# Patient Record
Sex: MALE | Race: ASIAN | NOT HISPANIC OR LATINO | Employment: OTHER | URBAN - METROPOLITAN AREA
[De-identification: names, ages, dates, MRNs, and addresses within clinical notes are randomized per-mention and may not be internally consistent; named-entity substitution may affect disease eponyms.]

---

## 2021-09-14 ENCOUNTER — HOSPITAL ENCOUNTER (INPATIENT)
Facility: CLINIC | Age: 71
LOS: 3 days | Discharge: NURSING, REHAB OR LONG-TERM CARE | DRG: 065 | End: 2021-09-17
Attending: EMERGENCY MEDICINE | Admitting: INTERNAL MEDICINE
Payer: MEDICARE

## 2021-09-14 ENCOUNTER — APPOINTMENT (OUTPATIENT)
Dept: CT IMAGING | Facility: CLINIC | Age: 71
DRG: 065 | End: 2021-09-14
Attending: EMERGENCY MEDICINE
Payer: MEDICARE

## 2021-09-14 ENCOUNTER — APPOINTMENT (OUTPATIENT)
Dept: GENERAL RADIOLOGY | Facility: CLINIC | Age: 71
DRG: 065 | End: 2021-09-14
Attending: EMERGENCY MEDICINE
Payer: MEDICARE

## 2021-09-14 ENCOUNTER — APPOINTMENT (OUTPATIENT)
Dept: MRI IMAGING | Facility: CLINIC | Age: 71
DRG: 065 | End: 2021-09-14
Attending: EMERGENCY MEDICINE
Payer: MEDICARE

## 2021-09-14 DIAGNOSIS — N17.9 ACUTE KIDNEY INJURY (H): ICD-10-CM

## 2021-09-14 DIAGNOSIS — I63.9 CEREBROVASCULAR ACCIDENT (CVA), UNSPECIFIED MECHANISM (H): Primary | ICD-10-CM

## 2021-09-14 DIAGNOSIS — R53.1 RIGHT SIDED WEAKNESS: ICD-10-CM

## 2021-09-14 DIAGNOSIS — I63.50 CEREBRAL ARTERY OCCLUSION WITH CEREBRAL INFARCTION (H): ICD-10-CM

## 2021-09-14 LAB
ALBUMIN UR-MCNC: 50 MG/DL
ANION GAP SERPL CALCULATED.3IONS-SCNC: 6 MMOL/L (ref 3–14)
APPEARANCE UR: CLEAR
APTT PPP: 29 SECONDS (ref 22–38)
ATRIAL RATE - MUSE: 77 BPM
BASOPHILS # BLD AUTO: 0.1 10E3/UL (ref 0–0.2)
BASOPHILS NFR BLD AUTO: 1 %
BILIRUB UR QL STRIP: NEGATIVE
BUN SERPL-MCNC: 35 MG/DL (ref 7–30)
CALCIUM SERPL-MCNC: 8.8 MG/DL (ref 8.5–10.1)
CHLORIDE BLD-SCNC: 98 MMOL/L (ref 94–109)
CO2 SERPL-SCNC: 30 MMOL/L (ref 20–32)
COLOR UR AUTO: YELLOW
CREAT SERPL-MCNC: 2.13 MG/DL (ref 0.66–1.25)
DIASTOLIC BLOOD PRESSURE - MUSE: NORMAL MMHG
EOSINOPHIL # BLD AUTO: 0.2 10E3/UL (ref 0–0.7)
EOSINOPHIL NFR BLD AUTO: 3 %
ERYTHROCYTE [DISTWIDTH] IN BLOOD BY AUTOMATED COUNT: 13.1 % (ref 10–15)
GFR SERPL CREATININE-BSD FRML MDRD: 30 ML/MIN/1.73M2
GLUCOSE BLD-MCNC: 379 MG/DL (ref 70–99)
GLUCOSE UR STRIP-MCNC: >=1000 MG/DL
HCO3 BLDV-SCNC: 31 MMOL/L (ref 21–28)
HCT VFR BLD AUTO: 41.6 % (ref 40–53)
HGB BLD-MCNC: 13.3 G/DL (ref 13.3–17.7)
HGB UR QL STRIP: NEGATIVE
HOLD SPECIMEN: NORMAL
IMM GRANULOCYTES # BLD: 0 10E3/UL
IMM GRANULOCYTES NFR BLD: 0 %
INR PPP: 0.94 (ref 0.85–1.15)
INTERPRETATION ECG - MUSE: NORMAL
KETONES UR STRIP-MCNC: NEGATIVE MG/DL
LACTATE BLD-SCNC: 2.2 MMOL/L
LEUKOCYTE ESTERASE UR QL STRIP: NEGATIVE
LYMPHOCYTES # BLD AUTO: 1.9 10E3/UL (ref 0.8–5.3)
LYMPHOCYTES NFR BLD AUTO: 25 %
MCH RBC QN AUTO: 26.7 PG (ref 26.5–33)
MCHC RBC AUTO-ENTMCNC: 32 G/DL (ref 31.5–36.5)
MCV RBC AUTO: 84 FL (ref 78–100)
MONOCYTES # BLD AUTO: 0.7 10E3/UL (ref 0–1.3)
MONOCYTES NFR BLD AUTO: 10 %
NEUTROPHILS # BLD AUTO: 4.6 10E3/UL (ref 1.6–8.3)
NEUTROPHILS NFR BLD AUTO: 61 %
NITRATE UR QL: NEGATIVE
NRBC # BLD AUTO: 0 10E3/UL
NRBC BLD AUTO-RTO: 0 /100
P AXIS - MUSE: 8 DEGREES
PCO2 BLDV: 47 MM HG (ref 40–50)
PH BLDV: 7.42 [PH] (ref 7.32–7.43)
PH UR STRIP: 6.5 [PH] (ref 5–7)
PLATELET # BLD AUTO: 207 10E3/UL (ref 150–450)
PO2 BLDV: 34 MM HG (ref 25–47)
POTASSIUM BLD-SCNC: 3.4 MMOL/L (ref 3.4–5.3)
PR INTERVAL - MUSE: 176 MS
QRS DURATION - MUSE: 68 MS
QT - MUSE: 400 MS
QTC - MUSE: 452 MS
R AXIS - MUSE: -24 DEGREES
RBC # BLD AUTO: 4.98 10E6/UL (ref 4.4–5.9)
RBC URINE: 2 /HPF
SAO2 % BLDV: 66 % (ref 94–100)
SODIUM SERPL-SCNC: 134 MMOL/L (ref 133–144)
SP GR UR STRIP: 1.02 (ref 1–1.03)
SYSTOLIC BLOOD PRESSURE - MUSE: NORMAL MMHG
T AXIS - MUSE: -4 DEGREES
TROPONIN I SERPL-MCNC: <0.015 UG/L (ref 0–0.04)
UROBILINOGEN UR STRIP-MCNC: NORMAL MG/DL
VENTRICULAR RATE- MUSE: 77 BPM
WBC # BLD AUTO: 7.5 10E3/UL (ref 4–11)
WBC URINE: 0 /HPF

## 2021-09-14 PROCEDURE — 87149 DNA/RNA DIRECT PROBE: CPT | Performed by: EMERGENCY MEDICINE

## 2021-09-14 PROCEDURE — 99291 CRITICAL CARE FIRST HOUR: CPT

## 2021-09-14 PROCEDURE — 81001 URINALYSIS AUTO W/SCOPE: CPT | Performed by: EMERGENCY MEDICINE

## 2021-09-14 PROCEDURE — 71045 X-RAY EXAM CHEST 1 VIEW: CPT

## 2021-09-14 PROCEDURE — 85025 COMPLETE CBC W/AUTO DIFF WBC: CPT | Performed by: EMERGENCY MEDICINE

## 2021-09-14 PROCEDURE — 36415 COLL VENOUS BLD VENIPUNCTURE: CPT | Performed by: EMERGENCY MEDICINE

## 2021-09-14 PROCEDURE — 96361 HYDRATE IV INFUSION ADD-ON: CPT

## 2021-09-14 PROCEDURE — 87077 CULTURE AEROBIC IDENTIFY: CPT | Performed by: EMERGENCY MEDICINE

## 2021-09-14 PROCEDURE — 70498 CT ANGIOGRAPHY NECK: CPT

## 2021-09-14 PROCEDURE — U0003 INFECTIOUS AGENT DETECTION BY NUCLEIC ACID (DNA OR RNA); SEVERE ACUTE RESPIRATORY SYNDROME CORONAVIRUS 2 (SARS-COV-2) (CORONAVIRUS DISEASE [COVID-19]), AMPLIFIED PROBE TECHNIQUE, MAKING USE OF HIGH THROUGHPUT TECHNOLOGIES AS DESCRIBED BY CMS-2020-01-R: HCPCS | Performed by: EMERGENCY MEDICINE

## 2021-09-14 PROCEDURE — 120N000001 HC R&B MED SURG/OB

## 2021-09-14 PROCEDURE — 258N000003 HC RX IP 258 OP 636: Performed by: EMERGENCY MEDICINE

## 2021-09-14 PROCEDURE — 70450 CT HEAD/BRAIN W/O DYE: CPT

## 2021-09-14 PROCEDURE — 85730 THROMBOPLASTIN TIME PARTIAL: CPT | Performed by: EMERGENCY MEDICINE

## 2021-09-14 PROCEDURE — 80048 BASIC METABOLIC PNL TOTAL CA: CPT | Performed by: EMERGENCY MEDICINE

## 2021-09-14 PROCEDURE — 99223 1ST HOSP IP/OBS HIGH 75: CPT | Mod: GC | Performed by: PSYCHIATRY & NEUROLOGY

## 2021-09-14 PROCEDURE — 82803 BLOOD GASES ANY COMBINATION: CPT

## 2021-09-14 PROCEDURE — 250N000009 HC RX 250: Performed by: EMERGENCY MEDICINE

## 2021-09-14 PROCEDURE — 96360 HYDRATION IV INFUSION INIT: CPT | Mod: 59

## 2021-09-14 PROCEDURE — 99223 1ST HOSP IP/OBS HIGH 75: CPT | Mod: AI | Performed by: INTERNAL MEDICINE

## 2021-09-14 PROCEDURE — 93005 ELECTROCARDIOGRAM TRACING: CPT

## 2021-09-14 PROCEDURE — 85610 PROTHROMBIN TIME: CPT | Performed by: EMERGENCY MEDICINE

## 2021-09-14 PROCEDURE — C9803 HOPD COVID-19 SPEC COLLECT: HCPCS

## 2021-09-14 PROCEDURE — 250N000011 HC RX IP 250 OP 636: Performed by: EMERGENCY MEDICINE

## 2021-09-14 PROCEDURE — 70551 MRI BRAIN STEM W/O DYE: CPT | Mod: 52

## 2021-09-14 PROCEDURE — 84484 ASSAY OF TROPONIN QUANT: CPT | Performed by: EMERGENCY MEDICINE

## 2021-09-14 RX ORDER — IOPAMIDOL 755 MG/ML
70 INJECTION, SOLUTION INTRAVASCULAR ONCE
Status: COMPLETED | OUTPATIENT
Start: 2021-09-14 | End: 2021-09-14

## 2021-09-14 RX ADMIN — IOPAMIDOL 70 ML: 755 INJECTION, SOLUTION INTRAVENOUS at 21:49

## 2021-09-14 RX ADMIN — SODIUM CHLORIDE 1000 ML: 9 INJECTION, SOLUTION INTRAVENOUS at 22:32

## 2021-09-14 RX ADMIN — SODIUM CHLORIDE 100 ML: 9 INJECTION, SOLUTION INTRAVENOUS at 21:49

## 2021-09-14 ASSESSMENT — ENCOUNTER SYMPTOMS
WEAKNESS: 1
FACIAL ASYMMETRY: 1

## 2021-09-15 ENCOUNTER — APPOINTMENT (OUTPATIENT)
Dept: CARDIOLOGY | Facility: CLINIC | Age: 71
DRG: 065 | End: 2021-09-15
Attending: INTERNAL MEDICINE
Payer: MEDICARE

## 2021-09-15 ENCOUNTER — APPOINTMENT (OUTPATIENT)
Dept: SPEECH THERAPY | Facility: CLINIC | Age: 71
DRG: 065 | End: 2021-09-15
Attending: INTERNAL MEDICINE
Payer: MEDICARE

## 2021-09-15 ENCOUNTER — APPOINTMENT (OUTPATIENT)
Dept: OCCUPATIONAL THERAPY | Facility: CLINIC | Age: 71
DRG: 065 | End: 2021-09-15
Attending: INTERNAL MEDICINE
Payer: MEDICARE

## 2021-09-15 ENCOUNTER — APPOINTMENT (OUTPATIENT)
Dept: PHYSICAL THERAPY | Facility: CLINIC | Age: 71
DRG: 065 | End: 2021-09-15
Attending: INTERNAL MEDICINE
Payer: MEDICARE

## 2021-09-15 LAB
ALBUMIN SERPL-MCNC: 2.6 G/DL (ref 3.4–5)
ALP SERPL-CCNC: 80 U/L (ref 40–150)
ALT SERPL W P-5'-P-CCNC: 22 U/L (ref 0–70)
ANION GAP SERPL CALCULATED.3IONS-SCNC: 4 MMOL/L (ref 3–14)
AST SERPL W P-5'-P-CCNC: 20 U/L (ref 0–45)
BILIRUB SERPL-MCNC: 0.3 MG/DL (ref 0.2–1.3)
BUN SERPL-MCNC: 30 MG/DL (ref 7–30)
CALCIUM SERPL-MCNC: 8.4 MG/DL (ref 8.5–10.1)
CHLORIDE BLD-SCNC: 104 MMOL/L (ref 94–109)
CHOLEST SERPL-MCNC: 172 MG/DL
CO2 SERPL-SCNC: 29 MMOL/L (ref 20–32)
CREAT SERPL-MCNC: 1.67 MG/DL (ref 0.66–1.25)
ERYTHROCYTE [DISTWIDTH] IN BLOOD BY AUTOMATED COUNT: 13.2 % (ref 10–15)
GFR SERPL CREATININE-BSD FRML MDRD: 41 ML/MIN/1.73M2
GLUCOSE BLD-MCNC: 253 MG/DL (ref 70–99)
GLUCOSE BLDC GLUCOMTR-MCNC: 205 MG/DL (ref 70–99)
GLUCOSE BLDC GLUCOMTR-MCNC: 209 MG/DL (ref 70–99)
GLUCOSE BLDC GLUCOMTR-MCNC: 246 MG/DL (ref 70–99)
GLUCOSE BLDC GLUCOMTR-MCNC: 273 MG/DL (ref 70–99)
GLUCOSE BLDC GLUCOMTR-MCNC: 344 MG/DL (ref 70–99)
GLUCOSE BLDC GLUCOMTR-MCNC: 393 MG/DL (ref 70–99)
HBA1C MFR BLD: 11.5 % (ref 0–5.6)
HCO3 BLDV-SCNC: 29 MMOL/L (ref 21–28)
HCT VFR BLD AUTO: 39.2 % (ref 40–53)
HDLC SERPL-MCNC: 32 MG/DL
HGB BLD-MCNC: 12.5 G/DL (ref 13.3–17.7)
LACTATE BLD-SCNC: 1.3 MMOL/L
LDLC SERPL CALC-MCNC: 92 MG/DL
MCH RBC QN AUTO: 26.8 PG (ref 26.5–33)
MCHC RBC AUTO-ENTMCNC: 31.9 G/DL (ref 31.5–36.5)
MCV RBC AUTO: 84 FL (ref 78–100)
NONHDLC SERPL-MCNC: 140 MG/DL
PCO2 BLDV: 51 MM HG (ref 40–50)
PH BLDV: 7.37 [PH] (ref 7.32–7.43)
PLATELET # BLD AUTO: 176 10E3/UL (ref 150–450)
PO2 BLDV: 28 MM HG (ref 25–47)
POTASSIUM BLD-SCNC: 3.2 MMOL/L (ref 3.4–5.3)
POTASSIUM BLD-SCNC: 3.5 MMOL/L (ref 3.4–5.3)
PROT SERPL-MCNC: 6.6 G/DL (ref 6.8–8.8)
RADIOLOGIST FLAGS: ABNORMAL
RBC # BLD AUTO: 4.66 10E6/UL (ref 4.4–5.9)
SAO2 % BLDV: 50 % (ref 94–100)
SARS-COV-2 RNA RESP QL NAA+PROBE: NEGATIVE
SODIUM SERPL-SCNC: 137 MMOL/L (ref 133–144)
TRIGL SERPL-MCNC: 238 MG/DL
WBC # BLD AUTO: 7.9 10E3/UL (ref 4–11)

## 2021-09-15 PROCEDURE — 250N000009 HC RX 250: Performed by: INTERNAL MEDICINE

## 2021-09-15 PROCEDURE — 97163 PT EVAL HIGH COMPLEX 45 MIN: CPT | Mod: GP | Performed by: PHYSICAL THERAPIST

## 2021-09-15 PROCEDURE — 92610 EVALUATE SWALLOWING FUNCTION: CPT | Mod: GN

## 2021-09-15 PROCEDURE — 97165 OT EVAL LOW COMPLEX 30 MIN: CPT | Mod: GO | Performed by: OCCUPATIONAL THERAPIST

## 2021-09-15 PROCEDURE — 999N000208 ECHOCARDIOGRAM COMPLETE

## 2021-09-15 PROCEDURE — 250N000013 HC RX MED GY IP 250 OP 250 PS 637: Performed by: INTERNAL MEDICINE

## 2021-09-15 PROCEDURE — 120N000001 HC R&B MED SURG/OB

## 2021-09-15 PROCEDURE — 84132 ASSAY OF SERUM POTASSIUM: CPT | Performed by: INTERNAL MEDICINE

## 2021-09-15 PROCEDURE — 99233 SBSQ HOSP IP/OBS HIGH 50: CPT | Performed by: PSYCHIATRY & NEUROLOGY

## 2021-09-15 PROCEDURE — 80061 LIPID PANEL: CPT | Performed by: INTERNAL MEDICINE

## 2021-09-15 PROCEDURE — 97110 THERAPEUTIC EXERCISES: CPT | Mod: GP | Performed by: PHYSICAL THERAPIST

## 2021-09-15 PROCEDURE — 93306 TTE W/DOPPLER COMPLETE: CPT | Mod: 26 | Performed by: INTERNAL MEDICINE

## 2021-09-15 PROCEDURE — 97530 THERAPEUTIC ACTIVITIES: CPT | Mod: GP | Performed by: PHYSICAL THERAPIST

## 2021-09-15 PROCEDURE — 82803 BLOOD GASES ANY COMBINATION: CPT

## 2021-09-15 PROCEDURE — 250N000012 HC RX MED GY IP 250 OP 636 PS 637: Performed by: INTERNAL MEDICINE

## 2021-09-15 PROCEDURE — 258N000003 HC RX IP 258 OP 636: Performed by: EMERGENCY MEDICINE

## 2021-09-15 PROCEDURE — 99221 1ST HOSP IP/OBS SF/LOW 40: CPT | Performed by: STUDENT IN AN ORGANIZED HEALTH CARE EDUCATION/TRAINING PROGRAM

## 2021-09-15 PROCEDURE — 258N000003 HC RX IP 258 OP 636: Performed by: INTERNAL MEDICINE

## 2021-09-15 PROCEDURE — 80053 COMPREHEN METABOLIC PANEL: CPT | Performed by: INTERNAL MEDICINE

## 2021-09-15 PROCEDURE — 255N000002 HC RX 255 OP 636: Performed by: INTERNAL MEDICINE

## 2021-09-15 PROCEDURE — 83036 HEMOGLOBIN GLYCOSYLATED A1C: CPT | Performed by: INTERNAL MEDICINE

## 2021-09-15 PROCEDURE — 97535 SELF CARE MNGMENT TRAINING: CPT | Mod: GO | Performed by: OCCUPATIONAL THERAPIST

## 2021-09-15 PROCEDURE — 250N000011 HC RX IP 250 OP 636: Performed by: INTERNAL MEDICINE

## 2021-09-15 PROCEDURE — C9113 INJ PANTOPRAZOLE SODIUM, VIA: HCPCS | Performed by: INTERNAL MEDICINE

## 2021-09-15 PROCEDURE — 97530 THERAPEUTIC ACTIVITIES: CPT | Mod: GO | Performed by: OCCUPATIONAL THERAPIST

## 2021-09-15 PROCEDURE — 36415 COLL VENOUS BLD VENIPUNCTURE: CPT | Performed by: INTERNAL MEDICINE

## 2021-09-15 PROCEDURE — 99233 SBSQ HOSP IP/OBS HIGH 50: CPT | Performed by: INTERNAL MEDICINE

## 2021-09-15 PROCEDURE — 85027 COMPLETE CBC AUTOMATED: CPT | Performed by: INTERNAL MEDICINE

## 2021-09-15 RX ORDER — ASPIRIN 81 MG/1
81 TABLET, CHEWABLE ORAL DAILY
Status: CANCELLED | OUTPATIENT
Start: 2021-09-15

## 2021-09-15 RX ORDER — CLOPIDOGREL BISULFATE 75 MG/1
75 TABLET ORAL DAILY
Status: DISCONTINUED | OUTPATIENT
Start: 2021-09-15 | End: 2021-09-16

## 2021-09-15 RX ORDER — ASPIRIN 81 MG/1
81 TABLET ORAL DAILY
Status: CANCELLED | OUTPATIENT
Start: 2021-09-15

## 2021-09-15 RX ORDER — NALOXONE HYDROCHLORIDE 0.4 MG/ML
0.2 INJECTION, SOLUTION INTRAMUSCULAR; INTRAVENOUS; SUBCUTANEOUS
Status: DISCONTINUED | OUTPATIENT
Start: 2021-09-15 | End: 2021-09-17 | Stop reason: HOSPADM

## 2021-09-15 RX ORDER — ASPIRIN 81 MG/1
81 TABLET, CHEWABLE ORAL DAILY
Status: DISCONTINUED | OUTPATIENT
Start: 2021-09-16 | End: 2021-09-17 | Stop reason: HOSPADM

## 2021-09-15 RX ORDER — AMLODIPINE BESYLATE 10 MG/1
10 TABLET ORAL DAILY
Status: ON HOLD | COMMUNITY
End: 2021-10-06

## 2021-09-15 RX ORDER — ATENOLOL 25 MG/1
25 TABLET ORAL 2 TIMES DAILY
Status: DISCONTINUED | OUTPATIENT
Start: 2021-09-15 | End: 2021-09-17 | Stop reason: HOSPADM

## 2021-09-15 RX ORDER — ONDANSETRON 4 MG/1
4 TABLET, ORALLY DISINTEGRATING ORAL EVERY 6 HOURS PRN
Status: DISCONTINUED | OUTPATIENT
Start: 2021-09-15 | End: 2021-09-15

## 2021-09-15 RX ORDER — ONDANSETRON 2 MG/ML
4 INJECTION INTRAMUSCULAR; INTRAVENOUS EVERY 6 HOURS PRN
Status: DISCONTINUED | OUTPATIENT
Start: 2021-09-15 | End: 2021-09-17 | Stop reason: HOSPADM

## 2021-09-15 RX ORDER — TAMSULOSIN HYDROCHLORIDE 0.4 MG/1
0.4 CAPSULE ORAL DAILY
Status: ON HOLD | COMMUNITY
End: 2021-10-06

## 2021-09-15 RX ORDER — NICOTINE POLACRILEX 4 MG
15-30 LOZENGE BUCCAL
Status: DISCONTINUED | OUTPATIENT
Start: 2021-09-15 | End: 2021-09-17 | Stop reason: HOSPADM

## 2021-09-15 RX ORDER — CLOPIDOGREL BISULFATE 75 MG/1
75 TABLET ORAL DAILY
COMMUNITY

## 2021-09-15 RX ORDER — HYDROCHLOROTHIAZIDE 25 MG/1
25 TABLET ORAL DAILY
Status: ON HOLD | COMMUNITY
End: 2021-09-17

## 2021-09-15 RX ORDER — ATORVASTATIN CALCIUM 80 MG/1
80 TABLET, FILM COATED ORAL DAILY
COMMUNITY

## 2021-09-15 RX ORDER — CLOPIDOGREL BISULFATE 75 MG/1
75 TABLET ORAL DAILY
Status: CANCELLED | OUTPATIENT
Start: 2021-09-15

## 2021-09-15 RX ORDER — ACETAMINOPHEN 325 MG/1
650 TABLET ORAL EVERY 4 HOURS PRN
Status: DISCONTINUED | OUTPATIENT
Start: 2021-09-15 | End: 2021-09-17 | Stop reason: HOSPADM

## 2021-09-15 RX ORDER — SENNOSIDES 8.6 MG
8.6 TABLET ORAL 2 TIMES DAILY PRN
Status: DISCONTINUED | OUTPATIENT
Start: 2021-09-15 | End: 2021-09-17 | Stop reason: HOSPADM

## 2021-09-15 RX ORDER — INSULIN ASPART 100 [IU]/ML
INJECTION, SOLUTION INTRAVENOUS; SUBCUTANEOUS
Status: ON HOLD | COMMUNITY
End: 2021-10-06

## 2021-09-15 RX ORDER — SPIRONOLACTONE 25 MG/1
25 TABLET ORAL DAILY
Status: ON HOLD | COMMUNITY
End: 2021-09-17

## 2021-09-15 RX ORDER — NALOXONE HYDROCHLORIDE 0.4 MG/ML
0.4 INJECTION, SOLUTION INTRAMUSCULAR; INTRAVENOUS; SUBCUTANEOUS
Status: DISCONTINUED | OUTPATIENT
Start: 2021-09-15 | End: 2021-09-17 | Stop reason: HOSPADM

## 2021-09-15 RX ORDER — PROCHLORPERAZINE 25 MG
12.5 SUPPOSITORY, RECTAL RECTAL EVERY 12 HOURS PRN
Status: DISCONTINUED | OUTPATIENT
Start: 2021-09-15 | End: 2021-09-17 | Stop reason: HOSPADM

## 2021-09-15 RX ORDER — ATORVASTATIN CALCIUM 80 MG/1
80 TABLET, FILM COATED ORAL DAILY
Status: DISCONTINUED | OUTPATIENT
Start: 2021-09-15 | End: 2021-09-17 | Stop reason: HOSPADM

## 2021-09-15 RX ORDER — ACETAMINOPHEN 325 MG/1
650 TABLET ORAL EVERY 6 HOURS PRN
Status: DISCONTINUED | OUTPATIENT
Start: 2021-09-15 | End: 2021-09-15

## 2021-09-15 RX ORDER — DEXTROSE MONOHYDRATE 25 G/50ML
25-50 INJECTION, SOLUTION INTRAVENOUS
Status: DISCONTINUED | OUTPATIENT
Start: 2021-09-15 | End: 2021-09-17 | Stop reason: HOSPADM

## 2021-09-15 RX ORDER — LIDOCAINE 40 MG/G
CREAM TOPICAL
Status: DISCONTINUED | OUTPATIENT
Start: 2021-09-15 | End: 2021-09-15

## 2021-09-15 RX ORDER — PROCHLORPERAZINE MALEATE 5 MG
5 TABLET ORAL EVERY 6 HOURS PRN
Status: DISCONTINUED | OUTPATIENT
Start: 2021-09-15 | End: 2021-09-15

## 2021-09-15 RX ORDER — ASPIRIN 81 MG/1
81 TABLET, CHEWABLE ORAL DAILY
Status: ON HOLD | COMMUNITY
End: 2021-11-03

## 2021-09-15 RX ORDER — NICOTINE POLACRILEX 4 MG
15-30 LOZENGE BUCCAL
Status: DISCONTINUED | OUTPATIENT
Start: 2021-09-15 | End: 2021-09-15

## 2021-09-15 RX ORDER — LANOLIN ALCOHOL/MO/W.PET/CERES
3 CREAM (GRAM) TOPICAL
Status: DISCONTINUED | OUTPATIENT
Start: 2021-09-15 | End: 2021-09-17 | Stop reason: HOSPADM

## 2021-09-15 RX ORDER — POTASSIUM CHLORIDE 1500 MG/1
20 TABLET, EXTENDED RELEASE ORAL DAILY
Status: ON HOLD | COMMUNITY
End: 2021-10-06

## 2021-09-15 RX ORDER — POLYETHYLENE GLYCOL 3350 17 G/17G
17 POWDER, FOR SOLUTION ORAL DAILY
Status: DISCONTINUED | OUTPATIENT
Start: 2021-09-15 | End: 2021-09-17 | Stop reason: HOSPADM

## 2021-09-15 RX ORDER — PROCHLORPERAZINE 25 MG
12.5 SUPPOSITORY, RECTAL RECTAL EVERY 12 HOURS PRN
Status: DISCONTINUED | OUTPATIENT
Start: 2021-09-15 | End: 2021-09-15

## 2021-09-15 RX ORDER — LIDOCAINE HYDROCHLORIDE 20 MG/ML
JELLY TOPICAL EVERY 4 HOURS PRN
Status: DISCONTINUED | OUTPATIENT
Start: 2021-09-15 | End: 2021-09-17 | Stop reason: HOSPADM

## 2021-09-15 RX ORDER — LABETALOL HYDROCHLORIDE 5 MG/ML
10-20 INJECTION, SOLUTION INTRAVENOUS EVERY 10 MIN PRN
Status: DISCONTINUED | OUTPATIENT
Start: 2021-09-15 | End: 2021-09-17 | Stop reason: HOSPADM

## 2021-09-15 RX ORDER — ACETAMINOPHEN 650 MG/1
650 SUPPOSITORY RECTAL EVERY 6 HOURS PRN
Status: DISCONTINUED | OUTPATIENT
Start: 2021-09-15 | End: 2021-09-15

## 2021-09-15 RX ORDER — DEXTROSE MONOHYDRATE 25 G/50ML
25-50 INJECTION, SOLUTION INTRAVENOUS
Status: DISCONTINUED | OUTPATIENT
Start: 2021-09-15 | End: 2021-09-15

## 2021-09-15 RX ORDER — LOSARTAN POTASSIUM 100 MG/1
100 TABLET ORAL DAILY
Status: ON HOLD | COMMUNITY
End: 2021-09-17

## 2021-09-15 RX ORDER — TAMSULOSIN HYDROCHLORIDE 0.4 MG/1
0.4 CAPSULE ORAL DAILY
Status: DISCONTINUED | OUTPATIENT
Start: 2021-09-15 | End: 2021-09-17 | Stop reason: HOSPADM

## 2021-09-15 RX ORDER — ACETAMINOPHEN 650 MG/1
650 SUPPOSITORY RECTAL EVERY 4 HOURS PRN
Status: DISCONTINUED | OUTPATIENT
Start: 2021-09-15 | End: 2021-09-17 | Stop reason: HOSPADM

## 2021-09-15 RX ORDER — SODIUM CHLORIDE 9 MG/ML
INJECTION, SOLUTION INTRAVENOUS CONTINUOUS
Status: DISCONTINUED | OUTPATIENT
Start: 2021-09-15 | End: 2021-09-17 | Stop reason: HOSPADM

## 2021-09-15 RX ORDER — ATENOLOL 50 MG/1
50 TABLET ORAL 2 TIMES DAILY
Status: ON HOLD | COMMUNITY
End: 2021-10-06

## 2021-09-15 RX ORDER — HYDRALAZINE HYDROCHLORIDE 20 MG/ML
10-20 INJECTION INTRAMUSCULAR; INTRAVENOUS EVERY 30 MIN PRN
Status: DISCONTINUED | OUTPATIENT
Start: 2021-09-15 | End: 2021-09-17 | Stop reason: HOSPADM

## 2021-09-15 RX ORDER — LIDOCAINE 40 MG/G
CREAM TOPICAL
Status: DISCONTINUED | OUTPATIENT
Start: 2021-09-15 | End: 2021-09-17 | Stop reason: HOSPADM

## 2021-09-15 RX ORDER — ASPIRIN 300 MG/1
300 SUPPOSITORY RECTAL DAILY
Status: DISCONTINUED | OUTPATIENT
Start: 2021-09-15 | End: 2021-09-15

## 2021-09-15 RX ORDER — HYDROMORPHONE HCL IN WATER/PF 6 MG/30 ML
0.2 PATIENT CONTROLLED ANALGESIA SYRINGE INTRAVENOUS
Status: DISCONTINUED | OUTPATIENT
Start: 2021-09-15 | End: 2021-09-17 | Stop reason: HOSPADM

## 2021-09-15 RX ORDER — PROCHLORPERAZINE MALEATE 5 MG
5 TABLET ORAL EVERY 6 HOURS PRN
Status: DISCONTINUED | OUTPATIENT
Start: 2021-09-15 | End: 2021-09-17 | Stop reason: HOSPADM

## 2021-09-15 RX ORDER — CLOPIDOGREL 300 MG/1
300 TABLET, FILM COATED ORAL ONCE
Status: CANCELLED | OUTPATIENT
Start: 2021-09-15

## 2021-09-15 RX ORDER — ONDANSETRON 4 MG/1
4 TABLET, ORALLY DISINTEGRATING ORAL EVERY 6 HOURS PRN
Status: DISCONTINUED | OUTPATIENT
Start: 2021-09-15 | End: 2021-09-17 | Stop reason: HOSPADM

## 2021-09-15 RX ORDER — ONDANSETRON 2 MG/ML
4 INJECTION INTRAMUSCULAR; INTRAVENOUS EVERY 6 HOURS PRN
Status: DISCONTINUED | OUTPATIENT
Start: 2021-09-15 | End: 2021-09-15

## 2021-09-15 RX ADMIN — HUMAN ALBUMIN MICROSPHERES AND PERFLUTREN 9 ML: 10; .22 INJECTION, SOLUTION INTRAVENOUS at 15:25

## 2021-09-15 RX ADMIN — SODIUM CHLORIDE: 9 INJECTION, SOLUTION INTRAVENOUS at 17:48

## 2021-09-15 RX ADMIN — ASPIRIN 300 MG: 300 SUPPOSITORY RECTAL at 10:53

## 2021-09-15 RX ADMIN — INSULIN ASPART 2 UNITS: 100 INJECTION, SOLUTION INTRAVENOUS; SUBCUTANEOUS at 13:06

## 2021-09-15 RX ADMIN — ATENOLOL 25 MG: 25 TABLET ORAL at 11:38

## 2021-09-15 RX ADMIN — LIDOCAINE HYDROCHLORIDE 6 ML: 20 JELLY TOPICAL at 13:31

## 2021-09-15 RX ADMIN — INSULIN ASPART 3 UNITS: 100 INJECTION, SOLUTION INTRAVENOUS; SUBCUTANEOUS at 06:37

## 2021-09-15 RX ADMIN — TAMSULOSIN HYDROCHLORIDE 0.4 MG: 0.4 CAPSULE ORAL at 11:38

## 2021-09-15 RX ADMIN — ATENOLOL 25 MG: 25 TABLET ORAL at 21:35

## 2021-09-15 RX ADMIN — CLOPIDOGREL BISULFATE 75 MG: 75 TABLET ORAL at 17:43

## 2021-09-15 RX ADMIN — SODIUM CHLORIDE 1000 ML: 9 INJECTION, SOLUTION INTRAVENOUS at 00:09

## 2021-09-15 RX ADMIN — INSULIN ASPART 2 UNITS: 100 INJECTION, SOLUTION INTRAVENOUS; SUBCUTANEOUS at 09:40

## 2021-09-15 RX ADMIN — PANTOPRAZOLE SODIUM 40 MG: 40 INJECTION, POWDER, FOR SOLUTION INTRAVENOUS at 09:30

## 2021-09-15 RX ADMIN — SODIUM CHLORIDE: 9 INJECTION, SOLUTION INTRAVENOUS at 07:39

## 2021-09-15 RX ADMIN — SODIUM CHLORIDE: 9 INJECTION, SOLUTION INTRAVENOUS at 02:30

## 2021-09-15 RX ADMIN — INSULIN GLARGINE 20 UNITS: 100 INJECTION, SOLUTION SUBCUTANEOUS at 21:35

## 2021-09-15 RX ADMIN — ATORVASTATIN CALCIUM 80 MG: 80 TABLET, FILM COATED ORAL at 11:38

## 2021-09-15 RX ADMIN — INSULIN ASPART 3 UNITS: 100 INJECTION, SOLUTION INTRAVENOUS; SUBCUTANEOUS at 02:15

## 2021-09-15 ASSESSMENT — ACTIVITIES OF DAILY LIVING (ADL)
ADLS_ACUITY_SCORE: 21
PREVIOUS_RESPONSIBILITIES: MEDICATION MANAGEMENT;DRIVING
ADLS_ACUITY_SCORE: 19
ADLS_ACUITY_SCORE: 17
ADLS_ACUITY_SCORE: 17
ADLS_ACUITY_SCORE: 21

## 2021-09-15 NOTE — PROGRESS NOTES
09/15/21 1000   Quick Adds   Type of Visit Initial Occupational Therapy Evaluation   Living Environment   People in home spouse   Current Living Arrangements house   Home Accessibility stairs to enter home;stairs within home   Number of Stairs, Main Entrance   (1-2, no rail)   Number of Stairs, Within Home, Primary   (uses stair/chair lift)   Transportation Anticipated   (pt. driving at baseline)   Living Environment Comments Per. dtr. report, pt. typically stays on  main level, uses 4WW in/utside the home, also uses a cane outside the  home at times;has a commode over the toilet and a tub/shower combo. w/shower chair, grab bars and hand-held shower hose.    Self-Care   Usual Activity Tolerance moderate   Current Activity Tolerance fair   Regular Exercise Yes   Activity/Exercise Type walking   Exercise Amount/Frequency   (walks around [place]de[place]sac 2X/day w/4WW)   Equipment Currently Used at Home cane, straight;grab bar, tub/shower;shower chair;walker, rolling;other (see comments)  (commode--uses over the toilet)   Activity/Exercise/Self-Care Comment Pt. overall indep./mod. indep. w/mobility + ADL's w/ exception of dressing, bathing--gets some assist, dtr. not sure to what level;pt. completes all grooming, toileting tasks on his own;pt. boudreaux smedication mgmt. on his own.    Instrumental Activities of Daily Living (IADL)   Previous Responsibilities medication management;driving  (spouse does the cooking,cleaning and laundry)   Disability/Function   Walking or Climbing Stairs Difficulty yes   Walking or Climbing Stairs ambulation difficulty, requires equipment   Mobility Management 4WW;also occasionally uses cane   Dressing/Bathing Difficulty yes   Dressing/Bathing bathing difficulty, requires equipment;bathing difficulty, assistance 1 person;dressing difficulty, assistance 1 person   Toileting issues yes   Toileting Assistance toileting difficulty, requires equipment   Fall history within last six months yes   Number  of times patient has fallen within last six months 2   Change in Functional Status Since Onset of Current Illness/Injury yes   General Information   Onset of Illness/Injury or Date of Surgery 09/14/21   Referring Physician    Additional Occupational Profile Info/Pertinent History of Current Problem OT:Jimmy Otto is a 71 year old male admitted on 9/14/2021. He presents to the emergency department with approximately 2 days of some mild increased weakness last seen by sister-in-law at 6 PM and fairly baseline. Shortly thereafter, patient developed significant expressive aphasia and weakness, found to have new right thalamic stroke with prior left thalamic stroke.   Performance Patterns (Routines, Roles, Habits) Per dtr., has had 2 previous strokes;pt. w/ baseline R-sdied weakness, limited RUE ROM.   Existing Precautions/Restrictions fall;swallowing  (pureed w/thin liquids)   Limitations/Impairments swallowing   General Observations and Info Pt. sleepy, following commands, dtr. present, expressive aphasia;per chart--pt. had slow speech at baseline, currently w/ increaased impairment   Cognitive Status Examination   Orientation Status person;time  (stated correct month)   Follows Commands follows one-step commands;75-90% accuracy   Cognitive Status Comments difficult to assess due to aphasia   Visual Perception   Impact of Vision Impairment on Function (Vision) able to visually track object w/ exception of L upper quadrant--will need to further assess   Sensory   Sensory Comments light touch intact;nonumbness/tingling reported   Pain Assessment   Patient Currently in Pain No   Posture   Posture forward head position;protracted shoulders   Range of Motion Comprehensive   Comment, General Range of Motion RUE--able to demo. digit flex./ext., no other AROM, hold arm w/ elbow flexed, supinated;PROM=WFL all planes/shoulder to 90 egrees; LUE=scap./shoulder to WFL;elbow through hand=WNL/WFL, some difficulty w/  command-following for testing   Strength Comprehensive (MMT)   Comment, General Manual Muscle Testing (MMT) Assessment RUE:impaired/weak from previous strok LUE:grossly 3+/5 shoulder strength, 4-/5 elbow flex./ext. + forearm pron./sup.    Coordination   Upper Extremity Coordination Left UE impaired   Coordination Comments R sided hemiparesis from previous CVA   Bed Mobility   Comment (Bed Mobility) mod.-max. A   Balance   Balance Comments sitting balance w/ initial mod. A, progressed to SBA-CGA   Lower Body Dressing Assessment   [place] Level (Lower Body Dressing) dependent (less than 25% patient effort)   Grooming Assessment   [place] Level (Grooming) moderate assist (50% patient effort)   Clinical Impression   Criteria for Skilled Therapeutic Interventions Met (OT) yes   OT Diagnosis Declinein ADl performance   OT Problem List-Impairments impacting ADL problems related to;activity tolerance impaired;balance;cognition;communication;coordination;mobility;motor control;range of motion (ROM);strength;pain;postural control;vision   Assessment of Occupational Performance 5 or more Performance Deficits   Identified Performance Deficits dressing,bathing, groooming, toileting, mobility/transfers, IADL's   Planned Therapy Interventions (OT) ADL retraining;IADL retraining;balance training;cognition;fine motor coordination training;motor coordination training;neuromuscular re-education;ROM;strengthening;transfer training;visual perception;home program guidelines;progressive activity/exercise   Clinical Decision Making Complexity (OT) low complexity   Therapy Frequency (OT) Daily   Predicted Duration of Therapy 3-5 days   Anticipated Equipment Needs Upon Discharge (OT)   (defer to rehab)   Risk & Benefits of therapy have been explained evaluation/treatment results reviewed;care plan/treatment goals reviewed;risks/benefits reviewed;current/potential barriers reviewed;participants voiced agreement with care  plan;participants included;patient;daughter   OT Discharge Planning    OT Discharge Recommendation (DC Rec) [facility] patient will benefit from intensive, interdisciplinary therapy.  Anticipate will be able to tolerate 3 hours of therapy per day   OT Rationale for DC Rec OT:Pt. is currently well below ADL baseline and would benefit from continued intensivev daily OT to maximize safety/indep. w/ ADL's prior to discharge home;pt. appears m otivated w/ good family support.    Total Evaluation Time (Minutes)   Total Evaluation Time (Minutes) 10

## 2021-09-15 NOTE — ED PROVIDER NOTES
History   Chief Complaint:  Altered Mental Status     The history is provided by the patient, medical records and the EMS personnel.      Jimmy Otto is a 71 year old male with history of hypertension, type 2 diabetes, and stroke who presents with altered mental status. The patient's family states that approximately 3 hours ago at 1800 the patient started to mentally decline as he had difficulty balancing and was unable to ambulate, although he has difficulty ambulating at baseline. Since 1800, they have been trying to have him agree to go to the ED but the patient declined. Since symptom onset, the patient has developed a right-sided facial droop and lost movement in his right side, prompting his family to call EMS. En route his blood sugar was noted to be 450 and noted the patient to be fairly unresponsive. Here in the ED the patient is able to recite his name but not his birthday, and denies any headache. Of note the patient had a stroke 7 years ago. He has marks on his chest from a coining ceremony as well.    Review of Systems   Unable to perform ROS: Acuity of condition   Neurological: Positive for facial asymmetry (right side droop) and weakness.       Allergies:  No known drug allergies    Medications:  Insulin  Lisinopril  Metformin  Zocor  Maxzide  Plavix  Oretic  Cozaar    Past Medical History:    Type 2 Diabetes  Hypercholesteremia  Hypertension  Unspecified Cerebral Artery Occlusion with Cerebral Infarction  Stroke  Sleep Apnea  Torn Rotator Cuff  Imbalance  Hyperlipidemia  Microalbuminuria  Depression  Gait Abnormality  Acute Renal Failure  Dysphagia    Past Surgical History:    No past surgical history on file.    Family History:    Mother - Cataract    Social History:  Marital Status:     Physical Exam     Patient Vitals for the past 24 hrs:   BP Temp Temp src Pulse Resp SpO2 Weight   09/14/21 2300 -- -- -- 71 19 97 % --   09/14/21 2230 (!) 169/93 -- -- 73 -- 97 % --   09/14/21 2200 (!) 181/95  -- -- 101 16 98 % --   09/14/21 2150 (!) 172/96 -- -- 77 -- 98 % --   09/14/21 2148 -- 97.7  F (36.5  C) Temporal -- -- -- 63.8 kg (140 lb 10.5 oz)   09/14/21 2145 -- -- -- 77 20 98 % --   09/14/21 2140 (!) 183/97 -- -- 77 18 98 % --       Physical Exam   Eyes:  The pupils are equal and round    Conjunctivae and sclerae are normal  ENT:    The nose is normal    Pinnae are normal  CV:  Regular rate and rhythm     No edema  Resp:  Lungs are clear    Non-labored    No rales    No wheezing   GI:  Abdomen is soft, there is no rigidity    No distension    No rebound tenderness   MS:  Normal muscular tone    No asymmetric leg swelling  Skin:  No rash or acute skin lesions noted  Neuro:   Awake, alert, GCS 15    Speech is slow but does not appear slurred    Face is symmetric    Right sided weakness, hold right leg off bed briefly    No drift in LLE    No drift in LUE    Effort of right upper extremity against gravity    SILT in all four extremities    Equal sensation bilaterally    No inattention    EOMI    No apparent visual field deficits      Emergency Department Course   ECG (21:43:59):  Indication: Screening for cardiovascular disease.   Rate 77 bpm. WV interval 176. QRS duration 68. QT/QTc 400/452. P-R-T axes 8 -24 -4.   Interpretation: Sinus rhythm with fusion complexes. Moderate voltage criteria for LVH, may be normal variant. Junctional ST depression, probably abnormal. Abnormal ECG.  Interpreted at 2145 by Dr. Young.     Imaging:  XR Chest Port 1 view   IMPRESSION: Somewhat low lung volumes. Atelectasis/infiltrate at the left base. Right lung appears clear. No significant pleural fluid. No pneumothorax. Normal heart size and pulmonary vascularity.  Reading per radiology    CTA Head Neck w contrast   IMPRESSION:   HEAD CT:   1.  No CT evidence of acute intracranial hemorrhage, mass or recent infarct.   2.  Chronic infarct involving the left basal ganglia and thalamus.   3.  Moderate presumed chronic small vessel  ischemic changes.   4.  Mild to moderate generalized volume loss.     HEAD CTA:   1.  No major vessel acute thromboembolism, flow-limiting stenosis or occlusion.     NECK CTA:   1.  No measurable stenosis in either proximal internal carotid artery.   2.  Vertebral arteries are patent through the neck and into head.   Reading per radiology    MR Brain w/o Contrast  IMPRESSION:   1.  Technically suboptimal secondary to patient motion and inability of the patient to continue with the complete sequences.   2.  15 mm in diameter acute/early subacute infarct involving the anteromedial right thalamus.   3.  Chronic infarct in the left basal ganglia and thalamus.   Per radiology    Laboratory:  CBC: WBC 7.5, HGB 13.3,    BMP: Glucose 379 (H), Urea Nitrogen 35 (H), GFR 30 (L), o/w WNL (Creatinine 2.13 (H))     ISTAT Gases Lactate Venous (2227): LACTW 2.2 (H), Bicarbonate POCT 31 (H), O2 Sat POCT 66 (L), AWNL  ISTAT Gases Lactate Venous (0110): [place] POCT 29 (H), O2 POCT 50 (L), PCO2 POCT 51 (H), AWNL    Asymptomatic COVID19 Virus PCR by nasopharyngeal swab pending    Blood Culture: Pending    PTT: 29    INR: 0.94    Troponin: (Collected 2142) <0.015    UA: Glucose >= 1000 (!), Protein Albumin 50 (!)    Blood Culture: Pending    Asymptomatic COVID19 Virus PCR by nasopharyngeal swab pending    Emergency Department Course:    National [employer] Stroke Scale  Exam Interval: Baseline   Score    Level of consciousness: (1)   Not alert; arousable w/ minor stim to obey/answer/respond    LOC questions: (0)   Answers both questions correctly    LOC commands: (0)   Performs both tasks correctly    Best gaze: (0)   Normal    Visual: (0)   No visual loss    Facial palsy: (1)   Minor paralysis (flat nasolabial fold, smile asymmetry)    Motor arm (left): (0)   No drift    Motor arm (right): (2)   Some effort against gravity    Motor leg (left): (0)   No drift    Motor leg (right): (1)   Drift    Limb ataxia: (0)    Absent    Sensory: (0)   Normal- no sensory loss    Best language: (0)   Normal- no aphasia    Dysarthria: (0)   Normal    Extinction and inattention: (0)   No abnormality        Total Score:  5      Elevated lactate not suspected to be due to infection. Elevated lactate secondary to dehydration.    Reviewed:  I reviewed nursing notes, vitals, past medical history and care everywhere    Assessments:  2137 Patient arrives via EMS.  2139 Tier 1 Code Stroke called.  2139 I obtained history and examined the patient as noted above  2142 Patient left ED to go to CT.  2330 I rechecked the patient and explained findings.   2335 Set for admission.    Consults:   2141 I spoke with Dr. Homero Matthews of the stroke neuro service from [place] regarding patient's presentation, findings, and plan of care.  2212 I spoke with Dr. Homero Matthews of the stroke neuro service from [place] regarding patient's presentation, findings, and plan of care.  2330 I spoke with Dr. De Guzman of the neurology service from [place] regarding patient's presentation, findings, and plan of care.    Interventions:  2232 Normal Saline 1000 mL IV    Disposition:  The patient was admitted to the hospital under the care of Dr. Edouard De Guzman.     Impression & Plan   Medical Decision Making:  Jimmy Otto is a 71 year old male who presents to the emergency department with weakness and confusion. Family noted to EMS that at about 6pm the patient started to have difficulty with balance and ambulating. He had some coining done by family and seemed to lose movement of his right side. On arrival here, he does have movements of his right side available. This does seem quite weak. Left side did not seem to show weakness to gravity especially when compared to the right. Code stroke was called. He was taken to CT which showed signs of chronic stroke, but no large vessel occlusion. Given timing, he does not meet the criteria for IVTPA. Later when discussing with family, they  noted that the right-sided symptoms were quite chronic and upon review and results of MRI there appears to be a new stroke on the right thalamus and chronic findings on the left side of the brain. Lactate was slightly elevated at 2.2. He has not been having fevers or hypoxia, no signs of a UTI. He does have acute kidney injuries which I suspect the elevated lactate is secondary to dehydration. Lactate was rechecked after 2L of saline provided. Repeat lactate was 1.3. Admitted patient to neurology floor.     Diagnosis:    ICD-10-CM    1. Right sided weakness  R53.1    2. Acute kidney injury (H)  N17.9      Scribe Disclosure:  I, Jesika Harrison, am serving as a scribe at 9:39 PM on 9/14/2021 to document services personally performed by Jose Young MD, based on my observations and the provider's statements to me.      Jose Young MD  09/15/21 0150

## 2021-09-15 NOTE — ED TRIAGE NOTES
OPt arrives via EMS for eval of altered mental status. LKN 1800. Pt became off, R side deficits. Hx of stroke 7 years ago. Coining marks noted to chest

## 2021-09-15 NOTE — H&P
[facility]    History and Physical - Hospitalist Service       Date of Admission:  9/14/2021    Assessment & Plan      Jimmy Otto is a 71 year old male admitted on 9/14/2021. He presents to the emergency department with approximately 2 days of some mild increased weakness last seen by sister-in-law at 6 PM and fairly baseline. Shortly thereafter, patient developed significant expressive aphasia and weakness, found to have new right thalamic stroke with prior left thalamic stroke.    Acute right thalamic CVA:  -Physical therapy, Occupational Therapy consulted  -N.p.o. until cleared by speech pathology  -Stroke neurology consulted, aware of patient from emergency department  -Rectal aspirin daily while n.p.o; holding on Plavix load as neurology has concern for cardioembolic source requiring anticoagulation.  -Normal saline at 100/h  -Case management consult for disposition planning; anticipate need for rehab, potentially acute rehab, at discharge. Discussed with family as well as patient  -Lipid panel pending; resume prior to admission atorvastatin 80 mg daily when tolerating oral intake  -Cardiac telemetry, TTE    Uncontrolled type 2 diabetes: Most recent hemoglobin A1c of 9.4 6/28/2021 through outside hospital system. Appears to be on 28 units long-acting insulin daily  -Lantus 15 units at bedtime given n.p.o. status  -Every 4 hour blood glucose checks with medium dose sliding scale insulin available  -Hypoglycemia protocol in place  -Prior to admission Metformin on hold given acute renal failure. baseline creatinine was 1.1.   -Avoid NSAID's except daily aspirin if indicated  -Start IV fluids  -Repeat BMP in AM    Hypertension:  -Prior to admission Cozaar 100 mg, spironolactone 25 mg, hydrochlorothiazide 25 on hold given acute renal failure  -Permissive hypertension at this time  -As needed labetalol and hydralazine if needed  -Resuming prior to admission atenolol 50 mg twice daily at half  dose per stroke protocol    Acute renal failure: Creatinine of 2.13, baseline of 1.1 as of 6/28/2021 through [facility]. He reports he has been eating and drinking normally, no nausea or vomiting, no diarrhea. Does have a history of nephrolithiasis in the past, though no flank pain, no hematuria. UA generally not concerning.  -Trend CMP  -Continue Flomax as below  -Holding prior to admission Cozaar, spironolactone, hydrochlorothiazide  -Received 2 L normal saline bolus in the emergency department  -If patient does not have significant improvement in his renal function in a.m., consider renal ultrasound  -Bladder scan, straight catheterize as needed. If significant issues with retention, Place Felix catheter as bladder outlet obstruction might be cause of patient's renal injury in the absence of other provoking factors.    BPH:  -Resume prior to admission Flomax 0.4 mg daily when able to tolerate oral intake  -Bladder scan, straight catheterize as needed    Hyperlipidemia:  -Fasting lipid panel in a.m.  -Continue atorvastatin 80 when able to tolerate oral intake       Diet: NPO for Medical/Clinical Reasons Except for: No Exceptions    DVT Prophylaxis: Pneumatic Compression Devices  Felix Catheter: Not present  Central Lines: None  Code Status:  Full code. Confirmed with patient on admission    Clinically Significant Risk Factors Present on Admission              # Platelet Defect: home medication list includes an antiplatelet medication      Disposition Plan   Expected discharge: Expected discharge likely 3 days recommended to Acute rehabilitation once Stroke work-up complete, safe disposition plan in place.     The patient's care was discussed with the Patient and Patient's 2 daughters, sister-in-law at bedside, Dr. Young in the emergency department. Discussed also with stroke neurology service..    Edouard De Guzman MD  [facility]  Securely message with the Vocera Web Console  (learn more here)  Text page via [facility] Paging/Directory      ______________________________________________________________________    Chief Complaint   Weakness, expressive aphasia    History is obtained from patient, chart review, review of outside records including prior creatinine and hemoglobin A1c through [facility], patient's 2 daughters at bedside, patient's sister-in-law at bedside, discussion with Dr. Young in the emergency department. Patient is not able to provide any significant verbal history secondary to his expressive aphasia. Patient's answers are largely yes or no with directed questioning, and this does limit his ability to provide a full history..    History of Present Illness   Jimmy Otto is a 71 year old male who presents to the emergency department for evaluation of weakness, expressive aphasia. 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 This stroke took place in approximately 2007/2008, and patient went to acute rehab following this. Patient lives at home with his wife. Per daughters at bedside, wife is not particularly robust. Patient has a history of hyperlipidemia, hypertension on multiple agents, and uncontrolled type 2 diabetes. Per daughters, they have attempted to assist him with medication management, though patient generally has been somewhat resistant to assistance with this, is in charge of his own medications. He has had improvement in his hemoglobin A1c over the past year, last 9.4 in June.    In discussion with patient, he is no longer taking his aspirin. He is unable to tell me why. He reports no bleeding, has not caused him any ill effect. A doctor has not told him to discontinue aspirin. He is not able to verbally tell me why he is no longer taking aspirin..    For the past 2 days or so, patient has been had some increased weakness. Per daughters, he typically goes on walks, though has not been  doing this. He reports no shortness of breath, no nausea or vomiting, no diarrhea. Tells me that he has been eating and drinking normally. He has no pain. He tells me that he does need to get up frequently at night to urinate. He tells me that he has been taking his antihypertensive medications and insulin, though again, confirms that he is not taking his daily aspirin    At approximately 6 PM, patient's sister-in-law, who lives next door, came over to do a coining on patient's chest. She tells me that he was conversant at that time, the reason for the Coining was his weakness over the past days. This is when patient was last seen at his approximate normal baseline, subsequently had worsening of his weakness and expressive aphasia resulting in presentation to the emergency department. A code stroke was called on arrival. CT and CTA without acute findings, though MRI confirmed a new right thalamic CVA.    Discussed with family, ER provider, and stroke neurology. There is concern for cardioembolic source of stroke given history of left thalamic and now with right thalamic stroke. Rectal aspirin without Plavix load given possible anticoagulation related to this.    Patient reports he has no pain. No shortness of breath. He has 1 coughing/clearing throat episode which sounds different to daughters than his baseline, though also in the setting of recent stroke. Patient is able to express that is his his throat that he was clearing, not a sensation in his chest. He reports no fevers or chills    Review of Systems    The 10 point Review of Systems is negative other than noted in the HPI or here.  No fevers or chills  No nausea vomiting or diarrhea  No pain    Past Medical History    I have reviewed this patient's medical history and updated it with pertinent information if needed.   Past Medical History:   Diagnosis Date     Diabetes mellitus (H)      Hypercholesteremia      Hypertension      Unspecified cerebral artery  occlusion with cerebral infarction     TIA 8/11       Past Surgical History   I have reviewed this patient's surgical history and updated it with pertinent information if needed.  No prior surgical history    Social History   I have reviewed this patient's social history and updated it with pertinent information if needed.  Social History     Tobacco Use     Smoking status: Never Smoker   Substance Use Topics     Alcohol use: Yes     Comment: occassional     Drug use: No       Family History     No family history of diabetes.    Prior to Admission Medications   Prior to Admission Medications   Prescriptions Last Dose Informant Patient Reported? Taking?   METFORMIN HCL PO   Yes No   Sig: Take 1,000 mg by mouth 2 times daily (with meals).     aspirin  MG EC tablet   No No   Sig: Take 1 tablet by mouth daily.   insulin aspart (NovoLOG) injection   Yes No   Sig: Inject  Subcutaneous 3 times daily (before meals). dispense flex pen, Per sliding scale   insulin glargine (LANTUS) 100 UNIT/ML injection   Yes No   Sig: Inject 20 Units Subcutaneous every evening.   lisinopril (PRINIVIL,ZESTRIL) 5 MG tablet   No No   Sig: Take 1 tablet by mouth daily.   simvastatin (ZOCOR) 20 MG tablet   No No   Sig: Take 1 tablet by mouth every evening.   triamterene-hydrochlorothiazide (MAXZIDE-25) 37.5-25 MG per tablet   Yes No   Sig: Take 1 tablet by mouth daily.        Facility-Administered Medications: None     Allergies   Allergies   Allergen Reactions     Nka [No Known Allergies]        Physical Exam   Vital Signs: Temp: 97.7  F (36.5  C) Temp src: Temporal BP: (!) 169/93 Pulse: 71   Resp: 19 SpO2: 97 % O2 Device: None (Room air)    Weight: 140 lbs 10.46 oz    General Appearance: Somewhat fatigued appearing 71-year-old  American male laying in bed in no acute distress.  Eyes: No scleral icterus or injection  HEENT: Atraumatic. No overt facial droop appreciated, though flattened expression/facies  Respiratory: Breath sounds  are clear bilateral auscultation no wheezes or crackles.  Cardiovascular: Regular rate and rhythm, no murmur  GI: Abdomen soft, nontender to palpation. Appears to have some suprapubic fullness, though no discomfort reported by patient with palpation and recently catheterized for urine  Lymph/Hematologic: Patient has some right lower extremity swelling associated with distant history of stroke which is unchanged per family at bedside  Skin: No jaundice, no petechiae. Has Coining marks around his neck on chest from ceremony approximately 6 PM this evening  Musculoskeletal: Diffuse weakness, contractures of right hand present. No subcutaneous fat wasting.  Neurologic: Patient with weakness of his left upper extremity most notable in  strength and slowed movement, though able to resist against flexion extension at wrist 5/5. Has chronic right sided upper and lower extremity weakness which is more profound than that now noted on left. Reflexes are brisk bilaterally, though no upgoing Babinski on left. Some contractures of the right hand are present and chronic, not noted on left hand. Dense expressive aphasia, though does not have any notable receptive aphasia. At baseline daughters report that his speech is slowed. He has no difficulty with English, though currently he mostly needs to answer by shaking his head yes or no. Is occasionally able to vocalize yes, no, and at one point says the word throat to indicate that he was clearing his throat. Strength is more preserved in left lower extremity as compared to left upper extremity  Psychiatric: Normal affect, pleasant    Data   Data reviewed today: I reviewed all medications, new labs and imaging results over the last 24 hours. I personally reviewed the brain MRI image(s) showing Bilateral thalamic infarcts. Reviewed with family at bedside..    Recent Labs   Lab 09/14/21 2145 09/14/21  2142   WBC  --  7.5   HGB  --  13.3   MCV  --  84   PLT  --  207   INR 0.94  --     NA  --  134   POTASSIUM  --  3.4   CHLORIDE  --  98   CO2  --  30   BUN  --  35*   CR  --  2.13*   ANIONGAP  --  6   ARLETH  --  8.8   GLC  --  379*   TROPONIN  --  <0.015

## 2021-09-15 NOTE — ED NOTES
[facility]  ED Nurse Handoff Report    ED Chief complaint: Altered Mental Status      ED Diagnosis:   Final diagnoses:   Right sided weakness   Acute kidney injury (H)       Code Status: see admitting MD    Allergies:   Allergies   Allergen Reactions     Nka [No Known Allergies]        Patient Story: 71 year old male arrives to the ED via ambulance for evaluation of altered mental status. arrives via EMS for eval of altered mental status. LKN 1800. Pt became off, R side deficits. Hx of stroke 7 years ago. Coining marks noted to chest  Focused Assessment:  Last known normal was at 1800.Right side deficits noted. Has a Hx of a stroke 7 years ago. Coining marks on upper chest noted.    Treatments and/or interventions provided: covid swab, labs, CT head, CTA Head Neck, MRI Brain, EKG, NS 2L  Patient's response to treatments and/or interventions: no change    To be done/followed up on inpatient unit:  monitor    Does this patient have any cognitive concerns?: normally he does not but he has an altered mental status due to stroke    Activity level - Baseline/Home:  Walker  Activity Level - Current:   Total Care    Patient's Preferred language: English   Needed?: No    Isolation: None  Infection: Not Applicable  Patient tested for COVID 19 prior to admission: YES  Bariatric?: No    Vital Signs:   Vitals:    09/14/21 2200 09/14/21 2230 09/14/21 2300 09/15/21 0000   BP: (!) 181/95 (!) 169/93  138/72   Pulse: 101 73 71 72   Resp: 16  19 16   Temp:       TempSrc:       SpO2: 98% 97% 97%    Weight:           Cardiac Rhythm:     Was the PSS-3 completed:   Yes  What interventions are required if any?               Family Comments: a couple of family members in the lobby and two family members at patient's bedside.  OBS brochure/video discussed/provided to patient/family: N/A              Name of person given brochure if not patient: NA              Relationship to patient: NA    For the majority of the  shift this patient's behavior was Green.   Behavioral interventions performed were none.    ED NURSE PHONE NUMBER: [phone]

## 2021-09-15 NOTE — PROGRESS NOTES
[facility]    Medicine Progress Note - Hospitalist Service       Date of Admission:  9/14/2021    Assessment & Plan           Jimmy Otto is a 71 year old male admitted on 9/14/2021. He presents to the emergency department with approximately 2 days of some mild increased weakness last seen by sister-in-law at 6 PM and fairly baseline. Shortly thereafter, patient developed significant expressive aphasia and weakness, found to have new right thalamic stroke with prior left thalamic stroke.    Acute right thalamic CVA:  -Physical therapy, Occupational Therapy consulted  - appreciate speech pathology  -Stroke neurology consulted  -Rectal aspirin daily while n.p.o; holding on Plavix load as neurology has concern for cardioembolic source requiring anticoagulation.  -Normal saline at 100/h  -Case management consult for disposition planning; anticipate need for rehab, potentially acute rehab, at discharge.   -Lipid panel pending; resume prior to admission atorvastatin 80 mg daily when tolerating oral intake  -Cardiac telemetry, TTE    Uncontrolled type 2 diabetes: Most recent hemoglobin A1c of 9.4 6/28/2021 through outside hospital system. Appears to be on 28 units long-acting insulin daily  -Lantus increased to 20 units 09/15  -Every 4 hour blood glucose checks with medium dose sliding scale insulin available  -Hypoglycemia protocol in place  -Prior to admission Metformin on hold given acute renal failure. baseline creatinine was 1.1.   -Avoid NSAID's except daily aspirin if indicated  -Start IV fluids    Recent Labs   Lab 09/15/21  0858 09/15/21  0606 09/15/21  0521 09/15/21  0207 09/14/21  2142   * 246* 253* 273* 379*     Hypertension:  -Prior to admission Cozaar 100 mg, spironolactone 25 mg, hydrochlorothiazide 25 on hold given acute renal failure  -Permissive hypertension at this time  -As needed labetalol and hydralazine if needed  -Ct prior to admission atenolol @ 25mg bid. (PTA dose 50  mg twice daily)     Acute renal failure: Creatinine of 2.13, baseline of 1.1 as of 6/28/2021 through   [facility] system.   Creatinine   Date Value Ref Range Status   09/15/2021 1.67 (H) 0.66 - 1.25 mg/dL Final   08/16/2012 0.96 0.66 - 1.25 mg/dL Final     Does have a history of nephrolithiasis in the past, though no flank pain, no hematuria. UA generally not concerning.  -Trend CMP  -Continue Flomax as below  -Holding prior to admission Cozaar, spironolactone, hydrochlorothiazide  -Received 2 L normal saline bolus in the emergency department  -Bladder scan, straight catheterize as needed. If significant issues with retention, Place Felix catheter as bladder outlet obstruction might be cause of patient's renal injury in the absence of other provoking factors.    BPH:  -Resume prior to admission Flomax 0.4 mg daily when able to tolerate oral intake  -Bladder scan, straight catheterize as needed    Hyperlipidemia:  -Fasting lipid panel (LDL- 92)   -Continue atorvastatin 80 when able to tolerate oral intake       Diet: Combination Diet Pureed Diet (level 4); Thin Liquids (level 0); No Straws    DVT Prophylaxis: Pneumatic Compression Devices  Felix Catheter: Not present  Central Lines: None  Code Status: Full Code      Disposition Plan   Expected discharge: 09/17/2021   recommended to transitional care unit once mental status at baseline and safe disposition plan/ TCU bed available.     The patient's care was discussed with the Bedside Nurse, Care Coordinator/, Patient and Patient's Family.    Nazario Cooper MD, MD  Hospitalist Service  [facility]  Securely message with the Vocera Web Console (learn more here)  Text page via [employer] Paging/Directory    Clinically Significant Risk Factors Present on Admission              # Platelet Defect: home medication list includes an antiplatelet medication    ______________________________________________________________________    Interval  History   History revisited from EMR, night MD, patient's daughter.    Increased weakness and speech difficulty  Has been having urinary retention issues as well required straight catheter 3 times   4 point ROS done and negative unless mentioned     Data reviewed today: I reviewed all medications, new labs and imaging results over the last 24 hours. I personally reviewed the head CT image(s) showing As below.    Physical Exam   BP (!) 148/93 (BP Location: Right arm)   Pulse 63   Temp 98.7  F (37.1  C) (Oral)   Resp 14   Wt 63.8 kg (140 lb 10.5 oz)   SpO2 97%   BMI 24.14 kg/m    Gen- pleasant  lying in bed  HEENT- NAD, ROGER  Neck- supple, no JVD elevation, no thyromegaly  CVS- I+II+ no m/r/g  RS- CTABS  Abdo- soft, no tenderness . No g/r/r, BS+ no hepatosplenomegaly   Ext- no edema   CNS-dense expressive aphasia.  Left-sided weakness (further as detailed in neurology note_     Data    BMPRecent Labs   Lab 09/15/21  0858 09/15/21  0701 09/15/21  0606 09/15/21  0521 09/15/21  0207 09/14/21  2142   NA  --   --   --  137  --  134   POTASSIUM  --  3.5  --  3.2*  --  3.4   CHLORIDE  --   --   --  104  --  98   ARLETH  --   --   --  8.4*  --  8.8   CO2  --   --   --  29  --  30   BUN  --   --   --  30  --  35*   CR  --   --   --  1.67*  --  2.13*   *  --  246* 253* 273* 379*     CBC  Recent Labs   Lab 09/15/21  0521 09/14/21  2142 09/14/21  2142   WBC 7.9  --  7.5   RBC 4.66  --  4.98   HGB 12.5*   < > 13.3   HCT 39.2*  --  41.6   MCV 84  --  84   MCH 26.8  --  26.7   MCHC 31.9  --  32.0   RDW 13.2  --  13.1     --  207    < > = values in this interval not displayed.     INR  Recent Labs   Lab 09/14/21  2145   INR 0.94     LFTs  Recent Labs   Lab 09/15/21  0521   ALKPHOS 80   AST 20   ALT 22   BILITOTAL 0.3   PROTTOTAL 6.6*   ALBUMIN 2.6*      PANCNo lab results found in last 7 days.    Recent Results (from the past 24 hour(s))   CT Head w/o Contrast    Narrative    EXAM: CTA  HEAD NECK WITH CONTRAST, CT  HEAD W/O CONTRAST  LOCATION: [facility]  DATE/TIME: 9/14/2021 9:48 PM    INDICATION: History of stroke with right-sided deficits, now presents with worsening right-sided deficits. Follow-up/evaluate cerebrovascular disease.  COMPARISON: 08/13/2012  CONTRAST: 70mL Isovue-370  TECHNIQUE: Head and neck CT angiogram with IV contrast. Noncontrast head CT followed by axial helical CT images of the head and neck vessels obtained during the arterial phase of intravenous contrast administration. Axial 2D reconstructed images and   multiplanar 3D MIP reconstructed images of the head and neck vessels were performed by the technologist. Dose reduction techniques were used. All stenosis measurements made according to NASCET criteria unless otherwise specified.    FINDINGS:   NONCONTRAST HEAD CT:   INTRACRANIAL CONTENTS: No intracranial hemorrhage, extraaxial collection, or mass effect.  No CT evidence of acute infarct. There is a chronic infarct involving the left basal ganglia and thalamus. Moderate presumed chronic small vessel ischemic changes.   Mild to moderate generalized volume loss. No hydrocephalus.     VISUALIZED ORBITS/SINUSES/MASTOIDS: No intraorbital abnormality. Trace pneumatized fluid in the sphenoid sinuses. No middle ear or mastoid effusion.    BONES/SOFT TISSUES: No acute abnormality.    HEAD CTA:  ANTERIOR CIRCULATION: No major vessel acute thromboembolism, flow-limiting stenosis or occlusion. There are mild atheromatous changes in the carotid siphons. Standard [place] of Eller anatomy.    POSTERIOR CIRCULATION: No stenosis/occlusion, aneurysm, or high flow vascular malformation. Dominant right and smaller left vertebral artery contribute to a normal basilar artery.     DURAL VENOUS SINUSES: Expected enhancement of the major dural venous sinuses.    NECK CTA:  RIGHT CAROTID: Mild atheromatous changes. No measurable stenosis or dissection.    LEFT CAROTID: Mild atheromatous changes.   No measurable stenosis or dissection.    VERTEBRAL ARTERIES: No focal stenosis or dissection. Dominant right and smaller left vertebral arteries.    AORTIC ARCH: Vascular variant aortic arch origin left vertebral artery.  No significant stenosis at the origin of the great vessels.    NONVASCULAR STRUCTURES: Unremarkable.      Impression    IMPRESSION:   HEAD CT:  1.  No CT evidence of acute intracranial hemorrhage, mass or recent infarct.  2.  Chronic infarct involving the left basal ganglia and thalamus.  3.  Moderate presumed chronic small vessel ischemic changes.  4.  Mild to moderate generalized volume loss.    HEAD CTA:   1.  No major vessel acute thromboembolism, flow-limiting stenosis or occlusion.    NECK CTA:  1.  No measurable stenosis in either proximal internal carotid artery.  2.  Vertebral arteries are patent through the neck and into head.    Results of the CT and CTA were called to Dr. Young at 10:19 PM on 09/14/2021.   CTA Head Neck with Contrast    Narrative    EXAM: CTA  HEAD NECK WITH CONTRAST, CT HEAD W/O CONTRAST  LOCATION: [facility]  DATE/TIME: 9/14/2021 9:48 PM    INDICATION: History of stroke with right-sided deficits, now presents with worsening right-sided deficits. Follow-up/evaluate cerebrovascular disease.  COMPARISON: 08/13/2012  CONTRAST: 70mL Isovue-370  TECHNIQUE: Head and neck CT angiogram with IV contrast. Noncontrast head CT followed by axial helical CT images of the head and neck vessels obtained during the arterial phase of intravenous contrast administration. Axial 2D reconstructed images and   multiplanar 3D MIP reconstructed images of the head and neck vessels were performed by the technologist. Dose reduction techniques were used. All stenosis measurements made according to NASCET criteria unless otherwise specified.    FINDINGS:   NONCONTRAST HEAD CT:   INTRACRANIAL CONTENTS: No intracranial hemorrhage, extraaxial collection, or mass effect.  No  CT evidence of acute infarct. There is a chronic infarct involving the left basal ganglia and thalamus. Moderate presumed chronic small vessel ischemic changes.   Mild to moderate generalized volume loss. No hydrocephalus.     VISUALIZED ORBITS/SINUSES/MASTOIDS: No intraorbital abnormality. Trace pneumatized fluid in the sphenoid sinuses. No middle ear or mastoid effusion.    BONES/SOFT TISSUES: No acute abnormality.    HEAD CTA:  ANTERIOR CIRCULATION: No major vessel acute thromboembolism, flow-limiting stenosis or occlusion. There are mild atheromatous changes in the carotid siphons. Standard [place] of Eller anatomy.    POSTERIOR CIRCULATION: No stenosis/occlusion, aneurysm, or high flow vascular malformation. Dominant right and smaller left vertebral artery contribute to a normal basilar artery.     DURAL VENOUS SINUSES: Expected enhancement of the major dural venous sinuses.    NECK CTA:  RIGHT CAROTID: Mild atheromatous changes. No measurable stenosis or dissection.    LEFT CAROTID: Mild atheromatous changes.  No measurable stenosis or dissection.    VERTEBRAL ARTERIES: No focal stenosis or dissection. Dominant right and smaller left vertebral arteries.    AORTIC ARCH: Vascular variant aortic arch origin left vertebral artery.  No significant stenosis at the origin of the great vessels.    NONVASCULAR STRUCTURES: Unremarkable.      Impression    IMPRESSION:   HEAD CT:  1.  No CT evidence of acute intracranial hemorrhage, mass or recent infarct.  2.  Chronic infarct involving the left basal ganglia and thalamus.  3.  Moderate presumed chronic small vessel ischemic changes.  4.  Mild to moderate generalized volume loss.    HEAD CTA:   1.  No major vessel acute thromboembolism, flow-limiting stenosis or occlusion.    NECK CTA:  1.  No measurable stenosis in either proximal internal carotid artery.  2.  Vertebral arteries are patent through the neck and into head.    Results of the CT and CTA were called to   Hannah at 10:19 PM on 09/14/2021.   XR Chest Port 1 View    Narrative    EXAM: XR CHEST PORT 1 VIEW  LOCATION: [facility]  DATE/TIME: 9/14/2021 10:18 PM    INDICATION: weakness  COMPARISON: 08/13/2012.      Impression    IMPRESSION: Somewhat low lung volumes. Atelectasis/infiltrate at the left base. Right lung appears clear. No significant pleural fluid. No pneumothorax. Normal heart size and pulmonary vascularity.   MR Brain w/o Contrast   Result Value    Radiologist flags (AA)     15 mm acute/early subacute infarct in the anteromedial right thalamus.    Narrative    EXAM: MR BRAIN W/O CONTRAST  LOCATION: [facility]  DATE/TIME: 9/14/2021 11:14 PM    INDICATION: Neuro deficit, acute, stroke suspected  COMPARISON: Head CT 09/14/2021 MRI brain 08/13/2012  TECHNIQUE: Head MRI without IV contrast including diffusion weighted imaging, FLAIR and hemosiderin sensitive sequences.    FINDINGS: Technically suboptimal secondary to motion and lack of FLAIR imaging which the patient could not tolerate.  INTRACRANIAL CONTENTS: There is a 15 mm in diameter zone of restricted diffusion involving the anteromedial right thalamus. No mass, acute hemorrhage, or extra-axial fluid collections. Chronic infarct in the left thalamus and basal ganglia. Mild to   moderate generalized cerebral atrophy. No hydrocephalus. Normal position of the cerebellar tonsils.     OTHER: Accounting for technique no additional abnormalities identified.      Impression    IMPRESSION:  1.  Technically suboptimal secondary to patient motion and inability of the patient to continue with the complete sequences.  2.  15 mm in diameter acute/early subacute infarct involving the anteromedial right thalamus.  3.  Chronic infarct in the left basal ganglia and thalamus.      [Critical Result: 15 mm acute/early subacute infarct in the anteromedial right thalamus.]    Finding was identified on 9/14/2021 11:37 PM.       Jose Young was contacted by me on 9/14/2021 11:44 PM and verbalized understanding of the critical result.

## 2021-09-15 NOTE — PROVIDER NOTIFICATION
Writer paged Dr. Cooper, Patient diet advanced to Pureed, meds will need to be crushed, please see meds are crushable. Also patient's daughter at bedside if you want to see patient. Please advise

## 2021-09-15 NOTE — PROVIDER NOTIFICATION
Writer paged Dr. Cooper, you wanna change the q4hr BG checks change to meals and HS, correct, please update orders. Néstor NEVES

## 2021-09-15 NOTE — PHARMACY-ADMISSION MEDICATION HISTORY
Pharmacy Medication History  Admission medication history interview status for the 9/14/2021  admission is complete. See EPIC admission navigator for prior to admission medications     Location of Interview: Patient room  Medication history sources: Patient's family/friend (daughter), Surescripts and Care Everywhere    Significant changes made to the medication list:   Added all medications. Deleted old list.    Medication reconciliation completed by provider prior to medication history? Yes    Time spent in this activity: 20 minutes     Prior to Admission medications    Medication Sig Last Dose Taking? Auth Provider   acetaminophen-codeine (TYLENOL #3) 300-30 MG tablet Take 1 tablet by mouth every 6 hours as needed for severe pain  Yes Unknown, Entered By History   amLODIPine (NORVASC) 10 MG tablet Take 10 mg by mouth daily  Yes Unknown, Entered By History   aspirin (ASA) 81 MG chewable tablet Take 81 mg by mouth daily  Yes Unknown, Entered By History   atenolol (TENORMIN) 50 MG tablet Take 50 mg by mouth 2 times daily  Yes Unknown, Entered By History   atorvastatin (LIPITOR) 80 MG tablet Take 80 mg by mouth daily  Yes Unknown, Entered By History   clopidogrel (PLAVIX) 75 MG tablet Take 75 mg by mouth daily  Yes Unknown, Entered By History   hydrochlorothiazide (HYDRODIURIL) 25 MG tablet Take 25 mg by mouth daily  Yes Unknown, Entered By History   insulin aspart (NOVOLOG FLEXPEN) 100 UNIT/ML pen Inject Subcutaneous 3 times daily (with meals) 2 units/carb + sliding scale insulin (50 units per 50 > 150)  Yes Unknown, Entered By History   insulin detemir (LEVEMIR PEN) 100 UNIT/ML pen Inject 28 Units Subcutaneous At Bedtime  Yes Unknown, Entered By History   losartan (COZAAR) 100 MG tablet Take 100 mg by mouth daily  Yes Unknown, Entered By History   metFORMIN (GLUCOPHAGE) 500 MG tablet Take 500 mg by mouth 2 times daily (with meals)  Yes Unknown, Entered By History   potassium chloride ER (KLOR-CON M) 20 MEQ CR tablet  Take 20 mEq by mouth daily  Yes Unknown, Entered By History   spironolactone (ALDACTONE) 25 MG tablet Take 25 mg by mouth daily  Yes Unknown, Entered By History   tamsulosin (FLOMAX) 0.4 MG capsule Take 0.4 mg by mouth daily  Yes Unknown, Entered By History       The information provided in this note is only as accurate as the sources available at the time of update(s)     Connie Downing, PharmD, BCCCP

## 2021-09-15 NOTE — CONSULTS
Urology Consult    Name: Jimmy Otto    MRN: [id number]   YOB: 1950               Chief Complaint:   Retention, gross hematuria    History is obtained from the patient and chart review          History of Present Illness:   Jimmy Otto is a 71 year old male admitted for thalamic stroke who required CIC multiple times yesterday for urinary retention now with Felix placed who has had intermitted hematuria. He has also had an ALEX to 2.1 which improved to 1.67 today with bladder emptying. He is on Plavix. UA negative for infection.     Urine is light red. Patient declines . He denies any difficulty voiding or hematuria prior to his stroke.           Past Medical History:     Past Medical History:   Diagnosis Date     Diabetes mellitus (H)      Hypercholesteremia      Hypertension      Unspecified cerebral artery occlusion with cerebral infarction     TIA 8/11            Past Surgical History:   No past surgical history on file.         Social History:     Social History     Tobacco Use     Smoking status: Never Smoker   Substance Use Topics     Alcohol use: Yes     Comment: occassional            Family History:   No family history on file.         Allergies:     Allergies   Allergen Reactions     Nka [No Known Allergies]             Medications:     Current Facility-Administered Medications   Medication     acetaminophen (TYLENOL) tablet 650 mg    Or     acetaminophen (TYLENOL) solution 650 mg    Or     acetaminophen (TYLENOL) Suppository 650 mg     [START ON 9/16/2021] aspirin (ASA) chewable tablet 81 mg     atenolol (TENORMIN) tablet 25 mg     atorvastatin (LIPITOR) tablet 80 mg     clopidogrel (PLAVIX) tablet 75 mg     glucose gel 15-30 g    Or     dextrose 50 % injection 25-50 mL    Or     glucagon injection 1 mg     hydrALAZINE (APRESOLINE) injection 10-20 mg     HYDROmorphone (DILAUDID) injection 0.2 mg     insulin aspart (NovoLOG) injection (RAPID ACTING)     insulin aspart (NovoLOG) injection  (RAPID ACTING)     insulin glargine (LANTUS PEN) injection 20 Units     labetalol (NORMODYNE/TRANDATE) injection 10-20 mg     lidocaine (LMX4) cream     lidocaine (XYLOCAINE) 2 % external gel     lidocaine 1 % 0.1-1 mL     Medication Instruction - Avoid dextrose in IV solutions     melatonin tablet 3 mg     naloxone (NARCAN) injection 0.2 mg    Or     naloxone (NARCAN) injection 0.4 mg    Or     naloxone (NARCAN) injection 0.2 mg    Or     naloxone (NARCAN) injection 0.4 mg     ondansetron (ZOFRAN-ODT) ODT tab 4 mg    Or     ondansetron (ZOFRAN) injection 4 mg     oxyCODONE IR (ROXICODONE) half-tab 2.5 mg     pantoprazole (PROTONIX) IV push injection 40 mg     polyethylene glycol (MIRALAX) Packet 17 g     prochlorperazine (COMPAZINE) injection 5 mg    Or     prochlorperazine (COMPAZINE) tablet 5 mg    Or     prochlorperazine (COMPAZINE) suppository 12.5 mg     sennosides (SENOKOT) tablet 8.6 mg     sodium chloride (PF) 0.9% PF flush 3 mL     sodium chloride (PF) 0.9% PF flush 3 mL     sodium chloride 0.9% infusion     tamsulosin (FLOMAX) capsule 0.4 mg             Review of Systems:    ROS: 10 point ROS neg other than the symptoms noted above in the HPI           Physical Exam:   VS:  T: 98.7    HR: 63    BP: 148/93    RR: 14   GEN:  AOx3.  NAD.    CV:  RRR  LUNGS: Non-labored breathing.   BACK:  No midline or CVA tenderness.  ABD:  Soft.  NT.  ND.  No rebound or guarding.  No masses.  :  Light red urine in catheter  SKIN:  Warm.  Dry.  No rashes.  NEURO:  CN grossly intact.            Data:   All laboratory data reviewed:    Recent Labs   Lab 09/15/21  0521 09/14/21  2142   WBC 7.9 7.5   HGB 12.5* 13.3    207       Recent Labs   Lab 09/15/21  1651 09/15/21  1229 09/15/21  0858 09/15/21  0701 09/15/21  0606 09/15/21  0521 09/15/21  0207 09/14/21  2142   NA  --   --   --   --   --  137  --  134   POTASSIUM  --   --   --  3.5  --  3.2*  --  3.4   CHLORIDE  --   --   --   --   --  104  --  98   CO2  --   --    --   --   --  29  --  30   BUN  --   --   --   --   --  30  --  35*   CR  --   --   --   --   --  1.67*  --  2.13*   * 209* 205*  --  246* 253*   < > 379*   ARLETH  --   --   --   --   --  8.4*  --  8.8    < > = values in this interval not displayed.       Recent Labs   Lab 09/14/21  2304   COLOR Yellow   APPEARANCE Clear   URINEGLC >=1000*   URINEBILI Negative   URINEKETONE Negative   SG 1.020   URINEPH 6.5   PROTEIN 50 *   NITRITE Negative   LEUKEST Negative   RBCU 2   WBCU 0     No imaging.          Impression and Plan:   Impression:   Jimmy Otto is a 71 year old male with PMH of thalamic stroke now with retention and intermittent hematuria. Retention likely secondary to stroke. Hematuria likely secondary to catheter and anticoagulation, however we will proceed with hematuria workup.       Plan:  - Avoid anticholinergic, antihistamine, and alpha-agonist medications as these will exacerbate urinary retention  - Minimize narcotic pain medication regimen as tolerated  - Increased ambulation/mobility will also usually help with improvement in the degree of urinary retention  - Start tamsulosin 0.4 mg qday    - Patient can either be managed with a indwelling ordoñez catheter vs clean intermittent catheterization until seen in f/u in urology clinic    - If CIC is chosen, then the patient will need nursing instruction on proper self-intermittent catheterization technique.  - Remind patient that CIC should be performed ONLY after an attempt at spontaneous voiding is made  - Frequency of SIC can be determined based on the average post-void residual:  If PVR < 150cc - no cathing needed  If PVR is 150-250cc - cath bid (qam & qhs)  If PVR is 251-350cc - cath tid  If PVR is 351-450cc - cath qid  If PVR is > 450cc - cath every 4 hours  - The patient will need to keep a journal of his cathing regimen after discharge (will need to include frequency of cathing and post-void residual amount obtained from CIC) and bring this to  clinic for review     - If patient unwilling or unable to perform CIC, may leave ordoñez catheter in place. May perform TOV on day of discharge if this is at least one week after placement.   - Patient will need to f/u in urology clinic approximately 1 week after discharge for a trial of void if not performed prior to discharge    For gross hematuria:  - Please get CT urogram when creatinine has normalized  - urine cytology (ordered for you)  - Will arrange for outpatient cystoscopy in the urology clinic    - Urology will continue to follow peripherally    This patient's exam findings, labs, and imaging discussed with urology staff surgeon Dr Red, who developed the treatment plan.    Renae Johnson MD  Urology Resident

## 2021-09-15 NOTE — ED NOTES
Bed: ST  Expected date: 9/14/21  Expected time: 9:40 PM  Means of arrival: Ambulance  Comments:  [facility] 71M stroke, unresponsive

## 2021-09-15 NOTE — PROGRESS NOTES
"Speech-Language Pathology Clinical Swallow Evaluation         09/15/21 0953   General Information   Onset of Illness/Injury or Date of Surgery 09/14/21   Referring Physician Dr. De Guzman   Pertinent History of Current Problem Per H&P, \"Jimmy Otto is a 71 year old male admitted on 9/14/2021. He presents to the emergency department with approximately 2 days of some mild increased weakness last seen by sister-in-law at 6 PM and fairly baseline. Shortly thereafter, patient developed significant expressive aphasia and weakness, found to have new right thalamic stroke with prior left thalamic stroke.\"   General Observations Pt asleep and somewhat difficult to rouse, lethargic for majority of evaluation, however with some progressive improvement. + reduced attention and limited verbal output; reduced intelligibility. Daughter present. She reported pt with \"slow\" speech at baseline secondary to past CVA, however currently with increased impairment.   Past History of Dysphagia No dysphagia at baseline per daughter; reported regular diet with thin liquid.   Pain Assessment   Patient Currently in Pain No   Type of Evaluation   Type of Evaluation Swallow Evaluation   Oral Motor   Oral Musculature   (limited assessment)   Mucosal Quality good   Dentition (Oral Motor)   Dentition (Oral Motor) natural dentition;adequate dentition   Facial Symmetry (Oral Motor)   Facial Symmetry (Oral Motor) right side impairment   Left Side Facial Asymmetry minimal impairment   Lip Function (Oral Motor)   Lip Range of Motion (Oral Motor) retraction impairment   Protrusion, Lip Range of Motion bilateral   Tongue Function (Oral Motor)   Tongue ROM (Oral Motor) protrusion is impaired;lateralization is impaired   Protrusion, Tongue ROM Impairment (Oral Motor) minimal impairment;bilateral   Lateralization, Tongue ROM Impairment (Oral Motor) bilateral;minimal impairment   Tongue Strength (Oral Motor) unable/difficult to assess   Jaw Function (Oral Motor)   Jaw " Function (Oral Motor) unable/difficult to assess   Cough/Swallow/Gag Reflex (Oral Motor)   Soft Palate/Velum (Oral Motor) unable/difficult to assess   Vocal Quality/Secretion Management (Oral Motor)   Vocal Quality (Oral Motor) hypophonic;hoarse   Secretion Management (Oral Motor) WNL   General Swallowing Observations   Current Diet/Method of Nutritional Intake (General Swallowing Observations, NIS) NPO   Respiratory Support (General Swallowing Observations) none   Swallowing Evaluation Clinical swallow evaluation   Clinical Swallow Evaluation   Clinical Swallow Evaluation Textures Trialed thin liquids;pureed;minced & moist   Clinical Swallow Eval: Thin Liquid Texture Trial   Mode of Presentation, Thin Liquids spoon;cup;self-fed;fed by clinician  (declined straw, does not use per daughter)   Oral Phase of Swallow   (occasional bolus holding)   Pharyngeal Phase of Swallow suspect impaired; ?frequently delayed swallow with reduced hyolaryngeal elevation   Diagnostic Statement No cough, throat clear, wet vocal quality, eye watering, or increased WOB.    Clinical Swallow Evaluation: Puree Solid Texture Trial   Mode of Presentation, Puree spoon;fed by clinician;self-fed   Oral Phase, Puree WFL   Pharyngeal Phase, Puree suspect impaired; ?frequently delayed swallow with reduced hyolaryngeal elevation   Diagnostic Statement No cough, throat clear, wet vocal quality, eye watering, or increased WOB.    Clinical Swallow Eval: Minced & Moist   Mode of Presentation spoon;fed by clinician   Oral Phase   (reduced, prolonged mastication)   Pharyngeal Phase suspect impaired; ?frequently delayed swallow with reduced hyolaryngeal elevation   Diagnostic Statement No cough, throat clear, wet vocal quality, eye watering, or increased WOB.    Swallowing Recommendations   Diet Consistency Recommendations pureed (level 4);thin liquids (level 0)   Supervision Level for Intake 1:1 supervision needed   Mode of Delivery Recommendations bolus  size, small;no straws   Swallowing Maneuver Recommendations alternate food and liquid intake   Monitoring/Assistance Required (Eating/Swallowing) stop eating activities when fatigue is present;monitor for cough or change in vocal quality with intake   Recommended Feeding/Eating Techniques (Swallow Eval) maintain upright posture during/after eating for 30 minutes;minimize distractions during oral intake   Medication Administration Recommendations, Swallowing (SLP) crushed in puree   Instrumental Assessment Recommendations instrumental evaluation not recommended at this time   Comment, Swallowing Recommendations Pt currently presents with mild-moderate oral and possible pharyngeal dysphagia. Noted reduced acceptance and fair containment with occasional bolus holding for thin liquid. Bolus formation/propulsion and lingual coordination appeared ~mild/mild-moderately reduced/prolonged. + mild-moderately reduced, prolonged mastication with increased oral transit time and complete clearance. Suspect frequently swallow with ?reduced hyolaryngeal elevation. No cough, throat clear, wet vocal quality, eye watering, or increased WOB.   General Therapy Interventions   Planned Therapy Interventions Dysphagia Treatment   Dysphagia treatment Modified diet education;Instruction of safe swallow strategies   SLP Therapy Assessment/Plan   Criteria for Skilled Therapeutic Interventions Met (SLP Eval) yes;treatment indicated   SLP Diagnosis mild-moderate oral and possible pharyngeal dysphagia   Rehab Potential (SLP Eval) good, to achieve stated therapy goals   Therapy Frequency (SLP Eval) 5 times/wk   Therapy Plan Review/Discharge Plan (SLP)   Therapy Plan Review (SLP) evaluation/treatment results reviewed;daughter;participants voiced agreement with care plan;participants included;patient   SLP Discharge Planning    SLP Discharge Recommendation (DC Rec) Transitional Care Facility   SLP Rationale for DC Rec Pt below baseline for diet  level and for communication per daughter   SLP Brief overview of current status  Recommend puree (4) diet with thin liquid (0) liquid with aspiration precautions -- no straws; small sips, one at a time; small bites; slow rate; upright posture during and following intake; alternate solids/liquids; feed only when fully awake/alert; discontinue feeding if difficulty or cough, throat clear, wet vocal quality, or increased WOB observed.     Total Evaluation Time   Total Evaluation Time (Minutes) 19

## 2021-09-15 NOTE — PROGRESS NOTES
09/15/21 1600   Quick Adds   Type of Visit Initial PT Evaluation   Living Environment   People in home spouse   Current Living Arrangements house   Home Accessibility stairs to enter home;stairs within home   Number of Stairs, Main Entrance 2   Stair Railings, Main Entrance none   Number of Stairs, Within Home, Primary greater than 10 stairs  (uses stair lift to go up, no lift to lower level)   Stair Railings, Within Home, Primary railing on right side (ascending)   Transportation Anticipated family or friend will provide   Self-Care   Usual Activity Tolerance moderate   Current Activity Tolerance fair   Regular Exercise Yes   Activity/Exercise Type walking   Exercise Amount/Frequency   (walks around [place]de[place]sac 2X/day w/4WW)   Equipment Currently Used at Home cane, straight;grab bar, tub/shower;shower chair;walker, rolling;other (see comments)   Activity/Exercise/Self-Care Comment Pt. overall indep./mod. indep. w/mobility + ADL's w/ exception of dressing, bathing--gets some assist, dtr. not sure to what level;pt. completes all grooming, toileting tasks on his own;pt. boudreaux smedication mgmt. on his own.    Disability/Function   Walking or Climbing Stairs Difficulty yes   Walking or Climbing Stairs ambulation difficulty, requires equipment   Mobility Management 4WW;also occasionally uses cane   Dressing/Bathing Difficulty yes   Dressing/Bathing bathing difficulty, assistance 1 person;dressing difficulty, assistance 1 person   Toileting issues yes   Toileting Assistance toileting difficulty, requires equipment   Fall history within last six months yes   Number of times patient has fallen within last six months 2   Change in Functional Status Since Onset of Current Illness/Injury yes   General Information   Onset of Illness/Injury or Date of Surgery 09/14/21   Referring Physician De GuzmanEdouard   Patient/Family Therapy Goals Statement (PT) none stated   Pertinent History of Current Problem (include personal factors and/or  comorbidities that impact the POC) Jimmy Otto is a 71 year old male admitted on 9/14/2021. He presents to the emergency department with approximately 2 days of some mild increased weakness last seen by sister-in-law at 6 PM and fairly baseline. Shortly thereafter, patient developed significant expressive aphasia and weakness, found to have new right thalamic stroke with prior left thalamic stroke.   Existing Precautions/Restrictions fall   General Observations dtr in room, pt lethargic but wakes when stirred,   Cognition   Orientation Status (Cognition) person;time   Affect/Mental Status (Cognition) low arousal/lethargic   Follows Commands (Cognition) follows one-step commands;75-90% accuracy   Pain Assessment   Patient Currently in Pain No   Range of Motion (ROM)   ROM Comment difficult to assess pt resists PROM, moves spontaneouly but not following commamds for full ROM, but resists prior to end range   Strength Comprehensive (MMT)   Comment, General Manual Muscle Testing (MMT) Assessment R side hemiparesis from prior CVA, difficult to Assess as pt not following commands well for LE movement, able to lift antigravity,    Strength   Strength Comments 3+ to 4-/5 for DF and PF   Bed Mobility   Bed Mobility rolling left;scooting/bridging;supine-sit   Rolling Left [place] (Bed Mobility) moderate assist (50% patient effort)   Scooting/Bridging [place] (Bed Mobility) minimum assist (75% patient effort)   Supine-Sit [place] (Bed Mobility) moderate assist (50% patient effort)   Bed Mobility Limitations decreased ability to use arms for pushing/pulling;decreased ability to use legs for bridging/pushing;impaired ability to control trunk for mobility;cognitive deficits   Impairments Contributing to Impaired Bed Mobility impaired balance;impaired coordination;impaired postural control;impaired motor control;decreased strength;decreased ROM   Assistive Device (Bed Mobility) bed rails   Transfers   Transfers  sit-stand transfer;bed-chair transfer;transfer safety analysis   Transfer Safety Concerns Noted losing balance backward;decreased sequencing ability   Impairments Contributing to Impaired Transfers impaired balance;impaired coordination;impaired postural control;decreased strength   Bed-Chair Transfer   Bed-Chair [place] (Transfers) moderate assist (50% patient effort)   Assistive Device (Bed-Chair Transfers)   (HHA)   Bed/Chair Transfer Comments use elbow grasp on PT to maintain standing and turn   Sit-Stand Transfer   Sit-Stand [place] (Transfers) 2 person assist;moderate assist (50% patient effort)   Sit/Stand Transfer Comments used elbos grasp on PT on L   Balance   Balance Comments sitting balance with mod A initally   Clinical Impression   Criteria for Skilled Therapeutic Intervention yes, treatment indicated   PT Diagnosis (PT) difficulty with all mob   Influenced by the following impairments decreased strength, ROM, act babita, balance, coordination, cog deficits, fall risk   Functional limitations due to impairments impaired functional mob I and safety   Clinical Presentation Unstable/Unpredictable   Clinical Presentation Rationale 5 + deficits   Clinical Decision Making (Complexity) high complexity   Therapy Frequency (PT) 2x/day   Predicted Duration of Therapy Intervention (days/wks) 4 days   Planned Therapy Interventions (PT) balance training;bed mobility training;gait training;motor coordination training;neuromuscular re-education;ROM (range of motion);stair training;strengthening;transfer training   Risk & Benefits of therapy have been explained evaluation/treatment results reviewed;care plan/treatment goals reviewed;risks/benefits reviewed;current/potential barriers reviewed;participants voiced agreement with care plan;participants included;patient;daughter   PT Discharge Planning    PT Discharge Recommendation (DC Rec) Acute [facility]hab [facility]-Motivated patient will benefit from intensive,  interdisciplinary therapy.  Anticipate will be able to tolerate 3 hours of therapy per day   PT Rationale for DC Rec Patient would benefit from ongoing physical therapy in order to increase strength, activity tolerance, balance, motor coordination and independence with safe, functional mobility.  Patient would benefit from intensive skilled therapies in ARU setting; patient is motivated to participate and would be able to tolerate 3 hours of therapy/day.   PT Brief overview of current status  Pt currently requires up to mod A x 1-2 for bed mob and transfers bed to chair   Total Evaluation Time   Total Evaluation Time (Minutes) 15

## 2021-09-15 NOTE — PROVIDER NOTIFICATION
Writer paged Dr. Cooper, could we get PRN urojet ordered for patient with anticipation of placing ordoñez. Thx

## 2021-09-15 NOTE — CONSULTS
[facility]    Stroke Telephone Note  I was called by Jose Young on 09/14/21 regarding patient Jimmy Otto. The patient is a 71 year old male with history of left corona radiata stroke, diabetes mellitus, hyperlipidemia.  On aspirin and statin at home.  He presents today with acute onset right-sided weakness, that was worse from his baseline and altered mental status that started at 6 PM, he was last known well before that.  On arrival to ER today his blood pressure was 183/97.  Per ED he appeared confused and mumbling.    MRI from August 2012 shows, left corona radiata stroke extending all the way to the internal capsule.    CT CTA was reviewed, TNKase not given due to unfavorable risk-benefit profile.  The examination appeared more consistent with encephalopathy likely in the setting of newly discovered ALEX.  We can get an MRI to rule out an acute stroke.     Addendum: On discussing with the hospitalist, the last known well time may not have been 1800, patient sister who arrived to perform coining found at 1800 that he was weak on both sides, and appeared confused.  The last known well time may be confirmed by the morning stroke team.       Stroke Code Data (for stroke code without tele)  Stroke code activated 09/14/21 2140   Stroke provider first response  09/14/21 2140            Last known normal 09/14/21   1800        Time of discovery   (or onset of symptoms)         Head CT read by Stroke Neuro Dr/Provider 09/14/21 2146   Was stroke code de-escalated?   09/14/21 2217  other (see comments) unfavorable risk/benefit profile     Imaging Findings:  CT shows encephalomalacia in the left corona radiata, wallerian degeneration inferiorly.  There is questionable hypodensity in the left insula that was not seen in the MRI from 2012.    CTA shows no large vessel occlusion, no evidence of atherosclerotic disease in either neck or cerebral vessels.    Thrombolytic Treatment   Not  "given due to stroke mimic: Stroke recrudescence     Endovascular Treatment  Not initiated due to absence of proximal vessel occlusion    Impression  Right Thalamus Ischemic Stroke  - admit for stroke w/u      My recommendations are based on the information provided over the phone by Jimmy Otto's in-person providers. They are not intended to replace the clinical judgment of his in-person providers. I was not requested to personally see or examine the patient at this time.    The Stroke Staff is Dr. Beltran.    Homero Matthews MD  Vascular Neurology Fellow  To page me or covering stroke neurology team member, click here: [facility]   Choose \"On Call\" tab at top, then search dropdown box for \"Neurology Adult\", select location, press Enter, then look for stroke/neuro ICU/telestroke.           "

## 2021-09-15 NOTE — PROGRESS NOTES
RECEIVING UNIT ED HANDOFF REVIEW    ED Nurse Handoff Report was reviewed by: Danielle Benedict RN on September 15, 2021 at 12:55 AM

## 2021-09-15 NOTE — PHARMACY
Pharmacy Consult to evaluate for medication related stroke core measures    Jimmy Otto, 71 year old male admitted for Acute right thalamic CVA   on 9/14/2021.    Thrombolytic was not given because of Stroke recrudescence     Antithrombotic: aspirin started on 1, as appropriate by end of hospital day 2. Continue antithrombotic therapy on discharge to meet quality measures, unless contraindicated.    Anticoagulation if history of A-fib/flutter: Patient does not have history of A-fib/flutter - anticoagulation not required for medication related stroke core measures.     LDL Cholesterol Calculated   Date Value Ref Range Status   09/15/2021 92 <=100 mg/dL Final   08/14/2012 135 (H) 0 - 129 mg/dL Final     Comment:     LDL Cholesterol is the primary guide to therapy: LDL-cholesterol goal in high   risk patients is <100 mg/dL and in very high risk patients is <70 mg/dL.       Patient currently receiving Lipitor (atorvastatin) continue statin on discharge to meet quality measures, unless contraindicated.      Thank you for the consult.    Anusha Medrano RPH 9/15/2021 9:01 AM

## 2021-09-15 NOTE — CONSULTS
"      [facility]    Stroke Consult Note    Reason for Consult: \"stroke\"    Chief Complaint: Altered Mental Status     HPI  Jimmy Otto is a 71 year old male with PMH of T2DM, HLD, HTN, ANDRE, hx of stroke with residual right sided deficits, who presented to the emergency department for evaluation of AMS and weakness. Patient was reported to have an acute change the evening of prior to admission with new left sided weakness and speech changes. Exact onset of symptoms unclear at this time, but daughter states she will follow up with her mother to provide that information. Patients daughter at bedside this afternoon states the family recently noticed a cognitive decline. However, at baseline the patient walks with walker at home, requires assistance with bathing, but independently drives and takes care of finances. There was a question regarding medication compliance. Per daughter, the patient may self adjust doses at home.      Stroke Evaluation Summarized  MRI/Head CT MRI brain demonstrates acute/subacute infarct involving the anteromedial right thalamus    Intracranial Vasculature CTA head negative for proximal LVO or flow limiting stenosis    Cervical Vasculature CTA neck unrevealing      Echocardiogram Ordered    EKG/Telemetry SR   Other Testing Not Applicable     LDL  9/15/2021: 92 mg/dL   A1C  9/15/2021: 11.5 %   Troponin 9/14/2021: <0.015 ug/L     Impression  71 year old male with history of stroke (residual right sided deficits) presented to the emergency department with new speech changes and left sided weakness. MRI brain reveals a new right thalamic infarct. Etiology of stroke likely small vessel disease related vs ICAD     Recommendations  -DAPT with ASA 81 mg + Plavix 75 mg for 21 days    -LDL (90) with goal 40-70. Continue Atorvastatin 80 mg daily  -A1c (11.5) will need aggressive long term glucose control needed to achieve goal <7.0   -Delirium precautions maintain sleep wake cycles, " "light on during the day, blinds should be up during daytime to let natural light in the room. Frequent reorientation and interaction during daytime.  -Therapies as needed   -TTE pending     Patient Follow-up    - final recommendation pending work-up    Thank you for this consult. We will continue to follow.     Amber Le PA-C   Neurology  To page me or covering stroke neurology team member, click here: AMCOM   Choose \"On Call\" tab at top, then search dropdown box for \"Neurology Adult\", select location, press Enter, then look for stroke/neuro ICU/telestroke.  _____________________________________________________    Clinically Significant Risk Factors Present on Admission             # Platelet Defect: home medication list includes an antiplatelet medication      Past Medical History   Past Medical History:   Diagnosis Date     Diabetes mellitus (H)      Hypercholesteremia      Hypertension      Unspecified cerebral artery occlusion with cerebral infarction     TIA 8/11     Past Surgical History   No past surgical history on file.  Medications   Home Meds  Prior to Admission medications    Medication Sig Start Date End Date Taking? Authorizing Provider   acetaminophen-codeine (TYLENOL #3) 300-30 MG tablet Take 1 tablet by mouth every 6 hours as needed for severe pain   Yes Unknown, Entered By History   amLODIPine (NORVASC) 10 MG tablet Take 10 mg by mouth daily   Yes Unknown, Entered By History   aspirin (ASA) 81 MG chewable tablet Take 81 mg by mouth daily   Yes Unknown, Entered By History   atenolol (TENORMIN) 50 MG tablet Take 50 mg by mouth 2 times daily   Yes Unknown, Entered By History   atorvastatin (LIPITOR) 80 MG tablet Take 80 mg by mouth daily   Yes Unknown, Entered By History   clopidogrel (PLAVIX) 75 MG tablet Take 75 mg by mouth daily   Yes Unknown, Entered By History   hydrochlorothiazide (HYDRODIURIL) 25 MG tablet Take 25 mg by mouth daily   Yes Unknown, Entered By History   insulin aspart (NOVOLOG " FLEXPEN) 100 UNIT/ML pen Inject Subcutaneous 3 times daily (with meals) 2 units/carb + sliding scale insulin (50 units per 50 > 150)   Yes Unknown, Entered By History   insulin detemir (LEVEMIR PEN) 100 UNIT/ML pen Inject 28 Units Subcutaneous At Bedtime   Yes Unknown, Entered By History   losartan (COZAAR) 100 MG tablet Take 100 mg by mouth daily   Yes Unknown, Entered By History   metFORMIN (GLUCOPHAGE) 500 MG tablet Take 500 mg by mouth 2 times daily (with meals)   Yes Unknown, Entered By History   potassium chloride ER (KLOR-CON M) 20 MEQ CR tablet Take 20 mEq by mouth daily   Yes Unknown, Entered By History   spironolactone (ALDACTONE) 25 MG tablet Take 25 mg by mouth daily   Yes Unknown, Entered By History   tamsulosin (FLOMAX) 0.4 MG capsule Take 0.4 mg by mouth daily   Yes Unknown, Entered By History       Scheduled Meds    aspirin  300 mg Rectal Daily     atenolol  25 mg Oral BID     atorvastatin  80 mg Oral Daily     insulin aspart  1-6 Units Subcutaneous Q4H     insulin glargine  15 Units Subcutaneous At Bedtime     pantoprazole (PROTONIX) IV  40 mg Intravenous Daily     sodium chloride (PF)  3 mL Intracatheter Q8H     sodium chloride (PF)  3 mL Intracatheter Q8H     tamsulosin  0.4 mg Oral Daily       Infusion Meds    - MEDICATION INSTRUCTIONS -       sodium chloride 100 mL/hr at 09/15/21 0739       PRN Meds  acetaminophen **OR** acetaminophen **OR** acetaminophen, glucose **OR** dextrose **OR** glucagon, hydrALAZINE, HYDROmorphone, labetalol, lidocaine 4%, lidocaine (buffered or not buffered), - MEDICATION INSTRUCTIONS -, melatonin, naloxone **OR** naloxone **OR** naloxone **OR** naloxone, ondansetron **OR** ondansetron, oxyCODONE, prochlorperazine **OR** prochlorperazine **OR** prochlorperazine, sodium chloride (PF), sodium chloride (PF)    Allergies   Allergies   Allergen Reactions     Nka [No Known Allergies]      Family History   No family history on file.  Social History   Social History      Tobacco Use     Smoking status: Never Smoker   Substance Use Topics     Alcohol use: Yes     Comment: occassional     Drug use: No       Review of Systems   Review of systems not obtained due to patient factors - mental status     PHYSICAL EXAMINATION   Temp:  [97.6  F (36.4  C)-98.6  F (37  C)] 98.6  F (37  C)  Pulse:  [] 67  Resp:  [12-20] 12  BP: (135-183)/(72-97) 135/80  SpO2:  [93 %-98 %] 98 %    Neurologic  Mental Status: drowsy/sleeping but will wake to verbal and tactile stimuli, alert to self, following some simple commands, severe dysarthria will mumble/whisper most answers   Cranial Nerves:  PERRL, EOMI with normal smooth pursuit, pronounced right facial droop (chronic) with new left facial muscle weakness, hearing not formally tested but intact to conversation, protrudes tongue  Motor:  normal muscle tone and bulk, no abnormal movements, chronic RUE/RLE weakness, with new LUE/LLE weakness  Reflexes:  deferred  Sensory:  unable to assess due to patients level of alertness   Coordination:  ataxic with LUE/LLE   Station/Gait:  deferred    Dysphagia Screen  Per Nursing    Stroke Scales  Unable to appropriately assess patient NIHSS due to level of alertness. Will attempt tomorrow.     Imaging  I personally reviewed all imaging; relevant findings per HPI.    Labs Data   CBC  Recent Labs   Lab 09/15/21  0521 09/14/21  2142   WBC 7.9 7.5   RBC 4.66 4.98   HGB 12.5* 13.3   HCT 39.2* 41.6    207     Basic Metabolic Panel   Recent Labs   Lab 09/15/21  0701 09/15/21  0606 09/15/21  0521 09/15/21  0207 09/14/21  2142   NA  --   --  137  --  134   POTASSIUM 3.5  --  3.2*  --  3.4   CHLORIDE  --   --  104  --  98   CO2  --   --  29  --  30   BUN  --   --  30  --  35*   CR  --   --  1.67*  --  2.13*   GLC  --  246* 253* 273* 379*   ARLETH  --   --  8.4*  --  8.8     Liver Panel  Recent Labs   Lab 09/15/21  0521   PROTTOTAL 6.6*   ALBUMIN 2.6*   BILITOTAL 0.3   ALKPHOS 80   AST 20   ALT 22     INR    Recent  Labs   Lab Test 09/14/21  2145   INR 0.94      Lipid Profile    Recent Labs   Lab Test 09/15/21  0521   CHOL 172   HDL 32*   LDL 92   TRIG 238*     A1C    Recent Labs   Lab Test 09/15/21  0521   A1C 11.5*     Troponin I    Recent Labs   Lab 09/14/21  2142   TROPONIN <0.015        Stroke Consult Data Data This was a non-emergent, non-telestroke consult.

## 2021-09-16 ENCOUNTER — APPOINTMENT (OUTPATIENT)
Dept: PHYSICAL THERAPY | Facility: CLINIC | Age: 71
DRG: 065 | End: 2021-09-16
Payer: MEDICARE

## 2021-09-16 ENCOUNTER — APPOINTMENT (OUTPATIENT)
Dept: OCCUPATIONAL THERAPY | Facility: CLINIC | Age: 71
DRG: 065 | End: 2021-09-16
Payer: MEDICARE

## 2021-09-16 LAB
ANION GAP SERPL CALCULATED.3IONS-SCNC: 6 MMOL/L (ref 3–14)
APTT PPP: 30 SECONDS (ref 22–38)
BUN SERPL-MCNC: 15 MG/DL (ref 7–30)
CALCIUM SERPL-MCNC: 9 MG/DL (ref 8.5–10.1)
CHLORIDE BLD-SCNC: 108 MMOL/L (ref 94–109)
CO2 SERPL-SCNC: 25 MMOL/L (ref 20–32)
CREAT SERPL-MCNC: 1.32 MG/DL (ref 0.66–1.25)
ENTEROCOCCUS FAECALIS: NOT DETECTED
ENTEROCOCCUS FAECIUM: NOT DETECTED
ERYTHROCYTE [DISTWIDTH] IN BLOOD BY AUTOMATED COUNT: 13.1 % (ref 10–15)
GFR SERPL CREATININE-BSD FRML MDRD: 54 ML/MIN/1.73M2
GLUCOSE BLD-MCNC: 312 MG/DL (ref 70–99)
GLUCOSE BLDC GLUCOMTR-MCNC: 194 MG/DL (ref 70–99)
GLUCOSE BLDC GLUCOMTR-MCNC: 209 MG/DL (ref 70–99)
GLUCOSE BLDC GLUCOMTR-MCNC: 233 MG/DL (ref 70–99)
GLUCOSE BLDC GLUCOMTR-MCNC: 290 MG/DL (ref 70–99)
GLUCOSE BLDC GLUCOMTR-MCNC: 328 MG/DL (ref 70–99)
GLUCOSE BLDC GLUCOMTR-MCNC: 455 MG/DL (ref 70–99)
GLUCOSE BLDC GLUCOMTR-MCNC: 529 MG/DL (ref 70–99)
HCT VFR BLD AUTO: 40 % (ref 40–53)
HGB BLD-MCNC: 13 G/DL (ref 13.3–17.7)
INR PPP: 1.03 (ref 0.85–1.15)
LISTERIA SPECIES (DETECTED/NOT DETECTED): NOT DETECTED
MCH RBC QN AUTO: 26.7 PG (ref 26.5–33)
MCHC RBC AUTO-ENTMCNC: 32.5 G/DL (ref 31.5–36.5)
MCV RBC AUTO: 82 FL (ref 78–100)
PLATELET # BLD AUTO: 207 10E3/UL (ref 150–450)
POTASSIUM BLD-SCNC: 3.6 MMOL/L (ref 3.4–5.3)
RBC # BLD AUTO: 4.86 10E6/UL (ref 4.4–5.9)
SODIUM SERPL-SCNC: 139 MMOL/L (ref 133–144)
STAPHYLOCOCCUS AUREUS: NOT DETECTED
STAPHYLOCOCCUS EPIDERMIDIS: NOT DETECTED
STAPHYLOCOCCUS LUGDUNENSIS: NOT DETECTED
STAPHYLOCOCCUS SPECIES: DETECTED
STREPTOCOCCUS AGALACTIAE: NOT DETECTED
STREPTOCOCCUS ANGINOSUS GROUP: NOT DETECTED
STREPTOCOCCUS PNEUMONIAE: NOT DETECTED
STREPTOCOCCUS PYOGENES: NOT DETECTED
STREPTOCOCCUS SPECIES: NOT DETECTED
WBC # BLD AUTO: 9.1 10E3/UL (ref 4–11)

## 2021-09-16 PROCEDURE — 250N000013 HC RX MED GY IP 250 OP 250 PS 637: Performed by: INTERNAL MEDICINE

## 2021-09-16 PROCEDURE — 250N000012 HC RX MED GY IP 250 OP 636 PS 637: Performed by: INTERNAL MEDICINE

## 2021-09-16 PROCEDURE — 97530 THERAPEUTIC ACTIVITIES: CPT | Mod: GP | Performed by: PHYSICAL THERAPY ASSISTANT

## 2021-09-16 PROCEDURE — 250N000011 HC RX IP 250 OP 636: Performed by: INTERNAL MEDICINE

## 2021-09-16 PROCEDURE — 88112 CYTOPATH CELL ENHANCE TECH: CPT | Mod: TC | Performed by: STUDENT IN AN ORGANIZED HEALTH CARE EDUCATION/TRAINING PROGRAM

## 2021-09-16 PROCEDURE — 36415 COLL VENOUS BLD VENIPUNCTURE: CPT | Performed by: INTERNAL MEDICINE

## 2021-09-16 PROCEDURE — C9113 INJ PANTOPRAZOLE SODIUM, VIA: HCPCS | Performed by: INTERNAL MEDICINE

## 2021-09-16 PROCEDURE — 250N000013 HC RX MED GY IP 250 OP 250 PS 637: Performed by: PHYSICIAN ASSISTANT

## 2021-09-16 PROCEDURE — 80048 BASIC METABOLIC PNL TOTAL CA: CPT | Performed by: INTERNAL MEDICINE

## 2021-09-16 PROCEDURE — 258N000003 HC RX IP 258 OP 636: Performed by: INTERNAL MEDICINE

## 2021-09-16 PROCEDURE — 97530 THERAPEUTIC ACTIVITIES: CPT | Mod: GO

## 2021-09-16 PROCEDURE — 85027 COMPLETE CBC AUTOMATED: CPT | Performed by: INTERNAL MEDICINE

## 2021-09-16 PROCEDURE — 99232 SBSQ HOSP IP/OBS MODERATE 35: CPT | Performed by: PSYCHIATRY & NEUROLOGY

## 2021-09-16 PROCEDURE — 99233 SBSQ HOSP IP/OBS HIGH 50: CPT | Performed by: INTERNAL MEDICINE

## 2021-09-16 PROCEDURE — 87040 BLOOD CULTURE FOR BACTERIA: CPT | Performed by: INTERNAL MEDICINE

## 2021-09-16 PROCEDURE — 85610 PROTHROMBIN TIME: CPT | Performed by: INTERNAL MEDICINE

## 2021-09-16 PROCEDURE — 120N000001 HC R&B MED SURG/OB

## 2021-09-16 PROCEDURE — 85730 THROMBOPLASTIN TIME PARTIAL: CPT | Performed by: INTERNAL MEDICINE

## 2021-09-16 RX ORDER — DEXTROSE MONOHYDRATE 25 G/50ML
25-50 INJECTION, SOLUTION INTRAVENOUS
Status: DISCONTINUED | OUTPATIENT
Start: 2021-09-16 | End: 2021-09-16

## 2021-09-16 RX ORDER — AMLODIPINE BESYLATE 10 MG/1
10 TABLET ORAL DAILY
Status: DISCONTINUED | OUTPATIENT
Start: 2021-09-16 | End: 2021-09-17 | Stop reason: HOSPADM

## 2021-09-16 RX ORDER — NICOTINE POLACRILEX 4 MG
15-30 LOZENGE BUCCAL
Status: DISCONTINUED | OUTPATIENT
Start: 2021-09-16 | End: 2021-09-16

## 2021-09-16 RX ORDER — ASPIRIN 81 MG/1
81 TABLET, CHEWABLE ORAL DAILY
Status: DISCONTINUED | OUTPATIENT
Start: 2021-09-16 | End: 2021-09-16

## 2021-09-16 RX ORDER — CLOPIDOGREL BISULFATE 75 MG/1
75 TABLET ORAL DAILY
Status: DISCONTINUED | OUTPATIENT
Start: 2021-09-16 | End: 2021-09-17 | Stop reason: HOSPADM

## 2021-09-16 RX ADMIN — ATENOLOL 25 MG: 25 TABLET ORAL at 09:16

## 2021-09-16 RX ADMIN — SODIUM CHLORIDE: 9 INJECTION, SOLUTION INTRAVENOUS at 22:35

## 2021-09-16 RX ADMIN — POLYETHYLENE GLYCOL 3350 17 G: 17 POWDER, FOR SOLUTION ORAL at 09:15

## 2021-09-16 RX ADMIN — ASPIRIN 81 MG CHEWABLE TABLET 81 MG: 81 TABLET CHEWABLE at 09:16

## 2021-09-16 RX ADMIN — INSULIN GLARGINE 25 UNITS: 100 INJECTION, SOLUTION SUBCUTANEOUS at 22:22

## 2021-09-16 RX ADMIN — TAMSULOSIN HYDROCHLORIDE 0.4 MG: 0.4 CAPSULE ORAL at 09:16

## 2021-09-16 RX ADMIN — ATORVASTATIN CALCIUM 80 MG: 80 TABLET, FILM COATED ORAL at 09:16

## 2021-09-16 RX ADMIN — PANTOPRAZOLE SODIUM 40 MG: 40 INJECTION, POWDER, FOR SOLUTION INTRAVENOUS at 09:23

## 2021-09-16 RX ADMIN — CLOPIDOGREL BISULFATE 75 MG: 75 TABLET ORAL at 09:16

## 2021-09-16 RX ADMIN — AMLODIPINE BESYLATE 10 MG: 10 TABLET ORAL at 17:49

## 2021-09-16 RX ADMIN — CLOPIDOGREL BISULFATE 75 MG: 75 TABLET ORAL at 17:49

## 2021-09-16 RX ADMIN — ATENOLOL 25 MG: 25 TABLET ORAL at 21:32

## 2021-09-16 RX ADMIN — SODIUM CHLORIDE: 9 INJECTION, SOLUTION INTRAVENOUS at 06:13

## 2021-09-16 ASSESSMENT — ACTIVITIES OF DAILY LIVING (ADL)
ADLS_ACUITY_SCORE: 17
ADLS_ACUITY_SCORE: 22

## 2021-09-16 NOTE — PROVIDER NOTIFICATION
Writer paged oncall hospitalist, Dr Lopez  Patient  at [phone]. Do you wanna given additional sliding scale Novolog? Please advise.     Writer spoke to Dr. Lopez, order to give a total if 15 units total of Novolog insulin of combined scheduled sliding scale insulin, carb count insulin, and the new order of one time dose insulin.

## 2021-09-16 NOTE — PLAN OF CARE
Pt here with R thalamus stroke. A&O to self, disoriented to time, place and situation. Neuros q4hrs, slow speech, slow to respond, LUE4/5 and RUE 3/5. RUE contracted. VSS. Tele NSR.  Purees diet with thin liquids. Takes pills crush in applesauce. Bladder irigatted with 60 ml NS, Pt toleterated it well. Felix catheter patent, urine is light pikage color from previous cath pull. Up with A2/lift. Denies pain. Pt scoring green on the Aggression Stop Light Tool. Discharge pending.

## 2021-09-16 NOTE — PROGRESS NOTES
[facility]    Stroke Progress Note    Interval EventsNo events overnight. More alert and interactive on exam today.     HPI Summary  71 year old male with PMH of T2DM, HLD, HTN, ANDRE, hx of stroke with residual right sided deficits, who presented to the emergency department for evaluation of AMS and weakness. Found to have a recent right thalamic infarct on MRI. There is a question regarding medication compliance. Per daughter, the patient may self adjust doses at home.    Stroke Evaluation Summarized  MRI/Head CT MRI brain demonstrates acute/subacute infarct involving the anteromedial right thalamus    Intracranial Vasculature CTA head negative for proximal LVO or flow limiting stenosis    Cervical Vasculature CTA neck unrevealing       Echocardiogram Borderline concentric LVH, normal LA   EKG/Telemetry SR   Other Testing 1/2 blood cultures with GPC      LDL  9/15/2021: 92 mg/dL   A1C  9/15/2021: 11.5 %   Troponin 9/14/2021: <0.015 ug/L          Impression   Acute ischemic stroke of right thalamus due to small-vessel occlusion     Plan  - DAPT with ASA 81 mg + Plavix 75 mg for 1 month then Plavix alone indefinitely. Check P2Y12 to ensure Plavix efficacy in 1 month.   - LDL (90) with goal 40-70. Continue Atorvastatin 80 mg daily  - A1c (11.5) will need aggressive long term glucose control needed to achieve goal <7.0   - Delirium precautions maintain sleep wake cycles, light on during the day, blinds should be up during daytime to let natural light in the room. Frequent reorientation and interaction during daytime.  - Therapies as needed   - Medication should be administered by caregiver    Patient Follow-up    - in the next 1-2 week(s) with PCP  - in 6-8 weeks with [facility] [phone] (neurology referral order placed)    No further stroke evaluation is recommended, so we will sign off. Please contact us with any additional questions.    FEMI Miles,  "CNP  Neurology  To page me or covering stroke neurology team member, click here: AMCOM   Choose \"On Call\" tab at top, then search dropdown box for \"Neurology Adult\", select location, press Enter, then look for stroke/neuro ICU/telestroke.    ______________________________________________________    Clinically Significant Risk Factors Present on Admission                 Medications   Scheduled Meds    aspirin  81 mg Oral Daily     atenolol  25 mg Oral BID     atorvastatin  80 mg Oral Daily     clopidogrel  75 mg Oral Daily     insulin aspart  1-7 Units Subcutaneous TID AC     insulin aspart  1-5 Units Subcutaneous At Bedtime     insulin glargine  25 Units Subcutaneous At Bedtime     pantoprazole (PROTONIX) IV  40 mg Intravenous Daily     polyethylene glycol  17 g Oral Daily     sodium chloride (PF)  3 mL Intracatheter Q8H     tamsulosin  0.4 mg Oral Daily       Infusion Meds    - MEDICATION INSTRUCTIONS -       sodium chloride 50 mL/hr at 09/16/21 1228       PRN Meds  acetaminophen **OR** acetaminophen **OR** acetaminophen, glucose **OR** dextrose **OR** glucagon, hydrALAZINE, HYDROmorphone, labetalol, lidocaine 4%, lidocaine, lidocaine (buffered or not buffered), - MEDICATION INSTRUCTIONS -, melatonin, naloxone **OR** naloxone **OR** naloxone **OR** naloxone, ondansetron **OR** ondansetron, oxyCODONE, prochlorperazine **OR** prochlorperazine **OR** prochlorperazine, sennosides, sodium chloride (PF)       PHYSICAL EXAMINATION  Temp:  [98.1  F (36.7  C)-99  F (37.2  C)] 98.9  F (37.2  C)  Pulse:  [65-72] 68  Resp:  [16] 16  BP: (138-172)/(80-97) 138/80  SpO2:  [94 %-98 %] 98 %      Neurologic  Mental Status:  alert, follows simple commands, states own name and names family members, moderate dysarthria  Cranial Nerves:  EOMI with normal smooth pursuit, chronic right facial droop, left facial weakness, does not protrude tongue today  Motor:  normal muscle tone and bulk, no abnormal movements, LUE with mild drift, " chronic RU/RLE weakness  Reflexes:  deferred   Sensory:  difficulty following for formal exam  Coordination:  no ataxia out of proportion to weakness  Station/Gait:  deferred    Stroke Scales    NIHSS  Interval    Interval Comments     1a. Level of Consciousness 0-->Alert, keenly responsive   1b. LOC Questions 1-->Answers one question correctly   1c. LOC Commands 0-->Performs both tasks correctly   2.   Best Gaze 0-->Normal   3.   Visual 0-->No visual loss   4.   Facial Palsy 2-->Partial paralysis (total or near-total paralysis of lower face)   5a. Motor Arm, Left 1-->Drift, limb holds 90 (or 45) degrees, but drifts down before full 10 seconds, does not hit bed or other support   5b. Motor Arm, Right 2-->Some effort against gravity, limb cannot get to or maintain (if cued) 90 (or 45) degrees, drifts down to bed, but has some effort against gravity   6a. Motor Leg, Left 1-->Drift, leg falls by the end of the 5-sec period but does not hit bed   6b. Motor Leg, right 1-->Drift, leg falls by the end of the 5-sec period but does not hit bed   7.   Limb Ataxia 0-->Absent   8.   Sensory 1-->Mild-to-moderate sensory loss, patient feels pinprick is less sharp or is dull on the affected side, or there is a loss of superficial pain with pinprick, but patient is aware of being touched   9.   Best Language 0-->No aphasia, normal   10. Dysarthria 1-->Mild-to-moderate dysarthria, patient slurs at least some words and, at worst, can be understood with some difficulty   11. Extinction and Inattention  0-->No abnormality   Total 10 (09/16/21 1435)       Modified Gato Score (Discharge)  4-Moderately severe disability; unable to walk without assistance and unable to attend to own bodily    Imaging  I personally reviewed all imaging; relevant findings per HPI.     Lab Results Data   CBC  Recent Labs   Lab 09/16/21  1003 09/15/21  0521 09/14/21  2142   WBC 9.1 7.9 7.5   RBC 4.86 4.66 4.98   HGB 13.0* 12.5* 13.3   HCT 40.0 39.2* 41.6     176 207     Basic Metabolic Panel    Recent Labs   Lab 09/16/21  1236 09/16/21  0957 09/16/21  0806 09/15/21  0858 09/15/21  0701 09/15/21  0606 09/15/21  0521 09/15/21  0207 09/14/21 2142   NA  --  139  --   --   --   --  137  --  134   POTASSIUM  --  3.6  --   --  3.5  --  3.2*   < > 3.4   CHLORIDE  --  108  --   --   --   --  104  --  98   CO2  --  25  --   --   --   --  29  --  30   BUN  --  15  --   --   --   --  30  --  35*   CR  --  1.32*  --   --   --   --  1.67*  --  2.13*   * 312* 194*   < >  --    < > 253*   < > 379*   ARLETH  --  9.0  --   --   --   --  8.4*  --  8.8    < > = values in this interval not displayed.     Liver Panel  Recent Labs   Lab 09/15/21  0521   PROTTOTAL 6.6*   ALBUMIN 2.6*   BILITOTAL 0.3   ALKPHOS 80   AST 20   ALT 22     INR    Recent Labs   Lab Test 09/16/21  0957 09/14/21 2145   INR 1.03 0.94      Lipid Profile    Recent Labs   Lab Test 09/15/21  0521   CHOL 172   HDL 32*   LDL 92   TRIG 238*     A1C    Recent Labs   Lab Test 09/15/21  0521   A1C 11.5*     Troponin I    Recent Labs   Lab 09/14/21 2142   TROPONIN <0.015

## 2021-09-16 NOTE — PLAN OF CARE
Pt here with new R thalamic stroke. Hx of previous L thalamic stroke resulting with R side deficits. Pt A&O x2, confused to time and situation. Sleeping between cares, arouses to voice. VSS, on RA. LS clear. Tele NSR. Patient very slow to respond, slow whispered speech, more than patient's baseline, able to follow most commands, CMS and Neuros intact except for RUE weakness, 3/5 with contraction, weak hand , RLE weakness, 3-4/5, with traces of edema, right facial droop, which daughter stated are all baseline for patient. Denies pain. T&R q2hrs, A2 lift/reposheet. Unable to void, bladder scanned 526cc, straight cath 2 times prior, Felix placed d/t retention, yellow urine output at time of insertion, patient pulled at tube later, writer noted red colored urine and clots in bag, MD notified, bladder irrigations ordered and Urology consulted. 60cc NS bladder irrigation done, rudi back 60cc of watermelon colored urine with blood clots. Advanced to Pureed (level 4) diet with thin liquids, total feed, good appetite. Takes pills crushed when able with applesauce. K+ 3.5. ECHO completed, see results. Skin has red subramanian on chest and back from coining ceremony. Pt scoring green on Aggression Stop Light Tool. Currently recommending ARU vs TCU.

## 2021-09-16 NOTE — PLAN OF CARE
Pt here with new R thalamic stroke. Pt A&O x2, confused to time and situation. Sleeping between cares, arouses to voice, more alert later in day. Elevated BP within parameters otherwise VSS, on RA. LS clear. Tele NSR. Patient very slow to respond, slow whispered speech, more than patient's baseline, able to follow most commands. CMS and Neuros intact except for RUE weakness, 3/5 strength, contracted, weak to mod hand , RLE weakness, 3/5, with traces of edema, R facial droop, baseline from patient's from previous stroke. Denies pain. A2, T&R q2hrs, lift/reposheet,  strong A2 w/GB and walker to chair, slow moving. Felix placed d/t retention, adequate output, red improving to watermelon colored urine, bladder irrigation completed per order, NS bladder irrigation done, 60cc input with 60cc drawn back, clear yellow urine no clots noted. Pureed (level 4) diet with thin liquids, total feed, good appetite. Elevated BG, MD notified, sliding scale insulin, carb counting insulin given at dinner time, evening Lantus dose increased. Takes pills whole, large pills crushed given with applesauce. Skin has red subramanian on chest and back from coining ceremony. Pt scoring green on Aggression Stop Light Tool. Discharge pending, ARU vs TCU.

## 2021-09-16 NOTE — PLAN OF CARE
Pt is alert with gentle shaking and oriented x3 with occasional confusion. Pt lethargic, but arousal. Unable to say situation. Denies pain. Having weakness in RUE 3/5 and contracted and RLE 3/5. LUE 4/5 and LLE 4/5. Pupils PERRL 3mm. Speech is slow. Able to swallow, med given whole in applesauce, tolerated well. Pt , insulin given as ordered (See MAR). Pt BP elevated, atenolol given as ordered (see MAR). Urine red potentially due to previous ordoñez pulling, urology consulted, orders to irrigate bladder once a shift, done last @ 1830 per previous nurse. Did not preform this task. Continue to monitor for s/s of stroke. Continue to monitor BP and BS. Continue POC.

## 2021-09-16 NOTE — CONSULTS
Care Management Initial Consult    General Information  Assessment completed with: Patient, Children, Family, Phally-Dtr and pt  Type of CM/SW Visit: Initial Assessment    Primary Care Provider verified and updated as needed: Yes --Dr. Nallely Fong at [facility]  Readmission within the last 30 days:        Reason for Consult: discharge planning  Advance Care Planning: Advance Care Planning Reviewed: education/resources on health care directives provided          Communication Assessment  Patient's communication style: spoken language (English or Bilingual)    Hearing Difficulty or Deaf: no   Wear Glasses or Blind: no    Cognitive  Cognitive/Neuro/Behavioral: .WDL except, orientation  Level of Consciousness: alert, confused  Arousal Level: arouses to voice  Orientation: disoriented to, time, situation  Mood/Behavior: cooperative, flat affect  Best Language: 0 - No aphasia  Speech: slow, whispers    Living Environment:   People in home: spouse     Current living Arrangements: house      Able to return to prior arrangements: yes       Family/Social Support:  Care provided by: self  Provides care for: no one  Marital Status:              Description of Support System: Supportive, Involved    Support Assessment: Adequate family and caregiver support, Adequate social supports    Current Resources:   Patient receiving home care services:  N/A     Community Resources:    Equipment currently used at home: cane, straight, grab bar, tub/shower, shower chair, walker, rolling, other (see comments)  Supplies currently used at home:  N/A    Employment/Financial:  Employment Status:          Financial Concerns:   Pt has Medicare w/supplement (dtr to provide copy of insurance cards to update Epic)          Lifestyle & Psychosocial Needs:  Social Determinants of Health     Tobacco Use:      Smoking Tobacco Use:      Smokeless Tobacco Use:    Alcohol Use:      Frequency of Alcohol Consumption:      Average Number of  Drinks:      Frequency of Binge Drinking:    Financial Resource Strain:      Difficulty of Paying Living Expenses:    Food Insecurity:      Worried About Running Out of Food in the Last Year:      Ran Out of Food in the Last Year:    Transportation Needs:      Lack of Transportation (Medical):      Lack of Transportation (Non-Medical):    Physical Activity:      Days of Exercise per Week:      Minutes of Exercise per Session:    Stress:      Feeling of Stress :    Social Connections:      Frequency of Communication with Friends and Family:      Frequency of Social Gatherings with Friends and Family:      Attends Alevism Services:      Active Member of Clubs or Organizations:      Attends Club or Organization Meetings:      Marital Status:    Intimate Partner Violence:      Fear of Current or Ex-Partner:      Emotionally Abused:      Physically Abused:      Sexually Abused:    Depression:      PHQ-2 Score:    Housing Stability:      Unable to Pay for Housing in the Last Year:      Number of Places Lived in the Last Year:      Unstable Housing in the Last Year:        Functional Status:  Prior to admission patient needed assistance:       Dtr reports pt has been independent w/most ADL's, did require showering assistance that was provided by sister in law that lives next door.        Mental Health Status:  Mental Health Status: No Current Concerns       Chemical Dependency Status:  Chemical Dependency Status: No Current Concerns             Values/Beliefs:  Spiritual, Cultural Beliefs, Alevism Practices, Values that affect care:             Yazidism    Additional Information:    SW reviewed chart and met with patient and his brother, Miquel Otto, who was present in room. As brother is not listed as an emergency contact, SW did received pt's approval to discuss all information in front of brother.   Pt was admitted 9/14/21 with right sided weakness/CVA. Anticipated discharge date: 9/17. PALAK introduced self and  role.  Pt confirmed he resides in his [place] home w/his spouse. Pt's brother lives directly next door in his own home. Pt has only Medicare listed currently, however, brother reports pt has full coverage; Pt's dtr Clemente has this information. We reviewed therapy recommendation for: ARU. Pt in favor of this level of care. Brother confirmed either he, his spouse or pt's dtr's would be able to move in w/pt to provide any level of assistance needed post ARU.  PALAK called dtrClemente who stated she will scan pt's insurance card and email them. Clemente stated she has no preference about which ARU, but SW will wait insurance information to be sure pt can go to either [facility] or [facility]. Dtr confirms all family members will be available to assist pt in his home post ARU, if needed.  We did not discuss transport at this time.   Will continue to follow.     ANIRUDH Vallejo   [facility]  [phone]      UPDATE@5560: Per brother, pt has [employer]. Brother has pt's insurance card at his house and will scan it and send to PALAK.  PALAK sent referrals thru DOD to: [facility] and [facility] as both take [employer].

## 2021-09-16 NOTE — PROVIDER NOTIFICATION
Eben Lopze MD (hospitalist) FYI paged regarding pt positive blood culture.    JOSE ANTONIO GARLAND RN @ [phone]

## 2021-09-16 NOTE — PROGRESS NOTES
"[facility]    Medicine Progress Note - Hospitalist Service       Date of Admission:  9/14/2021    Assessment & Plan           Jimmy Otto is a 71 year old male admitted on 9/14/2021. He presents to the emergency department with approximately 2 days of some mild increased weakness last seen by sister-in-law at 6 PM and fairly baseline. Shortly thereafter, patient developed significant expressive aphasia and weakness, found to have new right thalamic stroke with prior left thalamic stroke.    Acute right thalamic CVA:    - appreciate speech pathology  -Stroke neurology consulted \"Dual antiplatelets for 1 month then back to plavix monotherapy as he was supposed to be on. It might be informative to check P2Y12 activity after a month of being on plavix only. Caregivers need to be in charge of the medications administration. \"   -Case management consult for disposition planning; anticipate need for rehab, potentially acute rehab, at discharge.   - atorvastatin 80 mg daily when tolerating oral intake  -Cardiac telemetry, TTE  -Continue PT/OT.  -Continue SLP eval: Currently on pureed (level 4);thin liquids (level 0)    Uncontrolled type 2 diabetes: Most recent hemoglobin A1c of 9.4 6/28/2021 through outside hospital system. Appears to be on 28 units long-acting insulin daily  -Lantus increased to 25 units 09/16  Recent Labs   Lab 09/16/21  0957 09/16/21  0806 09/16/21  0559 09/16/21  0224 09/15/21  2121 09/15/21  1651   * 194* 209* 233* 393* 344*     -Hypoglycemia protocol in place  -Prior to admission Metformin on hold given acute renal failure. baseline creatinine was 1.1.   -Avoid NSAID's except daily aspirin if indicated    Hypertension:  -Prior to admission Cozaar 100 mg, spironolactone 25 mg, hydrochlorothiazide 25 on hold given acute renal failure  -Permissive hypertension at this time  -As needed labetalol and hydralazine if needed  -Ct prior to admission atenolol @ 25mg bid. (PTA dose " 50 mg twice daily)     Acute renal failure: Creatinine of 2.13, baseline of 1.1 as of 6/28/2021 through   [facility].   Creatinine   Date Value Ref Range Status   09/16/2021 1.32 (H) 0.66 - 1.25 mg/dL Final   08/16/2012 0.96 0.66 - 1.25 mg/dL Final     Does have a history of nephrolithiasis in the past, though no flank pain, no hematuria. UA generally not concerning.  -Trend CMP   -Continue Flomax as below  -Holding prior to admission Cozaar, spironolactone, hydrochlorothiazide  -Received 2 L normal saline bolus in the emergency department  -On 9/16: Creatinine is improving.  Likely had component of acute renal obstruction. Ct to monitor    Hematuria, BPH  and urinary retention:  -Appreciate urology review on 9/15  - Needs CT urogram once SCr normalizes  Outpatient followup for cystoscopy to complete hematuria workup    -Resume prior to admission Flomax 0.4 mg daily when able to tolerate oral intake    Positive blood culture on admission on 9/14: Gram-positive cocci in cluster 1/2 bottles  -Likely contaminant  -Repeat blood culture    Hyperlipidemia:  -Fasting lipid panel (LDL- 92)   -Continue atorvastatin 80 when able to tolerate oral intake       Diet: Combination Diet Pureed Diet (level 4); Thin Liquids (level 0); No Straws    DVT Prophylaxis: Pneumatic Compression Devices  Felix Catheter: PRESENT, indication: Retention  Central Lines: None  Code Status: Full Code      Disposition Plan   Expected discharge: 09/17/2021   recommended to transitional care unit once mental status at baseline and safe disposition plan/ TCU bed available.     The patient's care was discussed with the Bedside Nurse, Care Coordinator/, Patient and Patient's Family.    Nazario Cooper MD, MD  Hospitalist Service  [facility]  Securely message with the Vocera Web Console (learn more here)  Text page via [employer] Paging/Directory    Clinically Significant Risk Factors Present on Admission               ______________________________________________________________________    Interval History   Feels better compared to yesterday.  More awake and responding to questions  Had catheter placed yesterday was reviewed by urology.     4 point ROS done and negative unless mentioned     Data reviewed today: I reviewed all medications, new labs and imaging results over the last 24 hours. I personally reviewed the Echo image(s) showing As below.    Physical Exam   BP (!) 143/90 (BP Location: Right arm)   Pulse 65   Temp 98.5  F (36.9  C) (Oral)   Resp 16   Wt 61.5 kg (135 lb 9.3 oz)   SpO2 98%   BMI 23.27 kg/m    Gen- pleasant  lying in bed  HEENT- NAD, ROGER  Neck- supple, no JVD elevation, no thyromegaly  CVS- I+II+ no m/r/g  RS- CTABS  Abdo- soft, no tenderness . No g/r/r, BS+ no hepatosplenomegaly   Ext- no edema   CNS-dense expressive aphasia.  Left-sided weakness (further as detailed in neurology note_     Data    BMP  Recent Labs   Lab 09/16/21  0957 09/16/21  0806 09/16/21  0559 09/16/21  0224 09/15/21  0858 09/15/21  0701 09/15/21  0606 09/15/21  0521 09/15/21  0207 09/14/21  2142     --   --   --   --   --   --  137  --  134   POTASSIUM 3.6  --   --   --   --  3.5  --  3.2*  --  3.4   CHLORIDE 108  --   --   --   --   --   --  104  --  98   ARLETH 9.0  --   --   --   --   --   --  8.4*  --  8.8   CO2 25  --   --   --   --   --   --  29  --  30   BUN 15  --   --   --   --   --   --  30  --  35*   CR 1.32*  --   --   --   --   --   --  1.67*  --  2.13*   * 194* 209* 233*   < >  --    < > 253*   < > 379*    < > = values in this interval not displayed.     CBC  Recent Labs   Lab 09/16/21  1003 09/15/21  0521 09/15/21  0521 09/14/21 2142 09/14/21 2142   WBC 9.1  --  7.9  --  7.5   RBC 4.86  --  4.66  --  4.98   HGB 13.0*   < > 12.5*   < > 13.3   HCT 40.0  --  39.2*  --  41.6   MCV 82  --  84  --  84   MCH 26.7  --  26.8  --  26.7   MCHC 32.5  --  31.9  --  32.0   RDW 13.1  --  13.2  --  13.1   PLT  207  --  176  --  207    < > = values in this interval not displayed.     INR  Recent Labs   Lab 21  0957 21  2145   INR 1.03 0.94     LFTs  Recent Labs   Lab 09/15/21  0521   ALKPHOS 80   AST 20   ALT 22   BILITOTAL 0.3   PROTTOTAL 6.6*   ALBUMIN 2.6*      PANCNo lab results found in last 7 days.    Recent Results (from the past 24 hour(s))   Echocardiogram Complete - For age > 60 yrs    Narrative    [id number]  [id number]  [id number]^DEZ^EDOUARD^SEBLE     [facility]  Echocardiography Laboratory  [address] [place] [address]     Name: CARLOS SCHULTZ  MRN: [id number]  : 1950  Study Date: 09/15/2021 02:59 PM  Age: 71 yrs  Gender: Male  Patient Location: [facility]  Reason For Study: Cerebrovascular Incident  Ordering Physician: EDOUARD GARCES  Referring Physician: Manny Worrell  Performed By: Edouard Tapia RDCS     BSA: 1.7 m2  Height: 64 in  Weight: 140 lb  HR: 66  BP: 138/72 mmHg  ______________________________________________________________________________  Procedure  Complete Portable Echo Adult. Optison (NDC #1221-6656) given intravenously.  ______________________________________________________________________________  Interpretation Summary     Contrast was used without apparent complications. No cardiac source of emboli  noted.  ______________________________________________________________________________  Left Ventricle  The left ventricle is normal in size. There is borderline concentric left  ventricular hypertrophy. Left ventricular systolic function is normal. Left  ventricular diastolic function is normal. Normal left ventricular wall motion.  No evidence of left ventricular mass or tumors.     Right Ventricle  The right ventricle is normal in structure, function and size.     Atria  Normal left atrial size. Right atrial size is normal. There is no color  Doppler evidence of an atrial shunt.     Mitral Valve  The mitral valve leaflets are mildly thickened.  The mitral valve leaflets  appear normal. There is no evidence of stenosis, fluttering, or prolapse.     Tricuspid Valve  Normal tricuspid valve.     Aortic Valve  There is trivial trileaflet aortic sclerosis.     Pulmonic Valve  Normal pulmonic valve.     Vessels  The aortic root is normal size. The inferior vena cava is normal.     Pericardium  The pericardium appears normal.     ______________________________________________________________________________  MMode/2D Measurements & Calculations  IVSd: 1.2 cm  LVIDd: 3.7 cm  LVIDs: 2.1 cm  LVPWd: 1.1 cm  FS: 44.3 %  LV mass(C)d: 135.9 grams  LV mass(C)dI: 80.9 grams/m2     Ao root diam: 3.4 cm  LA dimension: 3.4 cm  LA/Ao: 1.0  LA Volume (BP): 36.7 ml  LA Volume Index (BP): 21.8 ml/m2  RWT: 0.60     Doppler Measurements & Calculations  MV E max kathleen: 103.0 cm/sec  MV A max kathleen: 81.0 cm/sec  MV E/A: 1.3  MV dec time: 0.17 sec  PA acc time: 0.10 sec     E/E' av.0  Lateral E/e': 10.5  Medial E/e': 15.5     ______________________________________________________________________________  Report approved by: Luciana Ferris 09/15/2021 04:21 PM

## 2021-09-16 NOTE — PROGRESS NOTES
Writer noted 1540 BG reading of 524, patient just finished eating lunch about an hour prior, will continue to monitor and recheck at next scheduled time.

## 2021-09-17 ENCOUNTER — APPOINTMENT (OUTPATIENT)
Dept: SPEECH THERAPY | Facility: CLINIC | Age: 71
DRG: 065 | End: 2021-09-17
Payer: MEDICARE

## 2021-09-17 ENCOUNTER — APPOINTMENT (OUTPATIENT)
Dept: PHYSICAL THERAPY | Facility: CLINIC | Age: 71
DRG: 065 | End: 2021-09-17
Payer: MEDICARE

## 2021-09-17 ENCOUNTER — HOSPITAL ENCOUNTER (INPATIENT)
Facility: CLINIC | Age: 71
LOS: 19 days | Discharge: NURSING, REHAB OR LONG-TERM CARE | DRG: 057 | End: 2021-10-06
Attending: PHYSICAL MEDICINE & REHABILITATION | Admitting: PHYSICAL MEDICINE & REHABILITATION
Payer: MEDICARE

## 2021-09-17 VITALS
RESPIRATION RATE: 16 BRPM | OXYGEN SATURATION: 95 % | WEIGHT: 135.58 LBS | HEART RATE: 71 BPM | BODY MASS INDEX: 23.15 KG/M2 | DIASTOLIC BLOOD PRESSURE: 83 MMHG | HEIGHT: 64 IN | SYSTOLIC BLOOD PRESSURE: 142 MMHG | TEMPERATURE: 98.7 F

## 2021-09-17 DIAGNOSIS — N40.1 BENIGN PROSTATIC HYPERPLASIA WITH URINARY RETENTION: ICD-10-CM

## 2021-09-17 DIAGNOSIS — K59.00 CONSTIPATION, UNSPECIFIED CONSTIPATION TYPE: ICD-10-CM

## 2021-09-17 DIAGNOSIS — E11.22 TYPE 2 DIABETES MELLITUS WITH CHRONIC KIDNEY DISEASE, WITH LONG-TERM CURRENT USE OF INSULIN, UNSPECIFIED CKD STAGE (H): ICD-10-CM

## 2021-09-17 DIAGNOSIS — R33.8 BENIGN PROSTATIC HYPERPLASIA WITH URINARY RETENTION: ICD-10-CM

## 2021-09-17 DIAGNOSIS — Z79.4 TYPE 2 DIABETES MELLITUS WITH CHRONIC KIDNEY DISEASE, WITH LONG-TERM CURRENT USE OF INSULIN, UNSPECIFIED CKD STAGE (H): ICD-10-CM

## 2021-09-17 DIAGNOSIS — I63.9 CEREBROVASCULAR ACCIDENT (CVA), UNSPECIFIED MECHANISM (H): Primary | ICD-10-CM

## 2021-09-17 DIAGNOSIS — G47.9 SLEEP DISTURBANCE: ICD-10-CM

## 2021-09-17 DIAGNOSIS — E87.6 HYPOKALEMIA: ICD-10-CM

## 2021-09-17 DIAGNOSIS — R52 PAIN: ICD-10-CM

## 2021-09-17 DIAGNOSIS — I10 BENIGN ESSENTIAL HYPERTENSION: ICD-10-CM

## 2021-09-17 LAB
ANION GAP SERPL CALCULATED.3IONS-SCNC: 4 MMOL/L (ref 3–14)
BACTERIA BLD CULT: ABNORMAL
BUN SERPL-MCNC: 15 MG/DL (ref 7–30)
CALCIUM SERPL-MCNC: 8.7 MG/DL (ref 8.5–10.1)
CHLORIDE BLD-SCNC: 107 MMOL/L (ref 94–109)
CO2 SERPL-SCNC: 29 MMOL/L (ref 20–32)
CREAT SERPL-MCNC: 1.31 MG/DL (ref 0.66–1.25)
GFR SERPL CREATININE-BSD FRML MDRD: 54 ML/MIN/1.73M2
GLUCOSE BLD-MCNC: 171 MG/DL (ref 70–99)
GLUCOSE BLDC GLUCOMTR-MCNC: 170 MG/DL (ref 70–99)
GLUCOSE BLDC GLUCOMTR-MCNC: 175 MG/DL (ref 70–99)
GLUCOSE BLDC GLUCOMTR-MCNC: 180 MG/DL (ref 70–99)
GLUCOSE BLDC GLUCOMTR-MCNC: 187 MG/DL (ref 70–99)
GLUCOSE BLDC GLUCOMTR-MCNC: 256 MG/DL (ref 70–99)
GLUCOSE BLDC GLUCOMTR-MCNC: 268 MG/DL (ref 70–99)
POTASSIUM BLD-SCNC: 3 MMOL/L (ref 3.4–5.3)
SODIUM SERPL-SCNC: 140 MMOL/L (ref 133–144)

## 2021-09-17 PROCEDURE — 250N000011 HC RX IP 250 OP 636: Performed by: INTERNAL MEDICINE

## 2021-09-17 PROCEDURE — 250N000013 HC RX MED GY IP 250 OP 250 PS 637: Performed by: PHYSICAL MEDICINE & REHABILITATION

## 2021-09-17 PROCEDURE — 250N000012 HC RX MED GY IP 250 OP 636 PS 637: Performed by: PHYSICIAN ASSISTANT

## 2021-09-17 PROCEDURE — 128N000003 HC R&B REHAB

## 2021-09-17 PROCEDURE — 250N000013 HC RX MED GY IP 250 OP 250 PS 637: Performed by: INTERNAL MEDICINE

## 2021-09-17 PROCEDURE — 99239 HOSP IP/OBS DSCHRG MGMT >30: CPT | Performed by: INTERNAL MEDICINE

## 2021-09-17 PROCEDURE — 250N000013 HC RX MED GY IP 250 OP 250 PS 637: Performed by: PHYSICIAN ASSISTANT

## 2021-09-17 PROCEDURE — 97530 THERAPEUTIC ACTIVITIES: CPT | Mod: GP | Performed by: PHYSICAL THERAPIST

## 2021-09-17 PROCEDURE — C9113 INJ PANTOPRAZOLE SODIUM, VIA: HCPCS | Performed by: INTERNAL MEDICINE

## 2021-09-17 PROCEDURE — 99223 1ST HOSP IP/OBS HIGH 75: CPT | Mod: AI | Performed by: PHYSICAL MEDICINE & REHABILITATION

## 2021-09-17 PROCEDURE — 92526 ORAL FUNCTION THERAPY: CPT | Mod: GN | Performed by: SPEECH-LANGUAGE PATHOLOGIST

## 2021-09-17 PROCEDURE — 80048 BASIC METABOLIC PNL TOTAL CA: CPT | Performed by: INTERNAL MEDICINE

## 2021-09-17 PROCEDURE — 97116 GAIT TRAINING THERAPY: CPT | Mod: GP | Performed by: PHYSICAL THERAPIST

## 2021-09-17 PROCEDURE — 36415 COLL VENOUS BLD VENIPUNCTURE: CPT | Performed by: INTERNAL MEDICINE

## 2021-09-17 RX ORDER — SENNOSIDES 8.6 MG
2 TABLET ORAL 2 TIMES DAILY PRN
Status: ON HOLD | DISCHARGE
Start: 2021-09-17 | End: 2021-10-06

## 2021-09-17 RX ORDER — ACETAMINOPHEN 325 MG/1
650 TABLET ORAL EVERY 4 HOURS PRN
Status: ON HOLD | DISCHARGE
Start: 2021-09-17 | End: 2021-10-06

## 2021-09-17 RX ORDER — NICOTINE POLACRILEX 4 MG
15-30 LOZENGE BUCCAL
Status: DISCONTINUED | OUTPATIENT
Start: 2021-09-17 | End: 2021-10-06 | Stop reason: HOSPADM

## 2021-09-17 RX ORDER — AMLODIPINE BESYLATE 10 MG/1
10 TABLET ORAL DAILY
Status: DISCONTINUED | OUTPATIENT
Start: 2021-09-18 | End: 2021-10-06 | Stop reason: HOSPADM

## 2021-09-17 RX ORDER — PANTOPRAZOLE SODIUM 40 MG/1
40 TABLET, DELAYED RELEASE ORAL
Status: DISCONTINUED | OUTPATIENT
Start: 2021-09-18 | End: 2021-09-24

## 2021-09-17 RX ORDER — ACETAMINOPHEN 325 MG/1
325-975 TABLET ORAL EVERY 6 HOURS PRN
Status: DISCONTINUED | OUTPATIENT
Start: 2021-09-17 | End: 2021-10-06 | Stop reason: HOSPADM

## 2021-09-17 RX ORDER — TAMSULOSIN HYDROCHLORIDE 0.4 MG/1
0.4 CAPSULE ORAL DAILY
Status: DISCONTINUED | OUTPATIENT
Start: 2021-09-18 | End: 2021-09-28 | Stop reason: ALTCHOICE

## 2021-09-17 RX ORDER — BISACODYL 10 MG
10 SUPPOSITORY, RECTAL RECTAL DAILY PRN
Status: DISCONTINUED | OUTPATIENT
Start: 2021-09-17 | End: 2021-10-06 | Stop reason: HOSPADM

## 2021-09-17 RX ORDER — POLYETHYLENE GLYCOL 3350 17 G/17G
17 POWDER, FOR SOLUTION ORAL DAILY PRN
Status: DISCONTINUED | OUTPATIENT
Start: 2021-09-17 | End: 2021-10-06 | Stop reason: HOSPADM

## 2021-09-17 RX ORDER — AMOXICILLIN 250 MG
1-2 CAPSULE ORAL 2 TIMES DAILY PRN
Status: DISCONTINUED | OUTPATIENT
Start: 2021-09-17 | End: 2021-10-06 | Stop reason: HOSPADM

## 2021-09-17 RX ORDER — ASPIRIN 81 MG/1
81 TABLET, CHEWABLE ORAL DAILY
Status: DISCONTINUED | OUTPATIENT
Start: 2021-09-18 | End: 2021-10-06 | Stop reason: HOSPADM

## 2021-09-17 RX ORDER — CLOPIDOGREL BISULFATE 75 MG/1
75 TABLET ORAL DAILY
Status: DISCONTINUED | OUTPATIENT
Start: 2021-09-18 | End: 2021-10-06 | Stop reason: HOSPADM

## 2021-09-17 RX ORDER — ATENOLOL 25 MG/1
25 TABLET ORAL 2 TIMES DAILY
Status: DISCONTINUED | OUTPATIENT
Start: 2021-09-17 | End: 2021-10-06 | Stop reason: HOSPADM

## 2021-09-17 RX ORDER — DEXTROSE MONOHYDRATE 25 G/50ML
25-50 INJECTION, SOLUTION INTRAVENOUS
Status: DISCONTINUED | OUTPATIENT
Start: 2021-09-17 | End: 2021-10-06 | Stop reason: HOSPADM

## 2021-09-17 RX ORDER — ONDANSETRON 4 MG/1
4 TABLET, FILM COATED ORAL EVERY 6 HOURS PRN
Status: DISCONTINUED | OUTPATIENT
Start: 2021-09-17 | End: 2021-10-06 | Stop reason: HOSPADM

## 2021-09-17 RX ORDER — LANOLIN ALCOHOL/MO/W.PET/CERES
3 CREAM (GRAM) TOPICAL
Status: DISCONTINUED | OUTPATIENT
Start: 2021-09-17 | End: 2021-10-02

## 2021-09-17 RX ORDER — POTASSIUM CHLORIDE 1.5 G/1.58G
40 POWDER, FOR SOLUTION ORAL ONCE
Status: COMPLETED | OUTPATIENT
Start: 2021-09-17 | End: 2021-09-17

## 2021-09-17 RX ORDER — HYDRALAZINE HYDROCHLORIDE 10 MG/1
10 TABLET, FILM COATED ORAL 4 TIMES DAILY PRN
Status: DISCONTINUED | OUTPATIENT
Start: 2021-09-17 | End: 2021-10-06 | Stop reason: HOSPADM

## 2021-09-17 RX ORDER — ATORVASTATIN CALCIUM 80 MG/1
80 TABLET, FILM COATED ORAL DAILY
Status: DISCONTINUED | OUTPATIENT
Start: 2021-09-18 | End: 2021-10-06 | Stop reason: HOSPADM

## 2021-09-17 RX ADMIN — CLOPIDOGREL BISULFATE 75 MG: 75 TABLET ORAL at 09:44

## 2021-09-17 RX ADMIN — TAMSULOSIN HYDROCHLORIDE 0.4 MG: 0.4 CAPSULE ORAL at 09:26

## 2021-09-17 RX ADMIN — INSULIN DETEMIR 30 UNITS: 100 INJECTION, SOLUTION SUBCUTANEOUS at 21:03

## 2021-09-17 RX ADMIN — ATENOLOL 25 MG: 25 TABLET ORAL at 21:03

## 2021-09-17 RX ADMIN — AMLODIPINE BESYLATE 10 MG: 10 TABLET ORAL at 09:27

## 2021-09-17 RX ADMIN — DOCUSATE SODIUM AND SENNOSIDES 2 TABLET: 8.6; 5 TABLET ORAL at 21:40

## 2021-09-17 RX ADMIN — POTASSIUM CHLORIDE 40 MEQ: 1.5 POWDER, FOR SOLUTION ORAL at 09:26

## 2021-09-17 RX ADMIN — PANTOPRAZOLE SODIUM 40 MG: 40 INJECTION, POWDER, FOR SOLUTION INTRAVENOUS at 09:27

## 2021-09-17 RX ADMIN — ATENOLOL 25 MG: 25 TABLET ORAL at 09:27

## 2021-09-17 RX ADMIN — ASPIRIN 81 MG CHEWABLE TABLET 81 MG: 81 TABLET CHEWABLE at 09:26

## 2021-09-17 RX ADMIN — ACETAMINOPHEN 975 MG: 325 TABLET, FILM COATED ORAL at 17:48

## 2021-09-17 RX ADMIN — INSULIN ASPART 8 UNITS: 100 INJECTION, SOLUTION INTRAVENOUS; SUBCUTANEOUS at 19:06

## 2021-09-17 RX ADMIN — ATORVASTATIN CALCIUM 80 MG: 80 TABLET, FILM COATED ORAL at 09:27

## 2021-09-17 ASSESSMENT — ACTIVITIES OF DAILY LIVING (ADL)
ADLS_ACUITY_SCORE: 20

## 2021-09-17 ASSESSMENT — MIFFLIN-ST. JEOR: SCORE: 1328

## 2021-09-17 NOTE — CONSULTS
Brief diabetes note    Increase Levemir from 25>30 units daily  Increase Novolog from 2:15 to 1:7g CHO with meals, and added snack coverage  Increase Novolog from medium to high intensity sliding scale AC/HS    Full consult to follow tomorrow.       Janet Duran PA-C  Diabetes Management Service  Pager [phone]

## 2021-09-17 NOTE — PROVIDER NOTIFICATION
MD paged: [phone] Jimmy Otto. Social work is setting up a tentative ride to [facility] at 1:30 today. Thanks, Manny GIANG [phone]

## 2021-09-17 NOTE — PROGRESS NOTES
Bladder irrigated at 0515 with 60cc of NS in and 60cc returned. Urine watermelon color, no clots. Pt tolerated well.

## 2021-09-17 NOTE — H&P
[facility]   Acute Rehabilitation Unit  Admission History and Physical    CHIEF COMPLAINT   Unable to obtain due to somnolence     REHAB DIAGNOSIS  Ischemic CVA of the right thalamus    HISTORY OF PRESENT ILLNESS  Jimmy Otto is a 71 year old male with a PMH including but not limited to HTN, uncontrolled DM 2 (HbA1c 11.5), HLD, BPH, and history of a stroke in 2012 with residual right hemiparesis who presented to the emergency room on 9/14/2021 with altered mental status and acute worsening of right-sided weakness.  Was not a candidate for thrombolysis due to unknown time of last known normal, and initial CT and CT imaging did not show any acute abnormalities or large vessel occlusion therefore not a candidate for mechanical thrombectomy.  An MRI was then obtained which did demonstrate an acute right thalamic CVA.  Etiology felt to be due to small vessel disease and he has been initiated on aspirin and Plavix for 1 month, followed by aspirin alone indefinitely with a check of P2Y12 activity after a month of being on Plavix only.  Hospital course has been complicated by acute kidney injury with a peak creatinine of 2.13, hypokalemia needing repletion, and urinary retention and hematuria needing Felix catheter and urology consult.    Upon my visit, Mr. Otto was quite somnolent and unable to provide any history or conversation.  Son was present at bedside who states this has not been uncommon since he has been in the hospital and much of the information regarding functional status and living situation was provided by him.    FUNCTIONAL HISTORY  Current Functional Status:  The patient is performing bed mobility with Mod/Min A, sitting balance with Mod A/SBA. The pateint performs sit to stand with Min/Mod A. The patient performs gait with Naseem/CGA x 70 feet with FWW, and a second staff member to perform close WC follow due to falls risk. In OT the pt sat EOB w/ overall CGA-SBA for  partcipation in grooming/hyg. tasks;Pt. able to wash face w/ set-up, wash cloth placed in pt.'s hand;pt. able to unscrew toothpaste cap using 1-handed tech/L hand after demo. Pt. able to squeeze toothpaste onto toothbrush w/L hand, therapist holding brush. In SLP pt accepted cup sips of thin water with moderate delay and no overt s/sx of aspiration on 4oz. Family reported pt enjoys coffee and coffee present with straw in cup. Trialed straw sips, however, suspect spillage with immediate throat clearing. O2 remained in high 90s.    Prior Functional Status:  Per son, was modified independent for ambulation of short distances with a cane in the left hand and activities of daily living.  Needed assistance for IADLs.  If out in the community for grocery shopping, would use a shopping cart for support.    PAST MEDICAL HISTORY   Reviewed and updated in Epic.  Past Medical History:   Diagnosis Date     Diabetes mellitus (H)      Hypercholesteremia      Hypertension      Unspecified cerebral artery occlusion with cerebral infarction     TIA 8/11       SURGICAL HISTORY  Reviewed and updated in Epic.  No past surgical history on file.    SOCIAL HISTORY  Reviewed and updated in Epic.  Marital Status:   Living situation: Lives at home with one-step to enter 2 flights of stairs inside.  Stairs upstairs has a lift.  To the basement does not.  Family support: Wife able to provide supervision and brother lives nearby, amount of physical assist available unclear at this time.    Social History     Socioeconomic History     Marital status:      Spouse name: Not on file     Number of children: Not on file     Years of education: Not on file     Highest education level: Not on file   Occupational History     Not on file   Tobacco Use     Smoking status: Never Smoker   Substance and Sexual Activity     Alcohol use: Yes     Comment: occassional     Drug use: No     Sexual activity: Not on file   Other Topics Concern     Not  on file   Social History Narrative     Not on file     Social Determinants of Health     Financial Resource Strain:      Difficulty of Paying Living Expenses:    Food Insecurity:      Worried About Running Out of Food in the Last Year:      Ran Out of Food in the Last Year:    Transportation Needs:      Lack of Transportation (Medical):      Lack of Transportation (Non-Medical):    Physical Activity:      Days of Exercise per Week:      Minutes of Exercise per Session:    Stress:      Feeling of Stress :    Social Connections:      Frequency of Communication with Friends and Family:      Frequency of Social Gatherings with Friends and Family:      Attends [employer] Services:      Active Member of Clubs or Organizations:      Attends Club or Organization Meetings:      Marital Status:    Intimate Partner Violence:      Fear of Current or Ex-Partner:      Emotionally Abused:      Physically Abused:      Sexually Abused:        FAMILY HISTORY  Reviewed and updated in Epic.  No family history on file.        MEDICATIONS  Scheduled meds  Medications Prior to Admission   Medication Sig Dispense Refill Last Dose     acetaminophen (TYLENOL) 325 MG tablet Take 2 tablets (650 mg) by mouth every 4 hours as needed for mild pain or fever (for temperature greater than 100.4 F (38 C) - may also be used for moderate pain)        amLODIPine (NORVASC) 10 MG tablet Take 10 mg by mouth daily        aspirin (ASA) 81 MG chewable tablet Take 81 mg by mouth daily        atenolol (TENORMIN) 50 MG tablet Take 50 mg by mouth 2 times daily        atorvastatin (LIPITOR) 80 MG tablet Take 80 mg by mouth daily        clopidogrel (PLAVIX) 75 MG tablet Take 75 mg by mouth daily        insulin aspart (NOVOLOG FLEXPEN) 100 UNIT/ML pen Inject Subcutaneous 3 times daily (with meals) 2 units/carb + sliding scale insulin (50 units per 50 > 150)        insulin detemir (LEVEMIR PEN) 100 UNIT/ML pen Inject 28 Units Subcutaneous At Bedtime        metFORMIN  "(GLUCOPHAGE) 500 MG tablet Take 500 mg by mouth 2 times daily (with meals)        potassium chloride ER (KLOR-CON M) 20 MEQ CR tablet Take 20 mEq by mouth daily        sennosides (SENOKOT) 8.6 MG tablet Take 2 tablets by mouth 2 times daily as needed for constipation        tamsulosin (FLOMAX) 0.4 MG capsule Take 0.4 mg by mouth daily          ALLERGIES     Allergies   Allergen Reactions     Nka [No Known Allergies]          REVIEW OF SYSTEMS  Unable to obtain review of systems at this time due to patient somnolence      PHYSICAL EXAM  VITAL SIGNS:  /64 (BP Location: Right arm)   Pulse 68   Temp 98.3  F (36.8  C) (Oral)   Resp 16   Ht 1.626 m (5' 4\")   Wt 66.2 kg (145 lb 15.1 oz)   SpO2 97%   BMI 25.05 kg/m    BMI:  Estimated body mass index is 25.05 kg/m  as calculated from the following:    Height as of this encounter: 1.626 m (5' 4\").    Weight as of this encounter: 66.2 kg (145 lb 15.1 oz).     General: Somnolent, difficult to arouse  HEENT: NC/AT  Pulmonary: Non-labored breathing, CTA b/l, no w/r/r  Cardiovascular: RRR, S1+S2, no m/r/g  Abdominal: Soft, NT/ND, BS+  Lower Extremities: No LE edema b/l  : Felix catheter in place with hematuria present  MSK/neuro:   Mental Status: Somnolent  1.  Cranial Nerves: Unable to participate in physical exam  Sensory: Unable to provide sensory information  Strength: Unable to perform strength exam  Reflexes:    Biceps BR Patella Achilles  R 2+ 1+ 0 0   L 1+ 1+ 0 0      Black's test: negative bilaterally    Tone per modified Misa Scale: 2 to right elbow flexors and wrist flexors   Abnormal movements: None   Coordination: Unable to test        LABS  Lab Results   Component Value Date    WBC 9.1 09/16/2021    WBC 5.6 08/16/2012     Lab Results   Component Value Date    RBC 4.86 09/16/2021    RBC 5.15 08/16/2012     Lab Results   Component Value Date    HGB 13.0 09/16/2021    HGB 14.4 08/16/2012     Lab Results   Component Value Date    HCT 40.0 09/16/2021 "    HCT 42.8 08/16/2012     Lab Results   Component Value Date    MCV 82 09/16/2021    MCV 83 08/16/2012     Lab Results   Component Value Date    MCH 26.7 09/16/2021    MCH 28.0 08/16/2012     Lab Results   Component Value Date    MCHC 32.5 09/16/2021    MCHC 33.6 08/16/2012     Lab Results   Component Value Date    RDW 13.1 09/16/2021    RDW 13.1 08/16/2012     Lab Results   Component Value Date     09/16/2021     08/16/2012     Last Comprehensive Metabolic Panel:  Sodium   Date Value Ref Range Status   09/17/2021 140 133 - 144 mmol/L Final   08/16/2012 140 133 - 144 mmol/L Final     Potassium   Date Value Ref Range Status   09/17/2021 3.0 (L) 3.4 - 5.3 mmol/L Final   08/16/2012 4.0 3.4 - 5.3 mmol/L Final     Chloride   Date Value Ref Range Status   09/17/2021 107 94 - 109 mmol/L Final   08/16/2012 101 94 - 109 mmol/L Final     Carbon Dioxide   Date Value Ref Range Status   08/16/2012 26 20 - 32 mmol/L Final     Carbon Dioxide (CO2)   Date Value Ref Range Status   09/17/2021 29 20 - 32 mmol/L Final     Anion Gap   Date Value Ref Range Status   09/17/2021 4 3 - 14 mmol/L Final   08/16/2012 13 6 - 17 mmol/L Final     Glucose   Date Value Ref Range Status   09/17/2021 171 (H) 70 - 99 mg/dL Final   08/16/2012 161 (H) 60 - 99 mg/dL Final     GLUCOSE BY METER POCT   Date Value Ref Range Status   09/17/2021 187 (H) 70 - 99 mg/dL Final     Urea Nitrogen   Date Value Ref Range Status   09/17/2021 15 7 - 30 mg/dL Final   08/16/2012 18 7 - 30 mg/dL Final     Creatinine   Date Value Ref Range Status   09/17/2021 1.31 (H) 0.66 - 1.25 mg/dL Final   08/16/2012 0.96 0.66 - 1.25 mg/dL Final     GFR Estimate   Date Value Ref Range Status   09/17/2021 54 (L) >60 mL/min/1.73m2 Final     Comment:     As of July 11, 2021, eGFR is calculated by the CKD-EPI creatinine equation, without race adjustment. eGFR can be influenced by muscle mass, exercise, and diet. The reported eGFR is an estimation only and is only applicable if  the renal function is stable.   08/16/2012 79 >60 mL/min/1.7m2 Final     Calcium   Date Value Ref Range Status   09/17/2021 8.7 8.5 - 10.1 mg/dL Final   08/16/2012 8.9 8.5 - 10.4 mg/dL Final     Recent Labs   Lab Test 09/15/21  0521   CHOL 172   HDL 32*   LDL 92   TRIG 238*     Hemoglobin A1C   Date Value Ref Range Status   09/15/2021 11.5 (H) 0.0 - 5.6 % Final     Comment:     Normal <5.7%   Prediabetes 5.7-6.4%    Diabetes 6.5% or higher     Note: Adopted from ADA consensus guidelines.   08/14/2012 7.6 (H) 4.3 - 6.0 % Final     Recent Labs   Lab 09/17/21  1718 09/17/21  1207 09/17/21  0814 09/17/21  0741 09/17/21  0654 09/17/21  0232   * 268* 180* 171* 170* 175*           IMAGING  CT/CTA Head Neck (9/14/21)  IMPRESSION:   HEAD CT:  1.  No CT evidence of acute intracranial hemorrhage, mass or recent infarct.  2.  Chronic infarct involving the left basal ganglia and thalamus.  3.  Moderate presumed chronic small vessel ischemic changes.  4.  Mild to moderate generalized volume loss.     HEAD CTA:   1.  No major vessel acute thromboembolism, flow-limiting stenosis or occlusion.     NECK CTA:  1.  No measurable stenosis in either proximal internal carotid artery.  2.  Vertebral arteries are patent through the neck and into head.     Results of the CT and CTA were called to Dr. Young at 10:19 PM on 09/14/2021.      MRI Head (9/14/21)  IMPRESSION:  1.  Technically suboptimal secondary to patient motion and inability of the patient to continue with the complete sequences.  2.  15 mm in diameter acute/early subacute infarct involving the anteromedial right thalamus.  3.  Chronic infarct in the left basal ganglia and thalamus.      IMPRESSION  Jimmy Otto is a 71 year old male with a PMH including but not limited to HTN, uncontrolled DM 2 (HbA1c 11.5), HLD, BPH, and history of a stroke in 2012 with residual right hemiparesis who presented to the emergency room on 9/14/2021 with altered mental status and acute worsening  of right-sided weakness, and found to have an acute right thalamic stroke due to small vessel occlusion.  Hospital course complicated by acute kidney injury, hypokalemia needing repletion, and urinary retention and hematuria needing Felix catheter and urology consult.        Impairment group code: Stroke Ischemic 01.3 Bilateral Involvement, new R Thalamic stroke in setting of R sided weakness from prior stroke    PLAN  Rehabilitation  -The patient has impairments in strength, balance, endurance, and coordination, which are leading to activity limitations in ambulation, mobility, ADLs, and swallowing.  He will be admitted into a comprehensive, interdisciplinary, inpatient rehabilitation program including  PT, OT and SLP for 60 minutes of each on a daily basis for an estimated 14 days.  The patient requires close supervision by a physiatrist for management and monitoring of active and chronic medical co-morbidities, and to ensure the patient's ability to fully participate and benefit from the rigorous program.  Additionally, the patient requires specialized rehabilitation nursing for monitoring of vital signs, medication administration, patient training and education, and monitoring of bowel and bladder including Felix management and potential for voiding trial.  The assistance of the dietary and social work teams are also needed to optimize nutritional status and to collaborate and achieve the identified discharge goals.   A comprehensive individualized Care Plan will be formulated by the attending Physiatrist after initial evaluations by each Rehabilitation Team member at the initial team conference within four days of admission and updated/modified at subsequent team conferences.   This level of care and multidisciplinary approach is medically necessary and only available at the inpatient rehabilitation facility level of care.  The patient is willing to participate in 3 hours of therapy per day and has the  potential for improvement.     Medical  1)Neurology  #R thalamic CVA due to small vessel occlusion  -DAPT with ASA 81 mg and Plavix 75 mg for 1 month, followed by Plavix alone indefinitely.  Recommended check P2Y12 level to ensure Plavix efficacy after 1 month of being on Plavix only.  -Hypertension, lipid, and glucose management as below.  Will need ongoing education regarding stroke risk factor modifications including importance of medication compliance and diet and lifestyle changes  -Follow-up with [facility] general neurology in 6 to 8 weeks  -Monitor somnolence/lethargy.  Decreased level of alertness not unexpected with essentially bilateral thalamic pathology (new right thalamic stroke and a history of left thalamic stroke).  If no improvement consider neuro stimulant  -High risk for delirium, ensure delirium precautions and appropriate sleep-wake cycle      2)CVS  #HTN  -PTA on amlodipine 10 mg daily, atenolol 50 mg twice daily, Cozaar 100 mg daily, spironolactone 25 mg daily, and hydrochlorothiazide 25 mg daily.  Cozaar, spironolactone, and hydrochlorothiazide are being held due to acute kidney injury  -continue amlodipine 10 mg daily, and atenolol at a lower dose of 25 mg twice daily for now.  Titrate doses as needed.  -Hydralazine as needed for SBP greater than 180 mmHg    #HLD  -Continue Lipitor 80 mg daily.  LDL 92 on 9/15    3)Pulmonary  -No acute issues but low lung volumes and atelectasis/infiltrate seen at the left lung base on chest x-ray 9/14.  Clinically no signs of pneumonia but will continue to monitor.  -Encourage incentive spirometry        4)FENGI  #Diet: Level 4 solids and level 0 thin liquids.  Upgrade as able per speech therapy  -Aspiration precautions  -Consult nutrition for education and optimization given stroke, modified diet, and uncontrolled diabetes    #Bowels: As needed Senokot-S, MiraLAX, and Dulcolax suppository    #Hypokalemia  -Odilon of 3.0 on 9/17 requiring repletion  -Continue to  monitor, recheck in the morning.    #GI prophylaxis  -Protonix 40 mg daily given DAPT and age      5)  #ALEX  -Baseline creatinine ~1.1, with a peak of 2.13 upon admission  -improving with fluids and holding Cozaar, hydrochlorothiazide, and spironolactone.  -Creatinine 1.31 on 9/17, continue to monitor and recheck in the morning    #Urinary retention and hematuria  -Felix placed on 9/15  -Urology consulted and recommend CT urogram once creatinine normalizes and outpatient follow-up for cystoscopy to complete hematuria work-up.  -Can perform voiding trial after 1 week from date of Felix placement (~9/22)  -Flomax 0.4 mg daily started, will continue      6)DVT prophylaxis  -Mechanical.  Will hold from chemoprophylaxis given hematuria and already on dual anti-platelet therapy      7)Pain  -Tylenol PRN      8)Endo  #Uncontrolled DM II (HbA1c 11.5)  -PTA was on Levemir 20 units nightly, NovoLog with meals with carb coverage, and Metformin 500 mg twice daily.  Unclear how compliant he was with his medications.  -Given uncontrolled diabetes and glucose levels into the 400s during hospitalization, will ask endocrinology to assist with glucose management and optimization.   -Increase Levemir to 30 units at bedtime, high resistance sliding scale, and carb coverage 1 unit per 7 g CHO with meals and snacks.      9)Heme  -Hgb 13.0 on 9/16.  Continue to monitor, especially in the setting of hematuria.        10)Psych  -Monitor mood, will consult health psychology if needed  -Melatonin PRN for sleep        11)Social/Dispo  -Anticipate discharge home at a modified independent level for ambulation, mobility, and ADLs.  -ELOS: 14 days  -Rehab prognosis: Fair  -Follow up appointments: PCP, [facility] General neurology clinic 6-8 weeks, urology for hematuria    Code status: Full Code (unable to discuss with patient given somnolence upon admission.  Have continued prior CODE STATUS and discussed with the son who confirms that his prior wishes  were to be full code)    Juan Miguel Campo MD  Department of Rehabilitation Medicine    Time Spent on this Encounter   I, Juan Miguel Campo, spent a total of 90 minutes bedside and on the inpatient unit today managing the care of Jimmy Otto.  Over 50% of my time on the unit was spent counseling the patient and /or coordinating care.  See note for details.

## 2021-09-17 NOTE — PROVIDER NOTIFICATION
MD paged: [phone] Jimmy Otto. Patient has a bed at [facility] and a ride. Can we get discharge orders? Thanks. Manny GIANG [phone]

## 2021-09-17 NOTE — DISCHARGE SUMMARY
Occupational Therapy Discharge Summary    Reason for therapy discharge:    Discharged to acute rehabilitation facility.    Progress towards therapy goal(s). See goals on Care Plan in [facility] electronic health record for goal details.  Goals partially met.  Barriers to achieving goals:   discharge from facility.    Therapy recommendation(s):    Continued therapy is recommended.  Rationale/Recommendations:  to incease indep with self-cares and funcitonal mobility prior to return home.

## 2021-09-17 NOTE — DISCHARGE INSTRUCTIONS
You are discharging to:    [facility]  [address] [place]  [address] [phone]

## 2021-09-17 NOTE — PROGRESS NOTES
Care Management Discharge Note    Discharge Date: 09/17/2021       Discharge Disposition:  [facility]    Discharge Services:      Discharge DME:      Discharge Transportation: health plan transportation-HE stretcher    Private pay costs discussed: Not applicable    PAS Confirmation Code:  N/A  Patient/family educated on Medicare website which has current facility and service quality ratings:  N/A    Education Provided on the Discharge Plan:   Persons Notified of Discharge Plans: Yes  Patient/Family in Agreement with the Plan:  Yes    Handoff Referral Completed: No    Additional Information:    Orders and discharge summary have been placed.   No further  interventions anticipated at this time.        ANIRUDH Vallejo   [facility]  [phone]

## 2021-09-17 NOTE — DISCHARGE SUMMARY
"[facility]  Hospitalist Discharge Summary      Date of Admission:  9/14/2021  Date of Discharge:  9/17/2021  Discharging Provider: Nazario Cooper MD, MD      Discharge Diagnoses     Acute right thalamic CVA:  Uncontrolled type 2 diabetes:      Hypertension:  Acute renal failure:      Hypokalemia- resolved    Hematuria, BPH  and urinary retention:     Positive blood culture on admission on 9/14: Gram-positive cocci in cluster 1/2 bottles  -Likely contaminant    Hyperlipidemia:  Clinically Significant Risk Factors Present on Admission                 Follow-ups Needed After Discharge   Follow-up Appointments     Follow Up and recommended labs and tests      Follow up with care home physician.  The following labs/tests are   recommended: CBC, BMP weekly.  Follow up with Urology  in 7-10 days .    Follow up with specialist, Neurology, in 1-2 weeks .         {  Unresulted Labs Ordered in the Past 30 Days of this Admission     Date and Time Order Name Status Description    9/17/2021  7:51 AM FISH bladder cancer In process     9/16/2021 11:46 AM Blood Culture Hand, Left Preliminary     9/15/2021  8:30 PM Cytology, non-gynecologic In process         Discharge Disposition   Discharged to rehabilitation facility  Condition at discharge: Stable      Hospital Course   Jimmy Otto is a 71 year old male admitted on 9/14/2021. He presents to the emergency department with approximately 2 days of some mild increased weakness last seen by sister-in-law at 6 PM and fairly baseline. Shortly thereafter, patient developed significant expressive aphasia and weakness, found to have new right thalamic stroke with prior left thalamic stroke.     Acute right thalamic CVA:     -Stroke neurology consulted \"Dual antiplatelets for 1 month then back to plavix monotherapy as he was supposed to be on. It might be informative to check P2Y12 activity after a month of being on plavix only. Caregivers need to be in charge of the " "medications administration. \"   -He had PT, OT and was discharged to acute rehab for continued cares  - atorvastatin 80 mg daily  -Continued SLP eval: Currently on pureed (level 4);thin liquids (level 0)  -will need follow-up with  general neurology clinic in 6-8 weeks    Uncontrolled type 2 diabetes: Most recent hemoglobin A1c of 9.4 6/28/2021 through outside hospital system. Appears to be on 28 units long-acting insulin daily  Recent Labs   Lab 09/17/21  1207 09/17/21  0814 09/17/21  0741 09/17/21  0654 09/17/21  0232 09/16/21  2128   * 180* 171* 170* 175* 290*     His insulin was adjusted and his PTA dosage was resumed on discharge (28 units long-acting at bedtime and carb coverage)   -Prior to admission Metformin was held given acute renal failure.  With improvement in creatinine before discharge it was resumed .  Recommend to follow creatinine closely and if any worsening to discontinue Metformin    -Avoid NSAID's except daily aspirin if indicated     Hypertension:  -Prior to admission Cozaar 100 mg, spironolactone 25 mg, hydrochlorothiazide 25 placed on hold given acute renal failure.   -Permissive hypertension at this time  -As needed labetalol and hydralazine if needed  -Ct prior to admission atenolol .  Will need to monitor blood pressure in rehab and if kidney function remains stable then lastly reintroduced PTA medications.    Acute renal failure: Creatinine of 2.13, baseline of 1.1 as of 6/28/2021 through   [email or web address] system.   Creatinine   Date Value Ref Range Status   09/17/2021 1.31 (H) 0.66 - 1.25 mg/dL Final   08/16/2012 0.96 0.66 - 1.25 mg/dL Final     Does have a history of nephrolithiasis in the past, though no flank pain, no hematuria. UA generally not concerning.  -Continued Flomax as below  -Holding prior to admission Cozaar, spironolactone, hydrochlorothiazide  -Received 2 L normal saline bolus in the emergency department  -Likely also had component of acute renal obstruction and " improved after placing a catheter     Hematuria, BPH  and urinary retention:  -Appreciate urology review on 9/15  - Needs CT urogram once SCr normalizes  Outpatient followup for cystoscopy to complete hematuria workup     -Resumed prior to admission Flomax 0.4 mg daily when able to tolerate oral intake     Positive blood culture on admission on 9/14: Gram-positive cocci in cluster 1/2 bottles  -Likely contaminant  -Repeat blood culture NGTD before discharge.  May need to follow complete result in acute rehab     Hyperlipidemia:  -Fasting lipid panel (LDL- 92)   -Continue atorvastatin 80      Consultations This Hospital Stay   PHYSICAL THERAPY ADULT IP CONSULT  OCCUPATIONAL THERAPY ADULT IP CONSULT  CARE MANAGEMENT / SOCIAL WORK IP CONSULT  PATIENT LEARNING CENTER IP CONSULT  NEUROLOGY IP CONSULT  SPEECH LANGUAGE PATH ADULT IP CONSULT  PHARMACY IP CONSULT  PHARMACY IP CONSULT  PHARMACY IP CONSULT  PHYSICAL THERAPY ADULT IP CONSULT  OCCUPATIONAL THERAPY ADULT IP CONSULT  REHAB ADMISSIONS LIAISON IP CONSULT  CARE MANAGEMENT / SOCIAL WORK IP CONSULT  UROLOGY IP CONSULT  SMOKING CESSATION PROGRAM IP CONSULT    Code Status   Full Code    Time Spent on this Encounter   I, Nazario Cooper MD, MD, personally saw the patient today and spent greater than 30 minutes discharging this patient.       Nazario Cooper MD, MD  [address] STROKE CENTER  [facility][note 3] [place] [address]  Phone: [phone]  ______________________________________________________________________    Physical Exam   Vital Signs: Temp: 98.7  F (37.1  C) Temp src: Oral BP: (!) 142/83 Pulse: 71   Resp: 16 SpO2: 95 % O2 Device: None (Room air)    Weight: 135 lbs 9.33 oz  Gen- pleasant  lying in bed  HEENT- NAD, ROGER  Neck- supple, no JVD elevation, no thyromegaly  CVS- I+II+ no m/r/g  RS- CTABS  Abdo- soft, no tenderness . No g/r/r, BS+ no hepatosplenomegaly   Ext- no edema   CNS-dense expressive aphasia.  Left-sided weakness  (further as detailed in neurology note_      Primary Care Physician   Kris Huston    Discharge Orders      Adult Neurology Referral      General info for SNF    Length of Stay Estimate: Short Term Care: Estimated # of Days <30  Condition at Discharge: Stable  Level of care:skilled   Rehabilitation Potential: Fair  Admission H&P remains valid and up-to-date: Yes  Recent Chemotherapy: N/A  Use Nursing Home Standing Orders: Yes     Mantoux instructions    Give two-step Mantoux (PPD) Per Facility Policy Yes     Follow Up and recommended labs and tests    Follow up with group home physician.  The following labs/tests are recommended: CBC, BMP weekly.  Follow up with Urology  in 7-10 days .    Follow up with specialist, Neurology, in 1-2 weeks .     Reason for your hospital stay    Jimmy Otto is a 71 year old male admitted on 9/14/2021. He presents to the emergency department with approximately 2 days of some mild increased weakness last seen by sister-in-law at 6 PM and fairly baseline. Shortly thereafter, patient developed significant expressive aphasia and weakness, found to have new right thalamic stroke with prior left thalamic stroke.     Glucose monitor nursing POCT    Before meals and at bedtime     Intake and output    Every shift     Felix catheter    To straight gravity drainage. Change catheter every 2 weeks and PRN for leaking or decreased uring output with signs of bladder distention. DO NOT change catheter without a specific MD order IF diagnosis of benign prostatic hypertrophy (BPH), neurogenic bladder, or other urological conditions     Activity - Up with assistive device     Fall precautions     Pneumatic Compression Device     Bilateral calf. Remove 30 mins BID.     Advance Diet as Tolerated    Follow this diet upon discharge: Orders Placed This Encounter      Combination Diet Consistent Carb 60 Grams CHO per Meal Diet; Pureed Diet (level 4); Thin Liquids (level 0); No Straws       Significant  Results and Procedures   Results for orders placed or performed during the hospital encounter of 09/14/21   CT Head w/o Contrast    Narrative    EXAM: CTA  HEAD NECK WITH CONTRAST, CT HEAD W/O CONTRAST  LOCATION: [facility]  DATE/TIME: 9/14/2021 9:48 PM    INDICATION: History of stroke with right-sided deficits, now presents with worsening right-sided deficits. Follow-up/evaluate cerebrovascular disease.  COMPARISON: 08/13/2012  CONTRAST: 70mL Isovue-370  TECHNIQUE: Head and neck CT angiogram with IV contrast. Noncontrast head CT followed by axial helical CT images of the head and neck vessels obtained during the arterial phase of intravenous contrast administration. Axial 2D reconstructed images and   multiplanar 3D MIP reconstructed images of the head and neck vessels were performed by the technologist. Dose reduction techniques were used. All stenosis measurements made according to NASCET criteria unless otherwise specified.    FINDINGS:   NONCONTRAST HEAD CT:   INTRACRANIAL CONTENTS: No intracranial hemorrhage, extraaxial collection, or mass effect.  No CT evidence of acute infarct. There is a chronic infarct involving the left basal ganglia and thalamus. Moderate presumed chronic small vessel ischemic changes.   Mild to moderate generalized volume loss. No hydrocephalus.     VISUALIZED ORBITS/SINUSES/MASTOIDS: No intraorbital abnormality. Trace pneumatized fluid in the sphenoid sinuses. No middle ear or mastoid effusion.    BONES/SOFT TISSUES: No acute abnormality.    HEAD CTA:  ANTERIOR CIRCULATION: No major vessel acute thromboembolism, flow-limiting stenosis or occlusion. There are mild atheromatous changes in the carotid siphons. Standard [place] anatomy.    POSTERIOR CIRCULATION: No stenosis/occlusion, aneurysm, or high flow vascular malformation. Dominant right and smaller left vertebral artery contribute to a normal basilar artery.     DURAL VENOUS SINUSES: Expected  enhancement of the major dural venous sinuses.    NECK CTA:  RIGHT CAROTID: Mild atheromatous changes. No measurable stenosis or dissection.    LEFT CAROTID: Mild atheromatous changes.  No measurable stenosis or dissection.    VERTEBRAL ARTERIES: No focal stenosis or dissection. Dominant right and smaller left vertebral arteries.    AORTIC ARCH: Vascular variant aortic arch origin left vertebral artery.  No significant stenosis at the origin of the great vessels.    NONVASCULAR STRUCTURES: Unremarkable.      Impression    IMPRESSION:   HEAD CT:  1.  No CT evidence of acute intracranial hemorrhage, mass or recent infarct.  2.  Chronic infarct involving the left basal ganglia and thalamus.  3.  Moderate presumed chronic small vessel ischemic changes.  4.  Mild to moderate generalized volume loss.    HEAD CTA:   1.  No major vessel acute thromboembolism, flow-limiting stenosis or occlusion.    NECK CTA:  1.  No measurable stenosis in either proximal internal carotid artery.  2.  Vertebral arteries are patent through the neck and into head.    Results of the CT and CTA were called to Dr. Young at 10:19 PM on 09/14/2021.   CTA Head Neck with Contrast    Narrative    EXAM: CTA  HEAD NECK WITH CONTRAST, CT HEAD W/O CONTRAST  LOCATION: [facility]  DATE/TIME: 9/14/2021 9:48 PM    INDICATION: History of stroke with right-sided deficits, now presents with worsening right-sided deficits. Follow-up/evaluate cerebrovascular disease.  COMPARISON: 08/13/2012  CONTRAST: 70mL Isovue-370  TECHNIQUE: Head and neck CT angiogram with IV contrast. Noncontrast head CT followed by axial helical CT images of the head and neck vessels obtained during the arterial phase of intravenous contrast administration. Axial 2D reconstructed images and   multiplanar 3D MIP reconstructed images of the head and neck vessels were performed by the technologist. Dose reduction techniques were used. All stenosis measurements made  according to NASCET criteria unless otherwise specified.    FINDINGS:   NONCONTRAST HEAD CT:   INTRACRANIAL CONTENTS: No intracranial hemorrhage, extraaxial collection, or mass effect.  No CT evidence of acute infarct. There is a chronic infarct involving the left basal ganglia and thalamus. Moderate presumed chronic small vessel ischemic changes.   Mild to moderate generalized volume loss. No hydrocephalus.     VISUALIZED ORBITS/SINUSES/MASTOIDS: No intraorbital abnormality. Trace pneumatized fluid in the sphenoid sinuses. No middle ear or mastoid effusion.    BONES/SOFT TISSUES: No acute abnormality.    HEAD CTA:  ANTERIOR CIRCULATION: No major vessel acute thromboembolism, flow-limiting stenosis or occlusion. There are mild atheromatous changes in the carotid siphons. Standard [place] of [place] anatomy.    POSTERIOR CIRCULATION: No stenosis/occlusion, aneurysm, or high flow vascular malformation. Dominant right and smaller left vertebral artery contribute to a normal basilar artery.     DURAL VENOUS SINUSES: Expected enhancement of the major dural venous sinuses.    NECK CTA:  RIGHT CAROTID: Mild atheromatous changes. No measurable stenosis or dissection.    LEFT CAROTID: Mild atheromatous changes.  No measurable stenosis or dissection.    VERTEBRAL ARTERIES: No focal stenosis or dissection. Dominant right and smaller left vertebral arteries.    AORTIC ARCH: Vascular variant aortic arch origin left vertebral artery.  No significant stenosis at the origin of the great vessels.    NONVASCULAR STRUCTURES: Unremarkable.      Impression    IMPRESSION:   HEAD CT:  1.  No CT evidence of acute intracranial hemorrhage, mass or recent infarct.  2.  Chronic infarct involving the left basal ganglia and thalamus.  3.  Moderate presumed chronic small vessel ischemic changes.  4.  Mild to moderate generalized volume loss.    HEAD CTA:   1.  No major vessel acute thromboembolism, flow-limiting stenosis or occlusion.    NECK  CTA:  1.  No measurable stenosis in either proximal internal carotid artery.  2.  Vertebral arteries are patent through the neck and into head.    Results of the CT and CTA were called to Dr. Young at 10:19 PM on 09/14/2021.   XR Chest Port 1 View    Narrative    EXAM: XR CHEST PORT 1 VIEW  LOCATION: [facility]  DATE/TIME: 9/14/2021 10:18 PM    INDICATION: weakness  COMPARISON: 08/13/2012.      Impression    IMPRESSION: Somewhat low lung volumes. Atelectasis/infiltrate at the left base. Right lung appears clear. No significant pleural fluid. No pneumothorax. Normal heart size and pulmonary vascularity.   MR Brain w/o Contrast     Value    Radiologist flags (OSMAR)     15 mm acute/early subacute infarct in the anteromedial right thalamus.    Narrative    EXAM: MR BRAIN W/O CONTRAST  LOCATION: [facility]  DATE/TIME: 9/14/2021 11:14 PM    INDICATION: Neuro deficit, acute, stroke suspected  COMPARISON: Head CT 09/14/2021 MRI brain 08/13/2012  TECHNIQUE: Head MRI without IV contrast including diffusion weighted imaging, FLAIR and hemosiderin sensitive sequences.    FINDINGS: Technically suboptimal secondary to motion and lack of FLAIR imaging which the patient could not tolerate.  INTRACRANIAL CONTENTS: There is a 15 mm in diameter zone of restricted diffusion involving the anteromedial right thalamus. No mass, acute hemorrhage, or extra-axial fluid collections. Chronic infarct in the left thalamus and basal ganglia. Mild to   moderate generalized cerebral atrophy. No hydrocephalus. Normal position of the cerebellar tonsils.     OTHER: Accounting for technique no additional abnormalities identified.      Impression    IMPRESSION:  1.  Technically suboptimal secondary to patient motion and inability of the patient to continue with the complete sequences.  2.  15 mm in diameter acute/early subacute infarct involving the anteromedial right thalamus.  3.  Chronic infarct in  the left basal ganglia and thalamus.      [Critical Result: 15 mm acute/early subacute infarct in the anteromedial right thalamus.]    Finding was identified on 2021 11:37 PM.     DR. Jose Young was contacted by me on 2021 11:44 PM and verbalized understanding of the critical result.    Echocardiogram Complete - For age > 60 yrs    Narrative    [id number]  [id number]  [id number]  [id number]^DEZ^EDOUARD^SEBLE     [facility]  Echocardiography Laboratory  [address] [place] [address]     Name: CARLOS SCHULTZ  MRN: [id number]  : 1950  Study Date: 09/15/2021 02:59 PM  Age: 71 yrs  Gender: Male  Patient Location: [facility]  Reason For Study: Cerebrovascular Incident  Ordering Physician: EDOUARD GARCES  Referring Physician: Manny Worrell  Performed By: Edouard Tapia RDCS     BSA: 1.7 m2  Height: 64 in  Weight: 140 lb  HR: 66  BP: 138/72 mmHg  ______________________________________________________________________________  Procedure  Complete Portable Echo Adult. Optison (NDC #7672-2163) given intravenously.  ______________________________________________________________________________  Interpretation Summary     Contrast was used without apparent complications. No cardiac source of emboli  noted.  ______________________________________________________________________________  Left Ventricle  The left ventricle is normal in size. There is borderline concentric left  ventricular hypertrophy. Left ventricular systolic function is normal. Left  ventricular diastolic function is normal. Normal left ventricular wall motion.  No evidence of left ventricular mass or tumors.     Right Ventricle  The right ventricle is normal in structure, function and size.     Atria  Normal left atrial size. Right atrial size is normal. There is no color  Doppler evidence of an atrial shunt.     Mitral Valve  The mitral valve leaflets are mildly thickened. The mitral valve leaflets  appear normal. There is no  evidence of stenosis, fluttering, or prolapse.     Tricuspid Valve  Normal tricuspid valve.     Aortic Valve  There is trivial trileaflet aortic sclerosis.     Pulmonic Valve  Normal pulmonic valve.     Vessels  The aortic root is normal size. The inferior vena cava is normal.     Pericardium  The pericardium appears normal.     ______________________________________________________________________________  MMode/2D Measurements & Calculations  IVSd: 1.2 cm  LVIDd: 3.7 cm  LVIDs: 2.1 cm  LVPWd: 1.1 cm  FS: 44.3 %  LV mass(C)d: 135.9 grams  LV mass(C)dI: 80.9 grams/m2     Ao root diam: 3.4 cm  LA dimension: 3.4 cm  LA/Ao: 1.0  LA Volume (BP): 36.7 ml  LA Volume Index (BP): 21.8 ml/m2  RWT: 0.60     Doppler Measurements & Calculations  MV E max kathleen: 103.0 cm/sec  MV A max kathleen: 81.0 cm/sec  MV E/A: 1.3  MV dec time: 0.17 sec  PA acc time: 0.10 sec     E/E' av.0  Lateral E/e': 10.5  Medial E/e': 15.5     ______________________________________________________________________________  Report approved by: Luciana Ferris 09/15/2021 04:21 PM               Discharge Medications   Current Discharge Medication List      START taking these medications    Details   acetaminophen (TYLENOL) 325 MG tablet Take 2 tablets (650 mg) by mouth every 4 hours as needed for mild pain or fever (for temperature greater than 100.4 F (38 C) - may also be used for moderate pain)    Associated Diagnoses: Cerebral artery occlusion with cerebral infarction (H)      sennosides (SENOKOT) 8.6 MG tablet Take 2 tablets by mouth 2 times daily as needed for constipation    Associated Diagnoses: Cerebral artery occlusion with cerebral infarction (H)         CONTINUE these medications which have NOT CHANGED    Details   amLODIPine (NORVASC) 10 MG tablet Take 10 mg by mouth daily      aspirin (ASA) 81 MG chewable tablet Take 81 mg by mouth daily      atenolol (TENORMIN) 50 MG tablet Take 50 mg by mouth 2 times daily      atorvastatin (LIPITOR) 80 MG  tablet Take 80 mg by mouth daily      clopidogrel (PLAVIX) 75 MG tablet Take 75 mg by mouth daily      insulin aspart (NOVOLOG FLEXPEN) 100 UNIT/ML pen Inject Subcutaneous 3 times daily (with meals) 2 units/carb + sliding scale insulin (50 units per 50 > 150)      insulin detemir (LEVEMIR PEN) 100 UNIT/ML pen Inject 28 Units Subcutaneous At Bedtime      metFORMIN (GLUCOPHAGE) 500 MG tablet Take 500 mg by mouth 2 times daily (with meals)      potassium chloride ER (KLOR-CON M) 20 MEQ CR tablet Take 20 mEq by mouth daily      tamsulosin (FLOMAX) 0.4 MG capsule Take 0.4 mg by mouth daily         STOP taking these medications       acetaminophen-codeine (TYLENOL #3) 300-30 MG tablet Comments:   Reason for Stopping:         hydrochlorothiazide (HYDRODIURIL) 25 MG tablet Comments:   Reason for Stopping:         losartan (COZAAR) 100 MG tablet Comments:   Reason for Stopping:         spironolactone (ALDACTONE) 25 MG tablet Comments:   Reason for Stopping:             Allergies   Allergies   Allergen Reactions     Nka [No Known Allergies]

## 2021-09-17 NOTE — PLAN OF CARE
Physical Therapy Discharge Summary    Reason for therapy discharge:    Discharged to acute rehabilitation facility.    Progress towards therapy goal(s). See goals on Care Plan in [facility] electronic health record for goal details.  Goals partially met.  Barriers to achieving goals:   discharge from facility.    Therapy recommendation(s):    Continued therapy is recommended.  Rationale/Recommendations:  Patient would benefit from continued skilled PT to progress his independence with bed mobility, transfers and amb with 4ww. .

## 2021-09-17 NOTE — PLAN OF CARE
Pt here with R thalamus stroke. A&O to self, disoriented to time, place and situation. Neuros q4hrs except slow speech, slow to respond, LUE4/5 and RUE 3/5. RUE contracted and weak. VSS. Tele NSR. Pureed diet with thin liquids.Takes pills crush in applesauce, small pill given whole in applesauce. Felix catheter patent and intact.Bladder irrigated at 0515 with 60cc of NS in and 60cc returned. Urine watermelon color, no clots. Pt tolerated irrigation well.  Up with strong A2/GB. Denies pain. Pt scoring green on the Aggression Stop Light Tool. Discharge plan is for ARU or TCU.

## 2021-09-17 NOTE — PROGRESS NOTES
Care Management Follow Up    Length of Stay (days): 3    Expected Discharge Date: 09/17/2021     Concerns to be Addressed:       Patient plan of care discussed at interdisciplinary rounds: Yes    Anticipated Discharge Disposition:       Anticipated Discharge Services:    Anticipated Discharge DME:      Patient/family educated on Medicare website which has current facility and service quality ratings:    Education Provided on the Discharge Plan:    Patient/Family in Agreement with the Plan:      Referrals Placed by CM/SW:    Private pay costs discussed: transportation costs, if applicable    Additional Information:    PALAK received update from [facility] liaison who anticipates a bed for pt today if medically stable.  SW set up a tentative stretcher ride for patient at: 2pm.  PCS form completed (trunk instability d/t CVA), faxed it to [facility] and placed on chart.  Awaiting final confirmation from [facility].  PALAK updated [facility] and [facility].        ANIRUDH Vallejo   [facility]  [phone]      UPDATE@1019: Pt accepted at [facility] however liaison reports they may not have a bed for today as planned.  SW to keep ride in place in case something falls thru. Dtr and RN updated.   Liaison will call SW by 12:30 to confirm/deny. If no bed today, SW directed to reschedule ride for 1 pm tomorrow.    UPDATE@1231: [facility] confirms they can take pt today; PALAK updated staff and pt/dtr; awaiting orders and discharge summary.

## 2021-09-17 NOTE — INTERIM SUMMARY
[facility] Acute Rehab Center Pre-Admission Screen    Referral Source:  [facility]  Admit date to referring facility: 9/14/2021    Physical Medicine and Rehab Consult Completed: No    Rehab Diagnosis:    Stroke Ischemic 01.3 Bilateral Involvement, new R Thalamic stroke in setting of R sided weakness from prior stroke    Justification for Acute Inpatient Rehabilitation  Jimmy Otto is a 71 year old male with a past medical history of hyperlipidemia, hypertension on multiple agents, and uncontrolled type 2 diabetes who presents to the emergency department for evaluation of weakness, expressive aphasia. 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 This stroke took place in approximately 2007/2008, and patient went to acute rehab following this and returned home. Patient lives at home with his wife. For the past 2 days or so, patient has been had some increased weakness. He now is presenting with L sided weakness in addition to his prior R sided weakness. MRI revealed an acute/subacute infarct involving the anteromedial right thalamus, thought to be from small vessel disease.     The patient is medically ready to transfer to [facility] for rehabilitation. Patient requires an intensive inpatient rehab program to address the following acute impairments:impaired strength, impaired activity tolerance, edema management, impaired tone, impaired balance, impaired coordination, impaired cognition, impaired judgement and safety awareness, impaired weight shifting, dysphagia, aphasia and dysarthria    Current Active Medical Management Needs/Risks for Clinical Complications  The patient requires the high level of rehabilitation physician supervision that accompanies the provision of intensive rehabilitation therapy.  The patient needs the services of the rehabilitation physician to assess the  patient medically and functionally and to modify the course of treatment as needed to maximize the patient's capacity to benefit from the rehabilitation process. The patient requires physician involvement at least 3 days per week to manage:   Neurologic: in setting of new R thalamic stroke with prior R sided weakness from L thalamic stroke, assess neuros regularly-- currently on aspirin, Plavix  Endocrine: in setting of uncontrolled DM II-- currently on sliding scale insulin   Cardiac: in setting of HTN, HLD at risk for further strokes-- currently on Norvasc, atenolol, Lipitor; allowing permissive HTN, and holding PTA meds Cozaar, HCTZ  Renal: in setting of ALEX assess creatinine regularly  : in setting of Hematuria, BPH, and urinary retention-- currently on Flomax, will need OP follow up for cystoscopy to complete hematuria workup  Dysphagia: assess nutrition and upgrade diet as able, currently on combination level 4 (pureed) and level 0 (thin liquids) IDDSI framework      The patient is at risk for medical complications due to Diabetes Mellitus type II- uncontrolled, Dysphagia increasing risk for aspiration and PNA, and Hemiplegia increasing risk for falls, skin breakdown.     Past Medical/Surgical History   Surgery in the past 100 days: No   Additional relevant past medical history:  hyperlipidemia, hypertension on multiple agents, uncontrolled type 2 diabetes, L thalamic stroke with residual R sided weakness    Level of Functioning Prior to Admission:    LIVING ENVIRONMENT   People in home: spouse   Current Living Arrangements: house   Home Accessibility: stairs to enter home, stairs within home    Number of Stairs, Main Entrance: 2    Stair Railings, Main Entrance: none    Number of Stairs, Within Home, Primary: greater than 10 stairs (uses stair lift to go up, no lift to lower level)    Stair Railings, Within Home, Primary: railing on right side (ascending)   Transportation Anticipated: family or friend will  provide   Living Environment Comments: Per. dtr. report, pt. typically stays on  main level, uses 4WW in/utside the home, also uses a cane outside the  home at times;has a commode over the toilet and a tub/shower combo. w/shower chair, grab bars and hand-held shower hose.     SELF-CARE   Usual Activity Tolerance: moderate   Regular Exercise: Yes    Activity/Exercise Type: walking    Exercise Amount/Frequency:  (walks around cul-de-sac 2X/day w/4WW)   Equipment Currently Used at Home: cane, straight, grab bar, tub/shower, shower chair, walker, rolling, other (see comments)   Activity/Exercise/Self-Care Comment: Pt. overall indep./mod. indep. w/mobility + ADL's w/ exception of dressing, bathing--gets some assist, dtr. not sure to what level;pt. completes all grooming, toileting tasks on his own;pt. boudreaux smedication mgmt. on his own.     DISABILITY/FUNCTION   Concentrating, Remembering or Making Decisions Difficulty: yes     Walking or Climbing Stairs Difficulty: yes    Walking or Climbing Stairs: ambulation difficulty, requires equipment    Mobility Management: 4WW;also occasionally uses cane   Dressing/Bathing Difficulty: yes    Dressing/Bathing: bathing difficulty, assistance 1 person, dressing difficulty, assistance 1 person     Toileting issues: yes      Toileting Assistance: toileting difficulty, requires equipment   Doing Errands Independently Difficulty (such as shopping): yes     Fall history within last six months: yes; Number of times patient has fallen within last six months: 2   Change in Functional Status Since Onset of Current Illness/Injury: yes  Additional Comments: Per daughters, he typically goes on walks, though has not been doing this the couple days prior to admit. He drives at baseline, walks with a cane or 4WW. He is in charge of his own medications, and may self adjust at baseline.     Level of Function: GG Scale (Section GG Functional Ability and Goals; CMS's INTERIANO Version 3.0 Manual effective  10.1.2019):  PT Current Function Goals for Rehab   Bed Rolling 2 Substantial/maximal assistance 6 Independent   Supine to Sit 2 Substantial/maximal assistance 6 Independent   Sit to Stand 1 Dependent 4 Supervision or touching assitance   Transfer 1 Dependent 4 Supervision or touching assitance   Ambulation 1 Dependent 4 Supervision or touching assitance   Stairs Not completed 4 Supervision or touching assitance     OT Current Function Goals for Rehab   Feeding 3 Partial/moderate assistance 6 Independent   Grooming 3 Partial/moderate assistance 6 Independent   Bathing Not completed 6 Independent   Upper Body Dressing 2 Substantial/maximal assistance 3 Partial/moderate assistance   Lower Body Dressing 1 Dependent 3 Partial/moderate assistance   Toileting 1 Dependent 6 Independent   Toilet Transfer 1 Dependent 6 Independent   Tub/Shower Transfer Not completed 4 Supervision or touching assitance   Cognition Impaired Supervision     SLP Current Function Goals for Rehab   Swallow Impaired Tolerate least restrictive diet without signs & symptoms of aspiration and adhere to safe swallow strategies   Communication Impaired Communicate basic needs effectively       Current Diet:  0-Thin, 4-Pureed/extremely thick, Diabetic and No straws    Summary Statement:  The patient is performing bed mobility with Mod/Min A, sitting balance with Mod A/SBA. The pateint performs sit to stand with Min/Mod A. The patient performs gait with Naseem/CGA x 70 feet with FWW, and a second staff member to perform close WC follow due to falls risk. In OT the pt sat EOB w/ overall CGA-SBA for partcipation in grooming/hyg. tasks;Pt. able to wash face w/ set-up, wash cloth placed in pt.'s hand;pt. able to unscrew toothpaste cap using 1-handed tech/L hand after demo. Pt. able to squeeze toothpaste onto toothbrush w/L hand, therapist holding brush. In SLP pt accepted cup sips of thin water with moderate delay and no overt s/sx of aspiration on 4oz. Family  reported pt enjoys coffee and coffee present with straw in cup. Trialed straw sips, however, suspect spillage with immediate throat clearing. O2 remained in high 90s.    Expected Therapies and Services Required During Inpatient Rehab Admission  Intensity of Therapy: Patient requires intensive therapies not available in a lesser level of care. Patient is motivated, making gains, and can tolerate 3 hours of therapy a day.  Physical Therapy: 60 minutes per day, 7 days a week for 14 days  Occupational Therapy: 60 minutes per day, 7 days a week for 14 days  Speech and Language Therapy: 60 minutes per day, 7 days a week for 14 days  Rehabilitation Nursing Needs: Patient requires 24 hour Rehab Nursing to manage bowel program, vitals, medication education, tone management, positioning, carryover of new rehab techniques, care coordination, blood sugar management, diabetes education, assess neurologic status, stroke education, provide safe environment for patient at falls risk and monitor nutritional intake.    Precautions/restrictions/special needs:   Precautions: fall precautions   Restrictions: Swallow restrictions, restricted diet   Special Needs: lift    Designated Visitors: Clemente (daughter), Sathya (son)    Expected Level of Improvement: Anticipate with intensive therapies, close medical management, and rehabilitative nursing care the patient will improve strength, balance, tolerance to activity, safety, swallow, and cognition to ensure supervision or less with all mobility and ADLs except LB dressing, tub/shower transfer, and stairs which will be Min A x 1 to allow return home with 24 hour supervision and ongoing home therapies likely.   Expected Length of time to achieve: 14 days    Anticipated Discharge Needs:  Anticipated Discharge Destination: Home  Anticipated Discharge Support: Family member  24/7 support available : Yes  Identified caregiver(s):  Daughter, Son-in-law, spouse  Anticipated Discharge Needs: Home  with homecare    Identified challenges/barriers:  2 IGNACIO, 10 within with stair lift. Does not need to access basement.    Admissions Liaison Signature/Date/Time:       Physician statement of review and agreement:  I have reviewed and am in agreement of the need for IRF stay to address above functional and medical needs. In addition to above statements address, Patient requires intensive active and ongoing therapeutic intervention and multiple therapies; Patient requires medical supervision; Expected to actively participate in the intensive rehab program; Sufficiently stable to actively participate; Expectation for measurable improvement in functional capacity or adaption to impairments.    Physician Signature/Date/Time:

## 2021-09-18 ENCOUNTER — APPOINTMENT (OUTPATIENT)
Dept: OCCUPATIONAL THERAPY | Facility: CLINIC | Age: 71
DRG: 057 | End: 2021-09-18
Attending: PHYSICAL MEDICINE & REHABILITATION
Payer: MEDICARE

## 2021-09-18 ENCOUNTER — APPOINTMENT (OUTPATIENT)
Dept: PHYSICAL THERAPY | Facility: CLINIC | Age: 71
DRG: 057 | End: 2021-09-18
Attending: PHYSICAL MEDICINE & REHABILITATION
Payer: MEDICARE

## 2021-09-18 ENCOUNTER — APPOINTMENT (OUTPATIENT)
Dept: SPEECH THERAPY | Facility: CLINIC | Age: 71
DRG: 057 | End: 2021-09-18
Attending: PHYSICAL MEDICINE & REHABILITATION
Payer: MEDICARE

## 2021-09-18 ENCOUNTER — APPOINTMENT (OUTPATIENT)
Dept: GENERAL RADIOLOGY | Facility: CLINIC | Age: 71
DRG: 057 | End: 2021-09-18
Attending: PHYSICAL MEDICINE & REHABILITATION
Payer: MEDICARE

## 2021-09-18 LAB
ANION GAP SERPL CALCULATED.3IONS-SCNC: 5 MMOL/L (ref 3–14)
BASOPHILS # BLD AUTO: 0.1 10E3/UL (ref 0–0.2)
BASOPHILS NFR BLD AUTO: 1 %
BUN SERPL-MCNC: 19 MG/DL (ref 7–30)
CALCIUM SERPL-MCNC: 9.2 MG/DL (ref 8.5–10.1)
CHLORIDE BLD-SCNC: 108 MMOL/L (ref 94–109)
CO2 SERPL-SCNC: 28 MMOL/L (ref 20–32)
CREAT SERPL-MCNC: 1.37 MG/DL (ref 0.66–1.25)
EOSINOPHIL # BLD AUTO: 0.1 10E3/UL (ref 0–0.7)
EOSINOPHIL NFR BLD AUTO: 1 %
ERYTHROCYTE [DISTWIDTH] IN BLOOD BY AUTOMATED COUNT: 13.2 % (ref 10–15)
GFR SERPL CREATININE-BSD FRML MDRD: 52 ML/MIN/1.73M2
GLUCOSE BLD-MCNC: 94 MG/DL (ref 70–99)
GLUCOSE BLDC GLUCOMTR-MCNC: 101 MG/DL (ref 70–99)
GLUCOSE BLDC GLUCOMTR-MCNC: 165 MG/DL (ref 70–99)
GLUCOSE BLDC GLUCOMTR-MCNC: 242 MG/DL (ref 70–99)
GLUCOSE BLDC GLUCOMTR-MCNC: 247 MG/DL (ref 70–99)
GLUCOSE BLDC GLUCOMTR-MCNC: 280 MG/DL (ref 70–99)
GLUCOSE BLDC GLUCOMTR-MCNC: 83 MG/DL (ref 70–99)
GLUCOSE BLDC GLUCOMTR-MCNC: 92 MG/DL (ref 70–99)
HCT VFR BLD AUTO: 41.1 % (ref 40–53)
HGB BLD-MCNC: 13.2 G/DL (ref 13.3–17.7)
IMM GRANULOCYTES # BLD: 0 10E3/UL
IMM GRANULOCYTES NFR BLD: 0 %
LYMPHOCYTES # BLD AUTO: 1.3 10E3/UL (ref 0.8–5.3)
LYMPHOCYTES NFR BLD AUTO: 15 %
MCH RBC QN AUTO: 26.8 PG (ref 26.5–33)
MCHC RBC AUTO-ENTMCNC: 32.1 G/DL (ref 31.5–36.5)
MCV RBC AUTO: 83 FL (ref 78–100)
MONOCYTES # BLD AUTO: 0.8 10E3/UL (ref 0–1.3)
MONOCYTES NFR BLD AUTO: 9 %
NEUTROPHILS # BLD AUTO: 6.4 10E3/UL (ref 1.6–8.3)
NEUTROPHILS NFR BLD AUTO: 74 %
NRBC # BLD AUTO: 0 10E3/UL
NRBC BLD AUTO-RTO: 0 /100
PLATELET # BLD AUTO: 217 10E3/UL (ref 150–450)
POTASSIUM BLD-SCNC: 3 MMOL/L (ref 3.4–5.3)
POTASSIUM BLD-SCNC: 3.4 MMOL/L (ref 3.4–5.3)
POTASSIUM BLD-SCNC: 3.6 MMOL/L (ref 3.4–5.3)
RBC # BLD AUTO: 4.93 10E6/UL (ref 4.4–5.9)
SODIUM SERPL-SCNC: 141 MMOL/L (ref 133–144)
TROPONIN I SERPL-MCNC: <0.015 UG/L (ref 0–0.04)
WBC # BLD AUTO: 8.6 10E3/UL (ref 4–11)

## 2021-09-18 PROCEDURE — 92610 EVALUATE SWALLOWING FUNCTION: CPT | Mod: GN | Performed by: SPEECH-LANGUAGE PATHOLOGIST

## 2021-09-18 PROCEDURE — 93005 ELECTROCARDIOGRAM TRACING: CPT

## 2021-09-18 PROCEDURE — 84484 ASSAY OF TROPONIN QUANT: CPT | Performed by: STUDENT IN AN ORGANIZED HEALTH CARE EDUCATION/TRAINING PROGRAM

## 2021-09-18 PROCEDURE — 250N000013 HC RX MED GY IP 250 OP 250 PS 637: Performed by: STUDENT IN AN ORGANIZED HEALTH CARE EDUCATION/TRAINING PROGRAM

## 2021-09-18 PROCEDURE — 250N000013 HC RX MED GY IP 250 OP 250 PS 637: Performed by: PHYSICAL MEDICINE & REHABILITATION

## 2021-09-18 PROCEDURE — 36415 COLL VENOUS BLD VENIPUNCTURE: CPT | Performed by: STUDENT IN AN ORGANIZED HEALTH CARE EDUCATION/TRAINING PROGRAM

## 2021-09-18 PROCEDURE — 36415 COLL VENOUS BLD VENIPUNCTURE: CPT | Performed by: PHYSICAL MEDICINE & REHABILITATION

## 2021-09-18 PROCEDURE — 97530 THERAPEUTIC ACTIVITIES: CPT | Mod: GP

## 2021-09-18 PROCEDURE — 97535 SELF CARE MNGMENT TRAINING: CPT | Mod: GO

## 2021-09-18 PROCEDURE — 84132 ASSAY OF SERUM POTASSIUM: CPT | Performed by: STUDENT IN AN ORGANIZED HEALTH CARE EDUCATION/TRAINING PROGRAM

## 2021-09-18 PROCEDURE — 84132 ASSAY OF SERUM POTASSIUM: CPT | Performed by: PHYSICAL MEDICINE & REHABILITATION

## 2021-09-18 PROCEDURE — 85025 COMPLETE CBC W/AUTO DIFF WBC: CPT | Performed by: PHYSICAL MEDICINE & REHABILITATION

## 2021-09-18 PROCEDURE — 250N000009 HC RX 250

## 2021-09-18 PROCEDURE — 97530 THERAPEUTIC ACTIVITIES: CPT | Mod: GO

## 2021-09-18 PROCEDURE — 80048 BASIC METABOLIC PNL TOTAL CA: CPT | Performed by: PHYSICAL MEDICINE & REHABILITATION

## 2021-09-18 PROCEDURE — 97163 PT EVAL HIGH COMPLEX 45 MIN: CPT | Mod: GP

## 2021-09-18 PROCEDURE — 93010 ELECTROCARDIOGRAM REPORT: CPT | Performed by: INTERNAL MEDICINE

## 2021-09-18 PROCEDURE — 71046 X-RAY EXAM CHEST 2 VIEWS: CPT

## 2021-09-18 PROCEDURE — 99222 1ST HOSP IP/OBS MODERATE 55: CPT | Performed by: NURSE PRACTITIONER

## 2021-09-18 PROCEDURE — 97167 OT EVAL HIGH COMPLEX 60 MIN: CPT | Mod: GO

## 2021-09-18 PROCEDURE — 71046 X-RAY EXAM CHEST 2 VIEWS: CPT | Mod: 26 | Performed by: RADIOLOGY

## 2021-09-18 PROCEDURE — 128N000003 HC R&B REHAB

## 2021-09-18 RX ORDER — POTASSIUM CHLORIDE 750 MG/1
40 TABLET, EXTENDED RELEASE ORAL ONCE
Status: COMPLETED | OUTPATIENT
Start: 2021-09-18 | End: 2021-09-18

## 2021-09-18 RX ORDER — POTASSIUM CHLORIDE 750 MG/1
20 TABLET, EXTENDED RELEASE ORAL ONCE
Status: DISCONTINUED | OUTPATIENT
Start: 2021-09-18 | End: 2021-09-18

## 2021-09-18 RX ORDER — MAGNESIUM HYDROXIDE 1200 MG/15ML
LIQUID ORAL
Status: COMPLETED
Start: 2021-09-18 | End: 2021-09-18

## 2021-09-18 RX ADMIN — ATENOLOL 25 MG: 25 TABLET ORAL at 20:58

## 2021-09-18 RX ADMIN — ACETAMINOPHEN 975 MG: 325 TABLET, FILM COATED ORAL at 09:47

## 2021-09-18 RX ADMIN — SODIUM CHLORIDE 60 ML: 900 IRRIGANT IRRIGATION at 02:31

## 2021-09-18 RX ADMIN — ACETAMINOPHEN 975 MG: 325 TABLET, FILM COATED ORAL at 15:47

## 2021-09-18 RX ADMIN — AMLODIPINE BESYLATE 10 MG: 10 TABLET ORAL at 08:57

## 2021-09-18 RX ADMIN — POLYETHYLENE GLYCOL 3350 17 G: 17 POWDER, FOR SOLUTION ORAL at 13:34

## 2021-09-18 RX ADMIN — TAMSULOSIN HYDROCHLORIDE 0.4 MG: 0.4 CAPSULE ORAL at 08:57

## 2021-09-18 RX ADMIN — POTASSIUM CHLORIDE 40 MEQ: 750 TABLET, EXTENDED RELEASE ORAL at 12:57

## 2021-09-18 RX ADMIN — INSULIN ASPART 3 UNITS: 100 INJECTION, SOLUTION INTRAVENOUS; SUBCUTANEOUS at 09:13

## 2021-09-18 RX ADMIN — ATORVASTATIN CALCIUM 80 MG: 80 TABLET, FILM COATED ORAL at 08:57

## 2021-09-18 RX ADMIN — PANTOPRAZOLE SODIUM 40 MG: 40 TABLET, DELAYED RELEASE ORAL at 06:15

## 2021-09-18 RX ADMIN — CLOPIDOGREL BISULFATE 75 MG: 75 TABLET, FILM COATED ORAL at 08:56

## 2021-09-18 RX ADMIN — ASPIRIN 81 MG CHEWABLE TABLET 81 MG: 81 TABLET CHEWABLE at 08:56

## 2021-09-18 RX ADMIN — INSULIN ASPART 8 UNITS: 100 INJECTION, SOLUTION INTRAVENOUS; SUBCUTANEOUS at 13:02

## 2021-09-18 RX ADMIN — ATENOLOL 25 MG: 25 TABLET ORAL at 08:57

## 2021-09-18 RX ADMIN — INSULIN ASPART 5 UNITS: 100 INJECTION, SOLUTION INTRAVENOUS; SUBCUTANEOUS at 19:14

## 2021-09-18 NOTE — PROGRESS NOTES
[facility]   Acute Rehabilitation Unit  Daily progress note    INTERVAL HISTORY  Nursing notes reviewed, no acute events overnight. Continues to have ongoing hematuria.     Jimmy Otto was seen and examined at bedside. His brother is at bedside as well.    This afternoon, Jimmy was sitting in wheelchair at bedside. Unable to verbally communicate, but able to follow commands w/ head nodding. Continues to appear slightly somnolent. Endorses some left sided neck pain, but notes that pain medication helped. His brother notes that today, he appears to be doing better than last Friday.     Potassium this AM was low, 3.0. Ho was given 40mEq potassium replacement. Repeat K was 3.4. Will continue to monitor.     This afternoon around 3pm, patient informed nursing staff of chest tightness, difficulty breathing, and left arm pain. Patient was being moved from one room to another when pain started. When this writer went to evaluate patient, he no longer had CP. Still had SOB, but noted that it was improving. Denied any numbness/tingling or pain in b/l UE. Discussed w/ patient and his brother. Low suspicion at this time for cardiac pathology, likely it was from transferring patient from bed to chair and back to bed that may have caused some SOB. Lung sounds decreased bilaterally, but no wheezing or crackles. No coughing. Repeat vitals WNL, sating 97%% on RA. Will obtain EKG, troponin, and CXR to r/o ACS and pneumonia.     Functionally  SLP:  Communication: to be assessed, pt limited voicing/aphonic, follows simple directions  Cognition: to be assessed  Swallow: IDDSI 4,- pureed thin fluids 0,  1:1 for po/meals, assist to feed PRN, slow pace, small bites, no straw  A 10 point ROS was performed and negative unless otherwise noted in HPI.     PT:  Bed Mobility: Dependent  Transfer: Max-A squat/pivot. OH lift with nursing  Gait: Amb 3' with HHA, unable to motor plan turns  Stairs: Not safe  Balance:  "At times sits EOB SBA, other times max-A for midline with B-side leaning      Bowel/Bladder: Felix in place, draining pink/red urine, bladder irrigation as ordered. Continent of bowel, LBM 9/14, PRN laxatives given.    New from today:  - Monitor potassium, replete as needed    MEDICATIONS  Scheduled meds    amLODIPine  10 mg Oral Daily     aspirin  81 mg Oral Daily     atenolol  25 mg Oral BID     atorvastatin  80 mg Oral Daily     clopidogrel  75 mg Oral Daily     insulin aspart   Subcutaneous TID w/meals     insulin aspart  1-10 Units Subcutaneous TID AC     insulin aspart  1-7 Units Subcutaneous At Bedtime     [Held by provider] insulin detemir  30 Units Subcutaneous At Bedtime     pantoprazole  40 mg Oral QAM AC     tamsulosin  0.4 mg Oral Daily       PRN meds:  acetaminophen, bisacodyl, glucose **OR** dextrose **OR** glucagon, hydrALAZINE, insulin aspart, melatonin, ondansetron, polyethylene glycol, senna-docusate      PHYSICAL EXAM  /61 (BP Location: Left arm)   Pulse 75   Temp 98  F (36.7  C) (Oral)   Resp 16   Ht 1.626 m (5' 4\")   Wt 66.2 kg (145 lb 15.1 oz)   SpO2 97%   BMI 25.05 kg/m    Gen: Calm, non verbal, able to answer questions w/ hand signs and head nodding  HEENT: PERRLA, MMM  Cardio: RRR, well perfused  Pulm: CTAB, no wheezing/crackles  Abd: Soft   Ext: No b/l extremity edema/tenderness  Neuro/MSK: Able to slightly lift extremities antigravity. Increased tone in RUE 2/2 prior stroke    LABS  Results for orders placed or performed during the hospital encounter of 09/17/21 (from the past 24 hour(s))   Glucose by meter   Result Value Ref Range    GLUCOSE BY METER POCT 256 (H) 70 - 99 mg/dL   Glucose by meter   Result Value Ref Range    GLUCOSE BY METER POCT 83 70 - 99 mg/dL   Glucose by meter   Result Value Ref Range    GLUCOSE BY METER POCT 101 (H) 70 - 99 mg/dL   CBC with platelets differential    Narrative    The following orders were created for panel order CBC with platelets " differential.  Procedure                               Abnormality         Status                     ---------                               -----------         ------                     CBC with platelets and d...[[id number]]  Abnormal            Final result                 Please view results for these tests on the individual orders.   Basic metabolic panel   Result Value Ref Range    Sodium 141 133 - 144 mmol/L    Potassium 3.0 (L) 3.4 - 5.3 mmol/L    Chloride 108 94 - 109 mmol/L    Carbon Dioxide (CO2) 28 20 - 32 mmol/L    Anion Gap 5 3 - 14 mmol/L    Urea Nitrogen 19 7 - 30 mg/dL    Creatinine 1.37 (H) 0.66 - 1.25 mg/dL    Calcium 9.2 8.5 - 10.1 mg/dL    Glucose 94 70 - 99 mg/dL    GFR Estimate 52 (L) >60 mL/min/1.73m2   CBC with platelets and differential   Result Value Ref Range    WBC Count 8.6 4.0 - 11.0 10e3/uL    RBC Count 4.93 4.40 - 5.90 10e6/uL    Hemoglobin 13.2 (L) 13.3 - 17.7 g/dL    Hematocrit 41.1 40.0 - 53.0 %    MCV 83 78 - 100 fL    MCH 26.8 26.5 - 33.0 pg    MCHC 32.1 31.5 - 36.5 g/dL    RDW 13.2 10.0 - 15.0 %    Platelet Count 217 150 - 450 10e3/uL    % Neutrophils 74 %    % Lymphocytes 15 %    % Monocytes 9 %    % Eosinophils 1 %    % Basophils 1 %    % Immature Granulocytes 0 %    NRBCs per 100 WBC 0 <1 /100    Absolute Neutrophils 6.4 1.6 - 8.3 10e3/uL    Absolute Lymphocytes 1.3 0.8 - 5.3 10e3/uL    Absolute Monocytes 0.8 0.0 - 1.3 10e3/uL    Absolute Eosinophils 0.1 0.0 - 0.7 10e3/uL    Absolute Basophils 0.1 0.0 - 0.2 10e3/uL    Absolute Immature Granulocytes 0.0 <=0.0 10e3/uL    Absolute NRBCs 0.0 10e3/uL   Glucose by meter   Result Value Ref Range    GLUCOSE BY METER POCT 92 70 - 99 mg/dL   Potassium   Result Value Ref Range    Potassium 3.4 3.4 - 5.3 mmol/L   Glucose by meter   Result Value Ref Range    GLUCOSE BY METER POCT 242 (H) 70 - 99 mg/dL   Glucose by meter   Result Value Ref Range    GLUCOSE BY METER POCT 280 (H) 70 - 99 mg/dL       ASSESSMENT AND PLAN  Jimmy Otto is a 71  year old male with a PMH including but not limited to HTN, uncontrolled DM 2 (HbA1c 11.5), HLD, BPH, and history of a stroke in 2012 with residual right hemiparesis who presented to the emergency room on 9/14/2021 with altered mental status and acute worsening of right-sided weakness, and found to have an acute right thalamic stroke due to small vessel occlusion.  Hospital course complicated by acute kidney injury, hypokalemia needing repletion, and urinary retention and hematuria needing Felix catheter and urology consult.     Admission to inpatient rehab:  9/17/21  Impairment group code: Stroke Ischemic 01.3 Bilateral Involvement, new R Thalamic stroke in setting of R sided weakness from prior stroke      - Vitals stable.   - K supplement given this AM, will continue to monitor.  - CXR, EKG, and troponin pending for CP (resolved) and SPB  - Continue ongoing medical management.  - Continue therapies and plan of care.  - Refer to last progress note from primary team for full problems list.      Erlinda Locke MD  PGY-2  Physical Medicine & Rehabilitation

## 2021-09-18 NOTE — PLAN OF CARE
Discharge Planner Post-Acute Rehab PT:     Discharge Plan: Home with 24/7 vs [facility] vs TCU pending progress    Precautions: Fall, alarms    Current Status:  Bed Mobility: Dependent  Transfer: Max-A squat/pivot. OH lift with nursing  Gait: Amb 3' with HHA, unable to motor plan turns  Stairs: Not safe  Balance: At times sits EOB SBA, other times max-A for midline with B-side leaning    Assessment:  Pt presents with new L hemiparesis and baseline R hemiparesis from past stroke. At baseline ambulates short distances mod-I with 4WW and is mod-I with basic ADLs. Anticipating need for long rehab course given new L-side deficits with significant baseline R-sided deficits. Cognition/lethargy will greatly impact progress. ELOS 3 weeks, will need to consider TCU if participation does not improve.    Other Barriers to Discharge (DME, Family Training, etc): Safe discharge plan - will need 24/7 cares, level of assist/equipment pending lethargy/participation

## 2021-09-18 NOTE — PROGRESS NOTES
09/18/21 0915   Quick Adds   Type of Visit Initial Occupational Therapy Evaluation   Living Environment   People in home spouse   Current Living Arrangements house   Home Accessibility stairs to enter home;stairs within home   Number of Stairs, Main Entrance 1   Stair Railings, Main Entrance none   Number of Stairs, Within Home, Primary 4   Stair Railings, Within Home, Primary none   Transportation Anticipated family or friend will provide   Living Environment Comments Lives in [place],  from spouse, lives in split level house with 4 stairs inside, tub/sh, chair lift   Self-Care   Usual Activity Tolerance moderate   Current Activity Tolerance fair   Regular Exercise Yes   Activity/Exercise Type walking   Equipment Currently Used at Home walker, rolling;shower chair;grab bar, toilet;cane, quad;wheelchair, power   Activity/Exercise/Self-Care Comment OT: Mod I in ADLs, MELA assists with bathing, cleaning, and cooking tasks,    Instrumental Activities of Daily Living (IADL)   IADL Comments Family assists with IADLs at baseline   Disability/Function   Hearing Difficulty or Deaf no   Wear Glasses or Blind yes   Vision Management eyeglasses   Concentrating, Remembering or Making Decisions Difficulty no   Difficulty Communicating no   Difficulty Eating/Swallowing no   Walking or Climbing Stairs Difficulty yes   Walking or Climbing Stairs stair climbing difficulty, requires equipment   Mobility Management stair lift, Mod I using 4WW   Dressing/Bathing Difficulty yes   Dressing/Bathing bathing difficulty, requires equipment   Dressing/Bathing Management grab bar, shower chair   Toileting issues yes   Toileting Management grab bar   Toileting Assistance toileting difficulty, requires equipment   Doing Errands Independently Difficulty (such as shopping) yes   Errands Management Family assist   Fall history within last six months yes   Number of times patient has fallen within last six months 2   Change in Functional  Status Since Onset of Current Illness/Injury yes   General Information   Onset of Illness/Injury or Date of Surgery 09/17/21   Referring Physician Dr Campo   Additional Occupational Profile Info/Pertinent History of Current Problem Jimmy Otto is a 71 year old male with a PMH including but not limited to HTN, uncontrolled DM 2 (HbA1c 11.5), HLD, BPH, and history of a stroke in 2012 with residual right hemiparesis who presented to the emergency room on 9/14/2021 with altered mental status and acute worsening of right-sided weakness.  Was not a candidate for thrombolysis due to unknown time of last known normal, and initial CT and CT imaging did not show any acute abnormalities or large vessel occlusion therefore not a candidate for mechanical thrombectomy.  An MRI was then obtained which did demonstrate an acute right thalamic CVA.  Etiology felt to be due to small vessel disease and he has been initiated on aspirin and Plavix for 1 month, followed by aspirin alone indefinitely with a check of P2Y12 activity after a month of being on Plavix only.  Hospital course has been complicated by acute kidney injury with a peak creatinine of 2.13, hypokalemia needing repletion, and urinary retention and hematuria needing Felix catheter and urology consult.   Cognitive Status Examination   Orientation Status person   Affect/Mental Status (Cognitive) low arousal/lethargic   Follows Commands follows one-step commands   Safety Deficit insight into deficits/self-awareness   Cognitive Status Comments pt exhibits bouts of somnolence   Visual Perception   Visual Impairment/Limitations corrective lenses full-time   Range of Motion Comprehensive   Comment, General Range of Motion RUE ROM impaired at baseline; LUE AROM WFL   Strength Comprehensive (MMT)   Comment, General Manual Muscle Testing (MMT) Assessment RUE MMT flaccid at baseline; LUE MMT 3/5   Muscle Tone Assessment   Muscle Tone Comments pt has increased tone in RUE with h/o L CVA    Coordination   Upper Extremity Coordination Left UE impaired   Gross Motor Coordination impaired   Coordination Comments pt has moderate difficulty with motor planning, LUE incoordination   ARC Assessment Only   Acute Rehab Functional Assessment See IP Rehab Daily Documentation Flowsheet for Functional Mobility/ADL Assessment   Clinical Impression   Criteria for Skilled Therapeutic Interventions Met (OT) yes;skilled treatment is necessary   OT Diagnosis ADL skills impaired   OT Problem List-Impairments impacting ADL problems related to;activity tolerance impaired;balance;cognition;communication;coordination;inability to direct their own care;mobility;range of motion (ROM);strength;pain;motor control;muscle tone   Assessment of Occupational Performance 5 or more Performance Deficits   Planned Therapy Interventions (OT) ADL retraining;bed mobility training;balance training;cognition;fine motor coordination training;E-stim;joint mobilization;manual therapy;motor coordination training;neuromuscular re-education;ROM;strengthening;stretching;transfer training;home program guidelines;progressive activity/exercise;risk factor education   Clinical Decision Making Complexity (OT) high complexity   Therapy Frequency (OT) Daily   Predicted Duration of Therapy 3 weeks   Anticipated Equipment Needs Upon Discharge (OT) tub bench   Risk & Benefits of therapy have been explained evaluation/treatment results reviewed;care plan/treatment goals reviewed;participants included;patient;sibling;participants voiced agreement with care plan   Comment-Clinical Impression Limited by activity tolerance, motor planning, incoordination, LUE function, strength, balance, cognition. Will benefit from daily OT services for 3 weeks in order to d/c home with family support, 24 hr assist   Total Evaluation Time (Minutes)   Total Evaluation Time (Minutes) 40

## 2021-09-18 NOTE — PLAN OF CARE
Discharge Planner Post-Acute Rehab SLP:     Discharge Plan:home with assist, likely further SLP    Precautions:fall, swallowing    Current Status:  Communication: to be assessed, pt limited voicing/aphonic, follows simple directions  Cognition: to be assessed  Swallow: IDDSI 4,- pureed thin fluids 0,  1:1 for po/meals, assist to feed PRN, slow pace, small bites, no straw    Assessment: SLP: pt seen for clinical swallowing evaluation. Pt presents with mild/moderate oropharyngeal dysphagia, Initially pt appeared somnolent, but did participate/awaken for evaluation. Pt generally aphonic, with very limited attempts to verbalize. Pt needed assist to  bring cup to mouth, did self feed pureed with left hand.  Noting variable delay with bolus formation, transit time/completion of swallow.  Appears pt holds bolus at times , cannot fully assess if premature pharyngeal entry. Also suspect reduced hyolaryngeal elevation.   Pt did not show any outward sx aspiration with small sips thin fluid by cup - did have coughing episode with pureed x1 (weak cough).  Pt took pills with nursing one whole, others crushed with pureed.Did not assess more advanced solids at this time, but may in subsequent sessions PRN.  Recommend continue pureed (4) and thin fluids, but monitor for sx aspiration. Pt needs 1:1 for meals/po to assist to feed/safety. Recommend small bites and sips/ no straw, wait for swallow, sit up for all po.  If sx of aspiration noted, pt may need VFSS.Ok pills with pureed crush, unless cannot per guidelines. will plan also to evaluate communication skills.     Other Barriers to Discharge (Family Training, etc): TBD

## 2021-09-18 NOTE — PHARMACY-MEDICATION REGIMEN REVIEW
Pharmacy Medication Regimen Review  Jimmy Otto is a 71 year old male who is currently in the Acute Rehab Unit.    Assessment: Upon review of the medications and patient chart the following irregularities were found: Other Recommendations:   -Patient on atenolol 50mg twice daily prior to admission. Starting at lower dose of 25mg twice daily and will titrate as needed  -Hydralazine as needed for SBP greater than 180mmHg  -Holding spironolactone, losartan, and hydrochlorothiazide in the setting of acute kidney injury.   -Patient on metformin 500mg twice daily prior to admission but this medication has not been restarted.     Plan:   Continue current medication treatment regimen.  Pharmacy will renally adjust any renally cleared medications as needed.    Attending provider will be sent this note for review.  If there are any emergent issues noted above, pharmacist will contact provider directly by phone.      Pharmacy will periodically review the resident's medication regimen for any PRN medications not administered in > 72 hours and discontinue them. The pharmacist will discuss gradual dose reductions of psychopharmacologic medications with interdisciplinary team on a regular basis.    Please contact pharmacy if the above does not answer specific medication questions/concerns.    Background:  A pharmacist has reviewed all medications and pertinent medical history today.  Medications were reviewed for appropriate use and any irregularities found are listed with recommendations.      Current Facility-Administered Medications:      acetaminophen (TYLENOL) tablet 325-975 mg, 325-975 mg, Oral, Q6H PRN, Kerrie Campo MD, 975 mg at 09/18/21 0947     amLODIPine (NORVASC) tablet 10 mg, 10 mg, Oral, Daily, Kerrie Campo MD, 10 mg at 09/18/21 0857     aspirin (ASA) chewable tablet 81 mg, 81 mg, Oral, Daily, Kerrie Campo MD, 81 mg at 09/18/21 0856     atenolol (TENORMIN) tablet 25 mg, 25 mg, Oral, BID, Jahaira  Kerrie Bullard MD, 25 mg at 09/18/21 0857     atorvastatin (LIPITOR) tablet 80 mg, 80 mg, Oral, Daily, JahairaKerrie godoy MD, 80 mg at 09/18/21 0857     bisacodyl (DULCOLAX) Suppository 10 mg, 10 mg, Rectal, Daily PRN, Kerrie Campo MD     clopidogrel (PLAVIX) tablet 75 mg, 75 mg, Oral, Daily, JahairaKerrie godoy MD, 75 mg at 09/18/21 0856     glucose gel 15-30 g, 15-30 g, Oral, Q15 Min PRN **OR** dextrose 50 % injection 25-50 mL, 25-50 mL, Intravenous, Q15 Min PRN **OR** glucagon injection 1 mg, 1 mg, Subcutaneous, Q15 Min PRN, Kerrie Campo MD     hydrALAZINE (APRESOLINE) tablet 10 mg, 10 mg, Oral, 4x Daily PRN, Kerrie Campo MD     insulin aspart (NovoLOG) injection (RAPID ACTING), , Subcutaneous, TID w/meals, Janet Duran PA-C, 3 Units at 09/18/21 0913     insulin aspart (NovoLOG) injection (RAPID ACTING), 1-10 Units, Subcutaneous, TID AC, Janet Duran PA-C, 2 Units at 09/17/21 1905     insulin aspart (NovoLOG) injection (RAPID ACTING), 1-7 Units, Subcutaneous, At Bedtime, Janet Duran PA-C, 3 Units at 09/17/21 2150     insulin aspart (NovoLOG) injection (RAPID ACTING), , Subcutaneous, With Snacks or Supplements, Janet Duran PA-C     [Held by provider] insulin detemir (LEVEMIR PEN) injection 30 Units, 30 Units, Subcutaneous, At Bedtime, Janet Duran PA-C, 30 Units at 09/17/21 2103     melatonin tablet 3 mg, 3 mg, Oral, At Bedtime PRN, Kerrie Campo MD     ondansetron (ZOFRAN) tablet 4 mg, 4 mg, Oral, Q6H PRN, Kerrie Campo MD     pantoprazole (PROTONIX) EC tablet 40 mg, 40 mg, Oral, QAM AC, Kerrie Campo MD, 40 mg at 09/18/21 0615     polyethylene glycol (MIRALAX) Packet 17 g, 17 g, Oral, Daily PRN, Kerrie Campo MD     senna-docusate (SENOKOT-S/PERICOLACE) 8.6-50 MG per tablet 1-2 tablet, 1-2 tablet, Oral, BID PRN, Kerrie Campo MD, 2 tablet at 09/17/21 2140     tamsulosin (FLOMAX) capsule 0.4  mg, 0.4 mg, Oral, Daily, Kerrie Campo MD, 0.4 mg at 09/18/21 3328  No current outpatient prescriptions on file.

## 2021-09-18 NOTE — PLAN OF CARE
"  VS: BP (!) 144/81 (BP Location: Right arm)   Pulse 83   Temp 98.6  F (37  C) (Oral)   Resp 16   Ht 1.626 m (5' 4\")   Wt 66.2 kg (145 lb 15.1 oz)   SpO2 98%   BMI 25.05 kg/m       O2: Room air, sats stable. LS clear.    Output: Felix catheter in place, patent. Irrigated.   LBM per report 9/14 family not sure of LBM. BS active. PRN miralax given, no results yet.   Activity: Up with 2 assist with a lift. Participated in therapies.   Pain: Pt/family reported pain on left shoulder/arm,received PRN tylenol with relief.   CMS: Unable to assess, pt is lethargic, whispers and unable to speak but follows commands and communicates by nodding. Pt's son at bedside assisting with communication.   Dressing: none   Diet: Pureed diet level 4/thin liquid, needs assistance and supervision with meals. Good appetite, ate 100 % lunch. Pills crushed takes with pudding.    LDA: Felix catheter in place, intact.   Plan: Continue to monitor and follow POC.   Additional Info: BG 92 before breakfast and 242 after eating lunch, pt ate lunch before BG check.  Potassium 3 this AM, recheck at 1238 3.4. Replaced per protocol and recheck ordered.       "

## 2021-09-18 NOTE — PLAN OF CARE
Speech Language Therapy Discharge Summary    Reason for therapy discharge:    Discharged to acute rehabilitation facility.    Progress towards therapy goal(s). See goals on Care Plan in [facility] electronic health record for goal details.  Goals not met.  Barriers to achieving goals:   discharge from facility.    Therapy recommendation(s):    Continued therapy is recommended.  Rationale/Recommendations:  Pt below baseline for diet level and for communication per daughter.     Recommendations at time of discharge: Recommend puree (4) diet with thin liquid (0) liquid with aspiration precautions -- NO STRAWS; small sips, one at a time; small bites; slow rate; upright posture during and following intake; alternate solids/liquids; feed only when fully awake/alert; discontinue feeding if difficulty or cough, throat clear, wet vocal quality, or increased WOB observed.

## 2021-09-18 NOTE — PROGRESS NOTES
09/18/21 1100   Quick Adds   Type of Visit Initial PT Evaluation   Living Environment   People in home alone   Current Living Arrangements house   Home Accessibility stairs to enter home;stairs within home   Number of Stairs, Main Entrance 1   Stair Railings, Main Entrance none   Number of Stairs, Within Home, Primary 4   Stair Railings, Within Home, Primary railing on right side (ascending)   Transportation Anticipated family or friend will provide;agency   Living Environment Comments Pt lives in [facility]. He is seperated from his spouse. His brother lives next door. Split-level entrance with 4 steps up/down. Chair lift installed to go to upper level. Pt does not use lower level. Tub shower upstairs. Standard toilet.   Self-Care   Usual Activity Tolerance fair   Current Activity Tolerance poor   Regular Exercise Yes   Activity/Exercise Type walking  (Walks ~ one block with walker occassionally)   Equipment Currently Used at Home walker, rolling;tub bench;grab bar, toilet;grab bar, tub/shower  (Has a quad cane, [facility], access to PWC that was originally moms)   Activity/Exercise/Self-Care Comment PT: Pt was mod-I short gait with 4WW. Mod-I with toileting and dressing, although reportedly took a very long time. Seperated spouse or sister in law would come in to help with cleaning, bathing at times, cooking. He could walk down stairs, but could not walk upstairs   Disability/Function   Hearing Difficulty or Deaf no   Wear Glasses or Blind yes   Vision Management Bifocal   Concentrating, Remembering or Making Decisions Difficulty no   Difficulty Communicating no   Difficulty Eating/Swallowing no   Walking or Climbing Stairs Difficulty yes   Mobility Management Stair lift upstairs, short gait mod-I with 4WW   Dressing/Bathing Difficulty yes   Dressing/Bathing Management Grab bars and shower bench, would get occassional assist for some cleaning   Toileting issues yes   Toileting Management Grab bar at toilet   Doing Errands  "Independently Difficulty (such as shopping) yes   Errands Management Family performed   Fall history within last six months yes   Number of times patient has fallen within last six months 2  (out of bed)   Change in Functional Status Since Onset of Current Illness/Injury yes   General Information   Onset of Illness/Injury or Date of Surgery 09/14/21   Referring Physician Dr. Campo   Patient/Family Therapy Goals Statement (PT) \"Learn everything we need to know to take care of him. What to eat.\"   Pertinent History of Current Problem (include personal factors and/or comorbidities that impact the POC) 71 year old male admitted on 9/14/2021 with new right thalamic stroke with prior left thalamic stroke and baseline R hemiparesis.   General Observations Pt very somnolent, brother present to clarify PLOF   Cognition   Cognitive Status Comments Unable to assess orientation/cognition. Unable to verbalize during session. Brother states he is oriented to self at least   Pain Assessment   Patient Currently in Pain Yes, see Vital Sign flowsheet  (Pointing at L neck, brother indicates pain)   Integumentary/Edema   Integumentary/Edema Comments Trace edema in RUE, likely baseline given given hemiparesis   Posture    Posture Forward head position;Protracted shoulders   Range of Motion (ROM)   ROM Comment Decreased RUE ROM ~ 90deg shouder flex/ABD with baseline contractures. PROM to LUE WFL, RLE WFL with DF to neutral   Strength   Strength Comments Unable to test due to command following, sporadically kicked against gravity, does not initiate L shoulder movement when prompted, but held LUE at 90deg flexion when placed   ARC Assessment Only   Acute Rehab Functional Assessment See IP Rehab Daily Documentation Flowsheet for Functional Mobility/ADL Assessment   Balance   Balance Comments At time sits EOB with SBA, sporadically falls into R or L trunk lean   Sensory Examination   Sensory Perception Comments Unable to assess due to " command following. No response   Coordination   Coordination Comments Unable to assess due to command following   Muscle Tone   Muscle Tone Comments RUE 3 on MAS, LLE 1+   Clinical Impression   Criteria for Skilled Therapeutic Intervention yes, treatment indicated   PT Diagnosis (PT) Impaired functional mobility   Influenced by the following impairments New L hemiparesis, cognition, lethargy, spacticity, coordinative deficits, baseline R hemiparesis   Functional limitations due to impairments Bed mobility, transfers, gait, stairs   Clinical Presentation Unstable/Unpredictable   Clinical Presentation Rationale B hemiparesis follow new stroke, cognition/lethargy   Clinical Decision Making (Complexity) high complexity   Therapy Frequency (PT) Daily  (x60-90 minutes)   Predicted Duration of Therapy Intervention (days/wks) 3 weeks   Planned Therapy Interventions (PT) balance training;bed mobility training;E-stim;gait training;home exercise program;neuromuscular re-education;patient/family education;postural re-education;strengthening;stair training;stretching;transfer training;progressive activity/exercise;risk factor education;home program guidelines   Anticipated Equipment Needs at Discharge (PT)   (Possible lift, commode)   Risk & Benefits of therapy have been explained evaluation/treatment results reviewed;care plan/treatment goals reviewed;risks/benefits reviewed;current/potential barriers reviewed;participants voiced agreement with care plan;participants included;patient   Clinical Impression Comments Pt presents with new L hemiparesis and baseline R hemiparesis from past stroke. At baseline ambulates short distances mod-I with 4WW and is mod-I with basic ADLs. Anticipating need for long rehab course given new L-side deficits with significant baseline R-sided deficits. Cognition/lethargy will greatly impact progress. ELOS 3 weeks, will need to consider TCU if participation does not improve.   Total Evaluation Time    Total Evaluation Time (Minutes) 25

## 2021-09-18 NOTE — PROGRESS NOTES
09/18/21 1200   General Information   Onset of Illness/Injury or Date of Surgery 09/18/21   Pertinent History of Current Problem Jimmy Otto is a 71 year old male with a PMH including but not limited to HTN, uncontrolled DM 2 (HbA1c 11.5), HLD, BPH, and history of a stroke in 2012 with residual right hemiparesis who presented to the emergency room on 9/14/2021 with altered mental status and acute worsening of right-sided weakness.  Was not a candidate for thrombolysis due to unknown time of last known normal, and initial CT and CT imaging did not show any acute abnormalities or large vessel occlusion therefore not a candidate for mechanical thrombectomy.  An MRI was then obtained which did demonstrate an acute right thalamic CVA.  Etiology felt to be due to small vessel disease and he has been initiated on aspirin and Plavix for 1 month, followed by aspirin alone indefinitely with a check of P2Y12 activity after a month of being on Plavix only.  Hospital course has been complicated by acute kidney injury with a peak creatinine of 2.13, hypokalemia needing repletion, and urinary retention and hematuria needing Felix catheter and urology consult.   Type of Evaluation   Type of Evaluation Swallow Evaluation   Oral Motor   Oral Musculature anomalies present   Dentition (Oral Motor)   Dentition (Oral Motor) natural dentition   Facial Symmetry (Oral Motor)   Facial Symmetry (Oral Motor) right side impairment  (from prior CVA )   Comment, Facial Symmetry (Oral Motor) SLP: pt has right side weakness, reportedly from prior CVA, appears more than left   Lip Function (Oral Motor)   Protrusion, Lip Range of Motion bilateral   Retraction, Lip Range of Motion bilateral   Comment, Lip Function (Oral Motor) SPL; overall weakness   Tongue Function (Oral Motor)   Protrusion, Tongue ROM Impairment (Oral Motor) bilateral  (reduced protrusion ? if also reduced comprehension/effort)   Tongue Strength (Oral Motor) strength decreased    Comment, Tongue Function (Oral Motor) SLP: difficult fully assess, but do note reduced ROM, minimal coating on tongue, noted to RN   Cough/Swallow/Gag Reflex (Oral Motor)   Volitional Throat Clear/Cough (Oral Motor)   (pt generally aphonic, weak voice with reflexive cough)   Vocal Quality/Secretion Management (Oral Motor)   Vocal Quality (Oral Motor) aphonia;breathy  (minimal voicing, weak with cough)   Secretion Management (Oral Motor)   (appears WFL)   General Swallowing Observations   Current Diet/Method of Nutritional Intake (General Swallowing Observations, NIS) thin liquids (level 0);pureed (level 4)   Respiratory Support (General Swallowing Observations) none   Swallowing Evaluation Clinical swallow evaluation   Clinical Swallow Evaluation   Feeding Assistance frequent cues/help required   Clinical Swallow Evaluation Textures Trialed thin liquids;pureed   Clinical Swallow Eval: Thin Liquid Texture Trial   Mode of Presentation, Thin Liquids cup;self-fed;fed by clinician  (needed assist to bring cup to mouth)   Oral Phase of Swallow Poor AP movement  (appears holding bolus at times )   Oral Residue left lip drooling   Pharyngeal Phase of Swallow reduction in laryngeal movement   Diagnostic Statement SLP:pt took small sips by cup, assisted to feed, no outward sx aspiration, (ie WOB, eyes watering, cough/throat clear), minmal oral leakage on left, appears slow to complete swallow at times.   Clinical Swallow Evaluation: Puree Solid Texture Trial   Mode of Presentation, Puree spoon;self-fed   Oral Phase, Puree WFL;other (see comments)  (slow initiation swallow, ?reduced A-P movement)   Pharyngeal Phase, Puree coughing/choking  (coughed one time, after swallowing hot cereal)   Diagnostic Statement SLP: pt self fed pureed, at times took large bolus, inconsistent timeliness of swallow, appears to hold/slight delay completion swallow.pt took pill whole and crushed with pureed, no outward sx difficulty did not assess  minced and moist (was not delivered per request, also pt mildly somnolent, but improved for evaluation.    Swallowing Recommendations   Diet Consistency Recommendations thin liquids (level 0);pureed (level 4)   Supervision Level for Intake 1:1 supervision needed   Mode of Delivery Recommendations bolus size, small;no straws   Monitoring/Assistance Required (Eating/Swallowing) check mouth frequently for oral residue/pocketing;stop eating activities when fatigue is present;monitor for cough or change in vocal quality with intake   Recommended Feeding/Eating Techniques (Swallow Eval) maintain upright posture during/after eating for 30 minutes   Medication Administration Recommendations, Swallowing (SLP)   (with pureed whole if needed otherwise crush)   Instrumental Assessment Recommendations   (may need VFSS, will monitor/assess)   General Therapy Interventions   Planned Therapy Interventions Dysphagia Treatment   Dysphagia treatment Oropharyngeal exercise training;Modified diet education;Instruction of safe swallow strategies;Compensatory strategies for swallowing   Intervention Comments sLP: will also evaluate communication, pt able to follow most simplecommands/aphonic   SLP Therapy Assessment/Plan   Criteria for Skilled Therapeutic Interventions Met (SLP Eval) yes;treatment indicated   SLP Diagnosis   (moderate oropharyngeal dysphagia )   Rehab Potential (SLP Eval) good, to achieve stated therapy goals   Therapy Frequency (SLP Eval) daily   Predicted Duration of Therapy Intervention (SLP Eval) estimated 2 weeks   Comment, Therapy Assessment/Plan (SLP) SLP: pt seen for clinical swallowing evaluation. Pt presents with mild/moderate oropharyngeal dysphagia, Initially pt appeared somnolent, but did participate/awaken for evaluation. Pt generally aphonic, with very limited attempts to verbalize. Pt needed assist to  bring cup to mouth, did self feed pureed with left hand.  Noting variable delay with bolus formation,  transit time/completion of swallow.  Appears pt holds bolus at times , cannot fully assess if premature pharyngeal entry. Also suspect reduced hyolaryngeal elevation.   Pt did not show any outward sx aspiration with small sips thin fluid by cup - did have coughing episode with pureed x1 (weak cough).  Pt took pills with nursing one whole, others crushed with pureed.Did not assess more advanced solids at this time, but may in subsequent sessions PRN.  Recommend continue pureed (4) and thin fluids, but monitor for sx aspiration. Pt needs 1:1 for meals/po to assist to feed/safety. Recommend small bites and sips/ no straw, wait for swallow, sit up for all po.  If sx of aspiration noted, pt may need VFSS. will plan also to evaluate communication skills.    Therapy Plan Review/Discharge Plan (SLP)   Therapy Plan Review (SLP) evaluation/treatment results reviewed;care plan/treatment goals reviewed;risks/benefits reviewed;current/potential barriers reviewed;participants voiced agreement with care plan;participants included;patient  (brother present)    Total Evaluation Time   Total Evaluation Time (Minutes) 55

## 2021-09-18 NOTE — PROGRESS NOTES
"CLINICAL NUTRITION SERVICES - ASSESSMENT NOTE     Nutrition Prescription    RECOMMENDATIONS FOR MDs/PROVIDERS TO ORDER:  None todya    Malnutrition Status:    Patient does not meet two of the established criteria necessary for diagnosing malnutrition    Recommendations already ordered by Registered Dietitian (RD):  Supplements PRN    Future/Additional Recommendations:  Consider removing CHO modifier if intakes do not improve  DM diet education as appropriate      REASON FOR ASSESSMENT  Jimmy Otto is a 71 year old male assessed by the dietitian for Provider Order - Nutrition education and optimization, CVA, poorly controlled DM  PMH significant for HTN, uncontrolled DM 2 (HbA1c 11.5), HLD, BPH, and history of a stroke in 2012 with residual right hemiparesis. An MRI was then obtained which did demonstrate an acute right thalamic CVA  NUTRITION HISTORY  Pt provided no information at this time. All information was obtained through chart review. Pt was consuming % of his melas while at [facility]. Unknown intakes prior to hospitalization. Pt follows up with an OP RD/CDE and has received DM diet education in the past    CURRENT NUTRITION ORDERS  Diet: 60g CHO per meal, pureed (level 4), thin liquids  Intake/Tolerance: Sine admit pt has consumed % of meals. Not appropriate for diet education at this time, RD to follow up with education as appropriate     LABS  Labs reviewed:  K 3 (3/18) improved to 3.4 with replacement     MEDICATIONS  Medications reviewed:  novolog  levemir  protonix  Potassium chloride  PRN: dulcolax, novolog, zofran, miralax, senokot    ANTHROPOMETRICS  Height: 162.6 cm (5' 4\")  Most Recent Weight: 66.2 kg (145 lb 15.1 oz)    IBW: 59 kg  BMI: 25.05 kg/m^2 Overweight BMI 25-29.9  Weight History: 7# (4.6%) wt loss in 2 months   Wt Readings from Last 10 Encounters:   09/17/21 66.2 kg (145 lb 15.1 oz)   09/16/21 61.5 kg (135 lb 9.3 oz)   07/02/21 69.3 kg (152 lb 12.8 oz)   04/15/21 68.5 kg (151 lb) "   01/19/21 70.8 kg (156 lb)   08/14/12 68.5 kg (151 lb)     Dosing Weight: 66.2 kg (current)    ASSESSED NUTRITION NEEDS  Estimated Energy Needs: 5228-4633 kcals/day (25 - 30 kcals/kg)  Justification: Maintenance  Estimated Protein Needs: 66-79 grams protein/day (1 - 1.2 grams of pro/kg)  Justification: Maintenance  Estimated Fluid Needs:1 mL/kcal  Justification: Maintenance or Per provider pending fluid status    PHYSICAL FINDINGS  See malnutrition section below.    MALNUTRITION  % Intake: Decreased intake does not meet criteria  % Weight Loss: Weight loss does not meet criteria  Subcutaneous Fat Loss: None observed  Muscle Loss: None observed  Fluid Accumulation/Edema: None noted  Malnutrition Diagnosis: Patient does not meet two of the established criteria necessary for diagnosing malnutrition    NUTRITION DIAGNOSIS  Inadequate oral intake related to altered consistency diet as evidenced by documented intakes since admit and at OSH      INTERVENTIONS  Implementation  Nutrition Education: Pt is not appropriate for DM diet education at this time. RD will follow up with education if pt becomes more appropriate    Supplements PRN     Goals  Patient to consume % of nutritionally adequate meal trays TID, or the equivalent with supplements/snacks.     Monitoring/Evaluation  Progress toward goals will be monitored and evaluated per protocol.    Daksha Melara MS, RD, LDN  Unit Pager [phone]

## 2021-09-18 NOTE — PLAN OF CARE
FOCUS/GOAL  Medical management    ASSESSMENT, INTERVENTIONS AND CONTINUING PLAN FOR GOAL:  Pt is alert to self. Whispers and slow to answer but follows command. A1-2 w/ cares and boosting up in bed. He likes to lay on his right side. Catheter is secured. Bladder irrigation done, ongoing hematuria. BG was 83mg/dl at 2am, apple sauce and apple juice given. Denies pain and no sob noted. Slept in between cares. Cont w/ POC,

## 2021-09-18 NOTE — PLAN OF CARE
FOCUS/GOAL  Bowel management, Bladder management, Nutrition/Feeding/Swallowing precautions, Pain management, Mobility, Cognition/Memory/Judgment/Problem solving, and Safety management    ASSESSMENT, INTERVENTIONS AND CONTINUING PLAN FOR GOAL:    Disoriented to place, Alert with intermittent confusion. Patient's speech is slow and whispery, needs anticipated as appropriate use of call light not observed. LBM per chart review is the 14th as reported by daughter. prn senna given. Felix draining pink/red urine, bladder irrigated per orders. Patient reported right arm and right flank pain. Prn tylenol given with relief. Ax2 Liko/Golvo. Pureed diet, takes pills crushed in applesauce. /256, sliding scale/carb coverage given. Continue with POC

## 2021-09-19 ENCOUNTER — APPOINTMENT (OUTPATIENT)
Dept: OCCUPATIONAL THERAPY | Facility: CLINIC | Age: 71
DRG: 057 | End: 2021-09-19
Attending: PHYSICAL MEDICINE & REHABILITATION
Payer: MEDICARE

## 2021-09-19 ENCOUNTER — APPOINTMENT (OUTPATIENT)
Dept: GENERAL RADIOLOGY | Facility: CLINIC | Age: 71
DRG: 057 | End: 2021-09-19
Attending: PHYSICAL MEDICINE & REHABILITATION
Payer: MEDICARE

## 2021-09-19 ENCOUNTER — APPOINTMENT (OUTPATIENT)
Dept: CT IMAGING | Facility: CLINIC | Age: 71
DRG: 057 | End: 2021-09-19
Attending: PHYSICAL MEDICINE & REHABILITATION
Payer: MEDICARE

## 2021-09-19 ENCOUNTER — APPOINTMENT (OUTPATIENT)
Dept: SPEECH THERAPY | Facility: CLINIC | Age: 71
DRG: 057 | End: 2021-09-19
Attending: PHYSICAL MEDICINE & REHABILITATION
Payer: MEDICARE

## 2021-09-19 LAB
ANION GAP SERPL CALCULATED.3IONS-SCNC: 3 MMOL/L (ref 3–14)
ANION GAP SERPL CALCULATED.3IONS-SCNC: 5 MMOL/L (ref 3–14)
ATRIAL RATE - MUSE: 71 BPM
BUN SERPL-MCNC: 24 MG/DL (ref 7–30)
BUN SERPL-MCNC: 26 MG/DL (ref 7–30)
CALCIUM SERPL-MCNC: 8.7 MG/DL (ref 8.5–10.1)
CALCIUM SERPL-MCNC: 8.9 MG/DL (ref 8.5–10.1)
CHLORIDE BLD-SCNC: 107 MMOL/L (ref 94–109)
CHLORIDE BLD-SCNC: 110 MMOL/L (ref 94–109)
CO2 SERPL-SCNC: 27 MMOL/L (ref 20–32)
CO2 SERPL-SCNC: 28 MMOL/L (ref 20–32)
CREAT SERPL-MCNC: 1.44 MG/DL (ref 0.66–1.25)
CREAT SERPL-MCNC: 1.52 MG/DL (ref 0.66–1.25)
CRP SERPL-MCNC: 28 MG/L (ref 0–8)
DIASTOLIC BLOOD PRESSURE - MUSE: NORMAL MMHG
ERYTHROCYTE [DISTWIDTH] IN BLOOD BY AUTOMATED COUNT: 13.2 % (ref 10–15)
ERYTHROCYTE [SEDIMENTATION RATE] IN BLOOD BY WESTERGREN METHOD: 66 MM/HR (ref 0–20)
GFR SERPL CREATININE-BSD FRML MDRD: 45 ML/MIN/1.73M2
GFR SERPL CREATININE-BSD FRML MDRD: 49 ML/MIN/1.73M2
GLUCOSE BLD-MCNC: 109 MG/DL (ref 70–99)
GLUCOSE BLD-MCNC: 244 MG/DL (ref 70–99)
GLUCOSE BLDC GLUCOMTR-MCNC: 107 MG/DL (ref 70–99)
GLUCOSE BLDC GLUCOMTR-MCNC: 173 MG/DL (ref 70–99)
GLUCOSE BLDC GLUCOMTR-MCNC: 199 MG/DL (ref 70–99)
GLUCOSE BLDC GLUCOMTR-MCNC: 249 MG/DL (ref 70–99)
GLUCOSE BLDC GLUCOMTR-MCNC: 83 MG/DL (ref 70–99)
GLUCOSE BLDC GLUCOMTR-MCNC: 88 MG/DL (ref 70–99)
HCT VFR BLD AUTO: 41.5 % (ref 40–53)
HGB BLD-MCNC: 13.1 G/DL (ref 13.3–17.7)
INTERPRETATION ECG - MUSE: NORMAL
LACTATE SERPL-SCNC: 1.2 MMOL/L (ref 0.7–2)
MCH RBC QN AUTO: 26.3 PG (ref 26.5–33)
MCHC RBC AUTO-ENTMCNC: 31.6 G/DL (ref 31.5–36.5)
MCV RBC AUTO: 83 FL (ref 78–100)
P AXIS - MUSE: 34 DEGREES
PLATELET # BLD AUTO: 234 10E3/UL (ref 150–450)
POTASSIUM BLD-SCNC: 3.6 MMOL/L (ref 3.4–5.3)
POTASSIUM BLD-SCNC: 4 MMOL/L (ref 3.4–5.3)
PR INTERVAL - MUSE: 192 MS
PROCALCITONIN SERPL-MCNC: 0.26 NG/ML
QRS DURATION - MUSE: 68 MS
QT - MUSE: 384 MS
QTC - MUSE: 417 MS
R AXIS - MUSE: -13 DEGREES
RBC # BLD AUTO: 4.99 10E6/UL (ref 4.4–5.9)
SODIUM SERPL-SCNC: 139 MMOL/L (ref 133–144)
SODIUM SERPL-SCNC: 141 MMOL/L (ref 133–144)
SYSTOLIC BLOOD PRESSURE - MUSE: NORMAL MMHG
T AXIS - MUSE: 225 DEGREES
VENTRICULAR RATE- MUSE: 71 BPM
WBC # BLD AUTO: 10.5 10E3/UL (ref 4–11)

## 2021-09-19 PROCEDURE — 83605 ASSAY OF LACTIC ACID: CPT | Performed by: STUDENT IN AN ORGANIZED HEALTH CARE EDUCATION/TRAINING PROGRAM

## 2021-09-19 PROCEDURE — 70450 CT HEAD/BRAIN W/O DYE: CPT

## 2021-09-19 PROCEDURE — 97535 SELF CARE MNGMENT TRAINING: CPT | Mod: GO

## 2021-09-19 PROCEDURE — 70450 CT HEAD/BRAIN W/O DYE: CPT | Mod: 26 | Performed by: RADIOLOGY

## 2021-09-19 PROCEDURE — 85027 COMPLETE CBC AUTOMATED: CPT | Performed by: STUDENT IN AN ORGANIZED HEALTH CARE EDUCATION/TRAINING PROGRAM

## 2021-09-19 PROCEDURE — 84145 PROCALCITONIN (PCT): CPT | Performed by: STUDENT IN AN ORGANIZED HEALTH CARE EDUCATION/TRAINING PROGRAM

## 2021-09-19 PROCEDURE — 92526 ORAL FUNCTION THERAPY: CPT | Mod: GN | Performed by: SPEECH-LANGUAGE PATHOLOGIST

## 2021-09-19 PROCEDURE — 86140 C-REACTIVE PROTEIN: CPT | Performed by: STUDENT IN AN ORGANIZED HEALTH CARE EDUCATION/TRAINING PROGRAM

## 2021-09-19 PROCEDURE — 36415 COLL VENOUS BLD VENIPUNCTURE: CPT | Performed by: STUDENT IN AN ORGANIZED HEALTH CARE EDUCATION/TRAINING PROGRAM

## 2021-09-19 PROCEDURE — 128N000003 HC R&B REHAB

## 2021-09-19 PROCEDURE — 71046 X-RAY EXAM CHEST 2 VIEWS: CPT | Mod: 26 | Performed by: RADIOLOGY

## 2021-09-19 PROCEDURE — 71046 X-RAY EXAM CHEST 2 VIEWS: CPT

## 2021-09-19 PROCEDURE — 99231 SBSQ HOSP IP/OBS SF/LOW 25: CPT | Performed by: PHYSICAL MEDICINE & REHABILITATION

## 2021-09-19 PROCEDURE — 250N000013 HC RX MED GY IP 250 OP 250 PS 637: Performed by: PHYSICAL MEDICINE & REHABILITATION

## 2021-09-19 PROCEDURE — 82374 ASSAY BLOOD CARBON DIOXIDE: CPT | Performed by: STUDENT IN AN ORGANIZED HEALTH CARE EDUCATION/TRAINING PROGRAM

## 2021-09-19 PROCEDURE — 93005 ELECTROCARDIOGRAM TRACING: CPT

## 2021-09-19 PROCEDURE — 85652 RBC SED RATE AUTOMATED: CPT | Performed by: STUDENT IN AN ORGANIZED HEALTH CARE EDUCATION/TRAINING PROGRAM

## 2021-09-19 PROCEDURE — 93010 ELECTROCARDIOGRAM REPORT: CPT | Performed by: INTERNAL MEDICINE

## 2021-09-19 RX ADMIN — TAMSULOSIN HYDROCHLORIDE 0.4 MG: 0.4 CAPSULE ORAL at 09:47

## 2021-09-19 RX ADMIN — AMLODIPINE BESYLATE 10 MG: 10 TABLET ORAL at 09:47

## 2021-09-19 RX ADMIN — BISACODYL 10 MG: 10 SUPPOSITORY RECTAL at 04:55

## 2021-09-19 RX ADMIN — ATENOLOL 25 MG: 25 TABLET ORAL at 09:46

## 2021-09-19 RX ADMIN — ACETAMINOPHEN 975 MG: 325 TABLET, FILM COATED ORAL at 13:17

## 2021-09-19 RX ADMIN — ASPIRIN 81 MG CHEWABLE TABLET 81 MG: 81 TABLET CHEWABLE at 09:46

## 2021-09-19 RX ADMIN — ATORVASTATIN CALCIUM 80 MG: 80 TABLET, FILM COATED ORAL at 09:47

## 2021-09-19 RX ADMIN — CLOPIDOGREL BISULFATE 75 MG: 75 TABLET, FILM COATED ORAL at 09:47

## 2021-09-19 RX ADMIN — ATENOLOL 25 MG: 25 TABLET ORAL at 20:18

## 2021-09-19 RX ADMIN — PANTOPRAZOLE SODIUM 40 MG: 40 TABLET, DELAYED RELEASE ORAL at 06:20

## 2021-09-19 NOTE — PROGRESS NOTES
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  Hospital course complicated by acute kidney injury, hypokalemia needing repletion, and urinary retention and hematuria needing Felix catheter and urology consult.     Admission to inpatient rehab:  9/17/21  Impairment group code: Stroke Ischemic 01.3 Bilateral Involvement, new R Thalamic stroke in setting of R sided weakness from prior stroke       Assessment:     1)  Type 2 Diabetes Mellitus; poor control.  A1c 11.5%  2)  S/p stroke; R hemiparesis  3)  ALEX/CKD     Plan:       -  Levemir gentle reduction to 26 units daily PM (2200)    -  Increase Novolog 1 unit(s) per 5 g cho breakfast, 1 per 6 lunch and snacks and 1 per 9 g cho dinner     -  Novolog high resistance sliding scale insulin TID AC/HS    -  BG monitoring TID AC, HS and 0200    -  Will consider adding additional agent -> GLP-1, DPP4 or SGLT2 less appropriate    -  Will do test claim for GLP-1 Monday     -  No resumption of Metformin at this time - will consider resumption ongoing    -  Hypoglycemia protocol    -  Carb counting protocol    -  Will follow       Outpatient diabetes follow up: TBD    Plan discussed with patient, family member - Sathya, bedside RN, and primary team via this note.           Interval History:     The last 24 hours progress and nursing notes reviewed.  No acute events this interval.    Elevated BG readings yesterday following lunch were PP reads.  Will trend today prior to making any changes in prandial coverage.      Appears from the past 2 days pattern -  "needs more prandial coverage for breakfast.   Less determir with the consistent lower trends in the AM/fasting    1:00 PM call to RN to clarify if there was any chance this too could be a PP read - no return call yet, 1:57 PM.    Spent > 30 minutes with family member who is here from [place] and when lived here reports helping a lot with Ho's cares.   Asks many questions about amb pump - current status, patient would not be an appropriate candidate for a amb pump    He does have a CGM - Freestyle sherwin - for when home.  Family very involved.       Recent Labs   Lab 09/19/21  0541 09/19/21  0306 09/19/21  0252 09/19/21  0219 09/18/21  2120 09/18/21  1745   * 107* 88 83 165* 247*           Nutrition:     Orders Placed This Encounter      Combination Diet Consistent Carb 60 Grams CHO per Meal Diet; Pureed Diet (level 4); Thin Liquids (level 0)    Supplements:         PTA Regimen:        Levemir 28 units at bedtime  \"Novolog - varied doses based on chos\"  Clinic notes reveal:  Novolog 2 unit(s) per carb choice (2per 15)  Novolog 1 unit(s) per 50> 150     Metformin 500 mg BID (dose reduced for decreased renal fxn)     BG monitoring frequency:  FreeStyle Sherwin - intermittent use     Diet: regular, no restrictions  Eats sporadically - AM - bagel or banana  Lunch - sporadic  Dinner - rice/beg/meat  Loves to drink [employer]     Exercise: sedentary             Review of Systems:   CC:  Non-verbal/resting most of visit.  Does open eyes and nod or shake head to questions.  Denies pain          Medications:   Steroid planning:  No       Physical Exam:   BP (P) 137/85 (BP Location: Left arm)   Pulse (P) 90   Temp (P) 99.6  F (37.6  C) (Oral)   Resp (P) 20   Ht 1.626 m (5' 4\")   Wt 66.2 kg (145 lb 15.1 oz)   SpO2 (P) 94%   BMI 25.05 kg/m         General:  resting in bed.  Son at bedside and rigo on phone. R sided deficts  HEENT: NC/AT, PER and anicteric, non-injected, oral mucous membranes moist.   Lungs: " non-labored, no cough,  no SOB  ABD:  soft, non-tender  Skin: warm and dry, no obvious lesions  Feet:  CMS intact  MSK:  fluid movement of all extremities  Lymp:  no LE edema   Mental status:  alert, oriented x3, unable to communicate verbally - nods and smiles  Psych:  calm, appropriate mood, approrpriate affect         Data:     Lab Results   Component Value Date    A1C 11.5 09/15/2021    A1C 7.6 08/14/2012            CBC RESULTS:   Recent Labs   Lab Test 09/18/21  0625   WBC 8.6   RBC 4.93   HGB 13.2*   HCT 41.1   MCV 83   MCH 26.8   MCHC 32.1   RDW 13.2        Recent Labs   Lab Test 09/19/21  0541 09/19/21  0306 09/18/21  2120 09/18/21  1909 09/18/21  0728 09/18/21  0625     --   --   --   --  141   POTASSIUM 3.6  --   --  3.6   < > 3.0*   CHLORIDE 110*  --   --   --   --  108   CO2 28  --   --   --   --  28   ANIONGAP 3  --   --   --   --  5   * 107*   < >  --    < > 94   BUN 24  --   --   --   --  19   CR 1.44*  --   --   --   --  1.37*   ARLETH 8.9  --   --   --   --  9.2    < > = values in this interval not displayed.     Liver Function Studies -   Recent Labs   Lab Test 09/15/21  0521   PROTTOTAL 6.6*   ALBUMIN 2.6*   BILITOTAL 0.3   ALKPHOS 80   AST 20   ALT 22     Lab Results   Component Value Date    INR 1.03 09/16/2021    INR 0.94 09/14/2021    INR 1.00 08/16/2012    INR 0.96 08/13/2012         FEMI Kohli CNP   Inpatient Diabetes Management Service  Pager - [phone]  Friday - Monday 0800 - 1600 hrs  After hours above - Diabetes Management Team job code: [id number]    I spent a total of >45 minutes bedside and on the inpatient unit managing glycemic care.  Over 50% of my time on the unit was spent counseling the patient and/or coordinating care regarding acute hyperglycemic management.  See note for details.

## 2021-09-19 NOTE — PLAN OF CARE
FOCUS/GOAL  Medical management    ASSESSMENT, INTERVENTIONS AND CONTINUING PLAN FOR GOAL:  Pt nods and follows command only, very sleepy. Bladder irrigation done. Still w/ hematuria on his ordoñez output. No sign of pain or sob noted. No BM yet but passing gas, prn supp given early in the morning when pt is more alert to answer. No result yet. BG of 83mg/dl at 2am, a cup of ice cream given, needs assist w/ feeding. Aspiration precaution observed. Needs A2 w/ reposition and boosting up in bed. Cont w/ POC.

## 2021-09-19 NOTE — PLAN OF CARE
Discharge Planner Post-Acute Rehab OT:     Discharge Plan: home with 24/7    Precautions: falls    Current Status:  ADLs: Total A for shower transfer using TIS commode chair, Total A for LB dressing tasks, Max A for UB dressing tasks, Total A for toilet tx/transfers using michell lift and commode chair, Min A for thoroughness with g/h tasks  IADLs: Family support  Vision/Cognition: wears glasses at all times, will continue to assess cognition    Assessment: Improved alertness this session for ADLs including shower. See IP OT daily documentation. Limited by LUE function, strength, activity tolerance, cognition, motor planning, coordination. Will benefit from ongoing OT services.    Other Barriers to Discharge (DME, Family Training, etc): Family training, DME

## 2021-09-19 NOTE — CONSULTS
21 1300   Living Arrangements   People in home alone   Able to Return to Prior Arrangements other (see comments)   Living Arrangement Comments Pending progress, good family support    CM/SW Discharge Planning   Patient/Family Anticipates Transition to home with family;home with help/services  (TCU)   Concerns to be Addressed no discharge needs identified   Patient/family educated on Medicare website which has current facility and service quality ratings yes   Transportation Anticipated family or friend will provide   Anticipated Discharge Disposition (CM/SW) Home;Transitional Care   Patient/Family in Agreement with Plan yes       Social Work: Initial Assessment with Discharge Plan    Patient Name: Jimmy Otto  : 1950  Age: 71 year old  MRN: [id number]  Completed assessment with: Chart review and interview with patient brother at bedside   Admitted to ARU: 2021    Presenting Information   Date of SW assessment: 2021  Health Care Directive: Health Care Directive Agent (if patient not able to make decisions)  Primary Health Care Agent: Patient/self until further assessed   Secondary Health Care Agent: JAI wife () and adult children   Living Situation: Lives in [place], [place]. Pt lives ALONE but wife will come over often to help, in additional to pt brother and MELA. Pt is  but  from wife. 1 IGNACIO and Split-level entrance with 4 steps up/down. Pt has a stair lift to go up but basement does not. Brother and MELA lives next door. Tub/shower. Standard toilet.   Previous Functional Status: Hx of stroke in . Pt MELA assisted with bathing PTA. Pt manages his own medications and finances. Family A with transportation.   Per pt H&P-Pt was modified independent for ambulation of short distances with a cane in the left hand and activities of daily living.  Needed assistance for IADLs.  If out in the community for grocery shopping, would use a shopping cart for support.   Per PT  eval-Pt was mod-I short gait with 4WW. Mod-I with toileting and dressing, although reportedly took a very long time. Seperated spouse or sister in law would come in to help with cleaning, bathing at times, cooking. He could walk down stairs, but could not walk upstairs. 2 falls in the last 6 months.   DME available: walker, rolling;tub bench;grab bar, toilet;grab bar, tub/shower, hand held shower   (Has a quad cane, MW, access to PWC that was originally moms).  Patient and family understanding of hospitalization: Difficult to assess. Per RN note--Pt nods and follows command only, very sleepy.   Cultural/Language/Spiritual Considerations: 70 y/o  male, English-speaking, Spiritism-toshia.     Physical Health  Reason for admission: Stroke     Jimmy Otto is a 71 year old male with a PMH including but not limited to HTN, uncontrolled DM 2 (HbA1c 11.5), HLD, BPH, and history of a stroke in 2012 with residual right hemiparesis who presented to the emergency room on 9/14/2021 with altered mental status and acute worsening of right-sided weakness.  Was not a candidate for thrombolysis due to unknown time of last known normal, and initial CT and CT imaging did not show any acute abnormalities or large vessel occlusion therefore not a candidate for mechanical thrombectomy.  An MRI was then obtained which did demonstrate an acute right thalamic CVA.  Etiology felt to be due to small vessel disease and he has been initiated on aspirin and Plavix for 1 month, followed by aspirin alone indefinitely with a check of P2Y12 activity after a month of being on Plavix only.  Hospital course has been complicated by acute kidney injury with a peak creatinine of 2.13, hypokalemia needing repletion, and urinary retention and hematuria needing Felix catheter and urology consult.    Provider Information   Primary Care Physician: Manny Lackey   [facility] [address] [place] [place] [address]  PH: [phone]  :  None reported     Mental Health/Chemical Dependency:   Diagnosis: Melatonin PRN for sleep. Pt brother denied depression/anxiety.   Alcohol/Tobacco/Narcotis: None reported, pt brother denied   Support/Services in Place: None reported   Services Needed/Recommended: Supportive services available by consult (Health Psychology and Preeti services)   Sexuality/Intimacy: Not discussed     Support System  Marital Status:  but . Wife assists with cleaning the home and errands. Per H&P, wife can provide level of supervision but physical A unclear at this time.   Family support: Brother (Miquel Otto) and MELA are supportive and live next door. Adult son Sathya (lives in [place]) and adult dtr Clemente and another dtr in [place]. Both dtrs live next door to each other. Wife primary lives with dtr.   Other support available: Not discussed     Community Resources  Current in home services: None reported   Previous services: Rehab after first stroke for about a month at [facility].     Financial/Employment/Education  Employment Status: Retired, worked a a technician   Income Source: SSI  Education: Degree  Financial Concerns:  None reported, pt brother denied   Insurance: Medicare and [employer]     *Pt brother inquired about MA and PCA Services. Pt collects close to $3,000/month in SSI. Will explore resources/options for brother/family to look intp.       Discharge Plan   Patient and family discharge goal: Home vs TCU pending progress.   Provided Education on discharge plan: Yes  Patient agreeable to discharge plan:  Yes  Provided education and attained signature for Medicare IM and IRF Patient Rights and Privacy Information provided to patient : YES  Provided patient with [facility] Resources: YES, information added to AVS and flyer given to pt/family.   Barriers to discharge: Pending progress    Discharge Recommendations   Disposition: See above   Transportation Needs: Family  assistance   Name of Transportation Company and Phone: N/A     Additional comments   Discharge LISA ARZOLA 14 days. Discharge plans pending progress.     SW will remain available and continue to follow as needs arise.     Please invite to Care Conference:  CRISTINA Linder, [facility]   Phone: [phone]  I   Pager: [phone]

## 2021-09-19 NOTE — PLAN OF CARE
"Discharge Planner Post-Acute Rehab SLP:      Discharge Plan:home with assist, likely further SLP     Precautions:fall, swallowing     Current Status:  Communication: to be assessed, pt limited voicing/aphonic, follows simple directions, some mild aphasia/dysarthria baseline per son, worsened now  Cognition: to be assessed  Swallow: IDDSI 4,- pureed  mildly thick fluids (2),  1:1 for po/meals, assist to feed PRN, slow pace, small bites, no straw     Assessment: slP: pt seen for meal, son present, some difficulty awakening intially. pt fed self pureed, assisted in bringing cup to mouth small sips liquids, son did report occasional cough on po since Thursday (pureed, liquids), unsure feeding method. will continue to monitor for aspiration, 1:1 with po, small bites/sips, slow pace Recommend continue pureed (4) downgraded to mildly thick (2) fluids, but monitor for sx aspiration. Pt needs 1:1 for meals/po to assist to feed/safety. Recommend small bites and sips/ no straw, wait for swallow, sit up for all po.Ok pills with pureed crush, unless cannot per guidelines. will plan also to evaluate communication skills.      addendum: approximately 1:10pm notified by CNA/nursing regarding pt coughing on po.  Went in to discuss with nursing/brother - stated \"he was having hard time swallowing pureed - gave him some water and started coughing.\"  Brother was assisting to feed.  Discussed with team, downgraded to nectar fluids, also requested order VFSS. CNA reported pt with elevated temperature. Will further discuss recommendations pending radiology reports. -20 minutes pt somnolent, returned from xray - did not appear appropriate for tx - reviewed above with brother.  Other Barriers to Discharge (Family Training, etc): TBD  "

## 2021-09-19 NOTE — PLAN OF CARE
"  VS: BP (P) 137/85 (BP Location: Left arm)   Pulse (P) 90   Temp (P) 99.6  F (37.6  C) (Oral)   Resp (P) 20   Ht 1.626 m (5' 4\")   Wt 66.2 kg (145 lb 15.1 oz)   SpO2 (P) 94%   BMI 25.05 kg/m    Patient A&O to self. LS clear,  BS active   O2: 94% at RA   Output: Felix patent with 450 ml, cloudy urine   Last BM: 9/19  x 2, after a suppository   Activity: Up with a liko lift   Skin: Intact   Pain: Denies pain   CMS: Per baseline   Dressing: None   Diet: Pureed   LDA: None   Equipment: Liko lift   Plan: Continue to monitor progress   Additional Info: Pt  has been sleepy, or if awake will stay in bed eyes closed up responding to commands .  Pt started coughing during his lunch while was fed. PT was assessed, LS clear on auscultation,VS Febrile Temp 100 per oral, Resp 20, HR 99, /84, SATs 94% at RA. MD aware, assessed Pt,  ordered labs &UA, Xray and CT. BG's 109 and 249. VS rechecked are as above. Will continue to monitor progress.       "

## 2021-09-19 NOTE — PLAN OF CARE
FOCUS/GOAL  Bowel management, Bladder management, Pain management, Medical management, Mobility, and Cognition/Memory/Judgment/Problem solving    ASSESSMENT, INTERVENTIONS AND CONTINUING PLAN FOR GOAL:    Alert to self, orientation difficult to assess d/t somnolence. Felix draining brown/christine urine, bladder irrigated. LBM 9/14 per report, miralax given this afternoon. Reported chest pain at change of shift ~ 1500. Prn tylenol given. VSS, denied numbness/tingling. Resident made aware, ekg, cxr and troponin returned unremarkable. When patient arrived back from x-ray patient denied any pain. Son at bedside and assisted with meal. /165. K+ 3.6. continue with POC

## 2021-09-19 NOTE — PROGRESS NOTES
"  [facility]   Acute Rehabilitation Unit  Daily progress note    INTERVAL HISTORY  Nursing notes reviewed, no acute events overnight. Continues to have ongoing hematuria noted in ordoñez output. No BM yet today, but passing gas.     Jimmy Otto was seen and examined at bedside. Patient did have an episode yesterday when experienced acute left sided chest/arm pain/tightness and difficulty breathing. Resident physician alerted to evaluate him in the room, and ordered troponin, EKG and chest xray, all of which looked wnl. All VSS with O2 sats 97%. Symptoms improved within 20 minutes and have not returned since then.        MEDICATIONS  Scheduled meds    amLODIPine  10 mg Oral Daily     aspirin  81 mg Oral Daily     atenolol  25 mg Oral BID     atorvastatin  80 mg Oral Daily     clopidogrel  75 mg Oral Daily     insulin aspart   Subcutaneous QAM AC     insulin aspart   Subcutaneous Daily with lunch     insulin aspart   Subcutaneous Daily with supper     insulin aspart  1-10 Units Subcutaneous TID AC     insulin aspart  1-7 Units Subcutaneous At Bedtime     insulin detemir  26 Units Subcutaneous At Bedtime     pantoprazole  40 mg Oral QAM AC     tamsulosin  0.4 mg Oral Daily       PRN meds:  acetaminophen, bisacodyl, glucose **OR** dextrose **OR** glucagon, hydrALAZINE, insulin aspart, melatonin, ondansetron, polyethylene glycol, senna-docusate      PHYSICAL EXAM  BP (!) 166/89   Pulse 87   Temp 98.3  F (36.8  C) (Oral)   Resp 18   Ht 1.626 m (5' 4\")   Wt 66.2 kg (145 lb 15.1 oz)   SpO2 99%   BMI 25.05 kg/m    Gen: Calm, non verbal, able to answer questions w/ head nodding  Cardio: RRR, well perfused  Pulm: Breathing unlabored on room air  Abd: Soft, non tender, non-distended  Ext: Trace edema in bilateral lower extremities  Neuro/MSK: Able to slightly lift extremities antigravity. Increased tone in RUE 2/2 prior stroke    LABS  Results for orders placed or performed during the " hospital encounter of 09/17/21 (from the past 24 hour(s))   Potassium   Result Value Ref Range    Potassium 3.4 3.4 - 5.3 mmol/L   Glucose by meter   Result Value Ref Range    GLUCOSE BY METER POCT 242 (H) 70 - 99 mg/dL   Glucose by meter   Result Value Ref Range    GLUCOSE BY METER POCT 280 (H) 70 - 99 mg/dL   Troponin I   Result Value Ref Range    Troponin I <0.015 0.000 - 0.045 ug/L   XR Chest 2 Views    Narrative    EXAMINATION: XR CHEST 2 VW, 9/18/2021 5:05 PM    COMPARISON: 9/14/2021    HISTORY: SOB    FINDINGS: Heart size is normal. No pneumothorax or pleural effusion.  No focal airspace opacities.      Impression    IMPRESSION: No acute cardiopulmonary disease.     SOFI ARNETT MD         SYSTEM ID:  [id number]   Glucose by meter   Result Value Ref Range    GLUCOSE BY METER POCT 247 (H) 70 - 99 mg/dL   Potassium   Result Value Ref Range    Potassium 3.6 3.4 - 5.3 mmol/L   Glucose by meter   Result Value Ref Range    GLUCOSE BY METER POCT 165 (H) 70 - 99 mg/dL   Glucose by meter   Result Value Ref Range    GLUCOSE BY METER POCT 83 70 - 99 mg/dL   Glucose by meter   Result Value Ref Range    GLUCOSE BY METER POCT 88 70 - 99 mg/dL   Glucose by meter   Result Value Ref Range    GLUCOSE BY METER POCT 107 (H) 70 - 99 mg/dL   Basic metabolic panel   Result Value Ref Range    Sodium 141 133 - 144 mmol/L    Potassium 3.6 3.4 - 5.3 mmol/L    Chloride 110 (H) 94 - 109 mmol/L    Carbon Dioxide (CO2) 28 20 - 32 mmol/L    Anion Gap 3 3 - 14 mmol/L    Urea Nitrogen 24 7 - 30 mg/dL    Creatinine 1.44 (H) 0.66 - 1.25 mg/dL    Calcium 8.9 8.5 - 10.1 mg/dL    Glucose 109 (H) 70 - 99 mg/dL    GFR Estimate 49 (L) >60 mL/min/1.73m2   EKG 12-lead, complete   Result Value Ref Range    Systolic Blood Pressure  mmHg    Diastolic Blood Pressure  mmHg    Ventricular Rate 71 BPM    Atrial Rate 71 BPM    VT Interval 192 ms    QRS Duration 68 ms     ms    QTc 417 ms    P Axis 34 degrees    R AXIS -13 degrees    T Axis 225  degrees    Interpretation ECG       Sinus rhythm  T wave abnormality, consider inferolateral ischemia  Abnormal ECG         ASSESSMENT AND PLAN  Jimmy Otto is a 71 year old male with a PMH including but not limited to HTN, uncontrolled DM 2 (HbA1c 11.5), HLD, BPH, and history of a stroke in 2012 with residual right hemiparesis who presented to the emergency room on 9/14/2021 with altered mental status and acute worsening of right-sided weakness, and found to have an acute right thalamic stroke due to small vessel occlusion.  Hospital course complicated by acute kidney injury, hypokalemia needing repletion, and urinary retention and hematuria needing Felix catheter and urology consult.     Admission to inpatient rehab:  9/17/21  Impairment group code: Stroke Ischemic 01.3 Bilateral Involvement, new R Thalamic stroke in setting of R sided weakness from prior stroke      - Vitals stable.   - K wnl today, will continue to monitor.  - CXR, EKG, and troponin wnl on 9/18.   - Continue ongoing medical management.  - Continue therapies and plan of care.  - Refer to last progress note from primary team for full problems list.    Claire Sandy MD     I spent a total of 15 minutes face-to-face and managing the care of Jimmy Otto. Over 50% of my time was spent counseling the patient and coordinating care. Please see note for details.

## 2021-09-20 ENCOUNTER — APPOINTMENT (OUTPATIENT)
Dept: PHYSICAL THERAPY | Facility: CLINIC | Age: 71
DRG: 057 | End: 2021-09-20
Attending: PHYSICAL MEDICINE & REHABILITATION
Payer: MEDICARE

## 2021-09-20 ENCOUNTER — APPOINTMENT (OUTPATIENT)
Dept: OCCUPATIONAL THERAPY | Facility: CLINIC | Age: 71
DRG: 057 | End: 2021-09-20
Attending: PHYSICAL MEDICINE & REHABILITATION
Payer: MEDICARE

## 2021-09-20 ENCOUNTER — APPOINTMENT (OUTPATIENT)
Dept: SPEECH THERAPY | Facility: CLINIC | Age: 71
DRG: 057 | End: 2021-09-20
Attending: PHYSICAL MEDICINE & REHABILITATION
Payer: MEDICARE

## 2021-09-20 LAB
ANION GAP SERPL CALCULATED.3IONS-SCNC: 6 MMOL/L (ref 3–14)
BASE EXCESS BLDV CALC-SCNC: 4.7 MMOL/L (ref -7.7–1.9)
BUN SERPL-MCNC: 30 MG/DL (ref 7–30)
CALCIUM SERPL-MCNC: 8.6 MG/DL (ref 8.5–10.1)
CHLORIDE BLD-SCNC: 111 MMOL/L (ref 94–109)
CO2 SERPL-SCNC: 28 MMOL/L (ref 20–32)
CREAT SERPL-MCNC: 1.63 MG/DL (ref 0.66–1.25)
ERYTHROCYTE [DISTWIDTH] IN BLOOD BY AUTOMATED COUNT: 13.4 % (ref 10–15)
GFR SERPL CREATININE-BSD FRML MDRD: 42 ML/MIN/1.73M2
GLUCOSE BLD-MCNC: 112 MG/DL (ref 70–99)
GLUCOSE BLDC GLUCOMTR-MCNC: 112 MG/DL (ref 70–99)
GLUCOSE BLDC GLUCOMTR-MCNC: 182 MG/DL (ref 70–99)
GLUCOSE BLDC GLUCOMTR-MCNC: 195 MG/DL (ref 70–99)
GLUCOSE BLDC GLUCOMTR-MCNC: 212 MG/DL (ref 70–99)
GLUCOSE BLDC GLUCOMTR-MCNC: 221 MG/DL (ref 70–99)
GLUCOSE BLDC GLUCOMTR-MCNC: 228 MG/DL (ref 70–99)
GLUCOSE BLDC GLUCOMTR-MCNC: 241 MG/DL (ref 70–99)
GLUCOSE BLDC GLUCOMTR-MCNC: 52 MG/DL (ref 70–99)
GLUCOSE BLDC GLUCOMTR-MCNC: 55 MG/DL (ref 70–99)
GLUCOSE BLDC GLUCOMTR-MCNC: 60 MG/DL (ref 70–99)
GLUCOSE BLDC GLUCOMTR-MCNC: 60 MG/DL (ref 70–99)
GLUCOSE BLDC GLUCOMTR-MCNC: 61 MG/DL (ref 70–99)
GLUCOSE BLDC GLUCOMTR-MCNC: 68 MG/DL (ref 70–99)
HCO3 BLDV-SCNC: 30 MMOL/L (ref 21–28)
HCT VFR BLD AUTO: 40.1 % (ref 40–53)
HGB BLD-MCNC: 12.6 G/DL (ref 13.3–17.7)
MCH RBC QN AUTO: 26.5 PG (ref 26.5–33)
MCHC RBC AUTO-ENTMCNC: 31.4 G/DL (ref 31.5–36.5)
MCV RBC AUTO: 84 FL (ref 78–100)
O2/TOTAL GAS SETTING VFR VENT: 96 %
PATH REPORT.COMMENTS IMP SPEC: NORMAL
PATH REPORT.FINAL DX SPEC: NORMAL
PATH REPORT.GROSS SPEC: NORMAL
PATH REPORT.RELEVANT HX SPEC: NORMAL
PCO2 BLDV: 46 MM HG (ref 40–50)
PH BLDV: 7.42 [PH] (ref 7.32–7.43)
PLATELET # BLD AUTO: 227 10E3/UL (ref 150–450)
PO2 BLDV: 47 MM HG (ref 25–47)
POTASSIUM BLD-SCNC: 3.3 MMOL/L (ref 3.4–5.3)
RBC # BLD AUTO: 4.75 10E6/UL (ref 4.4–5.9)
SODIUM SERPL-SCNC: 145 MMOL/L (ref 133–144)
WBC # BLD AUTO: 8.8 10E3/UL (ref 4–11)

## 2021-09-20 PROCEDURE — 250N000013 HC RX MED GY IP 250 OP 250 PS 637: Performed by: PHYSICIAN ASSISTANT

## 2021-09-20 PROCEDURE — 97530 THERAPEUTIC ACTIVITIES: CPT | Mod: GP

## 2021-09-20 PROCEDURE — 250N000013 HC RX MED GY IP 250 OP 250 PS 637: Performed by: PHYSICAL MEDICINE & REHABILITATION

## 2021-09-20 PROCEDURE — 82803 BLOOD GASES ANY COMBINATION: CPT | Performed by: STUDENT IN AN ORGANIZED HEALTH CARE EDUCATION/TRAINING PROGRAM

## 2021-09-20 PROCEDURE — 36415 COLL VENOUS BLD VENIPUNCTURE: CPT | Performed by: STUDENT IN AN ORGANIZED HEALTH CARE EDUCATION/TRAINING PROGRAM

## 2021-09-20 PROCEDURE — 99233 SBSQ HOSP IP/OBS HIGH 50: CPT | Mod: AI | Performed by: PHYSICAL MEDICINE & REHABILITATION

## 2021-09-20 PROCEDURE — 88112 CYTOPATH CELL ENHANCE TECH: CPT | Mod: 26 | Performed by: PATHOLOGY

## 2021-09-20 PROCEDURE — 97530 THERAPEUTIC ACTIVITIES: CPT | Mod: GO | Performed by: OCCUPATIONAL THERAPIST

## 2021-09-20 PROCEDURE — 128N000003 HC R&B REHAB

## 2021-09-20 PROCEDURE — 250N000011 HC RX IP 250 OP 636: Performed by: STUDENT IN AN ORGANIZED HEALTH CARE EDUCATION/TRAINING PROGRAM

## 2021-09-20 PROCEDURE — 99233 SBSQ HOSP IP/OBS HIGH 50: CPT | Performed by: NURSE PRACTITIONER

## 2021-09-20 PROCEDURE — 92526 ORAL FUNCTION THERAPY: CPT | Mod: GN | Performed by: SPEECH-LANGUAGE PATHOLOGIST

## 2021-09-20 PROCEDURE — 258N000003 HC RX IP 258 OP 636: Performed by: STUDENT IN AN ORGANIZED HEALTH CARE EDUCATION/TRAINING PROGRAM

## 2021-09-20 PROCEDURE — 97110 THERAPEUTIC EXERCISES: CPT | Mod: GO | Performed by: OCCUPATIONAL THERAPIST

## 2021-09-20 PROCEDURE — 80048 BASIC METABOLIC PNL TOTAL CA: CPT | Performed by: STUDENT IN AN ORGANIZED HEALTH CARE EDUCATION/TRAINING PROGRAM

## 2021-09-20 PROCEDURE — 85027 COMPLETE CBC AUTOMATED: CPT | Performed by: PHYSICAL MEDICINE & REHABILITATION

## 2021-09-20 PROCEDURE — 36415 COLL VENOUS BLD VENIPUNCTURE: CPT | Performed by: PHYSICAL MEDICINE & REHABILITATION

## 2021-09-20 RX ORDER — POTASSIUM CHLORIDE 750 MG/1
20 TABLET, EXTENDED RELEASE ORAL ONCE
Status: COMPLETED | OUTPATIENT
Start: 2021-09-20 | End: 2021-09-20

## 2021-09-20 RX ORDER — SODIUM CHLORIDE 9 MG/ML
INJECTION, SOLUTION INTRAVENOUS CONTINUOUS
Status: DISPENSED | OUTPATIENT
Start: 2021-09-20 | End: 2021-09-20

## 2021-09-20 RX ORDER — SODIUM CHLORIDE 9 MG/ML
INJECTION, SOLUTION INTRAVENOUS CONTINUOUS
Status: DISCONTINUED | OUTPATIENT
Start: 2021-09-20 | End: 2021-09-20 | Stop reason: CLARIF

## 2021-09-20 RX ORDER — AMANTADINE HYDROCHLORIDE 100 MG/1
100 CAPSULE, GELATIN COATED ORAL DAILY
Status: DISCONTINUED | OUTPATIENT
Start: 2021-09-20 | End: 2021-09-24

## 2021-09-20 RX ADMIN — ATENOLOL 25 MG: 25 TABLET ORAL at 08:04

## 2021-09-20 RX ADMIN — ASPIRIN 81 MG CHEWABLE TABLET 81 MG: 81 TABLET CHEWABLE at 08:04

## 2021-09-20 RX ADMIN — AMANTADINE HYDROCHLORIDE 100 MG: 100 CAPSULE ORAL at 12:27

## 2021-09-20 RX ADMIN — ATORVASTATIN CALCIUM 80 MG: 80 TABLET, FILM COATED ORAL at 08:04

## 2021-09-20 RX ADMIN — PANTOPRAZOLE SODIUM 40 MG: 40 TABLET, DELAYED RELEASE ORAL at 08:05

## 2021-09-20 RX ADMIN — DEXTROSE 15 G: 15 GEL ORAL at 02:30

## 2021-09-20 RX ADMIN — ATENOLOL 25 MG: 25 TABLET ORAL at 20:36

## 2021-09-20 RX ADMIN — GLUCAGON HYDROCHLORIDE 1 MG: 1 INJECTION, POWDER, FOR SOLUTION INTRAMUSCULAR; INTRAVENOUS; SUBCUTANEOUS at 03:55

## 2021-09-20 RX ADMIN — AMLODIPINE BESYLATE 10 MG: 10 TABLET ORAL at 08:04

## 2021-09-20 RX ADMIN — SODIUM CHLORIDE: 9 INJECTION, SOLUTION INTRAVENOUS at 05:44

## 2021-09-20 RX ADMIN — TAMSULOSIN HYDROCHLORIDE 0.4 MG: 0.4 CAPSULE ORAL at 08:04

## 2021-09-20 RX ADMIN — CLOPIDOGREL BISULFATE 75 MG: 75 TABLET, FILM COATED ORAL at 08:05

## 2021-09-20 RX ADMIN — POTASSIUM CHLORIDE 20 MEQ: 750 TABLET, EXTENDED RELEASE ORAL at 08:11

## 2021-09-20 RX ADMIN — DEXTROSE 15 G: 15 GEL ORAL at 02:51

## 2021-09-20 NOTE — PLAN OF CARE
Denies pain this morning, following commands in the morning , though speech was very soft and slow. I fed the patient and he ate 100%, of his food. Towards mid day, patient became sleepy whilst sitting in his chair, open his eyes with gentle shoulder rub. SLP did not proceed with feeding him his lunch due to his sleepiness, and possible risk for aspiration, speech did mention that patient had an episode of coughing with lunch despite his thick liquid and sitting up straight in his w/c. Patient took a nap in his chair for about an hour, he became more awake and ate about 50% of his lunch. Bg's today were 195 and 182, sliding scale insulin and carb coverage administered. Felix is patent and draining clear urine, Piv is intact on his left arm with saline infusing 100 ml/hr. Had 2 incontinent large formed BM contained in his brief, assisted with patric car. Daughter at bedside  most of the shift, will continue POC

## 2021-09-20 NOTE — PROGRESS NOTES
[facility]   Acute Rehabilitation Unit  Daily progress note    INTERVAL HISTORY  Weekend notes reviewed.  Overnight had hypoglycemia needing IV glucagon.  From an alertness standpoint he did seem more alert on Saturday however was again somnolent on Sunday.  A repeat CT head was obtained which was negative for acute pathology.  This morning he is seen with daughter present.  He is sitting in a WC, able to open eyes and follow very simple one step commands, but continues to be non-verbal and does not answer questioning.  Was able to participate in some PT and OT, but due to lethargy in the afternoon a planned VFSS was cancelled.  Functionally, when participating, scored 1/36 on the PASS, Max A for squat pivot transfers or lift with nursing, gait 10 ft with HHA.      MEDICATIONS  Scheduled:    amantadine  100 mg Oral Daily     amLODIPine  10 mg Oral Daily     aspirin  81 mg Oral Daily     atenolol  25 mg Oral BID     atorvastatin  80 mg Oral Daily     clopidogrel  75 mg Oral Daily     insulin aspart  1-5 Units Subcutaneous At Bedtime     insulin aspart   Subcutaneous QAM AC     insulin aspart   Subcutaneous Daily with lunch     insulin aspart   Subcutaneous Daily with supper     insulin aspart  1-10 Units Subcutaneous TID AC     insulin detemir  20 Units Subcutaneous At Bedtime     pantoprazole  40 mg Oral QAM AC     sodium chloride (PF)  3 mL Intracatheter Q8H     tamsulosin  0.4 mg Oral Daily        PRN:  acetaminophen, bisacodyl, glucose **OR** dextrose **OR** glucagon, hydrALAZINE, insulin aspart, insulin aspart, melatonin, ondansetron, polyethylene glycol, senna-docusate       PHYSICAL EXAM  Patient Vitals for the past 24 hrs:   BP Temp Temp src Pulse Resp SpO2   09/20/21 1514 118/70 97  F (36.1  C) Axillary 70 16 98 %   09/20/21 0725 133/61 (!) 96.2  F (35.7  C) Oral 62 20 98 %   09/20/21 0516 121/65 -- -- 72 21 99 %   09/20/21 0427 113/55 -- -- 68 20 97 %   09/20/21 0329  108/50 -- -- 68 24 95 %   09/20/21 0327 99/60 -- -- -- -- --   09/20/21 0321 117/62 -- -- 71 22 97 %   09/19/21 2015 113/60 98.4  F (36.9  C) Oral 73 18 96 %       GEN: NAD, lethargic, but able to open eyes upon verbal request  HEENT: NC/AT  CVS: RRR, S1+S2, no m/r/g  PULM: CTA b/l, no w/r/r  ABD: Soft, NT, ND, bowel sounds present  : Felix in place, no hematuria noted today  EXT: No LE edema or calf tenderness b/l  Neuro: Somnolent, non-verbal, able to follow basic 1 step commands    LABS  CBC RESULTS:   Recent Labs   Lab Test 09/20/21  0824   WBC 8.8   RBC 4.75   HGB 12.6*   HCT 40.1   MCV 84   MCH 26.5   MCHC 31.4*   RDW 13.4          Last Comprehensive Metabolic Panel:  Sodium   Date Value Ref Range Status   09/20/2021 145 (H) 133 - 144 mmol/L Final   08/16/2012 140 133 - 144 mmol/L Final     Potassium   Date Value Ref Range Status   09/20/2021 3.3 (L) 3.4 - 5.3 mmol/L Final   08/16/2012 4.0 3.4 - 5.3 mmol/L Final     Chloride   Date Value Ref Range Status   09/20/2021 111 (H) 94 - 109 mmol/L Final   08/16/2012 101 94 - 109 mmol/L Final     Carbon Dioxide   Date Value Ref Range Status   08/16/2012 26 20 - 32 mmol/L Final     Carbon Dioxide (CO2)   Date Value Ref Range Status   09/20/2021 28 20 - 32 mmol/L Final     Anion Gap   Date Value Ref Range Status   09/20/2021 6 3 - 14 mmol/L Final   08/16/2012 13 6 - 17 mmol/L Final     Glucose   Date Value Ref Range Status   09/20/2021 112 (H) 70 - 99 mg/dL Final   08/16/2012 161 (H) 60 - 99 mg/dL Final     GLUCOSE BY METER POCT   Date Value Ref Range Status   09/20/2021 182 (H) 70 - 99 mg/dL Final     Urea Nitrogen   Date Value Ref Range Status   09/20/2021 30 7 - 30 mg/dL Final   08/16/2012 18 7 - 30 mg/dL Final     Creatinine   Date Value Ref Range Status   09/20/2021 1.63 (H) 0.66 - 1.25 mg/dL Final   08/16/2012 0.96 0.66 - 1.25 mg/dL Final     GFR Estimate   Date Value Ref Range Status   09/20/2021 42 (L) >60 mL/min/1.73m2 Final     Comment:     As of July  11, 2021, eGFR is calculated by the CKD-EPI creatinine equation, without race adjustment. eGFR can be influenced by muscle mass, exercise, and diet. The reported eGFR is an estimation only and is only applicable if the renal function is stable.   08/16/2012 79 >60 mL/min/1.7m2 Final     Calcium   Date Value Ref Range Status   09/20/2021 8.6 8.5 - 10.1 mg/dL Final   08/16/2012 8.9 8.5 - 10.4 mg/dL Final       Recent Labs   Lab 09/20/21  1117 09/20/21  0738 09/20/21  0639 09/20/21  0515 09/20/21  0415 09/20/21  0409   * 195* 212* 221* 112* 112*         IMPRESSION  Jimmy Otto is a 71 year old male with a PMH including but not limited to HTN, uncontrolled DM 2 (HbA1c 11.5), HLD, BPH, and history of a stroke in 2012 with residual right hemiparesis who presented to the emergency room on 9/14/2021 with altered mental status and acute worsening of right-sided weakness, and found to have an acute right thalamic stroke due to small vessel occlusion.  Hospital course complicated by acute kidney injury, hypokalemia needing repletion, and urinary retention and hematuria needing Felix catheter and urology consult.         Impairment group code: Stroke Ischemic 01.3 Bilateral Involvement, new R Thalamic stroke in setting of R sided weakness from prior stroke      PLAN  Rehabilitation  -Continue with inpatient rehabilitation program including PT, OT, and SLP for 3 hrs/day, rehab nursing, social work, nutrition, and close monitoring by physiatry for an estimated total of 3 weeks.  -The patient has impairments in strength, balance, endurance, and coordination, which are leading to activity limitations in ambulation, mobility, ADLs, and swallowing.      Medical  1)Neurology  #R thalamic CVA due to small vessel occlusion  -DAPT with ASA 81 mg and Plavix 75 mg for 1 month, followed by Plavix alone indefinitely.  Recommended check P2Y12 level to ensure Plavix efficacy after 1 month of being on Plavix only.  -Hypertension, lipid, and  glucose management as below.  Will need ongoing education regarding stroke risk factor modifications including importance of medication compliance and diet and lifestyle changes  -Follow-up with [facility] general neurology in 6 to 8 weeks  -Monitor somnolence/lethargy.  Decreased level of alertness not unexpected with essentially bilateral thalamic pathology (new right thalamic stroke and a history of left thalamic stroke).    -Start Amantadine 100 mg daily for neurostimulation given ongoing somnolence which is limiting participation  -High risk for delirium, ensure delirium precautions and appropriate sleep-wake cycle        2)CVS  #HTN  -PTA on amlodipine 10 mg daily, atenolol 50 mg twice daily, Cozaar 100 mg daily, spironolactone 25 mg daily, and hydrochlorothiazide 25 mg daily.  Cozaar, spironolactone, and hydrochlorothiazide are being held due to acute kidney injury  -continue amlodipine 10 mg daily, and atenolol at a lower dose of 25 mg twice daily for now.  Titrate doses as needed.  -Hydralazine as needed for SBP greater than 180 mmHg     #HLD  -Continue Lipitor 80 mg daily.  LDL 92 on 9/15     3)Pulmonary  -No acute issues but low lung volumes and atelectasis/infiltrate seen at the left lung base on chest x-ray 9/14.  Clinically no signs of pneumonia but will continue to monitor.  -Encourage incentive spirometry           4)FENGI  #Diet: Level 4 solids and level 0 thin liquids.  Upgrade as able per speech therapy  -Aspiration precautions  -Consult nutrition for education and optimization given stroke, modified diet, and uncontrolled diabetes  -VFSS was planned for today, however cancelled due to lethargy     #Bowels: As needed Senokot-S, MiraLAX, and Dulcolax suppository     #Hypokalemia  -Ongoing and in the setting of poor po intake.  K 3.3 this morning, supplement with 20 mEq and re-check in the morning     #GI prophylaxis  -Protonix 40 mg daily given DAPT and age        5)  #ALEX  -Baseline creatinine ~1.1,  with a peak of 2.13 upon admission  -Had improved, but Cr today increasing at 1.63.  Was started on IV fluids overnight and is likely pre-renal given lethargy and poor po intake.  Re-check tomorrow.  -Continue holding Cozaar, hydrochlorothiazide, and spironolactone.       #Urinary retention and hematuria  -Felix placed on 9/15  -Urology consulted and recommend CT urogram once creatinine normalizes and outpatient follow-up for cystoscopy to complete hematuria work-up.  -Can perform voiding trial after 1 week from date of Felix placement (~9/22)  -Flomax 0.4 mg daily started, will continue        6)DVT prophylaxis  -Mechanical.  Will hold from chemoprophylaxis given hematuria and already on dual anti-platelet therapy        7)Pain  -Tylenol PRN        8)Endo  #Uncontrolled DM II (HbA1c 11.5)  -PTA was on Levemir 20 units nightly, NovoLog with meals with carb coverage, and Metformin 500 mg twice daily.  Unclear how compliant he was with his medications.  -Endocrine assistance appreciated.  Had hypoglycemic episodes overnight and adjustments made.    -  Decrease Levemir  to 20 units daily PM (2000) - adjust time to 2000 - following low     -  Novolog 1 unit(s) per 5 g cho breakfast, 1 per 6 lunch and snacks between 0800 - 1800 and decrease 1 per 12 g cho dinner and snacks from 1801 - 0759    -  Novolog high resistance sliding scale insulin TID AC    -  Decrease Novolog medium resistance HS    -  BG monitoring TID AC, HS and 0200    -  Will consider adding additional agent -> GLP-1, DPP4 or SGLT2 less appropriate    -  Will do test claim for GLP-1 Monday     -  No resumption of Metformin at this time - will consider resumption ongoing    -  Hypoglycemia protocol    -  Carb counting protocol    -  Will follow        9)Heme  -Hgb 12.6 on 9/20.  Overall stable.  -Continue to monitor, especially in the setting of hematuria.           10)Psych  -Monitor mood, will consult health psychology if needed  -Melatonin PRN for  sleep           11)Social/Dispo  -Goals for discharge home at a supervision level for ambulation, mobility, and ADLs.  -ELOS: Tentative discharge date 10/8/21  -Rehab prognosis: Fair  -Follow up appointments: PCP, [facility] neurology clinic 6-8 weeks, urology for hematuria     Code status: Full Code (unable to discuss with patient given somnolence upon admission.  Have continued prior CODE STATUS and discussed with the son who confirms that his prior wishes were to be full code)     Juan Miguel Campo MD  Department of Rehabilitation Medicine      Time Spent on this Encounter   I, Juan Miguel Campo, spent a total of 35 minutes face-to-face or managing the care of Jimmy Otto. Over 50% of my time on the unit was spent counseling the patient and coordinating care. See note for details.

## 2021-09-20 NOTE — PLAN OF CARE
"FOCUS/GOAL  Medical management    ASSESSMENT, INTERVENTIONS AND CONTINUING PLAN FOR GOAL:  Pt hard to assess orientation but his eyes open and follows command during incontinent cares done. He tried to reach out his cup of water after, took small sips of thickened water. Reposition him after since he try to hang his legs to the edge of bed. Bladder irrigation done after. Seen sleeping during safety round checks. He has hypoglycemic episode at 2am BG checks, was 52 mg/dl gel given orally in longer time, pt swallow the gel slowly. He was able to consume 1 tube. Re-check BG, was up to 68mg/dl. Pt tend to be more sleepy and not swallowing the gel. Re-check BG, went down to 55mg/dl. Unable to give anything by mouth. BG sugar fluctuates but it is still low. Called for another nurse and CN, unable to wake pt. He could hardly wake up for sternal rub. Called hospitalist, came w/ orders. MD () placed IV line access to left posterior arm. Glucagon given through IV line. After few minutes, pt move his legs and arms, VS stable. BG number is getting higher in readings,112mg/dl at 4:09am, 221mg/dl at 05:15am and latest is 212mg/dl. Has stat lab draws done w/ results. MD order  IV maintenance of  Normal Saline runs at 100ml/hr., started at 0544am. Pt opens his eyes when his name is  called. Asked if he is aguilar and answered \"yes\" then falls back to sleep. VS has been stable. Felix was irrigated earlier on the shift, light hematuria noted. Drain bag for better monitoring of urine color. Noted for dark yellow urine noted later. Had a BM, incontinent. Needs A2 w/ cares, repositioning and repositioning. Will continue to monitor status.  S- Hypoglycemic episode at 2am BG check.  B- he is here for rehab, stroke pt, hx of DM.  A- BG readings remains low. Pt unable to take orally more of the gluco gel or snack.  Could hardly arouse w/ sternal rub. VS remain stable.  R- Called hospitalist (Dr. Ha), w/ orders and carried out.      "

## 2021-09-20 NOTE — PROGRESS NOTES
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  Hospital course complicated by acute kidney injury, hypokalemia needing repletion, and urinary retention and hematuria needing Felix catheter and urology consult.     Admission to inpatient rehab:  9/17/21  Impairment group code: Stroke Ischemic 01.3 Bilateral Involvement, new R Thalamic stroke in setting of R sided weakness from prior stroke       Assessment:     1)  Type 2 Diabetes Mellitus; poor control.  A1c 11.5%  2)  S/p stroke; R hemiparesis  3)  ALEX/CKD     Plan:       -  Decrease Levemir  to 20 units daily PM (2000) - adjust time to 2000 - following low     -  Increase Novolog 1 unit(s) per 4 g cho breakfast, 1 per 5 lunch and snacks between 0800 - 1800 and decrease 1 per 12 g cho dinner and snacks from 1801 - 0759    -  Novolog high resistance sliding scale insulin TID AC    -  Decrease Novolog medium resistance HS    -  BG monitoring TID AC, HS and 0200    -  Will consider adding additional agent -> GLP-1, DPP4 or SGLT2 less appropriate    -  Will do test claim for GLP-1 Monday     -  No resumption of Metformin at this time - will consider resumption ongoing    -  Hypoglycemia protocol    -  Carb counting protocol    -  Will follow       Outpatient diabetes follow up: TBD    Plan discussed with patient, family member - Sathya, bedside RN, and primary team via this note.           Interval History:     The last 24 hours progress and nursing notes reviewed.     Experienced severe low BG despite reductions - pattern has been quite  "consistent with large excursions during the day with precipitous drop off later HS and overnoc  Will more aggressively reduce Levemir and change timing to earlier in evening as well as less prandial coverage with supper and reduce to Medium resistance sliding scale insulin at HS       rigo Phally is here - we reviewed in detail the plan   Ho is very alert this afternoon and more interactive, smiling and nodding yes or no to questions.     Eating well. Denies all pain or concerns.  Smiling a lot.     Creat 1.63/GFR 42 - creat slowly worsening      Recent Labs   Lab 09/20/21  0415 09/20/21  0300 09/20/21  0249 09/20/21  0226 09/19/21  2132 09/19/21  1735   * 55* 68* 52* 173* 199*           Nutrition:     Orders Placed This Encounter      Combination Diet Consistent Carb 60 Grams CHO per Meal Diet; Pureed Diet (level 4); Mildly Thick (level 2)    Supplements:         PTA Regimen:        Levemir 28 units at bedtime  \"Novolog - varied doses based on chos\"  Clinic notes reveal:  Novolog 2 unit(s) per carb choice (2per 15)  Novolog 1 unit(s) per 50> 150     Metformin 500 mg BID (dose reduced for decreased renal fxn)     BG monitoring frequency:  FreeStyle Sherwin - intermittent use     Diet: regular, no restrictions  Eats sporadically - AM - bagel or banana  Lunch - sporadic  Dinner - rice/beg/meat  Loves to drink [employer]     Exercise: sedentary             Review of Systems:   CC:  Non-verbal/resting most of visit.  Does open eyes and nod or shake head to questions.  Denies pain          Medications:   Steroid planning:  No       Physical Exam:   /61 (BP Location: Right arm)   Pulse 62   Temp (!) 96.2  F (35.7  C) (Oral)   Resp 20   Ht 1.626 m (5' 4\")   Wt 66.2 kg (145 lb 15.1 oz)   SpO2 98%   BMI 25.05 kg/m       General:  resting in chair, rigo at bedside. R sided deficts  HEENT: NC/AT, PER and anicteric, non-injected, oral mucous membranes moist.   Lungs: non-labored, no cough,  no SOB  ABD:  " rounded   Skin: warm and dry, no obvious lesions  Feet:  CMS intact  MSK:  fluid movement of L extremities - R sided deficits  Lymp:  no LE edema   Mental status:  alert, oriented x3, unable to communicate verbally - nods and smiles  Psych:  calm, appropriate mood, approrpriate affect         Data:     Lab Results   Component Value Date    A1C 11.5 09/15/2021    A1C 7.6 08/14/2012            CBC RESULTS: Recent Labs   Lab Test 09/20/21  0824   WBC 8.8   RBC 4.75   HGB 12.6*   HCT 40.1   MCV 84   MCH 26.5   MCHC 31.4*   RDW 13.4        Recent Labs   Lab Test 09/20/21  0415 09/20/21  0300 09/19/21  1735 09/19/21  1347   *  --   --  139   POTASSIUM 3.3*  --   --  4.0   CHLORIDE 111*  --   --  107   CO2 28  --   --  27   ANIONGAP 6  --   --  5   * 55*   < > 244*   BUN 30  --   --  26   CR 1.63*  --   --  1.52*   ARLETH 8.6  --   --  8.7    < > = values in this interval not displayed.       Liver Function Studies -   Recent Labs   Lab Test 09/15/21  0521   PROTTOTAL 6.6*   ALBUMIN 2.6*   BILITOTAL 0.3   ALKPHOS 80   AST 20   ALT 22     Lab Results   Component Value Date    INR 1.03 09/16/2021    INR 0.94 09/14/2021    INR 1.00 08/16/2012    INR 0.96 08/13/2012       FEMI Kohli CNP   Inpatient Diabetes Management Service  Pager - [phone]  Friday - Monday 0800 - 1600 hrs  After hours above - Diabetes Management Team job code: [id number]    I spent a total of 35 minutes bedside and on the inpatient unit managing glycemic care.  Over 50% of my time on the unit was spent counseling the patient and/or coordinating care regarding acute hyperglycemic management.  See note for details.

## 2021-09-20 NOTE — PLAN OF CARE
Discharge Planner Post-Acute Rehab SLP:      Discharge Plan:home with assist, likely further SLP     Precautions:fall, swallowing     Current Status:  Communication: to be assessed, pt limited voicing/aphonic, follows simple directions, some mild aphasia/dysarthria baseline per son, worsened now  Cognition: to be assessed  Swallow: IDDSI 4,- pureed  mildly thick fluids (2),  1:1 for po/meals, assist to feed PRN, slow pace, small bites, no straw, VFSS PRN (order received, on hold)     Assessment: SLP: attempted to awaken, for po/meal. pt did not open eyes, inchair, head frequently bobbing forward, did nod yes couple times. did not feed pt. educated dtr further regarding swallowing, feeding, diet level. dtr reported pt coughed on mildly thick with spoon.   Pt was scheduled for VFSS, but decision made to cancel 2/2 increased somnolence much of day - did not appear appropriate to attempt.  Will continue to assess - reschedule PRN (order still present).  -30 minutes pm, -20 am session.     Other Barriers to Discharge (Family Training, etc): TBD                Revision History

## 2021-09-20 NOTE — PLAN OF CARE
Discharge Planner Post-Acute Rehab PT:     Discharge Plan: Home with 24/7 vs [facility] vs TCU pending progress    Precautions: Fall, alarms    Current Status:  Bed Mobility: Dependent, does not initiate  Transfer: Max-A squat/pivot. OH lift with nursing  Gait: 10' with B HHA, assist to initiate steps, RLE drags which was reported as baseline  Stairs: Not safe  Balance: 1/36 PASS - although not truly reflective of capability due to drastic performance fluctuations with command following and lethargy.    Assessment: Able to trade for TIS w/c from another pt for safety up in chair due to alertness fluctuations. Lethargy first session, pt did participate fairly well 2nd session tolerating standing and transfer work. Initiation is very poor, but able to carry out tasks once started.    Other Barriers to Discharge (DME, Family Training, etc): Safe discharge plan - will need 24/7 cares, level of assist/equipment pending lethargy/participation

## 2021-09-20 NOTE — PLAN OF CARE
FOCUS/GOAL  Bowel management, Bladder management, Pain management, Mobility, Skin integrity, and Cognition/Memory/Judgment/Problem solving    ASSESSMENT, INTERVENTIONS AND CONTINUING PLAN FOR GOAL:    Alert, orientation difficult to assess. Patient was able to follow commands and remain more alert this shift. Good appetite, ate 100% of meal, no coughing or aspiration observed or reported. Denied pain.  and 173. Smear BM this shift. Spoke with resident about patient medical status given labs and tests administered today. Will continue to monitor.

## 2021-09-20 NOTE — PROGRESS NOTES
Hospital Medicine Cross Cover    Notified about worsening somnolence and hypoglycemia.  Patient given some oral glucose gel but unable to take adequate amount to improve glucose.  Patient assessed and found to be protecting airway with normal VS but responsive with grimace to painful stimuli only.  Glucose 60 during my exam.  An IV was placed and glucagon given with immediate improvement in mental status.    Glucose improved to 112.  Will monitor q1h for now.    Paulo Ha MD      Addendum:    Creatinine rising; will order NS @100 ml/h x 10 hours

## 2021-09-20 NOTE — PROGRESS NOTES
Discharge Planner Post-Acute Rehab OT:      Discharge Plan: home with 24/7 assistance vs TCU     Precautions: falls, hx of right sided weakness.      Current Status:  ADLs: Total A for shower transfer using TIS commode chair, Total A for LB dressing tasks, Max A for UB dressing tasks, Total A for toilet tx/transfers using michell lift and commode chair, Min A for thoroughness with g/h tasks  IADLs: Family support  Vision/Cognition: wears glasses at all times, will continue to assess cognition     Assessment: Pt lethargic during session and req'd max cues to participate. Focused on assessing LUE ROM and strengthening which presents WFL all planes when pt is alert. Pt would benefit from Right resting hand splint d/t hx of tone from previous stroke. Pt would benefit from ongoing OT services to help decrease burden of care and increase IND and safety with ADLs/IADLs and functional transfers.      Other Barriers to Discharge (DME, Family Training, etc): Family training,   AE/DME pending progress    -5 d/t lethargy

## 2021-09-20 NOTE — PLAN OF CARE
Individualized Overall Plan Of Care (IOPOC)      Rehab diagnosis/Impairment Group Code: Stroke ischemic 01.3 bilateral involvement, new r thalamic stroke in setting of r sided weakness from prior stroke  Cva (cerebral vascular accident) (h)       Expected functional outcome: Supervision for mobility and ADLs    Clinical Impression Comments: Ongoing lethargy/somnolence have been barrier    Mobility: Pt presents with new L hemiparesis and baseline R hemiparesis from past stroke. At baseline ambulates short distances mod-I with 4WW and is mod-I with basic ADLs. Anticipating need for long rehab course given new L-side deficits with significant baseline R-sided deficits. Cognition/lethargy will greatly impact progress. ELOS 3 weeks, will need to consider TCU if participation does not improve.    ADL: Limited by activity tolerance, motor planning, incoordination, LUE function, strength, balance, cognition. Will benefit from daily OT services for 3 weeks in order to d/c home with family support, 24 hr assist    Communication/Cognition/Swallow: SLP: pt seen for clinical swallowing evaluation. Pt presents with mild/moderate oropharyngeal dysphagia, Initially pt appeared somnolent, but did participate/awaken for evaluation. Pt generally aphonic, with very limited attempts to verbalize. Pt needed assist to  bring cup to mouth, did self feed pureed with left hand.  Noting variable delay with bolus formation, transit time/completion of swallow.  Appears pt holds bolus at times , cannot fully assess if premature pharyngeal entry. Also suspect reduced hyolaryngeal elevation.   Pt did not show any outward sx aspiration with small sips thin fluid by cup - did have coughing episode with pureed x1 (weak cough).  Pt took pills with nursing one whole, others crushed with pureed.Did not assess more advanced solids at this time, but may in subsequent sessions PRN.  Recommend continue pureed (4) and thin fluids, but monitor for sx  aspiration. Pt needs 1:1 for meals/po to assist to feed/safety. Recommend small bites and sips/ no straw, wait for swallow, sit up for all po.  If sx of aspiration noted, pt may need VFSS.Ok pills with pureed crush, unless cannot per guidelines. will plan also to evaluate communication skills.      Intensity of therapy:   PT 60 minutes, Daily, for 3 weeks  OT 60 minutes, Daily, for 3 weeks  SLP 60 minutes, daily, for estimated 3 weeks    Orthotics None  Education stroke and diabetes  Neuropsychology Testing: TBD  Other:  None      Medical Prognosis: Fair      Physician summary statement: Start Amantadine for neurostimulation.  Endocrine assistance appreciated for DM management.  Continue therapies as able.    Discharge destination: prior home  Discharge rehabilitation needs: home care, RN, PT, OT and SLP      Estimated length of stay: 3 weeks      Rehabilitation Physician Kerrie Campo MD

## 2021-09-20 NOTE — PLAN OF CARE
Postural Assessment Scale for Stroke    Maintaining a posture  1. Sitting without support: 0  2. Standing with support: 1  3. Standing without support: 0  4. Standing on nonparetic le  5. Standing on paretic le    Changing posture  6. Supine to affected side lateral: 0  7. Supine to nonaffected side lateral: 0  8. Supine to sitting up on the edge of the table: 0  9. Sitting on the edge of the table to supine: 0  10. Sitting to standing up: 0  11. Standing up to sitting down: 0  12. Standing, picking up a pencil from the floor: 0    Total score:     1/36    Score indicates low ambulatory potential, however difficult to determine actual ability vs command following/lethargy. Pt has been able to sit up in the past despite being even less alert than this session.

## 2021-09-21 ENCOUNTER — APPOINTMENT (OUTPATIENT)
Dept: OCCUPATIONAL THERAPY | Facility: CLINIC | Age: 71
DRG: 057 | End: 2021-09-21
Attending: PHYSICAL MEDICINE & REHABILITATION
Payer: MEDICARE

## 2021-09-21 ENCOUNTER — APPOINTMENT (OUTPATIENT)
Dept: SPEECH THERAPY | Facility: CLINIC | Age: 71
DRG: 057 | End: 2021-09-21
Attending: PHYSICAL MEDICINE & REHABILITATION
Payer: MEDICARE

## 2021-09-21 ENCOUNTER — APPOINTMENT (OUTPATIENT)
Dept: PHYSICAL THERAPY | Facility: CLINIC | Age: 71
DRG: 057 | End: 2021-09-21
Attending: PHYSICAL MEDICINE & REHABILITATION
Payer: MEDICARE

## 2021-09-21 LAB
ANION GAP SERPL CALCULATED.3IONS-SCNC: 2 MMOL/L (ref 3–14)
BACTERIA BLD CULT: NO GROWTH
BUN SERPL-MCNC: 24 MG/DL (ref 7–30)
CALCIUM SERPL-MCNC: 8.9 MG/DL (ref 8.5–10.1)
CHLORIDE BLD-SCNC: 111 MMOL/L (ref 94–109)
CO2 SERPL-SCNC: 30 MMOL/L (ref 20–32)
CREAT SERPL-MCNC: 1.43 MG/DL (ref 0.66–1.25)
ERYTHROCYTE [DISTWIDTH] IN BLOOD BY AUTOMATED COUNT: 13.2 % (ref 10–15)
GFR SERPL CREATININE-BSD FRML MDRD: 49 ML/MIN/1.73M2
GLUCOSE BLD-MCNC: 155 MG/DL (ref 70–99)
GLUCOSE BLDC GLUCOMTR-MCNC: 103 MG/DL (ref 70–99)
GLUCOSE BLDC GLUCOMTR-MCNC: 160 MG/DL (ref 70–99)
GLUCOSE BLDC GLUCOMTR-MCNC: 202 MG/DL (ref 70–99)
GLUCOSE BLDC GLUCOMTR-MCNC: 207 MG/DL (ref 70–99)
GLUCOSE BLDC GLUCOMTR-MCNC: 264 MG/DL (ref 70–99)
HCT VFR BLD AUTO: 38.1 % (ref 40–53)
HGB BLD-MCNC: 12 G/DL (ref 13.3–17.7)
MCH RBC QN AUTO: 26.7 PG (ref 26.5–33)
MCHC RBC AUTO-ENTMCNC: 31.5 G/DL (ref 31.5–36.5)
MCV RBC AUTO: 85 FL (ref 78–100)
PLATELET # BLD AUTO: 247 10E3/UL (ref 150–450)
POTASSIUM BLD-SCNC: 3.3 MMOL/L (ref 3.4–5.3)
RBC # BLD AUTO: 4.49 10E6/UL (ref 4.4–5.9)
SODIUM SERPL-SCNC: 143 MMOL/L (ref 133–144)
WBC # BLD AUTO: 7.3 10E3/UL (ref 4–11)

## 2021-09-21 PROCEDURE — 250N000013 HC RX MED GY IP 250 OP 250 PS 637: Performed by: PHYSICIAN ASSISTANT

## 2021-09-21 PROCEDURE — 250N000009 HC RX 250: Performed by: NURSE PRACTITIONER

## 2021-09-21 PROCEDURE — L3808 WHFO, RIGID W/O JOINTS: HCPCS | Performed by: OCCUPATIONAL THERAPIST

## 2021-09-21 PROCEDURE — 99232 SBSQ HOSP IP/OBS MODERATE 35: CPT | Performed by: PHYSICIAN ASSISTANT

## 2021-09-21 PROCEDURE — 128N000003 HC R&B REHAB

## 2021-09-21 PROCEDURE — 97530 THERAPEUTIC ACTIVITIES: CPT | Mod: GP

## 2021-09-21 PROCEDURE — 97530 THERAPEUTIC ACTIVITIES: CPT | Mod: GO | Performed by: OCCUPATIONAL THERAPIST

## 2021-09-21 PROCEDURE — 80048 BASIC METABOLIC PNL TOTAL CA: CPT | Performed by: PHYSICIAN ASSISTANT

## 2021-09-21 PROCEDURE — 85014 HEMATOCRIT: CPT | Performed by: PHYSICIAN ASSISTANT

## 2021-09-21 PROCEDURE — 92526 ORAL FUNCTION THERAPY: CPT | Mod: GN

## 2021-09-21 PROCEDURE — 99233 SBSQ HOSP IP/OBS HIGH 50: CPT | Performed by: NURSE PRACTITIONER

## 2021-09-21 PROCEDURE — 36415 COLL VENOUS BLD VENIPUNCTURE: CPT | Performed by: PHYSICIAN ASSISTANT

## 2021-09-21 PROCEDURE — 250N000013 HC RX MED GY IP 250 OP 250 PS 637: Performed by: PHYSICAL MEDICINE & REHABILITATION

## 2021-09-21 PROCEDURE — 92507 TX SP LANG VOICE COMM INDIV: CPT | Mod: GN

## 2021-09-21 RX ORDER — POTASSIUM CHLORIDE 750 MG/1
20 TABLET, EXTENDED RELEASE ORAL DAILY
Status: DISCONTINUED | OUTPATIENT
Start: 2021-09-21 | End: 2021-09-28 | Stop reason: ALTCHOICE

## 2021-09-21 RX ORDER — LIRAGLUTIDE 6 MG/ML
0.6 INJECTION SUBCUTANEOUS DAILY
Status: DISCONTINUED | OUTPATIENT
Start: 2021-09-21 | End: 2021-09-22

## 2021-09-21 RX ADMIN — CLOPIDOGREL BISULFATE 75 MG: 75 TABLET, FILM COATED ORAL at 07:36

## 2021-09-21 RX ADMIN — PANTOPRAZOLE SODIUM 40 MG: 40 TABLET, DELAYED RELEASE ORAL at 06:48

## 2021-09-21 RX ADMIN — LIRAGLUTIDE 0.6 MG: 6 INJECTION SUBCUTANEOUS at 17:29

## 2021-09-21 RX ADMIN — ASPIRIN 81 MG CHEWABLE TABLET 81 MG: 81 TABLET CHEWABLE at 07:37

## 2021-09-21 RX ADMIN — TAMSULOSIN HYDROCHLORIDE 0.4 MG: 0.4 CAPSULE ORAL at 07:37

## 2021-09-21 RX ADMIN — AMANTADINE HYDROCHLORIDE 100 MG: 100 CAPSULE ORAL at 07:40

## 2021-09-21 RX ADMIN — POTASSIUM CHLORIDE 20 MEQ: 750 TABLET, EXTENDED RELEASE ORAL at 11:37

## 2021-09-21 RX ADMIN — ATENOLOL 25 MG: 25 TABLET ORAL at 21:53

## 2021-09-21 RX ADMIN — AMLODIPINE BESYLATE 10 MG: 10 TABLET ORAL at 07:41

## 2021-09-21 RX ADMIN — ATENOLOL 25 MG: 25 TABLET ORAL at 07:40

## 2021-09-21 RX ADMIN — ATORVASTATIN CALCIUM 80 MG: 80 TABLET, FILM COATED ORAL at 07:36

## 2021-09-21 NOTE — PLAN OF CARE
No signs of pain noted,red tinged output noted in ordoñez drainage bag, also noted at the time was patient pulling on ordoñez and subsequently took off the ordoñez anchor on his left thigh. Ordoñez switched to the left thigh so patient wont be able to pull it with his weak arm, a new anchor also applied, Towards Mid day, Ordoñez output appear clear with red discoloration or sediments. Patient still have episodes of appearing sleepy on and off, do respond to verbal commands though. Had good appetite at breakfast and fair at lunch, BG's today 103 and 202,. Son in law at bedside most of the shift, will continue Poc

## 2021-09-21 NOTE — PLAN OF CARE
Discharge Planner Post-Acute Rehab PT:     Discharge Plan: Home with 24/7 vs [facility] vs TCU pending progress    Precautions: Fall, alarms    Current Status:  Bed Mobility: Dependent, does not initiate  Transfer: squat pivot A x 2, liko with RN staff  Gait: 100 ft with HHA bilaterally.   Stairs: Not safe  Balance: 1/36 PASS - although not truly reflective of capability due to drastic performance fluctuations with command following and lethargy.    Assessment: More alert this PM, able to amb 100' with assistive person on each side. Tolerates well, but immediately somnolent upon completion of task.     Other Barriers to Discharge (DME, Family Training, etc): Safe discharge plan - will need 24/7 cares, level of assist/equipment pending lethargy/participation

## 2021-09-21 NOTE — CONSULTS
Consulted by Margarita Rosen to run a test claim for GLP-1 Agonist Receptor drugs.    Patient has pharmacy benefits through [employer] Part D with $240 remaining in 2021 deductible. Per insurance, the following is covered and preferred under the plan:      Bydureon BCISE (exenatide ER): $287 before deductible met, $47 after deductible met    Ozempic (semaglutide): $287 before deductible met, $47 after deductible met    Trulicity (dulaglutide): $287 before deductible met, $47 after deductible met    Victoza (liraglutide): $287 before deductible met, $47 after deductible met    Rybelsus (semaglutide oral): $287 before deductible met, $47 after deductible met    Byetta (exenatide): $520.30 before deductible met, $280.30 once met    Not Covered:  Adlyxin (lixisenatide)      Please feel free to contact me with any other test claims, prior authorizations, or insurance questions regarding outpatient medications.     Thanks!      Dulce Rosa CPhT  [facility] Liaison  Pronouns: She/Her/Hers    [facility]  [facility]  [address] [place] [address] [address] [address]   [email or web address]  [email or web address]   Phone: [phone]  Pager: [phone]  Fax: [phone]

## 2021-09-21 NOTE — PROGRESS NOTES
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  Hospital course complicated by acute kidney injury, hypokalemia needing repletion, and urinary retention and hematuria needing Felix catheter and urology consult.     Admission to inpatient rehab:  9/17/21  Impairment group code: Stroke Ischemic 01.3 Bilateral Involvement, new R Thalamic stroke in setting of R sided weakness from prior stroke       Assessment:     1)  Type 2 Diabetes Mellitus; poor control.  A1c 11.5%  2)  S/p stroke; R hemiparesis  3)  ALEX/CKD     Plan:       -decrease Levemir  to 18 units from 20 units daily PM (2000)    -  continue Novolog 1 unit(s) per 4 g cho breakfast, 1 per 5 lunch and snacks between 0800 - 1800 and continue 1 per 12 g cho dinner and snacks from 1801 - 0759    -  Novolog high resistance sliding scale insulin TID AC    -  continue Novolog medium resistance HS    -  Start Victoza 0.6 mg subcutaneous daily tonight    -  BG monitoring TID AC, HS and 0200    -  No resumption of Metformin at this time - will consider resumption ongoing    -  Hypoglycemia protocol    -  Carb counting protocol    -  Will follow       Outpatient diabetes follow up: TBD    Plan discussed with patient, family member - bedside RN, and primary team via this note.           Interval History:     The last 24 hours progress and nursing notes reviewed. BG trend below:         this am, a little too tight of control for this patient.  Will scale back slightly to Levemir 18 units at 2000.      Son in law here with patient.  Not as  "involved with the medical decision making, will discuss plan of care with patient's daughter.    Ho is eating lunch with assistance today, only ate 11g CHO.  25g CHO with breakfast.      Called daughter, she is agreeable to starting Victoza.  Discussed risks, benefits, and insurance coverage.  No history of pancreatitis, heavy ETOH use, multiple endocrine neoplasia disorder, or thyroid cancer on chart review    Creatinine improved to 1.43 from 1.63 yesterday after IVF.      Recent Labs   Lab 09/21/21  1110 09/21/21  0830 09/21/21  0723 09/21/21  0206 09/20/21  2103 09/20/21  1716   * 155* 103* 160* 241* 228*           Nutrition:     Orders Placed This Encounter      Combination Diet Consistent Carb 60 Grams CHO per Meal Diet; Pureed Diet (level 4); Mildly Thick (level 2)    Supplements: any supplement PRN        PTA Regimen:        Levemir 28 units at bedtime  \"Novolog - varied doses based on chos\"  Clinic notes reveal:  Novolog 2 unit(s) per carb choice (2per 15)  Novolog 1 unit(s) per 50> 150     Metformin 500 mg BID (dose reduced for decreased renal fxn)     BG monitoring frequency:  FreeStyle Sherwin - intermittent use     Diet: regular, no restrictions  Eats sporadically - AM - bagel or banana  Lunch - sporadic  Dinner - rice/beg/meat  Loves to drink [employer]     Exercise: sedentary             Review of Systems:   CC:  Non-verbal/resting most of visit.  Does open eyes and nod or shake head to questions.  Denies pain          Medications:   Steroid planning:  No       Physical Exam:   /79 (BP Location: Right arm)   Pulse 78   Temp 97.4  F (36.3  C) (Oral)   Resp 17   Ht 1.626 m (5' 4\")   Wt 66.2 kg (145 lb 15.1 oz)   SpO2 97%   BMI 25.05 kg/m       General:  resting in chair, son in law at bedside. R sided deficts  HEENT: NC/AT, PER and anicteric, non-injected, oral mucous membranes moist.   Lungs: non-labored, no cough,  no SOB  ABD:  rounded   Skin: warm and dry, no obvious " lesions  Feet:  CMS intact  MSK:  fluid movement of L extremities - R sided deficits  Lymp:  no LE edema   Mental status:  alert, oriented x3, unable to communicate verbally - nods and smiles  Psych:  calm, appropriate mood, approrpriate affect         Data:     Lab Results   Component Value Date    A1C 11.5 09/15/2021    A1C 7.6 08/14/2012            CBC RESULTS: Recent Labs   Lab Test 09/20/21  0824   WBC 8.8   RBC 4.75   HGB 12.6*   HCT 40.1   MCV 84   MCH 26.5   MCHC 31.4*   RDW 13.4        Recent Labs   Lab Test 09/20/21  0415 09/20/21  0300 09/19/21  1735 09/19/21  1347   *  --   --  139   POTASSIUM 3.3*  --   --  4.0   CHLORIDE 111*  --   --  107   CO2 28  --   --  27   ANIONGAP 6  --   --  5   * 55*   < > 244*   BUN 30  --   --  26   CR 1.63*  --   --  1.52*   ARLETH 8.6  --   --  8.7    < > = values in this interval not displayed.       Liver Function Studies -   Recent Labs   Lab Test 09/15/21  0521   PROTTOTAL 6.6*   ALBUMIN 2.6*   BILITOTAL 0.3   ALKPHOS 80   AST 20   ALT 22     Lab Results   Component Value Date    INR 1.03 09/16/2021    INR 0.94 09/14/2021    INR 1.00 08/16/2012    INR 0.96 08/13/2012       FEMI Reynolds, CNP  Inpatient Diabetes Management Service  Pager - [phone]  Friday - Monday 0800 - 1600 hrs  After hours above - Diabetes Management Team job code: [phone]    I spent a total of 35 minutes bedside and on the inpatient unit managing glycemic care.  Over 50% of my time on the unit was spent counseling the patient and/or coordinating care regarding acute hyperglycemic management.  See note for details.

## 2021-09-21 NOTE — PLAN OF CARE
Discharge Planner Post-Acute Rehab SLP:      Discharge Plan:home with assist, likely further SLP     Precautions:fall, swallowing     Current Status:  Communication: to be assessed, pt limited voicing/aphonic, follows simple directions, some mild aphasia/dysarthria baseline per son, worsened now  Cognition: to be assessed  Swallow: IDDSI 4,- pureed  mildly thick fluids (2),  1:1 for po/meals, assist to feed PRN, slow pace, small bites, no straw, VFSS PRN (order received, on hold)     Assessment: Pt seen for dysphagia tx session. Pt requires set up and feeding assistance. He is able to fed self and take a drink with assistance once item is placed in his right hand. Adequate and timely bolus formation with puree, no notable oral residue. Intermittent bolus holding with mildly thick liquids but no overt s/sx of aspiration. Not yet appropriate for video given his fluctuating alertness. Current diet remains appropriate.    PM - pt completed basic level reading comprehension tasks with 90 % accuracy cues needed for attention and initiation - suspect this was impacted by fatigue. 50% accuracy on fill in the blank reading comp questions even with an auditory presentation as well      Other Barriers to Discharge (Family Training, etc): TBD

## 2021-09-21 NOTE — PROGRESS NOTES
[facility]   Acute Rehabilitation Unit  Daily progress note    INTERVAL HISTORY  Patient was seen at bedside this afternoon, son-in-law present.  No acute events reported overnight.  Endocrinology made some adjustments to insulin regimen and no recurrent hypoglycemia.  Nursing noting recurrent hematuria to ordoñez today.  Son-in-law says it is improving from earlier and that both he and nursing have witness patient to be pulling at ordoñez, so suspect may be related to trauma.  Patient has eyes closed for much of the visit, though he does open them intermittently and is able to follow commands and nods yes or no to questions more consistently than yesterday.  Patient denies pain, shortness of breath, nausea, difficulty sleeping.  AM labs with ongoing anemia, improvement in Cr, ongoing hypokalemia.    Functionally, he needs max assist for squat pivot transfers with skilled therapists, using lift with nursing.  He was able to ambulate 10' with bilat hand-hold assistance, including to initiate steps.  He needs total assist for shower transfers, toilet transfers and cares, and lower body dressing, and max assist for upper body dressing.  He is able to complete grooming/hygiene with min assist for thoroughness.  SLP unable to complete VFSS yesterday due to somnolence.  Today, was able to feed self and take a drink with assistance once item is placed in right hand.  Current diet remains appropriate and will evaluate VFSS timing based on more consistent alertness.    MEDICATIONS  Scheduled:    amantadine  100 mg Oral Daily     amLODIPine  10 mg Oral Daily     aspirin  81 mg Oral Daily     atenolol  25 mg Oral BID     atorvastatin  80 mg Oral Daily     clopidogrel  75 mg Oral Daily     insulin aspart  1-5 Units Subcutaneous At Bedtime     insulin aspart   Subcutaneous QAM AC     insulin aspart   Subcutaneous Daily with lunch     insulin aspart   Subcutaneous Daily with supper     insulin  aspart  1-10 Units Subcutaneous TID AC     insulin detemir  18 Units Subcutaneous Q24H     pantoprazole  40 mg Oral QAM AC     potassium chloride  20 mEq Oral Daily     sodium chloride (PF)  3 mL Intracatheter Q8H     tamsulosin  0.4 mg Oral Daily        PRN:  acetaminophen, bisacodyl, glucose **OR** dextrose **OR** glucagon, hydrALAZINE, insulin aspart, insulin aspart, melatonin, ondansetron, polyethylene glycol, senna-docusate       PHYSICAL EXAM  Patient Vitals for the past 24 hrs:   BP Temp Temp src Pulse Resp SpO2   09/21/21 0717 136/79 97.4  F (36.3  C) Oral 78 17 97 %   09/20/21 2036 127/67 -- -- 76 -- --   09/20/21 1514 118/70 97  F (36.1  C) Axillary 70 16 98 %       GEN: NAD, lethargic, but able to open eyes upon verbal request  HEENT: NC/AT  CVS: RRR, no murmurs  PULM: non-labored on room air, lungs CTA bilaterally  ABD: Soft, non-tender, non-distended, bowel sounds present  : Felix in place, recurrent hematuria noted today, urine christine colored  EXT: No LE edema or calf tenderness b/l, PIV in LUE  Neuro: Somnolent, non-verbal, able to follow basic 1 step commands    LABS  CBC RESULTS: Recent Labs   Lab Test 09/21/21  0830 09/20/21  0824 09/19/21  1347   WBC 7.3 8.8 10.5   RBC 4.49 4.75 4.99   HGB 12.0* 12.6* 13.1*   HCT 38.1* 40.1 41.5   MCV 85 84 83   MCH 26.7 26.5 26.3*   MCHC 31.5 31.4* 31.6   RDW 13.2 13.4 13.2    227 234     Last Basic Metabolic Panel:  Recent Labs   Lab Test 09/21/21  1110 09/21/21  0830 09/21/21  0723 09/20/21  0515 09/20/21  0415 09/19/21  1735 09/19/21  1347   NA  --  143  --   --  145*  --  139   POTASSIUM  --  3.3*  --   --  3.3*  --  4.0   CHLORIDE  --  111*  --   --  111*  --  107   CO2  --  30  --   --  28  --  27   ANIONGAP  --  2*  --   --  6  --  5   * 155* 103*   < > 112*   < > 244*   BUN  --  24  --   --  30  --  26   CR  --  1.43*  --   --  1.63*  --  1.52*   GFRESTIMATED  --  49*  --   --  42*  --  45*   ARLETH  --  8.9  --   --  8.6  --  8.7    < > =  values in this interval not displayed.     Recent Labs   Lab 09/21/21  1110 09/21/21  0830 09/21/21  0723 09/21/21  0206 09/20/21  2103 09/20/21  1716   * 155* 103* 160* 241* 228*       IMPRESSION  Jimmy Otto is a 71 year old male with a PMH including but not limited to HTN, uncontrolled DM 2 (HbA1c 11.5), HLD, BPH, and history of a stroke in 2012 with residual right hemiparesis who presented to the emergency room on 9/14/2021 with altered mental status and acute worsening of right-sided weakness, and found to have an acute right thalamic stroke due to small vessel occlusion.  Hospital course complicated by acute kidney injury, hypokalemia needing repletion, and urinary retention and hematuria needing Felix catheter and urology consult.         Impairment group code: Stroke Ischemic 01.3 Bilateral Involvement, new R Thalamic stroke in setting of R sided weakness from prior stroke      PLAN  Rehabilitation  -Continue with inpatient rehabilitation program including PT, OT, and SLP for 3 hrs/day, rehab nursing, social work, nutrition, and close monitoring by physiatry for an estimated total of 3 weeks.  -The patient has impairments in strength, balance, endurance, and coordination, which are leading to activity limitations in ambulation, mobility, ADLs, and swallowing.      Medical  1)Neurology  #R thalamic CVA due to small vessel occlusion  -DAPT with ASA 81 mg and Plavix 75 mg for 1 month, followed by Plavix alone indefinitely.  Recommended check P2Y12 level to ensure Plavix efficacy after 1 month of being on Plavix only.  -Hypertension, lipid, and glucose management as below.  Will need ongoing education regarding stroke risk factor modifications including importance of medication compliance and diet and lifestyle changes  -Follow-[facility] with [facility] general neurology in 6 to 8 weeks  -Monitor somnolence/lethargy.  Decreased level of alertness not unexpected with essentially bilateral thalamic pathology (new right  thalamic stroke and a history of left thalamic stroke).    -Amantadine 100 mg daily started on 9/20 for neurostimulation given ongoing somnolence which is limiting participation.  Remains fluctuating but possibly improved, continue to monitor.  -High risk for delirium, ensure delirium precautions and appropriate sleep-wake cycle        2)CVS  #HTN  -PTA on amlodipine 10 mg daily, atenolol 50 mg twice daily, Cozaar 100 mg daily, spironolactone 25 mg daily, and hydrochlorothiazide 25 mg daily.  Cozaar, spironolactone, and hydrochlorothiazide are being held due to acute kidney injury  -continue amlodipine 10 mg daily, and atenolol at a lower dose of 25 mg twice daily for now.  Titrate doses as needed.  -Hydralazine as needed for SBP greater than 180 mmHg     #HLD  -Continue Lipitor 80 mg daily.  LDL 92 on 9/15       3)Pulmonary  -No acute issues but low lung volumes and atelectasis/infiltrate seen at the left lung base on chest x-ray 9/14.  Clinically no signs of pneumonia but will continue to monitor.  -Encourage incentive spirometry        4)FENGI  #Diet: Level 4 solids and level 0 thin liquids.  Upgrade as able per speech therapy  -Aspiration precautions  -Consult nutrition for education and optimization given stroke, modified diet, and uncontrolled diabetes  -VFSS was planned for 9/20, however cancelled due to lethargy.  Will repeat when appropriate based on more consistent level of alertness.     #Bowels: As needed Senokot-S, MiraLAX, and Dulcolax suppository     #Hypokalemia  -Ongoing and in the setting of poor po intake.  K remains 3.3 this morning.  Will start on scheduled replacement 20 mEq daily and continue to monitor.     #GI prophylaxis  -Protonix 40 mg daily given DAPT and age        5)  #ALEX  -Baseline creatinine ~1.1, with a peak of 2.13 upon admission  -Had improved, then Cr increasing at 1.63 on 9/20,  likely pre-renal given lethargy and poor po intake.  Improved to 1.43 today following IV fluids  (9/19-9/20).  Continue to monitor, encourage fluids.  -Continue holding Cozaar, hydrochlorothiazide, and spironolactone.       #Urinary retention and hematuria  -Felix placed on 9/15  -Urology consulted and recommend CT urogram once creatinine normalizes and outpatient follow-up for cystoscopy to complete hematuria work-up.  -Recurrent hematuria noted today, family and nursing note patient pulling at tube frequently, may be 2/2 trauma.  Hgb slightly decreased at 12.0 (prior 12.6).  -Can perform voiding trial after 1 week from date of Felix placement (~9/22)  -Flomax 0.4 mg daily started, will continue        6)DVT prophylaxis  -Mechanical.  Will hold from chemoprophylaxis given hematuria and already on dual anti-platelet therapy        7)Pain  -Tylenol PRN        8)Endo  #Uncontrolled DM II (HbA1c 11.5)  -PTA was on Levemir 20 units nightly, NovoLog with meals with carb coverage, and Metformin 500 mg twice daily.  Unclear how compliant he was with his medications.  -Endocrine assistance appreciated.  Had hypoglycemic episodes overnight 9/19-9/20 and adjustments made as below.  Awaiting any further changes today.    -  Decrease Levemir  to 20 units daily PM (2000) - adjust time to 2000 - following low     -  Novolog 1 unit(s) per 5 g cho breakfast, 1 per 6 lunch and snacks between 0800 - 1800 and decrease 1 per 12 g cho dinner and snacks from 1801 - 0759    -  Novolog high resistance sliding scale insulin TID AC    -  Decrease Novolog medium resistance HS    -  BG monitoring TID AC, HS and 0200    -  Will consider adding additional agent -> GLP-1, DPP4 or SGLT2 less appropriate    -  Will do test claim for GLP-1 Monday     -  No resumption of Metformin at this time - will consider resumption ongoing    -  Hypoglycemia protocol    -  Carb counting protocol    -  Will follow        9)Heme  -Hgb 12.0 on 9/21.  Overall stable.  -Continue to monitor, especially in the setting of hematuria.           10)Psych  -Monitor  mood, will consult health psychology if needed  -Melatonin PRN for sleep           11)Social/Dispo  -Goals for discharge home at a supervision level for ambulation, mobility, and ADLs.  -ELOS: Tentative discharge date 10/8/21  -Rehab prognosis: Fair  -Follow up appointments: PCP, N General neurology clinic 6-8 weeks, urology for hematuria     Code status: Full Code (unable to discuss with patient given somnolence upon admission.  Have continued prior CODE STATUS and discussed with the son who confirms that his prior wishes were to be full code)       Patient discussed with Dr. Juan Miguel Campo, PM&R staff physician     NATALIE Lopez-C  Physical Medicine & Rehabilitation

## 2021-09-21 NOTE — PLAN OF CARE
"Alert, orientation difficult to assess. Patient was able to follow commands; shakes head \"yes\" \"no\" to questions. A2 for bed mobility and positioning. Good appetite, ate 75% with 1:1 assist. Scant hematuria noted to ordoñez catheter; patient observed pulling at brief and gown. Denies pain.  and 241. No acute changes, continue POC.      "

## 2021-09-21 NOTE — PLAN OF CARE
FOCUS/GOAL  Medical management    ASSESSMENT, INTERVENTIONS AND CONTINUING PLAN FOR GOAL:  Restless at shift change, patient is pulling is ordoñez, ripped his brief.  Has a small BM in his brief, toileted, repositioned and Ordoñez secured. Ordoñez out 1000 ml of bloody urine. Provider informed via sticky note. No signs of pain noted. Patient on Potassium replacement  ( RN managed).  BMP and CBC scheduled this morning. Lab contacted Around 730 as no result showing for potassium level.  Phlebotomist  in unit to draw the lab. Result TBD. Will continue with POC.

## 2021-09-21 NOTE — PROGRESS NOTES
Discharge Planner Post-Acute Rehab OT:      Discharge Plan: home with 24/7 assistance vs TCU     Precautions: falls, hx of right sided weakness.      Current Status:  ADLs: Total A for shower transfer using TIS commode chair, Total A for LB dressing tasks, Max A for UB dressing tasks, Total A for toilet tx/transfers using michell lift and commode chair, Min A for thoroughness with g/h tasks.  IADLs: Family support  Vision/Cognition: Wears glasses all the time. Lethargic, follows directions when alert.      Assessment: Fabricated resting hand splint: palmar based/forearm length with wrist. Purpose of splint to counteract flexor tone/contracture and provide functional hand/instrinsic plus position.      Other Barriers to Discharge (DME, Family Training, etc):   Family training  AE/DME pending progress

## 2021-09-22 ENCOUNTER — APPOINTMENT (OUTPATIENT)
Dept: SPEECH THERAPY | Facility: CLINIC | Age: 71
DRG: 057 | End: 2021-09-22
Attending: PHYSICAL MEDICINE & REHABILITATION
Payer: MEDICARE

## 2021-09-22 ENCOUNTER — APPOINTMENT (OUTPATIENT)
Dept: OCCUPATIONAL THERAPY | Facility: CLINIC | Age: 71
DRG: 057 | End: 2021-09-22
Attending: PHYSICAL MEDICINE & REHABILITATION
Payer: MEDICARE

## 2021-09-22 ENCOUNTER — APPOINTMENT (OUTPATIENT)
Dept: PHYSICAL THERAPY | Facility: CLINIC | Age: 71
DRG: 057 | End: 2021-09-22
Attending: PHYSICAL MEDICINE & REHABILITATION
Payer: MEDICARE

## 2021-09-22 LAB
GLUCOSE BLDC GLUCOMTR-MCNC: 105 MG/DL (ref 70–99)
GLUCOSE BLDC GLUCOMTR-MCNC: 156 MG/DL (ref 70–99)
GLUCOSE BLDC GLUCOMTR-MCNC: 183 MG/DL (ref 70–99)
GLUCOSE BLDC GLUCOMTR-MCNC: 190 MG/DL (ref 70–99)
GLUCOSE BLDC GLUCOMTR-MCNC: 230 MG/DL (ref 70–99)
PATH REPORT.COMMENTS IMP SPEC: NORMAL
PATH REPORT.FINAL DX SPEC: NORMAL
PATH REPORT.GROSS SPEC: NORMAL
PATH REPORT.RELEVANT HX SPEC: NORMAL
POTASSIUM BLD-SCNC: 3.8 MMOL/L (ref 3.4–5.3)

## 2021-09-22 PROCEDURE — 97535 SELF CARE MNGMENT TRAINING: CPT | Mod: GO

## 2021-09-22 PROCEDURE — 92507 TX SP LANG VOICE COMM INDIV: CPT | Mod: GN

## 2021-09-22 PROCEDURE — 128N000003 HC R&B REHAB

## 2021-09-22 PROCEDURE — 250N000013 HC RX MED GY IP 250 OP 250 PS 637: Performed by: PHYSICIAN ASSISTANT

## 2021-09-22 PROCEDURE — 97530 THERAPEUTIC ACTIVITIES: CPT | Mod: GP

## 2021-09-22 PROCEDURE — 99233 SBSQ HOSP IP/OBS HIGH 50: CPT | Performed by: NURSE PRACTITIONER

## 2021-09-22 PROCEDURE — 92523 SPEECH SOUND LANG COMPREHEN: CPT | Mod: GN | Performed by: SPEECH-LANGUAGE PATHOLOGIST

## 2021-09-22 PROCEDURE — 36415 COLL VENOUS BLD VENIPUNCTURE: CPT | Performed by: PHYSICAL MEDICINE & REHABILITATION

## 2021-09-22 PROCEDURE — 84132 ASSAY OF SERUM POTASSIUM: CPT | Performed by: PHYSICAL MEDICINE & REHABILITATION

## 2021-09-22 PROCEDURE — 92526 ORAL FUNCTION THERAPY: CPT | Mod: GN

## 2021-09-22 PROCEDURE — 250N000013 HC RX MED GY IP 250 OP 250 PS 637: Performed by: PHYSICAL MEDICINE & REHABILITATION

## 2021-09-22 PROCEDURE — 99232 SBSQ HOSP IP/OBS MODERATE 35: CPT | Mod: AI | Performed by: PHYSICAL MEDICINE & REHABILITATION

## 2021-09-22 RX ORDER — LIRAGLUTIDE 6 MG/ML
1.2 INJECTION SUBCUTANEOUS DAILY
Status: DISCONTINUED | OUTPATIENT
Start: 2021-09-23 | End: 2021-09-25

## 2021-09-22 RX ADMIN — POTASSIUM CHLORIDE 20 MEQ: 750 TABLET, EXTENDED RELEASE ORAL at 09:11

## 2021-09-22 RX ADMIN — AMANTADINE HYDROCHLORIDE 100 MG: 100 CAPSULE ORAL at 09:10

## 2021-09-22 RX ADMIN — ATENOLOL 25 MG: 25 TABLET ORAL at 09:11

## 2021-09-22 RX ADMIN — TAMSULOSIN HYDROCHLORIDE 0.4 MG: 0.4 CAPSULE ORAL at 09:11

## 2021-09-22 RX ADMIN — LIRAGLUTIDE 0.6 MG: 6 INJECTION SUBCUTANEOUS at 09:23

## 2021-09-22 RX ADMIN — ATENOLOL 25 MG: 25 TABLET ORAL at 22:10

## 2021-09-22 RX ADMIN — ASPIRIN 81 MG CHEWABLE TABLET 81 MG: 81 TABLET CHEWABLE at 09:10

## 2021-09-22 RX ADMIN — AMLODIPINE BESYLATE 10 MG: 10 TABLET ORAL at 09:12

## 2021-09-22 RX ADMIN — CLOPIDOGREL BISULFATE 75 MG: 75 TABLET, FILM COATED ORAL at 09:11

## 2021-09-22 RX ADMIN — ATORVASTATIN CALCIUM 80 MG: 80 TABLET, FILM COATED ORAL at 09:11

## 2021-09-22 RX ADMIN — PANTOPRAZOLE SODIUM 40 MG: 40 TABLET, DELAYED RELEASE ORAL at 06:48

## 2021-09-22 NOTE — PLAN OF CARE
Discharge Planner Post-Acute Rehab PT:     Discharge Plan: Home with 24/7 vs [facility] vs TCU pending progress    Precautions: Fall, alarms    Current Status:  Bed Mobility: mod A if alert  Transfer: squat pivot A x 2, liko with RN staff  Gait: 50 ft with quad cane, CGA  Stairs: Not safe  Balance: 1/36 PASS - although not truly reflective of capability due to drastic performance fluctuations with command following and lethargy.    Assessment: Alertness improving again - amb with CGA x 1 using quad cane in L hand. Unable to trial stairs d/t waning alertness and very slow processing.     Other Barriers to Discharge (DME, Family Training, etc): Safe discharge plan - will need 24/7 cares, level of assist/equipment pending lethargy/participation

## 2021-09-22 NOTE — PROGRESS NOTES
"  General acute hospital   Acute Rehabilitation Unit  Daily progress note    INTERVAL HISTORY  Pt is seen sitting in wheelchair with daughter present as well.  No acute events overnight.  He is able to keep his eyes open and nod \"yes or no\", but remains non-verbal.  He shook his head \"no\" when asked if he is having any chest pain or shortness of breath.  With therapy yesterday he was able to ambulate 100 ft with HHA on both sides with improved alertness, although was immediately somnolent after completion of ambulation.  Squat pivot assist x2, Liko with RN staff.  Upgraded to level 2 liquids.      MEDICATIONS  Scheduled:    amantadine  100 mg Oral Daily     amLODIPine  10 mg Oral Daily     aspirin  81 mg Oral Daily     atenolol  25 mg Oral BID     atorvastatin  80 mg Oral Daily     clopidogrel  75 mg Oral Daily     insulin aspart  1-5 Units Subcutaneous At Bedtime     insulin aspart   Subcutaneous QAM AC     insulin aspart   Subcutaneous Daily with lunch     insulin aspart   Subcutaneous Daily with supper     insulin aspart  1-10 Units Subcutaneous TID AC     insulin detemir  18 Units Subcutaneous Q24H     liraglutide  0.6 mg Subcutaneous Daily     pantoprazole  40 mg Oral QAM AC     potassium chloride  20 mEq Oral Daily     sodium chloride (PF)  3 mL Intracatheter Q8H     tamsulosin  0.4 mg Oral Daily        PRN:  acetaminophen, bisacodyl, glucose **OR** dextrose **OR** glucagon, hydrALAZINE, insulin aspart, insulin aspart, melatonin, ondansetron, polyethylene glycol, senna-docusate       PHYSICAL EXAM  Patient Vitals for the past 24 hrs:   BP Temp Temp src Pulse Resp SpO2   09/22/21 0800 125/73 -- -- 84 -- 97 %   09/22/21 0628 111/69 97  F (36.1  C) Oral 88 16 96 %   09/21/21 2153 133/83 -- -- -- -- --   09/21/21 1911 130/66 (!) 96.1  F (35.6  C) Oral 71 16 98 %   09/21/21 1500 124/71 97.5  F (36.4  C) Oral 70 20 98 %       GEN: NAD, awake with eyes open  HEENT: NC/AT  CVS: RRR, S1+S2, no " "m/r/g  PULM: CTA b/l, no w/r/r  ABD: Soft, NT, ND, bowel sounds present  : Felix in place, no hematuria noted  EXT: No LE edema or calf tenderness b/l  Neuro: Awake, non-verbal but shakes head \"yes or no\"    LABS  CBC RESULTS: Recent Labs   Lab Test 09/21/21  0830   WBC 7.3   RBC 4.49   HGB 12.0*   HCT 38.1*   MCV 85   MCH 26.7   MCHC 31.5   RDW 13.2          Last Comprehensive Metabolic Panel:  Sodium   Date Value Ref Range Status   09/21/2021 143 133 - 144 mmol/L Final   08/16/2012 140 133 - 144 mmol/L Final     Potassium   Date Value Ref Range Status   09/22/2021 3.8 3.4 - 5.3 mmol/L Final   08/16/2012 4.0 3.4 - 5.3 mmol/L Final     Chloride   Date Value Ref Range Status   09/21/2021 111 (H) 94 - 109 mmol/L Final   08/16/2012 101 94 - 109 mmol/L Final     Carbon Dioxide   Date Value Ref Range Status   08/16/2012 26 20 - 32 mmol/L Final     Carbon Dioxide (CO2)   Date Value Ref Range Status   09/21/2021 30 20 - 32 mmol/L Final     Anion Gap   Date Value Ref Range Status   09/21/2021 2 (L) 3 - 14 mmol/L Final   08/16/2012 13 6 - 17 mmol/L Final     Glucose   Date Value Ref Range Status   09/21/2021 155 (H) 70 - 99 mg/dL Final   08/16/2012 161 (H) 60 - 99 mg/dL Final     GLUCOSE BY METER POCT   Date Value Ref Range Status   09/22/2021 105 (H) 70 - 99 mg/dL Final     Urea Nitrogen   Date Value Ref Range Status   09/21/2021 24 7 - 30 mg/dL Final   08/16/2012 18 7 - 30 mg/dL Final     Creatinine   Date Value Ref Range Status   09/21/2021 1.43 (H) 0.66 - 1.25 mg/dL Final   08/16/2012 0.96 0.66 - 1.25 mg/dL Final     GFR Estimate   Date Value Ref Range Status   09/21/2021 49 (L) >60 mL/min/1.73m2 Final     Comment:     As of July 11, 2021, eGFR is calculated by the CKD-EPI creatinine equation, without race adjustment. eGFR can be influenced by muscle mass, exercise, and diet. The reported eGFR is an estimation only and is only applicable if the renal function is stable.   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  Hospital course complicated by acute kidney injury, hypokalemia needing repletion, and urinary retention and hematuria needing Felix catheter and urology consult.         Impairment group code: Stroke Ischemic 01.3 Bilateral Involvement, new R Thalamic stroke in setting of R sided weakness from prior stroke        PLAN  Rehabilitation  -Continue with inpatient rehabilitation program including PT, OT, and SLP for 3 hrs/day, rehab nursing, social work, nutrition, and close monitoring by physiatry for an estimated total of 3 weeks.  -The patient has impairments in strength, balance, endurance, and coordination, which are leading to activity limitations in ambulation, mobility, ADLs, and swallowing.      Medical  1)Neurology  #R thalamic CVA due to small vessel occlusion  -DAPT with ASA 81 mg and Plavix 75 mg for 1 month, followed by Plavix alone indefinitely.  Recommended check P2Y12 level to ensure Plavix efficacy after 1 month of being on Plavix only.  -Hypertension, lipid, and glucose management as below.  Will need ongoing education regarding stroke risk factor modifications including importance of medication compliance and diet and lifestyle changes  -Follow-up with [facility] general neurology in 6 to 8 weeks  -Monitor somnolence/lethargy.  Decreased level  of alertness not unexpected with essentially bilateral thalamic pathology (new right thalamic stroke and a history of left thalamic stroke).    -Amantadine 100 mg daily started on 9/20 for neurostimulation given ongoing somnolence which is limiting participation.  Remains fluctuating but overall seems somewhat improved, continue to monitor.  -High risk for delirium, ensure delirium precautions and appropriate sleep-wake cycle        2)CVS  #HTN  -PTA on amlodipine 10 mg daily, atenolol 50 mg twice daily, Cozaar 100 mg daily, spironolactone 25 mg daily, and hydrochlorothiazide 25 mg daily.  Cozaar, spironolactone, and hydrochlorothiazide are being held due to acute kidney injury  -continue amlodipine 10 mg daily, and atenolol at a lower dose of 25 mg twice daily for now.  Titrate doses as needed.  -Hydralazine as needed for SBP greater than 180 mmHg     #HLD  -Continue Lipitor 80 mg daily.  LDL 92 on 9/15        3)Pulmonary  -No acute issues but low lung volumes and atelectasis/infiltrate seen at the left lung base on chest x-ray 9/14.  Clinically no signs of pneumonia but will continue to monitor.  -Encourage incentive spirometry        4)FENGI  #Diet: Level 4 solids and now upgraded to level 2 liquids.  Upgrade further as able per speech therapy  -Aspiration precautions  -Consult nutrition for education and optimization given stroke, modified diet, and uncontrolled diabetes  -VFSS was planned for 9/20, however cancelled due to lethargy.  Will repeat when appropriate based on more consistent level of alertness.     #Bowels: As needed Senokot-S, MiraLAX, and Dulcolax suppository     #Hypokalemia  -Ongoing and in the setting of poor po intake.  Started scheduled replacement 20 mEq daily, K 3.8 today.     #GI prophylaxis  -Protonix 40 mg daily given DAPT and age        5)  #ALEX  -Baseline creatinine ~1.1, with a peak of 2.13 upon admission  -Had improved, then Cr increasing at 1.63 on 9/20,  likely pre-renal given  lethargy and poor po intake.  Improved to 1.43 on 9/21 following IV fluids (9/19-9/20).  Continue to monitor, encourage fluids.  -Continue holding Cozaar, hydrochlorothiazide, and spironolactone.        #Urinary retention and hematuria  -Felix placed on 9/15  -Urology consulted and recommend CT urogram once creatinine normalizes and outpatient follow-up for cystoscopy to complete hematuria work-up.  -Recurrent hematuria noted, family and nursing note patient pulling at tube frequently, may be 2/2 trauma.  -Will do voiding trial today and monitor voids and PVRs with intermittent caths as needed.  -Flomax 0.4 mg daily started, will continue        6)DVT prophylaxis  -Mechanical.  Will hold from chemoprophylaxis given hematuria and already on dual anti-platelet therapy        7)Pain  -Tylenol PRN        8)Endo  #Uncontrolled DM II (HbA1c 11.5)  -PTA was on Levemir 20 units nightly, NovoLog with meals with carb coverage, and Metformin 500 mg twice daily.  Unclear how compliant he was with his medications.  -Endocrine assistance appreciated.     -decrease Levemir  to 18 units from 20 units daily PM (2000)    -  continue Novolog 1 unit(s) per 4 g cho breakfast, 1 per 5 lunch and snacks between 0800 - 1800 and continue 1 per 12 g cho dinner and snacks from 1801 - 0759    -  Novolog high resistance sliding scale insulin TID AC    -  continue Novolog medium resistance HS    -  Start Victoza 0.6 mg subcutaneous daily tonight    -  BG monitoring TID AC, HS and 0200    -  No resumption of Metformin at this time - will consider resumption ongoing    -  Hypoglycemia protocol    -  Carb counting protocol    -  Will follow        9)Heme  -Hgb 12.0 on 9/21.  Overall stable.  -Continue to monitor, especially in the setting of hematuria.           10)Psych  -Monitor mood, will consult health psychology if needed  -Melatonin PRN for sleep           11)Social/Dispo  -Goals for discharge home at a supervision level for ambulation,  mobility, and ADLs.  -ELOS: Tentative discharge date 10/8/21  -Rehab prognosis: Fair  -Follow up appointments: PCP, N General neurology clinic 6-8 weeks, urology for hematuria     Code status: Full Code (unable to discuss with patient given somnolence upon admission.  Have continued prior CODE STATUS and discussed with the son who confirms that his prior wishes were to be full code)    Juan Miguel Campo MD  Department of Rehabilitation Medicine    Time Spent on this Encounter   I, Juan Miguel Campo, spent a total of 25 minutes face-to-face or managing the care of Jimmy Otto. Over 50% of my time on the unit was spent counseling the patient and coordinating care. See note for details.

## 2021-09-22 NOTE — PLAN OF CARE
FOCUS/GOAL  Bowel management, Bladder management, Medication management, Pain management, Medical management, Mobility, Skin integrity, and Safety management    ASSESSMENT, INTERVENTIONS AND CONTINUING PLAN FOR GOAL:    Pt is alert, unable to assess orientation as pt did not respond to questions. Incontinent of bowel, incontinence pad utilized, ordoñez catheter in place. Liko lift transfers. Pureed diet, mildly thick liquids, pills crushed in apple sauce. PIV patent and saline locked. Unable to assess pain, nausea and vomiting, numbness or tingling, SOB, pt appears comfortable. Alarms on, call light within reach. Will continue with plan of care.

## 2021-09-22 NOTE — PROGRESS NOTES
"Discharge Planner Post-Acute Rehab OT:      Discharge Plan: home with 24/7 assistance vs TCU     Precautions: falls, hx of right sided weakness.      Current Status:  ADLs: Total A for shower transfer using TIS commode chair, Max A don pants, Mod A socks, Max A for UB dressing tasks, Total A for toilet tx/transfers using michell lift and commode chair, Min A for thoroughness with g/h tasks.  IADLs: Family support  Vision/Cognition: Wears glasses all the time. Lethargic, follows directions when alert.      Assessment:  tx focus adls, see gg flowsheet for performance details. Pt with good participation as able limited by periods of drowsiness.  Nodding yes/no to questions in context of tx, attempted to verbalize/mouth \"after\" when asking if he would like to do oral hygeine before/after breakfast. Improved with donning socks today with optimal positioning during more alert period. Cont per POC     Other Barriers to Discharge (DME, Family Training, etc):   Family training  AE/DME pending progress    "

## 2021-09-22 NOTE — PLAN OF CARE
No signs of pain noted, pink urine noted in catheter drainage bag before it was discontinued. Urethral catheter removed per provider orders, scant amount of blood noted at the tip of the cathter after it was removed. Patient a little bit more awake today but did have periods of sleepiness whilst her daughter was attempting to feed him lunch. Patient did end up eating half of his roast beef and mash potato and gravy. Had good appetite at breakfast also. Bg's today 105 and 156. Daughter at bedside and hands on, will continue Poc

## 2021-09-22 NOTE — PLAN OF CARE
Discharge Planner Post-Acute Rehab SLP:      Discharge Plan:home with assist, likely further SLP     Precautions:fall, swallowing     Current Status:  Communication: to be assessed, pt limited voicing/aphonic, follows simple directions, some mild aphasia/dysarthria baseline per son, worsened now  Cognition: to be assessed  Swallow: IDDSI 4,- pureed  mildly thick fluids (2),  1:1 for po/meals, assist to feed PRN, slow pace, small bites, no straw, VFSS PRN (order received, on hold)     Assessment:  Pt seen with IDD 5 (minced moist) texture and mildly thick liquids. mildly thick liquids not directly on tray. Pt with limited initaiton initally with texture and required max verbal and tactile cues to initate response and self feed. Pt with prolonged mastication, reduced AP transit, and moderate collection of residuals in oral cavity following completion of initial swallow. SLP cued pt to take subsequent sip liquid to clear bolus remaining in oral cavity. Strategy was effective. However, given cognition and reduced initiation, do not suspect pt would be able to complete this independently. After initial cueing, pt able to feed himself more independently. Pt with overall fatigue from IDD 5 texture and do not feel pt ready for upgrade at this time. Pt would benefit from ongoing trials with texture via SLP meal follow ups to gain increased in experience. Daughter present for session and educated re: clinical impressions and recommendations. Plan to conduct VFSS when pt becomes appropriate. No s/sx aspiration noted with mildly thick liquids.     Also conducted further communication evaluation.       Other Barriers to Discharge (Family Training, etc): TBD

## 2021-09-22 NOTE — PLAN OF CARE
FOCUS/GOAL  Medical management    ASSESSMENT, INTERVENTIONS AND CONTINUING PLAN FOR GOAL:  Slept good. Felix patent, still having bloody urine. Output 800 ml  . Splint on  R hand all night long refrained him to pull his catheter. No BM this shift.  Lab ordered for  Potassium level result 3.8.  PIV patent, no S/S of infection noted. Will continue with POC.

## 2021-09-22 NOTE — PLAN OF CARE
FOCUS/GOAL  Bowel management, Bladder management, Medication management, Pain management, Medical management, Mobility, Skin integrity, and Safety management     ASSESSMENT, INTERVENTIONS AND CONTINUING PLAN FOR GOAL:     Pt is alert, unable to assess orientation. Incontinent of bladder, incontinence pad utilized. PIV is no longer patent, taken out this shift. Noticed soft bulge on left arm (upper), marked and sticky note left for provider. Liko lift transfers. Pureed diet, mildly thick liquids, pills crushed in apple sauce. Denied pain, nausea and vomiting, numbness or tingling, SOB, pt appears comfortable. Alarms on, call light within reach. Will continue with plan of care.

## 2021-09-22 NOTE — PROGRESS NOTES
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  Hospital course complicated by acute kidney injury, hypokalemia needing repletion, and urinary retention and hematuria needing Felix catheter and urology consult.     Admission to inpatient rehab:  9/17/21  Impairment group code: Stroke Ischemic 01.3 Bilateral Involvement, new R Thalamic stroke in setting of R sided weakness from prior stroke        Assessment:     1)  Type 2 Diabetes Mellitus; poor control.  A1c 11.5%  2)  S/p stroke; R hemiparesis  3)  ALEX/CKD     Plan:       - decrease Levemir  to 14 units from 18 units  daily PM (2000)    -  continue Novolog 1 unit(s) per 4 g cho breakfast, 1 per 5 lunch and snacks between 0800 - 1800 and increase 1 per 8 g cho from 1:12g dinner and snacks from 1801 - 0759    -  Novolog high resistance sliding scale insulin TID AC    -  continue Novolog medium resistance HS    -  Increase Victoza to 1.2 mg tomorrow am    -  BG monitoring TID AC, HS and 0200    -  No resumption of Metformin at this time - will consider resumption ongoing    -  Hypoglycemia protocol    -  Carb counting protocol    -  Will follow       Outpatient diabetes follow up: TBD    Plan discussed with patient, family member - bedside RN, and primary team via this note.           Interval History:     The last 24 hours progress and nursing notes reviewed. BG trend below:        Yesterday had pre prandial BG in 200s. Victoza started at 0.6 mg yesterday.  Levemir decreased to 18 units.   this am.  Patient worked with therapies  "this am, now asleep in bed.  Daughter not at bedside at this time.  No reports of diarrhea, abdominal pain, nausea/vomiting at this time.         Recent Labs   Lab 09/22/21  1153 09/22/21  0746 09/22/21  0207 09/21/21  2107 09/21/21  1717 09/21/21  1110   * 105* 183* 264* 207* 202*           Nutrition:     Orders Placed This Encounter      Combination Diet Consistent Carb 60 Grams CHO per Meal Diet; Pureed Diet (level 4); Mildly Thick (level 2)    Supplements: any supplement PRN        PTA Regimen:        Levemir 28 units at bedtime  \"Novolog - varied doses based on chos\"  Clinic notes reveal:  Novolog 2 unit(s) per carb choice (2per 15)  Novolog 1 unit(s) per 50> 150     Metformin 500 mg BID (dose reduced for decreased renal fxn)     BG monitoring frequency:  FreeStyle Sherwin - intermittent use     Diet: regular, no restrictions  Eats sporadically - AM - bagel or banana  Lunch - sporadic  Dinner - rice/beg/meat  Loves to drink [employer]     Exercise: sedentary             Review of Systems:   CC:  Non-verbal/resting most of visit.  Does open eyes and nod or shake head to questions.  Denies pain          Medications:   Steroid planning:  No       Physical Exam:   /73 (BP Location: Left arm)   Pulse 84   Temp 97  F (36.1  C) (Oral)   Resp 16   Ht 1.626 m (5' 4\")   Wt 66.2 kg (145 lb 15.1 oz)   SpO2 97%   BMI 25.05 kg/m       General:  resting in chair, son in law at bedside. R sided deficts  HEENT: NC/AT, PER and anicteric, non-injected, oral mucous membranes moist.   Lungs: non-labored, no cough,  no SOB  ABD:  rounded   Skin: warm and dry, no obvious lesions  Feet:  CMS intact  MSK:  fluid movement of L extremities - R sided deficits  Lymp:  no LE edema   Mental status:  alert, oriented x3, unable to communicate verbally - nods and smiles  Psych:  calm, appropriate mood, approrpriate affect         Data:     Lab Results   Component Value Date    A1C 11.5 09/15/2021    A1C 7.6 08/14/2012 "            CBC RESULTS: Recent Labs   Lab Test 09/20/21  0824   WBC 8.8   RBC 4.75   HGB 12.6*   HCT 40.1   MCV 84   MCH 26.5   MCHC 31.4*   RDW 13.4        Recent Labs   Lab Test 09/20/21  0415 09/20/21  0300 09/19/21  1735 09/19/21  1347   *  --   --  139   POTASSIUM 3.3*  --   --  4.0   CHLORIDE 111*  --   --  107   CO2 28  --   --  27   ANIONGAP 6  --   --  5   * 55*   < > 244*   BUN 30  --   --  26   CR 1.63*  --   --  1.52*   ARLETH 8.6  --   --  8.7    < > = values in this interval not displayed.       Liver Function Studies -   Recent Labs   Lab Test 09/15/21  0521   PROTTOTAL 6.6*   ALBUMIN 2.6*   BILITOTAL 0.3   ALKPHOS 80   AST 20   ALT 22     Lab Results   Component Value Date    INR 1.03 09/16/2021    INR 0.94 09/14/2021    INR 1.00 08/16/2012    INR 0.96 08/13/2012       FEMI Reynolds, CNP  Inpatient Diabetes Management Service  Pager - [phone]  Friday - Monday 0800 - 1600 hrs  After hours above - Diabetes Management Team job code: [id number]    I spent a total of 35 minutes bedside and on the inpatient unit managing glycemic care.  Over 50% of my time on the unit was spent counseling the patient and/or coordinating care regarding acute hyperglycemic management.  See note for details.

## 2021-09-23 ENCOUNTER — APPOINTMENT (OUTPATIENT)
Dept: OCCUPATIONAL THERAPY | Facility: CLINIC | Age: 71
DRG: 057 | End: 2021-09-23
Attending: PHYSICAL MEDICINE & REHABILITATION
Payer: MEDICARE

## 2021-09-23 ENCOUNTER — APPOINTMENT (OUTPATIENT)
Dept: SPEECH THERAPY | Facility: CLINIC | Age: 71
DRG: 057 | End: 2021-09-23
Attending: PHYSICAL MEDICINE & REHABILITATION
Payer: MEDICARE

## 2021-09-23 LAB
GLUCOSE BLDC GLUCOMTR-MCNC: 134 MG/DL (ref 70–99)
GLUCOSE BLDC GLUCOMTR-MCNC: 180 MG/DL (ref 70–99)
GLUCOSE BLDC GLUCOMTR-MCNC: 180 MG/DL (ref 70–99)
GLUCOSE BLDC GLUCOMTR-MCNC: 201 MG/DL (ref 70–99)
GLUCOSE BLDC GLUCOMTR-MCNC: 215 MG/DL (ref 70–99)

## 2021-09-23 PROCEDURE — 92526 ORAL FUNCTION THERAPY: CPT | Mod: GN

## 2021-09-23 PROCEDURE — 99233 SBSQ HOSP IP/OBS HIGH 50: CPT | Performed by: NURSE PRACTITIONER

## 2021-09-23 PROCEDURE — 128N000003 HC R&B REHAB

## 2021-09-23 PROCEDURE — 99232 SBSQ HOSP IP/OBS MODERATE 35: CPT | Performed by: PHYSICIAN ASSISTANT

## 2021-09-23 PROCEDURE — 92507 TX SP LANG VOICE COMM INDIV: CPT | Mod: GN

## 2021-09-23 PROCEDURE — 97530 THERAPEUTIC ACTIVITIES: CPT | Mod: GO | Performed by: STUDENT IN AN ORGANIZED HEALTH CARE EDUCATION/TRAINING PROGRAM

## 2021-09-23 PROCEDURE — 250N000013 HC RX MED GY IP 250 OP 250 PS 637: Performed by: PHYSICAL MEDICINE & REHABILITATION

## 2021-09-23 PROCEDURE — 250N000013 HC RX MED GY IP 250 OP 250 PS 637: Performed by: PHYSICIAN ASSISTANT

## 2021-09-23 RX ORDER — AMOXICILLIN 250 MG
1 CAPSULE ORAL AT BEDTIME
Status: DISCONTINUED | OUTPATIENT
Start: 2021-09-23 | End: 2021-10-06

## 2021-09-23 RX ADMIN — ATORVASTATIN CALCIUM 80 MG: 80 TABLET, FILM COATED ORAL at 07:35

## 2021-09-23 RX ADMIN — DOCUSATE SODIUM AND SENNOSIDES 1 TABLET: 8.6; 5 TABLET ORAL at 13:40

## 2021-09-23 RX ADMIN — AMLODIPINE BESYLATE 10 MG: 10 TABLET ORAL at 07:35

## 2021-09-23 RX ADMIN — LIRAGLUTIDE 1.2 MG: 6 INJECTION SUBCUTANEOUS at 09:05

## 2021-09-23 RX ADMIN — ATENOLOL 25 MG: 25 TABLET ORAL at 21:59

## 2021-09-23 RX ADMIN — AMANTADINE HYDROCHLORIDE 100 MG: 100 CAPSULE ORAL at 07:35

## 2021-09-23 RX ADMIN — ATENOLOL 25 MG: 25 TABLET ORAL at 07:36

## 2021-09-23 RX ADMIN — DOCUSATE SODIUM AND SENNOSIDES 1 TABLET: 8.6; 5 TABLET ORAL at 21:58

## 2021-09-23 RX ADMIN — SENNOSIDES AND DOCUSATE SODIUM 1 TABLET: 8.6; 5 TABLET ORAL at 22:00

## 2021-09-23 RX ADMIN — ASPIRIN 81 MG CHEWABLE TABLET 81 MG: 81 TABLET CHEWABLE at 07:35

## 2021-09-23 RX ADMIN — PANTOPRAZOLE SODIUM 40 MG: 40 TABLET, DELAYED RELEASE ORAL at 06:42

## 2021-09-23 RX ADMIN — POTASSIUM CHLORIDE 20 MEQ: 750 TABLET, EXTENDED RELEASE ORAL at 07:35

## 2021-09-23 RX ADMIN — ACETAMINOPHEN 975 MG: 325 TABLET, FILM COATED ORAL at 13:45

## 2021-09-23 RX ADMIN — CLOPIDOGREL BISULFATE 75 MG: 75 TABLET, FILM COATED ORAL at 07:36

## 2021-09-23 RX ADMIN — TAMSULOSIN HYDROCHLORIDE 0.4 MG: 0.4 CAPSULE ORAL at 07:35

## 2021-09-23 NOTE — PLAN OF CARE
-60 mins PT d/t somnolence. Pt unable to open eyes or respond to verbal cues. Attempted to raise HOB and provide sternal rubbing to rouse. This was unsuccessful. Per nursing, pt was awake all night. Assisted pt to reposition safely in bed and notified RN and PA regarding pt's status.    Consider set schedule starting later in AM to allow for improved sleep/wake consistency.

## 2021-09-23 NOTE — PROGRESS NOTES
"  [facility]   Acute Rehabilitation Unit  Daily progress note    INTERVAL HISTORY  Patient is seen this morning at bedside, daughter present.  No acute events reported overnight, although patient noted to be more lethargic this morning and not able to participate in early therapies.  Nursing reporting a poor night of sleep although patient denies.  He is complaining of diffuse bilateral leg pain, but is unable to further describe.  Daughter is providing massage at time of visit, and will try Tylenol.  Patient denies other pain, shortness of breath, nausea.  Felix was removed and patient has been incontinent of urine.  PVRs elevated but improving.  Nursing noting soft \"bulge\" in left upper arm.  Per daughter, she had not noted this previously.  Patient denied pain in the area.    Functionally, he is able to complete bed mobility with mod assist if alert.  He completed squat pivot transfer with assist of 2 with therapies, continues lift with nursing.  He ambulates 50' with quad cane and contact guard assist.  Alertness has generally been improved, although more lethargic today.  SLP will repeat video swallow study when level of alertness improved more consistently.  He needs total assist for shower transfers, max assist for donning pants, mod assist for socks, max assist for upper body dressing, total assist for toileting, min assist for thoroughness with grooming/hygiene tasks.    MEDICATIONS  Scheduled:    amantadine  100 mg Oral Daily     amLODIPine  10 mg Oral Daily     aspirin  81 mg Oral Daily     atenolol  25 mg Oral BID     atorvastatin  80 mg Oral Daily     clopidogrel  75 mg Oral Daily     insulin aspart  1-7 Units Subcutaneous At Bedtime     insulin aspart   Subcutaneous QAM AC     insulin aspart   Subcutaneous Daily with lunch     insulin aspart   Subcutaneous Daily with supper     insulin aspart  1-10 Units Subcutaneous TID AC     insulin detemir  14 Units Subcutaneous " "Q24H     liraglutide  1.2 mg Subcutaneous Daily     pantoprazole  40 mg Oral QAM AC     potassium chloride  20 mEq Oral Daily     sodium chloride (PF)  3 mL Intracatheter Q8H     tamsulosin  0.4 mg Oral Daily        PRN:  acetaminophen, bisacodyl, glucose **OR** dextrose **OR** glucagon, hydrALAZINE, insulin aspart, insulin aspart, melatonin, ondansetron, polyethylene glycol, senna-docusate       PHYSICAL EXAM  Patient Vitals for the past 24 hrs:   BP Temp Temp src Pulse Resp SpO2   09/23/21 0931 -- -- -- 93 18 99 %   09/23/21 0721 126/68 98.5  F (36.9  C) Oral (!) 18 17 --   09/23/21 0651 128/70 96.8  F (36  C) Oral 63 16 99 %   09/22/21 2210 (!) 146/79 -- -- -- -- --   09/22/21 1500 115/70 (!) 96.5  F (35.8  C) Oral 87 18 97 %       GEN: NAD, more lethargic, kept eyes closed but responded to questions by nodding  HEENT: NC/AT  CVS: RRR, no murmurs  PULM: lungs CTA bilaterally, non-labored on room air  ABD: Soft, NT, ND, bowel sounds present  EXT: No LE edema or calf tenderness b/l  Neuro: Awake, non-verbal but shakes head \"yes or no\"  Skin: approximate 1.5 cm nodule in distal lateral upper arm (several inches proximally from antecubital fossa), soft, non-tender, mobile, no overlying erythema or warmth    LABS  CBC RESULTS: Recent Labs   Lab Test 09/21/21  0830 09/20/21  0824 09/19/21  1347   WBC 7.3 8.8 10.5   RBC 4.49 4.75 4.99   HGB 12.0* 12.6* 13.1*   HCT 38.1* 40.1 41.5   MCV 85 84 83   MCH 26.7 26.5 26.3*   MCHC 31.5 31.4* 31.6   RDW 13.2 13.4 13.2    227 234     Last Basic Metabolic Panel:  Recent Labs   Lab Test 09/23/21  0734 09/23/21  0246 09/22/21  2121 09/22/21  0746 09/22/21  0547 09/21/21  1110 09/21/21  0830 09/20/21  0515 09/20/21  0415 09/19/21  1735 09/19/21  1347   NA  --   --   --   --   --   --  143  --  145*  --  139   POTASSIUM  --   --   --   --  3.8  --  3.3*  --  3.3*   < > 4.0   CHLORIDE  --   --   --   --   --   --  111*  --  111*  --  107   CO2  --   --   --   --   --   --  30  " --  28  --  27   ANIONGAP  --   --   --   --   --   --  2*  --  6  --  5   * 180* 230*   < >  --    < > 155*   < > 112*   < > 244*   BUN  --   --   --   --   --   --  24  --  30  --  26   CR  --   --   --   --   --   --  1.43*  --  1.63*  --  1.52*   GFRESTIMATED  --   --   --   --   --   --  49*  --  42*  --  45*   ARLETH  --   --   --   --   --   --  8.9  --  8.6  --  8.7    < > = values in this interval not displayed.     Recent Labs   Lab 09/23/21  0734 09/23/21  0246 09/22/21  2121 09/22/21  1654 09/22/21  1153 09/22/21  0746   * 180* 230* 190* 156* 105*       IMPRESSION  Jimmy Otto is a 71 year old male with a PMH including but not limited to HTN, uncontrolled DM 2 (HbA1c 11.5), HLD, BPH, and history of a stroke in 2012 with residual right hemiparesis who presented to the emergency room on 9/14/2021 with altered mental status and acute worsening of right-sided weakness, and found to have an acute right thalamic stroke due to small vessel occlusion.  Hospital course complicated by acute kidney injury, hypokalemia needing repletion, and urinary retention and hematuria needing Felix catheter and urology consult.         Impairment group code: Stroke Ischemic 01.3 Bilateral Involvement, new R Thalamic stroke in setting of R sided weakness from prior stroke        PLAN  Rehabilitation  -Continue with inpatient rehabilitation program including PT, OT, and SLP for 3 hrs/day, rehab nursing, social work, nutrition, and close monitoring by physiatry for an estimated total of 3 weeks.  -The patient has impairments in strength, balance, endurance, and coordination, which are leading to activity limitations in ambulation, mobility, ADLs, and swallowing.      Medical  1)Neurology  #R thalamic CVA due to small vessel occlusion  - DAPT with ASA 81 mg and Plavix 75 mg for 1 month, followed by Plavix alone indefinitely.  Recommended check P2Y12 level to ensure Plavix efficacy after 1 month of being on Plavix only.  -  Hypertension, lipid, and glucose management as below.  Will need ongoing education regarding stroke risk factor modifications including importance of medication compliance and diet and lifestyle changes  - Follow-up with [facility] general neurology in 6 to 8 weeks  - Monitor somnolence/lethargy.  Decreased level of alertness not unexpected with essentially bilateral thalamic pathology (new right thalamic stroke and a history of left thalamic stroke).    - Amantadine 100 mg daily started on 9/20 for neurostimulation given ongoing somnolence which is limiting participation.  Remains fluctuating but overall seems somewhat improved.  Today more lethargic again, continue to monitor.  Therapist will try set schedule to target optimal times of day for alertness/participation.  - High risk for delirium, ensure delirium precautions and appropriate sleep-wake cycle        2)CVS  #HTN  - PTA on amlodipine 10 mg daily, atenolol 50 mg twice daily, Cozaar 100 mg daily, spironolactone 25 mg daily, and hydrochlorothiazide 25 mg daily.  Cozaar, spironolactone, and hydrochlorothiazide are being held due to acute kidney injury  - Continue amlodipine 10 mg daily, and atenolol at a lower dose of 25 mg twice daily for now.  Titrate doses as needed.  - Hydralazine as needed for SBP greater than 180 mmHg     #HLD  - Continue Lipitor 80 mg daily.  LDL 92 on 9/15        3)Pulmonary  - No acute issues but low lung volumes and atelectasis/infiltrate seen at the left lung base on chest x-ray 9/14.  Clinically no signs of pneumonia but will continue to monitor.  - Encourage incentive spirometry        4)FENGI  #Diet: Level 4 solids and now upgraded to level 2 liquids.  Upgrade further as able per speech therapy  - Aspiration precautions  - Consult nutrition for education and optimization given stroke, modified diet, and uncontrolled diabetes  - VFSS was planned for 9/20, however cancelled due to lethargy.  Will repeat when appropriate based on more  consistent level of alertness.     #Bowels: As needed Senokot-S, MiraLAX, and Dulcolax suppository     #Hypokalemia  - Ongoing and in the setting of poor po intake.  Started scheduled replacement 20 mEq daily, K 3.8 9/22.  Repeat labs tomorrow.     #GI prophylaxis  -Protonix 40 mg daily given DAPT and age        5)  #ALEX  - Baseline creatinine ~1.1, with a peak of 2.13 upon admission  - Had improved, then Cr increasing at 1.63 on 9/20,  likely pre-renal given lethargy and poor po intake.  Improved to 1.43 on 9/21 following IV fluids (9/19-9/20).  Continue to monitor, encourage fluids.  Repeat labs tomorrow.  - Continue holding Cozaar, hydrochlorothiazide, and spironolactone.        #Urinary retention and hematuria  - Felix placed on 9/15  - Urology consulted and recommend CT urogram once creatinine normalizes and outpatient follow-up for cystoscopy to complete hematuria work-up.  - Recurrent hematuria noted, family and nursing note patient pulling at tube frequently, may be 2/2 trauma.  - Trial of void started 9/22.  Has been incontinent.  PVRs elevated, but most recent with improved volume.  Continue to monitor voids and PVRs with intermittent caths as needed.  - Flomax 0.4 mg daily started, will continue        6)DVT prophylaxis  - Mechanical.  Will hold from chemoprophylaxis given hematuria and already on dual anti-platelet therapy        7)Pain  - Tylenol PRN        8)Endo  #Uncontrolled DM II (HbA1c 11.5)  - PTA was on Levemir 20 units nightly, NovoLog with meals with carb coverage, and Metformin 500 mg twice daily.  Unclear how compliant he was with his medications.  - Endocrine assistance appreciated.  Recs as of 9/22    - decrease Levemir  to 14 units from 18 units  daily PM (2000)    -  continue Novolog 1 unit(s) per 4 g cho breakfast, 1 per 5 lunch and snacks between 0800 - 1800 and increase 1 per 8 g cho from 1:12g dinner and snacks from 1801 - 0759    -  Novolog high resistance sliding scale insulin  TID AC    -  continue Novolog medium resistance HS    -  Increase Victoza to 1.2 mg tomorrow am    -  BG monitoring TID AC, HS and 0200    -  No resumption of Metformin at this time - will consider resumption ongoing    -  Hypoglycemia protocol    -  Carb counting protocol    -  Will follow        9)Heme  - Hgb 12.0 on 9/21.  Overall stable.  - Continue to monitor, especially in the setting of hematuria.  Repeat labs tomorrow.        10)Psych  - Monitor mood, will consult health psychology if needed  - Melatonin PRN for sleep      11)MSK  ~1.5 cm soft, non-tender, mobile nodule in left distal upper arm.  No overlying erythema, warmth, skin changes.  Seems most consistent with lipoma vs cyst.  - Monitor, but no further work-up/treatment indicated at this time           11)Social/Dispo  - Goals for discharge home at a supervision level for ambulation, mobility, and ADLs.  - ELOS: Tentative discharge date 10/8/21  - Rehab prognosis: Fair  - Follow up appointments: PCP, [facility] neurology clinic 6-8 weeks, urology for hematuria     Code status: Full Code (unable to discuss with patient given somnolence upon admission.  Have continued prior CODE STATUS and discussed with the son who confirms that his prior wishes were to be full code)        Patient discussed with Dr. Juan Miguel Campo, PM&R staff physician     Jesika Theodore PA-C  Physical Medicine & Rehabilitation

## 2021-09-23 NOTE — PLAN OF CARE
Discharge Planner Post-Acute Rehab OT:      Discharge Plan: home with 24/7 assistance vs TCU     Precautions: falls, hx of right sided weakness.      Current Status:  ADLs: Total A for shower transfer using TIS commode chair, Max A don pants, Mod A socks, Max A for UB dressing tasks, Total A for toilet tx/transfers using michell lift and commode chair, Min A for thoroughness with g/h tasks.  IADLs: Family support  Vision/Cognition: Wears glasses all the time. Lethargic, follows directions when alert.      Assessment:  Pt too lethargic to participate in meaningful task. Therapist tried sternal rubs, rubbing head, talking loudly, rolling pt during nursing cares, using cold wash cloth for face and literally sitting pt up to wake him up but he only briefly opened his eyes after nurse cathed him and then continued to keep eyes closed and fall asleep. All movement during this session was done for the pt except he did take the wash cloth and rub his face and he did hold therapists hand. Otherwise he could not assist at all with bed mobility. -30      Other Barriers to Discharge (DME, Family Training, etc):   Family training, per daughter the pt's wife can be home for the pt but she is not necessarily going to be able to assist physically.   AE/DME pending progress

## 2021-09-23 NOTE — PLAN OF CARE
Discharge Planner Post-Acute Rehab SLP:      Discharge Plan:home with assist, likely further SLP     Precautions:fall, swallowing     Current Status:  Communication: to be assessed, pt limited voicing/aphonic, follows simple directions, some mild aphasia/dysarthria baseline per son, worsened now  Cognition: to be assessed  Swallow: IDDSI 4,- pureed  mildly thick fluids (2),  1:1 for po/meals, assist to feed PRN, slow pace, small bites, no straw, VFSS PRN (order received, on hold)     Assessment:  Fatigue noted but adequately alert to start his meal. Needing hand over hand assistance with varying level of support. Increasing fatigue with progression of the session leading to increased oral transit time and delayed swallow. Cough x1 with mildly thick liquids. Orders obtained for VFSS.     Other Barriers to Discharge (Family Training, etc): TBD

## 2021-09-23 NOTE — PLAN OF CARE
No signs of pain noted but earlier patient c/o leg pain to the PA, also had a heard BM today, senna  and tylenol given. Very sleepy today, did not participate in early therapies today, did not get of bed, repositioned every 2 hrs and heels elevated on a pillow.Incontinent of bladder x 2 PVR for 365 and 384, Straight cath him for 400, output from sic was red, no sediments noted.Ate poor, daughter at bedside and hands on. PIV on right arm patent, will continue POC

## 2021-09-23 NOTE — PROGRESS NOTES
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  Hospital course complicated by acute kidney injury, hypokalemia needing repletion, and urinary retention and hematuria needing Felix catheter and urology consult.     Admission to inpatient rehab:  9/17/21  Impairment group code: Stroke Ischemic 01.3 Bilateral Involvement, new R Thalamic stroke in setting of R sided weakness from prior stroke        Assessment:     1)  Type 2 Diabetes Mellitus; poor control.  A1c 11.5%  2)  S/p stroke; R hemiparesis  3)  ALEX/CKD     Plan:       -  continue Levemir 14 units daily PM (2000)    -  continue Novolog 1 unit(s) per 4 g cho breakfast, 1 per 5 lunch and snacks between 0800 - 1800 and increase 1 per 6 g cho from 1:8g dinner and snacks from 1801 - 0759    -  Novolog high resistance sliding scale insulin TID AC and HS    -  Increase Victoza to 1.2 mg today    -  BG monitoring TID AC, HS and 0200    -  No resumption of Metformin at this time - will consider resumption ongoing    -  Hypoglycemia protocol    -  Carb counting protocol    -  Will follow       Outpatient diabetes follow up: TBD    Plan discussed with patient, family member - bedside RN, and primary team via this note.           Interval History:     The last 24 hours progress and nursing notes reviewed. BG trend below:        Patient ate 10-15g CHO per meal yesterday.  -230s.   this am.  Will increase dinner CHO coverage.  Victoza increased to 1.2 mg today, continue Levemir 14 units daily in PM.  Will try to move toward simpler  "Novolog CHO coverage and sliding scale insulin.      Patient doing ok today, sleepier this am.  Daughter at bedside, no reports of nausea, vomiting, diarrhea, or stomach upset.      Tentative discharge date 10/8/21.  Awaiting VFSS when patient more alert. Lethargy has been an issue.           Recent Labs   Lab 09/23/21  0734 09/23/21  0246 09/22/21  2121 09/22/21  1654 09/22/21  1153 09/22/21  0746   * 180* 230* 190* 156* 105*           Nutrition:     Orders Placed This Encounter      Combination Diet Consistent Carb 60 Grams CHO per Meal Diet; Pureed Diet (level 4); Mildly Thick (level 2)    Supplements: any supplement PRN        PTA Regimen:        Levemir 28 units at bedtime  \"Novolog - varied doses based on chos\"  Clinic notes reveal:  Novolog 2 unit(s) per carb choice (2per 15)  Novolog 1 unit(s) per 50> 150     Metformin 500 mg BID (dose reduced for decreased renal fxn)     BG monitoring frequency:  FreeStyle Sherwin - intermittent use     Diet: regular, no restrictions  Eats sporadically - AM - bagel or banana  Lunch - sporadic  Dinner - rice/beg/meat  Loves to drink [employer]     Exercise: sedentary             Review of Systems:   CC:  Non-verbal/resting most of visit.  Does open eyes and nod or shake head to questions.  Smiles. Denies pain          Medications:   Steroid planning:  No       Physical Exam:   /68 (BP Location: Right arm)   Pulse (!) 18   Temp 98.5  F (36.9  C) (Oral)   Resp 17   Ht 1.626 m (5' 4\")   Wt 66.2 kg (145 lb 15.1 oz)   SpO2 99%   BMI 25.05 kg/m       General:  resting in bed, daughter at bedside. R sided deficts  HEENT: NC/AT, PER and anicteric, non-injected, oral mucous membranes moist.   Lungs: non-labored, no cough,  no SOB  ABD:  rounded   Skin: warm and dry, no obvious lesions  Feet:  CMS intact  MSK:  fluid movement of L extremities - R sided deficits  Lymp:  no LE edema   Mental status:  alert, oriented x3, unable to communicate verbally - nods and " smiles  Psych:  calm, appropriate mood, approrpriate affect         Data:     Lab Results   Component Value Date    A1C 11.5 09/15/2021    A1C 7.6 08/14/2012            CBC RESULTS: Recent Labs   Lab Test 09/20/21  0824   WBC 8.8   RBC 4.75   HGB 12.6*   HCT 40.1   MCV 84   MCH 26.5   MCHC 31.4*   RDW 13.4        Recent Labs   Lab Test 09/20/21  0415 09/20/21  0300 09/19/21  1735 09/19/21  1347   *  --   --  139   POTASSIUM 3.3*  --   --  4.0   CHLORIDE 111*  --   --  107   CO2 28  --   --  27   ANIONGAP 6  --   --  5   * 55*   < > 244*   BUN 30  --   --  26   CR 1.63*  --   --  1.52*   ARLETH 8.6  --   --  8.7    < > = values in this interval not displayed.       Liver Function Studies -   Recent Labs   Lab Test 09/15/21  0521   PROTTOTAL 6.6*   ALBUMIN 2.6*   BILITOTAL 0.3   ALKPHOS 80   AST 20   ALT 22     Lab Results   Component Value Date    INR 1.03 09/16/2021    INR 0.94 09/14/2021    INR 1.00 08/16/2012    INR 0.96 08/13/2012       FEMI Reynolds, CNP  Inpatient Diabetes Management Service  Pager - [phone]  Friday - Monday 0800 - 1600 hrs  After hours above - Diabetes Management Team job code: [id number]    I spent a total of 35 minutes bedside and on the inpatient unit managing glycemic care.  Over 50% of my time on the unit was spent counseling the patient and/or coordinating care regarding acute hyperglycemic management.  See note for details.

## 2021-09-23 NOTE — PROGRESS NOTES
CLINICAL NUTRITION SERVICES - REASSESSMENT NOTE     Nutrition Prescription    RECOMMENDATIONS FOR MDs/PROVIDERS TO ORDER:  None today    Malnutrition Status:    Patient does not meet two of the established criteria necessary for diagnosing malnutrition    Recommendations already ordered by Registered Dietitian (RD):  Gelatein Sugar free @ 2PM snack  No milk with meals due to lactose intolerance (does not warrant lactose free diet as patient tolerates other forms of lactose)    Future/Additional Recommendations:  Obtain current weight as able  Assess for education needs, as appropriate  Monitor po adequacy and hydration status (on thickened liquids)  Monitor progress with SLP     EVALUATION OF THE PROGRESS TOWARD GOALS   Diet: 60 gm Consistent Carbohydrate Pureed (level 4) with Mildly thick liquids (level 2), supplements prn  Room Service with assist  Intake: % per flowsheets (variable, although only one recorded meal of 25% and 50%, most meals %)     NEW FINDINGS   SLP progress noted.   Patient fatigues easily, VFSS planned for future date  GLU (9/23) 180, 215  No new weight to address.     Patient sleeping at time of visit.   Cup of thickened water at bedside, 100% consumed  History obtained from daughter at bedside: weight has been stable, patient does not drink milk due to lactose intolerance but does tolerate other forms of lactose (ice cream).   Noncompliant PTA with diet - excess sweets.   Good appetite PTA.    MALNUTRITION  % Intake: No decreased intake noted  % Weight Loss: None noted  Subcutaneous Fat Loss: None observed in facial region  Muscle Loss: Unable to assess  Fluid Accumulation/Edema: trace edema RLE per flowsheets  Malnutrition Diagnosis: Patient does not meet two of the established criteria necessary for diagnosing malnutrition    Previous Goals   Patient to consume % of nutritionally adequate meal trays TID, or the equivalent with supplements/snacks.  Evaluation: Not met  consistently: per flowsheets one meal 25%, one meal 50%    Previous Nutrition Diagnosis  Inadequate oral intake related to altered consistency diet as evidenced by documented intakes since admit and at OSH   Evaluation: Improving    CURRENT NUTRITION DIAGNOSIS  Inadequate oral intake related to fatigue at mealtimes and need for assistance / texture modified diet as evidenced by documented variable intake (%)      INTERVENTIONS  Implementation  Medical food supplement therapy - Gelatein sugar free every day  No milk with trays    Goals  Patient to consume % of nutritionally adequate meal trays TID, or the equivalent with supplements/snacks.    Monitoring/Evaluation  Progress toward goals will be monitored and evaluated per protocol.     Elyse Mcdermott RD, LD  [facility] Pager: [phone]

## 2021-09-23 NOTE — PLAN OF CARE
FOCUS/GOAL  Medical management    ASSESSMENT, INTERVENTIONS AND CONTINUING PLAN FOR GOAL:  Patient slept well. Felix removed, patient voided twice this shift. PVR are elevated. Last one was relatively normal 130. No S/S of pain noted. No BM this shift. Will continue with POC,

## 2021-09-24 ENCOUNTER — APPOINTMENT (OUTPATIENT)
Dept: SPEECH THERAPY | Facility: CLINIC | Age: 71
DRG: 057 | End: 2021-09-24
Attending: PHYSICAL MEDICINE & REHABILITATION
Payer: MEDICARE

## 2021-09-24 ENCOUNTER — APPOINTMENT (OUTPATIENT)
Dept: PHYSICAL THERAPY | Facility: CLINIC | Age: 71
DRG: 057 | End: 2021-09-24
Attending: PHYSICAL MEDICINE & REHABILITATION
Payer: MEDICARE

## 2021-09-24 ENCOUNTER — APPOINTMENT (OUTPATIENT)
Dept: OCCUPATIONAL THERAPY | Facility: CLINIC | Age: 71
DRG: 057 | End: 2021-09-24
Attending: PHYSICAL MEDICINE & REHABILITATION
Payer: MEDICARE

## 2021-09-24 ENCOUNTER — APPOINTMENT (OUTPATIENT)
Dept: GENERAL RADIOLOGY | Facility: CLINIC | Age: 71
DRG: 057 | End: 2021-09-24
Attending: PHYSICIAN ASSISTANT
Payer: MEDICARE

## 2021-09-24 ENCOUNTER — APPOINTMENT (OUTPATIENT)
Dept: EDUCATION SERVICES | Facility: CLINIC | Age: 71
DRG: 057 | End: 2021-09-24
Attending: PHYSICAL MEDICINE & REHABILITATION
Payer: MEDICARE

## 2021-09-24 LAB
ANION GAP SERPL CALCULATED.3IONS-SCNC: 4 MMOL/L (ref 3–14)
BUN SERPL-MCNC: 32 MG/DL (ref 7–30)
CALCIUM SERPL-MCNC: 8.8 MG/DL (ref 8.5–10.1)
CHLORIDE BLD-SCNC: 112 MMOL/L (ref 94–109)
CO2 SERPL-SCNC: 28 MMOL/L (ref 20–32)
CREAT SERPL-MCNC: 1.65 MG/DL (ref 0.66–1.25)
ERYTHROCYTE [DISTWIDTH] IN BLOOD BY AUTOMATED COUNT: 13 % (ref 10–15)
GFR SERPL CREATININE-BSD FRML MDRD: 41 ML/MIN/1.73M2
GLUCOSE BLD-MCNC: 95 MG/DL (ref 70–99)
GLUCOSE BLDC GLUCOMTR-MCNC: 113 MG/DL (ref 70–99)
GLUCOSE BLDC GLUCOMTR-MCNC: 141 MG/DL (ref 70–99)
GLUCOSE BLDC GLUCOMTR-MCNC: 172 MG/DL (ref 70–99)
GLUCOSE BLDC GLUCOMTR-MCNC: 229 MG/DL (ref 70–99)
GLUCOSE BLDC GLUCOMTR-MCNC: 88 MG/DL (ref 70–99)
HCT VFR BLD AUTO: 38.7 % (ref 40–53)
HGB BLD-MCNC: 12.3 G/DL (ref 13.3–17.7)
MCH RBC QN AUTO: 26.7 PG (ref 26.5–33)
MCHC RBC AUTO-ENTMCNC: 31.8 G/DL (ref 31.5–36.5)
MCV RBC AUTO: 84 FL (ref 78–100)
PLATELET # BLD AUTO: 275 10E3/UL (ref 150–450)
POTASSIUM BLD-SCNC: 3.4 MMOL/L (ref 3.4–5.3)
RBC # BLD AUTO: 4.61 10E6/UL (ref 4.4–5.9)
SODIUM SERPL-SCNC: 144 MMOL/L (ref 133–144)
WBC # BLD AUTO: 7.3 10E3/UL (ref 4–11)

## 2021-09-24 PROCEDURE — 36415 COLL VENOUS BLD VENIPUNCTURE: CPT | Performed by: PHYSICIAN ASSISTANT

## 2021-09-24 PROCEDURE — 92611 MOTION FLUOROSCOPY/SWALLOW: CPT | Mod: GN | Performed by: SPEECH-LANGUAGE PATHOLOGIST

## 2021-09-24 PROCEDURE — 74230 X-RAY XM SWLNG FUNCJ C+: CPT

## 2021-09-24 PROCEDURE — 85027 COMPLETE CBC AUTOMATED: CPT | Performed by: PHYSICIAN ASSISTANT

## 2021-09-24 PROCEDURE — 999N000125 HC STATISTIC PATIENT MED CONFERENCE < 30 MIN: Performed by: SPEECH-LANGUAGE PATHOLOGIST

## 2021-09-24 PROCEDURE — 99233 SBSQ HOSP IP/OBS HIGH 50: CPT | Performed by: PHYSICAL MEDICINE & REHABILITATION

## 2021-09-24 PROCEDURE — 250N000013 HC RX MED GY IP 250 OP 250 PS 637: Performed by: PHYSICAL MEDICINE & REHABILITATION

## 2021-09-24 PROCEDURE — 97112 NEUROMUSCULAR REEDUCATION: CPT | Mod: GP

## 2021-09-24 PROCEDURE — 258N000003 HC RX IP 258 OP 636

## 2021-09-24 PROCEDURE — 999N000150 HC STATISTIC PT MED CONFERENCE < 30 MIN: Performed by: PHYSICAL THERAPIST

## 2021-09-24 PROCEDURE — 92526 ORAL FUNCTION THERAPY: CPT | Mod: GN

## 2021-09-24 PROCEDURE — 999N000125 HC STATISTIC PATIENT MED CONFERENCE < 30 MIN: Performed by: STUDENT IN AN ORGANIZED HEALTH CARE EDUCATION/TRAINING PROGRAM

## 2021-09-24 PROCEDURE — 250N000013 HC RX MED GY IP 250 OP 250 PS 637: Performed by: PHYSICIAN ASSISTANT

## 2021-09-24 PROCEDURE — 250N000009 HC RX 250: Performed by: PHYSICAL MEDICINE & REHABILITATION

## 2021-09-24 PROCEDURE — 80048 BASIC METABOLIC PNL TOTAL CA: CPT | Performed by: PHYSICIAN ASSISTANT

## 2021-09-24 PROCEDURE — 74230 X-RAY XM SWLNG FUNCJ C+: CPT | Mod: 26 | Performed by: STUDENT IN AN ORGANIZED HEALTH CARE EDUCATION/TRAINING PROGRAM

## 2021-09-24 PROCEDURE — 97116 GAIT TRAINING THERAPY: CPT | Mod: GP

## 2021-09-24 PROCEDURE — 99233 SBSQ HOSP IP/OBS HIGH 50: CPT | Performed by: NURSE PRACTITIONER

## 2021-09-24 PROCEDURE — 258N000003 HC RX IP 258 OP 636: Performed by: PHYSICAL MEDICINE & REHABILITATION

## 2021-09-24 PROCEDURE — 97535 SELF CARE MNGMENT TRAINING: CPT | Mod: GO

## 2021-09-24 PROCEDURE — 128N000003 HC R&B REHAB

## 2021-09-24 PROCEDURE — 92507 TX SP LANG VOICE COMM INDIV: CPT | Mod: GN

## 2021-09-24 PROCEDURE — 97530 THERAPEUTIC ACTIVITIES: CPT | Mod: GP

## 2021-09-24 RX ORDER — SODIUM CHLORIDE 9 MG/ML
INJECTION, SOLUTION INTRAVENOUS CONTINUOUS
Status: DISCONTINUED | OUTPATIENT
Start: 2021-09-24 | End: 2021-09-27

## 2021-09-24 RX ORDER — AMANTADINE HYDROCHLORIDE 100 MG/1
100 CAPSULE, GELATIN COATED ORAL 2 TIMES DAILY
Status: DISCONTINUED | OUTPATIENT
Start: 2021-09-25 | End: 2021-09-28

## 2021-09-24 RX ORDER — SODIUM CHLORIDE 9 MG/ML
INJECTION, SOLUTION INTRAVENOUS
Status: COMPLETED
Start: 2021-09-24 | End: 2021-09-24

## 2021-09-24 RX ORDER — BARIUM SULFATE 400 MG/ML
SUSPENSION ORAL ONCE
Status: COMPLETED | OUTPATIENT
Start: 2021-09-24 | End: 2021-09-24

## 2021-09-24 RX ADMIN — SODIUM CHLORIDE: 9 INJECTION, SOLUTION INTRAVENOUS at 10:45

## 2021-09-24 RX ADMIN — ACETAMINOPHEN 975 MG: 325 TABLET, FILM COATED ORAL at 10:06

## 2021-09-24 RX ADMIN — POTASSIUM CHLORIDE 20 MEQ: 750 TABLET, EXTENDED RELEASE ORAL at 08:50

## 2021-09-24 RX ADMIN — ATORVASTATIN CALCIUM 80 MG: 80 TABLET, FILM COATED ORAL at 08:50

## 2021-09-24 RX ADMIN — Medication 40 MG: at 08:49

## 2021-09-24 RX ADMIN — TAMSULOSIN HYDROCHLORIDE 0.4 MG: 0.4 CAPSULE ORAL at 08:51

## 2021-09-24 RX ADMIN — BARIUM SULFATE 25 ML: 400 SUSPENSION ORAL at 15:33

## 2021-09-24 RX ADMIN — INSULIN ASPART 1 UNITS: 100 INJECTION, SOLUTION INTRAVENOUS; SUBCUTANEOUS at 13:22

## 2021-09-24 RX ADMIN — SENNOSIDES AND DOCUSATE SODIUM 1 TABLET: 8.6; 5 TABLET ORAL at 20:50

## 2021-09-24 RX ADMIN — INSULIN ASPART 1 UNITS: 100 INJECTION, SOLUTION INTRAVENOUS; SUBCUTANEOUS at 17:25

## 2021-09-24 RX ADMIN — AMLODIPINE BESYLATE 10 MG: 10 TABLET ORAL at 08:51

## 2021-09-24 RX ADMIN — ATENOLOL 25 MG: 25 TABLET ORAL at 08:51

## 2021-09-24 RX ADMIN — AMANTADINE HYDROCHLORIDE 100 MG: 100 CAPSULE ORAL at 08:50

## 2021-09-24 RX ADMIN — ASPIRIN 81 MG CHEWABLE TABLET 81 MG: 81 TABLET CHEWABLE at 08:50

## 2021-09-24 RX ADMIN — CLOPIDOGREL BISULFATE 75 MG: 75 TABLET, FILM COATED ORAL at 08:52

## 2021-09-24 RX ADMIN — SODIUM CHLORIDE 1000 ML: 9 INJECTION, SOLUTION INTRAVENOUS at 10:43

## 2021-09-24 RX ADMIN — ATENOLOL 25 MG: 25 TABLET ORAL at 20:49

## 2021-09-24 RX ADMIN — LIRAGLUTIDE 1.2 MG: 6 INJECTION SUBCUTANEOUS at 09:05

## 2021-09-24 ASSESSMENT — MIFFLIN-ST. JEOR: SCORE: 1327

## 2021-09-24 NOTE — PLAN OF CARE
C/o left leg pain, tylenol given with good effect, Had an episode of bladder incontinence PVR for 399, currently en-route to video swallow. Will have the next him complete another scan when he comes back. Appear awake today and eyes open most of the time and participated in therapies. IV maintenance fluid started by provider due to elevated BUN, currently infusing at 100 ml/hr, had good appetite today, BG's 88 and 172 daughter and wife at bedside, will continue poc

## 2021-09-24 NOTE — CONSULTS
Stroke Education Note     The following information has been reviewed with the family:     1. Warning signs of stroke     2. Calling 911 if having warning signs of stroke     3. All modifiable risk factors: hypertension, CAD, atrial fib, diabetes, hypercholesterolemia, smoking, substance abuse, diet, physical inactivity, obesity, sleep apnea.     4. Patient's risk factors for stroke which include: HTN, HLD, DM2, stroke in 2012     5. Follow-up plan for after discharge     6. Discharge medications which include: Norvasc, Aspirin, Plavix, Atenolol, Lipitor     In addition, the above information was given to the family in writing as a part of the [facility] Stroke Class Handout.     Learner's response to risk factors / lifestyle modification education: Ability to change      Olga Talley RN

## 2021-09-24 NOTE — CARE CONFERENCE
Acute Rehab Care Conference/Team Rounds      Type: Team Rounds    Present: Dr. Kerrie Campo, Jesika Theodore PA, uJnior Valentine RN, Nadia Ortiz [facility], Zayra James OT, Ra Berumen PT, Imani Lacy SLP, Kristina Garcia Dietician, Patient Jimmy Otto      Discharge Barriers/Treatment/Education    Rehab Diagnosis: Ischemic CVA of R thalamus    Active Medical Co-morbidities/Prognosis: HTN, uncontrolled DM II, HLD, BPH, prior CVA with residual R hemiparesis, hypokalemia, urinary retention, hematuria, dysphagia, ALEX    Safety: Patient is alert but does not talk, staff anticipates all his needs. Transfer with Liko lift. He was sleepy but easily arouse..    Pain: No evidence of discomfort.    Medications, Skin, Tubes/Lines: Medication are crushed and mixed with apple sauce. Skin is intact. No tubes/lines.     Swallowing/Nutrition:    Bowel/Bladder: Incontinent of stool x2 tonight. Urinary retention and incontinence.     Psychosocial: Legally  but  from wife. Lives alone, brother and MELA live next door. Family supportive, was helping PTA. Pt retired. No MH or SA concerns. Hx stroke in the past.       ADLs/IADLs: Pt currently requires MAX A for dressing, min A for grooming, total assist for toileting and liko lift for transfers vs pivot transfer with varying assist if alert. Pt has been limited in participation due to somnolence. When somnolent he is extremely hard to wake up and becomes a total assist for all movement and ADL. Hard to determine DME for bathroom at this time since pt has been mostly max/total assist. Family is usually present here but reports that the pt's wife who will be home with him is not able to assist physically. Will need to discuss with family the need for more caregiver assist if discharging home and pt may need full DME work up including lift, hospital bed, WC and commode if his alertness does not improve. 24/7 physical assist recommended and home care.     Mobility:   Bed Mobility:  mod A if alert  Transfer: squat pivot A x 2, liko with RN staff  Gait: 50 ft with quad cane, CGA  Stairs: Not safe  Balance: 1/36 PASS - although not truly reflective of capability due to drastic performance fluctuations with command following and lethargy.  Fluctuating alertness remains a challenging barrier to progression of mobility. Anticipate discharge home with 24/7 supervision, with likely need for WC, ramp, quad cane, gait belt. Home care PT.    Cognition/Language:    Community Re-Entry: Not an immediate barrier.     Transportation: Family to provide. Will need to assess/practice car transfer prior to discharge.    Plan of Care and goals reviewed and updated.    Discharge Plan/Recommendations    Fall Precautions: continue    Overall plan for the patient:   Somnolence continues to be a barrier to participation.  Slightly improved with initiation of Amantadine, however still having prolonged periods of somnolence and will increase dose tomorrow.  Functional capabilities also vary with alertness levels.  When awake, able to ambulate 100-125 ft with a quad cane, however Max A for mobility, transfers, and ADLs when somnolent.  Is able to respond with accurate yes/no questions, but no verbal output.  On level 4 solids and level 2 liquids with planned VFSS today.  Discussed with daughter that will need to plan for equipment and assistance during his somnolent times, and therefore will need family training and equipment ordered but will continue to monitor progress.  He required a medical exception to therapy minutes due to somnolence. Tentative discharge date 10/8/21.        Utilization Review and Continued Stay Justification    Medical Necessity Criteria:    For any criteria that is not met, please document reason and plan for discharge, transfer, or modification of plan of care to address.    Requires intensive rehabilitation program to treat functional deficits?: Yes    Requires 3x per week or greater  involvement of rehabilitation physician to oversee rehabilitation program?: Yes    Requires rehabilitation nursing interventions?: Yes    Patient is making functional progress?: Yes    There is a potential for additional functional progress? Yes    Patient is participating in therapy 3 hours per day a minimum of 5 days per week or 15 hours per week in 7 day period?: Medical exception days 3 and 7 with significant somnolence, altering meds/fluid management to increase participation.  Expect continued variability, but week 2 anticipate 15 hours/week.    Has discharge needs that require coordinated discharge planning approach?:Yes      Barriers/Concerns related to meeting medical necessity criteria:  Somnolence    Team Plan to Address Concern:  Pharmacological and non-pharmacological methods for stimulation, appropriate sleep/wake cycle, ongoing therapies.      Final Physician Sign off    Statement of Approval: I have reviewed and agree with the recommendations and documentation in this care conference note.       Patient Goals  Social Work Goals: Confirm discharge recommendations with therapy, coordinate safe discharge plan and remain available to support and assist as needed.    OT goal: hygiene/grooming: supervision/stand-by assist  OT goal: upper body dressing: Minimal assist  OT goal: lower body dressing: Moderate assist  OT goal: upper body bathing: Supervision/stand-by assist  OT goal: lower body bathing: Moderate assist  OT goal: toilet transfer/toileting: Moderate assist, toilet transfer, cleaning and garment management     PT Frequency: daily x60-90 minutes  PT goal: bed mobility: Minimal assist, Supine to/from sit, Rolling  PT goal: transfers: Minimal assist, Sit to/from stand, Bed to/from chair  PT goal: gait: Minimal assist, 25 feet (Hand hold vs cane)  PT goal 1: Family will understand fall prevention methods for safe home setup and discharge  PT Goal 2: Pt will perform HEP with supervision/cues for  family.  PT Goal 3: Car transfer with min-A         SLP Frequency: daily  SLP Swallow Goal:  Safely tolerate diet without signs/symptoms of aspiration: Soft & bite sized diet, Thin liquids

## 2021-09-24 NOTE — PROGRESS NOTES
"  [facility]   Acute Rehabilitation Unit  Daily progress note    INTERVAL HISTORY  Pt seen in team rounds with daughter present.  Yesterday Mr. Otto was quite somnolent and unable to participate in therapies.  Today he is more awake and alert, able to keep his eyes open and answer basic questions with shaking his head yes or no.  Felix was removed 9/22 and he has been incontinent of urine, but also retaining and needed straight cath x1 with hematuria per report.  For full functional updates, see team rounds note from today.    MEDICATIONS  Scheduled:    [START ON 9/25/2021] amantadine  100 mg Oral BID     amLODIPine  10 mg Oral Daily     aspirin  81 mg Oral Daily     atenolol  25 mg Oral BID     atorvastatin  80 mg Oral Daily     clopidogrel  75 mg Oral Daily     insulin aspart   Subcutaneous TID w/meals     insulin aspart  1-7 Units Subcutaneous TID AC     insulin aspart  1-5 Units Subcutaneous At Bedtime     insulin detemir  14 Units Subcutaneous Q24H     liraglutide  1.2 mg Subcutaneous Daily     pantoprazole  40 mg Oral QAM AC     potassium chloride  20 mEq Oral Daily     senna-docusate  1 tablet Oral At Bedtime     sodium chloride (PF)  3 mL Intracatheter Q8H     tamsulosin  0.4 mg Oral Daily        PRN:  acetaminophen, bisacodyl, glucose **OR** dextrose **OR** glucagon, diclofenac, hydrALAZINE, insulin aspart, melatonin, ondansetron, polyethylene glycol, senna-docusate       PHYSICAL EXAM  Patient Vitals for the past 24 hrs:   BP Temp Temp src Pulse Resp SpO2 Weight   09/24/21 0623 138/79 97.3  F (36.3  C) Oral 80 15 97 % 66.1 kg (145 lb 11.6 oz)   09/23/21 2159 124/73 -- -- -- -- -- --   09/23/21 1500 100/66 97.5  F (36.4  C) -- 97 15 98 % --       GEN: NAD, awake today, able to nod yes/no responses  HEENT: NC/AT  PULM: non-labored on room air  ABD: Non-distended  EXT: No LE edema or calf tenderness b/l  Neuro: Awake, non-verbal but shakes head \"yes or no\"      LABS  CBC " RESULTS:   Recent Labs   Lab Test 09/24/21  0641 09/21/21  0830 09/20/21  0824   WBC 7.3 7.3 8.8   RBC 4.61 4.49 4.75   HGB 12.3* 12.0* 12.6*   HCT 38.7* 38.1* 40.1   MCV 84 85 84   MCH 26.7 26.7 26.5   MCHC 31.8 31.5 31.4*   RDW 13.0 13.2 13.4    247 227     Last Basic Metabolic Panel:  Recent Labs   Lab Test 09/24/21  0839 09/24/21  0641 09/24/21  0211 09/22/21  0746 09/22/21  0547 09/21/21  1110 09/21/21  0830 09/20/21  0515 09/20/21  0415   NA  --  144  --   --   --   --  143  --  145*   POTASSIUM  --  3.4  --   --  3.8  --  3.3*   < > 3.3*   CHLORIDE  --  112*  --   --   --   --  111*  --  111*   CO2  --  28  --   --   --   --  30  --  28   ANIONGAP  --  4  --   --   --   --  2*  --  6   GLC 88 95 113*   < >  --    < > 155*   < > 112*   BUN  --  32*  --   --   --   --  24  --  30   CR  --  1.65*  --   --   --   --  1.43*  --  1.63*   GFRESTIMATED  --  41*  --   --   --   --  49*  --  42*   ARLETH  --  8.8  --   --   --   --  8.9  --  8.6    < > = values in this interval not displayed.     Recent Labs   Lab 09/24/21  0839 09/24/21  0641 09/24/21  0211 09/23/21  2201 09/23/21  1730 09/23/21  1159   GLC 88 95 113* 134* 180* 201*       IMPRESSION  Jimmy Otto is a 71 year old male with a PMH including but not limited to HTN, uncontrolled DM 2 (HbA1c 11.5), HLD, BPH, and history of a stroke in 2012 with residual right hemiparesis who presented to the emergency room on 9/14/2021 with altered mental status and acute worsening of right-sided weakness, and found to have an acute right thalamic stroke due to small vessel occlusion.  Hospital course complicated by acute kidney injury, hypokalemia needing repletion, and urinary retention and hematuria needing Felix catheter and urology consult.         Impairment group code: Stroke Ischemic 01.3 Bilateral Involvement, new R Thalamic stroke in setting of R sided weakness from prior stroke        PLAN  Rehabilitation  -Continue with inpatient rehabilitation program including  PT, OT, and SLP for 3 hrs/day, rehab nursing, social work, nutrition, and close monitoring by physiatry for an estimated total of 3 weeks.  -The patient has impairments in strength, balance, endurance, and coordination, which are leading to activity limitations in ambulation, mobility, ADLs, and swallowing.      Medical  1)Neurology  #R thalamic CVA due to small vessel occlusion  - DAPT with ASA 81 mg and Plavix 75 mg for 1 month, followed by Plavix alone indefinitely.  Recommended check P2Y12 level to ensure Plavix efficacy after 1 month of being on Plavix only.  - Hypertension, lipid, and glucose management as below.  Will need ongoing education regarding stroke risk factor modifications including importance of medication compliance and diet and lifestyle changes  - Follow-up with [facility] general neurology in 6 to 8 weeks  - Monitor somnolence/lethargy.  Decreased level of alertness not unexpected with essentially bilateral thalamic pathology (new right thalamic stroke and a history of left thalamic stroke).    - Amantadine 100 mg daily started on 9/20 for neurostimulation given ongoing somnolence which is limiting participation.  Remains fluctuating but overall seems somewhat improved, but yesterday the somnolence was significant.  Will increase Amantadine to 100 mg BID (7 am and noon) starting tomorrow.  Therapist will try set schedule to target optimal times of day for alertness/participation.  - High risk for delirium, ensure delirium precautions and appropriate sleep-wake cycle        2)CVS  #HTN  - PTA on amlodipine 10 mg daily, atenolol 50 mg twice daily, Cozaar 100 mg daily, spironolactone 25 mg daily, and hydrochlorothiazide 25 mg daily.  Cozaar, spironolactone, and hydrochlorothiazide are being held due to acute kidney injury  - Continue amlodipine 10 mg daily, and atenolol at a lower dose of 25 mg twice daily for now.  Titrate doses as needed.  - Hydralazine as needed for SBP greater than 180  mmHg     #HLD  - Continue Lipitor 80 mg daily.  LDL 92 on 9/15        3)Pulmonary  - No acute issues but low lung volumes and atelectasis/infiltrate seen at the left lung base on chest x-ray 9/14.  Clinically no signs of pneumonia but will continue to monitor.  - Encourage incentive spirometry        4)FENGI  #Diet: Level 4 solids and now upgraded to level 2 liquids.  Upgrade further as able per speech therapy  - Aspiration precautions  - Consult nutrition for education and optimization given stroke, modified diet, and uncontrolled diabetes  - VFSS was planned for 9/20, however cancelled due to lethargy. Repeat planned for this afternoon.     #Bowels: As needed Senokot-S, MiraLAX, and Dulcolax suppository     #Hypokalemia  - Ongoing and in the setting of poor po intake.  Started scheduled replacement 20 mEq daily, K 3.8 9/22.  Potassium 3.4 today.     #GI prophylaxis  -Protonix 40 mg daily given DAPT and age        5)  #ALEX  - Baseline creatinine ~1.1, with a peak of 2.13 upon admission  - Had improved, then Cr increasing at 1.63 on 9/20,  likely pre-renal given lethargy and poor po intake.  Improved to 1.43 on 9/21 following IV fluids (9/19-9/20).  Continue to monitor, encourage fluids.  Creatinine today again increased at 1.65 and BUN 32 (prior 24).  Will re-start IV normal saline and re-check BMP in the morning.  - Continue holding Cozaar, hydrochlorothiazide, and spironolactone.        #Urinary retention and hematuria  - Felix placed on 9/15  - Urology consulted and recommend CT urogram once creatinine normalizes and outpatient follow-up for cystoscopy to complete hematuria work-up.  - Recurrent hematuria noted, family and nursing note patient pulling at tube frequently, may be 2/2 trauma.  - Trial of void started 9/22.  Has been incontinent but also with ongoing elevated PVRs.  Continue to monitor voids and PVRs with intermittent caths as needed.  If no improvement and continues to need regular caths then  would need replacement of Felix as he would be unable to straight cath himself.  - Flomax 0.4 mg daily started, will continue        6)DVT prophylaxis  - Mechanical.  Will hold from chemoprophylaxis given hematuria and already on dual anti-platelet therapy        7)Pain  - Tylenol PRN        8)Endo  #Uncontrolled DM II (HbA1c 11.5)  - PTA was on Levemir 20 units nightly, NovoLog with meals with carb coverage, and Metformin 500 mg twice daily.  Unclear how compliant he was with his medications.    -  continue Levemir 14 units daily PM (2000)    -  continue Novolog 1 unit(s) per 4 g cho breakfast, 1 per 5 lunch and snacks between 0800 - 1800 and increase 1 per 6 g cho from 1:8g dinner and snacks from 1801 - 0759    -  Novolog high resistance sliding scale insulin TID AC and HS    -  Increase Victoza to 1.2 mg today    -  BG monitoring TID AC, HS and 0200    -  No resumption of Metformin at this time - will consider resumption ongoing    -  Hypoglycemia protocol    -  Carb counting protocol    -  Will follow     9)Heme  - Hgb 12.3 on 9/24.  Overall stable.  - Continue to monitor, especially in the setting of hematuria.  Repeat CBC Monday        10)Psych  - Monitor mood, will consult health psychology if needed  - Melatonin PRN for sleep      11)MSK  ~1.5 cm soft, non-tender, mobile nodule in left distal upper arm.  No overlying erythema, warmth, skin changes.  Seems most consistent with lipoma vs cyst.  - Monitor, but no further work-up/treatment indicated at this time           12)Social/Dispo  - Goals for discharge home at a supervision level for ambulation, mobility, and ADLs.  - ELOS: Tentative discharge date 10/8/21  - Rehab prognosis: Fair  - Follow up appointments: PCP, [facility] neurology clinic 6-8 weeks, urology for hematuria     Code status: Full Code (unable to discuss with patient given somnolence upon admission.  Have continued prior CODE STATUS and discussed with the son who confirms that his prior  wishes were to be full code)    Juan Miguel Campo MD  Department of Rehabilitation Medicine    Time Spent on this Encounter   I, Juan Miguel Campo, spent a total of 35 minutes face-to-face or managing the care of Jimmy Otto. Over 50% of my time on the unit was spent counseling the patient and coordinating care. See note for details.

## 2021-09-24 NOTE — PROGRESS NOTES
09/24/21 1500   General Information   Onset of Illness/Injury or Date of Surgery 09/18/21   Referring Physician Dr. Kerrie Campo MD   Pertinent History of Current Problem Jimmy Otto is a 71 year old male with a PMH including but not limited to HTN, uncontrolled DM 2 (HbA1c 11.5), HLD, BPH, and history of a stroke in 2012 with residual right hemiparesis who presented to the emergency room on 9/14/2021 with altered mental status and acute worsening of right-sided weakness.  Was not a candidate for thrombolysis due to unknown time of last known normal, and initial CT and CT imaging did not show any acute abnormalities or large vessel occlusion therefore not a candidate for mechanical thrombectomy.  An MRI was then obtained which did demonstrate an acute right thalamic CVA.  Etiology felt to be due to small vessel disease and he has been initiated on aspirin and Plavix for 1 month, followed by aspirin alone indefinitely with a check of P2Y12 activity after a month of being on Plavix only.  Hospital course has been complicated by acute kidney injury with a peak creatinine of 2.13, hypokalemia needing repletion, and urinary retention and hematuria needing Felix catheter and urology consult.   General Observations Patient has been seen by SLP in acute setting for swallowing, sent to current setting on IDDSI 4,0 diet. Liquids downgraded to mildly thick following coughing episode.   Disability/Function   Hearing Difficulty or Deaf no   Type of Evaluation   Type of Evaluation Swallow Evaluation   General Swallowing Observations   Swallowing Evaluation Videofluoroscopic swallow study (VFSS)   VFSS Evaluation   Radiologist Dr. Arce   Views Taken left lateral   Physical Location of Procedure [facility]   VFSS Textures Trialed thin liquids;slightly thick liquids;mildly thick liquids   VFSS Eval: Thin Liquid Texture Trial   Mode of Presentation, Thin Liquid spoon;fed by clinician;cup;self-fed   Order of Presentation  1-3, 9,10   Preparatory Phase Holding;Other (see comments)  (2-5 seconds)   Oral Phase, Thin Liquid Premature pharyngeal entry;other (see comments)  (with 1st bolus sip)   Pharyngeal Phase, Thin Liquid Residue in valleculae;other (see comments)  (Trace)   Rosenbek's Penetration Aspiration Scale: Thin Liquid Trial Results 2 - contrast enters airway, remains above the vocal cords, no residue remains (penetration)   Successful Strategies Trialed During Procedure, Thin Liquid other (see comments)  (Small sips/tsp sips)   Diagnostic Statement Mild oropharyngeal dysphagia c/b impaired cognition resulting in extended bolus hold with swallow initiation when cued, reduced bolus control resulting in premature spillage to the valleculae with large bolus sip, and reduced pharyngeal strength resulting in reduced hyolaryngeal excursion, reduced pharyngeal constriction, residue lining pharyngeal wall, and occasional trace vallecular residue.   VFSS Eval: Slightly Thick Liquids   Mode of Presentation spoon;fed by clinician;cup;self-fed   Order of Presentation 7,8   Preparatory Phase Holding  (2-5 seconds with cup)   Oral Phase premature pharyngeal entry  (with sip to valleuculae)   Pharyngeal Phase Residue in valleculae;Pharyngeal wall coating   Rosenbek's Penetration Aspiration Scale 1 - no aspiration, contrast does not enter airway   Successful Strategies Trialed During Procedure other (see comments)  (small sips, tsp sips)   Diagnostic Statement Mild oropharyngeal dysphagia c/b impaired cognition resulting in extended bolus hold with swallow initiation when cued, timely initiation of swallow, 2 swallows required to clear with second swallow initiated at the valleculae, and reduced pharyngeal strength resulting in reduced hyolaryngeal excursion, reduced pharyngeal constriction, residue lining pharyngeal wall, and occasional trace vallecular residue.   VFSS Eval: Mildly Thick Liquids   Mode of Presentation spoon;fed by  clinician;cup;self-fed   Order of Presentation 4-6   Preparatory Phase Holding  (2-5 seconds)   Oral Phase Premature pharyngeal entry;other (see comments)  (2 swallows for cup sips)   Pharyngeal Phase Residue in valleculae;Pharyngeal wall coating   Rosenbek's Penetration Aspiration Scale 1 - no aspiration, contrast does not enter airway   Diagnostic Statement Mild oropharyngeal dysphagia c/b impaired cognition resulting in extended bolus hold with swallow initiation when cued, timely initiation of swallow, 2 swallows required for cup sip with second swallow initiated at the pyriforms, and reduced pharyngeal strength resulting in reduced hyolaryngeal excursion, reduced pharyngeal constriction, residue lining pharyngeal wall, and occasional trace vallecular residue.   Esophageal Phase of Swallow   Patient reports or presents with symptoms of esophageal dysphagia No   Swallowing Recommendations   Diet Consistency Recommendations thin liquids (level 0);pureed (level 4);other (see comments)  (thin liquids via 5cc bolus sip (tsp or provale cup).)   Supervision Level for Intake close supervision needed   Mode of Delivery Recommendations bolus size, small  (thin liquids via tsp or measured cup sips)   Monitoring/Assistance Required (Eating/Swallowing) check mouth frequently for oral residue/pocketing;stop eating activities when fatigue is present;other (see comments)  (ensure small cup sips/tsp sips)   Recommended Feeding/Eating Techniques (Swallow Eval) maintain upright sitting position for eating;minimize distractions during oral intake   Instrumental Assessment Recommendations   (Reassess if indicated)   SLP Therapy Assessment/Plan   Criteria for Skilled Therapeutic Interventions Met (SLP Eval) yes;treatment indicated   SLP Diagnosis Mild oropharyngeal dysphagia   Rehab Potential (SLP Eval) good, to achieve stated therapy goals   Therapy Frequency (SLP Eval) daily   Predicted Duration of Therapy Intervention (SLP Eval)  2-3 weeks   Comment, Therapy Assessment/Plan (SLP) Patient seen for VFSS this afternoon. Patient completed PO intake of thin, slightly-thick, and mildly-thick liquids via tsp and cup sips. Overall, patient demonstrates mild oropharyngeal dysphagia with oral phase c/b adequate bolus acceptance, impaired cognition leading to bolus hold (2-5 seconds) with cup sips, but cleared with cued swallow, usual (50-74%) premature spillage of liquid bolus to the valleculae before swallow, or if second swallow with same bolus to the vallecular or pyriform (with mildly-thick), and good oral clearance. Pharyngeal phase c/b reduced pharyngeal strength resulting in reduced pharyngeal constriction leading to trace pharyngeal wall residue with all liquids, reduced hyolaryngeal excursion leading to flash penetration of thin liquids, as well as reduced BOT strength resulting in trace-minimal vallecular residue. Patient did demonstrate need for double swallows with 1 bolus for most cup sips. Only 1 instance of flash penetration with large thin liquid bolus sip. No other s/s of penetration of aspiration or penetration with all other trials. Patient safest and least restrictive diet will be IDDSI 4,0 at this time, with thin liquids taken in 5cc volume (either tsp or provale cup). Patient should sit upright for all meals, should have his mouth checked to ensure clearance throughout the meal, and feeding should be abandoned if patient waxing and waning in alertness or is not alert. If patient falls asleep or becomes somnolent during the meal, do not attempt further PO intake. Diet will be upgraded following meal trial with SLP in future session.   Therapy Plan Review/Discharge Plan (SLP)   Therapy Plan Review (SLP) evaluation/treatment results reviewed;care plan/treatment goals reviewed;risks/benefits reviewed;current/potential barriers reviewed;participants voiced agreement with care plan;participants included;patient;caregiver    Total  Evaluation Time   Total Evaluation Time (Minutes) 30  (VFSS)

## 2021-09-24 NOTE — PLAN OF CARE
FOCUS/GOAL  Bowel management, Bladder management, Medication management, Pain management, Medical management, Mobility, Skin integrity, and Safety management     ASSESSMENT, INTERVENTIONS AND CONTINUING PLAN FOR GOAL:     Pt is alert, unable to assess orientation, very somnolent, slept entire shift. Incontinent of bladder, incontinence pad utilized. Liko lift transfers. Pureed diet, mildly thick liquids, pills crushed in apple sauce. Denied pain, nausea and vomiting, numbness or tingling, SOB, pt appears comfortable. Daughter present at bedside. Alarms on, call light within reach. Will continue with plan of care.

## 2021-09-24 NOTE — PLAN OF CARE
Discharge Planner Post-Acute Rehab PT:     Discharge Plan: Home with 24/7 vs [facility] vs TCU pending progress    Precautions: Fall, alarms    Current Status:  Bed Mobility: mod A if alert  Transfer: squat pivot A x 2, liko with RN staff.  Total Assist of 1 stand pivot,  Max assist sit to stand  Gait: 15 feet Mod A with sequencing and weight shifting using WBQC.  Lethargy impacts performance  Stairs: Not safe  Balance: 1/36 PASS - although not truly reflective of capability due to drastic performance fluctuations with command following and lethargy.    Assessment: Alertness is inconsistent.  Needed lots of cues to stay engaged.     Other Barriers to Discharge (DME, Family Training, etc): Safe discharge plan - will need 24/7 cares, level of assist/equipment pending lethargy/participation

## 2021-09-24 NOTE — PROGRESS NOTES
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  Hospital course complicated by acute kidney injury, hypokalemia needing repletion, and urinary retention and hematuria needing Felix catheter and urology consult.     Admission to inpatient rehab:  9/17/21  Impairment group code: Stroke Ischemic 01.3 Bilateral Involvement, new R Thalamic stroke in setting of R sided weakness from prior stroke        Assessment:     1)  Type 2 Diabetes Mellitus; poor control.  A1c 11.5%  2)  S/p stroke; R hemiparesis  3)  ALEX/CKD     Plan:       -  continue Levemir 14 units daily PM (2000)    -  switch to 1:7g CHO all meals and snacks     -  decrease to Novolog medium resistance from high resistance sliding scale insulin TID AC and HS    -  continue Victoza to 1.2 mg, consider increase to 1.8mg in 2 days    -  anticipate decreasing insulin needs with Victoza on board    -  BG monitoring TID AC, HS and 0200    -  No resumption of Metformin at this time - will consider resumption ongoing    -  Hypoglycemia protocol    -  Carb counting protocol    -  Will follow       Outpatient diabetes follow up: TBD    Plan discussed with patient, family member - bedside RN, and primary team via this note.           Interval History:     The last 24 hours progress and nursing notes reviewed. BG trend below:        Patient eating 33g, 41g CHO for bfast and lunch.   CHO per meal yesterday.  BG 88 this am. Victoza 1.2 mg daily continues.  Will continue Levemir 14 units daily in PM.  Will try to move toward simpler Novolog CHO  "coverage and sliding scale insulin and scale back on CHO coverage.      Patient sitting up in chair, eating lunch.  Daughter at bedside, no reports of nausea, vomiting, diarrhea, or stomach upset.      Tentative discharge date 10/8/21.  Awaiting VFSS when patient more alert. Lethargy has been an issue.           Recent Labs   Lab 09/24/21  0839 09/24/21  0641 09/24/21  0211 09/23/21  2201 09/23/21  1730 09/23/21  1159   GLC 88 95 113* 134* 180* 201*           Nutrition:     Orders Placed This Encounter      Combination Diet Consistent Carb 60 Grams CHO per Meal Diet; Pureed Diet (level 4); Mildly Thick (level 2)    Supplements: any supplement PRN        PTA Regimen:        Levemir 28 units at bedtime  \"Novolog - varied doses based on chos\"  Clinic notes reveal:  Novolog 2 unit(s) per carb choice (2per 15)  Novolog 1 unit(s) per 50> 150     Metformin 500 mg BID (dose reduced for decreased renal fxn)     BG monitoring frequency:  FreeStyle Sherwin - intermittent use     Diet: regular, no restrictions  Eats sporadically - AM - bagel or banana  Lunch - sporadic  Dinner - rice/beg/meat  Loves to drink Monster Drinks     Exercise: sedentary             Review of Systems:   CC:  Non-verbal/resting most of visit.  Does open eyes and nod or shake head to questions.  Smiles. Denies pain          Medications:   Steroid planning:  No       Physical Exam:   /79 (BP Location: Left arm)   Pulse 80   Temp 97.3  F (36.3  C) (Oral)   Resp 15   Ht 1.626 m (5' 4\")   Wt 66.1 kg (145 lb 11.6 oz)   SpO2 97%   BMI 25.01 kg/m       General:  sitting up in chair, daughter at bedside. R sided deficts  HEENT: NC/AT, PER and anicteric, non-injected, oral mucous membranes moist.   Lungs: non-labored, no cough,  no SOB  ABD:  rounded   Skin: warm and dry, no obvious lesions  Feet:  CMS intact  MSK:  fluid movement of L extremities - R sided deficits  Lymp:  no LE edema   Mental status:  alert, oriented x3, unable to communicate " verbally - nods and smiles  Psych:  calm, appropriate mood, approrpriate affect         Data:     Lab Results   Component Value Date    A1C 11.5 09/15/2021    A1C 7.6 08/14/2012            CBC RESULTS: Recent Labs   Lab Test 09/20/21  0824   WBC 8.8   RBC 4.75   HGB 12.6*   HCT 40.1   MCV 84   MCH 26.5   MCHC 31.4*   RDW 13.4        Recent Labs   Lab Test 09/20/21  0415 09/20/21  0300 09/19/21  1735 09/19/21  1347   *  --   --  139   POTASSIUM 3.3*  --   --  4.0   CHLORIDE 111*  --   --  107   CO2 28  --   --  27   ANIONGAP 6  --   --  5   * 55*   < > 244*   BUN 30  --   --  26   CR 1.63*  --   --  1.52*   ARLETH 8.6  --   --  8.7    < > = values in this interval not displayed.       Liver Function Studies -   Recent Labs   Lab Test 09/15/21  0521   PROTTOTAL 6.6*   ALBUMIN 2.6*   BILITOTAL 0.3   ALKPHOS 80   AST 20   ALT 22     Lab Results   Component Value Date    INR 1.03 09/16/2021    INR 0.94 09/14/2021    INR 1.00 08/16/2012    INR 0.96 08/13/2012       FEMI Reynolds, CNP  Inpatient Diabetes Management Service  Pager - [phone]  Friday - Monday 0800 - 1600 hrs  After hours above - Diabetes Management Team job code: [id number]    I spent a total of 35 minutes bedside and on the inpatient unit managing glycemic care.  Over 50% of my time on the unit was spent counseling the patient and/or coordinating care regarding acute hyperglycemic management.  See note for details.

## 2021-09-25 ENCOUNTER — APPOINTMENT (OUTPATIENT)
Dept: OCCUPATIONAL THERAPY | Facility: CLINIC | Age: 71
DRG: 057 | End: 2021-09-25
Attending: PHYSICAL MEDICINE & REHABILITATION
Payer: MEDICARE

## 2021-09-25 ENCOUNTER — APPOINTMENT (OUTPATIENT)
Dept: PHYSICAL THERAPY | Facility: CLINIC | Age: 71
DRG: 057 | End: 2021-09-25
Attending: PHYSICAL MEDICINE & REHABILITATION
Payer: MEDICARE

## 2021-09-25 ENCOUNTER — APPOINTMENT (OUTPATIENT)
Dept: SPEECH THERAPY | Facility: CLINIC | Age: 71
DRG: 057 | End: 2021-09-25
Attending: PHYSICAL MEDICINE & REHABILITATION
Payer: MEDICARE

## 2021-09-25 LAB
ANION GAP SERPL CALCULATED.3IONS-SCNC: 2 MMOL/L (ref 3–14)
BUN SERPL-MCNC: 31 MG/DL (ref 7–30)
CALCIUM SERPL-MCNC: 8.7 MG/DL (ref 8.5–10.1)
CHLORIDE BLD-SCNC: 111 MMOL/L (ref 94–109)
CO2 SERPL-SCNC: 30 MMOL/L (ref 20–32)
CREAT SERPL-MCNC: 1.46 MG/DL (ref 0.66–1.25)
GFR SERPL CREATININE-BSD FRML MDRD: 48 ML/MIN/1.73M2
GLUCOSE BLD-MCNC: 222 MG/DL (ref 70–99)
GLUCOSE BLDC GLUCOMTR-MCNC: 153 MG/DL (ref 70–99)
GLUCOSE BLDC GLUCOMTR-MCNC: 156 MG/DL (ref 70–99)
GLUCOSE BLDC GLUCOMTR-MCNC: 158 MG/DL (ref 70–99)
GLUCOSE BLDC GLUCOMTR-MCNC: 197 MG/DL (ref 70–99)
GLUCOSE BLDC GLUCOMTR-MCNC: 212 MG/DL (ref 70–99)
GLUCOSE BLDC GLUCOMTR-MCNC: 213 MG/DL (ref 70–99)
POTASSIUM BLD-SCNC: 3.7 MMOL/L (ref 3.4–5.3)
SODIUM SERPL-SCNC: 143 MMOL/L (ref 133–144)

## 2021-09-25 PROCEDURE — 250N000013 HC RX MED GY IP 250 OP 250 PS 637: Performed by: PHYSICIAN ASSISTANT

## 2021-09-25 PROCEDURE — 92526 ORAL FUNCTION THERAPY: CPT | Mod: GN | Performed by: SPEECH-LANGUAGE PATHOLOGIST

## 2021-09-25 PROCEDURE — 250N000009 HC RX 250: Performed by: STUDENT IN AN ORGANIZED HEALTH CARE EDUCATION/TRAINING PROGRAM

## 2021-09-25 PROCEDURE — 92507 TX SP LANG VOICE COMM INDIV: CPT | Mod: GN | Performed by: SPEECH-LANGUAGE PATHOLOGIST

## 2021-09-25 PROCEDURE — 250N000013 HC RX MED GY IP 250 OP 250 PS 637: Performed by: PHYSICAL MEDICINE & REHABILITATION

## 2021-09-25 PROCEDURE — 97110 THERAPEUTIC EXERCISES: CPT | Mod: GP

## 2021-09-25 PROCEDURE — 99231 SBSQ HOSP IP/OBS SF/LOW 25: CPT | Mod: GC | Performed by: STUDENT IN AN ORGANIZED HEALTH CARE EDUCATION/TRAINING PROGRAM

## 2021-09-25 PROCEDURE — 80048 BASIC METABOLIC PNL TOTAL CA: CPT | Performed by: PHYSICAL MEDICINE & REHABILITATION

## 2021-09-25 PROCEDURE — 36415 COLL VENOUS BLD VENIPUNCTURE: CPT | Performed by: PHYSICAL MEDICINE & REHABILITATION

## 2021-09-25 PROCEDURE — 97530 THERAPEUTIC ACTIVITIES: CPT | Mod: GP

## 2021-09-25 PROCEDURE — 250N000009 HC RX 250: Performed by: PHYSICAL MEDICINE & REHABILITATION

## 2021-09-25 PROCEDURE — 97535 SELF CARE MNGMENT TRAINING: CPT | Mod: GO

## 2021-09-25 PROCEDURE — 128N000003 HC R&B REHAB

## 2021-09-25 RX ORDER — LIDOCAINE HYDROCHLORIDE 20 MG/ML
JELLY TOPICAL EVERY 4 HOURS PRN
Status: DISCONTINUED | OUTPATIENT
Start: 2021-09-25 | End: 2021-10-06 | Stop reason: HOSPADM

## 2021-09-25 RX ORDER — LIDOCAINE HYDROCHLORIDE 20 MG/ML
JELLY TOPICAL ONCE
Status: COMPLETED | OUTPATIENT
Start: 2021-09-25 | End: 2021-09-25

## 2021-09-25 RX ORDER — LIRAGLUTIDE 6 MG/ML
1.8 INJECTION SUBCUTANEOUS DAILY
Status: DISCONTINUED | OUTPATIENT
Start: 2021-09-26 | End: 2021-10-06 | Stop reason: HOSPADM

## 2021-09-25 RX ADMIN — TAMSULOSIN HYDROCHLORIDE 0.4 MG: 0.4 CAPSULE ORAL at 09:17

## 2021-09-25 RX ADMIN — CLOPIDOGREL BISULFATE 75 MG: 75 TABLET, FILM COATED ORAL at 09:17

## 2021-09-25 RX ADMIN — SENNOSIDES AND DOCUSATE SODIUM 1 TABLET: 8.6; 5 TABLET ORAL at 20:04

## 2021-09-25 RX ADMIN — DICLOFENAC SODIUM 2 G: 10 GEL TOPICAL at 21:36

## 2021-09-25 RX ADMIN — AMLODIPINE BESYLATE 10 MG: 10 TABLET ORAL at 09:17

## 2021-09-25 RX ADMIN — LIDOCAINE HYDROCHLORIDE: 20 JELLY TOPICAL at 11:33

## 2021-09-25 RX ADMIN — LIRAGLUTIDE 1.2 MG: 6 INJECTION SUBCUTANEOUS at 09:17

## 2021-09-25 RX ADMIN — INSULIN ASPART 2 UNITS: 100 INJECTION, SOLUTION INTRAVENOUS; SUBCUTANEOUS at 09:17

## 2021-09-25 RX ADMIN — ASPIRIN 81 MG CHEWABLE TABLET 81 MG: 81 TABLET CHEWABLE at 09:17

## 2021-09-25 RX ADMIN — INSULIN ASPART 2 UNITS: 100 INJECTION, SOLUTION INTRAVENOUS; SUBCUTANEOUS at 17:05

## 2021-09-25 RX ADMIN — ATORVASTATIN CALCIUM 80 MG: 80 TABLET, FILM COATED ORAL at 09:17

## 2021-09-25 RX ADMIN — INSULIN ASPART 1 UNITS: 100 INJECTION, SOLUTION INTRAVENOUS; SUBCUTANEOUS at 12:00

## 2021-09-25 RX ADMIN — Medication 40 MG: at 06:33

## 2021-09-25 RX ADMIN — AMANTADINE HYDROCHLORIDE 100 MG: 100 CAPSULE ORAL at 11:32

## 2021-09-25 RX ADMIN — ATENOLOL 25 MG: 25 TABLET ORAL at 20:04

## 2021-09-25 RX ADMIN — ATENOLOL 25 MG: 25 TABLET ORAL at 09:17

## 2021-09-25 RX ADMIN — AMANTADINE HYDROCHLORIDE 100 MG: 100 CAPSULE ORAL at 06:33

## 2021-09-25 RX ADMIN — POTASSIUM CHLORIDE 20 MEQ: 750 TABLET, EXTENDED RELEASE ORAL at 09:17

## 2021-09-25 NOTE — PROGRESS NOTES
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  Hospital course complicated by acute kidney injury, hypokalemia needing repletion, and urinary retention and hematuria needing Felix catheter and urology consult.     Admission to inpatient rehab:  9/17/21  Impairment group code: Stroke Ischemic 01.3 Bilateral Involvement, new R Thalamic stroke in setting of R sided weakness from prior stroke      Assessment:     1)  Type 2 Diabetes Mellitus; poor control.  A1c 11.5%  2)  S/p stroke; R hemiparesis  3)  ALEX/CKD     Plan:       -  continue Levemir 14 units daily PM (2000) - then reduce tomorrow to 12 unit(s) as Victoza increases to goal    -  Novolog 1:7g CHO all meals and snacks     -  Novolog medium resistance from high resistance sliding scale insulin TID AC and HS    -  continue Victoza to 1.2 mg, will increase to 1.8mg  tomorrow    -  anticipate decreasing insulin needs with Victoza on board    -  BG monitoring TID AC, HS and 0200    -  No resumption of Metformin at this time - will consider resumption ongoing    -  Hypoglycemia protocol    -  Carb counting protocol    -  Will follow        Outpatient diabetes follow up: TBD     Plan discussed with patient, family member - bedside RN, and primary team via this note.                Interval History:     The last 24 hours progress and nursing notes reviewed.  No acute events this interval.      Nephew in room today - no new questions or concerns  Ho shakes head to all ROS questions  Specifically denies any abd/GI distress or elimination  "concerns  Will increase Victoza tomorrow to 1.8 mg - full dosing/goal      Recent Labs   Lab 09/25/21  0208 09/24/21  2101 09/24/21  1717 09/24/21  1225 09/24/21  0839 09/24/21  0641   * 229* 141* 172* 88 95           Nutrition:     Orders Placed This Encounter      Combination Diet Consistent Carb 60 Grams CHO per Meal Diet; Pureed Diet (level 4); Mildly Thick (level 2)    Supplements: Gelatein sugar -free - between meals - send orange 2 pm        PTA Regimen:     Levemir 28 units at bedtime  \"Novolog - varied doses based on chos\"  Clinic notes reveal:  Novolog 2 unit(s) per carb choice (2per 15)  Novolog 1 unit(s) per 50> 150     Metformin 500 mg BID (dose reduced for decreased renal fxn)     BG monitoring frequency:  FreeStyle Sherwin - intermittent use     Diet: regular, no restrictions  Eats sporadically - AM - bagel or banana  Lunch - sporadic  Dinner - rice/beg/meat  Loves to drink [employer] Drinks     Exercise: sedentary          Review of Systems:   CC:  Denies     Constitutional:   No fever/chills  ENT/Mouth:   No hearing changes, no ear pain, no sore throat, no rhinorrhea, no difficulty swallowing  Eyes:  No eye pain, no discharge, no vision changes  CV:   No CP/SOB, no new edema  Resp:  No cough, no wheezing  GI:   No N/V, no constipation, no diarrhea  :  No dysuria, frequency, or hematuria  Musk:  No new joint swelling/pain, no back pain  Skin/heme:   No new rashes/bruises/open areas.  No pruritis  Neuro:   No new weakness, no numbness/tingling, no headache  Psych:   No new anxiety, denies depression  Endocrine:  No polyuria or polydipsia         Medications:   Steroid planning:  no       Physical Exam:   /72   Pulse 93   Temp (!) 96.4  F (35.8  C) (Oral)   Resp 15   Ht 1.626 m (5' 4\")   Wt 66.1 kg (145 lb 11.6 oz)   SpO2 97%   BMI 25.01 kg/m      General:   NAD, pleasant,  resting comfortably in bed. Lethargic but arousable.  Nephew at bedside, attentive  HEENT:  NC/AT. MMM, sclera " not injected  Lungs:  unremarkable, no new cough, no SOB  ABD:   soft,  rounded  Skin:  warm and dry, no obvious lesions/rash  Feet:    CMS intact, PPP - per baseline  MSK:   moving all extremities - R sided deficits  Lymp:   no LE edema  Mental Status:  Lethargic - opens eyes and nods to ROS questions, follows writer with eyes.  Looks at family in room  Psych:   Cooperative, calm          Data:     Lab Results   Component Value Date    A1C 11.5 09/15/2021    A1C 7.6 08/14/2012            CBC RESULTS:   Recent Labs   Lab Test 09/24/21  0641   WBC 7.3   RBC 4.61   HGB 12.3*   HCT 38.7*   MCV 84   MCH 26.7   MCHC 31.8   RDW 13.0        Recent Labs   Lab Test 09/25/21  0208 09/24/21  2101 09/24/21  0839 09/24/21  0641 09/22/21  0746 09/22/21  0547 09/21/21  1110 09/21/21  0830   NA  --   --   --  144  --   --   --  143   POTASSIUM  --   --   --  3.4  --  3.8   < > 3.3*   CHLORIDE  --   --   --  112*  --   --   --  111*   CO2  --   --   --  28  --   --   --  30   ANIONGAP  --   --   --  4  --   --   --  2*   * 229*   < > 95   < >  --    < > 155*   BUN  --   --   --  32*  --   --   --  24   CR  --   --   --  1.65*  --   --   --  1.43*   ARLETH  --   --   --  8.8  --   --   --  8.9    < > = values in this interval not displayed.     Liver Function Studies -   Recent Labs   Lab Test 09/15/21  0521   PROTTOTAL 6.6*   ALBUMIN 2.6*   BILITOTAL 0.3   ALKPHOS 80   AST 20   ALT 22     Lab Results   Component Value Date    INR 1.03 09/16/2021    INR 0.94 09/14/2021    INR 1.00 08/16/2012    INR 0.96 08/13/2012     FEMI Kohli CNP   Inpatient Diabetes Management Service  Pager - [phone]  Friday - Monday 0800 - 1600 hrs  After hours above - Diabetes Management Team job code: [phone]    I spent a total of 25 minutes bedside and on the inpatient unit managing glycemic care.  Over 50% of my time on the unit was spent counseling the patient and/or coordinating care regarding acute hyperglycemic management.  See  note for details.

## 2021-09-25 NOTE — PLAN OF CARE
Patient alert, ZAYDA orientation d/t aphasia. Pt able to communicate needs with resources in room and writing them on paper. R side weakness, A2 pivot transfer or liko lift. L forearm PIV, flushed and patent. Pureed diet, mild thick liquids, pills crushed in applesauce, on carb count.  and 158. Sliding scale insulin given as needed. Incontinent of bowel and bladder, LBM: 9/25. Two small BM's during shift. Having urinary retention, bladder scan done at 1100: 607mL, attempted voiding in urinal, no output. ISC done at 1200: 550mL, red tinged urine. Urojet used during straight cath, patient still experienced severe discomfort and met resistance. Pt denies pain throughout shift. Will continue with POC.

## 2021-09-25 NOTE — PROGRESS NOTES
"  [facility]   Acute Rehabilitation Unit  Weekend progress note    INTERVAL HISTORY  Jimmy Otto was seen and examined at bedside, lying comfortably in bed.  Nephew present at the bedside.  No acute events overnight.   Patient responding to voice commands spontaneously.  Was able to open up his eyes, able to move both his hands on voice command.  Nursing report reviewed patient had one intermittent catheterization overnight, some hematuria at the time of catheterization.  Urojet ordered for subsequent catheterizations.  Creatinine on 9/25/2021 was 1.46 coming down from 1.65 on 9/24/2021    Functionally  Total assist of 1 with stand pivot transfers, max assist from sit to stand, able to walk 15 feet with moderate assistance with sequencing and weight shifting using WBQC        MEDICATIONS    amantadine  100 mg Oral BID     amLODIPine  10 mg Oral Daily     aspirin  81 mg Oral Daily     atenolol  25 mg Oral BID     atorvastatin  80 mg Oral Daily     clopidogrel  75 mg Oral Daily     insulin aspart   Subcutaneous TID w/meals     insulin aspart  1-7 Units Subcutaneous TID AC     insulin aspart  1-5 Units Subcutaneous At Bedtime     insulin detemir  14 Units Subcutaneous Q24H     lidocaine   Urethral Once     liraglutide  1.2 mg Subcutaneous Daily     pantoprazole  40 mg Oral QAM AC     potassium chloride  20 mEq Oral Daily     senna-docusate  1 tablet Oral At Bedtime     sodium chloride (PF)  3 mL Intracatheter Q8H     tamsulosin  0.4 mg Oral Daily        acetaminophen, bisacodyl, glucose **OR** dextrose **OR** glucagon, diclofenac, hydrALAZINE, insulin aspart, melatonin, ondansetron, polyethylene glycol, senna-docusate     PHYSICAL EXAM  BP (!) 158/93 (BP Location: Right arm)   Pulse 99   Temp 98  F (36.7  C) (Oral)   Resp 15   Ht 1.626 m (5' 4\")   Wt 66.1 kg (145 lb 11.6 oz)   SpO2 99%   BMI 25.01 kg/m    Gen: Lying comfortably in bed, sleeping, was easily arousable  HEENT: Eyes " closed, open once to voice command  Cardio: S1-S2 present, regular, rate, rhythm.  Pulm: equal bilateral air entry, no crackles or wheezes  Abd: soft non-tender, audible bowel sounds  Ext: no pitting edema, no tenderness in bilateral calves  Neuro/MSK: Following voice commands consistently.  Ongoing right-sided weakness in upper extremity.  Skin: Visible skin without any skin changes    LABS  CBC RESULTS:   Recent Labs   Lab Test 09/24/21  0641   WBC 7.3   RBC 4.61   HGB 12.3*   HCT 38.7*   MCV 84   MCH 26.7   MCHC 31.8   RDW 13.0         Last Comprehensive Metabolic Panel:  Sodium   Date Value Ref Range Status   09/25/2021 143 133 - 144 mmol/L Final   08/16/2012 140 133 - 144 mmol/L Final     Potassium   Date Value Ref Range Status   09/25/2021 3.7 3.4 - 5.3 mmol/L Final   08/16/2012 4.0 3.4 - 5.3 mmol/L Final     Chloride   Date Value Ref Range Status   09/25/2021 111 (H) 94 - 109 mmol/L Final   08/16/2012 101 94 - 109 mmol/L Final     Carbon Dioxide   Date Value Ref Range Status   08/16/2012 26 20 - 32 mmol/L Final     Carbon Dioxide (CO2)   Date Value Ref Range Status   09/25/2021 30 20 - 32 mmol/L Final     Anion Gap   Date Value Ref Range Status   09/25/2021 2 (L) 3 - 14 mmol/L Final   08/16/2012 13 6 - 17 mmol/L Final     Glucose   Date Value Ref Range Status   09/25/2021 222 (H) 70 - 99 mg/dL Final   08/16/2012 161 (H) 60 - 99 mg/dL Final     GLUCOSE BY METER POCT   Date Value Ref Range Status   09/25/2021 197 (H) 70 - 99 mg/dL Final     Urea Nitrogen   Date Value Ref Range Status   09/25/2021 31 (H) 7 - 30 mg/dL Final   08/16/2012 18 7 - 30 mg/dL Final     Creatinine   Date Value Ref Range Status   09/25/2021 1.46 (H) 0.66 - 1.25 mg/dL Final   08/16/2012 0.96 0.66 - 1.25 mg/dL Final     GFR Estimate   Date Value Ref Range Status   09/25/2021 48 (L) >60 mL/min/1.73m2 Final     Comment:     As of July 11, 2021, eGFR is calculated by the CKD-EPI creatinine equation, without race adjustment. eGFR can be  influenced by muscle mass, exercise, and diet. The reported eGFR is an estimation only and is only applicable if the renal function is stable.   08/16/2012 79 >60 mL/min/1.7m2 Final     Calcium   Date Value Ref Range Status   09/25/2021 8.7 8.5 - 10.1 mg/dL Final   08/16/2012 8.9 8.5 - 10.4 mg/dL Final     Bilirubin Total   Date Value Ref Range Status   09/15/2021 0.3 0.2 - 1.3 mg/dL Final   08/13/2012 0.6 0.2 - 1.3 mg/dL Final     Alkaline Phosphatase   Date Value Ref Range Status   09/15/2021 80 40 - 150 U/L Final   08/13/2012 87 40 - 150 U/L Final     ALT   Date Value Ref Range Status   09/15/2021 22 0 - 70 U/L Final   08/13/2012 13 0 - 70 U/L Final     AST   Date Value Ref Range Status   09/15/2021 20 0 - 45 U/L Final   08/13/2012 36 0 - 45 U/L Final       ASSESSMENT   Jimmy Otto is a 71 year old male with a PMH including but not limited to HTN, uncontrolled DM 2 (HbA1c 11.5), HLD, BPH, and history of a stroke in 2012 with residual right hemiparesis who presented to the emergency room on 9/14/2021 with altered mental status and acute worsening of right-sided weakness, and found to have an acute right thalamic stroke due to small vessel occlusion.  Hospital course complicated by acute kidney injury, hypokalemia needing repletion, and urinary retention and hematuria needing Felix catheter and urology consult.        Impairment group code: Stroke Ischemic 01.3 Bilateral Involvement, new R Thalamic stroke in setting of R sided weakness from prior stroke    PLAN  -Vitals stable. Creatinine on 9/25/2021 was 1.46 coming down from 1.65 on 9/24/2021.  BUN 31 on 9/25/2021.  We will continue to monitor  -Continue intermittent catheterization if trial voids fail.  Will review tomorrow if patient needing regular caths, will replace it with Felix's.  -Continue ongoing medical management, please review primary team's note for details.  -Continue therapies and plan of care.    Patient was seen and discussed with my staff physician  Dr. Bertrand, who agrees with my assessment and plan.    Torsten Carmona  PGY-3  Physical medicine and rehabilitation  Pager [phone]

## 2021-09-25 NOTE — PLAN OF CARE
Discharge Planner Post-Acute Rehab SLP:      Discharge Plan:home with assist, likely further SLP     Precautions:fall, swallowing     Current Status:  Communication: to be assessed, pt limited voicing/aphonic, follows simple directions, some mild aphasia/dysarthria baseline per son, worsened now  Cognition: to be assessed  Swallow: IDDSI 4,- pureed  mildly thick fluids (2),  1:1 for po/meals, assist to feed PRN, slow pace, small bites, no straw, VFSS PRN (order received, on hold)     Assessment: Patient completed PO intake of thin liquids via tsp and puree textures via tsp. 1 overt s/s of aspiration as patient had immediate cough following extended bolus hold with multiple cues for swallow. No other overt s/s of aspiration observed with all PO intake. Patient does require usual (50-74%) max auditory cues to initiate swallow in more timely fashion. Education provided to patient and family member regarding role of SLP in patient care, goals for patient, plans for future sessions, and rationale for diet upgrade with thin liquids via tsp. Family verbalized agreement and understanding. Upgrade patient to thin liquids via teaspoon. Continue puree textures. Ensure patient is seated upright, small bites/sips, total assistance for feeding, and do not feed if patient is not alert or falling asleep during the meal. Cue patient verbally to initiate swallow more timely.      Other Barriers to Discharge (Family Training, etc): TBD

## 2021-09-25 NOTE — PLAN OF CARE
FOCUS/GOAL  Bladder management, Skin integrity, and Safety management    ASSESSMENT, INTERVENTIONS AND CONTINUING PLAN FOR GOAL:  Pt was alert, ZAYDA orientation d/t dysarthria but able to nod head with yes or no questions, able to use call light and to make needs known to staff thru gestures. On IV infusion at 100ml/hr and was completed at 2030. Denies pain, SOB, chest pain nor dizziness. On pureed diet, mildly thick liquids, takes medicines crushed with apple sauce, ate 100% of meal with assistance from family member. Liko lift with transfers. Incont of BL, voided a little, random bladder scan taken twice and straight catheterization done 2x drained pink-red urine, no BM this shift, LBM-9/23/21. Left arm PIV in place, dressing CDI. VSS, BG monitored. Will continue with current POC.

## 2021-09-25 NOTE — PLAN OF CARE
"Discharge Planner Post-Acute Rehab SLP:      Discharge Plan:home with assist, likely further SLP     Precautions:fall, swallowing     Current Status:  Communication: to be assessed, pt limited voicing/aphonic, follows simple directions, some mild aphasia/dysarthria baseline per son, worsened now  Cognition: to be assessed  Swallow: IDDSI 4,- pureed  mildly thick fluids (2),  1:1 for po/meals, assist to feed PRN, slow pace, small bites, no straw, VFSS PRN (order received, on hold)     Assessment:  Patient seen for communication treatment with nephew present. Patient achieved soft, hoarse, weak true voicing at vowel level (\"ah\") on 3/5 attempts (note 1 second duration). Patient not able to elicit throat clear imitation task. Patient mouthed words for pictured objects with 100% accuracy. Patient achieved whispered voice 30% of trials given repetition/imitation task. AAC low tech (f:8 choices): Patient pointed to named items with 75% response rate. Patient did not use board functionally to indicate needs. Patient responded to short abstract yes/no questions with 90% accuracy given occasional repetition. Patient nodded head yes/no to respond (at times difficult to discern intent).    Other Barriers to Discharge (Family Training, etc): TBD    "

## 2021-09-25 NOTE — PLAN OF CARE
Discharge Planner Post-Acute Rehab OT:      Discharge Plan: home with 24/7 assistance vs TCU     Precautions: falls, hx of right sided weakness.      Current Status:  ADLs: Total A for shower transfer using TIS commode chair, Max A don pants, Mod A socks, Max A for UB dressing tasks, Total A for toilet tx/transfers using michell lift and commode chair, Min A for thoroughness with g/h tasks.  IADLs: Family support  Vision/Cognition: Wears glasses all the time. Lethargic, follows directions when alert.      Assessment: Pt keeping his eyes closed during the morning OT session. Pt responding by nodding and following ~50% of commands. ADLs completed with pt needing cues for active participation at times. Tolerated sitting on EOB ~7minute with CGA-mod A.         Other Barriers to Discharge (DME, Family Training, etc):   Family training, per daughter the pt's wife can be home for the pt but she is not necessarily going to be able to assist physically.   AE/DME pending progress    -5 minutes OT

## 2021-09-25 NOTE — PLAN OF CARE
Discharge Planner Post-Acute Rehab PT:      Discharge Plan: Home with 24/7 vs long-term vs TCU pending progress     Precautions: Fall, alarms     Current Status:  Bed Mobility: mod A if alert  Transfer: Squat Pivot with ModA x 2; LIKO with RN staff; and MaxAx1 for STS from MW with WBQC and MinAx1 for balance in standing.  Gait: 15 feet Mod A with sequencing and weight shifting using WBQC.  Lethargy impacts performance  Stairs: Not safe  Balance: 1/36 PASS - although not truly reflective of capability due to drastic performance fluctuations with command following and lethargy.     Assessment: Pt able to follow most commands during session, and demonstrated agreeableness/motivation to participate. Showing improvement with transfers, as pt performed sup<>sit with SBA-ModAx1 with increased time allowed, and SPT with ModAx2 from EOB>MWC and inc vc's to sequence task. Increase of fatigue observed with increased activity. Pt's nephew present for duration of session.      Other Barriers to Discharge (DME, Family Training, etc): Safe discharge plan - will need 24/7 cares, level of assist/equipment pending lethargy/participation

## 2021-09-26 ENCOUNTER — APPOINTMENT (OUTPATIENT)
Dept: PHYSICAL THERAPY | Facility: CLINIC | Age: 71
DRG: 057 | End: 2021-09-26
Attending: PHYSICAL MEDICINE & REHABILITATION
Payer: MEDICARE

## 2021-09-26 ENCOUNTER — APPOINTMENT (OUTPATIENT)
Dept: SPEECH THERAPY | Facility: CLINIC | Age: 71
DRG: 057 | End: 2021-09-26
Attending: PHYSICAL MEDICINE & REHABILITATION
Payer: MEDICARE

## 2021-09-26 ENCOUNTER — APPOINTMENT (OUTPATIENT)
Dept: OCCUPATIONAL THERAPY | Facility: CLINIC | Age: 71
DRG: 057 | End: 2021-09-26
Attending: PHYSICAL MEDICINE & REHABILITATION
Payer: MEDICARE

## 2021-09-26 LAB
GLUCOSE BLDC GLUCOMTR-MCNC: 137 MG/DL (ref 70–99)
GLUCOSE BLDC GLUCOMTR-MCNC: 177 MG/DL (ref 70–99)
GLUCOSE BLDC GLUCOMTR-MCNC: 190 MG/DL (ref 70–99)
GLUCOSE BLDC GLUCOMTR-MCNC: 193 MG/DL (ref 70–99)
GLUCOSE BLDC GLUCOMTR-MCNC: 201 MG/DL (ref 70–99)
HOLD SPECIMEN: NORMAL
POTASSIUM BLD-SCNC: 3.6 MMOL/L (ref 3.4–5.3)

## 2021-09-26 PROCEDURE — 36415 COLL VENOUS BLD VENIPUNCTURE: CPT | Performed by: PHYSICAL MEDICINE & REHABILITATION

## 2021-09-26 PROCEDURE — 97110 THERAPEUTIC EXERCISES: CPT | Mod: GO

## 2021-09-26 PROCEDURE — 84132 ASSAY OF SERUM POTASSIUM: CPT | Performed by: PHYSICAL MEDICINE & REHABILITATION

## 2021-09-26 PROCEDURE — 250N000013 HC RX MED GY IP 250 OP 250 PS 637: Performed by: PHYSICAL MEDICINE & REHABILITATION

## 2021-09-26 PROCEDURE — 250N000013 HC RX MED GY IP 250 OP 250 PS 637: Performed by: PHYSICIAN ASSISTANT

## 2021-09-26 PROCEDURE — 92526 ORAL FUNCTION THERAPY: CPT | Mod: GN | Performed by: SPEECH-LANGUAGE PATHOLOGIST

## 2021-09-26 PROCEDURE — 97110 THERAPEUTIC EXERCISES: CPT | Mod: GP

## 2021-09-26 PROCEDURE — 97535 SELF CARE MNGMENT TRAINING: CPT | Mod: GO

## 2021-09-26 PROCEDURE — 128N000003 HC R&B REHAB

## 2021-09-26 PROCEDURE — 97530 THERAPEUTIC ACTIVITIES: CPT | Mod: GO

## 2021-09-26 PROCEDURE — 97112 NEUROMUSCULAR REEDUCATION: CPT | Mod: GP

## 2021-09-26 PROCEDURE — 97530 THERAPEUTIC ACTIVITIES: CPT | Mod: GP

## 2021-09-26 PROCEDURE — 99232 SBSQ HOSP IP/OBS MODERATE 35: CPT | Performed by: NURSE PRACTITIONER

## 2021-09-26 PROCEDURE — 92507 TX SP LANG VOICE COMM INDIV: CPT | Mod: GN | Performed by: SPEECH-LANGUAGE PATHOLOGIST

## 2021-09-26 PROCEDURE — 250N000009 HC RX 250: Performed by: STUDENT IN AN ORGANIZED HEALTH CARE EDUCATION/TRAINING PROGRAM

## 2021-09-26 RX ADMIN — LIDOCAINE HYDROCHLORIDE: 20 JELLY TOPICAL at 06:39

## 2021-09-26 RX ADMIN — Medication 40 MG: at 06:39

## 2021-09-26 RX ADMIN — AMANTADINE HYDROCHLORIDE 100 MG: 100 CAPSULE ORAL at 06:39

## 2021-09-26 RX ADMIN — ATENOLOL 25 MG: 25 TABLET ORAL at 20:13

## 2021-09-26 RX ADMIN — AMANTADINE HYDROCHLORIDE 100 MG: 100 CAPSULE ORAL at 12:28

## 2021-09-26 RX ADMIN — ATORVASTATIN CALCIUM 80 MG: 80 TABLET, FILM COATED ORAL at 07:31

## 2021-09-26 RX ADMIN — TAMSULOSIN HYDROCHLORIDE 0.4 MG: 0.4 CAPSULE ORAL at 07:31

## 2021-09-26 RX ADMIN — LIDOCAINE HYDROCHLORIDE: 20 JELLY TOPICAL at 01:15

## 2021-09-26 RX ADMIN — INSULIN ASPART 2 UNITS: 100 INJECTION, SOLUTION INTRAVENOUS; SUBCUTANEOUS at 14:05

## 2021-09-26 RX ADMIN — LIRAGLUTIDE 1.8 MG: 6 INJECTION SUBCUTANEOUS at 07:31

## 2021-09-26 RX ADMIN — SENNOSIDES AND DOCUSATE SODIUM 1 TABLET: 8.6; 5 TABLET ORAL at 20:13

## 2021-09-26 RX ADMIN — ATENOLOL 25 MG: 25 TABLET ORAL at 07:30

## 2021-09-26 RX ADMIN — POTASSIUM CHLORIDE 20 MEQ: 750 TABLET, EXTENDED RELEASE ORAL at 07:30

## 2021-09-26 RX ADMIN — INSULIN ASPART 2 UNITS: 100 INJECTION, SOLUTION INTRAVENOUS; SUBCUTANEOUS at 07:45

## 2021-09-26 RX ADMIN — ASPIRIN 81 MG CHEWABLE TABLET 81 MG: 81 TABLET CHEWABLE at 07:30

## 2021-09-26 RX ADMIN — CLOPIDOGREL BISULFATE 75 MG: 75 TABLET, FILM COATED ORAL at 07:31

## 2021-09-26 RX ADMIN — AMLODIPINE BESYLATE 10 MG: 10 TABLET ORAL at 07:31

## 2021-09-26 NOTE — PLAN OF CARE
Patient is alert, difficult to assess orientation d/t aphasia. Assist of 2 with Liko Lift. Has ordoñez, patent. Urine is red with clots, not new. MD notified. Incontinent of bowel, last BM today.  Pureed level 4 diet, thin liquids by spoonful only, takes pills crushed with applesauce. BG checks QID + 02:00, 137 at dinner and 177 at HS. Brother helped patient with dinner.  At appx 16:15, Dennys TOLEDO informed me that it appeared his 3rd and 4th digit on his right hand looked dislocated. Upon RN assessment, 3rd and 4th digit appear depressed and dislocated in between the medial and proximal phalanges. MD notified, stated to be assessed by primary team in the AM, no change in POC.  VS stable. Continue POC.

## 2021-09-26 NOTE — PLAN OF CARE
Discharge Planner Post-Acute Rehab PT:      Discharge Plan: Home with 24/7 vs detention vs TCU pending progress     Precautions: Fall, alarms         Current Status:  Bed Mobility: mod A if alert  Transfer: squat pivot A x 2, liko with RN staff.  Total Assist of 1 stand pivot,  Max assist sit to stand  Gait: 15 feet Mod A with sequencing and weight shifting using WBQC.  Lethargy impacts performance  Stairs: Not safe  Balance: 1/36 PASS - although not truly reflective of capability due to drastic performance fluctuations with command following and lethargy.     Assessment: PT: tolerated sitting re ed; squat pivot transfer; sit to stand with mod A; static standing min A and weight shifts min A;  Brother present for education this pm.         PT: finger hyperextension noted at the R proximal IP joints 3rd >4th, during ROM;  notified RN  (pt with splint on during first pm session; PT returned later to provide additional neuro re ed and this was noted; denies pain; no gross swelling or redness however appears to be significantly lax into extension;  RN will monitor and page MD's for additional assessment as needed).  Pt and brother are agreeable to this plan.        Other Barriers to Discharge (DME, Family Training, etc): Safe discharge plan - will need 24/7 cares, level of assist/equipment pending lethargy/participation

## 2021-09-26 NOTE — PLAN OF CARE
Discharge Planner Post-Acute Rehab SLP:      Discharge Plan:home with assist, likely further SLP     Precautions:fall, swallowing     Current Status:  Communication: to be assessed, pt limited voicing/aphonic, follows simple directions, some mild aphasia/dysarthria baseline per son, worsened now  Cognition: to be assessed  Swallow: IDDSI 4,0 (puree, thin via teaspoon only).  1:1 for po/meals, assist to feed PRN, slow pace, small bites, no straw.     Assessment: Patient completed PO intake with thin liquids. 1 instance of overt s/s of aspiration as patient had immediate cough following tsp of thin liquids, but appeared that patient had attempted to swallow before receiving PO intake resulting in poor coordination of swallow. Overall, safely tolerating thin liquids and puree textures. Improving comprehension of no-tech AAC board, but likely not independently implementing. Patient overall improved alertness compared to previous sessions.       Other Barriers to Discharge (Family Training, etc): TBD

## 2021-09-26 NOTE — PLAN OF CARE
Discharge Planner Post-Acute Rehab OT:      Discharge Plan: home with 24/7 assistance vs TCU     Precautions: falls, hx of right sided weakness.      Current Status:  ADLs: Total A for shower transfer using TIS commode chair, Max A don pants, Mod A socks, Max A for UB dressing tasks, Total A for toilet tx/transfers using michell lift and commode chair, Min A for thoroughness with g/h tasks.  IADLs: Family support  Vision/Cognition: Wears glasses all the time. Lethargic, follows directions when alert.      Assessment: Pt did not tolerate full session today, became somnolent during OT session in gym, unable to arouse. Pt originally responding to yes/no questions by nodding head, eyes open and alert. Following LUE AAROM exercise at tabletop for approx 10 minutes pt closed eyes and slumped forward sleeping. Returned pt to bed via liko lift Ax2. Pt remained sleeping during transfer and rolling to remove liko lift. Handoff given to nursing.      Other Barriers to Discharge (DME, Family Training, etc):   Family training, per daughter the pt's wife can be home for the pt but she is not necessarily going to be able to assist physically.   AE/DME pending progress

## 2021-09-26 NOTE — PLAN OF CARE
FOCUS/GOAL  Bowel management, Bladder management, Pain management and Cognition/Memory/Judgment/Problem solving    ASSESSMENT, INTERVENTIONS AND CONTINUING PLAN FOR GOAL:  Pt is alert and aphasic making orientation assessment difficult, calling but unable to always communicate needs, can answer most yes or no questions with head noding and follows most commands, attempting to reposition pt but will self turn back to right side, ISC for 425 at 130 am after urojet, cath met little resistance but urine is red tinged at start and then became dark red with multiple small clots when nearing removal, pt was incontinent x3 of bladder, incontinent of stool but pt became restless prior to this incontinence but was unable to communicate needs therefore was incontinent. At 230 pt again began restless and physically communicated a desire to remove right hand splint, assisting pt with oral thin liquid intake overnight after which pt started to have wet cough, denied pain and sob, no indication of spontaneous cough unrelated to oral intake, fever, chills or nasal secretions, no further care concerns at this time continue with POC.

## 2021-09-26 NOTE — PLAN OF CARE
Patient alert, ZAYDA orientation d/t speech difficulty. A2 pivot or liko lift for transfer. Incontinent of bowel and bladder, LBM: 9/26. Pt continued to have urinary retention and hematuria. Orders to place ordoñez given. Placed ordoñez catheter at 1122 with standard bag and secured with stat lock. Urine blood-tinged with clots. Pureed diet, thin liquids, pills crushed with applesauce. Pt only tolerating small amounts of water at a time. On carb count.  and 190, sliding scale and carb coverage insulin given as needed. PIV in L forearm, flushed and patent. Pt denies pain, shortness of breath, and n/v throughout shift. Will continue with POC.

## 2021-09-26 NOTE — PLAN OF CARE
FOCUS/GOAL  Bladder management, Skin integrity, and Safety management     ASSESSMENT, INTERVENTIONS AND CONTINUING PLAN FOR GOAL:  Pt was alert, ZAYDA orientation d/t dysarthria but able to nod head with yes or no questions, able to use call light and make needs known to staff thru actions. Denies SOB, chest pain nor dizziness, had pain on left thight and was managed with PRN voltaren gel. On consistent carb, pureed diet, thin liquids, takes medicines crushed with apple sauce, ate 100% of meal with assistance from brother. Assist of 2 liko lift with transfers, stayed in bed the whole shift, repositioned. Incont of BL, voided a little, random bladder scan taken twice and straight catheterization done once, drained pink-red urine with small blood clots, no BM this shift, LBM-9/25/21. Left arm PIV in place, dressing CDI. VSS, BG monitored. Will continue with current POC.

## 2021-09-26 NOTE — PROGRESS NOTES
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  Hospital course complicated by acute kidney injury, hypokalemia needing repletion, and urinary retention and hematuria needing Felix catheter and urology consult.     Admission to inpatient rehab:  9/17/21  Impairment group code: Stroke Ischemic 01.3 Bilateral Involvement, new R Thalamic stroke in setting of R sided weakness from prior stroke      Assessment:     1)  Type 2 Diabetes Mellitus; poor control.  A1c 11.5%  2)  S/p stroke; R hemiparesis  3)  ALEX/CKD     Plan:       -  continue Levemir 14 units daily PM (2000) - will trend if decrease needed now that victoza at goal    -  Novolog 1:7g CHO all meals and snacks     -  Novolog medium resistance from high resistance sliding scale insulin TID AC and HS    -  Victoza to 1.8 mg    -  anticipate decreasing insulin needs with Victoza on board    -  BG monitoring TID AC, HS and 0200    -  No resumption of Metformin at this time - will consider resumption ongoing    -  Hypoglycemia protocol    -  Carb counting protocol    -  Will follow      Outpatient diabetes follow up: TBD     Plan discussed with patient, family member - bedside RN, and primary team via this note.                Interval History:     The last 24 hours progress and nursing notes reviewed. Catheter place - causing some discomfort  Family at bedside - no additional questions/concerns        Nephew in room today - no new questions or concerns  Ho shakes head to havingdiscomfort with the placement of the cath    Specifically  "denies any abd/GI distress or elimination concerns   Victoza at 1.8 mg - will monitor for changes    Recent Labs   Lab 09/26/21  0207 09/25/21  2109 09/25/21  1655 09/25/21  1332 09/25/21  1133 09/25/21  0809   * 212* 213* 153* 158* 197*           Nutrition:     Orders Placed This Encounter      Combination Diet Consistent Carb 60 Grams CHO per Meal Diet; Pureed Diet (level 4); Thin Liquids (level 0) (via tsp only)    Supplements: Gelatein sugar -free - between meals - send orange 2 pm        PTA Regimen:     Levemir 28 units at bedtime  \"Novolog - varied doses based on chos\"  Clinic notes reveal:  Novolog 2 unit(s) per carb choice (2per 15)  Novolog 1 unit(s) per 50> 150     Metformin 500 mg BID (dose reduced for decreased renal fxn)     BG monitoring frequency:  FreeStyle Sherwin - intermittent use     Diet: regular, no restrictions  Eats sporadically - AM - bagel or banana  Lunch - sporadic  Dinner - rice/beg/meat  Loves to drink [employer]     Exercise: sedentary          Review of Systems:   CC:  Pain from the placement of cath    Constitutional:   No fever/chills  ENT/Mouth:   No hearing changes, no ear pain, no sore throat, no rhinorrhea, no difficulty swallowing  Eyes:  No eye pain, no discharge, no vision changes  CV:   No CP/SOB, no new edema  Resp:  No cough, no wheezing  GI:   No N/V, no constipation, no diarrhea  :  Cath placed  Musk:  No new joint swelling/pain, no back pain  Skin/heme:   No new rashes/bruises/open areas.  No pruritis  Neuro:   No new weakness, no numbness/tingling, no headache  Psych:   No new anxiety, denies depression  Endocrine:  No polyuria or polydipsia         Medications:   Steroid planning:  no       Physical Exam:   BP (!) 147/89 (BP Location: Right arm)   Pulse 101   Temp 98.2  F (36.8  C) (Oral)   Resp 16   Ht 1.626 m (5' 4\")   Wt 66.1 kg (145 lb 11.6 oz)   SpO2 97%   BMI 25.01 kg/m      General:   NAD, pleasant,  resting comfortably in bed. Uncomfortable " appearing,  nephew at bedside, attentive - cath placed  HEENT:  NC/AT. MMM, sclera not injected  Lungs:  unremarkable, no new cough, no SOB  ABD:   soft,  rounded  Skin:  warm and dry, no obvious lesions/rash  Feet:    CMS intact, PPP - per baseline  MSK:   moving all extremities - R sided deficits  Lymp:   no LE edema  Mental Status:  Mor alert today -nods to ROS questions, follows writer with eyes.  Looks at family in room  Psych:   Cooperative, calm          Data:     Lab Results   Component Value Date    A1C 11.5 09/15/2021    A1C 7.6 08/14/2012            CBC RESULTS: Recent Labs   Lab Test 09/24/21  0641   WBC 7.3   RBC 4.61   HGB 12.3*   HCT 38.7*   MCV 84   MCH 26.7   MCHC 31.8   RDW 13.0        Recent Labs   Lab Test 09/26/21  1223 09/26/21  0744 09/26/21  0544 09/26/21  0207 09/25/21  0809 09/25/21  0728 09/24/21  0839 09/24/21  0641   NA  --   --   --   --   --  143  --  144   POTASSIUM  --   --  3.6  --   --  3.7   < > 3.4   CHLORIDE  --   --   --   --   --  111*  --  112*   CO2  --   --   --   --   --  30  --  28   ANIONGAP  --   --   --   --   --  2*  --  4   * 201*  --    < >   < > 222*   < > 95   BUN  --   --   --   --   --  31*  --  32*   CR  --   --   --   --   --  1.46*  --  1.65*   ARLETH  --   --   --   --   --  8.7  --  8.8    < > = values in this interval not displayed.       Liver Function Studies -   Recent Labs   Lab Test 09/15/21  0521   PROTTOTAL 6.6*   ALBUMIN 2.6*   BILITOTAL 0.3   ALKPHOS 80   AST 20   ALT 22     Lab Results   Component Value Date    INR 1.03 09/16/2021    INR 0.94 09/14/2021    INR 1.00 08/16/2012    INR 0.96 08/13/2012     FEMI Kohli CNP   Inpatient Diabetes Management Service  Pager - [phone]  Friday - Monday 0800 - 1600 hrs  After hours above - Diabetes Management Team job code: [id number]    I spent a total of 25 minutes bedside and on the inpatient unit managing glycemic care.  Over 50% of my time on the unit was spent counseling the patient  and/or coordinating care regarding acute hyperglycemic management.  See note for details.

## 2021-09-27 ENCOUNTER — APPOINTMENT (OUTPATIENT)
Dept: SPEECH THERAPY | Facility: CLINIC | Age: 71
DRG: 057 | End: 2021-09-27
Attending: PHYSICAL MEDICINE & REHABILITATION
Payer: MEDICARE

## 2021-09-27 ENCOUNTER — APPOINTMENT (OUTPATIENT)
Dept: PHYSICAL THERAPY | Facility: CLINIC | Age: 71
DRG: 057 | End: 2021-09-27
Attending: PHYSICAL MEDICINE & REHABILITATION
Payer: MEDICARE

## 2021-09-27 ENCOUNTER — APPOINTMENT (OUTPATIENT)
Dept: OCCUPATIONAL THERAPY | Facility: CLINIC | Age: 71
DRG: 057 | End: 2021-09-27
Attending: PHYSICAL MEDICINE & REHABILITATION
Payer: MEDICARE

## 2021-09-27 LAB
ANION GAP SERPL CALCULATED.3IONS-SCNC: 3 MMOL/L (ref 3–14)
BUN SERPL-MCNC: 31 MG/DL (ref 7–30)
CALCIUM SERPL-MCNC: 9.2 MG/DL (ref 8.5–10.1)
CHLORIDE BLD-SCNC: 110 MMOL/L (ref 94–109)
CO2 SERPL-SCNC: 29 MMOL/L (ref 20–32)
CREAT SERPL-MCNC: 1.51 MG/DL (ref 0.66–1.25)
ERYTHROCYTE [DISTWIDTH] IN BLOOD BY AUTOMATED COUNT: 13.1 % (ref 10–15)
GFR SERPL CREATININE-BSD FRML MDRD: 46 ML/MIN/1.73M2
GLUCOSE BLD-MCNC: 259 MG/DL (ref 70–99)
GLUCOSE BLDC GLUCOMTR-MCNC: 203 MG/DL (ref 70–99)
GLUCOSE BLDC GLUCOMTR-MCNC: 209 MG/DL (ref 70–99)
GLUCOSE BLDC GLUCOMTR-MCNC: 235 MG/DL (ref 70–99)
GLUCOSE BLDC GLUCOMTR-MCNC: 264 MG/DL (ref 70–99)
GLUCOSE BLDC GLUCOMTR-MCNC: 269 MG/DL (ref 70–99)
HCT VFR BLD AUTO: 39.6 % (ref 40–53)
HGB BLD-MCNC: 12.7 G/DL (ref 13.3–17.7)
MCH RBC QN AUTO: 27.1 PG (ref 26.5–33)
MCHC RBC AUTO-ENTMCNC: 32.1 G/DL (ref 31.5–36.5)
MCV RBC AUTO: 84 FL (ref 78–100)
PLATELET # BLD AUTO: 278 10E3/UL (ref 150–450)
POTASSIUM BLD-SCNC: 3.6 MMOL/L (ref 3.4–5.3)
RBC # BLD AUTO: 4.69 10E6/UL (ref 4.4–5.9)
SARS-COV-2 RNA RESP QL NAA+PROBE: NEGATIVE
SODIUM SERPL-SCNC: 142 MMOL/L (ref 133–144)
WBC # BLD AUTO: 7.8 10E3/UL (ref 4–11)

## 2021-09-27 PROCEDURE — 250N000009 HC RX 250: Performed by: PHYSICAL MEDICINE & REHABILITATION

## 2021-09-27 PROCEDURE — 258N000003 HC RX IP 258 OP 636: Performed by: PHYSICIAN ASSISTANT

## 2021-09-27 PROCEDURE — U0003 INFECTIOUS AGENT DETECTION BY NUCLEIC ACID (DNA OR RNA); SEVERE ACUTE RESPIRATORY SYNDROME CORONAVIRUS 2 (SARS-COV-2) (CORONAVIRUS DISEASE [COVID-19]), AMPLIFIED PROBE TECHNIQUE, MAKING USE OF HIGH THROUGHPUT TECHNOLOGIES AS DESCRIBED BY CMS-2020-01-R: HCPCS | Performed by: PHYSICIAN ASSISTANT

## 2021-09-27 PROCEDURE — 128N000003 HC R&B REHAB

## 2021-09-27 PROCEDURE — 36415 COLL VENOUS BLD VENIPUNCTURE: CPT | Performed by: PHYSICIAN ASSISTANT

## 2021-09-27 PROCEDURE — 97112 NEUROMUSCULAR REEDUCATION: CPT | Mod: GO | Performed by: STUDENT IN AN ORGANIZED HEALTH CARE EDUCATION/TRAINING PROGRAM

## 2021-09-27 PROCEDURE — 97110 THERAPEUTIC EXERCISES: CPT | Mod: GO | Performed by: STUDENT IN AN ORGANIZED HEALTH CARE EDUCATION/TRAINING PROGRAM

## 2021-09-27 PROCEDURE — 97116 GAIT TRAINING THERAPY: CPT | Mod: GP

## 2021-09-27 PROCEDURE — 97535 SELF CARE MNGMENT TRAINING: CPT | Mod: GO | Performed by: STUDENT IN AN ORGANIZED HEALTH CARE EDUCATION/TRAINING PROGRAM

## 2021-09-27 PROCEDURE — 92526 ORAL FUNCTION THERAPY: CPT | Mod: GN | Performed by: SPEECH-LANGUAGE PATHOLOGIST

## 2021-09-27 PROCEDURE — 97112 NEUROMUSCULAR REEDUCATION: CPT | Mod: GP

## 2021-09-27 PROCEDURE — 85027 COMPLETE CBC AUTOMATED: CPT | Performed by: PHYSICIAN ASSISTANT

## 2021-09-27 PROCEDURE — 250N000013 HC RX MED GY IP 250 OP 250 PS 637: Performed by: PHYSICIAN ASSISTANT

## 2021-09-27 PROCEDURE — 99233 SBSQ HOSP IP/OBS HIGH 50: CPT | Performed by: PHYSICAL MEDICINE & REHABILITATION

## 2021-09-27 PROCEDURE — 80048 BASIC METABOLIC PNL TOTAL CA: CPT | Performed by: PHYSICIAN ASSISTANT

## 2021-09-27 PROCEDURE — 250N000013 HC RX MED GY IP 250 OP 250 PS 637: Performed by: PHYSICAL MEDICINE & REHABILITATION

## 2021-09-27 PROCEDURE — 99232 SBSQ HOSP IP/OBS MODERATE 35: CPT | Performed by: NURSE PRACTITIONER

## 2021-09-27 RX ORDER — SODIUM CHLORIDE 9 MG/ML
INJECTION, SOLUTION INTRAVENOUS CONTINUOUS
Status: DISCONTINUED | OUTPATIENT
Start: 2021-09-27 | End: 2021-09-28

## 2021-09-27 RX ORDER — NYSTATIN 100000/ML
500000 SUSPENSION, ORAL (FINAL DOSE FORM) ORAL 4 TIMES DAILY
Status: DISCONTINUED | OUTPATIENT
Start: 2021-09-27 | End: 2021-09-27

## 2021-09-27 RX ORDER — NYSTATIN 100000/ML
500000 SUSPENSION, ORAL (FINAL DOSE FORM) ORAL 4 TIMES DAILY
Status: DISPENSED | OUTPATIENT
Start: 2021-09-27 | End: 2021-10-04

## 2021-09-27 RX ADMIN — ACETAMINOPHEN 975 MG: 325 TABLET, FILM COATED ORAL at 19:36

## 2021-09-27 RX ADMIN — Medication 40 MG: at 06:15

## 2021-09-27 RX ADMIN — MELATONIN TAB 3 MG 3 MG: 3 TAB at 19:36

## 2021-09-27 RX ADMIN — INSULIN ASPART 3 UNITS: 100 INJECTION, SOLUTION INTRAVENOUS; SUBCUTANEOUS at 07:45

## 2021-09-27 RX ADMIN — ASPIRIN 81 MG CHEWABLE TABLET 81 MG: 81 TABLET CHEWABLE at 07:46

## 2021-09-27 RX ADMIN — LIRAGLUTIDE 1.8 MG: 6 INJECTION SUBCUTANEOUS at 07:45

## 2021-09-27 RX ADMIN — AMANTADINE HYDROCHLORIDE 100 MG: 100 CAPSULE ORAL at 06:15

## 2021-09-27 RX ADMIN — SODIUM CHLORIDE: 9 INJECTION, SOLUTION INTRAVENOUS at 11:06

## 2021-09-27 RX ADMIN — ATENOLOL 25 MG: 25 TABLET ORAL at 19:36

## 2021-09-27 RX ADMIN — NYSTATIN 500000 UNITS: 100000 SUSPENSION ORAL at 19:44

## 2021-09-27 RX ADMIN — INSULIN ASPART 2 UNITS: 100 INJECTION, SOLUTION INTRAVENOUS; SUBCUTANEOUS at 17:32

## 2021-09-27 RX ADMIN — POTASSIUM CHLORIDE 20 MEQ: 750 TABLET, EXTENDED RELEASE ORAL at 07:46

## 2021-09-27 RX ADMIN — ATENOLOL 25 MG: 25 TABLET ORAL at 07:57

## 2021-09-27 RX ADMIN — SENNOSIDES AND DOCUSATE SODIUM 1 TABLET: 8.6; 5 TABLET ORAL at 19:36

## 2021-09-27 RX ADMIN — NYSTATIN 500000 UNITS: 100000 SUSPENSION ORAL at 11:08

## 2021-09-27 RX ADMIN — NYSTATIN 500000 UNITS: 100000 SUSPENSION ORAL at 16:22

## 2021-09-27 RX ADMIN — CLOPIDOGREL BISULFATE 75 MG: 75 TABLET, FILM COATED ORAL at 07:46

## 2021-09-27 RX ADMIN — INSULIN ASPART 2 UNITS: 100 INJECTION, SOLUTION INTRAVENOUS; SUBCUTANEOUS at 12:18

## 2021-09-27 RX ADMIN — AMANTADINE HYDROCHLORIDE 100 MG: 100 CAPSULE ORAL at 11:08

## 2021-09-27 RX ADMIN — ATORVASTATIN CALCIUM 80 MG: 80 TABLET, FILM COATED ORAL at 07:46

## 2021-09-27 RX ADMIN — AMLODIPINE BESYLATE 10 MG: 10 TABLET ORAL at 07:46

## 2021-09-27 RX ADMIN — TAMSULOSIN HYDROCHLORIDE 0.4 MG: 0.4 CAPSULE ORAL at 07:46

## 2021-09-27 NOTE — PROGRESS NOTES
[facility]   Acute Rehabilitation Unit  Daily progress note    INTERVAL HISTORY  Weekend notes reviewed.  Had ongoing retention requiring re-placement of a Felix catheter.  This morning Mr. Otto is awake, but closes his eyes towards the end of my visit.  He is able to follow simple one step commands and denied pain, chest pain, or shortness of breath.  Functionally, he continues to vary based on his alertness levels.  Today was Mod A for transfers, gait and standing in parallel bars with chair follow, cues for attention.     MEDICATIONS  Scheduled:    amantadine  100 mg Oral BID     amLODIPine  10 mg Oral Daily     aspirin  81 mg Oral Daily     atenolol  25 mg Oral BID     atorvastatin  80 mg Oral Daily     clopidogrel  75 mg Oral Daily     insulin aspart   Subcutaneous TID w/meals     insulin aspart  1-7 Units Subcutaneous TID AC     insulin aspart  1-5 Units Subcutaneous At Bedtime     insulin detemir  14 Units Subcutaneous Q24H     liraglutide  1.8 mg Subcutaneous Daily     nystatin  500,000 Units Oral 4x Daily     pantoprazole  40 mg Oral QAM AC     potassium chloride  20 mEq Oral Daily     senna-docusate  1 tablet Oral At Bedtime     sodium chloride (PF)  3 mL Intracatheter Q8H     tamsulosin  0.4 mg Oral Daily        PRN:  acetaminophen, bisacodyl, glucose **OR** dextrose **OR** glucagon, diclofenac, hydrALAZINE, insulin aspart, lidocaine, melatonin, ondansetron, polyethylene glycol, senna-docusate       PHYSICAL EXAM  Patient Vitals for the past 24 hrs:   BP Temp Temp src Pulse Resp SpO2   09/27/21 0729 (!) 161/85 97.9  F (36.6  C) Oral 96 16 96 %   09/26/21 2013 (!) 143/69 -- -- 101 -- --   09/26/21 1533 130/81 96.8  F (36  C) Oral 99 16 98 %       GEN: NAD, awake during visit but closes eyes towards the end and falls asleep easily, able to nod yes/no responses  HEENT: NC/AT.  Dry mucus membranes, +Thrush on tongue  CVS: RRR, S1+S2, no m/r/g  PULM: non-labored on room  "air, CTA b/l  ABD: Non-distended, soft, NT, BS+  : Felix in place, +Hematuria  EXT: No LE edema or calf tenderness b/l  Neuro: Non-verbal but shakes head \"yes or no\"      LABS  CBC RESULTS:   Recent Labs   Lab Test 09/27/21  0602 09/24/21  0641 09/21/21  0830   WBC 7.8 7.3 7.3   RBC 4.69 4.61 4.49   HGB 12.7* 12.3* 12.0*   HCT 39.6* 38.7* 38.1*   MCV 84 84 85   MCH 27.1 26.7 26.7   MCHC 32.1 31.8 31.5   RDW 13.1 13.0 13.2    275 247     Last Basic Metabolic Panel:  Recent Labs   Lab Test 09/27/21 0728 09/27/21 0602 09/27/21 0201 09/26/21  0744 09/26/21  0544 09/25/21  0809 09/25/21  0728 09/24/21  0839 09/24/21  0641   NA  --  142  --   --   --   --  143  --  144   POTASSIUM  --  3.6  --   --  3.6  --  3.7   < > 3.4   CHLORIDE  --  110*  --   --   --   --  111*  --  112*   CO2  --  29  --   --   --   --  30  --  28   ANIONGAP  --  3  --   --   --   --  2*  --  4   * 259* 203*   < >  --    < > 222*   < > 95   BUN  --  31*  --   --   --   --  31*  --  32*   CR  --  1.51*  --   --   --   --  1.46*  --  1.65*   GFRESTIMATED  --  46*  --   --   --   --  48*  --  41*   ARLETH  --  9.2  --   --   --   --  8.7  --  8.8    < > = values in this interval not displayed.     Recent Labs   Lab 09/27/21 0728 09/27/21  0602 09/27/21  0201 09/26/21  2106 09/26/21  1652 09/26/21  1223   * 259* 203* 177* 137* 190*       IMPRESSION  Jimmy Otto is a 71 year old male with a PMH including but not limited to HTN, uncontrolled DM 2 (HbA1c 11.5), HLD, BPH, and history of a stroke in 2012 with residual right hemiparesis who presented to the emergency room on 9/14/2021 with altered mental status and acute worsening of right-sided weakness, and found to have an acute right thalamic stroke due to small vessel occlusion.  Hospital course complicated by acute kidney injury, hypokalemia needing repletion, and urinary retention and hematuria needing Felix catheter and urology consult.         Impairment group " code: Stroke Ischemic 01.3 Bilateral Involvement, new R Thalamic stroke in setting of R sided weakness from prior stroke        PLAN  Rehabilitation  -Continue with inpatient rehabilitation program including PT, OT, and SLP for 3 hrs/day, rehab nursing, social work, nutrition, and close monitoring by physiatry for an estimated total of 3 weeks.  -The patient has impairments in strength, balance, endurance, and coordination, which are leading to activity limitations in ambulation, mobility, ADLs, and swallowing.      Medical  1)Neurology  #R thalamic CVA due to small vessel occlusion  - DAPT with ASA 81 mg and Plavix 75 mg for 1 month, followed by Plavix alone indefinitely.  Recommended check P2Y12 level to ensure Plavix efficacy after 1 month of being on Plavix only.  - Hypertension, lipid, and glucose management as below.  Will need ongoing education regarding stroke risk factor modifications including importance of medication compliance and diet and lifestyle changes  - Follow-up with [facility] general neurology in 6 to 8 weeks  - Monitor somnolence/lethargy.  Decreased level of alertness not unexpected with essentially bilateral thalamic pathology (new right thalamic stroke and a history of left thalamic stroke).    - Amantadine 100 mg daily started on 9/20 for neurostimulation given ongoing somnolence which is limiting participation.  Increased Amantadine to 100 mg BID (7 am and noon) on 9/25.  Therapist will try set schedule to target optimal times of day for alertness/participation.  - High risk for delirium, ensure delirium precautions and appropriate sleep-wake cycle.  PRN melatonin available.        2)CVS  #HTN  - PTA on amlodipine 10 mg daily, atenolol 50 mg twice daily, Cozaar 100 mg daily, spironolactone 25 mg daily, and hydrochlorothiazide 25 mg daily.  Cozaar, spironolactone, and hydrochlorothiazide are being held due to acute kidney injury  - Continue amlodipine 10 mg daily, and atenolol at a lower dose of  25 mg twice daily for now.  Titrate doses as needed.  - Hydralazine as needed for SBP greater than 180 mmHg     #HLD  - Continue Lipitor 80 mg daily.  LDL 92 on 9/15        3)Pulmonary  - No acute issues but low lung volumes and atelectasis/infiltrate seen at the left lung base on chest x-ray 9/14.  Clinically no signs of pneumonia but will continue to monitor.  - Encourage incentive spirometry        4)FENGI  #Diet: Level 4 solids and now upgraded to level 0 thin liquids.  Upgrade further as able per speech therapy  - Aspiration precautions  - Consult nutrition for education and optimization given stroke, modified diet, and uncontrolled diabetes  - VFSS completed 9/24, diet now upgraded to level 4 solids 0, however cancelled due to lethargy. Repeat planned for this afternoon.     #Bowels: As needed Senokot-S, MiraLAX, and Dulcolax suppository     #Hypokalemia  - Ongoing and in the setting of poor po intake.  Started scheduled replacement 20 mEq daily, K 3.8 9/22.  Potassium 3.4 today.     #GI prophylaxis  -Protonix 40 mg daily given DAPT and age     #Oral Thrush  -Start Nystatin swish and swallow x7 days.  Will need to ensure patient is awake enough to adhere to aspiration precautions.     5)  #ALEX  - Baseline creatinine ~1.1, with a peak of 2.13 upon admission  - Had improved, then Cr increasing at 1.63 on 9/20,  likely pre-renal given lethargy and poor po intake.  Improved to 1.43 on 9/21 following IV fluids (9/19-9/20).  Continue to monitor, encourage fluids.  Creatinine today 1.51 with BUN 31.  Will continue IV fluids for one more day and re-check BMP in am.   - Continue holding Cozaar, hydrochlorothiazide, and spironolactone.        #Urinary retention and hematuria  - Trial of void attempted, but continued to retain urine and Foleyh was re-placed on 9/26. Will plan to keep in until follow up with urology.  - Urology consulted and recommend CT urogram once creatinine normalizes and outpatient follow-up for  cystoscopy to complete hematuria work-up.  - Recurrent hematuria noted, continue to monitor hgb levels.  Thus far have been stable.   - Flomax 0.4 mg daily started, will continue        6)DVT prophylaxis  - Mechanical.  Will hold from chemoprophylaxis given hematuria and already on dual anti-platelet therapy        7)Pain  - Tylenol PRN        8)Endo  #Uncontrolled DM II (HbA1c 11.5)  - PTA was on Levemir 20 units nightly, NovoLog with meals with carb coverage, and Metformin 500 mg twice daily.  Unclear how compliant he was with his medications.  Endocrine assistance appreciated for help with management.  Recs 9/26:     -  continue Levemir 14 units daily PM (2000) - will trend if decrease needed now that victoza at goal    -  Novolog 1:7g CHO all meals and snacks     -  Novolog medium resistance from high resistance sliding scale insulin TID AC and HS    -  Victoza to 1.8 mg    -  anticipate decreasing insulin needs with Victoza on board    -  BG monitoring TID AC, HS and 0200    -  No resumption of Metformin at this time - will consider resumption ongoing    -  Hypoglycemia protocol    -  Carb counting protocol    -  Will follow     9)Heme  - Hgb 12.7 on 9/27.  Overall stable.  - Continue to monitor, especially in the setting of hematuria.        10)Psych  - Monitor mood, will consult health psychology if needed  - Melatonin PRN for sleep      11)MSK  ~1.5 cm soft, non-tender, mobile nodule in left distal upper arm.  No overlying erythema, warmth, skin changes.  Seems most consistent with lipoma vs cyst.  - Monitor, but no further work-up/treatment indicated at this time           12)Social/Dispo  - Goals for discharge home at a supervision level for ambulation, mobility, and ADLs.  - ELOS: Tentative discharge date 10/8/21  - Rehab prognosis: Fair  - Follow up appointments: PCP, [facility] General neurology clinic 6-8 weeks, urology for hematuria     Code status: Full Code (unable to discuss with patient given somnolence  upon admission.  Have continued prior CODE STATUS and discussed with the son who confirms that his prior wishes were to be full code)    Juan Miguel Campo MD  Department of Rehabilitation Medicine    Time Spent on this Encounter   I, Juan Miguel Campo, spent a total of 35 minutes face-to-face or managing the care of Jimmy Otto. Over 50% of my time on the unit was spent counseling the patient and coordinating care. See note for details.

## 2021-09-27 NOTE — PLAN OF CARE
Discharge Planner Post-Acute Rehab SLP:      Discharge Plan:home with assist, likely further SLP     Precautions:fall, swallowing     Current Status:  Communication:  pt limited voicing/aphonic, follows simple directions, some mild aphasia/dysarthria baseline per son, worsened now. Working on expression/comprehension as well as assistive technology ie picure board  Cognition: to be assessed as appropriate  Swallow: IDDSI 4,0 (puree, thin via teaspoon only).  1:1 for po/meals, assist to feed PRN, slow pace, small bites, no straw.     assessment: SLP:pm session pt seen for swallowing drank 4oz thin liquid by sip, no outward sx aspiration, nor by tsp, pt also self fed gelatein without any sx difficulty. In am session pt did cough 1x by tsp. Will further assess if pt ok to drink thin fluids by sip which may help meet hydration needs better and increase independence.   Other Barriers to Discharge (Family Training, etc): TBD

## 2021-09-27 NOTE — PLAN OF CARE
FOCUS/GOAL  Bowel management, Bladder management, Pain management and Cognition/Memory/Judgment/Problem solving    ASSESSMENT, INTERVENTIONS AND CONTINUING PLAN FOR GOAL:  Pt is alert and aphasic but following commands, ordoñez in place draining red urine some clots visible. Pt continues to be restless until 230am, denied pain, and without signs or symptoms of sob, chills, n/v or new numbness and tingling, PIV in L hand, no further care concerns at this time continue with POC.

## 2021-09-27 NOTE — PLAN OF CARE
Patient alert, ZAYDA orientation d/t aphasia. R side weakness, A2 pivot or liko lift. Incontinent of bowel and bladder, LBM: . Felix in place, output still red with clots. Order to irrigate q shift. L arm PIV, started 0.9 sodium chloride continuous fluids at 1100, running at 100mL/hr. Pureed diet, thin liquids by tsp only, pills crushed in applesauce, total feed. Adequate intake. B and 234, sliding scale and carb coverage insulin given. R arm splint on at this time. Pt denies pain throughout shift. Asymptomatic COVID swab done, results negative. Will continue with POC.

## 2021-09-27 NOTE — PLAN OF CARE
Discharge Planner Post-Acute Rehab PT:      Discharge Plan: Home with 24/7 vs prison vs TCU pending progress     Precautions: Fall, alarms         Current Status:  Bed Mobility: mod A if alert  Transfer: squat pivot A x 2, liko with RN staff.  Total Assist of 1 stand pivot,  Max assist sit to stand  Gait: 15 feet Mod A with sequencing and weight shifting using WBQC.  Lethargy impacts performance  Stairs: Not safe  Balance: 1/36 PASS - although not truly reflective of capability due to drastic performance fluctuations with command following and lethargy.     Assessment: Mod A for transfers,  Gait and standing at // bars with chair follow,  Impaired weight shift.  Cues for attention.  Performance variable due to fatigue and lethargy.      Other Barriers to Discharge (DME, Family Training, etc): Safe discharge plan - will need 24/7 cares, level of assist/equipment pending lethargy/participation

## 2021-09-27 NOTE — PROGRESS NOTES
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  Hospital course complicated by acute kidney injury, hypokalemia needing repletion, and urinary retention and hematuria needing Felix catheter and urology consult.     Admission to inpatient rehab:  9/17/21  Impairment group code: Stroke Ischemic 01.3 Bilateral Involvement, new R Thalamic stroke in setting of R sided weakness from prior stroke      Assessment:     1)  Type 2 Diabetes Mellitus; poor control.  A1c 11.5%  2)  S/p stroke; R hemiparesis  3)  ALEX/CKD     Plan:       -  continue Levemir 14 units daily PM (2000) - will trend if decrease needed now that victoza at goal    -  Novolog 1:7g CHO all meals and snacks     -  Novolog medium resistance from high resistance sliding scale insulin TID AC and HS    -  Victoza to 1.8 mg    -  anticipate decreasing insulin needs with Victoza on board    -  BG monitoring TID AC, HS and 0200    -  No resumption of Metformin at this time - will consider resumption ongoing    -  Hypoglycemia protocol    -  Carb counting protocol    -  Will follow      Outpatient diabetes follow up: TBD     Plan discussed with patient, bedside RN, and primary team via this note.             Interval History:     The last 24 hours progress and nursing notes reviewed.      Working with speech therapy this AM - feeling better than yesterday, pain reduced from cath insertion site.     Specifically denies any abd/GI distress or elimination concerns   Victoza at 1.8 mg - tolerating    (note:  Phally - one of her family members  "exposed to covid -19+ so she will be available by phone during this exposure time)    Recent Labs   Lab 09/27/21  0728 09/27/21  0602 09/27/21  0201 09/26/21  2106 09/26/21  1652 09/26/21  1223   * 259* 203* 177* 137* 190*           Nutrition:     Orders Placed This Encounter      Combination Diet Consistent Carb 60 Grams CHO per Meal Diet; Pureed Diet (level 4); Thin Liquids (level 0) (via tsp only)    Supplements: Gelatein sugar -free - between meals - send orange 2 pm        PTA Regimen:     Levemir 28 units at bedtime  \"Novolog - varied doses based on chos\"  Clinic notes reveal:  Novolog 2 unit(s) per carb choice (2per 15)  Novolog 1 unit(s) per 50> 150     Metformin 500 mg BID (dose reduced for decreased renal fxn)     BG monitoring frequency:  FreeStyle Sherwin - intermittent use     Diet: regular, no restrictions  Eats sporadically - AM - bagel or banana  Lunch - sporadic  Dinner - rice/beg/meat  Loves to drink [employer]     Exercise: sedentary          Review of Systems:   CC:  Denies pain today and that the pain from cath yesterday has resolved.    Constitutional:   No fever/chills  ENT/Mouth:   No hearing changes, no ear pain, no sore throat, no rhinorrhea, no difficulty swallowing  Eyes:  No eye pain, no discharge, no vision changes  CV:   No CP/SOB, no new edema  Resp:  No cough, no wheezing  GI:   No N/V, no constipation, no diarrhea  :  Cath placed yesterday  Musk:  No new joint swelling/pain, no back pain  Skin/heme:   No new rashes/bruises/open areas.  No pruritis  Neuro:   No new weakness, no numbness/tingling, no headache  Psych:   No new anxiety, denies depression  Endocrine:  No polyuria or polydipsia         Medications:   Steroid planning:  no       Physical Exam:   BP (!) 161/85 (BP Location: Left arm)   Pulse 96   Temp 97.9  F (36.6  C) (Oral)   Resp 16   Ht 1.626 m (5' 4\")   Wt 66.1 kg (145 lb 11.6 oz)   SpO2 96%   BMI 25.01 kg/m      General:   NAD, pleasant,  resting " comfortably in chair - working with speech therapy   HEENT:  NC/AT. MMM, sclera not injected  Lungs:  unremarkable, no new cough, no SOB  ABD:   soft,  rounded  Skin:  warm and dry, no obvious lesions/rash  Feet:    CMS intact, PPP - per baseline  MSK:   moving all extremities - R sided deficits  Lymp:   no LE edema  Mental Status:  alert -nods to ROS questions, follows writer with eyes.   Psych:   Cooperative, calm          Data:     Lab Results   Component Value Date    A1C 11.5 09/15/2021    A1C 7.6 08/14/2012        CBC RESULTS: Recent Labs   Lab Test 09/27/21  0602   WBC 7.8   RBC 4.69   HGB 12.7*   HCT 39.6*   MCV 84   MCH 27.1   MCHC 32.1   RDW 13.1        Recent Labs   Lab Test 09/27/21  1152 09/27/21  0728 09/27/21  0602 09/27/21  0201 09/26/21  0744 09/26/21  0544 09/25/21  0809 09/25/21  0728   NA  --   --  142  --   --   --   --  143   POTASSIUM  --   --  3.6  --   --  3.6   < > 3.7   CHLORIDE  --   --  110*  --   --   --   --  111*   CO2  --   --  29  --   --   --   --  30   ANIONGAP  --   --  3  --   --   --   --  2*   * 269* 259*   < >   < >  --    < > 222*   BUN  --   --  31*  --   --   --   --  31*   CR  --   --  1.51*  --   --   --   --  1.46*   ARLETH  --   --  9.2  --   --   --   --  8.7    < > = values in this interval not displayed.       Liver Function Studies -   Recent Labs   Lab Test 09/15/21  0521   PROTTOTAL 6.6*   ALBUMIN 2.6*   BILITOTAL 0.3   ALKPHOS 80   AST 20   ALT 22     Lab Results   Component Value Date    INR 1.03 09/16/2021    INR 0.94 09/14/2021    INR 1.00 08/16/2012    INR 0.96 08/13/2012     FEMI Kohli CNP   Inpatient Diabetes Management Service  Pager - [phone]  Friday - Monday 0800 - 1600 hrs  After hours above - Diabetes Management Team job code: [id number]    I spent a total of 25 minutes bedside and on the inpatient unit managing glycemic care.  Over 50% of my time on the unit was spent counseling the patient and/or coordinating care regarding acute  hyperglycemic management.  See note for details.

## 2021-09-27 NOTE — PLAN OF CARE
Discharge Planner Post-Acute Rehab OT:      Discharge Plan: home with 24/7 assistance vs TCU     Precautions: falls, hx of right sided weakness.      Current Status:  ADLs: Total A for shower transfer using TIS commode chair, Max A don pants, Mod A socks, Max A for UB dressing tasks, Total A for toilet tx/transfers using michell lift and commode chair, Min A for thoroughness with g/h tasks.  IADLs: Family support  Vision/Cognition: Wears glasses all the time. Lethargic, follows directions when alert.      Assessment:Pt completed 2 transfers to  with assist of 2. Pt more alert in the beginning of session but fatigued by the end. Over all max A for transfer due to sequencing and fatigue.    Other Barriers to Discharge (DME, Family Training, etc):   Family training, per daughter the pt's wife can be home for the pt but she is not necessarily going to be able to assist physically.   AE/DME pending progress

## 2021-09-28 ENCOUNTER — APPOINTMENT (OUTPATIENT)
Dept: SPEECH THERAPY | Facility: CLINIC | Age: 71
DRG: 057 | End: 2021-09-28
Attending: PHYSICAL MEDICINE & REHABILITATION
Payer: MEDICARE

## 2021-09-28 ENCOUNTER — APPOINTMENT (OUTPATIENT)
Dept: OCCUPATIONAL THERAPY | Facility: CLINIC | Age: 71
DRG: 057 | End: 2021-09-28
Attending: PHYSICAL MEDICINE & REHABILITATION
Payer: MEDICARE

## 2021-09-28 LAB
ANION GAP SERPL CALCULATED.3IONS-SCNC: <1 MMOL/L (ref 3–14)
BUN SERPL-MCNC: 33 MG/DL (ref 7–30)
CALCIUM SERPL-MCNC: 9.2 MG/DL (ref 8.5–10.1)
CHLORIDE BLD-SCNC: 114 MMOL/L (ref 94–109)
CO2 SERPL-SCNC: 28 MMOL/L (ref 20–32)
CREAT SERPL-MCNC: 1.57 MG/DL (ref 0.66–1.25)
GFR SERPL CREATININE-BSD FRML MDRD: 44 ML/MIN/1.73M2
GLUCOSE BLD-MCNC: 227 MG/DL (ref 70–99)
GLUCOSE BLDC GLUCOMTR-MCNC: 167 MG/DL (ref 70–99)
GLUCOSE BLDC GLUCOMTR-MCNC: 206 MG/DL (ref 70–99)
GLUCOSE BLDC GLUCOMTR-MCNC: 237 MG/DL (ref 70–99)
GLUCOSE BLDC GLUCOMTR-MCNC: 272 MG/DL (ref 70–99)
GLUCOSE BLDC GLUCOMTR-MCNC: 289 MG/DL (ref 70–99)
HOLD SPECIMEN: NORMAL
POTASSIUM BLD-SCNC: 3.5 MMOL/L (ref 3.4–5.3)
SODIUM SERPL-SCNC: 142 MMOL/L (ref 133–144)

## 2021-09-28 PROCEDURE — 97535 SELF CARE MNGMENT TRAINING: CPT | Mod: GO | Performed by: STUDENT IN AN ORGANIZED HEALTH CARE EDUCATION/TRAINING PROGRAM

## 2021-09-28 PROCEDURE — 250N000013 HC RX MED GY IP 250 OP 250 PS 637: Performed by: PHYSICAL MEDICINE & REHABILITATION

## 2021-09-28 PROCEDURE — 92526 ORAL FUNCTION THERAPY: CPT | Mod: GN | Performed by: SPEECH-LANGUAGE PATHOLOGIST

## 2021-09-28 PROCEDURE — 99233 SBSQ HOSP IP/OBS HIGH 50: CPT | Performed by: NURSE PRACTITIONER

## 2021-09-28 PROCEDURE — 36415 COLL VENOUS BLD VENIPUNCTURE: CPT | Performed by: PHYSICAL MEDICINE & REHABILITATION

## 2021-09-28 PROCEDURE — 82374 ASSAY BLOOD CARBON DIOXIDE: CPT | Performed by: PHYSICAL MEDICINE & REHABILITATION

## 2021-09-28 PROCEDURE — 99232 SBSQ HOSP IP/OBS MODERATE 35: CPT | Performed by: PHYSICAL MEDICINE & REHABILITATION

## 2021-09-28 PROCEDURE — 250N000013 HC RX MED GY IP 250 OP 250 PS 637: Performed by: PHYSICIAN ASSISTANT

## 2021-09-28 PROCEDURE — 250N000009 HC RX 250: Performed by: PHYSICAL MEDICINE & REHABILITATION

## 2021-09-28 PROCEDURE — 250N000012 HC RX MED GY IP 250 OP 636 PS 637: Performed by: NURSE PRACTITIONER

## 2021-09-28 PROCEDURE — 128N000003 HC R&B REHAB

## 2021-09-28 RX ORDER — POTASSIUM CHLORIDE 1.5 G/1.58G
20 POWDER, FOR SOLUTION ORAL DAILY
Status: DISCONTINUED | OUTPATIENT
Start: 2021-09-28 | End: 2021-10-06 | Stop reason: HOSPADM

## 2021-09-28 RX ORDER — AMANTADINE HYDROCHLORIDE 100 MG/1
100 CAPSULE, GELATIN COATED ORAL DAILY
Status: DISCONTINUED | OUTPATIENT
Start: 2021-09-29 | End: 2021-10-06 | Stop reason: HOSPADM

## 2021-09-28 RX ORDER — MAGNESIUM HYDROXIDE 1200 MG/15ML
LIQUID ORAL
Status: DISPENSED
Start: 2021-09-28 | End: 2021-09-29

## 2021-09-28 RX ORDER — DOXAZOSIN 1 MG/1
1 TABLET ORAL DAILY
Status: DISCONTINUED | OUTPATIENT
Start: 2021-09-28 | End: 2021-10-06 | Stop reason: HOSPADM

## 2021-09-28 RX ADMIN — Medication 40 MG: at 06:06

## 2021-09-28 RX ADMIN — DOXAZOSIN 1 MG: 1 TABLET ORAL at 09:26

## 2021-09-28 RX ADMIN — AMLODIPINE BESYLATE 10 MG: 10 TABLET ORAL at 08:17

## 2021-09-28 RX ADMIN — AMANTADINE HYDROCHLORIDE 100 MG: 100 CAPSULE ORAL at 06:06

## 2021-09-28 RX ADMIN — LIRAGLUTIDE 1.8 MG: 6 INJECTION SUBCUTANEOUS at 08:16

## 2021-09-28 RX ADMIN — ATENOLOL 25 MG: 25 TABLET ORAL at 21:18

## 2021-09-28 RX ADMIN — INSULIN ASPART 3 UNITS: 100 INJECTION, SOLUTION INTRAVENOUS; SUBCUTANEOUS at 12:24

## 2021-09-28 RX ADMIN — NYSTATIN 500000 UNITS: 100000 SUSPENSION ORAL at 12:33

## 2021-09-28 RX ADMIN — INSULIN ASPART 2 UNITS: 100 INJECTION, SOLUTION INTRAVENOUS; SUBCUTANEOUS at 08:15

## 2021-09-28 RX ADMIN — POTASSIUM CHLORIDE 20 MEQ: 1.5 FOR SOLUTION ORAL at 09:27

## 2021-09-28 RX ADMIN — NYSTATIN 500000 UNITS: 100000 SUSPENSION ORAL at 08:17

## 2021-09-28 RX ADMIN — ATENOLOL 25 MG: 25 TABLET ORAL at 08:17

## 2021-09-28 RX ADMIN — NYSTATIN 500000 UNITS: 100000 SUSPENSION ORAL at 21:22

## 2021-09-28 RX ADMIN — ASPIRIN 81 MG CHEWABLE TABLET 81 MG: 81 TABLET CHEWABLE at 08:16

## 2021-09-28 RX ADMIN — CLOPIDOGREL BISULFATE 75 MG: 75 TABLET, FILM COATED ORAL at 08:17

## 2021-09-28 RX ADMIN — SENNOSIDES AND DOCUSATE SODIUM 1 TABLET: 8.6; 5 TABLET ORAL at 21:18

## 2021-09-28 RX ADMIN — NYSTATIN 500000 UNITS: 100000 SUSPENSION ORAL at 18:02

## 2021-09-28 RX ADMIN — INSULIN ASPART 2 UNITS: 100 INJECTION, SOLUTION INTRAVENOUS; SUBCUTANEOUS at 18:02

## 2021-09-28 RX ADMIN — ATORVASTATIN CALCIUM 80 MG: 80 TABLET, FILM COATED ORAL at 08:16

## 2021-09-28 NOTE — PLAN OF CARE
Discharge Planner Post-Acute Rehab PT:      Discharge Plan: Home with 24/7 vs nursing home vs TCU pending progress     Precautions: Fall, alarms     Current Status:  Bed Mobility: mod A if alert  Transfer: variable based on alertness, lift dependent  Gait: variable based on alertness, w/c dependent   Stairs: Not safe  Balance: 1/36 PASS - although not truly reflective of capability due to drastic performance fluctuations with command following and lethargy.     Assessment: Somnolence continues to limit safety. Unable to participate in session this AM. Initiated order for hospital bed, lift, and wheelchair for safe discharge to home.    Need to contact family to solidify discharge plan, location, and supervision.     Other Barriers to Discharge (DME, Family Training, etc): Safe discharge plan - will need 24/7 cares, level of assist/equipment pending lethargy/participation

## 2021-09-28 NOTE — PROGRESS NOTES
Pt alert, ZAYDA orientation due to sever aphasia. Nods to yes and no questions, soft spoken. Felix patent and draining clear, dark yellow urine. Bladder irrigation per orders. LBM 9/27. PIV to LUE, + blood return and saline locked. Level 4 (pureed), level 2 (mild thick), emds crushed in apple sauce - family at bedside assisting with dinner this evening. Family reports increased coughing with eating and difficulty swallowing. Ate 75% of meal and ricardo sounds CTA. Left sticky note for SLP. Blood sugar 237, 289. Alarms activated. Soft touch call light within reach. Pt does use call light and also noted to fidget with call light at times.

## 2021-09-28 NOTE — PROGRESS NOTES
"  [facility]   Acute Rehabilitation Unit  Daily progress note    INTERVAL HISTORY  Mr. Otto was seen and examined sitting upright in wheelchair this morning.  No acute events reported overnight.  He appeared more awake this morning, and when asked how he is doing today, he gave \"ok\" sign.  He denies any pain, shortness of breath, dizziness, nausea.  Nursing reports he ate a good breakfast today.    Functionally, he currently requires mod assist for bed mobility (if alert), lift dependent for transfers (though abilities variable based on alertness), wheelchair dependent at this time.  ADL continue to be completed mostly by therapist with pt minimally assisting. He needs max assist for getting to edge of bed, min assist for sitting balance, max assist for transfer to . Will need to initiate family training since pt will be max assist for ADL due to somnolence. Physical therapist initiated paperwork for DME.    MEDICATIONS  Scheduled:    [START ON 9/29/2021] amantadine  100 mg Oral Daily     amLODIPine  10 mg Oral Daily     aspirin  81 mg Oral Daily     atenolol  25 mg Oral BID     atorvastatin  80 mg Oral Daily     clopidogrel  75 mg Oral Daily     doxazosin  1 mg Oral Daily     insulin aspart   Subcutaneous TID w/meals     insulin aspart  1-7 Units Subcutaneous TID AC     insulin aspart  1-5 Units Subcutaneous At Bedtime     insulin detemir  14 Units Subcutaneous Q24H     liraglutide  1.8 mg Subcutaneous Daily     nystatin  500,000 Units Swish & Swallow 4x Daily     pantoprazole  40 mg Oral QAM AC     potassium chloride  20 mEq Oral Daily     senna-docusate  1 tablet Oral At Bedtime     sodium chloride (PF)  3 mL Intracatheter Q8H        PRN:  acetaminophen, bisacodyl, glucose **OR** dextrose **OR** glucagon, diclofenac, hydrALAZINE, insulin aspart, lidocaine, melatonin, ondansetron, polyethylene glycol, senna-docusate       PHYSICAL EXAM  Patient Vitals for the past 24 hrs:   " "BP Temp Temp src Pulse Resp SpO2   09/28/21 0746 (!) 149/90 (!) 95.1  F (35.1  C) Oral 95 20 97 %   09/28/21 0617 126/67 (!) 96.7  F (35.9  C) Oral 95 16 97 %   09/27/21 1524 136/86 97.3  F (36.3  C) Oral 94 16 98 %       GEN: NAD, awake during visit, able to nod yes/no responses  HEENT: NC/AT,  MMM, +Thrush on tongue  CVS: RRR, no murmurs  PULM: non-labored on room air, CTA b/l  ABD: Non-distended, soft, NT, BS+  : Felix in place, draining dark yellow-orange urine  EXT: no edema appreciated in bilateral lower extremities  Neuro: Non-verbal but shakes head \"yes or no\"      LABS  CBC RESULTS:   Recent Labs   Lab Test 09/27/21  0602 09/24/21  0641 09/21/21  0830   WBC 7.8 7.3 7.3   RBC 4.69 4.61 4.49   HGB 12.7* 12.3* 12.0*   HCT 39.6* 38.7* 38.1*   MCV 84 84 85   MCH 27.1 26.7 26.7   MCHC 32.1 31.8 31.5   RDW 13.1 13.0 13.2    275 247     Last Basic Metabolic Panel:  Recent Labs   Lab Test 09/28/21  0726 09/28/21  0622 09/28/21  0209 09/27/21  0728 09/27/21  0602 09/26/21  0744 09/26/21  0544 09/25/21  0809 09/25/21  0728   NA  --  142  --   --  142  --   --   --  143   POTASSIUM  --  3.5  --   --  3.6  --  3.6   < > 3.7   CHLORIDE  --  114*  --   --  110*  --   --   --  111*   CO2  --  28  --   --  29  --   --   --  30   ANIONGAP  --  <1*  --   --  3  --   --   --  2*   * 227* 167*   < > 259*   < >  --    < > 222*   BUN  --  33*  --   --  31*  --   --   --  31*   CR  --  1.57*  --   --  1.51*  --   --   --  1.46*   GFRESTIMATED  --  44*  --   --  46*  --   --   --  48*   ARLETH  --  9.2  --   --  9.2  --   --   --  8.7    < > = values in this interval not displayed.     Recent Labs   Lab 09/28/21  0726 09/28/21  0622 09/28/21  0209 09/27/21  2138 09/27/21  1646 09/27/21  1152   * 227* 167* 264* 209* 235*       IMPRESSION  Jimmy Otto is a 71 year old male with a PMH including but not limited to HTN, uncontrolled DM 2 (HbA1c 11.5), HLD, BPH, and history of a stroke in 2012 with residual right " hemiparesis who presented to the emergency room on 9/14/2021 with altered mental status and acute worsening of right-sided weakness, and found to have an acute right thalamic stroke due to small vessel occlusion.  Hospital course complicated by acute kidney injury, hypokalemia needing repletion, and urinary retention and hematuria needing Felix catheter and urology consult.         Impairment group code: Stroke Ischemic 01.3 Bilateral Involvement, new R Thalamic stroke in setting of R sided weakness from prior stroke        PLAN  Rehabilitation  -Continue with inpatient rehabilitation program including PT, OT, and SLP for 3 hrs/day, rehab nursing, social work, nutrition, and close monitoring by physiatry for an estimated total of 3 weeks.  -The patient has impairments in strength, balance, endurance, and coordination, which are leading to activity limitations in ambulation, mobility, ADLs, and swallowing.      Medical  1)Neurology  #R thalamic CVA due to small vessel occlusion  - DAPT with ASA 81 mg and Plavix 75 mg for 1 month, followed by Plavix alone indefinitely.  Recommended check P2Y12 level to ensure Plavix efficacy after 1 month of being on Plavix only.  - Hypertension, lipid, and glucose management as below.  Will need ongoing education regarding stroke risk factor modifications including importance of medication compliance and diet and lifestyle changes  - Follow-up with [facility] general neurology in 6 to 8 weeks  - Monitor somnolence/lethargy.  Decreased level of alertness not unexpected with essentially bilateral thalamic pathology (new right thalamic stroke and a history of left thalamic stroke).    - Amantadine 100 mg daily started on 9/20 for neurostimulation given ongoing somnolence which is limiting participation.  Increased Amantadine to 100 mg BID (7 am and noon) on 9/25.  Decreased to 100 mg daily on 9/29 due to impaired renal function.  - High risk for delirium, ensure delirium precautions and  appropriate sleep-wake cycle.  PRN melatonin available.        2)CVS  #HTN  - PTA on amlodipine 10 mg daily, atenolol 50 mg twice daily, Cozaar 100 mg daily, spironolactone 25 mg daily, and hydrochlorothiazide 25 mg daily.  Cozaar, spironolactone, and hydrochlorothiazide are being held due to acute kidney injury  - Continue amlodipine 10 mg daily, and atenolol at a lower dose of 25 mg twice daily for now.  Titrate doses as needed.  - Hydralazine as needed for SBP greater than 180 mmHg     #HLD  - Continue Lipitor 80 mg daily.  LDL 92 on 9/15        3)Pulmonary  - No acute issues but low lung volumes and atelectasis/infiltrate seen at the left lung base on chest x-ray 9/14.  Clinically no signs of pneumonia but will continue to monitor.  - Encourage incentive spirometry        4)FENGI  #Diet: Level 4 solids and now upgraded to level 0 thin liquids.  Upgrade further as able per speech therapy  - Aspiration precautions  - Consult nutrition for education and optimization given stroke, modified diet, and uncontrolled diabetes  - VFSS completed 9/24, diet now upgraded to level 4 solids 0.     #Bowels: As needed Senokot-S, MiraLAX, and Dulcolax suppository     #Hypokalemia  - Ongoing and in the setting of poor po intake.  Started scheduled replacement 20 mEq daily, K 3.8 9/22.  Potassium 3.5 today.     #GI prophylaxis  -Protonix 40 mg daily given DAPT and age     #Oral Thrush  Noted on exam 9/27, started on Nystatin swish and swallow x7 days.  - Continue Nystatin swish and swallow x7 days.  Will need to ensure patient is awake enough to adhere to aspiration precautions.     5)  #ALEX  - Baseline creatinine ~1.1, with a peak of 2.13 upon admission  - Had improved, then Cr increasing at 1.63 on 9/20,  likely pre-renal given lethargy and poor po intake.  Improved to 1.43 on 9/21 following IV fluids (9/19-9/20).  Continue to monitor, encourage fluids.  Creatinine 9/28 1.51 with BUN 31, on continuous IV fluids 100 ml/hr  overnight.  Repeat Cr today at 1.57 with BUN 33.  Weights stable, mucous membranes appear less dry today, urine output ~1800 ml yesterday.  Maintenance fluids stopped, encourage oral intake.  Recheck Cr tomorrow.  Consider IV fluid bolus if Cr uptrending.    - Continue holding Cozaar, hydrochlorothiazide, and spironolactone.        #Urinary retention and hematuria  - Trial of void attempted, but continued to retain urine and Felix was re-placed on 9/26. Will plan to keep in until follow up with urology.  - Urology consulted and recommend CT urogram once creatinine normalizes and outpatient follow-up for cystoscopy to complete hematuria work-up.  - Recurrent hematuria noted, continue to monitor hgb levels.  Thus far have been stable.   - Flomax 0.4 mg daily started, will continue        6)DVT prophylaxis  - Mechanical.  Will hold from chemoprophylaxis given hematuria and already on dual anti-platelet therapy        7)Pain  - Tylenol PRN        8)Endo  #Uncontrolled DM II (HbA1c 11.5)  - PTA was on Levemir 20 units nightly, NovoLog with meals with carb coverage, and Metformin 500 mg twice daily.  Unclear how compliant he was with his medications.  Endocrine assistance appreciated for help with management.  Recs 9/27:    -  continue Levemir 14 units daily PM (2000) - will trend if decrease needed now that victoza at goal    -  Novolog 1:7g CHO all meals and snacks     -  Novolog medium resistance from high resistance sliding scale insulin TID AC and HS    -  Victoza to 1.8 mg    -  anticipate decreasing insulin needs with Victoza on board    -  BG monitoring TID AC, HS and 0200    -  No resumption of Metformin at this time - will consider resumption ongoing    -  Hypoglycemia protocol    -  Carb counting protocol    -  Will follow     9)Heme  - Hgb 12.7 on 9/27.  Overall stable.  - Continue to monitor, especially in the setting of hematuria.        10)Psych  - Monitor mood, will consult health psychology if needed  -  Melatonin PRN for sleep      11)MSK  ~1.5 cm soft, non-tender, mobile nodule in left distal upper arm.  No overlying erythema, warmth, skin changes.  Seems most consistent with lipoma vs cyst.  - Monitor, but no further work-up/treatment indicated at this time           12)Social/Dispo  - Goals for discharge home at a supervision level for ambulation, mobility, and ADLs.  - ELOS: Tentative discharge date 10/8/21  - Rehab prognosis: Fair  - Follow up appointments: PCP, [facility] General neurology clinic 6-8 weeks, urology for hematuria     Code status: Full Code (unable to discuss with patient given somnolence upon admission.  Have continued prior CODE STATUS and discussed with the son who confirms that his prior wishes were to be full code)        Seen and discussed with Dr. Juan Miguel Campo, PM&R staff physician     NATALIE Lopez-C  Physical Medicine & Rehabilitation

## 2021-09-28 NOTE — PLAN OF CARE
FOCUS/GOAL  Medical management    ASSESSMENT, INTERVENTIONS AND CONTINUING PLAN FOR GOAL:  Pt is alert, answer questions by nodding. Alarm was on earlier when pt move to his rt side and left leg try to reach side table. Reposition by 2 staff. He was able to sleep for a while, when he was awake he tried to push the bed control. Locked the bed to prevent accident. Constantly checking for needs and offered water. Needs A1 w/ spoon to drink. Noted to have episode of coughing w/ regular thin liquid. His left hand is busy pushing the bed control, gave him the squeezy foam to exercise his hands. Decline pain and no sob noted. He was awake at 4am, watching TV. Re-orient pt w/ time and encourage to sleep. Bladder Irrigation done earlier. Colored orange of urine draining from the ordoñez. Cont w/ POC.

## 2021-09-28 NOTE — PROGRESS NOTES
"Rehabilitation Medicine Wheelchair Face to Face     Diagnosis: debility and somnolence d/t ischemic CVA of the right thalamus.   Currently has limited mobility due to weakness and somnolence.  Current transfer status: mechanical lift  Distance patient currently able to walk: 0 feet d/t somnolence.   Therapy team has ruled out the following less costly options:   Cane due to unable to walk safely d/t somnolence.   Walker due to unable to walk safely d/t somnolence  Patient will use wheelchair to complete Mobility Related ADL's in the home including toileting, dressing, self cares.    Without a wheelchair patient will be unable to safely move about their home.  This equipment will allow them to be out of bed, participate in home activities with their family, and it will be part of a fall prevention plan for safe mobility.     Patient's home will accommodate use of wheelchair.  Patient has a family member willing and able to assist as necessary with wheelchair.     Patient needs a parul-height chair due to small body stature in order to self propel with their feet.    Arm and leg strength: dependent for propulsion d/t somnolence.     Gait/balance/coordination: Unable when somnolent.    Length of need: lifetime=99    Current height: 5'4\"  Current weight:145  :1950        Juan Miguel Campo MD  NPI: [id number]  Date: 2021     "

## 2021-09-28 NOTE — PLAN OF CARE
Discharge Planner Post-Acute Rehab OT:      Discharge Plan: home with 24/7 assistance, liift, hospital bed and WC     Precautions: falls, hx of right sided weakness.      Current Status:  ADLs: Total A for shower transfer using TIS commode chair, Max A don pants, Mod A socks, Max A for UB dressing tasks, Total A for toilet tx/transfers using michell lift and commode chair, Min A for thoroughness with g/h tasks.  IADLs: Family support  Vision/Cognition: Wears glasses all the time. Lethargic, follows directions when alert.      Assessment: ADL completed but mostly by therapist with pt minimally assisting. MAX A for getting to EOB, GLADIS for sitting balance, MAX A for transfer to WC. Once in CW pt fell asleep again. Family was not present today. Will need to get family here to initiate training since pt will be max A for ADL due to somnolence. Physical therapist initiated paperwork for DME. Pt has been incontinent of BM and has a ordoñez so he does not need a commode but will talk tot family about it at later time. -30 lethargy    Talked to pt's daughter,Clemente, on the phone. The earliest she and other family will be able to come in to do family training is Sat. She will call with an updated time in prep for the training.     Other Barriers to Discharge (DME, Family Training, etc):   Family training, per daughter the pt's wife can be home for the pt but she is not necessarily going to be able to assist physically.   AE/DME WC, lift, hospital bed

## 2021-09-28 NOTE — PROGRESS NOTES
"Rehabilitation Medicine Face to Face for Patient Lift    Diagnosis: ischemic CVA of the right thalamus.    Currently requires assistance of more than one person to transfer between surfaces due to somnolence and severe weakness.    Patient is unable to transfer without a lift due to somnolence and severe weakness.    Lift will fit into the patient's home.  Patient has a caregiver who will be able to assist with use of the lift. Without a lift patient will be confined to bed.     Will need slings x 2 to use with this lift.     Length of need: lifetime    Current height: 5'4\"  Current weight: 145 lbs  : 1950      Juan Miguel Campo MD  NPI: [id number]  Date: 2021    "

## 2021-09-28 NOTE — PLAN OF CARE
Patient is alert, difficult to assess orientation d/t aphasia. Assist of 2 with Liko Lift. Has ordoñez, patent. Urine is red with clots, not new. Incontinent of bowel, last BM yesterday 09/26.  Pureed level 4 diet, thin liquids by spoonful only, takes pills crushed with applesauce. BG checks QID + 02:00, 209 at dinner and 264 at HS. Sister in law helped patient with dinner. NS running at start of shift 100ml/hr, ended appx 20:30. Patient received bed bath today. VS stable. Continue POC.

## 2021-09-28 NOTE — PLAN OF CARE
FOCUS/GOAL  Bowel management, Bladder management, Pain management, Mobility, Skin integrity, and Safety management    ASSESSMENT, INTERVENTIONS AND CONTINUING PLAN FOR GOAL:  Alert, orientation unable to be assessed, able to answer yes and no questions. VS stable. Level 4/2, pills crushed in applesauce. Ate most of meal. Incontinent of bowel, ordoñez in place for bladder management. No complaints or signs of pain this shift. Assist of 2 with liko to transfer. No new skin concerns. Calls some with light, fidgets with light. Anticipating cares needed. Continue POC.

## 2021-09-28 NOTE — PLAN OF CARE
Discharge Planner Post-Acute Rehab SLP:      Discharge Plan:home with assist, likely further SLP     Precautions:fall, swallowing     Current Status:  Communication:  pt limited voicing/aphonic, follows simple directions, some mild aphasia/dysarthria baseline per son, worsened now. Working on expression/comprehension as well as assistive technology ie picure board  Cognition: to be assessed as appropriate  Swallow: IDDSI 4,2 (pureed, mildly thick ok by cup).  1:1 for po/meals, assist to feed PRN, slow pace, small bites,/sips no straw.  Assessment:   sLP: pt seen for meal, aspiration sx with thin by cup (coughing,) even with small sip.  Because of intermittent clinical sx aspirtion on thin (tsp/sip), downgraded back to mildly thick - likely less aspiration risk, and may be easier to meet hydration needs as difficult to comply with tsp size bolus with thin at this time.  May look into assessing for free water. Pt fed self with spoon, occasional cues to take smaller bolus,slow down, smallsips, no sx aspiration with mildly thick/pureed, continue 1:1 for now, but will work towards independence   Other Barriers to Discharge (Family Training, etc): TBD

## 2021-09-28 NOTE — PROGRESS NOTES
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  Hospital course complicated by acute kidney injury, hypokalemia needing repletion, and urinary retention and hematuria needing Felix catheter and urology consult.     Admission to inpatient rehab:  9/17/21  Impairment group code: Stroke Ischemic 01.3 Bilateral Involvement, new R Thalamic stroke in setting of R sided weakness from prior stroke      Assessment:     1)  Type 2 Diabetes Mellitus; poor control.  A1c 11.5%  2)  S/p stroke; R hemiparesis  3)  ALEX/CKD     Plan:       -  increase Levemir to 18 units daily PM from 14 units(2000)     -  Novolog 1:7g CHO all meals and snacks     -  Novolog medium resistance from high resistance sliding scale insulin TID AC and HS    -  continue Victoza to 1.8 mg    -  BG monitoring TID AC, HS and 0200    -  Metformin 500 mg PO in am, start tomorrow    -  Hypoglycemia protocol    -  Carb counting protocol    -  Will follow      Outpatient diabetes follow up: TBD     Plan discussed with patient, bedside RN, and primary team via this note.             Interval History:     The last 24 hours progress and nursing notes reviewed.      BG trend:        BG in 200s consistently.  Eating well.  Will increase Levemir to 18 units in PM, start Metformin 500 mg PO daily in am tomorrow as well, he was on this prior to admission.     Denies any abd/GI distress or elimination concerns   Victoza at 1.8 mg - tolerating    (note:  Phally - one of her family members exposed to covid -19+ so she will be available by phone  "during this exposure time)    Recent Labs   Lab 09/28/21  0726 09/28/21  0622 09/28/21  0209 09/27/21  2138 09/27/21  1646 09/27/21  1152   * 227* 167* 264* 209* 235*           Nutrition:     Orders Placed This Encounter      Combination Diet Consistent Carb 60 Grams CHO per Meal Diet; Pureed Diet (level 4); Thin Liquids (level 0) (via tsp only)    Supplements: Gelatein sugar -free - between meals - send orange 2 pm        PTA Regimen:     Levemir 28 units at bedtime  \"Novolog - varied doses based on chos\"  Clinic notes reveal:  Novolog 2 unit(s) per carb choice (2per 15)  Novolog 1 unit(s) per 50> 150     Metformin 500 mg BID (dose reduced for decreased renal fxn)     BG monitoring frequency:  FreeStyle Sherwin - intermittent use     Diet: regular, no restrictions  Eats sporadically - AM - bagel or banana  Lunch - sporadic  Dinner - rice/beg/meat  Loves to drink Monster Drinks     Exercise: sedentary          Review of Systems:   CC:  Denies pain today and that the pain from cath yesterday has resolved.    Constitutional:   No fever/chills  ENT/Mouth:   No hearing changes, no ear pain, no sore throat, no rhinorrhea, no difficulty swallowing  Eyes:  No eye pain, no discharge, no vision changes  CV:   No CP/SOB, no new edema  Resp:  No cough, no wheezing  GI:   No N/V, no constipation, no diarrhea  :  Cath placed yesterday  Musk:  No new joint swelling/pain, no back pain  Skin/heme:   No new rashes/bruises/open areas.  No pruritis  Neuro:   No new weakness, no numbness/tingling, no headache  Psych:   No new anxiety, denies depression  Endocrine:  No polyuria or polydipsia         Medications:   Steroid planning:  no       Physical Exam:   BP (!) 149/90 (BP Location: Left arm)   Pulse 95   Temp (!) 95.1  F (35.1  C) (Oral)   Resp 20   Ht 1.626 m (5' 4\")   Wt 66.1 kg (145 lb 11.6 oz)   SpO2 97%   BMI 25.01 kg/m      General:   NAD, pleasant,  resting comfortably in chair - working with speech therapy "   HEENT:  NC/AT. MMM, sclera not injected  Lungs:  unremarkable, no new cough, no SOB  ABD:   soft,  rounded  Skin:  warm and dry, no obvious lesions/rash  Feet:    CMS intact, PPP - per baseline  MSK:   moving all extremities - R sided deficits  Lymp:   no LE edema  Mental Status:  alert -nods to ROS questions, follows writer with eyes.   Psych:   Cooperative, calm          Data:     Lab Results   Component Value Date    A1C 11.5 09/15/2021    A1C 7.6 08/14/2012        CBC RESULTS: Recent Labs   Lab Test 09/27/21  0602   WBC 7.8   RBC 4.69   HGB 12.7*   HCT 39.6*   MCV 84   MCH 27.1   MCHC 32.1   RDW 13.1        Recent Labs   Lab Test 09/27/21  1152 09/27/21  0728 09/27/21  0602 09/27/21  0201 09/26/21  0744 09/26/21  0544 09/25/21  0809 09/25/21  0728   NA  --   --  142  --   --   --   --  143   POTASSIUM  --   --  3.6  --   --  3.6   < > 3.7   CHLORIDE  --   --  110*  --   --   --   --  111*   CO2  --   --  29  --   --   --   --  30   ANIONGAP  --   --  3  --   --   --   --  2*   * 269* 259*   < >   < >  --    < > 222*   BUN  --   --  31*  --   --   --   --  31*   CR  --   --  1.51*  --   --   --   --  1.46*   ARLETH  --   --  9.2  --   --   --   --  8.7    < > = values in this interval not displayed.       Liver Function Studies -   Recent Labs   Lab Test 09/15/21  0521   PROTTOTAL 6.6*   ALBUMIN 2.6*   BILITOTAL 0.3   ALKPHOS 80   AST 20   ALT 22     Lab Results   Component Value Date    INR 1.03 09/16/2021    INR 0.94 09/14/2021    INR 1.00 08/16/2012    INR 0.96 08/13/2012     FEMI Reynolds, CNP  Inpatient Diabetes Management Service  Pager - [phone]  Friday - Monday 0800 - 1600 hrs  After hours above - Diabetes Management Team job code: [id number]    I spent a total of 35 minutes bedside and on the inpatient unit managing glycemic care.  Over 50% of my time on the unit was spent counseling the patient and/or coordinating care regarding acute hyperglycemic management.  See note for details.

## 2021-09-28 NOTE — PROGRESS NOTES
Briefly discussed pt case after huddle with primary team. Therapy plans to schedule FT with pt caregivers and working on equipment for home. At this time, targeting discharge Fri 10/08. SWer sent referral for HC to [facility]. Pending reply and confirmation. Will plan to discuss HC set up with family and provide additional resources/information by end of the week or early next week. SW available as needs arise.     Home Health Care:   [facility] PH: [phone]   Nurse, physical therapy, occupational therapy, speech therapy, home health aide     [facility]   Direct Phone: [phone]  I   Pager: [phone]  I  Fax: [phone]

## 2021-09-28 NOTE — PROGRESS NOTES
"Rehabilitation Medicine Face to Face for Hospital Bed     Diagnosis: ischemic CVA of the right thalamus.    The patient has a medical condition which requires positioning of the body in ways not feasible with an ordinary bed.   Medical condition: ischemic CVA of the right thalamus resulting in: debility, acute on chronic weakness, somnolence.    The patient requires positioning of the body in ways not feasible with an ordinary bed in order to alleviate pain.     The patient requires a bed height different than a fixed height hospital bed to permit transfers to chair, wheelchair, or standing position.  Current transfer status: mechanical lift dependent    The patient requires frequent changes in body position and/or has an immediate need for change in body position due to inability to reposition self d/t severe weakness and somnolence and medical diagnosis as written above.    Type of rails: 2 upper half  Length of need lifetime.    Patient ht: 5'4\"  Patient wt:145 lbs  Patient : 1950        Juan Miguel Campo MD  NPI: [id number]  Date: 2021        "

## 2021-09-29 ENCOUNTER — APPOINTMENT (OUTPATIENT)
Dept: SPEECH THERAPY | Facility: CLINIC | Age: 71
DRG: 057 | End: 2021-09-29
Attending: PHYSICAL MEDICINE & REHABILITATION
Payer: MEDICARE

## 2021-09-29 ENCOUNTER — APPOINTMENT (OUTPATIENT)
Dept: OCCUPATIONAL THERAPY | Facility: CLINIC | Age: 71
DRG: 057 | End: 2021-09-29
Attending: PHYSICAL MEDICINE & REHABILITATION
Payer: MEDICARE

## 2021-09-29 ENCOUNTER — APPOINTMENT (OUTPATIENT)
Dept: PHYSICAL THERAPY | Facility: CLINIC | Age: 71
DRG: 057 | End: 2021-09-29
Attending: PHYSICAL MEDICINE & REHABILITATION
Payer: MEDICARE

## 2021-09-29 LAB
ANION GAP SERPL CALCULATED.3IONS-SCNC: 2 MMOL/L (ref 3–14)
BUN SERPL-MCNC: 31 MG/DL (ref 7–30)
CALCIUM SERPL-MCNC: 9 MG/DL (ref 8.5–10.1)
CHLORIDE BLD-SCNC: 111 MMOL/L (ref 94–109)
CO2 SERPL-SCNC: 30 MMOL/L (ref 20–32)
CREAT SERPL-MCNC: 1.58 MG/DL (ref 0.66–1.25)
GFR SERPL CREATININE-BSD FRML MDRD: 43 ML/MIN/1.73M2
GLUCOSE BLD-MCNC: 191 MG/DL (ref 70–99)
GLUCOSE BLDC GLUCOMTR-MCNC: 141 MG/DL (ref 70–99)
GLUCOSE BLDC GLUCOMTR-MCNC: 168 MG/DL (ref 70–99)
GLUCOSE BLDC GLUCOMTR-MCNC: 184 MG/DL (ref 70–99)
GLUCOSE BLDC GLUCOMTR-MCNC: 204 MG/DL (ref 70–99)
GLUCOSE BLDC GLUCOMTR-MCNC: 232 MG/DL (ref 70–99)
POTASSIUM BLD-SCNC: 3.4 MMOL/L (ref 3.4–5.3)
SODIUM SERPL-SCNC: 143 MMOL/L (ref 133–144)

## 2021-09-29 PROCEDURE — 80048 BASIC METABOLIC PNL TOTAL CA: CPT | Performed by: PHYSICIAN ASSISTANT

## 2021-09-29 PROCEDURE — 92526 ORAL FUNCTION THERAPY: CPT | Mod: GN | Performed by: SPEECH-LANGUAGE PATHOLOGIST

## 2021-09-29 PROCEDURE — 250N000009 HC RX 250: Performed by: PHYSICAL MEDICINE & REHABILITATION

## 2021-09-29 PROCEDURE — 250N000013 HC RX MED GY IP 250 OP 250 PS 637: Performed by: NURSE PRACTITIONER

## 2021-09-29 PROCEDURE — 97116 GAIT TRAINING THERAPY: CPT | Mod: GP

## 2021-09-29 PROCEDURE — 97530 THERAPEUTIC ACTIVITIES: CPT | Mod: GP

## 2021-09-29 PROCEDURE — 97112 NEUROMUSCULAR REEDUCATION: CPT | Mod: GP

## 2021-09-29 PROCEDURE — 250N000013 HC RX MED GY IP 250 OP 250 PS 637: Performed by: PHYSICIAN ASSISTANT

## 2021-09-29 PROCEDURE — 97535 SELF CARE MNGMENT TRAINING: CPT | Mod: GO

## 2021-09-29 PROCEDURE — 99232 SBSQ HOSP IP/OBS MODERATE 35: CPT | Performed by: NURSE PRACTITIONER

## 2021-09-29 PROCEDURE — 36415 COLL VENOUS BLD VENIPUNCTURE: CPT | Performed by: PHYSICIAN ASSISTANT

## 2021-09-29 PROCEDURE — 99232 SBSQ HOSP IP/OBS MODERATE 35: CPT | Performed by: PHYSICAL MEDICINE & REHABILITATION

## 2021-09-29 PROCEDURE — 250N000013 HC RX MED GY IP 250 OP 250 PS 637: Performed by: PHYSICAL MEDICINE & REHABILITATION

## 2021-09-29 PROCEDURE — 128N000003 HC R&B REHAB

## 2021-09-29 RX ADMIN — ATORVASTATIN CALCIUM 80 MG: 80 TABLET, FILM COATED ORAL at 08:34

## 2021-09-29 RX ADMIN — ATENOLOL 25 MG: 25 TABLET ORAL at 20:21

## 2021-09-29 RX ADMIN — DOXAZOSIN 1 MG: 1 TABLET ORAL at 08:34

## 2021-09-29 RX ADMIN — AMLODIPINE BESYLATE 10 MG: 10 TABLET ORAL at 08:34

## 2021-09-29 RX ADMIN — INSULIN ASPART 2 UNITS: 100 INJECTION, SOLUTION INTRAVENOUS; SUBCUTANEOUS at 12:03

## 2021-09-29 RX ADMIN — ASPIRIN 81 MG CHEWABLE TABLET 81 MG: 81 TABLET CHEWABLE at 08:34

## 2021-09-29 RX ADMIN — CLOPIDOGREL BISULFATE 75 MG: 75 TABLET, FILM COATED ORAL at 08:35

## 2021-09-29 RX ADMIN — NYSTATIN 500000 UNITS: 100000 SUSPENSION ORAL at 20:39

## 2021-09-29 RX ADMIN — POTASSIUM CHLORIDE 20 MEQ: 1.5 FOR SOLUTION ORAL at 08:34

## 2021-09-29 RX ADMIN — LIRAGLUTIDE 1.8 MG: 6 INJECTION SUBCUTANEOUS at 08:34

## 2021-09-29 RX ADMIN — NYSTATIN 500000 UNITS: 100000 SUSPENSION ORAL at 12:06

## 2021-09-29 RX ADMIN — METFORMIN HYDROCHLORIDE 500 MG: 500 TABLET, FILM COATED ORAL at 08:34

## 2021-09-29 RX ADMIN — Medication 40 MG: at 05:37

## 2021-09-29 RX ADMIN — ATENOLOL 25 MG: 25 TABLET ORAL at 08:35

## 2021-09-29 RX ADMIN — AMANTADINE HYDROCHLORIDE 100 MG: 100 CAPSULE ORAL at 08:35

## 2021-09-29 RX ADMIN — INSULIN ASPART 1 UNITS: 100 INJECTION, SOLUTION INTRAVENOUS; SUBCUTANEOUS at 17:50

## 2021-09-29 RX ADMIN — INSULIN ASPART 1 UNITS: 100 INJECTION, SOLUTION INTRAVENOUS; SUBCUTANEOUS at 08:33

## 2021-09-29 RX ADMIN — SENNOSIDES AND DOCUSATE SODIUM 1 TABLET: 8.6; 5 TABLET ORAL at 20:21

## 2021-09-29 RX ADMIN — NYSTATIN 500000 UNITS: 100000 SUSPENSION ORAL at 08:34

## 2021-09-29 NOTE — PLAN OF CARE
FOCUS/GOAL  Bowel management, Bladder management, Pain management, Mobility, Skin integrity, and Safety management    ASSESSMENT, INTERVENTIONS AND CONTINUING PLAN FOR GOAL:  A/O, VS stable. Regular/thin, pills whole. Ate most of meal. Continent and incontinent of bowel, ordoñez in place for bladder management. No complaints of pain this shift. Assist of 2 with liko to transfer. No new skin concerns. Calls some with light, anticipate cares. Continue POC.

## 2021-09-29 NOTE — PLAN OF CARE
Discharge Planner Post-Acute Rehab SLP:      Discharge Plan:home with assist, likely further SLP     Precautions:fall, swallowing     Current Status:  Communication:  pt limited voicing/aphonic, follows simple directions, some mild aphasia/dysarthria baseline per son, worsened now. Working on expression/comprehension as well as assistive technology ie picure board  Cognition: to be assessed as appropriate  Swallow: IDDSI 4,2 (pureed, mildly thick ok by cup). Ruiz Free Water protocol via tsp between meals. 1:1 for po/meals, assist to feed PRN, slow pace, small bites,/sips no straw.      Assessment: Patient was not impulsive during PO intake at noon meal with current IDDSI 4,2 (puree,mildly thick) diet. Following completion of PO intake, patient completed PO intake of thin water via tsp (x10 trials) without overt s/s of aspiration observed. Patient does demonstrated delayed initiation of pharyngeal swallow, however that is baseline at this time. Implement ruiz free water protocol of thin water between meals via teaspoon, after oral care. Continue IDDSI 2 mildly thick liquids between meals.    -30 minutes with SLP for afternoon session, patient somnolent, unable to be made alert for therapy participation.     Other Barriers to Discharge (Family Training, etc): TBD

## 2021-09-29 NOTE — PROGRESS NOTES
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  Hospital course complicated by acute kidney injury, hypokalemia needing repletion, and urinary retention and hematuria needing Felix catheter and urology consult.     Admission to inpatient rehab:  9/17/21  Impairment group code: Stroke Ischemic 01.3 Bilateral Involvement, new R Thalamic stroke in setting of R sided weakness from prior stroke      Assessment:     1)  Type 2 Diabetes Mellitus; poor control.  A1c 11.5%  2)  S/p stroke; R hemiparesis  3)  ALEX/CKD     Plan:       -  increase Levemir to 20 from 18 units daily PM    -  Novolog 1:7g CHO all meals and snacks     -  Novolog medium resistance from high resistance sliding scale insulin TID AC and HS    -  continue Victoza to 1.8 mg    -  BG monitoring TID AC, HS and 0200    -  Metformin 500 mg PO in am, increase to BID tomorrow    -  Hypoglycemia protocol    -  Carb counting protocol    -  Will follow      Outpatient diabetes follow up: TBD     Plan discussed with patient, bedside RN, and primary team via this note.             Interval History:     The last 24 hours progress and nursing notes reviewed.      BG trend:        BG improved slightly today after increas of Levemir to 18 units last evening.    Eating well.  Given Metformin 500 mg this am.  GFR 43, was 44 yesterday.  Will plan to increase Metformin to 500 mg PO BID tomorrow pending kidney function and patient's symptoms.    Denies any abd/GI distress or elimination concerns   Victoza at 1.8 mg - tolerating    (note:  Phally -  "one of her family members exposed to covid -19+ so she will be available by phone during this exposure time)    Recent Labs   Lab 09/29/21  1107 09/29/21  0747 09/29/21  0622 09/29/21  0204 09/28/21  2115 09/28/21  1710   * 184* 191* 232* 289* 237*           Nutrition:     Orders Placed This Encounter      Combination Diet Consistent Carb 60 Grams CHO per Meal Diet; Pureed Diet (level 4); Mildly Thick (level 2)    Supplements: Gelatein sugar -free - between meals - send orange 2 pm        PTA Regimen:     Levemir 28 units at bedtime  \"Novolog - varied doses based on chos\"  Clinic notes reveal:  Novolog 2 unit(s) per carb choice (2per 15)  Novolog 1 unit(s) per 50> 150     Metformin 500 mg BID (dose reduced for decreased renal fxn)     BG monitoring frequency:  FreeStyle Sherwin - intermittent use     Diet: regular, no restrictions  Eats sporadically - AM - bagel or banana  Lunch - sporadic  Dinner - rice/beg/meat  Loves to drink Monster Drinks     Exercise: sedentary          Review of Systems:   CC:  Denies pain today and that the pain from cath yesterday has resolved.    Constitutional:   No fever/chills  ENT/Mouth:   No hearing changes, no ear pain, no sore throat, no rhinorrhea, no difficulty swallowing  Eyes:  No eye pain, no discharge, no vision changes  CV:   No CP/SOB, no new edema  Resp:  No cough, no wheezing  GI:   No N/V, no constipation, no diarrhea  :  Cath placed yesterday  Musk:  No new joint swelling/pain, no back pain  Skin/heme:   No new rashes/bruises/open areas.  No pruritis  Neuro:   No new weakness, no numbness/tingling, no headache  Psych:   No new anxiety, denies depression  Endocrine:  No polyuria or polydipsia         Medications:   Steroid planning:  no       Physical Exam:   /75   Pulse 95   Temp 97.4  F (36.3  C) (Oral)   Resp 16   Ht 1.626 m (5' 4\")   Wt 66.1 kg (145 lb 11.6 oz)   SpO2 96%   BMI 25.01 kg/m      General:   NAD, pleasant,  Resting in bed.  HEENT:  " NC/AT. MMM, sclera not injected  Lungs:  unremarkable, no new cough, no SOB  ABD:   soft,  rounded  Skin:  warm and dry, no obvious lesions/rash  Feet:    CMS intact, PPP - per baseline  MSK:   moving all extremities - R sided deficits  Lymp:   no LE edema  Mental Status:  alert -nods to ROS questions, follows writer with eyes.   Psych:   Cooperative, calm          Data:     Lab Results   Component Value Date    A1C 11.5 09/15/2021    A1C 7.6 08/14/2012        CBC RESULTS: Recent Labs   Lab Test 09/27/21  0602   WBC 7.8   RBC 4.69   HGB 12.7*   HCT 39.6*   MCV 84   MCH 27.1   MCHC 32.1   RDW 13.1        Recent Labs   Lab Test 09/27/21  1152 09/27/21  0728 09/27/21  0602 09/27/21  0201 09/26/21  0744 09/26/21  0544 09/25/21  0809 09/25/21  0728   NA  --   --  142  --   --   --   --  143   POTASSIUM  --   --  3.6  --   --  3.6   < > 3.7   CHLORIDE  --   --  110*  --   --   --   --  111*   CO2  --   --  29  --   --   --   --  30   ANIONGAP  --   --  3  --   --   --   --  2*   * 269* 259*   < >   < >  --    < > 222*   BUN  --   --  31*  --   --   --   --  31*   CR  --   --  1.51*  --   --   --   --  1.46*   ARLETH  --   --  9.2  --   --   --   --  8.7    < > = values in this interval not displayed.       Liver Function Studies -   Recent Labs   Lab Test 09/15/21  0521   PROTTOTAL 6.6*   ALBUMIN 2.6*   BILITOTAL 0.3   ALKPHOS 80   AST 20   ALT 22     Lab Results   Component Value Date    INR 1.03 09/16/2021    INR 0.94 09/14/2021    INR 1.00 08/16/2012    INR 0.96 08/13/2012     FEMI Reynolds, CNP  Inpatient Diabetes Management Service  Pager - [phone]  Friday - Monday 0800 - 1600 hrs  After hours above - Diabetes Management Team job code: [id number]    I spent a total of 25 minutes bedside and on the inpatient unit managing glycemic care.  Over 50% of my time on the unit was spent counseling the patient and/or coordinating care regarding acute hyperglycemic management.  See note for details.

## 2021-09-29 NOTE — PROGRESS NOTES
Pt alert, ZAYDA orientation due to severe aphasia. Nods to yes and no questions, soft spoken when answering questions. Felix patent and draining clear, yellow urine. Bladder irrigation per orders. LBM 9/29. PIV to LUE, + no blood return and saline locked. Level 4 (pureed), level 2 (mild thick)-ok for free water after oral cares, meds crushed in apple sauce - family at bedside assisting with dinner this evening. Ate 100%. Blood sugar 168. Alarms activated. Soft touch call light within reach.

## 2021-09-29 NOTE — PROGRESS NOTES
As noted yesterday in SW note. Referral to [facility] HC and pt accepted for HC services at discharge.     SWer called pt dtr to discuss discharge plans and resources further. Spoke with Clemente. Clemente was quick to express concerns RE: pt retention and plan going home. SWer informed Clemente that SW can not answer that or address that concern but would have the medical team and RN staff follow up.     Clemente also expressed concerns about pt lethargy and fluctuation day-by-day and if TCU would be an option. SWer discussed goal to get pt home and that in order to go to TCU, pt has to be able to participate, tolerate and show progress. SWer made plan to discuss with team but spoke highly of plan to coordinate discharge home.     SWer began to discuss HC set up, services, SOC, frequency and insurance coverage. Clemente appeared to be considering/thinking a lot and SW had to go back to that conversation and confirm that Clemente understood and agreed with the plan.     Clemente stated that she lives in [place], can work from home at times but also travels for work. Phally concerned that pt's wife is small and can't provide any physical help but can provide supervision. SWer asked about pt brother and MELA who live next door, Clemente stated that she doesn't know their schedule and what level of support they can provide. SWer encouraged Clemente to discuss with her immediate/extended family and have a plan in place prior to discharge home. SWer also encouraged Clemente to have her uncle and pt wife also come in for family training (whether they are providing supervision or physical help) before discharge home. Clemente stated that she and pt's MELA (who live next door) were making plans for FT this weekend and waiting for therapy to call back and confirm. SWer confirmed with Clemente and will update therapy office. FT to start Sat 10/02 at 1:15pm.       Clemente asked about PCA services. SWer discussed criteria for PCA and applying for MA. Per  discussion with pt brother at admission, pt is way over the income for MA. Clemente expressed understanding. SWer discussed hiring in help (AM/PM) and offered to complete a [place] [facility] referral and [facility] referral but Clemente declined. Clemente prefers to take that information home with her and f/u on their own time. SW discussed transportation, offered to do a [employer] application and Clemente declined that as well. Clemente asked about a [facility] assessment, SWer plans to bring information for the [facility] Health in [place] information and other resources and leave that ppwk in pt room for family to review. (This was completed and left in pt room, along with  business card).     Will discuss updates with team, continue to follow and follow-up early next week with family to address and other  concerns and/or resources.     Resources left in room for family: hired in help resources, [facility] social service information, [facility] flyer, stroke flyer (declined referral) and SLL information (declined referral).  business card.     Home Health Care:   [facility] PH: [phone]  Nurse, physical therapy, occupational therapy, speech therapy, home health aide    [facility]   Direct Phone: [phone]  I   Pager: [phone]  I  Fax: [phone]

## 2021-09-29 NOTE — PLAN OF CARE
FOCUS/GOAL  Medical management    ASSESSMENT, INTERVENTIONS AND CONTINUING PLAN FOR GOAL:  Pt opens eyes and nods to communicate. Hard to assess orientation d/t aphasia or use questions answerable w/ yes and no.  Can be fully awake then sleeps easily. Use call light when up. Fluids offered 3x, able to drink half cup each time.  Bladder irrigation done, urine output of dark yellowish color. No BM this shift. A2 w/ repositioning and boosting up in bed. Cont w/ POC.

## 2021-09-29 NOTE — PROGRESS NOTES
[facility]   Acute Rehabilitation Unit  Daily progress note    INTERVAL HISTORY  No acute events overnight.  This morning Mr. Otto is seen participating in physical therapy, standing in the parallel bars.  He is alert and awake.  He continues to be non-verbal but is able to respond yes/no appropriately with head shakes.  Denies chest pain, pain in general, or shortness of breath.  Functionally continues to fluctuate with alertness levels.  Mod A if alert, but can be dependent if somnolent.  Liquids were downgraded back to level 2 due to coughing.    MEDICATIONS  Scheduled:    amantadine  100 mg Oral Daily     amLODIPine  10 mg Oral Daily     aspirin  81 mg Oral Daily     atenolol  25 mg Oral BID     atorvastatin  80 mg Oral Daily     clopidogrel  75 mg Oral Daily     doxazosin  1 mg Oral Daily     insulin aspart   Subcutaneous TID w/meals     insulin aspart  1-7 Units Subcutaneous TID AC     insulin aspart  1-5 Units Subcutaneous At Bedtime     insulin detemir  18 Units Subcutaneous Q24H     liraglutide  1.8 mg Subcutaneous Daily     metFORMIN  500 mg Oral Daily with breakfast     nystatin  500,000 Units Swish & Swallow 4x Daily     pantoprazole  40 mg Oral QAM AC     potassium chloride  20 mEq Oral Daily     senna-docusate  1 tablet Oral At Bedtime     sodium chloride (PF)  3 mL Intracatheter Q8H        PRN:  acetaminophen, bisacodyl, glucose **OR** dextrose **OR** glucagon, diclofenac, hydrALAZINE, insulin aspart, lidocaine, melatonin, ondansetron, polyethylene glycol, senna-docusate       PHYSICAL EXAM  Patient Vitals for the past 24 hrs:   BP Temp Temp src Pulse Resp SpO2   09/29/21 0835 126/75 -- -- 95 -- --   09/28/21 2117 124/73 -- -- 97 -- --   09/28/21 1530 134/88 97.4  F (36.3  C) Oral 90 16 96 %       GEN: NAD, awake during visit and participating in PT, able to nod yes/no responses  HEENT: NC/AT,  MMM, +Thrush on tongue  CVS: RRR, no murmurs  PULM: non-labored on  "room air, CTA b/l  ABD: Non-distended, soft, NT, BS+  : Felix in place, draining clear yellow urine  EXT: no edema appreciated in bilateral lower extremities  Neuro: Non-verbal but shakes head \"yes or no\"      LABS  CBC RESULTS:   Recent Labs   Lab Test 09/27/21  0602 09/24/21  0641 09/21/21  0830   WBC 7.8 7.3 7.3   RBC 4.69 4.61 4.49   HGB 12.7* 12.3* 12.0*   HCT 39.6* 38.7* 38.1*   MCV 84 84 85   MCH 27.1 26.7 26.7   MCHC 32.1 31.8 31.5   RDW 13.1 13.0 13.2    275 247     Last Basic Metabolic Panel:  Recent Labs   Lab Test 09/29/21  0747 09/29/21  0622 09/29/21  0204 09/28/21  0726 09/28/21  0622 09/27/21  0728 09/27/21  0602   NA  --  143  --   --  142  --  142   POTASSIUM  --  3.4  --   --  3.5  --  3.6   CHLORIDE  --  111*  --   --  114*  --  110*   CO2  --  30  --   --  28  --  29   ANIONGAP  --  2*  --   --  <1*  --  3   * 191* 232*   < > 227*   < > 259*   BUN  --  31*  --   --  33*  --  31*   CR  --  1.58*  --   --  1.57*  --  1.51*   GFRESTIMATED  --  43*  --   --  44*  --  46*   ARLETH  --  9.0  --   --  9.2  --  9.2    < > = values in this interval not displayed.     Recent Labs   Lab 09/29/21  0747 09/29/21  0622 09/29/21  0204 09/28/21  2115 09/28/21  1710 09/28/21  1042   * 191* 232* 289* 237* 272*       IMPRESSION  Jimmy Otto is a 71 year old male with a PMH including but not limited to HTN, uncontrolled DM 2 (HbA1c 11.5), HLD, BPH, and history of a stroke in 2012 with residual right hemiparesis who presented to the emergency room on 9/14/2021 with altered mental status and acute worsening of right-sided weakness, and found to have an acute right thalamic stroke due to small vessel occlusion.  Hospital course complicated by acute kidney injury, hypokalemia needing repletion, and urinary retention and hematuria needing Felix catheter and urology consult.         Impairment group code: Stroke Ischemic 01.3 Bilateral Involvement, new R Thalamic stroke in setting of R sided weakness from " prior stroke        PLAN  Rehabilitation  -Continue with inpatient rehabilitation program including PT, OT, and SLP for 3 hrs/day, rehab nursing, social work, nutrition, and close monitoring by physiatry for an estimated total of 3 weeks.  -The patient has impairments in strength, balance, endurance, and coordination, which are leading to activity limitations in ambulation, mobility, ADLs, and swallowing.      Medical  1)Neurology  #R thalamic CVA due to small vessel occlusion  - DAPT with ASA 81 mg and Plavix 75 mg for 1 month, followed by Plavix alone indefinitely.  Recommended check P2Y12 level to ensure Plavix efficacy after 1 month of being on Plavix only.  - Hypertension, lipid, and glucose management as below.  Will need ongoing education regarding stroke risk factor modifications including importance of medication compliance and diet and lifestyle changes  - Follow-up with [facility] general neurology in 6 to 8 weeks  - Monitor somnolence/lethargy.  Decreased level of alertness not unexpected with essentially bilateral thalamic pathology (new right thalamic stroke and a history of left thalamic stroke).    - Amantadine 100 mg daily started on 9/20 for neurostimulation given ongoing somnolence which is limiting participation.  Increased Amantadine to 100 mg BID (7 am and noon) on 9/25.  Decreased to 100 mg daily on 9/29 due to impaired renal function.  - High risk for delirium, ensure delirium precautions and appropriate sleep-wake cycle.  PRN melatonin available.        2)CVS  #HTN  - PTA on amlodipine 10 mg daily, atenolol 50 mg twice daily, Cozaar 100 mg daily, spironolactone 25 mg daily, and hydrochlorothiazide 25 mg daily.  Cozaar, spironolactone, and hydrochlorothiazide are being held due to acute kidney injury  - Continue amlodipine 10 mg daily, and atenolol at a lower dose of 25 mg twice daily for now.  Titrate doses as needed.  - Hydralazine as needed for SBP greater than 180 mmHg     #HLD  - Continue  Lipitor 80 mg daily.  LDL 92 on 9/15        3)Pulmonary  - No acute issues but low lung volumes and atelectasis/infiltrate seen at the left lung base on chest x-ray 9/14.  Clinically no signs of pneumonia but will continue to monitor.  - Encourage incentive spirometry        4)FENGI  #Diet: Level 4 solids and was upgraded to level 0 thin liquids, however downgraded again to level 2 liquids due to concern for aspiration.  Upgrade further as able per speech therapy  - Aspiration precautions  - Consult nutrition for education and optimization given stroke, modified diet, and uncontrolled diabetes  - VFSS completed 9/24     #Bowels: As needed Senokot-S, MiraLAX, and Dulcolax suppository     #Hypokalemia  - Ongoing and in the setting of poor po intake.  Started scheduled replacement 20 mEq daily.  Potassium 3.4 today.     #GI prophylaxis  -Protonix 40 mg daily given DAPT and age     #Oral Thrush  Noted on exam 9/27, started on Nystatin swish and swallow x7 days.  - Continue Nystatin swish and swallow x7 days.  Will need to ensure patient is awake enough to adhere to aspiration precautions.     5)  #ALEX  - Baseline creatinine ~1.1, with a peak of 2.13 upon admission  - Had improved, then Cr increasing at 1.63 on 9/20,  likely pre-renal given lethargy and poor po intake.  Improved to 1.43 on 9/21 following IV fluids (9/19-9/20).  Continue to monitor, encourage fluids.  Creatinine climbing again and IV fluids given over the weekend and stopped yesterday.  This morning Cr 1.58 with BUN 31, which is stable from yesterday.  Continue to closely monitor, re-check in 2 days.  - Continue holding Cozaar, hydrochlorothiazide, and spironolactone.        #Urinary retention and hematuria  - Trial of void attempted, but continued to retain urine and Felix was re-placed on 9/26. Will plan to keep in until follow up with urology.  - Urology consulted and recommend CT urogram once creatinine normalizes and outpatient follow-up for  cystoscopy to complete hematuria work-up.  - Recurrent hematuria noted, continue to monitor hgb levels.  Thus far have been stable.   - Flomax 0.4 mg daily started, will continue        6)DVT prophylaxis  - Mechanical.  Will hold from chemoprophylaxis given hematuria and already on dual anti-platelet therapy        7)Pain  - Tylenol PRN        8)Endo  #Uncontrolled DM II (HbA1c 11.5)  - PTA was on Levemir 20 units nightly, NovoLog with meals with carb coverage, and Metformin 500 mg twice daily.  Unclear how compliant he was with his medications.  Endocrine assistance appreciated for help with management.  Recs 9/28:     -  increase Levemir to 18 units daily PM from 14 units(2000)     -  Novolog 1:7g CHO all meals and snacks     -  Novolog medium resistance from high resistance sliding scale insulin TID AC and HS    -  continue Victoza to 1.8 mg    -  BG monitoring TID AC, HS and 0200    -  Metformin 500 mg PO in am, start tomorrow    -  Hypoglycemia protocol    -  Carb counting protocol    -  Will follow     9)Heme  - Hgb 12.7 on 9/27.  Overall stable.  - Continue to monitor, especially in the setting of hematuria.        10)Psych  - Monitor mood, will consult health psychology if needed  - Melatonin PRN for sleep      11)MSK  ~1.5 cm soft, non-tender, mobile nodule in left distal upper arm.  No overlying erythema, warmth, skin changes.  Seems most consistent with lipoma vs cyst.  - Monitor, but no further work-up/treatment indicated at this time           12)Social/Dispo  - Goals for discharge home at a supervision level for ambulation, mobility, and ADLs.  - ELOS: Tentative discharge date 10/8/21  - Rehab prognosis: Fair  - Follow up appointments: PCP, [facility] neurology clinic 6-8 weeks, urology for hematuria     Code status: Full Code (unable to discuss with patient given somnolence upon admission.  Have continued prior CODE STATUS and discussed with the son who confirms that his prior wishes were to be full  code)      Juan Miguel Campo MD  Department of Rehabilitation Medicine    Time Spent on this Encounter   I, Juan Miguel Campo, spent a total of 25 minutes face-to-face or managing the care of Jimmy Otto. Over 50% of my time on the unit was spent counseling the patient and coordinating care. See note for details.

## 2021-09-29 NOTE — PLAN OF CARE
Discharge Planner Post-Acute Rehab PT:      Discharge Plan: Home with 24/7 vs [facility] vs TCU pending progress     Precautions: Fall, alarms     Current Status:  Bed Mobility: mod A if alert  Transfer: variable based on alertness, lift dependent  Gait: variable based on alertness, w/c dependent   Stairs: Not safe  Balance: 1/36 PASS - although not truly reflective of capability due to drastic performance fluctuations with command following and lethargy.     Assessment: Pt alert and awake for therapy, engaged in stand pivot transfers bed <> w/c with max A of 1 and min A of another; gait in // bars 12' x 2 with min A of 1 and w/c follow by tech, improved pace and step length throughout, assistance provided for tactile feedback to initiate swing and longer step length.  -10 minutes due to BM    Other Barriers to Discharge (DME, Family Training, etc): Safe discharge plan - will need 24/7 cares, level of assist/equipment pending lethargy/participation

## 2021-09-30 ENCOUNTER — APPOINTMENT (OUTPATIENT)
Dept: SPEECH THERAPY | Facility: CLINIC | Age: 71
DRG: 057 | End: 2021-09-30
Attending: PHYSICAL MEDICINE & REHABILITATION
Payer: MEDICARE

## 2021-09-30 ENCOUNTER — APPOINTMENT (OUTPATIENT)
Dept: PHYSICAL THERAPY | Facility: CLINIC | Age: 71
DRG: 057 | End: 2021-09-30
Attending: PHYSICAL MEDICINE & REHABILITATION
Payer: MEDICARE

## 2021-09-30 ENCOUNTER — APPOINTMENT (OUTPATIENT)
Dept: OCCUPATIONAL THERAPY | Facility: CLINIC | Age: 71
DRG: 057 | End: 2021-09-30
Attending: PHYSICAL MEDICINE & REHABILITATION
Payer: MEDICARE

## 2021-09-30 LAB
GLUCOSE BLDC GLUCOMTR-MCNC: 153 MG/DL (ref 70–99)
GLUCOSE BLDC GLUCOMTR-MCNC: 154 MG/DL (ref 70–99)
GLUCOSE BLDC GLUCOMTR-MCNC: 247 MG/DL (ref 70–99)
GLUCOSE BLDC GLUCOMTR-MCNC: 287 MG/DL (ref 70–99)
GLUCOSE BLDC GLUCOMTR-MCNC: 292 MG/DL (ref 70–99)

## 2021-09-30 PROCEDURE — 250N000013 HC RX MED GY IP 250 OP 250 PS 637: Performed by: PHYSICIAN ASSISTANT

## 2021-09-30 PROCEDURE — 250N000013 HC RX MED GY IP 250 OP 250 PS 637: Performed by: NURSE PRACTITIONER

## 2021-09-30 PROCEDURE — 250N000013 HC RX MED GY IP 250 OP 250 PS 637: Performed by: PHYSICAL MEDICINE & REHABILITATION

## 2021-09-30 PROCEDURE — 92507 TX SP LANG VOICE COMM INDIV: CPT | Mod: GN | Performed by: SPEECH-LANGUAGE PATHOLOGIST

## 2021-09-30 PROCEDURE — 99232 SBSQ HOSP IP/OBS MODERATE 35: CPT | Performed by: NURSE PRACTITIONER

## 2021-09-30 PROCEDURE — 99232 SBSQ HOSP IP/OBS MODERATE 35: CPT | Performed by: PHYSICAL MEDICINE & REHABILITATION

## 2021-09-30 PROCEDURE — 250N000009 HC RX 250: Performed by: PHYSICAL MEDICINE & REHABILITATION

## 2021-09-30 PROCEDURE — 92526 ORAL FUNCTION THERAPY: CPT | Mod: GN

## 2021-09-30 PROCEDURE — 128N000003 HC R&B REHAB

## 2021-09-30 PROCEDURE — 97530 THERAPEUTIC ACTIVITIES: CPT | Mod: GO | Performed by: STUDENT IN AN ORGANIZED HEALTH CARE EDUCATION/TRAINING PROGRAM

## 2021-09-30 PROCEDURE — 97110 THERAPEUTIC EXERCISES: CPT | Mod: GP

## 2021-09-30 PROCEDURE — 92507 TX SP LANG VOICE COMM INDIV: CPT | Mod: GN

## 2021-09-30 PROCEDURE — 97535 SELF CARE MNGMENT TRAINING: CPT | Mod: GO | Performed by: STUDENT IN AN ORGANIZED HEALTH CARE EDUCATION/TRAINING PROGRAM

## 2021-09-30 PROCEDURE — 92526 ORAL FUNCTION THERAPY: CPT | Mod: GN | Performed by: SPEECH-LANGUAGE PATHOLOGIST

## 2021-09-30 RX ORDER — MAGNESIUM HYDROXIDE 1200 MG/15ML
LIQUID ORAL
Status: DISPENSED
Start: 2021-09-30 | End: 2021-10-01

## 2021-09-30 RX ADMIN — ATENOLOL 25 MG: 25 TABLET ORAL at 19:58

## 2021-09-30 RX ADMIN — AMANTADINE HYDROCHLORIDE 100 MG: 100 CAPSULE ORAL at 08:14

## 2021-09-30 RX ADMIN — SENNOSIDES AND DOCUSATE SODIUM 1 TABLET: 8.6; 5 TABLET ORAL at 20:05

## 2021-09-30 RX ADMIN — NYSTATIN 500000 UNITS: 100000 SUSPENSION ORAL at 19:59

## 2021-09-30 RX ADMIN — AMLODIPINE BESYLATE 10 MG: 10 TABLET ORAL at 08:14

## 2021-09-30 RX ADMIN — DOXAZOSIN 1 MG: 1 TABLET ORAL at 08:14

## 2021-09-30 RX ADMIN — POTASSIUM CHLORIDE 20 MEQ: 1.5 FOR SOLUTION ORAL at 08:15

## 2021-09-30 RX ADMIN — NYSTATIN 500000 UNITS: 100000 SUSPENSION ORAL at 12:51

## 2021-09-30 RX ADMIN — ASPIRIN 81 MG CHEWABLE TABLET 81 MG: 81 TABLET CHEWABLE at 08:14

## 2021-09-30 RX ADMIN — INSULIN ASPART 3 UNITS: 100 INJECTION, SOLUTION INTRAVENOUS; SUBCUTANEOUS at 12:50

## 2021-09-30 RX ADMIN — LIRAGLUTIDE 1.8 MG: 6 INJECTION SUBCUTANEOUS at 08:29

## 2021-09-30 RX ADMIN — ATORVASTATIN CALCIUM 80 MG: 80 TABLET, FILM COATED ORAL at 08:14

## 2021-09-30 RX ADMIN — METFORMIN HYDROCHLORIDE 500 MG: 500 TABLET, FILM COATED ORAL at 08:15

## 2021-09-30 RX ADMIN — NYSTATIN 500000 UNITS: 100000 SUSPENSION ORAL at 08:14

## 2021-09-30 RX ADMIN — METFORMIN HYDROCHLORIDE 500 MG: 500 TABLET, FILM COATED ORAL at 19:58

## 2021-09-30 RX ADMIN — INSULIN ASPART 1 UNITS: 100 INJECTION, SOLUTION INTRAVENOUS; SUBCUTANEOUS at 08:28

## 2021-09-30 RX ADMIN — INSULIN DETEMIR 22 UNITS: 100 INJECTION, SOLUTION SUBCUTANEOUS at 20:03

## 2021-09-30 RX ADMIN — ATENOLOL 25 MG: 25 TABLET ORAL at 08:14

## 2021-09-30 RX ADMIN — CLOPIDOGREL BISULFATE 75 MG: 75 TABLET, FILM COATED ORAL at 08:14

## 2021-09-30 RX ADMIN — Medication 40 MG: at 06:16

## 2021-09-30 NOTE — PLAN OF CARE
Discharge Planner Post-Acute Rehab PT:      Discharge Plan: Home with 24/7 vs jail vs TCU pending progress     Precautions: Fall, alarms     Current Status:  Bed Mobility: mod A if alert  Transfer: variable based on alertness, lift dependent  Gait: variable based on alertness, w/c dependent   Stairs: Not safe  Balance: 1/36 PASS - although not truly reflective of capability due to drastic performance fluctuations with command following and lethargy.     Assessment:Lethargic during AM session - but was able to engage in biking with motor support.Fluctuates in/out of alertness.     Family training scheduled for 1:15pm on Saturday 10/2.    Other Barriers to Discharge (DME, Family Training, etc): Safe discharge plan - will need 24/7 cares, level of assist/equipment pending lethargy/participation

## 2021-09-30 NOTE — PLAN OF CARE
Discharge Planner Post-Acute Rehab OT:      Discharge Plan: home with 24/7 assistance, lift, hospital bed and WC     Precautions: falls, hx of right sided weakness, variable alertness      Current Status:  ADLs: Total A for shower transfer using TIS commode chair, Max A don pants, Mod A socks, Max A for UB dressing tasks, Total A for toilet tx/transfers using michell lift and commode chair, Min A for thoroughness with g/h tasks.  IADLs: Family support  Vision/Cognition: Wears glasses all the time. Lethargic, follows directions when alert.      Assessment: Pt more alert this AM then this therapist has ever seen. Pt participated in ADL although still requiring a high level of assist. Pt alert for entire session.  Assist of 2 for transfer.         Other Barriers to Discharge (DME, Family Training, etc):   Family training scheduled 10/2 1:15pm, per daughter the pt's wife can be home for the pt but she is not necessarily going to be able to assist physically.   AE/DME WC, lift, hospital bed

## 2021-09-30 NOTE — PLAN OF CARE
Discharge Planner Post-Acute Rehab OT:      Discharge Plan: home with 24/7 assistance, lift, hospital bed and WC     Precautions: falls, hx of right sided weakness, variable alertness      Current Status:  ADLs: Total A for shower transfer using TIS commode chair, Max A don pants, Mod A socks, Max A for UB dressing tasks, Total A for toilet tx/transfers using michell lift and commode chair, Min A for thoroughness with g/h tasks.  IADLs: Family support  Vision/Cognition: Wears glasses all the time. Lethargic, follows directions when alert.      Assessment: Pt participated in EOB g/h routine, initially alert but arousal level and participation significantly decreasing in short amount of time over course of session and resulting in max A and pt somnolence. Pt progress in therapy continues to be limited by variable arousal level affecting participation in therapy. Continue POC.     - 25 d/t somnolence    Other Barriers to Discharge (DME, Family Training, etc):   Family training scheduled 10/2, per daughter the pt's wife can be home for the pt but she is not necessarily going to be able to assist physically.   AE/DME WC, lift, hospital bed

## 2021-09-30 NOTE — PLAN OF CARE
Discharge Planner Post-Acute Rehab SLP:      Discharge Plan:home with assist, likely further SLP     Precautions:fall, swallowing     Current Status:  Communication:  pt limited voicing/aphonic, follows simple directions, some mild aphasia/dysarthria baseline per son, worsened now. Working on expression/comprehension as well as assistive technology ie picure board  Cognition: to be assessed as appropriate  Swallow: IDDSI 4,2 (pureed, mildly thick ok by cup). Ruiz Free Water protocol via tsp between meals. 1:1 for po/meals, assist to feed PRN, slow pace, small bites,/sips no straw.      Assessment: SLP: Tolerated 8 ounces of slightly thick liquids without any overt s/sx of aspriation. Some labial leakage at times when having delayed AP movement. Patient aslos tolerating limited thin by spoon without difficulties. Some increasing fatigue with progression of the session. Showing good potential for advancement to slightly thick liquids.   Able to make a drink selection when presented with picture choices. Able to mouth his choice with clinician model and verbal cueing.      Other Barriers to Discharge (Family Training, etc): TBD

## 2021-09-30 NOTE — PROGRESS NOTES
"  [facility]   Acute Rehabilitation Unit  Daily progress note    INTERVAL HISTORY  No acute events overnight.  Upon my visit this morning, pt was awake and alert, was able to mouth \"OK\", although continues to have no verbal output.  Denies chest pain or shortness of breath.  Small amount of hematuria seen in the Felix bag today.  Functionally was awake and alert with OT this morning, however with PT later was more lethargic but able to engage in biking in support.  Family training is scheduled for this weekend.    MEDICATIONS  Scheduled:    amantadine  100 mg Oral Daily     amLODIPine  10 mg Oral Daily     aspirin  81 mg Oral Daily     atenolol  25 mg Oral BID     atorvastatin  80 mg Oral Daily     clopidogrel  75 mg Oral Daily     doxazosin  1 mg Oral Daily     insulin aspart   Subcutaneous TID w/meals     insulin aspart  1-7 Units Subcutaneous TID AC     insulin aspart  1-5 Units Subcutaneous At Bedtime     insulin detemir  20 Units Subcutaneous Q24H     liraglutide  1.8 mg Subcutaneous Daily     metFORMIN  500 mg Oral BID w/meals     nystatin  500,000 Units Swish & Swallow 4x Daily     pantoprazole  40 mg Oral QAM AC     potassium chloride  20 mEq Oral Daily     senna-docusate  1 tablet Oral At Bedtime     sodium chloride (PF)  3 mL Intracatheter Q8H        PRN:  acetaminophen, bisacodyl, glucose **OR** dextrose **OR** glucagon, diclofenac, hydrALAZINE, insulin aspart, lidocaine, melatonin, ondansetron, polyethylene glycol, senna-docusate       PHYSICAL EXAM  Patient Vitals for the past 24 hrs:   BP Temp Temp src Pulse Resp SpO2   09/30/21 0800 136/77 98.4  F (36.9  C) Oral 97 16 97 %   09/29/21 2018 124/86 -- -- 96 -- --   09/29/21 1554 120/77 97.5  F (36.4  C) Oral 89 16 97 %       GEN: NAD, awake during visit, seated in wheelchair, able to nod yes/no responses  HEENT: NC/AT, MMM, Thrush on tongue is resolving  CVS: RRR, no murmurs  PULM: non-labored on room air, CTA " "b/l  ABD: Non-distended, soft, NT, BS+  : Felix in place, mild hematuria noted.  EXT: no edema appreciated in bilateral lower extremities  Neuro: Non-verbal but shakes head \"yes or no\"      LABS  CBC RESULTS:   Recent Labs   Lab Test 09/27/21  0602 09/24/21  0641 09/21/21  0830   WBC 7.8 7.3 7.3   RBC 4.69 4.61 4.49   HGB 12.7* 12.3* 12.0*   HCT 39.6* 38.7* 38.1*   MCV 84 84 85   MCH 27.1 26.7 26.7   MCHC 32.1 31.8 31.5   RDW 13.1 13.0 13.2    275 247     Last Basic Metabolic Panel:  Recent Labs   Lab Test 09/30/21 0754 09/30/21 0159 09/29/21 2050 09/29/21  0747 09/29/21  0622 09/28/21  0726 09/28/21  0622 09/27/21  0728 09/27/21  0602   NA  --   --   --   --  143  --  142  --  142   POTASSIUM  --   --   --   --  3.4  --  3.5  --  3.6   CHLORIDE  --   --   --   --  111*  --  114*  --  110*   CO2  --   --   --   --  30  --  28  --  29   ANIONGAP  --   --   --   --  2*  --  <1*  --  3   * 153* 141*   < > 191*   < > 227*   < > 259*   BUN  --   --   --   --  31*  --  33*  --  31*   CR  --   --   --   --  1.58*  --  1.57*  --  1.51*   GFRESTIMATED  --   --   --   --  43*  --  44*  --  46*   ARLETH  --   --   --   --  9.0  --  9.2  --  9.2    < > = values in this interval not displayed.     Recent Labs   Lab 09/30/21 0754 09/30/21 0159 09/29/21 2050 09/29/21  1719 09/29/21  1107 09/29/21  0747   * 153* 141* 168* 204* 184*       IMPRESSION  Ho Seak is a 71 year old male with a PMH including but not limited to HTN, uncontrolled DM 2 (HbA1c 11.5), HLD, BPH, and history of a stroke in 2012 with residual right hemiparesis who presented to the emergency room on 9/14/2021 with altered mental status and acute worsening of right-sided weakness, and found to have an acute right thalamic stroke due to small vessel occlusion.  Hospital course complicated by acute kidney injury, hypokalemia needing repletion, and urinary retention and hematuria needing Felix catheter and urology consult.         Impairment " group code: Stroke Ischemic 01.3 Bilateral Involvement, new R Thalamic stroke in setting of R sided weakness from prior stroke        PLAN  Rehabilitation  -Continue with inpatient rehabilitation program including PT, OT, and SLP for 3 hrs/day, rehab nursing, social work, nutrition, and close monitoring by physiatry for an estimated total of 3 weeks.  -The patient has impairments in strength, balance, endurance, and coordination, which are leading to activity limitations in ambulation, mobility, ADLs, and swallowing.      Medical  1)Neurology  #R thalamic CVA due to small vessel occlusion  - DAPT with ASA 81 mg and Plavix 75 mg for 1 month, followed by Plavix alone indefinitely.  Recommended check P2Y12 level to ensure Plavix efficacy after 1 month of being on Plavix only.  - Hypertension, lipid, and glucose management as below.  Will need ongoing education regarding stroke risk factor modifications including importance of medication compliance and diet and lifestyle changes  - Follow-up with [facility] general neurology in 6 to 8 weeks  - Monitor somnolence/lethargy.  Decreased level of alertness not unexpected with essentially bilateral thalamic pathology (new right thalamic stroke and a history of left thalamic stroke).    - Amantadine 100 mg daily started on 9/20 for neurostimulation given ongoing somnolence which is limiting participation.  Increased Amantadine to 100 mg BID (7 am and noon) on 9/25.  Decreased to 100 mg daily on 9/29 due to impaired renal function.  - High risk for delirium, ensure delirium precautions and appropriate sleep-wake cycle.  PRN melatonin available.        2)CVS  #HTN  - PTA on amlodipine 10 mg daily, atenolol 50 mg twice daily, Cozaar 100 mg daily, spironolactone 25 mg daily, and hydrochlorothiazide 25 mg daily.  Cozaar, spironolactone, and hydrochlorothiazide are being held due to acute kidney injury  - Continue amlodipine 10 mg daily, and atenolol at a lower dose of 25 mg twice daily  for now.  Titrate doses as needed.  - Hydralazine as needed for SBP greater than 180 mmHg     #HLD  - Continue Lipitor 80 mg daily.  LDL 92 on 9/15        3)Pulmonary  - No acute issues but low lung volumes and atelectasis/infiltrate seen at the left lung base on chest x-ray 9/14.  Clinically no signs of pneumonia but will continue to monitor.  - Encourage incentive spirometry        4)FENGI  #Diet: Level 4 solids and was upgraded to level 0 thin liquids, however downgraded again to level 2 liquids due to concern for aspiration.  Upgrade further as able per speech therapy  - Aspiration precautions  - Consult nutrition for education and optimization given stroke, modified diet, and uncontrolled diabetes  - VFSS completed 9/24     #Bowels: As needed Senokot-S, MiraLAX, and Dulcolax suppository     #Hypokalemia  - Ongoing and in the setting of poor po intake.  Started scheduled replacement 20 mEq daily.  Potassium 3.4 on 9/29.  Re-check tomorrow.     #GI prophylaxis  -Protonix 40 mg daily given DAPT and age     #Oral Thrush  Noted on exam 9/27, started on Nystatin swish and swallow x7 days.  - Continue Nystatin swish and swallow x7 days.  Will need to ensure patient is awake enough to adhere to aspiration precautions.     5)  #ALEX  - Baseline creatinine ~1.1, with a peak of 2.13 upon admission  - Had improved, then Cr increasing at 1.63 on 9/20,  likely pre-renal given lethargy and poor po intake.  Improved to 1.43 on 9/21 following IV fluids (9/19-9/20).  Continue to monitor, encourage fluids.  Creatinine climbing again and IV fluids given over the weekend and stopped yesterday.  This morning Cr 1.58 with BUN 31, which is stable from yesterday.  Continue to closely monitor, re-check tomorrow.  - Continue holding Cozaar, hydrochlorothiazide, and spironolactone.        #Urinary retention and hematuria  - Trial of void attempted, but continued to retain urine and Felix was re-placed on 9/26. Will plan to keep in until  follow up with urology.  - Urology consulted and recommend CT urogram once creatinine normalizes and outpatient follow-up for cystoscopy to complete hematuria work-up.  - Recurrent hematuria noted, continue to monitor hgb levels.  Thus far have been stable.   - Flomax 0.4 mg daily started, will continue        6)DVT prophylaxis  - Mechanical.  Will hold from chemoprophylaxis given hematuria and already on dual anti-platelet therapy        7)Pain  - Tylenol PRN        8)Endo  #Uncontrolled DM II (HbA1c 11.5)  - PTA was on Levemir 20 units nightly, NovoLog with meals with carb coverage, and Metformin 500 mg twice daily.  Unclear how compliant he was with his medications.  Endocrine assistance appreciated for help with management.  Recs 9/29:     -  increase Levemir to 20 from 18 units daily PM    -  Novolog 1:7g CHO all meals and snacks     -  Novolog medium resistance from high resistance sliding scale insulin TID AC and HS    -  continue Victoza to 1.8 mg    -  BG monitoring TID AC, HS and 0200    -  Metformin 500 mg PO in am, increase to BID tomorrow    -  Hypoglycemia protocol    -  Carb counting protocol    -  Will follow         9)Heme  - Hgb 12.7 on 9/27.  Overall stable.  - Continue to monitor, especially in the setting of hematuria.        10)Psych  - Monitor mood, will consult health psychology if needed  - Melatonin PRN for sleep      11)MSK  ~1.5 cm soft, non-tender, mobile nodule in left distal upper arm.  No overlying erythema, warmth, skin changes.  Seems most consistent with lipoma vs cyst.  - Monitor, but no further work-up/treatment indicated at this time           12)Social/Dispo  - Goals for discharge home at a supervision level for ambulation, mobility, and ADLs.  - ELOS: Tentative discharge date 10/8/21  - Rehab prognosis: Fair  - Follow up appointments: PCP, N General neurology clinic 6-8 weeks, urology for hematuria     Code status: Full Code (unable to discuss with patient given somnolence  upon admission.  Have continued prior CODE STATUS and discussed with the son who confirms that his prior wishes were to be full code)      Juan Miguel Campo MD  Department of Rehabilitation Medicine    Time Spent on this Encounter   I, Juan Miguel Campo, spent a total of 25 minutes face-to-face or managing the care of Jimmy Otto. Over 50% of my time on the unit was spent counseling the patient and coordinating care. See note for details.

## 2021-09-30 NOTE — PROGRESS NOTES
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  Hospital course complicated by acute kidney injury, hypokalemia needing repletion, and urinary retention and hematuria needing Felix catheter and urology consult.     Admission to inpatient rehab:  9/17/21  Impairment group code: Stroke Ischemic 01.3 Bilateral Involvement, new R Thalamic stroke in setting of R sided weakness from prior stroke      Assessment:     1)  Type 2 Diabetes Mellitus; poor control.  A1c 11.5%  2)  S/p stroke; R hemiparesis  3)  ALEX/CKD     Plan:       -  increase Levemir to 22 units at 2000    -  Novolog 1:7g CHO all meals and snacks     -  Novolog medium resistance from high resistance sliding scale insulin TID AC and HS    -  continue Victoza to 1.8 mg    -  BG monitoring TID AC, HS and 0200    -  Increase Metformin to 500 mg PO BID, from once daily, monitor kidney function closely    -  Hypoglycemia protocol    -  Carb counting protocol    -  Will follow      Outpatient diabetes follow up: TBD     Plan discussed with patient, bedside RN, and primary team via this note.             Interval History:     The last 24 hours progress and nursing notes reviewed.      BG trend:        BG improved to 140s-150s after Levemir increased to 20 units last evening.      Denies any abd/GI distress or elimination concerns, sleepy this am.   Victoza at 1.8 mg - tolerating    (note:  Phally - one of her family members exposed to covid -19+ so she will be available by phone during this exposure time)    Recent Labs   Lab  "09/30/21  0754 09/30/21  0159 09/29/21  2050 09/29/21  1719 09/29/21  1107 09/29/21  0747   * 153* 141* 168* 204* 184*           Nutrition:     Orders Placed This Encounter      Combination Diet Consistent Carb 60 Grams CHO per Meal Diet; Pureed Diet (level 4); Mildly Thick (level 2)    Supplements: Gelatein sugar -free - between meals - send orange 2 pm        PTA Regimen:     Levemir 28 units at bedtime  \"Novolog - varied doses based on chos\"  Clinic notes reveal:  Novolog 2 unit(s) per carb choice (2per 15)  Novolog 1 unit(s) per 50> 150     Metformin 500 mg BID (dose reduced for decreased renal fxn)     BG monitoring frequency:  FreeStyle Sherwin - intermittent use     Diet: regular, no restrictions  Eats sporadically - AM - bagel or banana  Lunch - sporadic  Dinner - rice/beg/meat  Loves to drink [employer]     Exercise: sedentary          Review of Systems:   CC:  Denies pain today and that the pain from cath yesterday has resolved.    Constitutional:   No fever/chills  ENT/Mouth:   No hearing changes, no ear pain, no sore throat, no rhinorrhea, no difficulty swallowing  Eyes:  No eye pain, no discharge, no vision changes  CV:   No CP/SOB, no new edema  Resp:  No cough, no wheezing  GI:   No N/V, no constipation, no diarrhea  :  Cath placed yesterday  Musk:  No new joint swelling/pain, no back pain  Skin/heme:   No new rashes/bruises/open areas.  No pruritis  Neuro:   No new weakness, no numbness/tingling, no headache  Psych:   No new anxiety, denies depression  Endocrine:  No polyuria or polydipsia         Medications:   Steroid planning:  no       Physical Exam:   /77 (BP Location: Right arm)   Pulse 97   Temp 98.4  F (36.9  C) (Oral)   Resp 16   Ht 1.626 m (5' 4\")   Wt 66.1 kg (145 lb 11.6 oz)   SpO2 97%   BMI 25.01 kg/m      General:   NAD, pleasant,  Sitting in chair, sleepy  HEENT:  NC/AT. MMM, sclera not injected  Lungs:  unremarkable, no new cough, no SOB  ABD:   soft,  " rounded  Skin:  warm and dry, no obvious lesions/rash  Feet:    CMS intact, PPP - per baseline  MSK:   moving all extremities - R sided deficits  Lymp:   no LE edema  Mental Status:  alert -nods to ROS questions, follows writer with eyes.   Psych:   Cooperative, calm          Data:     Lab Results   Component Value Date    A1C 11.5 09/15/2021    A1C 7.6 08/14/2012        CBC RESULTS: Recent Labs   Lab Test 09/27/21  0602   WBC 7.8   RBC 4.69   HGB 12.7*   HCT 39.6*   MCV 84   MCH 27.1   MCHC 32.1   RDW 13.1        Recent Labs   Lab Test 09/27/21  1152 09/27/21  0728 09/27/21  0602 09/27/21  0201 09/26/21  0744 09/26/21  0544 09/25/21  0809 09/25/21  0728   NA  --   --  142  --   --   --   --  143   POTASSIUM  --   --  3.6  --   --  3.6   < > 3.7   CHLORIDE  --   --  110*  --   --   --   --  111*   CO2  --   --  29  --   --   --   --  30   ANIONGAP  --   --  3  --   --   --   --  2*   * 269* 259*   < >   < >  --    < > 222*   BUN  --   --  31*  --   --   --   --  31*   CR  --   --  1.51*  --   --   --   --  1.46*   ARLETH  --   --  9.2  --   --   --   --  8.7    < > = values in this interval not displayed.       Liver Function Studies -   Recent Labs   Lab Test 09/15/21  0521   PROTTOTAL 6.6*   ALBUMIN 2.6*   BILITOTAL 0.3   ALKPHOS 80   AST 20   ALT 22     Lab Results   Component Value Date    INR 1.03 09/16/2021    INR 0.94 09/14/2021    INR 1.00 08/16/2012    INR 0.96 08/13/2012     FEMI Reynolds, CNP  Inpatient Diabetes Management Service  Pager - [phone]  Friday - Monday 0800 - 1600 hrs  After hours above - Diabetes Management Team job code: [phone]    I spent a total of 25 minutes bedside and on the inpatient unit managing glycemic care.  Over 50% of my time on the unit was spent counseling the patient and/or coordinating care regarding acute hyperglycemic management.  See note for details.

## 2021-09-30 NOTE — PROGRESS NOTES
CLINICAL NUTRITION SERVICES - BRIEF NOTE    FINDINGS   RD briefly reviewed pt with SLP today for possible diet advancements by late this afternoon, SLP trialed Ensure with pt and pt took well so will consider adding/adjusting supplements once final orders determined. Noted pt is on for team rounds tomorrow, RD will plan for full re-assessment tomorrow in light of this and in light of possible diet advancement per SLP.    Kristina Garcia RD, CNSC, LD  [facility] RD pager: [phone]

## 2021-09-30 NOTE — PROGRESS NOTES
Alert, ZAYDA orientation d/t patient's aphagia, pt able to easily answer yes/no questions with head gestures, waved at staff with left hand when greeted this AM, VSS, A2 w/lift for transfers, turns easily with assist when in bed, LS clear, BS+, ordoñez patent and draining, hematuria at times, irrigating Qshift, no c/o pain, CP, SOB, nausea or numbness/tingling, tolerating puree diet with mildly thickened liquids, able to feed self today, pills crushed with apple sauce.  this AM and 287 before lunch, sliding scale and carb count insulin given per protocols. Patient able to participate in all therapy sessions this shift. POC reviewed with patient and son-in-law at bedside, questions answered.

## 2021-09-30 NOTE — PLAN OF CARE
FOCUS/GOAL  Bowel management, Bladder management, Pain management, Skin integrity and Cognition/Memory/Judgment/Problem solving    ASSESSMENT, INTERVENTIONS AND CONTINUING PLAN FOR GOAL:  Pt is alert, unable to assess orienation due to aphasia but able to answer yes or no questions with fairly high certainty, denied pain, sob, or new numbness and tingling  pt was found with ordoñez in hand and had detached it from anchor, irrigated and re secured, education and redirection from ordoñez as performed and effective, hematuria present with output, BG of 153 at 2 am, , oral cares performed, mildly thick fluids provided, no further care concerns at this time continue with POC.

## 2021-10-01 ENCOUNTER — APPOINTMENT (OUTPATIENT)
Dept: SPEECH THERAPY | Facility: CLINIC | Age: 71
DRG: 057 | End: 2021-10-01
Attending: PHYSICAL MEDICINE & REHABILITATION
Payer: MEDICARE

## 2021-10-01 ENCOUNTER — APPOINTMENT (OUTPATIENT)
Dept: OCCUPATIONAL THERAPY | Facility: CLINIC | Age: 71
DRG: 057 | End: 2021-10-01
Attending: PHYSICAL MEDICINE & REHABILITATION
Payer: MEDICARE

## 2021-10-01 ENCOUNTER — APPOINTMENT (OUTPATIENT)
Dept: PHYSICAL THERAPY | Facility: CLINIC | Age: 71
DRG: 057 | End: 2021-10-01
Attending: PHYSICAL MEDICINE & REHABILITATION
Payer: MEDICARE

## 2021-10-01 LAB
ANION GAP SERPL CALCULATED.3IONS-SCNC: 3 MMOL/L (ref 3–14)
BUN SERPL-MCNC: 40 MG/DL (ref 7–30)
CALCIUM SERPL-MCNC: 8.9 MG/DL (ref 8.5–10.1)
CHLORIDE BLD-SCNC: 112 MMOL/L (ref 94–109)
CO2 SERPL-SCNC: 30 MMOL/L (ref 20–32)
CREAT SERPL-MCNC: 1.64 MG/DL (ref 0.66–1.25)
GFR SERPL CREATININE-BSD FRML MDRD: 41 ML/MIN/1.73M2
GLUCOSE BLD-MCNC: 115 MG/DL (ref 70–99)
GLUCOSE BLDC GLUCOMTR-MCNC: 125 MG/DL (ref 70–99)
GLUCOSE BLDC GLUCOMTR-MCNC: 154 MG/DL (ref 70–99)
GLUCOSE BLDC GLUCOMTR-MCNC: 155 MG/DL (ref 70–99)
GLUCOSE BLDC GLUCOMTR-MCNC: 89 MG/DL (ref 70–99)
POTASSIUM BLD-SCNC: 3.3 MMOL/L (ref 3.4–5.3)
SODIUM SERPL-SCNC: 145 MMOL/L (ref 133–144)

## 2021-10-01 PROCEDURE — 999N000125 HC STATISTIC PATIENT MED CONFERENCE < 30 MIN: Performed by: SPEECH-LANGUAGE PATHOLOGIST

## 2021-10-01 PROCEDURE — 250N000013 HC RX MED GY IP 250 OP 250 PS 637: Performed by: NURSE PRACTITIONER

## 2021-10-01 PROCEDURE — 999N000150 HC STATISTIC PT MED CONFERENCE < 30 MIN

## 2021-10-01 PROCEDURE — 92526 ORAL FUNCTION THERAPY: CPT | Mod: GN

## 2021-10-01 PROCEDURE — 97535 SELF CARE MNGMENT TRAINING: CPT | Mod: GO | Performed by: STUDENT IN AN ORGANIZED HEALTH CARE EDUCATION/TRAINING PROGRAM

## 2021-10-01 PROCEDURE — 99233 SBSQ HOSP IP/OBS HIGH 50: CPT | Performed by: PHYSICAL MEDICINE & REHABILITATION

## 2021-10-01 PROCEDURE — 250N000013 HC RX MED GY IP 250 OP 250 PS 637: Performed by: PHYSICIAN ASSISTANT

## 2021-10-01 PROCEDURE — 99232 SBSQ HOSP IP/OBS MODERATE 35: CPT | Performed by: NURSE PRACTITIONER

## 2021-10-01 PROCEDURE — 999N000125 HC STATISTIC PATIENT MED CONFERENCE < 30 MIN: Performed by: STUDENT IN AN ORGANIZED HEALTH CARE EDUCATION/TRAINING PROGRAM

## 2021-10-01 PROCEDURE — 97530 THERAPEUTIC ACTIVITIES: CPT | Mod: GP

## 2021-10-01 PROCEDURE — 258N000003 HC RX IP 258 OP 636: Performed by: PHYSICAL MEDICINE & REHABILITATION

## 2021-10-01 PROCEDURE — 250N000013 HC RX MED GY IP 250 OP 250 PS 637: Performed by: PHYSICAL MEDICINE & REHABILITATION

## 2021-10-01 PROCEDURE — 36415 COLL VENOUS BLD VENIPUNCTURE: CPT | Performed by: PHYSICAL MEDICINE & REHABILITATION

## 2021-10-01 PROCEDURE — 80048 BASIC METABOLIC PNL TOTAL CA: CPT | Performed by: PHYSICAL MEDICINE & REHABILITATION

## 2021-10-01 PROCEDURE — 250N000012 HC RX MED GY IP 250 OP 636 PS 637: Performed by: NURSE PRACTITIONER

## 2021-10-01 PROCEDURE — 128N000003 HC R&B REHAB

## 2021-10-01 RX ORDER — POTASSIUM CHLORIDE 750 MG/1
20 TABLET, EXTENDED RELEASE ORAL ONCE
Status: COMPLETED | OUTPATIENT
Start: 2021-10-01 | End: 2021-10-01

## 2021-10-01 RX ADMIN — POTASSIUM CHLORIDE 20 MEQ: 750 TABLET, EXTENDED RELEASE ORAL at 09:03

## 2021-10-01 RX ADMIN — NYSTATIN 500000 UNITS: 100000 SUSPENSION ORAL at 16:16

## 2021-10-01 RX ADMIN — ATENOLOL 25 MG: 25 TABLET ORAL at 09:03

## 2021-10-01 RX ADMIN — ASPIRIN 81 MG CHEWABLE TABLET 81 MG: 81 TABLET CHEWABLE at 09:03

## 2021-10-01 RX ADMIN — SODIUM CHLORIDE 500 ML: 9 INJECTION, SOLUTION INTRAVENOUS at 09:13

## 2021-10-01 RX ADMIN — DOXAZOSIN 1 MG: 1 TABLET ORAL at 09:03

## 2021-10-01 RX ADMIN — CLOPIDOGREL BISULFATE 75 MG: 75 TABLET, FILM COATED ORAL at 09:03

## 2021-10-01 RX ADMIN — NYSTATIN 500000 UNITS: 100000 SUSPENSION ORAL at 09:03

## 2021-10-01 RX ADMIN — NYSTATIN 500000 UNITS: 100000 SUSPENSION ORAL at 13:13

## 2021-10-01 RX ADMIN — INSULIN DETEMIR 22 UNITS: 100 INJECTION, SOLUTION SUBCUTANEOUS at 20:57

## 2021-10-01 RX ADMIN — METFORMIN HYDROCHLORIDE 500 MG: 500 TABLET, FILM COATED ORAL at 09:03

## 2021-10-01 RX ADMIN — AMLODIPINE BESYLATE 10 MG: 10 TABLET ORAL at 09:03

## 2021-10-01 RX ADMIN — SENNOSIDES AND DOCUSATE SODIUM 1 TABLET: 8.6; 5 TABLET ORAL at 20:57

## 2021-10-01 RX ADMIN — POTASSIUM CHLORIDE 20 MEQ: 1.5 FOR SOLUTION ORAL at 09:13

## 2021-10-01 RX ADMIN — NYSTATIN 500000 UNITS: 100000 SUSPENSION ORAL at 21:06

## 2021-10-01 RX ADMIN — AMANTADINE HYDROCHLORIDE 100 MG: 100 CAPSULE ORAL at 09:03

## 2021-10-01 RX ADMIN — Medication 40 MG: at 05:27

## 2021-10-01 RX ADMIN — METFORMIN HYDROCHLORIDE 500 MG: 500 TABLET, FILM COATED ORAL at 18:21

## 2021-10-01 RX ADMIN — SODIUM CHLORIDE 500 ML: 9 INJECTION, SOLUTION INTRAVENOUS at 13:11

## 2021-10-01 RX ADMIN — ATENOLOL 25 MG: 25 TABLET ORAL at 20:57

## 2021-10-01 RX ADMIN — ATORVASTATIN CALCIUM 80 MG: 80 TABLET, FILM COATED ORAL at 09:03

## 2021-10-01 RX ADMIN — LIRAGLUTIDE 1.8 MG: 6 INJECTION SUBCUTANEOUS at 09:04

## 2021-10-01 ASSESSMENT — MIFFLIN-ST. JEOR: SCORE: 1331.43

## 2021-10-01 NOTE — PLAN OF CARE
OT: Post rounds discussion occurred with pt's daughter and pt since daughter arrived on the unit shortly after rounds was finished. Gave her an update and discussed discharge options including TCU and Home with 24/7 assist. Per daughter they would like the pt to discharge to a TCU to get as close to his baseline as he can before returning home. If this is not possible then she is aware and in agreement of completing family training in prep for a discharge home as well. We discussed family training and her mother is also going to come since she will be able to assist with ADL. We discussed DME and the idea of a mamadou giles. Gave her resources for a commode for when appropriate. Daughter and mother will come tomorrow for FT and other family will come on a different day.

## 2021-10-01 NOTE — PLAN OF CARE
Discharge Planner Post-Acute Rehab SLP:      Discharge Plan:home with assist, likely further SLP     Precautions:fall, swallowing     Current Status:  Communication:  pt limited voicing/aphonic, follows simple directions, some mild aphasia/dysarthria baseline per son, worsened now. Working on expression/comprehension as well as assistive technology ie picure board  Cognition: to be assessed as appropriate  Swallow: IDDSI 4,2 (pureed, mildly thick ok by cup). Ruiz Free Water protocol via tsp between meals. 1:1 for po/meals, assist to feed PRN, slow pace, small bites,/sips no straw.      Assessment:  sLP: pt seen for swallowing, famly education provided regarding feeding/swallowing recommendations, diet, thickening liquids, demonstrated and verbalized understanding, pt did not show any sx aspiration simall sips slightly thick. Noting slight voicing in conversational response.   Other Barriers to Discharge (Family Training, etc): TBD

## 2021-10-01 NOTE — CARE CONFERENCE
Acute Rehab Care Conference/Team Rounds      Type: Team Rounds    Present: Dr. Kerrie Campo, Jesika Theodore PA, Charmaine Pickett RN, Finn Lantigua SW, Zayra James OT, Rubi Avila PT, Imani Lacy SLP, Kristina Garcia Dietician, Patient Jimmy Otto      Discharge Barriers/Treatment/Education    Rehab Diagnosis: Ischemic CVA of R thalamus     Active Medical Co-morbidities/Prognosis: HTN, uncontrolled DM II, HLD, BPH, prior CVA with residual R hemiparesis, hypokalemia, urinary retention needing Ordoñez catheter, hematuria, dysphagia, ALEX, spasticity    Safety: Pt is alert, unable to assess orientation due to aphasia, able to generally answer yes or no questions with head nods, lift to transfer, bed alarms on at all times, on occasion will pull at ordoñez and need reeducation and direction.     Pain: Pt denies pain.     Medications, Skin, Tubes/Lines: Pt takes pills crushed in applesauce and will need medication management upon discharge, unsure of DM education needs, ordoñez in place draining red urine at times with bouts of christine, PIV in LUE.     Swallowing/Nutrition:SLP; pt on pureed IDDSI 4, mildly thick fluids. Pt did not aspirate on VFSS on thin, but does have clinical sx intermittently (even with alert, following strategies) ok'd for free water with supervision, continue recommend 1:1 for po/meals for safety, pt to sit up, small bites and sips, wait for swallow    Bowel/Bladder: Pt generally incontinent of stool with ordoñez in place due to retention and hematuria.     Psychosocial: Legally  but  from wife. Lives alone, brother and MELA live next door. Adult children live local. Family helping PTA. Pt retired. No MH or SA concerns. Hx stroke in the past.    ADLs/IADLs: Pt currently requires a high level of assist including max A for dressing, total asist for incontinence cares, min A for grooming and MAX A for transfers or liko lift. Goal is for family to take pt home with DME and to train them in caregiving needs.  Yesterday pt was more alert but this did not improve his ADL or transfer ability. Pt has limited assist at home and because of his variability we are acquiring a hospital bed, lift and WC for the pt. Therapist will recommend a commode however he has been incontinent thus far and does not need one for discharge. Family training scheduled for Sat and most likely they will require more sessions as well. Recommend HH OT and HHA.    Mobility: Significant fluctuation in performance between dependent lift and ambulatory with min A. Family hoping for improved function prior to discharge to home, but realize that TCU may not be available. Working on increasing alertness and participation, and minimizing caregiver burden. Planning for home with bed, lift, and wheelchair. Hopeful to decrease need for TIS w/c and trial María Stedy lift instead of Benjie for decreased burden on family.    Cognition/Language:SLP: pt generally aphonic at this time, working on basic speech/language, also working on assistive communication (ie picture board) pt below baseline per family reports.  Limited improvement in expressive communication. Pt does follow simple directions, but likely cognitive deficits. Recommended 24 hour support.      Plan of Care and goals reviewed and updated.    Discharge Plan/Recommendations    Fall Precautions: continue    Overall plan for the patient:   Overall improvement seen in alertness, although still very fluctuant and variable.  Function also very variable.  Medically needs Felix catheter for retention and has ongoing hematuria.  Also need to continue to monitor kidney function and fluid status closely.  Functionally has been able to ambulate up to 100 ft when alert and awake, but can also be dependent in a lift for mobility.  Yesterday with OT was alert, but still required Max A x2 for transfer.  Diet was downgraded as he coughs with liquids, and thus seems to be doing worse clinically compared to his VFSS.  Poor  initiation.  Has good comprehension, but still not able to verbalize.  Will need significant 24 hr cares including physical assist for all mobilty and ADLs, and equipment in place if patient were to discharge home.  Family would prefer TCU to continue with rehab and gain more consistency and improve function prior to discharge home, which is very reasonable.  However, given current healthcare situation an accepting facility is not guaranteed and therefore we will also plan to train the family in the event he does need to discharge home.  Will begin TCU applications immediately.  .        Utilization Review and Continued Stay Justification    Medical Necessity Criteria:    For any criteria that is not met, please document reason and plan for discharge, transfer, or modification of plan of care to address.    Requires intensive rehabilitation program to treat functional deficits?: Yes    Requires 3x per week or greater involvement of rehabilitation physician to oversee rehabilitation program?: Yes    Requires rehabilitation nursing interventions?: Yes    Patient is making functional progress?: Yes    There is a potential for additional functional progress? Yes    Patient is participating in therapy 3 hours per day a minimum of 5 days per week or 15 hours per week in 7 day period?:Yes    Has discharge needs that require coordinated discharge planning approach?:Yes      Barriers/Concerns related to meeting medical necessity criteria:  Somnolence, variable performance    Team Plan to Address Concern:  Ongoing therapies, pharmacological and non-pharmacological methods to promote alertness      Final Physician Sign off    Statement of Approval: I have reviewed and agree with the recommendations and documentation in this care conference note.       Patient Goals  Social Work Goals: Confirm discharge recommendations with therapy, coordinate safe discharge plan and remain available to support and assist as needed.    OT goal:  hygiene/grooming: supervision/stand-by assist  OT goal: upper body dressing: Minimal assist  OT goal: lower body dressing: Moderate assist  OT goal: upper body bathing: Supervision/stand-by assist  OT goal: lower body bathing: Moderate assist  OT goal: toilet transfer/toileting: Moderate assist, toilet transfer, cleaning and garment management                                     PT Frequency: daily x60-90 minutes  PT goal: bed mobility: Minimal assist, Supine to/from sit, Rolling  PT goal: transfers: Minimal assist, Sit to/from stand, Bed to/from chair  PT goal: gait: Minimal assist, 25 feet (Hand hold vs cane)  PT goal 1: Family will understand fall prevention methods for safe home setup and discharge  PT Goal 2: Pt will perform HEP with supervision/cues for family.  PT Goal 3: Car transfer with min-A     SLP Frequency: daily  SLP Swallow Goal:  Safely tolerate diet without signs/symptoms of aspiration: Soft & bite sized diet, Thin liquids  SLP: pt to communicate basic needs with min cues 80% of time  SLP: to to comprehend basic/moderate complex information (yes/no questions, directions) with min assist 80% accuracy                     Patient/Family Goal: Bladder: Pt will be able to demonstrate ordoñez cares before discharge if pt going home with ordoñez.              Goal: Skin Integrity: Pt will be able to reposisiton enough to prevent skin breakdown while on ARU.                    Goal: Safety Management: Pt will call for assistance with transferring while on ARU.

## 2021-10-01 NOTE — PLAN OF CARE
FOCUS/GOAL  Bowel management, Bladder management, Medication management, Pain management, Medical management, Mobility, Skin integrity, and Safety management    ASSESSMENT, INTERVENTIONS AND CONTINUING PLAN FOR GOAL:    Pt is alert, ZAYDA orentation. Incontinent of bowel, ordoñez cathetar in place, urine is blood-tinged, bladder irrigated per POC. Ax2 liko lift transfers. Pureed diet, mildly thick liquids, requires 1:1 feeding assistance. 500 ml fluid bolus given at 9 am and 1 pm . Denied pain, nausea and vomiting, numbness or tingling, SOB. Alarms on, call light within reach. Will continue with plan of care.

## 2021-10-01 NOTE — PROGRESS NOTES
Pt alert, ZAYDA orientation due to severe aphasia. Nods to yes and no questions. Pt sleepy after therapy today and when woken to eat dinner, pt declined, daughter at bedside and attempted x3. Blood sugar 292 and 247. patient drank 1/4 of thickened vanilla ensure at HS. Denies chest pain, SOB, n/v, numbness/tingling. Felix patent and draining christine urine. Bladder irrigation per orders. LBM 9/30. PIV to LUE, no blood return and saline locked. Level 4 (pureed), level 2 (mild thick)-ok for free water after oral cares, meds crushed in apple sauce. bed bath provided. Alarms activated. Soft touch call light within reach.

## 2021-10-01 NOTE — PROGRESS NOTES
CLINICAL NUTRITION SERVICES - REASSESSMENT NOTE     Nutrition Prescription    RECOMMENDATIONS FOR MDs/PROVIDERS TO ORDER:  None today - pt reviewed face to face during team rounds     Malnutrition Status:    Patient does not meet two of the established criteria necessary for diagnosing malnutrition but is at risk for malnutrition    Recommendations already ordered by Registered Dietitian (RD):  Continue supplement as ordered - if diet advanced per SLP will also add on Ensure Enlive once daily and increase based on tolerance/acceptance    RD will add to room service order to have  send 2 juices on each meal tray    RD will add nursing patient care order to encourage between meal fluids     Future/Additional Recommendations:  Continue to monitor meal intakes and supplement acceptance  Continue to monitor weight and lab trends      EVALUATION OF THE PROGRESS TOWARD GOALS   Diet: Consistent CHO - 60 grams, Puree (level 4), Mildly Thick (level 2) - no milk with meals due to lactose intolerance per pt/family preference   Supplement: Orange Gelatein once daily at 2 pm   Intake: % per flow sheets with only 1 noted 0%       NEW FINDINGS   MAR reviewed. Labs noted below, pt receiving IVF today. Pt reviewed during team rounds and visited at bedside, reviewed po meal intakes appear adequate, pt appears to continue to have difficulty with hydration and continues to require intermittent IVF, reviewed potential for diet advancement later today per SLP and potential for pt to have Ensure supplement for additional supplementation.     Results for CARLOS SCHULTZ (MRN [id number]) as of 10/1/2021 09:01   9/27/2021 06:02 9/28/2021 06:22 9/29/2021 06:22 10/1/2021 05:42   Sodium 142 142 143 145 (H)   Potassium 3.6 3.5 3.4 3.3 (L)   Chloride 110 (H) 114 (H) 111 (H) 112 (H)   Urea Nitrogen 31 (H) 33 (H) 31 (H) 40 (H)   Creatinine 1.51 (H) 1.57 (H) 1.58 (H) 1.64 (H)   GFR Estimate 46 (L) 44 (L) 43 (L) 41 (L)     10/01/21 0611  66.5 kg (146 lb 11.2 oz)   09/24/21 0623 66.1 kg (145 lb 11.6 oz)   09/17/21 1455 66.2 kg (145 lb 15.1 oz)     09/16/21 61.5 kg (135 lb 9.3 oz) - question accuracy      07/02/21 69.3 kg (152 lb 12.8 oz)   04/15/21 68.5 kg (151 lb)   01/19/21 70.8 kg (156 lb)     Weight assessment: Weight appears stable from ARU admission, non-significant 3.9% weight loss in 3 months, non-significant 3.3% weight loss in 6 months.     MALNUTRITION  % Intake: Decreased intake does not meet criteria  % Weight Loss: Weight loss does not meet criteria  Subcutaneous Fat Loss: Facial region: mild vs age related   Muscle Loss: Temporal: mild vs age related   Fluid Accumulation/Edema: Trace per flow sheet  Malnutrition Diagnosis: Patient does not meet two of the established criteria necessary for diagnosing malnutrition but is at risk for malnutrition    Previous Goals   Patient to consume % of nutritionally adequate meal trays TID, or the equivalent with supplements/snacks  Evaluation: Likely met    Previous Nutrition Diagnosis  Inadequate oral intake related to fatigue at mealtimes and need for assistance / texture modified diet as evidenced by documented variable intake (%)    Evaluation: Improving, diagnosis adjusted to below     CURRENT NUTRITION DIAGNOSIS  Inadequate oral hydration related to continued need for modified diet as evidenced by lab values       INTERVENTIONS  Implementation  IVF hydration per Provider discretion   Medical food supplement therapy - continue as ordered/consider additions once diet advanced   Care orders - as above to encourage po intakes     Goals  Patient to consume % of nutritionally adequate meal trays TID, or the equivalent with supplements/snacks while attempting to maintain hydration status.    Monitoring/Evaluation  Progress toward goals will be monitored and evaluated per protocol.    Kristina Garcia, RD, CNSC, LD  ARU RD pager: [phone]  Weekend/holiday pager: [phone]

## 2021-10-01 NOTE — PROGRESS NOTES
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  Hospital course complicated by acute kidney injury, hypokalemia needing repletion, and urinary retention and hematuria needing Felix catheter and urology consult.     Admission to inpatient rehab:  9/17/21  Impairment group code: Stroke Ischemic 01.3 Bilateral Involvement, new R Thalamic stroke in setting of R sided weakness from prior stroke      Assessment:     1)  Type 2 Diabetes Mellitus; poor control.  A1c 11.5%  2)  S/p stroke; R hemiparesis  3)  ALEX/CKD     Plan:       -  continue Levemir to 22 units at 2000    -  Novolog 1:7g CHO all meals and snacks     -  Novolog medium resistance from high resistance sliding scale insulin TID AC and HS    -  continue Victoza to 1.8 mg    -  BG monitoring TID AC, HS and 0200    -  continue Metformin to 500 mg PO BID, from once daily, monitor kidney function closely    -  Hypoglycemia protocol    -  Carb counting protocol    -  Will follow      Outpatient diabetes follow up: TBD     Plan discussed with patient, bedside RN, and primary team via this note.             Interval History:     The last 24 hours progress and nursing notes reviewed.      BG trend:         yesterday at 1700, patient did not eat, but no sliding scale insulin was given by RN.  Unsure for reason.  Discussed with nursing staff today and they will cover with sliding scale insulin even if patient does not eat.  BG stable today, 115-125.  Will continue with current regimen.     Denies any abd/GI distress or elimination  "concerns, daughter at bedside.   Victoza at 1.8 mg - tolerating        Recent Labs   Lab 10/01/21  1157 10/01/21  0542 10/01/21  0152 09/30/21  2056 09/30/21  1709 09/30/21  1227   * 115* 155* 247* 292* 287*           Nutrition:     Orders Placed This Encounter      Combination Diet Consistent Carb 60 Grams CHO per Meal Diet; Pureed Diet (level 4); Slightly Thick (level 1)    Supplements: Gelatein sugar -free - between meals - send orange 2 pm        PTA Regimen:     Levemir 28 units at bedtime  \"Novolog - varied doses based on chos\"  Clinic notes reveal:  Novolog 2 unit(s) per carb choice (2per 15)  Novolog 1 unit(s) per 50> 150     Metformin 500 mg BID (dose reduced for decreased renal fxn)     BG monitoring frequency:  FreeStyle Sherwin - intermittent use     Diet: regular, no restrictions  Eats sporadically - AM - bagel or banana  Lunch - sporadic  Dinner - rice/beg/meat  Loves to drink [employer]     Exercise: sedentary          Review of Systems:   CC:  Denies pain today and that the pain from cath yesterday has resolved.    Constitutional:   No fever/chills  ENT/Mouth:   No hearing changes, no ear pain, no sore throat, no rhinorrhea, no difficulty swallowing  Eyes:  No eye pain, no discharge, no vision changes  CV:   No CP/SOB, no new edema  Resp:  No cough, no wheezing  GI:   No N/V, no constipation, no diarrhea  :  Cath placed yesterday  Musk:  No new joint swelling/pain, no back pain  Skin/heme:   No new rashes/bruises/open areas.  No pruritis  Neuro:   No new weakness, no numbness/tingling, no headache  Psych:   No new anxiety, denies depression  Endocrine:  No polyuria or polydipsia         Medications:   Steroid planning:  no       Physical Exam:   /79   Pulse 89   Temp 97.2  F (36.2  C) (Oral)   Resp 18   Ht 1.626 m (5' 4\")   Wt 66.5 kg (146 lb 11.2 oz)   SpO2 95%   BMI 25.18 kg/m      General:   NAD, pleasant,  Sitting in bed, alert.  HEENT:  NC/AT. MMM, sclera not " injected  Lungs:  unremarkable, no new cough, no SOB  ABD:   soft,  rounded  Skin:  warm and dry, no obvious lesions/rash  Feet:    CMS intact, PPP - per baseline  MSK:   moving all extremities - R sided deficits  Lymp:   no LE edema  Mental Status:  alert -nods to ROS questions, follows writer with eyes.   Psych:   Cooperative, calm          Data:     Lab Results   Component Value Date    A1C 11.5 09/15/2021    A1C 7.6 08/14/2012        CBC RESULTS: Recent Labs   Lab Test 09/27/21  0602   WBC 7.8   RBC 4.69   HGB 12.7*   HCT 39.6*   MCV 84   MCH 27.1   MCHC 32.1   RDW 13.1        Recent Labs   Lab Test 09/27/21  1152 09/27/21  0728 09/27/21  0602 09/27/21  0201 09/26/21  0744 09/26/21  0544 09/25/21  0809 09/25/21  0728   NA  --   --  142  --   --   --   --  143   POTASSIUM  --   --  3.6  --   --  3.6   < > 3.7   CHLORIDE  --   --  110*  --   --   --   --  111*   CO2  --   --  29  --   --   --   --  30   ANIONGAP  --   --  3  --   --   --   --  2*   * 269* 259*   < >   < >  --    < > 222*   BUN  --   --  31*  --   --   --   --  31*   CR  --   --  1.51*  --   --   --   --  1.46*   ARLETH  --   --  9.2  --   --   --   --  8.7    < > = values in this interval not displayed.       Liver Function Studies -   Recent Labs   Lab Test 09/15/21  0521   PROTTOTAL 6.6*   ALBUMIN 2.6*   BILITOTAL 0.3   ALKPHOS 80   AST 20   ALT 22     Lab Results   Component Value Date    INR 1.03 09/16/2021    INR 0.94 09/14/2021    INR 1.00 08/16/2012    INR 0.96 08/13/2012     FEMI Reynolds, CNP  Inpatient Diabetes Management Service  Pager - [phone]  Friday - Monday 0800 - 1600 hrs  After hours above - Diabetes Management Team job code: [id number]    I spent a total of 25 minutes bedside and on the inpatient unit managing glycemic care.  Over 50% of my time on the unit was spent counseling the patient and/or coordinating care regarding acute hyperglycemic management.  See note for details.

## 2021-10-01 NOTE — PROGRESS NOTES
[facility]   Acute Rehabilitation Unit  Daily progress note    INTERVAL HISTORY  Pt was seen in team rounds.  Family was not present during rounds but came shortly after and therapies was able to update and discuss discharge planning.  They would prefer TCU given his significant variability in function and amount of assist that would be required.  No acute events overnight.  This morning Mr. Otto denies (via head shaking) any concerns including pain in the legs, chest pain, or shortness of breath.  Functionally he continues be very variable depending on his alertness levels, but yesterday with OT was alert and still required Max Ax2 for transfers.  For full functional updates, see team rounds note from today.    MEDICATIONS  Scheduled:    amantadine  100 mg Oral Daily     amLODIPine  10 mg Oral Daily     aspirin  81 mg Oral Daily     atenolol  25 mg Oral BID     atorvastatin  80 mg Oral Daily     clopidogrel  75 mg Oral Daily     doxazosin  1 mg Oral Daily     insulin aspart   Subcutaneous TID w/meals     insulin aspart  1-7 Units Subcutaneous TID AC     insulin aspart  1-5 Units Subcutaneous At Bedtime     insulin detemir  22 Units Subcutaneous Q24H     liraglutide  1.8 mg Subcutaneous Daily     metFORMIN  500 mg Oral BID w/meals     nystatin  500,000 Units Swish & Swallow 4x Daily     pantoprazole  40 mg Oral QAM AC     potassium chloride  20 mEq Oral Daily     senna-docusate  1 tablet Oral At Bedtime     sodium chloride (PF)  3 mL Intracatheter Q8H        PRN:  acetaminophen, bisacodyl, glucose **OR** dextrose **OR** glucagon, diclofenac, hydrALAZINE, insulin aspart, lidocaine, melatonin, ondansetron, polyethylene glycol, senna-docusate       PHYSICAL EXAM  Patient Vitals for the past 24 hrs:   BP Temp Temp src Pulse Resp SpO2 Weight   10/01/21 0903 133/79 -- -- -- -- -- --   10/01/21 0611 126/77 97.2  F (36.2  C) Oral 89 18 95 % 66.5 kg (146 lb 11.2 oz)   09/30/21 1957  "130/80 -- -- 92 -- -- --   09/30/21 1638 100/69 (!) 96.7  F (35.9  C) Oral 93 16 97 % --       GEN: NAD, awake during visit, lying in bed, able to nod yes/no responses  HEENT: NC/AT, MMM, Thrush on tongue is resolving  PULM: non-labored on room air  ABD: Non-distended  : Felix in place, +Hematuria present  EXT: no edema appreciated in bilateral lower extremities  Neuro: Non-verbal but shakes head \"yes or no\"      LABS  CBC RESULTS:   Recent Labs   Lab Test 09/27/21  0602 09/24/21  0641 09/21/21  0830   WBC 7.8 7.3 7.3   RBC 4.69 4.61 4.49   HGB 12.7* 12.3* 12.0*   HCT 39.6* 38.7* 38.1*   MCV 84 84 85   MCH 27.1 26.7 26.7   MCHC 32.1 31.8 31.5   RDW 13.1 13.0 13.2    275 247     Last Basic Metabolic Panel:  Recent Labs   Lab Test 10/01/21  1157 10/01/21  0542 10/01/21  0152 09/29/21  0747 09/29/21 0622 09/28/21  0726 09/28/21 0622   NA  --  145*  --   --  143  --  142   POTASSIUM  --  3.3*  --   --  3.4  --  3.5   CHLORIDE  --  112*  --   --  111*  --  114*   CO2  --  30  --   --  30  --  28   ANIONGAP  --  3  --   --  2*  --  <1*   * 115* 155*   < > 191*   < > 227*   BUN  --  40*  --   --  31*  --  33*   CR  --  1.64*  --   --  1.58*  --  1.57*   GFRESTIMATED  --  41*  --   --  43*  --  44*   ARLETH  --  8.9  --   --  9.0  --  9.2    < > = values in this interval not displayed.     Recent Labs   Lab 10/01/21  1157 10/01/21  0542 10/01/21  0152 09/30/21  2056 09/30/21  1709 09/30/21  1227   * 115* 155* 247* 292* 287*       IMPRESSION  Jimmy Otto is a 71 year old male with a PMH including but not limited to HTN, uncontrolled DM 2 (HbA1c 11.5), HLD, BPH, and history of a stroke in 2012 with residual right hemiparesis who presented to the emergency room on 9/14/2021 with altered mental status and acute worsening of right-sided weakness, and found to have an acute right thalamic stroke due to small vessel occlusion.  Hospital course complicated by acute kidney injury, hypokalemia needing repletion, and " urinary retention and hematuria needing Felix catheter and urology consult.         Impairment group code: Stroke Ischemic 01.3 Bilateral Involvement, new R Thalamic stroke in setting of R sided weakness from prior stroke        PLAN  Rehabilitation  -Continue with inpatient rehabilitation program including PT, OT, and SLP for 3 hrs/day, rehab nursing, social work, nutrition, and close monitoring by physiatry for an estimated total of 3 weeks.  -The patient has impairments in strength, balance, endurance, and coordination, which are leading to activity limitations in ambulation, mobility, ADLs, and swallowing.      Medical  1)Neurology  #R thalamic CVA due to small vessel occlusion  - DAPT with ASA 81 mg and Plavix 75 mg for 1 month, followed by Plavix alone indefinitely.  Recommended check P2Y12 level to ensure Plavix efficacy after 1 month of being on Plavix only.  - Hypertension, lipid, and glucose management as below.  Will need ongoing education regarding stroke risk factor modifications including importance of medication compliance and diet and lifestyle changes  - Follow-up with [facility] general neurology in 6 to 8 weeks  - Monitor somnolence/lethargy.  Decreased level of alertness not unexpected with essentially bilateral thalamic pathology (new right thalamic stroke and a history of left thalamic stroke).    - Amantadine 100 mg daily started on 9/20 for neurostimulation given ongoing somnolence which is limiting participation.  Increased Amantadine to 100 mg BID (7 am and noon) on 9/25.  Decreased to 100 mg daily on 9/29 due to impaired renal function.  - High risk for delirium, ensure delirium precautions and appropriate sleep-wake cycle.  PRN melatonin available.        2)CVS  #HTN  - PTA on amlodipine 10 mg daily, atenolol 50 mg twice daily, Cozaar 100 mg daily, spironolactone 25 mg daily, and hydrochlorothiazide 25 mg daily.  Cozaar, spironolactone, and hydrochlorothiazide are being held due to acute kidney  injury  - Continue amlodipine 10 mg daily, and atenolol at a lower dose of 25 mg twice daily for now.  Titrate doses as needed.  - Hydralazine as needed for SBP greater than 180 mmHg     #HLD  - Continue Lipitor 80 mg daily.  LDL 92 on 9/15        3)Pulmonary  - No acute issues but low lung volumes and atelectasis/infiltrate seen at the left lung base on chest x-ray 9/14.  Clinically no signs of pneumonia but will continue to monitor.  - Encourage incentive spirometry        4)FENGI  #Diet: Level 4 solids and was upgraded to level 0 thin liquids, however downgraded again to level 2 liquids due to concern for aspiration.  Upgrade further as able per speech therapy  - Aspiration precautions  - Consult nutrition for education and optimization given stroke, modified diet, and uncontrolled diabetes  - VFSS completed 9/24     #Bowels: As needed Senokot-S, MiraLAX, and Dulcolax suppository     #Hypokalemia  - Ongoing.  Started scheduled replacement 20 mEq daily.  Potassium 3.3 on 10/1.  Replete with an additional 30 mEq and re-check tomorrow.     #GI prophylaxis  -Protonix 40 mg daily given DAPT and age     #Oral Thrush  Noted on exam 9/27, started on Nystatin swish and swallow x7 days.  - Improving.  Continue Nystatin swish and swallow x7 days.  Will need to ensure patient is awake enough to adhere to aspiration precautions.     5)  #ALEX  - Baseline creatinine ~1.1, with a peak of 2.13 upon admission  - Had improved, then Cr increasing at 1.63 on 9/20,  likely pre-renal given lethargy and poor po intake.  Improved to 1.43 on 9/21 following IV fluids (9/19-9/20).  Continue to monitor, encourage fluids.   This morning Cr 1.64 with BUN 40, both increased from yesterday.  Give 1L of saline today and re-check in the morning.  Will also monitor sodium and Chloride levels closely, both slightly elevated today.  - Continue holding Cozaar, hydrochlorothiazide, and spironolactone.        #Urinary retention and hematuria  - Trial  of void attempted, but continued to retain urine and Felix was re-placed on 9/26. Will plan to keep in until follow up with urology.  - Urology consulted and recommend CT urogram once creatinine normalizes and outpatient follow-up for cystoscopy to complete hematuria work-up.  - Recurrent hematuria noted, continue to monitor hgb levels.  Thus far have been stable.   - Flomax 0.4 mg daily started, will continue        6)DVT prophylaxis  - Mechanical.  Will hold from chemoprophylaxis given hematuria and already on dual anti-platelet therapy        7)Pain  - Tylenol PRN        8)Endo  #Uncontrolled DM II (HbA1c 11.5)  - PTA was on Levemir 20 units nightly, NovoLog with meals with carb coverage, and Metformin 500 mg twice daily.  Unclear how compliant he was with his medications.  Endocrine assistance appreciated for help with management.  Recs 9/30:   -  increase Levemir to 22 units at 2000    -  Novolog 1:7g CHO all meals and snacks     -  Novolog medium resistance from high resistance sliding scale insulin TID AC and HS    -  continue Victoza to 1.8 mg    -  BG monitoring TID AC, HS and 0200    -  Increase Metformin to 500 mg PO BID, from once daily, monitor kidney function closely    -  Hypoglycemia protocol    -  Carb counting protocol    -  Will follow         9)Heme  - Hgb 12.7 on 9/27.  Overall stable.    - Continue to monitor, especially in the setting of hematuria.  Re-check in the morning.        10)Psych  - Monitor mood, will consult health psychology if needed  - Melatonin PRN for sleep      11)MSK  ~1.5 cm soft, non-tender, mobile nodule in left distal upper arm.  No overlying erythema, warmth, skin changes.  Seems most consistent with lipoma vs cyst.  - Monitor, but no further work-up/treatment indicated at this time           12)Social/Dispo  - Goals for discharge home at a supervision level for ambulation, mobility, and ADLs.  - ELOS: Tentative discharge date 10/8/21  - Rehab prognosis: Fair  - Follow  up appointments: PCP, [facility] General neurology clinic 6-8 weeks, urology for hematuria     Code status: Full Code (unable to discuss with patient given somnolence upon admission.  Have continued prior CODE STATUS and discussed with the son who confirms that his prior wishes were to be full code)      Juan Miguel Campo MD  Department of Rehabilitation Medicine    Time Spent on this Encounter   I, Juan Miguel Campo, spent a total of 35 minutes face-to-face or managing the care of Jimmy Otto. Over 50% of my time on the unit was spent counseling the patient and coordinating care. See note for details.

## 2021-10-01 NOTE — PLAN OF CARE
"PT, OT, and SLP spoke with Daughter Jose regarding discharge plans. Family preferring TCU placement so patient can be \"closer to baseline\" prior to going home. She acknowledges this may not be possible, and that family could support needs at home. SW notified of TCU referral request.    Family training was started today with daughter. Other family coming this weekend for education on pt's current status and lift equipment. Family is most anxious about catheter management at home.     If patient goes home from ARU, anticipate need for mechanical lift, hospital bed, and wheelchair. Hopeful to progress to María Stedy or similar device, and wean from TIS wheelchair.  "

## 2021-10-01 NOTE — PROGRESS NOTES
PT notified SW that pt's daughter was present and requesting information about discharge to TCU vs home with home care.     SW provided general overview of capabilities of TCUs, insurance coverage, and referral process. SW provided information on three 5-star rated SNFs in the [place] area from medicare.gov for her to review. SW also reviewed that staff shortages and high volume of patients throughout the system have made placement at a pt's preferred TCUs a challenge and that a search for an accepting facility may have to expand. Dtr stated she would share this information with her siblings and discuss.    SW also answered dtr's questions about home care services and informed her that floor SW has already made a referral. I provided my direct phone number in case dtr wants me to send out referrals this afternoon.     SW will continue to remain available for patient and family support, discharge planning, and access to resources.    Finn BLOUNT, [facility]  TCU   Phone: [phone]

## 2021-10-01 NOTE — PLAN OF CARE
FOCUS/GOAL  Bowel management, Bladder management and Cognition/Memory/Judgment/Problem solving    ASSESSMENT, INTERVENTIONS AND CONTINUING PLAN FOR GOAL:  Pt is alert, unable to assess orienation due to aphasia but able to answer yes or no questions with fairly high certainty, denied pain, sob, or new numbness and tingling  ordoñez secured and irrigated ,hematuria present with output which then faded to christine toward end of shift, BG of 155 at 2 am, oral cares performed, mildly thick fluids provided, sleeping off and on during the night but pt appears restless, splint on RUE, no further care concerns at this time continue with POC.

## 2021-10-02 ENCOUNTER — APPOINTMENT (OUTPATIENT)
Dept: PHYSICAL THERAPY | Facility: CLINIC | Age: 71
DRG: 057 | End: 2021-10-02
Attending: PHYSICAL MEDICINE & REHABILITATION
Payer: MEDICARE

## 2021-10-02 ENCOUNTER — APPOINTMENT (OUTPATIENT)
Dept: OCCUPATIONAL THERAPY | Facility: CLINIC | Age: 71
DRG: 057 | End: 2021-10-02
Attending: PHYSICAL MEDICINE & REHABILITATION
Payer: MEDICARE

## 2021-10-02 ENCOUNTER — APPOINTMENT (OUTPATIENT)
Dept: SPEECH THERAPY | Facility: CLINIC | Age: 71
DRG: 057 | End: 2021-10-02
Attending: PHYSICAL MEDICINE & REHABILITATION
Payer: MEDICARE

## 2021-10-02 LAB
ANION GAP SERPL CALCULATED.3IONS-SCNC: 2 MMOL/L (ref 3–14)
BUN SERPL-MCNC: 29 MG/DL (ref 7–30)
CALCIUM SERPL-MCNC: 8.6 MG/DL (ref 8.5–10.1)
CHLORIDE BLD-SCNC: 112 MMOL/L (ref 94–109)
CO2 SERPL-SCNC: 30 MMOL/L (ref 20–32)
CREAT SERPL-MCNC: 1.47 MG/DL (ref 0.66–1.25)
GFR SERPL CREATININE-BSD FRML MDRD: 47 ML/MIN/1.73M2
GLUCOSE BLD-MCNC: 117 MG/DL (ref 70–99)
GLUCOSE BLDC GLUCOMTR-MCNC: 103 MG/DL (ref 70–99)
GLUCOSE BLDC GLUCOMTR-MCNC: 108 MG/DL (ref 70–99)
GLUCOSE BLDC GLUCOMTR-MCNC: 135 MG/DL (ref 70–99)
GLUCOSE BLDC GLUCOMTR-MCNC: 194 MG/DL (ref 70–99)
GLUCOSE BLDC GLUCOMTR-MCNC: 200 MG/DL (ref 70–99)
HGB BLD-MCNC: 11.5 G/DL (ref 13.3–17.7)
POTASSIUM BLD-SCNC: 3.6 MMOL/L (ref 3.4–5.3)
SODIUM SERPL-SCNC: 144 MMOL/L (ref 133–144)

## 2021-10-02 PROCEDURE — 250N000013 HC RX MED GY IP 250 OP 250 PS 637: Performed by: PHYSICIAN ASSISTANT

## 2021-10-02 PROCEDURE — 92526 ORAL FUNCTION THERAPY: CPT | Mod: GN | Performed by: SPEECH-LANGUAGE PATHOLOGIST

## 2021-10-02 PROCEDURE — 250N000013 HC RX MED GY IP 250 OP 250 PS 637: Performed by: NURSE PRACTITIONER

## 2021-10-02 PROCEDURE — 97535 SELF CARE MNGMENT TRAINING: CPT | Mod: GO

## 2021-10-02 PROCEDURE — 250N000013 HC RX MED GY IP 250 OP 250 PS 637: Performed by: PHYSICAL MEDICINE & REHABILITATION

## 2021-10-02 PROCEDURE — 99231 SBSQ HOSP IP/OBS SF/LOW 25: CPT | Performed by: PHYSICAL MEDICINE & REHABILITATION

## 2021-10-02 PROCEDURE — 99233 SBSQ HOSP IP/OBS HIGH 50: CPT | Performed by: NURSE PRACTITIONER

## 2021-10-02 PROCEDURE — 97530 THERAPEUTIC ACTIVITIES: CPT | Mod: GP

## 2021-10-02 PROCEDURE — 36415 COLL VENOUS BLD VENIPUNCTURE: CPT | Performed by: PHYSICIAN ASSISTANT

## 2021-10-02 PROCEDURE — 80048 BASIC METABOLIC PNL TOTAL CA: CPT | Performed by: PHYSICIAN ASSISTANT

## 2021-10-02 PROCEDURE — 128N000003 HC R&B REHAB

## 2021-10-02 PROCEDURE — 85018 HEMOGLOBIN: CPT | Performed by: PHYSICIAN ASSISTANT

## 2021-10-02 RX ORDER — LANOLIN ALCOHOL/MO/W.PET/CERES
3 CREAM (GRAM) TOPICAL AT BEDTIME
Status: DISCONTINUED | OUTPATIENT
Start: 2021-10-02 | End: 2021-10-03

## 2021-10-02 RX ADMIN — POTASSIUM CHLORIDE 20 MEQ: 1.5 FOR SOLUTION ORAL at 07:45

## 2021-10-02 RX ADMIN — NYSTATIN 500000 UNITS: 100000 SUSPENSION ORAL at 12:41

## 2021-10-02 RX ADMIN — ASPIRIN 81 MG CHEWABLE TABLET 81 MG: 81 TABLET CHEWABLE at 07:44

## 2021-10-02 RX ADMIN — AMLODIPINE BESYLATE 10 MG: 10 TABLET ORAL at 07:45

## 2021-10-02 RX ADMIN — METFORMIN HYDROCHLORIDE 500 MG: 500 TABLET, FILM COATED ORAL at 17:31

## 2021-10-02 RX ADMIN — CLOPIDOGREL BISULFATE 75 MG: 75 TABLET, FILM COATED ORAL at 07:45

## 2021-10-02 RX ADMIN — ATENOLOL 25 MG: 25 TABLET ORAL at 07:48

## 2021-10-02 RX ADMIN — NYSTATIN 500000 UNITS: 100000 SUSPENSION ORAL at 07:45

## 2021-10-02 RX ADMIN — NYSTATIN 500000 UNITS: 100000 SUSPENSION ORAL at 16:07

## 2021-10-02 RX ADMIN — ATENOLOL 25 MG: 25 TABLET ORAL at 20:33

## 2021-10-02 RX ADMIN — MELATONIN TAB 3 MG 3 MG: 3 TAB at 01:07

## 2021-10-02 RX ADMIN — LIRAGLUTIDE 1.8 MG: 6 INJECTION SUBCUTANEOUS at 07:45

## 2021-10-02 RX ADMIN — NYSTATIN 500000 UNITS: 100000 SUSPENSION ORAL at 20:33

## 2021-10-02 RX ADMIN — SENNOSIDES AND DOCUSATE SODIUM 1 TABLET: 8.6; 5 TABLET ORAL at 20:33

## 2021-10-02 RX ADMIN — ACETAMINOPHEN 975 MG: 325 TABLET, FILM COATED ORAL at 20:32

## 2021-10-02 RX ADMIN — ATORVASTATIN CALCIUM 80 MG: 80 TABLET, FILM COATED ORAL at 07:45

## 2021-10-02 RX ADMIN — INSULIN ASPART 2 UNITS: 100 INJECTION, SOLUTION INTRAVENOUS; SUBCUTANEOUS at 17:37

## 2021-10-02 RX ADMIN — METFORMIN HYDROCHLORIDE 500 MG: 500 TABLET, FILM COATED ORAL at 07:45

## 2021-10-02 RX ADMIN — AMANTADINE HYDROCHLORIDE 100 MG: 100 CAPSULE ORAL at 07:45

## 2021-10-02 RX ADMIN — Medication 40 MG: at 05:34

## 2021-10-02 RX ADMIN — MELATONIN TAB 3 MG 3 MG: 3 TAB at 20:33

## 2021-10-02 RX ADMIN — DOXAZOSIN 1 MG: 1 TABLET ORAL at 07:45

## 2021-10-02 NOTE — PLAN OF CARE
FOCUS/GOAL  Bowel management, Bladder management, Nutrition/Feeding/Swallowing precautions, Pain management, Mobility, and Cognition/Memory/Judgment/Problem solving    ASSESSMENT, INTERVENTIONS AND CONTINUING PLAN FOR GOAL:    Alert, follows commands. Ax2 Liko, LBM 9/30, ordoñez draining dark tea/red colored urine. Bladder irrigated per orders. Good appetite tonight, ate 100% of meal. Denied pain, also absent of non verbal signs of pain. BG 89 & 154. Continue with POC

## 2021-10-02 NOTE — PLAN OF CARE
Discharge Planner Post-Acute Rehab OT:      Discharge Plan: home with 24/7 assistance, lift, hospital bed and WC     Precautions: falls, hx of right sided weakness, variable alertness      Current Status:  ADLs: Total A for shower transfer using TIS commode chair, Max A don pants, Mod A socks, Max A for UB dressing tasks, Total A for toilet tx/transfers using michell lift and commode chair, Min A for thoroughness with g/h tasks.  IADLs: Family support  Vision/Cognition: Wears glasses all the time. Lethargic, follows directions when alert.      Assessment:    Family training completed for when pt more somnolent and needing increased caregiver assist, covering dressing and brief changes/hygeine. Also educated pts dtr and wife re: gentle RUE PROM, with good return demonstration and pt tolerating well.      Other Barriers to Discharge (DME, Family Training, etc):   Family training scheduled 10/2 1:15pm, per daughter the pt's wife can be home for the pt but she is not necessarily going to be able to assist physically.   AE/DME WC, lift, hospital bed

## 2021-10-02 NOTE — PLAN OF CARE
FOCUS/GOAL  Bowel management, Bladder management, Medication management, Pain management, Medical management, Mobility, Skin integrity, and Safety management     ASSESSMENT, INTERVENTIONS AND CONTINUING PLAN FOR GOAL:     Pt is alert, ZAYDA orentation. Was continent of bowel this shift, ordoñez cathetar in place, urine is blood-tinged, bladder irrigated per POC. Ax2 liko lift transfers. Pureed diet, mildly thick liquids, requires 1:1 feeding assistance. Denied pain, nausea and vomiting, numbness or tingling, SOB. Alarms on, call light within reach. Will continue with plan of care.

## 2021-10-02 NOTE — PROGRESS NOTES
[facility]   Acute Rehabilitation Unit  Daily progress note    INTERVAL HISTORY  Jimmy Otto was seen and examined at bedside this morning.  Nursing team reported that he was awake all night and pushed her call light several times throughout the night.  Received as needed melatonin at 1 AM with no effect.  He seemed to be comfortable in his bed; aphasic and not able to communicate.  He follows some commands but not consistently.      MEDICATIONS  Scheduled:    amantadine  100 mg Oral Daily     amLODIPine  10 mg Oral Daily     aspirin  81 mg Oral Daily     atenolol  25 mg Oral BID     atorvastatin  80 mg Oral Daily     clopidogrel  75 mg Oral Daily     doxazosin  1 mg Oral Daily     insulin aspart   Subcutaneous TID w/meals     insulin aspart  1-7 Units Subcutaneous TID AC     insulin aspart  1-5 Units Subcutaneous At Bedtime     insulin detemir  16 Units Subcutaneous Q24H     liraglutide  1.8 mg Subcutaneous Daily     melatonin  3 mg Oral At Bedtime     metFORMIN  500 mg Oral BID w/meals     nystatin  500,000 Units Swish & Swallow 4x Daily     pantoprazole  40 mg Oral QAM AC     potassium chloride  20 mEq Oral Daily     senna-docusate  1 tablet Oral At Bedtime     sodium chloride (PF)  3 mL Intracatheter Q8H        PRN:  acetaminophen, bisacodyl, glucose **OR** dextrose **OR** glucagon, diclofenac, hydrALAZINE, insulin aspart, lidocaine, ondansetron, polyethylene glycol, senna-docusate       PHYSICAL EXAM  Patient Vitals for the past 24 hrs:   BP Temp Temp src Pulse Resp SpO2   10/02/21 0736 (!) 150/79 97.2  F (36.2  C) Oral 94 16 98 %   10/01/21 2052 131/75 -- -- 90 -- --   10/01/21 1545 126/73 97.3  F (36.3  C) Oral 85 16 96 %       GEN: NAD, resting in bed   HEENT: His tongue seemed clear from thrush  PULM: clear breath sounds b/l   ABD: Soft and nontender  EXT: No edema in bilateral lower extremities and did not seem to have any tenderness at calves  Neuro: Aphasic, follows  some commands.  Right hemiparesis with clear emerging tone at right upper extremity and no volitional tone.  He had some good resistance at his right lower extremity with close kinetic chain and some synergic movement of her muscles.    Dark christine color urine in the Felix bag.      EDGAR Otto is a 71 year old male with a PMH including but not limited to HTN, uncontrolled DM 2 (HbA1c 11.5), HLD, BPH, and history of a stroke in 2012 with residual right hemiparesis who presented to the emergency room on 9/14/2021 with altered mental status and acute worsening of right-sided weakness, and found to have an acute right thalamic stroke due to small vessel occlusion.  Hospital course complicated by acute kidney injury, hypokalemia needing repletion, and urinary retention and hematuria needing Felix catheter and urology consult.         Impairment group code: Stroke Ischemic 01.3 Bilateral Involvement, new R Thalamic stroke in setting of R sided weakness from prior stroke        --Vitals stable. BP was slightly elevated today.   --Labs: C improved. Hgb 11.5.     --Continue ongoing medical management.   -Cr. Improved with IV fluid.  Has repeat labs ordered for Monday.  Potassium within normal limits with a supplement that he received yesterday.   -Hemoglobin is slightly lower than previous checks but this might be delusional given the IV fluid that he received.  Will monitor clinically and repeat labs on Monday.   -Melatonin was just added yesterday to help with his sleep-wake cycle.  Scheduled melatonin at 9 AM and will monitor tonight; may consider to switch to a different agent if needed.    --Continue therapies and plan of care.  Therapy team has communicated with his family about plans for discharge to home versus TCU.  They have also discussed all the equipment needs including mechanical lift, hospital bed and wheelchair.  Working on transfers with María giles.      Janel Benson MD  Physical Medicine &  Rehabilitation    Time Spent on this Encounter   I spent a total of 15 minutes face to face and coordinating care of Jimmy Otto.  Over 50% of my time on the unit was spent counseling the patient and /or coordinating care; see note for details.

## 2021-10-02 NOTE — PLAN OF CARE
Discharge Planner Post-Acute Rehab PT:      Discharge Plan: Attempting TCU placement, otherwise family will take home with lift and w/c, dependent for cares.     Precautions: Fall, alarms     Current Status:  Bed Mobility: mod A if alert  Transfer: variable based on alertness, lift dependent  Gait: variable based on alertness, w/c dependent   Stairs: Not safe  Balance: 1/36 PASS - although not truly reflective of capability due to drastic performance fluctuations with command following and lethargy.     Assessment:Alert in AM session - somnolent in PM session. Family present for training on dependent rolling and lift transfers. Discussed w/c options with family. Daughter present and engaged well with training, pt's wife needing encouragement.       Other Barriers to Discharge (DME, Family Training, etc): Safe discharge plan - will need 24/7 cares, level of assist/equipment pending lethargy/participation

## 2021-10-02 NOTE — PLAN OF CARE
FOCUS/GOAL  Bladder management, Pain management, Mobility, Cognition/Memory/Judgment/Problem solving, and Safety management    ASSESSMENT, INTERVENTIONS AND CONTINUING PLAN FOR GOAL:  Pt is alert, unable to assess orientation d/t aphasia. Felix catheter is patent, draining cherry red urine. Felix irrigated per orders. No s/s of pain or discomfort overnight. Pt turns and repositions with assist of 1. Pt received Melatonin at 0100 with no affect. Pt awake throughout the night. Call light within reach. Bed alarm on for safety. Will continue with POC.

## 2021-10-03 ENCOUNTER — APPOINTMENT (OUTPATIENT)
Dept: OCCUPATIONAL THERAPY | Facility: CLINIC | Age: 71
DRG: 057 | End: 2021-10-03
Attending: PHYSICAL MEDICINE & REHABILITATION
Payer: MEDICARE

## 2021-10-03 ENCOUNTER — APPOINTMENT (OUTPATIENT)
Dept: PHYSICAL THERAPY | Facility: CLINIC | Age: 71
DRG: 057 | End: 2021-10-03
Attending: PHYSICAL MEDICINE & REHABILITATION
Payer: MEDICARE

## 2021-10-03 ENCOUNTER — APPOINTMENT (OUTPATIENT)
Dept: SPEECH THERAPY | Facility: CLINIC | Age: 71
DRG: 057 | End: 2021-10-03
Attending: PHYSICAL MEDICINE & REHABILITATION
Payer: MEDICARE

## 2021-10-03 LAB
GLUCOSE BLDC GLUCOMTR-MCNC: 105 MG/DL (ref 70–99)
GLUCOSE BLDC GLUCOMTR-MCNC: 205 MG/DL (ref 70–99)
GLUCOSE BLDC GLUCOMTR-MCNC: 264 MG/DL (ref 70–99)
GLUCOSE BLDC GLUCOMTR-MCNC: 79 MG/DL (ref 70–99)

## 2021-10-03 PROCEDURE — 128N000003 HC R&B REHAB

## 2021-10-03 PROCEDURE — 92507 TX SP LANG VOICE COMM INDIV: CPT | Mod: GN | Performed by: SPEECH-LANGUAGE PATHOLOGIST

## 2021-10-03 PROCEDURE — 250N000013 HC RX MED GY IP 250 OP 250 PS 637: Performed by: PHYSICAL MEDICINE & REHABILITATION

## 2021-10-03 PROCEDURE — 92526 ORAL FUNCTION THERAPY: CPT | Mod: GN | Performed by: SPEECH-LANGUAGE PATHOLOGIST

## 2021-10-03 PROCEDURE — 97530 THERAPEUTIC ACTIVITIES: CPT | Mod: GP

## 2021-10-03 PROCEDURE — 250N000013 HC RX MED GY IP 250 OP 250 PS 637: Performed by: NURSE PRACTITIONER

## 2021-10-03 PROCEDURE — 99232 SBSQ HOSP IP/OBS MODERATE 35: CPT | Performed by: NURSE PRACTITIONER

## 2021-10-03 PROCEDURE — 99232 SBSQ HOSP IP/OBS MODERATE 35: CPT | Performed by: PHYSICAL MEDICINE & REHABILITATION

## 2021-10-03 PROCEDURE — 250N000013 HC RX MED GY IP 250 OP 250 PS 637: Performed by: PHYSICIAN ASSISTANT

## 2021-10-03 PROCEDURE — 97535 SELF CARE MNGMENT TRAINING: CPT | Mod: GO

## 2021-10-03 RX ORDER — RAMELTEON 8 MG/1
8 TABLET ORAL AT BEDTIME
Status: DISCONTINUED | OUTPATIENT
Start: 2021-10-03 | End: 2021-10-06 | Stop reason: HOSPADM

## 2021-10-03 RX ADMIN — NYSTATIN 500000 UNITS: 100000 SUSPENSION ORAL at 21:07

## 2021-10-03 RX ADMIN — DOXAZOSIN 1 MG: 1 TABLET ORAL at 09:29

## 2021-10-03 RX ADMIN — INSULIN ASPART 3 UNITS: 100 INJECTION, SOLUTION INTRAVENOUS; SUBCUTANEOUS at 13:05

## 2021-10-03 RX ADMIN — RAMELTEON 8 MG: 8 TABLET ORAL at 21:07

## 2021-10-03 RX ADMIN — ATENOLOL 25 MG: 25 TABLET ORAL at 09:29

## 2021-10-03 RX ADMIN — AMLODIPINE BESYLATE 10 MG: 10 TABLET ORAL at 09:28

## 2021-10-03 RX ADMIN — ACETAMINOPHEN 650 MG: 325 TABLET, FILM COATED ORAL at 21:07

## 2021-10-03 RX ADMIN — ATORVASTATIN CALCIUM 80 MG: 80 TABLET, FILM COATED ORAL at 09:29

## 2021-10-03 RX ADMIN — AMANTADINE HYDROCHLORIDE 100 MG: 100 CAPSULE ORAL at 09:28

## 2021-10-03 RX ADMIN — METFORMIN HYDROCHLORIDE 500 MG: 500 TABLET, FILM COATED ORAL at 17:52

## 2021-10-03 RX ADMIN — CLOPIDOGREL BISULFATE 75 MG: 75 TABLET, FILM COATED ORAL at 09:31

## 2021-10-03 RX ADMIN — LIRAGLUTIDE 1.8 MG: 6 INJECTION SUBCUTANEOUS at 09:30

## 2021-10-03 RX ADMIN — METFORMIN HYDROCHLORIDE 500 MG: 500 TABLET, FILM COATED ORAL at 09:28

## 2021-10-03 RX ADMIN — ATENOLOL 25 MG: 25 TABLET ORAL at 21:07

## 2021-10-03 RX ADMIN — ASPIRIN 81 MG CHEWABLE TABLET 81 MG: 81 TABLET CHEWABLE at 09:29

## 2021-10-03 RX ADMIN — NYSTATIN 500000 UNITS: 100000 SUSPENSION ORAL at 09:28

## 2021-10-03 RX ADMIN — POTASSIUM CHLORIDE 20 MEQ: 1.5 FOR SOLUTION ORAL at 09:29

## 2021-10-03 RX ADMIN — SENNOSIDES AND DOCUSATE SODIUM 1 TABLET: 8.6; 5 TABLET ORAL at 21:07

## 2021-10-03 RX ADMIN — NYSTATIN 500000 UNITS: 100000 SUSPENSION ORAL at 13:05

## 2021-10-03 NOTE — PLAN OF CARE
FOCUS/GOAL  Bladder management, Pain management, Mobility, Cognition/Memory/Judgment/Problem solving, and Safety management    ASSESSMENT, INTERVENTIONS AND CONTINUING PLAN FOR GOAL:  Pt is alert, unable to assess orientation d/t aphasia. Eflix catheter is patent, draining yellow urine this shift. Pt picking at and partially removed securing device this shift. Writer replaced securing device to the right upper thigh. No s/s of pain or discomfort this shift. Pt turned and repositioned with assist of 1. Pt did not sleep well this shift. Pt was found frequently rubbing left forearm against the right resting hand splint. PIV was found to have been removed at 0345. Call light within reach. Bed alarm on for safety. Will continue with POC.     0600: urine noted to be cloudy and odorous.   Pt too sleepy to take Protonix at this time.

## 2021-10-03 NOTE — PROGRESS NOTES
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  Hospital course complicated by acute kidney injury, hypokalemia needing repletion, and urinary retention and hematuria needing Felix catheter and urology consult.     Admission to inpatient rehab:  9/17/21  Impairment group code: Stroke Ischemic 01.3 Bilateral Involvement, new R Thalamic stroke in setting of R sided weakness from prior stroke    Home/discharge set for 10/8 as of today      Assessment:     1)  Type 2 Diabetes Mellitus; poor control.  A1c 11.5%  2)  S/p stroke; R hemiparesis  3)  ALEX/CKD     Plan:       -  decrease Levemir to 16 units at 2000    -  Novolog 1:10g CHO all meals and snacks     -  Novolog medium resistance from high resistance sliding scale insulin TID AC and HS    -  continue Victoza to 1.8 mg    -  BG monitoring TID AC, HS and 0200    -  continue Metformin to 500 mg PO BID, from once daily, monitor kidney function closely    -  Hypoglycemia protocol    -  Carb counting protocol    -  Will follow      Outpatient diabetes follow up: TBD     Plan discussed with patient, rigo/wife and bedside RN, and primary team via this note.             Interval History:     The last 24 hours progress and nursing notes reviewed.  No acute events this interval.  Working with family and therapy for transfers in planning for discharge    Jimmy is tired following therapies - minimally interactive today  Outlier BG at noon of 264 - call to RN who confirms he ate a snack of ice cream before lunch so this is a post-prandial read.      Today  "different family members here for education and working with therapies.  No new questions or concerns.       Carry over:  Met with Clemente and wife who were here for education with Rehab - reviewed aspects of care in detail - discharge date is still set for 10/8 and it is not clear today if that will be home or TCU  Did review what a sliding scale insulin regimen would look like by having Clemente review scales with writer - felt with the specifics the scale provided, it was clear and simple.      Reassured IDS will plan for a clear/concise and simplified plan for home.  All questions and concerns addressed.       Recent Labs   Lab 10/03/21  0219 10/02/21  2126 10/02/21  1702 10/02/21  1157 10/02/21  0732 10/02/21  0549   * 200* 194* 135* 108* 117*           Nutrition:     Orders Placed This Encounter      Combination Diet Consistent Carb 60 Grams CHO per Meal Diet; Pureed Diet (level 4); Slightly Thick (level 1)    Supplements: Vanilla or strawberry Ensure Enlive once daily at breakfast meal up to BID PRN (can send at dinner as well if requested by nursing or family) - SLP preformed flow test and approved these flavors to be given to pt    SF Gelatein - Please send orange flavor at 2PM        PTA Regimen:        Levemir 28 units at bedtime  \"Novolog - varied doses based on chos\"  Clinic notes reveal:  Novolog 2 unit(s) per carb choice (2per 15)  Novolog 1 unit(s) per 50> 150     Metformin 500 mg BID (dose reduced for decreased renal fxn)     BG monitoring frequency:  FreeStyle Sherwin - intermittent use     Diet: regular, no restrictions  Eats sporadically - AM - bagel or banana  Lunch - sporadic  Dinner - rice/beg/meat  Loves to drink [employer]     Exercise: sedentary          Review of Systems:   CC:  Denies - no GI distress    Constitutional:   No fever/chills  ENT/Mouth:   No hearing changes, no ear pain, no sore throat, no rhinorrhea, no difficulty swallowing  Eyes:  No eye pain, no discharge, no vision " "changes  CV:   No CP/SOB, no new edema  Resp:  No cough, no wheezing  GI:   No N/V, no constipation, no diarrhea  :  No dysuria, frequency, or hematuria - cath   Musk:  No new joint swelling/pain, no back pain  Skin/heme:   No new rashes/bruises/open areas.  No pruritis  Neuro:   No new weakness, no numbness/tingling, no headache  Psych:   No new anxiety, denies epression  Endocrine:  No polyuria or polydipsia         Medications:   Steroid planning:  no       Physical Exam:   /67 (BP Location: Left arm)   Pulse 97   Temp 97  F (36.1  C) (Oral)   Resp 16   Ht 1.626 m (5' 4\")   Wt 66.5 kg (146 lb 11.2 oz)   SpO2 96%   BMI 25.18 kg/m      General:   NAD, pleasant,  resting comfortably in bed. Fatigued appearing - family in room - supportive.  HEENT:  NC/AT. MMM, sclera not injected  Lungs:  unremarkable, no new cough, no SOB  ABD:   soft, rounded  Skin:  warm and dry, no obvious lesions/rash  Feet:   CMS intact, PPP  MSK:   moving all extremities  Lymp:   no LE edema  Mental Status:  fatigued - open eyes when spoken to   Psych:   Cooperative         Data:     Lab Results   Component Value Date    A1C 11.5 09/15/2021    A1C 7.6 08/14/2012          CBC RESULTS: Recent Labs   Lab Test 10/02/21  0549 09/27/21  0602 09/27/21  0602   WBC  --   --  7.8   RBC  --   --  4.69   HGB 11.5*   < > 12.7*   HCT  --   --  39.6*   MCV  --   --  84   MCH  --   --  27.1   MCHC  --   --  32.1   RDW  --   --  13.1   PLT  --   --  278    < > = values in this interval not displayed.     Recent Labs   Lab Test 10/03/21  0219 10/02/21  2126 10/02/21  0732 10/02/21  0549 10/01/21  1157 10/01/21  0542   NA  --   --   --  144  --  145*   POTASSIUM  --   --   --  3.6  --  3.3*   CHLORIDE  --   --   --  112*  --  112*   CO2  --   --   --  30  --  30   ANIONGAP  --   --   --  2*  --  3   * 200*   < > 117*   < > 115*   BUN  --   --   --  29  --  40*   CR  --   --   --  1.47*  --  1.64*   ARLETH  --   --   --  8.6  --  8.9    < > = " values in this interval not displayed.       Liver Function Studies -   Recent Labs   Lab Test 09/15/21  0521   PROTTOTAL 6.6*   ALBUMIN 2.6*   BILITOTAL 0.3   ALKPHOS 80   AST 20   ALT 22     Lab Results   Component Value Date    INR 1.03 09/16/2021    INR 0.94 09/14/2021    INR 1.00 08/16/2012    INR 0.96 08/13/2012       FEMI Kohli CNP   Inpatient Diabetes Management Service  Pager - [phone]  Friday - Monday 0800 - 1600 hrs  After hours above - Diabetes Management Team job code: [id number]    I spent a total of 25 minutes bedside and on the inpatient unit managing glycemic care.  Over 50% of my time on the unit was spent counseling the patient and/or coordinating care regarding acute hyperglycemic management.  See note for details.

## 2021-10-03 NOTE — PROGRESS NOTES
"SW spoke to PT today about pt's discharge plan. Per PT, team had a discussion w/ pt and daughter, Clemente, during rounds on Friday and recommend pt discharge to a TCU, but also informed daughter that they should prepare for pt to discharge home w/ family and HC on 10/8 in case pt does not get accepted at a TCU by then. Per PT, Clemente was in agreement w/ this plan. Therapy continues to do family training in anticipation that pt goes home.     PALAK called Clemente to discuss. Clemente confirmed this conversation w/ the team and is in agreement w/ it. SW provided education about TCU. Clemente shared that she reviewed a list of TCUs given to her by PALAK Briseno on Friday. PALAK pulled up the list on the computer from the Medicare.gov website and reviewed facilities w/ Clemente. Clemente shared that she is OK w/ PALAK sending referrals to \"any facility in/around [place] and seeing what comes back as accepting pt.\" Clemente gave SW permission to send referrals to TCUs from the Medicare.gov website. PALAK sent referrals. PALAK informed Clemente that PALAK will update her on status of referrals. Clemente was in agreement and did not have any immediate questions/needs for SW.    PENDING FACILITIES   [facility]  [address], [facility] [address]  Phone: [phone]  Fax: [phone]    [facility]   [address] [facility] [address]  Phone: [phone]  Fax: [phone]    Vipul Mcl[facility]  [address] [facility] [address]  Phone: [phone]  Fax: [phone]    [facility]  [address], [facility] [place] [facility] [address]  Phone: [phone]  Fax: [phone]    Jose Menjivar  [address] [address]  Phone: [phone]  Fax: [phone]     [facility] at [address] [facility] [address]  Phone: [phone]  Fax: [phone]    [place] [address] [place] [address]  Phone: [phone]  Fax: " [phone]    [facility]   [address], [place] [address]  Phone: [phone]  Fax: [phone]    [facility]   [address] [address]  Phone: [phone]  Fax: [phone]    [facility] [place]  [place]   Phone: [phone]  Pager: [phone]

## 2021-10-03 NOTE — PLAN OF CARE
Orientation: Alert, unable to assess orientation due to aphasia. Follows directions.   Mobility: A2 liko lift.   Vitals: Temp: 97.2  F (36.2  C) Temp src: Oral BP: 126/70 Pulse: 89   Resp: 18 SpO2: 96 % O2 Device: None (Room air)    Pain: PRN tyenol given x1, patient appear restless and rubbing legs. Rested well after tylenol.   BG: Patient's Preprandial BG at dinner not obtained. Patient had a late snack and family had already began to feed dinner. BG would have been inaccurate. Did cover carbs for snack at 4pm and dinner at 6 pm. BG at HS was 205, 1 unit sliding scale given.   I/O: Felix patent and draining. No BM this shift.   Skin: Intact  Equipment: Uses R. Hand splint for contracture prevention.

## 2021-10-03 NOTE — PLAN OF CARE
Discharge Planner Post-Acute Rehab OT:      Discharge Plan: home with 24/7 assistance, lift, hospital bed and WC     Precautions: falls, hx of right sided weakness, variable alertness      Current Status:  ADLs: Total A for shower transfer using TIS commode chair, Max A don pants, Mod A socks, Max A for UB dressing tasks, Total A for toilet tx/transfers using michell lift and commode chair, Min A for thoroughness with g/h tasks.  IADLs: Family support  Vision/Cognition: Wears glasses all the time. Lethargic, follows directions when alert.      Assessment:    Am tx: trialed donning pants with some bridging assist from pt today vs rolling, continues to require Max A to don pants, Mod A don socks. Pt Mod A supine to sit and tsf squat pivot to w/c. Min A oral hygeine cares with one handed toothpaste application and assist with basin as unable to reach sink to expectorate toothpaste. Mod A to don shirt, set up to comb hair.                                                            PM tx: provided caregiver training for pts brother and other daughter, covered techniques for LB dressing in bed, UB dsg in bed PRN as recommending to complete up in w/c, and brief change. Recommending sponge bathing at this time when at home d/t current set up is tub/shower combo and pt not ready for bath bench yet. Reviewed options for washing hair when completing sponge bathing including inflatable shampoo basin.  Provided education re: sleep/wake cycle regulation and benefit of having daily routines to encourage wakefulness during the day.  Pt somnolent throughout tx, however recommended caregivers to encourage pt to participate with care as much as possible when awake.     Other Barriers to Discharge (DME, Family Training, etc):   Family training scheduled 10/2 1:15pm, per daughter the pt's wife can be home for the pt but she is not necessarily going to be able to assist physically.   AE/DME WC, lift, hospital bed

## 2021-10-03 NOTE — PROGRESS NOTES
[facility]   Acute Rehabilitation Unit  Daily progress note    INTERVAL HISTORY  Jimmy Otto was seen and examined at bedside this morning.  His daughter was at bedside visiting today.  Reviewed his lab results and active medical issues.  He was not able to sleep again last night and was frequently moving and bad and using call light several time.  He did not seem to be in any pain or discomfort.  This morning he was awake and able to eat his breakfast and drink fluids.  Nursing reported some cloudy and foul-smelling urine.  His urine was clear today so asked nursing to flush per shift as they have been doing and encourage fluids.  Will consider to check a UA/UC by tomorrow if he develops any symptoms or if his urine stays cloudy.  Reviewed the plan with his daughter who agreed and answered all of her questions.      MEDICATIONS  Scheduled:    amantadine  100 mg Oral Daily     amLODIPine  10 mg Oral Daily     aspirin  81 mg Oral Daily     atenolol  25 mg Oral BID     atorvastatin  80 mg Oral Daily     clopidogrel  75 mg Oral Daily     doxazosin  1 mg Oral Daily     insulin aspart   Subcutaneous TID w/meals     insulin aspart  1-7 Units Subcutaneous TID AC     insulin aspart  1-5 Units Subcutaneous At Bedtime     insulin detemir  16 Units Subcutaneous Q24H     liraglutide  1.8 mg Subcutaneous Daily     metFORMIN  500 mg Oral BID w/meals     nystatin  500,000 Units Swish & Swallow 4x Daily     pantoprazole  40 mg Oral QAM AC     potassium chloride  20 mEq Oral Daily     ramelteon  8 mg Oral At Bedtime     senna-docusate  1 tablet Oral At Bedtime     sodium chloride (PF)  3 mL Intracatheter Q8H        PRN:  acetaminophen, bisacodyl, glucose **OR** dextrose **OR** glucagon, diclofenac, hydrALAZINE, insulin aspart, lidocaine, ondansetron, polyethylene glycol, senna-docusate       PHYSICAL EXAM  Patient Vitals for the past 24 hrs:   BP Temp Temp src Pulse Resp SpO2   10/03/21 0929 (!)  142/87 -- -- -- -- --   10/03/21 0728 127/67 97  F (36.1  C) Oral 97 16 96 %   10/02/21 2030 (!) 144/83 -- -- 96 -- --   10/02/21 1612 124/80 97.2  F (36.2  C) Oral 93 16 97 %       GEN: NAD, sitting in chair  PULM: non-labored in room air   ABD: Soft and nontender  EXT: No edema in bilateral lower extremities and did not seem to have any tenderness at calves  Neuro: Aphasic, follows some commands.  Right hemiparesis with clear emerging tone at right upper extremity and no volitional tone.  He had some good resistance at his right lower extremity with close kinetic chain and some synergic movement of her muscles.    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  Hospital course complicated by acute kidney injury, hypokalemia needing repletion, and urinary retention and hematuria needing Felix catheter and urology consult.         Impairment group code: Stroke Ischemic 01.3 Bilateral Involvement, new R Thalamic stroke in setting of R sided weakness from prior stroke        --Vitals stable. BP was slightly elevated today.   --Labs: None today but from Saturday Cr improved. Hgb 11.5.     --Continue ongoing medical management.   -Cr. Improved with IV fluid.  Has repeat labs ordered for Monday.  Potassium within normal limits with a supplement that he received yesterday.   -Hemoglobin is slightly lower than previous checks but this might be delusional given the IV fluid that he received.  Will monitor clinically and repeat labs on Monday.   -Melatonin was added on Friday to help with his sleep-wake cycle; he received 1 dose at 1 AM on Saturday.   Scheduled melatonin for 9 PM on Saturday night but he did not seem to be sleeping at all all shift.  A started Rynatan and will monitor.   -Reviewed the plan regarding his urine with the nursing staff and his daughter.    --Continue therapies and plan of care.  Therapy team has communicated with his family about plans for discharge to home versus TCU.  They have also discussed all the equipment needs including mechanical lift, hospital bed and wheelchair.  Working on transfers with María giles.      Janel Benson MD  Physical Medicine & Rehabilitation    Time Spent on this Encounter   I spent a total of 25 minutes face to face and coordinating care of Jimmy Otto.  Over 50% of my time on the unit was spent counseling the patient and /or coordinating care; see note for details.

## 2021-10-03 NOTE — PLAN OF CARE
FOCUS/GOAL  Bowel management, Bladder management, Medication management, Pain management, Medical management, Mobility, Skin integrity, and Safety management     ASSESSMENT, INTERVENTIONS AND CONTINUING PLAN FOR GOAL:     Pt is alert, ZAYDA orentation. Continent of bowel, ordoñez cathetar in place, urine looks more clear than the previous day, yellow color, bladder irrigated per POC and fluid intake encouraged. Ax2 liko lift transfers. Pureed diet, mildly thick liquids, requires 1:1 feeding assistance. Denied pain, nausea and vomiting, numbness or tingling, SOB. Alarms on, call light within reach. Will continue with plan of care.

## 2021-10-03 NOTE — PLAN OF CARE
FOCUS/GOAL  Bowel management, Bladder management, Pain management, Mobility, and Cognition/Memory/Judgment/Problem solving    ASSESSMENT, INTERVENTIONS AND CONTINUING PLAN FOR GOAL:    Alert and follows commands. Ax2 Liko. LBM 10/2, ordoñez draining more yellow colored urine with slight red tinge. Bladder irrigated per orders.  and 200, poor appetite tonight did not eat dinner, but had a tray around 1500~ with SLP. Left PIV flushed without difficulty. Reported left thigh pain, prn tylenol given. Patient sleeping and absent of non verbal signs of pain. Will continue with POC

## 2021-10-03 NOTE — PLAN OF CARE
"Discharge Planner Post-Acute Rehab SLP:      Discharge Plan:home with assist, likely further SLP     Precautions:fall, swallowing     Current Status:  Communication:  pt limited voicing/aphonic, follows simple directions, some mild aphasia/dysarthria baseline per son, worsened now. Working on expression/comprehension as well as assistive technology ie picure board  Cognition: to be assessed as appropriate  Swallow: IDDSI 4,1(pureed, slightly thick ok by cup). Joseph Free Water protocol via tsp/sips between meals. 1:1 for po/meals, assist to feed PRN, slow pace, small bites,/sips no straw.      Assessment:  SLP: worked with picture board, pointed to picture to basic functional question 50% accuracy. noted slight voice few times with simple imitation, otherwise aphonia, but with some \"mouthing\" words. educated family regarding tasks for expression/voicing,Also educated brother and sister in law, regarding thickening liquids to slightly thick, free water, pureed food/preparation, eating/feeding assist, no outward sx aspiration with current diet, or free water, if pt takes small sips , pt does grimace often when swallowing fluids - unknown reason,    Other Barriers to Discharge (Family Training, etc): TBD  "

## 2021-10-04 ENCOUNTER — APPOINTMENT (OUTPATIENT)
Dept: OCCUPATIONAL THERAPY | Facility: CLINIC | Age: 71
DRG: 057 | End: 2021-10-04
Attending: PHYSICAL MEDICINE & REHABILITATION
Payer: MEDICARE

## 2021-10-04 ENCOUNTER — DOCUMENTATION ONLY (OUTPATIENT)
Dept: REHABILITATION | Facility: CLINIC | Age: 71
End: 2021-10-04

## 2021-10-04 ENCOUNTER — APPOINTMENT (OUTPATIENT)
Dept: PHYSICAL THERAPY | Facility: CLINIC | Age: 71
DRG: 057 | End: 2021-10-04
Attending: PHYSICAL MEDICINE & REHABILITATION
Payer: MEDICARE

## 2021-10-04 ENCOUNTER — APPOINTMENT (OUTPATIENT)
Dept: SPEECH THERAPY | Facility: CLINIC | Age: 71
DRG: 057 | End: 2021-10-04
Attending: PHYSICAL MEDICINE & REHABILITATION
Payer: MEDICARE

## 2021-10-04 LAB
ANION GAP SERPL CALCULATED.3IONS-SCNC: 1 MMOL/L (ref 3–14)
BUN SERPL-MCNC: 28 MG/DL (ref 7–30)
CALCIUM SERPL-MCNC: 9.1 MG/DL (ref 8.5–10.1)
CHLORIDE BLD-SCNC: 110 MMOL/L (ref 94–109)
CO2 SERPL-SCNC: 31 MMOL/L (ref 20–32)
CREAT SERPL-MCNC: 1.39 MG/DL (ref 0.66–1.25)
ERYTHROCYTE [DISTWIDTH] IN BLOOD BY AUTOMATED COUNT: 13 % (ref 10–15)
GFR SERPL CREATININE-BSD FRML MDRD: 51 ML/MIN/1.73M2
GLUCOSE BLD-MCNC: 116 MG/DL (ref 70–99)
GLUCOSE BLDC GLUCOMTR-MCNC: 120 MG/DL (ref 70–99)
GLUCOSE BLDC GLUCOMTR-MCNC: 130 MG/DL (ref 70–99)
GLUCOSE BLDC GLUCOMTR-MCNC: 168 MG/DL (ref 70–99)
GLUCOSE BLDC GLUCOMTR-MCNC: 229 MG/DL (ref 70–99)
GLUCOSE BLDC GLUCOMTR-MCNC: 97 MG/DL (ref 70–99)
HCT VFR BLD AUTO: 37.2 % (ref 40–53)
HGB BLD-MCNC: 11.9 G/DL (ref 13.3–17.7)
MCH RBC QN AUTO: 27.1 PG (ref 26.5–33)
MCHC RBC AUTO-ENTMCNC: 32 G/DL (ref 31.5–36.5)
MCV RBC AUTO: 85 FL (ref 78–100)
PLATELET # BLD AUTO: 211 10E3/UL (ref 150–450)
POTASSIUM BLD-SCNC: 3.4 MMOL/L (ref 3.4–5.3)
RBC # BLD AUTO: 4.39 10E6/UL (ref 4.4–5.9)
SARS-COV-2 RNA RESP QL NAA+PROBE: NEGATIVE
SODIUM SERPL-SCNC: 142 MMOL/L (ref 133–144)
WBC # BLD AUTO: 6.9 10E3/UL (ref 4–11)

## 2021-10-04 PROCEDURE — 250N000013 HC RX MED GY IP 250 OP 250 PS 637: Performed by: PHYSICAL MEDICINE & REHABILITATION

## 2021-10-04 PROCEDURE — 99232 SBSQ HOSP IP/OBS MODERATE 35: CPT | Performed by: NURSE PRACTITIONER

## 2021-10-04 PROCEDURE — 250N000009 HC RX 250: Performed by: NURSE PRACTITIONER

## 2021-10-04 PROCEDURE — G0008 ADMIN INFLUENZA VIRUS VAC: HCPCS | Performed by: PHYSICAL MEDICINE & REHABILITATION

## 2021-10-04 PROCEDURE — 92507 TX SP LANG VOICE COMM INDIV: CPT | Mod: GN | Performed by: SPEECH-LANGUAGE PATHOLOGIST

## 2021-10-04 PROCEDURE — U0005 INFEC AGEN DETEC AMPLI PROBE: HCPCS | Performed by: PHYSICIAN ASSISTANT

## 2021-10-04 PROCEDURE — 97530 THERAPEUTIC ACTIVITIES: CPT | Mod: GP | Performed by: STUDENT IN AN ORGANIZED HEALTH CARE EDUCATION/TRAINING PROGRAM

## 2021-10-04 PROCEDURE — 128N000003 HC R&B REHAB

## 2021-10-04 PROCEDURE — 250N000013 HC RX MED GY IP 250 OP 250 PS 637: Performed by: PHYSICIAN ASSISTANT

## 2021-10-04 PROCEDURE — 85027 COMPLETE CBC AUTOMATED: CPT | Performed by: PHYSICIAN ASSISTANT

## 2021-10-04 PROCEDURE — 99232 SBSQ HOSP IP/OBS MODERATE 35: CPT | Performed by: PHYSICAL MEDICINE & REHABILITATION

## 2021-10-04 PROCEDURE — 92526 ORAL FUNCTION THERAPY: CPT | Mod: GN | Performed by: SPEECH-LANGUAGE PATHOLOGIST

## 2021-10-04 PROCEDURE — 90662 IIV NO PRSV INCREASED AG IM: CPT | Performed by: PHYSICAL MEDICINE & REHABILITATION

## 2021-10-04 PROCEDURE — 97535 SELF CARE MNGMENT TRAINING: CPT | Mod: GO | Performed by: STUDENT IN AN ORGANIZED HEALTH CARE EDUCATION/TRAINING PROGRAM

## 2021-10-04 PROCEDURE — 250N000013 HC RX MED GY IP 250 OP 250 PS 637: Performed by: NURSE PRACTITIONER

## 2021-10-04 PROCEDURE — 80048 BASIC METABOLIC PNL TOTAL CA: CPT | Performed by: PHYSICIAN ASSISTANT

## 2021-10-04 PROCEDURE — 36415 COLL VENOUS BLD VENIPUNCTURE: CPT | Performed by: PHYSICIAN ASSISTANT

## 2021-10-04 PROCEDURE — 250N000011 HC RX IP 250 OP 636: Performed by: PHYSICAL MEDICINE & REHABILITATION

## 2021-10-04 RX ADMIN — METFORMIN HYDROCHLORIDE 500 MG: 500 TABLET, FILM COATED ORAL at 09:37

## 2021-10-04 RX ADMIN — INFLUENZA A VIRUS A/VICTORIA/2570/2019 IVR-215 (H1N1) ANTIGEN (FORMALDEHYDE INACTIVATED), INFLUENZA A VIRUS A/TASMANIA/503/2020 IVR-221 (H3N2) ANTIGEN (FORMALDEHYDE INACTIVATED), INFLUENZA B VIRUS B/PHUKET/3073/2013 ANTIGEN (FORMALDEHYDE INACTIVATED), AND INFLUENZA B VIRUS B/WASHINGTON/02/2019 ANTIGEN (FORMALDEHYDE INACTIVATED) 0.7 ML: 60; 60; 60; 60 INJECTION, SUSPENSION INTRAMUSCULAR at 10:22

## 2021-10-04 RX ADMIN — ATENOLOL 25 MG: 25 TABLET ORAL at 08:16

## 2021-10-04 RX ADMIN — ATENOLOL 25 MG: 25 TABLET ORAL at 20:13

## 2021-10-04 RX ADMIN — RAMELTEON 8 MG: 8 TABLET ORAL at 20:12

## 2021-10-04 RX ADMIN — ASPIRIN 81 MG CHEWABLE TABLET 81 MG: 81 TABLET CHEWABLE at 08:15

## 2021-10-04 RX ADMIN — AMLODIPINE BESYLATE 10 MG: 10 TABLET ORAL at 08:15

## 2021-10-04 RX ADMIN — CLOPIDOGREL BISULFATE 75 MG: 75 TABLET, FILM COATED ORAL at 08:16

## 2021-10-04 RX ADMIN — METFORMIN HYDROCHLORIDE 500 MG: 500 TABLET, FILM COATED ORAL at 18:29

## 2021-10-04 RX ADMIN — DOXAZOSIN 1 MG: 1 TABLET ORAL at 08:15

## 2021-10-04 RX ADMIN — LIRAGLUTIDE 1.8 MG: 6 INJECTION SUBCUTANEOUS at 10:10

## 2021-10-04 RX ADMIN — AMANTADINE HYDROCHLORIDE 100 MG: 100 CAPSULE ORAL at 08:15

## 2021-10-04 RX ADMIN — POTASSIUM CHLORIDE 20 MEQ: 1.5 FOR SOLUTION ORAL at 08:15

## 2021-10-04 RX ADMIN — Medication 40 MG: at 07:01

## 2021-10-04 RX ADMIN — NYSTATIN 500000 UNITS: 100000 SUSPENSION ORAL at 09:37

## 2021-10-04 RX ADMIN — ATORVASTATIN CALCIUM 80 MG: 80 TABLET, FILM COATED ORAL at 08:15

## 2021-10-04 RX ADMIN — SENNOSIDES AND DOCUSATE SODIUM 1 TABLET: 8.6; 5 TABLET ORAL at 20:12

## 2021-10-04 NOTE — PROGRESS NOTES
Prior Authorization **APPROVED**    Authorization Effective Date: 7/6/21  Authorization Expiration Date: 10/4/22  Medication: Ramelteon 8mg tabs *APPROVED*  Approved Dose/Quantity: 1 tablet daily  Reference #: CoverMyMeds Key: [id number] - [place] Case ID: [id number]   Insurance Company: [employer] Part D - Phone [phone] Fax [phone]  Expected CoPay: $292.11 (d/t $240 deductible), $52.11 once met     CoPay Card Available: No    Foundation Assistance Needed: n/a  Which Pharmacy is filling the prescription (Not needed for infusion/clinic administered): n/a - Patient has not yet been discharged, and there are no outpatient orders for this medication yet  Comments:  Proactive Prior Authorization          Dulce Rosa [facility] Discharge [facility] Liaison  Pronouns: She/Her/Hers    [facility]  [address] [place] [address]  [address] [place] [address] [address] [id number]   [email or web address]  [email or web address]   Phone: [phone]  Pager: [phone]  Fax: [phone]

## 2021-10-04 NOTE — PROGRESS NOTES
[facility]   Acute Rehabilitation Unit  Daily progress note    INTERVAL HISTORY  Patient was seen this morning sitting up in his room.  Weekend therapy and progress notes reviewed, no acute concerns reported.  He did have some increased difficulty with sleep over the weekend despite scheduled melatonin, so ramelteon was added.  This morning, patient reports a better night of sleep last night.  He denies any pain, shortness of breath, nausea, dizziness.  He is able to mouth a few words and appears more alert at the time of my visit.  AM labs with improvement in Cr, stable anemia.    Functionally, he is requiring mod assist for bed mobility if alert, transfers and gait variable based on alertness, often lift dependent and dependent for wheelchair mobility.  ALDs with max to total assist, exception of min assist for grooming/hygiene.  Multiple family members present over weekend for family training.  Referrals made to TCU; however, family also preparing to meet patient's needs if unable to get to TCU.    MEDICATIONS  Scheduled:    amantadine  100 mg Oral Daily     amLODIPine  10 mg Oral Daily     aspirin  81 mg Oral Daily     atenolol  25 mg Oral BID     atorvastatin  80 mg Oral Daily     clopidogrel  75 mg Oral Daily     doxazosin  1 mg Oral Daily     influenza vac high-dose quad  0.7 mL Intramuscular Prior to discharge     insulin aspart   Subcutaneous TID w/meals     insulin aspart  1-7 Units Subcutaneous TID AC     insulin aspart  1-5 Units Subcutaneous At Bedtime     insulin detemir  16 Units Subcutaneous Q24H     liraglutide  1.8 mg Subcutaneous Daily     metFORMIN  500 mg Oral BID w/meals     nystatin  500,000 Units Swish & Swallow 4x Daily     pantoprazole  40 mg Oral QAM AC     potassium chloride  20 mEq Oral Daily     ramelteon  8 mg Oral At Bedtime     senna-docusate  1 tablet Oral At Bedtime        PRN:  acetaminophen, bisacodyl, glucose **OR** dextrose **OR**  "glucagon, diclofenac, hydrALAZINE, insulin aspart, lidocaine, ondansetron, polyethylene glycol, senna-docusate       PHYSICAL EXAM  Patient Vitals for the past 24 hrs:   BP Temp Temp src Pulse Resp SpO2   10/04/21 0810 125/74 (!) 96.4  F (35.8  C) Oral 92 16 96 %   10/03/21 2100 111/85 -- -- 94 -- --   10/03/21 1628 126/70 97.2  F (36.2  C) Oral 89 18 96 %       GEN: NAD, awake during visit, seated upright in wheelchair  HEENT: NC/AT, MMM, Thrush on tongue resolved  CVS: RRR, no murmurs  PULM: non-labored on room air, lungs CTA bilaterally  ABD: soft, non-tender, non-distended, bowel sounds present  : Felix in place, draining clear/dark yellow urine  EXT: no edema appreciated in bilateral lower extremities  Neuro: Non-verbal but shakes head \"yes or no\" and mouthed a few words, RUE in resting splint      LABS  CBC RESULTS:   Recent Labs   Lab Test 10/04/21  0515 10/02/21  0549 09/27/21  0602 09/24/21  0641 09/24/21  0641   WBC 6.9  --  7.8  --  7.3   RBC 4.39*  --  4.69  --  4.61   HGB 11.9* 11.5* 12.7*   < > 12.3*   HCT 37.2*  --  39.6*  --  38.7*   MCV 85  --  84  --  84   MCH 27.1  --  27.1  --  26.7   MCHC 32.0  --  32.1  --  31.8   RDW 13.0  --  13.1  --  13.0     --  278  --  275    < > = values in this interval not displayed.     Last Basic Metabolic Panel:  Recent Labs   Lab Test 10/04/21  0813 10/04/21  0515 10/04/21  0208 10/02/21  0732 10/02/21  0549 10/01/21  1157 10/01/21  0542   NA  --  142  --   --  144  --  145*   POTASSIUM  --  3.4  --   --  3.6  --  3.3*   CHLORIDE  --  110*  --   --  112*  --  112*   CO2  --  31  --   --  30  --  30   ANIONGAP  --  1*  --   --  2*  --  3   GLC 97 116* 168*   < > 117*   < > 115*   BUN  --  28  --   --  29  --  40*   CR  --  1.39*  --   --  1.47*  --  1.64*   GFRESTIMATED  --  51*  --   --  47*  --  41*   ARLETH  --  9.1  --   --  8.6  --  8.9    < > = values in this interval not displayed.     Recent Labs   Lab 10/04/21  0813 10/04/21  0515 10/04/21  0208 " 10/03/21  2104 10/03/21  1205 10/03/21  0750   GLC 97 116* 168* 205* 264* 79       EDGAR Otto is a 71 year old male with a PMH including but not limited to HTN, uncontrolled DM 2 (HbA1c 11.5), HLD, BPH, and history of a stroke in 2012 with residual right hemiparesis who presented to the emergency room on 9/14/2021 with altered mental status and acute worsening of right-sided weakness, and found to have an acute right thalamic stroke due to small vessel occlusion.  Hospital course complicated by acute kidney injury, hypokalemia needing repletion, and urinary retention and hematuria needing Felix catheter and urology consult.         Impairment group code: Stroke Ischemic 01.3 Bilateral Involvement, new R Thalamic stroke in setting of R sided weakness from prior stroke        PLAN  Rehabilitation  -Continue with inpatient rehabilitation program including PT, OT, and SLP for 3 hrs/day, rehab nursing, social work, nutrition, and close monitoring by physiatry for an estimated total of 3 weeks.  -The patient has impairments in strength, balance, endurance, and coordination, which are leading to activity limitations in ambulation, mobility, ADLs, and swallowing.      Medical  1)Neurology  #R thalamic CVA due to small vessel occlusion  - DAPT with ASA 81 mg and Plavix 75 mg for 1 month, followed by Plavix alone indefinitely.  Recommended check P2Y12 level to ensure Plavix efficacy after 1 month of being on Plavix only.  - Hypertension, lipid, and glucose management as below.  Will need ongoing education regarding stroke risk factor modifications including importance of medication compliance and diet and lifestyle changes  - Follow-up with [facility] general neurology in 6 to 8 weeks  - Monitor somnolence/lethargy.  Decreased level of alertness not unexpected with essentially bilateral thalamic pathology (new right thalamic stroke and a history of left thalamic stroke).    - Amantadine 100 mg daily started on 9/20 for  neurostimulation given ongoing somnolence which is limiting participation.  Increased Amantadine to 100 mg BID (7 am and noon) on 9/25.  Decreased to 100 mg daily on 9/29 due to impaired renal function.  - High risk for delirium, ensure delirium precautions and appropriate sleep-wake cycle.  PRN melatonin available.        2)CVS  #HTN  - PTA on amlodipine 10 mg daily, atenolol 50 mg twice daily, Cozaar 100 mg daily, spironolactone 25 mg daily, and hydrochlorothiazide 25 mg daily.  Cozaar, spironolactone, and hydrochlorothiazide are being held due to acute kidney injury  - Continue amlodipine 10 mg daily, and atenolol at a lower dose of 25 mg twice daily for now.  Titrate doses as needed.  - Hydralazine as needed for SBP greater than 180 mmHg     #HLD  - Continue Lipitor 80 mg daily.  LDL 92 on 9/15        3)Pulmonary  - No acute issues but low lung volumes and atelectasis/infiltrate seen at the left lung base on chest x-ray 9/14.  Clinically no signs of pneumonia but will continue to monitor.  - Encourage incentive spirometry        4)FENGI  #Diet: Level 4 solids and was upgraded to level 0 thin liquids, however downgraded again to level 2 liquids due to concern for aspiration.  Upgrade further as able per speech therapy  - Aspiration precautions  - Consult nutrition for education and optimization given stroke, modified diet, and uncontrolled diabetes  - VFSS completed 9/24     #Bowels: As needed Senokot-S, MiraLAX, and Dulcolax suppository     #Hypokalemia  - Ongoing.  Started scheduled replacement 20 mEq daily.  Potassium 3.3 on 10/1.  Repleted with additional 30 mEq, recheck 10/2 WNL at 3.6, remains stable at 3.4 today.    #GI prophylaxis  -Protonix 40 mg daily given DAPT and age     #Oral Thrush  Noted on exam 9/27, started on Nystatin swish and swallow x7 days.  - Improving.  Continue Nystatin swish and swallow x7 days.  Will need to ensure patient is awake enough to adhere to aspiration  precautions.     5)  #ALEX  - Baseline creatinine ~1.1, with a peak of 2.13 upon admission  - Had improved, then Cr increasing at 1.63 on 9/20,  likely pre-renal given lethargy and poor po intake.  Improved to 1.43 on 9/21 following IV fluids (9/19-9/20).  Continue to monitor, encourage fluids.  10/1 Cr 1.64 with BUN 40, both increased from yesterday.  Give 1L of saline again 10/1, re-check 10/2 with Cr improved to 1.47, further improved to 1.39 today.  - Continue holding Cozaar, hydrochlorothiazide, and spironolactone.        #Urinary retention and hematuria  - Trial of void attempted, but continued to retain urine and Felix was re-placed on 9/26. Will plan to keep in until follow up with urology.  - Urology consulted and recommend CT urogram once creatinine normalizes and outpatient follow-up for cystoscopy to complete hematuria work-up.  - Recurrent hematuria noted, continue to monitor hgb levels.  Thus far have been stable, slight drop on 10/2, suspect maybe related to dilution in setting of IV fluids, remains stable today.  Hematuria improved today.  - Flomax 0.4 mg daily started, will continue        6)DVT prophylaxis  - Mechanical.  Will hold from chemoprophylaxis given hematuria and already on dual anti-platelet therapy        7)Pain  - Tylenol PRN        8)Endo  #Uncontrolled DM II (HbA1c 11.5)  - PTA was on Levemir 20 units nightly, NovoLog with meals with carb coverage, and Metformin 500 mg twice daily.  Unclear how compliant he was with his medications.  Endocrine assistance appreciated for help with management.  Recs 10/3:     -  decrease Levemir to 16 units at 2000    -  Novolog 1:10g CHO all meals and snacks     -  Novolog medium resistance from high resistance sliding scale insulin TID AC and HS    -  continue Victoza to 1.8 mg    -  BG monitoring TID AC, HS and 0200    -  continue Metformin to 500 mg PO BID, from once daily, monitor kidney function closely    -  Hypoglycemia protocol    -  Carb  counting protocol    -  Will follow         9)Heme  - Hgb 12.7 on 9/27.  Overall stable.    - Continue to monitor, especially in the setting of hematuria.  Re-check 10/2 with slight drop to 11.5, suspect maybe related to dilution in setting of IV fluids, remains stable today at 11.9.  Hematuria improved this AM.      10)Psych  - Monitor mood, will consult health psychology if needed  - Melatonin scheduled starting 10/1 due to complaints of sleep difficulty.   Ongoing issues, so started on ramelteon.  Patient reports improvement in sleep today.      11)MSK  ~1.5 cm soft, non-tender, mobile nodule in left distal upper arm.  No overlying erythema, warmth, skin changes.  Seems most consistent with lipoma vs cyst.  - Monitor, but no further work-up/treatment indicated at this time           12)Social/Dispo  - Goals for discharge home at a supervision level for ambulation, mobility, and ADLs.  - ELOS: Tentative discharge date 10/8/21  - Rehab prognosis: Fair  - Follow up appointments: PCP, [facility] neurology clinic 6-8 weeks, urology for hematuria     Code status: Full Code (unable to discuss with patient given somnolence upon admission.  Have continued prior CODE STATUS and discussed with the son who confirms that his prior wishes were to be full code)      Patient discussed with Dr. Juan Miguel Campo, PM&R staff physician     Jesika Theodore PA-C  Physical Medicine & Rehabilitation

## 2021-10-04 NOTE — PROGRESS NOTES
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  Hospital course complicated by acute kidney injury, hypokalemia needing repletion, and urinary retention and hematuria needing Felix catheter and urology consult.     Admission to inpatient rehab:  9/17/21  Impairment group code: Stroke Ischemic 01.3 Bilateral Involvement, new R Thalamic stroke in setting of R sided weakness from prior stroke    Home/discharge still set for 10/8 as of today      Assessment:     1)  Type 2 Diabetes Mellitus; poor control.  A1c 11.5%  2)  S/p stroke; R hemiparesis  3)  ALEX/CKD     Plan:       -  decrease Levemir to 13 units at 2000    -  decrease Novolog 1:15g CHO all meals and snacks     -  Novolog medium resistance from high resistance sliding scale insulin TID AC and HS    -  continue Victoza to 1.8 mg    -  BG monitoring TID AC, HS and 0200    -  continue Metformin to 500 mg PO BID, from once daily, monitor kidney function closely    -  Hypoglycemia protocol    -  Carb counting protocol    -  Will follow      Outpatient diabetes follow up: TBD - likely to go to TCU however family has been getting education in the event that a TCU is not available     Plan discussed with patient, rigo/wife and bedside RN, and primary team via this note.             Interval History:     The last 24 hours progress and nursing notes reviewed.  No acute events this interval.  Working with family and therapy for transfers in planning for discharge    Lower trends - reduced basal/prandial  Wife and rigo here - no new questions   Jimmy  "a bit more alert and interactive today - denies pain or other concerns        Carry over:  Met with Clemente and wife who were here for education with Rehab - reviewed aspects of care in detail - discharge date is still set for 10/8 and it is still not clear if that will be home or TCU  Did review what a sliding scale insulin regimen would look like by having Clemente review scales with writer - felt with the specifics the scale provided, it was clear and simple.    Reassured IDS will plan for a clear/concise and simplified plan for home.  All questions and concerns addressed.       Recent Labs   Lab 10/04/21  0515 10/04/21  0208 10/03/21  2104 10/03/21  1205 10/03/21  0750 10/03/21  0219   * 168* 205* 264* 79 105*           Nutrition:     Orders Placed This Encounter      Combination Diet Consistent Carb 60 Grams CHO per Meal Diet; Pureed Diet (level 4); Slightly Thick (level 1)    Supplements: Vanilla or strawberry Ensure Enlive once daily at breakfast meal up to BID PRN (can send at dinner as well if requested by nursing or family) - SLP preformed flow test and approved these flavors to be given to pt    SF Gelatein - Please send orange flavor at 2PM        PTA Regimen:        Levemir 28 units at bedtime  \"Novolog - varied doses based on chos\"  Clinic notes reveal:  Novolog 2 unit(s) per carb choice (2per 15)  Novolog 1 unit(s) per 50> 150     Metformin 500 mg BID (dose reduced for decreased renal fxn)     BG monitoring frequency:  FreeStyle Sherwin - intermittent use     Diet: regular, no restrictions  Eats sporadically - AM - bagel or banana  Lunch - sporadic  Dinner - rice/beg/meat  Loves to drink [employer]     Exercise: sedentary          Review of Systems:   CC:  Denies    Constitutional:   No fever/chills  ENT/Mouth:   No hearing changes, no ear pain, no sore throat, no rhinorrhea, no difficulty swallowing  Eyes:  No eye pain, no discharge, no vision changes  CV:   No CP/SOB, no new edema  Resp:  No " "cough, no wheezing  GI:   No N/V, no constipation, no diarrhea  :  No dysuria, frequency, or hematuria - cath   Musk:  No new joint swelling/pain, no back pain  Skin/heme:   No new rashes/bruises/open areas.  No pruritis  Neuro:   No new weakness, no numbness/tingling, no headache  Psych:   No new anxiety, denies epression  Endocrine:  No polyuria or polydipsia         Medications:   Steroid planning:  no       Physical Exam:   /85 (BP Location: Left arm)   Pulse 94   Temp 97.2  F (36.2  C) (Oral)   Resp 18   Ht 1.626 m (5' 4\")   Wt 66.5 kg (146 lb 11.2 oz)   SpO2 96%   BMI 25.18 kg/m      General:   NAD, pleasant,  resting comfortably in bed. Fatigued appearing - family in room - supportive.  HEENT:  NC/AT. MMM, sclera not injected  Lungs:  unremarkable, no new cough, no SOB  ABD:   soft, rounded  Skin:  warm and dry, no obvious lesions/rash  Feet:   CMS intact, PPP  MSK:   moving all extremities  Lymp:   no LE edema  Mental Status:  fatigued - open eyes when spoken to   Psych:   Cooperative         Data:     Lab Results   Component Value Date    A1C 11.5 09/15/2021    A1C 7.6 08/14/2012          CBC RESULTS: Recent Labs   Lab Test 10/04/21  0515   WBC 6.9   RBC 4.39*   HGB 11.9*   HCT 37.2*   MCV 85   MCH 27.1   MCHC 32.0   RDW 13.0        Recent Labs   Lab Test 10/04/21  0813 10/04/21  0515 10/02/21  0732 10/02/21  0549   NA  --  142  --  144   POTASSIUM  --  3.4  --  3.6   CHLORIDE  --  110*  --  112*   CO2  --  31  --  30   ANIONGAP  --  1*  --  2*   GLC 97 116*   < > 117*   BUN  --  28  --  29   CR  --  1.39*  --  1.47*   ARLETH  --  9.1  --  8.6    < > = values in this interval not displayed.       Liver Function Studies -   Recent Labs   Lab Test 09/15/21  0521   PROTTOTAL 6.6*   ALBUMIN 2.6*   BILITOTAL 0.3   ALKPHOS 80   AST 20   ALT 22     Lab Results   Component Value Date    INR 1.03 09/16/2021    INR 0.94 09/14/2021    INR 1.00 08/16/2012    INR 0.96 08/13/2012       Keri" FEMI Amaral CNP   Inpatient Diabetes Management Service  Pager - [phone]  Friday - Monday 0800 - 1600 hrs  After hours above - Diabetes Management Team job code: [phone]    I spent a total of 25 minutes bedside and on the inpatient unit managing glycemic care.  Over 50% of my time on the unit was spent counseling the patient and/or coordinating care regarding acute hyperglycemic management.  See note for details.

## 2021-10-04 NOTE — PLAN OF CARE
Discharge Planner Post-Acute Rehab PT:      Discharge Plan: Attempting TCU placement, otherwise family will take home with lift and w/c, dependent for cares.     Precautions: Fall, alarms     Current Status:  Bed Mobility: mod A if alert  Transfer: variable based on alertness, lift dependent  Gait: variable based on alertness, w/c dependent   Stairs: Not safe  Balance: 1/36 PASS - although not truly reflective of capability due to drastic performance fluctuations with command following and lethargy.     Assessment: am session PT and dtr completing wc>bed while pt quite fatigued. PM session Ho more alert, wife and dtr directing majority of transfers. Will continue family training as unclear if pt will be accepted to TCU by 10/8.      Other Barriers to Discharge (DME, Family Training, etc): Safe discharge plan - will need 24/7 cares, level of assist/equipment pending lethargy/participation

## 2021-10-04 NOTE — PLAN OF CARE
FOCUS/GOAL  Bowel management, Bladder management, Pain management, Mobility, Skin integrity, and Safety management    ASSESSMENT, INTERVENTIONS AND CONTINUING PLAN FOR GOAL:  Alert, orientation hard to assess due to aphasia and quiet vocalization, VS stable. Level 4/1, pills crushed with applesauce. Incontinent of bowel, ordoñez in place for bladder management. No complaints of pain this shift. Assist of 2 with lift to transfer. No new skin concerns. Calls appropriately with light. Continue POC.

## 2021-10-04 NOTE — PROGRESS NOTES
SW assisting w/ discharge planning. No accepting facility at this time.     PENDING FACILITIES   [facility]  [address] [place] [place] [address]  Phone: [phone]  Fax: [phone]  10/4: Admissions called and requested SW send a paper fax since they did not receive Epic fax. Paper referral sent.     [facility]   [address] [facility] [address]  Phone: [phone]  Fax: [phone]  10/4: Left VM requesting update on referral.      [facility]  [address] [place] [address]  Phone: [phone]  Fax: [phone]  10/4: Left VM requesting update on referral.      [facility]  [address], [place] [address]  Phone: [phone]  Fax: [phone]  10/4: Received call from Stephanie in admissions that Keri is reviewing the referral and will update PALAK.      [facility]   [address], [place] [address]  Phone: [phone]  Fax: [phone]  10/4: Left  requesting update on referral.     [facility]   [address], [facility] [address]  Phone: [phone]  Fax: [phone]  10/4: Left  requesting update on referral.    [facility]  [facility]  [address], [facility] [address]  Phone: [phone]  Fax: [phone]  10/4: No available beds.     [facility]  [address] [facility] [address]  Phone: [phone]  Fax: [phone]  10/4: No available beds. May have an opening end of week, but TBD.    [facility]  [address] [facility], [facility] [address]  Phone: [phone]  Fax: [phone]  10/4: Full. No available beds until the week of 10/18.     Tremayne BLOUNT, [facility]   Phone: [phone]  Pager: [phone]

## 2021-10-04 NOTE — PLAN OF CARE
Discharge Planner Post-Acute Rehab SLP:      Discharge Plan:home with assist, likely further SLP     Precautions:fall, swallowing     Current Status:  Communication:  pt limited voicing/aphonic, follows simple directions, some mild aphasia/dysarthria baseline per son, worsened now. Working on expression/comprehension as well as assistive technology ie picure board  Cognition: to be assessed as appropriate  Swallow: IDDSI 4,1(pureed, slightly thick ok by cup). Ruiz Free Water protocol via tsp/sips between meals. 1:1 for po/meals, assist to feed PRN, slow pace, small bites,/sips no straw.      Assessment:  SLP: some difficulty following directions during meal( ?comprehension vs cognition ),  only slight phonation noted couple times with cues otherwise aphonic, limited attempts. pt seen for swallowing at meal, trialed small amount minced and moist ,extended chewing time ( up to minute per bolus), no coughing during, but did have one episode later in meal, while eating pureed.  continued to educate wife/dtr on diet, strategies. .appeared to tolerate slighlty thick coffee   Other Barriers to Discharge (Family Training, etc): TBD

## 2021-10-04 NOTE — PROGRESS NOTES
Prior Authorization **INITIATED**    Medication: Ramelteon 8mg tabs  Insurance Company: [employer] Part D - Phone [phone] Fax [phone]  Pharmacy Filling the Rx: n/a - Patient has not yet been discharged, and there are no outpatient orders for this medication yet  Start Date: 10/4/2021  Reference #: CoverMyMeds Key: [id number] - [place] Case ID: [id number]  Comments:  Proactive Prior Authorization      [facility]ul[facility] Liaison  Pronouns: She/Her/Hers    [facility]  [facility]  [address] [place] [address] [address] [id number]   [email or web address]  [email or web address]   Phone: [phone]  Pager: [phone]  Fax: [phone]

## 2021-10-04 NOTE — PLAN OF CARE
FOCUS/GOAL  Bladder management, Pain management, Mobility, Cognition/Memory/Judgment/Problem solving, and Safety management    ASSESSMENT, INTERVENTIONS AND CONTINUING PLAN FOR GOAL:  Pt is alert, unable to assess orientation d/t expressive aphasia. Felix catheter is patent and intact, draining slightly cloudy yellow urine. Urine is less odorous this shift. No report of pain or discomfort. Turns and repositions with assist of 1-2. Pt appeared to be sleeping well this shift. Call light within reach. Bed alarm on for safety. Will continue with POC.

## 2021-10-04 NOTE — PLAN OF CARE
Discharge Planner Post-Acute Rehab OT:      Discharge Plan: home with 24/7 assistance, lift, hospital bed and WC vs TCU     Precautions: falls, hx of right sided weakness, variable alertness      Current Status:  ADLs: Total A for shower transfer using TIS commode chair, Max A don pants, Mod A socks, Max A for UB dressing tasks, Total A for toilet tx/transfers using michell lift and commode chair, Min A for thoroughness with g/h tasks.  IADLs: Family support  Vision/Cognition: Wears glasses all the time. Lethargic, follows directions when alert.      Assessment:                                                           Family present this PM for more family training. Reviewed strategy for sponge bath and demo'd a squat pivot transfer with daughter assisting. Pt was mAX x2 person. Reviewed recommendation for pt to be liko lift for transfers. Daughter very attentive , pt's wife more observed.     Other Barriers to Discharge (DME, Family Training, etc):   Family training scheduled 10/2 1:15pm, per daughter the pt's wife can be home for the pt but she is not necessarily going to be able to assist physically.   AE/DME WC, lift, hospital bed

## 2021-10-05 ENCOUNTER — APPOINTMENT (OUTPATIENT)
Dept: SPEECH THERAPY | Facility: CLINIC | Age: 71
DRG: 057 | End: 2021-10-05
Attending: PHYSICAL MEDICINE & REHABILITATION
Payer: MEDICARE

## 2021-10-05 ENCOUNTER — APPOINTMENT (OUTPATIENT)
Dept: OCCUPATIONAL THERAPY | Facility: CLINIC | Age: 71
DRG: 057 | End: 2021-10-05
Attending: PHYSICAL MEDICINE & REHABILITATION
Payer: MEDICARE

## 2021-10-05 ENCOUNTER — APPOINTMENT (OUTPATIENT)
Dept: PHYSICAL THERAPY | Facility: CLINIC | Age: 71
DRG: 057 | End: 2021-10-05
Attending: PHYSICAL MEDICINE & REHABILITATION
Payer: MEDICARE

## 2021-10-05 LAB
GLUCOSE BLDC GLUCOMTR-MCNC: 136 MG/DL (ref 70–99)
GLUCOSE BLDC GLUCOMTR-MCNC: 176 MG/DL (ref 70–99)
GLUCOSE BLDC GLUCOMTR-MCNC: 194 MG/DL (ref 70–99)
GLUCOSE BLDC GLUCOMTR-MCNC: 217 MG/DL (ref 70–99)

## 2021-10-05 PROCEDURE — 99232 SBSQ HOSP IP/OBS MODERATE 35: CPT | Performed by: PHYSICAL MEDICINE & REHABILITATION

## 2021-10-05 PROCEDURE — 97535 SELF CARE MNGMENT TRAINING: CPT | Mod: GO | Performed by: STUDENT IN AN ORGANIZED HEALTH CARE EDUCATION/TRAINING PROGRAM

## 2021-10-05 PROCEDURE — 99233 SBSQ HOSP IP/OBS HIGH 50: CPT | Performed by: PHYSICIAN ASSISTANT

## 2021-10-05 PROCEDURE — 250N000013 HC RX MED GY IP 250 OP 250 PS 637: Performed by: PHYSICAL MEDICINE & REHABILITATION

## 2021-10-05 PROCEDURE — 92507 TX SP LANG VOICE COMM INDIV: CPT | Mod: GN | Performed by: SPEECH-LANGUAGE PATHOLOGIST

## 2021-10-05 PROCEDURE — 128N000003 HC R&B REHAB

## 2021-10-05 PROCEDURE — 92526 ORAL FUNCTION THERAPY: CPT | Mod: GN | Performed by: SPEECH-LANGUAGE PATHOLOGIST

## 2021-10-05 PROCEDURE — 250N000013 HC RX MED GY IP 250 OP 250 PS 637: Performed by: PHYSICIAN ASSISTANT

## 2021-10-05 PROCEDURE — 97530 THERAPEUTIC ACTIVITIES: CPT | Mod: GP

## 2021-10-05 PROCEDURE — 250N000013 HC RX MED GY IP 250 OP 250 PS 637: Performed by: NURSE PRACTITIONER

## 2021-10-05 RX ORDER — AMOXICILLIN 250 MG
1 CAPSULE ORAL AT BEDTIME
Status: CANCELLED | DISCHARGE
Start: 2021-10-05

## 2021-10-05 RX ORDER — AMOXICILLIN 250 MG
1-2 CAPSULE ORAL 2 TIMES DAILY PRN
Status: CANCELLED | DISCHARGE
Start: 2021-10-05

## 2021-10-05 RX ORDER — PANTOPRAZOLE SODIUM 40 MG/1
40 TABLET, DELAYED RELEASE ORAL
Status: DISCONTINUED | OUTPATIENT
Start: 2021-10-06 | End: 2021-10-06 | Stop reason: HOSPADM

## 2021-10-05 RX ADMIN — ASPIRIN 81 MG CHEWABLE TABLET 81 MG: 81 TABLET CHEWABLE at 08:57

## 2021-10-05 RX ADMIN — RAMELTEON 8 MG: 8 TABLET ORAL at 22:00

## 2021-10-05 RX ADMIN — AMANTADINE HYDROCHLORIDE 100 MG: 100 CAPSULE ORAL at 08:57

## 2021-10-05 RX ADMIN — DOXAZOSIN 1 MG: 1 TABLET ORAL at 08:57

## 2021-10-05 RX ADMIN — INSULIN ASPART 2 UNITS: 100 INJECTION, SOLUTION INTRAVENOUS; SUBCUTANEOUS at 14:15

## 2021-10-05 RX ADMIN — AMLODIPINE BESYLATE 10 MG: 10 TABLET ORAL at 08:57

## 2021-10-05 RX ADMIN — METFORMIN HYDROCHLORIDE 850 MG: 850 TABLET, FILM COATED ORAL at 17:44

## 2021-10-05 RX ADMIN — SENNOSIDES AND DOCUSATE SODIUM 1 TABLET: 8.6; 5 TABLET ORAL at 22:00

## 2021-10-05 RX ADMIN — POTASSIUM CHLORIDE 20 MEQ: 1.5 FOR SOLUTION ORAL at 08:57

## 2021-10-05 RX ADMIN — CLOPIDOGREL BISULFATE 75 MG: 75 TABLET, FILM COATED ORAL at 08:57

## 2021-10-05 RX ADMIN — METFORMIN HYDROCHLORIDE 500 MG: 500 TABLET, FILM COATED ORAL at 08:57

## 2021-10-05 RX ADMIN — Medication 40 MG: at 05:32

## 2021-10-05 RX ADMIN — LIRAGLUTIDE 1.8 MG: 6 INJECTION SUBCUTANEOUS at 08:56

## 2021-10-05 RX ADMIN — ATENOLOL 25 MG: 25 TABLET ORAL at 21:59

## 2021-10-05 RX ADMIN — ATENOLOL 25 MG: 25 TABLET ORAL at 08:57

## 2021-10-05 RX ADMIN — ATORVASTATIN CALCIUM 80 MG: 80 TABLET, FILM COATED ORAL at 08:57

## 2021-10-05 NOTE — PROGRESS NOTES
This writer followed up on TCU referrals, updates listed below. As of 2:00pm today, no accepting TCUs due to acuity, bed avail and limited participation. Team updated in evelyn. PALAK will continue to follow.     PENDING FACILITIES:  [facility]   Phone: [phone], Fax: [phone]  10/05: Left vm for Mackenzie in admissions. Waiting on reply.      [facility]  Phone: [phone], Fax: [phone]  10/05: Left vm for Daksha in admissions. Waiting on reply.      [facility]   Phone: [phone], Fax: [phone]  10/5: Left VM for admissions. Waiting on reply.   -------------------------------------------------------------------------------------  ADDITIONAL REFERRALS SENT TODAY:   [facility] ([facility])  Global referral to [place]   -------------------------------------------------------------------------------------  DECLINED  [facility]  10/4: No available beds.      [facility]  10/4: Declined, no available beds. May have an opening end of week, but [facility]  10/4: Declined, no available beds until the week of 10/18.    [facility]  10/05: Declined due to participation in therapy and 1:1 for meals.    [facility]   10/5: Declined, no beds until next week (at earliest)     [facility]  10/5: Declined due to acuity     CRISTINA Neville   [facility]   Direct Phone: [phone]  I   Pager: [phone]  I  Fax: [phone]

## 2021-10-05 NOTE — PROGRESS NOTES
"  [facility]   Acute Rehabilitation Unit  Daily progress note    INTERVAL HISTORY  No acute events overnight.  Pt seen with his wife present.  This morning Mr. Marquez mouths that he is \"OK\", and shakes his head \"no\" when asked if he has leg pain, abdominal pain, nausea, chest pain, or shortness of breath.  Per wife is eating \"a little bit\", and per nursing flowsheets meals range from 25-75%.  Last BM 10/2.  Functionally was able to  parallel bars for 8 minutes today with MIn A, but continues to have ongoing fatigue.      MEDICATIONS  Scheduled:    amantadine  100 mg Oral Daily     amLODIPine  10 mg Oral Daily     aspirin  81 mg Oral Daily     atenolol  25 mg Oral BID     atorvastatin  80 mg Oral Daily     clopidogrel  75 mg Oral Daily     doxazosin  1 mg Oral Daily     insulin aspart   Subcutaneous TID w/meals     insulin aspart  1-7 Units Subcutaneous TID AC     insulin aspart  1-5 Units Subcutaneous At Bedtime     insulin detemir  13 Units Subcutaneous Q24H     liraglutide  1.8 mg Subcutaneous Daily     metFORMIN  850 mg Oral BID w/meals     pantoprazole  40 mg Oral QAM AC     potassium chloride  20 mEq Oral Daily     ramelteon  8 mg Oral At Bedtime     senna-docusate  1 tablet Oral At Bedtime        PRN:  acetaminophen, bisacodyl, glucose **OR** dextrose **OR** glucagon, diclofenac, hydrALAZINE, insulin aspart, lidocaine, ondansetron, polyethylene glycol, senna-docusate       PHYSICAL EXAM  Patient Vitals for the past 24 hrs:   BP Temp Temp src Pulse Resp SpO2   10/04/21 2013 (!) 144/79 -- -- 97 -- --   10/04/21 1544 126/72 (!) 95.3  F (35.2  C) Axillary 91 16 100 %       GEN: NAD, awake during visit, lying in bed  HEENT: NC/AT, MMM, Thrush on tongue resolved  CVS: RRR, no murmurs  PULM: non-labored on room air, lungs CTA bilaterally  ABD: soft, non-tender, non-distended, bowel sounds present  : Felix in place, draining clear/dark yellow urine  EXT: no edema appreciated " "in bilateral lower extremities  Neuro: Non-verbal but shakes head \"yes or no\" and mouthed a few words, RUE in resting splint      LABS  CBC RESULTS:   Recent Labs   Lab Test 10/04/21  0515 10/02/21  0549 09/27/21  0602 09/24/21  0641 09/24/21  0641   WBC 6.9  --  7.8  --  7.3   RBC 4.39*  --  4.69  --  4.61   HGB 11.9* 11.5* 12.7*   < > 12.3*   HCT 37.2*  --  39.6*  --  38.7*   MCV 85  --  84  --  84   MCH 27.1  --  27.1  --  26.7   MCHC 32.0  --  32.1  --  31.8   RDW 13.0  --  13.1  --  13.0     --  278  --  275    < > = values in this interval not displayed.     Last Basic Metabolic Panel:  Recent Labs   Lab Test 10/05/21  0209 10/04/21  2111 10/04/21  1731 10/04/21  0813 10/04/21  0515 10/02/21  0732 10/02/21  0549 10/01/21  1157 10/01/21  0542   NA  --   --   --   --  142  --  144  --  145*   POTASSIUM  --   --   --   --  3.4  --  3.6  --  3.3*   CHLORIDE  --   --   --   --  110*  --  112*  --  112*   CO2  --   --   --   --  31  --  30  --  30   ANIONGAP  --   --   --   --  1*  --  2*  --  3   * 229* 130*   < > 116*   < > 117*   < > 115*   BUN  --   --   --   --  28  --  29  --  40*   CR  --   --   --   --  1.39*  --  1.47*  --  1.64*   GFRESTIMATED  --   --   --   --  51*  --  47*  --  41*   ARLETH  --   --   --   --  9.1  --  8.6  --  8.9    < > = values in this interval not displayed.     Recent Labs   Lab 10/05/21  0209 10/04/21  2111 10/04/21  1731 10/04/21  1252 10/04/21  0813 10/04/21  0515   * 229* 130* 120* 97 116*       IMPRESSION  Jimmy Otto is a 71 year old male with a PMH including but not limited to HTN, uncontrolled DM 2 (HbA1c 11.5), HLD, BPH, and history of a stroke in 2012 with residual right hemiparesis who presented to the emergency room on 9/14/2021 with altered mental status and acute worsening of right-sided weakness, and found to have an acute right thalamic stroke due to small vessel occlusion.  Hospital course complicated by acute kidney injury, hypokalemia needing " repletion, and urinary retention and hematuria needing Felix catheter and urology consult.         Impairment group code: Stroke Ischemic 01.3 Bilateral Involvement, new R Thalamic stroke in setting of R sided weakness from prior stroke        PLAN  Rehabilitation  -Continue with inpatient rehabilitation program including PT, OT, and SLP for 3 hrs/day, rehab nursing, social work, nutrition, and close monitoring by physiatry.  -The patient has impairments in strength, balance, endurance, and coordination, which are leading to activity limitations in ambulation, mobility, ADLs, and swallowing.      Medical  1)Neurology  #R thalamic CVA due to small vessel occlusion  - DAPT with ASA 81 mg and Plavix 75 mg for 1 month, followed by Plavix alone indefinitely.  Recommended check P2Y12 level to ensure Plavix efficacy after 1 month of being on Plavix only.  - Hypertension, lipid, and glucose management as below.  Will need ongoing education regarding stroke risk factor modifications including importance of medication compliance and diet and lifestyle changes  - Follow-up with [facility] general neurology in 6 to 8 weeks  - Monitor somnolence/lethargy.  Decreased level of alertness not unexpected with essentially bilateral thalamic pathology (new right thalamic stroke and a history of left thalamic stroke).    - Amantadine 100 mg daily started on 9/20 for neurostimulation given ongoing somnolence which is limiting participation.  Increased Amantadine to 100 mg BID (7 am and noon) on 9/25.  Decreased back to 100 mg daily on 9/29 due to impaired renal function.  - High risk for delirium, ensure delirium precautions and appropriate sleep-wake cycle.  PRN melatonin available.        2)CVS  #HTN  - PTA on amlodipine 10 mg daily, atenolol 50 mg twice daily, Cozaar 100 mg daily, spironolactone 25 mg daily, and hydrochlorothiazide 25 mg daily.  Cozaar, spironolactone, and hydrochlorothiazide are being held due to acute kidney injury  -  Continue amlodipine 10 mg daily, and atenolol at a lower dose of 25 mg twice daily for now.  Titrate doses as needed.  - Hydralazine as needed for SBP greater than 180 mmHg     #HLD  - Continue Lipitor 80 mg daily.  LDL 92 on 9/15        3)Pulmonary  - No acute issues but low lung volumes and atelectasis/infiltrate seen at the left lung base on chest x-ray 9/14.  Clinically no signs of pneumonia but will continue to monitor.  - Encourage incentive spirometry        4)FENGI  #Diet: Currently on pureed (level 4) solids and slightly thick (level 1) liquids and free water protocol.   Upgrade further as able per speech therapy  - Aspiration precautions  - Consult nutrition for education and optimization given stroke, modified diet, and uncontrolled diabetes  - VFSS completed 9/24     #Bowels: As needed Senokot-S, MiraLAX, and Dulcolax suppository     #Hypokalemia  - Ongoing.  Started scheduled replacement 20 mEq daily.  Potassium 3.3 on 10/1.  Repleted with additional 30 mEq, recheck 10/2 WNL at 3.6, remains stable at 3.4 on 10/4.  Re-check BMP on Thursday..    #GI prophylaxis  -Protonix 40 mg daily given DAPT and age     #Oral Thrush  -Resolved after 7 day course of Nystatin     5)  #ALEX  - Baseline creatinine ~1.1, with a peak of 2.13 upon admission  - Had improved, then Cr increasing at 1.63 on 9/20,  likely pre-renal given lethargy and poor po intake.  Improved to 1.43 on 9/21 following IV fluids (9/19-9/20).  Continue to monitor, encourage fluids.  10/1 Cr 1.64 with BUN 40, both increased from yesterday.  Give 1L of saline again 10/1, re-check 10/2 with Cr improved to 1.47, further improved to 1.39 on 10/4.  Re-check Thursday.  - Continue holding Cozaar, hydrochlorothiazide, and spironolactone.        #Urinary retention and hematuria  - Trial of void attempted, but continued to retain urine and Felix was re-placed on 9/26. Will plan to keep in until follow up with urology.  - Urology consulted and recommend CT  urogram once creatinine normalizes and outpatient follow-up for cystoscopy to complete hematuria work-up.  - Recurrent hematuria noted, continue to monitor hgb levels.  Thus far have been stable, slight drop on 10/2, suspect maybe related to dilution in setting of IV fluids, remains stable today.  Hematuria improved today.  - Flomax 0.4 mg daily started, will continue        6)DVT prophylaxis  - Mechanical.  Will hold from chemoprophylaxis given hematuria and already on dual anti-platelet therapy        7)Pain  - Tylenol PRN        8)Endo  #Uncontrolled DM II (HbA1c 11.5)  - PTA was on Levemir 20 units nightly, NovoLog with meals with carb coverage, and Metformin 500 mg twice daily.  Unclear how compliant he was with his medications.  Endocrine assistance appreciated for help with management.  Recs 10/5:                  -increase Metformin from 500>850 mg BID with meals today                  -Lantus 13 units daily                  -Victoza 1.8 mg daily                  -Novolog 1:15g CHO coverage with meals and snacks/supplements- will discontinue beginning tomorrow AM                 -Novolog moderate intensity sliding scale TID AC and HS                 -BG monitoring TID AC, HS, 0200                 -hypoglycemia protocol                 -carb counting protocol                 -diabetes education- to be done with family/caregivers prior to discharge.     Planning for Discharge:                  -Lantus once daily, dose TBD                 -Victoza 1.8 mg once daily with breakfast                 -Metformin 850-1000 mg BID                 -hope for no mealtime insulin, +/- sliding scale insulin upon discharge for simplification.          9)Heme.    - Continue to monitor Hgb, especially in the setting of hematuria.  Re-check 10/2 with slight drop to 11.5, suspect maybe related to dilution in setting of IV fluids, remained stable on 10/4 at 11.9.  No hematuria seen this AM.      10)Psych  - Monitor mood, will  consult health psychology if needed  - Melatonin scheduled starting 10/1 due to complaints of sleep difficulty.   Ongoing issues, so started on ramelteon.  Patient reports improvement.      11)MSK  ~1.5 cm soft, non-tender, mobile nodule in left distal upper arm.  No overlying erythema, warmth, skin changes.  Seems most consistent with lipoma vs cyst.  - Monitor, but no further work-up/treatment indicated at this time           12)Social/Dispo  - Goals for discharge home at a supervision level for ambulation, mobility, and ADLs.  - ELOS: Tentative discharge date 10/8/21  - Rehab prognosis: Fair  - Follow up appointments: PCP, N General neurology clinic 6-8 weeks, urology for hematuria     Code status: Full Code (unable to discuss with patient given somnolence upon admission.  Have continued prior CODE STATUS and discussed with the son who confirms that his prior wishes were to be full code)    Juan Miguel Campo MD  Department of Rehabilitation Medicine    Time Spent on this Encounter   I, Juan Miguel Campo, spent a total of 25 minutes face-to-face or managing the care of Jimmy Otto. Over 50% of my time on the unit was spent counseling the patient and coordinating care. See note for details.

## 2021-10-05 NOTE — PLAN OF CARE
Discharge Planner Post-Acute Rehab OT:      Discharge Plan: home with 24/7 assistance, lift, hospital bed and WC vs TCU     Precautions: falls, hx of right sided weakness, variable alertness      Current Status:  ADLs: Total A for shower transfer using TIS commode chair, Max A don pants, Mod A socks, Max A for UB dressing tasks, Total A for toilet tx/transfers using michlel lift and commode chair, Min A for thoroughness with g/h tasks.  IADLs: Family support  Vision/Cognition: Wears glasses all the time. Lethargic, follows directions when alert.      Assessment:                                                          Pt's wife was present, used Cambodian interpretor for family training. Wife will not be the main caregiver but did educate her and instruct her in how to assist with LB dressing in bed and with lift transfer and placement of sling. Wife needing verbal instruction from therapist throughout but able to act as second assist.  -40  Other Barriers to Discharge (DME, Family Training, etc):   Family training scheduled 10/2 1:15pm, per daughter the pt's wife can be home for the pt but she is not necessarily going to be able to assist physically.   AE/DME WC, lift, hospital bed

## 2021-10-05 NOTE — PLAN OF CARE
Discharge Planner Post-Acute Rehab PT:      Discharge Plan: Attempting TCU placement, otherwise family will take home with lift and w/c, dependent for cares.     Precautions: Fall, alarms     Current Status:  Bed Mobility: mod A if alert  Transfer: variable based on alertness, lift dependent  Gait: variable based on alertness, w/c dependent   Stairs: Not safe  Balance: 1/36 PASS - although not truly reflective of capability due to drastic performance fluctuations with command following and lethargy.     Assessment: Pt's SO present for session, at start of session pt appeared alert, able to go to gym and  // bars and amb ~8' with min A though increased fatigue evident, with use of interpretor services was able to engage in family training with SO, though SO will not be main caregiver, was able to assist with sling placement and provide SBA for w/c > bed with liko lift and assist with rolling for sling removal. Pt immediately closed eyes once in bed. -25 minutes due to pt fatigue        Other Barriers to Discharge (DME, Family Training, etc): Safe discharge plan - will need 24/7 cares, level of assist/equipment pending lethargy/participation

## 2021-10-05 NOTE — PLAN OF CARE
Patient is alert, difficult to assess orientation d/t aphasia. Assist of 2 with Liko Lift. Denies pain, no nonverbal indicators of pain absent. Has tiago, patent. Last BM 10/02.  Pureed level 4 diet, slightly thickened liquids, thin liquids by spoonful after oral cares, takes pills crushed with applesauce. BG checks QID + 02:00, 130 at dinner and 229 at HS. Family helped patient with dinner.  Patient received bed bath today. VS stable. Continue POC.

## 2021-10-05 NOTE — PLAN OF CARE
FOCUS/GOAL  Bowel management, Bladder management, Skin integrity and Cognition/Memory/Judgment/Problem solving    ASSESSMENT, INTERVENTIONS AND CONTINUING PLAN FOR GOAL:  Pt is alert, unable to assess oriented due to aphasia, pt was reoriented to call light use after frequently using at start of shift but being able to indicate any need. Pt was assisted with drinking, using head nods to answer yes or no question, denied pain, sob, or new numbness and tingling. Felix in place and patent, no BM overnight. Skin intact, splint on RUE intact, Bg 173 at 2 am, no further care concerns at this time continue with POC.

## 2021-10-05 NOTE — PLAN OF CARE
FOCUS/GOAL  Bowel management, Bladder management, Pain management, Mobility, Skin integrity, and Safety management    ASSESSMENT, INTERVENTIONS AND CONTINUING PLAN FOR GOAL:  Alert, orientation unable to assess due to aphasia and whispered speech. VS stable. Level 4/1, pills crushed in applesauce. Continent of bowel, ordoñez in place for bladder management. Last BM 10/2. No complaints of pain this shift. Assist of 2 with liko to transfer. No new skin concerns. Calls some with light. Pushes buttons frequently without any needs. Encouraging awake time/out of bed during day to help with sleep overnight. Continue POC.

## 2021-10-05 NOTE — PROGRESS NOTES
IP Diabetes Management  Daily Note           Assessment and Plan:   HPI: Jimmy Otto is a 71 year old male with a PMHx  HTN, uncontrolled DM 2 (HbA1c 11.5), HLD, BPH, and history of a stroke in 2012 with residual right hemiparesis who presented to the emergency room on 9/14/2021 with altered mental status and acute worsening of right-sided weakness, and found to have an acute right thalamic stroke due to small vessel occlusion.  Hospital course complicated by acute kidney injury, hypokalemia needing repletion, and urinary retention and hematuria. At ARU for rehabilitation since 9/17/21.     Assessment:   1)Type II Diabetes Mellitus, uncontrolled (A1c 11.5% )    Plan:    -increase Metformin from 500>850 mg BID with meals today    -Lantus 13 units daily    -Victoza 1.8 mg daily    -Novolog 1:15g CHO coverage with meals and snacks/supplements- will discontinue beginning tomorrow AM   -Novolog moderate intensity sliding scale TID AC and HS   -BG monitoring TID AC, HS, 0200   -hypoglycemia protocol   -carb counting protocol   -diabetes education- to be done with family/caregivers prior to discharge.    Planning for Discharge:    -Lantus once daily, dose TBD   -Victoza 1.8 mg once daily with breakfast   -Metformin 850-1000 mg BID   -hope for no mealtime insulin, +/- sliding scale insulin upon discharge for simplification.     Outpatient follow up: TBD  Plan discussed with patient, family at bedside, and primary team       Interval History and Assessment: interval glucose trend reviewed:    this AM with Lantus 13, compared to 97 the morning previous with Lantus 16. Evening prandial hyperglycemia. He is getting medications crushed in applesauce.    Tolerating Metformin and Victoza without side effects. Renal function stable, to improved. GFR >45 consistently.     Discharge planning : home with family, if no TCU acceptance by Friday, is the plan.  In depth conversation for insulin planning for home was deferred today as  "it seemed there was a language barrier to understanding. Will return tomorrow with Camb[place]ian .     Current nutritional intake and type: Orders Placed This Encounter      Combination Diet Consistent Carb 60 Grams CHO per Meal Diet; Pureed Diet (level 4); Slightly Thick (level 1)      PTA Diabetes Regimen:   Levemir 28 units at bedtime  \"Novolog - varied doses based on chos\"  Clinic notes reveal:  Novolog 2 unit(s) per carb choice (2per 15)  Novolog 1 unit(s) per 50> 150     Metformin 500 mg BID (dose reduced for decreased renal fxn)     BG monitoring frequency:  FreeStyle Sherwin - intermittent use     Diet: regular, no restrictions  Eats sporadically - AM - bagel or banana  Lunch - sporadic  Dinner - rice/beg/meat  Loves to drink [employer]     Exercise: sedentary    Discharge Planning: TCU vs home, likely at end of week. 24 hour care was recommended if discharging home.            Diabetes History:   Type of Diabetes: Type 2 Diabetes Mellitus  Lab Results   Component Value Date    A1C 11.5 09/15/2021    A1C 7.6 08/14/2012              Review of Systems:     The Review of Systems is negative other than noted in the Interval History.           Medications:     Current Facility-Administered Medications   Medication     acetaminophen (TYLENOL) tablet 325-975 mg     amantadine (SYMMETREL) capsule 100 mg     amLODIPine (NORVASC) tablet 10 mg     aspirin (ASA) chewable tablet 81 mg     atenolol (TENORMIN) tablet 25 mg     atorvastatin (LIPITOR) tablet 80 mg     bisacodyl (DULCOLAX) Suppository 10 mg     clopidogrel (PLAVIX) tablet 75 mg     glucose gel 15-30 g    Or     dextrose 50 % injection 25-50 mL    Or     glucagon injection 1 mg     diclofenac (VOLTAREN) 1 % topical gel 2 g     doxazosin (CARDURA) tablet 1 mg     hydrALAZINE (APRESOLINE) tablet 10 mg     insulin aspart (NovoLOG) injection (RAPID ACTING)     insulin aspart (NovoLOG) injection (RAPID ACTING)     insulin aspart (NovoLOG) injection (RAPID " "ACTING)     insulin aspart (NovoLOG) injection (RAPID ACTING)     insulin detemir (LEVEMIR PEN) injection 13 Units     lidocaine (XYLOCAINE) 2 % external gel     liraglutide (VICTOZA) injection 1.8 mg     metFORMIN (GLUCOPHAGE) tablet 850 mg     ondansetron (ZOFRAN) tablet 4 mg     pantoprazole (PROTONIX) 2 mg/mL suspension 40 mg     polyethylene glycol (MIRALAX) Packet 17 g     potassium chloride (KLOR-CON) Packet 20 mEq     ramelteon (ROZEREM) tablet 8 mg     senna-docusate (SENOKOT-S/PERICOLACE) 8.6-50 MG per tablet 1 tablet     senna-docusate (SENOKOT-S/PERICOLACE) 8.6-50 MG per tablet 1-2 tablet            Physical Exam:    BP (!) 144/79   Pulse 97   Temp (!) 95.3  F (35.2  C) (Axillary)   Resp 16   Ht 1.626 m (5' 4\")   Wt 66.5 kg (146 lb 11.2 oz)   SpO2 100%   BMI 25.18 kg/m    General: pleasant, in no distress, sitting up in chair   HEENT: normocephalic, atraumatic. Oral mucous membranes moist.   Lungs: unlabored respiration, no cough  ABD: rounded, nondistended  Skin: warm and dry, no obvious lesions  MSK:  moves all extremities  Lymp:  no LE edema   Mental status:  alert, oriented to self, pt was aphasic, did not answer questions .  Psych:   calm and appropriate interaction             Data:     Recent Labs   Lab 10/05/21  0209 10/04/21  2111 10/04/21  1731 10/04/21  1252 10/04/21  0813 10/04/21  0515   * 229* 130* 120* 97 116*     Lab Results   Component Value Date    WBC 6.9 10/04/2021    WBC 7.8 09/27/2021    WBC 7.3 09/24/2021    HGB 11.9 (L) 10/04/2021    HGB 11.5 (L) 10/02/2021    HGB 12.7 (L) 09/27/2021    HCT 37.2 (L) 10/04/2021    HCT 39.6 (L) 09/27/2021    HCT 38.7 (L) 09/24/2021    MCV 85 10/04/2021    MCV 84 09/27/2021    MCV 84 09/24/2021     10/04/2021     09/27/2021     09/24/2021     Lab Results   Component Value Date     10/04/2021     10/02/2021     (H) 10/01/2021    POTASSIUM 3.4 10/04/2021    POTASSIUM 3.6 10/02/2021    POTASSIUM 3.3 " (L) 10/01/2021    CHLORIDE 110 (H) 10/04/2021    CHLORIDE 112 (H) 10/02/2021    CHLORIDE 112 (H) 10/01/2021    CO2 31 10/04/2021    CO2 30 10/02/2021    CO2 30 10/01/2021     (H) 10/05/2021     (H) 10/04/2021     (H) 10/04/2021     Lab Results   Component Value Date    BUN 28 10/04/2021    BUN 29 10/02/2021    BUN 40 (H) 10/01/2021     Lab Results   Component Value Date    TSH 1.00 08/14/2012     Lab Results   Component Value Date    AST 20 09/15/2021    AST 36 08/13/2012    ALT 22 09/15/2021    ALT 13 08/13/2012    ALKPHOS 80 09/15/2021    ALKPHOS 87 08/13/2012             Over 50% of my time on the unit was spent counseling the patient and/or coordinating care regarding acute hyperglycemia management.  See note for details.    To contact Endocrine Diabetes service:   From 8AM-4PM: page inpatient diabetes provider that is following the patient  For questions or updates from 4PM-8AM: page the diabetes job code for on call fellow: [phone]    Janet Duran PA-C  Inpatient Diabetes Management Service  Pager [phone]

## 2021-10-05 NOTE — PLAN OF CARE
Discharge Planner Post-Acute Rehab SLP:      Discharge Plan:home with assist, likely further SLP     Precautions:fall, swallowing     Current Status:  Communication:  pt limited voicing/aphonic, follows simple directions, some mild aphasia/dysarthria baseline per son, worsened now. Working on expression/comprehension as well as assistive technology ie picure board  Cognition: to be assessed as appropriate  Swallow: IDDSI 4,1(pureed, slightly thick ok by cup). Riuz Free Water protocol via tsp/sips between meals with supervision. 1:1 for po/meals, assist to feed PRN, slow pace, small bites,/sips no straw.  Assessment : SLP: reassessment,auditory comprehension basic yes/no 9/10, one step directions 9/10, 2 step 4/5. Also assessed basic memory - matching task pictures, pt did appear to have some retention with repetition. Pt also seen for swallowing at meal, wife present, assisting PRn, one time slight cough on slightly thick , otherwise no outward sx aspiration during meal on pureed, or slightly thick pt taking small bites, and sips slow pace with min cues, do note ongoing delayed swallow completion       Other Barriers to Discharge (Family Training, etc): TBD

## 2021-10-06 ENCOUNTER — APPOINTMENT (OUTPATIENT)
Dept: SPEECH THERAPY | Facility: CLINIC | Age: 71
DRG: 057 | End: 2021-10-06
Attending: PHYSICAL MEDICINE & REHABILITATION
Payer: MEDICARE

## 2021-10-06 ENCOUNTER — APPOINTMENT (OUTPATIENT)
Dept: PHYSICAL THERAPY | Facility: CLINIC | Age: 71
DRG: 057 | End: 2021-10-06
Attending: PHYSICAL MEDICINE & REHABILITATION
Payer: MEDICARE

## 2021-10-06 ENCOUNTER — APPOINTMENT (OUTPATIENT)
Dept: OCCUPATIONAL THERAPY | Facility: CLINIC | Age: 71
DRG: 057 | End: 2021-10-06
Attending: PHYSICAL MEDICINE & REHABILITATION
Payer: MEDICARE

## 2021-10-06 VITALS
WEIGHT: 146.7 LBS | BODY MASS INDEX: 25.04 KG/M2 | OXYGEN SATURATION: 97 % | HEART RATE: 96 BPM | TEMPERATURE: 97.2 F | SYSTOLIC BLOOD PRESSURE: 125 MMHG | HEIGHT: 64 IN | RESPIRATION RATE: 20 BRPM | DIASTOLIC BLOOD PRESSURE: 73 MMHG

## 2021-10-06 LAB
GLUCOSE BLDC GLUCOMTR-MCNC: 152 MG/DL (ref 70–99)
GLUCOSE BLDC GLUCOMTR-MCNC: 185 MG/DL (ref 70–99)
GLUCOSE BLDC GLUCOMTR-MCNC: 288 MG/DL (ref 70–99)
HOLD SPECIMEN: NORMAL
POTASSIUM BLD-SCNC: 3.9 MMOL/L (ref 3.4–5.3)

## 2021-10-06 PROCEDURE — 97110 THERAPEUTIC EXERCISES: CPT | Mod: GP | Performed by: REHABILITATION PRACTITIONER

## 2021-10-06 PROCEDURE — 250N000013 HC RX MED GY IP 250 OP 250 PS 637: Performed by: PHYSICIAN ASSISTANT

## 2021-10-06 PROCEDURE — 250N000013 HC RX MED GY IP 250 OP 250 PS 637: Performed by: PHYSICAL MEDICINE & REHABILITATION

## 2021-10-06 PROCEDURE — 84132 ASSAY OF SERUM POTASSIUM: CPT | Performed by: PHYSICAL MEDICINE & REHABILITATION

## 2021-10-06 PROCEDURE — 92526 ORAL FUNCTION THERAPY: CPT | Mod: GN

## 2021-10-06 PROCEDURE — 97535 SELF CARE MNGMENT TRAINING: CPT | Mod: GO | Performed by: OCCUPATIONAL THERAPIST

## 2021-10-06 PROCEDURE — 36415 COLL VENOUS BLD VENIPUNCTURE: CPT | Performed by: PHYSICAL MEDICINE & REHABILITATION

## 2021-10-06 PROCEDURE — 97530 THERAPEUTIC ACTIVITIES: CPT | Mod: GP | Performed by: REHABILITATION PRACTITIONER

## 2021-10-06 PROCEDURE — 99239 HOSP IP/OBS DSCHRG MGMT >30: CPT | Performed by: PHYSICAL MEDICINE & REHABILITATION

## 2021-10-06 RX ORDER — LIRAGLUTIDE 6 MG/ML
1.8 INJECTION SUBCUTANEOUS DAILY
Status: ON HOLD | DISCHARGE
Start: 2021-10-06 | End: 2021-11-03

## 2021-10-06 RX ORDER — POLYETHYLENE GLYCOL 3350 17 G/17G
17 POWDER, FOR SOLUTION ORAL DAILY PRN
DISCHARGE
Start: 2021-10-06

## 2021-10-06 RX ORDER — RAMELTEON 8 MG/1
8 TABLET ORAL AT BEDTIME
Status: ON HOLD | DISCHARGE
Start: 2021-10-06 | End: 2021-11-03

## 2021-10-06 RX ORDER — ACETAMINOPHEN 325 MG/1
325-975 TABLET ORAL EVERY 6 HOURS PRN
DISCHARGE
Start: 2021-10-06 | End: 2023-04-21

## 2021-10-06 RX ORDER — SENNOSIDES 8.6 MG
8.6 TABLET ORAL AT BEDTIME
Status: DISCONTINUED | OUTPATIENT
Start: 2021-10-07 | End: 2021-10-06 | Stop reason: HOSPADM

## 2021-10-06 RX ORDER — POTASSIUM CHLORIDE 1.5 G/1.58G
20 POWDER, FOR SOLUTION ORAL DAILY
Status: ON HOLD | DISCHARGE
Start: 2021-10-06 | End: 2021-11-03

## 2021-10-06 RX ORDER — AMLODIPINE BESYLATE 10 MG/1
10 TABLET ORAL DAILY
Status: ON HOLD | DISCHARGE
Start: 2021-10-06 | End: 2021-11-03

## 2021-10-06 RX ORDER — AMANTADINE HYDROCHLORIDE 100 MG/1
100 CAPSULE, GELATIN COATED ORAL DAILY
DISCHARGE
Start: 2021-10-06 | End: 2022-10-28

## 2021-10-06 RX ORDER — DOXAZOSIN 1 MG/1
1 TABLET ORAL DAILY
Status: ON HOLD | DISCHARGE
Start: 2021-10-06 | End: 2021-11-03

## 2021-10-06 RX ORDER — DOCUSATE SODIUM 100 MG/1
100 CAPSULE, LIQUID FILLED ORAL 2 TIMES DAILY
Status: ON HOLD | DISCHARGE
Start: 2021-10-06 | End: 2021-11-03

## 2021-10-06 RX ORDER — BISACODYL 10 MG
10 SUPPOSITORY, RECTAL RECTAL DAILY PRN
Status: ON HOLD | DISCHARGE
Start: 2021-10-06 | End: 2021-11-03

## 2021-10-06 RX ORDER — DOCUSATE SODIUM 100 MG/1
100 CAPSULE, LIQUID FILLED ORAL 2 TIMES DAILY
Status: DISCONTINUED | OUTPATIENT
Start: 2021-10-06 | End: 2021-10-06 | Stop reason: HOSPADM

## 2021-10-06 RX ORDER — ATENOLOL 25 MG/1
25 TABLET ORAL 2 TIMES DAILY
DISCHARGE
Start: 2021-10-06

## 2021-10-06 RX ORDER — SENNOSIDES 8.6 MG
1 TABLET ORAL AT BEDTIME
DISCHARGE
Start: 2021-10-07 | End: 2022-01-21

## 2021-10-06 RX ADMIN — ATENOLOL 25 MG: 25 TABLET ORAL at 08:36

## 2021-10-06 RX ADMIN — ASPIRIN 81 MG CHEWABLE TABLET 81 MG: 81 TABLET CHEWABLE at 08:36

## 2021-10-06 RX ADMIN — INSULIN ASPART 1 UNITS: 100 INJECTION, SOLUTION INTRAVENOUS; SUBCUTANEOUS at 17:23

## 2021-10-06 RX ADMIN — INSULIN ASPART 3 UNITS: 100 INJECTION, SOLUTION INTRAVENOUS; SUBCUTANEOUS at 12:09

## 2021-10-06 RX ADMIN — DOXAZOSIN 1 MG: 1 TABLET ORAL at 08:36

## 2021-10-06 RX ADMIN — AMLODIPINE BESYLATE 10 MG: 10 TABLET ORAL at 08:36

## 2021-10-06 RX ADMIN — AMANTADINE HYDROCHLORIDE 100 MG: 100 CAPSULE ORAL at 08:36

## 2021-10-06 RX ADMIN — POLYETHYLENE GLYCOL 3350 17 G: 17 POWDER, FOR SOLUTION ORAL at 09:25

## 2021-10-06 RX ADMIN — POTASSIUM CHLORIDE 20 MEQ: 1.5 FOR SOLUTION ORAL at 08:36

## 2021-10-06 RX ADMIN — METFORMIN HYDROCHLORIDE 850 MG: 850 TABLET, FILM COATED ORAL at 08:36

## 2021-10-06 RX ADMIN — ATORVASTATIN CALCIUM 80 MG: 80 TABLET, FILM COATED ORAL at 08:36

## 2021-10-06 RX ADMIN — PANTOPRAZOLE SODIUM 40 MG: 40 TABLET, DELAYED RELEASE ORAL at 06:34

## 2021-10-06 RX ADMIN — METFORMIN HYDROCHLORIDE 850 MG: 850 TABLET, FILM COATED ORAL at 19:06

## 2021-10-06 RX ADMIN — LIRAGLUTIDE 1.8 MG: 6 INJECTION SUBCUTANEOUS at 08:37

## 2021-10-06 RX ADMIN — CLOPIDOGREL BISULFATE 75 MG: 75 TABLET, FILM COATED ORAL at 08:36

## 2021-10-06 RX ADMIN — BISACODYL 10 MG: 10 SUPPOSITORY RECTAL at 13:34

## 2021-10-06 NOTE — PROGRESS NOTES
[facility]   Acute Rehabilitation Unit  Daily progress note    INTERVAL HISTORY  No acute events overnight.  Pt seen with wife and daughter present and family training ongoing.  No acute events overnight and this morning Ho denies any concerns or complaints, however family states he has been complaining of bilateral leg pain with them.  They are asking if he could use [employer], will place an order for topical icy/hot PRN.  Has been accepted to a TCU, however there is a confirmed COVID case which is making family somewhat hesitant, although have not made a final decision yet.  Daughter asking appropriate questions including checking renal function, hydration, and catheter cares should he discharge home.  Functionally, Max A x2 for pants, Max A for UBD, dependent for socks, able to  parallel bars and ambulate 8 ft with Min A although fatigue still evident.     MEDICATIONS  Scheduled:    amantadine  100 mg Oral Daily     amLODIPine  10 mg Oral Daily     aspirin  81 mg Oral Daily     atenolol  25 mg Oral BID     atorvastatin  80 mg Oral Daily     clopidogrel  75 mg Oral Daily     doxazosin  1 mg Oral Daily     insulin aspart  1-7 Units Subcutaneous TID AC     insulin aspart  1-5 Units Subcutaneous At Bedtime     insulin detemir  15 Units Subcutaneous Q24H     liraglutide  1.8 mg Subcutaneous Daily     metFORMIN  850 mg Oral BID w/meals     pantoprazole  40 mg Oral QAM AC     potassium chloride  20 mEq Oral Daily     ramelteon  8 mg Oral At Bedtime     senna-docusate  1 tablet Oral At Bedtime        PRN:  acetaminophen, bisacodyl, glucose **OR** dextrose **OR** glucagon, diclofenac, hydrALAZINE, insulin aspart, lidocaine, methyl salicylate-menthol, ondansetron, polyethylene glycol, senna-docusate       PHYSICAL EXAM  Patient Vitals for the past 24 hrs:   BP Temp Temp src Pulse Resp SpO2   10/06/21 0835 125/73 -- -- 96 -- --   10/06/21 0607 138/80 97.2  F (36.2  C) Oral 96 20  "97 %   10/05/21 2159 119/69 -- -- 88 -- --   10/05/21 1520 111/62 (!) 96.4  F (35.8  C) Axillary 91 16 97 %       GEN: NAD, awake during visit, sitting in wheelchair  HEENT: NC/AT, MMM  CVS: RRR, no murmurs  PULM: non-labored on room air, lungs CTA bilaterally  ABD: soft, non-tender, non-distended, bowel sounds present  : Felix in place, draining clear christine colored yellow urine  EXT: no edema appreciated in bilateral lower extremities  Neuro: Non-verbal but shakes head \"yes or no\" and mouthed a few words      LABS  CBC RESULTS:   Recent Labs   Lab Test 10/04/21  0515 10/02/21  0549 09/27/21  0602 09/24/21  0641 09/24/21  0641   WBC 6.9  --  7.8  --  7.3   RBC 4.39*  --  4.69  --  4.61   HGB 11.9* 11.5* 12.7*   < > 12.3*   HCT 37.2*  --  39.6*  --  38.7*   MCV 85  --  84  --  84   MCH 27.1  --  27.1  --  26.7   MCHC 32.0  --  32.1  --  31.8   RDW 13.0  --  13.1  --  13.0     --  278  --  275    < > = values in this interval not displayed.     Last Basic Metabolic Panel:  Recent Labs   Lab Test 10/06/21  0554 10/06/21  0215 10/05/21  2200 10/05/21  1707 10/04/21  0813 10/04/21  0515 10/02/21  0732 10/02/21  0549 10/01/21  1157 10/01/21  0542   NA  --   --   --   --   --  142  --  144  --  145*   POTASSIUM 3.9  --   --   --   --  3.4  --  3.6  --  3.3*   CHLORIDE  --   --   --   --   --  110*  --  112*  --  112*   CO2  --   --   --   --   --  31  --  30  --  30   ANIONGAP  --   --   --   --   --  1*  --  2*  --  3   GLC  --  152* 194* 136*   < > 116*   < > 117*   < > 115*   BUN  --   --   --   --   --  28  --  29  --  40*   CR  --   --   --   --   --  1.39*  --  1.47*  --  1.64*   GFRESTIMATED  --   --   --   --   --  51*  --  47*  --  41*   ARLETH  --   --   --   --   --  9.1  --  8.6  --  8.9    < > = values in this interval not displayed.     Recent Labs   Lab 10/06/21  0215 10/05/21  2200 10/05/21  1707 10/05/21  1255 10/05/21  0209 10/04/21  2111   * 194* 136* 217* 176* 229*       IMPRESSION  Ho Seak " is a 71 year old male with a PMH including but not limited to HTN, uncontrolled DM 2 (HbA1c 11.5), HLD, BPH, and history of a stroke in 2012 with residual right hemiparesis who presented to the emergency room on 9/14/2021 with altered mental status and acute worsening of right-sided weakness, and found to have an acute right thalamic stroke due to small vessel occlusion.  Hospital course complicated by acute kidney injury, hypokalemia needing repletion, and urinary retention and hematuria needing Felix catheter and urology consult.         Impairment group code: Stroke Ischemic 01.3 Bilateral Involvement, new R Thalamic stroke in setting of R sided weakness from prior stroke        PLAN  Rehabilitation  -Continue with inpatient rehabilitation program including PT, OT, and SLP for 3 hrs/day, rehab nursing, social work, nutrition, and close monitoring by physiatry.  -The patient has impairments in strength, balance, endurance, and coordination, which are leading to activity limitations in ambulation, mobility, ADLs, and swallowing.      Medical  1)Neurology  #R thalamic CVA due to small vessel occlusion  - DAPT with ASA 81 mg and Plavix 75 mg for 1 month, followed by Plavix alone indefinitely.  Recommended check P2Y12 level to ensure Plavix efficacy after 1 month of being on Plavix only.  - Hypertension, lipid, and glucose management as below.  Will need ongoing education regarding stroke risk factor modifications including importance of medication compliance and diet and lifestyle changes  - Follow-up with [facility] general neurology in 6 to 8 weeks  - Monitor somnolence/lethargy.  Decreased level of alertness not unexpected with essentially bilateral thalamic pathology (new right thalamic stroke and a history of left thalamic stroke).    - Amantadine 100 mg daily started on 9/20 for neurostimulation given ongoing somnolence which is limiting participation.  Increased Amantadine to 100 mg BID (7 am and noon) on 9/25.   Decreased back to 100 mg daily on 9/29 due to impaired renal function.  - High risk for delirium, ensure delirium precautions and appropriate sleep-wake cycle.  PRN melatonin available.        2)CVS  #HTN  - PTA on amlodipine 10 mg daily, atenolol 50 mg twice daily, Cozaar 100 mg daily, spironolactone 25 mg daily, and hydrochlorothiazide 25 mg daily.  Cozaar, spironolactone, and hydrochlorothiazide are being held due to acute kidney injury  - Continue amlodipine 10 mg daily, and atenolol at a lower dose of 25 mg twice daily for now.  Titrate doses as needed.  - Hydralazine as needed for SBP greater than 180 mmHg     #HLD  - Continue Lipitor 80 mg daily.  LDL 92 on 9/15        3)Pulmonary  - No acute issues but low lung volumes and atelectasis/infiltrate seen at the left lung base on chest x-ray 9/14.  Clinically no signs of pneumonia but will continue to monitor.  - Encourage incentive spirometry        4)FENGI  #Diet: Currently on pureed (level 4) solids and slightly thick (level 1) liquids and free water protocol.   Upgrade further as able per speech therapy  - Aspiration precautions  - Consult nutrition for education and optimization given stroke, modified diet, and uncontrolled diabetes  - VFSS completed 9/24     #Bowels: As needed Senokot-S, MiraLAX, and Dulcolax suppository     #Hypokalemia  - Ongoing.  Started scheduled replacement 20 mEq daily.  Potassium 3.9 on 10/6.  Re-check BMP tomorrow.    #GI prophylaxis  -Protonix 40 mg daily given DAPT and age     #Oral Thrush  -Resolved after 7 day course of Nystatin     5)  #ALEX  - Baseline creatinine ~1.1, with a peak of 2.13 upon admission  - Had improved, then Cr increasing at 1.63 on 9/20,  likely pre-renal given lethargy and poor po intake.  Improved to 1.43 on 9/21 following IV fluids (9/19-9/20).  Continue to monitor, encourage fluids.  10/1 Cr 1.64 with BUN 40, both increased from yesterday.  Give 1L of saline again 10/1, re-check 10/2 with Cr improved to  1.47, further improved to 1.39 on 10/4.  Re-check Thursday.  - Continue holding Cozaar, hydrochlorothiazide, and spironolactone.        #Urinary retention and hematuria  - Trial of void attempted, but continued to retain urine and Felix was re-placed on 9/26. Will plan to keep in until follow up with urology.  - Urology consulted and recommend CT urogram once creatinine normalizes and outpatient follow-up for cystoscopy to complete hematuria work-up.  - Recurrent hematuria noted, continue to monitor hgb levels.  Thus far have been stable, slight drop on 10/2, suspect maybe related to dilution in setting of IV fluids, remains stable today.  Hematuria improved today.  - Flomax 0.4 mg daily started, will continue        6)DVT prophylaxis  - Mechanical.  Will hold from chemoprophylaxis given hematuria and already on dual anti-platelet therapy        7)Pain  - Tylenol PRN  -Add Icy/Hot topical to legs PRN        8)Endo  #Uncontrolled DM II (HbA1c 11.5)  - PTA was on Levemir 20 units nightly, NovoLog with meals with carb coverage, and Metformin 500 mg twice daily.  Unclear how compliant he was with his medications.  Endocrine assistance appreciated for help with management.  Recs 10/5:                  -increase Metformin from 500>850 mg BID with meals today                  -Lantus 13 units daily                  -Victoza 1.8 mg daily                  -Novolog 1:15g CHO coverage with meals and snacks/supplements- will discontinue beginning tomorrow AM                 -Novolog moderate intensity sliding scale TID AC and HS                 -BG monitoring TID AC, HS, 0200                 -hypoglycemia protocol                 -carb counting protocol                 -diabetes education- to be done with family/caregivers prior to discharge.     Planning for Discharge:                  -Lantus once daily, dose TBD                 -Victoza 1.8 mg once daily with breakfast                 -Metformin 850-1000 mg BID                  -hope for no mealtime insulin, +/- sliding scale insulin upon discharge for simplification.          9)Heme.    - Continue to monitor Hgb, especially in the setting of hematuria.  Re-check 10/2 with slight drop to 11.5, suspect maybe related to dilution in setting of IV fluids, remained stable on 10/4 at 11.9.  No hematuria seen this AM. Check CBC in am.      10)Psych  - Monitor mood, will consult health psychology if needed  - Melatonin scheduled starting 10/1 due to complaints of sleep difficulty.   Ongoing issues, so started on ramelteon.  Patient reports improvement.       11)MSK  ~1.5 cm soft, non-tender, mobile nodule in left distal upper arm.  No overlying erythema, warmth, skin changes.  Seems most consistent with lipoma vs cyst.  - Monitor, but no further work-up/treatment indicated at this time           12)Social/Dispo  - Goals for discharge home at a supervision level for ambulation, mobility, and ADLs.  - ELOS: Tentative discharge date 10/8/21  - Rehab prognosis: Fair  - Follow up appointments: PCP, [facility] General neurology clinic 6-8 weeks, urology for hematuria     Code status: Full Code (unable to discuss with patient given somnolence upon admission.  Have continued prior CODE STATUS and discussed with the son who confirms that his prior wishes were to be full code)    Juan Miguel Campo MD  Department of Rehabilitation Medicine    Time Spent on this Encounter   I, Juan Miguel Campo, spent a total of 25 minutes face-to-face or managing the care of Jimmy Otto. Over 50% of my time on the unit was spent counseling the patient and coordinating care. See note for details.

## 2021-10-06 NOTE — PLAN OF CARE
FOCUS/GOAL  Bowel management, Medication management, and Safety management    ASSESSMENT, INTERVENTIONS AND CONTINUING PLAN FOR GOAL:  Pt Alert with severe aphasia, able to shake head for yes or no, occasionally speaks on or two words.  Shook head no when asked about pain, cough, sob, chest pain, dizziness, N/V, N/T. Pt has ordoñez in place, cleansed with wipes, blood noted around cath, cath irrigated once a shift, emptied for 400 on writers shift.  Pt has not had BM since the 2nd, PRN miralax given, helped onto bed pan later in shift but was unsuccessful, pt and family agreed to try suppository if no success by mely.  Liko for transfers.  Pureed/ slightly thick liquids, taking pills crushed with applesauce. Working with therapies.  Late in writers shift ride set for pt to discharge to TCU at 1800. Nursing will continue to monitor.

## 2021-10-06 NOTE — PROGRESS NOTES
IP Diabetes Management  Daily Note           Assessment and Plan:   HPI: Jimmy Otto is a 71 year old male with a PMHx  HTN, uncontrolled DM 2 (HbA1c 11.5), HLD, BPH, and history of a stroke in 2012 with residual right hemiparesis who presented to the emergency room on 9/14/2021 with altered mental status and acute worsening of right-sided weakness, and found to have an acute right thalamic stroke due to small vessel occlusion.  Hospital course complicated by acute kidney injury, hypokalemia needing repletion, and urinary retention and hematuria. At ARU for rehabilitation since 9/17/21.     Assessment:   1)Type II Diabetes Mellitus, uncontrolled (A1c 11.5% )    Plan:    -Metformin  850 mg BID with meals - if renal function stable and tolerating, may be able to increase to 1000 mg BID on discharge.   -Lantus 15 units daily > assess for increase tomorrow   -Victoza 1.8 mg daily    -Novolog 1:15g CHO coverage with meals and snacks discontinued today.   -Novolog moderate intensity sliding scale TID AC and HS   -BG monitoring TID AC, HS, 0200   -hypoglycemia protocol   -carb counting protocol   -diabetes education- to be done with family/caregivers prior to home discharge.    Addendum 1400: pt is slated for outside TCU discharge this afternoon- updated discharge orders below:    Planning for TCU Discharge:    -Levemir 18 units once daily HS   -Victoza 1.8 mg once daily with breakfast   -Metformin 1000 mg BID   -No Novolog mealtime insulin   -Novolog moderate intensity sliding scale AC/HS: ** family requesting this not be prescribed on discharge home, to simplify plan.      Moderate Intensity  Sliding Scale (1:50)  Mealtime Scale  BG less than 140, none   - 189 = 1 unit.    - 239 = 2    - 289 = 3     - 339 = 4   BG over 340 = 5     Bedtime Scale  BG less than 200, none   - 249 = 1 unit.    - 299 = 2     - 349 = 3   BG over 350 = 4       Outpatient follow up: TBD  Plan discussed with  "patient, family at bedside, and primary team       Interval History and Assessment: interval glucose trend reviewed:   BG occasionally above target, but improved following first Metformin increased dose yesterday.    Discharge planning : home with family, if no TCU acceptance by Friday, is the plan.  CDE aware teaching will be needed with family.    Daughter was present today; appreciative of care. She was hopeful, upon further introspection, to not have her mother and father need to utilize a sliding scale on discharge. We will attempt to titrate Metformin and Lantus such that control is optimized without the need for Novolog.     Current nutritional intake and type: Orders Placed This Encounter      Combination Diet Consistent Carb 60 Grams CHO per Meal Diet; Pureed Diet (level 4); Slightly Thick (level 1)      PTA Diabetes Regimen:   Levemir 28 units at bedtime  \"Novolog - varied doses based on chos\"  Clinic notes reveal:  Novolog 2 unit(s) per carb choice (2per 15)  Novolog 1 unit(s) per 50> 150     Metformin 500 mg BID (dose reduced for decreased renal fxn)     BG monitoring frequency:  FreeStyle Sherwin - intermittent use     Diet: regular, no restrictions  Eats sporadically - AM - bagel or banana  Lunch - sporadic  Dinner - rice/beg/meat  Loves to drink [employer]     Exercise: sedentary    Discharge Planning: TCU vs home, likely at end of week. 24 hour care was recommended if discharging home.            Diabetes History:   Type of Diabetes: Type 2 Diabetes Mellitus  Lab Results   Component Value Date    A1C 11.5 09/15/2021    A1C 7.6 08/14/2012              Review of Systems:     The Review of Systems is negative other than noted in the Interval History.           Medications:     Current Facility-Administered Medications   Medication     acetaminophen (TYLENOL) tablet 325-975 mg     amantadine (SYMMETREL) capsule 100 mg     amLODIPine (NORVASC) tablet 10 mg     aspirin (ASA) chewable tablet 81 mg     " "atenolol (TENORMIN) tablet 25 mg     atorvastatin (LIPITOR) tablet 80 mg     bisacodyl (DULCOLAX) Suppository 10 mg     clopidogrel (PLAVIX) tablet 75 mg     glucose gel 15-30 g    Or     dextrose 50 % injection 25-50 mL    Or     glucagon injection 1 mg     diclofenac (VOLTAREN) 1 % topical gel 2 g     doxazosin (CARDURA) tablet 1 mg     hydrALAZINE (APRESOLINE) tablet 10 mg     insulin aspart (NovoLOG) injection (RAPID ACTING)     insulin aspart (NovoLOG) injection (RAPID ACTING)     insulin aspart (NovoLOG) injection (RAPID ACTING)     insulin detemir (LEVEMIR PEN) injection 15 Units     lidocaine (XYLOCAINE) 2 % external gel     liraglutide (VICTOZA) injection 1.8 mg     menthol (Topical Analgesic) 2.5% (BENGAY VANISHING SCENT) 2.5 % topical gel     metFORMIN (GLUCOPHAGE) tablet 850 mg     ondansetron (ZOFRAN) tablet 4 mg     pantoprazole (PROTONIX) EC tablet 40 mg     polyethylene glycol (MIRALAX) Packet 17 g     potassium chloride (KLOR-CON) Packet 20 mEq     ramelteon (ROZEREM) tablet 8 mg     senna-docusate (SENOKOT-S/PERICOLACE) 8.6-50 MG per tablet 1 tablet     senna-docusate (SENOKOT-S/PERICOLACE) 8.6-50 MG per tablet 1-2 tablet            Physical Exam:    /73 (BP Location: Left arm)   Pulse 96   Temp 97.2  F (36.2  C) (Oral)   Resp 20   Ht 1.626 m (5' 4\")   Wt 66.5 kg (146 lb 11.2 oz)   SpO2 97%   BMI 25.18 kg/m    General: pleasant, in no distress, sitting up in chair. multiple family members present.  HEENT: normocephalic, atraumatic. Oral mucous membranes moist.   Lungs: unlabored respiration, no cough  ABD: rounded, nondistended  Skin: warm and dry, no obvious lesions  MSK:  moves all extremities  Lymp:  no LE edema   Mental status:  alert, oriented to self, pt was aphasic, did not answer questions but nodded occasionally to his daughter  Psych:   calm            Data:     Recent Labs   Lab 10/06/21  1126 10/06/21  0215 10/05/21  2200 10/05/21  1707 10/05/21  1255 10/05/21  0209   GLC " 288* 152* 194* 136* 217* 176*     Lab Results   Component Value Date    WBC 6.9 10/04/2021    WBC 7.8 09/27/2021    WBC 7.3 09/24/2021    HGB 11.9 (L) 10/04/2021    HGB 11.5 (L) 10/02/2021    HGB 12.7 (L) 09/27/2021    HCT 37.2 (L) 10/04/2021    HCT 39.6 (L) 09/27/2021    HCT 38.7 (L) 09/24/2021    MCV 85 10/04/2021    MCV 84 09/27/2021    MCV 84 09/24/2021     10/04/2021     09/27/2021     09/24/2021     Lab Results   Component Value Date     10/04/2021     10/02/2021     (H) 10/01/2021    POTASSIUM 3.9 10/06/2021    POTASSIUM 3.4 10/04/2021    POTASSIUM 3.6 10/02/2021    CHLORIDE 110 (H) 10/04/2021    CHLORIDE 112 (H) 10/02/2021    CHLORIDE 112 (H) 10/01/2021    CO2 31 10/04/2021    CO2 30 10/02/2021    CO2 30 10/01/2021     (H) 10/06/2021     (H) 10/06/2021     (H) 10/05/2021     Lab Results   Component Value Date    BUN 28 10/04/2021    BUN 29 10/02/2021    BUN 40 (H) 10/01/2021     Lab Results   Component Value Date    TSH 1.00 08/14/2012     Lab Results   Component Value Date    AST 20 09/15/2021    AST 36 08/13/2012    ALT 22 09/15/2021    ALT 13 08/13/2012    ALKPHOS 80 09/15/2021    ALKPHOS 87 08/13/2012       Over 50% of my time on the unit was spent counseling the patient and/or coordinating care regarding acute hyperglycemia management.  See note for details.    To contact Endocrine Diabetes service:   From 8AM-4PM: page inpatient diabetes provider that is following the patient  For questions or updates from 4PM-8AM: page the diabetes job code for on call fellow: [phone]    Janet Duran PA-C  Inpatient Diabetes Management Service  Pager [phone]

## 2021-10-06 NOTE — PLAN OF CARE
Patient is alert, difficult to assess orientation d/t aphasia. Assist of 2 with Liko Lift. Denies pain, nonverbal indicators of pain absent. Has tiago, patent. Last BM 10/02.  Pureed level 4 diet, slightly thickened liquids, thin liquids by spoonful after oral cares, takes pills crushed with applesauce. BG checks QID + 02:00, 130 at dinner and 194 at HS. Family helped patient with dinner. VS stable. Continue POC.

## 2021-10-06 NOTE — DISCHARGE INSTRUCTIONS
Follow up appointments    - Primary Care Provider - hospital follow-up and recommended check P2Y12 level to ensure Plavix efficacy after 1 month of being on Plavix only.  You are scheduled to see Dr. Nallely Fong on Tuesday, October 19th at 11:30 AM.    Address  [facility]                          [place] [address]    Phone   [phone]  Fax                  [phone]      - [facility] neurology clinic 6-8 weeks  You are scheduled for a video visit with Dr. Liz on Friday, December 10th at 1:30 PM.  The clinic will text a link to the mobile we have on file, [phone], 30 minutes prior to the appointment.      Address [facility] - Neurology    [place] [address]   Phone  [phone]    - Urology for hematuria  Please call the number below to schedule a urology appointment.     Address [facility]  [facility] [place]  [address]  [address]    [place] [address]  Phone   [phone]    Home Health Care:   [facility] PH: [phone] (previously known as: [facility])  Nurse, physical therapy, occupational therapy, speech therapy, home health aide   -You will get a call after [facility] to schedule the first home care visit. The home health nurse should come out within the first 24-48 hours. If you don't get a call from them within the first 48 hours, please call to follow up (number above).     [place] [facility] [place] and Association  Additional resources and contact information online   Www.strokemn.org & www.[email or web address]    Types of services that Stroke/Brain Injury Resource Facilitation offers include:  Emotional support in coping with a  new normal   Finding mental health support and counselors who understand brain injury and stroke  Finding a support group  Finding or re-connecting to  rehabilitation services  Finding where and how to get a brain injury assessed  Finding or re-connecting with a primary care physician  Supporting caregivers by connecting them with resources  Education about diagnosis and symptoms -  Is this normal   Helping educate family and loved ones of the survivor s  invisible  symptoms  Figuring out the logistics of returning to work, vocational rehab and understanding ADA rights  If not going back to work, support in finding meaningful ways to structure your day and exploring volunteer options  Coaching on navigation the federal, state and [facility] health and disability service system with additional support and conference calls as needed  Help finding appropriate legal support for appealing and navigating Social Security Disability, providing advocacy on the individual s behalf as needed and connecting with cost effective alternatives to  as needed  Supporting parents/students with how to discuss return to school and sports with educators and connecting to a TBI specialist or parent advocate group if needed  Connecting to addiction (alcohol/drug/gambling/smoking) support and treatment services  Support with creative problem solving to life barriers.  *These are just a few examples of common things that Resource Facilitation can help with. They are not limited to what is listed here so if you are curious if they can help, just ask. Call us at [phone] or [phone].    ------------------------------------------------------------------------------------------------------------------------------------------------------------------------------------------------------------------------    To reduce the risk of subsequent stroke there are several important factors including optimal management of anticoagulants, blood pressure, cholesterol, diabetes and smoking abstinence.    Anticoagulation:  You are on Aspirin and Clopidogrel    Blood Pressure:  Keeping your blood  "pressures less than 130/80 has been shown to reduce risk of recurrent stroke. Recording your blood pressure and heart rate twice daily in a log book can help you and your providers make decisions on optimal management. You are encouraged to bring your log book with you to your primary physician and/or cardiology doctor visits.    You are currently on amlodipine, atenolol to help control your blood pressure. Several lifestyle modifications have been associated with blood pressure reduction and are an important part of a comprehensive plan. These include: weight loss (if over-weight); a diet low in salt and cholesterol and rich in fruits and vegetables; regular aerobic physical activity and limited alcohol consumption.    Diabetes:  Optimal management of diabetes not only reduces risk of another stroke, it can help with healing process from your recent stroke. Blood glucose levels measure how well you're controlling your diabetes. An additional test \"hemoglobin A1C\" reflects how you have been overall with glucose control in the prior few months. This should be less than 7 though even lower below 6 is considered normal. Your recent Hgb A1C was [insert A1C]. This should be followed up with your primary provider and/or endocrinologist.    Your present plan is to check blood glucose consistently Before Meals and at Bedtime. These should be recorded along with date/time and any relevant notes such as food consumed, insulin/medication taken etc. This helps you and your providers make decisions on optimal management. You're encouraged to bring you log book with to your primary and/or endocrinology doctor visits.  Your current medications include metformin, insulin Levemir, insulin Novolog, Victoza. Specific doses and frequencies listed elsewhere.     Diet:  Diet and exercise are very important for diabetes regardless of being on medication or not. Be aware of and moderate your carbohydrate intake as instructed by doctor, " "dietitian or nurse.  Regular physical activity may be more difficult since your stroke though doing what you can on a daily basis is helpful.     Cholesterol:  Traditional target levels for LDL cholesterol or \"bad cholesterol\" is less than 130 however once you have had a stroke, your target LDL level is now less than 70. Additional recommendations such as increasing your HDL or \"good\" cholesterol and lowering your triglyceride level can also be important.    Your most recent lipid panel was   Lab Results   Component Value Date    CHOL 172 09/15/2021    CHOL 197 08/14/2012     Lab Results   Component Value Date    HDL 32 09/15/2021    HDL 34 08/14/2012     Lab Results   Component Value Date    LDL 92 09/15/2021     08/14/2012     Lab Results   Component Value Date    TRIG 238 09/15/2021    TRIG 136 08/14/2012     Lab Results   Component Value Date    CHOLHDLRATIO 5.7 08/14/2012       This should be followed up in 2-3 months with your primary provider.    Smoking:  Finally one of the most important modifiable risk factors is to not smoke. This includes cigarettes, pipes, cigars, chewing tobacco and second hand smoke. Support through counseling, nicotine replacement, and oral smoking-cessation medications may all be helpful. Often people have been able to quit during their hospitalization but once returning to their familiar environment, the urges can be stronger. If this is the case, we encourage you to get support. There are numerous options, start by talking with your doctor.    "

## 2021-10-06 NOTE — PROGRESS NOTES
[facility], [facility] and [facility] all willing and able to accept with semi-private rooms available. SWer updated pt dtr at bedside. Pt dtr spoke with family and prefer/agreeable to [facility]. SWer confirmed medical readiness for discharge and bed available for today. After speaking with RN and PT, SWer arranged for a HE stretcher ride at 6pm today. TCU Liaison, HUC, Charge RN, PA and therapy team updated. Pt and pt family updated and aware as well. PCS form filled out and faxed to  billing. Pt dtr notified no guarantee of coverage but stretcher recommended due to fatigue, cognition and ability to tolerate the w/c for duration of transport. [facility] updated.     SWer faxed orders and discharge summary to TCU. PAS completed-[id number]. Indep day completed today as well. SWer gave pt dtr IMM, pt dtr signed on pt behalf. Pt dtr asked about POA, SWer discussed POA and cognition. SWer emailed pt dtr a link to VOA to pusue guardianship/POA. Dtr appreciative, denied additional SW needs.     [facility]--TODAY via HE stretcher ride at 6pm.   MAIN PH: [phone], F: [phone]  TCU Admissions LiaAugustina simon PH: [phone], F: [phone].     CRISTINA Neville   [facility]   Direct Phone: [phone]  I   Pager: [phone]  I  Fax: [phone]  -------------------------------------------------------------------------------------  PENDING/ACCEPTING REFERRALS:   [facility]   Phone: [phone], Fax: [phone]  10/06: Left [facility] for admissions. No reply since referral had been sent.      [facility]   10/06: Left  for admissions. No reply since referral has been sent.     [facility]   10/06: Left  for admissions. No reply since referral has been sent.     [facility]  10/06: Called but unable to leave , will follow-up as needed. No reply since referral has been sent.    [facility]   10:06: No reply since referral has been sent.    [facility]   10/06: Referral sent to [facility]--ACCEPTED with semi-private room.   10/06: [facility]--ACCEPTED with semi-private room.--Pt family declined this option due to 1/5 star ratings and only have a semi-private room available.     [facility]  10/06: ACCEPTED with bed on LTC unit w/ semi-private room.   -------------------------------------------------------------------------------------  DECLINED  [facility]  [facility]  [facility]    [facility]--10/6: Accepted yesterday but as of this afternoon, pt can no longer accept due to language/ needs and staffing.     CRISTINA Neville   [facility]   Direct Phone: [phone]  I   Pager: [phone]  I  Fax: [phone]

## 2021-10-06 NOTE — PROGRESS NOTES
"LATE ENTRY: At the end of the day yesterday (Tues 10/05) [facility] TCU admissions called and pt accepted with bed available today (10/06). Admissions wanted family to be aware that there is a LTC patient on the same unit and down a different ledesma that is COVID + and the facility is on lock down from visitors for \"hopefully only another week\". Yarar called pt dtr Clemente to update. Clemente wanted to speak to her siblings about this option. In the meantime, SWer updated Clemente on all the other pending and declined referrals. SW let Clemente know that [facility] could send to a facility in [facility] to review. SW looked up star ratings on Medicare list. Clemente declined looking into either of those locations further due to star rating and distance to [place].     Clemente called back later in the day and requested that SW look at other locations that are pending and family will make final choice in the morning. Clemente later emailed SWer and asked about looking into [facility] in [place]. SWer notified Clemente that [facility] is a senior living (ILF/intermediate) and does not provide TCU. SWer made plan to follow-up with Clemente in the morning (on 10/06) after calling pending referrals. Yarar updated therapy office and indep day scheduled for 10/06. PA, NOA and Charge RN updated as well.     SW will continue to follow. See following PALAK note for further updates.     [facility]   Phone: [phone], Fax: [phone]    CRISTINA Neville   [facility]   Direct Phone: [phone]  I   Pager: [phone]  I  Fax: [phone]    "

## 2021-10-06 NOTE — PLAN OF CARE
Discharge Planner Post-Acute Rehab OT:      Discharge Plan: home with 24/7 assistance, lift, hospital bed and WC vs TCU     Precautions: falls, hx of right sided weakness, variable alertness      Current Status:  ADLs: Family training continued with Liko transfer, Grooming/hygiene and dressing. Total assist perry slipper socks, Max assist doff gown and perry pull over shirt, Max assist x2 to manage pants.   IADLs: Family support  Vision/Cognition: Wears glasses all the time. Lethargic, follows directions when alert.      Assessment:     Pt. Daughter and spouse present for family training. Daughter took lead on training although if patient unable to get TCU bed patient will return home with family support ( Main caregivers spouse and sister who lives next door).                                Other Barriers to Discharge (DME, Family Training, etc):   Family training scheduled 10/2 1:15pm, per daughter the pt's wife can be home for the pt but she is not necessarily going to be able to assist physically.   AE/DME WC, lift, hospital bed

## 2021-10-06 NOTE — PLAN OF CARE
Discharge Planner Post-Acute Rehab PT:     Discharge Plan: Attempting TCU placement, otherwise family will take home with lift and w/c, dependent for cares.     Precautions: Fall, alarms    Current Status:  Bed Mobility: depends on alertness   Transfer: lift   Gait: mostly W/C dependent   Stairs: unsafe  Balance: mod A     Assessment:  family training went well. Drt stating no questions at this time. Pt demo STS in // bars needing min to mod A PT to ed and give written handout for seated and supine TE for family to demo outside of PT     Other Barriers to Discharge (DME, Family Training, etc): Safe discharge plan - will need 24/7 cares, level of assist/equipment pending lethargy/participation

## 2021-10-06 NOTE — PLAN OF CARE
FOCUS/GOAL  Bowel management, Bladder management, Pain management and Cognition/Memory/Judgment/Problem solving    ASSESSMENT, INTERVENTIONS AND CONTINUING PLAN FOR GOAL:  Pt is alert and aphasic making orientation ZAYDA, can answer some yes or no questions primarily with head nods, denied pain, sob, n/v, or new numbness and tingling, improved sleep overnight with a reduction in unnecessary call light use, ordoñez in place,

## 2021-10-06 NOTE — DISCHARGE SUMMARY
[facility]   Acute Rehabilitation Unit  Discharge summary     Date of Admission: 9/17/2021  Date of Discharge: 10/6/2021  Disposition: TCU  Primary Care Physician: Manny Worrell  Attending physician: Kerrie Campo MD      DISCHARGE DIAGNOSIS      R thalamic CVA due to small vessel occlusion    Hypertension    Hyperlipidemia    Hypokalemia    Dysphagia    ALEX    Urinary retention    Hematuria    Uncontrolled DM II    Anemia    Sleep difficulty    Constipation      BRIEF SUMMARY  Jimmy Otto is a 71 year old male with a PMH including but not limited to HTN, uncontrolled DM 2 (HbA1c 11.5), HLD, BPH, and history of a stroke in 2012 with residual right hemiparesis who presented to the emergency room on 9/14/2021 with altered mental status and acute worsening of right-sided weakness, and found to have an acute right thalamic stroke due to small vessel occlusion.  Hospital course complicated by acute kidney injury, hypokalemia needing repletion, and urinary retention and hematuria needing Felix catheter and urology consult.    REHABILITATION COURSE  PT: Discharged to TCU  Current Status:  Bed Mobility: depends on alertness   Transfer: lift   Gait: mostly W/C dependent   Stairs: unsafe  Balance: mod A     OT: Discharged to TCU  Current Status:  ADLs: Family training continued with Liko transfer, Grooming/hygiene and dressing. Total assist perry slipper socks, Max assist doff gown and perry pull over shirt, Max assist x2 to manage pants.   IADLs: Family support  Vision/Cognition: Wears glasses all the time. Lethargic, follows directions when alert.     SLP: Discharged to TCU  Current Status:  Communication:  pt limited voicing/aphonic, follows simple directions, some mild aphasia/dysarthria baseline per son, worsened now. Working on expression/comprehension as well as assistive technology ie picure board  Cognition: to be assessed as appropriate  Swallow: IDDSI 4,1(pureed,  slightly thick ok by cup). Ruiz Free Water protocol via tsp/sips between meals with supervision. 1:1 for po/meals, assist to feed PRN, slow pace, small bites,/sips no straw.    MEDICAL COURSE  1)Neurology  #R thalamic CVA due to small vessel occlusion  - DAPT with ASA 81 mg and Plavix 75 mg for 1 month (~10/16), followed by Plavix alone indefinitely.  Recommended check P2Y12 level to ensure Plavix efficacy after 1 month of being on Plavix only.  - Hypertension, lipid, and glucose management as below.  Will need ongoing education regarding stroke risk factor modifications including importance of medication compliance and diet and lifestyle changes  - Follow-up with [facility] general neurology in 6 to 8 weeks  - Monitor somnolence/lethargy.  Decreased level of alertness not unexpected with essentially bilateral thalamic pathology (new right thalamic stroke and a history of left thalamic stroke).    - Amantadine 100 mg daily started on 9/20 for neurostimulation given ongoing somnolence which is limiting participation.  Increased Amantadine to 100 mg BID (7 am and noon) on 9/25.  Decreased back to 100 mg daily on 9/29 due to impaired renal function.  - High risk for delirium, ensure delirium precautions and appropriate sleep-wake cycle.  Continue PRN melatonin.        2)CVS  #HTN  - PTA on amlodipine 10 mg daily, atenolol 50 mg twice daily, Cozaar 100 mg daily, spironolactone 25 mg daily, and hydrochlorothiazide 25 mg daily.  Cozaar, spironolactone, and hydrochlorothiazide are being held due to acute kidney injury  - Continue amlodipine 10 mg daily, and atenolol at a lower dose of 25 mg twice daily for now.  Blood pressures currently well-controlled on this regimen.  Continue to monitor for need for additional adjustments.     #HLD  - Continue Lipitor 80 mg daily.  LDL 92 on 9/15        3)Pulmonary  - No acute issues but low lung volumes and atelectasis/infiltrate seen at the left lung base on chest x-ray 9/14.  Clinically  no signs of pneumonia but will continue to monitor.  - Encourage incentive spirometry        4)FENGI  #Diet: Currently on pureed (level 4) solids and slightly thick (level 1) liquids and free water protocol.   Upgrade further as able per speech therapy  - Aspiration precautions  - Consult nutrition for education and optimization given stroke, modified diet, and uncontrolled diabetes  - VFSS completed 9/24     #Bowels: Has had hard, infrequent bowel movements on Senokot-S daily at bedtime.  Last BM 10/6 after Miralax, very hard per nursing.  Also administered suppository.  Plan to increase docusate to 100 mg BID and Senna daily at bedtime.  Continue Dulcolax suppository PRN.  Monitor need for further adjustments to regimen.     #Hypokalemia  - Ongoing.  Started scheduled replacement 20 mEq daily.  Potassium 3.9 on 10/6.  Continue replacement and repeat BMP within 1 week.     #GI prophylaxis  -Protonix 40 mg daily while at ARU given DAPT and age, ok to discontinue at discharge.     #Oral Thrush  -Resolved after 7 day course of Nystatin     5)  #ALEX  - Baseline creatinine ~1.1, with a peak of 2.13 upon admission  - Has had fluctuating creatinine during ARU stay, suspect pre-renal given poor intake and modified diet.  Required intermittent IV fluid boluses with subsequent improvement.  With improved alertness and current diet, Cr has improved, most recently at 1.39 on 10/4.  Planning for recheck Thurs (10/7), but now discharging to TCU.  Would recommended repeat BMP within 3 days, especially given increasing Metformin dose.    - Continue to encourage fluids.  - Continue holding Cozaar, hydrochlorothiazide, and spironolactone.     #Urinary retention and hematuria  - Trial of void attempted, but continued to retain urine and Felix was re-placed on 9/26. Will plan to keep in until follow up with urology.  - Urology consulted and recommend CT urogram once creatinine normalizes and outpatient follow-up for cystoscopy to  complete hematuria work-up.  - Recurrent hematuria noted but Hgb remained stable.  Hematuria has improved as well.  - Flomax 0.4 mg daily started, will continue        6)DVT prophylaxis  - Mechanical.  Will hold from chemoprophylaxis given hematuria and already on dual anti-platelet therapy        7)Pain  - Tylenol PRN  - Add Icy/Hot topical to legs PRN         8)Endo  #Uncontrolled DM II (HbA1c 11.5)  - PTA was on Levemir 20 units nightly, NovoLog with meals with carb coverage, and Metformin 500 mg twice daily.  Unclear how compliant he was with his medications.  Endocrinology following during ARU stay with multiple changes to his regimen.  Goal to simplify regimen for family before discharge to home.    Endocrine recs as of 10/6:                  -increase Metformin from 850->1000 mg BID with meals today                  -increase Levemir to 18 units daily at HS                 -Continue Victoza 1.8 mg daily with breakfast                 -carb coverage discontinued                 -Continue Novolog moderate intensity sliding scale TID AC and HS for now, but family requesting this not be prescribed on discharge home, to simplify plan.                 -BG monitoring TID AC, HS, 0200                 -hypoglycemia protocol                  9)Heme    - Continue to monitor Hgb, especially in the setting of hematuria.  Re-check 10/2 with slight drop to 11.5, suspect maybe related to dilution in setting of IV fluids, remained stable on 10/4 at 11.9.  No hematuria seen this AM.  Continue to trend CBC.        10)Psych  - Monitor mood, will consult health psychology if needed  - Melatonin scheduled starting 10/1 due to complaints of sleep difficulty.   Ongoing issues, so started on ramelteon.  Patient reports improvement.   - Continue ramelteon.        11)MSK  ~1.5 cm soft, non-tender, mobile nodule in left distal upper arm.  No overlying erythema, warmth, skin changes.  Seems most consistent with lipoma vs cyst.  -  Monitor, but no further work-up/treatment indicated at this time    DISCHARGE MEDICATIONS  Current Discharge Medication List      START taking these medications    Details   amantadine (SYMMETREL) 100 MG capsule Take 1 capsule (100 mg) by mouth daily    Associated Diagnoses: Cerebrovascular accident (CVA), unspecified mechanism (H)      bisacodyl (DULCOLAX) 10 MG suppository Place 1 suppository (10 mg) rectally daily as needed for constipation    Associated Diagnoses: Constipation, unspecified constipation type      docusate sodium (COLACE) 100 MG capsule Take 1 capsule (100 mg) by mouth 2 times daily    Associated Diagnoses: Constipation, unspecified constipation type      doxazosin (CARDURA) 1 MG tablet Take 1 tablet (1 mg) by mouth daily    Associated Diagnoses: Benign prostatic hyperplasia with urinary retention      liraglutide (VICTOZA) 18 MG/3ML solution Inject 1.8 mg Subcutaneous daily    Associated Diagnoses: Type 2 diabetes mellitus with chronic kidney disease, with long-term current use of insulin, unspecified CKD stage (H)      polyethylene glycol (MIRALAX) 17 g packet Take 17 g by mouth daily as needed for constipation    Associated Diagnoses: Constipation, unspecified constipation type      potassium chloride (KLOR-CON) 20 MEQ packet Take 20 mEq by mouth daily    Associated Diagnoses: Hypokalemia      ramelteon (ROZEREM) 8 MG tablet Take 1 tablet (8 mg) by mouth At Bedtime    Associated Diagnoses: Sleep disturbance         CONTINUE these medications which have CHANGED    Details   acetaminophen (TYLENOL) 325 MG tablet Take 1-3 tablets (325-975 mg) by mouth every 6 hours as needed for mild pain or fever (> 101 F)    Associated Diagnoses: Pain      amLODIPine (NORVASC) 10 MG tablet Take 1 tablet (10 mg) by mouth daily    Associated Diagnoses: Benign essential hypertension      atenolol (TENORMIN) 25 MG tablet Take 1 tablet (25 mg) by mouth 2 times daily    Associated Diagnoses: Benign essential  hypertension      !! insulin aspart (NOVOLOG PEN) 100 UNIT/ML pen Inject 1-7 Units Subcutaneous 3 times daily (before meals) Mealtime Scale  BG less than 140, none   - 189 = 1 unit.    - 239 = 2    - 289 = 3     - 339 = 4   BG over 340 = 5    Associated Diagnoses: Type 2 diabetes mellitus with chronic kidney disease, with long-term current use of insulin, unspecified CKD stage (H)      !! insulin aspart (NOVOLOG PEN) 100 UNIT/ML pen Inject 1-5 Units Subcutaneous At Bedtime Bedtime Scale  BG less than 200, none   - 249 = 1 unit.    - 299 = 2     - 349 = 3   BG over 350 = 4    Associated Diagnoses: Type 2 diabetes mellitus with chronic kidney disease, with long-term current use of insulin, unspecified CKD stage (H)      insulin detemir (LEVEMIR PEN) 100 UNIT/ML pen Inject 18 Units Subcutaneous every 24 hours (evening)    Associated Diagnoses: Type 2 diabetes mellitus with chronic kidney disease, with long-term current use of insulin, unspecified CKD stage (H)      metFORMIN (GLUCOPHAGE) 1000 MG tablet Take 1 tablet (1,000 mg) by mouth 2 times daily (with meals)    Associated Diagnoses: Type 2 diabetes mellitus with chronic kidney disease, with long-term current use of insulin, unspecified CKD stage (H)      sennosides (SENOKOT) 8.6 MG tablet Take 1 tablet by mouth At Bedtime    Associated Diagnoses: Constipation, unspecified constipation type       !! - Potential duplicate medications found. Please discuss with provider.      CONTINUE these medications which have NOT CHANGED    Details   aspirin (ASA) 81 MG chewable tablet Take 81 mg by mouth daily      atorvastatin (LIPITOR) 80 MG tablet Take 80 mg by mouth daily      clopidogrel (PLAVIX) 75 MG tablet Take 75 mg by mouth daily         STOP taking these medications       potassium chloride ER (KLOR-CON M) 20 MEQ CR tablet Comments:   Reason for Stopping:         tamsulosin (FLOMAX) 0.4 MG capsule Comments:   Reason for  Stopping:                 DISCHARGE INSTRUCTIONS AND FOLLOW UP  Discharge Procedure Orders   General info for SNF   Order Comments: Length of Stay Estimate: Short Term Care: Estimated # of Days 31-90  Condition at Discharge: Stable  Level of care:skilled   Rehabilitation Potential: Fair  Admission H&P remains valid and up-to-date: Yes  Recent Chemotherapy: N/A  Use Nursing Home Standing Orders: Yes     Mantoux instructions   Order Comments: Give two-step Mantoux (PPD) Per Facility Policy Yes     Follow Up and recommended labs and tests   Order Comments: Follow up with long term physician.  The following labs/tests are recommended: BMP (evaluate Cr with increase in Metformin, poor oral intake) within 3 days, CBC.    Follow up with [facility] general neurology clinic 6-8 weeks.    Follow up with urology regarding hematuria.     Reason for your hospital stay   Order Comments: You were admitted for rehabilitation after your stroke.     Felix catheter   Order Comments: To straight gravity drainage. Change catheter every 2 weeks and PRN for leaking or decreased uring output with signs of bladder distention. DO NOT change catheter without a specific MD order IF diagnosis of benign prostatic hypertrophy (BPH), neurogenic bladder, or other urological conditions     Activity - Up with nursing assistance     Order Specific Question Answer Comments   Is discharge order? Yes      Full Code     Order Specific Question Answer Comments   Code status determined by: Discussion with patient/ legal decision maker      Physical Therapy Adult Consult   Order Comments: Evaluate and treat as clinically indicated.    Reason:  stroke     Occupational Therapy Adult Consult   Order Comments: Evaluate and treat as clinically indicated.    Reason:  stroke     Speech Language Path Adult Consult   Order Comments: Evaluate and treat as clinically indicated.    Reason:  stroke, dysphagia, aphasia, impaired cognition     Fall precautions     Advance Diet  as Tolerated   Order Comments: Follow this diet upon discharge:       Snacks/Supplements Adult: Gelatein sugar-free; Between Meals; Ensure Enlive; With Meals      Consistent Carb 60 Grams CHO per Meal Diet; Pureed Diet (level 4); Slightly Thick (level 1)      Encourage fluids.     Order Specific Question Answer Comments   Is discharge order? Yes           PHYSICAL EXAMINATION    Most recent Vital Signs:   Vitals:    10/05/21 1520 10/05/21 2159 10/06/21 0607 10/06/21 0835   BP: 111/62 119/69 138/80 125/73   BP Location: Left arm  Right arm Left arm   Pulse: 91 88 96 96   Resp: 16  20    Temp: (!) 96.4  F (35.8  C)  97.2  F (36.2  C)    TempSrc: Axillary  Oral    SpO2: 97%  97%    Weight:       Height:           Gen: NAD, lying in bed, sleepy on arrival but roused to voice  HEENT: NC/AT, mild coating on tongue  Cardio: RRR, no murmurs  Pulm: non-labored on room air, lungs CTA bilaterally  Abd: soft, non-tender, non-distended, bowel sounds present  : ordoñez present, no hematuria  Extr: no edema in bilateral lower extremities  Neuro/MSK: able to nod yes/no to questions and mouths a few words but generally non-verbal, follows commands, lethargic, RUE without voluntary movement and increased flexural tone MAS 3 in right elbow flexors with resting wrist splint in place, LUE strength 4/5 throughout, RLE 2/5 HF 4-/5 KE 4/5 PF/DF, LLE 3/5 HF 4/5 KE 3/5 DF 4/5 PF    40 minutes spent in discharge, including >50% in counseling and coordination of care, medication review and plan of care recommended on follow up.     Patient was evaluated on day of discharge by attending physician, Kerrie Campo MD, who agrees with plan of care.    Discharge summary was forwarded to Manny Worrell (PCP) at the time of discharge, so as to bridge from hospital to outpatient care.     It was our pleasure to care for Jimmy Otto during this hospitalization. Please do not hesitate to contact me should there be questions regarding the hospital  course or discharge plan.          Jesika Theodore PA-C  Physical Medicine and Rehabilitation

## 2021-10-06 NOTE — DISCHARGE SUMMARY
Patient discharged via stretcher to [facility]. Family with patient at the time of discharge and received education and discharge paper work. Important dates and phone number were highlighted for ease of finding. Family was receptive to the learning and was assessed. Verified with charge nurse that there were no discharge medications being sent with patient. Report was given to EMS transferring patient.

## 2021-10-06 NOTE — PLAN OF CARE
Discharge Planner Post-Acute Rehab SLP:      Discharge Plan:home with assist, likely further SLP     Precautions:fall, swallowing     Current Status:  Communication:  pt limited voicing/aphonic, follows simple directions, some mild aphasia/dysarthria baseline per son, worsened now. Working on expression/comprehension as well as assistive technology ie picure board  Cognition: to be assessed as appropriate  Swallow: IDDSI 4,1(pureed, slightly thick ok by cup). Ruiz Free Water protocol via tsp/sips between meals with supervision. 1:1 for po/meals, assist to feed PRN, slow pace, small bites,/sips no straw.    Assessment : No overt s/sx of aspiration on mildly thick or slightly thick liquids t/o meal session. Slow but functional manipulation of purees. Encouragement needed to self feed but pt able to do so. Current diet and recommendations remains appropriate.        Other Barriers to Discharge (Family Training, etc): TBD

## 2021-10-07 NOTE — PLAN OF CARE
Physical Therapy Discharge Summary    Reason for therapy discharge:    Discharged to transitional care facility.    Progress towards therapy goal(s). See goals on Care Plan in [facility] electronic health record for goal details.  Goals partially met.  Barriers to achieving goals:   limited tolerance for therapy.    Therapy recommendation(s):    Continued therapy is recommended.  Rationale/Recommendations:  continue PT at TCU.

## 2021-10-07 NOTE — PLAN OF CARE
Speech Language Therapy Discharge Summary    Reason for therapy discharge:    Discharged to transitional care facility.    Progress towards therapy goal(s). See goals on Care Plan in [facility] electronic health record for goal details.  Goals partially met.  Pt made improvement in swallow function and has tolerated upgrade to slightly thick liquids and free water appropriately. Pt language still is impaired and would benefit from ongoing use of low tech AAC and ongoing SLP    Therapy recommendation(s):    Ongoing SLP at U.     Pt participated in skilled speech therapy targeting swallow function and communication. Pt is now tolerating puree texture (4), slightly thick liquids (1). Free Water Protocol also implemented with small single sips thin water only, pt tolerating this well. Daughter purchased Provale cup to ensure safe amount of bolus (5 ml) offered at single instance. Pt would benefit from ongoing SLP to further tx and address communication and dysphagia.

## 2021-10-07 NOTE — PLAN OF CARE
Occupational Therapy Discharge Summary    Reason for therapy discharge:    Discharged to transitional care facility.    Progress towards therapy goal(s). See goals on Care Plan in [facility] electronic health record for goal details.  Goals not met.  Barriers to achieving goals:   limited tolerance for therapy and discharge from facility.    Therapy recommendation(s):    Continued therapy is recommended.  Rationale/Recommendations:  Pt discharged to TCU for continued therapy. Pt was making improvement in alertness but functionally still required a lot of support. Family training was in progress but ultimately the family was not able to provide the level of physical assist the pt would require for a discharge home. Pt discharged to TCU.

## 2021-10-12 ENCOUNTER — TELEPHONE (OUTPATIENT)
Dept: UROLOGY | Facility: CLINIC | Age: 71
End: 2021-10-12

## 2021-10-13 ENCOUNTER — TELEPHONE (OUTPATIENT)
Dept: UROLOGY | Facility: CLINIC | Age: 71
End: 2021-10-13

## 2021-10-13 ENCOUNTER — APPOINTMENT (OUTPATIENT)
Dept: GENERAL RADIOLOGY | Facility: CLINIC | Age: 71
DRG: 871 | End: 2021-10-13
Attending: EMERGENCY MEDICINE
Payer: MEDICARE

## 2021-10-13 ENCOUNTER — HOSPITAL ENCOUNTER (INPATIENT)
Facility: CLINIC | Age: 71
LOS: 21 days | Discharge: HOME HEALTH | DRG: 871 | End: 2021-11-04
Attending: EMERGENCY MEDICINE | Admitting: INTERNAL MEDICINE
Payer: MEDICARE

## 2021-10-13 ENCOUNTER — APPOINTMENT (OUTPATIENT)
Dept: CT IMAGING | Facility: CLINIC | Age: 71
DRG: 871 | End: 2021-10-13
Attending: EMERGENCY MEDICINE
Payer: MEDICARE

## 2021-10-13 DIAGNOSIS — R11.2 NAUSEA AND VOMITING, INTRACTABILITY OF VOMITING NOT SPECIFIED, UNSPECIFIED VOMITING TYPE: ICD-10-CM

## 2021-10-13 DIAGNOSIS — R62.7 ADULT FAILURE TO THRIVE: ICD-10-CM

## 2021-10-13 DIAGNOSIS — Z79.4 TYPE 2 DIABETES MELLITUS WITH CHRONIC KIDNEY DISEASE, WITH LONG-TERM CURRENT USE OF INSULIN, UNSPECIFIED CKD STAGE (H): ICD-10-CM

## 2021-10-13 DIAGNOSIS — N40.1 BENIGN PROSTATIC HYPERPLASIA WITH URINARY RETENTION: ICD-10-CM

## 2021-10-13 DIAGNOSIS — R33.8 BENIGN PROSTATIC HYPERPLASIA WITH URINARY RETENTION: ICD-10-CM

## 2021-10-13 DIAGNOSIS — I63.9 CEREBROVASCULAR ACCIDENT (CVA), UNSPECIFIED MECHANISM (H): ICD-10-CM

## 2021-10-13 DIAGNOSIS — N39.0 ACUTE UTI: ICD-10-CM

## 2021-10-13 DIAGNOSIS — E86.0 DEHYDRATION: ICD-10-CM

## 2021-10-13 DIAGNOSIS — F32.A DEPRESSION, UNSPECIFIED DEPRESSION TYPE: Primary | ICD-10-CM

## 2021-10-13 DIAGNOSIS — I10 BENIGN ESSENTIAL HYPERTENSION: ICD-10-CM

## 2021-10-13 DIAGNOSIS — B37.0 ORAL THRUSH: ICD-10-CM

## 2021-10-13 DIAGNOSIS — E11.22 TYPE 2 DIABETES MELLITUS WITH CHRONIC KIDNEY DISEASE, WITH LONG-TERM CURRENT USE OF INSULIN, UNSPECIFIED CKD STAGE (H): ICD-10-CM

## 2021-10-13 LAB
ALBUMIN SERPL-MCNC: 3.3 G/DL (ref 3.4–5)
ALBUMIN UR-MCNC: 100 MG/DL
ALP SERPL-CCNC: 158 U/L (ref 40–150)
ALT SERPL W P-5'-P-CCNC: 19 U/L (ref 0–70)
ANION GAP SERPL CALCULATED.3IONS-SCNC: 7 MMOL/L (ref 3–14)
APPEARANCE UR: ABNORMAL
AST SERPL W P-5'-P-CCNC: 15 U/L (ref 0–45)
BACTERIA #/AREA URNS HPF: ABNORMAL /HPF
BASOPHILS # BLD AUTO: 0.1 10E3/UL (ref 0–0.2)
BASOPHILS NFR BLD AUTO: 1 %
BILIRUB SERPL-MCNC: 0.4 MG/DL (ref 0.2–1.3)
BILIRUB UR QL STRIP: NEGATIVE
BUN SERPL-MCNC: 30 MG/DL (ref 7–30)
CALCIUM SERPL-MCNC: 10.3 MG/DL (ref 8.5–10.1)
CHLORIDE BLD-SCNC: 106 MMOL/L (ref 94–109)
CO2 SERPL-SCNC: 29 MMOL/L (ref 20–32)
COLOR UR AUTO: YELLOW
CREAT SERPL-MCNC: 1.53 MG/DL (ref 0.66–1.25)
EOSINOPHIL # BLD AUTO: 0.2 10E3/UL (ref 0–0.7)
EOSINOPHIL NFR BLD AUTO: 2 %
ERYTHROCYTE [DISTWIDTH] IN BLOOD BY AUTOMATED COUNT: 13.2 % (ref 10–15)
GFR SERPL CREATININE-BSD FRML MDRD: 45 ML/MIN/1.73M2
GLUCOSE BLD-MCNC: 83 MG/DL (ref 70–99)
GLUCOSE BLDC GLUCOMTR-MCNC: 101 MG/DL (ref 70–99)
GLUCOSE UR STRIP-MCNC: NEGATIVE MG/DL
HCO3 BLDV-SCNC: 31 MMOL/L (ref 21–28)
HCT VFR BLD AUTO: 46.5 % (ref 40–53)
HGB BLD-MCNC: 14.2 G/DL (ref 13.3–17.7)
HGB UR QL STRIP: ABNORMAL
HOLD SPECIMEN: NORMAL
IMM GRANULOCYTES # BLD: 0 10E3/UL
IMM GRANULOCYTES NFR BLD: 0 %
KETONES UR STRIP-MCNC: NEGATIVE MG/DL
LACTATE BLD-SCNC: 2.4 MMOL/L
LEUKOCYTE ESTERASE UR QL STRIP: ABNORMAL
LIPASE SERPL-CCNC: <10 U/L (ref 73–393)
LYMPHOCYTES # BLD AUTO: 1.3 10E3/UL (ref 0.8–5.3)
LYMPHOCYTES NFR BLD AUTO: 13 %
MCH RBC QN AUTO: 26.7 PG (ref 26.5–33)
MCHC RBC AUTO-ENTMCNC: 30.5 G/DL (ref 31.5–36.5)
MCV RBC AUTO: 88 FL (ref 78–100)
MONOCYTES # BLD AUTO: 0.7 10E3/UL (ref 0–1.3)
MONOCYTES NFR BLD AUTO: 7 %
MUCOUS THREADS #/AREA URNS LPF: PRESENT /LPF
NEUTROPHILS # BLD AUTO: 7.9 10E3/UL (ref 1.6–8.3)
NEUTROPHILS NFR BLD AUTO: 77 %
NITRATE UR QL: NEGATIVE
NRBC # BLD AUTO: 0 10E3/UL
NRBC BLD AUTO-RTO: 0 /100
PCO2 BLDV: 51 MM HG (ref 40–50)
PH BLDV: 7.39 [PH] (ref 7.32–7.43)
PH UR STRIP: 7 [PH] (ref 5–7)
PLATELET # BLD AUTO: 284 10E3/UL (ref 150–450)
PO2 BLDV: 21 MM HG (ref 25–47)
POTASSIUM BLD-SCNC: 4.1 MMOL/L (ref 3.4–5.3)
PROT SERPL-MCNC: 9.7 G/DL (ref 6.8–8.8)
RBC # BLD AUTO: 5.31 10E6/UL (ref 4.4–5.9)
RBC URINE: >182 /HPF
SAO2 % BLDV: 33 % (ref 94–100)
SODIUM SERPL-SCNC: 142 MMOL/L (ref 133–144)
SP GR UR STRIP: 1.02 (ref 1–1.03)
TROPONIN I SERPL-MCNC: <0.015 UG/L (ref 0–0.04)
UROBILINOGEN UR STRIP-MCNC: NORMAL MG/DL
WBC # BLD AUTO: 10.2 10E3/UL (ref 4–11)
WBC CLUMPS #/AREA URNS HPF: PRESENT /HPF
WBC URINE: 124 /HPF

## 2021-10-13 PROCEDURE — 250N000011 HC RX IP 250 OP 636: Performed by: EMERGENCY MEDICINE

## 2021-10-13 PROCEDURE — 83690 ASSAY OF LIPASE: CPT | Performed by: EMERGENCY MEDICINE

## 2021-10-13 PROCEDURE — 84484 ASSAY OF TROPONIN QUANT: CPT | Performed by: EMERGENCY MEDICINE

## 2021-10-13 PROCEDURE — 71046 X-RAY EXAM CHEST 2 VIEWS: CPT

## 2021-10-13 PROCEDURE — 87040 BLOOD CULTURE FOR BACTERIA: CPT | Performed by: EMERGENCY MEDICINE

## 2021-10-13 PROCEDURE — C9803 HOPD COVID-19 SPEC COLLECT: HCPCS

## 2021-10-13 PROCEDURE — 258N000003 HC RX IP 258 OP 636: Performed by: EMERGENCY MEDICINE

## 2021-10-13 PROCEDURE — 85025 COMPLETE CBC W/AUTO DIFF WBC: CPT | Performed by: EMERGENCY MEDICINE

## 2021-10-13 PROCEDURE — 36415 COLL VENOUS BLD VENIPUNCTURE: CPT | Performed by: EMERGENCY MEDICINE

## 2021-10-13 PROCEDURE — 70450 CT HEAD/BRAIN W/O DYE: CPT

## 2021-10-13 PROCEDURE — 82803 BLOOD GASES ANY COMBINATION: CPT

## 2021-10-13 PROCEDURE — 87635 SARS-COV-2 COVID-19 AMP PRB: CPT | Performed by: EMERGENCY MEDICINE

## 2021-10-13 PROCEDURE — 96361 HYDRATE IV INFUSION ADD-ON: CPT

## 2021-10-13 PROCEDURE — 99285 EMERGENCY DEPT VISIT HI MDM: CPT | Mod: 25

## 2021-10-13 PROCEDURE — 96374 THER/PROPH/DIAG INJ IV PUSH: CPT

## 2021-10-13 PROCEDURE — 87086 URINE CULTURE/COLONY COUNT: CPT | Performed by: EMERGENCY MEDICINE

## 2021-10-13 PROCEDURE — 250N000009 HC RX 250: Performed by: EMERGENCY MEDICINE

## 2021-10-13 PROCEDURE — 82040 ASSAY OF SERUM ALBUMIN: CPT | Performed by: EMERGENCY MEDICINE

## 2021-10-13 PROCEDURE — 93005 ELECTROCARDIOGRAM TRACING: CPT

## 2021-10-13 PROCEDURE — 81001 URINALYSIS AUTO W/SCOPE: CPT | Performed by: EMERGENCY MEDICINE

## 2021-10-13 RX ORDER — CEFTRIAXONE 1 G/1
1 INJECTION, POWDER, FOR SOLUTION INTRAMUSCULAR; INTRAVENOUS ONCE
Status: COMPLETED | OUTPATIENT
Start: 2021-10-13 | End: 2021-10-14

## 2021-10-13 RX ORDER — ONDANSETRON 2 MG/ML
4 INJECTION INTRAMUSCULAR; INTRAVENOUS EVERY 30 MIN PRN
Status: DISCONTINUED | OUTPATIENT
Start: 2021-10-13 | End: 2021-10-14

## 2021-10-13 RX ORDER — ONDANSETRON 2 MG/ML
4 INJECTION INTRAMUSCULAR; INTRAVENOUS ONCE
Status: COMPLETED | OUTPATIENT
Start: 2021-10-13 | End: 2021-10-13

## 2021-10-13 RX ADMIN — SODIUM CHLORIDE 1000 ML: 9 INJECTION, SOLUTION INTRAVENOUS at 22:46

## 2021-10-13 RX ADMIN — ONDANSETRON 4 MG: 2 INJECTION INTRAMUSCULAR; INTRAVENOUS at 22:16

## 2021-10-13 RX ADMIN — FAMOTIDINE 20 MG: 10 INJECTION, SOLUTION INTRAVENOUS at 22:46

## 2021-10-13 ASSESSMENT — ENCOUNTER SYMPTOMS
VOMITING: 1
COUGH: 0
FEVER: 1
DIARRHEA: 0
NAUSEA: 1

## 2021-10-13 NOTE — TELEPHONE ENCOUNTER
I called daughter and told her not urgent but should follow up in case this would come back again.  Please scheduled with Alivia Herzog on   Friday Oct 29th 9am for hematuria video visit.  Please call and schedule and remove hold Virginie Reyes LPN Staff Nurse

## 2021-10-14 ENCOUNTER — APPOINTMENT (OUTPATIENT)
Dept: OCCUPATIONAL THERAPY | Facility: CLINIC | Age: 71
DRG: 871 | End: 2021-10-14
Attending: INTERNAL MEDICINE
Payer: MEDICARE

## 2021-10-14 ENCOUNTER — APPOINTMENT (OUTPATIENT)
Dept: SPEECH THERAPY | Facility: CLINIC | Age: 71
DRG: 871 | End: 2021-10-14
Attending: INTERNAL MEDICINE
Payer: MEDICARE

## 2021-10-14 ENCOUNTER — APPOINTMENT (OUTPATIENT)
Dept: PHYSICAL THERAPY | Facility: CLINIC | Age: 71
DRG: 871 | End: 2021-10-14
Attending: INTERNAL MEDICINE
Payer: MEDICARE

## 2021-10-14 PROBLEM — N39.0 ACUTE UTI: Status: ACTIVE | Noted: 2021-10-14

## 2021-10-14 PROBLEM — R62.7 ADULT FAILURE TO THRIVE: Status: ACTIVE | Noted: 2021-10-14

## 2021-10-14 PROBLEM — B37.0 ORAL THRUSH: Status: ACTIVE | Noted: 2021-10-14

## 2021-10-14 PROBLEM — E86.0 DEHYDRATION: Status: ACTIVE | Noted: 2021-10-14

## 2021-10-14 LAB
ANION GAP SERPL CALCULATED.3IONS-SCNC: 7 MMOL/L (ref 3–14)
ATRIAL RATE - MUSE: 91 BPM
BUN SERPL-MCNC: 22 MG/DL (ref 7–30)
CALCIUM SERPL-MCNC: 8.5 MG/DL (ref 8.5–10.1)
CHLORIDE BLD-SCNC: 109 MMOL/L (ref 94–109)
CO2 SERPL-SCNC: 26 MMOL/L (ref 20–32)
CREAT SERPL-MCNC: 1.19 MG/DL (ref 0.66–1.25)
DIASTOLIC BLOOD PRESSURE - MUSE: NORMAL MMHG
ERYTHROCYTE [DISTWIDTH] IN BLOOD BY AUTOMATED COUNT: 13 % (ref 10–15)
GFR SERPL CREATININE-BSD FRML MDRD: 61 ML/MIN/1.73M2
GLUCOSE BLD-MCNC: 146 MG/DL (ref 70–99)
GLUCOSE BLDC GLUCOMTR-MCNC: 140 MG/DL (ref 70–99)
GLUCOSE BLDC GLUCOMTR-MCNC: 148 MG/DL (ref 70–99)
GLUCOSE BLDC GLUCOMTR-MCNC: 163 MG/DL (ref 70–99)
GLUCOSE BLDC GLUCOMTR-MCNC: 182 MG/DL (ref 70–99)
GLUCOSE BLDC GLUCOMTR-MCNC: 260 MG/DL (ref 70–99)
GLUCOSE BLDC GLUCOMTR-MCNC: 274 MG/DL (ref 70–99)
GLUCOSE BLDC GLUCOMTR-MCNC: 68 MG/DL (ref 70–99)
HCO3 BLDV-SCNC: 28 MMOL/L (ref 21–28)
HCT VFR BLD AUTO: 41.3 % (ref 40–53)
HGB BLD-MCNC: 12.8 G/DL (ref 13.3–17.7)
INTERPRETATION ECG - MUSE: NORMAL
LACTATE BLD-SCNC: 2.2 MMOL/L
LACTATE SERPL-SCNC: 1.2 MMOL/L (ref 0.7–2)
MAGNESIUM SERPL-MCNC: 1.5 MG/DL (ref 1.6–2.3)
MCH RBC QN AUTO: 27.2 PG (ref 26.5–33)
MCHC RBC AUTO-ENTMCNC: 31 G/DL (ref 31.5–36.5)
MCV RBC AUTO: 88 FL (ref 78–100)
P AXIS - MUSE: 35 DEGREES
PCO2 BLDV: 49 MM HG (ref 40–50)
PH BLDV: 7.36 [PH] (ref 7.32–7.43)
PLATELET # BLD AUTO: 213 10E3/UL (ref 150–450)
PO2 BLDV: 30 MM HG (ref 25–47)
POTASSIUM BLD-SCNC: 3.7 MMOL/L (ref 3.4–5.3)
PR INTERVAL - MUSE: 170 MS
QRS DURATION - MUSE: 72 MS
QT - MUSE: 384 MS
QTC - MUSE: 472 MS
R AXIS - MUSE: -18 DEGREES
RBC # BLD AUTO: 4.71 10E6/UL (ref 4.4–5.9)
SAO2 % BLDV: 53 % (ref 94–100)
SARS-COV-2 RNA RESP QL NAA+PROBE: NEGATIVE
SODIUM SERPL-SCNC: 142 MMOL/L (ref 133–144)
SYSTOLIC BLOOD PRESSURE - MUSE: NORMAL MMHG
T AXIS - MUSE: 19 DEGREES
VENTRICULAR RATE- MUSE: 91 BPM
WBC # BLD AUTO: 9 10E3/UL (ref 4–11)

## 2021-10-14 PROCEDURE — 83605 ASSAY OF LACTIC ACID: CPT | Performed by: INTERNAL MEDICINE

## 2021-10-14 PROCEDURE — 258N000003 HC RX IP 258 OP 636: Performed by: INTERNAL MEDICINE

## 2021-10-14 PROCEDURE — 36415 COLL VENOUS BLD VENIPUNCTURE: CPT | Performed by: INTERNAL MEDICINE

## 2021-10-14 PROCEDURE — 97530 THERAPEUTIC ACTIVITIES: CPT | Mod: GO

## 2021-10-14 PROCEDURE — 96365 THER/PROPH/DIAG IV INF INIT: CPT

## 2021-10-14 PROCEDURE — 87040 BLOOD CULTURE FOR BACTERIA: CPT | Performed by: EMERGENCY MEDICINE

## 2021-10-14 PROCEDURE — 999N000127 HC STATISTIC PERIPHERAL IV START W US GUIDANCE

## 2021-10-14 PROCEDURE — 250N000013 HC RX MED GY IP 250 OP 250 PS 637: Performed by: INTERNAL MEDICINE

## 2021-10-14 PROCEDURE — 92610 EVALUATE SWALLOWING FUNCTION: CPT | Mod: GN

## 2021-10-14 PROCEDURE — 258N000003 HC RX IP 258 OP 636: Performed by: EMERGENCY MEDICINE

## 2021-10-14 PROCEDURE — 97530 THERAPEUTIC ACTIVITIES: CPT | Mod: GP | Performed by: PHYSICAL THERAPIST

## 2021-10-14 PROCEDURE — 250N000011 HC RX IP 250 OP 636: Performed by: EMERGENCY MEDICINE

## 2021-10-14 PROCEDURE — 83735 ASSAY OF MAGNESIUM: CPT | Performed by: INTERNAL MEDICINE

## 2021-10-14 PROCEDURE — 258N000001 HC RX 258

## 2021-10-14 PROCEDURE — 97162 PT EVAL MOD COMPLEX 30 MIN: CPT | Mod: GP | Performed by: PHYSICAL THERAPIST

## 2021-10-14 PROCEDURE — 36415 COLL VENOUS BLD VENIPUNCTURE: CPT | Performed by: EMERGENCY MEDICINE

## 2021-10-14 PROCEDURE — 97166 OT EVAL MOD COMPLEX 45 MIN: CPT | Mod: GO

## 2021-10-14 PROCEDURE — 80048 BASIC METABOLIC PNL TOTAL CA: CPT | Performed by: INTERNAL MEDICINE

## 2021-10-14 PROCEDURE — 120N000001 HC R&B MED SURG/OB

## 2021-10-14 PROCEDURE — 250N000011 HC RX IP 250 OP 636: Performed by: INTERNAL MEDICINE

## 2021-10-14 PROCEDURE — 85014 HEMATOCRIT: CPT | Performed by: INTERNAL MEDICINE

## 2021-10-14 PROCEDURE — 99223 1ST HOSP IP/OBS HIGH 75: CPT | Mod: AI | Performed by: INTERNAL MEDICINE

## 2021-10-14 PROCEDURE — 82803 BLOOD GASES ANY COMBINATION: CPT

## 2021-10-14 PROCEDURE — 250N000012 HC RX MED GY IP 250 OP 636 PS 637: Performed by: INTERNAL MEDICINE

## 2021-10-14 RX ORDER — POLYETHYLENE GLYCOL 3350 17 G/17G
17 POWDER, FOR SOLUTION ORAL DAILY PRN
Status: DISCONTINUED | OUTPATIENT
Start: 2021-10-14 | End: 2021-10-16

## 2021-10-14 RX ORDER — PROCHLORPERAZINE MALEATE 5 MG
5 TABLET ORAL EVERY 6 HOURS PRN
Status: DISCONTINUED | OUTPATIENT
Start: 2021-10-14 | End: 2021-11-04 | Stop reason: HOSPADM

## 2021-10-14 RX ORDER — NICOTINE POLACRILEX 4 MG
15-30 LOZENGE BUCCAL
Status: DISCONTINUED | OUTPATIENT
Start: 2021-10-14 | End: 2021-11-04 | Stop reason: HOSPADM

## 2021-10-14 RX ORDER — ACETAMINOPHEN 650 MG/1
650 SUPPOSITORY RECTAL EVERY 6 HOURS PRN
Status: DISCONTINUED | OUTPATIENT
Start: 2021-10-14 | End: 2021-11-04 | Stop reason: HOSPADM

## 2021-10-14 RX ORDER — BISACODYL 10 MG
10 SUPPOSITORY, RECTAL RECTAL DAILY PRN
Status: DISCONTINUED | OUTPATIENT
Start: 2021-10-14 | End: 2021-11-04 | Stop reason: HOSPADM

## 2021-10-14 RX ORDER — FLUCONAZOLE 2 MG/ML
100 INJECTION INTRAVENOUS EVERY 24 HOURS
Status: DISCONTINUED | OUTPATIENT
Start: 2021-10-14 | End: 2021-10-20

## 2021-10-14 RX ORDER — DEXTROSE MONOHYDRATE, SODIUM CHLORIDE, AND POTASSIUM CHLORIDE 50; 1.49; 4.5 G/1000ML; G/1000ML; G/1000ML
INJECTION, SOLUTION INTRAVENOUS CONTINUOUS
Status: DISCONTINUED | OUTPATIENT
Start: 2021-10-14 | End: 2021-10-18

## 2021-10-14 RX ORDER — LIDOCAINE 40 MG/G
CREAM TOPICAL
Status: DISCONTINUED | OUTPATIENT
Start: 2021-10-14 | End: 2021-11-04 | Stop reason: HOSPADM

## 2021-10-14 RX ORDER — ASPIRIN 81 MG/1
81 TABLET, CHEWABLE ORAL DAILY
Status: DISCONTINUED | OUTPATIENT
Start: 2021-10-14 | End: 2021-10-18

## 2021-10-14 RX ORDER — ONDANSETRON 4 MG/1
4 TABLET, ORALLY DISINTEGRATING ORAL EVERY 6 HOURS PRN
Status: DISCONTINUED | OUTPATIENT
Start: 2021-10-14 | End: 2021-11-04 | Stop reason: HOSPADM

## 2021-10-14 RX ORDER — CEFTRIAXONE 2 G/1
2 INJECTION, POWDER, FOR SOLUTION INTRAMUSCULAR; INTRAVENOUS EVERY 24 HOURS
Status: COMPLETED | OUTPATIENT
Start: 2021-10-15 | End: 2021-10-21

## 2021-10-14 RX ORDER — ONDANSETRON 2 MG/ML
4 INJECTION INTRAMUSCULAR; INTRAVENOUS EVERY 6 HOURS PRN
Status: DISCONTINUED | OUTPATIENT
Start: 2021-10-14 | End: 2021-11-04 | Stop reason: HOSPADM

## 2021-10-14 RX ORDER — MAGNESIUM SULFATE HEPTAHYDRATE 40 MG/ML
2 INJECTION, SOLUTION INTRAVENOUS ONCE
Status: COMPLETED | OUTPATIENT
Start: 2021-10-14 | End: 2021-10-14

## 2021-10-14 RX ORDER — PROCHLORPERAZINE 25 MG
12.5 SUPPOSITORY, RECTAL RECTAL EVERY 12 HOURS PRN
Status: DISCONTINUED | OUTPATIENT
Start: 2021-10-14 | End: 2021-11-04 | Stop reason: HOSPADM

## 2021-10-14 RX ORDER — CEFTRIAXONE 1 G/1
1 INJECTION, POWDER, FOR SOLUTION INTRAMUSCULAR; INTRAVENOUS ONCE
Status: COMPLETED | OUTPATIENT
Start: 2021-10-14 | End: 2021-10-14

## 2021-10-14 RX ORDER — CALCIUM CARBONATE 500 MG/1
1000 TABLET, CHEWABLE ORAL 4 TIMES DAILY PRN
Status: DISCONTINUED | OUTPATIENT
Start: 2021-10-14 | End: 2021-11-04 | Stop reason: HOSPADM

## 2021-10-14 RX ORDER — DEXTROSE MONOHYDRATE 25 G/50ML
25-50 INJECTION, SOLUTION INTRAVENOUS
Status: DISCONTINUED | OUTPATIENT
Start: 2021-10-14 | End: 2021-11-04 | Stop reason: HOSPADM

## 2021-10-14 RX ORDER — DEXTROSE MONOHYDRATE 25 G/50ML
INJECTION, SOLUTION INTRAVENOUS
Status: COMPLETED
Start: 2021-10-14 | End: 2021-10-14

## 2021-10-14 RX ORDER — AMOXICILLIN 250 MG
2 CAPSULE ORAL 2 TIMES DAILY PRN
Status: DISCONTINUED | OUTPATIENT
Start: 2021-10-14 | End: 2021-11-04 | Stop reason: HOSPADM

## 2021-10-14 RX ORDER — CLOPIDOGREL BISULFATE 75 MG/1
75 TABLET ORAL DAILY
Status: DISCONTINUED | OUTPATIENT
Start: 2021-10-14 | End: 2021-11-04 | Stop reason: HOSPADM

## 2021-10-14 RX ORDER — ACETAMINOPHEN 325 MG/1
650 TABLET ORAL EVERY 6 HOURS PRN
Status: DISCONTINUED | OUTPATIENT
Start: 2021-10-14 | End: 2021-11-04 | Stop reason: HOSPADM

## 2021-10-14 RX ORDER — AMOXICILLIN 250 MG
1 CAPSULE ORAL 2 TIMES DAILY PRN
Status: DISCONTINUED | OUTPATIENT
Start: 2021-10-14 | End: 2021-11-04 | Stop reason: HOSPADM

## 2021-10-14 RX ADMIN — CLOPIDOGREL BISULFATE 75 MG: 75 TABLET ORAL at 12:19

## 2021-10-14 RX ADMIN — DEXTROSE MONOHYDRATE 25 ML: 25 INJECTION, SOLUTION INTRAVENOUS at 03:42

## 2021-10-14 RX ADMIN — ASPIRIN 81 MG CHEWABLE TABLET 81 MG: 81 TABLET CHEWABLE at 12:19

## 2021-10-14 RX ADMIN — POTASSIUM CHLORIDE, DEXTROSE MONOHYDRATE AND SODIUM CHLORIDE: 150; 5; 450 INJECTION, SOLUTION INTRAVENOUS at 03:47

## 2021-10-14 RX ADMIN — CEFTRIAXONE 1 G: 1 INJECTION, POWDER, FOR SOLUTION INTRAMUSCULAR; INTRAVENOUS at 04:11

## 2021-10-14 RX ADMIN — FLUCONAZOLE 100 MG: 2 INJECTION, SOLUTION INTRAVENOUS at 06:13

## 2021-10-14 RX ADMIN — CEFTRIAXONE 1 G: 1 INJECTION, POWDER, FOR SOLUTION INTRAMUSCULAR; INTRAVENOUS at 00:21

## 2021-10-14 RX ADMIN — SODIUM CHLORIDE, POTASSIUM CHLORIDE, SODIUM LACTATE AND CALCIUM CHLORIDE 1000 ML: 600; 310; 30; 20 INJECTION, SOLUTION INTRAVENOUS at 00:05

## 2021-10-14 RX ADMIN — MAGNESIUM SULFATE HEPTAHYDRATE 2 G: 40 INJECTION, SOLUTION INTRAVENOUS at 14:05

## 2021-10-14 RX ADMIN — INSULIN DETEMIR 9 UNITS: 100 INJECTION, SOLUTION SUBCUTANEOUS at 22:52

## 2021-10-14 RX ADMIN — INSULIN ASPART 3 UNITS: 100 INJECTION, SOLUTION INTRAVENOUS; SUBCUTANEOUS at 22:49

## 2021-10-14 RX ADMIN — CEFTRIAXONE 2 G: 2 INJECTION, POWDER, FOR SOLUTION INTRAMUSCULAR; INTRAVENOUS at 23:57

## 2021-10-14 RX ADMIN — POTASSIUM CHLORIDE, DEXTROSE MONOHYDRATE AND SODIUM CHLORIDE: 150; 5; 450 INJECTION, SOLUTION INTRAVENOUS at 14:00

## 2021-10-14 RX ADMIN — INSULIN ASPART 3 UNITS: 100 INJECTION, SOLUTION INTRAVENOUS; SUBCUTANEOUS at 19:35

## 2021-10-14 ASSESSMENT — ACTIVITIES OF DAILY LIVING (ADL)
FALL_HISTORY_WITHIN_LAST_SIX_MONTHS: NO
ADLS_ACUITY_SCORE: 26
ADLS_ACUITY_SCORE: 22
ADLS_ACUITY_SCORE: 26
ADLS_ACUITY_SCORE: 22
IADL_COMMENTS: DEPENDENT
ADLS_ACUITY_SCORE: 26

## 2021-10-14 NOTE — ED TRIAGE NOTES
Pt aox3, ABCs intact. Pt arrives with family for evaluation of nausea/vomiting and low blood sugar this AM. Patient unable to keep any food or water down. Family also concerned for blisters on his penis that developed 30 minutes PTA, patient has urinary catheter in place. Patient and family also state that patient feels unsafe at home because he needs more care than his family can provide. Blood sugar in triage was 101.

## 2021-10-14 NOTE — PROGRESS NOTES
"   10/14/21 1501   Quick Adds   Type of Visit Initial PT Evaluation       Present no   Language English   Living Environment   Current Living Arrangements house   Home Accessibility stairs to enter home;stairs within home   Stair Railings, Main Entrance none   Number of Stairs, Within Home, Primary   (uses stair/chair lift)   Stair Railings, Within Home, Primary railing on right side (ascending)   Transportation Anticipated family or friend will provide   Living Environment Comments Pt currently receiving 24/7 assistance with fn mobility from family. Family member endorsing they are overwhelmed with the amount of care they are providing, and endorsing they are worried about injuring themselves in the process. They have michell lift available but reporting pt does not like it to be used.   Self-Care   Usual Activity Tolerance fair   Current Activity Tolerance fair   Equipment Currently Used at Home walker, standard;wheelchair, manual;lift device;hospital bed   Activity/Exercise/Self-Care Comment Had been to ARU, then TCU (for 1 day only), reporitng \"not taking good care of him\"   Disability/Function   Fall history within last six months no   General Information   Onset of Illness/Injury or Date of Surgery 10/13/21   Referring Physician Alessio Marshall MD   Patient/Family Therapy Goals Statement (PT) Did not formally state, endorsing being overwhelmed with care at home   Pertinent History of Current Problem (include personal factors and/or comorbidities that impact the POC) Jimmy Otto is a 71-year-old male with history of insulin-dependent type 2 diabetes, hypercholesterolemia, hypertension, TIA, erectile dysfunction, recent stroke, and urinary retention with indwelling Felix catheter placed about 2 and half weeks ago.  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  This left him nonverbal.  He was cared for at the [facility].  He discharged to the acute rehab unit at " [facility] where he stayed from 9/17 through 10/6.  He returned home.  His family brought him in last night for evaluation of weakness, nausea, vomiting, decreased oral intake, difficulty swallowing, and generally doing poorly.     Existing Precautions/Restrictions fall   General Observations Alert during this evaluation, per family/RN/chart review, frequently very lethargic/somnolent    Cognition   Orientation Status (Cognition) unable/difficult to assess   Affect/Mental Status (Cognition) flat/blunted affect   Follows Commands (Cognition) follows one-step commands;75-90% accuracy   Behavioral Issues unable/difficult to assess;withdrawn   Safety Deficit (Cognition) unable/difficult to assess   Memory Deficit (Cognition) unable/difficult to assess   Pain Assessment   Patient Currently in Pain No   Strength   Strength Comments Grossly deconditioned, L LE grossly 4-/5, R LE grossly 3+/5    Bed Mobility   Comment (Bed Mobility) modA   Transfers   Transfer Safety Comments modA with SPT bed<>w/c, gait belt, no AD, uses elbow grasp to maintain standing and turn   Balance   Balance Comments Sitting balance fair-poor, modA to maintain, fatigues. Fair to poor head control with fatigue in sitting.Sits with posterior pelvic tilt, slumped posture, leaning to L, R UE flexed at elbow.  Standing balance poor.    Sensory Examination   Sensory Perception Comments Unable to assess due to command following   Coordination   Coordination Comments Unable to assess due to command following   Muscle Tone   Muscle Tone Comments On modified DEV liz 3 (considerable increase in tone)    Clinical Impression   Criteria for Skilled Therapeutic Intervention yes, treatment indicated   PT Diagnosis (PT) Impaired fn mobility   Influenced by the following impairments Strength, ROM, cardiovascular endurance, balance, coordination, cognition, communication, high falls risk    Functional limitations due to impairments All fn mobility tasks: Bed mob,  transfers, amb   Clinical Presentation Evolving/Changing   Clinical Presentation Rationale Multiple affecting comorobidities, assessment incl strength, balance, tone, ROM, cognition/communication, home env and caregiver assist/burden   Clinical Decision Making (Complexity) moderate complexity   Therapy Frequency (PT) Daily   Predicted Duration of Therapy Intervention (days/wks) 4 days   Planned Therapy Interventions (PT) balance training;bed mobility training;gait training;home exercise program;neuromuscular re-education;patient/family education;postural re-education;strengthening;stair training;stretching;transfer training;progressive activity/exercise;risk factor education;home program guidelines   Risk & Benefits of therapy have been explained evaluation/treatment results reviewed;care plan/treatment goals reviewed;risks/benefits reviewed;current/potential barriers reviewed;participants voiced agreement with care plan;participants included;patient   Clinical Impression Comments Cognition/lethargy greatly impact fn mobility level.    PT Discharge Planning    PT Discharge Recommendation (DC Rec) home with assist;home with home care physical therapy;Transitional Care Facility   PT Rationale for DC Rec Pt would benefit from ongoing physical therapy in order to increase strength, activity tolerance, balance, motor coordination and independence with safe, functional mobility. Of note, family reporting feeling overwhelmed with amount of care they are providing at home.  Patient would benefit from intensive skilled therapies in TCU setting; however, anticipate pt/family will refuse. If d/c to home, pt/fam already have stair lift, michell, w/c, 2WW,4WW. Pt would benefit from HHPT to reduce caregiver burden and progress pt's fn mobility, reduce risk of falling. Spoke with SW/CM about assisting family with adult  services   PT Brief overview of current status  modA with bed mob, sitting balance, SPT    Total Evaluation  Time   Total Evaluation Time (Minutes) 15   Coping Strategies   Trust Relationship/Rapport care explained;choices provided;questions encouraged;reassurance provided;questions answered;empathic listening provided;thoughts/feelings acknowledged   Wellbeing Promotion   Family/Support System Care caregiver stress acknowledged;self-care encouraged;support provided

## 2021-10-14 NOTE — PROGRESS NOTES
Patient seen and examined.  H&P reviewed.  71-year-old male patient with history of diabetes mellitus type 2, insulin requiring, hypertension, hyperlipidemia, recent stroke, urinary retention with indwelling Felix catheter, who was admitted this morning by Dr. Marshall for sepsis secondary to urinary tract infection.  He was started on IV fluid and IV ceftriaxone.  We will continue IV fluid and IV antibiotic as ordered.  Evaluated by speech therapy dysphagia and recommended clear diet.  Order placed.

## 2021-10-14 NOTE — ED NOTES
[facility]  ED Nurse Handoff Report    Jimmy Otto is a 71 year old male   ED Chief complaint: Nausea & Vomiting  . ED Diagnosis:   Final diagnoses:   Adult failure to thrive   Nausea and vomiting, intractability of vomiting not specified, unspecified vomiting type   Oral thrush   Dehydration   Acute UTI     Allergies:   Allergies   Allergen Reactions     Nka [No Known Allergies]        Code Status: not addressed in ED  Activity level - Baseline/Home:  Total Care. Activity Level - Current:   Total Care. Lift room needed: Yes. Bariatric: No   Needed: No   Isolation: No. Infection: Not Applicable.     Vital Signs:   Vitals:    10/13/21 2230 10/13/21 2245 10/13/21 2300 10/13/21 2345   BP: 128/77 117/76  122/64   Pulse: 91 90  91   Resp:       Temp:       TempSrc:       SpO2: 96% 97% 97% 98%       Cardiac Rhythm:  ,      Pain level:    Patient confused: No. Patient Falls Risk: Yes.   Elimination Status: Felix cath in place  Patient Report - Initial Complaint: n/v  . Focused Assessment:Jimmy Otto is a 71 year old male with history of diabetes mellitus, cerebral artery occlusion, hypertension, and hypercholesteremia who presents with nausea and vomiting. The patient's brother mentions the patient felt weak and had a decreased appetite yesterday which developed into nausea, multiple episodes of emesis, and increased weakness prompting their visit to the ED today. His brother mentions he had a subjective fever at home yesterday but denies any cough or diarrhea. In the ED, the patient denies any pain.The family is concerned for blisters on his penis which he developed 30 minutes prior to arrival. The patient lives at home with his wife but the family feels he needs more care than what they can provide. The patient's history and ROS is limited due to the baseline condition of the patient.        Review of Systems   Constitutional: Positive for fever (Subjective).   Respiratory: Negative for cough.     Gastrointestinal: Positive for nausea and vomiting. Negative for diarrhea.   All other systems reviewed and are negative.     Allergies:  No known drug allergies     Medications:    Amantadine   Amlodipine  Atenolol   Atorvastatin   Bisacodyl   Clopidogrel   Docusate sodium   Doxazosin   Liraglutide   Metformin  Ramelteon   Sennosides   Viagra     Past Medical History:    Diabetes mellitus  Hypercholesteremia  Hypertension  Cerebral artery occlusion   Stroke     Family History:    Cataract: Mother     Social History:  The patient presents to the ED with his brother.  Unable to walk at home per baseline.   Lives with his wife at home.     Tests Performed: labs, head CT, CXR. Abnormal Results:   Labs Ordered and Resulted from Time of ED Arrival Up to the Time of Departure from the ED   GLUCOSE BY METER - Abnormal; Notable for the following components:       Result Value    GLUCOSE BY METER POCT 101 (*)     All other components within normal limits   COMPREHENSIVE METABOLIC PANEL - Abnormal; Notable for the following components:    Creatinine 1.53 (*)     Calcium 10.3 (*)     Alkaline Phosphatase 158 (*)     Protein Total 9.7 (*)     Albumin 3.3 (*)     GFR Estimate 45 (*)     All other components within normal limits   ROUTINE UA WITH MICROSCOPIC REFLEX TO CULTURE - Abnormal; Notable for the following components:    Appearance Urine Slightly Cloudy (*)     Blood Urine Large (*)     Protein Albumin Urine 100  (*)     Leukocyte Esterase Urine Large (*)     Bacteria Urine Many (*)     WBC Clumps Urine Present (*)     Mucus Urine Present (*)     RBC Urine >182 (*)     WBC Urine 124 (*)     All other components within normal limits    Narrative:     Urine Culture ordered based on laboratory criteria   CBC WITH PLATELETS AND DIFFERENTIAL - Abnormal; Notable for the following components:    MCHC 30.5 (*)     All other components within normal limits   LIPASE - Abnormal; Notable for the following components:    Lipase <10  (*)     All other components within normal limits   ISTAT GASES LACTATE VENOUS POCT - Abnormal; Notable for the following components:    Lactic Acid POCT 2.4 (*)     Bicarbonate Venous POCT 31 (*)     O2 Sat, Venous POCT 33 (*)     pCO2V Venous POCT 51 (*)     pO2 Venous POCT 21 (*)     All other components within normal limits   TROPONIN I - Normal   COVID-19 VIRUS (CORONAVIRUS) BY PCR - Normal    Narrative:     Testing was performed using the nan  SARS-CoV-2 & Influenza A/B Assay on the nan  Snow  System.  This test should be ordered for the detection of SARS-COV-2 in individuals who meet SARS-CoV-2 clinical and/or epidemiological criteria. Test performance is unknown in asymptomatic patients.  This test is for in vitro diagnostic use under the FDA EUA for laboratories certified under CLIA to perform moderate and/or high complexity testing. This test has not been FDA cleared or approved.  A negative test does not rule out the presence of PCR inhibitors in the specimen or target RNA in concentration below the limit of detection for the assay. The possibility of a false negative should be considered if the patient's recent exposure or clinical presentation suggests COVID-19.  [facility] are certified under the Clinical Laboratory Improvement Amendments of 1988 (CLIA-88) as qualified to perform moderate and/or high complexity laboratory testing.   EXTRA BLUE TOP TUBE   EXTRA RED TOP TUBE   EXTRA PURPLE TOP TUBE   EXTRA BLOOD BANK PURPLE TOP TUBE   EXTRA BLOOD BANK PURPLE TOP TUBE   PERIPHERAL IV CATHETER   PERIPHERAL IV CATHETER   ISTAT CG4 GASES LACTATE YRN NURSING POCT   ISTAT CG4 GASES LACTATE YRN NURSING POCT   URINE CULTURE   BLOOD CULTURE   BLOOD CULTURE   CBC WITH PLATELETS & DIFFERENTIAL    Narrative:     The following orders were created for panel order CBC with platelets differential.  Procedure                               Abnormality         Status                     ---------                                -----------         ------                     CBC with platelets and d...[[id number]]  Abnormal            Final result                 Please view results for these tests on the individual orders.   EXTRA TUBE    Narrative:     The following orders were created for panel order Extra Tube.  Procedure                               Abnormality         Status                     ---------                               -----------         ------                     Extra Blood Bank Purple ...[[id number]]                      Final result                 Please view results for these tests on the individual orders.   EXTRA TUBE    Narrative:     The following orders were created for panel order Extra Tube ([place] Draw).  Procedure                               Abnormality         Status                     ---------                               -----------         ------                     Extra Blue Top Tube[[id number]]                              Final result               Extra Red Top Tube[[id number]]                               Final result               Extra Purple Top Tube[[id number]]                            Final result               Extra Blood Bank Purple ...[[id number]]                                                   Please view results for these tests on the individual orders.     XR Chest 2 Views   Final Result   IMPRESSION: No acute abnormality.      Head CT w/o contrast   Final Result   IMPRESSION:   1.  No acute intracranial process.   2.  Chronic infarcts described above.   3.  Age-related changes described above.    4.  Right posterior sphenoid sinus mild mucosal thickening versus trace air-fluid level. Please correlate for acute sinusitis.       .   Treatments provided: NS bolus, LR bolus, ABX  Family Comments: family at bedside and is updated on hospital admission  OBS brochure/video discussed/provided to patient:  No  ED Medications:   Medications   ondansetron  (ZOFRAN) injection 4 mg (has no administration in time range)   lactated ringers BOLUS 1,995 mL (0 mLs Intravenous Paused 10/14/21 0023)   ondansetron (ZOFRAN) injection 4 mg (4 mg Intravenous Given 10/13/21 2216)   famotidine (PEPCID) injection 20 mg (20 mg Intravenous Given 10/13/21 2246)   0.9% sodium chloride BOLUS (0 mLs Intravenous Stopped 10/14/21 0020)   cefTRIAXone (ROCEPHIN) 1 g vial to attach to  mL bag for ADULTS or NS 50 mL bag for PEDS (1 g Intravenous New Bag 10/14/21 0021)     Drips infusing:  No  For the majority of the shift, the patient's behavior Green. Interventions performed were n/a.    Sepsis treatment initiated: No     Patient tested for COVID 19 prior to admission: YES    ED Nurse Name/Phone Number: Octavia Wilson RN,   12:43 AM    RECEIVING UNIT ED HANDOFF REVIEW    Above ED Nurse Handoff Report was reviewed: Yes  Reviewed by: Lorie Vaugnh RN on October 14, 2021 at 12:47 AM

## 2021-10-14 NOTE — PROGRESS NOTES
10/14/21 1021   General Information   Onset of Illness/Injury or Date of Surgery 10/13/21   Referring Physician Alessio Marshall MD   Patient/Family Therapy Goal Statement (SLP) None stated   Pertinent History of Current Problem Jimmy Otto is a 71-year-old male with history of insulin-dependent type 2 diabetes, hypercholesterolemia, hypertension, TIA, erectile dysfunction, recent stroke, and urinary retention with indwelling Felix catheter placed about 2 and half weeks ago.  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  This left him nonverbal.  He was cared for at the [facility].  He discharged to the acute rehab unit at [facility] where he stayed from 9/17 through 10/6.  He returned home.  His family brought him in last night for evaluation of weakness, nausea, vomiting, decreased oral intake, difficulty swallowing, and generally doing poorly.  He had some fever the day before.  He had no cough or diarrhea.  Family was also concerned about maybe some blisters on his penis but none of those were noted here.  Emergency department evaluation showed heart rate in the 90s but no fever or other significant abnormalities.  Laboratory evaluation showed white blood cell 10.2 with otherwise unremarkable CBC.  Basic metabolic panel showed BUN 30 and creatinine 1.53 but was otherwise unremarkable.  Alkaline phosphatase is 158 and liver function tests were otherwise unremarkable.  Lactic acid was 2.4 and came down to 2.2 after IV fluids.  Lipase and troponin were undetectable.  Venous blood gas showed pH 7.39, PCO2 51, PO2 24, and oxygen saturations of 33%.  Urinalysis showed 124 white blood cells, large leukocyte esterase, no nitrite, many bacteria, and white blood cell clumps.  Testing for COVID-19 was negative.  Chest x-ray was unremarkable.  CT of head raise question of possible right sphenoid sinus mucosal thickening with small air-fluid level.  Patient was given Rocephin for sepsis due to urinary  tract infection.  Exam suggested thrush.  I was asked to admit him for further cares.   General Observations Pt is known to SLP Department. He was discharged from ARU with recommendations for puree (4) and slightly thick (1) liquids. A video swallow was completed on 9/24 and no aspiration occured across trials, penetration with thin liquids. No family is present during today's evaluation to ask further questions re: swallowing and diet modifications at home    Past History of Dysphagia Video swallow completed at ARU on 9/24 - no aspiration occured. Penetration with thin liquids. Discharge recs as follows - Pt is now tolerating puree texture (4), slightly thick liquids (1). Free Water Protocol also implemented with small single sips thin water only, pt tolerating this well. Daughter purchased Provale cup to ensure safe amount of bolus (5 ml) offered at single instance.   Type of Evaluation   Type of Evaluation Swallow Evaluation   Oral Motor   Oral Musculature unable to assess due to poor participation/comprehension   Structural Abnormalities none present   Vocal Quality/Secretion Management (Oral Motor)   Vocal Quality (Oral Motor)   (weak)   Secretion Management (Oral Motor) WNL   General Swallowing Observations   Current Diet/Method of Nutritional Intake (General Swallowing Observations, NIS) NPO   Respiratory Support (General Swallowing Observations) none   Swallowing Evaluation Clinical swallow evaluation   Clinical Swallow Evaluation   Clinical Swallow Evaluation Textures Trialed mildly thick liquids;pureed   Clinical Swallow Eval: Mildly Thick Liquids   Mode of Presentation cup;spoon;straw;fed by clinician   Volume Presented 1 oz    Oral Phase WFL;residue in oral cavity   Pharyngeal Phase impaired;repeated swallows   Diagnostic Statement Bolus holding and very slow swallow. No overt s/sx of aspiration on small amounts of mildly thick liquids.    Clinical Swallow Evaluation: Puree Solid Texture Trial   Mode of  Presentation, Puree spoon;fed by clinician   Volume of Puree Presented 1 teaspoons    Oral Phase, Puree Poor AP movement;Residue in oral cavity   Oral Residue, Puree mid posterior tongue;left anterior lateral sulci;right anterior lateral sulci   Diagnostic Statement Pt bolus holding and ultimately spitting out all of the puree trials. He reports difficulty and discomfort when swallowing and therefore declines any additional trials.    Swallowing Recommendations   Diet Consistency Recommendations mildly thick liquids (level 2);clear liquid diet   Supervision Level for Intake 1:1 supervision needed   Mode of Delivery Recommendations bolus size, small;slow rate of intake   Swallowing Maneuver Recommendations extra swallow   Monitoring/Assistance Required (Eating/Swallowing) check mouth frequently for oral residue/pocketing;cue for finger/lingual sweep if oral pocketing present;stop eating activities when fatigue is present;monitor for cough or change in vocal quality with intake   Recommended Feeding/Eating Techniques (Swallow Eval) maintain upright sitting position for eating;maintain upright posture during/after eating for 30 minutes;minimize distractions during oral intake;provide assist with feeding   Medication Administration Recommendations, Swallowing (SLP) Crushed with mildly thick    Instrumental Assessment Recommendations instrumental evaluation not recommended at this time   General Therapy Interventions   Planned Therapy Interventions Dysphagia Treatment   Dysphagia treatment Oropharyngeal exercise training;Modified diet education;Instruction of safe swallow strategies;Compensatory strategies for swallowing   SLP Therapy Assessment/Plan   Criteria for Skilled Therapeutic Interventions Met (SLP Eval) yes;treatment indicated   SLP Diagnosis Moderate oropharyngeal dysphagia    Rehab Potential (SLP Eval) good, to achieve stated therapy goals   Therapy Frequency (SLP Eval) 5 times/wk   Predicted Duration of  Therapy Intervention (SLP Eval) 2 weeks   Comment, Therapy Assessment/Plan (SLP) Pt seen for clinical swallow evaluation. He is fatigued and requires cues for alertness initially but is then able to maintain alertness. He reports difficulty and discomfort swallowing. Oral mech is supsect for oral thrush, medically being treated according to MD noted . Pt tolerated mildly thick liquids by spoon and cup sip, no overt s/sx of aspiration. Bolus holding and prolonged posterior propulsion, limited trials d/t pt declining. He was also assessed with puree which was noted for even more significant bolus holding and severe amounts of oral residue. Pt ultimately spitting it out and saying it was too difficulty to swallow. No further trials given d/t pt's refusal. Moderate oropharyngeal dysphagia, suspect largely d/t thrush given pt's report of discomfort and difficulty. Recommend clear liquids (midly thick) with 1:1 assistance and supervision. Pt must be alert, seated upright, and be allowed adequate time to swallow. Initiate SLP services for dysphagia.    Therapy Plan Review/Discharge Plan (SLP)   Therapy Plan Review (SLP) evaluation/treatment results reviewed;care plan/treatment goals reviewed;participants voiced agreement with care plan;participants included;patient   SLP Discharge Planning    SLP Discharge Recommendation (DC Rec) home with assist;home with home care speech therapy;Transitional Care Facility   SLP Rationale for DC Rec Dysphagia, below baseline    SLP Brief overview of current status  Moderate oropharyngeal dysphagia, suspect largely d/t thrush given pt's report of discomfort and difficulty. Recommend clear liquids (midly thick) with 1:1 assistance and supervision. Pt must be alert, seated upright, and be allowed adequate time to swallow. Initiate SLP services for dysphagia.     Total Evaluation Time   Total Evaluation Time (Minutes) 14

## 2021-10-14 NOTE — ED NOTES
DATE:  10/13/2021   TIME OF RECEIPT FROM LAB:  9404  LAB TEST:  lactic  LAB VALUE:  2.4  [facility]  TIME LAB VALUE REPORTED TO PROVIDER:   [id number]

## 2021-10-14 NOTE — PHARMACY-ADMISSION MEDICATION HISTORY
Admission medication history interview status for this patient is complete. See EPIC admission navigator for allergy information, prior to admission medications and immunization status. Per daughter, pt discharged from Acute rehab at [facility] on 10/6.  [place] [place] then came home on 10/8.  Daughter has been following meds from discharge paperwork from [facility].    Medication history interview source(s):pt daughter, Clemente, via phone  Medication history resources (including written lists, pill bottles, clinic record):Sure Scripts, Epic list ([facility] rehab discharge list)  Primary pharmacy:[facility] [place] on [address]    Changes made to PTA medication list:  Added: -----  Deleted: sliding scale novolog (family was told to not cont at home per care team)  REPORTED as NOT TAKING: bisacodyl, docusate, senna, miralax (per daughter do not have at home and not giving)  Changed: victoza from daily to qpm, levemir from qpm to qam.    Actions taken by pharmacist (provider contacted, etc):None     Additional medication history information:None    Medication reconciliation/reorder completed by provider prior to medication history? Yes, sticky note left for MD and MD paged      For patients on insulin therapy:Y  Lantus/levemir/NPH/Mix 70/30 dose: Y  Sliding scale Novolog N  If yes, do you have baseline novolog pre-meal dose:---  Checking BG 3-5 times daily.  Per daughter, some low BG, 50-70s  Any barriers to therapy: cost of medications/comfortable with giving injections (if applicable)/comfortable and confident with current diabetes regimen.    Prior to Admission medications    Medication Sig Last Dose Taking? Auth Provider   acetaminophen (TYLENOL) 325 MG tablet Take 1-3 tablets (325-975 mg) by mouth every 6 hours as needed for mild pain or fever (> 101 F)  at no recent usage Yes Jesika Theodore PA-C   amantadine (SYMMETREL) 100 MG capsule Take 1 capsule (100 mg) by mouth daily 10/13/2021 at am Yes  Jesika Theodore PA-C   amLODIPine (NORVASC) 10 MG tablet Take 1 tablet (10 mg) by mouth daily 10/13/2021 at am Yes Jesika Theodore PA-C   aspirin (ASA) 81 MG chewable tablet Take 81 mg by mouth daily 10/13/2021 at Unknown time Yes Unknown, Entered By History   atenolol (TENORMIN) 25 MG tablet Take 1 tablet (25 mg) by mouth 2 times daily 10/13/2021 at Unknown time Yes Jesika Theodore PA-C   atorvastatin (LIPITOR) 80 MG tablet Take 80 mg by mouth daily 10/13/2021 at Unknown time Yes Unknown, Entered By History   clopidogrel (PLAVIX) 75 MG tablet Take 75 mg by mouth daily 10/13/2021 at Unknown time Yes Unknown, Entered By History   doxazosin (CARDURA) 1 MG tablet Take 1 tablet (1 mg) by mouth daily 10/13/2021 at Unknown time Yes Jesika Theodore PA-C   insulin detemir (LEVEMIR PEN) 100 UNIT/ML pen Inject 18 Units Subcutaneous every 24 hours (evening)  Patient taking differently: Inject 18 Units Subcutaneous every morning (evening) 10/13/2021 at Unknown time Yes Jesika Theodore PA-C   liraglutide (VICTOZA) 18 MG/3ML solution Inject 1.8 mg Subcutaneous daily 10/12/2021 at pm Yes Jesika Theodore PA-C   metFORMIN (GLUCOPHAGE) 1000 MG tablet Take 1 tablet (1,000 mg) by mouth 2 times daily (with meals) 10/13/2021 at Unknown time Yes Jesika Theodore PA-C   potassium chloride (KLOR-CON) 20 MEQ packet Take 20 mEq by mouth daily 10/13/2021 at Unknown time Yes Jesika Theodore PA-C   ramelteon (ROZEREM) 8 MG tablet Take 1 tablet (8 mg) by mouth At Bedtime 10/13/2021 at Unknown time Yes Jesika Theodore PA-C   bisacodyl (DULCOLAX) 10 MG suppository Place 1 suppository (10 mg) rectally daily as needed for constipation  Patient not taking: Reported on 10/14/2021 Not Taking at Unknown time  Jesika Theodore PA-C   docusate sodium (COLACE) 100 MG capsule Take 1 capsule (100 mg) by mouth 2 times daily  Patient not taking: Reported on 10/14/2021 Not Taking at Unknown time  Jesika Theodore  KARINA ACOSTA   polyethylene glycol (MIRALAX) 17 g packet Take 17 g by mouth daily as needed for constipation  Patient not taking: Reported on 10/14/2021 Not Taking at Unknown time  Jesika Theodore PA-C   sennosides (SENOKOT) 8.6 MG tablet Take 1 tablet by mouth At Bedtime  Patient not taking: Reported on 10/14/2021 Not Taking at Unknown time  Jesika Theodore PA-C

## 2021-10-14 NOTE — PLAN OF CARE
VSS.  Pt non verbal so cannot  orientation.  Breathing is unlabored and pt denies CP and SOB.  Pt currently a lift as PT has not evaluated mobility.  Pt helped to shift weight and reposition throughout shift.  Felix chronic with good output.  Pt denies pain but has penile discomfort and itching.    Speech saw today; please see note.    Pt not able to have much oral intake; D5 K+ 1/2 NS running at 100 ml/hr; vascular placed new IV today also.    Pills given crushed in applesauce; pt would prefer pudding next time due to mouth sores.    Mag 1.5 and replaced with AM recheck.    Held insulin due to low intake.    Lactic resulted 1.2 this shift.    Following RN aware of swallowing difficulty and diet updates.

## 2021-10-14 NOTE — PROGRESS NOTES
"   10/14/21 1500   Quick Adds   Type of Visit Initial Occupational Therapy Evaluation   Living Environment   People in home Alone, lives in family's home now    Current Living Arrangements house     Home Accessibility stairs to enter home;stairs within home  (   Number of Stairs, Main Entrance 1   Self-Care   Usual Activity Tolerance fair         Equipment Currently Used at Home walker, standard;wheelchair, manual;lift device;hospital bed   Activity/Exercise/Self-Care Comment max A for all cares for ADL. family reports toileting and dressing at bed  level.  non ambulatory though can stand pivot with assist. had been working with therapies at [facility], discharged 2/2 lack of progress and thus discharged to TCU. family brought him home after 1 day at TCU reported \"not taking good care of him\"   Instrumental Activities of Daily Living (IADL)   IADL Comments dependent    Disability/Function   Hearing Difficulty or Deaf no   Communication difficulty speaking;unable to speak   Communication Management gestures and attempts to verbalize    Fall history within last six months no   General Information   Onset of Illness/Injury or Date of Surgery 10/13/21   Referring Physician Alessio Marshall MD   Patient/Family Therapy Goal Statement (OT) pt family member reports unsure of discharge dispo at this time    Additional Occupational Profile Info/Pertinent History of Current Problem Jimmy Otto is a 71 year old male with a PMH including but not limited to HTN, uncontrolled DM 2 (HbA1c 11.5), HLD, BPH, and history of a stroke in 2012 with residual right hemiparesis who presented to the emergency room on 9/14/2021 with altered mental status and acute worsening of right-sided weakness.  Was not a candidate for thrombolysis due to unknown time of last known normal, and initial CT and CT imaging did not show any acute abnormalities or large vessel occlusion therefore not a candidate for mechanical thrombectomy.  An MRI was then obtained " which did demonstrate an acute right thalamic CVA.  Etiology felt to be due to small vessel disease and he has been initiated on aspirin and Plavix for 1 month, followed by aspirin alone indefinitely with a check of P2Y12 activity after a month of being on Plavix only.  Hospital course has been complicated by acute kidney injury with a peak creatinine of 2.13, hypokalemia needing repletion, and urinary retention and hematuria needing Ordoñez catheter and urology consult.   Cognitive Status Examination   Cognitive Status Comments unable to assess. able to follow 2 step commands with VC and TC   Pain Assessment   Patient Currently in Pain No   Range of Motion Comprehensive   General Range of Motion upper extremity range of motion deficits identified   General Upper Extremity Assessment (Range of Motion)   Upper Extremity: Range of Motion shoulder, left: UE ROM   Comment: Upper Extremity ROM R UE no AROM, PROM grossly 75% SH. LUE about 50% full AROM, WNL PROM   Strength Comprehensive (MMT)   Comment, General Manual Muscle Testing (MMT) Assessment 1/5 R SH.   L not formally assessed. generalized deconditionng and weakness in L UE   Muscle Tone Assessment   Muscle Tone Quick Adds RUE   Muscle Tone Assessment - RUE moderately increased tone   Muscle Tone Comments at eblow, wrist, hand    Coordination   Upper Extremity Coordination Right UE impaired   Activities of Daily Living   BADL Assessment toileting   Toileting   [place] Level (Toileting) dependent (less than 25% patient effort)   Comment (Toileting) ordoñez use   Clinical Impression   Criteria for Skilled Therapeutic Interventions Met (OT) yes;skilled treatment is necessary   OT Diagnosis decreased indp and function with ADL   OT Problem List-Impairments impacting ADL problems related to;activity tolerance impaired;balance;cognition;communication;coordination;eating/swallowing;flexibility;mobility;muscle tone;range of motion (ROM);strength;postural control    Assessment of Occupational Performance 5 or more Performance Deficits   Identified Performance Deficits dressing,bathing, groooming, toileting, mobility/transfers, IADL's   Planned Therapy Interventions (OT) ADL retraining;IADL retraining;balance training;bed mobility training;cognition;neuromuscular re-education;strengthening;ROM;transfer training;home program guidelines;progressive activity/exercise;other (see comments)  (caregiver education )   Clinical Decision Making Complexity (OT) moderate complexity   Therapy Frequency (OT) 5x/week   Predicted Duration of Therapy 3 days    Anticipated Equipment Needs Upon Discharge (OT)   (TBD but reports that pt has all equipt needed)   Risk & Benefits of therapy have been explained evaluation/treatment results reviewed;care plan/treatment goals reviewed;risks/benefits reviewed;current/potential barriers reviewed;participants voiced agreement with care plan;participants included;patient  (sister in law?)   Comment-Clinical Impression decreased safety and function without skilled IP OT    OT Discharge Planning    OT Discharge Recommendation (DC Rec) Transitional Care Facility;home with home care occupational therapy;Home with assist   OT Rationale for DC Rec recommending TCU if pt family cannot provide 24/7 assistance with all mobility and ADL. if discharging to home would require dependent lift device, commode, shower chair/tub bench. will continue to monitor for needs. anticiapte that pt could make functional gains at TCU and would benefit from continued rehab after IP   OT Brief overview of current status  max A for transfers and ADL   Total Evaluation Time (Minutes)   Total Evaluation Time (Minutes) 15

## 2021-10-14 NOTE — H&P
[facility]  History and Physical   Hospitalist Service    Alessio Marshall MD    Jimmy Otto MRN# [id number]   YOB: 1950 Age: 71 year old      Date of Admission:  10/13/2021           Assessment and Plan:   Summary:  Jimmy Otto is a 71-year-old male with history of insulin-dependent type 2 diabetes, hypercholesterolemia, hypertension, TIA, erectile dysfunction, recent stroke, and urinary retention with indwelling Felix catheter placed about 2 and half weeks ago.  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  This left him nonverbal.  He was cared for at the [facility].  He discharged to the acute rehab unit at [facility] where he stayed from 9/17 through 10/6.  He returned home.  His family brought him in last night for evaluation of weakness, nausea, vomiting, decreased oral intake, difficulty swallowing, and generally doing poorly.  He had some fever the day before.  He had no cough or diarrhea.  Family was also concerned about maybe some blisters on his penis but none of those were noted here.  Emergency department evaluation showed heart rate in the 90s but no fever or other significant abnormalities.  Laboratory evaluation showed white blood cell 10.2 with otherwise unremarkable CBC.  Basic metabolic panel showed BUN 30 and creatinine 1.53 but was otherwise unremarkable.  Alkaline phosphatase is 158 and liver function tests were otherwise unremarkable.  Lactic acid was 2.4 and came down to 2.2 after IV fluids.  Lipase and troponin were undetectable.  Venous blood gas showed pH 7.39, PCO2 51, PO2 24, and oxygen saturations of 33%.  Urinalysis showed 124 white blood cells, large leukocyte esterase, no nitrite, many bacteria, and white blood cell clumps.  Testing for COVID-19 was negative.  Chest x-ray was unremarkable.  CT of head raise question of possible right sphenoid sinus mucosal thickening with small air-fluid level.  Patient was given Rocephin for sepsis due to  urinary tract infection.  Exam suggested thrush.  I was asked to admit him for further cares.    Problem list:    Sepsis (leukocytosis, heart rate 90s, lactic acidosis)  Urinary tract infection  -Admit as inpatient  -Continue Rocephin 2 g every 24 hours  -Follow urine and blood cultures  -Hold prior to admission antihypertensives in setting of infection and sepsis  -Lactic acid improving; repeat with morning labs    Thrush  -With difficulty swallowing we will treat with fluconazole 100 mg IV daily which can hopefully be changed to oral once diet advanced    Dysphagia  -Prior to admission was on modified diet due to recent stroke  -Has been having more difficulty in the last few days -likely due to weakness from infection  -N.p.o. for now except aspirin and Plavix  -We will ask speech therapy to see for swallow evaluation    Recent stroke on 9/14/2021  -Was hospitalized at [facility] in acute rehab there  -Resume prior to admission aspirin and Plavix  -Once diet advanced resume prior to admission Lipitor    Urine retention identified during recent hospitalization for stroke  -Indwelling Felix catheter was changed in the emergency department  -Keep catheter in  -Was to follow-up with urology as an outpatient    Type 2 diabetes  -Prior to admission was on Levemir insulin 18 units at bedtime, NovoLog sliding scale insulin, Victoza, and Metformin  -I have ordered Levemir 9 units at bedtime and NovoLog sliding scale every 4 hours as needed  -Hold prior to admission Victoza and Metformin for now but can likely resume once diet advanced    Hypercholesterolemia  -Resume prior to admission Lipitor once diet advanced    Hypertension  -Atenolol and Norvasc once blood pressure stable 1 diet advanced    Weakness  Deconditioning  -PT and OT consults    COVID-19 PCR testing was negative    CODE STATUS: Full code  DVT prophylaxis: Pneumoboots  Disposition: Admit as inpatient             Code Status:   Full Code          Primary Care Physician:   Manny Worrell [phone]         Chief Complaint:   Weakness, nausea, vomiting, and fever    History is obtained from patient's family, Dr. Simpson, and the medical record         History of Present Illness:   Jimmy Otto is a 71-year-old male with history of insulin-dependent type 2 diabetes, hypercholesterolemia, hypertension, TIA, erectile dysfunction, recent stroke, and urinary retention with indwelling Felix catheter placed about 2 and half weeks ago.  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  This left him nonverbal.  He was cared for at the [facility].  He discharged to the acute rehab unit at [facility] where he stayed from 9/17 through 10/6.  He returned home.  His family brought him in last night for evaluation of weakness, nausea, vomiting, decreased oral intake, difficulty swallowing, and generally doing poorly.  He had some fever the day before.  He had no cough or diarrhea.  Family was also concerned about maybe some blisters on his penis but none of those were noted here.  Emergency department evaluation showed heart rate in the 90s but no fever or other significant abnormalities.  Laboratory evaluation showed white blood cell 10.2 with otherwise unremarkable CBC.  Basic metabolic panel showed BUN 30 and creatinine 1.53 but was otherwise unremarkable.  Alkaline phosphatase is 158 and liver function tests were otherwise unremarkable.  Lactic acid was 2.4 and came down to 2.2 after IV fluids.  Lipase and troponin were undetectable.  Venous blood gas showed pH 7.39, PCO2 51, PO2 24, and oxygen saturations of 33%.  Urinalysis showed 124 white blood cells, large leukocyte esterase, no nitrite, many bacteria, and white blood cell clumps.  Testing for COVID-19 was negative.  Chest x-ray was unremarkable.  CT of head raise question of possible right sphenoid sinus mucosal thickening with small air-fluid level.  Patient was given Rocephin for sepsis due  to urinary tract infection.  Exam suggested thrush.  I was asked to admit him for further cares.           Past Medical History:     Patient Active Problem List   Diagnosis     Cerebral artery occlusion with cerebral infarction (H)     Stroke (H)     Right sided weakness     CVA (cerebral vascular accident) (H)     Adult failure to thrive     Dehydration     Oral thrush     Acute UTI     Nausea and vomiting, intractability of vomiting not specified, unspecified vomiting type      Past Medical History:   Diagnosis Date     Diabetes mellitus (H)      Hypercholesteremia      Hypertension      Unspecified cerebral artery occlusion with cerebral infarction     TIA 8/11             Past Surgical History:   No past surgical history on file.         Home Medications:     Prior to Admission medications    Medication Sig Last Dose Taking? Auth Provider   acetaminophen (TYLENOL) 325 MG tablet Take 1-3 tablets (325-975 mg) by mouth every 6 hours as needed for mild pain or fever (> 101 F)   Jesika Theodore PA-C   amantadine (SYMMETREL) 100 MG capsule Take 1 capsule (100 mg) by mouth daily   Jesika Theodore PA-C   amLODIPine (NORVASC) 10 MG tablet Take 1 tablet (10 mg) by mouth daily   Jesika Theodore PA-C   aspirin (ASA) 81 MG chewable tablet Take 81 mg by mouth daily   Unknown, Entered By History   atenolol (TENORMIN) 25 MG tablet Take 1 tablet (25 mg) by mouth 2 times daily   Jesika Theodore PA-C   atorvastatin (LIPITOR) 80 MG tablet Take 80 mg by mouth daily   Unknown, Entered By History   bisacodyl (DULCOLAX) 10 MG suppository Place 1 suppository (10 mg) rectally daily as needed for constipation   Jesika Theodore PA-C   clopidogrel (PLAVIX) 75 MG tablet Take 75 mg by mouth daily   Unknown, Entered By History   docusate sodium (COLACE) 100 MG capsule Take 1 capsule (100 mg) by mouth 2 times daily   Jesika Theodore PA-C   doxazosin (CARDURA) 1 MG tablet Take 1 tablet (1 mg) by mouth daily    Jesika Theodore PA-C   insulin aspart (NOVOLOG PEN) 100 UNIT/ML pen Inject 1-7 Units Subcutaneous 3 times daily (before meals) Mealtime Scale  BG less than 140, none   - 189 = 1 unit.    - 239 = 2    - 289 = 3     - 339 = 4   BG over 340 = 5   Jesika Theodore PA-C   insulin aspart (NOVOLOG PEN) 100 UNIT/ML pen Inject 1-5 Units Subcutaneous At Bedtime Bedtime Scale  BG less than 200, none   - 249 = 1 unit.    - 299 = 2     - 349 = 3   BG over 350 = 4   Jesika Theodore PA-C   insulin detemir (LEVEMIR PEN) 100 UNIT/ML pen Inject 18 Units Subcutaneous every 24 hours (evening)   Jesika Theodore PA-C   liraglutide (VICTOZA) 18 MG/3ML solution Inject 1.8 mg Subcutaneous daily   Jesika Theodore PA-C   metFORMIN (GLUCOPHAGE) 1000 MG tablet Take 1 tablet (1,000 mg) by mouth 2 times daily (with meals)   Jesika Theodore PA-C   polyethylene glycol (MIRALAX) 17 g packet Take 17 g by mouth daily as needed for constipation   Jesika Theoodre PA-C   potassium chloride (KLOR-CON) 20 MEQ packet Take 20 mEq by mouth daily   Jesika Theodore PA-C   ramelteon (ROZEREM) 8 MG tablet Take 1 tablet (8 mg) by mouth At Bedtime   Jesika Theodore PA-C   sennosides (SENOKOT) 8.6 MG tablet Take 1 tablet by mouth At Bedtime   Jesika Theodore PA-C            Allergies:     Allergies   Allergen Reactions     Nka [No Known Allergies]             Social History:     Social History     Tobacco Use     Smoking status: Never Smoker   Substance Use Topics     Alcohol use: Yes     Comment: occassional             Family History:   Cataracts           Review of Systems:   The 10 point Review of Systems is negative other than as noted in the HPI.           Physical Exam:   Blood pressure 124/73, pulse 93, temperature (!) 96.7  F (35.9  C), temperature source Axillary, resp. rate 18, weight 59.7 kg (131 lb 9.6 oz), SpO2 94 %.  131 lbs 9.6 oz      GENERAL: Nonverbal.  Sleeping. Appears comfortable. No acute distress.  EYES: Pupils equal and round. No scleral erythema or icterus.  ENT: External ears are normal without deformity. Posterior oropharynx is without erythem, swelling, or exudate.  NECK: Supple. No masses or swelling. No tenderness. Thyroid is normal without mass or tenderness.  CHEST: Clear to auscultation. Normal breath sounds. No retractions.   CV: Regular rate and rhythm. No JVD. Pulses normal.  ABDOMEN: Bowel sounds present. No tenderness. No masses or hernia.  : Felix in place  EXTREMETIES: No clubbing, cyanosis, or ischemia.  SKIN: Warm and dry to touch. No wounds or rashes.  NEUROLOGIC: Strength and sensation are normal. Deep tendon reflexes are normal. Cranial nerves are normal.             Data:   All new lab and imaging data was reviewed.     Results for orders placed or performed during the hospital encounter of 10/13/21 (from the past 24 hour(s))   Glucose by meter   Result Value Ref Range    GLUCOSE BY METER POCT 101 (H) 70 - 99 mg/dL   Extra Tube    Narrative    The following orders were created for panel order Extra Tube.  Procedure                               Abnormality         Status                     ---------                               -----------         ------                     Extra Blood Bank Purple ...[[id number]]                      Final result                 Please view results for these tests on the individual orders.   Extra Blood Bank Purple Top Tube   Result Value Ref Range    Hold Specimen [facility]    CBC with platelets differential    Narrative    The following orders were created for panel order CBC with platelets differential.  Procedure                               Abnormality         Status                     ---------                               -----------         ------                     CBC with platelets and d...[[id number]]  Abnormal            Final result                 Please view results for these tests on the  individual orders.   Comprehensive metabolic panel   Result Value Ref Range    Sodium 142 133 - 144 mmol/L    Potassium 4.1 3.4 - 5.3 mmol/L    Chloride 106 94 - 109 mmol/L    Carbon Dioxide (CO2) 29 20 - 32 mmol/L    Anion Gap 7 3 - 14 mmol/L    Urea Nitrogen 30 7 - 30 mg/dL    Creatinine 1.53 (H) 0.66 - 1.25 mg/dL    Calcium 10.3 (H) 8.5 - 10.1 mg/dL    Glucose 83 70 - 99 mg/dL    Alkaline Phosphatase 158 (H) 40 - 150 U/L    AST 15 0 - 45 U/L    ALT 19 0 - 70 U/L    Protein Total 9.7 (H) 6.8 - 8.8 g/dL    Albumin 3.3 (L) 3.4 - 5.0 g/dL    Bilirubin Total 0.4 0.2 - 1.3 mg/dL    GFR Estimate 45 (L) >60 mL/min/1.73m2   Extra Tube (Rogers Draw)    Narrative    The following orders were created for panel order Extra Tube (Rogers Draw).  Procedure                               Abnormality         Status                     ---------                               -----------         ------                     Extra Blue Top Tube[[id number]]                              Final result               Extra Red Top Tube[[id number]]                               Final result               Extra Purple Top Tube[[id number]]                            Final result               Extra Blood Bank Purple ...[[id number]]                                                   Please view results for these tests on the individual orders.   CBC with platelets and differential   Result Value Ref Range    WBC Count 10.2 4.0 - 11.0 10e3/uL    RBC Count 5.31 4.40 - 5.90 10e6/uL    Hemoglobin 14.2 13.3 - 17.7 g/dL    Hematocrit 46.5 40.0 - 53.0 %    MCV 88 78 - 100 fL    MCH 26.7 26.5 - 33.0 pg    MCHC 30.5 (L) 31.5 - 36.5 g/dL    RDW 13.2 10.0 - 15.0 %    Platelet Count 284 150 - 450 10e3/uL    % Neutrophils 77 %    % Lymphocytes 13 %    % Monocytes 7 %    % Eosinophils 2 %    % Basophils 1 %    % Immature Granulocytes 0 %    NRBCs per 100 WBC 0 <1 /100    Absolute Neutrophils 7.9 1.6 - 8.3 10e3/uL    Absolute Lymphocytes 1.3 0.8 - 5.3 10e3/uL     Absolute Monocytes 0.7 0.0 - 1.3 10e3/uL    Absolute Eosinophils 0.2 0.0 - 0.7 10e3/uL    Absolute Basophils 0.1 0.0 - 0.2 10e3/uL    Absolute Immature Granulocytes 0.0 <=0.0 10e3/uL    Absolute NRBCs 0.0 10e3/uL   Extra Blue Top Tube   Result Value Ref Range    Hold Specimen JIC    Extra Red Top Tube   Result Value Ref Range    Hold Specimen JIC    Extra Purple Top Tube   Result Value Ref Range    Hold Specimen JIC    Lipase   Result Value Ref Range    Lipase <10 (L) 73 - 393 U/L   Troponin I   Result Value Ref Range    Troponin I <0.015 0.000 - 0.045 ug/L   iStat Gases (lactate) venous, POCT   Result Value Ref Range    Lactic Acid POCT 2.4 (H) <=2.0 mmol/L    Bicarbonate Venous POCT 31 (H) 21 - 28 mmol/L    O2 Sat, Venous POCT 33 (L) 94 - 100 %    pCO2V Venous POCT 51 (H) 40 - 50 mm Hg    pH Venous POCT 7.39 7.32 - 7.43    pO2 Venous POCT 21 (L) 25 - 47 mm Hg   UA with Microscopic reflex to Culture    Specimen: Urine, Catheter   Result Value Ref Range    Color Urine Yellow Colorless, Straw, Light Yellow, Yellow    Appearance Urine Slightly Cloudy (A) Clear    Glucose Urine Negative Negative mg/dL    Bilirubin Urine Negative Negative    Ketones Urine Negative Negative mg/dL    Specific Gravity Urine 1.016 1.003 - 1.035    Blood Urine Large (A) Negative    pH Urine 7.0 5.0 - 7.0    Protein Albumin Urine 100  (A) Negative mg/dL    Urobilinogen Urine Normal Normal, 2.0 mg/dL    Nitrite Urine Negative Negative    Leukocyte Esterase Urine Large (A) Negative    Bacteria Urine Many (A) None Seen /HPF    WBC Clumps Urine Present (A) None Seen /HPF    Mucus Urine Present (A) None Seen /LPF    RBC Urine >182 (H) <=2 /HPF    WBC Urine 124 (H) <=5 /HPF    Narrative    Urine Culture ordered based on laboratory criteria   Head CT w/o contrast    Narrative    EXAM: CT HEAD W/O CONTRAST  LOCATION: [facility]  DATE/TIME: 10/13/2021 11:03 PM    INDICATION: Mental status change, unknown cause  COMPARISON: CT  head 09/14/2021. MRI brain 09/14/2021.  TECHNIQUE: Routine CT Head without IV contrast. Multiplanar reformats. Dose reduction techniques were used.    FINDINGS:  INTRACRANIAL CONTENTS: No intracranial hemorrhage, extraaxial collection, or mass effect.  No CT evidence of acute infarct. Moderate presumed chronic small vessel ischemic changes. Moderate generalized volume loss. No hydrocephalus. Left frontoparietal   deep white matter and adjacent left basal ganglia chronic infarct. Left thalamus chronic infarct. MRI brain 09/14/2021 demonstrated a right thalamus acute infarct with sequelae not identified on current CT head exam.    VISUALIZED ORBITS/SINUSES/MASTOIDS: No intraorbital abnormality. Right posterior sphenoid sinus mild mucosal thickening versus trace air-fluid level. Please correlate for acute sinusitis. Remaining visualized paranasal sinuses essentially clear.  Left   mastoid tip patchy opacification. Right mastoid air cells essentially clear.    BONES/SOFT TISSUES: No acute abnormality. Vascular calcifications.      Impression    IMPRESSION:  1.  No acute intracranial process.  2.  Chronic infarcts described above.  3.  Age-related changes described above.   4.  Right posterior sphenoid sinus mild mucosal thickening versus trace air-fluid level. Please correlate for acute sinusitis.    XR Chest 2 Views    Narrative    EXAM: XR CHEST 2 VW  LOCATION: [facility]  DATE/TIME: 10/13/2021 11:17 PM    INDICATION: Weakness.  COMPARISON: 9/19/2021.    FINDINGS: The heart size is normal. The thoracic aorta is calcified. Mild atelectasis or scarring at the left lung base. The lungs are otherwise clear. No pneumothorax or pleural effusion.      Impression    IMPRESSION: No acute abnormality.   Asymptomatic COVID-19 Virus (Coronavirus) by PCR Nasopharyngeal    Specimen: Nasopharyngeal; Swab   Result Value Ref Range    SARS CoV2 PCR Negative Negative    Narrative    Testing was performed using the  nan  SARS-CoV-2 & Influenza A/B Assay on the nan  Snow  System.  This test should be ordered for the detection of SARS-COV-2 in individuals who meet SARS-CoV-2 clinical and/or epidemiological criteria. Test performance is unknown in asymptomatic patients.  This test is for in vitro diagnostic use under the FDA EUA for laboratories certified under CLIA to perform moderate and/or high complexity testing. This test has not been FDA cleared or approved.  A negative test does not rule out the presence of PCR inhibitors in the specimen or target RNA in concentration below the limit of detection for the assay. The possibility of a false negative should be considered if the patient's recent exposure or clinical presentation suggests COVID-19.  [facility] are certified under the Clinical Laboratory Improvement Amendments of 1988 (CLIA-88) as qualified to perform moderate and/or high complexity laboratory testing.   EKG 12-lead, tracing only   Result Value Ref Range    Systolic Blood Pressure  mmHg    Diastolic Blood Pressure  mmHg    Ventricular Rate 91 BPM    Atrial Rate 91 BPM    WV Interval 170 ms    QRS Duration 72 ms     ms    QTc 472 ms    P Axis 35 degrees    R AXIS -18 degrees    T Axis 19 degrees    Interpretation ECG       Sinus rhythm  Minimal voltage criteria for LVH, may be normal variant  Borderline ECG  When compared with ECG of 19-SEP-2021 06:18,  T wave inversion no longer evident in Inferior leads  T wave inversion no longer evident in Lateral leads  QT has lengthened     iStat Gases (lactate) venous, POCT   Result Value Ref Range    Lactic Acid POCT 2.2 (H) <=2.0 mmol/L    Bicarbonate Venous POCT 28 21 - 28 mmol/L    O2 Sat, Venous POCT 53 (L) 94 - 100 %    pCO2V Venous POCT 49 40 - 50 mm Hg    pH Venous POCT 7.36 7.32 - 7.43    pO2 Venous POCT 30 25 - 47 mm Hg   Glucose by meter   Result Value Ref Range    GLUCOSE BY METER POCT 68 (L) 70 - 99 mg/dL   Glucose by meter   Result  Value Ref Range    GLUCOSE BY METER POCT 163 (H) 70 - 99 mg/dL

## 2021-10-14 NOTE — PLAN OF CARE
/73 (BP Location: Left arm)   Pulse 93   Temp (!) 96.7  F (35.9  C) (Axillary)   Resp 18   Wt 59.7 kg (131 lb 9.6 oz)   SpO2 94%   BMI 22.59 kg/m      Pt nonverbal (expressive aphasia), but understands what is being said. Per son, pt understands English. VSS. Pt denied pain overnight. Denies N/V. R-sided weakness & facial droop (per baseline from previous stroke).  Chronic ordoñez. Bleeding noted at meatus. BG 68 overnight. IV dextrose given. Requires lift. NPO. IV fluids 100mls/hr. K/mag protocol. Will cont POC.     Lorie Vaughn RN

## 2021-10-15 ENCOUNTER — APPOINTMENT (OUTPATIENT)
Dept: PHYSICAL THERAPY | Facility: CLINIC | Age: 71
DRG: 871 | End: 2021-10-15
Payer: MEDICARE

## 2021-10-15 LAB
BACTERIA UR CULT: ABNORMAL
GLUCOSE BLDC GLUCOMTR-MCNC: 149 MG/DL (ref 70–99)
GLUCOSE BLDC GLUCOMTR-MCNC: 188 MG/DL (ref 70–99)
GLUCOSE BLDC GLUCOMTR-MCNC: 196 MG/DL (ref 70–99)
GLUCOSE BLDC GLUCOMTR-MCNC: 230 MG/DL (ref 70–99)
GLUCOSE BLDC GLUCOMTR-MCNC: 241 MG/DL (ref 70–99)
GLUCOSE BLDC GLUCOMTR-MCNC: 250 MG/DL (ref 70–99)
GLUCOSE BLDC GLUCOMTR-MCNC: 254 MG/DL (ref 70–99)
MAGNESIUM SERPL-MCNC: 1.9 MG/DL (ref 1.6–2.3)
POTASSIUM BLD-SCNC: 3.7 MMOL/L (ref 3.4–5.3)

## 2021-10-15 PROCEDURE — 250N000011 HC RX IP 250 OP 636: Performed by: INTERNAL MEDICINE

## 2021-10-15 PROCEDURE — 84132 ASSAY OF SERUM POTASSIUM: CPT | Performed by: INTERNAL MEDICINE

## 2021-10-15 PROCEDURE — 97116 GAIT TRAINING THERAPY: CPT | Mod: GP | Performed by: PHYSICAL THERAPIST

## 2021-10-15 PROCEDURE — 99233 SBSQ HOSP IP/OBS HIGH 50: CPT | Performed by: INTERNAL MEDICINE

## 2021-10-15 PROCEDURE — 250N000013 HC RX MED GY IP 250 OP 250 PS 637: Performed by: INTERNAL MEDICINE

## 2021-10-15 PROCEDURE — 97530 THERAPEUTIC ACTIVITIES: CPT | Mod: GP | Performed by: PHYSICAL THERAPIST

## 2021-10-15 PROCEDURE — 83735 ASSAY OF MAGNESIUM: CPT | Performed by: INTERNAL MEDICINE

## 2021-10-15 PROCEDURE — 36415 COLL VENOUS BLD VENIPUNCTURE: CPT | Performed by: INTERNAL MEDICINE

## 2021-10-15 PROCEDURE — 258N000003 HC RX IP 258 OP 636: Performed by: INTERNAL MEDICINE

## 2021-10-15 PROCEDURE — 120N000001 HC R&B MED SURG/OB

## 2021-10-15 RX ORDER — DIPHENHYDRAMINE HYDROCHLORIDE AND LIDOCAINE HYDROCHLORIDE AND ALUMINUM HYDROXIDE AND MAGNESIUM HYDRO
10 KIT EVERY 6 HOURS PRN
Status: DISCONTINUED | OUTPATIENT
Start: 2021-10-15 | End: 2021-11-04 | Stop reason: HOSPADM

## 2021-10-15 RX ORDER — QUETIAPINE FUMARATE 25 MG/1
25 TABLET, FILM COATED ORAL AT BEDTIME
Status: DISCONTINUED | OUTPATIENT
Start: 2021-10-15 | End: 2021-10-16

## 2021-10-15 RX ADMIN — INSULIN ASPART 3 UNITS: 100 INJECTION, SOLUTION INTRAVENOUS; SUBCUTANEOUS at 22:38

## 2021-10-15 RX ADMIN — INSULIN ASPART 3 UNITS: 100 INJECTION, SOLUTION INTRAVENOUS; SUBCUTANEOUS at 16:25

## 2021-10-15 RX ADMIN — CEFTRIAXONE 2 G: 2 INJECTION, POWDER, FOR SOLUTION INTRAMUSCULAR; INTRAVENOUS at 23:43

## 2021-10-15 RX ADMIN — INSULIN DETEMIR 9 UNITS: 100 INJECTION, SOLUTION SUBCUTANEOUS at 22:38

## 2021-10-15 RX ADMIN — INSULIN ASPART 1 UNITS: 100 INJECTION, SOLUTION INTRAVENOUS; SUBCUTANEOUS at 02:30

## 2021-10-15 RX ADMIN — POTASSIUM CHLORIDE, DEXTROSE MONOHYDRATE AND SODIUM CHLORIDE: 150; 5; 450 INJECTION, SOLUTION INTRAVENOUS at 13:45

## 2021-10-15 RX ADMIN — PHENOL 1 ML: 1.5 LIQUID ORAL at 06:31

## 2021-10-15 RX ADMIN — QUETIAPINE FUMARATE 25 MG: 25 TABLET ORAL at 20:02

## 2021-10-15 RX ADMIN — INSULIN ASPART 2 UNITS: 100 INJECTION, SOLUTION INTRAVENOUS; SUBCUTANEOUS at 06:31

## 2021-10-15 RX ADMIN — PHENOL 1 ML: 1.5 LIQUID ORAL at 02:25

## 2021-10-15 RX ADMIN — INSULIN ASPART 2 UNITS: 100 INJECTION, SOLUTION INTRAVENOUS; SUBCUTANEOUS at 12:33

## 2021-10-15 RX ADMIN — POTASSIUM CHLORIDE, DEXTROSE MONOHYDRATE AND SODIUM CHLORIDE: 150; 5; 450 INJECTION, SOLUTION INTRAVENOUS at 23:43

## 2021-10-15 RX ADMIN — INSULIN ASPART 3 UNITS: 100 INJECTION, SOLUTION INTRAVENOUS; SUBCUTANEOUS at 19:00

## 2021-10-15 RX ADMIN — ACETAMINOPHEN 650 MG: 325 TABLET, FILM COATED ORAL at 01:05

## 2021-10-15 RX ADMIN — ASPIRIN 81 MG CHEWABLE TABLET 81 MG: 81 TABLET CHEWABLE at 09:11

## 2021-10-15 RX ADMIN — POTASSIUM CHLORIDE, DEXTROSE MONOHYDRATE AND SODIUM CHLORIDE: 150; 5; 450 INJECTION, SOLUTION INTRAVENOUS at 02:04

## 2021-10-15 RX ADMIN — CLOPIDOGREL BISULFATE 75 MG: 75 TABLET ORAL at 09:11

## 2021-10-15 RX ADMIN — FLUCONAZOLE 100 MG: 2 INJECTION, SOLUTION INTRAVENOUS at 06:32

## 2021-10-15 RX ADMIN — PHENOL 1 ML: 1.5 LIQUID ORAL at 09:15

## 2021-10-15 ASSESSMENT — ACTIVITIES OF DAILY LIVING (ADL)
ADLS_ACUITY_SCORE: 28

## 2021-10-15 NOTE — PROVIDER NOTIFICATION
MD paged: Pt having pain in mouth from kanker sores. Could we please get an order for PRN chloraseptic buccal mouth spray?    Order for buccal spray put in MAR. Thank you!

## 2021-10-15 NOTE — PROGRESS NOTES
[facility] Progress Note    Met with patient to deliver advance directive materials.  Patient is non-verbal at this time.  I explained that this information is for him to consider with his family.  I stated that his family may contact [facility] for further discussion if they wish.    Rev. Stephanie Tellez M.Div.  Staff   Phone  [phone]

## 2021-10-15 NOTE — PLAN OF CARE
Pt is nonverbal but is able to answer yes or no questions by shaking his head or giving a thumbs up with left thumb. Denies pain. Felix in place. IVF D5, 1/2 NS, 20 K @ 100 mL/hr. Abx IV rocephin. Up Ax2 with a lift. /260/274. Discharge TBD.

## 2021-10-15 NOTE — PROGRESS NOTES
[facility]    Hospitalist Progress Note  Provider : Mustapha Fountain MD  Date of Service (when I saw the patient): 10/15/2021    Assessment & Plan   Jimmy Otto is a 71-year-old male with history of insulin-dependent type 2 diabetes, hypercholesterolemia, hypertension, TIA, erectile dysfunction, recent stroke, and urinary retention with indwelling Felix catheter placed about 2 and half weeks ago.  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  This left him nonverbal.  He was cared for at the [facility].  He discharged to the acute rehab unit at [facility] where he stayed from 9/17 through 10/6.  He returned home.  His family brought him in last night for evaluation of weakness, nausea, vomiting, decreased oral intake, difficulty swallowing, and generally doing poorly.  He had some fever the day before.  He had no cough or diarrhea.    In the ER, he was afebrile. His heart rate was in 90s.  Lab workup showed wbc 10.2 otherwise unremarkable CBC.  BMP showed creatinine 1.53 but was otherwise unremarkable. Lactic acid was 2.4 and came down to 2.2 after IV fluids.  Lipase and troponin were undetectable.  Venous blood gas showed pH 7.39, PCO2 51, PO2 24, and oxygen saturations of 33%.  Urinalysis showed 124 white blood cells, large leukocyte esterase, no nitrite, many bacteria, and white blood cell clumps.  Testing for COVID-19 was negative.  Chest x-ray was unremarkable.  CT of head raise question of possible right sphenoid sinus mucosal thickening with small air-fluid level.  Patient was given Rocephin for sepsis due to urinary tract infection.  Exam suggested thrush. Patient was started on IV Ceftriaxone. He was admitted to the hospital for further management.      Problem list:     Sepsis (leukocytosis, heart rate 90s, lactic acidosis)  Urinary tract infection  -Continue Rocephin 2 g every 24 hours  -Urine culture growing E.coli. Will follow final culture and sensitivity  result.   -Hold prior to admission antihypertensives in setting of infection and sepsis  -Lactic acid improving with IV fluids  -Will continue D51/2NS with KCl 20 meq  at 100 ml/hr.      Thrush  -With difficulty swallowing we will treat with fluconazole 100 mg IV daily. Will continue  -Currently on clears.      Dysphagia  -Prior to admission was on modified diet due to recent stroke  -Has been having more difficulty in the last few days -likely due to weakness from infection  -Evaluated by speech therapy and recommended clear liquid diet.  -Will follow speech therapy recommendations.      Recent stroke on 9/14/2021  -Was hospitalized at [facility] at [facility] in acute rehab there  -Resumed prior to admission aspirin and Plavix  -Will resume Lipitor today     Urine retention identified during recent hospitalization for stroke  -Indwelling Felix catheter was changed in the emergency department  -Keep catheter in  -Was to follow-up with urology as an outpatient     Type 2 diabetes  -Prior to admission was on Levemir insulin 18 units at bedtime, NovoLog sliding scale insulin, Victoza, and Metformin  -Currently on Levemir 9 units at bedtime and NovoLog sliding scale every 4 hours as needed  -Hold prior to admission Victoza and Metformin for now but can likely resume once diet advanced  -Will change insulin sliding scale to ACHS.      Hypercholesterolemia  -Resume prior to admission Lipitor once diet advanced     Hypertension  -Atenolol and Norvasc once blood pressure stable 1 diet advanced     Weakness  Deconditioning  -PT and OT consults     COVID-19 PCR testing was negative     Code Status: Full Code    Disposition: Expected discharge: anticipate more than 2 nights of hospital course until sepsis improved.     Mustapha Fountain MD    Spent>35 minutes on chart review, patient evaluation, coordination of care, documentation.     Interval History   Patient seen and examined. He is nonverbal.unable to obtain any  history from the patient.     -Data reviewed today: I reviewed all new labs and imaging results over the last 24 hours. I personally reviewed     Physical Exam   Temp: 100  F (37.8  C) Temp src: Oral BP: (!) 145/76 Pulse: 104   Resp: 18 SpO2: 94 % O2 Device: None (Room air)    Vitals:    10/14/21 0131 10/15/21 0638   Weight: 59.7 kg (131 lb 9.6 oz) 57.4 kg (126 lb 9.6 oz)     Vital Signs with Ranges  Temp:  [97.6  F (36.4  C)-100  F (37.8  C)] 100  F (37.8  C)  Pulse:  [] 104  Resp:  [15-20] 18  BP: (134-153)/(76-82) 145/76  SpO2:  [94 %-97 %] 94 %  I/O last 3 completed shifts:  In: -   Out: 2400 [Urine:2400]    GEN:  Alert, oriented x 3, appears comfortable, NAD.  HEENT:  Normocephalic/atraumatic, no scleral icterus, no nasal discharge, mouth moist.  CV:  Regular rate and rhythm, no murmur or JVD.  S1 + S2 noted, no S3 or S4.  LUNGS:  Clear to auscultation bilaterally without rales/rhonchi/wheezing/retractions.  Symmetric chest rise on inhalation noted.  ABD:  Active bowel sounds, soft, non-tender/non-distended.  No rebound/guarding/rigidity.  EXT:  No edema or cyanosis.  Hands/feet warm to touch with good signs of peripheral perfusion.  No joint synovitis noted.  SKIN:  Dry to touch, no exanthems noted in the visualized areas.  NEURO:  Symmetric muscle strength, sensation to touch grossly intact.  No new focal deficits appreciated.    Medications     dextrose 5% and 0.45% NaCl + KCl 20 mEq/L 100 mL/hr at 10/15/21 0918       aspirin  81 mg Oral Daily     cefTRIAXone  2 g Intravenous Q24H     clopidogrel  75 mg Oral Daily     fluconazole  100 mg Intravenous Q24H     insulin aspart  1-6 Units Subcutaneous Q4H     insulin detemir  9 Units Subcutaneous At Bedtime     sodium chloride (PF)  3 mL Intracatheter Q8H       Data   Recent Labs   Lab 10/15/21  1138 10/15/21  0851 10/15/21  0745 10/15/21  0610 10/14/21  0819 10/14/21  0808 10/14/21  0333 10/13/21  4802   WBC  --   --   --   --   --  9.0  --  10.2   HGB  --    --   --   --   --  12.8*  --  14.2   MCV  --   --   --   --   --  88  --  88   PLT  --   --   --   --   --  213  --  284   NA  --   --   --   --   --  142  --  142   POTASSIUM  --   --  3.7  --   --  3.7  --  4.1   CHLORIDE  --   --   --   --   --  109  --  106   CO2  --   --   --   --   --  26  --  29   BUN  --   --   --   --   --  22  --  30   CR  --   --   --   --   --  1.19  --  1.53*   ANIONGAP  --   --   --   --   --  7  --  7   ARLETH  --   --   --   --   --  8.5  --  10.3*   * 149*  --  196*   < > 146*   < > 83   ALBUMIN  --   --   --   --   --   --   --  3.3*   PROTTOTAL  --   --   --   --   --   --   --  9.7*   BILITOTAL  --   --   --   --   --   --   --  0.4   ALKPHOS  --   --   --   --   --   --   --  158*   ALT  --   --   --   --   --   --   --  19   AST  --   --   --   --   --   --   --  15   LIPASE  --   --   --   --   --   --   --  <10*   TROPONIN  --   --   --   --   --   --   --  <0.015    < > = values in this interval not displayed.       No results found for this or any previous visit (from the past 24 hour(s)).

## 2021-10-15 NOTE — PLAN OF CARE
BP (!) 153/80 (BP Location: Left arm)   Pulse 87   Temp 98.4  F (36.9  C) (Oral)   Resp 15   Wt 59.7 kg (131 lb 9.6 oz)   SpO2 96%   BMI 22.59 kg/m      Pt alert and nonverbal. Unable to assess orientation, but responds to his name. Pt unable to report pain, but increase in restlessness and  PAIND scale 5/10 in mouth. PRN tylenol given w/ some relief. MD blanton and PRN chloraseptic spray given w/ relief per decrease in nonverbal indicators: PAIND scale 1/10. LS: clear and sating well on RA. Pt denies N/V. R sided weakness r/t previous CVA. Skin: redness blanchable on bottom; pt able to change position and shift weight independently w/ occasional boosts up in bed. Ordoñez: WDL except some penile redness noted; good output from ordoñez and ordoñez care completed. PIV on L forearm w/ D5 and 1/2NS w/ 20 mEq KCl infusing @ 100 ml/hr; no-no band in place. B and 1 unit of novolog given per MAR protocol; B w/ 2 units of novolog given per MAR protocol. Diet: clear liquid mildly thick level 2. Activity: Assist of 2 w/ lift. Continue w/ POC.

## 2021-10-15 NOTE — PLAN OF CARE
Alert. Non verbal, can answer yes and no questions with shaking his head. Felix patent and draining. , 241,254. Meds crushed in pudding. K and Mg WNL, Recheck in am. Complains of mouth hurting, PRN given. Plan: more than 2 nights

## 2021-10-16 ENCOUNTER — APPOINTMENT (OUTPATIENT)
Dept: SPEECH THERAPY | Facility: CLINIC | Age: 71
DRG: 871 | End: 2021-10-16
Payer: MEDICARE

## 2021-10-16 LAB
ANION GAP SERPL CALCULATED.3IONS-SCNC: 6 MMOL/L (ref 3–14)
BUN SERPL-MCNC: 16 MG/DL (ref 7–30)
CALCIUM SERPL-MCNC: 8.1 MG/DL (ref 8.5–10.1)
CHLORIDE BLD-SCNC: 105 MMOL/L (ref 94–109)
CO2 SERPL-SCNC: 26 MMOL/L (ref 20–32)
CREAT SERPL-MCNC: 1.39 MG/DL (ref 0.66–1.25)
ERYTHROCYTE [DISTWIDTH] IN BLOOD BY AUTOMATED COUNT: 12.9 % (ref 10–15)
GFR SERPL CREATININE-BSD FRML MDRD: 51 ML/MIN/1.73M2
GLUCOSE BLD-MCNC: 204 MG/DL (ref 70–99)
GLUCOSE BLDC GLUCOMTR-MCNC: 173 MG/DL (ref 70–99)
GLUCOSE BLDC GLUCOMTR-MCNC: 225 MG/DL (ref 70–99)
GLUCOSE BLDC GLUCOMTR-MCNC: 226 MG/DL (ref 70–99)
GLUCOSE BLDC GLUCOMTR-MCNC: 235 MG/DL (ref 70–99)
GLUCOSE BLDC GLUCOMTR-MCNC: 245 MG/DL (ref 70–99)
HCT VFR BLD AUTO: 38 % (ref 40–53)
HGB BLD-MCNC: 11.6 G/DL (ref 13.3–17.7)
MAGNESIUM SERPL-MCNC: 1.6 MG/DL (ref 1.6–2.3)
MCH RBC QN AUTO: 27 PG (ref 26.5–33)
MCHC RBC AUTO-ENTMCNC: 30.5 G/DL (ref 31.5–36.5)
MCV RBC AUTO: 88 FL (ref 78–100)
PLATELET # BLD AUTO: 186 10E3/UL (ref 150–450)
POTASSIUM BLD-SCNC: 3.7 MMOL/L (ref 3.4–5.3)
RBC # BLD AUTO: 4.3 10E6/UL (ref 4.4–5.9)
SODIUM SERPL-SCNC: 137 MMOL/L (ref 133–144)
WBC # BLD AUTO: 7.7 10E3/UL (ref 4–11)

## 2021-10-16 PROCEDURE — 80048 BASIC METABOLIC PNL TOTAL CA: CPT | Performed by: INTERNAL MEDICINE

## 2021-10-16 PROCEDURE — 85027 COMPLETE CBC AUTOMATED: CPT | Performed by: INTERNAL MEDICINE

## 2021-10-16 PROCEDURE — 99233 SBSQ HOSP IP/OBS HIGH 50: CPT | Performed by: INTERNAL MEDICINE

## 2021-10-16 PROCEDURE — 250N000011 HC RX IP 250 OP 636: Performed by: INTERNAL MEDICINE

## 2021-10-16 PROCEDURE — 36415 COLL VENOUS BLD VENIPUNCTURE: CPT | Performed by: INTERNAL MEDICINE

## 2021-10-16 PROCEDURE — 92526 ORAL FUNCTION THERAPY: CPT | Mod: GN | Performed by: SPEECH-LANGUAGE PATHOLOGIST

## 2021-10-16 PROCEDURE — 250N000013 HC RX MED GY IP 250 OP 250 PS 637: Performed by: INTERNAL MEDICINE

## 2021-10-16 PROCEDURE — 258N000003 HC RX IP 258 OP 636: Performed by: INTERNAL MEDICINE

## 2021-10-16 PROCEDURE — 83735 ASSAY OF MAGNESIUM: CPT | Performed by: INTERNAL MEDICINE

## 2021-10-16 PROCEDURE — 120N000001 HC R&B MED SURG/OB

## 2021-10-16 RX ORDER — ATENOLOL 25 MG/1
25 TABLET ORAL 2 TIMES DAILY
Status: DISCONTINUED | OUTPATIENT
Start: 2021-10-16 | End: 2021-11-04 | Stop reason: HOSPADM

## 2021-10-16 RX ORDER — DOXAZOSIN 1 MG/1
1 TABLET ORAL DAILY
Status: DISCONTINUED | OUTPATIENT
Start: 2021-10-16 | End: 2021-10-20

## 2021-10-16 RX ORDER — ATORVASTATIN CALCIUM 40 MG/1
80 TABLET, FILM COATED ORAL DAILY
Status: DISCONTINUED | OUTPATIENT
Start: 2021-10-16 | End: 2021-11-04 | Stop reason: HOSPADM

## 2021-10-16 RX ORDER — SENNOSIDES 8.6 MG
1 TABLET ORAL AT BEDTIME
Status: DISCONTINUED | OUTPATIENT
Start: 2021-10-16 | End: 2021-11-04 | Stop reason: HOSPADM

## 2021-10-16 RX ORDER — DEXTROSE MONOHYDRATE 25 G/50ML
25-50 INJECTION, SOLUTION INTRAVENOUS
Status: DISCONTINUED | OUTPATIENT
Start: 2021-10-16 | End: 2021-10-18

## 2021-10-16 RX ORDER — RAMELTEON 8 MG/1
8 TABLET ORAL AT BEDTIME
Status: DISCONTINUED | OUTPATIENT
Start: 2021-10-16 | End: 2021-10-20

## 2021-10-16 RX ORDER — AMANTADINE HYDROCHLORIDE 100 MG/1
100 CAPSULE, GELATIN COATED ORAL DAILY
Status: DISCONTINUED | OUTPATIENT
Start: 2021-10-16 | End: 2021-11-04 | Stop reason: HOSPADM

## 2021-10-16 RX ORDER — POLYETHYLENE GLYCOL 3350 17 G/17G
17 POWDER, FOR SOLUTION ORAL DAILY PRN
Status: DISCONTINUED | OUTPATIENT
Start: 2021-10-16 | End: 2021-11-04 | Stop reason: HOSPADM

## 2021-10-16 RX ORDER — NICOTINE POLACRILEX 4 MG
15-30 LOZENGE BUCCAL
Status: DISCONTINUED | OUTPATIENT
Start: 2021-10-16 | End: 2021-10-18

## 2021-10-16 RX ADMIN — INSULIN DETEMIR 9 UNITS: 100 INJECTION, SOLUTION SUBCUTANEOUS at 21:46

## 2021-10-16 RX ADMIN — ASPIRIN 81 MG CHEWABLE TABLET 81 MG: 81 TABLET CHEWABLE at 08:57

## 2021-10-16 RX ADMIN — INSULIN ASPART 1 UNITS: 100 INJECTION, SOLUTION INTRAVENOUS; SUBCUTANEOUS at 08:58

## 2021-10-16 RX ADMIN — INSULIN ASPART 3 UNITS: 100 INJECTION, SOLUTION INTRAVENOUS; SUBCUTANEOUS at 03:08

## 2021-10-16 RX ADMIN — POTASSIUM CHLORIDE, DEXTROSE MONOHYDRATE AND SODIUM CHLORIDE: 150; 5; 450 INJECTION, SOLUTION INTRAVENOUS at 23:15

## 2021-10-16 RX ADMIN — SENNOSIDES 1 TABLET: 8.6 TABLET, FILM COATED ORAL at 22:11

## 2021-10-16 RX ADMIN — CLOPIDOGREL BISULFATE 75 MG: 75 TABLET ORAL at 08:57

## 2021-10-16 RX ADMIN — FLUCONAZOLE 100 MG: 2 INJECTION, SOLUTION INTRAVENOUS at 06:16

## 2021-10-16 RX ADMIN — RAMELTEON 8 MG: 8 TABLET ORAL at 22:11

## 2021-10-16 RX ADMIN — ATENOLOL 25 MG: 25 TABLET ORAL at 21:57

## 2021-10-16 ASSESSMENT — ACTIVITIES OF DAILY LIVING (ADL)
ADLS_ACUITY_SCORE: 28

## 2021-10-16 NOTE — PROGRESS NOTES
[facility]    Hospitalist Progress Note  Provider : Mustapha Fountain MD  Date of Service (when I saw the patient): 10/16/2021    Assessment & Plan   Jimmy Otto is a 71-year-old male with history of insulin-dependent type 2 diabetes, hypercholesterolemia, hypertension, TIA, erectile dysfunction, recent stroke, and urinary retention with indwelling Felix catheter placed about 2 and half weeks ago.  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  This left him nonverbal.  He was cared for at the [facility].  He discharged to the acute rehab unit at [facility] where he stayed from 9/17 through 10/6.  He returned home.  His family brought him in last night for evaluation of weakness, nausea, vomiting, decreased oral intake, difficulty swallowing, and generally doing poorly.  He had some fever the day before.  He had no cough or diarrhea.    In the ER, he was afebrile. His heart rate was in 90s.  Lab workup showed wbc 10.2 otherwise unremarkable CBC.  BMP showed creatinine 1.53 but was otherwise unremarkable. Lactic acid was 2.4 and came down to 2.2 after IV fluids.  Lipase and troponin were undetectable.  Venous blood gas showed pH 7.39, PCO2 51, PO2 24, and oxygen saturations of 33%.  Urinalysis showed 124 white blood cells, large leukocyte esterase, no nitrite, many bacteria, and white blood cell clumps.  Testing for COVID-19 was negative.  Chest x-ray was unremarkable.  CT of head raise question of possible right sphenoid sinus mucosal thickening with small air-fluid level.  Patient was given Rocephin for sepsis due to urinary tract infection.  Exam suggested thrush. Patient was started on IV Ceftriaxone. He was admitted to the hospital for further management.      Problem list:     Sepsis (leukocytosis, heart rate 90s, lactic acidosis)  Urinary tract infection  -Continue Rocephin 2 g every 24 hours  -Urine culture growing E.coli(pansensitive)  -Hold prior to admission  antihypertensives in setting of infection and sepsis  -Lactic acid improving with IV fluids  -Will continue D51/2NS with KCl 20 meq  at 100 ml/hr.      Thrush  -With difficulty swallowing we will treat with fluconazole 100 mg IV daily. Will continue  -Currently on clears.      Dysphagia  -Prior to admission was on modified diet due to recent stroke  -Has been having more difficulty in the last few days -likely due to weakness from infection  -Evaluated by speech therapy and recommended clear liquid diet.  -Will follow speech therapy recommendations.      Recent stroke on 9/14/2021  -Was hospitalized at [facility] and was in acute rehab there  -Resumed prior to admission aspirin, Plavix, lipitor     Urine retention identified during recent hospitalization for stroke  -Indwelling Felix catheter was changed in the emergency department  -Keep catheter in  -Was to follow-up with urology as an outpatient     Type 2 diabetes  -Prior to admission was on Levemir insulin 18 units at bedtime, NovoLog sliding scale insulin, Victoza, and Metformin  -Currently on Levemir 9 units at bedtime and NovoLog sliding scale every 4 hours as needed  -Hold prior to admission Victoza and Metformin for now but can likely resume once diet advanced  -Will change insulin sliding scale to ACHS.      Hypercholesterolemia  -Resumed prior to admission Lipitor once diet advanced     Hypertension  -Resumed atenolol with parameter. Norvasc on hold for now.      Weakness  Deconditioning  -PT and OT consulted     COVID-19 PCR testing was negative     Code Status: Full Code    Disposition: Expected discharge: anticipate more than 2 nights of hospital course until sepsis improved.     Mustapha Fountain MD    Spent>35 minutes on chart review, patient evaluation, coordination of care, documentation.     Interval History   Patient seen and examined. He is nonverbal.unable to obtain any history from the patient because of his nonverbal state.      -Data reviewed today: I reviewed all new labs and imaging results over the last 24 hours. I personally reviewed     Physical Exam   Temp: 97.5  F (36.4  C) Temp src: Axillary BP: (!) 142/75 Pulse: 89   Resp: 16 SpO2: 98 % O2 Device: None (Room air)    Vitals:    10/14/21 0131 10/15/21 0638 10/16/21 0636   Weight: 59.7 kg (131 lb 9.6 oz) 57.4 kg (126 lb 9.6 oz) 60.9 kg (134 lb 3.2 oz)     Vital Signs with Ranges  Temp:  [97.5  F (36.4  C)-99.6  F (37.6  C)] 97.5  F (36.4  C)  Pulse:  [] 89  Resp:  [16] 16  BP: (136-146)/(67-81) 142/75  SpO2:  [94 %-98 %] 98 %  I/O last 3 completed shifts:  In: 120 [P.O.:120]  Out: 1650 [Urine:1650]    GEN:  Alert, oriented x 3, appears comfortable, NAD.  HEENT:  Normocephalic/atraumatic, no scleral icterus, no nasal discharge, mouth moist.  CV:  Regular rate and rhythm, no murmur or JVD.  S1 + S2 noted, no S3 or S4.  LUNGS:  Clear to auscultation bilaterally without rales/rhonchi/wheezing/retractions.  Symmetric chest rise on inhalation noted.  ABD:  Active bowel sounds, soft, non-tender/non-distended.  No rebound/guarding/rigidity.  EXT:  No edema or cyanosis.  Hands/feet warm to touch with good signs of peripheral perfusion.  No joint synovitis noted.  SKIN:  Dry to touch, no exanthems noted in the visualized areas.  NEURO:  Symmetric muscle strength, sensation to touch grossly intact.  No new focal deficits appreciated.    Medications     dextrose 5% and 0.45% NaCl + KCl 20 mEq/L 100 mL/hr at 10/15/21 3223       aspirin  81 mg Oral Daily     cefTRIAXone  2 g Intravenous Q24H     clopidogrel  75 mg Oral Daily     fluconazole  100 mg Intravenous Q24H     insulin aspart  1-6 Units Subcutaneous Q4H     insulin detemir  9 Units Subcutaneous At Bedtime     QUEtiapine  25 mg Oral At Bedtime     sodium chloride (PF)  3 mL Intracatheter Q8H       Data   Recent Labs   Lab 10/16/21  0840 10/16/21  0706 10/16/21  0256 10/15/21  0851 10/15/21  0745 10/14/21  0819 10/14/21  0808  10/14/21  0333 10/13/21  2152   WBC  --  7.7  --   --   --   --  9.0  --  10.2   HGB  --  11.6*  --   --   --   --  12.8*  --  14.2   MCV  --  88  --   --   --   --  88  --  88   PLT  --  186  --   --   --   --  213  --  284   NA  --  137  --   --   --   --  142  --  142   POTASSIUM  --  3.7  --   --  3.7  --  3.7  --  4.1   CHLORIDE  --  105  --   --   --   --  109  --  106   CO2  --  26  --   --   --   --  26  --  29   BUN  --  16  --   --   --   --  22  --  30   CR  --  1.39*  --   --   --   --  1.19  --  1.53*   ANIONGAP  --  6  --   --   --   --  7  --  7   ARLETH  --  8.1*  --   --   --   --  8.5  --  10.3*   * 204* 245*   < >  --    < > 146*   < > 83   ALBUMIN  --   --   --   --   --   --   --   --  3.3*   PROTTOTAL  --   --   --   --   --   --   --   --  9.7*   BILITOTAL  --   --   --   --   --   --   --   --  0.4   ALKPHOS  --   --   --   --   --   --   --   --  158*   ALT  --   --   --   --   --   --   --   --  19   AST  --   --   --   --   --   --   --   --  15   LIPASE  --   --   --   --   --   --   --   --  <10*   TROPONIN  --   --   --   --   --   --   --   --  <0.015    < > = values in this interval not displayed.       No results found for this or any previous visit (from the past 24 hour(s)).

## 2021-10-16 NOTE — PLAN OF CARE
Alert. Non verbal, can answer yes and no questions with shaking his head. Ordoñez patent and draining. , 225, 235. Meds crushed in pudding. K and Mg WNL, Recheck in am. Pt has mits on d/t trying to pull on ordoñez. Pt has been very lethargic today, held noon pills d/t unsafe to swallow. MD aware, decreased HS Seroquel. Plan: greater than 2 noc

## 2021-10-16 NOTE — PLAN OF CARE
/67 (BP Location: Right arm)   Pulse 100   Temp 99.6  F (37.6  C) (Axillary)   Resp 16   Wt 57.4 kg (126 lb 9.6 oz)   SpO2 97%   BMI 21.73 kg/m        VSS. Nonverbal. No signs of pain this shift. - insulin given, see MAR. Restless earlier in shift, MD paged- Seroquel ordered and given. Fluids infusing 100 ml/hr. Takes meds crushed in pudding. Felix in place. PT/OT/Speech following. Will continue POC.

## 2021-10-16 NOTE — CONSULTS
CLINICAL NUTRITION SERVICES  -  ASSESSMENT NOTE      RECOMMENDATIONS FOR MD/PROVIDER TO ORDER:   If diet is not able to be advanced beyond full liquids within 48 hours, recommend considering nutrition support therapy given chronic and ongoing poor PO intakes.    Recommendations Ordered by Registered Dietitian (RD):   Gelatein supplement TID with meals   MVI+M for micronutrient needs with poor PO intakes    Future/Additional Recommendations:   Diet per SLP  Monitor need for Room Service w/ Assist vs Room Service Not Appropriate    Malnutrition:   % Weight Loss:  > 7.5% in 3 months (severe malnutrition)  % Intake:  </= 50% for >/= 5 days (severe malnutrition)  Subcutaneous Fat Loss:  Orbital region moderate depletion and Upper arm region mild-moderate depletion  Muscle Loss:  Temporal region moderate depletion, Clavicle bone region moderate depletion, Acromion bone region moderate depletion and Dorsal hand region moderate depletion  Fluid Retention:  None noted    Malnutrition Diagnosis: Severe malnutrition  In Context of: Acute on Chronic illness or disease       REASON FOR ASSESSMENT  Jimmy Otto is a 71 year old male seen by Registered Dietitian for Admission Nutrition Risk Screen for positive  Have you recently lost weight without trying? Yes (unsure amount)  Have you been eating poorly because of a decreased appetite? Yes    Per chart review, PMH significant for insulin-dependent T2DM, hypercholesteremia, HTN and recent stroke on 9/14/21 that has left him nonverbal. Pt received medical care for acute stroke at [facility] and then completed rehab at [facility] from 9/17-10/6.  Admitted to [facility] 2/2 evaluation of weakness, N/V, decreased oral intake, difficulty swallowing and generally doing poor at home. Pt also was noted to have fevers for a day or so PTA.       NUTRITION HISTORY  - Information obtained from chart review and family at bedside.   - Pt was seen by RD consistently while at [facility]. Noted pt was receiving an orange  "gelatein daily and overall had good oral intakes.   - On admission pt has been noted to have thrush which likely has been contributing to his poor appetite and oral intakes.   - Spoke with family member at bedside this afternoon who states pt has had a very poor appetite over the past week. Family has been feeding him soup daily though he often declines to eat.   - Food allergies: None noted. Per prior RD notes, family was reporting a lactose intolerance and pt was staying away from milk (though could have ice cream and other dairy products).       CURRENT NUTRITION ORDERS  Diet Order:     Full liquid, Mildly Thick Liquids (per SLP recommendations)  --> Previously started on Clear Liquid, Mildly Thick diet on 10/14, advanced this morning.     Current Intake/Tolerance:  Per I/Os, pt had 120 ml of fluid via PO yesterday. Minimal intake over past 3 day admission. Writer ordered cream of wheat and 2 gelatein supplements per pt preference.       NUTRITION FOCUSED PHYSICAL ASSESSMENT FOR DIAGNOSING MALNUTRITION)  Yes (visual upper body as pt was sleeping during visit)           Observed:    Muscle wasting (refer to documentation in Malnutrition section) and Subcutaneous fat loss (refer to documentation in Malnutrition section)    Obtained from Chart/Interdisciplinary Team:  - No edema noted  - Unknown last BM  - No skin concerns noted     ANTHROPOMETRICS  Height: 5' 4\"  Weight: 134 lbs 3.2 oz  Body mass index is 23.04 kg/m .  Weight Status:  Normal BMI  Weight History: No wt loss noted over the past month since patient's stroke, however, pt is noted to have lost 18# over the past 3 months (12% wt loss) which is clinically significant.   Wt Readings from Last 10 Encounters:   10/16/21 60.9 kg (134 lb 3.2 oz)   10/01/21 66.5 kg (146 lb 11.2 oz)   09/16/21 61.5 kg (135 lb 9.3 oz)   08/14/12 68.5 kg (151 lb)   Per Care Everywhere:   69.3 kg (152 lb 12.8 oz) 07/02/2021         LABS  Labs reviewed  - BGM " 173-250    MEDICATIONS  Medications reviewed  - 9 units levemir daily at bedtime   - senokot       ASSESSED NUTRITION NEEDS PER APPROVED PRACTICE GUIDELINES:    Dosing Weight 61 kg (actual wt on 10/16)  Estimated Energy Needs: 8245-8122 kcals (25-30 Kcal/Kg)  Justification: maintenance  Estimated Protein Needs: 75+ grams protein (1.2+ g pro/Kg)  Justification: preservation of lean body mass  Estimated Fluid Needs: 1 mL/kcal or per MD pending fluid status       NUTRITION DIAGNOSIS:  Inadequate oral intake related to decreased appetite, difficulty/pain with swallowing, altered mentation 2/2 recent stroke, oral thrush as evidenced by minimal PO intake x 5 days with PO intake down from baseline over the past month, 12% wt loss over the past 3 months and mild to moderate fat and muscle wasting noted on visual exam       NUTRITION INTERVENTIONS  Recommendations / Nutrition Prescription  Diet per SLP  Gelatein supplement TID with meals   MVI+M for micronutrient needs with poor PO intakes   Monitor need for Room Service w/ Assist vs Room Service Not Appropriate       Implementation  Nutrition education: Discussed current diet order and available menu options with family at bedside. Encouraged meal ordering as able and availability of room service with assist as needed. Reviewed available ONS and indications for use.   Medical Food Supplement and Multivitamin/Mineral      Nutrition Goals  Diet to advance > Full Liquids within 48 hours vs nutrition support initiation       MONITORING AND EVALUATION:  Progress towards goals will be monitored and evaluated per protocol and Practice Guidelines      Mandy Ramos RD, LD  ICU/3rd Floor RD Pager: [phone]  All Other Floors: [phone]  Weekend RD Pager: [phone]

## 2021-10-16 NOTE — CONSULTS
Care Management Initial Consult    General Information  Assessment completed with: Patient, Children, Family, dtr Phally, brother Miquel, sister-in-law  Type of CM/SW Visit: Initial Assessment    Primary Care Provider verified and updated as needed: Yes   Readmission within the last 30 days: previous discharge plan unsuccessful      Reason for Consult: discharge planning  Advance Care Planning:            Communication Assessment  Patient's communication style: spoken language (English or Bilingual)    Hearing Difficulty or Deaf: no   Wear Glasses or Blind: yes    Cognitive  Cognitive/Neuro/Behavioral: .WDL except  Level of Consciousness: alert  Arousal Level: opens eyes spontaneously  Orientation: other (see comments) (ZAYDA)  Mood/Behavior: calm, cooperative  Best Language: 3 - Mute  Speech: unable to speak    Living Environment:   People in home: alone/family coming in and caring for    Current living Arrangements: house      Able to return to prior arrangements:  Requires 24/7 assistance and home health care(RN/PT/OT/HHA)       Family/Social Support:  Care provided by: child(melchor), other (see comments) (sister-in-law/brother)  Provides care for: no one, unable/limited ability to care for self     Children, Sibling(s)          Description of Support System: Involved, Supportive         Current Resources:   Patient receiving home care services: No     Community Resources: None  Equipment currently used at home: walker, standard, wheelchair, manual, lift device, hospital bed  Supplies currently used at home:      Employment/Financial:  Employment Status: unemployed        Financial Concerns: No concerns identified   Referral to Financial Counselor: No       Lifestyle & Psychosocial Needs:  Social Determinants of Health     Tobacco Use:      Smoking Tobacco Use:      Smokeless Tobacco Use:    Alcohol Use:      Frequency of Alcohol Consumption:      Average Number of Drinks:      Frequency of Binge Drinking:    Financial  Resource Strain:      Difficulty of Paying Living Expenses:    Food Insecurity:      Worried About Running Out of Food in the Last Year:      Ran Out of Food in the Last Year:    Transportation Needs:      Lack of Transportation (Medical):      Lack of Transportation (Non-Medical):    Physical Activity:      Days of Exercise per Week:      Minutes of Exercise per Session:    Stress:      Feeling of Stress :    Social Connections:      Frequency of Communication with Friends and Family:      Frequency of Social Gatherings with Friends and Family:      Attends Bahai Services:      Active Member of Clubs or Organizations:      Attends Club or Organization Meetings:      Marital Status:    Intimate Partner Violence:      Fear of Current or Ex-Partner:      Emotionally Abused:      Physically Abused:      Sexually Abused:    Depression:      PHQ-2 Score:    Housing Stability:      Unable to Pay for Housing in the Last Year:      Number of Places Lived in the Last Year:      Unstable Housing in the Last Year:        Functional Status:  Prior to admission patient needed assistance:   Dependent ADLs:: Ambulation-walker, Wheelchair-with assist, Transfers, Bathing, Dressing, Eating  Dependent IADLs:: Transportation, Medication Management, Money Management, Meal Preparation       Mental Health Status:  Mental Health Status: No Current Concerns       Chemical Dependency Status:  Chemical Dependency Status: No Current Concerns             Additional Information:  Met with patient's sister-in-law in room, she is one of the primary caregivers at home, she suggested writer talk to her  Miquel(patient's brother) or daughter Clemente.  Miquel was on phone in room and requests home care services at discharge, states patient was at the [facility] for 1 day and they do not want him to return there, they did not feel it was safe for patient.  Family plans to take patient home and per Phally she had contacted patient's  primary MD who had made a referral earlier in the week to [facility], she would like to use this home care(RN/PT/OT/HHA).  Will send referral to see if [facility] .  Asked about adult day care options, daughter does not feel patient is able to leave the house for something like that at this time but may think about it in the future.      SW will continue to follow for discharge planning.    Garland Marcum, ANIRUDH  [phone]

## 2021-10-17 ENCOUNTER — APPOINTMENT (OUTPATIENT)
Dept: SPEECH THERAPY | Facility: CLINIC | Age: 71
DRG: 871 | End: 2021-10-17
Payer: MEDICARE

## 2021-10-17 ENCOUNTER — APPOINTMENT (OUTPATIENT)
Dept: OCCUPATIONAL THERAPY | Facility: CLINIC | Age: 71
DRG: 871 | End: 2021-10-17
Payer: MEDICARE

## 2021-10-17 LAB
ANION GAP SERPL CALCULATED.3IONS-SCNC: 6 MMOL/L (ref 3–14)
BUN SERPL-MCNC: 11 MG/DL (ref 7–30)
CALCIUM SERPL-MCNC: 8.2 MG/DL (ref 8.5–10.1)
CHLORIDE BLD-SCNC: 104 MMOL/L (ref 94–109)
CO2 SERPL-SCNC: 26 MMOL/L (ref 20–32)
CREAT SERPL-MCNC: 1.27 MG/DL (ref 0.66–1.25)
ERYTHROCYTE [DISTWIDTH] IN BLOOD BY AUTOMATED COUNT: 12.8 % (ref 10–15)
GFR SERPL CREATININE-BSD FRML MDRD: 56 ML/MIN/1.73M2
GLUCOSE BLD-MCNC: 200 MG/DL (ref 70–99)
GLUCOSE BLDC GLUCOMTR-MCNC: 185 MG/DL (ref 70–99)
GLUCOSE BLDC GLUCOMTR-MCNC: 192 MG/DL (ref 70–99)
GLUCOSE BLDC GLUCOMTR-MCNC: 207 MG/DL (ref 70–99)
GLUCOSE BLDC GLUCOMTR-MCNC: 233 MG/DL (ref 70–99)
GLUCOSE BLDC GLUCOMTR-MCNC: 275 MG/DL (ref 70–99)
HCT VFR BLD AUTO: 35.3 % (ref 40–53)
HGB BLD-MCNC: 10.8 G/DL (ref 13.3–17.7)
MAGNESIUM SERPL-MCNC: 1.5 MG/DL (ref 1.6–2.3)
MCH RBC QN AUTO: 26.2 PG (ref 26.5–33)
MCHC RBC AUTO-ENTMCNC: 30.6 G/DL (ref 31.5–36.5)
MCV RBC AUTO: 86 FL (ref 78–100)
PLATELET # BLD AUTO: 197 10E3/UL (ref 150–450)
POTASSIUM BLD-SCNC: 3.5 MMOL/L (ref 3.4–5.3)
RBC # BLD AUTO: 4.12 10E6/UL (ref 4.4–5.9)
SODIUM SERPL-SCNC: 136 MMOL/L (ref 133–144)
WBC # BLD AUTO: 7.4 10E3/UL (ref 4–11)

## 2021-10-17 PROCEDURE — 120N000001 HC R&B MED SURG/OB

## 2021-10-17 PROCEDURE — 80048 BASIC METABOLIC PNL TOTAL CA: CPT | Performed by: INTERNAL MEDICINE

## 2021-10-17 PROCEDURE — 85027 COMPLETE CBC AUTOMATED: CPT | Performed by: INTERNAL MEDICINE

## 2021-10-17 PROCEDURE — 258N000003 HC RX IP 258 OP 636: Performed by: INTERNAL MEDICINE

## 2021-10-17 PROCEDURE — 99233 SBSQ HOSP IP/OBS HIGH 50: CPT | Performed by: INTERNAL MEDICINE

## 2021-10-17 PROCEDURE — 250N000011 HC RX IP 250 OP 636: Performed by: INTERNAL MEDICINE

## 2021-10-17 PROCEDURE — 250N000013 HC RX MED GY IP 250 OP 250 PS 637: Performed by: INTERNAL MEDICINE

## 2021-10-17 PROCEDURE — 92526 ORAL FUNCTION THERAPY: CPT | Mod: GN | Performed by: SPEECH-LANGUAGE PATHOLOGIST

## 2021-10-17 PROCEDURE — 36415 COLL VENOUS BLD VENIPUNCTURE: CPT | Performed by: INTERNAL MEDICINE

## 2021-10-17 PROCEDURE — 97530 THERAPEUTIC ACTIVITIES: CPT | Mod: GO

## 2021-10-17 PROCEDURE — 83735 ASSAY OF MAGNESIUM: CPT | Performed by: INTERNAL MEDICINE

## 2021-10-17 RX ORDER — MAGNESIUM OXIDE 400 MG/1
400 TABLET ORAL 2 TIMES DAILY
Status: COMPLETED | OUTPATIENT
Start: 2021-10-17 | End: 2021-10-18

## 2021-10-17 RX ADMIN — CEFTRIAXONE 2 G: 2 INJECTION, POWDER, FOR SOLUTION INTRAMUSCULAR; INTRAVENOUS at 00:03

## 2021-10-17 RX ADMIN — ATENOLOL 25 MG: 25 TABLET ORAL at 08:58

## 2021-10-17 RX ADMIN — POTASSIUM CHLORIDE, DEXTROSE MONOHYDRATE AND SODIUM CHLORIDE: 150; 5; 450 INJECTION, SOLUTION INTRAVENOUS at 12:26

## 2021-10-17 RX ADMIN — FLUCONAZOLE 100 MG: 2 INJECTION, SOLUTION INTRAVENOUS at 08:58

## 2021-10-17 RX ADMIN — QUETIAPINE FUMARATE 12.5 MG: 25 TABLET ORAL at 20:47

## 2021-10-17 RX ADMIN — SENNOSIDES 1 TABLET: 8.6 TABLET, FILM COATED ORAL at 21:56

## 2021-10-17 RX ADMIN — Medication 400 MG: at 10:36

## 2021-10-17 RX ADMIN — DOXAZOSIN 1 MG: 1 TABLET ORAL at 08:58

## 2021-10-17 RX ADMIN — ATORVASTATIN CALCIUM 80 MG: 40 TABLET, FILM COATED ORAL at 08:58

## 2021-10-17 RX ADMIN — INSULIN DETEMIR 9 UNITS: 100 INJECTION, SOLUTION SUBCUTANEOUS at 22:41

## 2021-10-17 RX ADMIN — Medication 400 MG: at 20:57

## 2021-10-17 RX ADMIN — DIPHENHYDRAMINE HYDROCHLORIDE AND LIDOCAINE HYDROCHLORIDE AND ALUMINUM HYDROXIDE AND MAGNESIUM HYDRO 10 ML: KIT at 10:36

## 2021-10-17 RX ADMIN — AMANTADINE HYDROCHLORIDE 100 MG: 100 CAPSULE ORAL at 08:58

## 2021-10-17 RX ADMIN — ASPIRIN 81 MG CHEWABLE TABLET 81 MG: 81 TABLET CHEWABLE at 08:58

## 2021-10-17 RX ADMIN — CEFTRIAXONE 2 G: 2 INJECTION, POWDER, FOR SOLUTION INTRAMUSCULAR; INTRAVENOUS at 23:48

## 2021-10-17 RX ADMIN — CLOPIDOGREL BISULFATE 75 MG: 75 TABLET ORAL at 08:58

## 2021-10-17 RX ADMIN — RAMELTEON 8 MG: 8 TABLET ORAL at 21:56

## 2021-10-17 RX ADMIN — ATENOLOL 25 MG: 25 TABLET ORAL at 20:57

## 2021-10-17 ASSESSMENT — ACTIVITIES OF DAILY LIVING (ADL)
ADLS_ACUITY_SCORE: 28

## 2021-10-17 NOTE — PLAN OF CARE
Alert. Non verbal, can answer yes and no questions with shaking his head. Felix patent and draining. ,192. Meds crushed in pudding. K WNL, Recheck in am. Mg replaced, Recheck 10/19 am. Pt alert today, pivot with 2 assist to recliner. Diet increased to puree. Plan: greater than 2 noc

## 2021-10-17 NOTE — PROGRESS NOTES
[facility]    Hospitalist Progress Note  Provider : Mustapha Fountain MD  Date of Service (when I saw the patient): 10/17/2021    Assessment & Plan   Jimmy Otto is a 71-year-old male with history of insulin-dependent type 2 diabetes, hypercholesterolemia, hypertension, TIA, erectile dysfunction, recent stroke, and urinary retention with indwelling Felix catheter placed about 2 and half weeks ago. 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  This left him nonverbal.  He was cared for at the [facility].  He discharged to the acute rehab unit at [facility] where he stayed from 9/17 through 10/6.  He returned home.  His family brought him in last night for evaluation of weakness, nausea, vomiting, decreased oral intake, difficulty swallowing, and generally doing poorly.  He had some fever the day before.  He had no cough or diarrhea.    In the ER, he was afebrile. His heart rate was in 90s.  Lab workup showed wbc 10.2 otherwise unremarkable CBC.  BMP showed creatinine 1.53 but was otherwise unremarkable. Lactic acid was 2.4 and came down to 2.2 after IV fluids.  Lipase and troponin were undetectable. Venous blood gas showed pH 7.39, PCO2 51, PO2 24, and oxygen saturations of 33%. Urinalysis showed 124 white blood cells, large leukocyte esterase, no nitrite, many bacteria, and white blood cell clumps. Testing for COVID-19 was negative. Chest x-ray was unremarkable. CT of head raise question of possible right sphenoid sinus mucosal thickening with small air-fluid level. Patient was given Rocephin for sepsis due to urinary tract infection. Exam suggested thrush. Patient was started on IV Ceftriaxone. He was admitted to the hospital for further management.      Problem list:  Sepsis (leukocytosis, heart rate 90s, lactic acidosis)  Urinary tract infection  -elevated lactic acid, positive UC.  -Continue Rocephin 2 g every 24 hours  -Urine culture growing  E.coli(pansensitive)  -Blood culture negative so far  -Hold prior to admission antihypertensives in setting of infection and sepsis  -Lactic acid improving with IV fluids  -Will decrease D51/2NS with KCl 20 meq  at 75 ml/hr.     Thrush  -Treated with fluconazole.  -Continue magic mouthwash as needed for pain  -Currently on clears.      Dysphagia  -Prior to admission was on modified diet due to recent stroke  -Has been having more difficulty in the last few days -likely due to weakness from infection  -Evaluated by speech therapy and recommended clear liquid diet.  -Will follow speech therapy and advance diet per SLP recommendations.      Recent stroke on 9/14/2021  -Was hospitalized at [facility] and was in acute rehab there. Nonverbal   -Resumed prior to admission aspirin, Plavix, Lipitor  -Patient's family don't want him to go back to rehab facility. They want to take him home with home therapy orders.   -Will need home PT/OT/RN/HHA on discharge.  consulted for discharge planning     Urine retention identified during recent hospitalization for stroke  -Indwelling Felix catheter was changed in the emergency department  -Keep catheter in  -Was to follow-up with urology as an outpatient     Type 2 diabetes  -Prior to admission was on Levemir insulin 18 units at bedtime, NovoLog sliding scale insulin, Victoza, and Metformin  -Currently on Levemir 9 units at bedtime and NovoLog sliding scale ACHS.  -Hold prior to admission Victoza and Metformin for now but can likely resume once diet advanced     Hypercholesterolemia  -Resumed prior to admission Lipitor once diet advanced     Hypertension  -Resumed atenolol and Norvasc with parameter.      Weakness  Deconditioning  -PT and OT consulted     COVID-19 PCR testing was negative     Code Status: Full Code    Disposition: Expected discharge: anticipate more than 2 nights of hospital course until sepsis improved. Will need home PT/OT/RN/HHA on  discharge    Mustapha Fountain MD    Spent>35 minutes on chart review, patient evaluation, coordination of care, documentation.     Interval History   Patient seen and examined. He is nonverbal.unable to obtain any history from the patient because of his nonverbal state. He is sitting in chair today    -Data reviewed today: I reviewed all new labs and imaging results over the last 24 hours. I personally reviewed     Physical Exam   Temp: 98.6  F (37  C) Temp src: Oral BP: (!) 146/80 Pulse: 76   Resp: 18 SpO2: 96 % O2 Device: None (Room air)    Vitals:    10/15/21 0638 10/16/21 0636 10/17/21 0322   Weight: 57.4 kg (126 lb 9.6 oz) 60.9 kg (134 lb 3.2 oz) 58.8 kg (129 lb 11.2 oz)     Vital Signs with Ranges  Temp:  [98  F (36.7  C)-99  F (37.2  C)] 98.6  F (37  C)  Pulse:  [73-79] 76  Resp:  [16-18] 18  BP: (126-146)/(71-80) 146/80  SpO2:  [96 %-98 %] 96 %  I/O last 3 completed shifts:  In: 1039 [I.V.:1039]  Out: 1650 [Urine:1650]    GEN:  Alert, oriented x 3, appears comfortable, NAD.  HEENT:  Normocephalic/atraumatic, no scleral icterus, no nasal discharge, mouth moist.  CV:  Regular rate and rhythm, no murmur or JVD.  S1 + S2 noted, no S3 or S4.  LUNGS:  Clear to auscultation bilaterally without rales/rhonchi/wheezing/retractions.  Symmetric chest rise on inhalation noted.  ABD:  Active bowel sounds, soft, non-tender/non-distended.  No rebound/guarding/rigidity.  EXT:  No edema or cyanosis.  Hands/feet warm to touch with good signs of peripheral perfusion.  No joint synovitis noted.  SKIN:  Dry to touch, no exanthems noted in the visualized areas.  NEURO:  Symmetric muscle strength, sensation to touch grossly intact.  No new focal deficits appreciated.    Medications     dextrose 5% and 0.45% NaCl + KCl 20 mEq/L 75 mL/hr at 10/17/21 0859       amantadine  100 mg Oral Daily     aspirin  81 mg Oral Daily     atenolol  25 mg Oral BID     atorvastatin  80 mg Oral Daily     cefTRIAXone  2 g Intravenous Q24H      clopidogrel  75 mg Oral Daily     doxazosin  1 mg Oral Daily     fluconazole  100 mg Intravenous Q24H     insulin aspart  1-7 Units Subcutaneous TID AC     insulin aspart  1-5 Units Subcutaneous At Bedtime     insulin detemir  9 Units Subcutaneous At Bedtime     magnesium oxide  400 mg Oral BID     QUEtiapine  12.5 mg Oral At Bedtime     ramelteon  8 mg Oral At Bedtime     sennosides  1 tablet Oral At Bedtime     sodium chloride (PF)  3 mL Intracatheter Q8H       Data   Recent Labs   Lab 10/17/21  1148 10/17/21  0819 10/17/21  0818 10/16/21  0840 10/16/21  0706 10/15/21  0851 10/15/21  0745 10/14/21  0819 10/14/21  0808 10/14/21  0333 10/13/21  9705   WBC  --  7.4  --   --  7.7  --   --   --  9.0  --  10.2   HGB  --  10.8*  --   --  11.6*  --   --   --  12.8*  --  14.2   MCV  --  86  --   --  88  --   --   --  88  --  88   PLT  --  197  --   --  186  --   --   --  213  --  284   NA  --  136  --   --  137  --   --   --  142  --  142   POTASSIUM  --  3.5  --   --  3.7  --  3.7   < > 3.7  --  4.1   CHLORIDE  --  104  --   --  105  --   --   --  109  --  106   CO2  --  26  --   --  26  --   --   --  26  --  29   BUN  --  11  --   --  16  --   --   --  22  --  30   CR  --  1.27*  --   --  1.39*  --   --   --  1.19  --  1.53*   ANIONGAP  --  6  --   --  6  --   --   --  7  --  7   ARLETH  --  8.2*  --   --  8.1*  --   --   --  8.5  --  10.3*   * 200* 192*   < > 204*   < >  --    < > 146*   < > 83   ALBUMIN  --   --   --   --   --   --   --   --   --   --  3.3*   PROTTOTAL  --   --   --   --   --   --   --   --   --   --  9.7*   BILITOTAL  --   --   --   --   --   --   --   --   --   --  0.4   ALKPHOS  --   --   --   --   --   --   --   --   --   --  158*   ALT  --   --   --   --   --   --   --   --   --   --  19   AST  --   --   --   --   --   --   --   --   --   --  15   LIPASE  --   --   --   --   --   --   --   --   --   --  <10*   TROPONIN  --   --   --   --   --   --   --   --   --   --  <0.015    < > = values in  this interval not displayed.       No results found for this or any previous visit (from the past 24 hour(s)).

## 2021-10-17 NOTE — PLAN OF CARE
Vitals VSS  Neuro ZAYDA, patient nonverbal. Alert and opens eyes spontaneously   Respiratory 95% RA, LS clear  GI/ Felix in place for retention   Skin Blanchable redness at coccyx   LDAs PIV infusing D5 1/2 NS+ K @ 75 mL/hr  Labs K+ 3.5, creatinine 1.27, Mag 1.5,   Diet Pureed level 4, mildly thick liquids   Activity A2 sera steady   Plan Continue with POC.

## 2021-10-17 NOTE — PLAN OF CARE
BP (!) 140/74 (BP Location: Right arm)   Pulse 73   Temp 99  F (37.2  C) (Oral)   Resp 16   Wt 58.8 kg (129 lb 11.2 oz)   SpO2 96%   BMI 22.26 kg/m      Pt non-verbal. Can answer yes or no by nodding head. VSS stable. Felix intact. D5% and 0.45% naCL + KCL 20 Stone/ml @ 100 mL/hr. Turn and reposition Q2H. Med crushed in pudding.

## 2021-10-17 NOTE — PROGRESS NOTES
SLP - Pt's family with questions re: applying for county assist and possible hospital bed for home at discharge. Recommend social work consult - MD please order if in agreement.

## 2021-10-18 ENCOUNTER — APPOINTMENT (OUTPATIENT)
Dept: OCCUPATIONAL THERAPY | Facility: CLINIC | Age: 71
DRG: 871 | End: 2021-10-18
Payer: MEDICARE

## 2021-10-18 ENCOUNTER — APPOINTMENT (OUTPATIENT)
Dept: SPEECH THERAPY | Facility: CLINIC | Age: 71
DRG: 871 | End: 2021-10-18
Payer: MEDICARE

## 2021-10-18 LAB
ANION GAP SERPL CALCULATED.3IONS-SCNC: 5 MMOL/L (ref 3–14)
BUN SERPL-MCNC: 17 MG/DL (ref 7–30)
CALCIUM SERPL-MCNC: 8.5 MG/DL (ref 8.5–10.1)
CHLORIDE BLD-SCNC: 106 MMOL/L (ref 94–109)
CO2 SERPL-SCNC: 26 MMOL/L (ref 20–32)
CREAT SERPL-MCNC: 1.33 MG/DL (ref 0.66–1.25)
ERYTHROCYTE [DISTWIDTH] IN BLOOD BY AUTOMATED COUNT: 12.8 % (ref 10–15)
GFR SERPL CREATININE-BSD FRML MDRD: 53 ML/MIN/1.73M2
GLUCOSE BLD-MCNC: 231 MG/DL (ref 70–99)
GLUCOSE BLDC GLUCOMTR-MCNC: 167 MG/DL (ref 70–99)
GLUCOSE BLDC GLUCOMTR-MCNC: 201 MG/DL (ref 70–99)
GLUCOSE BLDC GLUCOMTR-MCNC: 246 MG/DL (ref 70–99)
GLUCOSE BLDC GLUCOMTR-MCNC: 271 MG/DL (ref 70–99)
GLUCOSE BLDC GLUCOMTR-MCNC: 289 MG/DL (ref 70–99)
HCT VFR BLD AUTO: 35.2 % (ref 40–53)
HGB BLD-MCNC: 10.5 G/DL (ref 13.3–17.7)
MAGNESIUM SERPL-MCNC: 1.7 MG/DL (ref 1.6–2.3)
MCH RBC QN AUTO: 26.3 PG (ref 26.5–33)
MCHC RBC AUTO-ENTMCNC: 29.8 G/DL (ref 31.5–36.5)
MCV RBC AUTO: 88 FL (ref 78–100)
PLATELET # BLD AUTO: 192 10E3/UL (ref 150–450)
POTASSIUM BLD-SCNC: 3.8 MMOL/L (ref 3.4–5.3)
RBC # BLD AUTO: 3.99 10E6/UL (ref 4.4–5.9)
SODIUM SERPL-SCNC: 137 MMOL/L (ref 133–144)
WBC # BLD AUTO: 6.7 10E3/UL (ref 4–11)

## 2021-10-18 PROCEDURE — 97110 THERAPEUTIC EXERCISES: CPT | Mod: GO

## 2021-10-18 PROCEDURE — 85027 COMPLETE CBC AUTOMATED: CPT | Performed by: INTERNAL MEDICINE

## 2021-10-18 PROCEDURE — 99232 SBSQ HOSP IP/OBS MODERATE 35: CPT | Performed by: INTERNAL MEDICINE

## 2021-10-18 PROCEDURE — 83735 ASSAY OF MAGNESIUM: CPT | Performed by: INTERNAL MEDICINE

## 2021-10-18 PROCEDURE — 250N000013 HC RX MED GY IP 250 OP 250 PS 637: Performed by: INTERNAL MEDICINE

## 2021-10-18 PROCEDURE — 92526 ORAL FUNCTION THERAPY: CPT | Mod: GN

## 2021-10-18 PROCEDURE — 120N000001 HC R&B MED SURG/OB

## 2021-10-18 PROCEDURE — 97530 THERAPEUTIC ACTIVITIES: CPT | Mod: GO

## 2021-10-18 PROCEDURE — 258N000003 HC RX IP 258 OP 636: Performed by: INTERNAL MEDICINE

## 2021-10-18 PROCEDURE — 80048 BASIC METABOLIC PNL TOTAL CA: CPT | Performed by: INTERNAL MEDICINE

## 2021-10-18 PROCEDURE — 36415 COLL VENOUS BLD VENIPUNCTURE: CPT | Performed by: INTERNAL MEDICINE

## 2021-10-18 PROCEDURE — 250N000011 HC RX IP 250 OP 636: Performed by: INTERNAL MEDICINE

## 2021-10-18 RX ORDER — AMLODIPINE BESYLATE 5 MG/1
5 TABLET ORAL DAILY
Status: DISCONTINUED | OUTPATIENT
Start: 2021-10-18 | End: 2021-11-04 | Stop reason: HOSPADM

## 2021-10-18 RX ORDER — HEPARIN SODIUM 5000 [USP'U]/.5ML
5000 INJECTION, SOLUTION INTRAVENOUS; SUBCUTANEOUS 2 TIMES DAILY
Status: DISCONTINUED | OUTPATIENT
Start: 2021-10-18 | End: 2021-10-27

## 2021-10-18 RX ADMIN — RAMELTEON 8 MG: 8 TABLET ORAL at 22:07

## 2021-10-18 RX ADMIN — METFORMIN HYDROCHLORIDE 1000 MG: 500 TABLET, FILM COATED ORAL at 18:31

## 2021-10-18 RX ADMIN — ATENOLOL 25 MG: 25 TABLET ORAL at 08:47

## 2021-10-18 RX ADMIN — QUETIAPINE FUMARATE 12.5 MG: 25 TABLET ORAL at 21:59

## 2021-10-18 RX ADMIN — CLOPIDOGREL BISULFATE 75 MG: 75 TABLET ORAL at 08:47

## 2021-10-18 RX ADMIN — ASPIRIN 81 MG CHEWABLE TABLET 81 MG: 81 TABLET CHEWABLE at 08:48

## 2021-10-18 RX ADMIN — Medication 400 MG: at 08:47

## 2021-10-18 RX ADMIN — FLUCONAZOLE 100 MG: 2 INJECTION, SOLUTION INTRAVENOUS at 09:08

## 2021-10-18 RX ADMIN — ATENOLOL 25 MG: 25 TABLET ORAL at 21:59

## 2021-10-18 RX ADMIN — ATORVASTATIN CALCIUM 80 MG: 40 TABLET, FILM COATED ORAL at 08:48

## 2021-10-18 RX ADMIN — POTASSIUM CHLORIDE, DEXTROSE MONOHYDRATE AND SODIUM CHLORIDE: 150; 5; 450 INJECTION, SOLUTION INTRAVENOUS at 02:38

## 2021-10-18 RX ADMIN — AMANTADINE HYDROCHLORIDE 100 MG: 100 CAPSULE ORAL at 08:47

## 2021-10-18 RX ADMIN — SENNOSIDES 1 TABLET: 8.6 TABLET, FILM COATED ORAL at 21:59

## 2021-10-18 RX ADMIN — CEFTRIAXONE 2 G: 2 INJECTION, POWDER, FOR SOLUTION INTRAMUSCULAR; INTRAVENOUS at 23:11

## 2021-10-18 RX ADMIN — HEPARIN SODIUM 5000 UNITS: 10000 INJECTION, SOLUTION INTRAVENOUS; SUBCUTANEOUS at 22:11

## 2021-10-18 RX ADMIN — Medication 400 MG: at 21:59

## 2021-10-18 RX ADMIN — AMLODIPINE BESYLATE 5 MG: 5 TABLET ORAL at 15:59

## 2021-10-18 RX ADMIN — DOXAZOSIN 1 MG: 1 TABLET ORAL at 08:47

## 2021-10-18 RX ADMIN — METFORMIN HYDROCHLORIDE 1000 MG: 500 TABLET, FILM COATED ORAL at 09:05

## 2021-10-18 ASSESSMENT — ACTIVITIES OF DAILY LIVING (ADL)
ADLS_ACUITY_SCORE: 32
ADLS_ACUITY_SCORE: 28
ADLS_ACUITY_SCORE: 28
ADLS_ACUITY_SCORE: 32
ADLS_ACUITY_SCORE: 34
ADLS_ACUITY_SCORE: 34
ADLS_ACUITY_SCORE: 32
ADLS_ACUITY_SCORE: 34
ADLS_ACUITY_SCORE: 28
ADLS_ACUITY_SCORE: 34
ADLS_ACUITY_SCORE: 32
ADLS_ACUITY_SCORE: 36
ADLS_ACUITY_SCORE: 34
ADLS_ACUITY_SCORE: 32
ADLS_ACUITY_SCORE: 32

## 2021-10-18 NOTE — PLAN OF CARE
ZAYDA orientation, pt nonverbal. VSS on RA except elevated BP. Shook head no when asked if in pain. , 289. Shook head no when asked if having CP or SOB. PIV to right intact, infusing w/ D5 0.45% NS + K @ 75 ml/hr. Up to chair for breakfast Ax2 GB mamadou steady, total feed, requires frequent swallowing instructions/reminders. Felix patent w/ good UOP. Dgtr at bedside. Anticipatory discharge 1-2 days home w/ HC, SW following. Will continue POC.

## 2021-10-18 NOTE — PROGRESS NOTES
[facility]    Hospitalist Progress Note      Assessment & Plan   Jimmy Otto is a 71 year old male who was admitted on 10/13/2021.    Summary of Stay:   Jimmy Otto is a 71-year-old male with history of insulin-dependent type 2 diabetes, hypercholesterolemia, hypertension, TIA, erectile dysfunction, recent stroke, and urinary retention with indwelling Felix catheter placed about 2 and half weeks ago. 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  This left him nonverbal.  He discharged to the acute rehab unit at [facility] where he stayed from 9/17 through 10/6.  He returned home.  His family brought him in for evaluation of weakness, nausea, vomiting, decreased oral intake, difficulty swallowing, and generally doing poorly.  He had some fever the day before.  He had no cough or diarrhea.    In the ER, he was afebrile. His heart rate was in 90s.  Lab workup showed wbc 10.2 otherwise unremarkable CBC.  BMP showed creatinine 1.53 but was otherwise unremarkable. Lactic acid was 2.4 and came down to 2.2 after IV fluids.  Lipase and troponin were undetectable. Venous blood gas showed pH 7.39, PCO2 51, PO2 24, and oxygen saturations of 33%. Urinalysis showed 124 white blood cells, large leukocyte esterase, no nitrite, many bacteria, and white blood cell clumps. Testing for COVID-19 was negative. Chest x-ray was unremarkable. CT of head raise question of possible right sphenoid sinus mucosal thickening with small air-fluid level. Patient was given Rocephin for sepsis due to urinary tract infection. Exam suggested thrush. Patient was started on IV Ceftriaxone. He was admitted to the hospital for further management.     Plan:    Sepsis (leukocytosis, heart rate 90s, lactic acidosis)  Urinary tract infection  -getting Rocephin 2 g every 24 hours  -Urine culture growing E.coli(pansensitive)  -Blood culture negative so far  -Lactic acid improving with IV fluids     Thrush  -Treated with  "fluconazole.  -Continue magic mouthwash as needed for pain  -Currently on clears.      Dysphagia  -Prior to admission was on modified diet due to recent stroke  -Has been having more difficulty in the last few days -likely due to weakness from infection  -Evaluated by speech therapy: Diet is slowly being advanced.  Currently on puréed diet with slightly thickened liquids.  Aspiration precautions.     Recent stroke on 9/14/2021  -Was hospitalized at [facility] and was in acute rehab there. Nonverbal   -Resumed prior to admission aspirin, Plavix, Lipitor  -Patient's family don't want him to go back to rehab facility. They want to take him home with home therapy orders.   -Will need home PT/OT/RN/HHA on discharge.  consulted for discharge planning   -Per neurology note from his admission at [facility]: \"Dual antiplatelets for 1 month then back to plavix monotherapy as he was supposed to be on. It might be informative to check P2Y12 activity after a month of being on plavix only.\"  -Discontinue aspirin as it has been 1 month since the stroke.  Continue Plavix.     Urine retention identified during recent hospitalization for stroke  -Indwelling Felix catheter was changed in the emergency department  -Outpatient urology follow-up.     Type 2 diabetes  -Prior to admission was on Levemir insulin 18 units at bedtime, NovoLog sliding scale insulin, Victoza, and Metformin  -Currently on Levemir 9 units at bedtime and NovoLog sliding scale ACHS.  Increase Lantus to 12 units.  Resume Metformin.     Hypercholesterolemia  -Resumed prior to admission Lipitor once diet advanced     Hypertension  -Atenolol resumed.  Resume amlodipine at a lower dose.     Weakness  Deconditioning  -PT and OT consulted     COVID-19 PCR testing was negative      DVT Prophylaxis: Heparin SQ  Code Status: Full Code  Expected discharge: 1-2 days    Melecio Blevins MD  Text Page (7am - 6pm, M-F)    Interval History   Patient was " evaluated with nursing staff. Overnight issues discussed.    Review of systems:  Unable to obtain    -Data reviewed today: Labs and medications.    Physical Exam   Temp: 99  F (37.2  C) Temp src: Oral BP: (!) 163/83 Pulse: 71   Resp: 16 SpO2: 99 % O2 Device: None (Room air)    Vitals:    10/16/21 0636 10/17/21 0322 10/18/21 0331   Weight: 60.9 kg (134 lb 3.2 oz) 58.8 kg (129 lb 11.2 oz) 59.3 kg (130 lb 12.8 oz)     Vital Signs with Ranges  Temp:  [97.7  F (36.5  C)-99  F (37.2  C)] 99  F (37.2  C)  Pulse:  [68-71] 71  Resp:  [16-18] 16  BP: (116-163)/(64-83) 163/83  SpO2:  [95 %-99 %] 99 %  I/O last 3 completed shifts:  In: 185 [P.O.:185]  Out: 1500 [Urine:1500]    Constitutional: Sleepy but arousable, unable to obtain history  HEENT: Trachea midline, sclera is clear   Respiratory: No crackles. No wheezing. Equal breath sounds bilaterally.  Cardiovascular: Regular rate and rhythm, normal S1 and S2, and no murmur noted  GI: Normal bowel sounds, soft, non-distended, non-tender      Medications     dextrose 5% and 0.45% NaCl + KCl 20 mEq/L 75 mL/hr at 10/18/21 0238       amantadine  100 mg Oral Daily     aspirin  81 mg Oral Daily     atenolol  25 mg Oral BID     atorvastatin  80 mg Oral Daily     cefTRIAXone  2 g Intravenous Q24H     clopidogrel  75 mg Oral Daily     doxazosin  1 mg Oral Daily     fluconazole  100 mg Intravenous Q24H     insulin aspart  1-7 Units Subcutaneous TID AC     insulin aspart  1-5 Units Subcutaneous At Bedtime     insulin detemir  9 Units Subcutaneous At Bedtime     magnesium oxide  400 mg Oral BID     metFORMIN  1,000 mg Oral BID w/meals     QUEtiapine  12.5 mg Oral At Bedtime     ramelteon  8 mg Oral At Bedtime     sennosides  1 tablet Oral At Bedtime     sodium chloride (PF)  3 mL Intracatheter Q8H       Data   Recent Labs   Lab 10/18/21  0631 10/18/21  0139 10/17/21  2128 10/17/21  1148 10/17/21  0819 10/16/21  0840 10/16/21  0706 10/14/21  0333 10/13/21  2152   WBC 6.7  --   --   --  7.4   --  7.7   < > 10.2   HGB 10.5*  --   --   --  10.8*  --  11.6*   < > 14.2   MCV 88  --   --   --  86  --  88   < > 88     --   --   --  197  --  186   < > 284     --   --   --  136  --  137   < > 142   POTASSIUM 3.8  --   --   --  3.5  --  3.7   < > 4.1   CHLORIDE 106  --   --   --  104  --  105   < > 106   CO2 26  --   --   --  26  --  26   < > 29   BUN 17  --   --   --  11  --  16   < > 30   CR 1.33*  --   --   --  1.27*  --  1.39*   < > 1.53*   ANIONGAP 5  --   --   --  6  --  6   < > 7   ARLETH 8.5  --   --   --  8.2*  --  8.1*   < > 10.3*   * 271* 233*   < > 200*   < > 204*   < > 83   ALBUMIN  --   --   --   --   --   --   --   --  3.3*   PROTTOTAL  --   --   --   --   --   --   --   --  9.7*   BILITOTAL  --   --   --   --   --   --   --   --  0.4   ALKPHOS  --   --   --   --   --   --   --   --  158*   ALT  --   --   --   --   --   --   --   --  19   AST  --   --   --   --   --   --   --   --  15   LIPASE  --   --   --   --   --   --   --   --  <10*   TROPONIN  --   --   --   --   --   --   --   --  <0.015    < > = values in this interval not displayed.       No results found for this or any previous visit (from the past 24 hour(s)).

## 2021-10-18 NOTE — PROVIDER NOTIFICATION
"MD paged: \"MAR says to contact MD if GFR < 60 to hold metformin. Ok to hold metformin for pt GFR of 53?\"    Addendum: MD responded \"it's ok to give.\"  "

## 2021-10-18 NOTE — PLAN OF CARE
Alert. Patient nonverbal. VSS. Assist x 2 with María Steady. Diet: Pureed level 4, mildly thick liquids.  Has right sided weakness. PIV infusing D5 1/2 NS+ K @ 75 mL/hr. Felix intact. Creatinine 1.27, Mag 1.5, K 3.5,  , 271    PT, OT Following. Plan is to discharge home with homecare.  SW consult

## 2021-10-18 NOTE — CONSULTS
PALAK again consulted for discharge planning. PALAK is already following for discharge planning. See PALAK consult note from 10/16 for clear discharge plan. Patient will discharge home with his family per their request. He will have 24/7 support and will need orders for Home Care RN/PT/OT/HHA. [facility] has accepted patient.     No further discharge needs.        Alanis Chong RN BSN CM  Inpatient Care Coordination  [facility]  [phone]

## 2021-10-19 ENCOUNTER — APPOINTMENT (OUTPATIENT)
Dept: GENERAL RADIOLOGY | Facility: CLINIC | Age: 71
DRG: 871 | End: 2021-10-19
Attending: INTERNAL MEDICINE
Payer: MEDICARE

## 2021-10-19 ENCOUNTER — APPOINTMENT (OUTPATIENT)
Dept: SPEECH THERAPY | Facility: CLINIC | Age: 71
DRG: 871 | End: 2021-10-19
Payer: MEDICARE

## 2021-10-19 LAB
BACTERIA BLD CULT: NO GROWTH
BACTERIA BLD CULT: NO GROWTH
GLUCOSE BLDC GLUCOMTR-MCNC: 109 MG/DL (ref 70–99)
GLUCOSE BLDC GLUCOMTR-MCNC: 129 MG/DL (ref 70–99)
GLUCOSE BLDC GLUCOMTR-MCNC: 132 MG/DL (ref 70–99)
GLUCOSE BLDC GLUCOMTR-MCNC: 137 MG/DL (ref 70–99)
GLUCOSE BLDC GLUCOMTR-MCNC: 150 MG/DL (ref 70–99)
GLUCOSE BLDC GLUCOMTR-MCNC: 75 MG/DL (ref 70–99)
MAGNESIUM SERPL-MCNC: 1.8 MG/DL (ref 1.6–2.3)
POTASSIUM BLD-SCNC: 3.4 MMOL/L (ref 3.4–5.3)
POTASSIUM BLD-SCNC: 3.8 MMOL/L (ref 3.4–5.3)

## 2021-10-19 PROCEDURE — 99233 SBSQ HOSP IP/OBS HIGH 50: CPT | Performed by: INTERNAL MEDICINE

## 2021-10-19 PROCEDURE — 84132 ASSAY OF SERUM POTASSIUM: CPT | Performed by: INTERNAL MEDICINE

## 2021-10-19 PROCEDURE — 250N000013 HC RX MED GY IP 250 OP 250 PS 637: Performed by: INTERNAL MEDICINE

## 2021-10-19 PROCEDURE — 250N000011 HC RX IP 250 OP 636: Performed by: INTERNAL MEDICINE

## 2021-10-19 PROCEDURE — 92526 ORAL FUNCTION THERAPY: CPT | Mod: GN

## 2021-10-19 PROCEDURE — 258N000003 HC RX IP 258 OP 636: Performed by: INTERNAL MEDICINE

## 2021-10-19 PROCEDURE — 120N000001 HC R&B MED SURG/OB

## 2021-10-19 PROCEDURE — 83735 ASSAY OF MAGNESIUM: CPT | Performed by: INTERNAL MEDICINE

## 2021-10-19 PROCEDURE — 36415 COLL VENOUS BLD VENIPUNCTURE: CPT | Performed by: INTERNAL MEDICINE

## 2021-10-19 PROCEDURE — 71045 X-RAY EXAM CHEST 1 VIEW: CPT

## 2021-10-19 RX ORDER — DEXTROSE MONOHYDRATE, SODIUM CHLORIDE, AND POTASSIUM CHLORIDE 50; 1.49; 4.5 G/1000ML; G/1000ML; G/1000ML
INJECTION, SOLUTION INTRAVENOUS CONTINUOUS
Status: DISCONTINUED | OUTPATIENT
Start: 2021-10-19 | End: 2021-10-20

## 2021-10-19 RX ORDER — GUAIFENESIN/DEXTROMETHORPHAN 100-10MG/5
5 SYRUP ORAL EVERY 4 HOURS PRN
Status: DISCONTINUED | OUTPATIENT
Start: 2021-10-19 | End: 2021-11-04 | Stop reason: HOSPADM

## 2021-10-19 RX ORDER — POTASSIUM CHLORIDE 1500 MG/1
20 TABLET, EXTENDED RELEASE ORAL ONCE
Status: COMPLETED | OUTPATIENT
Start: 2021-10-19 | End: 2021-10-19

## 2021-10-19 RX ADMIN — ATORVASTATIN CALCIUM 80 MG: 40 TABLET, FILM COATED ORAL at 08:12

## 2021-10-19 RX ADMIN — HEPARIN SODIUM 5000 UNITS: 10000 INJECTION, SOLUTION INTRAVENOUS; SUBCUTANEOUS at 08:25

## 2021-10-19 RX ADMIN — FLUCONAZOLE 100 MG: 2 INJECTION, SOLUTION INTRAVENOUS at 06:31

## 2021-10-19 RX ADMIN — POTASSIUM CHLORIDE, DEXTROSE MONOHYDRATE AND SODIUM CHLORIDE: 150; 5; 450 INJECTION, SOLUTION INTRAVENOUS at 09:15

## 2021-10-19 RX ADMIN — AMLODIPINE BESYLATE 5 MG: 5 TABLET ORAL at 08:12

## 2021-10-19 RX ADMIN — DIPHENHYDRAMINE HYDROCHLORIDE AND LIDOCAINE HYDROCHLORIDE AND ALUMINUM HYDROXIDE AND MAGNESIUM HYDRO 10 ML: KIT at 14:57

## 2021-10-19 RX ADMIN — POTASSIUM CHLORIDE 20 MEQ: 1500 TABLET, EXTENDED RELEASE ORAL at 08:12

## 2021-10-19 RX ADMIN — HEPARIN SODIUM 5000 UNITS: 10000 INJECTION, SOLUTION INTRAVENOUS; SUBCUTANEOUS at 20:50

## 2021-10-19 RX ADMIN — GUAIFENESIN AND DEXTROMETHORPHAN 5 ML: 100; 10 SYRUP ORAL at 09:15

## 2021-10-19 RX ADMIN — CLOPIDOGREL BISULFATE 75 MG: 75 TABLET ORAL at 08:12

## 2021-10-19 RX ADMIN — AMANTADINE HYDROCHLORIDE 100 MG: 100 CAPSULE ORAL at 08:12

## 2021-10-19 RX ADMIN — ATENOLOL 25 MG: 25 TABLET ORAL at 08:12

## 2021-10-19 RX ADMIN — PHENOL 1 ML: 1.5 LIQUID ORAL at 08:10

## 2021-10-19 RX ADMIN — DOXAZOSIN 1 MG: 1 TABLET ORAL at 08:12

## 2021-10-19 ASSESSMENT — ACTIVITIES OF DAILY LIVING (ADL)
ADLS_ACUITY_SCORE: 38
ADLS_ACUITY_SCORE: 34
ADLS_ACUITY_SCORE: 38
ADLS_ACUITY_SCORE: 38
ADLS_ACUITY_SCORE: 34
ADLS_ACUITY_SCORE: 38
ADLS_ACUITY_SCORE: 34
ADLS_ACUITY_SCORE: 38
ADLS_ACUITY_SCORE: 38
ADLS_ACUITY_SCORE: 34
ADLS_ACUITY_SCORE: 34
ADLS_ACUITY_SCORE: 38
ADLS_ACUITY_SCORE: 38
ADLS_ACUITY_SCORE: 34

## 2021-10-19 NOTE — PLAN OF CARE
SLP Update: Per chart/RN, difficulty/coughing with PO and holding PO until SLP re-assessment; CxR pending. Pt with overt congestive, unproductive coughing prior to any PO. Accepted x2 extremely small tsps with significant oral holding, delayed swallow, delayed cough on 2/2 trials. Unable to maintain alertness for further trials. Recommend maintain NPO status given decline in function/lethargy. Update provided to hospitalist and RN

## 2021-10-19 NOTE — PROGRESS NOTES
[facility]    Hospitalist Progress Note      Assessment & Plan   Jimmy Otto is a 71 year old male who was admitted on 10/13/2021.    Summary of Stay:   Jimmy Otto is a 71-year-old male with history of insulin-dependent type 2 diabetes, hypercholesterolemia, hypertension, TIA, erectile dysfunction, recent stroke, and urinary retention with indwelling Felix catheter placed about 2 and half weeks ago. 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  This left him nonverbal.  He discharged to the acute rehab unit at [facility] where he stayed from 9/17 through 10/6.  He returned home.  His family brought him in for evaluation of weakness, nausea, vomiting, decreased oral intake, difficulty swallowing, and generally doing poorly.  He had some fever the day before.  He had no cough or diarrhea.    In the ER, he was afebrile. His heart rate was in 90s.  Lab workup showed wbc 10.2 otherwise unremarkable CBC.  BMP showed creatinine 1.53 but was otherwise unremarkable. Lactic acid was 2.4 and came down to 2.2 after IV fluids.  Lipase and troponin were undetectable. Venous blood gas showed pH 7.39, PCO2 51, PO2 24, and oxygen saturations of 33%. Urinalysis showed 124 white blood cells, large leukocyte esterase, no nitrite, many bacteria, and white blood cell clumps. Testing for COVID-19 was negative. Chest x-ray was unremarkable. CT of head raise question of possible right sphenoid sinus mucosal thickening with small air-fluid level. Patient was given Rocephin for sepsis due to urinary tract infection. Exam suggested thrush. Patient was started on IV Ceftriaxone. He was admitted to the hospital for further management.     Plan:    Sepsis (leukocytosis, heart rate 90s, lactic acidosis)  Urinary tract infection  -IV ceftriaxone  -Urine culture growing E.coli(pansensitive)  -Blood culture negative so far     Thrush  -Treated with fluconazole.  -Continue magic mouthwash as needed for pain  -Currently on  "clears.      Dysphagia  -Prior to admission was on modified diet due to recent stroke  -Patient was having trouble eating at home, likely related to acute illness.  -Slow recovery in the hospital.  -Yesterday, was started on puréed diet after discussion with speech therapy.  -Today, appears more somnolent.  Having some cough this morning.  For that reason patient is being kept n.p.o. seen by speech therapy but they were unable to do a full assessment.  Continue n.p.o. status.  Aspiration precautions.  -Hold Lantus.  Gentle IV hydration.  -Also hold ramelteon (for insomnia) and quetiapine.     Recent stroke on 9/14/2021  -Was hospitalized at [facility] and was in acute rehab there. Nonverbal   -Resumed prior to admission Plavix, Lipitor  -Patient's family don't want him to go back to rehab facility. They want to take him home with home therapy orders.   -Will need home PT/OT/RN/HHA on discharge.  consulted for discharge planning   -Per neurology note from his admission at [facility]: \"Dual antiplatelets for 1 month then back to plavix monotherapy as he was supposed to be on. It might be informative to check P2Y12 activity after a month of being on plavix only.\"  -aspirin discontinued as it has been 1 month since the stroke.  Continue Plavix.     Urine retention identified during recent hospitalization for stroke  -Indwelling Felix catheter was changed in the emergency department  -Outpatient urology follow-up.     Type 2 diabetes  -Prior to admission was on Levemir insulin 18 units at bedtime, NovoLog sliding scale insulin, Victoza, and Metformin  -Levemir and Metformin on hold due to n.p.o. status.     Hypercholesterolemia  -Statin     Hypertension  -Atenolol resumed.  Resumed amlodipine at a lower dose.     Weakness  Deconditioning  -PT and OT consulted     COVID-19 PCR testing was negative        DVT Prophylaxis: Heparin SQ  Code Status: Full Code  Expected discharge: 2-3 " days    Melecio Blevins MD  Text Page (7am - 6pm, M-F)    Interval History   Patient was evaluated with nursing staff. Overnight issues discussed.    Review of systems:   Patient was sitting up in a chair.  Alert.  Significant aphasia.  Unable to provide any meaningful history.  Does not appear to be in any distress.    -Data reviewed today: Labs and medications.    Physical Exam   Temp: 98.3  F (36.8  C) Temp src: Oral BP: 137/75 Pulse: 76   Resp: 18 SpO2: 97 % O2 Device: None (Room air)    Vitals:    10/16/21 0636 10/17/21 0322 10/18/21 0331   Weight: 60.9 kg (134 lb 3.2 oz) 58.8 kg (129 lb 11.2 oz) 59.3 kg (130 lb 12.8 oz)     Vital Signs with Ranges  Temp:  [97.6  F (36.4  C)-99  F (37.2  C)] 98.3  F (36.8  C)  Pulse:  [70-78] 76  Resp:  [16-18] 18  BP: (118-150)/(62-84) 137/75  SpO2:  [95 %-99 %] 97 %  I/O last 3 completed shifts:  In: 1173 [P.O.:50; I.V.:1123]  Out: 1325 [Urine:1325]    Constitutional: Awake, alert, no apparent distress  HEENT: Trachea midline, sclera is clear   Respiratory: No crackles. No wheezing. Equal breath sounds bilaterally.  Cardiovascular: Regular rate and rhythm, normal S1 and S2, and no murmur noted  GI: Normal bowel sounds, soft, non-distended, non-tender  Skin/Integumen: No rashes, no cyanosis    Medications     dextrose 5% and 0.45% NaCl + KCl 20 mEq/L 50 mL/hr at 10/19/21 0915       amantadine  100 mg Oral Daily     amLODIPine  5 mg Oral Daily     atenolol  25 mg Oral BID     atorvastatin  80 mg Oral Daily     cefTRIAXone  2 g Intravenous Q24H     clopidogrel  75 mg Oral Daily     doxazosin  1 mg Oral Daily     fluconazole  100 mg Intravenous Q24H     heparin ANTICOAGULANT  5,000 Units Subcutaneous BID     insulin aspart  1-7 Units Subcutaneous TID AC     insulin aspart  1-5 Units Subcutaneous At Bedtime     [Held by provider] insulin detemir  12 Units Subcutaneous At Bedtime     QUEtiapine  12.5 mg Oral At Bedtime     ramelteon  8 mg Oral At Bedtime     sennosides  1 tablet Oral  At Bedtime     sodium chloride (PF)  3 mL Intracatheter Q8H       Data   Recent Labs   Lab 10/19/21  1243 10/19/21  1209 10/19/21  0925 10/19/21  0802 10/19/21  0615 10/19/21  0156 10/18/21  0846 10/18/21  0631 10/17/21  1148 10/17/21  0819 10/16/21  0840 10/16/21  0706 10/14/21  0333 10/13/21  2842   WBC  --   --   --   --   --   --   --  6.7  --  7.4  --  7.7   < > 10.2   HGB  --   --   --   --   --   --   --  10.5*  --  10.8*  --  11.6*   < > 14.2   MCV  --   --   --   --   --   --   --  88  --  86  --  88   < > 88   PLT  --   --   --   --   --   --   --  192  --  197  --  186   < > 284   NA  --   --   --   --   --   --   --  137  --  136  --  137   < > 142   POTASSIUM 3.8  --   --   --  3.4  --   --  3.8   < > 3.5   < > 3.7   < > 4.1   CHLORIDE  --   --   --   --   --   --   --  106  --  104  --  105   < > 106   CO2  --   --   --   --   --   --   --  26  --  26  --  26   < > 29   BUN  --   --   --   --   --   --   --  17  --  11  --  16   < > 30   CR  --   --   --   --   --   --   --  1.33*  --  1.27*  --  1.39*   < > 1.53*   ANIONGAP  --   --   --   --   --   --   --  5  --  6  --  6   < > 7   ARLETH  --   --   --   --   --   --   --  8.5  --  8.2*  --  8.1*   < > 10.3*   GLC  --  137* 129* 75  --    < >   < > 231*   < > 200*   < > 204*   < > 83   ALBUMIN  --   --   --   --   --   --   --   --   --   --   --   --   --  3.3*   PROTTOTAL  --   --   --   --   --   --   --   --   --   --   --   --   --  9.7*   BILITOTAL  --   --   --   --   --   --   --   --   --   --   --   --   --  0.4   ALKPHOS  --   --   --   --   --   --   --   --   --   --   --   --   --  158*   ALT  --   --   --   --   --   --   --   --   --   --   --   --   --  19   AST  --   --   --   --   --   --   --   --   --   --   --   --   --  15   LIPASE  --   --   --   --   --   --   --   --   --   --   --   --   --  <10*   TROPONIN  --   --   --   --   --   --   --   --   --   --   --   --   --  <0.015    < > = values in this interval not displayed.        Recent Results (from the past 24 hour(s))   XR Chest Port 1 View    Narrative    XR CHEST PORT 1 VIEW   10/19/2021 10:55 AM     HISTORY: cough, aspiration risk    COMPARISON: 10/13/2021      Impression    IMPRESSION: No focal infiltrate or pleural effusion. Normal heart size  and pulmonary vascularity. Minimal aortic calcifications.    ELY TO MD         SYSTEM ID:  [id number]

## 2021-10-19 NOTE — PLAN OF CARE
Pt oriented to self, difficultly understanding whispers/incomprehensible words. VSS on RA. Denied pain, CP, SOB. BG 72, 129, 137. K+ replaced per protocol w/ recheck 3.8. PIV to right intact, infusing w/ D5 0.45% NS + K @ 50 ml/hr. Up to chair Ax2 GB mamadou steady. Maintains NPO for observed increased coughing w/ AM meds; awaiting speech eval once pt more alert. PRN robitussin given for cough w/ some improvement. Dgtr at bedside. Will continue POC.

## 2021-10-19 NOTE — PLAN OF CARE
"VSS on RA.  ZAYDA orientation- pt nonverbal, is able to say \"yes and no\" at times.  Denies pain.  LS dim.  No evidence of SOB or CP.  Felix patent, good UOP.  Pureed diet, good appetite-total feed.  , 167.  R PIV SL.  On IV rocephin.  Up A2 w/ mamadou steady.  Plan to discharge home w/ home care tbd.  Continue POC.    "

## 2021-10-19 NOTE — PLAN OF CARE
"Care from [id number]    Inpatient Progress Note:  For complete assessment see flow sheet documentation.    Vital signs:  Temp: 99  F (37.2  C) Temp src: Oral BP: 132/75 Pulse: 71   Resp: 16 SpO2: 97 % O2 Device: None (Room air)     Weight: 59.3 kg (130 lb 12.8 oz)  Estimated body mass index is 22.45 kg/m  as calculated from the following:    Height as of 9/17/21: 1.626 m (5' 4\").    Weight as of this encounter: 59.3 kg (130 lb 12.8 oz).  Orientation: Alert and orientated, but is non verbal. He does answer questions with shaking his head yes or no. Is slow to respond  Neuro: WNL  Pain status: Denies  Activity: Assist 2 with mamadou steady  Peripheral edema: None noted  Resp: Diminished, infreq cough  Cardiac: WNL  GI: Total feed  :  Chronic ordoñez- 350 ml output   LDA: Peripheral IV and Ordoñez cath  Infusions: NS locked  Pertinent Labs: K/Mag protocol, WNL for last 24 hrs. Recheck later today.  Diet: Pureed diet with slight  thickened liquid. Likes chewing on ice  Discharge Plan: Family wants to have pt discharge to home with home care. Discontinue next 1 to 2 days.    Will continue to monitor and provide cares.     Talisha Negron RN   "

## 2021-10-19 NOTE — PROVIDER NOTIFICATION
MD paged [phone]: Pt is NPO except meds but pt takes meds crushed in pudding. Are you alright with him taking his meds in pudding or did you want to see if there are IV alternatives? He only has his atenolol and senokot later tonight. Thank you!    Addendum: Per Dr. Blevins hold these PO meds for tonight.

## 2021-10-19 NOTE — PROVIDER NOTIFICATION
"MD paged: \"Pt is coughing more than yesterday, he seems very uncomfortable w/ cough. Can we try PRN cough med that I can either crush or he can drink? Also, AM BG 72, fyi\"     \"Correction: BG 75\"    Addendum [id number]: MD responded, placed orders for chest xray, NPO until speech eval, and D5 IVF to prevent BG drop.   "

## 2021-10-20 ENCOUNTER — APPOINTMENT (OUTPATIENT)
Dept: OCCUPATIONAL THERAPY | Facility: CLINIC | Age: 71
DRG: 871 | End: 2021-10-20
Payer: MEDICARE

## 2021-10-20 ENCOUNTER — APPOINTMENT (OUTPATIENT)
Dept: PHYSICAL THERAPY | Facility: CLINIC | Age: 71
DRG: 871 | End: 2021-10-20
Payer: MEDICARE

## 2021-10-20 ENCOUNTER — APPOINTMENT (OUTPATIENT)
Dept: SPEECH THERAPY | Facility: CLINIC | Age: 71
DRG: 871 | End: 2021-10-20
Payer: MEDICARE

## 2021-10-20 LAB
GLUCOSE BLDC GLUCOMTR-MCNC: 156 MG/DL (ref 70–99)
GLUCOSE BLDC GLUCOMTR-MCNC: 179 MG/DL (ref 70–99)
GLUCOSE BLDC GLUCOMTR-MCNC: 185 MG/DL (ref 70–99)
GLUCOSE BLDC GLUCOMTR-MCNC: 189 MG/DL (ref 70–99)
GLUCOSE BLDC GLUCOMTR-MCNC: 203 MG/DL (ref 70–99)
GLUCOSE BLDC GLUCOMTR-MCNC: 232 MG/DL (ref 70–99)
MAGNESIUM SERPL-MCNC: 1.9 MG/DL (ref 1.6–2.3)
POTASSIUM BLD-SCNC: 3.9 MMOL/L (ref 3.4–5.3)

## 2021-10-20 PROCEDURE — 84132 ASSAY OF SERUM POTASSIUM: CPT | Performed by: INTERNAL MEDICINE

## 2021-10-20 PROCEDURE — 83735 ASSAY OF MAGNESIUM: CPT | Performed by: INTERNAL MEDICINE

## 2021-10-20 PROCEDURE — 250N000011 HC RX IP 250 OP 636: Performed by: INTERNAL MEDICINE

## 2021-10-20 PROCEDURE — 258N000003 HC RX IP 258 OP 636: Performed by: INTERNAL MEDICINE

## 2021-10-20 PROCEDURE — 250N000013 HC RX MED GY IP 250 OP 250 PS 637: Performed by: INTERNAL MEDICINE

## 2021-10-20 PROCEDURE — 97530 THERAPEUTIC ACTIVITIES: CPT | Mod: GP

## 2021-10-20 PROCEDURE — 97110 THERAPEUTIC EXERCISES: CPT | Mod: GO

## 2021-10-20 PROCEDURE — 97110 THERAPEUTIC EXERCISES: CPT | Mod: GP

## 2021-10-20 PROCEDURE — 99232 SBSQ HOSP IP/OBS MODERATE 35: CPT | Performed by: INTERNAL MEDICINE

## 2021-10-20 PROCEDURE — 120N000001 HC R&B MED SURG/OB

## 2021-10-20 PROCEDURE — 92526 ORAL FUNCTION THERAPY: CPT | Mod: GN

## 2021-10-20 PROCEDURE — 36415 COLL VENOUS BLD VENIPUNCTURE: CPT | Performed by: INTERNAL MEDICINE

## 2021-10-20 RX ORDER — DOXAZOSIN 1 MG/1
2 TABLET ORAL DAILY
Status: DISCONTINUED | OUTPATIENT
Start: 2021-10-21 | End: 2021-11-04 | Stop reason: HOSPADM

## 2021-10-20 RX ORDER — SODIUM CHLORIDE, SODIUM LACTATE, POTASSIUM CHLORIDE, CALCIUM CHLORIDE 600; 310; 30; 20 MG/100ML; MG/100ML; MG/100ML; MG/100ML
INJECTION, SOLUTION INTRAVENOUS CONTINUOUS
Status: DISCONTINUED | OUTPATIENT
Start: 2021-10-20 | End: 2021-10-21

## 2021-10-20 RX ADMIN — CEFTRIAXONE 2 G: 2 INJECTION, POWDER, FOR SOLUTION INTRAMUSCULAR; INTRAVENOUS at 00:13

## 2021-10-20 RX ADMIN — SENNOSIDES 1 TABLET: 8.6 TABLET, FILM COATED ORAL at 22:00

## 2021-10-20 RX ADMIN — POTASSIUM CHLORIDE, DEXTROSE MONOHYDRATE AND SODIUM CHLORIDE: 150; 5; 450 INJECTION, SOLUTION INTRAVENOUS at 04:25

## 2021-10-20 RX ADMIN — AMANTADINE HYDROCHLORIDE 100 MG: 100 CAPSULE ORAL at 10:59

## 2021-10-20 RX ADMIN — CLOPIDOGREL BISULFATE 75 MG: 75 TABLET ORAL at 10:59

## 2021-10-20 RX ADMIN — HEPARIN SODIUM 5000 UNITS: 10000 INJECTION, SOLUTION INTRAVENOUS; SUBCUTANEOUS at 22:00

## 2021-10-20 RX ADMIN — ATENOLOL 25 MG: 25 TABLET ORAL at 22:00

## 2021-10-20 RX ADMIN — HEPARIN SODIUM 5000 UNITS: 10000 INJECTION, SOLUTION INTRAVENOUS; SUBCUTANEOUS at 11:00

## 2021-10-20 RX ADMIN — SODIUM CHLORIDE, POTASSIUM CHLORIDE, SODIUM LACTATE AND CALCIUM CHLORIDE: 600; 310; 30; 20 INJECTION, SOLUTION INTRAVENOUS at 16:41

## 2021-10-20 RX ADMIN — Medication 1 MG: at 23:36

## 2021-10-20 RX ADMIN — ATORVASTATIN CALCIUM 80 MG: 40 TABLET, FILM COATED ORAL at 10:59

## 2021-10-20 RX ADMIN — FLUCONAZOLE 100 MG: 2 INJECTION, SOLUTION INTRAVENOUS at 06:21

## 2021-10-20 RX ADMIN — DOXAZOSIN 1 MG: 1 TABLET ORAL at 11:00

## 2021-10-20 RX ADMIN — CEFTRIAXONE 2 G: 2 INJECTION, POWDER, FOR SOLUTION INTRAMUSCULAR; INTRAVENOUS at 23:36

## 2021-10-20 RX ADMIN — ATENOLOL 25 MG: 25 TABLET ORAL at 10:59

## 2021-10-20 RX ADMIN — AMLODIPINE BESYLATE 5 MG: 5 TABLET ORAL at 10:59

## 2021-10-20 ASSESSMENT — ACTIVITIES OF DAILY LIVING (ADL)
ADLS_ACUITY_SCORE: 32

## 2021-10-20 NOTE — PLAN OF CARE
VSS on RA. Pt is alert but nonverbal, unable to assess orientation. Pt has R sided weakness and deficit with upper and lower extremities. Cardiac WNL. Lung sounds are diminished with fine crackles in upper lobes. Pt has frequent, nonproductive cough. Pt is NPO except for meds and ice chips per MD. Speech to reassess pt on 10/20. Felix remain intact. No Insulin given this shift. D5W1/2NS and K+ running at 50mL/hr. Will continue plan of care.

## 2021-10-20 NOTE — PLAN OF CARE
VS elevated at times, on BP/HR control meds. Pt non verbal, nods yes or no to questions. NPO except for meds. Pt has difficulty swallowing meds. Meds need to be crushed and placed pudding or apple sauce, but pt still has delay in swallowing and needs encouragement. Speech following. PT/OT following. Up with A2  María steady. Previous hx of stroke Right sided deficits.  On IV Rocephin and IV Diflucan dc'd today.

## 2021-10-20 NOTE — PROGRESS NOTES
[facility]    Hospitalist Progress Note      Assessment & Plan   Jimmy Otto is a 71 year old male who was admitted on 10/13/2021.    Summary of Stay:   Jimmy Otto is a 71-year-old male with history of insulin-dependent type 2 diabetes, hypercholesterolemia, hypertension, TIA, erectile dysfunction, recent stroke, and urinary retention with indwelling Felix catheter placed about 2 and half weeks ago. 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  This left him nonverbal.  He discharged to the acute rehab unit at [facility] where he stayed from 9/17 through 10/6.  He returned home.  His family brought him in for evaluation of weakness, nausea, vomiting, decreased oral intake, difficulty swallowing, and generally doing poorly.  He had some fever the day before.  He had no cough or diarrhea.    In the ER, he was afebrile. His heart rate was in 90s.  Lab workup showed wbc 10.2 otherwise unremarkable CBC.  BMP showed creatinine 1.53 but was otherwise unremarkable. Lactic acid was 2.4 and came down to 2.2 after IV fluids.  Lipase and troponin were undetectable. Venous blood gas showed pH 7.39, PCO2 51, PO2 24, and oxygen saturations of 33%. Urinalysis showed 124 white blood cells, large leukocyte esterase, no nitrite, many bacteria, and white blood cell clumps. Testing for COVID-19 was negative. Chest x-ray was unremarkable. CT of head raise question of possible right sphenoid sinus mucosal thickening with small air-fluid level. Patient was given Rocephin for sepsis due to urinary tract infection. Exam suggested thrush. Patient was started on IV Ceftriaxone. He was admitted to the hospital for further management.     Plan:    Sepsis (leukocytosis, heart rate 90s, lactic acidosis)  Urinary tract infection  -IV ceftriaxone  -Urine culture growing E.coli(pansensitive)  -Blood culture negative     Thrush  -Treated with fluconazole.  -Continue magic mouthwash as needed for pain     Dysphagia  -Prior to  "admission was on modified diet due to recent stroke  -Patient was having trouble eating at home, likely related to acute illness.  -This is continues to be the main issue.  -After speech therapy evaluation and clinical improvement, diet was advanced.  But continued to have issues with oral intake.  Yesterday, concern of cough after trying to eat or drink.  Also appears to be somewhat somnolent.  -Patient was made n.p.o. again.  Speech therapy team is following.  Video swallow eval is anticipated.  -Overall far more awake and alert.  Quetiapine and ramelteon are being held.  -On amantadine which was started during last hospitalization for fatigue in the setting of stroke.  -Continue to hold quetiapine and ramelteon.  Consider antipsychotic or sedating medications only if absolutely necessary such as concern of patient safety.  They are not his chronic medications.  Probably started at the TCU or rehab center for delirium.     Recent stroke on 9/14/2021  -Was hospitalized at [facility] and was in acute rehab there. Nonverbal   -Resumed prior to admission Plavix, Lipitor  -Patient's family don't want him to go back to rehab facility. They want to take him home with home therapy orders.   -Will need home PT/OT/RN/HHA on discharge.  consulted for discharge planning   -Per neurology note from his admission at [facility]: \"Dual antiplatelets for 1 month then back to plavix monotherapy as he was supposed to be on. It might be informative to check P2Y12 activity after a month of being on plavix only.\"  -aspirin discontinued as it has been 1 month since the stroke.  Continue Plavix.     Urine retention identified during recent hospitalization for stroke  -Indwelling Felix catheter was changed in the emergency department  -Outpatient urology follow-up.  -On doxazosin for BPH.  Increase doxazosin.  Monitor blood pressure.     Type 2 diabetes  -Prior to admission was on Levemir insulin 18 units at " bedtime, NovoLog sliding scale insulin, Victoza, and Metformin  -Levemir and Metformin on hold due to n.p.o. status.     Hypercholesterolemia  -Statin     Hypertension  -Atenolol resumed.  Resumed amlodipine at a lower dose.     Weakness  Deconditioning  -PT and OT consulted     COVID-19 PCR testing was negative        DVT Prophylaxis: Heparin SQ  Code Status: Full Code  Expected discharge: Uncertain.  The plan is to discharge home with home health and home care.  Apparently family has a lot of medical equipments to help him.  Family does not want him to go back to TCU.    Melecio Blevins MD  Text Page (7am - 6pm, M-F)    Interval History   Patient was evaluated with nursing staff. Overnight issues discussed.    Review of systems:   Unable to obtain.  Sitting in the chair and watching TV.  Fully alert.    -Data reviewed today: Labs and medications.    Physical Exam   Temp: 98.2  F (36.8  C) Temp src: Oral BP: (!) 147/75 Pulse: 75   Resp: 20 SpO2: 94 % O2 Device: None (Room air)    Vitals:    10/16/21 0636 10/17/21 0322 10/18/21 0331   Weight: 60.9 kg (134 lb 3.2 oz) 58.8 kg (129 lb 11.2 oz) 59.3 kg (130 lb 12.8 oz)     Vital Signs with Ranges  Temp:  [97.5  F (36.4  C)-98.2  F (36.8  C)] 98.2  F (36.8  C)  Pulse:  [68-75] 75  Resp:  [18-20] 20  BP: (122-147)/(61-76) 147/75  SpO2:  [94 %-99 %] 94 %  I/O last 3 completed shifts:  In: 712 [I.V.:712]  Out: 1975 [Urine:1975]    Constitutional: Awake, alert, no apparent distress  HEENT: Trachea midline, sclera is clear   Respiratory: No crackles. No wheezing. Equal breath sounds bilaterally.  Cardiovascular: Regular rate and rhythm, normal S1 and S2, and no murmur noted  GI: Normal bowel sounds, soft, non-distended, non-tender  Skin/Integumen: No rashes, no cyanosis      Medications     dextrose 5% and 0.45% NaCl + KCl 20 mEq/L 50 mL/hr at 10/20/21 0425       amantadine  100 mg Oral Daily     amLODIPine  5 mg Oral Daily     atenolol  25 mg Oral BID     atorvastatin  80 mg  Oral Daily     cefTRIAXone  2 g Intravenous Q24H     clopidogrel  75 mg Oral Daily     [START ON 10/21/2021] doxazosin  2 mg Oral Daily     fluconazole  100 mg Intravenous Q24H     heparin ANTICOAGULANT  5,000 Units Subcutaneous BID     insulin aspart  1-7 Units Subcutaneous TID AC     insulin aspart  1-5 Units Subcutaneous At Bedtime     [Held by provider] insulin detemir  12 Units Subcutaneous At Bedtime     [Held by provider] QUEtiapine  12.5 mg Oral At Bedtime     [Held by provider] ramelteon  8 mg Oral At Bedtime     sennosides  1 tablet Oral At Bedtime     sodium chloride (PF)  3 mL Intracatheter Q8H       Data   Recent Labs   Lab 10/20/21  1200 10/20/21  0801 10/20/21  0607 10/20/21  0546 10/20/21  0128 10/19/21  1627 10/19/21  1243 10/19/21  0802 10/19/21  0615 10/18/21  0846 10/18/21  0631 10/17/21  1148 10/17/21  0819 10/16/21  0840 10/16/21  0706 10/14/21  0333 10/13/21  2152   WBC  --   --   --   --   --   --   --   --   --   --  6.7  --  7.4  --  7.7   < > 10.2   HGB  --   --   --   --   --   --   --   --   --   --  10.5*  --  10.8*  --  11.6*   < > 14.2   MCV  --   --   --   --   --   --   --   --   --   --  88  --  86  --  88   < > 88   PLT  --   --   --   --   --   --   --   --   --   --  192  --  197  --  186   < > 284   NA  --   --   --   --   --   --   --   --   --   --  137  --  136  --  137   < > 142   POTASSIUM  --   --   --  3.9  --   --  3.8  --  3.4   < > 3.8   < > 3.5   < > 3.7   < > 4.1   CHLORIDE  --   --   --   --   --   --   --   --   --   --  106  --  104  --  105   < > 106   CO2  --   --   --   --   --   --   --   --   --   --  26  --  26  --  26   < > 29   BUN  --   --   --   --   --   --   --   --   --   --  17  --  11  --  16   < > 30   CR  --   --   --   --   --   --   --   --   --   --  1.33*  --  1.27*  --  1.39*   < > 1.53*   ANIONGAP  --   --   --   --   --   --   --   --   --   --  5  --  6  --  6   < > 7   ARLETH  --   --   --   --   --   --   --   --   --   --  8.5  --  8.2*   --  8.1*   < > 10.3*   * 189* 185*  --    < >   < >  --    < >  --    < > 231*   < > 200*   < > 204*   < > 83   ALBUMIN  --   --   --   --   --   --   --   --   --   --   --   --   --   --   --   --  3.3*   PROTTOTAL  --   --   --   --   --   --   --   --   --   --   --   --   --   --   --   --  9.7*   BILITOTAL  --   --   --   --   --   --   --   --   --   --   --   --   --   --   --   --  0.4   ALKPHOS  --   --   --   --   --   --   --   --   --   --   --   --   --   --   --   --  158*   ALT  --   --   --   --   --   --   --   --   --   --   --   --   --   --   --   --  19   AST  --   --   --   --   --   --   --   --   --   --   --   --   --   --   --   --  15   LIPASE  --   --   --   --   --   --   --   --   --   --   --   --   --   --   --   --  <10*   TROPONIN  --   --   --   --   --   --   --   --   --   --   --   --   --   --   --   --  <0.015    < > = values in this interval not displayed.       No results found for this or any previous visit (from the past 24 hour(s)).

## 2021-10-20 NOTE — PLAN OF CARE
/73 (BP Location: Left arm)   Pulse 69   Temp 97.8  F (36.6  C) (Oral)   Resp 18   Wt 59.3 kg (130 lb 12.8 oz)   SpO2 96%   BMI 22.45 kg/m        VSS. Alert but nonverbal. . Fluids infusing @ 50ml/hr. Felix in place. NPO except ice chips pending speech eval. A x2 w/ sera steady. Will continue POC.

## 2021-10-21 ENCOUNTER — APPOINTMENT (OUTPATIENT)
Dept: SPEECH THERAPY | Facility: CLINIC | Age: 71
DRG: 871 | End: 2021-10-21
Payer: MEDICARE

## 2021-10-21 ENCOUNTER — APPOINTMENT (OUTPATIENT)
Dept: GENERAL RADIOLOGY | Facility: CLINIC | Age: 71
DRG: 871 | End: 2021-10-21
Attending: INTERNAL MEDICINE
Payer: MEDICARE

## 2021-10-21 LAB
GLUCOSE BLDC GLUCOMTR-MCNC: 165 MG/DL (ref 70–99)
GLUCOSE BLDC GLUCOMTR-MCNC: 166 MG/DL (ref 70–99)
GLUCOSE BLDC GLUCOMTR-MCNC: 217 MG/DL (ref 70–99)
GLUCOSE BLDC GLUCOMTR-MCNC: 236 MG/DL (ref 70–99)
GLUCOSE BLDC GLUCOMTR-MCNC: 243 MG/DL (ref 70–99)
GLUCOSE BLDC GLUCOMTR-MCNC: 243 MG/DL (ref 70–99)
MAGNESIUM SERPL-MCNC: 1.7 MG/DL (ref 1.6–2.3)
PLATELET # BLD AUTO: 261 10E3/UL (ref 150–450)
POTASSIUM BLD-SCNC: 3.8 MMOL/L (ref 3.4–5.3)
SARS-COV-2 RNA RESP QL NAA+PROBE: NEGATIVE

## 2021-10-21 PROCEDURE — 83735 ASSAY OF MAGNESIUM: CPT | Performed by: INTERNAL MEDICINE

## 2021-10-21 PROCEDURE — 250N000013 HC RX MED GY IP 250 OP 250 PS 637: Performed by: INTERNAL MEDICINE

## 2021-10-21 PROCEDURE — 250N000011 HC RX IP 250 OP 636: Performed by: INTERNAL MEDICINE

## 2021-10-21 PROCEDURE — 92526 ORAL FUNCTION THERAPY: CPT | Mod: GN

## 2021-10-21 PROCEDURE — 36415 COLL VENOUS BLD VENIPUNCTURE: CPT | Performed by: INTERNAL MEDICINE

## 2021-10-21 PROCEDURE — 74230 X-RAY XM SWLNG FUNCJ C+: CPT

## 2021-10-21 PROCEDURE — 85049 AUTOMATED PLATELET COUNT: CPT | Performed by: INTERNAL MEDICINE

## 2021-10-21 PROCEDURE — 87635 SARS-COV-2 COVID-19 AMP PRB: CPT | Performed by: INTERNAL MEDICINE

## 2021-10-21 PROCEDURE — 99232 SBSQ HOSP IP/OBS MODERATE 35: CPT | Performed by: INTERNAL MEDICINE

## 2021-10-21 PROCEDURE — 120N000001 HC R&B MED SURG/OB

## 2021-10-21 PROCEDURE — 92611 MOTION FLUOROSCOPY/SWALLOW: CPT | Mod: GN

## 2021-10-21 PROCEDURE — 258N000003 HC RX IP 258 OP 636: Performed by: INTERNAL MEDICINE

## 2021-10-21 PROCEDURE — 84132 ASSAY OF SERUM POTASSIUM: CPT | Performed by: INTERNAL MEDICINE

## 2021-10-21 RX ORDER — BARIUM SULFATE 400 MG/ML
SUSPENSION ORAL ONCE
Status: COMPLETED | OUTPATIENT
Start: 2021-10-21 | End: 2021-10-21

## 2021-10-21 RX ADMIN — SENNOSIDES 1 TABLET: 8.6 TABLET, FILM COATED ORAL at 21:54

## 2021-10-21 RX ADMIN — AMLODIPINE BESYLATE 5 MG: 5 TABLET ORAL at 10:01

## 2021-10-21 RX ADMIN — HEPARIN SODIUM 5000 UNITS: 10000 INJECTION, SOLUTION INTRAVENOUS; SUBCUTANEOUS at 21:54

## 2021-10-21 RX ADMIN — ATORVASTATIN CALCIUM 80 MG: 40 TABLET, FILM COATED ORAL at 09:56

## 2021-10-21 RX ADMIN — CLOPIDOGREL BISULFATE 75 MG: 75 TABLET ORAL at 10:00

## 2021-10-21 RX ADMIN — AMANTADINE HYDROCHLORIDE 100 MG: 100 CAPSULE ORAL at 10:00

## 2021-10-21 RX ADMIN — ATENOLOL 25 MG: 25 TABLET ORAL at 10:01

## 2021-10-21 RX ADMIN — BARIUM SULFATE 10 ML: 400 SUSPENSION ORAL at 11:13

## 2021-10-21 RX ADMIN — SODIUM CHLORIDE, POTASSIUM CHLORIDE, SODIUM LACTATE AND CALCIUM CHLORIDE: 600; 310; 30; 20 INJECTION, SOLUTION INTRAVENOUS at 14:16

## 2021-10-21 RX ADMIN — ATENOLOL 25 MG: 25 TABLET ORAL at 21:54

## 2021-10-21 RX ADMIN — HEPARIN SODIUM 5000 UNITS: 10000 INJECTION, SOLUTION INTRAVENOUS; SUBCUTANEOUS at 10:26

## 2021-10-21 RX ADMIN — DOXAZOSIN 2 MG: 1 TABLET ORAL at 09:57

## 2021-10-21 ASSESSMENT — ACTIVITIES OF DAILY LIVING (ADL)
ADLS_ACUITY_SCORE: 30
ADLS_ACUITY_SCORE: 32
ADLS_ACUITY_SCORE: 32
ADLS_ACUITY_SCORE: 30
ADLS_ACUITY_SCORE: 32
ADLS_ACUITY_SCORE: 30
ADLS_ACUITY_SCORE: 32
ADLS_ACUITY_SCORE: 30
ADLS_ACUITY_SCORE: 32
ADLS_ACUITY_SCORE: 30
ADLS_ACUITY_SCORE: 32
ADLS_ACUITY_SCORE: 30
ADLS_ACUITY_SCORE: 32
ADLS_ACUITY_SCORE: 30

## 2021-10-21 NOTE — PLAN OF CARE
[id number]    Non verbal. VSS. IVF discontinued. Covid swab done, pt is negative. Trial for pureed diet level 4 with thickened liquids. , corrective insulin given. Denies pain.Will continue POC    Rodolfo Hawk RN on 10/21/2021 at 6:43 PM

## 2021-10-21 NOTE — PLAN OF CARE
BP (!) 143/79 (BP Location: Left arm)   Pulse 72   Temp 98.4  F (36.9  C) (Oral)   Resp 20   Wt 59.3 kg (130 lb 12.8 oz)   SpO2 97%   BMI 22.45 kg/m        VSS. Non verbal, can respond to yes or no questions. IV rocephin given this shift. LR infusing @ 50ml/hr. Felix in place with good UOP. NPO except meds- needs to be crushed in applesauce or pudding. A2 mamadou steady. B. R sided weakness from stroke. Will continue POC.

## 2021-10-21 NOTE — PLAN OF CARE
VSS afebrile. Was on IV Diflucan and Rocephin. On IVF's. NPO for speech today. Continues to have difficulty swallowing. Speech following. Meds crushed given applesauce or pudding. Pt likes Ice cream.  Had Video swallow done, see results. Plan for Trial Pureed diet level 4 with thick liq via tsp. Monitoring BG's on sliding scale. Up with A2 gait belt and mamadou steady. PT/OT following.

## 2021-10-21 NOTE — PROGRESS NOTES
Addendum: swallow tx session completed this afternoon, following the below VFSS. See flowsheet for full treatment details - improvements in swallow function with items that pt enjoys (ice cream, pudding, juice, water).  Recommendations: cautious re-intiation of puree solids, mildly thick liquids via tsp only - 1:1 assist, verify each swallow, allow extra time for pt to swallow two times per bite/sip. HOLD PO if any increased coughing/difficulty/decline in respiratory status. If concerns arise or pt unable to maintain nutrition orally, may want to consider discussions re: non-oral means of nutrition.     Video Fluoroscopic Swallow Study (VFSS)     10/21/21 1549   General Information   Onset of Illness/Injury or Date of Surgery 10/13/21   Referring Physician Dr. Blevins   Patient/Family Therapy Goal Statement (SLP) Did not state, nonverbal - thumbs up (yes) to agreeing to this test   Pertinent History of Current Problem   Jimmy Otto is a 71-year-old male with history of insulin-dependent type 2 diabetes, hypercholesterolemia, hypertension, TIA, erectile dysfunction, recent stroke, and urinary retention with indwelling Felix catheter placed about 2 and half weeks ago. 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  This left him nonverbal.  He discharged to the acute rehab unit at [facility] where he stayed from 9/17 through 10/6.  He returned home.  His family brought him in for evaluation of weakness, nausea, vomiting, decreased oral intake, difficulty swallowing, and generally doing poorly.  He had some fever the day before.  He had no cough or diarrhea.  Admitted with sepsis, UTI, thrush, dysphagia.      General Observations Pt fully alert, upright    Past History of Dysphagia   Video swallow completed at [facility]U on 9/24 - no aspiration occured. Penetration with thin liquids. Discharge recs as follows - Pt is now tolerating puree texture (4), slightly thick liquids (1). Free Water Protocol also implemented with small  single sips thin water only, pt tolerating this well. Daughter purchased Provale cup to ensure safe amount of bolus (5 ml) offered at single instance.      Type of Evaluation   Type of Evaluation Swallow Evaluation   VFSS Evaluation   Radiologist Dr. Loyola   Views Taken left lateral   Physical Location of Procedure [facility], Radiology Dept, Fluoroscopy Suite   VFSS Textures Trialed thin liquids;mildly thick liquids   VFSS Eval: Thin Liquid Texture Trial   Mode of Presentation, Thin Liquid spoon;cup   Order of Presentation majority not saved by radiologist given no swallows   Oral Phase, Thin Liquid Poor AP movement   Diagnostic Statement severe oral holding with little-no attempt at anterior to posterior transit, no pharyngeal swallows achieved, pt expectorating 100% of boluses   VFSS Eval: Mildly Thick Liquids   Mode of Presentation spoon;cup   Order of Presentation majority not saved by radiologist given no swallows   Oral Phase Poor AP movement   Diagnostic Statement severe oral holding with little-no attempt at anterior to posterior transit, no pharyngeal swallows achieved, pt expectorating 100% of boluses   Swallowing Recommendations   Diet Consistency Recommendations NPO   General Therapy Interventions   Planned Therapy Interventions Dysphagia Treatment   SLP Therapy Assessment/Plan   Criteria for Skilled Therapeutic Interventions Met (SLP Eval) yes;treatment indicated   SLP Diagnosis moderate dysphagia   Rehab Potential (SLP Eval) good, to achieve stated therapy goals   Therapy Frequency (SLP Eval) daily   Predicted Duration of Therapy Intervention (SLP Eval) 1-2 weeks   Comment, Therapy Assessment/Plan (SLP)   Video fluoroscopic swallow study. Pt fully alert in fluoro chair. Trials of thin and mildly thick liquids via tsp/cup completed; attempted straw but pt unable to suck up from same. Oral acceptance and labial seal for tsp/cup WNL. Pt initially pumping liquids in oral cavity  though eventually holding bolus in anterior portion of mouth with no attempts at posterior propulsion despite max verbal/ tactile cues, including presenting next tsp to try to elicit swallow response. Pt pointing at throat during evaluation though given nonverbal status unable to determine why (?pain 2/2 thrush vs increased difficulty swallowing compared to baseline/prior VFSS). Pt expectorated 100% of boluses and no pharyngeal swallows with PO bolus observed - interestingly pt did swallow x1 on fluoroscopy of only saliva WHILE liquid bolus was being held in oral cavity.     Pt noted to oral hold specifically with medications this AM with RN as well - ? oral holding related to taste adversions (bitter crushed meds, barium). Will continue to follow.     Briefly discussed with hospitalist - SLP to plan to re-assess at bedside later this date with flavors/items pt is known to love (ice cream, orange juice) with plan for either PO initiation vs discussion re: non oral means of nutrition with pt/family.      Therapy Plan Review/Discharge Plan (SLP)   Therapy Plan Review (SLP) evaluation/treatment results reviewed;patient   SLP Discharge Planning    SLP Discharge Recommendation (DC Rec) home with assist;home with home care speech therapy;Transitional Care Facility   SLP Rationale for DC Rec Dysphagia, below baseline    SLP Brief overview of current status  Recommend NPO status with frequent oral cares. Necessary oral meds crushed in puree and small amount of mildly thick liquids for comfort. Only provide these items when pt is fully alert and seated upright with feeding assistance/supervision    Total Evaluation Time   Total Evaluation Time (Minutes) 10

## 2021-10-21 NOTE — PROGRESS NOTES
[facility]    Hospitalist Progress Note      Assessment & Plan   Jimmy Otto is a 71 year old male who was admitted on 10/13/2021.    Summary of Stay:     Jimmy Otto is a 71-year-old male with history of insulin-dependent type 2 diabetes, hypercholesterolemia, hypertension, TIA, erectile dysfunction, recent stroke, and urinary retention with indwelling Felix catheter placed about 2 and half weeks ago. 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  This left him nonverbal.  He discharged to the acute rehab unit at [facility] where he stayed from 9/17 through 10/6.  He returned home.  His family brought him in for evaluation of weakness, nausea, vomiting, decreased oral intake, difficulty swallowing, and generally doing poorly.  He had some fever the day before.  He had no cough or diarrhea.    In the ER, he was afebrile. His heart rate was in 90s.  Lab workup showed wbc 10.2 otherwise unremarkable CBC.  BMP showed creatinine 1.53 but was otherwise unremarkable. Lactic acid was 2.4 and came down to 2.2 after IV fluids.  Lipase and troponin were undetectable. Venous blood gas showed pH 7.39, PCO2 51, PO2 24, and oxygen saturations of 33%. Urinalysis showed 124 white blood cells, large leukocyte esterase, no nitrite, many bacteria, and white blood cell clumps. Testing for COVID-19 was negative. Chest x-ray was unremarkable. CT of head raise question of possible right sphenoid sinus mucosal thickening with small air-fluid level. Patient was given Rocephin for sepsis due to urinary tract infection. Exam suggested thrush. Patient was started on IV Ceftriaxone. He was admitted to the hospital for further management.     Plan:    Sepsis (leukocytosis, heart rate 90s, lactic acidosis)  Urinary tract infection  -IV ceftriaxone, completed 7 days.  -Urine culture growing E.coli (pansensitive)  -Blood culture negative     Thrush  -Treated with fluconazole-completed.  -Continue magic mouthwash as needed for  "pain     Dysphagia  -Currently, this appears to be the main issue.  -Prior to admission was on modified diet due to recent stroke  -Patient was having trouble eating at home, likely related to acute illness.  -This is continues to be the main issue.  -After speech therapy evaluation and clinical improvement, diet was advanced.  But continued to have issues with oral intake.    2 days ago, concern of cough after trying to eat or drink.  Also appears to be somnolent.  -Patient was made n.p.o. again.  Speech therapy team is following.  -After stopping quetiapine and ramelteon, mental status has improved.  -Video swallow was attempted today but was not successful.  Patient was not able to clearly follow commands and he was keeping the food in his mouth.  Later on, speech pathologist did evaluation again and the patient was able to swallow liquids.  -Patient is being started on modified diet again.  Continue close monitoring for possible aspiration.  -On amantadine which was started during last hospitalization for fatigue in the setting of stroke.  -Continue to hold quetiapine and ramelteon.  Consider antipsychotic or sedating medications only if absolutely necessary such as concern of patient safety.  They are not his chronic medications.  Probably started at the TCU or rehab center for delirium.     Recent stroke on 9/14/2021  -Was hospitalized at [facility] and was in acute rehab there. Nonverbal   -On Plavix, Lipitor.  At admission, he was on aspirin plus Plavix.  Aspirin has been stopped, per neurology recommendations.  -Patient's family don't want him to go back to rehab facility. They want to take him home with home therapy orders.   -Will need home PT/OT/RN/HHA on discharge.  consulted for discharge planning   -Per neurology note from his admission at [facility]: \"Dual antiplatelets for 1 month then back to plavix monotherapy as he was supposed to be on. It might be informative to " "check P2Y12 activity after a month of being on plavix only.\"  -aspirin discontinued as it has been 1 month since the stroke.  Continue Plavix.     Urine retention identified during recent hospitalization for stroke  -Indwelling Felix catheter was changed in the emergency department  -Outpatient urology follow-up.  -On doxazosin for BPH.  doxazosin increased.  Monitor blood pressure.     Type 2 diabetes  -Prior to admission was on Levemir insulin 18 units at bedtime, NovoLog sliding scale insulin, Victoza, and Metformin  -Resume Levemir at a lower dose.     Hypercholesterolemia  -Statin     Hypertension  -Atenolol resumed.  Resumed amlodipine at a lower dose.     Weakness  Deconditioning  -PT and OT consulted     COVID-19 PCR testing was negative        DVT Prophylaxis: Heparin SQ  Code Status: Full Code  Expected discharge: Uncertain.  The plan is to discharge home with home health and home care.  Apparently family has a lot of medical equipments to help him.  Family does not want him to go back to TCU.    Melecio Blevins MD  Text Page (7am - 6pm, M-F)    Interval History   Patient was evaluated with nursing staff. Overnight issues discussed.    Review of systems:   Unable to obtain a meaningful review of systems.    -Data reviewed today: Labs and medications.    Physical Exam   Temp: 98.4  F (36.9  C) Temp src: Axillary BP: 135/76 Pulse: 72   Resp: 18 SpO2: 97 % O2 Device: None (Room air)    Vitals:    10/16/21 0636 10/17/21 0322 10/18/21 0331   Weight: 60.9 kg (134 lb 3.2 oz) 58.8 kg (129 lb 11.2 oz) 59.3 kg (130 lb 12.8 oz)     Vital Signs with Ranges  Temp:  [98.3  F (36.8  C)-98.4  F (36.9  C)] 98.4  F (36.9  C)  Pulse:  [70-82] 72  Resp:  [18-20] 18  BP: (123-143)/(69-79) 135/76  SpO2:  [96 %-97 %] 97 %  I/O last 3 completed shifts:  In: -   Out: 1900 [Urine:1900]    Constitutional: Awake, alert, cooperative, no apparent distress, nonverbal.  HEENT: Trachea midline, sclera is clear   Respiratory: No crackles. No " wheezing. Equal breath sounds bilaterally.  Cardiovascular: Regular rate and rhythm, normal S1 and S2, and no murmur noted  GI: Normal bowel sounds, soft, non-distended, non-tender  Extremities: No pitting edema     Medications     lactated ringers 50 mL/hr at 10/21/21 1416       amantadine  100 mg Oral Daily     amLODIPine  5 mg Oral Daily     atenolol  25 mg Oral BID     atorvastatin  80 mg Oral Daily     clopidogrel  75 mg Oral Daily     doxazosin  2 mg Oral Daily     heparin ANTICOAGULANT  5,000 Units Subcutaneous BID     insulin aspart  1-7 Units Subcutaneous TID AC     insulin aspart  1-5 Units Subcutaneous At Bedtime     [Held by provider] insulin detemir  12 Units Subcutaneous At Bedtime     sennosides  1 tablet Oral At Bedtime     sodium chloride (PF)  3 mL Intracatheter Q8H       Data   Recent Labs   Lab 10/21/21  1400 10/21/21  1213 10/21/21  0803 10/21/21  0606 10/21/21  0131 10/20/21  0607 10/20/21  0546 10/19/21  1627 10/19/21  1243 10/18/21  0846 10/18/21  0631 10/17/21  1148 10/17/21  0819 10/16/21  0840 10/16/21  0706   WBC  --   --   --   --   --   --   --   --   --   --  6.7  --  7.4  --  7.7   HGB  --   --   --   --   --   --   --   --   --   --  10.5*  --  10.8*  --  11.6*   MCV  --   --   --   --   --   --   --   --   --   --  88  --  86  --  88   PLT  --   --   --  261  --   --   --   --   --   --  192  --  197   < > 186   NA  --   --   --   --   --   --   --   --   --   --  137  --  136  --  137   POTASSIUM  --   --   --  3.8  --   --  3.9  --  3.8   < > 3.8   < > 3.5   < > 3.7   CHLORIDE  --   --   --   --   --   --   --   --   --   --  106  --  104  --  105   CO2  --   --   --   --   --   --   --   --   --   --  26  --  26  --  26   BUN  --   --   --   --   --   --   --   --   --   --  17  --  11  --  16   CR  --   --   --   --   --   --   --   --   --   --  1.33*  --  1.27*  --  1.39*   ANIONGAP  --   --   --   --   --   --   --   --   --   --  5  --  6  --  6   ARLETH  --   --   --   --   --    --   --   --   --   --  8.5  --  8.2*  --  8.1*   * 217* 165*  --    < >   < >  --    < >  --    < > 231*   < > 200*   < > 204*    < > = values in this interval not displayed.       Recent Results (from the past 24 hour(s))   XR Video Swallow with SLP or OT    Narrative    VIDEO SWALLOW WITH SLP OR OT   10/21/2021 11:35 AM     HISTORY: Dysphagia.    COMPARISON: None.    FINDINGS:  A swallow study was performed in coordination with a member  from the speech pathology service. Total fluoroscopy time was 1.9  minutes. 1 spot fluoroscopic image, and/or cine clip was obtained. 1  view in lateral projection. Various consistencies of barium were given  to the patient to swallow, and the swallowing mechanism was observed  using fluoroscopy.    The patient was unable to swallow any of the barium.      Impression    IMPRESSION:  1. The patient was unable to swallow.  2. Please refer to the speech pathology report for further details.    This study includes only the cervical esophagus.    DELFINO LYNN MD         SYSTEM ID:  [id number]

## 2021-10-21 NOTE — PROGRESS NOTES
CLINICAL NUTRITION SERVICES - REASSESSMENT NOTE    Future/Additional Recommendations:   Pending outcome of video swallow study, nutrition support? Nasoenteric FT vs G tube pending anticipated recovery period?   Malnutrition:   % Weight Loss:  > 7.5% in 3 months (severe malnutrition)  % Intake:  </= 50% for >/= 5 days (severe malnutrition)  Subcutaneous Fat Loss: moderate depletion, as outlined 10/16  Muscle Loss: moderate depletion, as outlined 10/16  Fluid Retention:  None noted     Malnutrition Diagnosis: Severe malnutrition  In Context of: Acute on Chronic illness or disease     EVALUATION OF PROGRESS TOWARD GOALS/NEW FINDINGS   Progress towards goals will be monitored and evaluated per protocol and Practice Guidelines    Diet order: NPO per SLP  NPO x 3 days. Pureed diet, thickened liquids advanced 10/16 with intake of 25-75% for documented meals. NPO since 10/19 per SLP   Video swallow planned today      BM: none since admit    Skin: red, blanchable.    Fluid: no edema charted    Weight: 59 kg - relatively stable since admit +/-2 kg    Rohit nutrition score: 2; total score: 15    I/O last 3 completed shifts:    In: -     Out: 1950 [Urine:1950]    Labs:    Electrolytes  Potassium (mmol/L)   Date Value   10/21/2021 3.8   10/20/2021 3.9   10/19/2021 3.8   08/16/2012 4.0   08/13/2012 3.6        Blood Glucose  Glucose (mg/dL)   Date Value   10/18/2021 231 (H)   10/17/2021 200 (H)   10/16/2021 204 (H)     GLUCOSE BY METER POCT (mg/dL)   Date Value   10/21/2021 165 (H)   10/21/2021 166 (H)   10/20/2021 179 (H)     Hemoglobin A1C (%)   Date Value   09/15/2021 11.5 (H)   08/14/2012 7.6 (H)        Inflammatory Markers  CRP Inflammation (mg/L)   Date Value   09/19/2021 28.0 (H)     WBC Count (10e3/uL)   Date Value   10/18/2021 6.7   10/17/2021 7.4   10/16/2021 7.7     Albumin (g/dL)   Date Value   10/13/2021 3.3 (L)   09/15/2021 2.6 (L)        Magnesium (mg/dL)   Date Value   10/21/2021 1.7   10/20/2021 1.9   10/19/2021  1.8     Sodium (mmol/L)   Date Value   10/18/2021 137   10/17/2021 136   10/16/2021 137   08/16/2012 140   08/13/2012 141        Renal  Urea Nitrogen (mg/dL)   Date Value   10/18/2021 17   10/17/2021 11   10/16/2021 16   08/16/2012 18   08/13/2012 14     Creatinine (mg/dL)   Date Value   10/18/2021 1.33 (H)   10/17/2021 1.27 (H)   10/16/2021 1.39 (H)   08/16/2012 0.96   08/13/2012 0.91         Additional  Triglycerides (mg/dL)   Date Value   09/15/2021 238 (H)   08/14/2012 136     Ketones Urine (mg/dL)   Date Value   10/13/2021 Negative        Meds:    amantadine  100 mg Oral Daily     amLODIPine  5 mg Oral Daily     atenolol  25 mg Oral BID     atorvastatin  80 mg Oral Daily     clopidogrel  75 mg Oral Daily     doxazosin  2 mg Oral Daily     heparin ANTICOAGULANT  5,000 Units Subcutaneous BID     insulin aspart  1-7 Units Subcutaneous TID AC     insulin aspart  1-5 Units Subcutaneous At Bedtime     [Held by provider] insulin detemir  12 Units Subcutaneous At Bedtime     sennosides  1 tablet Oral At Bedtime     sodium chloride (PF)  3 mL Intracatheter Q8H        lactated ringers 50 mL/hr at 10/20/21 1641      ASSESSED NUTRITION NEEDS (PER APPROVED PRACTICE GUIDELINES; DW: 59 kg):  Estimated Energy Needs: 30-35+ Kcal per kg  Justification: prolonged NPO, maintenance while hospitalized   Estimated Protein Needs: 1.2-1.5+ g pro/Kg  Justification: preservation of lean body mass  Estimated Fluid Needs: >1 mL/kcal     Previous Goals:   Diet to advance > Full Liquids within 48 hours vs nutrition support initiation   Evaluation: Met initially, now not met    Previous Nutrition Diagnosis:   Inadequate oral intake related to decreased appetite, difficulty/pain with swallowing, altered mentation 2/2 recent stroke, oral thrush as evidenced by minimal PO intake x 5 days with PO intake down from baseline over the past month, 12% wt loss over the past 3 months and mild to moderate fat and muscle wasting noted on visual exam    Evaluation: Ongoing, updated below    CURRENT NUTRITION DIAGNOSIS  Inadequate oral intake related to decreased appetite, difficulty/pain with swallowing, altered mentation 2/2 recent stroke, oral thrush as evidenced by minimal PO intake since admit with PO intake down from baseline over the past month, 12% wt loss over the past 3 months and  fat + muscle loss    Swallowing difficulty related to dysphagia s/p stroke as evidenced by NPO per SLP    INTERVENTIONS  Recommendations / Nutrition Prescription  Diet consistency/texture per SLP + Diet appropriate oral nutrition supplements to increase protein and calorie intake  Pending outcome of video swallow study, nutrition support? Nasoenteric FT vs G tube pending anticipated recovery period?    Implementation  Collaboration and Referral of care: Discussed patient during interdisciplinary care rounds this morning and with Dr. Blevins    Goals  Diet >/=full liquid within 24-48 hours    MONITORING AND EVALUATION:  Progress towards goals will be monitored and evaluated per protocol and Practice Guidelines      Olga Lidia Mast MS, RDN-AP, LD, CNSC  Pager - 3rd floor/ICU: [phone]  Pager - All other floors: [phone]  Pager - Weekend/holiday: [phone]  Office: [phone]

## 2021-10-21 NOTE — PLAN OF CARE
"Non verbal, can nod yes or no to questions or do \"thumbs up\". VSS ex htn, on scheduled atenolol. NPO ex meds, needs encouragement to swallow and very small bites. PIV infusing LR @ 50ml/hr. , 179. R side weakness/defecits d/t stroke hx. A2 with mamadou cline. Felix patent with good UOP. No BM, on senna. Speech/PT/OT following, will continue POC      Rodolfo Hawk RN on 10/20/2021 at 10:26 PM    "

## 2021-10-22 ENCOUNTER — APPOINTMENT (OUTPATIENT)
Dept: SPEECH THERAPY | Facility: CLINIC | Age: 71
DRG: 871 | End: 2021-10-22
Payer: MEDICARE

## 2021-10-22 LAB
ANION GAP SERPL CALCULATED.3IONS-SCNC: 6 MMOL/L (ref 3–14)
BUN SERPL-MCNC: 14 MG/DL (ref 7–30)
CALCIUM SERPL-MCNC: 9.3 MG/DL (ref 8.5–10.1)
CHLORIDE BLD-SCNC: 103 MMOL/L (ref 94–109)
CO2 SERPL-SCNC: 29 MMOL/L (ref 20–32)
CREAT SERPL-MCNC: 1.34 MG/DL (ref 0.66–1.25)
ERYTHROCYTE [DISTWIDTH] IN BLOOD BY AUTOMATED COUNT: 12.6 % (ref 10–15)
GFR SERPL CREATININE-BSD FRML MDRD: 53 ML/MIN/1.73M2
GLUCOSE BLD-MCNC: 132 MG/DL (ref 70–99)
GLUCOSE BLDC GLUCOMTR-MCNC: 125 MG/DL (ref 70–99)
GLUCOSE BLDC GLUCOMTR-MCNC: 127 MG/DL (ref 70–99)
GLUCOSE BLDC GLUCOMTR-MCNC: 161 MG/DL (ref 70–99)
GLUCOSE BLDC GLUCOMTR-MCNC: 253 MG/DL (ref 70–99)
GLUCOSE BLDC GLUCOMTR-MCNC: 265 MG/DL (ref 70–99)
GLUCOSE BLDC GLUCOMTR-MCNC: 278 MG/DL (ref 70–99)
HCT VFR BLD AUTO: 34.8 % (ref 40–53)
HGB BLD-MCNC: 11.1 G/DL (ref 13.3–17.7)
MAGNESIUM SERPL-MCNC: 1.9 MG/DL (ref 1.6–2.3)
MCH RBC QN AUTO: 27.1 PG (ref 26.5–33)
MCHC RBC AUTO-ENTMCNC: 31.9 G/DL (ref 31.5–36.5)
MCV RBC AUTO: 85 FL (ref 78–100)
PLATELET # BLD AUTO: 260 10E3/UL (ref 150–450)
POTASSIUM BLD-SCNC: 3.4 MMOL/L (ref 3.4–5.3)
POTASSIUM BLD-SCNC: 3.7 MMOL/L (ref 3.4–5.3)
RBC # BLD AUTO: 4.1 10E6/UL (ref 4.4–5.9)
SODIUM SERPL-SCNC: 138 MMOL/L (ref 133–144)
WBC # BLD AUTO: 7.3 10E3/UL (ref 4–11)

## 2021-10-22 PROCEDURE — 250N000011 HC RX IP 250 OP 636: Performed by: HOSPITALIST

## 2021-10-22 PROCEDURE — 36415 COLL VENOUS BLD VENIPUNCTURE: CPT | Performed by: HOSPITALIST

## 2021-10-22 PROCEDURE — 85027 COMPLETE CBC AUTOMATED: CPT | Performed by: HOSPITALIST

## 2021-10-22 PROCEDURE — 83735 ASSAY OF MAGNESIUM: CPT | Performed by: HOSPITALIST

## 2021-10-22 PROCEDURE — 250N000013 HC RX MED GY IP 250 OP 250 PS 637: Performed by: INTERNAL MEDICINE

## 2021-10-22 PROCEDURE — 84132 ASSAY OF SERUM POTASSIUM: CPT | Performed by: HOSPITALIST

## 2021-10-22 PROCEDURE — 92526 ORAL FUNCTION THERAPY: CPT | Mod: GN

## 2021-10-22 PROCEDURE — 80048 BASIC METABOLIC PNL TOTAL CA: CPT | Performed by: HOSPITALIST

## 2021-10-22 PROCEDURE — 99233 SBSQ HOSP IP/OBS HIGH 50: CPT | Performed by: HOSPITALIST

## 2021-10-22 PROCEDURE — 120N000001 HC R&B MED SURG/OB

## 2021-10-22 PROCEDURE — 99223 1ST HOSP IP/OBS HIGH 75: CPT | Performed by: NURSE PRACTITIONER

## 2021-10-22 PROCEDURE — 250N000011 HC RX IP 250 OP 636: Performed by: INTERNAL MEDICINE

## 2021-10-22 RX ORDER — POTASSIUM CHLORIDE 7.45 MG/ML
10 INJECTION INTRAVENOUS
Status: COMPLETED | OUTPATIENT
Start: 2021-10-22 | End: 2021-10-22

## 2021-10-22 RX ADMIN — HEPARIN SODIUM 5000 UNITS: 10000 INJECTION, SOLUTION INTRAVENOUS; SUBCUTANEOUS at 20:56

## 2021-10-22 RX ADMIN — ATENOLOL 25 MG: 25 TABLET ORAL at 08:30

## 2021-10-22 RX ADMIN — AMLODIPINE BESYLATE 5 MG: 5 TABLET ORAL at 08:30

## 2021-10-22 RX ADMIN — POTASSIUM CHLORIDE 10 MEQ: 7.46 INJECTION, SOLUTION INTRAVENOUS at 12:52

## 2021-10-22 RX ADMIN — HEPARIN SODIUM 5000 UNITS: 10000 INJECTION, SOLUTION INTRAVENOUS; SUBCUTANEOUS at 08:30

## 2021-10-22 RX ADMIN — CLOPIDOGREL BISULFATE 75 MG: 75 TABLET ORAL at 08:30

## 2021-10-22 RX ADMIN — DOXAZOSIN 2 MG: 1 TABLET ORAL at 08:30

## 2021-10-22 RX ADMIN — ATENOLOL 25 MG: 25 TABLET ORAL at 20:58

## 2021-10-22 RX ADMIN — POTASSIUM CHLORIDE 10 MEQ: 7.46 INJECTION, SOLUTION INTRAVENOUS at 14:02

## 2021-10-22 RX ADMIN — ATORVASTATIN CALCIUM 80 MG: 40 TABLET, FILM COATED ORAL at 08:29

## 2021-10-22 RX ADMIN — SENNOSIDES 1 TABLET: 8.6 TABLET, FILM COATED ORAL at 21:03

## 2021-10-22 RX ADMIN — AMANTADINE HYDROCHLORIDE 100 MG: 100 CAPSULE ORAL at 08:30

## 2021-10-22 ASSESSMENT — ACTIVITIES OF DAILY LIVING (ADL)
ADLS_ACUITY_SCORE: 34
ADLS_ACUITY_SCORE: 34
ADLS_ACUITY_SCORE: 30
ADLS_ACUITY_SCORE: 32
ADLS_ACUITY_SCORE: 30
ADLS_ACUITY_SCORE: 32
ADLS_ACUITY_SCORE: 32
ADLS_ACUITY_SCORE: 34
ADLS_ACUITY_SCORE: 30
ADLS_ACUITY_SCORE: 32
ADLS_ACUITY_SCORE: 34
ADLS_ACUITY_SCORE: 32

## 2021-10-22 NOTE — PLAN OF CARE
Non verbal and flat affect.  Appetite very poor with no carbs ingested.  On calorie count since lunch.  Encourage po intake while being fed but remains minimal with intake.  Felix draining staw colored.  Used María steady+2 to chair for a couple hrs.  Rt side weak

## 2021-10-22 NOTE — PROGRESS NOTES
Assumed care from 1900- 2300    Pt is nonverbal, Right side impairment. , no BM this shift; senna given. Bowel sounds normoactive, abd soft and non tender. Pt did not each more than 1 or 2 bites of food. Pockets food in cheeks. Denies pain, resting comfortably. Safety maintained. Will continue POC.

## 2021-10-22 NOTE — PLAN OF CARE
"/71 (BP Location: Left arm)   Pulse 67   Temp 98.3  F (36.8  C) (Oral)   Resp 16   Wt 59.3 kg (130 lb 12.8 oz)   SpO2 97%   BMI 22.45 kg/m        VSS. Non verbal. Responds to questions with nodding and \"thumbs up\". A2 mamadou steady. Takes meds crushed in pudding, applesauce or ice cream. PIV SL with no no in place. Pureed, level 4 thickened liquid diet. . Denies pain. Will continue POC.  "

## 2021-10-22 NOTE — PROGRESS NOTES
[facility]    Hospitalist Progress Note      Assessment & Plan   Jimmy Otto is a 71-year-old male with history of insulin-dependent type 2 diabetes, hypercholesterolemia, hypertension, TIA, erectile dysfunction, recent stroke, and urinary retention with indwelling Ordoñez catheter placed about 2 and half weeks ago. 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  This left him nonverbal.  He discharged to the acute rehab unit at [facility] where he stayed from 9/17 through 10/6.  He returned home.  His family brought him in for evaluation of weakness, nausea, vomiting, decreased oral intake, difficulty swallowing, and generally doing poorly.      #Sepsis (leukocytosis, heart rate 90s, lactic acidosis) secondary to Urinary tract infection with chronic ordoñez catheter in place: Urine culture grew pansensitive E. coli.  Completed 7 days of IV ceftriaxone.  Ordoñez catheter exchanged in the ED.     #Thrush: Treated with fluconazole-completed.  -Continue magic mouthwash as needed for pain     #Severe malnutrition in setting of CVA, Dysphagia: Currently, this appears to be the main issue.  He was treated for thrush as above.  Both nutrition and speech have evaluated the patient.  He is on a modified diet.  Concern that patient not taking enough p.o. to maintain his nutrition.  I discussed with his daughter, Clemente, on 10/22.  I expressed that if Mr. Otto is not able to take enough p.o. to maintain his nutrition then will need to consider feeding tube.  She states that this is all new to her and that she will need to discuss with her family.  She would also like to discuss with palliative care team.  Consult is placed.  -Prior to admission was on modified diet due to recent stroke.  Seen by speech and continued on strict modified diet with 1:1 supervision.   -Calorie counts over the next couple days to monitor p.o. intake.  -Palliative consulted for goals of care and consideration of feeding tube.  -On  "amantadine which was started during last hospitalization for fatigue in the setting of stroke.  -Continue to hold quetiapine and ramelteon.  Consider antipsychotic or sedating medications only if absolutely necessary such as concern of patient safety.  They are not his chronic medications.  Probably started at the TCU or rehab center for delirium.     #Recent stroke on 9/14/2021: Was hospitalized at [facility] and was in acute rehab there. Nonverbal   -On Plavix, Lipitor.  At admission, he was on aspirin plus Plavix.  Aspirin has been stopped, per neurology recommendations.  -Patient's family don't want him to go back to rehab facility. They want to take him home with home therapy orders.   -Will need home PT/OT/RN/HHA on discharge.  consulted for discharge planning   -Per neurology note from his admission at [facility]: \"Dual antiplatelets for 1 month then back to plavix monotherapy as he was supposed to be on. It might be informative to check P2Y12 activity after a month of being on plavix only.\"  -aspirin discontinued as it has been 1 month since the stroke.  Continue Plavix.     #Acute toxic and infectious encephalopathy: Patient with somnolence during his hospitalization.  Likely secondary to his CVA in the past along with infection with some sedating medications.  Seroquel and ramelteon have since been held.  He did tolerate some p.o. with nursing assistance this AM.  We will continue to monitor.    #Urine retention identified during recent hospitalization for stroke: Indwelling Felix catheter was changed in the emergency department  -Outpatient urology follow-up.  -On doxazosin for BPH.  doxazosin increased.       #Type 2 diabetes: Prior to admission was on Levemir insulin 18 units at bedtime, NovoLog sliding scale insulin, Victoza, and Metformin  -Resume Levemir at a lower dose.  sliding scale insulin with hypoglycemia protocol.      #Hypercholesterolemia: Continue " statin  #Hypertension: Atenolol resumed.  Resumed amlodipine at a lower dose.  #Weakness, Deconditioning: PT and OT consulted  #CKD3: Baseline creatinine seems to be around 1.5.  At baseline    DVT Prophylaxis: Heparin SQ  Code Status: Full Code  Dispo: Likely several days.  Need to monitor ability to take p.o.  Monitoring calorie counts over the next few days and consideration for feeding tube if not able to maintain p.o. intake.    Demetrius De Santiago MD  Text Page    Interval History   No events.  Assumed care.  Patient more somnolent in the afternoon.  Sleeping.  Does open his eyes and squeezes my hand on the left side on command.  I discussed with his daughter by phone and provided update.    -Data reviewed today: I reviewed all new labs and imaging results over the last 24 hours.    Physical Exam   Temp: 98.2  F (36.8  C) Temp src: Oral BP: (!) 141/72 Pulse: 61   Resp: 18 SpO2: 97 % O2 Device: None (Room air)    Vitals:    10/16/21 0636 10/17/21 0322 10/18/21 0331   Weight: 60.9 kg (134 lb 3.2 oz) 58.8 kg (129 lb 11.2 oz) 59.3 kg (130 lb 12.8 oz)     Vital Signs with Ranges  Temp:  [98.2  F (36.8  C)-98.7  F (37.1  C)] 98.2  F (36.8  C)  Pulse:  [61-72] 61  Resp:  [16-18] 18  BP: (120-141)/(43-72) 141/72  SpO2:  [95 %-97 %] 97 %  I/O last 3 completed shifts:  In: 50 [P.O.:50]  Out: 975 [Urine:975]    Constitutional: Asleep.  Awakes to gentle stimulation.  Follows simple commands including squeezing hand on the left side.  Nonverbal.  Chronically deconditioned  HEENT: Normocephalic. MMM, No elevation of JVD noted.   Respiratory: Nl WOB, Clear bilaterally, No wheezes or crackles  Cardiovascular: Regular, no murmur  GI: BS+, NT, ND  Skin/Integumen: WWP, no rash. No edema  Neuro: Somnolent but arouses to stimulation.  Nonverbal.  Weakness of right upper and right lower extremity.  Squeezes hand on the left side.    Medications       amantadine  100 mg Oral Daily     amLODIPine  5 mg Oral Daily     atenolol  25 mg Oral BID      atorvastatin  80 mg Oral Daily     clopidogrel  75 mg Oral Daily     doxazosin  2 mg Oral Daily     heparin ANTICOAGULANT  5,000 Units Subcutaneous BID     insulin aspart  1-7 Units Subcutaneous TID AC     insulin aspart  1-5 Units Subcutaneous At Bedtime     insulin detemir  8 Units Subcutaneous At Bedtime     sennosides  1 tablet Oral At Bedtime     sodium chloride (PF)  3 mL Intracatheter Q8H       Data   Recent Labs   Lab 10/22/21  0812 10/22/21  0739 10/22/21  0622 10/21/21  0803 10/21/21  0606 10/20/21  0607 10/20/21  0546 10/18/21  0846 10/18/21  0631 10/17/21  1148 10/17/21  0819   WBC  --  7.3  --   --   --   --   --   --  6.7  --  7.4   HGB  --  11.1*  --   --   --   --   --   --  10.5*  --  10.8*   MCV  --  85  --   --   --   --   --   --  88  --  86   PLT  --  260  --   --  261  --   --   --  192   < > 197   NA  --  138  --   --   --   --   --   --  137  --  136   POTASSIUM  --  3.4  --   --  3.8  --  3.9   < > 3.8   < > 3.5   CHLORIDE  --  103  --   --   --   --   --   --  106  --  104   CO2  --  29  --   --   --   --   --   --  26  --  26   BUN  --  14  --   --   --   --   --   --  17  --  11   CR  --  1.34*  --   --   --   --   --   --  1.33*  --  1.27*   ANIONGAP  --  6  --   --   --   --   --   --  5  --  6   ARLETH  --  9.3  --   --   --   --   --   --  8.5  --  8.2*   * 132* 127*   < >  --    < >  --    < > 231*   < > 200*    < > = values in this interval not displayed.       Recent Results (from the past 24 hour(s))   XR Video Swallow with SLP or OT    Narrative    VIDEO SWALLOW WITH SLP OR OT   10/21/2021 11:35 AM     HISTORY: Dysphagia.    COMPARISON: None.    FINDINGS:  A swallow study was performed in coordination with a member  from the speech pathology service. Total fluoroscopy time was 1.9  minutes. 1 spot fluoroscopic image, and/or cine clip was obtained. 1  view in lateral projection. Various consistencies of barium were given  to the patient to swallow, and the swallowing  mechanism was observed  using fluoroscopy.    The patient was unable to swallow any of the barium.      Impression    IMPRESSION:  1. The patient was unable to swallow.  2. Please refer to the speech pathology report for further details.    This study includes only the cervical esophagus.    DELFINO LYNN MD         SYSTEM ID:  [id number]

## 2021-10-22 NOTE — CONSULTS
[facility]  Palliative Care Consultation   Text Page    Assessment & Plan   Jimmy Otto is a 71 year old male who was admitted on 10/13/2021.   Consulted by Demetrius De Santiago MD  to assist with symptom management, goals of care, and development of plan of care      Recommendations:  1. Goals of Care- Full Code  Hospitalization goals discussed discussed with luis enrique Clark (spouse non english speaking and patient non verbal)   Tube feedings:  Leaning toward NGT as medical trial and see if this helps short term.  She would like a family conference tomorrow to discuss procedure/cpns further.     Decisional Capacity- Unreliable. Patient does not have an advance directive. Per  informed consent policy next of kin should be involved if patient becomes unable. Consent and decision making by next of kin.  Spouse would be next of kin, non english speaking and poor understanding of english.  Please use interpretor services to affirm if luis enrique Cornejo can be next of kin in decision making     POLST no form in the electronic record, r easonable to complete prior to discharge    2. Pain history of chronic low back pain   Patient's opioid use in past 24 hours: 0 = 0mg Daily Morphine Equivalent  [place] Board of Pharmacy Data Base Reviewed:    YES; As expected, no concern for misuse/abuse of controlled medications based on this report. No controlled substances in past 12 months  ORT   NA  ( add dot phrase .FRHORT if warranted)    3. Dyspnea  None present     4. Dysphagia resulting in weight loss and severe malnutrition   - support patient and family in discussion and decision making for potential tube feeding   -appreciate input from speech language    -diet as ordered sit upright for meal    - high protein snack and boost, feed patient.   - monitor intake and weight   4. Spiritual Care    Oriented to Spiritual Health as part of Palliative Care team.  Spiritual Background: Protestant     5. Care  Planning  Appreciate Care of Alanis Chong RN. discharge home with [facility] services  Medications for discharge  None from Palliative     Medical Decision Making and Goals of Care:  Discussed on October 22, 2021 with Carly KAHN CNP: I contacted daughter Clemente Clark by phone and introduced my self and the nature of my visit. She was aware of this consult.    We talked about events leading to this hospital stay. I informed my call was to talk about the placement of an NG tube for nutrition.  She mention the possibility of this tube being in the stomach too.  We talked about the reason for the tube ( severe malnutrition) and the possibility it may be temporary.  I discussed the pros and cons focusing on pain and suffering and the potential of prolonging the inevitable (end of life) to having nutrition via a tube. She asked realistic questions about management at home. The pros are improved nutrition, the cons are intolerance leading to diarrhea and bloating, abdominal cramping. If diarrhea is problematic then skin may breakdown.  Some patient also find the tube placement uncomfortable and may pull on it.  A times restraints for medical safety may be needed.  I stated this was a big decision and that there is no wrong answer and comfort eating plan leading to hospice is also ok.  It is what the patient would wish to have done and how he would want to live.  We talked about the patient's Caodaism toshia and how high power and spirits control death.  These physical symptoms may be a sign from the higher haynes.   She asked if a family meeting can be schedule. I coordinated a meeting for tomorrow with Dr. De Santiago and informed the daughter 11 am   She is leaning more toward a trial to see if it helps his overall situation.     Thank you for involving us in the patient's care.     Carly Ortiz RN-PGMT-BC, APRN, CNP, ACHPN  Pain Management and Palliative Care  [facility]  [facility]: [phone]    Time Spent on this Encounter   Total unit/floor time 3:02pm - 3:40pm and 4:27pm - 5:20 pm minutes, time consisted of the following, examination of the patient, reviewing the record and completing documentation. >50% of time spent in counseling and coordination of care, Bedside Nurse Samantha Negron RN  and Hospitalist  Demetrius De Santiago MD .  Time spend counseling with family consisted of the following topics, goals of care, care planning for discharge and symptom management.    Understanding of disease process:   This has been discussed with family by this writer .    History of Present Illness   History is obtained from the electronic health record and daughter. Patient  non verbal since recent CVA, spouse limited English    Jimmy Otto is a 71 year old male we have been asked to see him for goals  of care discussion related to dysphagia and need for tube feeding. He has a past medical history of IDDM, hypercholesterolemia, hypertension, TIA, erectile dysfunction, recent stroke, and urinary retention with indwelling Felix catheter. The patient  Had a right ophthalmic CVA on 9/14/21and is now non verbal. The patient discharge to acute rehab at [facility] then home on 10/6/21.  He is under the care of his family and was brought back to the ED for weakness, vomiting, nausea, reduced oral intake, dysphagia and penile blisters. There was high suspicion for urosepsis and also with thrush orally.  This is in the setting of  been hospitalized recently for CVA  15 lb weigh loss over 2 months .      Past Medical History   I have reviewed this patient's medical history and updated it with pertinent information if needed.   Past Medical History:   Diagnosis Date    Diabetes mellitus (H)     Hypercholesteremia     Hypertension     Unspecified cerebral artery occlusion with cerebral infarction     TIA 8/11       Past Surgical History   I have reviewed this patient's surgical history and updated it with pertinent  information if needed.  No past surgical history on file.    Prior to Admission Medications   Prior to Admission Medications   Prescriptions Last Dose Informant Patient Reported? Taking?   acetaminophen (TYLENOL) 325 MG tablet  at no recent usage  No Yes   Sig: Take 1-3 tablets (325-975 mg) by mouth every 6 hours as needed for mild pain or fever (> 101 F)   amLODIPine (NORVASC) 10 MG tablet 10/13/2021 at am  No Yes   Sig: Take 1 tablet (10 mg) by mouth daily   amantadine (SYMMETREL) 100 MG capsule 10/13/2021 at am  No Yes   Sig: Take 1 capsule (100 mg) by mouth daily   aspirin (ASA) 81 MG chewable tablet 10/13/2021 at Unknown time  Yes Yes   Sig: Take 81 mg by mouth daily   atenolol (TENORMIN) 25 MG tablet 10/13/2021 at Unknown time  No Yes   Sig: Take 1 tablet (25 mg) by mouth 2 times daily   atorvastatin (LIPITOR) 80 MG tablet 10/13/2021 at Unknown time  Yes Yes   Sig: Take 80 mg by mouth daily   bisacodyl (DULCOLAX) 10 MG suppository Not Taking at Unknown time  No No   Sig: Place 1 suppository (10 mg) rectally daily as needed for constipation   Patient not taking: Reported on 10/14/2021   clopidogrel (PLAVIX) 75 MG tablet 10/13/2021 at Unknown time  Yes Yes   Sig: Take 75 mg by mouth daily   docusate sodium (COLACE) 100 MG capsule Not Taking at Unknown time  No No   Sig: Take 1 capsule (100 mg) by mouth 2 times daily   Patient not taking: Reported on 10/14/2021   doxazosin (CARDURA) 1 MG tablet 10/13/2021 at Unknown time  No Yes   Sig: Take 1 tablet (1 mg) by mouth daily   insulin detemir (LEVEMIR PEN) 100 UNIT/ML pen 10/13/2021 at Unknown time  No Yes   Sig: Inject 18 Units Subcutaneous every 24 hours (evening)   Patient taking differently: Inject 18 Units Subcutaneous every morning (evening)   liraglutide (VICTOZA) 18 MG/3ML solution 10/12/2021 at pm  No Yes   Sig: Inject 1.8 mg Subcutaneous daily   metFORMIN (GLUCOPHAGE) 1000 MG tablet 10/13/2021 at Unknown time  No Yes   Sig: Take 1 tablet (1,000 mg) by mouth  2 times daily (with meals)   polyethylene glycol (MIRALAX) 17 g packet Not Taking at Unknown time  No No   Sig: Take 17 g by mouth daily as needed for constipation   Patient not taking: Reported on 10/14/2021   potassium chloride (KLOR-CON) 20 MEQ packet 10/13/2021 at Unknown time  No Yes   Sig: Take 20 mEq by mouth daily   ramelteon (ROZEREM) 8 MG tablet 10/13/2021 at Unknown time  No Yes   Sig: Take 1 tablet (8 mg) by mouth At Bedtime   sennosides (SENOKOT) 8.6 MG tablet Not Taking at Unknown time  No No   Sig: Take 1 tablet by mouth At Bedtime   Patient not taking: Reported on 10/14/2021      Facility-Administered Medications: None     Allergies   Allergies   Allergen Reactions    Nka [No Known Allergies]        Social History   I have updated and reviewed the following Social History Narrative:   Social History     Social History Narrative    Not on file           Living situation:  With family        Support system:  Family        Actual/Potential Caregiver:  Family        Functional status:  Significantly declined has home health care        Financial concerns:  None        Substance use disorder (past / present):  None        Occupation:  Retired        Hobbies:        Family History   I have reviewed this patient's family history and updated it with pertinent information if needed.   No family history on file.    Review of Systems   The 10 point Review of Systems is negative other than noted in the HPI or here.     Palliative Symptom Review (0=no symptom/no concern, 1=mild, 2=moderate, 3=severe):      Pain: 0-none      Fatigue: 1-mild      Nausea: 0-none      Constipation: 0-none      Diarrhea: 0-none      Depressive Symptoms: 0-none      Anxiety: 0-none      Drowsiness: 0-none      Poor Appetite: 2-moderate      Shortness of Breath: 0-none      Insomnia: 0-none      Other:dysphagia 1-mild      Overall (0 good/no concerns, 3 very poor):   2    Physical Exam   Temp:  [98.2  F (36.8  C)-98.7  F (37.1  C)] 98.2   F (36.8  C)  Pulse:  [61-72] 61  Resp:  [16-18] 18  BP: (120-141)/(43-72) 141/72  SpO2:  [95 %-97 %] 97 %  130 lbs 12.8 oz  Exam:  GEN:  Alert, oriented x 1, looks with name calling and gestures approiately, appears comfortable, NAD.  HEENT:  Normocephalic/atraumatic, no scleral icterus, no nasal discharge, mouth moist.  CV:  RRR, S1, S2; no murmurs or other irregularities noted.  +3 DP/PT pulses bilatererally; no edema BLE.  RESP:  Clear to auscultation bilaterally without rales/rhonchi/wheezing/retractions.  Symmetric chest rise on inhalation noted.  Normal respiratory effort.  ABD:  Rounded, soft, non-tender/non-distended.  +BS  EXT:  Edema & pulses as noted above.  CMS intact x 4.     M/S:   Non tender to palpation throughout , right arm paresis.    SKIN:  Dry to touch, no exanthems noted in the visualized areas.    NEURO: Symmetric strength +2/5.  Sensation to touch intact all extremities.   There is no area of allodynia or hyperesthesia.  PAIN BEHAVIOR: Cooperative  Psych:  Normal affect.  Calm, cooperative, conversant appropriately.    Delirium Screen/CAM:  Delirium = (#1 and #2 = YES) + (#3 and/or #4)   1) Acute onset and fluctuating course:   No   (acute change in mental status from baseline over last 24 hours)  2) Inattention:   No   (difficulty focusing, distractible, can't follow conversation)  3) Disorganized thinking:   Not applicable   (score only if #1 and #2 are YES)  (rambling/irrelevant conversation, unclear/illogical thoughts, inconsistency)  4) Altered level of consciousness:   No   (score only if #1 and #2 are YES)  (other than alert, calm, cooperative)    Delirium/CAM score: 3/4  Interpretation:  1)  Delirium:  Absent  2)  Type:    3)  Severity:      Data   Results for orders placed or performed during the hospital encounter of 10/13/21 (from the past 24 hour(s))   Asymptomatic COVID-19 Virus (Coronavirus) by PCR Nasopharyngeal    Specimen: Nasopharyngeal; Swab   Result Value Ref Range     SARS CoV2 PCR Negative Negative    Narrative    Testing was performed using the nan  SARS-CoV-2 & Influenza A/B Assay on the nan  [employer]  System.  This test should be ordered for the detection of SARS-COV-2 in individuals who meet SARS-CoV-2 clinical and/or epidemiological criteria. Test performance is unknown in asymptomatic patients.  This test is for in vitro diagnostic use under the FDA EUA for laboratories certified under CLIA to perform moderate and/or high complexity testing. This test has not been FDA cleared or approved.  A negative test does not rule out the presence of PCR inhibitors in the specimen or target RNA in concentration below the limit of detection for the assay. The possibility of a false negative should be considered if the patient's recent exposure or clinical presentation suggests COVID-19.  [facility] are certified under the Clinical Laboratory Improvement Amendments of 1988 (CLIA-88) as qualified to perform moderate and/or high complexity laboratory testing.   Glucose by meter   Result Value Ref Range    GLUCOSE BY METER POCT 243 (H) 70 - 99 mg/dL   Glucose by meter   Result Value Ref Range    GLUCOSE BY METER POCT 236 (H) 70 - 99 mg/dL   Glucose by meter   Result Value Ref Range    GLUCOSE BY METER POCT 161 (H) 70 - 99 mg/dL   Glucose by meter   Result Value Ref Range    GLUCOSE BY METER POCT 127 (H) 70 - 99 mg/dL   CBC with platelets   Result Value Ref Range    WBC Count 7.3 4.0 - 11.0 10e3/uL    RBC Count 4.10 (L) 4.40 - 5.90 10e6/uL    Hemoglobin 11.1 (L) 13.3 - 17.7 g/dL    Hematocrit 34.8 (L) 40.0 - 53.0 %    MCV 85 78 - 100 fL    MCH 27.1 26.5 - 33.0 pg    MCHC 31.9 31.5 - 36.5 g/dL    RDW 12.6 10.0 - 15.0 %    Platelet Count 260 150 - 450 10e3/uL   Basic metabolic panel   Result Value Ref Range    Sodium 138 133 - 144 mmol/L    Potassium 3.4 3.4 - 5.3 mmol/L    Chloride 103 94 - 109 mmol/L    Carbon Dioxide (CO2) 29 20 - 32 mmol/L    Anion Gap 6 3 - 14 mmol/L     Urea Nitrogen 14 7 - 30 mg/dL    Creatinine 1.34 (H) 0.66 - 1.25 mg/dL    Calcium 9.3 8.5 - 10.1 mg/dL    Glucose 132 (H) 70 - 99 mg/dL    GFR Estimate 53 (L) >60 mL/min/1.73m2   Magnesium   Result Value Ref Range    Magnesium 1.9 1.6 - 2.3 mg/dL   Glucose by meter   Result Value Ref Range    GLUCOSE BY METER POCT 125 (H) 70 - 99 mg/dL   Glucose by meter   Result Value Ref Range    GLUCOSE BY METER POCT 278 (H) 70 - 99 mg/dL

## 2021-10-23 ENCOUNTER — APPOINTMENT (OUTPATIENT)
Dept: OCCUPATIONAL THERAPY | Facility: CLINIC | Age: 71
DRG: 871 | End: 2021-10-23
Payer: MEDICARE

## 2021-10-23 LAB
GLUCOSE BLDC GLUCOMTR-MCNC: 176 MG/DL (ref 70–99)
GLUCOSE BLDC GLUCOMTR-MCNC: 202 MG/DL (ref 70–99)
GLUCOSE BLDC GLUCOMTR-MCNC: 237 MG/DL (ref 70–99)
GLUCOSE BLDC GLUCOMTR-MCNC: 255 MG/DL (ref 70–99)
GLUCOSE BLDC GLUCOMTR-MCNC: 314 MG/DL (ref 70–99)
HOLD SPECIMEN: NORMAL
MAGNESIUM SERPL-MCNC: 1.8 MG/DL (ref 1.6–2.3)
POTASSIUM BLD-SCNC: 3.6 MMOL/L (ref 3.4–5.3)

## 2021-10-23 PROCEDURE — 99232 SBSQ HOSP IP/OBS MODERATE 35: CPT | Performed by: HOSPITALIST

## 2021-10-23 PROCEDURE — 97535 SELF CARE MNGMENT TRAINING: CPT | Mod: GO | Performed by: REHABILITATION PRACTITIONER

## 2021-10-23 PROCEDURE — 250N000011 HC RX IP 250 OP 636: Performed by: INTERNAL MEDICINE

## 2021-10-23 PROCEDURE — 250N000013 HC RX MED GY IP 250 OP 250 PS 637: Performed by: INTERNAL MEDICINE

## 2021-10-23 PROCEDURE — 120N000001 HC R&B MED SURG/OB

## 2021-10-23 PROCEDURE — 97530 THERAPEUTIC ACTIVITIES: CPT | Mod: GO | Performed by: REHABILITATION PRACTITIONER

## 2021-10-23 PROCEDURE — 84132 ASSAY OF SERUM POTASSIUM: CPT | Performed by: HOSPITALIST

## 2021-10-23 PROCEDURE — 36415 COLL VENOUS BLD VENIPUNCTURE: CPT | Performed by: HOSPITALIST

## 2021-10-23 PROCEDURE — 83735 ASSAY OF MAGNESIUM: CPT | Performed by: HOSPITALIST

## 2021-10-23 RX ADMIN — HEPARIN SODIUM 5000 UNITS: 10000 INJECTION, SOLUTION INTRAVENOUS; SUBCUTANEOUS at 21:36

## 2021-10-23 RX ADMIN — ATORVASTATIN CALCIUM 80 MG: 40 TABLET, FILM COATED ORAL at 08:28

## 2021-10-23 RX ADMIN — AMLODIPINE BESYLATE 5 MG: 5 TABLET ORAL at 08:29

## 2021-10-23 RX ADMIN — ATENOLOL 25 MG: 25 TABLET ORAL at 08:29

## 2021-10-23 RX ADMIN — HEPARIN SODIUM 5000 UNITS: 10000 INJECTION, SOLUTION INTRAVENOUS; SUBCUTANEOUS at 08:45

## 2021-10-23 RX ADMIN — AMANTADINE HYDROCHLORIDE 100 MG: 100 CAPSULE ORAL at 08:28

## 2021-10-23 RX ADMIN — CLOPIDOGREL BISULFATE 75 MG: 75 TABLET ORAL at 08:28

## 2021-10-23 RX ADMIN — DOXAZOSIN 2 MG: 1 TABLET ORAL at 08:28

## 2021-10-23 ASSESSMENT — ACTIVITIES OF DAILY LIVING (ADL)
ADLS_ACUITY_SCORE: 34
ADLS_ACUITY_SCORE: 34
ADLS_ACUITY_SCORE: 36
ADLS_ACUITY_SCORE: 34
ADLS_ACUITY_SCORE: 36
ADLS_ACUITY_SCORE: 34
ADLS_ACUITY_SCORE: 36
ADLS_ACUITY_SCORE: 34
ADLS_ACUITY_SCORE: 36
ADLS_ACUITY_SCORE: 36
ADLS_ACUITY_SCORE: 34
ADLS_ACUITY_SCORE: 36
ADLS_ACUITY_SCORE: 36

## 2021-10-23 NOTE — PROGRESS NOTES
[facility]    Hospitalist Progress Note      Assessment & Plan   Jimmy Otto is a 71-year-old male with history of insulin-dependent type 2 diabetes, hypercholesterolemia, hypertension, TIA, erectile dysfunction, recent stroke, and urinary retention with indwelling Ordoñez catheter placed about 2 and half weeks ago. Unfortunately, he had right thalamic stroke on 9/14/2021.  This left him nonverbal.  He discharged to the acute rehab unit at [facility] where he stayed from 9/17 through 10/6.  He returned home.  His family brought him in for evaluation of weakness, nausea, vomiting, decreased oral intake, difficulty swallowing, and generally doing poorly.       #Sepsis (leukocytosis, heart rate 90s, lactic acidosis) secondary to Urinary tract infection with chronic ordoñez catheter in place: Urine culture grew pansensitive E. coli.  Completed 7 days of IV ceftriaxone.  Ordoñez catheter exchanged in the ED.     #Thrush: Treated with fluconazole-completed.  -Continue magic mouthwash as needed for pain     #Severe malnutrition in setting of CVA, Dysphagia: Currently, this appears to be the main issue.  He was treated for thrush as above.  Both nutrition and speech have evaluated the patient.  He is on a modified diet.  Concern that patient not taking enough p.o. to maintain his nutrition.  I discussed with his daughter, Clemente, on 10/22.  I expressed that if Mr. Otto is not able to take enough p.o. to maintain his nutrition then will need to consider feeding tube.  She states that this is all new to her and that she will need to discuss with her family.  She would also like to discuss with palliative care team.  Consult is placed.  -Prior to admission was on modified diet due to recent stroke.  Seen by speech and continued on strict modified diet with 1:1 supervision.   -Calorie counts over the next couple days to monitor p.o. intake.  -Palliative consulted for goals of care and consideration of feeding tube.  I  "discussed with family including daughters, brother, wife and son by phone.  I offered formal  but patient's wife declined and daughter acted as .  Family was encouraged that he tolerated and ate most of his dinner on 10/22.  They want to continue to monitor his p.o. intake but if he is not able to maintain his nutrition we will consider feeding tube on Monday.  -On amantadine which was started during last hospitalization for fatigue in the setting of stroke.  -Continue to hold quetiapine and ramelteon.  Consider antipsychotic or sedating medications only if absolutely necessary such as concern of patient safety.  They are not his chronic medications.  Probably started at the TCU or rehab center for delirium.     #Recent stroke on 9/14/2021: Was hospitalized at [facility] and was in acute rehab there. Suffered right thalamic stroke.  Nonverbal and worsening right sided weakness.  He has prior history of left thalamic stroke previously.  He was noted to have periodic somnolence and lethargy as result during that hospitalization at [facility].  We have seen that periodically here as well and minimized sedating meds as result.      -On Plavix, Lipitor.  At admission, he was on aspirin plus Plavix.  Aspirin has been stopped, per neurology recommendations.  -Patient's family don't want him to go back to rehab facility. They want to take him home with home therapy orders.   -Will need home PT/OT/RN/HHA on discharge.  consulted for discharge planning   -Per neurology note from his admission at [facility]: \"Dual antiplatelets for 1 month then back to plavix monotherapy as he was supposed to be on. It might be informative to check P2Y12 activity after a month of being on plavix only.\"  -aspirin discontinued as it has been 1 month since the stroke.  Continue Plavix.     #Acute toxic and infectious encephalopathy: Patient with somnolence during his hospitalization.  Likely secondary " to his CVA in the past along with infection with some sedating medications.  Seroquel and ramelteon have since been held.  He did tolerate some p.o. with nursing assistance this AM.  We will continue to monitor.     #Urine retention identified during recent hospitalization for stroke: Indwelling Felix catheter was changed in the emergency department  -Outpatient urology follow-up.  -On doxazosin for BPH.  doxazosin increased.       #Type 2 diabetes: Prior to admission was on Levemir insulin 18 units at bedtime, NovoLog sliding scale insulin, Victoza, and Metformin  -Resume Levemir at a lower dose.  sliding scale insulin with hypoglycemia protocol.   Will not increase regimen until p.o. intake is stable     #Hypercholesterolemia: Continue statin  #Hypertension: Atenolol resumed.  Resumed amlodipine at a lower dose.  #Weakness, Deconditioning: PT and OT consulted  #CKD3: Baseline creatinine seems to be around 1.5.  At baseline     DVT Prophylaxis: Heparin SQ  Code Status: Full Code  Dispo: Possibly Monday if able to maintain p.o. intake with modified diet.  If not able to maintain nutrition we will need to readdress feeding tube with family on Monday.    Demetrius De Santiago MD  Text Page    Interval History   No events.  Patient ate most of his dinner.  Looks better today.  Comfortable.  No events.    -Data reviewed today: I reviewed all new labs and imaging results over the last 24 hours.     Physical Exam   Temp: 98.3  F (36.8  C) Temp src: Oral BP: 113/64 Pulse: 71   Resp: 18 SpO2: 97 % O2 Device: None (Room air)    Vitals:    10/17/21 0322 10/18/21 0331 10/23/21 0547   Weight: 58.8 kg (129 lb 11.2 oz) 59.3 kg (130 lb 12.8 oz) 54.7 kg (120 lb 11.2 oz)     Vital Signs with Ranges  Temp:  [98.3  F (36.8  C)-99.1  F (37.3  C)] 98.3  F (36.8  C)  Pulse:  [61-81] 71  Resp:  [18] 18  BP: (113-145)/(64-73) 113/64  SpO2:  [94 %-97 %] 97 %  I/O last 3 completed shifts:  In: 10 [P.O.:10]  Out: 1072  [Urine:1075]    Constitutional:Awake in chair. Interacts with family. Chronically deconditioned.  HEENT: Normocephalic. MMM, No elevation of JVD noted.   Respiratory: Nl WOB, Clear bilaterally, No wheezes or crackles  Cardiovascular: Regular, no murmur  GI: BS+, NT, ND  Skin/Integumen: WWP, no rash. No edema  Neuro: Awake. Interacts with family. Nonverbal.     Medications       amantadine  100 mg Oral Daily     amLODIPine  5 mg Oral Daily     atenolol  25 mg Oral BID     atorvastatin  80 mg Oral Daily     clopidogrel  75 mg Oral Daily     doxazosin  2 mg Oral Daily     heparin ANTICOAGULANT  5,000 Units Subcutaneous BID     insulin aspart  1-7 Units Subcutaneous TID AC     insulin aspart  1-5 Units Subcutaneous At Bedtime     insulin detemir  8 Units Subcutaneous At Bedtime     sennosides  1 tablet Oral At Bedtime     sodium chloride (PF)  3 mL Intracatheter Q8H       Data   Recent Labs   Lab 10/23/21  0844 10/23/21  0657 10/23/21  0252 10/22/21  2139 10/22/21  1721 10/22/21  1634 10/22/21  0812 10/22/21  0739 10/21/21  0803 10/21/21  0606 10/18/21  0846 10/18/21  0631 10/17/21  1148 10/17/21  0819   WBC  --   --   --   --   --   --   --  7.3  --   --   --  6.7  --  7.4   HGB  --   --   --   --   --   --   --  11.1*  --   --   --  10.5*  --  10.8*   MCV  --   --   --   --   --   --   --  85  --   --   --  88  --  86   PLT  --   --   --   --   --   --   --  260  --  261  --  192   < > 197   NA  --   --   --   --   --   --   --  138  --   --   --  137  --  136   POTASSIUM  --  3.6  --   --   --  3.7  --  3.4   < > 3.8   < > 3.8   < > 3.5   CHLORIDE  --   --   --   --   --   --   --  103  --   --   --  106  --  104   CO2  --   --   --   --   --   --   --  29  --   --   --  26  --  26   BUN  --   --   --   --   --   --   --  14  --   --   --  17  --  11   CR  --   --   --   --   --   --   --  1.34*  --   --   --  1.33*  --  1.27*   ANIONGAP  --   --   --   --   --   --   --  6  --   --   --  5  --  6   ARLETH  --   --   --    --   --   --   --  9.3  --   --   --  8.5  --  8.2*   *  --  202* 265*   < >  --    < > 132*   < >  --    < > 231*   < > 200*    < > = values in this interval not displayed.       No results found for this or any previous visit (from the past 24 hour(s)).

## 2021-10-23 NOTE — PROGRESS NOTES
Calorie Count    Current Diet: Pureed + mildly thick per SLP    Current Supplements: Gelatein w/ meals TID    Date: 10/22  Meals Recorded: 3 (w/ flowsheet review)  Supplements Recorded: 1  Calories consumed - See below  Protein consumed - See below    ASSESSED NUTRITION NEEDS (PER APPROVED PRACTICE GUIDELINES; DW: 59 kg):  Estimated Energy Needs: 5366-8285, 25-30+ Kcal per kg  Justification: maintenance  Estimated Protein Needs: >/=71, >/=1.2 g pro/Kg  Justification: preservation of lean body mass  Estimated Fluid Needs: per MD       Summary:  - Met <50% needs orally yesterday via 3 meals and at least 1 oral supplement.    - Overall unclear if he will be able to increase and consistently meet needs orally, combination concern for ability to maintain hydration status orally.  Per discussion with family and nursing patient was able to eat only 25% of breakfast tray this AM (~200 kcal, 11 g protein), had 100% of lunch meal though it was quite small (365 kcal, 22 g protein).  - Provided family in room with pureed menu, kept in room as updated w/ patient's preferences, discussed with nursing and also updated supplements, helped to order all meals for today and tomorrow.  Family want patient to receive Ensure though we are not able to provide on current diet, offered vital cuisine in place though they report patient is lactose intolerant and that this would not be a good fit.  They want him receive Gelatein and Magic Cup with each meal instead.    - Continue calorie counts.  Ongoing goals of care discussion with MD pending results.        Carly Casas RDN, LD  Clinical Dietitian  3rd floor/ICU: [phone]  All other floors: [phone]  Weekend/holiday: [phone]

## 2021-10-23 NOTE — PLAN OF CARE
SLP: Patient busy with care conference so not seen for swallow treatment at scheduled time.    Addendum: Attempted to see patient for swallow treatment, however, sleeping soundly in chair and unable to arouse.

## 2021-10-23 NOTE — PLAN OF CARE
Attempt to feed himself otherwise fed.  About 25% taken.  Continue calorie count to determine if needs feeding tube.  Dr De Santiago had family conference this am with plan moving forward.  To be reassessed  for needs tomorrow.Family not interested in rehab facility

## 2021-10-23 NOTE — PLAN OF CARE
"/73   Pulse 79   Temp 98.3  F (36.8  C) (Axillary)   Resp 18   Wt 59.3 kg (130 lb 12.8 oz)   SpO2 97%   BMI 22.45 kg/m      Pt nonverbal. Alert & \"fidgety\" at times. Follows commands inconsistently. Denies pain. Requires mamadou steady A2 for transfers. Moves independently in bed. Felix draining. Seen by palliative yesterday. Plan for NG tube if nutrition intake fails to improve. Will cont POC.     Lorie Vaughn RN     "

## 2021-10-24 ENCOUNTER — APPOINTMENT (OUTPATIENT)
Dept: SPEECH THERAPY | Facility: CLINIC | Age: 71
DRG: 871 | End: 2021-10-24
Payer: MEDICARE

## 2021-10-24 ENCOUNTER — APPOINTMENT (OUTPATIENT)
Dept: PHYSICAL THERAPY | Facility: CLINIC | Age: 71
DRG: 871 | End: 2021-10-24
Payer: MEDICARE

## 2021-10-24 LAB
GLUCOSE BLDC GLUCOMTR-MCNC: 167 MG/DL (ref 70–99)
GLUCOSE BLDC GLUCOMTR-MCNC: 192 MG/DL (ref 70–99)
GLUCOSE BLDC GLUCOMTR-MCNC: 208 MG/DL (ref 70–99)
GLUCOSE BLDC GLUCOMTR-MCNC: 293 MG/DL (ref 70–99)
MAGNESIUM SERPL-MCNC: 1.9 MG/DL (ref 1.6–2.3)
PLATELET # BLD AUTO: 319 10E3/UL (ref 150–450)
POTASSIUM BLD-SCNC: 3.4 MMOL/L (ref 3.4–5.3)
POTASSIUM BLD-SCNC: 3.9 MMOL/L (ref 3.4–5.3)

## 2021-10-24 PROCEDURE — 85049 AUTOMATED PLATELET COUNT: CPT | Performed by: INTERNAL MEDICINE

## 2021-10-24 PROCEDURE — 250N000013 HC RX MED GY IP 250 OP 250 PS 637: Performed by: INTERNAL MEDICINE

## 2021-10-24 PROCEDURE — 250N000011 HC RX IP 250 OP 636: Performed by: HOSPITALIST

## 2021-10-24 PROCEDURE — 36415 COLL VENOUS BLD VENIPUNCTURE: CPT | Performed by: HOSPITALIST

## 2021-10-24 PROCEDURE — 83735 ASSAY OF MAGNESIUM: CPT | Performed by: HOSPITALIST

## 2021-10-24 PROCEDURE — 97530 THERAPEUTIC ACTIVITIES: CPT | Mod: GP

## 2021-10-24 PROCEDURE — 120N000001 HC R&B MED SURG/OB

## 2021-10-24 PROCEDURE — 99232 SBSQ HOSP IP/OBS MODERATE 35: CPT | Performed by: HOSPITALIST

## 2021-10-24 PROCEDURE — 84132 ASSAY OF SERUM POTASSIUM: CPT | Performed by: HOSPITALIST

## 2021-10-24 PROCEDURE — 250N000011 HC RX IP 250 OP 636: Performed by: INTERNAL MEDICINE

## 2021-10-24 PROCEDURE — 92526 ORAL FUNCTION THERAPY: CPT | Mod: GN | Performed by: SPEECH-LANGUAGE PATHOLOGIST

## 2021-10-24 RX ORDER — POTASSIUM CHLORIDE 7.45 MG/ML
10 INJECTION INTRAVENOUS
Status: COMPLETED | OUTPATIENT
Start: 2021-10-24 | End: 2021-10-24

## 2021-10-24 RX ADMIN — DOXAZOSIN 2 MG: 1 TABLET ORAL at 08:44

## 2021-10-24 RX ADMIN — ATORVASTATIN CALCIUM 80 MG: 40 TABLET, FILM COATED ORAL at 08:44

## 2021-10-24 RX ADMIN — POTASSIUM CHLORIDE 10 MEQ: 7.46 INJECTION, SOLUTION INTRAVENOUS at 12:46

## 2021-10-24 RX ADMIN — AMANTADINE HYDROCHLORIDE 100 MG: 100 CAPSULE ORAL at 08:44

## 2021-10-24 RX ADMIN — DIPHENHYDRAMINE HYDROCHLORIDE AND LIDOCAINE HYDROCHLORIDE AND ALUMINUM HYDROXIDE AND MAGNESIUM HYDRO 10 ML: KIT at 11:56

## 2021-10-24 RX ADMIN — AMLODIPINE BESYLATE 5 MG: 5 TABLET ORAL at 08:44

## 2021-10-24 RX ADMIN — ATENOLOL 25 MG: 25 TABLET ORAL at 08:44

## 2021-10-24 RX ADMIN — HEPARIN SODIUM 5000 UNITS: 10000 INJECTION, SOLUTION INTRAVENOUS; SUBCUTANEOUS at 08:44

## 2021-10-24 RX ADMIN — POTASSIUM CHLORIDE 10 MEQ: 7.46 INJECTION, SOLUTION INTRAVENOUS at 11:48

## 2021-10-24 RX ADMIN — HEPARIN SODIUM 5000 UNITS: 10000 INJECTION, SOLUTION INTRAVENOUS; SUBCUTANEOUS at 21:59

## 2021-10-24 RX ADMIN — ATENOLOL 25 MG: 25 TABLET ORAL at 22:00

## 2021-10-24 RX ADMIN — CLOPIDOGREL BISULFATE 75 MG: 75 TABLET ORAL at 08:44

## 2021-10-24 RX ADMIN — SENNOSIDES 1 TABLET: 8.6 TABLET, FILM COATED ORAL at 21:59

## 2021-10-24 ASSESSMENT — ACTIVITIES OF DAILY LIVING (ADL)
ADLS_ACUITY_SCORE: 36
ADLS_ACUITY_SCORE: 34
ADLS_ACUITY_SCORE: 36
ADLS_ACUITY_SCORE: 34
ADLS_ACUITY_SCORE: 36
ADLS_ACUITY_SCORE: 34
ADLS_ACUITY_SCORE: 36
ADLS_ACUITY_SCORE: 36
ADLS_ACUITY_SCORE: 34
ADLS_ACUITY_SCORE: 36
ADLS_ACUITY_SCORE: 34
ADLS_ACUITY_SCORE: 36
ADLS_ACUITY_SCORE: 36
ADLS_ACUITY_SCORE: 34
ADLS_ACUITY_SCORE: 36
ADLS_ACUITY_SCORE: 36

## 2021-10-24 NOTE — PROGRESS NOTES
[facility]    Hospitalist Progress Note      Assessment & Plan   Jimmy Otto is a 71-year-old male with history of insulin-dependent type 2 diabetes, hypercholesterolemia, hypertension, TIA, erectile dysfunction, recent stroke, and urinary retention with indwelling Ordoñez catheter placed about 2 and half weeks ago. Unfortunately, he had right thalamic stroke on 9/14/2021.  This left him nonverbal.  He discharged to the acute rehab unit at [facility] where he stayed from 9/17 through 10/6.  He returned home.  His family brought him in for evaluation of weakness, nausea, vomiting, decreased oral intake, difficulty swallowing, and generally doing poorly.       #Sepsis (leukocytosis, heart rate 90s, lactic acidosis) secondary to Urinary tract infection with chronic ordoñez catheter in place: Urine culture grew pansensitive E. coli.  Completed 7 days of IV ceftriaxone.  Ordoñez catheter exchanged in the ED.     #Thrush: Treated with fluconazole-completed.  -Continue magic mouthwash as needed for pain     #Severe malnutrition in setting of CVA, Dysphagia: Currently, this appears to be the main issue.  He was treated for thrush as above.  Both nutrition and speech have evaluated the patient.  He is on a modified diet.  Concern that patient not taking enough p.o. to maintain his nutrition.  I discussed with his daughter, Clemente, on 10/22.  I expressed that if Mr. Otto is not able to take enough p.o. to maintain his nutrition then will need to consider feeding tube.  She states that this is all new to her and that she will need to discuss with her family.  She would also like to discuss with palliative care team.  Consult is placed.  -Prior to admission was on modified diet due to recent stroke.  Seen by speech and continued on strict modified diet with 1:1 supervision.   -Calorie counts over the next couple days to monitor p.o. intake.  He does seem to be eating a good amount of his meals (soups brought from home on  "10/23 which patient tolerated).  Appreciate nutrition following.   -Palliative consulted for goals of care and consideration of feeding tube.  I discussed with family including daughters, brother, wife and son by phone.  I offered formal  but patient's wife declined and daughter acted as .  Family was encouraged that he tolerated and ate most of his dinner on 10/22.  They want to continue to monitor his p.o. intake but if he is not able to maintain his nutrition we will consider feeding tube on Monday.  -On amantadine which was started during last hospitalization for fatigue in the setting of stroke.  -Continue to hold quetiapine and ramelteon.  Consider antipsychotic or sedating medications only if absolutely necessary such as concern of patient safety.  They are not his chronic medications.  Probably started at the TCU or rehab center for delirium.     #Recent stroke on 9/14/2021: Was hospitalized at [facility] and was in acute rehab there. Suffered right thalamic stroke.  Nonverbal and worsening right sided weakness.  He has prior history of left thalamic stroke previously.  He was noted to have periodic somnolence and lethargy as result during that hospitalization at [facility].  We have seen that periodically here as well and minimized sedating meds as result.      -On Plavix, Lipitor.  At admission, he was on aspirin plus Plavix.  Aspirin has been stopped, per neurology recommendations.  -Patient's family don't want him to go back to rehab facility. They want to take him home with home therapy orders.   -Will need home PT/OT/RN/HHA on discharge.  consulted for discharge planning   -Per neurology note from his admission at [facility]: \"Dual antiplatelets for 1 month then back to plavix monotherapy as he was supposed to be on. It might be informative to check P2Y12 activity after a month of being on plavix only.\"  -aspirin discontinued as it has been 1 month since the " stroke.  Continue Plavix.     #Acute toxic and infectious encephalopathy: Patient with somnolence during his hospitalization.  Likely secondary to his CVA in the past along with infection with some sedating medications.  Seroquel and ramelteon have since been held.  He did tolerate some p.o. with nursing assistance this AM.  We will continue to monitor.     #Urine retention identified during recent hospitalization for stroke: Indwelling Felix catheter was changed in the emergency department  -Outpatient urology follow-up.  -On doxazosin for BPH.  doxazosin increased.       #Type 2 diabetes: Prior to admission was on Levemir insulin 18 units at bedtime, NovoLog sliding scale insulin, Victoza, and Metformin  -Resume Levemir at a lower dose.  Slight increase on 10/24 for -300 range.  Increased from 8 units daily to 10 units daily.  sliding scale insulin with hypoglycemia protocol.       #Hypercholesterolemia: Continue statin  #Hypertension: Atenolol resumed.  Resumed amlodipine at a lower dose.  #Weakness, Deconditioning: PT and OT consulted  #CKD3: Baseline creatinine seems to be around 1.5.  At baseline     DVT Prophylaxis: Heparin SQ  Code Status: Full Code  Dispo: Possibly Monday if able to maintain p.o. intake with modified diet.  If not able to maintain nutrition we will need to readdress feeding tube with family on Monday.    Demetrius De Santiago MD  Text Page    Interval History   No events. Ate 100% of dinner which was pureed soup brought from home.  Pt resting comfortably in chair.     -Data reviewed today: I reviewed all new labs and imaging results over the last 24 hours.     Physical Exam   Temp: 99.2  F (37.3  C) Temp src: Axillary BP: 128/75 Pulse: 76   Resp: 20 SpO2: 98 % O2 Device: None (Room air)    Vitals:    10/18/21 0331 10/23/21 0547 10/24/21 0511   Weight: 59.3 kg (130 lb 12.8 oz) 54.7 kg (120 lb 11.2 oz) 55.4 kg (122 lb 3.2 oz)     Vital Signs with Ranges  Temp:  [97.9  F (36.6  C)-99.2  F (37.3   C)] 99.2  F (37.3  C)  Pulse:  [73-85] 76  Resp:  [16-20] 20  BP: (128-143)/(67-84) 128/75  SpO2:  [97 %-99 %] 98 %  I/O last 3 completed shifts:  In: 103 [P.O.:100; I.V.:3]  Out: 1025 [Urine:1025]    Constitutional:Awake in chair. Chronically deconditioned.  HEENT: Normocephalic. MMM, No elevation of JVD noted.   Respiratory: Nl WOB, Clear bilaterally, No wheezes or crackles  Cardiovascular: Regular, no murmur  GI: BS+, NT, ND  Skin/Integumen: WWP, no rash. No edema  Neuro: Awake. Nonverbal.     Medications       amantadine  100 mg Oral Daily     amLODIPine  5 mg Oral Daily     atenolol  25 mg Oral BID     atorvastatin  80 mg Oral Daily     clopidogrel  75 mg Oral Daily     doxazosin  2 mg Oral Daily     heparin ANTICOAGULANT  5,000 Units Subcutaneous BID     insulin aspart  1-7 Units Subcutaneous TID AC     insulin aspart  1-5 Units Subcutaneous At Bedtime     insulin detemir  10 Units Subcutaneous At Bedtime     potassium chloride  10 mEq Intravenous Q1H     sennosides  1 tablet Oral At Bedtime     sodium chloride (PF)  3 mL Intracatheter Q8H       Data   Recent Labs   Lab 10/24/21  0808 10/24/21  0611 10/24/21  0154 10/23/21  2115 10/23/21  0844 10/23/21  0657 10/22/21  1721 10/22/21  1634 10/22/21  0812 10/22/21  0739 10/21/21  0803 10/21/21  0606 10/18/21  0846 10/18/21  0631   WBC  --   --   --   --   --   --   --   --   --  7.3  --   --   --  6.7   HGB  --   --   --   --   --   --   --   --   --  11.1*  --   --   --  10.5*   MCV  --   --   --   --   --   --   --   --   --  85  --   --   --  88   PLT  --  319  --   --   --   --   --   --   --  260  --  261   < > 192   NA  --   --   --   --   --   --   --   --   --  138  --   --   --  137   POTASSIUM  --  3.4  --   --   --  3.6  --  3.7   < > 3.4   < > 3.8   < > 3.8   CHLORIDE  --   --   --   --   --   --   --   --   --  103  --   --   --  106   CO2  --   --   --   --   --   --   --   --   --  29  --   --   --  26   BUN  --   --   --   --   --   --   --   --    --  14  --   --   --  17   CR  --   --   --   --   --   --   --   --   --  1.34*  --   --   --  1.33*   ANIONGAP  --   --   --   --   --   --   --   --   --  6  --   --   --  5   ARLETH  --   --   --   --   --   --   --   --   --  9.3  --   --   --  8.5   *  --  208* 237*   < >  --    < >  --    < > 132*   < >  --    < > 231*    < > = values in this interval not displayed.       No results found for this or any previous visit (from the past 24 hour(s)).

## 2021-10-24 NOTE — PROGRESS NOTES
"Calorie Count     Current Diet: Pureed + mildly thick per SLP     Current Supplements: Gelatein and Magic Cup w/ meals TID     Date: 10/23  Meals Recorded: 3  Supplements Recorded: 0  Calories consumed - See below at least 565  Protein consumed - See below at least 33 g     ASSESSED NUTRITION NEEDS (PER APPROVED PRACTICE GUIDELINES; DW: 59 kg):  Estimated Energy Needs: 6246-0806, 25-30+ Kcal per kg  Justification: maintenance  Estimated Protein Needs: >/=71, >/=1.2 g pro/Kg  Justification: preservation of lean body mass  Estimated Fluid Needs: per MD         Summary:  - Met <50% calorie needs and ~50% protein needs via 2 recorded meals yesterday.  Unclear supplement contribution as not recorded on tickets, not on-site to verify with patient/family as able.  This does not include dinner meal which was brought from home and therefore unable to calculate, documented as \"soup\".  - In total, even with a slight supplement and food from home/dinner contribution, likely continues to meet <60-75% needs orally.   - Will continue following.  Off-site, please page as needed.      Carly Casas RDN, LD  Clinical Dietitian  3rd floor/ICU: [phone]  All other floors: [phone]  Weekend/holiday: [phone]  "

## 2021-10-24 NOTE — PLAN OF CARE
VSS on RA.  ZAYDA orientation-pt nonverbal. Writer asked if pt was having pain and pt shook head no.  LS dim.  No evidence of SOB or CP.  Pureed diet w/ mildly thicken liquids, total feed.  Ate 100% of soup that daughter brought from home.  B, 237.  Isidro patent, good UOP.  R PIV SL.  Discharge home tbd.  Continue POC.

## 2021-10-24 NOTE — PLAN OF CARE
"BP (!) 142/75 (BP Location: Right arm)   Pulse 85   Temp 98.2  F (36.8  C) (Axillary)   Resp 18   Wt 55.4 kg (122 lb 3.2 oz)   SpO2 99%   BMI 20.98 kg/m    Felix patent, no BM. Ax1 to reposition. Pillow between knees for comfort. Denies pain. Shakes head \"no\" when asked. R PIV SL No-No in place over IV for protection. BG @2AM was 208. K 3.8, Mag 2.2. Bed alarm intact.  "

## 2021-10-24 NOTE — PLAN OF CARE
Nursing 1530- 1930  Turned every 2 hours. Patient reported left hip pain with turns. Positioned on right with alternating supine. Bowel sounds hyperactive x 4 quads. No recorded BM. Lungs decreased bilat. Coccyx color WNL but has old 1cm dried scab. Appears healed under. Daughter fed patient evening meal. VSS.

## 2021-10-24 NOTE — PLAN OF CARE
No overt change.  Ceiling lift to and from chair and bed.  Flat affect and does not appear in discomfort except when eats, tends to grimace. Used magic mouth wash on swab  In mouth,  Overal oral intake poor.  Continue with calorie counts.

## 2021-10-25 ENCOUNTER — APPOINTMENT (OUTPATIENT)
Dept: OCCUPATIONAL THERAPY | Facility: CLINIC | Age: 71
DRG: 871 | End: 2021-10-25
Payer: MEDICARE

## 2021-10-25 ENCOUNTER — APPOINTMENT (OUTPATIENT)
Dept: SPEECH THERAPY | Facility: CLINIC | Age: 71
DRG: 871 | End: 2021-10-25
Payer: MEDICARE

## 2021-10-25 LAB
ANION GAP SERPL CALCULATED.3IONS-SCNC: 7 MMOL/L (ref 3–14)
BUN SERPL-MCNC: 26 MG/DL (ref 7–30)
CALCIUM SERPL-MCNC: 9.9 MG/DL (ref 8.5–10.1)
CHLORIDE BLD-SCNC: 105 MMOL/L (ref 94–109)
CO2 SERPL-SCNC: 28 MMOL/L (ref 20–32)
CREAT SERPL-MCNC: 1.23 MG/DL (ref 0.66–1.25)
GFR SERPL CREATININE-BSD FRML MDRD: 59 ML/MIN/1.73M2
GLUCOSE BLD-MCNC: 273 MG/DL (ref 70–99)
GLUCOSE BLDC GLUCOMTR-MCNC: 228 MG/DL (ref 70–99)
GLUCOSE BLDC GLUCOMTR-MCNC: 232 MG/DL (ref 70–99)
GLUCOSE BLDC GLUCOMTR-MCNC: 254 MG/DL (ref 70–99)
GLUCOSE BLDC GLUCOMTR-MCNC: 310 MG/DL (ref 70–99)
GLUCOSE BLDC GLUCOMTR-MCNC: 312 MG/DL (ref 70–99)
GLUCOSE BLDC GLUCOMTR-MCNC: 382 MG/DL (ref 70–99)
MAGNESIUM SERPL-MCNC: 2.2 MG/DL (ref 1.6–2.3)
MAGNESIUM SERPL-MCNC: 2.2 MG/DL (ref 1.6–2.3)
POTASSIUM BLD-SCNC: 3.5 MMOL/L (ref 3.4–5.3)
POTASSIUM BLD-SCNC: 3.8 MMOL/L (ref 3.4–5.3)
SODIUM SERPL-SCNC: 140 MMOL/L (ref 133–144)

## 2021-10-25 PROCEDURE — 99232 SBSQ HOSP IP/OBS MODERATE 35: CPT | Performed by: HOSPITALIST

## 2021-10-25 PROCEDURE — 250N000013 HC RX MED GY IP 250 OP 250 PS 637: Performed by: INTERNAL MEDICINE

## 2021-10-25 PROCEDURE — 84132 ASSAY OF SERUM POTASSIUM: CPT | Performed by: HOSPITALIST

## 2021-10-25 PROCEDURE — 258N000003 HC RX IP 258 OP 636: Performed by: HOSPITALIST

## 2021-10-25 PROCEDURE — 92526 ORAL FUNCTION THERAPY: CPT | Mod: GN

## 2021-10-25 PROCEDURE — 120N000001 HC R&B MED SURG/OB

## 2021-10-25 PROCEDURE — 97530 THERAPEUTIC ACTIVITIES: CPT | Mod: GO

## 2021-10-25 PROCEDURE — 80048 BASIC METABOLIC PNL TOTAL CA: CPT | Performed by: HOSPITALIST

## 2021-10-25 PROCEDURE — 36415 COLL VENOUS BLD VENIPUNCTURE: CPT | Performed by: HOSPITALIST

## 2021-10-25 PROCEDURE — 99233 SBSQ HOSP IP/OBS HIGH 50: CPT | Performed by: CLINICAL NURSE SPECIALIST

## 2021-10-25 PROCEDURE — 250N000011 HC RX IP 250 OP 636: Performed by: INTERNAL MEDICINE

## 2021-10-25 PROCEDURE — 83735 ASSAY OF MAGNESIUM: CPT | Performed by: HOSPITALIST

## 2021-10-25 RX ORDER — NICOTINE POLACRILEX 4 MG
15-30 LOZENGE BUCCAL
Status: DISCONTINUED | OUTPATIENT
Start: 2021-10-25 | End: 2021-10-25

## 2021-10-25 RX ORDER — DEXTROSE MONOHYDRATE 25 G/50ML
25-50 INJECTION, SOLUTION INTRAVENOUS
Status: DISCONTINUED | OUTPATIENT
Start: 2021-10-25 | End: 2021-10-25

## 2021-10-25 RX ADMIN — SENNOSIDES 1 TABLET: 8.6 TABLET, FILM COATED ORAL at 21:59

## 2021-10-25 RX ADMIN — CLOPIDOGREL BISULFATE 75 MG: 75 TABLET ORAL at 08:09

## 2021-10-25 RX ADMIN — ATORVASTATIN CALCIUM 80 MG: 40 TABLET, FILM COATED ORAL at 08:09

## 2021-10-25 RX ADMIN — DIPHENHYDRAMINE HYDROCHLORIDE AND LIDOCAINE HYDROCHLORIDE AND ALUMINUM HYDROXIDE AND MAGNESIUM HYDRO 10 ML: KIT at 08:10

## 2021-10-25 RX ADMIN — DOXAZOSIN 2 MG: 1 TABLET ORAL at 08:09

## 2021-10-25 RX ADMIN — ATENOLOL 25 MG: 25 TABLET ORAL at 21:59

## 2021-10-25 RX ADMIN — ATENOLOL 25 MG: 25 TABLET ORAL at 08:09

## 2021-10-25 RX ADMIN — SODIUM CHLORIDE 1000 ML: 9 INJECTION, SOLUTION INTRAVENOUS at 16:25

## 2021-10-25 RX ADMIN — AMLODIPINE BESYLATE 5 MG: 5 TABLET ORAL at 08:09

## 2021-10-25 RX ADMIN — AMANTADINE HYDROCHLORIDE 100 MG: 100 CAPSULE ORAL at 08:09

## 2021-10-25 RX ADMIN — HEPARIN SODIUM 5000 UNITS: 10000 INJECTION, SOLUTION INTRAVENOUS; SUBCUTANEOUS at 21:52

## 2021-10-25 RX ADMIN — HEPARIN SODIUM 5000 UNITS: 10000 INJECTION, SOLUTION INTRAVENOUS; SUBCUTANEOUS at 08:09

## 2021-10-25 ASSESSMENT — ACTIVITIES OF DAILY LIVING (ADL)
ADLS_ACUITY_SCORE: 36
ADLS_ACUITY_SCORE: 33
ADLS_ACUITY_SCORE: 36
ADLS_ACUITY_SCORE: 33
ADLS_ACUITY_SCORE: 33
ADLS_ACUITY_SCORE: 36
ADLS_ACUITY_SCORE: 33
ADLS_ACUITY_SCORE: 36
ADLS_ACUITY_SCORE: 33
ADLS_ACUITY_SCORE: 36
ADLS_ACUITY_SCORE: 33

## 2021-10-25 NOTE — PLAN OF CARE
"Isidro patent, no BM. Ax1 to reposition. Pillow between knees for comfort. Denies pain. Shakes head \"no\" when asked. R PIV SL No-No in place over IV for protection. BG @2AM was 312.  "

## 2021-10-25 NOTE — PROGRESS NOTES
[facility]    Hospitalist Progress Note      Assessment & Plan   Jimmy Otto is a 71-year-old male with history of insulin-dependent type 2 diabetes, hypercholesterolemia, hypertension, TIA, erectile dysfunction, recent stroke, and urinary retention with indwelling Ordoñez catheter placed about 2 and half weeks ago. Unfortunately, he had right thalamic stroke on 9/14/2021.  This left him nonverbal.  He discharged to the acute rehab unit at [facility] where he stayed from 9/17 through 10/6.  He returned home.  His family brought him in for evaluation of weakness, nausea, vomiting, decreased oral intake, difficulty swallowing, and generally doing poorly.       #Sepsis (leukocytosis, heart rate 90s, lactic acidosis) secondary to Urinary tract infection with chronic ordoñez catheter in place: Urine culture grew pansensitive E. coli.  Completed 7 days of IV ceftriaxone.  Ordoñez catheter exchanged in the ED.     #Thrush: Treated with fluconazole-completed.  -Continue magic mouthwash as needed for pain     #Severe malnutrition in setting of CVA, Dysphagia from strokes: Currently, this appears to be the main issue.  He was treated for thrush as above.  Both nutrition and speech have evaluated the patient.  He is on a modified diet.  Concern that patient not taking enough p.o. to maintain his nutrition.  I discussed with his daughter, Clemente, on 10/22.  I expressed that if Mr. Otto is not able to take enough p.o. to maintain his nutrition then will need to consider feeding tube.  She states that this is all new to her and that she will need to discuss with her family.  She would also like to discuss with palliative care team.  Consult is placed.  -Prior to admission was on modified diet due to recent stroke.  Seen by speech and continued on strict modified diet with 1:1 supervision.    -Calorie counts have been done which do show some improvement in PO intake but still not meeting his requirements. Unfortunately, he  is on strict diet with teaspoons at a time.  He has been able to take more PO over last 2-3 days but he is still not able to handle any liquids.  Dehydration is largest concern given his inability to take liquids and will likely require him to have feeding tube placed.    -Palliative consulted for goals of care and consideration of feeding tube.  I discussed with family including daughters, brother, wife and son by phone.  I offered formal  but patient's wife declined and daughter acted as .  Family was encouraged that he tolerated and ate most of his dinner on 10/22.  At that time, they did want to continue to monitor his p.o. intake but if he is not able to maintain his nutrition we will consider feeding tube.    -I called daughter, Clemente, again on 10/25.  Informed her of Ho's inability to maintain hydration without feeding tube.  We had swallow reassess to see if he could handle water but he failed on 100% of trials.    -Will give IVF bolus today over 4 hours.   -Discussed with palliative care.  They are going to reach out to family.  Will likely need feeding tube placed with tube feed initiation in coming days.  Given that his dysphagia is secondary from his CVAs, unclear if his swallow function would get better.  Given family is determined to bring him home, would probably benefit from placement of GJ as NG would be difficult to manage at home.  -On amantadine which was started during last hospitalization for fatigue in the setting of stroke.  Continue to hold quetiapine and ramelteon.      #Recent stroke on 9/14/2021: Was hospitalized at [facility] and was in acute rehab there. Suffered right thalamic stroke.  Nonverbal and worsening right sided weakness.  He has prior history of left thalamic stroke previously.  He was noted to have periodic somnolence and lethargy as result during that hospitalization at [facility].  We have seen that periodically here as well and minimized sedating  "meds as result.      -On Plavix, Lipitor.  At admission, he was on aspirin plus Plavix.  Aspirin has been stopped, per neurology recommendations.  -Patient's family don't want him to go back to rehab facility. They want to take him home with home therapy orders.   -Will need home PT/OT/RN/HHA on discharge.  consulted for discharge planning   -Per neurology note from his admission at [facility]: \"Dual antiplatelets for 1 month then back to plavix monotherapy as he was supposed to be on. It might be informative to check P2Y12 activity after a month of being on plavix only.\"  -aspirin discontinued as it has been 1 month since the stroke.  Continue Plavix.     #Acute toxic and infectious encephalopathy: Patient with somnolence during his hospitalization.  Likely secondary to his CVA in the past along with infection with some sedating medications.  Seroquel and ramelteon have since been held.  He has been more awake over the weekend and today.      #Urine retention identified during recent hospitalization for stroke: Indwelling Felix catheter was changed in the emergency department  -Outpatient urology follow-up.  -On doxazosin for BPH.  doxazosin increased.       #Type 2 diabetes: Prior to admission was on Levemir insulin 18 units at bedtime, NovoLog sliding scale insulin, Victoza, and Metformin  -Resume Levemir at a lower dose.  Still with more elevated BS over last 24 hours.  Increase levemir to 12 units and add carb count 1 unit per 15 g carbs, hold for premeal BS < 100.  Sliding scale insulin with hypoglycemia protocol.       #Hypercholesterolemia: Continue statin  #Hypertension: Atenolol resumed.  Resumed amlodipine at a lower dose.  #Weakness, Deconditioning: PT and OT consulted  #CKD3: Baseline creatinine seems to be around 1.5.  At baseline     DVT Prophylaxis: Heparin SQ  Code Status: Full Code  Dispo:     Demetrius De Santiago MD  Text Page    Interval History   No events. Taking some PO but not " able to handle any liquids. Failed multiple swallow attempts.  Appears comfortable.     -Data reviewed today: I reviewed all new labs and imaging results over the last 24 hours.    Physical Exam   Temp: 97.7  F (36.5  C) Temp src: Axillary BP: 136/79 Pulse: 71   Resp: 20 SpO2: 98 % O2 Device: None (Room air)    Vitals:    10/23/21 0547 10/24/21 0511 10/25/21 0541   Weight: 54.7 kg (120 lb 11.2 oz) 55.4 kg (122 lb 3.2 oz) 54.3 kg (119 lb 12.8 oz)     Vital Signs with Ranges  Temp:  [97.7  F (36.5  C)-98.3  F (36.8  C)] 97.7  F (36.5  C)  Pulse:  [71-77] 71  Resp:  [18-20] 20  BP: (124-152)/(67-82) 136/79  SpO2:  [96 %-98 %] 98 %  I/O last 3 completed shifts:  In: -   Out: 1100 [Urine:1100]    Constitutional:Awake in chair. Chronically deconditioned.  HEENT: Normocephalic. MMM, No elevation of JVD noted.   Respiratory: Nl WOB, Clear bilaterally, No wheezes or crackles  Cardiovascular: Regular, no murmur  GI: BS+, NT, ND  Skin/Integumen: WWP, no rash. No edema  Neuro: Awake. Nonverbal.     Medications       amantadine  100 mg Oral Daily     amLODIPine  5 mg Oral Daily     atenolol  25 mg Oral BID     atorvastatin  80 mg Oral Daily     clopidogrel  75 mg Oral Daily     doxazosin  2 mg Oral Daily     heparin ANTICOAGULANT  5,000 Units Subcutaneous BID     insulin aspart  1-7 Units Subcutaneous TID AC     insulin aspart  1-5 Units Subcutaneous At Bedtime     insulin detemir  12 Units Subcutaneous At Bedtime     sennosides  1 tablet Oral At Bedtime     sodium chloride (PF)  3 mL Intracatheter Q8H       Data   Recent Labs   Lab 10/25/21  0918 10/25/21  0753 10/25/21  0558 10/25/21  0219 10/24/21  1700 10/24/21  1526 10/24/21  0808 10/24/21  0611 10/22/21  0812 10/22/21  0739 10/21/21  0803 10/21/21  0606   WBC  --   --   --   --   --   --   --   --   --  7.3  --   --    HGB  --   --   --   --   --   --   --   --   --  11.1*  --   --    MCV  --   --   --   --   --   --   --   --   --  85  --   --    PLT  --   --   --   --    --   --   --  319  --  260  --  261     --   --   --   --   --   --   --   --  138  --   --    POTASSIUM 3.5  --  3.8  --   --  3.9   < > 3.4   < > 3.4   < > 3.8   CHLORIDE 105  --   --   --   --   --   --   --   --  103  --   --    CO2 28  --   --   --   --   --   --   --   --  29  --   --    BUN 26  --   --   --   --   --   --   --   --  14  --   --    CR 1.23  --   --   --   --   --   --   --   --  1.34*  --   --    ANIONGAP 7  --   --   --   --   --   --   --   --  6  --   --    ARLETH 9.9  --   --   --   --   --   --   --   --  9.3  --   --    * 254*  --  312*   < >  --    < >  --    < > 132*   < >  --     < > = values in this interval not displayed.       No results found for this or any previous visit (from the past 24 hour(s)).

## 2021-10-25 NOTE — PLAN OF CARE
No overt changes or improvements.  Feeding pt but only taking a tsp or two at bkft and lunch .  Overal strength unchanged.  Lift to chair.  Family in this am

## 2021-10-25 NOTE — PROGRESS NOTES
[facility]  Palliative Care Progress Note  Text Page    Recommendations:  1. Goals of Care - Full Code - Restorative care.    Hospitalization goals discussed discussed with daughter Clemente Clark (spouse non english speaking and patient non verbal). Family was hopeful swallowing would improve with resolution of UTI. They are aware he is unable to meet nutritional needs. Clemente explained that if Ho would not improved with NG tube feedings, their plan would to consider long term tube feeding. Clemente will discuss with family this evening, but as they are not considering a comfort plan unless Ho were dying, they would like to consider the option of PEG tube.         Decisional Capacity- Unreliable. Patient does not have an advance directive. Per  informed consent policy next of kin should be involved if patient becomes unable. Consent and decision making by next of kin. Spouse is next of kin, has deferred to her daughter Clemente Clark in assisting the family with decision making.     POLST  - consider completion prior to discharge     2. Dysphagia - Appreciate input of Speech Language Pathologist Jaclyn Maynard, SLP as patient is not appropriate at this time for free water protocol. Family is interested in the option of TUBE FEEDING.    3.   Nutrition - Appreciate input of Registered Dietitian Carly Casas, RD, LD. Family request TUBE FEEDING to maintain nutrition and hydration.    4.  Chronic low back pain   Patient's opioid use in past 24 hours: 0 = 0 mg Daily Morphine Equivalent  [place] Board of Pharmacy Data Base Reviewed:    YES; As expected, no concern for misuse/abuse of controlled medications based on this report. No controlled substances in past 12 months     5. Spiritual Care  Oriented to Spiritual Health as part of Palliative Care team.  Spiritual Background: Zoroastrianism      5. Care Planning  Appreciate Care of Alanis Chong RN. discharge home with [facility] services  Medications for  "discharge - none from Palliative perspective      Medical Decision Making and Goals of Care:  Discussed on October 25, 2021 with FEMI Trujillo:    Phoned the patient's daughter Clemente Clark at [phone]. She acknowledged \"The family is disappointed that he isn't getting better and can't keep his hydration.\" She is aware of the option of NG tube feeding, which the family would like to pursue. Clemente acknowledged \"I know that the tube feeding would only be a week or two, but what do we do then?\" We discussed the hope that Jimmy's swallow would improve. Clemente explained that when Jimmy left TCU, home health was not set up and she had to contact the PCP to obtain services, and by that time Jimmy was already back in the hospital. \"We talked with Alanis, so I know everything will be ready when he comes home. He has an appointment on the 28th, which can be changed.\" Clemente explained that the family would hope that Jimmy would be able to eat on his own, but acknowledged that if he was not able to meet nutritional needs they would pursue the option of long-term tube feeding \"Unless he was dying.\" We discussed the general differenced of NG feeding and PEG feeding. Clemente inquired \"So we need to bring him back to the hospital in a couple weeks to get the permanent tube feeding?\" Clemente explained that the family would want all treatments, unless he was dying. This would include long-term tube feeding. Clemente acknowledged \"seems like it would be easier to have the tube in the stomach. When he's better and can eat on his own, he can have it taken out.\"  Clemente would like to discuss her father's care with the family this evening, but thinks they would want to pursue long-term tube feedings at home.        Yarely Gaitan MS, RN, CNS, APRN, ACHPN, FAACVPR  Pain and Palliative Care  Pager [phone]  Office [phone]       Time Spent on this Encounter   Total unit/floor time 4:15 PM until 5 PM, time consisted of the following, " examination of the patient, reviewing the record and completing documentation. >50% of time spent in counseling and coordination of care.  Time spend counseling with family consisted of the following topics, goals of care, education about diagnosis and symptom management.  Time spent in coordination of care with Hospitalist Demetrius De Santiago MD.       Review of Systems       Unable as patient is not verbal  Physical Exam   Temp:  [97.7  F (36.5  C)-98.3  F (36.8  C)] 97.8  F (36.6  C)  Pulse:  [71-77] 72  Resp:  [18-20] 20  BP: (124-152)/(67-82) 124/73  SpO2:  [96 %-98 %] 98 %  119 lbs 12.8 oz  GEN:  Alert, non-verbal, appears comfortable, no distress .  HEENT:  Normocephalic/atraumatic, no scleral icterus, no nasal discharge, mouth moist.  RESP:    Symmetric chest rise on inhalation noted.  Normal respiratory effort.      Medications       sodium chloride 0.9%  1,000 mL Intravenous Once     amantadine  100 mg Oral Daily     amLODIPine  5 mg Oral Daily     atenolol  25 mg Oral BID     atorvastatin  80 mg Oral Daily     clopidogrel  75 mg Oral Daily     doxazosin  2 mg Oral Daily     heparin ANTICOAGULANT  5,000 Units Subcutaneous BID     insulin aspart   Subcutaneous TID w/meals     insulin aspart  1-7 Units Subcutaneous TID AC     insulin aspart  1-5 Units Subcutaneous At Bedtime     insulin detemir  12 Units Subcutaneous At Bedtime     sennosides  1 tablet Oral At Bedtime     sodium chloride (PF)  3 mL Intracatheter Q8H       Data   Results for orders placed or performed during the hospital encounter of 10/13/21 (from the past 24 hour(s))   Glucose by meter   Result Value Ref Range    GLUCOSE BY METER POCT 293 (H) 70 - 99 mg/dL   Glucose by meter   Result Value Ref Range    GLUCOSE BY METER POCT 312 (H) 70 - 99 mg/dL   Magnesium   Result Value Ref Range    Magnesium 2.2 1.6 - 2.3 mg/dL   Potassium   Result Value Ref Range    Potassium 3.8 3.4 - 5.3 mmol/L   Glucose by meter   Result Value Ref Range    GLUCOSE BY METER  POCT 254 (H) 70 - 99 mg/dL   Basic metabolic panel   Result Value Ref Range    Sodium 140 133 - 144 mmol/L    Potassium 3.5 3.4 - 5.3 mmol/L    Chloride 105 94 - 109 mmol/L    Carbon Dioxide (CO2) 28 20 - 32 mmol/L    Anion Gap 7 3 - 14 mmol/L    Urea Nitrogen 26 7 - 30 mg/dL    Creatinine 1.23 0.66 - 1.25 mg/dL    Calcium 9.9 8.5 - 10.1 mg/dL    Glucose 273 (H) 70 - 99 mg/dL    GFR Estimate 59 (L) >60 mL/min/1.73m2   Magnesium   Result Value Ref Range    Magnesium 2.2 1.6 - 2.3 mg/dL

## 2021-10-25 NOTE — PROGRESS NOTES
CALORIE COUNT    Current Diet and Supplement Order:   Pureed + mildly thick per SLP by teaspoon, Gelatein w/ meals TID    Approximate Oral Intake for 10/24:   Calories: 620 kcals   Protein: 31 g     Number of Meals Recorded: 2. No data for lunch available (if 100% consumed, would add an addition 545 kcal, 23 g protein)    ASSESSED NUTRITION NEEDS: (DW 59 kg)  Estimated Energy Needs: 4530-9049, 25-30+ Kcal per kg  Justification: maintenance  Estimated Protein Needs: >/=71, >/=1.2 g pro/Kg  Justification: preservation of lean body mass  Estimated Fluid Needs: per MD     Summary:   Continue to encourage intake. Provider team in discussion with family re: nutrition support options. Concerned of patient's current and future ability to meet hydration needs at a minimum (more immediate consequences from dehydration vs prolonged inadequate kcal + protein intake).  Calorie counts to continue - appreciate documentation of oral intake.      ADDENDUM: Discussed with Dr. De Santiago, RN and SLP - assessment today to determine eligibility for free water protocol? Concern for ability to meet hydration needs when restricted to thickened liquids by teaspoon only. If nasoenteric FT pursued by family, role of mentation, patient acceptance of tube and use of restraints to be discussed by provider team.     Olga Lidia Mast MS, RDN-AP, LD, CNSC  Pager - 3rd floor/ICU: [phone]  Pager - All other floors: [phone]  Pager - Weekend/holiday: [phone]  Office: [phone]

## 2021-10-25 NOTE — PLAN OF CARE
SLP: Received communication from dietician and MD regarding pt's ability to tolerate Free Water Protocol. Despite strategies, pt with bolus holding across thin liquids which results in overt coughing in 100% of trials. Pt is not appropriate at this time for FWP. If concern for de-hydration continues, pt may benefit from discussion regarding alternative means of hydration. SLP will continue to assess readiness for FWP.

## 2021-10-26 ENCOUNTER — HOME INFUSION (PRE-WILLOW HOME INFUSION) (OUTPATIENT)
Dept: PHARMACY | Facility: CLINIC | Age: 71
End: 2021-10-26

## 2021-10-26 ENCOUNTER — APPOINTMENT (OUTPATIENT)
Dept: SPEECH THERAPY | Facility: CLINIC | Age: 71
DRG: 871 | End: 2021-10-26
Payer: MEDICARE

## 2021-10-26 LAB
GLUCOSE BLDC GLUCOMTR-MCNC: 122 MG/DL (ref 70–99)
GLUCOSE BLDC GLUCOMTR-MCNC: 161 MG/DL (ref 70–99)
GLUCOSE BLDC GLUCOMTR-MCNC: 177 MG/DL (ref 70–99)
GLUCOSE BLDC GLUCOMTR-MCNC: 259 MG/DL (ref 70–99)
GLUCOSE BLDC GLUCOMTR-MCNC: 398 MG/DL (ref 70–99)
MAGNESIUM SERPL-MCNC: 2 MG/DL (ref 1.6–2.3)
POTASSIUM BLD-SCNC: 3.4 MMOL/L (ref 3.4–5.3)

## 2021-10-26 PROCEDURE — 36415 COLL VENOUS BLD VENIPUNCTURE: CPT | Performed by: HOSPITALIST

## 2021-10-26 PROCEDURE — 92526 ORAL FUNCTION THERAPY: CPT | Mod: GN

## 2021-10-26 PROCEDURE — 84132 ASSAY OF SERUM POTASSIUM: CPT | Performed by: HOSPITALIST

## 2021-10-26 PROCEDURE — 250N000011 HC RX IP 250 OP 636: Performed by: INTERNAL MEDICINE

## 2021-10-26 PROCEDURE — 99233 SBSQ HOSP IP/OBS HIGH 50: CPT | Performed by: CLINICAL NURSE SPECIALIST

## 2021-10-26 PROCEDURE — 99233 SBSQ HOSP IP/OBS HIGH 50: CPT | Performed by: HOSPITALIST

## 2021-10-26 PROCEDURE — 83735 ASSAY OF MAGNESIUM: CPT | Performed by: HOSPITALIST

## 2021-10-26 PROCEDURE — 120N000001 HC R&B MED SURG/OB

## 2021-10-26 PROCEDURE — 250N000013 HC RX MED GY IP 250 OP 250 PS 637: Performed by: INTERNAL MEDICINE

## 2021-10-26 RX ADMIN — HEPARIN SODIUM 5000 UNITS: 10000 INJECTION, SOLUTION INTRAVENOUS; SUBCUTANEOUS at 08:53

## 2021-10-26 RX ADMIN — ATENOLOL 25 MG: 25 TABLET ORAL at 08:53

## 2021-10-26 RX ADMIN — CLOPIDOGREL BISULFATE 75 MG: 75 TABLET ORAL at 08:53

## 2021-10-26 RX ADMIN — AMANTADINE HYDROCHLORIDE 100 MG: 100 CAPSULE ORAL at 08:53

## 2021-10-26 RX ADMIN — ATENOLOL 25 MG: 25 TABLET ORAL at 22:02

## 2021-10-26 RX ADMIN — DOXAZOSIN 2 MG: 1 TABLET ORAL at 08:52

## 2021-10-26 RX ADMIN — AMLODIPINE BESYLATE 5 MG: 5 TABLET ORAL at 08:53

## 2021-10-26 RX ADMIN — HEPARIN SODIUM 5000 UNITS: 10000 INJECTION, SOLUTION INTRAVENOUS; SUBCUTANEOUS at 22:02

## 2021-10-26 RX ADMIN — ATORVASTATIN CALCIUM 80 MG: 40 TABLET, FILM COATED ORAL at 08:53

## 2021-10-26 RX ADMIN — SENNOSIDES 1 TABLET: 8.6 TABLET, FILM COATED ORAL at 22:02

## 2021-10-26 ASSESSMENT — ACTIVITIES OF DAILY LIVING (ADL)
ADLS_ACUITY_SCORE: 36
ADLS_ACUITY_SCORE: 33
ADLS_ACUITY_SCORE: 36
ADLS_ACUITY_SCORE: 34
ADLS_ACUITY_SCORE: 36
ADLS_ACUITY_SCORE: 33
ADLS_ACUITY_SCORE: 36
ADLS_ACUITY_SCORE: 34
ADLS_ACUITY_SCORE: 33
ADLS_ACUITY_SCORE: 34
ADLS_ACUITY_SCORE: 36
ADLS_ACUITY_SCORE: 34

## 2021-10-26 NOTE — PROGRESS NOTES
ADDENDUM 1330:  Spoke to [facility] Liaison Claire regarding possible home TF needs for patient. We are still waiting for benefit check to come back. She will reach out to patient's family when benefit check comes back to discuss TF process for home.    Care Management Follow Up    Length of Stay (days): 12    Expected Discharge Date: 10/29/2021     Concerns to be Addressed:  Possible TF needs on discharge     Patient plan of care discussed at interdisciplinary rounds: Yes    Anticipated Discharge Disposition: home with family with [facility] services and possible home TF needs     Anticipated Discharge Services:  TBD, possible TF needs    Referrals Placed by CM/SW:  [facility] benefit check sent      Additional Information:  SW following for discharge planning. RN CC spoke to dietician yesterday who indicates that family is considering PEG tube with TF on discharge. Benefit check sent to [facility] prior to PEG tube placement. Awaiting response on benefit check.     Will cont to follow for discharge planning.           Alanis Chong RN BSN CM  Inpatient Care Coordination  [facility]  [phone]        Alanis Chong RN

## 2021-10-26 NOTE — PLAN OF CARE
End of Shift Summary: VSS on room air.  Pt is alert, unable to assess orientation due to pt being nonverbal. Cardiac WNL. Lung sounds are diminished in all lobes. R sided deficit and weakness in upper and lower extremities. Pt has infrequent episodes of a nonproductive cough. Pt is on a mechanically altered diet with mildly thick liquids. Pt ate 25% of meal with encouragement/assist from family. Felix remains intact. 1000 NS bolus given once this shift per MD orders. Will continue to monitor.

## 2021-10-26 NOTE — PLAN OF CARE
SLP: Dysphagia tx completed. See flowsheet for full details. Improved tolerance with thin water today.     OK for free water protocol:  1) THIN water via tsp only, 1:1 nursing assist  2) only allow THIN water at least 30 minutes after other consistencies  3) only allow THIN water after thorough oral cares to reduce/eliminate oral bacteria    Overall still concerned re: overall nutrition/hydration status. Noted ongoing discussion with medical team re: nutrition.

## 2021-10-26 NOTE — PLAN OF CARE
Patient nonverbal. Poor PO intake. Difficult to get patients to swallow meds after crushed; holds pudding in mouth for long period of time. Speech following. Did eat 1 pro-source gelatin this shift. Up to chair with assist of 2. Denies pain; shakes head to yes/no. Felix patent. Palliative following. Care coordinator await insurance authorization on tube feeding. Continue supportive cares.

## 2021-10-26 NOTE — PROGRESS NOTES
Therapy: Enteral TF  Insurance: Medicare and [employer] Supplement plans     Patient Jimmy Otto meets Medicare criteria for enteral tf, between Medicare and his [employer] supplement plans he is covered 100%.     Please note, anticipated length of need must be 90 days or more in order for insurance to cover and this has to be documented in the discharge summary. If not then patient will be self-pay for the therapy.      Please contact Intake with any questions, [phone] or In Basket pool,  Home Infusion ([phone]).

## 2021-10-26 NOTE — PROGRESS NOTES
[facility]    Hospitalist Progress Note    Date of Service (when I saw the patient): 10/26/2021  Provider:  Suhas Graham MD   Text Page  7am - 6PM       Assessment & Plan   Jimmy Otto is a 71-year-old male with history of insulin-dependent type 2 diabetes, hypercholesterolemia, hypertension, TIA, erectile dysfunction, recent stroke, and urinary retention with indwelling Ordoñez catheter placed about 2 and half weeks ago. Unfortunately, he had right thalamic stroke on 9/14/2021.  This left him nonverbal.  He discharged to the acute rehab unit at [facility] where he stayed from 9/17 through 10/6.  He returned home.  His family brought him in for evaluation of weakness, nausea, vomiting, decreased oral intake, difficulty swallowing, and generally doing poorly.       #Sepsis (leukocytosis, heart rate 90s, lactic acidosis) secondary to E Coli Urinary tract infection with chronic ordoñez catheter in place: Urine culture grew pansensitive E. coli.  Completed 7 days of IV ceftriaxone.  Ordoñez catheter exchanged in the ED.     #Thrush: Treated with fluconazole-completed.  -Continue magic mouthwash as needed for pain     #Severe malnutrition in setting of CVA, Dysphagia from strokes: Currently, this appears to be the main issue.  He was treated for thrush as above.  Both nutrition and speech have evaluated the patient.  He is on a modified diet.  Concern that patient not taking enough p.o. to maintain his nutrition.  I discussed with his daughter, Clemente, on 10/22.  I expressed that if Mr. Otto is not able to take enough p.o. to maintain his nutrition then will need to consider feeding tube.  She states that this is all new to her and that she will need to discuss with her family.  She would also like to discuss with palliative care team.  Consult is placed.  -Prior to admission was on modified diet due to recent stroke.  Seen by speech and continued on strict modified diet with 1:1 supervision.    -Calorie  counts have been done which do show some improvement in PO intake but still not meeting his requirements. Unfortunately, he is on strict diet with teaspoons at a time.  He has been able to take more PO over last 2-3 days but he is still not able to handle any liquids.  Dehydration is largest concern given his inability to take liquids and will likely require him to have feeding tube placed.    -Palliative consulted for goals of care and consideration of feeding tube. Discussed with family including daughters, brother, wife and son by phone. Dr De Santiago offered formal  but patient's wife declined and daughter acted as .  Family was encouraged that he tolerated and ate most of his dinner on 10/22.  At that time, they did want to continue to monitor his p.o. intake but if he is not able to maintain his nutrition they want feeding tube.    - Dr De Santiago called daughter, Clemente, again on 10/25.  Informed her of Ho's inability to maintain hydration without feeding tube.  We had swallow reassess to see if he could handle water but he failed on 100% of trials.    -Discussed with palliative care.  They are going to reach out to family.  Will likely need feeding tube placed with tube feed initiation in coming days.  Given that his dysphagia is secondary from his CVAs, unclear if his swallow function would get better.  Given family is determined to bring him home, would probably benefit from placement of GJ as NG would be difficult to manage at home.  -On amantadine which was started during last hospitalization for fatigue in the setting of stroke.  Continue to hold quetiapine and ramelteon.      #Recent stroke on 9/14/2021: Was hospitalized at [facility] and was in acute rehab there. Suffered right thalamic stroke.  Nonverbal and worsening right sided weakness.  He has prior history of left thalamic stroke previously.  He was noted to have periodic somnolence and lethargy as result during that  "hospitalization at [facility].  We have seen that periodically here as well and minimized sedating meds as result.      -On Plavix, Lipitor.  At admission, he was on aspirin plus Plavix.  Aspirin has been stopped, per neurology recommendations.  -Patient's family don't want him to go back to rehab facility. They want to take him home with home therapy orders.   -Will need home PT/OT/RN/HHA on discharge.  consulted for discharge planning   -Per neurology note from his admission at [facility]: \"Dual antiplatelets for 1 month then back to plavix monotherapy as he was supposed to be on. It might be informative to check P2Y12 activity after a month of being on plavix only.\"  -aspirin discontinued as it has been 1 month since the stroke.  Continue Plavix.     #Acute toxic and infectious encephalopathy: Patient with somnolence during his hospitalization.  Likely secondary to his CVA in the past along with infection with some sedating medications.  Seroquel and ramelteon have since been held.  This seem to be resolved.      #Urine retention identified during recent hospitalization for stroke: Indwelling Felix catheter was changed in the emergency department  -Outpatient urology follow-up.  -On doxazosin for BPH.  doxazosin increased.       #Type 2 diabetes: Prior to admission was on Levemir insulin 18 units at bedtime, NovoLog sliding scale insulin, Victoza, and Metformin  -Resume Levemir at a lower dose. Increased levemir to 12 units and add carb count 1 unit per 15 g carbs, hold for premeal BS < 100.  Sliding scale insulin with hypoglycemia protocol.       #Hypercholesterolemia: Continue statin  #Hypertension: Atenolol resumed.  Resumed amlodipine at a lower dose.  #Weakness, Deconditioning: PT and OT consulted  #CKD3: Baseline creatinine seems to be around 1.5.  At baseline     DVT Prophylaxis: Heparin SQ  Code Status: Full Code    Disposition: Expected discharge in home once FT placement is " done.    Interval History   Non verbal, appears comfort not in distress. SBP sl up (148) o/w vitals are normal. . Pending definitive tt for feeding, NG vs GJ.     -Data reviewed today: I reviewed all new labs and imaging results over the last 24 hours. I personally reviewed the EKG tracing showing NSR in monitor .    Physical Exam   Temp: 98.9  F (37.2  C) Temp src: Oral BP: (Abnormal) 148/74 Pulse: 68   Resp: 18 SpO2: 97 % O2 Device: None (Room air)    Vitals:    10/24/21 0511 10/25/21 0541 10/26/21 0545   Weight: 55.4 kg (122 lb 3.2 oz) 54.3 kg (119 lb 12.8 oz) 55.5 kg (122 lb 6.4 oz)     Vital Signs with Ranges  Temp:  [97.8  F (36.6  C)-98.9  F (37.2  C)] 98.9  F (37.2  C)  Pulse:  [68-72] 68  Resp:  [18-20] 18  BP: (124-148)/(63-92) 148/74  SpO2:  [97 %-99 %] 97 %  I/O last 3 completed shifts:  In: 120 [P.O.:120]  Out: 750 [Urine:750]    GEN:  Alert, non verbal, appears comfortable, NAD.  HEENT:  Normocephalic/atraumatic, no scleral icterus, no nasal discharge, mouth moist.  CV:  Regular rate and rhythm, no murmur or JVD.  S1 + S2 noted, no S3 or S4.  LUNGS:  Clear to auscultation bilaterally without rales/rhonchi/wheezing/retractions.  Symmetric chest rise on inhalation noted.  ABD:  Active bowel sounds, soft, non-tender/non-distended.  No rebound/guarding/rigidity.  EXT:  No edema or cyanosis.  No joint synovitis noted.  SKIN:  Dry to touch, no exanthems noted in the visualized areas.  NEURO: L sided weakness and paresis     Medications       amantadine  100 mg Oral Daily     amLODIPine  5 mg Oral Daily     atenolol  25 mg Oral BID     atorvastatin  80 mg Oral Daily     clopidogrel  75 mg Oral Daily     doxazosin  2 mg Oral Daily     heparin ANTICOAGULANT  5,000 Units Subcutaneous BID     insulin aspart   Subcutaneous TID w/meals     insulin aspart  1-7 Units Subcutaneous TID AC     insulin aspart  1-5 Units Subcutaneous At Bedtime     insulin detemir  12 Units Subcutaneous At Bedtime     sennosides  1  tablet Oral At Bedtime     sodium chloride (PF)  3 mL Intracatheter Q8H       Data   Recent Labs   Lab 10/26/21  0917 10/26/21  0837 10/25/21  2256 10/25/21  1711 10/25/21  1210 10/25/21  0918 10/25/21  0753 10/25/21  0558 10/24/21  0808 10/24/21  0611 10/22/21  0812 10/22/21  0739 10/21/21  0803 10/21/21  0606   WBC  --   --   --   --   --   --   --   --   --   --   --  7.3  --   --    HGB  --   --   --   --   --   --   --   --   --   --   --  11.1*  --   --    MCV  --   --   --   --   --   --   --   --   --   --   --  85  --   --    PLT  --   --   --   --   --   --   --   --   --  319  --  260  --  261   NA  --   --   --   --   --  140  --   --   --   --   --  138  --   --    POTASSIUM  --  3.4  --   --   --  3.5  --  3.8   < > 3.4   < > 3.4   < > 3.8   CHLORIDE  --   --   --   --   --  105  --   --   --   --   --  103  --   --    CO2  --   --   --   --   --  28  --   --   --   --   --  29  --   --    BUN  --   --   --   --   --  26  --   --   --   --   --  14  --   --    CR  --   --   --   --   --  1.23  --   --   --   --   --  1.34*  --   --    ANIONGAP  --   --   --   --   --  7  --   --   --   --   --  6  --   --    ARLETH  --   --   --   --   --  9.9  --   --   --   --   --  9.3  --   --    *  --  228* 310*   < > 273*   < >  --    < >  --    < > 132*   < >  --     < > = values in this interval not displayed.       No results found for this or any previous visit (from the past 24 hour(s)).          Disclaimer: This note consists of symbols derived from keyboarding, dictation and/or voice recognition software. As a result, there may be errors in the script that have gone undetected. Please consider this when interpreting information found in this chart.

## 2021-10-26 NOTE — PROGRESS NOTES
"CALORIE COUNT    Current Diet and Supplement Order:   Pureed + mildly thick per SLP by jh, Gelatein w/ meals TID    Approximate Oral Intake for 10/25:   Calories: <500 kcals   Protein: <15 g     Number of Meals Recorded: 3.     ASSESSED NUTRITION NEEDS: (DW 59 kg)  Estimated Energy Needs: 1456-5574 kcal, 25-30+ Kcal per kg  Justification: minimum maintenance  Estimated Protein Needs: >/=71 kcal, >/=1.2 g pro/Kg  Justification: preservation of lean body mass  Estimated Fluid Needs: per MD     Summary:   Bites at best consumed yesterday. Despite trial of free water protocol today per SLP, pt is still restricted to intake by teaspoon only.  Concerned of patient's current and future ability to meet hydration needs at a minimum (more immediate consequences from dehydration vs prolonged inadequate kcal + protein intake).   Discussed with SLP and Dr. Graham today - awaiting direction from provider team on pursuit of temporary vs longer term FT. Will also need consult for \"TF assess and order\" when enteral access plan established.   Phos add on in the interim. Care coordinator checking insurance coverage for potential needs at discharge. Will continue to follow per protocol.    Olga Lidia Mast, MS, RDN-AP, LD, CNSC  Pager - 3rd floor/ICU: [phone]  Pager - All other floors: [phone]  Pager - Weekend/holiday: [phone]  Office: [phone]    "

## 2021-10-26 NOTE — PLAN OF CARE
VSS. Pt is alert, unable to assess further due to non-verbal status. LS diminished. Generalized weakness. Soft pureed food with total feed. Crush medications in applesauce or pudding. Pt has ordoñez in place. Blood sugars Q4 280,210,177. POC includes increasing nutrition and hydration. Will continue to monitor.

## 2021-10-27 ENCOUNTER — APPOINTMENT (OUTPATIENT)
Dept: OCCUPATIONAL THERAPY | Facility: CLINIC | Age: 71
DRG: 871 | End: 2021-10-27
Payer: MEDICARE

## 2021-10-27 LAB
GLUCOSE BLDC GLUCOMTR-MCNC: 153 MG/DL (ref 70–99)
GLUCOSE BLDC GLUCOMTR-MCNC: 190 MG/DL (ref 70–99)
GLUCOSE BLDC GLUCOMTR-MCNC: 195 MG/DL (ref 70–99)
GLUCOSE BLDC GLUCOMTR-MCNC: 201 MG/DL (ref 70–99)
GLUCOSE BLDC GLUCOMTR-MCNC: 248 MG/DL (ref 70–99)
MAGNESIUM SERPL-MCNC: 2 MG/DL (ref 1.6–2.3)
PHOSPHATE SERPL-MCNC: 3.7 MG/DL (ref 2.5–4.5)
PLATELET # BLD AUTO: 358 10E3/UL (ref 150–450)
POTASSIUM BLD-SCNC: 3.6 MMOL/L (ref 3.4–5.3)

## 2021-10-27 PROCEDURE — 250N000011 HC RX IP 250 OP 636: Performed by: INTERNAL MEDICINE

## 2021-10-27 PROCEDURE — 97110 THERAPEUTIC EXERCISES: CPT | Mod: GO

## 2021-10-27 PROCEDURE — 120N000001 HC R&B MED SURG/OB

## 2021-10-27 PROCEDURE — 83735 ASSAY OF MAGNESIUM: CPT | Performed by: HOSPITALIST

## 2021-10-27 PROCEDURE — 36415 COLL VENOUS BLD VENIPUNCTURE: CPT | Performed by: INTERNAL MEDICINE

## 2021-10-27 PROCEDURE — 36415 COLL VENOUS BLD VENIPUNCTURE: CPT | Performed by: HOSPITALIST

## 2021-10-27 PROCEDURE — 250N000013 HC RX MED GY IP 250 OP 250 PS 637: Performed by: INTERNAL MEDICINE

## 2021-10-27 PROCEDURE — 258N000003 HC RX IP 258 OP 636: Performed by: HOSPITALIST

## 2021-10-27 PROCEDURE — 99233 SBSQ HOSP IP/OBS HIGH 50: CPT | Performed by: HOSPITALIST

## 2021-10-27 PROCEDURE — 85049 AUTOMATED PLATELET COUNT: CPT | Performed by: INTERNAL MEDICINE

## 2021-10-27 PROCEDURE — 250N000011 HC RX IP 250 OP 636: Performed by: HOSPITALIST

## 2021-10-27 PROCEDURE — 97530 THERAPEUTIC ACTIVITIES: CPT | Mod: GO

## 2021-10-27 PROCEDURE — 99233 SBSQ HOSP IP/OBS HIGH 50: CPT | Performed by: CLINICAL NURSE SPECIALIST

## 2021-10-27 PROCEDURE — 84132 ASSAY OF SERUM POTASSIUM: CPT | Performed by: HOSPITALIST

## 2021-10-27 PROCEDURE — 84100 ASSAY OF PHOSPHORUS: CPT | Performed by: HOSPITALIST

## 2021-10-27 RX ORDER — DEXTROSE MONOHYDRATE 100 MG/ML
INJECTION, SOLUTION INTRAVENOUS CONTINUOUS PRN
Status: DISCONTINUED | OUTPATIENT
Start: 2021-10-27 | End: 2021-11-03

## 2021-10-27 RX ORDER — SODIUM CHLORIDE, SODIUM LACTATE, POTASSIUM CHLORIDE, CALCIUM CHLORIDE 600; 310; 30; 20 MG/100ML; MG/100ML; MG/100ML; MG/100ML
INJECTION, SOLUTION INTRAVENOUS CONTINUOUS
Status: DISCONTINUED | OUTPATIENT
Start: 2021-10-27 | End: 2021-11-02

## 2021-10-27 RX ADMIN — AMLODIPINE BESYLATE 5 MG: 5 TABLET ORAL at 09:12

## 2021-10-27 RX ADMIN — SODIUM CHLORIDE, POTASSIUM CHLORIDE, SODIUM LACTATE AND CALCIUM CHLORIDE: 600; 310; 30; 20 INJECTION, SOLUTION INTRAVENOUS at 10:58

## 2021-10-27 RX ADMIN — DOXAZOSIN 2 MG: 1 TABLET ORAL at 09:13

## 2021-10-27 RX ADMIN — HEPARIN SODIUM 5000 UNITS: 10000 INJECTION, SOLUTION INTRAVENOUS; SUBCUTANEOUS at 09:13

## 2021-10-27 RX ADMIN — ATORVASTATIN CALCIUM 80 MG: 40 TABLET, FILM COATED ORAL at 09:12

## 2021-10-27 RX ADMIN — ENOXAPARIN SODIUM 40 MG: 40 INJECTION SUBCUTANEOUS at 15:06

## 2021-10-27 RX ADMIN — AMANTADINE HYDROCHLORIDE 100 MG: 100 CAPSULE ORAL at 09:12

## 2021-10-27 RX ADMIN — ATENOLOL 25 MG: 25 TABLET ORAL at 09:12

## 2021-10-27 ASSESSMENT — ACTIVITIES OF DAILY LIVING (ADL)
ADLS_ACUITY_SCORE: 34

## 2021-10-27 NOTE — PLAN OF CARE
/62 (BP Location: Left arm)   Pulse 68   Temp 98.6  F (37  C) (Oral)   Resp 20   Wt 55.5 kg (122 lb 6.4 oz)   SpO2 99%   BMI 21.01 kg/m      Patient nonverbal. Poor PO intake. Difficulty swallowing PO meds. Ate moderate amount of soup that daughter brought in. A2 with lift.  Denies pain. Felix in place. Plan to have GJ tube placed tomorrow for tube feeds. Will continue to monitor.

## 2021-10-27 NOTE — PLAN OF CARE
VSS. Nonverbal, unable to assess orientation, nods yes and no to questions. Denies pain, CP, and SOB. Turned and repositioned as needed. Up to chair for meals. Only ate a few bites of meals. LR infusing at 75mL. Felix in place, 200 out.  and 201, insulin given per sliding scale. Ambulating Ax2/lift. Plan for NG feeding tube placement today. Possible discharge tomorrow.

## 2021-10-27 NOTE — PROGRESS NOTES
"CLINICAL NUTRITION SERVICES - REASSESSMENT NOTE    Recommendations Ordered by Registered Dietitian (RD):     Continue oral nutrition supplements, diet per SLP   Future/Additional Recommendations:     Consult for \"TF assess and order\" when provider team has solidified enteral nutrition support plan.     If access achieved today, orders can be signed/released for following TF:   Enteral Regimen: Jevity 1.5 at goal rate of 55 mL/hr x 24 hrs  Total Enteral Provisions: 1320 mL provides 1980 kcal, 84 g protein, 285 g CHO, 28 g fiber, and 1003 mL free water daily   Free Water Flush: standard 60 mL q4 hours, while IVF on  Phos add on  Daily weights  Start at 15 mL/hr, increase by 15 mL q8 hours until goal rate reached - can increase advancement schedule following morning if tolerating, labs stable   Malnutrition:   % Weight Loss:  > 7.5% in 3 months (severe malnutrition)  % Intake:  </= 50% for >/= 5 days (severe malnutrition)  Subcutaneous Fat Loss: moderate depletion, as outlined 10/16  Muscle Loss: moderate depletion, as outlined 10/16  Fluid Retention:  None noted     Malnutrition Diagnosis: Severe malnutrition  In Context of: Acute on Chronic illness or disease     EVALUATION OF PROGRESS TOWARD GOALS/NEW FINDINGS   Progress towards goals will be monitored and evaluated per protocol and Practice Guidelines    Diet order: Combination Diet Pureed Diet (level 4); Mildly Thick (level 2) (tsp ONLY) per SLP  Supplements: gelatain plus with meals  Requiring feeding assistance.  Per flow sheet review and calorie count data, minimal intake for 4 days. Mentation, dysphagia and appetite remain barriers to adequate intake. As documented previously, ongoing concern of ability to meet hydration needs (more immediate complications) and kcal/protein needs.  Family interested in G tube, however, Plavix needs to be held x 5 days. MD concerned about risks of holding this and in discussion with family, palliative care for " timeline.      BM: 10/26    Skin: pale, bruised, flaky    Fluid: no edema charted.    Weight: 54 kg -- down 8% since admit    Palliative: following for goals of care    Rohit nutrition score: 1; total score: 14    I/O last 3 completed shifts:    In: -     Out: 900 [Urine:900]    Labs:    Electrolytes  Potassium (mmol/L)   Date Value   10/27/2021 3.6   10/26/2021 3.4   10/25/2021 3.5   08/16/2012 4.0   08/13/2012 3.6        Blood Glucose  Glucose (mg/dL)   Date Value   10/25/2021 273 (H)   10/22/2021 132 (H)   10/18/2021 231 (H)     GLUCOSE BY METER POCT (mg/dL)   Date Value   10/27/2021 153 (H)   10/27/2021 248 (H)   10/26/2021 398 (H)   10/26/2021 259 (H)   10/26/2021 161 (H)     Hemoglobin A1C (%)   Date Value   09/15/2021 11.5 (H)   08/14/2012 7.6 (H)        Inflammatory Markers  CRP Inflammation (mg/L)   Date Value   09/19/2021 28.0 (H)       WBC Count (10e3/uL)   Date Value   10/22/2021 7.3   10/18/2021 6.7   10/17/2021 7.4     Albumin (g/dL)   Date Value   10/13/2021 3.3 (L)   09/15/2021 2.6 (L)   08/13/2012 5.1 (H)        Magnesium (mg/dL)   Date Value   10/27/2021 2.0   10/26/2021 2.0   10/25/2021 2.2     Sodium (mmol/L)   Date Value   10/25/2021 140   10/22/2021 138   10/18/2021 137   08/16/2012 140   08/13/2012 141        Renal  Urea Nitrogen (mg/dL)   Date Value   10/25/2021 26   10/22/2021 14   10/18/2021 17   08/16/2012 18   08/13/2012 14     Creatinine (mg/dL)   Date Value   10/25/2021 1.23   10/22/2021 1.34 (H)   10/18/2021 1.33 (H)   08/16/2012 0.96   08/13/2012 0.91         Additional  Triglycerides (mg/dL)   Date Value   09/15/2021 238 (H)   08/14/2012 136     Ketones Urine (mg/dL)   Date Value   10/13/2021 Negative        Meds:    amantadine  100 mg Oral Daily     amLODIPine  5 mg Oral Daily     atenolol  25 mg Oral BID     atorvastatin  80 mg Oral Daily     [Held by provider] clopidogrel  75 mg Oral Daily     doxazosin  2 mg Oral Daily     heparin ANTICOAGULANT  5,000 Units Subcutaneous BID      "insulin aspart   Subcutaneous TID w/meals     insulin aspart  1-7 Units Subcutaneous TID AC     insulin aspart  1-5 Units Subcutaneous At Bedtime     insulin detemir  12 Units Subcutaneous At Bedtime     sennosides  1 tablet Oral At Bedtime     sodium chloride (PF)  3 mL Intracatheter Q8H        lactated ringers 75 mL/hr at 10/27/21 1058       ASSESSED NUTRITION NEEDS (PER APPROVED PRACTICE GUIDELINES; DW: 59 kg):  Estimated Energy Needs: 30-35+ Kcal per kg  Justification: prolonged NPO, maintenance while hospitalized   Estimated Protein Needs: 1.2-1.5+ g pro/Kg  Justification: preservation of lean body mass  Estimated Fluid Needs: >1 mL/kcal     Previous Goals:   Diet >/=full liquid within 24-48 hours  Evaluation:met, but intake inconsistent and remains inadequate    Previous Nutrition Diagnosis:   Inadequate oral intake related to decreased appetite, difficulty/pain with swallowing, altered mentation 2/2 recent stroke, oral thrush as evidenced by minimal PO intake since admit with PO intake down from baseline over the past month, 12% wt loss over the past 3 months and  fat + muscle loss  Evaluation: Ongoing, updated below  Swallowing difficulty related to dysphagia s/p stroke as evidenced by NPO per SLP  Evaluation: Ongoing    CURRENT NUTRITION DIAGNOSIS  Inadequate oral intake related to decreased appetite, difficulty/pain with swallowing, altered mentation 2/2 recent stroke, oral thrush as evidenced by minimal PO intake since admit with PO intake down from baseline over the past month, 12% wt loss over the past 3 months and  fat + muscle loss    Swallowing difficulty related to dysphagia s/p stroke as evidenced by diet modifications with thickened liquids by teaspoon only.    INTERVENTIONS  Recommendations / Nutrition Prescription  Diet consistency/texture per SLP + Diet appropriate oral nutrition supplements to increase protein and calorie intake    Consult for \"TF assess and order\" when provider team has " solidified enteral nutrition support plan.   Phos add on    Implementation  Collaboration and Referral of care: Discussed patient during interdisciplinary care rounds this morning and with Dr. Graham. Care coordinator aware and following for discharge planning.    Goals  Enteral access and TF start within 24 hours (if aligns with goals of care)      MONITORING AND EVALUATION:  Progress towards goals will be monitored and evaluated per protocol and Practice Guidelines      Olga Lidia Mast MS, RDN-AP, LD, CNSC  Pager - 3rd floor/ICU: [phone]  Pager - All other floors: [phone]  Pager - Weekend/holiday: [phone]  Office: [phone]

## 2021-10-27 NOTE — PROGRESS NOTES
Radiologist reviewed request for placement of new gastrojejunostomy tube.  Patient needs a five day hold for Plavix and a 24 hour hold for heparin per high bleeding risk protocol.  Nursing unit notified.

## 2021-10-27 NOTE — PROGRESS NOTES
[facility]    Hospitalist Progress Note    Date of Service (when I saw the patient): 10/27/2021  Provider:  Suhas Graham MD   Text Page  7am - 6PM       Assessment & Plan   Jimmy Otto is a 71-year-old male with history of insulin-dependent type 2 diabetes, hypercholesterolemia, hypertension, TIA, erectile dysfunction, recent stroke, and urinary retention with indwelling Ordoñez catheter placed about 2 and half weeks ago. Unfortunately, he had right thalamic stroke on 9/14/2021.  This left him nonverbal.  He discharged to the acute rehab unit at [facility] where he stayed from 9/17 through 10/6.  He returned home.  His family brought him in for evaluation of weakness, nausea, vomiting, decreased oral intake, difficulty swallowing, and generally doing poorly.       #Sepsis (leukocytosis, heart rate 90s, lactic acidosis) secondary to E Coli Urinary tract infection with chronic ordoñez catheter in place: Urine culture grew pansensitive E. coli.  Completed 7 days of IV ceftriaxone.  Ordoñez catheter exchanged in the ED.     #Thrush: Treated with fluconazole-completed.  -Continue magic mouthwash as needed for pain     #Severe malnutrition in setting of CVA, Dysphagia from strokes: Currently, this appears to be the main issue.  He was treated for thrush as above.  Both nutrition and speech have evaluated the patient.  He is on a modified diet.  Concern that patient not taking enough p.o. to maintain his nutrition.  I discussed with his daughter, Clemente, on 10/22.  I expressed that if Mr. Otto is not able to take enough p.o. to maintain his nutrition then will need to consider feeding tube.  She states that this is all new to her and that she will need to discuss with her family.  She would also like to discuss with palliative care team.  Consult is placed.  -Prior to admission was on modified diet due to recent stroke.  Seen by speech and continued on strict modified diet with 1:1 supervision.    -Calorie  counts have been done which do show some improvement in PO intake but still not meeting his requirements. Unfortunately, he is on strict diet with teaspoons at a time.  He has been able to take more PO over last 2-3 days but he is still not able to handle any liquids.  Dehydration is largest concern given his inability to take liquids and will likely require him to have feeding tube placed.    -Palliative consulted for goals of care and consideration of feeding tube. Discussed with family including daughters, brother, wife and son by phone. Dr De Santiago offered formal  but patient's wife declined and daughter acted as .  Family was encouraged that he tolerated and ate most of his dinner on 10/22.  At that time, they did want to continue to monitor his p.o. intake but if he is not able to maintain his nutrition they want feeding tube.    - Dr De Santiago called daughter, Clemente, again on 10/25.  Informed her of Ho's inability to maintain hydration without feeding tube.  We had swallow reassess to see if he could handle water but he failed on 100% of trials.    -Discussed with palliative care. Given that his dysphagia is secondary from his CVAs, unclear if his swallow function would get better.  Given family is determined to bring him home, they are in agreement with placement of GJ but because of the Plavix, the patient cannot be done until 5 days of Plavix.  In the interim he will have NG placement, discharged to home as possible on Lovenox twice daily, plan to hold on the heparin and 24 hours before GJ placement by IR.  Anticipate this will be Monday or Tuesday this coming week.  Palliative involvement and help highly appreciated.  -On amantadine which was started during last hospitalization for fatigue in the setting of stroke.  Continue to hold quetiapine and ramelteon.      #Recent stroke on 9/14/2021: Was hospitalized at [facility] and was in acute rehab there. Suffered right thalamic  "stroke.  Nonverbal and worsening right sided weakness.  He has prior history of left thalamic stroke previously.  He was noted to have periodic somnolence and lethargy as result during that hospitalization at [facility].  We have seen that periodically here as well and minimized sedating meds as result.      -On Plavix, Lipitor.  At admission, he was on aspirin plus Plavix.  Aspirin has been stopped, per neurology recommendations.  -Patient's family don't want him to go back to rehab facility. They want to take him home with home therapy orders.   -Will need home PT/OT/RN/HHA on discharge.  consulted for discharge planning   -Per neurology note from his admission at [facility]: \"Dual antiplatelets for 1 month then back to plavix monotherapy as he was supposed to be on. It might be informative to check P2Y12 activity after a month of being on plavix only.\"  -aspirin discontinued as it has been 1 month since the stroke.  Continue Plavix.     #Acute toxic and infectious encephalopathy: Patient with somnolence during his hospitalization.  Likely secondary to his CVA in the past along with infection with some sedating medications.  Seroquel and ramelteon have since been held.  This seem to be resolved.      #Urine retention identified during recent hospitalization for stroke: Indwelling Felix catheter was changed in the emergency department  -Outpatient urology follow-up.  -On doxazosin for BPH.  doxazosin increased.       #Type 2 diabetes: Prior to admission was on Levemir insulin 18 units at bedtime, NovoLog sliding scale insulin, Victoza, and Metformin  -Resume Levemir at a lower dose. Increased levemir to 12 units and add carb count 1 unit per 15 g carbs, hold for premeal BS < 100.  Sliding scale insulin with hypoglycemia protocol.       #Hypercholesterolemia: Continue statin  #Hypertension: Atenolol resumed.  Resumed amlodipine at a lower dose.  #Weakness, Deconditioning: PT and OT " consulted  #CKD3: Baseline creatinine seems to be around 1.5.  At baseline     DVT Prophylaxis: Heparin SQ  Code Status: Full Code    Disposition: Expected discharge in home once FT placement is done.    Interval History   Non verbal, appears comfort not in distress. SBP sl up (144) o/w vitals are normal. . Pending definitive tt for feeding, temporary NG until GJ is placed.     -Data reviewed today: I reviewed all new labs and imaging results over the last 24 hours. I personally reviewed the EKG tracing showing NSR in monitor .    Physical Exam   Temp: 97.2  F (36.2  C) Temp src: Oral BP: (Abnormal) 144/84 Pulse: 68   Resp: 20 SpO2: 95 % O2 Device: None (Room air)    Vitals:    10/25/21 0541 10/26/21 0545 10/27/21 0554   Weight: 54.3 kg (119 lb 12.8 oz) 55.5 kg (122 lb 6.4 oz) 54.7 kg (120 lb 9.6 oz)     Vital Signs with Ranges  Temp:  [97.2  F (36.2  C)-98.6  F (37  C)] 97.2  F (36.2  C)  Pulse:  [68-84] 68  Resp:  [20] 20  BP: (113-144)/(62-84) 144/84  SpO2:  [95 %-99 %] 95 %  I/O last 3 completed shifts:  In: -   Out: 1100 [Urine:1100]    GEN:  Alert, non verbal, appears comfortable, NAD.  HEENT:  Normocephalic/atraumatic, no scleral icterus, no nasal discharge, mouth moist.  CV:  Regular rate and rhythm, no murmur or JVD.  S1 + S2 noted, no S3 or S4.  LUNGS:  Clear to auscultation bilaterally without rales/rhonchi/wheezing/retractions.  Symmetric chest rise on inhalation noted.  ABD:  Active bowel sounds, soft, non-tender/non-distended.  No rebound/guarding/rigidity.  EXT:  No edema or cyanosis.  No joint synovitis noted.  SKIN:  Dry to touch, no exanthems noted in the visualized areas.  NEURO: L sided weakness and paresis     Medications     dextrose       lactated ringers 75 mL/hr at 10/27/21 1058       amantadine  100 mg Oral Daily     amLODIPine  5 mg Oral Daily     atenolol  25 mg Oral BID     atorvastatin  80 mg Oral Daily     [Held by provider] clopidogrel  75 mg Oral Daily     doxazosin  2 mg Oral  Daily     enoxaparin ANTICOAGULANT  0.75 mg/kg Subcutaneous Q12H     insulin aspart   Subcutaneous TID w/meals     insulin aspart  1-7 Units Subcutaneous TID AC     insulin aspart  1-5 Units Subcutaneous At Bedtime     insulin detemir  12 Units Subcutaneous At Bedtime     sennosides  1 tablet Oral At Bedtime     sodium chloride (PF)  3 mL Intracatheter Q8H       Data   Recent Labs   Lab 10/27/21  1413 10/27/21  0936 10/27/21  0850 10/27/21  0743 10/27/21  0204 10/26/21  0917 10/26/21  0837 10/25/21  1210 10/25/21  0918 10/24/21  0808 10/24/21  0611 10/22/21  0812 10/22/21  0739   WBC  --   --   --   --   --   --   --   --   --   --   --   --  7.3   HGB  --   --   --   --   --   --   --   --   --   --   --   --  11.1*   MCV  --   --   --   --   --   --   --   --   --   --   --   --  85   PLT  --   --   --  358  --   --   --   --   --   --  319  --  260   NA  --   --   --   --   --   --   --   --  140  --   --   --  138   POTASSIUM  --  3.6  --   --   --   --  3.4  --  3.5   < > 3.4   < > 3.4   CHLORIDE  --   --   --   --   --   --   --   --  105  --   --   --  103   CO2  --   --   --   --   --   --   --   --  28  --   --   --  29   BUN  --   --   --   --   --   --   --   --  26  --   --   --  14   CR  --   --   --   --   --   --   --   --  1.23  --   --   --  1.34*   ANIONGAP  --   --   --   --   --   --   --   --  7  --   --   --  6   ARLETH  --   --   --   --   --   --   --   --  9.9  --   --   --  9.3   *  --  153*  --  248*   < >  --    < > 273*   < >  --    < > 132*    < > = values in this interval not displayed.       No results found for this or any previous visit (from the past 24 hour(s)).          Disclaimer: This note consists of symbols derived from keyboarding, dictation and/or voice recognition software. As a result, there may be errors in the script that have gone undetected. Please consider this when interpreting information found in this chart.

## 2021-10-27 NOTE — PROGRESS NOTES
[facility]    Received request for benefit check for home enteral nutrition. Jimmy Otto meets Medicare criteria for enteral tube feeding. Between Medicare and his [employer] supplement plans he is covered at 100%.     Please note, anticipated length of need must be 90 days or more in order for insurance to cover and this has to be documented in the discharge summary. If not then patient will be self-pay for the therapy.    Addendum @ 1428h: Phone call placed to pt's daughter, Clemente to introduce home infusion services, review benefits and offer choice of providers. No answer, generic VM message left.     Addendum @ 1506h: received return phone call from Clemente. She would like to proceed with [facility] for home enteral needs. I will follow up with Clemente closer to discharge to coordinate enteral education.     Thank you for the referral.    Claire Kan RN  [facility] Liaison  [phone] (Mon thru Fri 8am - 5pm)  [phone] Office

## 2021-10-27 NOTE — PROGRESS NOTES
[facility]  Palliative Care Progress Note  Text Page     Assessment & Plan     Recommendations:  1. Goals of Care - Full Code - Restorative care. Family agreeable with NG tube feeding placement while awaiting timing of GJ-tube feeding.       Hospitalization goals discussed discussed with daughter Clemente Clark (spouse non english speaking and patient non verbal). Clemente has discussed options with their family. They agree with plan for NG feedings while timing of PEG tube is figured out. Their only concern is in Ho's wife's ability to feel comfortable with the tube feedings, as she is not English speaking and will need  for education.          Decisional Capacity- Unreliable. Patient does not have an advance directive. Per  informed consent policy next of kin should be involved if patient becomes unable. Consent and decision making by next of kin. Spouse is next of kin, has deferred to her daughter Clemente Clark in assisting the family with decision making.     POLST  - consider completion prior to discharge     2. Dysphagia - Appreciate input of Speech Therapist Alba Georges, SLP as patient has improved tolerance and is okay for free water protocol. Family is interested in the option of TUBE FEEDING.     3.   Nutrition - Appreciate input of Registered Dietitian Olga Lidia Mast RD, LD. Family request TUBE FEEDING to maintain nutrition and hydration.     4.  Pain - Patient sleeping at time of assessment and no family at bedside to provide insight. Continue benzocaine prn using a oral sponge for comfort.    Patient's opioid use in past 24 hours: 0 = 0 mg Daily Morphine Equivalent  [place] Board of Pharmacy Data Base Reviewed:    YES; As expected, no concern for misuse/abuse of controlled medications based on this report. No controlled substances in past 12 months     5. Spiritual Care  Oriented to Spiritual Health as part of Palliative Care team.  Spiritual Background: Bahai      5.  "Care Planning  Appreciate Care of Alanis Chong, RAHAT. discharge home with [facility] services.  Appreciate input of Infusion Service nurse MORRO Alvarez regarding enteral nutrition coverage.   Medications for discharge - none from Palliative perspective      Medical Decision Making and Goals of Care:  Discussed on October 27, 2021 with FEMI Trujillo:    Phoned the patient's daughter Clemente Clark at [phone]. Discussed the issue of holding Plavix for five days before GJ tube placement. Clemente is in agreement with placing temporary NG tube feedings to assist her father in gaining strength and nutrition until long-term tube feeding is placed. Clemente offered concern regarding her mother's comfort in dealing with Ho's feeding tube, as her mother does not speak English and \"She might need more time to learn about the feeding tube.\"       The patient's daughter, Clemente  called our office later in the afternoon and clarified that she has spoken with her family regarding the NG tube feeding placement. Clemente had a question from her uncle who is currently visiting . He was wondering if iJmmy could still eat with the plan for tube feedings, since a tray had arrived for him. I explained I would have 's bedside nurse Zofia Simpson RN check in with her uncle and father, but Jimmy would be able to eat orally despite a NG or GJ tube feeding. I clarified tht the tube feeding is needed as Jimmy is not meeting his nutritional and fluid needs and given his dysphagia tube feeding is a way to help him meet those needs.     Yarely Gaitan MS, RN, CNS, APRN, ACHPN, FAACVPR  Pain and Palliative Care  Pager [phone]  Office [phone]     Time Spent on this Encounter   Total unit/floor time 11:35 AM until Noon, 1:15 PM and until 1:25 PM time consisted of the following, examination of the patient, reviewing the record and completing documentation. >50% of time spent in counseling and coordination of care.  Time spend counseling " with family consisted of the following topics, care planning for discharge and symptom management.  Time spent in coordination of care with Bedside Nurse Zofia Simpson RN and Hospitalist Suhas Graham MD.     Interval History   Chart reviewed - no new issues. Unable to place GJ tube until off Plavix for 5 days    Review of Systems      Unable as patient non-verbal and sleeping        Physical Exam   Temp:  [97.2  F (36.2  C)-98.6  F (37  C)] 97.2  F (36.2  C)  Pulse:  [68-84] 68  Resp:  [20] 20  BP: (113-144)/(62-84) 144/84  SpO2:  [95 %-99 %] 95 %  120 lbs 9.6 oz  GEN:  Sleeping in chair and appears comfortable, no apparent distress.  HEENT:  Normocephalic/atraumatic, no scleral icterus, no nasal discharge, mouth moist.  CV:  RRR, S1, S2; no murmurs or other irregularities noted.  +3 DP/PT pulses bilaterally; no edema BLE.  RESP:  Clear to auscultation bilaterally without rales/rhonchi/wheezing/retractions.  Symmetric chest rise on inhalation noted.  Normal respiratory effort.  ABD:  Rounded, soft, non-tender/non-distended.  +BS  SKIN:  Warm and dry to touch.    Medications     lactated ringers 75 mL/hr at 10/27/21 1058       amantadine  100 mg Oral Daily     amLODIPine  5 mg Oral Daily     atenolol  25 mg Oral BID     atorvastatin  80 mg Oral Daily     [Held by provider] clopidogrel  75 mg Oral Daily     doxazosin  2 mg Oral Daily     heparin ANTICOAGULANT  5,000 Units Subcutaneous BID     insulin aspart   Subcutaneous TID w/meals     insulin aspart  1-7 Units Subcutaneous TID AC     insulin aspart  1-5 Units Subcutaneous At Bedtime     insulin detemir  12 Units Subcutaneous At Bedtime     sennosides  1 tablet Oral At Bedtime     sodium chloride (PF)  3 mL Intracatheter Q8H       Data   Results for orders placed or performed during the hospital encounter of 10/13/21 (from the past 24 hour(s))   Glucose by meter   Result Value Ref Range    GLUCOSE BY METER POCT 161 (H) 70 - 99 mg/dL   Glucose by meter   Result  Value Ref Range    GLUCOSE BY METER POCT 259 (H) 70 - 99 mg/dL   Glucose by meter   Result Value Ref Range    GLUCOSE BY METER POCT 398 (H) 70 - 99 mg/dL   Glucose by meter   Result Value Ref Range    GLUCOSE BY METER POCT 248 (H) 70 - 99 mg/dL   Platelet count   Result Value Ref Range    Platelet Count 358 150 - 450 10e3/uL   Glucose by meter   Result Value Ref Range    GLUCOSE BY METER POCT 153 (H) 70 - 99 mg/dL   Magnesium   Result Value Ref Range    Magnesium 2.0 1.6 - 2.3 mg/dL   Potassium   Result Value Ref Range    Potassium 3.6 3.4 - 5.3 mmol/L

## 2021-10-27 NOTE — PLAN OF CARE
VSS. Pt is alert, unable to assess further due to non-verbal status. LS diminished. Generalized weakness. Soft pureed food with total feed. Pt having issues with crushed medications and swallowing.   Pt has ordoñez in place MONIKA. POC includes JG tube placement for feedings. On hold for 5 days due to Plavix. Will continue to monitor.

## 2021-10-28 ENCOUNTER — APPOINTMENT (OUTPATIENT)
Dept: GENERAL RADIOLOGY | Facility: CLINIC | Age: 71
DRG: 871 | End: 2021-10-28
Attending: HOSPITALIST
Payer: MEDICARE

## 2021-10-28 ENCOUNTER — APPOINTMENT (OUTPATIENT)
Dept: SPEECH THERAPY | Facility: CLINIC | Age: 71
DRG: 871 | End: 2021-10-28
Payer: MEDICARE

## 2021-10-28 LAB
ANION GAP SERPL CALCULATED.3IONS-SCNC: 7 MMOL/L (ref 3–14)
BASOPHILS # BLD AUTO: 0.1 10E3/UL (ref 0–0.2)
BASOPHILS NFR BLD AUTO: 1 %
BUN SERPL-MCNC: 33 MG/DL (ref 7–30)
CALCIUM SERPL-MCNC: 9.6 MG/DL (ref 8.5–10.1)
CHLORIDE BLD-SCNC: 110 MMOL/L (ref 94–109)
CO2 SERPL-SCNC: 28 MMOL/L (ref 20–32)
CREAT SERPL-MCNC: 1.39 MG/DL (ref 0.66–1.25)
EOSINOPHIL # BLD AUTO: 0.1 10E3/UL (ref 0–0.7)
EOSINOPHIL NFR BLD AUTO: 1 %
ERYTHROCYTE [DISTWIDTH] IN BLOOD BY AUTOMATED COUNT: 12.9 % (ref 10–15)
GFR SERPL CREATININE-BSD FRML MDRD: 51 ML/MIN/1.73M2
GLUCOSE BLD-MCNC: 220 MG/DL (ref 70–99)
GLUCOSE BLDC GLUCOMTR-MCNC: 127 MG/DL (ref 70–99)
GLUCOSE BLDC GLUCOMTR-MCNC: 142 MG/DL (ref 70–99)
GLUCOSE BLDC GLUCOMTR-MCNC: 184 MG/DL (ref 70–99)
GLUCOSE BLDC GLUCOMTR-MCNC: 210 MG/DL (ref 70–99)
GLUCOSE BLDC GLUCOMTR-MCNC: 241 MG/DL (ref 70–99)
HCT VFR BLD AUTO: 42.2 % (ref 40–53)
HGB BLD-MCNC: 12.6 G/DL (ref 13.3–17.7)
IMM GRANULOCYTES # BLD: 0 10E3/UL
IMM GRANULOCYTES NFR BLD: 1 %
LYMPHOCYTES # BLD AUTO: 1.4 10E3/UL (ref 0.8–5.3)
LYMPHOCYTES NFR BLD AUTO: 22 %
MAGNESIUM SERPL-MCNC: 1.9 MG/DL (ref 1.6–2.3)
MCH RBC QN AUTO: 26.3 PG (ref 26.5–33)
MCHC RBC AUTO-ENTMCNC: 29.9 G/DL (ref 31.5–36.5)
MCV RBC AUTO: 88 FL (ref 78–100)
MONOCYTES # BLD AUTO: 0.4 10E3/UL (ref 0–1.3)
MONOCYTES NFR BLD AUTO: 6 %
NEUTROPHILS # BLD AUTO: 4.6 10E3/UL (ref 1.6–8.3)
NEUTROPHILS NFR BLD AUTO: 69 %
NRBC # BLD AUTO: 0 10E3/UL
NRBC BLD AUTO-RTO: 0 /100
PHOSPHATE SERPL-MCNC: 3.4 MG/DL (ref 2.5–4.5)
PLATELET # BLD AUTO: 332 10E3/UL (ref 150–450)
POTASSIUM BLD-SCNC: 3.8 MMOL/L (ref 3.4–5.3)
RBC # BLD AUTO: 4.8 10E6/UL (ref 4.4–5.9)
SODIUM SERPL-SCNC: 145 MMOL/L (ref 133–144)
WBC # BLD AUTO: 6.6 10E3/UL (ref 4–11)

## 2021-10-28 PROCEDURE — 87635 SARS-COV-2 COVID-19 AMP PRB: CPT | Performed by: HOSPITALIST

## 2021-10-28 PROCEDURE — 74340 X-RAY GUIDE FOR GI TUBE: CPT

## 2021-10-28 PROCEDURE — 44500 INTRO GASTROINTESTINAL TUBE: CPT

## 2021-10-28 PROCEDURE — 120N000001 HC R&B MED SURG/OB

## 2021-10-28 PROCEDURE — 83735 ASSAY OF MAGNESIUM: CPT | Performed by: HOSPITALIST

## 2021-10-28 PROCEDURE — 80048 BASIC METABOLIC PNL TOTAL CA: CPT | Performed by: HOSPITALIST

## 2021-10-28 PROCEDURE — 250N000011 HC RX IP 250 OP 636: Performed by: HOSPITALIST

## 2021-10-28 PROCEDURE — 36415 COLL VENOUS BLD VENIPUNCTURE: CPT | Performed by: HOSPITALIST

## 2021-10-28 PROCEDURE — 85004 AUTOMATED DIFF WBC COUNT: CPT | Performed by: HOSPITALIST

## 2021-10-28 PROCEDURE — 250N000013 HC RX MED GY IP 250 OP 250 PS 637: Performed by: INTERNAL MEDICINE

## 2021-10-28 PROCEDURE — 250N000009 HC RX 250: Performed by: INTERNAL MEDICINE

## 2021-10-28 PROCEDURE — 99233 SBSQ HOSP IP/OBS HIGH 50: CPT | Performed by: HOSPITALIST

## 2021-10-28 PROCEDURE — 258N000003 HC RX IP 258 OP 636: Performed by: HOSPITALIST

## 2021-10-28 PROCEDURE — 272N000078 HC NUTRITION PRODUCT INTERMEDIATE LITER

## 2021-10-28 PROCEDURE — 92526 ORAL FUNCTION THERAPY: CPT | Mod: GN

## 2021-10-28 PROCEDURE — 84100 ASSAY OF PHOSPHORUS: CPT | Performed by: HOSPITALIST

## 2021-10-28 RX ORDER — LIDOCAINE HYDROCHLORIDE 20 MG/ML
JELLY TOPICAL ONCE
Status: COMPLETED | OUTPATIENT
Start: 2021-10-28 | End: 2021-10-28

## 2021-10-28 RX ADMIN — ATENOLOL 25 MG: 25 TABLET ORAL at 21:53

## 2021-10-28 RX ADMIN — DOXAZOSIN 2 MG: 1 TABLET ORAL at 09:15

## 2021-10-28 RX ADMIN — ATORVASTATIN CALCIUM 80 MG: 40 TABLET, FILM COATED ORAL at 09:16

## 2021-10-28 RX ADMIN — SENNOSIDES 1 TABLET: 8.6 TABLET, FILM COATED ORAL at 21:53

## 2021-10-28 RX ADMIN — INSULIN ASPART 5 UNITS: 100 INJECTION, SOLUTION INTRAVENOUS; SUBCUTANEOUS at 12:47

## 2021-10-28 RX ADMIN — AMANTADINE HYDROCHLORIDE 100 MG: 100 CAPSULE ORAL at 09:16

## 2021-10-28 RX ADMIN — ENOXAPARIN SODIUM 40 MG: 40 INJECTION SUBCUTANEOUS at 03:04

## 2021-10-28 RX ADMIN — AMLODIPINE BESYLATE 5 MG: 5 TABLET ORAL at 09:16

## 2021-10-28 RX ADMIN — ENOXAPARIN SODIUM 40 MG: 40 INJECTION SUBCUTANEOUS at 14:55

## 2021-10-28 RX ADMIN — DIATRIZOATE MEGLUMINE AND DIATRIZOATE SODIUM 15 ML: 660; 100 SOLUTION ORAL; RECTAL at 10:58

## 2021-10-28 RX ADMIN — SODIUM CHLORIDE, POTASSIUM CHLORIDE, SODIUM LACTATE AND CALCIUM CHLORIDE: 600; 310; 30; 20 INJECTION, SOLUTION INTRAVENOUS at 14:55

## 2021-10-28 RX ADMIN — INSULIN ASPART 3 UNITS: 100 INJECTION, SOLUTION INTRAVENOUS; SUBCUTANEOUS at 16:43

## 2021-10-28 RX ADMIN — ATENOLOL 25 MG: 25 TABLET ORAL at 09:16

## 2021-10-28 RX ADMIN — LIDOCAINE HYDROCHLORIDE 10 ML: 20 JELLY TOPICAL at 10:57

## 2021-10-28 RX ADMIN — SODIUM CHLORIDE, POTASSIUM CHLORIDE, SODIUM LACTATE AND CALCIUM CHLORIDE: 600; 310; 30; 20 INJECTION, SOLUTION INTRAVENOUS at 02:36

## 2021-10-28 RX ADMIN — INSULIN ASPART 1 UNITS: 100 INJECTION, SOLUTION INTRAVENOUS; SUBCUTANEOUS at 21:56

## 2021-10-28 ASSESSMENT — ACTIVITIES OF DAILY LIVING (ADL)
ADLS_ACUITY_SCORE: 36
ADLS_ACUITY_SCORE: 36
ADLS_ACUITY_SCORE: 34
ADLS_ACUITY_SCORE: 36
ADLS_ACUITY_SCORE: 34
ADLS_ACUITY_SCORE: 36
ADLS_ACUITY_SCORE: 34
ADLS_ACUITY_SCORE: 36

## 2021-10-28 NOTE — PHARMACY-CONSULT NOTE
Pharmacy Tube Feeding Consult    Medication reviewed for administration by feeding tube and for potential food/drug interactions.    Discussed w/ pt's RN - currently ok with crushing meds for oral intake. Will not make any changes at this time, please contact pharmacy if you would like any meds changed to liquid or for FT administration. Thank you

## 2021-10-28 NOTE — PROGRESS NOTES
[facility]    Hospitalist Progress Note    Date of Service (when I saw the patient): 10/28/2021  Provider:  Suhas Graham MD   Text Page  7am - 6PM       Assessment & Plan   Jimmy Otto is a 71-year-old male with history of insulin-dependent type 2 diabetes, hypercholesterolemia, hypertension, TIA, erectile dysfunction, recent stroke, and urinary retention with indwelling Ordoñez catheter placed about 2 and half weeks ago. Unfortunately, he had right thalamic stroke on 9/14/2021.  This left him nonverbal.  He discharged to the acute rehab unit at [facility] where he stayed from 9/17 through 10/6.  He returned home.  His family brought him in for evaluation of weakness, nausea, vomiting, decreased oral intake, difficulty swallowing, and generally doing poorly.       #Sepsis (leukocytosis, heart rate 90s, lactic acidosis) secondary to E Coli Urinary tract infection with chronic ordoñez catheter in place: Urine culture grew pansensitive E. coli.  Completed 7 days of IV ceftriaxone.  Ordoñez catheter exchanged in the ED.     #Thrush: Treated with fluconazole-completed.  -Continue magic mouthwash as needed for pain     #Severe malnutrition in setting of CVA, Dysphagia from strokes: Currently, this appears to be the main issue.  He was treated for thrush as above.  Both nutrition and speech have evaluated the patient.  He is on a modified diet.  Concern that patient not taking enough p.o. to maintain his nutrition.  I discussed with his daughter, Clemente, on 10/22.  I expressed that if Mr. Otto is not able to take enough p.o. to maintain his nutrition then will need to consider feeding tube.  She states that this is all new to her and that she will need to discuss with her family.  She would also like to discuss with palliative care team.  Consult is placed.  -Prior to admission was on modified diet due to recent stroke.  Seen by speech and continued on strict modified diet with 1:1 supervision.    -Calorie  counts have been done which do show some improvement in PO intake but still not meeting his requirements. Unfortunately, he is on strict diet with teaspoons at a time.  He has been able to take more PO over last 2-3 days but he is still not able to handle any liquids.  Dehydration is largest concern given his inability to take liquids and will likely require him to have feeding tube placed.    -Palliative consulted for goals of care and consideration of feeding tube. Discussed with family including daughters, brother, wife and son by phone. Dr De Santiago offered formal  but patient's wife declined and daughter acted as .  Family was encouraged that he tolerated and ate most of his dinner on 10/22.  At that time, they did want to continue to monitor his p.o. intake but if he is not able to maintain his nutrition they want feeding tube.    - Dr De Santiago called daughter, Clemente, again on 10/25.  Informed her of Ho's inability to maintain hydration without feeding tube.  We had swallow reassess to see if he could handle water but he failed on 100% of trials.    -Discussed with palliative care. Given that his dysphagia is secondary from his CVAs, unclear if his swallow function would get better.  Given family is determined to bring him home, they are in agreement with placement of GJ but because of the Plavix, the patient cannot be done until 5 days off Plavix.  In the interim he had NG placement today and EN started, discharged to home as possible on Lovenox twice daily, plan to hold on the heparin 24 hours before GJ placement by IR.  Anticipate this will be Monday or Tuesday this coming week.  Palliative involvement and help highly appreciated.  -On amantadine which was started during last hospitalization for fatigue in the setting of stroke.  Continue to hold quetiapine and ramelteon.      #Recent stroke on 9/14/2021: Was hospitalized at [facility] and was in acute rehab there. Suffered right  "thalamic stroke.  Nonverbal and worsening right sided weakness.  He has prior history of left thalamic stroke previously.  He was noted to have periodic somnolence and lethargy as result during that hospitalization at [facility].  We have seen that periodically here as well and minimized sedating meds as result.      -On Plavix, Lipitor.  At admission, he was on aspirin plus Plavix.  Aspirin has been stopped, per neurology recommendations.  -Patient's family don't want him to go back to rehab facility. They want to take him home with home therapy orders.   -Will need home PT/OT/RN/HHA on discharge.  consulted for discharge planning   -Per neurology note from his admission at [facility]: \"Dual antiplatelets for 1 month then back to plavix monotherapy as he was supposed to be on. It might be informative to check P2Y12 activity after a month of being on plavix only.\"  -aspirin discontinued as it has been 1 month since the stroke.  Continue Plavix.     #Acute toxic and infectious encephalopathy: Patient with somnolence during his hospitalization.  Likely secondary to his CVA in the past along with infection with some sedating medications.  Seroquel and ramelteon have since been held.  This seem to be resolved.      #Urine retention identified during recent hospitalization for stroke: Indwelling Felix catheter was changed in the emergency department  -Outpatient urology follow-up.  -On doxazosin for BPH.  doxazosin increased.       #Type 2 diabetes: Prior to admission was on Levemir insulin 18 units at bedtime, NovoLog sliding scale insulin, Victoza, and Metformin  -Resumed Levemir at a lower dose. Increased levemir to 12 units, BS checks q 4h with EN.  Sliding scale insulin with hypoglycemia protocol.       #Hypercholesterolemia: Continue statin  #Hypertension: Atenolol resumed.  Resumed amlodipine at a lower dose.  #Weakness, Deconditioning: PT and OT consulted  #CKD3: Baseline creatinine seems to be " around 1.5.  At baseline     DVT Prophylaxis: Heparin SQ  Code Status: Full Code    Disposition: Expected discharge in home once FT placement is done.    Interval History   Non verbal, appears comfort not in distress. SBP sl up (144) o/w vitals are normal. . Pending definitive tt for feeding, temporary NG until GJ is placed.     -Data reviewed today: I reviewed all new labs and imaging results over the last 24 hours. I personally reviewed the EKG tracing showing NSR in monitor .    Physical Exam   Temp: 98.4  F (36.9  C) Temp src: Oral BP: (Abnormal) 161/86 Pulse: 70   Resp: 15 SpO2: 97 % O2 Device: None (Room air)    Vitals:    10/25/21 0541 10/26/21 0545 10/27/21 0554   Weight: 54.3 kg (119 lb 12.8 oz) 55.5 kg (122 lb 6.4 oz) 54.7 kg (120 lb 9.6 oz)     Vital Signs with Ranges  Temp:  [97.2  F (36.2  C)-98.4  F (36.9  C)] 98.4  F (36.9  C)  Pulse:  [65-74] 70  Resp:  [15-20] 15  BP: (103-161)/(57-86) 161/86  SpO2:  [97 %-99 %] 97 %  I/O last 3 completed shifts:  In: -   Out: 650 [Urine:650]    GEN:  Alert, non verbal, appears comfortable, NAD.  HEENT:  Normocephalic/atraumatic, no scleral icterus, no nasal discharge, mouth moist.  CV:  Regular rate and rhythm, no murmur or JVD.  S1 + S2 noted, no S3 or S4.  LUNGS:  Clear to auscultation bilaterally without rales/rhonchi/wheezing/retractions.  Symmetric chest rise on inhalation noted.  ABD:  Active bowel sounds, soft, non-tender/non-distended.  No rebound/guarding/rigidity.  EXT:  No edema or cyanosis.  No joint synovitis noted.  SKIN:  Dry to touch, no exanthems noted in the visualized areas.  NEURO: L sided weakness and paresis     Medications     dextrose       lactated ringers 75 mL/hr at 10/28/21 0236       amantadine  100 mg Oral Daily     amLODIPine  5 mg Oral Daily     atenolol  25 mg Oral BID     atorvastatin  80 mg Oral Daily     [Held by provider] clopidogrel  75 mg Oral Daily     doxazosin  2 mg Oral Daily     enoxaparin ANTICOAGULANT  0.75 mg/kg  Subcutaneous Q12H     insulin aspart   Subcutaneous TID w/meals     insulin aspart  1-7 Units Subcutaneous TID AC     insulin aspart  1-5 Units Subcutaneous At Bedtime     insulin detemir  12 Units Subcutaneous At Bedtime     sennosides  1 tablet Oral At Bedtime     sodium chloride (PF)  3 mL Intracatheter Q8H       Data   Recent Labs   Lab 10/28/21  0723 10/28/21  0245 10/27/21  2143 10/27/21  1413 10/27/21  0936 10/27/21  0850 10/27/21  0743 10/26/21  0917 10/26/21  0837 10/25/21  1210 10/25/21  0918 10/24/21  0808 10/24/21  0611 10/22/21  0812 10/22/21  0739   WBC 6.6  --   --   --   --   --   --   --   --   --   --   --   --   --  7.3   HGB 12.6*  --   --   --   --   --   --   --   --   --   --   --   --   --  11.1*   MCV 88  --   --   --   --   --   --   --   --   --   --   --   --   --  85     --   --   --   --   --  358  --   --   --   --   --  319   < > 260   *  --   --   --   --   --   --   --   --   --  140  --   --   --  138   POTASSIUM 3.8  --   --   --  3.6  --   --   --  3.4   < > 3.5   < > 3.4   < > 3.4   CHLORIDE 110*  --   --   --   --   --   --   --   --   --  105  --   --   --  103   CO2 28  --   --   --   --   --   --   --   --   --  28  --   --   --  29   BUN 33*  --   --   --   --   --   --   --   --   --  26  --   --   --  14   CR 1.39*  --   --   --   --   --   --   --   --   --  1.23  --   --   --  1.34*   ANIONGAP 7  --   --   --   --   --   --   --   --   --  7  --   --   --  6   ARLETH 9.6  --   --   --   --   --   --   --   --   --  9.9  --   --   --  9.3   * 184* 190*   < >  --    < >  --    < >  --    < > 273*   < >  --    < > 132*    < > = values in this interval not displayed.       Recent Results (from the past 24 hour(s))   XR Feeding Tube Placement    Narrative    XR FEEDING TUBE PLACEMENT 10/28/2021 10:53 AM     HISTORY: STROKE, DYSPHAGIA  TECHNIQUE: 35 seconds fluoroscopy time. No spot images. 15 mL  Gastrografin injected to confirm placement.  COMPARISON:  None      Impression    IMPRESSION:  1.  Nasojejunal feeding tube placed without difficulty. Tip is at the  duodenojejunal junction. Tube is flushed and ready for use.    WILLIE VALERA MD         SYSTEM ID:  [id number]             Disclaimer: This note consists of symbols derived from keyboarding, dictation and/or voice recognition software. As a result, there may be errors in the script that have gone undetected. Please consider this when interpreting information found in this chart.

## 2021-10-28 NOTE — PLAN OF CARE
VSS, elevated BP. ZAYDA orientation due to patient being nonverbal, answers yes and no questions by nodding. Denies pain, SOB, and CP. Feeding tube placed by XR today, jevity feeding running at 15mL/hour, 60mL free water flushes Q4. LR infusing at 75 mL.  and 241, insulin given per sliding scale. Felix in place, 425mL out. Ax2/lift. Discharge possible to home with family tomorrow.

## 2021-10-28 NOTE — PLAN OF CARE
Orientation:  Non verbal. Will follow most commands  VS: WDL  Pain: No sign of    Activity:  Assist X 2  Resp:   WDL  GI:  WDL  : WDL Felix Cath  Skin:  Redness on sacrum area padding placed  Lines: LR @ 75mL/HR  Other:  Patient became restless off and on throughout the night. Pt. Chewed trough IV line. Line replaced. Gave him ice cream instead. Slept the rest of the night without incident.   Plan:  Expected discharge in home once FT placement is done.

## 2021-10-28 NOTE — PLAN OF CARE
/57   Pulse 67   Temp 97.6  F (36.4  C) (Axillary)   Resp 20   Wt 54.7 kg (120 lb 9.6 oz)   SpO2 99%   BMI 20.70 kg/m      VSS except soft BP. Pt denies pain, sob, cp. LR infusing at 75mL/hr. Poor intake, ate 0% of dinner. , 190. NG tube placement tomorrow AM. Will continue to monitor.

## 2021-10-28 NOTE — PROGRESS NOTES
Nasal feeding tube 8 [place] placed post pyloric per Dr. Ji without difficulty, bridle securing device attached. Tube is available for immediate use. Patient transferred to room via cart in stable condition

## 2021-10-28 NOTE — PLAN OF CARE
Pt denied CP, pain. Calm, cooperative. LR infusing. Pt pulled out feeding tube at 1730. Tube feeding was going at 15 ml/hour prior to pt pulling it out. MD notified and order to place one tomorrow. Felix catheter in place. Continue to monitor and with plan of care. Levemir held per provider since Q4 BG and Tube feeding not happening.

## 2021-10-29 ENCOUNTER — APPOINTMENT (OUTPATIENT)
Dept: GENERAL RADIOLOGY | Facility: CLINIC | Age: 71
DRG: 871 | End: 2021-10-29
Attending: HOSPITALIST
Payer: MEDICARE

## 2021-10-29 ENCOUNTER — APPOINTMENT (OUTPATIENT)
Dept: PHYSICAL THERAPY | Facility: CLINIC | Age: 71
DRG: 871 | End: 2021-10-29
Payer: MEDICARE

## 2021-10-29 ENCOUNTER — APPOINTMENT (OUTPATIENT)
Dept: OCCUPATIONAL THERAPY | Facility: CLINIC | Age: 71
DRG: 871 | End: 2021-10-29
Payer: MEDICARE

## 2021-10-29 ENCOUNTER — PRE VISIT (OUTPATIENT)
Dept: UROLOGY | Facility: CLINIC | Age: 71
End: 2021-10-29

## 2021-10-29 ENCOUNTER — APPOINTMENT (OUTPATIENT)
Dept: SPEECH THERAPY | Facility: CLINIC | Age: 71
DRG: 871 | End: 2021-10-29
Payer: MEDICARE

## 2021-10-29 ENCOUNTER — APPOINTMENT (OUTPATIENT)
Dept: GENERAL RADIOLOGY | Facility: CLINIC | Age: 71
DRG: 871 | End: 2021-10-29
Attending: RADIOLOGY
Payer: MEDICARE

## 2021-10-29 LAB
GLUCOSE BLDC GLUCOMTR-MCNC: 132 MG/DL (ref 70–99)
GLUCOSE BLDC GLUCOMTR-MCNC: 136 MG/DL (ref 70–99)
GLUCOSE BLDC GLUCOMTR-MCNC: 139 MG/DL (ref 70–99)
GLUCOSE BLDC GLUCOMTR-MCNC: 166 MG/DL (ref 70–99)
GLUCOSE BLDC GLUCOMTR-MCNC: 168 MG/DL (ref 70–99)
GLUCOSE BLDC GLUCOMTR-MCNC: 194 MG/DL (ref 70–99)
GLUCOSE BLDC GLUCOMTR-MCNC: 229 MG/DL (ref 70–99)
MAGNESIUM SERPL-MCNC: 1.8 MG/DL (ref 1.6–2.3)
PHOSPHATE SERPL-MCNC: 2.7 MG/DL (ref 2.5–4.5)
POTASSIUM BLD-SCNC: 3.7 MMOL/L (ref 3.4–5.3)
SARS-COV-2 RNA RESP QL NAA+PROBE: NEGATIVE

## 2021-10-29 PROCEDURE — 250N000011 HC RX IP 250 OP 636: Performed by: HOSPITALIST

## 2021-10-29 PROCEDURE — 99233 SBSQ HOSP IP/OBS HIGH 50: CPT | Performed by: HOSPITALIST

## 2021-10-29 PROCEDURE — 83735 ASSAY OF MAGNESIUM: CPT | Performed by: HOSPITALIST

## 2021-10-29 PROCEDURE — 97530 THERAPEUTIC ACTIVITIES: CPT | Mod: GP | Performed by: PHYSICAL THERAPIST

## 2021-10-29 PROCEDURE — 36415 COLL VENOUS BLD VENIPUNCTURE: CPT | Performed by: HOSPITALIST

## 2021-10-29 PROCEDURE — 84100 ASSAY OF PHOSPHORUS: CPT | Performed by: HOSPITALIST

## 2021-10-29 PROCEDURE — 97530 THERAPEUTIC ACTIVITIES: CPT | Mod: GO | Performed by: REHABILITATION PRACTITIONER

## 2021-10-29 PROCEDURE — 84132 ASSAY OF SERUM POTASSIUM: CPT | Performed by: HOSPITALIST

## 2021-10-29 PROCEDURE — 250N000013 HC RX MED GY IP 250 OP 250 PS 637: Performed by: INTERNAL MEDICINE

## 2021-10-29 PROCEDURE — 74340 X-RAY GUIDE FOR GI TUBE: CPT

## 2021-10-29 PROCEDURE — 92526 ORAL FUNCTION THERAPY: CPT | Mod: GN

## 2021-10-29 PROCEDURE — 44500 INTRO GASTROINTESTINAL TUBE: CPT

## 2021-10-29 PROCEDURE — 97116 GAIT TRAINING THERAPY: CPT | Mod: GP | Performed by: PHYSICAL THERAPIST

## 2021-10-29 PROCEDURE — 120N000001 HC R&B MED SURG/OB

## 2021-10-29 PROCEDURE — 250N000009 HC RX 250: Performed by: INTERNAL MEDICINE

## 2021-10-29 PROCEDURE — 999N000065 XR ABDOMEN PORT 1 VIEWS

## 2021-10-29 PROCEDURE — 258N000003 HC RX IP 258 OP 636: Performed by: HOSPITALIST

## 2021-10-29 PROCEDURE — 250N000013 HC RX MED GY IP 250 OP 250 PS 637: Performed by: HOSPITALIST

## 2021-10-29 RX ORDER — FLUOXETINE 20 MG/5ML
20 SOLUTION ORAL DAILY
Status: DISCONTINUED | OUTPATIENT
Start: 2021-10-29 | End: 2021-11-04 | Stop reason: HOSPADM

## 2021-10-29 RX ORDER — LIDOCAINE HYDROCHLORIDE 20 MG/ML
JELLY TOPICAL ONCE
Status: COMPLETED | OUTPATIENT
Start: 2021-10-29 | End: 2021-10-29

## 2021-10-29 RX ADMIN — INSULIN ASPART 3 UNITS: 100 INJECTION, SOLUTION INTRAVENOUS; SUBCUTANEOUS at 13:28

## 2021-10-29 RX ADMIN — ATORVASTATIN CALCIUM 80 MG: 40 TABLET, FILM COATED ORAL at 08:32

## 2021-10-29 RX ADMIN — DOXAZOSIN 2 MG: 1 TABLET ORAL at 08:32

## 2021-10-29 RX ADMIN — ENOXAPARIN SODIUM 40 MG: 40 INJECTION SUBCUTANEOUS at 15:12

## 2021-10-29 RX ADMIN — DIATRIZOATE MEGLUMINE AND DIATRIZOATE SODIUM 50 ML: 660; 100 SOLUTION ORAL; RECTAL at 14:57

## 2021-10-29 RX ADMIN — INSULIN ASPART 4 UNITS: 100 INJECTION, SOLUTION INTRAVENOUS; SUBCUTANEOUS at 21:14

## 2021-10-29 RX ADMIN — LIDOCAINE HYDROCHLORIDE 10 ML: 20 JELLY TOPICAL at 14:58

## 2021-10-29 RX ADMIN — INSULIN ASPART 2 UNITS: 100 INJECTION, SOLUTION INTRAVENOUS; SUBCUTANEOUS at 04:00

## 2021-10-29 RX ADMIN — SODIUM CHLORIDE, POTASSIUM CHLORIDE, SODIUM LACTATE AND CALCIUM CHLORIDE: 600; 310; 30; 20 INJECTION, SOLUTION INTRAVENOUS at 20:23

## 2021-10-29 RX ADMIN — ATENOLOL 25 MG: 25 TABLET ORAL at 21:59

## 2021-10-29 RX ADMIN — AMLODIPINE BESYLATE 5 MG: 5 TABLET ORAL at 08:32

## 2021-10-29 RX ADMIN — FLUOXETINE HYDROCHLORIDE 20 MG: 20 LIQUID ORAL at 16:11

## 2021-10-29 RX ADMIN — AMANTADINE HYDROCHLORIDE 100 MG: 100 CAPSULE ORAL at 08:32

## 2021-10-29 RX ADMIN — SODIUM CHLORIDE, POTASSIUM CHLORIDE, SODIUM LACTATE AND CALCIUM CHLORIDE: 600; 310; 30; 20 INJECTION, SOLUTION INTRAVENOUS at 04:03

## 2021-10-29 RX ADMIN — ENOXAPARIN SODIUM 40 MG: 40 INJECTION SUBCUTANEOUS at 03:53

## 2021-10-29 ASSESSMENT — ACTIVITIES OF DAILY LIVING (ADL)
ADLS_ACUITY_SCORE: 32
ADLS_ACUITY_SCORE: 34
ADLS_ACUITY_SCORE: 36
ADLS_ACUITY_SCORE: 32
ADLS_ACUITY_SCORE: 34
ADLS_ACUITY_SCORE: 34
ADLS_ACUITY_SCORE: 32
ADLS_ACUITY_SCORE: 36
ADLS_ACUITY_SCORE: 32
ADLS_ACUITY_SCORE: 34
ADLS_ACUITY_SCORE: 32
ADLS_ACUITY_SCORE: 34
ADLS_ACUITY_SCORE: 32
ADLS_ACUITY_SCORE: 36

## 2021-10-29 NOTE — PLAN OF CARE
VSS. Patient nonverbal, nods yes and no to questions. Denies pain, SOB, or CP. Restless this AM, chewed through IV tubing, writer replaced. Sitter placed at bediside for patient safety. Family at bedside in afternoon. NG tube to be replaced today, awaiting XR placement.  and 194. Felix in place for retention, 460mL out. Ambulating Ax1 with gait belt and walker. LR infusing at 75mL. Possible discharge to home with family 1-2 days.

## 2021-10-29 NOTE — PLAN OF CARE
VSS on ra. Pt alert and restless. Did not sleep much at all. Early in shift found pt chewing on NG tube that he had already removed but was attached by the nasal ring. Nasal ring removed. Pt pulling at clothing, kicking off blankets, swinging feet over side rail, and pushing the button on his side rails. Not easily redirect. Very pleasant. Unable to verbally communicate. Held up hand and gestured okay. Occasional nods to yes or no questions. LS diminished. Incontinent B&B. Blood sugars 139, 166. RPIV infusing LR @ 75. Felix for retention is patient. Did not take anything po this shift. Modified diet. 2 assist with lift if OOB.

## 2021-10-29 NOTE — PROGRESS NOTES
Pulled out his tube feeds.  Not getting any continuous feeding.  Will hold off long-acting insulin as patient is not getting any tube feeds nor is he eating

## 2021-10-29 NOTE — PROGRESS NOTES
[facility]    Hospitalist Progress Note    Date of Service (when I saw the patient): 10/29/2021  Provider:  Suhas Graham MD   Text Page  7am - 6PM       Assessment & Plan   Jimmy Otto is a 71-year-old male with history of insulin-dependent type 2 diabetes, hypercholesterolemia, hypertension, TIA, erectile dysfunction, recent stroke, and urinary retention with indwelling Ordoñez catheter placed about 2 and half weeks ago. Unfortunately, he had right thalamic stroke on 9/14/2021.  This left him nonverbal.  He discharged to the acute rehab unit at [facility] where he stayed from 9/17 through 10/6.  He returned home.  His family brought him in for evaluation of weakness, nausea, vomiting, decreased oral intake, difficulty swallowing, and generally doing poorly.       #Sepsis (leukocytosis, heart rate 90s, lactic acidosis) secondary to E Coli Urinary tract infection with chronic ordoñez catheter in place: Urine culture grew pansensitive E. coli.  Completed 7 days of IV ceftriaxone.  Ordoñez catheter exchanged in the ED.     #Thrush: Treated with fluconazole-completed.  -Continue magic mouthwash as needed for pain     #Severe malnutrition in setting of CVA, Dysphagia from strokes: Currently, this appears to be the main issue.  He was treated for thrush as above.  Both nutrition and speech have evaluated the patient.  He is on a modified diet.  Concern that patient not taking enough p.o. to maintain his nutrition.  I discussed with his daughter, Clemente, on 10/22.  I expressed that if Mr. Otto is not able to take enough p.o. to maintain his nutrition then will need to consider feeding tube.  She states that this is all new to her and that she will need to discuss with her family.  She would also like to discuss with palliative care team.  Consult is placed.  -Prior to admission was on modified diet due to recent stroke.  Seen by speech and continued on strict modified diet with 1:1 supervision.    -Calorie  counts have been done which do show some improvement in PO intake but still not meeting his requirements. Unfortunately, he is on strict diet with teaspoons at a time.  He has been able to take more PO over last 2-3 days but he is still not able to handle any liquids.  Dehydration is largest concern given his inability to take liquids and will likely require him to have feeding tube placed.    -Palliative consulted for goals of care and consideration of feeding tube. Discussed with family including daughters, brother, wife and son by phone. Dr De Santiago offered formal  but patient's wife declined and daughter acted as .  Family was encouraged that he tolerated and ate most of his dinner on 10/22.  At that time, they did want to continue to monitor his p.o. intake but if he is not able to maintain his nutrition they want feeding tube.    -  I talked to luis enrique Cornejo personally and famiily is in agreement with plan.    -Discussed with palliative care. Given that his dysphagia is secondary from his CVAs, unclear if his swallow function would get better.  Given family is determined to bring him home, they are in agreement with placement of GJ but because of the Plavix, the patient cannot be done until 5 days off Plavix.  In the interim he had NG placement yesterday and EN started but pulled out. We 'll replace it, discharge to home tomorrow on Lovenox twice daily, plan to hold on the heparin 24 hours before GJ placement by IR.  Anticipate this will be Monday or Tuesday this coming week.  Palliative involvement and help highly appreciated.  -On amantadine which was started during last hospitalization for fatigue in the setting of stroke.  Continue to hold quetiapine and ramelteon.      #Recent stroke on 9/14/2021: Was hospitalized at [facility] and was in acute rehab there. Suffered right thalamic stroke.  Nonverbal and worsening right sided weakness.  He has prior history of left thalamic  "stroke previously.  He was noted to have periodic somnolence and lethargy as result during that hospitalization at [facility].  We have seen that periodically here as well and minimized sedating meds as result.      -On Plavix, Lipitor.  At admission, he was on aspirin plus Plavix.  Aspirin has been stopped, per neurology recommendations.  -Patient's family don't want him to go back to rehab facility. They want to take him home with home therapy orders.   -Will need home PT/OT/RN/HHA on discharge.  consulted for discharge planning   -Per neurology note from his admission at [facility]: \"Dual antiplatelets for 1 month then back to plavix monotherapy as he was supposed to be on. It might be informative to check P2Y12 activity after a month of being on plavix only.\"  -aspirin discontinued as it has been 1 month since the stroke.  Continue Plavix.     #Acute toxic and infectious encephalopathy: Patient with somnolence during his hospitalization.  Likely secondary to his CVA in the past along with infection with some sedating medications.  Seroquel and ramelteon have since been held.  This seem to be resolved.      #Urine retention identified during recent hospitalization for stroke: Indwelling Felix catheter was changed in the emergency department  -Outpatient urology follow-up.  -On doxazosin for BPH.  doxazosin increased.       #Type 2 diabetes: Prior to admission was on Levemir insulin 18 units at bedtime, NovoLog sliding scale insulin, Victoza, and Metformin  -Resumed Levemir at a lower dose. Increased levemir to 12 units, BS checks q 4h with EN.  Sliding scale insulin with hypoglycemia protocol.       #Hypercholesterolemia: Continue statin  #Hypertension: Atenolol resumed.  Resumed amlodipine at a lower dose.  #Weakness, Deconditioning: PT and OT consulted  #CKD3: Baseline creatinine seems to be around 1.5.  At baseline  #Depression.  Patient had good response to antidepressant when he had the first " stroke, the family is requesting that we prescribe ibuprofen medication.  I am ordering fluoxetine liquid formulation.     DVT Prophylaxis: Heparin SQ  Code Status: Full Code    Disposition: Expected discharge in home once FT placement is done.  Daughter updated at the bedside.  Family is in agreement with the plan as outlined.    Interval History   Non verbal, appears comfort not in distress.  Printed off his NG yesterday. Pending registration for feeding, temporary NG until GJ is placed.     -Data reviewed today: I reviewed all new labs and imaging results over the last 24 hours. I personally reviewed the EKG tracing showing NSR in monitor .    Physical Exam   Temp: 97.7  F (36.5  C) Temp src: Oral BP: (Abnormal) 144/73 Pulse: 58   Resp: 16 SpO2: 98 % O2 Device: None (Room air)    Vitals:    10/26/21 0545 10/27/21 0554 10/29/21 0549   Weight: 55.5 kg (122 lb 6.4 oz) 54.7 kg (120 lb 9.6 oz) 54.4 kg (120 lb)     Vital Signs with Ranges  Temp:  [97.7  F (36.5  C)-98  F (36.7  C)] 97.7  F (36.5  C)  Pulse:  [58-67] 58  Resp:  [16] 16  BP: (134-156)/(73-76) 144/73  SpO2:  [97 %-98 %] 98 %  I/O last 3 completed shifts:  In: 199 [P.O.:40; NG/GT:144]  Out: 1475 [Urine:1475]    GEN:  Alert, non verbal, appears comfortable, NAD.  HEENT:  Normocephalic/atraumatic, no scleral icterus, no nasal discharge, mouth moist.  CV:  Regular rate and rhythm, no murmur or JVD.  S1 + S2 noted, no S3 or S4.  LUNGS:  Clear to auscultation bilaterally without rales/rhonchi/wheezing/retractions.  Symmetric chest rise on inhalation noted.  ABD:  Active bowel sounds, soft, non-tender/non-distended.  No rebound/guarding/rigidity.  EXT:  No edema or cyanosis.  No joint synovitis noted.  SKIN:  Dry to touch, no exanthems noted in the visualized areas.  NEURO: L sided weakness and paresis     Medications     dextrose       lactated ringers 75 mL/hr at 10/29/21 0403       amantadine  100 mg Oral Daily     amLODIPine  5 mg Oral Daily     atenolol  25  mg Oral BID     atorvastatin  80 mg Oral Daily     [Held by provider] clopidogrel  75 mg Oral Daily     doxazosin  2 mg Oral Daily     enoxaparin ANTICOAGULANT  0.75 mg/kg Subcutaneous Q12H     insulin aspart  1-12 Units Subcutaneous Q4H     insulin detemir  12 Units Subcutaneous At Bedtime     sennosides  1 tablet Oral At Bedtime     sodium chloride (PF)  3 mL Intracatheter Q8H       Data   Recent Labs   Lab 10/29/21  1248 10/29/21  0836 10/29/21  0615 10/29/21  0352 10/28/21  1211 10/28/21  0723 10/27/21  1413 10/27/21  0936 10/27/21  0850 10/27/21  0743 10/25/21  1210 10/25/21  0918 10/24/21  0808 10/24/21  0611   WBC  --   --   --   --   --  6.6  --   --   --   --   --   --   --   --    HGB  --   --   --   --   --  12.6*  --   --   --   --   --   --   --   --    MCV  --   --   --   --   --  88  --   --   --   --   --   --   --   --    PLT  --   --   --   --   --  332  --   --   --  358  --   --   --  319   NA  --   --   --   --   --  145*  --   --   --   --   --  140  --   --    POTASSIUM  --   --  3.7  --   --  3.8  --  3.6  --   --    < > 3.5   < > 3.4   CHLORIDE  --   --   --   --   --  110*  --   --   --   --   --  105  --   --    CO2  --   --   --   --   --  28  --   --   --   --   --  28  --   --    BUN  --   --   --   --   --  33*  --   --   --   --   --  26  --   --    CR  --   --   --   --   --  1.39*  --   --   --   --   --  1.23  --   --    ANIONGAP  --   --   --   --   --  7  --   --   --   --   --  7  --   --    ARLETH  --   --   --   --   --  9.6  --   --   --   --   --  9.9  --   --    * 136*  --  166*   < > 220*   < >  --    < >  --    < > 273*   < >  --     < > = values in this interval not displayed.       No results found for this or any previous visit (from the past 24 hour(s)).          Disclaimer: This note consists of symbols derived from keyboarding, dictation and/or voice recognition software. As a result, there may be errors in the script that have gone undetected. Please consider  this when interpreting information found in this chart.

## 2021-10-30 ENCOUNTER — APPOINTMENT (OUTPATIENT)
Dept: SPEECH THERAPY | Facility: CLINIC | Age: 71
DRG: 871 | End: 2021-10-30
Payer: MEDICARE

## 2021-10-30 ENCOUNTER — APPOINTMENT (OUTPATIENT)
Dept: OCCUPATIONAL THERAPY | Facility: CLINIC | Age: 71
DRG: 871 | End: 2021-10-30
Payer: MEDICARE

## 2021-10-30 LAB
GLUCOSE BLDC GLUCOMTR-MCNC: 177 MG/DL (ref 70–99)
GLUCOSE BLDC GLUCOMTR-MCNC: 223 MG/DL (ref 70–99)
GLUCOSE BLDC GLUCOMTR-MCNC: 227 MG/DL (ref 70–99)
GLUCOSE BLDC GLUCOMTR-MCNC: 252 MG/DL (ref 70–99)
GLUCOSE BLDC GLUCOMTR-MCNC: 283 MG/DL (ref 70–99)
GLUCOSE BLDC GLUCOMTR-MCNC: 322 MG/DL (ref 70–99)
GLUCOSE BLDC GLUCOMTR-MCNC: 324 MG/DL (ref 70–99)
GLUCOSE BLDC GLUCOMTR-MCNC: 351 MG/DL (ref 70–99)
MAGNESIUM SERPL-MCNC: 1.8 MG/DL (ref 1.6–2.3)
PLATELET # BLD AUTO: 254 10E3/UL (ref 150–450)
POTASSIUM BLD-SCNC: 3.4 MMOL/L (ref 3.4–5.3)

## 2021-10-30 PROCEDURE — 250N000013 HC RX MED GY IP 250 OP 250 PS 637: Performed by: INTERNAL MEDICINE

## 2021-10-30 PROCEDURE — 250N000011 HC RX IP 250 OP 636: Performed by: HOSPITALIST

## 2021-10-30 PROCEDURE — 272N000078 HC NUTRITION PRODUCT INTERMEDIATE LITER

## 2021-10-30 PROCEDURE — 99233 SBSQ HOSP IP/OBS HIGH 50: CPT | Performed by: HOSPITALIST

## 2021-10-30 PROCEDURE — 97530 THERAPEUTIC ACTIVITIES: CPT | Mod: GO | Performed by: OCCUPATIONAL THERAPIST

## 2021-10-30 PROCEDURE — 36415 COLL VENOUS BLD VENIPUNCTURE: CPT | Performed by: HOSPITALIST

## 2021-10-30 PROCEDURE — 120N000001 HC R&B MED SURG/OB

## 2021-10-30 PROCEDURE — 250N000013 HC RX MED GY IP 250 OP 250 PS 637: Performed by: HOSPITALIST

## 2021-10-30 PROCEDURE — 36415 COLL VENOUS BLD VENIPUNCTURE: CPT | Performed by: INTERNAL MEDICINE

## 2021-10-30 PROCEDURE — 83735 ASSAY OF MAGNESIUM: CPT | Performed by: HOSPITALIST

## 2021-10-30 PROCEDURE — 84132 ASSAY OF SERUM POTASSIUM: CPT | Performed by: HOSPITALIST

## 2021-10-30 PROCEDURE — 97535 SELF CARE MNGMENT TRAINING: CPT | Mod: GO | Performed by: OCCUPATIONAL THERAPIST

## 2021-10-30 PROCEDURE — 85049 AUTOMATED PLATELET COUNT: CPT | Performed by: INTERNAL MEDICINE

## 2021-10-30 PROCEDURE — 92526 ORAL FUNCTION THERAPY: CPT | Mod: GN | Performed by: SPEECH-LANGUAGE PATHOLOGIST

## 2021-10-30 PROCEDURE — 258N000003 HC RX IP 258 OP 636: Performed by: HOSPITALIST

## 2021-10-30 RX ORDER — POTASSIUM CHLORIDE 1.5 G/1.58G
20 POWDER, FOR SOLUTION ORAL ONCE
Status: COMPLETED | OUTPATIENT
Start: 2021-10-30 | End: 2021-10-30

## 2021-10-30 RX ADMIN — SODIUM CHLORIDE, POTASSIUM CHLORIDE, SODIUM LACTATE AND CALCIUM CHLORIDE: 600; 310; 30; 20 INJECTION, SOLUTION INTRAVENOUS at 06:04

## 2021-10-30 RX ADMIN — ENOXAPARIN SODIUM 40 MG: 40 INJECTION SUBCUTANEOUS at 02:59

## 2021-10-30 RX ADMIN — SODIUM CHLORIDE, POTASSIUM CHLORIDE, SODIUM LACTATE AND CALCIUM CHLORIDE: 600; 310; 30; 20 INJECTION, SOLUTION INTRAVENOUS at 16:17

## 2021-10-30 RX ADMIN — ATENOLOL 25 MG: 25 TABLET ORAL at 20:11

## 2021-10-30 RX ADMIN — FLUOXETINE HYDROCHLORIDE 20 MG: 20 LIQUID ORAL at 10:57

## 2021-10-30 RX ADMIN — ENOXAPARIN SODIUM 40 MG: 40 INJECTION SUBCUTANEOUS at 14:48

## 2021-10-30 RX ADMIN — INSULIN ASPART 6 UNITS: 100 INJECTION, SOLUTION INTRAVENOUS; SUBCUTANEOUS at 08:42

## 2021-10-30 RX ADMIN — INSULIN ASPART 9 UNITS: 100 INJECTION, SOLUTION INTRAVENOUS; SUBCUTANEOUS at 20:11

## 2021-10-30 RX ADMIN — ATORVASTATIN CALCIUM 80 MG: 40 TABLET, FILM COATED ORAL at 10:45

## 2021-10-30 RX ADMIN — AMLODIPINE BESYLATE 5 MG: 5 TABLET ORAL at 10:57

## 2021-10-30 RX ADMIN — INSULIN ASPART 5 UNITS: 100 INJECTION, SOLUTION INTRAVENOUS; SUBCUTANEOUS at 00:24

## 2021-10-30 RX ADMIN — INSULIN ASPART 4 UNITS: 100 INJECTION, SOLUTION INTRAVENOUS; SUBCUTANEOUS at 04:49

## 2021-10-30 RX ADMIN — POTASSIUM CHLORIDE 20 MEQ: 1.5 POWDER, FOR SOLUTION ORAL at 12:18

## 2021-10-30 RX ADMIN — INSULIN ASPART 8 UNITS: 100 INJECTION, SOLUTION INTRAVENOUS; SUBCUTANEOUS at 12:54

## 2021-10-30 RX ADMIN — INSULIN ASPART 4 UNITS: 100 INJECTION, SOLUTION INTRAVENOUS; SUBCUTANEOUS at 16:17

## 2021-10-30 RX ADMIN — DOXAZOSIN 2 MG: 1 TABLET ORAL at 10:44

## 2021-10-30 RX ADMIN — AMANTADINE HYDROCHLORIDE 100 MG: 100 CAPSULE ORAL at 10:45

## 2021-10-30 RX ADMIN — ATENOLOL 25 MG: 25 TABLET ORAL at 10:46

## 2021-10-30 ASSESSMENT — ACTIVITIES OF DAILY LIVING (ADL)
ADLS_ACUITY_SCORE: 32
ADLS_ACUITY_SCORE: 36
ADLS_ACUITY_SCORE: 34
ADLS_ACUITY_SCORE: 34
ADLS_ACUITY_SCORE: 38
ADLS_ACUITY_SCORE: 34
ADLS_ACUITY_SCORE: 38
ADLS_ACUITY_SCORE: 36
ADLS_ACUITY_SCORE: 34
ADLS_ACUITY_SCORE: 34
ADLS_ACUITY_SCORE: 36
ADLS_ACUITY_SCORE: 32
ADLS_ACUITY_SCORE: 36
ADLS_ACUITY_SCORE: 34
ADLS_ACUITY_SCORE: 36
ADLS_ACUITY_SCORE: 32
ADLS_ACUITY_SCORE: 36
ADLS_ACUITY_SCORE: 34
ADLS_ACUITY_SCORE: 36
ADLS_ACUITY_SCORE: 36
ADLS_ACUITY_SCORE: 32
ADLS_ACUITY_SCORE: 34
ADLS_ACUITY_SCORE: 36
ADLS_ACUITY_SCORE: 38

## 2021-10-30 NOTE — PROGRESS NOTES
[facility]    Hospitalist Progress Note    Date of Service (when I saw the patient): 10/30/2021  Provider:  Suhas Graham MD   Text Page  7am - 6PM       Assessment & Plan   Jimmy Otto is a 71-year-old male with history of insulin-dependent type 2 diabetes, hypercholesterolemia, hypertension, TIA, erectile dysfunction, recent stroke, and urinary retention with indwelling Ordoñez catheter placed about 2 and half weeks ago. Unfortunately, he had right thalamic stroke on 9/14/2021.  This left him nonverbal.  He discharged to the acute rehab unit at [facility] where he stayed from 9/17 through 10/6.  He returned home.  His family brought him in for evaluation of weakness, nausea, vomiting, decreased oral intake, difficulty swallowing, and generally doing poorly.       #Sepsis (leukocytosis, heart rate 90s, lactic acidosis) secondary to E Coli Urinary tract infection with chronic ordoñez catheter in place: Urine culture grew pansensitive E. coli.  Completed 7 days of IV ceftriaxone.  Ordoñez catheter exchanged in the ED.     #Thrush: Treated with fluconazole-completed.  -Continue magic mouthwash as needed for pain     #Severe malnutrition in setting of CVA, Dysphagia from strokes: Currently, this appears to be the main issue.  He was treated for thrush as above.  Both nutrition and speech have evaluated the patient.  He is on a modified diet.  Concern that patient not taking enough p.o. to maintain his nutrition.  I discussed with his daughter, Clemente, on 10/22.  I expressed that if Mr. Otto is not able to take enough p.o. to maintain his nutrition then will need to consider feeding tube.  She states that this is all new to her and that she will need to discuss with her family.  She would also like to discuss with palliative care team.  Consult is placed.  -Prior to admission was on modified diet due to recent stroke.  Seen by speech and continued on strict modified diet with 1:1 supervision.    -Calorie  counts have been done which do show some improvement in PO intake but still not meeting his requirements. Unfortunately, he is on strict diet with teaspoons at a time.  He has been able to take more PO over last 2-3 days but he is still not able to handle any liquids.  Dehydration is largest concern given his inability to take liquids and will likely require him to have feeding tube placed.    -Palliative consulted for goals of care and consideration of feeding tube. Discussed with family including daughters, brother, wife and son by phone. Dr De Santiago offered formal  but patient's wife declined and daughter acted as .  Family was encouraged that he tolerated and ate most of his dinner on 10/22.  At that time, they did want to continue to monitor his p.o. intake but if he is not able to maintain his nutrition they want feeding tube.    -  I talked to luis enrique Cornejo personally and famiily is in agreement with plan.    -Discussed with palliative care. Given that his dysphagia is secondary from his CVAs, unclear if his swallow function would get better.  Given family is determined to bring him home, they are in agreement with placement of GJ but because of the Plavix, the patient cannot be done until 5 days off Plavix.  In the interim he had NG placement yesterday and EN started but pulled out. We replaced it,   Discharge to home after GJ placement. On Lovenox twice daily, plan to hold on the heparin 24 hours before GJ placement by IR.  Anticipate this will be Monday or Tuesday this coming week.    Palliative involvement and help highly appreciated.  -RD leading EN through NG, thanks.   -On amantadine which was started during last hospitalization for fatigue in the setting of stroke.  Continue to hold quetiapine and ramelteon.      #Right sided hemiplegia from an old stroke.    #Recent stroke on 9/14/2021: Was hospitalized at [facility] and was in acute rehab there. Suffered right thalamic  "stroke.  Nonverbal and worsening right sided weakness.  He has prior history of left thalamic stroke previously.  He was noted to have periodic somnolence and lethargy as result during that hospitalization at [facility].  We have seen that periodically here as well and minimized sedating meds as result.      -On Plavix, Lipitor.  At admission, he was on aspirin plus Plavix.  Aspirin has been stopped, per neurology recommendations.  -Patient's family don't want him to go back to rehab facility. They want to take him home with home therapy orders.   -Will need home PT/OT/RN/HHA on discharge.  consulted for discharge planning   -Per neurology note from his admission at [facility]: \"Dual antiplatelets for 1 month then back to plavix monotherapy as he was supposed to be on. It might be informative to check P2Y12 activity after a month of being on plavix only.\"  -aspirin discontinued as it has been 1 month since the stroke.  Continue Plavix.     #Acute toxic and infectious encephalopathy: Patient with somnolence during his hospitalization.  Likely secondary to his CVA in the past along with infection with some sedating medications.  Seroquel and ramelteon have since been held.  This seem to be resolved.      #Urine retention identified during recent hospitalization for stroke: Indwelling Felix catheter was changed in the emergency department  -Outpatient urology follow-up.  -On doxazosin for BPH.  doxazosin increased.       #Type 2 diabetes: Prior to admission was on Levemir insulin 18 units at bedtime, NovoLog sliding scale insulin, Victoza, and Metformin  -Resumed Levemir at a lower dose. Increased levemir to 12 units, BS checks q 4h with EN.  Sliding scale insulin with hypoglycemia protocol.       #Hypercholesterolemia: Continue statin  #Hypertension: Atenolol resumed.  Resumed amlodipine at a lower dose.  #Weakness, Deconditioning: PT and OT consulted  #CKD3: Baseline creatinine seems to be around 1.5. "  At baseline  #Depression.  Patient had good response to antidepressant when he had the first stroke, the family is requesting antidepressant medication.  Fluoxetine liquid formulation.     DVT Prophylaxis: Heparin SQ  Code Status: Full Code    Disposition: Expected discharge in home once FT placement is done.  Daughter updated at the bedside.  Family is in agreement with the plan as outlined.    Interval History   Non verbal, appears comfort not in distress.  Printed off his NG yesterday. Pending registration for feeding, temporary NG until GJ is placed.     -Data reviewed today: I reviewed all new labs and imaging results over the last 24 hours. I personally reviewed the EKG tracing showing NSR in monitor .    Physical Exam   Temp: 97.5  F (36.4  C) Temp src: Rectal BP: (Abnormal) 147/74 Pulse: 68   Resp: 18 SpO2: 98 % O2 Device: None (Room air)    Vitals:    10/27/21 0554 10/29/21 0549 10/30/21 0500   Weight: 54.7 kg (120 lb 9.6 oz) 54.4 kg (120 lb) 55.6 kg (122 lb 9.6 oz)     Vital Signs with Ranges  Temp:  [97.5  F (36.4  C)-97.9  F (36.6  C)] 97.5  F (36.4  C)  Pulse:  [67-83] 68  Resp:  [16-18] 18  BP: (122-149)/(69-77) 147/74  SpO2:  [96 %-98 %] 98 %  I/O last 3 completed shifts:  In: 30   Out: 1260 [Urine:1260]    GEN:  Alert, non verbal, appears comfortable, NAD.  HEENT:  Normocephalic/atraumatic, no scleral icterus, no nasal discharge, mouth moist.  CV:  Regular rate and rhythm, no murmur or JVD.  S1 + S2 noted, no S3 or S4.  LUNGS:  Clear to auscultation bilaterally without rales/rhonchi/wheezing/retractions.  Symmetric chest rise on inhalation noted.  ABD:  Active bowel sounds, soft, non-tender/non-distended.  No rebound/guarding/rigidity.  EXT:  No edema or cyanosis.  No joint synovitis noted.  SKIN:  Dry to touch, no exanthems noted in the visualized areas.  NEURO: L sided weakness and paresis     Medications     dextrose       lactated ringers 75 mL/hr at 10/30/21 0604       amantadine  100 mg Oral  Daily     amLODIPine  5 mg Oral Daily     atenolol  25 mg Oral BID     atorvastatin  80 mg Oral Daily     [Held by provider] clopidogrel  75 mg Oral Daily     doxazosin  2 mg Oral Daily     enoxaparin ANTICOAGULANT  0.75 mg/kg Subcutaneous Q12H     FLUoxetine  20 mg Oral or Feeding Tube Daily     insulin aspart  1-12 Units Subcutaneous Q4H     insulin detemir  12 Units Subcutaneous At Bedtime     sennosides  1 tablet Oral At Bedtime     sodium chloride (PF)  3 mL Intracatheter Q8H       Data   Recent Labs   Lab 10/30/21  1252 10/30/21  0842 10/30/21  0825 10/30/21  0659 10/30/21  0448 10/29/21  0836 10/29/21  0615 10/28/21  0913 10/28/21  0723 10/27/21  0850 10/27/21  0743 10/25/21  1210 10/25/21  0918   WBC  --   --   --   --   --   --   --   --  6.6  --   --   --   --    HGB  --   --   --   --   --   --   --   --  12.6*  --   --   --   --    MCV  --   --   --   --   --   --   --   --  88  --   --   --   --    PLT  --   --   --  254  --   --   --   --  332  --  358  --   --    NA  --   --   --   --   --   --   --   --  145*  --   --   --  140   POTASSIUM  --  3.4  --   --   --   --  3.7  --  3.8   < >  --    < > 3.5   CHLORIDE  --   --   --   --   --   --   --   --  110*  --   --   --  105   CO2  --   --   --   --   --   --   --   --  28  --   --   --  28   BUN  --   --   --   --   --   --   --   --  33*  --   --   --  26   CR  --   --   --   --   --   --   --   --  1.39*  --   --   --  1.23   ANIONGAP  --   --   --   --   --   --   --   --  7  --   --   --  7   ARLETH  --   --   --   --   --   --   --   --  9.6  --   --   --  9.9   *  --  283*  --  227*   < >  --    < > 220*   < >  --    < > 273*    < > = values in this interval not displayed.       Recent Results (from the past 24 hour(s))   XR Feeding Tube Placement    Narrative    FEEDING TUBE PLACEMENT 10/29/2021 2:48 PM    HISTORY: Pulled out.    COMPARISON: Feeding tube placement on 10/28/2021.    Fluoro time: 12.27 minutes. Two fluoroscopic images were  obtained.    TECHNIQUE: After injection of Xylocaine gel into the nose, a feeding  tube was advanced under fluoroscopic guidance.    FINDINGS: The feeding tube is advanced with the tip in the distal  stomach/proximal duodenum A small amount of barium was injected to  define anatomy.      Impression    IMPRESSION: Uncomplicated feeding tube placement with tip in the  distal stomach/proximal duodenum.         DELFINO LYNN MD         SYSTEM ID:  [id number]   XR Abdomen Port 1 View    Narrative    ABDOMEN ONE VIEW PORTABLE   10/29/2021 3:41 PM     HISTORY: Post feeding tube placement    COMPARISON: Fluoroscopy study on same day earlier      Impression    IMPRESSION: Single supine view of the abdomen and pelvis was obtained.  Feeding tube distal tip projects over distal stomach. Nonobstructive  bowel gas pattern with enteric contrast within small and large bowel.    DELFINO LYNN MD         SYSTEM ID:  [id number]     Disclaimer: This note consists of symbols derived from keyboarding, dictation and/or voice recognition software. As a result, there may be errors in the script that have gone undetected. Please consider this when interpreting information found in this chart.

## 2021-10-30 NOTE — CONSULTS
"Clinical Nutrition Brief Note     Received consult \"registered dietitian to order TF per medical nutrition therapy guidelines\" per request.     Labs:  Labs:  Electrolytes  Potassium (mmol/L)   Date Value   10/30/2021 3.4   10/29/2021 3.7   10/28/2021 3.8   08/16/2012 4.0   08/13/2012 3.6     Phosphorus (mg/dL)   Date Value   10/29/2021 2.7   10/28/2021 3.4   10/27/2021 3.7    Blood Glucose  Glucose (mg/dL)   Date Value   10/28/2021 220 (H)   10/25/2021 273 (H)   10/22/2021 132 (H)   10/18/2021 231 (H)   10/17/2021 200 (H)   08/16/2012 161 (H)   08/13/2012 214 (H)     GLUCOSE BY METER POCT (mg/dL)   Date Value   10/30/2021 324 (H)   10/30/2021 283 (H)   10/30/2021 227 (H)   10/30/2021 177 (H)   10/30/2021 252 (H)     Hemoglobin A1C (%)   Date Value   09/15/2021 11.5 (H)   08/14/2012 7.6 (H)    Inflammatory Markers  CRP Inflammation (mg/L)   Date Value   09/19/2021 28.0 (H)     WBC (10e9/L)   Date Value   08/16/2012 5.6   08/13/2012 6.1     WBC Count (10e3/uL)   Date Value   10/28/2021 6.6   10/22/2021 7.3   10/18/2021 6.7     Albumin (g/dL)   Date Value   10/13/2021 3.3 (L)   09/15/2021 2.6 (L)   08/13/2012 5.1 (H)      Magnesium (mg/dL)   Date Value   10/30/2021 1.8   10/29/2021 1.8   10/28/2021 1.9     Sodium (mmol/L)   Date Value   10/28/2021 145 (H)   10/25/2021 140   10/22/2021 138   08/16/2012 140   08/13/2012 141    Renal  Urea Nitrogen (mg/dL)   Date Value   10/28/2021 33 (H)   10/25/2021 26   10/22/2021 14   08/16/2012 18   08/13/2012 14     Creatinine (mg/dL)   Date Value   10/28/2021 1.39 (H)   10/25/2021 1.23   10/22/2021 1.34 (H)   08/16/2012 0.96   08/13/2012 0.91     Additional  Triglycerides (mg/dL)   Date Value   09/15/2021 238 (H)   08/14/2012 136     Ketones Urine (mg/dL)   Date Value   10/13/2021 Negative        Medications:    amantadine  100 mg Oral Daily     amLODIPine  5 mg Oral Daily     atenolol  25 mg Oral BID     atorvastatin  80 mg Oral Daily     [Held by provider] clopidogrel  75 mg Oral " Daily     doxazosin  2 mg Oral Daily     enoxaparin ANTICOAGULANT  0.75 mg/kg Subcutaneous Q12H     FLUoxetine  20 mg Oral or Feeding Tube Daily     insulin aspart  1-12 Units Subcutaneous Q4H     insulin detemir  12 Units Subcutaneous At Bedtime     sennosides  1 tablet Oral At Bedtime     sodium chloride (PF)  3 mL Intracatheter Q8H        dextrose       lactated ringers 75 mL/hr at 10/30/21 0604      acetaminophen **OR** acetaminophen, benzocaine, bisacodyl, calcium carbonate, dextrose, glucose **OR** dextrose **OR** glucagon, guaiFENesin-dextromethorphan, lidocaine 4%, lidocaine (buffered or not buffered), lidocaine visc 2% & maalox/mylanta w/simethicone & diphenhydramine, magnesium hydroxide, melatonin, ondansetron **OR** ondansetron, phenol, polyethylene glycol, prochlorperazine **OR** prochlorperazine **OR** prochlorperazine, senna-docusate **OR** senna-docusate, sodium chloride (PF)     Recommendations:   - Continue TF as ordered below--> rate currently at 45 mL/hr with a goal of 55 mL/hr.             EAD: Nasogastric (10/28)     Enteral Regimen: Jevity 1.5 at goal rate of 55 mL/hr x 24 hrs    Total Enteral Provisions: 1320 mL provides 1980 kcal, 84 g protein, 285 g CHO, 28 g fiber, and 1003 mL free water daily     Free Water Flush: standard 60 mL q4 hours, while IVF on    Phos add on    Daily weights    Please page with any questions.     Beronica Brasher, RD, LD  Clinical Dietitian

## 2021-10-30 NOTE — PLAN OF CARE
VSS ex htn, on atenolol. Non verbal but can nod head yes and no. Denies pain, SOB, nausea, CP. NG patent and in right place, enteral feedings started at 15ml/hr. Pt to be increased to 30ml at 0100. Sitter at bedside d/t pt being restless and picking at NG and IV. New IV placed, LR infusing @ 75ml/hr. , 229. Had 2 large, liquid bowel movements. Senna held. Will continue POC    Rodolfo Hawk RN on 10/29/2021 at 8:40 PM

## 2021-10-30 NOTE — PLAN OF CARE
Pt is non-verbal. Denies pain. Sitter in room. Tube feed increased to 30mL/hr at 0100, tolerating well. Q4 BGs: 252, 227. LR at 75mL/hr. Felix in place. Several loose BMs.     Plan: GJ tube placement next week. Continue to monitor.     /69 (BP Location: Left arm)   Pulse 67   Temp 97.5  F (36.4  C) (Rectal)   Resp 18   Wt 54.4 kg (120 lb)   SpO2 96%   BMI 20.60 kg/m

## 2021-10-30 NOTE — PLAN OF CARE
Denies pain.Nonverbal. Tube feeding adjusted to 45 ml/hour at 0900. Fluids infusing. Mitts on pt and family aware due to pt pulling out feeding tube on Thursday. , 324. Plan to have GJ placement on Monday. Hold Lovenox injection on Sunday per MD.

## 2021-10-30 NOTE — PROGRESS NOTES
"Care Management Follow Up    Length of Stay (days): 16    Expected Discharge Date: 10/30/2021     Concerns to be Addressed:     Discharge planning  Patient plan of care discussed at interdisciplinary rounds: Yes    Anticipated Discharge Disposition:  [facility] and [facility] Home care RN PT OT HHA     Anticipated Discharge Services:  Infusion services, Home care  Anticipated Discharge DME:  none    Referrals Placed by CM/SW:  Updated [facility] and [facility]     Private pay costs discussed: Not applicable    Additional Information:  Notified by nursing, MD planning on discharging patient today. CC notified [facility] and [facility] on discharge plan.     Spoke with Dr. Bhakta, he states discharge anticipated for Monday. Patient discharging home with family, per family's request.  [facility] and [facility] have accepted patient and will follow when discharge. HI and [facility] updated discharge anticipated on Monday.     Will need orders for Home Infusion and Home care RN PT OT HHA for discharge.       Donna Serrano RN Case Manager  Inpatient Care Coordination   [facility]   [phone]      Donna Serrano RN    Update 3238: Per MD note \" Discharge to home after GJ placement. On Lovenox twice daily, plan to hold on the heparin 24 hours before GJ placement by IR.  Anticipate this will be Monday or Tuesday this coming week.  \"      "

## 2021-10-30 NOTE — PLAN OF CARE
VSS. Non verbal but can nod head yes or no. No c/o pain, nausea, SOB. LS dim, PIV infusing LR @ 75ml/hr. TF goal rate of 55ml/hr reached. Tolerating well. PSC at bedside, pt can be restless and pick at tubes/IV at times. , 351, 322. Having loose/paste-like BMs, senna held. No c/o pain. Will continue POC    Rodolfo Hawk RN on 10/30/2021 at 9:31 PM

## 2021-10-31 ENCOUNTER — APPOINTMENT (OUTPATIENT)
Dept: SPEECH THERAPY | Facility: CLINIC | Age: 71
DRG: 871 | End: 2021-10-31
Payer: MEDICARE

## 2021-10-31 ENCOUNTER — APPOINTMENT (OUTPATIENT)
Dept: OCCUPATIONAL THERAPY | Facility: CLINIC | Age: 71
DRG: 871 | End: 2021-10-31
Payer: MEDICARE

## 2021-10-31 LAB
GLUCOSE BLDC GLUCOMTR-MCNC: 161 MG/DL (ref 70–99)
GLUCOSE BLDC GLUCOMTR-MCNC: 198 MG/DL (ref 70–99)
GLUCOSE BLDC GLUCOMTR-MCNC: 250 MG/DL (ref 70–99)
GLUCOSE BLDC GLUCOMTR-MCNC: 301 MG/DL (ref 70–99)
GLUCOSE BLDC GLUCOMTR-MCNC: 330 MG/DL (ref 70–99)
GLUCOSE BLDC GLUCOMTR-MCNC: 372 MG/DL (ref 70–99)
GLUCOSE BLDC GLUCOMTR-MCNC: 376 MG/DL (ref 70–99)
HOLD SPECIMEN: NORMAL
MAGNESIUM SERPL-MCNC: 1.8 MG/DL (ref 1.6–2.3)
POTASSIUM BLD-SCNC: 3.5 MMOL/L (ref 3.4–5.3)

## 2021-10-31 PROCEDURE — 250N000013 HC RX MED GY IP 250 OP 250 PS 637: Performed by: HOSPITALIST

## 2021-10-31 PROCEDURE — 258N000003 HC RX IP 258 OP 636: Performed by: HOSPITALIST

## 2021-10-31 PROCEDURE — 250N000012 HC RX MED GY IP 250 OP 636 PS 637: Performed by: HOSPITALIST

## 2021-10-31 PROCEDURE — 97530 THERAPEUTIC ACTIVITIES: CPT | Mod: GO | Performed by: OCCUPATIONAL THERAPIST

## 2021-10-31 PROCEDURE — 99232 SBSQ HOSP IP/OBS MODERATE 35: CPT | Performed by: INTERNAL MEDICINE

## 2021-10-31 PROCEDURE — 120N000001 HC R&B MED SURG/OB

## 2021-10-31 PROCEDURE — 92526 ORAL FUNCTION THERAPY: CPT | Mod: GN | Performed by: SPEECH-LANGUAGE PATHOLOGIST

## 2021-10-31 PROCEDURE — 250N000013 HC RX MED GY IP 250 OP 250 PS 637: Performed by: INTERNAL MEDICINE

## 2021-10-31 PROCEDURE — 84132 ASSAY OF SERUM POTASSIUM: CPT | Performed by: INTERNAL MEDICINE

## 2021-10-31 PROCEDURE — 36415 COLL VENOUS BLD VENIPUNCTURE: CPT | Performed by: HOSPITALIST

## 2021-10-31 PROCEDURE — 83735 ASSAY OF MAGNESIUM: CPT | Performed by: HOSPITALIST

## 2021-10-31 RX ADMIN — SODIUM CHLORIDE, POTASSIUM CHLORIDE, SODIUM LACTATE AND CALCIUM CHLORIDE: 600; 310; 30; 20 INJECTION, SOLUTION INTRAVENOUS at 04:40

## 2021-10-31 RX ADMIN — AMANTADINE HYDROCHLORIDE 100 MG: 100 CAPSULE ORAL at 09:46

## 2021-10-31 RX ADMIN — INSULIN ASPART 10 UNITS: 100 INJECTION, SOLUTION INTRAVENOUS; SUBCUTANEOUS at 03:56

## 2021-10-31 RX ADMIN — ATENOLOL 25 MG: 25 TABLET ORAL at 20:47

## 2021-10-31 RX ADMIN — FLUOXETINE HYDROCHLORIDE 20 MG: 20 LIQUID ORAL at 10:12

## 2021-10-31 RX ADMIN — AMLODIPINE BESYLATE 5 MG: 5 TABLET ORAL at 09:57

## 2021-10-31 RX ADMIN — ATORVASTATIN CALCIUM 80 MG: 40 TABLET, FILM COATED ORAL at 09:45

## 2021-10-31 RX ADMIN — SODIUM CHLORIDE, POTASSIUM CHLORIDE, SODIUM LACTATE AND CALCIUM CHLORIDE: 600; 310; 30; 20 INJECTION, SOLUTION INTRAVENOUS at 17:30

## 2021-10-31 RX ADMIN — INSULIN ASPART 3 UNITS: 100 INJECTION, SOLUTION INTRAVENOUS; SUBCUTANEOUS at 08:08

## 2021-10-31 RX ADMIN — INSULIN ASPART 10 UNITS: 100 INJECTION, SOLUTION INTRAVENOUS; SUBCUTANEOUS at 00:40

## 2021-10-31 RX ADMIN — DOXAZOSIN 2 MG: 1 TABLET ORAL at 09:46

## 2021-10-31 ASSESSMENT — ACTIVITIES OF DAILY LIVING (ADL)
ADLS_ACUITY_SCORE: 36
ADLS_ACUITY_SCORE: 38
ADLS_ACUITY_SCORE: 36
ADLS_ACUITY_SCORE: 38
ADLS_ACUITY_SCORE: 36
ADLS_ACUITY_SCORE: 38
ADLS_ACUITY_SCORE: 36
ADLS_ACUITY_SCORE: 38
ADLS_ACUITY_SCORE: 36
ADLS_ACUITY_SCORE: 36
ADLS_ACUITY_SCORE: 38
ADLS_ACUITY_SCORE: 36

## 2021-10-31 NOTE — PLAN OF CARE
Pt is non verbal. Shook head side to side when asked about pain. Ax2 w lift. Pt pulled NG tube out ~0405. Ok'd to leave NG out until AM. Closely monitoring BGs 330 at 0552. Original plan to place GJ tube 11/1. Soft BM on shift. LR at 75mL/hr.     BP (!) 151/76 (BP Location: Left arm, Patient Position: Supine)   Pulse 68   Temp 98  F (36.7  C) (Rectal)   Resp 16   Wt 55.6 kg (122 lb 9.6 oz)   SpO2 100%   BMI 21.04 kg/m

## 2021-10-31 NOTE — PLAN OF CARE
VSS ex htn, on scheduled atenolol. Non verbal but can nod yes/no. LR at 75ml/hr. Remains on dysphagia pureed diet, likes ice cream but poor appetite. Still having loose stools. A2 with lift. Denies pain. Repo as tolerated. Mitts on for safety when family not in room. Felix patent with good UOP. , 301. Plan for GJ tube placement tomorrow. Will continue POC     Rodolfo Hawk RN on 10/31/2021 at 9:02 PM

## 2021-10-31 NOTE — PROGRESS NOTES
Cross Cover:     Patient has NG tube placed for tube feeds due to severe malnutrition in the setting of CVA with dysphagia. Plan is to discharge home following GJ placement per IR on 11/1 or 11/2. OK to keep NG tube out until AM. Reassess if patient will tolerate placement of NG tube vs waiting for GJ tube placement. Discussed with nursing - patient had insulin prior to pulling NG tube. Last . Monitor BG and notify provider if <160 to re-evaluate if IVF vs NG tube trial overnight indicated. Defer to day team to discuss care goals of placement of NG vs waiting for IR GJ tube placement. Nursing notes patient has not been tolerating NG tube since placement.

## 2021-10-31 NOTE — PROGRESS NOTES
[facility]  Hospitalist Progress Note  Taty Bolton MD 10/31/21    Reason for Stay (Diagnosis): weakness         Assessment and Plan:      Summary of Stay: Jimmy Otto is a 71-year-old male with history of IDDM2, hypercholesterolemia, hypertension, urinary retention (currently ordoñez catheter in place) and recent right thalamic stroke on 9/14/2021 which left him nonverbal after which he was discharged to the acute rehab unit at [facility] where he stayed from 9/17 through 10/6 who was admitted on 10/13/2021 with weakness, nausea, vomiting, decreased oral intake, difficulty swallowing, and overall functional decline and was found to have sepsis due to UTI as well as severe malnutrition.  After discussions with patient's family plan to place Gtube for nutritional support with goal of returning home with his family.    Problem List/Assessment and Plan:     Sepsis 2/2 E Coli UTI with chronic ordoñez catheter in place: Urine culture grew pansensitive E. coli.  Completed 7 days of IV ceftriaxone.  Ordoñez catheter exchanged in the ED.  He had difficulty with urinary retention when hospitalized for CVA in September.  Did have hematuria at that time which resolved.  Has had ordoñez since then.  Will need outpatient follow up with Urology.  - Continue Flomax  - Follow up with Urology in outpatient setting     Thrush: Treated with fluconazole-completed.  -Continue magic mouthwash as needed for pain     Severe malnutrition in setting of CVA, Dysphagia from strokes: Currently, this appears to be the main issue.  He was treated for thrush as above.  Both nutrition and speech have evaluated the patient.  He is on a modified diet.  Concern that patient not taking enough p.o. to maintain his nutrition. Multiple discussions by prior Hospitalist as well as Palliative care.  -Prior to admission was on modified diet due to recent stroke.  Seen by speech and continued on strict modified diet with 1:1 supervision.    -Calorie  "counts have been done which do show some improvement in PO intake but still not meeting his requirements.   -Palliative care consulted, appreciate recommendations  - Given that his dysphagia is secondary from his CVAs, unclear if his swallow function would get better.  Family decided to move forward with Gtube placement.  - Plavix has been held since 10/27, Lovenox now on hold with plans for Gtube tomorrow, NPO at midnight  - NGT had been placed but he pulled this out for a second time this morning, will hold off on replacing this, planning Gtube tomorrow  -On amantadine which was started during last hospitalization for fatigue in the setting of stroke.  Continue to hold quetiapine and ramelteon.      Right sided hemiplegia from an old stroke.     Recent stroke on 9/14/2021: Was hospitalized at [facility] and was in acute rehab there. Suffered right thalamic stroke.  Nonverbal and worsening right sided weakness.  He has prior history of left thalamic stroke previously.  He was noted to have periodic somnolence and lethargy as result during that hospitalization at [facility].  We have seen that periodically here as well and minimized sedating meds as result.      -On Plavix, Lipitor.  At admission, he was on aspirin plus Plavix.  Aspirin has been stopped, per neurology recommendations.  -Patient's family don't want him to go back to rehab facility. They want to take him home with home therapy orders.   -Will need home PT/OT/RN/HHA on discharge.  consulted for discharge planning   -Per neurology note from his admission at [facility]: \"Dual antiplatelets for 1 month then back to Plavix monotherapy as he was supposed to be on. It might be informative to check P2Y12 activity after a month of being on Plavix only.\"  -aspirin discontinued as it has been 1 month since the stroke.   - As above Plavix on hold for Gtube placement tomorrow     Acute toxic and infectious encephalopathy: Patient with " somnolence during his hospitalization.  Likely secondary to his CVA in the past along with infection with some sedating medications.  Seroquel and ramelteon have since been held.  This seem to be resolved.      Urine retention identified during recent hospitalization for stroke: Indwelling Felix catheter was changed in the emergency department  -Outpatient urology follow-up.  -On doxazosin for BPH.  doxazosin increased.       IDDM2: Prior to admission was on Levemir insulin 18 units at bedtime, NovoLog sliding scale insulin, Victoza, and Metformin. His TF are currently held as he pulled his NGT so will lower Levemir today.  - Decrease Levemir from 12 units to 6 units as TF are now on hold  - Change to medium sliding scale insulin NPO scale  - Will need readjustment of insulin regimen once Gtube is placed and patient re started on TF     Hypercholesterolemia: Continue statin    Hypertension: Atenolol resumed.  Resumed amlodipine at a lower dose.    Weakness, Deconditioning: PT and OT consulted    CKD3: Baseline creatinine seems to be around 1.5.  At baseline    Depression.  Patient had good response to antidepressant when he had the first stroke, the family is requesting antidepressant medication.  Fluoxetine liquid formulation started.    Diet: Snacks/Supplements Adult: Gelatein Plus; With Meals  Combination Diet Pureed Diet (level 4); Mildly Thick (level 2) (tsp ONLY)  Adult Formula Drip Feeding: Continuous Jevity 1.5; Other - Specify in Comment; Goal Rate: 55; mL/hr; Medication - Feeding Tube Flush Frequency: At least 15-30 mL water before and after medication administration and with tube clogging; Start at 15 ...  NPO per Anesthesia Guidelines for Procedure/Surgery Except for: Meds    DVT Prophylaxis: Pneumatic Compression Devices, holding chemical prophylaxis for now given Gtube placement tomorrow  Felix Catheter: PRESENT, indication: Retention  Code Status: Full Code      Disposition Plan   Expected discharge:  suspect 2 days recommended to prior living arrangement once Gtube placed, tolerating TF, stable blood sugars.  Entered: Taty Bolton MD 10/31/2021, 11:29 AM       The patient's care was discussed with the Bedside Nurse, Patient and Patient's Family.    Hospitalist Service  [facility]          Interval History (Subjective):      Patient pulled out his NGT for the second time early this morning.  I saw the patient with his nurse at the bedside.  He appears comfortable at this time.  I called his daughter by phone.  Discussed that he had pulled out his NGT today and as this is the second time we will not replace it today as we will have the G-tube placed tomorrow.  She is agreeable with this plan.  She had a few more questions which I answered.  She will update the rest of her family.                  Physical Exam:      Last Vital Signs:  BP (!) 144/68   Pulse 59   Temp 97.7  F (36.5  C) (Oral)   Resp 20   Wt 55.6 kg (122 lb 9.6 oz)   SpO2 99%   BMI 21.04 kg/m      General: Alert, awake, no acute distress.  In a chair.  HEENT: Normocephalic and atraumatic, eyes anicteric and without scleral injection, EOMI, face symmetric, MMM.  Cardiac: RRR, normal S1, S2. No m/g/r, no LE edema.  Pulmonary: Normal chest rise, normal work of breathing.  Lungs CTAB without crackles or wheezing.  Abdomen: soft, non-tender, non-distended.  Normoactive bowel sounds, no guarding or rebound tenderness.  Extremities: no deformities.  Warm, well perfused.  Skin: no rashes or lesions.  Warm and Dry.  Neuro: No focal deficits.  Patient is nonverbal due to his stroke.  Moving extremities while sitting in a chair.  Psych: Alert and oriented to self. Appropriate affect.         Medications:      All current medications were reviewed with changes reflected in problem list.         Data:      All new lab and imaging data was reviewed.   Labs:  Recent Labs   Lab 10/31/21  0807 10/31/21  0647 10/31/21  0508  10/31/21  0355 10/30/21  1252 10/30/21  0842 10/29/21  0836 10/29/21  0615 10/28/21  0913 10/28/21  0723 10/25/21  1210 10/25/21  0918   NA  --   --   --   --   --   --   --   --   --  145*  --  140   POTASSIUM  --  3.5  --   --   --  3.4  --  3.7   < > 3.8   < > 3.5   CHLORIDE  --   --   --   --   --   --   --   --   --  110*  --  105   CO2  --   --   --   --   --   --   --   --   --  28  --  28   ANIONGAP  --   --   --   --   --   --   --   --   --  7  --  7   *  --  330* 372*   < >  --    < >  --    < > 220*   < > 273*   BUN  --   --   --   --   --   --   --   --   --  33*  --  26   CR  --   --   --   --   --   --   --   --   --  1.39*  --  1.23   GFRESTIMATED  --   --   --   --   --   --   --   --   --  51*  --  59*   ARLETH  --   --   --   --   --   --   --   --   --  9.6  --  9.9    < > = values in this interval not displayed.     Recent Labs   Lab 10/30/21  0659 10/28/21  0723 10/27/21  0743   WBC  --  6.6  --    HGB  --  12.6*  --    HCT  --  42.2  --    MCV  --  88  --     332 [id number]      Imaging:   No results found for this or any previous visit (from the past 24 hour(s)).    Taty Bolton MD

## 2021-11-01 ENCOUNTER — APPOINTMENT (OUTPATIENT)
Dept: ULTRASOUND IMAGING | Facility: CLINIC | Age: 71
DRG: 871 | End: 2021-11-01
Attending: RADIOLOGY
Payer: MEDICARE

## 2021-11-01 ENCOUNTER — APPOINTMENT (OUTPATIENT)
Dept: OCCUPATIONAL THERAPY | Facility: CLINIC | Age: 71
DRG: 871 | End: 2021-11-01
Payer: MEDICARE

## 2021-11-01 ENCOUNTER — APPOINTMENT (OUTPATIENT)
Dept: PHYSICAL THERAPY | Facility: CLINIC | Age: 71
DRG: 871 | End: 2021-11-01
Payer: MEDICARE

## 2021-11-01 ENCOUNTER — APPOINTMENT (OUTPATIENT)
Dept: GENERAL RADIOLOGY | Facility: CLINIC | Age: 71
DRG: 871 | End: 2021-11-01
Attending: INTERNAL MEDICINE
Payer: MEDICARE

## 2021-11-01 LAB
ANION GAP SERPL CALCULATED.3IONS-SCNC: 6 MMOL/L (ref 3–14)
BUN SERPL-MCNC: 16 MG/DL (ref 7–30)
CALCIUM SERPL-MCNC: 8.9 MG/DL (ref 8.5–10.1)
CHLORIDE BLD-SCNC: 105 MMOL/L (ref 94–109)
CO2 SERPL-SCNC: 30 MMOL/L (ref 20–32)
CREAT SERPL-MCNC: 1.01 MG/DL (ref 0.66–1.25)
ERYTHROCYTE [DISTWIDTH] IN BLOOD BY AUTOMATED COUNT: 13.1 % (ref 10–15)
GFR SERPL CREATININE-BSD FRML MDRD: 74 ML/MIN/1.73M2
GLUCOSE BLD-MCNC: 195 MG/DL (ref 70–99)
GLUCOSE BLDC GLUCOMTR-MCNC: 173 MG/DL (ref 70–99)
GLUCOSE BLDC GLUCOMTR-MCNC: 185 MG/DL (ref 70–99)
GLUCOSE BLDC GLUCOMTR-MCNC: 192 MG/DL (ref 70–99)
GLUCOSE BLDC GLUCOMTR-MCNC: 196 MG/DL (ref 70–99)
GLUCOSE BLDC GLUCOMTR-MCNC: 221 MG/DL (ref 70–99)
GLUCOSE BLDC GLUCOMTR-MCNC: 227 MG/DL (ref 70–99)
HCT VFR BLD AUTO: 37.7 % (ref 40–53)
HGB BLD-MCNC: 11.3 G/DL (ref 13.3–17.7)
INR PPP: 0.96 (ref 0.85–1.15)
MAGNESIUM SERPL-MCNC: 1.9 MG/DL (ref 1.6–2.3)
MCH RBC QN AUTO: 26.3 PG (ref 26.5–33)
MCHC RBC AUTO-ENTMCNC: 30 G/DL (ref 31.5–36.5)
MCV RBC AUTO: 88 FL (ref 78–100)
PHOSPHATE SERPL-MCNC: 2.6 MG/DL (ref 2.5–4.5)
PLATELET # BLD AUTO: 224 10E3/UL (ref 150–450)
POTASSIUM BLD-SCNC: 3.5 MMOL/L (ref 3.4–5.3)
RBC # BLD AUTO: 4.3 10E6/UL (ref 4.4–5.9)
SODIUM SERPL-SCNC: 141 MMOL/L (ref 133–144)
WBC # BLD AUTO: 5.6 10E3/UL (ref 4–11)

## 2021-11-01 PROCEDURE — 85014 HEMATOCRIT: CPT | Performed by: INTERNAL MEDICINE

## 2021-11-01 PROCEDURE — 250N000013 HC RX MED GY IP 250 OP 250 PS 637: Performed by: INTERNAL MEDICINE

## 2021-11-01 PROCEDURE — 85610 PROTHROMBIN TIME: CPT | Performed by: INTERNAL MEDICINE

## 2021-11-01 PROCEDURE — 120N000001 HC R&B MED SURG/OB

## 2021-11-01 PROCEDURE — 97116 GAIT TRAINING THERAPY: CPT | Mod: GP | Performed by: PHYSICAL THERAPIST

## 2021-11-01 PROCEDURE — 99232 SBSQ HOSP IP/OBS MODERATE 35: CPT | Performed by: INTERNAL MEDICINE

## 2021-11-01 PROCEDURE — 250N000009 HC RX 250: Performed by: RADIOLOGY

## 2021-11-01 PROCEDURE — 83735 ASSAY OF MAGNESIUM: CPT | Performed by: INTERNAL MEDICINE

## 2021-11-01 PROCEDURE — 97110 THERAPEUTIC EXERCISES: CPT | Mod: GO | Performed by: OCCUPATIONAL THERAPIST

## 2021-11-01 PROCEDURE — 84100 ASSAY OF PHOSPHORUS: CPT | Performed by: INTERNAL MEDICINE

## 2021-11-01 PROCEDURE — 36415 COLL VENOUS BLD VENIPUNCTURE: CPT | Performed by: INTERNAL MEDICINE

## 2021-11-01 PROCEDURE — 250N000013 HC RX MED GY IP 250 OP 250 PS 637: Performed by: HOSPITALIST

## 2021-11-01 PROCEDURE — 97112 NEUROMUSCULAR REEDUCATION: CPT | Mod: GP | Performed by: PHYSICAL THERAPIST

## 2021-11-01 PROCEDURE — C1769 GUIDE WIRE: HCPCS

## 2021-11-01 PROCEDURE — 97535 SELF CARE MNGMENT TRAINING: CPT | Mod: GO | Performed by: OCCUPATIONAL THERAPIST

## 2021-11-01 PROCEDURE — 250N000011 HC RX IP 250 OP 636: Performed by: RADIOLOGY

## 2021-11-01 PROCEDURE — 80048 BASIC METABOLIC PNL TOTAL CA: CPT | Performed by: INTERNAL MEDICINE

## 2021-11-01 PROCEDURE — 272N000078 HC NUTRITION PRODUCT INTERMEDIATE LITER

## 2021-11-01 PROCEDURE — 0DH63UZ INSERTION OF FEEDING DEVICE INTO STOMACH, PERCUTANEOUS APPROACH: ICD-10-PCS | Performed by: RADIOLOGY

## 2021-11-01 PROCEDURE — 258N000003 HC RX IP 258 OP 636: Performed by: HOSPITALIST

## 2021-11-01 PROCEDURE — 999N000189 US MARKINGS: Mod: TC

## 2021-11-01 PROCEDURE — 99152 MOD SED SAME PHYS/QHP 5/>YRS: CPT

## 2021-11-01 RX ORDER — NALOXONE HYDROCHLORIDE 0.4 MG/ML
0.2 INJECTION, SOLUTION INTRAMUSCULAR; INTRAVENOUS; SUBCUTANEOUS
Status: DISCONTINUED | OUTPATIENT
Start: 2021-11-01 | End: 2021-11-04 | Stop reason: HOSPADM

## 2021-11-01 RX ORDER — FENTANYL CITRATE 50 UG/ML
25-50 INJECTION, SOLUTION INTRAMUSCULAR; INTRAVENOUS EVERY 5 MIN PRN
Status: DISCONTINUED | OUTPATIENT
Start: 2021-11-01 | End: 2021-11-01

## 2021-11-01 RX ORDER — FLUMAZENIL 0.1 MG/ML
0.2 INJECTION, SOLUTION INTRAVENOUS
Status: DISCONTINUED | OUTPATIENT
Start: 2021-11-01 | End: 2021-11-04 | Stop reason: HOSPADM

## 2021-11-01 RX ORDER — NALOXONE HYDROCHLORIDE 0.4 MG/ML
0.4 INJECTION, SOLUTION INTRAMUSCULAR; INTRAVENOUS; SUBCUTANEOUS
Status: DISCONTINUED | OUTPATIENT
Start: 2021-11-01 | End: 2021-11-04 | Stop reason: HOSPADM

## 2021-11-01 RX ORDER — FENTANYL CITRATE 50 UG/ML
25-100 INJECTION, SOLUTION INTRAMUSCULAR; INTRAVENOUS
Status: DISCONTINUED | OUTPATIENT
Start: 2021-11-01 | End: 2021-11-04

## 2021-11-01 RX ORDER — LIDOCAINE HYDROCHLORIDE 20 MG/ML
JELLY TOPICAL ONCE
Status: COMPLETED | OUTPATIENT
Start: 2021-11-01 | End: 2021-11-01

## 2021-11-01 RX ADMIN — LIDOCAINE HYDROCHLORIDE 2 TUBE: 20 JELLY TOPICAL at 15:01

## 2021-11-01 RX ADMIN — FENTANYL CITRATE 50 MCG: 50 INJECTION, SOLUTION INTRAMUSCULAR; INTRAVENOUS at 14:59

## 2021-11-01 RX ADMIN — LIDOCAINE HYDROCHLORIDE 5 ML: 10 INJECTION, SOLUTION INFILTRATION; PERINEURAL at 15:07

## 2021-11-01 RX ADMIN — DOXAZOSIN 2 MG: 1 TABLET ORAL at 12:02

## 2021-11-01 RX ADMIN — ACETAMINOPHEN 650 MG: 325 TABLET, FILM COATED ORAL at 20:32

## 2021-11-01 RX ADMIN — AMLODIPINE BESYLATE 5 MG: 5 TABLET ORAL at 12:03

## 2021-11-01 RX ADMIN — MIDAZOLAM 1 MG: 1 INJECTION INTRAMUSCULAR; INTRAVENOUS at 14:59

## 2021-11-01 RX ADMIN — SODIUM CHLORIDE, POTASSIUM CHLORIDE, SODIUM LACTATE AND CALCIUM CHLORIDE: 600; 310; 30; 20 INJECTION, SOLUTION INTRAVENOUS at 22:09

## 2021-11-01 RX ADMIN — SODIUM CHLORIDE, POTASSIUM CHLORIDE, SODIUM LACTATE AND CALCIUM CHLORIDE: 600; 310; 30; 20 INJECTION, SOLUTION INTRAVENOUS at 06:44

## 2021-11-01 RX ADMIN — AMANTADINE HYDROCHLORIDE 100 MG: 100 CAPSULE ORAL at 12:02

## 2021-11-01 RX ADMIN — FLUOXETINE HYDROCHLORIDE 20 MG: 20 LIQUID ORAL at 12:18

## 2021-11-01 RX ADMIN — SENNOSIDES 1 TABLET: 8.6 TABLET, FILM COATED ORAL at 22:11

## 2021-11-01 RX ADMIN — ATORVASTATIN CALCIUM 80 MG: 40 TABLET, FILM COATED ORAL at 12:03

## 2021-11-01 ASSESSMENT — ACTIVITIES OF DAILY LIVING (ADL)
ADLS_ACUITY_SCORE: 36
ADLS_ACUITY_SCORE: 40
ADLS_ACUITY_SCORE: 40
ADLS_ACUITY_SCORE: 36
ADLS_ACUITY_SCORE: 36
ADLS_ACUITY_SCORE: 40
ADLS_ACUITY_SCORE: 36
ADLS_ACUITY_SCORE: 40
ADLS_ACUITY_SCORE: 36
ADLS_ACUITY_SCORE: 36
ADLS_ACUITY_SCORE: 40
ADLS_ACUITY_SCORE: 40
ADLS_ACUITY_SCORE: 36
ADLS_ACUITY_SCORE: 40
ADLS_ACUITY_SCORE: 36
ADLS_ACUITY_SCORE: 40
ADLS_ACUITY_SCORE: 36
ADLS_ACUITY_SCORE: 40
ADLS_ACUITY_SCORE: 40
ADLS_ACUITY_SCORE: 36

## 2021-11-01 NOTE — SEDATION DOCUMENTATION
Patient tolerated gastrostomy tube placement well by Dr Fonseca.. 1 mg of versed and 50 mcg of fentanyl given.  13 minutes of sedation time.  Dressing clean dry and intact.  New tube to drainage bag for two hours, then ok to take it off.  Ok to use tube at 2000.  Buttons will need to be removed in 10-14 days.  Ok to restart plavix tomorrow.

## 2021-11-01 NOTE — PROGRESS NOTES
CLINICAL NUTRITION SERVICES - REASSESSMENT NOTE    Recommendations Ordered by Registered Dietitian (RD):   Diet per SLP   Continue oral nutritional supplements as ordered   Continue EN as ordered --> once GT placed, restart EN at 25 mL/hr x 4 hours and increase by 10 mL Q 4 hours.    Malnutrition:   % Weight Loss:  > 7.5% in 3 months (severe malnutrition)  % Intake:  </= 50% for >/= 5 days (severe malnutrition)  Subcutaneous Fat Loss: moderate depletion, as outlined 10/16  Muscle Loss: moderate depletion, as outlined 10/16  Fluid Retention:  None noted     Malnutrition Diagnosis: Severe malnutrition  In Context of: Acute on Chronic illness or disease     EVALUATION OF PROGRESS TOWARD GOALS   Diet: NPO     Nutrition Support: EN started on 10/28 given dysphagia, appetite and ongoing concern of ability to meet hydration and kcal/protein needs. Pulled FT on 10/28, replaced and then pulled again on 10/31. Goal rate reached before pulled the second time. MD opted to keep FT out as pt with plans to have a G tube placement on 11/01. Will plan to restart regimen below once placed:     EAD: Nasogastric (10/28)   Enteral Regimen: Jevity 1.5 at goal rate of 55 mL/hr x 24 hrs  Total Enteral Provisions: 1320 mL provides 1980 kcal, 84 g protein, 285 g CHO, 28 g fiber, and 1003 mL free water daily   Free Water Flush: standard 60 mL q4 hours, while IVF on    Intake/Tolerance:     - weight: weight loss of 2 kg over the past ~ 2.5 weeks.   Vitals:    10/14/21 0131 10/15/21 0638 10/16/21 0636 10/17/21 0322   Weight: 59.7 kg (131 lb 9.6 oz) 57.4 kg (126 lb 9.6 oz) 60.9 kg (134 lb 3.2 oz) 58.8 kg (129 lb 11.2 oz)    10/18/21 0331 10/23/21 0547 10/24/21 0511 10/25/21 0541   Weight: 59.3 kg (130 lb 12.8 oz) 54.7 kg (120 lb 11.2 oz) 55.4 kg (122 lb 3.2 oz) 54.3 kg (119 lb 12.8 oz)    10/26/21 0545 10/27/21 0554 10/29/21 0549 10/30/21 0500   Weight: 55.5 kg (122 lb 6.4 oz) 54.7 kg (120 lb 9.6 oz) 54.4 kg (120 lb) 55.6 kg (122 lb 9.6 oz)     11/01/21 0542   Weight: 57.7 kg (127 lb 1.6 oz)     - labs:  Labs:  Electrolytes  Potassium (mmol/L)   Date Value   11/01/2021 3.5   10/31/2021 3.5   10/30/2021 3.4   08/16/2012 4.0   08/13/2012 3.6     Phosphorus (mg/dL)   Date Value   10/29/2021 2.7   10/28/2021 3.4   10/27/2021 3.7    Blood Glucose  Glucose (mg/dL)   Date Value   11/01/2021 195 (H)   10/28/2021 220 (H)   10/25/2021 273 (H)   10/22/2021 132 (H)   10/18/2021 231 (H)   08/16/2012 161 (H)   08/13/2012 214 (H)     GLUCOSE BY METER POCT (mg/dL)   Date Value   11/01/2021 185 (H)   11/01/2021 192 (H)   11/01/2021 227 (H)   10/31/2021 301 (H)   10/31/2021 161 (H)     Hemoglobin A1C (%)   Date Value   09/15/2021 11.5 (H)   08/14/2012 7.6 (H)    Inflammatory Markers  CRP Inflammation (mg/L)   Date Value   09/19/2021 28.0 (H)     WBC (10e9/L)   Date Value   08/16/2012 5.6   08/13/2012 6.1     WBC Count (10e3/uL)   Date Value   11/01/2021 5.6   10/28/2021 6.6   10/22/2021 7.3     Albumin (g/dL)   Date Value   10/13/2021 3.3 (L)   09/15/2021 2.6 (L)   08/13/2012 5.1 (H)      Magnesium (mg/dL)   Date Value   11/01/2021 1.9   10/31/2021 1.8   10/30/2021 1.8     Sodium (mmol/L)   Date Value   11/01/2021 141   10/28/2021 145 (H)   10/25/2021 140   08/16/2012 140   08/13/2012 141    Renal  Urea Nitrogen (mg/dL)   Date Value   11/01/2021 16   10/28/2021 33 (H)   10/25/2021 26   08/16/2012 18   08/13/2012 14     Creatinine (mg/dL)   Date Value   11/01/2021 1.01   10/28/2021 1.39 (H)   10/25/2021 1.23   08/16/2012 0.96   08/13/2012 0.91     Additional  Triglycerides (mg/dL)   Date Value   09/15/2021 238 (H)   08/14/2012 136     Ketones Urine (mg/dL)   Date Value   10/13/2021 Negative        - medications:    amantadine  100 mg Oral Daily     amLODIPine  5 mg Oral Daily     atenolol  25 mg Oral BID     atorvastatin  80 mg Oral Daily     [Held by provider] clopidogrel  75 mg Oral Daily     doxazosin  2 mg Oral Daily     FLUoxetine  20 mg Oral or Feeding Tube Daily      insulin aspart  1-6 Units Subcutaneous Q4H     insulin detemir  5 Units Subcutaneous At Bedtime     sennosides  1 tablet Oral At Bedtime     sodium chloride (PF)  3 mL Intracatheter Q8H        dextrose       lactated ringers 75 mL/hr at 11/01/21 0644      acetaminophen **OR** acetaminophen, benzocaine, bisacodyl, calcium carbonate, dextrose, glucose **OR** dextrose **OR** glucagon, guaiFENesin-dextromethorphan, lidocaine 4%, lidocaine (buffered or not buffered), lidocaine visc 2% & maalox/mylanta w/simethicone & diphenhydramine, magnesium hydroxide, melatonin, ondansetron **OR** ondansetron, phenol, polyethylene glycol, prochlorperazine **OR** prochlorperazine **OR** prochlorperazine, senna-docusate **OR** senna-docusate, sodium chloride (PF)     ASSESSED NUTRITION NEEDS (PER APPROVED PRACTICE GUIDELINES; DW: 59 kg):  Estimated Energy Needs: 8240-9369+ kcal (30-35+ Kcal per kg)  Justification: prolonged NPO, maintenance while hospitalized   Estimated Protein Needs: 71-89+ g protein (1.2-1.5+ g pro/Kg)  Justification: preservation of lean body mass  Estimated Fluid Needs: >1 mL/kcal     NEW FINDINGS:   - plan for GT placement on 11/01    Previous Goals:   Enteral access and TF start within 24 hours (if aligns with goals of care)  Evaluation: Met    Previous Nutrition Diagnosis:   Inadequate oral intake related to decreased appetite, difficulty/pain with swallowing, altered mentation 2/2 recent stroke, oral thrush as evidenced by minimal PO intake since admit with PO intake down from baseline over the past month, 12% wt loss over the past 3 months and  fat + muscle loss  Evaluation: No change     Swallowing difficulty related to dysphagia s/p stroke as evidenced by diet modifications with thickened liquids by jh only.  Evaluation: No change    MALNUTRITION  % Weight Loss:  > 7.5% in 3 months (severe malnutrition)  % Intake:  </= 50% for >/= 5 days (severe malnutrition)  Subcutaneous Fat Loss: moderate depletion,  as outlined 10/6  Muscle Loss: moderate depletion, as outlined 10/16  Fluid Retention:  None noted     Malnutrition Diagnosis: Severe malnutrition  In Context of: Acute on Chronic illness or disease    CURRENT NUTRITION DIAGNOSIS  Inadequate oral intake related to decreased appetite, difficulty/pain with swallowing, altered mentation 2/2 recent stroke, oral thrush as evidenced by minimal PO intake since admit with PO intake down from baseline over the past month, 12% wt loss over the past 3 months and  fat + muscle loss, now on EN     INTERVENTIONS  Recommendations / Nutrition Prescription  Diet per SLP   Continue oral nutritional supplements as ordered   Continue EN as ordered --> once GT placed, restart EN at 25 mL/hr x 4 hours and increase by 10 mL Q 4 hours.     Implementation  EN Composition, EN Schedule, Feeding Tube Flush: as above  Medical Food Supplement: as above  Collaboration and Referral of Nutrition care: discussed pt during interdisciplinary rounds this morning    Goals  EN to reach goal rate in the next 24 hours   EN at goal to meet 100% of nutritional needs     MONITORING AND EVALUATION:  Progress towards goals will be monitored and evaluated per protocol and Practice Guidelines    Beronica Brasher, RD, LD  Clinical Dietitian

## 2021-11-01 NOTE — PROGRESS NOTES
[facility]  Hospitalist Progress Note  Taty Bolton MD 11/01/21     Reason for Stay (Diagnosis): weakness         Assessment and Plan:      Summary of Stay: Jimmy Otto is a 71-year-old male with history of IDDM2, hypercholesterolemia, hypertension, urinary retention (currently ordoñez catheter in place) and recent right thalamic stroke on 9/14/2021 which left him nonverbal after which he was discharged to the acute rehab unit at [facility] where he stayed from 9/17 through 10/6 who was admitted on 10/13/2021 with weakness, nausea, vomiting, decreased oral intake, difficulty swallowing, and overall functional decline and was found to have sepsis due to UTI as well as severe malnutrition.  After discussions with patient's family plan to place Gtube for nutritional support with goal of returning home with his family.  Gtube to be placed today.    Problem List/Assessment and Plan:     Sepsis 2/2 E Coli UTI with chronic ordoñez catheter in place: Urine culture grew pansensitive E. coli.  Completed 7 days of IV ceftriaxone.  Ordoñez catheter exchanged in the ED.  He had difficulty with urinary retention when hospitalized for CVA in September.  Did have hematuria at that time which resolved.  Has had ordoñez since then.  Will need outpatient follow up with Urology.  - Continue Flomax  - Follow up with Urology in outpatient setting     Thrush: Treated with fluconazole-completed.  -Continue magic mouthwash as needed for pain     Severe malnutrition in setting of CVA, Dysphagia from strokes: Currently, this appears to be the main issue.  He was treated for thrush as above.  Both nutrition and speech have evaluated the patient.  He is on a modified diet.  Concern that patient not taking enough p.o. to maintain his nutrition. Multiple discussions by prior Hospitalist as well as Palliative care.  -Prior to admission was on modified diet due to recent stroke.  Seen by speech and continued on strict modified diet with 1:1  "supervision.    -Calorie counts have been done which do show some improvement in PO intake but still not meeting his requirements.   -Palliative care consulted, appreciate recommendations  - Given that his dysphagia is secondary from his CVAs, unclear if his swallow function would get better.  Family decided to move forward with Gtube placement.  - Plavix has been held since 10/27, Lovenox now on hold with plans for Gtube today, NPO until after procedure  -On amantadine which was started during last hospitalization for fatigue in the setting of stroke.   - Continue to hold quetiapine and ramelteon.      Right sided hemiplegia from an old stroke.     Recent stroke on 9/14/2021: Was hospitalized at [facility] [place] and was in acute rehab there. Suffered right thalamic stroke.  Nonverbal and worsening right sided weakness.  He has prior history of left thalamic stroke previously.  He was noted to have periodic somnolence and lethargy as result during that hospitalization at [facility].  We have seen that periodically here as well and minimized sedating meds as result.      -On Plavix, Lipitor.  At admission, he was on aspirin plus Plavix.  Aspirin has been stopped, per neurology recommendations.  -Patient's family don't want him to go back to rehab facility. They want to take him home with home therapy orders.   -Will need home PT/OT/RN/HHA on discharge.  consulted for discharge planning   -Per neurology note from his admission at [facility]: \"Dual antiplatelets for 1 month then back to Plavix monotherapy as he was supposed to be on. It might be informative to check P2Y12 activity after a month of being on Plavix only.\"  -aspirin discontinued as it has been 1 month since the stroke.   - As above Plavix on hold for Gtube placement today     Acute toxic and infectious encephalopathy - Resolved: Patient with somnolence during his hospitalization.  Likely secondary to his CVA in the past along with " infection with some sedating medications.  Seroquel and ramelteon have since been held.  This seem to be resolved.      Urine retention identified during recent hospitalization for stroke: Indwelling Felix catheter was changed in the emergency department  -Outpatient urology follow-up.  -On doxazosin for BPH.  doxazosin increased.       IDDM2: Prior to admission was on Levemir insulin 18 units at bedtime, NovoLog sliding scale insulin, Victoza, and Metformin. He will have Gtube placed today and then resumed on TF so will likely need to adjust insulin again based on sugars once TF started.  - Increase Levemir back to 12 units QHS as TF will be resumed today  - Medium sliding scale insulin NPO scale     Hypercholesterolemia: Continue statin    Hypertension: Atenolol resumed.  Resumed amlodipine at a lower dose.    Weakness, Deconditioning: PT and OT consulted, family would like patient to return home with home therapies.    CKD3: Baseline creatinine seems to be around 1-1.4.  At baseline    Depression.  Patient had good response to antidepressant when he had the first stroke, the family is requesting antidepressant medication.  Fluoxetine liquid formulation started.    Discharge Planning: Can discharge home once on stable TF regimen and stable insulin regimen.    Diet: Snacks/Supplements Adult: Gelatein Plus; With Meals  NPO per Anesthesia Guidelines for Procedure/Surgery Except for: Meds  Adult Formula Drip Feeding: Continuous Jevity 1.5; Other - Specify in Comment; Goal Rate: 55; mL/hr; Medication - Feeding Tube Flush Frequency: At least 15-30 mL water before and after medication administration and with tube clogging; Once G tube ...    DVT Prophylaxis: Pneumatic Compression Devices, holding chemical prophylaxis for now given Gtube placement tomorrow  Felix Catheter: PRESENT, indication: Retention  Code Status: Full Code      Disposition Plan   Expected discharge: suspect 1-2 days recommended to prior living  arrangement once Gtube placed, tolerating TF, stable blood sugars.  Entered: Taty Bolton MD 11/01/2021, 10:03 AM       The patient's care was discussed with the Bedside Nurse, Patient and Patient's Family.    Hospitalist [facility]          Interval History (Subjective):      Patient was discussed with his bedside nurse this morning.  He was seen prior to Gtube.  Discussed care plan.      I spoke with his daughter, Clemente, by phone.  Updated her on his status, reviewed care plan, questions answered.                  Physical Exam:      Last Vital Signs:  BP (!) 147/74 (BP Location: Left arm)   Pulse 60   Temp 98  F (36.7  C) (Oral)   Resp 16   Wt 57.7 kg (127 lb 1.6 oz)   SpO2 97%   BMI 21.82 kg/m      General: Alert, awake, no acute distress.    HEENT: Normocephalic and atraumatic, eyes anicteric and without scleral injection, EOMI, face symmetric, MMM.  Cardiac: RRR, normal S1, S2. No m/g/r, no LE edema.  Pulmonary: Normal chest rise, normal work of breathing.  Lungs CTAB without crackles or wheezing.  Abdomen: soft, non-tender, non-distended.  Normoactive bowel sounds, no guarding or rebound tenderness.  Extremities: no deformities.  Warm, well perfused.  Skin: no rashes or lesions.  Warm and Dry.  Neuro: No focal deficits.  Patient is nonverbal due to his stroke.  Moving extremities while sitting in a chair.  Psych: Alert and oriented to self. Appropriate affect.         Medications:      All current medications were reviewed with changes reflected in problem list.         Data:      All new lab and imaging data was reviewed.   Labs:  Recent Labs   Lab 11/01/21  0827 11/01/21  0735 11/01/21  0436 10/31/21  0807 10/31/21  0647 10/30/21  1252 10/30/21  0842 10/28/21  0913 10/28/21  0723   NA  --  141  --   --   --   --   --   --  145*   POTASSIUM  --  3.5  --   --  3.5  --  3.4   < > 3.8   CHLORIDE  --  105  --   --   --   --   --   --  110*   CO2  --  30  --   --   --    --   --   --  28   ANIONGAP  --  6  --   --   --   --   --   --  7   * 195* 192*   < >  --    < >  --    < > 220*   BUN  --  16  --   --   --   --   --   --  33*   CR  --  1.01  --   --   --   --   --   --  1.39*   GFRESTIMATED  --  74  --   --   --   --   --   --  51*   ARLETH  --  8.9  --   --   --   --   --   --  9.6    < > = values in this interval not displayed.     Recent Labs   Lab 11/01/21  0735 10/30/21  0659 10/28/21  0723   WBC 5.6  --  6.6   HGB 11.3*  --  12.6*   HCT 37.7*  --  42.2   MCV 88  --  88    254 332      Imaging:   No results found for this or any previous visit (from the past 24 hour(s)).    Taty Bolton MD

## 2021-11-01 NOTE — PLAN OF CARE
VSS ex htn, see flowsheets. On RA. Nonverbal, follows commands and can respond to yes/no questions. RUE weakness. Felix in place with good UOP. LR @75 ml/hr. NPO since midnight for GJ tube placement today.

## 2021-11-01 NOTE — PLAN OF CARE
VSS afebrile. Non verbal. Right sided weakness from previous stroke hx. Completed ABX treatment during this stay for UTI. Poor PO intake. Meds crushed with ice cream.  SP/PT/OT and Nutrition following.  Pt had pulled out Nasogastric tube feeding over the weekend. Plan for G-tube placement today. Spoke with IR Nurse and will place abd binder to help prevent pt from removing G-tube. Son here from [place] at bedside.

## 2021-11-01 NOTE — PROCEDURES
Interventional Radiology Post-Procedure Note   ?   Brief Procedure Note:   Patient name: Jimmy Otto Pt MRN:[id number]   Date of procedure: 11/1/2021     Procedure(s): Percutaneous image guided placement of Gastrostomy feeding tube  Sedation method: Moderate sedation was employed. The patient was monitored by an interventional radiology nurse at all times throughout the procedure under my direct guidance.  Pre Procedure Diagnosis: Dysphagia  Post Procedure Diagnosis: Same  Indications: Need for enteric nutrition/medication administration   ?   Attending: Paulo Fonseca M.D.  Specimen(s) removed: None   Additional studies ordered: None  Drains/Tubes: 16 Fr Gastrostomy feeding tube  Estimated Blood Loss: Minimal  Complications: None  Vascular closure method: N/A    Findings/Notes/Comments: Uncomplicated image guided placement of percutaneous gastrostomy feeding tube. Tube in good location. Leave gastric lumen to gravity bag drainage for 4 hours and then proceed with sterile saline challenge as ordered. Contact interventional radiology in 7-10 days for T-fastener suture removal.   ?   Please see dictation in PACS or under the Imaging tab in [facility] for detailed procedure note.     Paulo Fonseca M.D.   Vascular and Interventional Radiology   Pager: [phone]   After Hours / Scheduling: [phone]     11/1/2021  3:28 PM

## 2021-11-02 ENCOUNTER — APPOINTMENT (OUTPATIENT)
Dept: PHYSICAL THERAPY | Facility: CLINIC | Age: 71
DRG: 871 | End: 2021-11-02
Payer: MEDICARE

## 2021-11-02 ENCOUNTER — APPOINTMENT (OUTPATIENT)
Dept: SPEECH THERAPY | Facility: CLINIC | Age: 71
DRG: 871 | End: 2021-11-02
Payer: MEDICARE

## 2021-11-02 LAB
ANION GAP SERPL CALCULATED.3IONS-SCNC: 5 MMOL/L (ref 3–14)
BUN SERPL-MCNC: 17 MG/DL (ref 7–30)
CALCIUM SERPL-MCNC: 8.4 MG/DL (ref 8.5–10.1)
CHLORIDE BLD-SCNC: 103 MMOL/L (ref 94–109)
CO2 SERPL-SCNC: 30 MMOL/L (ref 20–32)
CREAT SERPL-MCNC: 1.11 MG/DL (ref 0.66–1.25)
GFR SERPL CREATININE-BSD FRML MDRD: 66 ML/MIN/1.73M2
GLUCOSE BLD-MCNC: 288 MG/DL (ref 70–99)
GLUCOSE BLDC GLUCOMTR-MCNC: 232 MG/DL (ref 70–99)
GLUCOSE BLDC GLUCOMTR-MCNC: 232 MG/DL (ref 70–99)
GLUCOSE BLDC GLUCOMTR-MCNC: 256 MG/DL (ref 70–99)
GLUCOSE BLDC GLUCOMTR-MCNC: 297 MG/DL (ref 70–99)
GLUCOSE BLDC GLUCOMTR-MCNC: 327 MG/DL (ref 70–99)
GLUCOSE BLDC GLUCOMTR-MCNC: 363 MG/DL (ref 70–99)
MAGNESIUM SERPL-MCNC: 1.9 MG/DL (ref 1.6–2.3)
PHOSPHATE SERPL-MCNC: 2.8 MG/DL (ref 2.5–4.5)
PLATELET # BLD AUTO: 202 10E3/UL (ref 150–450)
POTASSIUM BLD-SCNC: 3.6 MMOL/L (ref 3.4–5.3)
SODIUM SERPL-SCNC: 138 MMOL/L (ref 133–144)

## 2021-11-02 PROCEDURE — 85049 AUTOMATED PLATELET COUNT: CPT | Performed by: INTERNAL MEDICINE

## 2021-11-02 PROCEDURE — 92526 ORAL FUNCTION THERAPY: CPT | Mod: GN | Performed by: SPEECH-LANGUAGE PATHOLOGIST

## 2021-11-02 PROCEDURE — 99233 SBSQ HOSP IP/OBS HIGH 50: CPT | Performed by: INTERNAL MEDICINE

## 2021-11-02 PROCEDURE — 97530 THERAPEUTIC ACTIVITIES: CPT | Mod: GP

## 2021-11-02 PROCEDURE — 84100 ASSAY OF PHOSPHORUS: CPT | Performed by: INTERNAL MEDICINE

## 2021-11-02 PROCEDURE — 83735 ASSAY OF MAGNESIUM: CPT | Performed by: INTERNAL MEDICINE

## 2021-11-02 PROCEDURE — 272N000078 HC NUTRITION PRODUCT INTERMEDIATE LITER

## 2021-11-02 PROCEDURE — 250N000013 HC RX MED GY IP 250 OP 250 PS 637: Performed by: INTERNAL MEDICINE

## 2021-11-02 PROCEDURE — 80048 BASIC METABOLIC PNL TOTAL CA: CPT | Performed by: INTERNAL MEDICINE

## 2021-11-02 PROCEDURE — 120N000001 HC R&B MED SURG/OB

## 2021-11-02 PROCEDURE — 250N000013 HC RX MED GY IP 250 OP 250 PS 637: Performed by: HOSPITALIST

## 2021-11-02 PROCEDURE — 36415 COLL VENOUS BLD VENIPUNCTURE: CPT | Performed by: INTERNAL MEDICINE

## 2021-11-02 RX ADMIN — CLOPIDOGREL BISULFATE 75 MG: 75 TABLET ORAL at 10:02

## 2021-11-02 RX ADMIN — AMLODIPINE BESYLATE 5 MG: 5 TABLET ORAL at 10:03

## 2021-11-02 RX ADMIN — ATENOLOL 25 MG: 25 TABLET ORAL at 21:28

## 2021-11-02 RX ADMIN — AMANTADINE HYDROCHLORIDE 100 MG: 100 CAPSULE ORAL at 10:03

## 2021-11-02 RX ADMIN — ATORVASTATIN CALCIUM 80 MG: 40 TABLET, FILM COATED ORAL at 10:04

## 2021-11-02 RX ADMIN — SENNOSIDES 1 TABLET: 8.6 TABLET, FILM COATED ORAL at 21:28

## 2021-11-02 RX ADMIN — FLUOXETINE HYDROCHLORIDE 20 MG: 20 LIQUID ORAL at 10:24

## 2021-11-02 RX ADMIN — DOXAZOSIN 2 MG: 1 TABLET ORAL at 10:04

## 2021-11-02 RX ADMIN — ATENOLOL 25 MG: 25 TABLET ORAL at 10:02

## 2021-11-02 ASSESSMENT — ACTIVITIES OF DAILY LIVING (ADL)
ADLS_ACUITY_SCORE: 36
ADLS_ACUITY_SCORE: 38
ADLS_ACUITY_SCORE: 36
ADLS_ACUITY_SCORE: 38
ADLS_ACUITY_SCORE: 36
ADLS_ACUITY_SCORE: 38
ADLS_ACUITY_SCORE: 36
ADLS_ACUITY_SCORE: 36
ADLS_ACUITY_SCORE: 38
ADLS_ACUITY_SCORE: 36

## 2021-11-02 NOTE — PROGRESS NOTES
"SPIRITUAL HEALTH SERVICES Progress Note  RH  MS3    Spoke to pt's daughter, Clemente, per SADIA. Pt is non-verbal, so I reached out to his daughter to get advice for possible ways to offer spiritual or emotional support to her dad. She said he is \"not very Oriental orthodox\" and doesn't even really follow Christian very much. She declined support at this time.    I informed her how to request  services.       Manny Alva M.A.   Intern  Phone [phone]  "

## 2021-11-02 NOTE — PLAN OF CARE
Ax2 w/ lift. Nonverbal. VSS. G-tube placed yesterday. Dressing CDI. TF at goal of 55ml/hr, free water flush 180ml q4h. Diet pureed w/ thickened liquids BG q4hr 297,327  w/ sliding scale coverage. K 3.6, Mg 1.9 recheck in AM. Chronic ordoñez w/ good UOP. Plan to discharge  1-2 days when BG and TF regiment are stablized

## 2021-11-02 NOTE — PROGRESS NOTES
Care Management Follow Up    Length of Stay (days): 19    Expected Discharge Date: 11/03/2021     Concerns to be Addressed:       Patient plan of care discussed at interdisciplinary rounds: Yes    Anticipated Discharge Disposition:  Home with family 24/7 support     Anticipated Discharge Services:  [facility], [facility]  Anticipated Discharge DME:  TF supplies      Additional Information:  SW spoke to Dr Bolton regarding discharge plan of care. patient will be ready for discharge on Thurs 11/4. He will go home with bolus TF and support of his family. He has been accepted for HC service by [facility] a couple of weeks ago. Notification was sent to [facility] and Liaison to update them on plan for patient to discharge on Thurs. Anticipate patient will need HC RN/PT/OT/HHA/SW. They are following.     Call placed to Claire Landrum [phone], [facility] Liaison, to update her on plan for patient discharge on thurs. MD would like to discharge patient on bolus TF. Claire will follow along to communicate and teach family about the TF process at home. Patient will need a Home Infusion Referral order on discharge.    Will cont to follow for plan of care.           Alanis Chong RN BSN CM  Inpatient Care Coordination  [facility]  [phone]

## 2021-11-02 NOTE — PROGRESS NOTES
[facility]  Hospitalist Progress Note  Taty Bolton MD 11/02/21     Reason for Stay (Diagnosis): weakness         Assessment and Plan:      Summary of Stay: Jimmy Otto is a 71-year-old male with history of IDDM2, hypercholesterolemia, hypertension, urinary retention (currently ordoñez catheter in place) and recent right thalamic stroke on 9/14/2021 which left him nonverbal after which he was discharged to the acute rehab unit at [facility] where he stayed from 9/17 through 10/6 who was admitted on 10/13/2021 with weakness, nausea, vomiting, decreased oral intake, difficulty swallowing, and overall functional decline and was found to have sepsis due to UTI as well as severe malnutrition.  After discussions with patient's family plan to place Gtube for nutritional support with goal of returning home with his family.  Gtube placed on 11/1, working on getting patient to goal rate for tube feeds and insulin adjustments prior to discharge home.    Problem List/Assessment and Plan:     Sepsis 2/2 E Coli UTI with chronic ordoñez catheter in place: Urine culture grew pansensitive E. coli.  Completed 7 days of IV ceftriaxone.  Ordoñez catheter exchanged in the ED.  He had difficulty with urinary retention when hospitalized for CVA in September.  Did have hematuria at that time which resolved.  Has had ordoñez since then.  Will need outpatient follow up with Urology.  - Continue Flomax  - Follow up with Urology in outpatient setting     Thrush: Treated with fluconazole-completed.  -Continue magic mouthwash as needed for pain     Severe malnutrition in setting of CVA, Dysphagia from strokes: Currently, this appears to be the main issue.  He was treated for thrush as above.  Both nutrition and speech have evaluated the patient.  He is on a modified diet.  Concern that patient not taking enough p.o. to maintain his nutrition. Multiple discussions by prior Hospitalist as well as Palliative care.  -Prior to admission was on  "modified diet due to recent stroke.  Seen by speech and continued on strict modified diet with 1:1 supervision.    -Calorie counts have been done which do show some improvement in PO intake but still not meeting his requirements.   -Palliative care consulted, appreciate recommendations  - Given that his dysphagia is secondary from his CVAs, unclear if his swallow function would get better.  Family decided to move forward with Gtube placement.  -G-tube placed on 11/1  -On amantadine which was started during last hospitalization for fatigue in the setting of stroke.   - Continue to hold quetiapine and ramelteon.      Right sided hemiplegia from an old stroke.     Recent stroke on 9/14/2021: Was hospitalized at [facility] and was in acute rehab there. Suffered right thalamic stroke.  Nonverbal and worsening right sided weakness.  He has prior history of left thalamic stroke previously.  He was noted to have periodic somnolence and lethargy as result during that hospitalization at [facility].  We have seen that periodically here as well and minimized sedating meds as result.      -On Plavix, Lipitor.  At admission, he was on aspirin plus Plavix.  Aspirin has been stopped, per neurology recommendations.  -Patient's family don't want him to go back to rehab facility. They want to take him home with home therapy orders.   -Will need home PT/OT/RN/HHA on discharge.  consulted for discharge planning   -Per neurology note from his admission at [facility]: \"Dual antiplatelets for 1 month then back to Plavix monotherapy as he was supposed to be on. It might be informative to check P2Y12 activity after a month of being on Plavix only.\"  -aspirin discontinued as it has been 1 month since the stroke.   -Plavix had been held for G-tube placement, resumed on 11/2     Acute toxic and infectious encephalopathy - Resolved: Patient with somnolence during his hospitalization.  Likely secondary to his CVA in the " past along with infection with some sedating medications.  Seroquel and ramelteon have since been held.  This seem to be resolved.      Urine retention identified during recent hospitalization for stroke: Indwelling Felix catheter was changed in the emergency department  -Outpatient urology follow-up.  -On doxazosin for BPH.  doxazosin increased.       IDDM2: Prior to admission was on Levemir insulin 18 units at bedtime, NovoLog sliding scale insulin, Victoza, and Metformin.  Adjusting insulin regimen today as tube feeds have been resumed and he is hyperglycemic.  - Increase Levemir to 16 units QHS   -Change to high sliding scale insulin NPO scale     Hypercholesterolemia: Continue statin    Hypertension: Atenolol resumed.  Resumed amlodipine at a lower dose.    Weakness, Deconditioning: PT and OT consulted, family would like patient to return home with home therapies.  Maite this extensively with the patient's son today.    CKD3: Baseline creatinine seems to be around 1-1.4.  At baseline    Depression.  Patient had good response to antidepressant when he had the first stroke, the family is requesting antidepressant medication.  Fluoxetine liquid formulation started.    Discharge Planning: Can discharge home once on stable TF regimen and stable insulin regimen.  I would like the patient to be on bolus type feedings as I think this will be much easier for the patient's family to handle at home.    Diet: Snacks/Supplements Adult: Gelatein Plus; With Meals  Adult Formula Drip Feeding: Continuous Jevity 1.5; Other - Specify in Comment; Goal Rate: 55; mL/hr; Medication - Feeding Tube Flush Frequency: At least 15-30 mL water before and after medication administration and with tube clogging; Once GT ok t...  Combination Diet Pureed Diet (level 4); Mildly Thick (level 2) (by spoon)    DVT Prophylaxis: Pneumatic Compression Devices, holding chemical prophylaxis for now given Gtube placement tomorrow  Felix Catheter: PRESENT,  indication: Retention  Code Status: Full Code      Disposition Plan   Expected discharge: suspect 1-2 days recommended to prior living arrangement once stable glucose, on bolus type feedings.  Entered: Taty Bolton MD 11/02/2021, 11:45 AM       The patient's care was discussed with the Bedside Nurse, Patient and Patient's Family.    Hospitalist Service  [facility]          Interval History (Subjective):      Patient was discussed with his bedside nurse this morning.  He was seen with his son at the bedside.  We discussed the care plan.  He had many questions about discharge and what that would look like, I reviewed all of his questions and concerns.                  Physical Exam:      Last Vital Signs:  BP (!) 151/72   Pulse 72   Temp 98.1  F (36.7  C) (Oral)   Resp 15   Wt 57.7 kg (127 lb 1.6 oz)   SpO2 96%   BMI 21.82 kg/m      General: Alert, awake, no acute distress.    HEENT: Normocephalic and atraumatic, eyes anicteric and without scleral injection, EOMI, face symmetric, MMM.  Cardiac: RRR, normal S1, S2. No m/g/r, no LE edema.  Pulmonary: Normal chest rise, normal work of breathing.  Lungs CTAB without crackles or wheezing.  Abdomen: soft, non-tender, non-distended.  G-tube in place with abdominal binder. Normoactive bowel sounds, no guarding or rebound tenderness.  Extremities: no deformities.  Warm, well perfused.  Skin: no rashes or lesions.  Warm and Dry.  Neuro: No focal deficits.  Patient is nonverbal due to his stroke, can shake his head yes or no appropriately.  Moving extremities while sitting in a chair.  Psych: Alert and oriented to self. Appropriate affect.         Medications:      All current medications were reviewed with changes reflected in problem list.         Data:      All new lab and imaging data was reviewed.   Labs:  Recent Labs   Lab 11/02/21  0919 11/02/21  0609 11/02/21  0555 11/01/21  0827 11/01/21  0735 10/31/21  0807 10/31/21  0647 10/28/21  0913  10/28/21  0723   NA  --  138  --   --  141  --   --   --  145*   POTASSIUM  --  3.6  --   --  3.5  --  3.5   < > 3.8   CHLORIDE  --  103  --   --  105  --   --   --  110*   CO2  --  30  --   --  30  --   --   --  28   ANIONGAP  --  5  --   --  6  --   --   --  7   * 288* 256*   < > 195*   < >  --    < > 220*   BUN  --  17  --   --  16  --   --   --  33*   CR  --  1.11  --   --  1.01  --   --   --  1.39*   GFRESTIMATED  --  66  --   --  74  --   --   --  51*   ARLETH  --  8.4*  --   --  8.9  --   --   --  9.6    < > = values in this interval not displayed.     Recent Labs   Lab 11/02/21  0609 11/01/21  0735 10/30/21  0659 10/28/21  0723 10/28/21  0723   WBC  --  5.6  --   --  6.6   HGB  --  11.3*  --   --  12.6*   HCT  --  37.7*  --   --  42.2   MCV  --  88  --   --  88    224 254   < > 332    < > = values in this interval not displayed.      Imaging:   Recent Results (from the past 24 hour(s))   XR Percutaneous Gastrostomy Tube [facility]    Narrative    [facility]  DATE: 11/1/2021    PROCEDURE: PERCUTANEOUS GASTROSTOMY PLACEMENT    INTERVENTIONAL RADIOLOGIST: Paulo Fonseca MD.    INDICATION: Patient is a 71-year-old male with history of stroke and  dysphagia who presents for percutaneous gastrostomy tube placement for  enteric nutrition.    CONSENT: The risks, benefits and alternatives of percutaneous  gastrostomy placement were discussed with the patient  in detail. All  questions were answered. Informed consent was given to proceed with  the procedure.    MODERATE SEDATION: Versed 1 mg IV; Fentanyl 50 mcg IV.  Under  physician supervision, Versed and fentanyl were administered for  moderate sedation. Pulse oximetry, heart rate and blood pressure were  continuously monitored by an independent trained observer. The  physician spent 15 minutes of face-to-face sedation time with the  patient.    CONTRAST: 30 mL Omnipaque enteric.  ANTIBIOTICS: Ancef 2 grams intravenous.  ADDITIONAL MEDICATIONS:  None.    FLUOROSCOPIC TIME: 2.5 minutes.  RADIATION DOSE: Air Kerma: 13 mGy.    COMPLICATIONS: No immediate complications.    STERILE BARRIER TECHNIQUE: Maximum sterile barrier technique was used.  Cutaneous antisepsis was performed at the operative site with  application of 2% chlorhexidine and large sterile drape. Prior to the  procedure, the  and assistant performed hand hygiene and wore  hat, mask, sterile gown, and sterile gloves during the entire  procedure.    PROCEDURE:    Ultrasound examination of the epigastric region was performed to  localize the left hepatic edge, which was marked on the patient's  skin.     The stomach was insufflated with air. Under direct fluoroscopic  visualization, Three point gastropexy was performed with endoluminal  positioning confirmed with a small amount of contrast. Using a fourth  puncture between the gastropexy sites, a guidewire was placed into the  stomach.     Following tract dilatation, over wire exchange was made for a new 14F  BEVERLEY gastrostomy which was positioned under fluoroscopic guidance with  its tip in the gastric lumen. The retention balloon was inflated with  10 mL of saline. A post placement contrast injection through the  gastric lumen was performed.    FINDINGS:  Ultrasound demonstrates a normal-appearing liver, the lower margin of  which was marked on the patient's skin.    After placement, the new gastrostomy is in appropriate position with  the gastric lumen emptying into the stomach.      Impression    IMPRESSION:    Successful percutaneous gastrostomy placement as detailed above.    ANA TREJO MD         SYSTEM ID:  [id number]   US Markings    Narrative    This exam was marked as non-reportable because it will not be read by a   radiologist or a [facility] non-radiologist provider.             Taty Bolton MD        I spent 35 minutes today at the bedside answering questions regarding care plan.

## 2021-11-02 NOTE — PLAN OF CARE
VSS. Had Gtube placed today to begin TF at 2000. TF started at 25ml/hr. Abd binder in place to protect tube from pt pulling on it. NPO. Meds given thru Gtube. Son at bedside. Per son, pt seemed to have pain, Tylenol given x1. Nonverbal, but able to follow commands.  and 196. LR running at 75/hr. PIV infiltrated, new IV placed in L hand. Pt coccyx red but blanchable Q2h turns. Felix in place for urinary retention. PT, OT, speech and nutrition following. Son would like to talk to diabetic educator if able. On K+ and Mag protocol. Plan: home w/family and home care.

## 2021-11-02 NOTE — PLAN OF CARE
VSS, non verbal, TF increased to 45ml/hr, water flushes 60cc q4h, IVF LR@75ml/hr, Isidro patent, Nutrition/PT/OT/SLP following, bg [id number] with sliding scale insulin coverage,   will continue with POC.

## 2021-11-03 ENCOUNTER — APPOINTMENT (OUTPATIENT)
Dept: PHYSICAL THERAPY | Facility: CLINIC | Age: 71
DRG: 871 | End: 2021-11-03
Payer: MEDICARE

## 2021-11-03 ENCOUNTER — APPOINTMENT (OUTPATIENT)
Dept: OCCUPATIONAL THERAPY | Facility: CLINIC | Age: 71
DRG: 871 | End: 2021-11-03
Payer: MEDICARE

## 2021-11-03 LAB
GLUCOSE BLDC GLUCOMTR-MCNC: 211 MG/DL (ref 70–99)
GLUCOSE BLDC GLUCOMTR-MCNC: 215 MG/DL (ref 70–99)
GLUCOSE BLDC GLUCOMTR-MCNC: 254 MG/DL (ref 70–99)
GLUCOSE BLDC GLUCOMTR-MCNC: 278 MG/DL (ref 70–99)
GLUCOSE BLDC GLUCOMTR-MCNC: 359 MG/DL (ref 70–99)
GLUCOSE BLDC GLUCOMTR-MCNC: 359 MG/DL (ref 70–99)
MAGNESIUM SERPL-MCNC: 2.1 MG/DL (ref 1.6–2.3)
PHOSPHATE SERPL-MCNC: 2.7 MG/DL (ref 2.5–4.5)
POTASSIUM BLD-SCNC: 3.3 MMOL/L (ref 3.4–5.3)
POTASSIUM BLD-SCNC: 4.1 MMOL/L (ref 3.4–5.3)

## 2021-11-03 PROCEDURE — 84100 ASSAY OF PHOSPHORUS: CPT | Performed by: INTERNAL MEDICINE

## 2021-11-03 PROCEDURE — 250N000013 HC RX MED GY IP 250 OP 250 PS 637: Performed by: INTERNAL MEDICINE

## 2021-11-03 PROCEDURE — 97530 THERAPEUTIC ACTIVITIES: CPT | Mod: GP

## 2021-11-03 PROCEDURE — 84132 ASSAY OF SERUM POTASSIUM: CPT | Performed by: INTERNAL MEDICINE

## 2021-11-03 PROCEDURE — 99239 HOSP IP/OBS DSCHRG MGMT >30: CPT | Performed by: INTERNAL MEDICINE

## 2021-11-03 PROCEDURE — 83735 ASSAY OF MAGNESIUM: CPT | Performed by: INTERNAL MEDICINE

## 2021-11-03 PROCEDURE — 250N000012 HC RX MED GY IP 250 OP 636 PS 637: Performed by: INTERNAL MEDICINE

## 2021-11-03 PROCEDURE — 97535 SELF CARE MNGMENT TRAINING: CPT | Mod: GO

## 2021-11-03 PROCEDURE — 250N000013 HC RX MED GY IP 250 OP 250 PS 637: Performed by: HOSPITALIST

## 2021-11-03 PROCEDURE — 36415 COLL VENOUS BLD VENIPUNCTURE: CPT | Performed by: INTERNAL MEDICINE

## 2021-11-03 PROCEDURE — 120N000001 HC R&B MED SURG/OB

## 2021-11-03 PROCEDURE — 97116 GAIT TRAINING THERAPY: CPT | Mod: GP

## 2021-11-03 PROCEDURE — 97110 THERAPEUTIC EXERCISES: CPT | Mod: GP

## 2021-11-03 RX ORDER — POTASSIUM CHLORIDE 1.5 G/1.58G
20 POWDER, FOR SOLUTION ORAL ONCE
Status: COMPLETED | OUTPATIENT
Start: 2021-11-03 | End: 2021-11-03

## 2021-11-03 RX ORDER — FLUOXETINE 20 MG/5ML
20 SOLUTION ORAL DAILY
Qty: 150 ML | Refills: 0 | Status: SHIPPED | OUTPATIENT
Start: 2021-11-04

## 2021-11-03 RX ORDER — DOXAZOSIN 2 MG/1
2 TABLET ORAL DAILY
Qty: 30 TABLET | Refills: 0 | Status: SHIPPED | OUTPATIENT
Start: 2021-11-03 | End: 2021-12-13

## 2021-11-03 RX ORDER — AMLODIPINE BESYLATE 5 MG/1
5 TABLET ORAL DAILY
Qty: 30 TABLET | Refills: 0 | Status: SHIPPED | OUTPATIENT
Start: 2021-11-03 | End: 2021-12-22

## 2021-11-03 RX ORDER — DEXTROSE MONOHYDRATE 100 MG/ML
INJECTION, SOLUTION INTRAVENOUS CONTINUOUS PRN
Status: DISCONTINUED | OUTPATIENT
Start: 2021-11-03 | End: 2021-11-04 | Stop reason: HOSPADM

## 2021-11-03 RX ADMIN — POTASSIUM CHLORIDE 20 MEQ: 1.5 POWDER, FOR SOLUTION ORAL at 10:30

## 2021-11-03 RX ADMIN — CLOPIDOGREL BISULFATE 75 MG: 75 TABLET ORAL at 10:29

## 2021-11-03 RX ADMIN — ATENOLOL 25 MG: 25 TABLET ORAL at 20:38

## 2021-11-03 RX ADMIN — METFORMIN HYDROCHLORIDE 1000 MG: 500 TABLET, FILM COATED ORAL at 20:38

## 2021-11-03 RX ADMIN — AMLODIPINE BESYLATE 5 MG: 5 TABLET ORAL at 10:28

## 2021-11-03 RX ADMIN — FLUOXETINE HYDROCHLORIDE 20 MG: 20 LIQUID ORAL at 10:52

## 2021-11-03 RX ADMIN — ACETAMINOPHEN 650 MG: 325 TABLET, FILM COATED ORAL at 10:29

## 2021-11-03 RX ADMIN — DOXAZOSIN 2 MG: 1 TABLET ORAL at 10:28

## 2021-11-03 RX ADMIN — INSULIN DETEMIR 18 UNITS: 100 INJECTION, SOLUTION SUBCUTANEOUS at 22:27

## 2021-11-03 RX ADMIN — SENNOSIDES 1 TABLET: 8.6 TABLET, FILM COATED ORAL at 22:58

## 2021-11-03 RX ADMIN — ATORVASTATIN CALCIUM 80 MG: 40 TABLET, FILM COATED ORAL at 10:51

## 2021-11-03 RX ADMIN — AMANTADINE HYDROCHLORIDE 100 MG: 100 CAPSULE ORAL at 10:29

## 2021-11-03 RX ADMIN — METFORMIN HYDROCHLORIDE 1000 MG: 500 TABLET, FILM COATED ORAL at 11:11

## 2021-11-03 ASSESSMENT — ACTIVITIES OF DAILY LIVING (ADL)
ADLS_ACUITY_SCORE: 36

## 2021-11-03 NOTE — PROGRESS NOTES
[facility]    I spoke with pt's care coordinator, Alanis and was told that the patient is anticipated to discharge to home tomorrow. I spoke with pt's daughter, Clemente to coordinate bolus enteral education. She would like to have a nurse provide enteral education at home on day of discharge due to her work schedule and visitor limitations at the hospital. Clemente stated that she would ensure the extended family would be present and could then all be taught by one nurse. Clemente stated that her brother has been staying with  in the hospital and gave me permission to call him to see if he would like any education at [facility] prior to pt's discharge to home. Phone call was placed to pt's son, Sathya at [phone] and generic voicemail message left - awaiting return phone call.     Thank you for the referral.    Claire Kan RN  [facility] Liaison  [phone] (Mon thru Fri 8am - 5pm)  [phone] Office

## 2021-11-03 NOTE — PLAN OF CARE
A/O: alert and nonverbal; difficult to assess orientation but patient does follow commands.  Assistance: assist of 2 and lift; reposition every 2 hours.  Diet: Pureed and mildly thick liquids via spoon. Patient on tube feedings through G tube at rate of 55 ml/hr (this is the goal rate) and 180 ml free water flush every 4 hours.  Pain: Patient has not appeared to be experiencing pain as there has been an absence of nonverbal indicators of pain.  Pain Meds: tylenol  Vitals: Temp: 98  F (36.7  C) Temp src: Axillary BP: 120/53 Pulse: 63   Resp: 16 SpO2: 95 %   Weight: 57.7 kg (127 lb 1.6 oz)  O2 Device: None (Room air)  LDAs: Peripheral, Felix, and Feeding tube. G Tube was placed on 11/01/21. Felix in place for urinary retention.   Fluids: none  Meds: Via G Tube. PTA meds and insulins; insulins being adjusted to find stable regimen.  Labs: On K+ and Mg+ protocols. Both WNL.  Tele: N/A  Other Comments: Patient has abdominal binder in place to prevent patient from pulling on G Tube. PT/OT, SLP, SW, and palliative are following.  Discharge Disposition: Home with Homecare and family support.  Expected Discharge: 11/04/21    Will continue with POC.

## 2021-11-03 NOTE — DISCHARGE SUMMARY
[facility]  Discharge Summary  Name: Jimmy Otto    MRN: [id number]  YOB: 1950    Age: 71 year old  Date of Discharge:  11/4/2021  Date of Admission: 10/13/2021  Primary Care Provider: Manny Worrell  Discharge Physician:  Taty Bolton MD  Discharging Service:  Hospitalist      Discharge Diagnoses:  1.  Sepsis secondary to E. coli UTI with chronic Ordoñez catheter in place  2.  Thrush  3.  Severe malnutrition in setting of CVA, dysphagia secondary to CVA  4.  Right-sided hemiplegia from prior CVA  5.  Acute toxic and infectious encephalopathy  6.  Urinary retention with a Ordoñez catheter in place  7.  Insulin-dependent type 2 diabetes  8.  Hyperlipidemia  9.  Hypertension  10.  Weakness/deconditioning  11.  CKD stage III  12.  Depression     Follow-ups Needed After Discharge   Follow up with PCP within 7-10 days  Follow up with Neurology in clinic  Follow up with Urology in clinic    Unresulted Labs Ordered in the Past 30 Days of this Admission     No orders found from 10/16/2018 to 12/16/2018.        Hospital Course:  Jimmy Otto is a 71-year-old male with history of IDDM2, hypercholesterolemia, hypertension, urinary retention (currently ordoñez catheter in place) and recent right thalamic stroke on 9/14/2021 which left him nonverbal after which he was discharged to the acute rehab unit at [facility] where he stayed from 9/17 through 10/6 who was admitted on 10/13/2021 with weakness, nausea, vomiting, decreased oral intake, difficulty swallowing, and overall functional decline and was found to have sepsis due to UTI as well as severe malnutrition.  After discussions with patient's family plan to place Gtube for nutritional support with goal of returning home with his family.  Gtube placed on 11/1 and he was initiated on TF.  Will discharge home with home therapies.     Sepsis 2/2 E Coli UTI with chronic ordoñez catheter in place: Urine culture grew pansensitive E. coli.  Completed 7 days of IV ceftriaxone.   Ordoñez catheter exchanged in the ED.  He had difficulty with urinary retention when hospitalized for CVA in September.  Did have hematuria at that time which resolved.  Has had ordoñez since then.  Will need outpatient follow up with Urology.  Will remain on Doxazosin.     Thrush: Treated with fluconazole-completed.     Severe malnutrition in setting of CVA, Dysphagia from strokes: Currently, this appears to be the main issue.  He was treated for thrush as above.  Both nutrition and speech have evaluated the patient.  He is on a modified diet.  Patient had severe malnutrition and was not able to meet his nutritional needs with oral intake.  Multiple discussions by prior Hospitalist as well as Palliative care and ultimately family decided to place G-tube which was done on 11/1.  He was ultimately transitioned to bolus feedings which were tolerated prior to discharge.  He will be on a modified diet with 1:1 supervision and have speech therapy at home as well.    - At time of discharge he will be on bolus feelings three times daily with Jevity 1.5 (1 can in the morning, 2 cans in the afternoon and 2 cans in the evening)  - Anticipated need of nutritional support via TF through G-tube is greater than 90 days and may be lifelong if his dysphagia does not significantly improve     Right sided hemiplegia from an old stroke. On amantadine which was started during last hospitalization for fatigue in the setting of stroke. Follow up with Neurology in clinic.     Recent stroke on 9/14/2021: Was hospitalized at [facility] and was in acute rehab there. Suffered right thalamic stroke.  Nonverbal and worsening right sided weakness.  He has prior history of left thalamic stroke previously.  He was noted to have periodic somnolence and lethargy as result during that hospitalization at [facility].  We have seen that periodically here as well and minimized sedating meds as result.  During this admission ASA was discontinued per  Neurology recommendations, he was continued on Plavix.  G-tube for nutritional support placed this admission.  He was continued on statin therapy.  He will discharge home with homecare (nurse, OT, PT, SW, HHA, speech therapy).     Acute toxic and infectious encephalopathy - Resolved: Patient with somnolence during his hospitalization.  Likely secondary to his CVA in the past along with infection with some sedating medications.  Seroquel and ramelteon have since been held. Mental status seems back to baseline.      Urine retention identified during recent hospitalization for stroke: Indwelling Felix catheter was changed in the emergency department.  Doxazosin dose was increased this admission.  As above follow up with Urology in clinic upon discharge.     IDDM2: Prior to admission was on Levemir insulin 18 units at bedtime, NovoLog sliding scale insulin, Victoza, and Metformin.  Insulin regimen adjusted throughout admission, particularly with addition of TF.  At time of discharge he will resume his Levemir 18 units QHS but also add 10 units QAM and resume his Metformin.  He will be on Aspart 5 units with 1 can of Jevity and 10 units with 2 cans of Jevity.  He will NOT resume Victoza for now, this could be added back in the outpatient setting if needed based on his blood sugars.     Hypercholesterolemia: Continue statin     Hypertension: Atenolol resumed.  Resumed amlodipine at a lower dose.     Weakness, Deconditioning: PT and OT consulted, family would like patient to return home with home therapies.       CKD3: Baseline creatinine seems to be around 1-1.4.  At baseline     Depression.  Patient had good response to antidepressant when he had the first stroke, the family is requesting antidepressant medication.  Fluoxetine liquid formulation started.     Discharge Disposition:  Discharged to home     Allergies:  Allergies   Allergen Reactions     Nka [No Known Allergies]         Condition on Discharge:  Discharge  condition: Stable   Discharge vitals: Blood pressure (!) 150/72, pulse 58, temperature 97.4  F (36.3  C), temperature source Oral, resp. rate 16, weight 57.7 kg (127 lb 1.6 oz), SpO2 96 %.   Code status on discharge: Full Code     History of Illness:  See detailed admission note for full details.    Physical Exam:  Blood pressure (!) 150/72, pulse 58, temperature 97.4  F (36.3  C), temperature source Oral, resp. rate 16, weight 57.7 kg (127 lb 1.6 oz), SpO2 96 %.  Wt Readings from Last 1 Encounters:   11/03/21 57.7 kg (127 lb 1.6 oz)     General: Alert, awake, no acute distress.    HEENT: Normocephalic and atraumatic, eyes anicteric and without scleral injection, EOMI, face symmetric, MMM.  Cardiac: RRR, normal S1, S2. No m/g/r, no LE edema.  Pulmonary: Normal chest rise, normal work of breathing.  Lungs CTAB without crackles or wheezing.  Abdomen: soft, non-tender, non-distended.  G-tube in place with abdominal binder. Normoactive bowel sounds, no guarding or rebound tenderness.  Extremities: no deformities.  Warm, well perfused.  Skin: no rashes or lesions.  Warm and Dry.  Neuro: Weakness on the right side.  Patient is nonverbal due to his stroke, can shake his head yes or no appropriately.  Moving extremities while in bed.  Psych: Alert and oriented to self. Appropriate affect.    Procedures other than Imaging:  G-tube placement  Telemetry  Phlebotomy     Imaging:  Results for orders placed or performed during the hospital encounter of 10/13/21   XR Chest 2 Views    Narrative    EXAM: XR CHEST 2 VW  LOCATION: [facility]  DATE/TIME: 10/13/2021 11:17 PM    INDICATION: Weakness.  COMPARISON: 9/19/2021.    FINDINGS: The heart size is normal. The thoracic aorta is calcified. Mild atelectasis or scarring at the left lung base. The lungs are otherwise clear. No pneumothorax or pleural effusion.      Impression    IMPRESSION: No acute abnormality.   Head CT w/o contrast    Narrative    EXAM: CT  HEAD W/O CONTRAST  LOCATION: [facility]  DATE/TIME: 10/13/2021 11:03 PM    INDICATION: Mental status change, unknown cause  COMPARISON: CT head 09/14/2021. MRI brain 09/14/2021.  TECHNIQUE: Routine CT Head without IV contrast. Multiplanar reformats. Dose reduction techniques were used.    FINDINGS:  INTRACRANIAL CONTENTS: No intracranial hemorrhage, extraaxial collection, or mass effect.  No CT evidence of acute infarct. Moderate presumed chronic small vessel ischemic changes. Moderate generalized volume loss. No hydrocephalus. Left frontoparietal   deep white matter and adjacent left basal ganglia chronic infarct. Left thalamus chronic infarct. MRI brain 09/14/2021 demonstrated a right thalamus acute infarct with sequelae not identified on current CT head exam.    VISUALIZED ORBITS/SINUSES/MASTOIDS: No intraorbital abnormality. Right posterior sphenoid sinus mild mucosal thickening versus trace air-fluid level. Please correlate for acute sinusitis. Remaining visualized paranasal sinuses essentially clear.  Left   mastoid tip patchy opacification. Right mastoid air cells essentially clear.    BONES/SOFT TISSUES: No acute abnormality. Vascular calcifications.      Impression    IMPRESSION:  1.  No acute intracranial process.  2.  Chronic infarcts described above.  3.  Age-related changes described above.   4.  Right posterior sphenoid sinus mild mucosal thickening versus trace air-fluid level. Please correlate for acute sinusitis.    XR Chest Port 1 View    Narrative    XR CHEST PORT 1 VIEW   10/19/2021 10:55 AM     HISTORY: cough, aspiration risk    COMPARISON: 10/13/2021      Impression    IMPRESSION: No focal infiltrate or pleural effusion. Normal heart size  and pulmonary vascularity. Minimal aortic calcifications.    ELY TO MD         SYSTEM ID:  [id number]   XR Video Swallow with SLP or OT    Narrative    VIDEO SWALLOW WITH SLP OR OT   10/21/2021 11:35 AM     HISTORY:  Dysphagia.    COMPARISON: None.    FINDINGS:  A swallow study was performed in coordination with a member  from the speech pathology service. Total fluoroscopy time was 1.9  minutes. 1 spot fluoroscopic image, and/or cine clip was obtained. 1  view in lateral projection. Various consistencies of barium were given  to the patient to swallow, and the swallowing mechanism was observed  using fluoroscopy.    The patient was unable to swallow any of the barium.      Impression    IMPRESSION:  1. The patient was unable to swallow.  2. Please refer to the speech pathology report for further details.    This study includes only the cervical esophagus.    DELFINO LYNN MD         SYSTEM ID:  [id number]   XR Feeding Tube Placement    Narrative    XR FEEDING TUBE PLACEMENT 10/28/2021 10:53 AM     HISTORY: STROKE, DYSPHAGIA  TECHNIQUE: 35 seconds fluoroscopy time. No spot images. 15 mL  Gastrografin injected to confirm placement.  COMPARISON: None      Impression    IMPRESSION:  1.  Nasojejunal feeding tube placed without difficulty. Tip is at the  duodenojejunal junction. Tube is flushed and ready for use.    WILLIE VALERA MD         SYSTEM ID:  [id number]   XR Feeding Tube Placement    Narrative    FEEDING TUBE PLACEMENT 10/29/2021 2:48 PM    HISTORY: Pulled out.    COMPARISON: Feeding tube placement on 10/28/2021.    Fluoro time: 12.27 minutes. Two fluoroscopic images were obtained.    TECHNIQUE: After injection of Xylocaine gel into the nose, a feeding  tube was advanced under fluoroscopic guidance.    FINDINGS: The feeding tube is advanced with the tip in the distal  stomach/proximal duodenum A small amount of barium was injected to  define anatomy.      Impression    IMPRESSION: Uncomplicated feeding tube placement with tip in the  distal stomach/proximal duodenum.         DELFINO LYNN MD         SYSTEM ID:  [id number]   XR Abdomen Port 1 View    Narrative    ABDOMEN ONE VIEW PORTABLE   10/29/2021 3:41 PM      HISTORY: Post feeding tube placement    COMPARISON: Fluoroscopy study on same day earlier      Impression    IMPRESSION: Single supine view of the abdomen and pelvis was obtained.  Feeding tube distal tip projects over distal stomach. Nonobstructive  bowel gas pattern with enteric contrast within small and large bowel.    DELFINO LYNN MD         SYSTEM ID:  [id number]   XR Percutaneous Gastrostomy Tube Plmt    Narrative    [facility]  DATE: 11/1/2021    PROCEDURE: PERCUTANEOUS GASTROSTOMY PLACEMENT    INTERVENTIONAL RADIOLOGIST: Paulo Fonseca MD.    INDICATION: Patient is a 71-year-old male with history of stroke and  dysphagia who presents for percutaneous gastrostomy tube placement for  enteric nutrition.    CONSENT: The risks, benefits and alternatives of percutaneous  gastrostomy placement were discussed with the patient  in detail. All  questions were answered. Informed consent was given to proceed with  the procedure.    MODERATE SEDATION: Versed 1 mg IV; Fentanyl 50 mcg IV.  Under  physician supervision, Versed and fentanyl were administered for  moderate sedation. Pulse oximetry, heart rate and blood pressure were  continuously monitored by an independent trained observer. The  physician spent 15 minutes of face-to-face sedation time with the  patient.    CONTRAST: 30 mL Omnipaque enteric.  ANTIBIOTICS: Ancef 2 grams intravenous.  ADDITIONAL MEDICATIONS: None.    FLUOROSCOPIC TIME: 2.5 minutes.  RADIATION DOSE: Air Kerma: 13 mGy.    COMPLICATIONS: No immediate complications.    STERILE BARRIER TECHNIQUE: Maximum sterile barrier technique was used.  Cutaneous antisepsis was performed at the operative site with  application of 2% chlorhexidine and large sterile drape. Prior to the  procedure, the  and assistant performed hand hygiene and wore  hat, mask, sterile gown, and sterile gloves during the entire  procedure.    PROCEDURE:    Ultrasound examination of the epigastric region was  performed to  localize the left hepatic edge, which was marked on the patient's  skin.     The stomach was insufflated with air. Under direct fluoroscopic  visualization, Three point gastropexy was performed with endoluminal  positioning confirmed with a small amount of contrast. Using a fourth  puncture between the gastropexy sites, a guidewire was placed into the  stomach.     Following tract dilatation, over wire exchange was made for a new 14F  BEVERLEY gastrostomy which was positioned under fluoroscopic guidance with  its tip in the gastric lumen. The retention balloon was inflated with  10 mL of saline. A post placement contrast injection through the  gastric lumen was performed.    FINDINGS:  Ultrasound demonstrates a normal-appearing liver, the lower margin of  which was marked on the patient's skin.    After placement, the new gastrostomy is in appropriate position with  the gastric lumen emptying into the stomach.      Impression    IMPRESSION:    Successful percutaneous gastrostomy placement as detailed above.    ANA TREJO MD         SYSTEM ID:  [id number]   US Markings    Narrative    This exam was marked as non-reportable because it will not be read by a   radiologist or a [place] non-radiologist provider.                Consultations:  Consultations This Hospital Stay   ADVANCE DIRECTIVE IP CONSULT  PHYSICAL THERAPY ADULT IP CONSULT  OCCUPATIONAL THERAPY ADULT IP CONSULT  SPEECH LANGUAGE PATH ADULT IP CONSULT  ADVANCE DIRECTIVE IP CONSULT  CARE MANAGEMENT / SOCIAL WORK IP CONSULT  SOCIAL WORK IP CONSULT  PALLIATIVE CARE ADULT IP CONSULT  PHARMACY IP CONSULT  CARE MANAGEMENT / SOCIAL WORK IP CONSULT  NUTRITION SERVICES ADULT IP CONSULT  NUTRITION SERVICES ADULT IP CONSULT  PHARMACY IP CONSULT     Recent Lab Results:  Recent Labs   Lab 11/02/21  0609 11/01/21  0735 10/30/21  0659   WBC  --  5.6  --    HGB  --  11.3*  --    HCT  --  37.7*  --    MCV  --  88  --     224 254     Recent Labs   Lab  11/04/21  1214 11/04/21  0813 11/04/21  0629 11/04/21  0548 11/03/21  1500 11/03/21  1436 11/03/21  1032 11/03/21  0628 11/02/21  0919 11/02/21  0609 11/01/21  0827 11/01/21  0735   NA  --   --   --   --   --   --   --   --   --  138  --  141   POTASSIUM  --   --  3.8  --   --  4.1  --  3.3*   < > 3.6   < > 3.5   CHLORIDE  --   --   --   --   --   --   --   --   --  103  --  105   CO2  --   --   --   --   --   --   --   --   --  30  --  30   ANIONGAP  --   --   --   --   --   --   --   --   --  5  --  6   * 131*  --  189*   < >  --    < >  --    < > 288*   < > 195*   BUN  --   --   --   --   --   --   --   --   --  17  --  16   CR  --   --   --   --   --   --   --   --   --  1.11  --  1.01   GFRESTIMATED  --   --   --   --   --   --   --   --   --  66  --  74   ARLETH  --   --   --   --   --   --   --   --   --  8.4*  --  8.9    < > = values in this interval not displayed.          Pending Results:    Unresulted Labs Ordered in the Past 30 Days of this Admission     No orders found from 9/13/2021 to 10/14/2021.           Discharge Instructions and Follow-Up:   Discharge Orders      Medication Therapy Management Referral      Home care nursing referral      Home infusion referral      Home Care PT Referral for Hospital Discharge      Home Care OT Referral for Hospital Discharge      Home Care SLP Referral for Hospital Discharge      Home Care Social Service Referral for Hospital Discharge      Reason for your hospital stay    You were hospitalized for significant weakness and trouble swallowing.  You were found to be malnourished and had a G-tube placed to provide nutritional support.  You will go home with home therapies to continue to work on getting stronger after your stroke.  You also had a urinary tract infection but completed antibiotic therapy prior to your discharge home.     Follow-up and recommended labs and tests     1.  Follow up with primary care provider, Kris Huston, within 7-10 days to  evaluate medication change and for hospital follow- up.  No follow up labs or test are needed.    2.  Follow up with Urology in clinic regarding your urinary retention.  3.  Follow up with Neurology in clinic regarding your stroke.     Activity    Your activity upon discharge: activity as tolerated     MD face to face encounter    Documentation of Face to Face and Certification for Home Health Services    I certify that patient: Jimmy Otto is under my care and that I, or a nurse practitioner or physician's assistant working with me, had a face-to-face encounter that meets the physician face-to-face encounter requirements with this patient on: 11/3/2021.    This encounter with the patient was in whole, or in part, for the following medical condition, which is the primary reason for home health care: CVA, malnutrition.    I certify that, based on my findings, the following services are medically necessary home health services: Nursing, Occupational Therapy, Physical Therapy, Speech Language Therapy, and Social Work.    My clinical findings support the need for the above services because: Nurse is needed: To assess TF (G-tube) after changes in medications or other medical regimen.., Occupational Therapy Services are needed to assess and treat cognitive ability and address ADL safety due to impairment in ADLs., Physical Therapy Services are needed to assess and treat the following functional impairments: mobility., and Speech Therapy Services are needed to assess and treat impairments in language and/or swallow functions due to dysphagia.    Further, I certify that my clinical findings support that this patient is homebound (i.e. absences from home require considerable and taxing effort and are for medical reasons or [employer] services or infrequently or of short duration when for other reasons) because: Requires assistance of another person or specialized equipment to access medical services because patient: Is unable to  operate assistive equipment on their own...    Based on the above findings. I certify that this patient is confined to the home and needs intermittent skilled nursing care, physical therapy and/or speech therapy.  The patient is under my care, and I have initiated the establishment of the plan of care.  This patient will be followed by a physician who will periodically review the plan of care.  Physician/Provider to provide follow up care: Manny Worrell    Attending hospital physician (the Medicare certified [place] provider): Taty Bolton MD  Physician Signature: See electronic signature associated with these discharge orders.  Date: 11/3/2021     Diet    Follow this diet upon discharge: Orders Placed This Encounter  1. Combination Diet Pureed Diet (level 4); Mildly Thick (level 2) (by spoon)  2. Bolus Jevity 1.5 (5 cans in total) TID via syringe or gravity bag (7am - 1 can, 12pm - 2 cans, and 5pm - 2 cans). Free water flush of 60 mL before and after each feeding.     Discharge Medications   Current Discharge Medication List      START taking these medications    Details   FLUoxetine (PROZAC) 20 MG/5ML solution 5 mLs (20 mg) by Oral or Feeding Tube route daily  Qty: 150 mL, Refills: 0    Associated Diagnoses: Depression, unspecified depression type; Cerebrovascular accident (CVA), unspecified mechanism (H)      insulin aspart (NOVOLOG PEN) 100 UNIT/ML pen Take three times daily with Jevity feedings, Take 5 units with one can of Jevity and 10 units with 2 cans of Jevity.  Qty: 6 mL, Refills: 0    Associated Diagnoses: Type 2 diabetes mellitus with chronic kidney disease, with long-term current use of insulin, unspecified CKD stage (H)      Nutritional Supplements (JEVITY 1.5 ARLETH) LIQD Bolus Jevity 1.5 (5 cans in total) TID via syringe or gravity bag (7am - 1 can, 12pm - 2 cans, and 5pm - 2 cans). Free water flush of 60 mL before and after each feeding.  Qty: 68644 mL, Refills: 0    Associated Diagnoses:  Cerebrovascular accident (CVA), unspecified mechanism (H)      ondansetron (ZOFRAN-ODT) 4 MG ODT tab Take 1 tablet (4 mg) by mouth every 6 hours as needed for nausea or vomiting  Qty: 15 tablet, Refills: 0    Associated Diagnoses: Cerebrovascular accident (CVA), unspecified mechanism (H)         CONTINUE these medications which have CHANGED    Details   amLODIPine (NORVASC) 5 MG tablet Take 1 tablet (5 mg) by mouth daily  Qty: 30 tablet, Refills: 0    Associated Diagnoses: Benign essential hypertension      doxazosin (CARDURA) 2 MG tablet Take 1 tablet (2 mg) by mouth daily  Qty: 30 tablet, Refills: 0    Associated Diagnoses: Benign prostatic hyperplasia with urinary retention      insulin detemir (LEVEMIR PEN) 100 UNIT/ML pen Take 10 units every morning and 18 units every evening.  Qty: 6 mL, Refills: 0    Associated Diagnoses: Type 2 diabetes mellitus with chronic kidney disease, with long-term current use of insulin, unspecified CKD stage (H)         CONTINUE these medications which have NOT CHANGED    Details   acetaminophen (TYLENOL) 325 MG tablet Take 1-3 tablets (325-975 mg) by mouth every 6 hours as needed for mild pain or fever (> 101 F)    Associated Diagnoses: Pain      amantadine (SYMMETREL) 100 MG capsule Take 1 capsule (100 mg) by mouth daily    Associated Diagnoses: Cerebrovascular accident (CVA), unspecified mechanism (H)      atenolol (TENORMIN) 25 MG tablet Take 1 tablet (25 mg) by mouth 2 times daily    Associated Diagnoses: Benign essential hypertension      atorvastatin (LIPITOR) 80 MG tablet Take 80 mg by mouth daily      clopidogrel (PLAVIX) 75 MG tablet Take 75 mg by mouth daily      metFORMIN (GLUCOPHAGE) 1000 MG tablet Take 1 tablet (1,000 mg) by mouth 2 times daily (with meals)    Associated Diagnoses: Type 2 diabetes mellitus with chronic kidney disease, with long-term current use of insulin, unspecified CKD stage (H)      polyethylene glycol (MIRALAX) 17 g packet Take 17 g by mouth daily  as needed for constipation    Associated Diagnoses: Constipation, unspecified constipation type      sennosides (SENOKOT) 8.6 MG tablet Take 1 tablet by mouth At Bedtime    Associated Diagnoses: Constipation, unspecified constipation type         STOP taking these medications       aspirin (ASA) 81 MG chewable tablet Comments:   Reason for Stopping:         bisacodyl (DULCOLAX) 10 MG suppository Comments:   Reason for Stopping:         docusate sodium (COLACE) 100 MG capsule Comments:   Reason for Stopping:         liraglutide (VICTOZA) 18 MG/3ML solution Comments:   Reason for Stopping:         potassium chloride (KLOR-CON) 20 MEQ packet Comments:   Reason for Stopping:         ramelteon (ROZEREM) 8 MG tablet Comments:   Reason for Stopping:               Time Spent on this Encounter   I, Taty Bolton MD, personally saw the patient today and spent greater than 30 minutes discharging this patient.    Taty Bolton MD

## 2021-11-03 NOTE — PLAN OF CARE
VSS. Gtube is patent and intact. TF running at goal rate of 55/hr. Abd binder in place to protect tube from pt pulling on it. Pureed diet with spoon thick liquids. Meds given thru Gtube.  Nonverbal, but able to follow commands.  and 232. PIV in L hand saline locked. Pt coccyx red but blanchable Q2h turns. Felix in place for urinary retention. PT, OT, speech, palliative and nutrition following. Gave son education on diabetes. On K+ and Mag protocol. Plan: home w/family and home care.

## 2021-11-03 NOTE — PROGRESS NOTES
[facility]  Hospitalist Progress Note  Taty Bolton MD 11/03/21     Reason for Stay (Diagnosis): weakness         Assessment and Plan:      Summary of Stay: Jimmy Otto is a 71-year-old male with history of IDDM2, hypercholesterolemia, hypertension, urinary retention (currently ordoñez catheter in place) and recent right thalamic stroke on 9/14/2021 which left him nonverbal after which he was discharged to the acute rehab unit at [facility] where he stayed from 9/17 through 10/6 who was admitted on 10/13/2021 with weakness, nausea, vomiting, decreased oral intake, difficulty swallowing, and overall functional decline and was found to have sepsis due to UTI as well as severe malnutrition.  After discussions with patient's family plan to place Gtube for nutritional support with goal of returning home with his family.  Gtube placed on 11/1, changing to bolus feedings today with plans to discharge home tomorrow.    Problem List/Assessment and Plan:     Sepsis 2/2 E Coli UTI with chronic odroñez catheter in place: Urine culture grew pansensitive E. coli.  Completed 7 days of IV ceftriaxone.  Ordoñez catheter exchanged in the ED.  He had difficulty with urinary retention when hospitalized for CVA in September.  Did have hematuria at that time which resolved.  Has had ordoñez since then.  Will need outpatient follow up with Urology.  - Continue Flomax  - Follow up with Urology in outpatient setting     Thrush: Treated with fluconazole-completed.  -Continue magic mouthwash as needed for pain     Severe malnutrition in setting of CVA, Dysphagia from strokes: Currently, this appears to be the main issue.  He was treated for thrush as above.  Both nutrition and speech have evaluated the patient.  He is on a modified diet.  Concern that patient not taking enough p.o. to maintain his nutrition. Multiple discussions by prior Hospitalist as well as Palliative care.  -Prior to admission was on modified diet due to recent  "stroke.  Seen by speech and continued on strict modified diet with 1:1 supervision.    -Calorie counts have been done which do show some improvement in PO intake but still not meeting his requirements.   -Palliative care consulted, appreciate recommendations  - Given that his dysphagia is secondary from his CVAs, unclear if his swallow function would get better.  Family decided to move forward with Gtube placement.  -G-tube placed on 11/1  -On amantadine which was started during last hospitalization for fatigue in the setting of stroke.   - Continue to hold quetiapine and ramelteon.      Right sided hemiplegia from an old stroke.     Recent stroke on 9/14/2021: Was hospitalized at [facility] [place] and was in acute rehab there. Suffered right thalamic stroke.  Nonverbal and worsening right sided weakness.  He has prior history of left thalamic stroke previously.  He was noted to have periodic somnolence and lethargy as result during that hospitalization at [facility].  We have seen that periodically here as well and minimized sedating meds as result.      -On Plavix, Lipitor.  At admission, he was on aspirin plus Plavix.  Aspirin has been stopped, per neurology recommendations.  -Patient's family don't want him to go back to rehab facility. They want to take him home with home therapy orders.   -Will need home PT/OT/RN/HHA on discharge.  consulted for discharge planning   -Per neurology note from his admission at [facility]: \"Dual antiplatelets for 1 month then back to Plavix monotherapy as he was supposed to be on. It might be informative to check P2Y12 activity after a month of being on Plavix only.\"  -aspirin discontinued as it has been 1 month since the stroke.   -Plavix had been held for G-tube placement, resumed on 11/2     Acute toxic and infectious encephalopathy - Resolved: Patient with somnolence during his hospitalization.  Likely secondary to his CVA in the past along with infection with " some sedating medications.  Seroquel and ramelteon have since been held.  This seem to be resolved.      Urine retention identified during recent hospitalization for stroke: Indwelling Felix catheter was changed in the emergency department  -Outpatient urology follow-up.  -On doxazosin for BPH.  doxazosin increased.       IDDM2: Prior to admission was on Levemir insulin 18 units at bedtime, NovoLog sliding scale insulin, Victoza, and Metformin.  Adjusting insulin regimen today as tube feeds have been resumed and he is hyperglycemic.  - Increase Levemir to 18 units QHS   -High sliding scale insulin NPO scale  -Aspart 5 units 3 times daily with bolus feedings     Hypercholesterolemia: Continue statin    Hypertension: Atenolol resumed.  Resumed amlodipine at a lower dose.    Weakness, Deconditioning: PT and OT consulted, family would like patient to return home with home therapies.      CKD3: Baseline creatinine seems to be around 1-1.4.  At baseline    Depression.  Patient had good response to antidepressant when he had the first stroke, the family is requesting antidepressant medication.  Fluoxetine liquid formulation started.    Discharge Planning: Can discharge home once on stable TF regimen and stable insulin regimen.      Diet: Snacks/Supplements Adult: Gelatein Plus; With Meals  Combination Diet Pureed Diet (level 4); Mildly Thick (level 2) (by spoon)  Adult Formula Bolus Feeding: TID Jevity 1.5; Route: Gastrostomy; 5; Can(s); Medication - Feeding Tube Flush Frequency: At least 15-30 mL water before and after medication administration and with tube clogging; hold TF, at 9am infuse 1 can at 400 m...    DVT Prophylaxis: Pneumatic Compression Devices, holding chemical prophylaxis for now given Gtube placement tomorrow  Felix Catheter: PRESENT, indication: Retention  Code Status: Full Code      Disposition Plan   Expected discharge: 11/4/21 recommended to prior living arrangement once stable glucose, on bolus type  feedings.  Entered: Taty Bolton MD 11/03/2021, 8:24 AM       The patient's care was discussed with the Bedside Nurse, Patient and Patient's Family.    Hospitalist Service  [facility]          Interval History (Subjective):      Patient was discussed with his bedside nurse this morning.  When asked if he was in pain he did point towards his abdominal region.  I discussed that the pain should improve with time after getting a G-tube and eventually he will not have any pain with this.  Denies nausea, shortness of breath or chest pain.  I discussed the care plan with him today.    I called his daughter, Clemente, by phone.  Reviewed care plan and all questions answered.                  Physical Exam:      Last Vital Signs:  /68 (BP Location: Left arm)   Pulse 62   Temp 97.4  F (36.3  C) (Oral)   Resp 16   Wt 57.7 kg (127 lb 1.6 oz)   SpO2 96%   BMI 21.82 kg/m      General: Alert, awake, no acute distress.    HEENT: Normocephalic and atraumatic, eyes anicteric and without scleral injection, EOMI, face symmetric, MMM.  Cardiac: RRR, normal S1, S2. No m/g/r, no LE edema.  Pulmonary: Normal chest rise, normal work of breathing.  Lungs CTAB without crackles or wheezing.  Abdomen: soft, non-tender, non-distended.  G-tube in place with abdominal binder. Normoactive bowel sounds, no guarding or rebound tenderness.  Extremities: no deformities.  Warm, well perfused.  Skin: no rashes or lesions.  Warm and Dry.  Neuro: No focal deficits.  Patient is nonverbal due to his stroke, can shake his head yes or no appropriately.  Moving extremities while sitting in a chair.  Psych: Alert and oriented to self. Appropriate affect.         Medications:      All current medications were reviewed with changes reflected in problem list.         Data:      All new lab and imaging data was reviewed.   Labs:  Recent Labs   Lab 11/03/21 0628 11/03/21  0545 11/03/21 0224 11/02/21 1959 11/02/21 0919  11/02/21 0609 11/01/21 0827 11/01/21 0735 10/28/21  0913 10/28/21  0723   NA  --   --   --   --   --  138  --  141  --  145*   POTASSIUM 3.3*  --   --   --   --  3.6  --  3.5   < > 3.8   CHLORIDE  --   --   --   --   --  103  --  105  --  110*   CO2  --   --   --   --   --  30  --  30  --  28   ANIONGAP  --   --   --   --   --  5  --  6  --  7   GLC  --  211* 254* 232*   < > 288*   < > 195*   < > 220*   BUN  --   --   --   --   --  17  --  16  --  33*   CR  --   --   --   --   --  1.11  --  1.01  --  1.39*   GFRESTIMATED  --   --   --   --   --  66  --  74  --  51*   ARLETH  --   --   --   --   --  8.4*  --  8.9  --  9.6    < > = values in this interval not displayed.     Recent Labs   Lab 11/02/21  0609 11/01/21  0735 10/30/21  0659 10/28/21  0723 10/28/21  0723   WBC  --  5.6  --   --  6.6   HGB  --  11.3*  --   --  12.6*   HCT  --  37.7*  --   --  42.2   MCV  --  88  --   --  88    224 254   < > 332    < > = values in this interval not displayed.      Imaging:   No results found for this or any previous visit (from the past 24 hour(s)).    Taty Bolton MD

## 2021-11-03 NOTE — PROGRESS NOTES
[facility]  Hospitalist Progress Note  Taty Bolton MD 11/03/21     Reason for Stay (Diagnosis): weakness         Assessment and Plan:      Summary of Stay: Jimmy Otto is a 71-year-old male with history of IDDM2, hypercholesterolemia, hypertension, urinary retention (currently ordoñez catheter in place) and recent right thalamic stroke on 9/14/2021 which left him nonverbal after which he was discharged to the acute rehab unit at [facility] where he stayed from 9/17 through 10/6 who was admitted on 10/13/2021 with weakness, nausea, vomiting, decreased oral intake, difficulty swallowing, and overall functional decline and was found to have sepsis due to UTI as well as severe malnutrition.  After discussions with patient's family plan to place Gtube for nutritional support with goal of returning home with his family.  Gtube placed on 11/1, changing to bolus feedings today with plans to discharge home tomorrow.    Problem List/Assessment and Plan:     Sepsis 2/2 E Coli UTI with chronic ordoñez catheter in place: Urine culture grew pansensitive E. coli.  Completed 7 days of IV ceftriaxone.  Ordoñez catheter exchanged in the ED.  He had difficulty with urinary retention when hospitalized for CVA in September.  Did have hematuria at that time which resolved.  Has had ordoñez since then.  Will need outpatient follow up with Urology.  - Continue Flomax  - Follow up with Urology in outpatient setting     Thrush: Treated with fluconazole-completed.  -Continue magic mouthwash as needed for pain     Severe malnutrition in setting of CVA, Dysphagia from strokes: Currently, this appears to be the main issue.  He was treated for thrush as above.  Both nutrition and speech have evaluated the patient.  He is on a modified diet.  Concern that patient not taking enough p.o. to maintain his nutrition. Multiple discussions by prior Hospitalist as well as Palliative care.  -Prior to admission was on modified diet due to recent  "stroke.  Seen by speech and continued on strict modified diet with 1:1 supervision.    -Calorie counts have been done which do show some improvement in PO intake but still not meeting his requirements.   -Palliative care consulted, appreciate recommendations  - Given that his dysphagia is secondary from his CVAs, unclear if his swallow function would get better.  Family decided to move forward with Gtube placement.  -G-tube placed on 11/1  -On amantadine which was started during last hospitalization for fatigue in the setting of stroke.   - Continue to hold quetiapine and ramelteon.   -Anticipated length of need for TF is greater than 90 days, potentially lifelong.  -Nutrition consulted, changing to TID bolus feedings today     Right sided hemiplegia from an old stroke.     Recent stroke on 9/14/2021: Was hospitalized at [facility] and was in acute rehab there. Suffered right thalamic stroke.  Nonverbal and worsening right sided weakness.  He has prior history of left thalamic stroke previously.  He was noted to have periodic somnolence and lethargy as result during that hospitalization at [facility].  We have seen that periodically here as well and minimized sedating meds as result.      -On Plavix, Lipitor.  At admission, he was on aspirin plus Plavix.  Aspirin has been stopped, per neurology recommendations.  -Patient's family don't want him to go back to rehab facility. They want to take him home with home therapy orders.   -Will need home PT/OT/RN/HHA on discharge.  consulted for discharge planning   -Per neurology note from his admission at [facility]: \"Dual antiplatelets for 1 month then back to Plavix monotherapy as he was supposed to be on. It might be informative to check P2Y12 activity after a month of being on Plavix only.\"  -aspirin discontinued as it has been 1 month since the stroke.   -Plavix had been held for G-tube placement, resumed on 11/2     Acute toxic and infectious " encephalopathy - Resolved: Patient with somnolence during his hospitalization.  Likely secondary to his CVA in the past along with infection with some sedating medications.  Seroquel and ramelteon have since been held.  This seem to be resolved.      Urine retention identified during recent hospitalization for stroke: Indwelling Felix catheter was changed in the emergency department  -Outpatient urology follow-up.  -On doxazosin for BPH.  doxazosin increased.       IDDM2: Prior to admission was on Levemir insulin 18 units at bedtime, NovoLog sliding scale insulin, Victoza, and Metformin.  Adjusting insulin regimen today as tube feeds have been resumed and he is hyperglycemic.  - Increase Levemir to 18 units QHS   -High sliding scale insulin NPO scale  -Aspart 5 units 3 times daily with bolus feedings     Hypercholesterolemia: Continue statin    Hypertension: Atenolol resumed.  Resumed amlodipine at a lower dose.    Weakness, Deconditioning: PT and OT consulted, family would like patient to return home with home therapies.      CKD3: Baseline creatinine seems to be around 1-1.4.  At baseline    Depression.  Patient had good response to antidepressant when he had the first stroke, the family is requesting antidepressant medication.  Fluoxetine liquid formulation started.    Discharge Planning: Can discharge home once on stable TF regimen and stable insulin regimen.      Diet: Snacks/Supplements Adult: Gelatein Plus; With Meals  Combination Diet Pureed Diet (level 4); Mildly Thick (level 2) (by spoon)  Adult Formula Bolus Feeding: TID Jevity 1.5; Route: Gastrostomy; 5; Can(s); Medication - Feeding Tube Flush Frequency: At least 15-30 mL water before and after medication administration and with tube clogging; continue to hold TF, at 12pm infuse 1...    DVT Prophylaxis: Pneumatic Compression Devices, holding chemical prophylaxis for now given Gtube placement tomorrow  Felix Catheter: PRESENT, indication: Retention  Code  Status: Full Code      Disposition Plan   Expected discharge: 11/4/21 recommended to prior living arrangement once stable glucose, on bolus type feedings.  Entered: Taty Bolton MD 11/03/2021, 11:43 AM       The patient's care was discussed with the Bedside Nurse, Patient and Patient's Family.    Hospitalist Service  [facility]          Interval History (Subjective):      Patient was discussed with his bedside nurse this morning.  When asked if he was in pain he did point towards his abdominal region.  I discussed that the pain should improve with time after getting a G-tube and eventually he will not have any pain with this.  Denies nausea, shortness of breath or chest pain.  I discussed the care plan with him today.    I called his daughter, Clemente, by phone.  Reviewed care plan and all questions answered.                  Physical Exam:      Last Vital Signs:  BP (!) 150/72 (BP Location: Left arm)   Pulse 58   Temp 97.4  F (36.3  C) (Oral)   Resp 16   Wt 57.7 kg (127 lb 1.6 oz)   SpO2 96%   BMI 21.82 kg/m      General: Alert, awake, no acute distress.    HEENT: Normocephalic and atraumatic, eyes anicteric and without scleral injection, EOMI, face symmetric, MMM.  Cardiac: RRR, normal S1, S2. No m/g/r, no LE edema.  Pulmonary: Normal chest rise, normal work of breathing.  Lungs CTAB without crackles or wheezing.  Abdomen: soft, non-tender, non-distended.  G-tube in place with abdominal binder. Normoactive bowel sounds, no guarding or rebound tenderness.  Extremities: no deformities.  Warm, well perfused.  Skin: no rashes or lesions.  Warm and Dry.  Neuro: No focal deficits.  Patient is nonverbal due to his stroke, can shake his head yes or no appropriately.  Moving extremities while sitting in a chair.  Psych: Alert and oriented to self. Appropriate affect.         Medications:      All current medications were reviewed with changes reflected in problem list.         Data:      All  new lab and imaging data was reviewed.   Labs:  Recent Labs   Lab 11/03/21  1032 11/03/21 0628 11/03/21  0545 11/03/21  0224 11/02/21  0919 11/02/21  0609 11/01/21 0827 11/01/21 0735 10/28/21  0913 10/28/21  0723   NA  --   --   --   --   --  138  --  141  --  145*   POTASSIUM  --  3.3*  --   --   --  3.6  --  3.5   < > 3.8   CHLORIDE  --   --   --   --   --  103  --  105  --  110*   CO2  --   --   --   --   --  30  --  30  --  28   ANIONGAP  --   --   --   --   --  5  --  6  --  7   *  --  211* 254*   < > 288*   < > 195*   < > 220*   BUN  --   --   --   --   --  17  --  16  --  33*   CR  --   --   --   --   --  1.11  --  1.01  --  1.39*   GFRESTIMATED  --   --   --   --   --  66  --  74  --  51*   ARLETH  --   --   --   --   --  8.4*  --  8.9  --  9.6    < > = values in this interval not displayed.     Recent Labs   Lab 11/02/21  0609 11/01/21  0735 10/30/21  0659 10/28/21  0723 10/28/21  0723   WBC  --  5.6  --   --  6.6   HGB  --  11.3*  --   --  12.6*   HCT  --  37.7*  --   --  42.2   MCV  --  88  --   --  88    224 254   < > 332    < > = values in this interval not displayed.      Imaging:   No results found for this or any previous visit (from the past 24 hour(s)).    Taty Bolton MD

## 2021-11-03 NOTE — PLAN OF CARE
Tylenol given due to author asking pt if he wanted any and he nodded yes. Transitioned to bolus tube feeding TID. , 359. MD notified on 359 and instructed to give sliding scale. Continue to monitor and with plan of care. Possible discharge home tomorrow with cares.

## 2021-11-03 NOTE — PROGRESS NOTES
Clinical Nutrition Brief Note     Please refer to full reassessment completed on 11/1 for more information.     Per MD note on 11/2, goal is to bolus pt at home.     Weight:  Vitals:    10/14/21 0131 10/15/21 0638 10/16/21 0636 10/17/21 0322   Weight: 59.7 kg (131 lb 9.6 oz) 57.4 kg (126 lb 9.6 oz) 60.9 kg (134 lb 3.2 oz) 58.8 kg (129 lb 11.2 oz)    10/18/21 0331 10/23/21 0547 10/24/21 0511 10/25/21 0541   Weight: 59.3 kg (130 lb 12.8 oz) 54.7 kg (120 lb 11.2 oz) 55.4 kg (122 lb 3.2 oz) 54.3 kg (119 lb 12.8 oz)    10/26/21 0545 10/27/21 0554 10/29/21 0549 10/30/21 0500   Weight: 55.5 kg (122 lb 6.4 oz) 54.7 kg (120 lb 9.6 oz) 54.4 kg (120 lb) 55.6 kg (122 lb 9.6 oz)    11/01/21 0542 11/03/21 0514   Weight: 57.7 kg (127 lb 1.6 oz) 57.7 kg (127 lb 1.6 oz)     Labs:  Labs:  Electrolytes  Potassium (mmol/L)   Date Value   11/03/2021 3.3 (L)   11/02/2021 3.6   11/01/2021 3.5   08/16/2012 4.0   08/13/2012 3.6     Phosphorus (mg/dL)   Date Value   11/02/2021 2.8   11/01/2021 2.6   10/29/2021 2.7   10/28/2021 3.4   10/27/2021 3.7    Blood Glucose  Glucose (mg/dL)   Date Value   11/02/2021 288 (H)   11/01/2021 195 (H)   10/28/2021 220 (H)   10/25/2021 273 (H)   10/22/2021 132 (H)   08/16/2012 161 (H)   08/13/2012 214 (H)     GLUCOSE BY METER POCT (mg/dL)   Date Value   11/03/2021 211 (H)   11/03/2021 254 (H)   11/02/2021 232 (H)   11/02/2021 363 (H)   11/02/2021 327 (H)     Hemoglobin A1C (%)   Date Value   09/15/2021 11.5 (H)   08/14/2012 7.6 (H)    Inflammatory Markers  CRP Inflammation (mg/L)   Date Value   09/19/2021 28.0 (H)     WBC (10e9/L)   Date Value   08/16/2012 5.6   08/13/2012 6.1     WBC Count (10e3/uL)   Date Value   11/01/2021 5.6   10/28/2021 6.6   10/22/2021 7.3     Albumin (g/dL)   Date Value   10/13/2021 3.3 (L)   09/15/2021 2.6 (L)   08/13/2012 5.1 (H)      Magnesium (mg/dL)   Date Value   11/03/2021 2.1   11/02/2021 1.9   11/01/2021 1.9     Sodium (mmol/L)   Date Value   11/02/2021 138   11/01/2021 141    10/28/2021 145 (H)   08/16/2012 140   08/13/2012 141    Renal  Urea Nitrogen (mg/dL)   Date Value   11/02/2021 17   11/01/2021 16   10/28/2021 33 (H)   08/16/2012 18   08/13/2012 14     Creatinine (mg/dL)   Date Value   11/02/2021 1.11   11/01/2021 1.01   10/28/2021 1.39 (H)   08/16/2012 0.96   08/13/2012 0.91     Additional  Triglycerides (mg/dL)   Date Value   09/15/2021 238 (H)   08/14/2012 136     Ketones Urine (mg/dL)   Date Value   10/13/2021 Negative        Medications:    amantadine  100 mg Oral Daily     amLODIPine  5 mg Oral Daily     atenolol  25 mg Oral BID     atorvastatin  80 mg Oral Daily     clopidogrel  75 mg Oral Daily     doxazosin  2 mg Oral Daily     FLUoxetine  20 mg Oral or Feeding Tube Daily     insulin aspart  1-12 Units Subcutaneous Q4H     insulin aspart  3 Units Subcutaneous Q4H     insulin detemir  18 Units Subcutaneous At Bedtime     potassium chloride  20 mEq Oral or Feeding Tube Once     sennosides  1 tablet Oral At Bedtime     sodium chloride (PF)  3 mL Intracatheter Q8H        dextrose       dextrose        acetaminophen **OR** acetaminophen, benzocaine, bisacodyl, calcium carbonate, dextrose, dextrose, glucose **OR** dextrose **OR** glucagon, fentaNYL, flumazenil, guaiFENesin-dextromethorphan, lidocaine 4%, lidocaine (buffered or not buffered), lidocaine (buffered or not buffered), lidocaine visc 2% & maalox/mylanta w/simethicone & diphenhydramine, magnesium hydroxide, melatonin, midazolam, naloxone **OR** naloxone **OR** naloxone **OR** naloxone, ondansetron **OR** ondansetron, phenol, polyethylene glycol, prochlorperazine **OR** prochlorperazine **OR** prochlorperazine, senna-docusate **OR** senna-docusate, sodium chloride (PF)      ASSESSED NUTRITION NEEDS (PER APPROVED PRACTICE GUIDELINES; DW: 59 kg):  Estimated Energy Needs: 7841-3917+ kcal (30-35+ Kcal per kg)  Justification: prolonged NPO, maintenance while hospitalized   Estimated Protein Needs: 71-89+ g protein  "(1.2-1.5+ g pro/Kg)  Justification: preservation of lean body mass  Estimated Fluid Needs: >1 mL/kcal     Recommendations:  - Will plan to bolus patient in anticipation of discharge   - Recommend TF as follows:   Type of Feeding Tube: GT   Enteral Frequency: Bolus (\"mock bolus\" on the pump @ 250 mL/hr)   Enteral Regimen: Jevity 1.5 TID (7am, 12pm and 5pm)   Total Enteral Provisions: 1185 mL daily provides 1778 kcal (30 kcal per kg), 76 g protein (1.3 g per kg), 256 g CHO, 25 g fiber and 901 mL free water. Meets 100% of DRI's.  Free Water Flush: standard 60 mL before and after bolus     Daily electrolyte check, Phos add on  Daily weights  Hold TF, at 9am infuse 1 can at 250 mL/hr, then at 12pm infuse 1.5 can at 250 mL/hr, and last at 5pm infuse 2 cans at 250 mL/hr. RD to updated orders in the AM of 11/4    TF cans did not come up until later change timings to 12 pm - 1 can, 4pm - 1.5 cans and 8pm - 2 cans     GOAL AT HOME: Bolus Jevity 1.5 (5 cans in total) TID via syringe or gravity bag (7am - 1 can, 12pm - 2 cans, and 5pm - 2 cans). Free water flush of 60 mL before and after each feeding.     Please page with any questions.    Beronica Brasher, RD, LD  Clinical Dietitian       "

## 2021-11-03 NOTE — PROVIDER NOTIFICATION
Metformin ordered to restart today. MD paged to clarify order regarding lab draw:  Restarting metformin today, no GFR was drawn this am, yesterday was 66. Did you want this drawn first or should we just give it? Thank you.  Per MD, ok to give.

## 2021-11-04 ENCOUNTER — HOME INFUSION (PRE-WILLOW HOME INFUSION) (OUTPATIENT)
Dept: PHARMACY | Facility: CLINIC | Age: 71
End: 2021-11-04
Payer: MEDICARE

## 2021-11-04 ENCOUNTER — APPOINTMENT (OUTPATIENT)
Dept: GENERAL RADIOLOGY | Facility: CLINIC | Age: 71
DRG: 871 | End: 2021-11-04
Attending: INTERNAL MEDICINE
Payer: MEDICARE

## 2021-11-04 ENCOUNTER — APPOINTMENT (OUTPATIENT)
Dept: OCCUPATIONAL THERAPY | Facility: CLINIC | Age: 71
DRG: 871 | End: 2021-11-04
Payer: MEDICARE

## 2021-11-04 VITALS
SYSTOLIC BLOOD PRESSURE: 104 MMHG | DIASTOLIC BLOOD PRESSURE: 59 MMHG | BODY MASS INDEX: 22.31 KG/M2 | WEIGHT: 130 LBS | TEMPERATURE: 98.1 F | OXYGEN SATURATION: 97 % | RESPIRATION RATE: 17 BRPM | HEART RATE: 70 BPM

## 2021-11-04 LAB
GLUCOSE BLDC GLUCOMTR-MCNC: 131 MG/DL (ref 70–99)
GLUCOSE BLDC GLUCOMTR-MCNC: 189 MG/DL (ref 70–99)
GLUCOSE BLDC GLUCOMTR-MCNC: 251 MG/DL (ref 70–99)
GLUCOSE BLDC GLUCOMTR-MCNC: 254 MG/DL (ref 70–99)
GLUCOSE BLDC GLUCOMTR-MCNC: 292 MG/DL (ref 70–99)
MAGNESIUM SERPL-MCNC: 2 MG/DL (ref 1.6–2.3)
POTASSIUM BLD-SCNC: 3.8 MMOL/L (ref 3.4–5.3)

## 2021-11-04 PROCEDURE — 250N000013 HC RX MED GY IP 250 OP 250 PS 637: Performed by: INTERNAL MEDICINE

## 2021-11-04 PROCEDURE — 36415 COLL VENOUS BLD VENIPUNCTURE: CPT | Performed by: INTERNAL MEDICINE

## 2021-11-04 PROCEDURE — 84132 ASSAY OF SERUM POTASSIUM: CPT | Performed by: INTERNAL MEDICINE

## 2021-11-04 PROCEDURE — 83735 ASSAY OF MAGNESIUM: CPT | Performed by: INTERNAL MEDICINE

## 2021-11-04 PROCEDURE — 272N000078 HC NUTRITION PRODUCT INTERMEDIATE LITER

## 2021-11-04 PROCEDURE — 97535 SELF CARE MNGMENT TRAINING: CPT | Mod: GO

## 2021-11-04 PROCEDURE — 97530 THERAPEUTIC ACTIVITIES: CPT | Mod: GO

## 2021-11-04 PROCEDURE — 250N000013 HC RX MED GY IP 250 OP 250 PS 637: Performed by: HOSPITALIST

## 2021-11-04 PROCEDURE — 74018 RADEX ABDOMEN 1 VIEW: CPT

## 2021-11-04 RX ORDER — ONDANSETRON 4 MG/1
4 TABLET, ORALLY DISINTEGRATING ORAL EVERY 6 HOURS PRN
Qty: 15 TABLET | Refills: 0 | Status: SHIPPED | OUTPATIENT
Start: 2021-11-04 | End: 2023-04-21

## 2021-11-04 RX ADMIN — ATENOLOL 25 MG: 25 TABLET ORAL at 08:46

## 2021-11-04 RX ADMIN — AMANTADINE HYDROCHLORIDE 100 MG: 100 CAPSULE ORAL at 08:46

## 2021-11-04 RX ADMIN — METFORMIN HYDROCHLORIDE 1000 MG: 500 TABLET, FILM COATED ORAL at 19:09

## 2021-11-04 RX ADMIN — METFORMIN HYDROCHLORIDE 1000 MG: 500 TABLET, FILM COATED ORAL at 08:47

## 2021-11-04 RX ADMIN — FLUOXETINE HYDROCHLORIDE 20 MG: 20 LIQUID ORAL at 09:12

## 2021-11-04 RX ADMIN — ATORVASTATIN CALCIUM 80 MG: 40 TABLET, FILM COATED ORAL at 08:46

## 2021-11-04 RX ADMIN — DOXAZOSIN 2 MG: 1 TABLET ORAL at 08:47

## 2021-11-04 RX ADMIN — AMLODIPINE BESYLATE 5 MG: 5 TABLET ORAL at 08:46

## 2021-11-04 RX ADMIN — CLOPIDOGREL BISULFATE 75 MG: 75 TABLET ORAL at 08:46

## 2021-11-04 ASSESSMENT — ACTIVITIES OF DAILY LIVING (ADL)
ADLS_ACUITY_SCORE: 36
ADLS_ACUITY_SCORE: 38
ADLS_ACUITY_SCORE: 36
ADLS_ACUITY_SCORE: 36
ADLS_ACUITY_SCORE: 38
ADLS_ACUITY_SCORE: 36

## 2021-11-04 NOTE — PROGRESS NOTES
NUTRITION BRIEF NOTE     Chart check for nutrition support patient. Chart reviewed and discussed with RN. Vomiting after 2 can/2 hour bolus 11/3 pm. Per RN, tolerated AM bolus of 1 can, will trial 2 cans over 2 hours this afternoon. Orders entered in preparation for discharge.    Will continue to follow per protocol. Please page/consult as needed.     Olga Lidia Mast MS, RDN-AP, LD, CNSC  Pager - 3rd floor/ICU: [phone]  Pager - All other floors: [phone]  Pager - Weekend/holiday: [phone]  Office: [phone]

## 2021-11-04 NOTE — PROGRESS NOTES
Cross cover notified of patient projectile vomiting large amount of tube feeds that just finished, received a 500 mL bolus tube feed over 2 hours.  Patient is nonverbal and per his nurse he seems to be uncomfortable, but otherwise is okay.  He is alert and does not have any respiratory difficulty.  He had a G-tube placed 2 days ago.  Nursing reporting no bowel movement since 10/31.  -obtain portable abdominal xray  -hold bolus tube feedings for now, team to reassess in morning    Addendum:  G-tube present in the stomach is moderately distended with gas so gastric outlet obstruction cannot be excluded, however the bowel otherwise is normal in caliber and contrast is seen all the way to the level of the rectum.  Suspect most of had previous Gastrografin challenge.  -Hold bolus tube feeds and flushes for now

## 2021-11-04 NOTE — PLAN OF CARE
Patient alert and nonverbal; difficult to attain orientation. Ax2 with lift. Pureed foods with mildly thick liquids diet but does have G tube and has been receiving tube feedings. Goal is 2 cans of Jevity feeding, and goal had been met, but patient had an episode of vomiting last night at approximately 2300; physician paged. In addition to vomiting episode, patient has not had a BM since 10/31 so abdominal xray obtained; findings indicated that tube feedings and flushes should be held until further evaluation can be done per provider; see provider note. N/V have improved since vomiting episode. Patient does not appear to be in pain. VSS. /67 (BP Location: Left arm)   Pulse 68   Temp 98.1  F (36.7  C) (Axillary)   Resp 16   Wt 59 kg (130 lb)   SpO2 94%   BMI 22.31 kg/m   On RA. Has ordoñez in place for urinary retention. On Mg and K protocols; both are WNL. PT/OT, SLP, palliative, and CM are following. Plan to discharge back to home with family support and home care today 11/04. Will continue with POC.

## 2021-11-04 NOTE — PROGRESS NOTES
Care Management Follow Up    Length of Stay (days): 21    Expected Discharge Date: 11/05/2021     Concerns to be Addressed:       Patient plan of care discussed at interdisciplinary rounds: Yes    Anticipated Discharge Disposition:  Home with HC     Anticipated Discharge Services:  HC  Anticipated Discharge DME: TF       Additional Information:  Left  for Claire Kan RN  [facility] Liaison  [phone] informing her that pt had emesis overnight which may delay discharge.    Marina Thomas RN, BSN, PHN, [facility]  Care Coordinator  [facility]  [phone]    Addendum 1445:  Emailed [facility] and [facility] regarding pt discharge orders.  Spoke with Claire from [facility] and she will call the pt dtg about teaching and coordinating supply delivery.  Spoke with bedside nurse who said pt son will provide transportation home.

## 2021-11-04 NOTE — PROGRESS NOTES
[facility]    Pt to discharge to home today with home bolus enteral feedings tid. Phone call placed to pt's daughter, Clemente to update on delivery method via  service and plan for home RN visit tonight for initial home TF education. Clemente was in agreement with plan. I will call her once RN visit time and formula/supply delivery time determined.     Thank you     Claire Kan RN  [facility] Liaison  [phone] (Mon thru Fri 8am - 5pm)  [phone] Office

## 2021-11-04 NOTE — DISCHARGE INSTRUCTIONS
Your home care referral was sent to [facility]  If you haven't heard from them within the next 24-48 hours,  Please call them at [phone]    GOAL AT HOME: Bolus Jevity 1.5 (5 cans in total) TID via syringe or gravity bag (7am - 1 can, 12pm - 2 cans, and 5pm - 2 cans). Free water flush of 60 mL before and after each feeding. May need additional free water based on oral intake volumes.      Your insulin regimen was adjusted when you were started on Bolus Jevity Feedings.  1. You will now take Detemir 10 units every morning and 18 units every evening.  2. You will take Aspart when you do the Jevity feedings.  You will take 5 units with one can of Jevity and 10 units with two cans of Jevity.  3. You will STOP taking Victoza at this time.

## 2021-11-04 NOTE — PLAN OF CARE
VSS. Gtube is patent and intact. TF changed from continuous to bolus feed, 1600 TF was 1.5 cans of Jevity and 2000 TF was 2 cans, which is goal.  Free water flush orders changed. Abd binder in place to protect tube from pt pulling on it. Pureed diet with spoon thick liquids. Meds given thru Gtube.  Nonverbal, but able to follow commands.  and 278. PIV in L hand saline locked. Pt coccyx red but blanchable Q2h turns. Felix in place for urinary retention. PT, OT, speech, palliative and nutrition following.  On K+ and Mag protocol rechecks ordered for lindsey. Home dose of 1000 mg. Metformin was restarted today. Levemir dose was changed from 16-18 units to accomodate TF. Also, Additional Aspart was added for TF. No BM has been charted for pt since 10/31.   Plan: home w/family and home care lindsey.    TF ended at 2258, and while unhooking the tubing, the pt projectile vomited most or all of the feeding up. MD notified, see his note.

## 2021-11-04 NOTE — PROVIDER NOTIFICATION
Metformin 1000 mg. Dose found on bedside table when giving evening dose. Assuming this is the am dose since it was restarted today, MD paged. Insulin orders have been adjusted based off BG and TF, so concerned about dropping his BG too low. Per MD, ok to give all insulin orders.

## 2021-11-04 NOTE — PLAN OF CARE
Problem: Adult Inpatient Plan of Care  Goal: Optimal Comfort and Wellbeing  Outcome: Improving     Problem: Fall Injury Risk  Goal: Absence of Fall and Fall-Related Injury  Intervention: Promote Injury-Free Environment  Recent Flowsheet Documentation  Taken 11/4/2021 0800 by Praveen Schmid RN  Safety Promotion/Fall Prevention:   activity supervised   nonskid shoes/slippers when out of bed    Pt is alert. He is aphasic but can respond to yes or no questions. He is RA with clear lungs sound. Sats are in the 90s. Pt is on tube feeding. Gets a total of 5 can of jevity daily. [facility] with insulin coverage. Will continue to monitor

## 2021-11-05 ENCOUNTER — PATIENT OUTREACH (OUTPATIENT)
Dept: CARE COORDINATION | Facility: CLINIC | Age: 71
End: 2021-11-05

## 2021-11-05 DIAGNOSIS — Z71.89 OTHER SPECIFIED COUNSELING: ICD-10-CM

## 2021-11-05 NOTE — PROGRESS NOTES
Clinic Care Coordination Contact  [facility]/Voicemail       Clinical Data: Care Coordinator Outreach  Outreach attempted x 1. Home number not in service and Mobile number voice mail not set up Care Coordinator will try to reach patient again in 1-2 business days.      Bettina Garcia  [phone]  Care

## 2021-11-05 NOTE — PROGRESS NOTES
Pt discharged at 1923 to home. Pt is stable. Writer reviewed AVS with pt and pt verbalized understanding. Pt's belongings were returned and medication given. He was picked up by his son. NA escorted pt out of the hospital.

## 2021-11-05 NOTE — PLAN OF CARE
Speech Language Therapy Discharge Summary    Reason for therapy discharge:    Discharged to home with home therapy.    Progress towards therapy goal(s). See goals on Care Plan in [facility] electronic health record for goal details.  Goals not met.  Barriers to achieving goals:   discharge from facility.    Therapy recommendation(s):    Continued therapy is recommended.  Rationale/Recommendations:  Ongoing dysphagia management for PO trials/advancements as appropriate, exercises, straegies.    At time of discharge, following recommendations:   1) Continue puree solids, mildly thick liquids via tsp or small cup sip - 1:1 assist, verify each swallow, allow extra time for pt to swallow two times per bite/sip.   2) OK for meds whole(small) and crushed(larger) with thin/regular ice cream tsps to assist in consistent swallowing of medications. HOLD PO if any increased coughing/difficulty.   3) Ok for free water protocol with strict guidelines: THIN water via tsp only, 1:1 nursing assist, only allow THIN water at least 30 minutes after other consistencies, only allow THIN water after thorough oral cares to reduce/eliminate oral bacteria   4) PEG for nutrition support per hospitalist/nutrition team

## 2021-11-05 NOTE — PLAN OF CARE
Occupational Therapy Discharge Summary    Reason for therapy discharge:    Discharged to home with home therapy.    Progress towards therapy goal(s). See goals on Care Plan in [facility] electronic health record for goal details.  Goals not met.  Barriers to achieving goals:   discharge from facility.    Therapy recommendation(s):    Continued therapy is recommended.  Rationale/Recommendations:  TCU was recommended, however pt/family declined and discharged home w/ HH services.      **Pt not seen by discharging therapist on this date, note written based on previous treating therapist's notes and recommendations

## 2021-11-05 NOTE — PLAN OF CARE
Physical Therapy Discharge Summary    Reason for therapy discharge:    Discharged to home with home therapy.    Progress towards therapy goal(s). See goals on Care Plan in [facility] electronic health record for goal details.  Goals not met.  Barriers to achieving goals:   discharge from facility.    Therapy recommendation(s):    Continued therapy is recommended.  Rationale/Recommendations:  TCU was recommended, however pt/family declined and discharged home w/ HH services.      **Pt not seen by discharging therapist on this date, note written based on previous treating therapist's notes and recommendations

## 2021-11-06 NOTE — PROGRESS NOTES
Clinic Care Coordination Contact  [facility]/Voicemail       Clinical Data: Care Coordinator Outreach  Outreach attempted x 2.  Unable to leave message on patient's voicemail with call back information.    Plan: Care Coordinator will do no further outreaches at this time.    ANIRUDH Rogers  [phone]  [facility]

## 2021-11-09 ENCOUNTER — VIRTUAL VISIT (OUTPATIENT)
Dept: UROLOGY | Facility: CLINIC | Age: 71
End: 2021-11-09
Payer: MEDICARE

## 2021-11-09 DIAGNOSIS — R31.0 GROSS HEMATURIA: ICD-10-CM

## 2021-11-09 DIAGNOSIS — R33.9 URINARY RETENTION: Primary | ICD-10-CM

## 2021-11-09 PROBLEM — E11.65 TYPE 2 DIABETES MELLITUS WITH HYPERGLYCEMIA, WITH LONG-TERM CURRENT USE OF INSULIN (H): Status: ACTIVE | Noted: 2020-08-06

## 2021-11-09 PROBLEM — Z79.4 TYPE 2 DIABETES MELLITUS WITH HYPERGLYCEMIA, WITH LONG-TERM CURRENT USE OF INSULIN (H): Status: ACTIVE | Noted: 2020-08-06

## 2021-11-09 PROCEDURE — 99203 OFFICE O/P NEW LOW 30 MIN: CPT | Mod: 95 | Performed by: NURSE PRACTITIONER

## 2021-11-09 RX ORDER — BLOOD-GLUCOSE METER
EACH MISCELLANEOUS
COMMUNITY
Start: 2019-11-29 | End: 2022-01-21

## 2021-11-09 RX ORDER — LOSARTAN POTASSIUM 100 MG/1
TABLET ORAL
COMMUNITY
Start: 2021-10-02 | End: 2022-01-21

## 2021-11-09 RX ORDER — PEN NEEDLE, DIABETIC 30 GX5/16"
1 NEEDLE, DISPOSABLE MISCELLANEOUS
COMMUNITY
Start: 2020-11-03 | End: 2023-04-21

## 2021-11-09 RX ORDER — TAMSULOSIN HYDROCHLORIDE 0.4 MG/1
CAPSULE ORAL
Qty: 30 CAPSULE | Refills: 3 | Status: SHIPPED | OUTPATIENT
Start: 2021-11-09 | End: 2021-12-13

## 2021-11-09 RX ORDER — HYDROCORTISONE 2.5 %
CREAM (GRAM) TOPICAL
COMMUNITY
Start: 2020-10-15 | End: 2022-01-21

## 2021-11-09 RX ORDER — SILDENAFIL 100 MG/1
100 TABLET, FILM COATED ORAL
COMMUNITY
Start: 2021-07-02 | End: 2022-01-21

## 2021-11-09 RX ORDER — LANCETS 30 GAUGE
EACH MISCELLANEOUS
COMMUNITY
Start: 2019-12-04 | End: 2023-04-21

## 2021-11-09 RX ORDER — TAMSULOSIN HYDROCHLORIDE 0.4 MG/1
CAPSULE ORAL
COMMUNITY
Start: 2021-01-19 | End: 2021-11-09

## 2021-11-09 RX ORDER — HYDROCHLOROTHIAZIDE 25 MG/1
TABLET ORAL
COMMUNITY
Start: 2021-10-02 | End: 2022-01-21

## 2021-11-09 RX ORDER — TAMSULOSIN HYDROCHLORIDE 0.4 MG/1
CAPSULE ORAL
Qty: 30 CAPSULE | Refills: 3 | Status: SHIPPED | OUTPATIENT
Start: 2021-11-09 | End: 2021-11-09 | Stop reason: ALTCHOICE

## 2021-11-09 ASSESSMENT — PAIN SCALES - GENERAL: PAINLEVEL: NO PAIN (0)

## 2021-11-09 NOTE — LETTER
11/9/2021       RE: Jimmy Otto  [address] [place] [address]     Dear Colleague,    Thank you for referring your patient, Jimmy Otto, to the [facility] [place] [facility]. Please see a copy of my visit note below.    Jimmy Otto is a 71 year old who is being evaluated via a billable video visit. Multiple attempts made to connect via [employer], but were unsuccesful. We were able to connect via [employer] for just a few seconds, where I was able to see the patient and his son, but due to audio issues, we switched to telephone. Therefore, the majority of our visit was via telephone.       How would you like to obtain your AVS? Mail a copy    Duration of telephone call: 15 minutes    Jimmy Otto complains of   Chief Complaint   Patient presents with     Consult     Hematuria/urinary retention       Assessment/Plan:  71 year old male with recent CVA with right-sided hemiplegia and aphasia, as well as urinary retention c/b an episode of gross hematuria, which has now resolved. The patient's son is hopeful that we will be able to get rid of his Felix catheter. However, given the significant impact from his CVA, I am concerned that this has impacted his bladder's function. He did have hesitancy prior to this stroke, so likely has underlying BPH, as well. His son states that he is not currently taking Flomax, despite it being on his med list today. Will re-prescribe this for the patient to take.   -Will set him up for a nurse visit to complete a TOV and to obtain a urine sample to send for UA, UC and urine cytology (orders placed in chart). If TOV passed, he should continue Flomax daily. Follow up TBD based on results of urine testing (possible need for hematuria workup). If TOV failed, patient should be scheduled for next-available urodynamics study.     Alivia Herzog, CNP  Department of Urology      Subjective:   71 year old male with a history of CVA in  September with right-sided hemiplegia, severe malnutrition, DM2, CKD3, and chronic urinary retention managed with Felix catheter who presents for consult regarding gross hematuria. This was visible over one month ago. The patient was subsequently hospitalized on 10/13/21 for urosepsis, with UC growing E.coli.     He is joined by his son today who provides all of his HPI information. I was able to see the patient on our video briefly before our connection issues began, so our visit is largely being done today via telephone call. His stone states that the patient was able to void prior to his stroke, but did have some issues with hesitancy. There has been no ongoing blood in his urine. Functionally, the patient is now completely dependent for cares. He is a two-person transfer with a gait belt. Prior to his stroke, he was able to ambulate with a cane. There are times that he will respond verbally to questions, while other times it may take him a long time to respond, and sometimes he does not respond at all. Word-finding difficulty, per his son.       Objective:     Exam:   Patient is a 71 year old male   General Appearance: Well groomed, hygenic  Respiratory: No cough, no respiratory distress or labored breathing  Musculoskeletal: Grossly normal with no gross deficits  Skin: No discoloration or apparent rashes  Neurologic: No tremors  Psychiatric: Alert and oriented  Further examination is deferred due to the nature of our visit.

## 2021-11-09 NOTE — PATIENT INSTRUCTIONS
UROLOGY CLINIC VISIT PATIENT INSTRUCTIONS    -Schedule next available nurse visit to obtain a urine sample and complete a voiding trial.     If you have any issues, questions or concerns in the meantime, do not hesitate to contact us at [phone] or via [employer].     It was a pleasure meeting with you today.  Thank you for allowing me and my team the privilege of caring for you today.  YOU are the reason we are here, and I truly hope we provided you with the excellent service you deserve.  Please let us know if there is anything else we can do for you so that we can be sure you are leaving completely satisfied with your care experience.    Alivia Herzog, CNP

## 2021-11-09 NOTE — PROGRESS NOTES
Jimmy Otto is a 71 year old who is being evaluated via a billable video visit. Multiple attempts made to connect via [employer], but were unsuccesful. We were able to connect via [employer] for just a few seconds, where I was able to see the patient and his son, but due to audio issues, we switched to telephone. Therefore, the majority of our visit was via telephone.       How would you like to obtain your AVS? Mail a copy    Duration of telephone call: 15 minutes    Jimmy Otto complains of   Chief Complaint   Patient presents with     Consult     Hematuria/urinary retention       Assessment/Plan:  71 year old male with recent CVA with right-sided hemiplegia and aphasia, as well as urinary retention c/b an episode of gross hematuria, which has now resolved. The patient's son is hopeful that we will be able to get rid of his Felix catheter. However, given the significant impact from his CVA, I am concerned that this has impacted his bladder's function. He did have hesitancy prior to this stroke, so likely has underlying BPH, as well. His son states that he is not currently taking Flomax, despite it being on his med list today. Will re-prescribe this for the patient to take.   -Will set him up for a nurse visit to complete a TOV and to obtain a urine sample to send for UA, UC and urine cytology (orders placed in chart). If TOV passed, he should continue Flomax daily. Follow up TBD based on results of urine testing (possible need for hematuria workup). If TOV failed, patient should be scheduled for next-available urodynamics study.     Alivia Herzog, CNP  Department of Urology      Subjective:   71 year old male with a history of CVA in September with right-sided hemiplegia, severe malnutrition, DM2, CKD3, and chronic urinary retention managed with Felix catheter who presents for consult regarding gross hematuria. This was visible over one month ago. The patient was subsequently hospitalized on 10/13/21 for urosepsis, with UC  growing E.coli.     He is joined by his son today who provides all of his HPI information. I was able to see the patient on our video briefly before our connection issues began, so our visit is largely being done today via telephone call. His stone states that the patient was able to void prior to his stroke, but did have some issues with hesitancy. There has been no ongoing blood in his urine. Functionally, the patient is now completely dependent for cares. He is a two-person transfer with a gait belt. Prior to his stroke, he was able to ambulate with a cane. There are times that he will respond verbally to questions, while other times it may take him a long time to respond, and sometimes he does not respond at all. Word-finding difficulty, per his son.       Objective:     Exam:   Patient is a 71 year old male   General Appearance: Well groomed, hygenic  Respiratory: No cough, no respiratory distress or labored breathing  Musculoskeletal: Grossly normal with no gross deficits  Skin: No discoloration or apparent rashes  Neurologic: No tremors  Psychiatric: Alert and oriented  Further examination is deferred due to the nature of our visit.

## 2021-11-10 ENCOUNTER — HOME INFUSION (PRE-WILLOW HOME INFUSION) (OUTPATIENT)
Dept: PHARMACY | Facility: CLINIC | Age: 71
End: 2021-11-10
Payer: MEDICARE

## 2021-11-10 ENCOUNTER — TELEPHONE (OUTPATIENT)
Dept: UROLOGY | Facility: CLINIC | Age: 71
End: 2021-11-10
Payer: MEDICARE

## 2021-11-11 NOTE — PROGRESS NOTES
This is a recent snapshot of the patient's [facility] medical record.  For current drug dose and complete information and questions, call [phone] or In Basket pool, [facility] ([id number])  CSN Number:  [id number]

## 2021-11-16 NOTE — TELEPHONE ENCOUNTER
FUTURE VISIT INFORMATION      FUTURE VISIT INFORMATION:    Date: 12/10/2021    Time: 130pm    Location: [facility] [place]  REFERRAL INFORMATION:    Referring provider:  Dr. De Guzman     Referring providers clinic:  [facility] ED     Reason for visit/diagnosis  CVA     RECORDS REQUESTED FROM:       Clinic name Comments Records Status Imaging Status   Internal ED Visit/Admission-9/14/0021    CT Head-10/13/2021, 9/19/2021, 9/14/2021    MR Brain-9/14/2021    CTA Head/Neck-9/14/2021 [facility] CT Head-2/22/2020 Care Everywhere Requested to PACS                       11/17/2021-Request for images faxed to [facility]MR @ 649am    12/7/2021-[facility] images now in PACS-MR @ 715am

## 2021-11-18 ENCOUNTER — TELEPHONE (OUTPATIENT)
Dept: UROLOGY | Facility: CLINIC | Age: 71
End: 2021-11-18
Payer: MEDICARE

## 2021-11-18 NOTE — TELEPHONE ENCOUNTER
[facility] Call Center    Phone Message    May a detailed message be left on voicemail: yes     Reason for Call: Other: . maribel, from home care is calling, wondering if she needs to change pts cath in home, because she doesn't have an order, or if pt needs to come to the clinic for TOV appt, please call Maribel, thank you    Action Taken: Message routed to:  [facility] uro    Travel Screening: Not Applicable

## 2021-11-22 NOTE — TELEPHONE ENCOUNTER
Called patient nurse within 5 minutes of the call and had to leave a message again Virginie Reyes LPN Staff Nurse

## 2021-11-22 NOTE — TELEPHONE ENCOUNTER
[facility] Call Center    Phone Message    May a detailed message be left on voicemail: yes     Reason for Call: Other: Alba calling Virginie back. Please call     Action Taken: Message routed to:  [facility] urology    Travel Screening: Not Applicable

## 2021-11-22 NOTE — TELEPHONE ENCOUNTER
[facility] Call Center    Phone Message    May a detailed message be left on voicemail: yes     Reason for Call: Other: Alba calling to follow up on her request for catheter change orders.  Please call her back to discuss     Action Taken: Message routed to:  [facility]    Travel Screening: Not Applicable

## 2021-11-22 NOTE — TELEPHONE ENCOUNTER
Called seth and she stated to call daughter at [phone] for this appointment to have ua and tov and if he does not pass the tov please send him with orders for his home care nurse to [facility] monthly.  They want orders to come with patient when he lives this appointment Virginie Reyes LPN Staff Nurse

## 2021-11-24 ENCOUNTER — LAB REQUISITION (OUTPATIENT)
Dept: LAB | Facility: CLINIC | Age: 71
End: 2021-11-24
Payer: COMMERCIAL

## 2021-11-24 DIAGNOSIS — A04.71 ENTEROCOLITIS DUE TO CLOSTRIDIUM DIFFICILE, RECURRENT: ICD-10-CM

## 2021-11-24 DIAGNOSIS — R19.7 DIARRHEA, UNSPECIFIED: ICD-10-CM

## 2021-11-24 NOTE — TELEPHONE ENCOUNTER
[facility] Call Center    Phone Message    May a detailed message be left on voicemail: yes     Reason for Call: Other: Clemente called in regards to the TOV appointment and home nurse orders.  Advised the TOV appointment is an in office appointment.  She said her father is not mobile due to a stroke.  She is asking if the TOV can be done in the home by a home health nurse.  She also has some questions/concerns about the Flomax.   Flomax was prescribed to pt, it is a capsule that cannot be crushed, per directions on the bottle.  The capsule is too large for pt to swallow.  Can capsule be opened and the inside powder put in his feeding tube?  Or is Flomax available in liquid form?  If neither of the option can be done, there an alternative in a liquid form, or a tablet that could be crushed?  Please call Clemente to discuss the above concerns on the appointment and medication.    Action Taken: Message routed to:  [facility] uro    Travel Screening: Not Applicable

## 2021-11-29 NOTE — TELEPHONE ENCOUNTER
Does home care have the equipment necessary to do a TOV? I am concerned that his bladder function was impacted by his CVA, so even if he passes his initial TOV, he will need to be monitored closely for recurrent retention. After discussing with pharmacy, there are no options for liquid or crushable alpha blocker medications (like Flomax) due to the way these meds are absorbed.

## 2021-11-30 PROCEDURE — 87493 C DIFF AMPLIFIED PROBE: CPT | Mod: ORL | Performed by: INTERNAL MEDICINE

## 2021-12-02 ENCOUNTER — HOME INFUSION (PRE-WILLOW HOME INFUSION) (OUTPATIENT)
Dept: PHARMACY | Facility: CLINIC | Age: 71
End: 2021-12-02
Payer: MEDICARE

## 2021-12-02 ENCOUNTER — LAB REQUISITION (OUTPATIENT)
Dept: LAB | Facility: CLINIC | Age: 71
End: 2021-12-02
Payer: MEDICARE

## 2021-12-02 DIAGNOSIS — A04.72 ENTEROCOLITIS DUE TO CLOSTRIDIUM DIFFICILE, NOT SPECIFIED AS RECURRENT: ICD-10-CM

## 2021-12-02 LAB — C DIFF TOX B STL QL: NEGATIVE

## 2021-12-07 ENCOUNTER — OFFICE VISIT (OUTPATIENT)
Dept: UROLOGY | Facility: CLINIC | Age: 71
End: 2021-12-07
Payer: MEDICARE

## 2021-12-07 DIAGNOSIS — R33.9 URINARY RETENTION: Primary | ICD-10-CM

## 2021-12-07 LAB
ALBUMIN UR-MCNC: NEGATIVE MG/DL
APPEARANCE UR: CLEAR
BILIRUB UR QL STRIP: NEGATIVE
COLOR UR AUTO: COLORLESS
GLUCOSE UR STRIP-MCNC: NEGATIVE MG/DL
HGB UR QL STRIP: ABNORMAL
KETONES UR STRIP-MCNC: NEGATIVE MG/DL
LEUKOCYTE ESTERASE UR QL STRIP: NEGATIVE
NITRATE UR QL: NEGATIVE
PH UR STRIP: 5 [PH] (ref 5–7)
RBC URINE: 4 /HPF
RENAL TUB EPI: 2 /HPF
SP GR UR STRIP: 1 (ref 1–1.03)
UROBILINOGEN UR STRIP-MCNC: NORMAL MG/DL
WBC URINE: 3 /HPF

## 2021-12-07 PROCEDURE — 51702 INSERT TEMP BLADDER CATH: CPT

## 2021-12-07 PROCEDURE — 81001 URINALYSIS AUTO W/SCOPE: CPT | Performed by: PATHOLOGY

## 2021-12-07 PROCEDURE — 87086 URINE CULTURE/COLONY COUNT: CPT | Performed by: NURSE PRACTITIONER

## 2021-12-07 NOTE — PATIENT INSTRUCTIONS
Please follow up for the next available urodynamics study.     It was a pleasure meeting with you today.  Thank you for allowing me and my team the privilege of caring for you today.  YOU are the reason we are here, and I truly hope we provided you with the excellent service you deserve.  Please let us know if there is anything else we can do for you so that we can be sure you are leaving completely satisfied with your care experience.

## 2021-12-07 NOTE — PROGRESS NOTES
Chief Complaint   Patient presents with     Allied Health Visit       Patient Active Problem List   Diagnosis     Cerebral artery occlusion with cerebral infarction (H)     Stroke (H)     Right sided weakness     CVA (cerebral vascular accident) (H)     Adult failure to thrive     Dehydration     Oral thrush     Acute UTI     Nausea and vomiting, intractability of vomiting not specified, unspecified vomiting type     Aphasia as late effect of cerebrovascular accident     Type 2 diabetes mellitus with hyperglycemia, with long-term current use of insulin (H)     Dysarthria due to cerebrovascular accident     Essential hypertension     Hemiplegia following CVA (cerebrovascular accident) (H)     Hyperlipidemia     Imbalance     Late effects of CVA (cerebrovascular accident)     Low back pain     Microalbuminuria     Spasticity     Torn rotator cuff       Allergies   Allergen Reactions     Nka [No Known Allergies]        Current Outpatient Medications   Medication Sig Dispense Refill     acetaminophen (TYLENOL) 325 MG tablet Take 1-3 tablets (325-975 mg) by mouth every 6 hours as needed for mild pain or fever (> 101 F)       amantadine (SYMMETREL) 100 MG capsule Take 1 capsule (100 mg) by mouth daily       amLODIPine (NORVASC) 5 MG tablet Take 1 tablet (5 mg) by mouth daily 30 tablet 0     atenolol (TENORMIN) 25 MG tablet Take 1 tablet (25 mg) by mouth 2 times daily       atorvastatin (LIPITOR) 80 MG tablet Take 80 mg by mouth daily       blood glucose (NO BRAND SPECIFIED) test strip Use 1 strip to test 3 times daily (before meals and bedtime). E11.29, R80.9, Z79.4       blood glucose monitoring (ONE TOUCH ULTRA 2) meter device kit Use as directed. Pharmacy dispense brand based on insurance. E11,29, R80.9, Z79.4       clopidogrel (PLAVIX) 75 MG tablet Take 75 mg by mouth daily       Continuous Blood Gluc Sensor (FREESTYLE TITI 2 SENSOR) [facility] Use with Titi 2 [place]       doxazosin (CARDURA) 2 MG tablet Take 1 tablet (2  "mg) by mouth daily 30 tablet 0     FLUoxetine (PROZAC) 20 MG/5ML solution 5 mLs (20 mg) by Oral or Feeding Tube route daily 150 mL 0     hydrochlorothiazide (HYDRODIURIL) 25 MG tablet        hydrocortisone 2.5 % cream        insulin aspart (NOVOLOG PEN) 100 UNIT/ML pen Take three times daily with Jevity feedings, Take 5 units with one can of Jevity and 10 units with 2 cans of Jevity. 6 mL 0     insulin detemir (LEVEMIR PEN) 100 UNIT/ML pen Take 10 units every morning and 18 units every evening. 6 mL 0     Insulin Pen Needle (PEN NEEDLES 5/16\") 31G X 8 MM MISC Inject 1 each Subcutaneous       Lancets MISC Use as instructed 3 times daily (before meals and bedtime). E11.29, R80.9, Z79.4       losartan (COZAAR) 100 MG tablet        metFORMIN (GLUCOPHAGE) 1000 MG tablet Take 1 tablet (1,000 mg) by mouth 2 times daily (with meals)       Nutritional Supplements (JEVITY 1.5 ARELTH) LIQD Bolus Jevity 1.5 (5 cans in total) TID via syringe or gravity bag (7am - 1 can, 12pm - 2 cans, and 5pm - 2 cans). Free water flush of 60 mL before and after each feeding. 01331 mL 0     ondansetron (ZOFRAN-ODT) 4 MG ODT tab Take 1 tablet (4 mg) by mouth every 6 hours as needed for nausea or vomiting 15 tablet 0     polyethylene glycol (MIRALAX) 17 g packet Take 17 g by mouth daily as needed for constipation       sennosides (SENOKOT) 8.6 MG tablet Take 1 tablet by mouth At Bedtime       sildenafil (VIAGRA) 100 MG tablet Take 100 mg by mouth       tamsulosin (FLOMAX) 0.4 MG capsule TAKE 1 CAPSULE BY MOUTH EVERY DAY AT NIGHT 30 capsule 3       Social History     Tobacco Use     Smoking status: Never Smoker     Smokeless tobacco: Never Used   Substance Use Topics     Alcohol use: Yes     Comment: occassional     Drug use: No       Jimmy Otto comes into clinic today at the request of Alivia Herzog for TOV / catheter removal.    Patient diagnosis: urinary retention    This service provided today was under the direct supervision of Alivia Herzog, " who was available if needed.    Jimmy Otto presented today for a trial of void.  Approximately 300 mL of normal saline instilled into bladder via catheter.  Patient stated he had urge to urinate and catheter was removed without difficulty.  Patient was given a cylinder to measure urine output.  Patient voided approximately 25 mL of clear urine.  PVR >450 mL.  Catheter reinserted     Insertion:  16 Fr Coude tipped latex ordoñez catheter inserted into urethral meatus in the usual sterile fashion without difficulty.  Received > 200 ml clear positive urine return.   Balloon filled with 10 mL sterile H2O after positive urine return.  Catheter secured in place with leg strap: Yes.         Cipro 500 given per protocol: No  Pt unable to take oral medication    Patient did tolerate procedure well.      Josh Hughes EMT-P  12/7/2021  2:27 PM

## 2021-12-08 LAB
PATH REPORT.COMMENTS IMP SPEC: NORMAL
PATH REPORT.FINAL DX SPEC: NORMAL
PATH REPORT.GROSS SPEC: NORMAL
PATH REPORT.RELEVANT HX SPEC: NORMAL

## 2021-12-08 PROCEDURE — 88112 CYTOPATH CELL ENHANCE TECH: CPT | Mod: 26 | Performed by: PATHOLOGY

## 2021-12-08 PROCEDURE — 88112 CYTOPATH CELL ENHANCE TECH: CPT | Mod: TC | Performed by: NURSE PRACTITIONER

## 2021-12-09 LAB — BACTERIA UR CULT: ABNORMAL

## 2021-12-10 ENCOUNTER — TELEPHONE (OUTPATIENT)
Dept: UROLOGY | Facility: CLINIC | Age: 71
End: 2021-12-10

## 2021-12-10 ENCOUNTER — PRE VISIT (OUTPATIENT)
Dept: NEUROLOGY | Facility: CLINIC | Age: 71
End: 2021-12-10

## 2021-12-10 DIAGNOSIS — N40.1 BENIGN PROSTATIC HYPERPLASIA WITH URINARY RETENTION: Primary | ICD-10-CM

## 2021-12-10 DIAGNOSIS — R33.8 BENIGN PROSTATIC HYPERPLASIA WITH URINARY RETENTION: Primary | ICD-10-CM

## 2021-12-10 NOTE — TELEPHONE ENCOUNTER
Spoke to home care nurse Alba from [facility].  She requests to have verbal orders for catheters as they will be able to do that for pt at home.    She also requests to discontinue flomax.  Pt has difficulty swallowing it and it can't be crushed or opened up to administer through g-tube.    Nurse also requests refills of doxazosin. Noted that medication was ordered by discharging provider on 11/3/21.  Last seen by Alivia Herzog on 11/9/21.      Called nurse back and left message with verbal orders for catheter.  To call back clinic at [phone].     Mona Crowe, RN

## 2021-12-10 NOTE — TELEPHONE ENCOUNTER
RECORDS RECEIVED FROM: Internal   REASON FOR VISIT: FU stroke    Date of Appt: 12/22/21   NOTES (FOR ALL VISITS) STATUS DETAILS   OFFICE NOTE from referring provider Internal SEE INPATIENT NOTES   OFFICE NOTE from other specialist N/A    DISCHARGE SUMMARY from hospital Internal/CE [facility]:  10/13/21-11/4/21    [facility]:  9/17/21-10/6/21    [facility]:  9/14/21-9/17/21    [facility]:  2/22/20-2/23/20   DISCHARGE REPORT from the ER N/A    OPERATIVE REPORT N/A    MEDICATION LIST Internal    IMAGING  (FOR ALL VISITS)     EMG N/A    EEG N/A    LUMBAR PUNCTURE N/A    ETHAN SCAN N/A    ULTRASOUND (CAROTID BILAT) *VASCULAR* N/A    MRI (HEAD, NECK, SPINE) Internal [facility]:  MRI Brain 9/14/21   CT (HEAD, NECK, SPINE) Internal/Received [facility]:  CT Head 10/13/21  CT Head 9/19/21    [facility]:  CTA Head Neck 9/14/21  CT Head 9/14/21    [facility]:  CT Head 2/22/20

## 2021-12-10 NOTE — TELEPHONE ENCOUNTER
Ok to refill doxazosin and discontinue Flomax, and ok for home care to carry out catheter exchanges at home.

## 2021-12-10 NOTE — TELEPHONE ENCOUNTER
[facility] Call Center    Phone Message    May a detailed message be left on voicemail: yes     Reason for Call: Other: Alba Home Care Nurse calling/ Needs supplies and Medication Refilled.       Comments:  Call Alba @: [phone]    Action Taken: Message routed to:  [facility] Urology    Travel Screening: Not Applicable

## 2021-12-13 ENCOUNTER — TELEPHONE (OUTPATIENT)
Dept: UROLOGY | Facility: CLINIC | Age: 71
End: 2021-12-13
Payer: MEDICARE

## 2021-12-13 RX ORDER — DOXAZOSIN 2 MG/1
2 TABLET ORAL DAILY
Qty: 90 TABLET | Refills: 3 | Status: SHIPPED | OUTPATIENT
Start: 2021-12-13 | End: 2022-11-25

## 2021-12-13 NOTE — TELEPHONE ENCOUNTER
Tried calling patient regarding the UAUC results .  Per Alivia Herzog need to identify symptoms but results posted with ordoñez catheter in place no treatment Virginie Reyes LPN Staff Nurse

## 2021-12-13 NOTE — RESULT ENCOUNTER NOTE
I called patient several times Number does not go thru.  Called another number on chart and that as well does not go thru Virginie Reyes LPN Staff Nurse

## 2021-12-13 NOTE — RESULT ENCOUNTER NOTE
Tried calling patient several times her phone number will not go thru Virginie Reyes LPN Staff Nurse

## 2021-12-13 NOTE — TELEPHONE ENCOUNTER
Spoke to home care nurse Alba.  Informed her of Rx doxazosin refill sent to [facility] and discontinuation of Rx Flomax.  Reconfirmed that she received verbal orders to change catheter around 1/7/22.  Nurse verbalized understanding.  No other questions noted.

## 2021-12-16 ENCOUNTER — LAB REQUISITION (OUTPATIENT)
Dept: LAB | Facility: CLINIC | Age: 71
End: 2021-12-16
Payer: MEDICARE

## 2021-12-16 DIAGNOSIS — N39.0 URINARY TRACT INFECTION, SITE NOT SPECIFIED: ICD-10-CM

## 2021-12-16 LAB
ALBUMIN SERPL-MCNC: 2.9 G/DL (ref 3.4–5)
ALBUMIN UR-MCNC: 100 MG/DL
ALP SERPL-CCNC: 123 U/L (ref 40–150)
ALT SERPL W P-5'-P-CCNC: 28 U/L (ref 0–70)
AMORPH CRY #/AREA URNS HPF: ABNORMAL /HPF
ANION GAP SERPL CALCULATED.3IONS-SCNC: 12 MMOL/L (ref 3–14)
APPEARANCE UR: ABNORMAL
AST SERPL W P-5'-P-CCNC: 15 U/L (ref 0–45)
BILIRUB SERPL-MCNC: 0.3 MG/DL (ref 0.2–1.3)
BILIRUB UR QL STRIP: NEGATIVE
BUN SERPL-MCNC: 32 MG/DL (ref 7–30)
CALCIUM SERPL-MCNC: 9.7 MG/DL (ref 8.5–10.1)
CHLORIDE BLD-SCNC: 100 MMOL/L (ref 94–109)
CO2 SERPL-SCNC: 28 MMOL/L (ref 20–32)
COLOR UR AUTO: YELLOW
CREAT SERPL-MCNC: 1.02 MG/DL (ref 0.66–1.25)
ERYTHROCYTE [DISTWIDTH] IN BLOOD BY AUTOMATED COUNT: 14.2 % (ref 10–15)
GFR SERPL CREATININE-BSD FRML MDRD: 74 ML/MIN/1.73M2
GLUCOSE BLD-MCNC: 150 MG/DL (ref 70–99)
GLUCOSE UR STRIP-MCNC: NEGATIVE MG/DL
HBA1C MFR BLD: 7.4 % (ref 0–5.6)
HCT VFR BLD AUTO: 40.3 % (ref 40–53)
HGB BLD-MCNC: 12.6 G/DL (ref 13.3–17.7)
HGB UR QL STRIP: ABNORMAL
KETONES UR STRIP-MCNC: NEGATIVE MG/DL
LEUKOCYTE ESTERASE UR QL STRIP: ABNORMAL
MCH RBC QN AUTO: 27.2 PG (ref 26.5–33)
MCHC RBC AUTO-ENTMCNC: 31.3 G/DL (ref 31.5–36.5)
MCV RBC AUTO: 87 FL (ref 78–100)
MUCOUS THREADS #/AREA URNS LPF: PRESENT /LPF
NITRATE UR QL: NEGATIVE
PH UR STRIP: 7.5 [PH] (ref 5–7)
PLATELET # BLD AUTO: 214 10E3/UL (ref 150–450)
POTASSIUM BLD-SCNC: 4.3 MMOL/L (ref 3.4–5.3)
PREALB SERPL IA-MCNC: 27 MG/DL (ref 15–45)
PROT SERPL-MCNC: 7.9 G/DL (ref 6.8–8.8)
RBC # BLD AUTO: 4.64 10E6/UL (ref 4.4–5.9)
RBC URINE: 12 /HPF
SODIUM SERPL-SCNC: 140 MMOL/L (ref 133–144)
SP GR UR STRIP: 1.02 (ref 1–1.03)
SQUAMOUS EPITHELIAL: 1 /HPF
UROBILINOGEN UR STRIP-MCNC: NORMAL MG/DL
WBC # BLD AUTO: 7.1 10E3/UL (ref 4–11)
WBC CLUMPS #/AREA URNS HPF: PRESENT /HPF
WBC URINE: >182 /HPF

## 2021-12-16 PROCEDURE — 87088 URINE BACTERIA CULTURE: CPT | Mod: ORL | Performed by: INTERNAL MEDICINE

## 2021-12-16 PROCEDURE — 83036 HEMOGLOBIN GLYCOSYLATED A1C: CPT | Mod: ORL | Performed by: INTERNAL MEDICINE

## 2021-12-16 PROCEDURE — 84134 ASSAY OF PREALBUMIN: CPT | Mod: ORL | Performed by: INTERNAL MEDICINE

## 2021-12-16 PROCEDURE — 36415 COLL VENOUS BLD VENIPUNCTURE: CPT | Mod: ORL | Performed by: INTERNAL MEDICINE

## 2021-12-16 PROCEDURE — 85027 COMPLETE CBC AUTOMATED: CPT | Mod: ORL | Performed by: INTERNAL MEDICINE

## 2021-12-16 PROCEDURE — 80053 COMPREHEN METABOLIC PANEL: CPT | Mod: ORL | Performed by: INTERNAL MEDICINE

## 2021-12-16 PROCEDURE — 81001 URINALYSIS AUTO W/SCOPE: CPT | Mod: ORL | Performed by: INTERNAL MEDICINE

## 2021-12-19 LAB — BACTERIA UR CULT: ABNORMAL

## 2021-12-22 ENCOUNTER — PRE VISIT (OUTPATIENT)
Dept: NEUROLOGY | Facility: CLINIC | Age: 71
End: 2021-12-22

## 2021-12-22 ENCOUNTER — VIRTUAL VISIT (OUTPATIENT)
Dept: NEUROLOGY | Facility: CLINIC | Age: 71
End: 2021-12-22
Payer: MEDICARE

## 2021-12-22 DIAGNOSIS — I63.9 CEREBROVASCULAR ACCIDENT (CVA), UNSPECIFIED MECHANISM (H): ICD-10-CM

## 2021-12-22 DIAGNOSIS — I10 BENIGN ESSENTIAL HYPERTENSION: ICD-10-CM

## 2021-12-22 PROCEDURE — 99204 OFFICE O/P NEW MOD 45 MIN: CPT | Mod: 95 | Performed by: PSYCHIATRY & NEUROLOGY

## 2021-12-22 RX ORDER — AMLODIPINE BESYLATE 5 MG/1
10 TABLET ORAL DAILY
Qty: 30 TABLET | Refills: 0
Start: 2021-12-22 | End: 2022-01-27

## 2021-12-22 NOTE — PROGRESS NOTES
Jimmy is a 71 year old who is being evaluated via a billable video visit.      How would you like to obtain your AVS? MyChart  If the video visit is dropped, the invitation should be resent by: Text to cell phone: [phone]  Will anyone else be joining your video visit? No        48 minutes spent on the date of the encounter doing chart review, review of test results, interpretation of tests, patient visit and documentation          Return in about 6 months (around 6/22/2022), or if symptoms worsen or fail to improve, for Follow up.    Janette Juares MD  [facility] NEUROLOGY [facility]      Video-Visit Details    Type of service:  Video Visit    Video Start Time: 1056 12/22/21    Video End Time:1118 12/22/21    Originating Location (pt. Location): Home    Distant Location (provider location):  [facility]     Platform used for Video Visit: [email or web address]    __________________________________________________________      [facility]    at [facility]  __________________________________________________________    Chief Complaint: Patient presents with:  Consult      History of Present Illness: Jimmy Otto is a 71 year old male presenting for follow-up.    71 year old male with PMH of T2DM, HLD, HTN, ANDRE, hx of stroke with residual right sided deficits, who presented to the emergency department for evaluation of AMS and weakness. Found to have a recent right thalamic infarct on MRI. There is a question regarding medication compliance. Per daughter, the patient may self adjust doses at home.    Since his last stroke he has had significant decline in his functional abilities. He needs assistance with ambulation and requires a wheelchair. He had significant dysphagia and has G tube for feeding . He gets help with dressing and bathing. He is getting home health care nurse for help. He is compliant with his  medications without any side effects. Daughter is monitoring his blood pressure and today it was 161/96 and on an average it has been 143/81. He had a UTI in October which slowed down his recovery as well.      Stroke Evaluation Summarized  MRI/Head CT MRI brain demonstrates acute/subacute infarct involving the anteromedial right thalamus    Intracranial Vasculature CTA head negative for proximal LVO or flow limiting stenosis    Cervical Vasculature CTA neck unrevealing       Echocardiogram Borderline concentric LVH, normal LA   EKG/Telemetry SR   Other Testing 1/2 blood cultures with GPC      LDL  9/15/2021: 92 mg/dL   A1C  9/15/2021: 11.5 %   Troponin 9/14/2021: <0.015 ug/L           Impression:   Problem List Items Addressed This Visit        Nervous and Auditory    CVA (cerebral vascular accident) (H)      Other Visit Diagnoses     Benign essential hypertension        Relevant Medications    amLODIPine (NORVASC) 5 MG tablet        71 year old male with hx of strokes in the past. Most recent in Sept of 2021. Stroke located in R thalamus. On review of images it appears there is an older stroke in identical location on opposite thalamus as well and an older MRI showing L corona radiata stroke as well with encephalomalacia. Given constellation of these imaging findings it is possible that the stroke etiology is related to small vessel disease and his current state is due to accumulation of injury from most recent and prior strokes.     Plan:   - Continue plavix 75 mg daily  - Average BP is slightly higher than target BP (<130/80). We increased the dose of amlodipine to 10 mg daily. I have asked them to monitor his BP frequently (couple of times a day) after starting new medication. Patients son will be visiting him and requested that we start this change on Sunday (12/26/2021) which is reasonable.   - Send monthly BP log via [employer]  - Continue lipitor 80 mg daily  - We may consider repeating MRI brain in few months  "  - Follow up in 6 months    Stroke Education provided.  He will call us with any questions.  For any acute neurologic deficits he was advised to  go directly to the hospital rather than call the clinic.    Janette Juares MD  Neurology  12/22/2021 11:31 AM  To page me or covering stroke neurology team member, click here: AMCOM  Choose \"On Call\" tab at top, then search dropdown box for \"Neurology Adult\" & press Enter, look for Neuro ICU/Stroke    ___________________________________________________________________    Current Medications  Current Outpatient Medications   Medication Sig     amLODIPine (NORVASC) 5 MG tablet Take 2 tablets (10 mg) by mouth daily     acetaminophen (TYLENOL) 325 MG tablet Take 1-3 tablets (325-975 mg) by mouth every 6 hours as needed for mild pain or fever (> 101 F)     amantadine (SYMMETREL) 100 MG capsule Take 1 capsule (100 mg) by mouth daily     atenolol (TENORMIN) 25 MG tablet Take 1 tablet (25 mg) by mouth 2 times daily     atorvastatin (LIPITOR) 80 MG tablet Take 80 mg by mouth daily     blood glucose (NO BRAND SPECIFIED) test strip Use 1 strip to test 3 times daily (before meals and bedtime). E11.29, R80.9, Z79.4     blood glucose monitoring (ONE TOUCH ULTRA 2) meter device kit Use as directed. Pharmacy dispense brand based on insurance. E11,29, R80.9, Z79.4     clopidogrel (PLAVIX) 75 MG tablet Take 75 mg by mouth daily     Continuous Blood Gluc Sensor (FREESTYLE TITI 2 SENSOR) [facility] Use with Titi 2 [place]     doxazosin (CARDURA) 2 MG tablet Take 1 tablet (2 mg) by mouth daily     FLUoxetine (PROZAC) 20 MG/5ML solution 5 mLs (20 mg) by Oral or Feeding Tube route daily     hydrochlorothiazide (HYDRODIURIL) 25 MG tablet      hydrocortisone 2.5 % cream      insulin aspart (NOVOLOG PEN) 100 UNIT/ML pen Take three times daily with Jevity feedings, Take 5 units with one can of Jevity and 10 units with 2 cans of Jevity.     insulin detemir (LEVEMIR PEN) 100 UNIT/ML pen Take 10 units " "every morning and 18 units every evening.     Insulin Pen Needle (PEN NEEDLES 5/16\") 31G X 8 MM MISC Inject 1 each Subcutaneous     Lancets MISC Use as instructed 3 times daily (before meals and bedtime). E11.29, R80.9, Z79.4     losartan (COZAAR) 100 MG tablet      metFORMIN (GLUCOPHAGE) 1000 MG tablet Take 1 tablet (1,000 mg) by mouth 2 times daily (with meals)     Nutritional Supplements (JEVITY 1.5 ARLETH) LIQD Bolus Jevity 1.5 (5 cans in total) TID via syringe or gravity bag (7am - 1 can, 12pm - 2 cans, and 5pm - 2 cans). Free water flush of 60 mL before and after each feeding.     ondansetron (ZOFRAN-ODT) 4 MG ODT tab Take 1 tablet (4 mg) by mouth every 6 hours as needed for nausea or vomiting     polyethylene glycol (MIRALAX) 17 g packet Take 17 g by mouth daily as needed for constipation     sennosides (SENOKOT) 8.6 MG tablet Take 1 tablet by mouth At Bedtime     sildenafil (VIAGRA) 100 MG tablet Take 100 mg by mouth     No current facility-administered medications for this visit.       Past Medical History  Past Medical History:   Diagnosis Date     Diabetes mellitus (H)      Hypercholesteremia      Hypertension      Unspecified cerebral artery occlusion with cerebral infarction     TIA 8/11       Social History  Social History     Tobacco Use     Smoking status: Never Smoker     Smokeless tobacco: Never Used   Substance Use Topics     Alcohol use: Yes     Comment: occassional     Drug use: No       Family History  No family history on file.    ROS: 10 point relevant ROS neg other than the symptoms noted above in the HPI.    Physical Exam    There were no vitals taken for this visit.    General:  no acute distress  HEENT:  normocephalic/atraumatic  Pulmonary:  no respiratory distress    Neurologic  Alert and follows simple commands. Unable to speak. He was sitting in wheelchair. No speech output during todays visit.   Neuroimaging: as per HPI.  Labs:    Recent Labs   Lab Test 11/01/21  0735 09/16/21  0957 " 09/14/21  2145   INR 0.96 1.03 0.94        Recent Labs   Lab Test 09/15/21  0521   CHOL 172   HDL 32*   LDL 92   TRIG 238*       Recent Labs   Lab Test 12/16/21  1450 09/15/21  0521   A1C 7.4* 11.5*       No lab results found.

## 2021-12-22 NOTE — LETTER
12/22/2021       RE: Jimmy Otto  [address] [place] [address]     Dear Colleague,    Thank you for referring your patient, Jimmy Otto, to the [facility] [place] at [facility]. Please see a copy of my visit note below.    Return in about 6 months (around 6/22/2022), or if symptoms worsen or fail to improve, for Follow up.    Janette Juares MD  [facility] [place]  __________________________________________________________      [facility]    at [facility]  __________________________________________________________    Chief Complaint: Patient presents with:  Consult      History of Present Illness: Jimmy Otto is a 71 year old male presenting for follow-up.    71 year old male with PMH of T2DM, HLD, HTN, ANDRE, hx of stroke with residual right sided deficits, who presented to the emergency department for evaluation of AMS and weakness. Found to have a recent right thalamic infarct on MRI. There is a question regarding medication compliance. Per daughter, the patient may self adjust doses at home.    Since his last stroke he has had significant decline in his functional abilities. He needs assistance with ambulation and requires a wheelchair. He had significant dysphagia and has G tube for feeding . He gets help with dressing and bathing. He is getting home health care nurse for help. He is compliant with his medications without any side effects. Daughter is monitoring his blood pressure and today it was 161/96 and on an average it has been 143/81. He had a UTI in October which slowed down his recovery as well.      Stroke Evaluation Summarized  MRI/Head CT MRI brain demonstrates acute/subacute infarct involving the anteromedial right thalamus    Intracranial Vasculature CTA head negative for proximal LVO or flow limiting stenosis    Cervical  "Vasculature CTA neck unrevealing       Echocardiogram Borderline concentric LVH, normal LA   EKG/Telemetry SR   Other Testing 1/2 blood cultures with GPC      LDL  9/15/2021: 92 mg/dL   A1C  9/15/2021: 11.5 %   Troponin 9/14/2021: <0.015 ug/L           Impression:   Problem List Items Addressed This Visit        Nervous and Auditory    CVA (cerebral vascular accident) (H)      Other Visit Diagnoses     Benign essential hypertension        Relevant Medications    amLODIPine (NORVASC) 5 MG tablet        71 year old male with hx of strokes in the past. Most recent in Sept of 2021. Stroke located in R thalamus. On review of images it appears there is an older stroke in identical location on opposite thalamus as well and an older MRI showing L corona radiata stroke as well with encephalomalacia. Given constellation of these imaging findings it is possible that the stroke etiology is related to small vessel disease and his current state is due to accumulation of injury from most recent and prior strokes.     Plan:   - Continue plavix 75 mg daily  - Average BP is slightly higher than target BP (<130/80). We increased the dose of amlodipine to 10 mg daily. I have asked them to monitor his BP frequently (couple of times a day) after starting new medication. Patients son will be visiting him and requested that we start this change on Sunday (12/26/2021) which is reasonable.   - Send monthly BP log via [employer]  - Continue lipitor 80 mg daily  - We may consider repeating MRI brain in few months   - Follow up in 6 months    Stroke Education provided.  He will call us with any questions.  For any acute neurologic deficits he was advised to  go directly to the hospital rather than call the clinic.    Janette Juares MD  Neurology  12/22/2021 11:31 AM  To page me or covering stroke neurology team member, click here: AMCOM  Choose \"On Call\" tab at top, then search dropdown box for \"Neurology Adult\" & press Enter, look for Neuro " "ICU/Stroke    ___________________________________________________________________    Current Medications  Current Outpatient Medications   Medication Sig     amLODIPine (NORVASC) 5 MG tablet Take 2 tablets (10 mg) by mouth daily     acetaminophen (TYLENOL) 325 MG tablet Take 1-3 tablets (325-975 mg) by mouth every 6 hours as needed for mild pain or fever (> 101 F)     amantadine (SYMMETREL) 100 MG capsule Take 1 capsule (100 mg) by mouth daily     atenolol (TENORMIN) 25 MG tablet Take 1 tablet (25 mg) by mouth 2 times daily     atorvastatin (LIPITOR) 80 MG tablet Take 80 mg by mouth daily     blood glucose (NO BRAND SPECIFIED) test strip Use 1 strip to test 3 times daily (before meals and bedtime). E11.29, R80.9, Z79.4     blood glucose monitoring (ONE TOUCH ULTRA 2) meter device kit Use as directed. Pharmacy dispense brand based on insurance. E11,29, R80.9, Z79.4     clopidogrel (PLAVIX) 75 MG tablet Take 75 mg by mouth daily     Continuous Blood Gluc Sensor (FREESTYLE TITI 2 SENSOR) MISC Use with Titi 2 [place]     doxazosin (CARDURA) 2 MG tablet Take 1 tablet (2 mg) by mouth daily     FLUoxetine (PROZAC) 20 MG/5ML solution 5 mLs (20 mg) by Oral or Feeding Tube route daily     hydrochlorothiazide (HYDRODIURIL) 25 MG tablet      hydrocortisone 2.5 % cream      insulin aspart (NOVOLOG PEN) 100 UNIT/ML pen Take three times daily with Jevity feedings, Take 5 units with one can of Jevity and 10 units with 2 cans of Jevity.     insulin detemir (LEVEMIR PEN) 100 UNIT/ML pen Take 10 units every morning and 18 units every evening.     Insulin Pen Needle (PEN NEEDLES 5/16\") 31G X 8 MM MISC Inject 1 each Subcutaneous     Lancets MISC Use as instructed 3 times daily (before meals and bedtime). E11.29, R80.9, Z79.4     losartan (COZAAR) 100 MG tablet      metFORMIN (GLUCOPHAGE) 1000 MG tablet Take 1 tablet (1,000 mg) by mouth 2 times daily (with meals)     Nutritional Supplements (JEVITY 1.5 ARLETH) LIQD Bolus Jevity 1.5 (5 cans " in total) TID via syringe or gravity bag (7am - 1 can, 12pm - 2 cans, and 5pm - 2 cans). Free water flush of 60 mL before and after each feeding.     ondansetron (ZOFRAN-ODT) 4 MG ODT tab Take 1 tablet (4 mg) by mouth every 6 hours as needed for nausea or vomiting     polyethylene glycol (MIRALAX) 17 g packet Take 17 g by mouth daily as needed for constipation     sennosides (SENOKOT) 8.6 MG tablet Take 1 tablet by mouth At Bedtime     sildenafil (VIAGRA) 100 MG tablet Take 100 mg by mouth     No current facility-administered medications for this visit.       Past Medical History  Past Medical History:   Diagnosis Date     Diabetes mellitus (H)      Hypercholesteremia      Hypertension      Unspecified cerebral artery occlusion with cerebral infarction     TIA 8/11       Social History  Social History     Tobacco Use     Smoking status: Never Smoker     Smokeless tobacco: Never Used   Substance Use Topics     Alcohol use: Yes     Comment: occassional     Drug use: No       Family History  No family history on file.    ROS: 10 point relevant ROS neg other than the symptoms noted above in the HPI.    Physical Exam    There were no vitals taken for this visit.    General:  no acute distress  HEENT:  normocephalic/atraumatic  Pulmonary:  no respiratory distress    Neurologic  Alert and follows simple commands. Unable to speak. He was sitting in wheelchair. No speech output during todays visit.   Neuroimaging: as per HPI.  Labs:    Recent Labs   Lab Test 11/01/21  0735 09/16/21  0957 09/14/21  2145   INR 0.96 1.03 0.94        Recent Labs   Lab Test 09/15/21  0521   CHOL 172   HDL 32*   LDL 92   TRIG 238*       Recent Labs   Lab Test 12/16/21  1450 09/15/21  0521   A1C 7.4* 11.5*       No lab results found.      Janette Juares MD

## 2021-12-23 ENCOUNTER — HOME INFUSION (PRE-WILLOW HOME INFUSION) (OUTPATIENT)
Dept: PHARMACY | Facility: CLINIC | Age: 71
End: 2021-12-23
Payer: MEDICARE

## 2021-12-23 NOTE — PATIENT INSTRUCTIONS
- Continue plavix 75 mg daily  - Average BP is slightly higher than target BP (<130/80). We increased the dose of amlodipine to 10 mg daily. I have asked them to monitor his BP frequently (couple of times a day) after starting new medication. Patients son will be visiting him and requested that we start this change on Sunday (12/26/2021) which is reasonable.   - Send monthly BP log via [employer]  - Continue lipitor 80 mg daily  - We may consider repeating MRI brain in few months   - Follow up in 6 months    If you have any questions please contact me at [phone], option 3.    Freedom Wise RN, CNRN  Stroke & Endovascular Care Coordinator    Thank you for choosing [facility] for your health care needs.

## 2021-12-25 ENCOUNTER — HEALTH MAINTENANCE LETTER (OUTPATIENT)
Age: 71
End: 2021-12-25

## 2021-12-27 ENCOUNTER — MEDICAL CORRESPONDENCE (OUTPATIENT)
Dept: HEALTH INFORMATION MANAGEMENT | Facility: CLINIC | Age: 71
End: 2021-12-27
Payer: MEDICARE

## 2021-12-28 ENCOUNTER — MEDICAL CORRESPONDENCE (OUTPATIENT)
Dept: HEALTH INFORMATION MANAGEMENT | Facility: CLINIC | Age: 71
End: 2021-12-28
Payer: MEDICARE

## 2022-01-07 ENCOUNTER — HOSPITAL ENCOUNTER (EMERGENCY)
Facility: CLINIC | Age: 72
Discharge: HOME | End: 2022-01-08
Attending: EMERGENCY MEDICINE | Admitting: EMERGENCY MEDICINE
Payer: MEDICARE

## 2022-01-07 DIAGNOSIS — R31.9 HEMATURIA, UNSPECIFIED TYPE: ICD-10-CM

## 2022-01-07 DIAGNOSIS — Z46.6 URINARY CATHETER (FOLEY) CHANGE REQUIRED: ICD-10-CM

## 2022-01-07 LAB
ALBUMIN UR-MCNC: 600 MG/DL
APPEARANCE UR: ABNORMAL
BACTERIA #/AREA URNS HPF: ABNORMAL /HPF
BILIRUB UR QL STRIP: NEGATIVE
COLOR UR AUTO: YELLOW
GLUCOSE UR STRIP-MCNC: 100 MG/DL
HGB UR QL STRIP: ABNORMAL
HYALINE CASTS: 6 /LPF
KETONES UR STRIP-MCNC: NEGATIVE MG/DL
LEUKOCYTE ESTERASE UR QL STRIP: ABNORMAL
MUCOUS THREADS #/AREA URNS LPF: PRESENT /LPF
NITRATE UR QL: NEGATIVE
PH UR STRIP: 8.5 [PH] (ref 5–7)
RBC URINE: >182 /HPF
SP GR UR STRIP: 1.02 (ref 1–1.03)
TRANSITIONAL EPI: 1 /HPF
UROBILINOGEN UR STRIP-MCNC: NORMAL MG/DL
WBC URINE: 47 /HPF

## 2022-01-07 PROCEDURE — 99284 EMERGENCY DEPT VISIT MOD MDM: CPT | Performed by: EMERGENCY MEDICINE

## 2022-01-07 PROCEDURE — 87086 URINE CULTURE/COLONY COUNT: CPT | Performed by: EMERGENCY MEDICINE

## 2022-01-07 PROCEDURE — 81001 URINALYSIS AUTO W/SCOPE: CPT | Performed by: EMERGENCY MEDICINE

## 2022-01-07 PROCEDURE — 51702 INSERT TEMP BLADDER CATH: CPT

## 2022-01-07 PROCEDURE — 99283 EMERGENCY DEPT VISIT LOW MDM: CPT | Mod: 25

## 2022-01-07 RX ORDER — AMOXICILLIN 875 MG
875 TABLET ORAL 2 TIMES DAILY
Qty: 14 TABLET | Refills: 0 | Status: SHIPPED | OUTPATIENT
Start: 2022-01-07 | End: 2022-01-09

## 2022-01-07 NOTE — ED PROVIDER NOTES
"ED Provider Note  [facility]      History     Chief Complaint   Patient presents with     Catheter Problem     HPI  Jimmy Otto is a 72 year old male with a PMH of DM2, hypercholesterolemia, HLD, HTN, cerebral infarction, hemiplegia following CVA, G-tube present, UTI, sepsis and oral thrush who presents to the ED today complaining of hematuria with a urinary catheter issue.  Per report, his home care nurse had manipulated his catheter today to change it, following which he had some bleeding, including a clot that was seen by family.  They presented to the emergency department to have the catheter flushed.    The patient was brought in by ambulance.  He is nonverbal.  Patient's son provides the history.    Patient BIBA, nonverbal; nurse was changing catheter and noticed blood. There was a clot. Son on way. Patient coming to to have catheter flushed.     Past Medical History  Past Medical History:   Diagnosis Date     Diabetes mellitus (H)      Hypercholesteremia      Hypertension      Unspecified cerebral artery occlusion with cerebral infarction     TIA 8/11     History reviewed. No pertinent surgical history.  acetaminophen (TYLENOL) 325 MG tablet  amantadine (SYMMETREL) 100 MG capsule  amLODIPine (NORVASC) 5 MG tablet  atenolol (TENORMIN) 25 MG tablet  atorvastatin (LIPITOR) 80 MG tablet  blood glucose (NO BRAND SPECIFIED) test strip  blood glucose monitoring (ONE TOUCH ULTRA 2) meter device kit  clopidogrel (PLAVIX) 75 MG tablet  Continuous Blood Gluc Sensor (FREESTYLE TITI 2 SENSOR) MISC  doxazosin (CARDURA) 2 MG tablet  FLUoxetine (PROZAC) 20 MG/5ML solution  hydrochlorothiazide (HYDRODIURIL) 25 MG tablet  hydrocortisone 2.5 % cream  insulin aspart (NOVOLOG PEN) 100 UNIT/ML pen  insulin detemir (LEVEMIR PEN) 100 UNIT/ML pen  Insulin Pen Needle (PEN NEEDLES 5/16\") 31G X 8 MM MISC  Lancets MISC  losartan (COZAAR) 100 MG tablet  metFORMIN (GLUCOPHAGE) 1000 MG tablet  Nutritional " Supplements (JEVITY 1.5 ARLETH) LIQD  ondansetron (ZOFRAN-ODT) 4 MG ODT tab  polyethylene glycol (MIRALAX) 17 g packet  sennosides (SENOKOT) 8.6 MG tablet  sildenafil (VIAGRA) 100 MG tablet      Allergies   Allergen Reactions     Nka [No Known Allergies]      Family History  History reviewed. No pertinent family history.  Social History   Social History     Tobacco Use     Smoking status: Never Smoker     Smokeless tobacco: Never Used   Substance Use Topics     Alcohol use: Yes     Comment: occassional     Drug use: No      Past medical history, past surgical history, medications, allergies, family history, and social history were reviewed with the patient. No additional pertinent items.       Review of Systems   General: No fevers or chills  Skin: No rash or diaphoresis  Eyes: No eye redness or discharge  Ears/Nose/Throat: No rhinorrhea or nasal congestion  Respiratory: No cough or SOB  Cardiovascular: No chest pain or palpitations  Gastrointestinal: No nausea, vomiting, or diarrhea  Genitourinary: No urinary frequency, hematuria, or dysuria  Musculoskeletal: No arthralgias or myalgias  Neurologic: No numbness or weakness  Psychiatric: No depression or SI  Hematologic/Lymphatic/Immunologic: No leg swelling, no easy bruising/bleeding  Endocrine: No polyuria/polydypsia      A complete review of systems was performed with pertinent positives and negatives noted in the HPI, and all other systems negative.    Physical Exam   BP: 115/74  Pulse: 91  Temp: 97.9  F (36.6  C)  Resp: 16  SpO2: 95 %  Physical Exam  General: Well nourished, well developed, NAD  HEENT: EOMI, anicteric. NCAT, MMM  Neck: no jugular venous distension, supple, nl ROM  Cardiac: Regular rate and rhythm. No murmurs, rubs, or gallops. Normal S1, S2.  Intact peripheral pulses  Pulm: CTAB, no stridor, wheezes, rales, rhonchi  Abd: Soft, nontender, nondistended.  No masses palpated.    Skin: Warm and dry to the touch.  No rash  Extremities: No LE edema, no  cyanosis, w/w/p  Neuro: A&Ox3, no gross focal deficits        ED Course      Procedures       The medical record was reviewed and interpreted.  Current labs reviewed and interpreted.              Results for orders placed or performed during the hospital encounter of 01/07/22   UA with Microscopic reflex to Culture     Status: Abnormal    Specimen: Urine, Midstream   Result Value Ref Range    Color Urine Yellow Colorless, Straw, Light Yellow, Yellow    Appearance Urine Slightly Cloudy (A) Clear    Glucose Urine 100  (A) Negative mg/dL    Bilirubin Urine Negative Negative    Ketones Urine Negative Negative mg/dL    Specific Gravity Urine 1.017 1.003 - 1.035    Blood Urine Large (A) Negative    pH Urine 8.5 (H) 5.0 - 7.0    Protein Albumin Urine 600  (A) Negative mg/dL    Urobilinogen Urine Normal Normal, 2.0 mg/dL    Nitrite Urine Negative Negative    Leukocyte Esterase Urine Large (A) Negative    Bacteria Urine Many (A) None Seen /HPF    Mucus Urine Present (A) None Seen /LPF    RBC Urine >182 (H) <=2 /HPF    WBC Urine 47 (H) <=5 /HPF    Transitional Epithelials Urine 1 <=1 /HPF    Hyaline Casts Urine 6 (H) <=2 /LPF    Narrative    Urine Culture ordered based on laboratory criteria   Urine Culture     Status: Abnormal (Preliminary result)    Specimen: Urine, Midstream   Result Value Ref Range    Culture >100,000 CFU/mL Proteus mirabilis (A)      Medications - No data to display     Assessments & Plan (with Medical Decision Making)   The patient is a 72-year-old male who presents with a catheter problem/hematuria.  Hematuria likely secondary to manipulation of catheter today causing minor trauma.  Catheter removed and replaced in the emergency department.  Draining slightly pink-tinged urine that quickly cleared.  Urinalysis sent to rule out urinary tract infection, which may have made the patient more prone to bleeding.    UA shows findings consistent with hematuria and possible UTI.  Previous urine cultures were  reviewed and antibiotics ordered accordingly    I have reviewed the nursing notes. I have reviewed the findings, diagnosis, plan and need for follow up with the patient.    Patient to be discharged home. Advised to follow up with PCP within 1 week. To return to ER immediately with any new/worsening symptoms. Plan of care discussed with patient who expresses understanding and agrees with plan of care.      Discharge Medication List as of 1/8/2022 12:26 AM      START taking these medications    Details   amoxicillin (AMOXIL) 875 MG tablet Take 1 tablet (875 mg) by mouth 2 times daily, Disp-14 tablet, R-0, Local Print             Final diagnoses:   Hematuria, unspecified type   Urinary catheter (Felix) change required       --  Carly Lainez MD  [facility] EMERGENCY DEPARTMENT  1/7/2022     Carly Lainez MD  01/08/22 2034

## 2022-01-07 NOTE — ED TRIAGE NOTES
Patient BIBA, nonverbal; nurse was changing catheter and noticed blood. There was a clot. Son on way. Patient coming to to have catheter flushed.

## 2022-01-08 VITALS
RESPIRATION RATE: 18 BRPM | TEMPERATURE: 97.9 F | HEART RATE: 90 BPM | SYSTOLIC BLOOD PRESSURE: 117 MMHG | DIASTOLIC BLOOD PRESSURE: 78 MMHG | OXYGEN SATURATION: 98 %

## 2022-01-08 NOTE — DISCHARGE INSTRUCTIONS
TODAY'S VISIT:  You were seen today for hematuria  -   - If you had any labs or imaging/radiology tests performed today, you should also discuss these tests with your usual provider.     FOLLOW-UP:  Please make an appointment to follow up with:  - Your Primary Care Provider. If you do not have a PCP, please call the Primary Care Center (phone: [phone] for an appointment  - Urology Clinic (phone: [phone]) if bleeding does not resolve    - Have your provider review the results from today's visit with you again to make sure no further follow-up or additional testing is needed based on those results.     RETURN TO THE EMERGENCY DEPARTMENT  Return to the Emergency Department at any time for any new or worsening symptoms or any concerns.

## 2022-01-09 ENCOUNTER — TELEPHONE (OUTPATIENT)
Dept: EMERGENCY MEDICINE | Facility: CLINIC | Age: 72
End: 2022-01-09
Payer: MEDICARE

## 2022-01-09 LAB — BACTERIA UR CULT: ABNORMAL

## 2022-01-09 RX ORDER — CEFPODOXIME PROXETIL 200 MG/1
200 TABLET, FILM COATED ORAL 2 TIMES DAILY
Qty: 14 TABLET | Refills: 0 | Status: SHIPPED | OUTPATIENT
Start: 2022-01-09 | End: 2022-01-16

## 2022-01-09 NOTE — TELEPHONE ENCOUNTER
[facility] ([facility]) Emergency Department Lab result notification [Adult-Male]    [facility] ED lab result protocol used  Urine Culture    Reason for call  Notify of lab results, assess symptoms,  review ED providers recommendations/discharge instructions (if necessary) and advise per ED lab result f/u protocol    Lab Result (including Rx patient on, if applicable)  Final Urine Culture Report on 1/9/22  [facility] Emergency Dept discharge antibiotic prescribed: Amoxicillin (AMOXIL) 875 MG tablet, 1 tablet (875 mg) by mouth 2 times daily for 7 days  #1. Bacteria, >100,000 colonies/mL Proteus mirabilis,  is [RESISTANT] to antibiotic.   Recommendations in treatment per [facility] ED lab result Urine Culture protocol.    Information table from Emergency Dept Provider visit on 1/7/22  Symptoms reported at ED visit (Chief complaint, HPI) Chief Complaint   Patient presents with     Catheter Problem      HPI  Jimmy Otto is a 72 year old male with a PMH of DM2, hypercholesterolemia, HLD, HTN, cerebral infarction, hemiplegia following CVA, G-tube present, UTI, sepsis and oral thrush who presents to the ED today complaining of hematuria with a urinary catheter issue.  Per report, his home care nurse had manipulated his catheter today to change it, following which he had some bleeding, including a clot that was seen by family.  They presented to the emergency department to have the catheter flushed.     The patient was brought in by ambulance.  He is nonverbal.  Patient's son provides the history.     Patient BIBA, nonverbal; nurse was changing catheter and noticed blood. There was a clot. Son on way. Patient coming to to have catheter flushed.      Significant Medical hx, if applicable (i.e. CKD, diabetes) CVA, dehydration, DMII, Catheter   Allergies Allergies   Allergen Reactions     Nka [No Known Allergies]       Weight, if applicable Wt Readings from Last 2 Encounters:   11/04/21  59 kg (130 lb)   10/01/21 66.5 kg (146 lb 11.2 oz)      Coumadin/Warfarin [Yes /No] NO   Creatinine Level (mg/dl) Creatinine   Date Value Ref Range Status   12/16/2021 1.02 0.66 - 1.25 mg/dL Final   08/16/2012 0.96 0.66 - 1.25 mg/dL Final      Creatinine clearance (ml/min), if applicable Serum creatinine: 1.02 mg/dL 12/16/21 1450  Estimated creatinine clearance: 54.6 mL/min   ED providers Impression and Plan (applicable information) The patient is a 72-year-old male who presents with a catheter problem/hematuria.  Hematuria likely secondary to manipulation of catheter today causing minor trauma.  Catheter removed and replaced in the emergency department.  Draining slightly pink-tinged urine that quickly cleared.  Urinalysis sent to rule out urinary tract infection, which may have made the patient more prone to bleeding.     UA shows findings consistent with hematuria and possible UTI.  Previous urine cultures were reviewed and antibiotics ordered accordingly     I have reviewed the nursing notes. I have reviewed the findings, diagnosis, plan and need for follow up with the patient.     Patient to be discharged home. Advised to follow up with PCP within 1 week. To return to ER immediately with any new/worsening symptoms. Plan of care discussed with patient who expresses understanding and agrees with plan of care.   ED diagnosis Hematuria, unspecified type  Urinary catheter (Felix) change required   ED provider Carly Lainez MD      RN Assessment (Patient s current Symptoms), include time called.  10:48A - Daughter doesn't know how he is doing today - she is able to take message regarding his results and antibiotic change - she is not with patient      RN Recommendations/Instructions per [place] ED lab result protocol  Patients daughter - Phally -  notified of lab result and treatment recommendations.  Advised to discontinue Amoxicillin and switch to Rx for Cefpodoxime (Vantin) 200 MG tablet, 1 tablet (200 mg)  by mouth 2 times daily for 7 days sent to [Pharmacy - [facility] [address], [place]].      Information on preventing future UTI's:  Drink plenty of fluids such as water, juice, or other caffeine-free drinks. This helps flush bacteria out of your system.       Please Contact your PCP clinic or return to the Emergency department if your:    Symptoms return.    Symptoms do not improve after 3 days on antibiotic.    Symptoms do not resolve after completing antibiotic.    Symptoms worsen or other concerning symptom's.    PCP follow-up Questions asked: YES       Mariajose Nash RN  [facility]  Emergency Dept Lab Result RN  Ph# [phone]     Copy of Lab result  Urine Culture  Order: [id number] - Reflex for Order [id number]   Status: Final result     Visible to patient: Yes (seen)    Specimen Information: Urine, Midstream         2 Result Notes    Culture >100,000 CFU/mL Proteus mirabilis Abnormal             Resulting Agency: [facility]       Susceptibility      Proteus mirabilis (1)    Antibiotic Interpretation Sensitivity  Method Status    Ampicillin Resistant >=32.0 ug/mL B[facility]LEY Final    Ampicillin/ Sulbactam Susceptible 8.0 ug/mL [facility]LEY Final    Piperacillin/Tazobactam Susceptible <=4.0 ug/mL [facility]LEY Final    Cefazolin Susceptible <=4.0 ug/mL [facility]LEY Final     Cefazolin BEVERLEY breakpoints are for the treatment of uncomplicated urinary tract infections. For the treatment of systemic infections, please contact the laboratory for additional testing.       Cefoxitin Susceptible <=4.0 ug/mL B[facility]LEY Final    Ceftazidime Susceptible <=1.0 ug/mL [facility]LEY Final    Ceftriaxone Susceptible <=1.0 ug/mL B[facility]LEY Final    Cefepime Susceptible <=1.0 ug/mL B[facility]LEY Final    Gentamicin Susceptible <=1.0 ug/mL [facility]LEY Final    Tobramycin Susceptible <=1.0 ug/mL [facility] Final    Ciprofloxacin Susceptible <=0.25 ug/mL [facility]LEY Final    Levofloxacin Susceptible <=0.12 ug/mL BEVERLEY Final    Nitrofurantoin Resistant 64.0 ug/mL BEVERLEY Final     Intrinsically  Resistant       Trimethoprim/Sulfamethoxazole Resistant >16/304 ug/mL BEVERLEY Final          Specimen Collected: 01/07/22 10:54 PM Last Resulted: 01/09/22 12:29 AM

## 2022-01-20 ENCOUNTER — PRE VISIT (OUTPATIENT)
Dept: UROLOGY | Facility: CLINIC | Age: 72
End: 2022-01-20
Payer: MEDICARE

## 2022-01-21 ENCOUNTER — HOSPITAL ENCOUNTER (INPATIENT)
Facility: CLINIC | Age: 72
LOS: 5 days | Discharge: HOME HEALTH | DRG: 698 | End: 2022-01-26
Attending: EMERGENCY MEDICINE | Admitting: INTERNAL MEDICINE
Payer: MEDICARE

## 2022-01-21 ENCOUNTER — APPOINTMENT (OUTPATIENT)
Dept: CT IMAGING | Facility: CLINIC | Age: 72
DRG: 698 | End: 2022-01-21
Attending: EMERGENCY MEDICINE
Payer: MEDICARE

## 2022-01-21 ENCOUNTER — APPOINTMENT (OUTPATIENT)
Dept: GENERAL RADIOLOGY | Facility: CLINIC | Age: 72
DRG: 698 | End: 2022-01-21
Attending: EMERGENCY MEDICINE
Payer: MEDICARE

## 2022-01-21 DIAGNOSIS — U07.1 PNEUMONIA DUE TO 2019 NOVEL CORONAVIRUS: ICD-10-CM

## 2022-01-21 DIAGNOSIS — J96.01 ACUTE RESPIRATORY FAILURE WITH HYPOXIA (H): ICD-10-CM

## 2022-01-21 DIAGNOSIS — I63.50 CEREBRAL ARTERY OCCLUSION WITH CEREBRAL INFARCTION (H): Primary | ICD-10-CM

## 2022-01-21 DIAGNOSIS — J12.82 PNEUMONIA DUE TO 2019 NOVEL CORONAVIRUS: ICD-10-CM

## 2022-01-21 DIAGNOSIS — N39.0 URINARY TRACT INFECTION WITHOUT HEMATURIA, SITE UNSPECIFIED: ICD-10-CM

## 2022-01-21 DIAGNOSIS — E87.0 HYPERNATREMIA: ICD-10-CM

## 2022-01-21 LAB
ALBUMIN SERPL-MCNC: 1.9 G/DL (ref 3.4–5)
ALBUMIN UR-MCNC: 100 MG/DL
ALP SERPL-CCNC: 120 U/L (ref 40–150)
ALT SERPL W P-5'-P-CCNC: 46 U/L (ref 0–70)
ANION GAP SERPL CALCULATED.3IONS-SCNC: 6 MMOL/L (ref 3–14)
APPEARANCE UR: ABNORMAL
AST SERPL W P-5'-P-CCNC: 19 U/L (ref 0–45)
ATRIAL RATE - MUSE: 105 BPM
BACTERIA #/AREA URNS HPF: ABNORMAL /HPF
BASOPHILS # BLD AUTO: 0.1 10E3/UL (ref 0–0.2)
BASOPHILS NFR BLD AUTO: 1 %
BILIRUB SERPL-MCNC: 0.2 MG/DL (ref 0.2–1.3)
BILIRUB UR QL STRIP: NEGATIVE
BUN SERPL-MCNC: 49 MG/DL (ref 7–30)
CALCIUM SERPL-MCNC: 9 MG/DL (ref 8.5–10.1)
CHLORIDE BLD-SCNC: 120 MMOL/L (ref 94–109)
CO2 SERPL-SCNC: 29 MMOL/L (ref 20–32)
COLOR UR AUTO: YELLOW
CREAT SERPL-MCNC: 1.31 MG/DL (ref 0.66–1.25)
CRP SERPL-MCNC: 22.1 MG/L (ref 0–8)
D DIMER PPP FEU-MCNC: 1.31 UG/ML FEU (ref 0–0.5)
DIASTOLIC BLOOD PRESSURE - MUSE: NORMAL MMHG
EOSINOPHIL # BLD AUTO: 0.1 10E3/UL (ref 0–0.7)
EOSINOPHIL NFR BLD AUTO: 1 %
ERYTHROCYTE [DISTWIDTH] IN BLOOD BY AUTOMATED COUNT: 14.4 % (ref 10–15)
FLUAV RNA SPEC QL NAA+PROBE: NEGATIVE
FLUBV RNA RESP QL NAA+PROBE: NEGATIVE
GFR SERPL CREATININE-BSD FRML MDRD: 58 ML/MIN/1.73M2
GLUCOSE BLD-MCNC: 299 MG/DL (ref 70–99)
GLUCOSE BLDC GLUCOMTR-MCNC: 331 MG/DL (ref 70–99)
GLUCOSE BLDC GLUCOMTR-MCNC: 356 MG/DL (ref 70–99)
GLUCOSE BLDC GLUCOMTR-MCNC: 377 MG/DL (ref 70–99)
GLUCOSE BLDC GLUCOMTR-MCNC: 394 MG/DL (ref 70–99)
GLUCOSE BLDC GLUCOMTR-MCNC: 427 MG/DL (ref 70–99)
GLUCOSE UR STRIP-MCNC: >=1000 MG/DL
HCT VFR BLD AUTO: 41 % (ref 40–53)
HGB BLD-MCNC: 11.6 G/DL (ref 13.3–17.7)
HGB UR QL STRIP: ABNORMAL
IMM GRANULOCYTES # BLD: 0.1 10E3/UL
IMM GRANULOCYTES NFR BLD: 1 %
INTERPRETATION ECG - MUSE: NORMAL
KETONES UR STRIP-MCNC: NEGATIVE MG/DL
LACTATE SERPL-SCNC: 2.3 MMOL/L (ref 0.7–2)
LEUKOCYTE ESTERASE UR QL STRIP: ABNORMAL
LYMPHOCYTES # BLD AUTO: 1.4 10E3/UL (ref 0.8–5.3)
LYMPHOCYTES NFR BLD AUTO: 14 %
MAGNESIUM SERPL-MCNC: 2.1 MG/DL (ref 1.6–2.3)
MCH RBC QN AUTO: 25.3 PG (ref 26.5–33)
MCHC RBC AUTO-ENTMCNC: 28.3 G/DL (ref 31.5–36.5)
MCV RBC AUTO: 89 FL (ref 78–100)
MONOCYTES # BLD AUTO: 0.8 10E3/UL (ref 0–1.3)
MONOCYTES NFR BLD AUTO: 8 %
MUCOUS THREADS #/AREA URNS LPF: PRESENT /LPF
NEUTROPHILS # BLD AUTO: 7.3 10E3/UL (ref 1.6–8.3)
NEUTROPHILS NFR BLD AUTO: 75 %
NITRATE UR QL: NEGATIVE
NRBC # BLD AUTO: 0 10E3/UL
NRBC BLD AUTO-RTO: 0 /100
NT-PROBNP SERPL-MCNC: 696 PG/ML (ref 0–900)
P AXIS - MUSE: 36 DEGREES
PH UR STRIP: 6.5 [PH] (ref 5–7)
PHOSPHATE SERPL-MCNC: 3.2 MG/DL (ref 2.5–4.5)
PLATELET # BLD AUTO: 251 10E3/UL (ref 150–450)
POTASSIUM BLD-SCNC: 4 MMOL/L (ref 3.4–5.3)
PR INTERVAL - MUSE: 150 MS
PROCALCITONIN SERPL-MCNC: <0.05 NG/ML
PROT SERPL-MCNC: 8.2 G/DL (ref 6.8–8.8)
QRS DURATION - MUSE: 64 MS
QT - MUSE: 380 MS
QTC - MUSE: 502 MS
R AXIS - MUSE: -31 DEGREES
RBC # BLD AUTO: 4.59 10E6/UL (ref 4.4–5.9)
RBC URINE: 12 /HPF
SARS-COV-2 RNA RESP QL NAA+PROBE: POSITIVE
SODIUM SERPL-SCNC: 153 MMOL/L (ref 133–144)
SODIUM SERPL-SCNC: 155 MMOL/L (ref 133–144)
SP GR UR STRIP: 1.02 (ref 1–1.03)
SYSTOLIC BLOOD PRESSURE - MUSE: NORMAL MMHG
T AXIS - MUSE: 38 DEGREES
TROPONIN I SERPL HS-MCNC: 10 NG/L
UROBILINOGEN UR STRIP-MCNC: NORMAL MG/DL
VENTRICULAR RATE- MUSE: 105 BPM
WBC # BLD AUTO: 9.7 10E3/UL (ref 4–11)
WBC CLUMPS #/AREA URNS HPF: PRESENT /HPF
WBC URINE: >182 /HPF

## 2022-01-21 PROCEDURE — 87636 SARSCOV2 & INF A&B AMP PRB: CPT | Performed by: PHYSICIAN ASSISTANT

## 2022-01-21 PROCEDURE — 36415 COLL VENOUS BLD VENIPUNCTURE: CPT | Performed by: PHYSICIAN ASSISTANT

## 2022-01-21 PROCEDURE — 36415 COLL VENOUS BLD VENIPUNCTURE: CPT | Performed by: INTERNAL MEDICINE

## 2022-01-21 PROCEDURE — 93005 ELECTROCARDIOGRAM TRACING: CPT

## 2022-01-21 PROCEDURE — 96368 THER/DIAG CONCURRENT INF: CPT

## 2022-01-21 PROCEDURE — 96367 TX/PROPH/DG ADDL SEQ IV INF: CPT

## 2022-01-21 PROCEDURE — 250N000013 HC RX MED GY IP 250 OP 250 PS 637: Performed by: INTERNAL MEDICINE

## 2022-01-21 PROCEDURE — 84295 ASSAY OF SERUM SODIUM: CPT | Performed by: PHYSICIAN ASSISTANT

## 2022-01-21 PROCEDURE — 36415 COLL VENOUS BLD VENIPUNCTURE: CPT | Performed by: EMERGENCY MEDICINE

## 2022-01-21 PROCEDURE — 80053 COMPREHEN METABOLIC PANEL: CPT | Performed by: EMERGENCY MEDICINE

## 2022-01-21 PROCEDURE — 83880 ASSAY OF NATRIURETIC PEPTIDE: CPT | Performed by: EMERGENCY MEDICINE

## 2022-01-21 PROCEDURE — 96366 THER/PROPH/DIAG IV INF ADDON: CPT

## 2022-01-21 PROCEDURE — 83605 ASSAY OF LACTIC ACID: CPT | Performed by: EMERGENCY MEDICINE

## 2022-01-21 PROCEDURE — 258N000003 HC RX IP 258 OP 636: Performed by: INTERNAL MEDICINE

## 2022-01-21 PROCEDURE — 99285 EMERGENCY DEPT VISIT HI MDM: CPT | Mod: 25

## 2022-01-21 PROCEDURE — 99223 1ST HOSP IP/OBS HIGH 75: CPT | Mod: AI | Performed by: PHYSICIAN ASSISTANT

## 2022-01-21 PROCEDURE — 96361 HYDRATE IV INFUSION ADD-ON: CPT

## 2022-01-21 PROCEDURE — 84484 ASSAY OF TROPONIN QUANT: CPT | Performed by: EMERGENCY MEDICINE

## 2022-01-21 PROCEDURE — 81003 URINALYSIS AUTO W/O SCOPE: CPT | Performed by: EMERGENCY MEDICINE

## 2022-01-21 PROCEDURE — 84295 ASSAY OF SERUM SODIUM: CPT | Performed by: INTERNAL MEDICINE

## 2022-01-21 PROCEDURE — 87040 BLOOD CULTURE FOR BACTERIA: CPT | Performed by: PHYSICIAN ASSISTANT

## 2022-01-21 PROCEDURE — 84100 ASSAY OF PHOSPHORUS: CPT | Performed by: PHYSICIAN ASSISTANT

## 2022-01-21 PROCEDURE — 86140 C-REACTIVE PROTEIN: CPT | Performed by: EMERGENCY MEDICINE

## 2022-01-21 PROCEDURE — 85025 COMPLETE CBC W/AUTO DIFF WBC: CPT | Performed by: EMERGENCY MEDICINE

## 2022-01-21 PROCEDURE — 120N000001 HC R&B MED SURG/OB

## 2022-01-21 PROCEDURE — 87088 URINE BACTERIA CULTURE: CPT | Performed by: EMERGENCY MEDICINE

## 2022-01-21 PROCEDURE — 96365 THER/PROPH/DIAG IV INF INIT: CPT

## 2022-01-21 PROCEDURE — 84145 PROCALCITONIN (PCT): CPT | Performed by: EMERGENCY MEDICINE

## 2022-01-21 PROCEDURE — 258N000003 HC RX IP 258 OP 636: Performed by: PHYSICIAN ASSISTANT

## 2022-01-21 PROCEDURE — G1004 CDSM NDSC: HCPCS

## 2022-01-21 PROCEDURE — 85379 FIBRIN DEGRADATION QUANT: CPT | Performed by: EMERGENCY MEDICINE

## 2022-01-21 PROCEDURE — 250N000011 HC RX IP 250 OP 636: Performed by: EMERGENCY MEDICINE

## 2022-01-21 PROCEDURE — 250N000011 HC RX IP 250 OP 636: Performed by: PHYSICIAN ASSISTANT

## 2022-01-21 PROCEDURE — 83735 ASSAY OF MAGNESIUM: CPT | Performed by: PHYSICIAN ASSISTANT

## 2022-01-21 PROCEDURE — 250N000012 HC RX MED GY IP 250 OP 636 PS 637: Performed by: PHYSICIAN ASSISTANT

## 2022-01-21 PROCEDURE — 87040 BLOOD CULTURE FOR BACTERIA: CPT | Performed by: EMERGENCY MEDICINE

## 2022-01-21 PROCEDURE — C9803 HOPD COVID-19 SPEC COLLECT: HCPCS

## 2022-01-21 PROCEDURE — 83735 ASSAY OF MAGNESIUM: CPT | Performed by: INTERNAL MEDICINE

## 2022-01-21 PROCEDURE — 71045 X-RAY EXAM CHEST 1 VIEW: CPT

## 2022-01-21 PROCEDURE — 250N000013 HC RX MED GY IP 250 OP 250 PS 637: Performed by: PHYSICIAN ASSISTANT

## 2022-01-21 PROCEDURE — 96375 TX/PRO/DX INJ NEW DRUG ADDON: CPT

## 2022-01-21 PROCEDURE — 258N000003 HC RX IP 258 OP 636: Performed by: EMERGENCY MEDICINE

## 2022-01-21 PROCEDURE — 250N000009 HC RX 250: Performed by: EMERGENCY MEDICINE

## 2022-01-21 RX ORDER — ONDANSETRON 4 MG/1
4 TABLET, ORALLY DISINTEGRATING ORAL EVERY 6 HOURS PRN
Status: DISCONTINUED | OUTPATIENT
Start: 2022-01-21 | End: 2022-01-26 | Stop reason: HOSPADM

## 2022-01-21 RX ORDER — CLOPIDOGREL BISULFATE 75 MG/1
75 TABLET ORAL DAILY
Status: DISCONTINUED | OUTPATIENT
Start: 2022-01-21 | End: 2022-01-26 | Stop reason: HOSPADM

## 2022-01-21 RX ORDER — ACETAMINOPHEN 650 MG/1
650 SUPPOSITORY RECTAL EVERY 6 HOURS PRN
Status: DISCONTINUED | OUTPATIENT
Start: 2022-01-21 | End: 2022-01-26 | Stop reason: HOSPADM

## 2022-01-21 RX ORDER — LIDOCAINE 40 MG/G
CREAM TOPICAL
Status: DISCONTINUED | OUTPATIENT
Start: 2022-01-21 | End: 2022-01-26 | Stop reason: HOSPADM

## 2022-01-21 RX ORDER — ATORVASTATIN CALCIUM 40 MG/1
80 TABLET, FILM COATED ORAL DAILY
Status: DISCONTINUED | OUTPATIENT
Start: 2022-01-21 | End: 2022-01-26 | Stop reason: HOSPADM

## 2022-01-21 RX ORDER — CEFTRIAXONE 2 G/1
2 INJECTION, POWDER, FOR SOLUTION INTRAMUSCULAR; INTRAVENOUS ONCE
Status: COMPLETED | OUTPATIENT
Start: 2022-01-21 | End: 2022-01-21

## 2022-01-21 RX ORDER — ATENOLOL 25 MG/1
25 TABLET ORAL 2 TIMES DAILY
Status: DISCONTINUED | OUTPATIENT
Start: 2022-01-21 | End: 2022-01-26 | Stop reason: HOSPADM

## 2022-01-21 RX ORDER — NICOTINE POLACRILEX 4 MG
15-30 LOZENGE BUCCAL
Status: DISCONTINUED | OUTPATIENT
Start: 2022-01-21 | End: 2022-01-26 | Stop reason: HOSPADM

## 2022-01-21 RX ORDER — DEXTROSE MONOHYDRATE 100 MG/ML
INJECTION, SOLUTION INTRAVENOUS CONTINUOUS PRN
Status: DISCONTINUED | OUTPATIENT
Start: 2022-01-21 | End: 2022-01-26 | Stop reason: HOSPADM

## 2022-01-21 RX ORDER — FLUOXETINE 20 MG/5ML
20 SOLUTION ORAL DAILY
Status: DISCONTINUED | OUTPATIENT
Start: 2022-01-21 | End: 2022-01-26 | Stop reason: HOSPADM

## 2022-01-21 RX ORDER — AMLODIPINE BESYLATE 10 MG/1
10 TABLET ORAL DAILY
Status: DISCONTINUED | OUTPATIENT
Start: 2022-01-21 | End: 2022-01-26 | Stop reason: HOSPADM

## 2022-01-21 RX ORDER — AZITHROMYCIN 500 MG/5ML
500 INJECTION, POWDER, LYOPHILIZED, FOR SOLUTION INTRAVENOUS ONCE
Status: COMPLETED | OUTPATIENT
Start: 2022-01-21 | End: 2022-01-21

## 2022-01-21 RX ORDER — DEXTROSE MONOHYDRATE 25 G/50ML
25-50 INJECTION, SOLUTION INTRAVENOUS
Status: DISCONTINUED | OUTPATIENT
Start: 2022-01-21 | End: 2022-01-26 | Stop reason: HOSPADM

## 2022-01-21 RX ORDER — SENNOSIDES 8.6 MG
1 TABLET ORAL DAILY PRN
COMMUNITY
End: 2022-10-28

## 2022-01-21 RX ORDER — SODIUM CHLORIDE 9 MG/ML
INJECTION, SOLUTION INTRAVENOUS CONTINUOUS
Status: DISCONTINUED | OUTPATIENT
Start: 2022-01-21 | End: 2022-01-21

## 2022-01-21 RX ORDER — DOXAZOSIN 1 MG/1
2 TABLET ORAL DAILY
Status: DISCONTINUED | OUTPATIENT
Start: 2022-01-21 | End: 2022-01-26 | Stop reason: HOSPADM

## 2022-01-21 RX ORDER — ACETAMINOPHEN 650 MG/1
650 SUPPOSITORY RECTAL EVERY 6 HOURS PRN
Status: DISCONTINUED | OUTPATIENT
Start: 2022-01-21 | End: 2022-01-21

## 2022-01-21 RX ORDER — ACETAMINOPHEN 325 MG/10.15ML
650 LIQUID ORAL EVERY 6 HOURS PRN
Status: DISCONTINUED | OUTPATIENT
Start: 2022-01-21 | End: 2022-01-26 | Stop reason: HOSPADM

## 2022-01-21 RX ORDER — INSULIN ASPART 100 [IU]/ML
0-5 INJECTION, SOLUTION INTRAVENOUS; SUBCUTANEOUS AT BEDTIME
COMMUNITY

## 2022-01-21 RX ORDER — PROCHLORPERAZINE 25 MG
12.5 SUPPOSITORY, RECTAL RECTAL EVERY 12 HOURS PRN
Status: DISCONTINUED | OUTPATIENT
Start: 2022-01-21 | End: 2022-01-26 | Stop reason: HOSPADM

## 2022-01-21 RX ORDER — ONDANSETRON 2 MG/ML
4 INJECTION INTRAMUSCULAR; INTRAVENOUS EVERY 6 HOURS PRN
Status: DISCONTINUED | OUTPATIENT
Start: 2022-01-21 | End: 2022-01-26 | Stop reason: HOSPADM

## 2022-01-21 RX ORDER — IOPAMIDOL 755 MG/ML
500 INJECTION, SOLUTION INTRAVASCULAR ONCE
Status: COMPLETED | OUTPATIENT
Start: 2022-01-21 | End: 2022-01-21

## 2022-01-21 RX ORDER — AMANTADINE HYDROCHLORIDE 100 MG/1
100 CAPSULE, GELATIN COATED ORAL DAILY
Status: DISCONTINUED | OUTPATIENT
Start: 2022-01-21 | End: 2022-01-26 | Stop reason: HOSPADM

## 2022-01-21 RX ORDER — DEXTROSE MONOHYDRATE 50 MG/ML
INJECTION, SOLUTION INTRAVENOUS CONTINUOUS
Status: DISCONTINUED | OUTPATIENT
Start: 2022-01-21 | End: 2022-01-22

## 2022-01-21 RX ORDER — ACETAMINOPHEN 325 MG/1
650 TABLET ORAL EVERY 6 HOURS PRN
Status: DISCONTINUED | OUTPATIENT
Start: 2022-01-21 | End: 2022-01-21

## 2022-01-21 RX ORDER — AMOXICILLIN 250 MG
2 CAPSULE ORAL 2 TIMES DAILY PRN
Status: DISCONTINUED | OUTPATIENT
Start: 2022-01-21 | End: 2022-01-26 | Stop reason: HOSPADM

## 2022-01-21 RX ORDER — DEXAMETHASONE SODIUM PHOSPHATE 10 MG/ML
6 INJECTION, SOLUTION INTRAMUSCULAR; INTRAVENOUS DAILY
Status: DISCONTINUED | OUTPATIENT
Start: 2022-01-22 | End: 2022-01-23

## 2022-01-21 RX ORDER — PROCHLORPERAZINE MALEATE 5 MG
5 TABLET ORAL EVERY 6 HOURS PRN
Status: DISCONTINUED | OUTPATIENT
Start: 2022-01-21 | End: 2022-01-26 | Stop reason: HOSPADM

## 2022-01-21 RX ORDER — DEXAMETHASONE SODIUM PHOSPHATE 10 MG/ML
6 INJECTION, SOLUTION INTRAMUSCULAR; INTRAVENOUS ONCE
Status: COMPLETED | OUTPATIENT
Start: 2022-01-21 | End: 2022-01-21

## 2022-01-21 RX ORDER — AMOXICILLIN 250 MG
1 CAPSULE ORAL 2 TIMES DAILY PRN
Status: DISCONTINUED | OUTPATIENT
Start: 2022-01-21 | End: 2022-01-26 | Stop reason: HOSPADM

## 2022-01-21 RX ADMIN — ACETAMINOPHEN 650 MG: 325 SOLUTION ORAL at 20:32

## 2022-01-21 RX ADMIN — INSULIN DETEMIR 4 UNITS: 100 INJECTION, SOLUTION SUBCUTANEOUS at 20:32

## 2022-01-21 RX ADMIN — ATENOLOL 25 MG: 25 TABLET ORAL at 20:33

## 2022-01-21 RX ADMIN — DEXAMETHASONE SODIUM PHOSPHATE 6 MG: 10 INJECTION, SOLUTION INTRAMUSCULAR; INTRAVENOUS at 06:43

## 2022-01-21 RX ADMIN — AMLODIPINE BESYLATE 10 MG: 10 TABLET ORAL at 18:57

## 2022-01-21 RX ADMIN — CEFTRIAXONE 2 G: 2 INJECTION, POWDER, FOR SOLUTION INTRAMUSCULAR; INTRAVENOUS at 08:53

## 2022-01-21 RX ADMIN — DEXTROSE MONOHYDRATE: 50 INJECTION, SOLUTION INTRAVENOUS at 22:06

## 2022-01-21 RX ADMIN — AMANTADINE HYDROCHLORIDE 100 MG: 100 CAPSULE ORAL at 18:58

## 2022-01-21 RX ADMIN — TAZOBACTAM SODIUM AND PIPERACILLIN SODIUM 3.38 G: 375; 3 INJECTION, SOLUTION INTRAVENOUS at 18:32

## 2022-01-21 RX ADMIN — INSULIN ASPART 6 UNITS: 100 INJECTION, SOLUTION INTRAVENOUS; SUBCUTANEOUS at 16:57

## 2022-01-21 RX ADMIN — INSULIN ASPART 5 UNITS: 100 INJECTION, SOLUTION INTRAVENOUS; SUBCUTANEOUS at 20:37

## 2022-01-21 RX ADMIN — DEXTROSE MONOHYDRATE: 50 INJECTION, SOLUTION INTRAVENOUS at 11:45

## 2022-01-21 RX ADMIN — DOXAZOSIN 2 MG: 1 TABLET ORAL at 18:56

## 2022-01-21 RX ADMIN — SODIUM CHLORIDE 1000 ML: 9 INJECTION, SOLUTION INTRAVENOUS at 08:23

## 2022-01-21 RX ADMIN — TAZOBACTAM SODIUM AND PIPERACILLIN SODIUM 4.5 G: 500; 4 INJECTION, SOLUTION INTRAVENOUS at 11:47

## 2022-01-21 RX ADMIN — AZITHROMYCIN MONOHYDRATE 500 MG: 500 INJECTION, POWDER, LYOPHILIZED, FOR SOLUTION INTRAVENOUS at 09:38

## 2022-01-21 RX ADMIN — INSULIN ASPART 4 UNITS: 100 INJECTION, SOLUTION INTRAVENOUS; SUBCUTANEOUS at 12:27

## 2022-01-21 RX ADMIN — SODIUM CHLORIDE 84 ML: 9 INJECTION, SOLUTION INTRAVENOUS at 10:26

## 2022-01-21 RX ADMIN — CLOPIDOGREL BISULFATE 75 MG: 75 TABLET ORAL at 18:59

## 2022-01-21 RX ADMIN — IOPAMIDOL 61 ML: 755 INJECTION, SOLUTION INTRAVENOUS at 10:26

## 2022-01-21 RX ADMIN — ATORVASTATIN CALCIUM 80 MG: 40 TABLET, FILM COATED ORAL at 18:59

## 2022-01-21 RX ADMIN — FLUOXETINE HYDROCHLORIDE 20 MG: 20 LIQUID ORAL at 18:57

## 2022-01-21 ASSESSMENT — ACTIVITIES OF DAILY LIVING (ADL)
ADLS_ACUITY_SCORE: 36
ADLS_ACUITY_SCORE: 18
ADLS_ACUITY_SCORE: 36
ADLS_ACUITY_SCORE: 18
ADLS_ACUITY_SCORE: 36
ADLS_ACUITY_SCORE: 18

## 2022-01-21 ASSESSMENT — ENCOUNTER SYMPTOMS: SHORTNESS OF BREATH: 1

## 2022-01-21 ASSESSMENT — MIFFLIN-ST. JEOR: SCORE: 1247.05

## 2022-01-21 NOTE — PHARMACY-ADMISSION MEDICATION HISTORY
Admission medication history interview status for this patient is complete. See [facility] admission navigator for allergy information, prior to admission medications and immunization status.     Medication history interview done, indicate source(s): Daughter Clemente  Medication history resources (including written lists, pill bottles, clinic record): Cynthia oHffman  Pharmacy: [facility] [place], [address]    Changes made to PTA medication list:  Added:   Changed: Novolog 5 units per can to 4 units per can Jevity, Levemir 10/18 units to 8/4 units am/pm, Senna to prn  Reported as Not Taking:   Removed: hydrochlorothiazide, Losartan, Sildenafil, Hydrocortisone Cream    Actions taken by pharmacist (provider contacted, etc):None     Additional medication history information: All meds are administered through G-tube    Medication reconciliation/reorder completed by provider prior to medication history?  No      For patients on insulin therapy:   Do you use sliding scale insulin based on blood sugars? No  What is your pre-meal insulin coverage?  4 units per can Jevity  Do you typically eat three meals a day?   How many times do you check your blood glucose per day? Am/hs and with each tube feeding  How many episodes of hypoglycemia do you typically have per month?   Do you have a Continuous Glucose Monitor (CGM)?  Yes    Prior to Admission medications    Medication Sig Last Dose Taking? Auth Provider   amantadine (SYMMETREL) 100 MG capsule Take 1 capsule (100 mg) by mouth daily 1/20/2022 at Unknown time Yes Jesika Theodore PA-C   amLODIPine (NORVASC) 5 MG tablet Take 2 tablets (10 mg) by mouth daily 1/20/2022 at Unknown time Yes Janette Juares MD   atenolol (TENORMIN) 25 MG tablet Take 1 tablet (25 mg) by mouth 2 times daily 1/20/2022 at Unknown time Yes Jesika Theodore PA-C   atorvastatin (LIPITOR) 80 MG tablet Take 80 mg by mouth daily 1/20/2022 at Unknown time Yes Unknown, Entered By History   clopidogrel  "(PLAVIX) 75 MG tablet Take 75 mg by mouth daily 1/20/2022 at Unknown time Yes Unknown, Entered By History   Continuous Blood Gluc Sensor (FREESTYLE SHERWIN 2 SENSOR) MISC Use with Sherwin 2 Leckrone 1/20/2022 at Unknown time Yes Reported, Patient   doxazosin (CARDURA) 2 MG tablet Take 1 tablet (2 mg) by mouth daily 1/20/2022 at Unknown time Yes Alivia Herzog CNP   FLUoxetine (PROZAC) 20 MG/5ML solution 5 mLs (20 mg) by Oral or Feeding Tube route daily 1/20/2022 at Unknown time Yes Taty Bolton MD   insulin aspart (NOVOLOG FLEXPEN) 100 UNIT/ML pen Inject 4-8 Units Subcutaneous 3 times daily (with meals) Take three times daily with Jevity feedings, Take 4 units with one can of Jevity and 8 units with 2 cans of Jevity. 1/20/2022 at Unknown time Yes Unknown, Entered By History   insulin detemir (LEVEMIR PEN) 100 UNIT/ML pen Inject 8 Units Subcutaneous every morning  Yes Unknown, Entered By History   insulin detemir (LEVEMIR PEN) 100 UNIT/ML pen Inject 4 Units Subcutaneous every evening  Yes Unknown, Entered By History   metFORMIN (GLUCOPHAGE) 1000 MG tablet Take 1 tablet (1,000 mg) by mouth 2 times daily (with meals) 1/20/2022 at Unknown time Yes Jesika Theodore PA-C   polyethylene glycol (MIRALAX) 17 g packet Take 17 g by mouth daily as needed for constipation More than a month at Unknown time Yes Jesika Theodore PA-C   sennosides (SENOKOT) 8.6 MG tablet Take 1 tablet by mouth daily as needed for constipation  Yes Unknown, Entered By History   acetaminophen (TYLENOL) 325 MG tablet Take 1-3 tablets (325-975 mg) by mouth every 6 hours as needed for mild pain or fever (> 101 F)   Jesika Tehodore PA-C   blood glucose (NO BRAND SPECIFIED) test strip Use 1 strip to test 3 times daily (before meals and bedtime). E11.29, R80.9, Z79.4   Reported, Patient   Insulin Pen Needle (PEN NEEDLES 5/16\") 31G X 8 MM MISC Inject 1 each Subcutaneous   Reported, Patient   Lancets MISC Use as instructed 3 times daily " (before meals and bedtime). E11.29, R80.9, Z79.4   Reported, Patient   Nutritional Supplements (JEVITY 1.5 ARLETH) LIQD Bolus Jevity 1.5 (5 cans in total) TID via syringe or gravity bag (7am - 1 can, 12pm - 2 cans, and 5pm - 2 cans). Free water flush of 60 mL before and after each feeding.   Taty Bolton MD   ondansetron (ZOFRAN-ODT) 4 MG ODT tab Take 1 tablet (4 mg) by mouth every 6 hours as needed for nausea or vomiting   Taty Bolton MD

## 2022-01-21 NOTE — ED NOTES
[facility]  ED Nurse Handoff Report    Jimmy Otto is a 72 year old male   ED Chief complaint: Shortness of Breath  . ED Diagnosis:   Final diagnoses:   None     Allergies:   Allergies   Allergen Reactions     Nka [No Known Allergies]        Code Status: Full Code  Activity level - Baseline/Home:  Unable to assess. Activity Level - Current:   Total Care. Lift room needed: No. Bariatric: No   Needed: No   Isolation: No. Infection: Not Applicable.     Vital Signs:   Vitals:    01/21/22 0558 01/21/22 0615 01/21/22 0630 01/21/22 0645   BP: 136/73 129/75     Pulse: 106 105     Resp: 22      Temp: 98.6  F (37  C)      TempSrc: Axillary      SpO2: 100% 97% 97% 98%       Cardiac Rhythm:  ,      Pain level:    Patient confused: Yes. Patient Falls Risk: Yes.   Elimination Status: Urethral catheter (ordoñez) in place; refer to orders to discontinue as per protocol    Patient Report - Initial Complaint: Shortness of breath. Focused Assessment: Pt on 4 LPM on non rebreather mask, tachypneic and tachycardic. G tube in place. Ordoñez in place, blood and clots coming out around ordoñez. Pt does not answer questions, responds to painful stimuli. Pt has some dementia at baseline per EMS, not sure if pt is currently at baseline. Pt has RN help at home, lives with family.    Tests Performed: Labs, EKG, Xray. Abnormal Results:   Labs Ordered and Resulted from Time of ED Arrival to Time of ED Departure   LACTIC ACID WHOLE BLOOD - Abnormal       Result Value    Lactic Acid 2.3 (*)    CBC WITH PLATELETS AND DIFFERENTIAL - Abnormal    WBC Count 9.7      RBC Count 4.59      Hemoglobin 11.6 (*)     Hematocrit 41.0      MCV 89      MCH 25.3 (*)     MCHC 28.3 (*)     RDW 14.4      Platelet Count 251      % Neutrophils 75      % Lymphocytes 14      % Monocytes 8      % Eosinophils 1      % Basophils 1      % Immature Granulocytes 1      NRBCs per 100 WBC 0      Absolute Neutrophils 7.3      Absolute Lymphocytes 1.4      Absolute  Monocytes 0.8      Absolute Eosinophils 0.1      Absolute Basophils 0.1      Absolute Immature Granulocytes 0.1      Absolute NRBCs 0.0     COMPREHENSIVE METABOLIC PANEL   TROPONIN I   NT PROBNP INPATIENT   ROUTINE UA WITH MICROSCOPIC REFLEX TO CULTURE     XR Chest Port 1 View    (Results Pending)     .   Treatments provided: See MAR, flushed ordoñez.  Family Comments: n/a  OBS brochure/video discussed/provided to patient:  Yes  ED Medications:   Medications   dexamethasone PF (DECADRON) injection 6 mg (6 mg Intravenous Given 1/21/22 0643)     Drips infusing:  No  For the majority of the shift, the patient's behavior Green. Interventions performed were n/a.    Sepsis treatment initiated: No     Patient tested for COVID 19 prior to admission: YES, pt is positive 2 days ago    ED Nurse Name/Phone Number: Jannet Key RN,   6:49 AM    RECEIVING UNIT ED HANDOFF REVIEW    Above ED Nurse Handoff Report was reviewed: Yes  Reviewed by: Olivia Elder RN on January 21, 2022 at 3:08 PM

## 2022-01-21 NOTE — ED TRIAGE NOTES
Pt arrived by EMS for dyspnea; COVID positive x 2 days; SPO2 85% RA at residence; pt given albuterol in route by EMS; 100% on 8L nonrebreather upon arrival

## 2022-01-21 NOTE — CONSULTS
CLINICAL NUTRITION SERVICES  -  ASSESSMENT NOTE      Recommendations:   Diet and appropriateness/need for SLP consult during admit per MD.      Restart of EN per MD order.  Restart continuous to establish tolerance and with possibility of intolerance (N/V) during periods of acute illness/recent COVID, in combination with lack of admit wt for comparison.  Can change back to bolus (mock bolus via pump d/t staffing) as able and as tolerated prior to discharge.  May need to adjust total volume when admit wt obtained/availble:  - EAD: Baseline GT  - Frequency: Continuous  - Jevity 1.5 at goal rate of 45 mL/hr x 24 hrs  - 1080 mL provides 1620 kcal (27 kcal/kg), 69 g protein (1.2 g/kg), 233 g CHO, 23 g fiber, and 821 mL free water daily  - Meets >100% RDIs  - Free water flush: 60 mL q 4 hrs while on IVFs, increase to 150 mL q 4 hrs for hydration when off IVFs and if remains NPO  - Did not text page MD to change to non-dextrose IVFs as medically needed in the setting of hypernatremia (Na 155 this AM and IVFs recently hung).  Will need to follow-up as able to have discontinued when medically appropriate.    - No baseline magnesium or phosphorus, ordered for completion.  Given no admit wt and currently on D5, initiate at 15 mL/hr x 12 hrs, advance by 15 mL q 12 hrs as tolerated to goal rate.    Admit wt ordered and appreciated.     MALNUTRITION:  % Weight Loss: No admit wt for comparison  % Intake:  Decreased intake does not meet criteria for malnutrition --> daughter reports consistent infusion in home setting   Subcutaneous Fat Loss:  Unable to observe, in ED and COVID+  Muscle Loss:  Unable to observe, in ED and COVID+  Fluid Retention: None documented though limited timeframe since admit    Malnutrition Diagnosis: Unable to determine due to lack of admit wt and NFPE          REASON FOR ASSESSMENT  Jimmy Otto is a 72 year old male seen by Registered Dietitian for Provider Order - Registered Dietitian to Assess and Order TF  "per Medical Nutrition Therapy. Protocol    PMH of: CVA, non-verbal, hemiplegia, enteral reliance w/ GT, recent COVID, DMII, CKD stage 3.    Admit 2/2: Acute respiratory failure, recent COVID, hypernatremia, possible UTI.    NUTRITION HISTORY  - Information obtained from patient's daughter (Clemente) via phone.  She reports patient resides in home setting with his wife and that family and home care staff come daily to assist with cares.    - GT placed 11/01 per review of chart.  Per review of previous RD notes patient was aiming to bolus prior to discharge, and was receiving 5 cartons of Jevity 1.5/day at that time.    - Daughter describes patient was decreased to 4 cartons of Jevity 1.5/day (likely d/t increase in PO intakes as outlined below), utilizing gravity bag.  Schedule as follows:    1 carton Jevity 1.5 at ~8 am  1 carton Jevity 1.5 at ~noon  2 cartons Jevity 1.5 at ~5 pm  - EAD: Baseline GT  - Frequency: Bolus via gravity bag TID  - 4 cartons (948 mL) Jevity 1.5 daily provides 1422 kcal, 61 g protein daily  - Free water flushes: reported as 60 mL before and after feedings + w/ meds and connections overall    - Appears he was NPO at last hospital discharge though daughter reports he was working with home speech therapy and diet had advanced to solids to a degree (mentions scrambled eggs and \"minced meat\").  Exact diet is unclear to this writer.    - Daughter reports they were offering small meals or snacks daily and that patient was able to eat these and possibly a few Magic Cups or Gelatein supplements (they had ordered online) daily.  However in the past ~1 month since his COVID diagnosis, he has been enteral reliant and not taking much of anything orally in the past few weeks PTA.    - States no recent intolerance issues and has consistently been receiving 4 cartons/day in the past week PTA.  Has had intolerance in the past (noted in previous RD notes when admitted --> vomiting following a bolus) and " "daughter describes he has done this at home but not recently.  She noted vomiting when infusing 2 cartons (as opposed to 1) at noon, given patient would also be offered a meal at this time and she believes this would cause GI upset.    - Allergies: NKFA.      CURRENT NUTRITION ORDERS  Diet Order:     None, assuming NPO    Current Intake/Tolerance:  No restart of EN since admission.        NUTRITION FOCUSED PHYSICAL ASSESSMENT FOR DIAGNOSING MALNUTRITION)  No, in ED and COVID r/o     Obtained from Chart/Interdisciplinary Team:  - GT noted in imaging/chest CT from this AM  - Requiring 8 L oxymask  - No documentation of PI  - Stooling patterns reviewed    ANTHROPOMETRICS  Height: 5' 4\"  Weight: 0 lbs 0 oz  There is no height or weight on file to calculate BMI.  Weight Status: Unable to determine without admit wt  Weight History:  Wt Readings from Last 10 Encounters:   11/04/21 59 kg (130 lb)   10/01/21 66.5 kg (146 lb 11.2 oz)   09/16/21 61.5 kg (135 lb 9.3 oz)   08/14/12 68.5 kg (151 lb)     - No admit wt yet for comparison.  Daughter reports they have no ability to weigh patient in home setting.      LABS  Labs reviewed:  Electrolytes  Potassium (mmol/L)   Date Value   01/21/2022 4.0   12/16/2021 4.3   11/04/2021 3.8   08/16/2012 4.0   08/13/2012 3.6     Phosphorus (mg/dL)   Date Value   11/03/2021 2.7   11/02/2021 2.8   11/01/2021 2.6   10/29/2021 2.7   10/28/2021 3.4    Blood Glucose  Glucose (mg/dL)   Date Value   01/21/2022 299 (H)   12/16/2021 150 (H)   11/02/2021 288 (H)   11/01/2021 195 (H)   10/28/2021 220 (H)   08/16/2012 161 (H)   08/13/2012 214 (H)     GLUCOSE BY METER POCT (mg/dL)   Date Value   11/04/2021 292 (H)   11/04/2021 254 (H)   11/04/2021 131 (H)   11/04/2021 189 (H)   11/04/2021 251 (H)     Hemoglobin A1C POCT (%)   Date Value   08/14/2012 7.6 (H)     Hemoglobin A1C (%)   Date Value   12/16/2021 7.4 (H)   09/15/2021 11.5 (H)    Inflammatory Markers  CRP Inflammation (mg/L)   Date Value "   01/21/2022 22.1 (H)   09/19/2021 28.0 (H)     WBC (10e9/L)   Date Value   08/16/2012 5.6   08/13/2012 6.1     WBC Count (10e3/uL)   Date Value   01/21/2022 9.7   12/16/2021 7.1   11/01/2021 5.6     Albumin (g/dL)   Date Value   01/21/2022 1.9 (L)   12/16/2021 2.9 (L)   10/13/2021 3.3 (L)   08/13/2012 5.1 (H)      Magnesium (mg/dL)   Date Value   11/04/2021 2.0   11/03/2021 2.1   11/02/2021 1.9     Sodium (mmol/L)   Date Value   01/21/2022 155 (H)   12/16/2021 140   11/02/2021 138   08/16/2012 140   08/13/2012 141    Renal  Urea Nitrogen (mg/dL)   Date Value   01/21/2022 49 (H)   12/16/2021 32 (H)   11/02/2021 17   08/16/2012 18   08/13/2012 14     Creatinine (mg/dL)   Date Value   01/21/2022 1.31 (H)   12/16/2021 1.02   11/02/2021 1.11   08/16/2012 0.96   08/13/2012 0.91     Additional  Triglycerides (mg/dL)   Date Value   09/15/2021 238 (H)   08/14/2012 136     Ketones Urine (mg/dL)   Date Value   01/21/2022 Negative        B/P: 136/81, T: 98.6, P: 97, R: 22      MEDICATIONS  Medications reviewed:    [START ON 1/22/2022] azithromycin  250 mg Intravenous Q24H     [START ON 1/22/2022] dexamethasone  6 mg Intravenous Daily     insulin aspart  1-6 Units Subcutaneous Q4H     piperacillin-tazobactam  3.375 g Intravenous Q6H     piperacillin-tazobactam  4.5 g Intravenous Once     sodium chloride (PF)  3 mL Intracatheter Q8H        D5W            ASSESSED NUTRITION NEEDS PER APPROVED PRACTICE GUIDELINES:    Dosing Weight 59 kg -   Estimated Energy Needs: >/=1508 kcals - Lisandro Quint[place] w/ activity factor >/=1.2   Justification: maintenance and only available wt until admit obtained  Estimated Protein Needs: 59-71+ grams protein - 1-1.2+ g pro/Kg  Justification: preservation of lean body mass  Estimated Fluid Needs: per MD      NUTRITION DIAGNOSIS:  Inadequate enteral nutrition infusion related to interruption to regimen for hospital admit as evidenced by no restart of EN since admission.      NUTRITION  INTERVENTIONS  Recommendations / Nutrition Prescription  Diet and appropriateness/need for SLP consult during admit per MD.      Restart of EN per MD order.  Restart continuous to establish tolerance and with possibility of intolerance (N/V) during periods of acute illness/recent COVID, in combination with lack of admit wt for comparison.  Can change back to bolus (mock bolus via pump d/t staffing) as able and as tolerated prior to discharge.  May need to adjust total volume when admit wt obtained/availble:  - EAD: Baseline GT  - Frequency: Continuous  - Jevity 1.5 at goal rate of 45 mL/hr x 24 hrs  - 1080 mL provides 1620 kcal (27 kcal/kg), 69 g protein (1.2 g/kg), 233 g CHO, 23 g fiber, and 821 mL free water daily  - Meets >100% RDIs  - Free water flush: 60 mL q 4 hrs while on IVFs, increase to 150 mL q 4 hrs for hydration when off IVFs and if remains NPO  - Did not text page MD to change to non-dextrose IVFs as medically needed in the setting of hypernatremia (Na 155 this AM and IVFs recently hung).  Will need to follow-up as able to have discontinued when medically appropriate.    - No baseline magnesium or phosphorus, ordered for completion.  Given no admit wt and currently on D5, initiate at 15 mL/hr x 12 hrs, advance by 15 mL q 12 hrs as tolerated to goal rate.    Admit wt ordered and appreciated.      Implementation  Nutrition education: Provided education on EN restart with daughter via phone.    EN Composition, EN Schedule and Feeding Tube Flush: As above.    Nutrition Goals  EN to reach goal rate w/in 48 hrs of restart.  EN at goal rate to meet % daily needs.      MONITORING AND EVALUATION:  Progress towards goals will be monitored and evaluated per protocol and Practice Guidelines          Carly Casas RDN, LD  Clinical Dietitian  3rd floor/ICU: [phone]  All other floors: [phone]  Weekend/holiday: [phone]  Office: [phone]

## 2022-01-21 NOTE — ED NOTES
Pt's ordoñez removed and replaced. Pt seems to be uncomfortable - moaning, moving hands towards groin after insertion. Blood noted at meatus prior to reinsertion. Urine is yellow, cloudy, Blood appears to be coming from urethral meatus due to skin breakdown rather than from higher up in the urinary tract.     MD updated. RN will reinsert a new ordoñez and reassess.

## 2022-01-21 NOTE — ED NOTES
informed author patient had blood around ordoñez catheter and on blanket. Catheter remained in place, cleaned up patient, chux placed. Primary RN Hermelindo notified.

## 2022-01-21 NOTE — ED PROVIDER NOTES
"  History     Chief Complaint:  Shortness of Breath       HPI   Jimmy Otto is a 72 year old male who presents for evaluation of increased work of breathing.  History is obtained from EMS, nursing staff, and the patient's daughter Clemente over the telephone.  Apparently, the home health nurse noted some apnea episodes a couple days ago that resolved.  They noticed increased work of breathing and hypoxia today and EMS was called.  He was diagnosed with COVID on 1/1/2021.  He apparently had been doing well until today.  He takes all his feet and liquids by feeding tube.  He is not normally on oxygen.  He is nonverbal at baseline But can make some hand signs    ROS:  Review of Systems   Unable to perform ROS: Patient nonverbal   Respiratory: Positive for shortness of breath.          Allergies:  Nka [No Known Allergies]     Medications:    acetaminophen (TYLENOL) 325 MG tablet  amantadine (SYMMETREL) 100 MG capsule  amLODIPine (NORVASC) 5 MG tablet  atenolol (TENORMIN) 25 MG tablet  atorvastatin (LIPITOR) 80 MG tablet  blood glucose (NO BRAND SPECIFIED) test strip  blood glucose monitoring (ONE TOUCH ULTRA 2) meter device kit  clopidogrel (PLAVIX) 75 MG tablet  Continuous Blood Gluc Sensor (FREESTYLE TITI 2 SENSOR) MISC  doxazosin (CARDURA) 2 MG tablet  FLUoxetine (PROZAC) 20 MG/5ML solution  hydrochlorothiazide (HYDRODIURIL) 25 MG tablet  hydrocortisone 2.5 % cream  insulin aspart (NOVOLOG PEN) 100 UNIT/ML pen  insulin detemir (LEVEMIR PEN) 100 UNIT/ML pen  Insulin Pen Needle (PEN NEEDLES 5/16\") 31G X 8 MM MISC  Lancets MISC  losartan (COZAAR) 100 MG tablet  metFORMIN (GLUCOPHAGE) 1000 MG tablet  Nutritional Supplements (JEVITY 1.5 ARLETH) LIQD  ondansetron (ZOFRAN-ODT) 4 MG ODT tab  polyethylene glycol (MIRALAX) 17 g packet  sennosides (SENOKOT) 8.6 MG tablet  sildenafil (VIAGRA) 100 MG tablet        Past Medical History:    Past Medical History:   Diagnosis Date     Diabetes mellitus (H)      Hypercholesteremia      " Hypertension      Unspecified cerebral artery occlusion with cerebral infarction      Patient Active Problem List   Diagnosis     Cerebral artery occlusion with cerebral infarction (H)     Stroke (H)     Right sided weakness     CVA (cerebral vascular accident) (H)     Adult failure to thrive     Dehydration     Oral thrush     Acute UTI     Nausea and vomiting, intractability of vomiting not specified, unspecified vomiting type     Aphasia as late effect of cerebrovascular accident     Type 2 diabetes mellitus with hyperglycemia, with long-term current use of insulin (H)     Dysarthria due to cerebrovascular accident     Essential hypertension     Hemiplegia following CVA (cerebrovascular accident) (H)     Hyperlipidemia     Imbalance     Late effects of CVA (cerebrovascular accident)     Low back pain     Microalbuminuria     Spasticity     Torn rotator cuff        Past Surgical History:    No past surgical history on file.     Family History:    family history is not on file.    Social History:   reports that he has never smoked. He has never used smokeless tobacco. He reports current alcohol use. He reports that he does not use drugs.  PCP: Manny Worrell     Physical Exam     Patient Vitals for the past 24 hrs:   BP Temp Temp src Pulse Resp SpO2   01/21/22 0615 129/75 -- -- 105 -- 97 %   01/21/22 0558 136/73 98.6  F (37  C) Axillary 106 22 100 %        Physical Exam  Constitutional: Ill appearing.  HEENT: Atraumatic.  Moist mucous membranes.  Neck: Soft.  Supple.  No JVD.  Cardiac: Regular rate and rhythm.  No murmur or rub.  Respiratory: Mildly increased work of breathing.  Diffusely diminished breath sounds with no wheezing.  Abdomen: Soft and nontender. Feeding tube present, no surrounding abnormalities. Nondistended.  Musculoskeletal: No edema.  Normal range of motion.  Neurologic: Alert and not responsive.  Does not follow commands.  Skin: No rashes.  No edema.  Psych: Normal affect.  Normal  behavior.    Emergency Department Course   ECG:  ECG results from 01/21/22   EKG 12 lead     Value    Systolic Blood Pressure     Diastolic Blood Pressure     Ventricular Rate 105    Atrial Rate 105    DE Interval 150    QRS Duration 64        QTc 502    P Axis 36    R AXIS -31    T Axis 38    Interpretation ECG      Sinus tachycardia  Left axis deviation  Minimal voltage criteria for LVH, may be normal variant  Abnormal ECG  When compared with ECG of 13-OCT-2021 23:41,  No significant change was found  Confirmed by - EMERGENCY ROOM, PHYSICIAN ([id number]),  CHARLEE TIDWELL ([id number]) on 1/21/2022 6:44:12 AM               Imaging:  CT Chest Pulmonary Embolism w Contrast   Final Result   IMPRESSION:   1.  No evidence of pulmonary embolism.   2.  Patchy bilateral groundglass and interstitial opacities have a   typical appearance of COVID 19. Superimposed airspace consolidation in   the right lower lobe concerning for superimposed bacterial pneumonia.      ALEXYS ORTEGA MD            SYSTEM ID:  [id number]      XR Chest Port 1 View   Final Result   IMPRESSION: Multifocal patchy infiltrates in both lungs consistent   with pneumonia. No pleural effusion or pneumothorax. Normal heart   size.      TAVARES BRAY MD            SYSTEM ID:  [id number]         Report per radiology    Laboratory:  Labs Ordered and Resulted from Time of ED Arrival to Time of ED Departure   COMPREHENSIVE METABOLIC PANEL - Abnormal       Result Value    Sodium 155 (*)     Potassium 4.0      Chloride 120 (*)     Carbon Dioxide (CO2) 29      Anion Gap 6      Urea Nitrogen 49 (*)     Creatinine 1.31 (*)     Calcium 9.0      Glucose 299 (*)     Alkaline Phosphatase 120      AST 19      ALT 46      Protein Total 8.2      Albumin 1.9 (*)     Bilirubin Total 0.2      GFR Estimate 58 (*)    LACTIC ACID WHOLE BLOOD - Abnormal    Lactic Acid 2.3 (*)    ROUTINE UA WITH MICROSCOPIC REFLEX TO CULTURE - Abnormal    Color Urine Yellow      Appearance Urine  Slightly Cloudy (*)     Glucose Urine >=1000 (*)     Bilirubin Urine Negative      Ketones Urine Negative      Specific Gravity Urine 1.019      Blood Urine Trace (*)     pH Urine 6.5      Protein Albumin Urine 100  (*)     Urobilinogen Urine Normal      Nitrite Urine Negative      Leukocyte Esterase Urine Large (*)     Bacteria Urine Few (*)     WBC Clumps Urine Present (*)     Mucus Urine Present (*)     RBC Urine 12 (*)     WBC Urine >182 (*)    CBC WITH PLATELETS AND DIFFERENTIAL - Abnormal    WBC Count 9.7      RBC Count 4.59      Hemoglobin 11.6 (*)     Hematocrit 41.0      MCV 89      MCH 25.3 (*)     MCHC 28.3 (*)     RDW 14.4      Platelet Count 251      % Neutrophils 75      % Lymphocytes 14      % Monocytes 8      % Eosinophils 1      % Basophils 1      % Immature Granulocytes 1      NRBCs per 100 WBC 0      Absolute Neutrophils 7.3      Absolute Lymphocytes 1.4      Absolute Monocytes 0.8      Absolute Eosinophils 0.1      Absolute Basophils 0.1      Absolute Immature Granulocytes 0.1      Absolute NRBCs 0.0     D DIMER QUANTITATIVE - Abnormal    D-Dimer Quantitative 1.31 (*)    CRP INFLAMMATION - Abnormal    CRP Inflammation 22.1 (*)    INFLUENZA A/B & SARS-COV2 PCR MULTIPLEX - Abnormal    Influenza A PCR Negative      Influenza B PCR Negative      SARS CoV2 PCR Positive (*)    GLUCOSE BY METER - Abnormal    GLUCOSE BY METER POCT 331 (*)    TROPONIN I - Normal    Troponin I High Sensitivity 10     NT PROBNP INPATIENT - Normal    N terminal Pro BNP Inpatient 696     PROCALCITONIN - Normal    Procalcitonin <0.05     MAGNESIUM - Normal    Magnesium 2.1     PHOSPHORUS - Normal    Phosphorus 3.2     GLUCOSE MONITOR NURSING POCT   GLUCOSE MONITOR NURSING POCT   GLUCOSE MONITOR NURSING POCT   URINE CULTURE        Procedures       Emergency Department Course:             Reviewed:  I reviewed nursing notes, vitals, past medical history and Care Everywhere    Interventions:  Medications   dexamethasone PF  (DECADRON) injection 6 mg (has no administration in time range)   IV fluid normal saline  IV Rocephin  IV azithromycin  IV Zosyn    Disposition:  The patient was admitted to the hospital under the care of Dr. Casey.     Impression & Plan    ovid-19  Jimmy Otto was evaluated during a global COVID-19 pandemic, which necessitated consideration that the patient might be at risk for infection with the SARS-CoV-2 virus that causes COVID-19.   Applicable protocols for evaluation were followed during the patient's care.   COVID-19 was considered as part of the patient's evaluation. The plan for testing is:  a test was obtained during this visit.    Medical Decision Making:  iJmmy Otto is a 72-year-old man who is hypoxic.  He is stable on supplemental oxygen.  He apparently tested positive for COVID on 1/1 and is unclear if his hypoxia secondary to COVID versus superimposed bacterial pneumonia.  I given antibiotics for potential superimposed bacterial pneumonia as his chest x-ray is consistent with multifocal pneumonia.  Lab work-up as noted as above.  He has a hypernatremia likely secondary to dehydration.  He was given IV fluids.  His UA is also concerning for infection.  Added on Zosyn given his history of enteric coccus.  We will exchange his Felix catheter.  He has some hematuria and is requiring flushing of the catheter.  I spoke with the daughter over the telephone states the patient is full code and is in agreement with treatment and admission to the hospital.  He was discussed with the hospitalist service who excepts him to the medical floor he is in stable condition at time of admission.    Diagnosis:    ICD-10-CM    1. Acute respiratory failure with hypoxia (H)  J96.01    2. Pneumonia due to 2019 novel coronavirus  U07.1     J12.82    3. Hypernatremia  E87.0    4. Urinary tract infection without hematuria, site unspecified  N39.0         1/21/2022   Mathieu Lobato MD Salay, Nicholas J, MD  01/22/22 0742

## 2022-01-21 NOTE — H&P
History and Physical     Jimmy Otto MRN# [id number]   YOB: 1950 Age: 72 year old      Date of Admission:  1/21/2022    Primary care provider: Manny Worrell          Assessment and Plan:   Jimmy Otto is a medically complex 72 year old male with a PMH significant for CVA causing hemiplegia and non verbal status, indwelling ordoñez catheter, malnutrition with G tube feedings,  recent COVID infection and DM II who presents with increased work of breathing, hypoxia and increased somnolence.     Patient was discussed with Dr. Lobato, who was provider in ED. Chart review of ED work up was reviewed as well as chart review of Care Everywhere, previous visits and admissions.     On admission temp 98.6, pulse 106, pressure 136/73 and initially on non rebreather at 8 litres now weaned to 4 litres. Lab work remarkable for sodium 155, chloride 120, creatinine 1.31, normal LFTs, CRP 22.1, lactic acid 2.3, , troponin 10, glucose 299, WBC 9.7 and Hgb 11.6. CXR showed multifocal patchy infiltrates both lungs, UA looks possibly infected but was taken from indwelling ordoñez and sent for culture, blood cultures drawn and CT PE study ordered. He was given Azithromycin, Ceftriaxone and Dexamethasone 6 mg.    #Acute hypoxemic respiratory failure  -Presents with notable increased work of breathing at home  -No wheezing   -Initially requiring 8 litres but now on 4 litres  -CXR shows multifocal infiltrates. Per family positive for COVID on 1/1 (has had one vaccination)  -Troponin 10 and BNP normal without evidence of fluid overload so low suspicion for heart failure  -Added procalcition, follow blood culture  -Will reswab for COVID and see if we can get documentation for infection. Likely out of the window for benefit from Remdisivir  -Continue Azithromycin, change Ceftriaxone to Zosyn  -Continue Dexamethasone for now  -Obtain CT PE study    #Hypernatremia  #Hyperchloremia   -155 and appears new  -Has nutrition through G tube  so suspect free water deficit, calculated at about 1.7 litres  -D5W at 100 ml/hr and recheck sodium in 6 hours  -Nutrition consult for tube feeds    #Possible UTI  #Hx of E coli UTI  -Urine is very dirty and he is unable to give hx  -Sent for culture and covering with Zosyn given hx of Enterococcus  -Exchanged ordoñez in ER    #Hx of CVA with hemiplegia  #Dysphagia with G tube placement on 11/1  #Severe malnutrition  #Non verbal status  -Stroke in 9/2021  -Cared for by family and home care nurses  -Modified diet with 1:1 supervision if eating  -Continue Plavix and Amantadine    #DM II  [PTA detemir 10 units in AM and 18 units in PM, Aspart 5 units with each can of Jevity, Metformin 1000 mg BID  -Glucose 299, A1c 9.4 in 6/2021  -Hold on long acting insulin until tube feeds restart  -For now NPO status medium resistance sliding scale    #CKD III  -Baseline creatinine 1-1.4    #HTN  -Continue PTA Amlodipine, Atenolol, Doxazosin, Losartan on parameters    #HLP  -Continue Atorvastatin    #Depression  -Continue PTA Fluoxetine    Social: Lives with family who provide total care  Code: Discussed with POA and they have chosen Full code  VTE prophylaxis: Lovenox  Disposition: Inpatient                    Chief Complaint:   Increased work of breathing,          History of Present Illness:   Patient is non verbal and hx provided by ED provider and daughter    Jimmy Otto is a 72 year old male who presents by EMS from home at the request of home health aid and family.  They have noted increased work of breathing as well as a couple of apneic episodes. Notes from 1/19 indicate home care nursing was concerned about increased fatigue/sleeping. He and his wife had COVID earlier this month but he was relatively asymptomatic. There are have been no other concerns except for some blood around catheter in penis.              Past Medical History:     Past Medical History:   Diagnosis Date     Diabetes mellitus (H)      Hypercholesteremia       Hypertension      Unspecified cerebral artery occlusion with cerebral infarction     TIA 8/11               Past Surgical History:   No past surgical history on file.            Social History:     Social History     Socioeconomic History     Marital status:      Spouse name: Not on file     Number of children: Not on file     Years of education: Not on file     Highest education level: Not on file   Occupational History     Not on file   Tobacco Use     Smoking status: Never Smoker     Smokeless tobacco: Never Used   Substance and Sexual Activity     Alcohol use: Yes     Comment: occassional     Drug use: No     Sexual activity: Not on file   Other Topics Concern     Not on file   Social History Narrative     Not on file     Social Determinants of Health     Financial Resource Strain: Not on file   Food Insecurity: Not on file   Transportation Needs: Not on file   Physical Activity: Not on file   Stress: Not on file   Social Connections: Not on file   Intimate Partner Violence: Not on file   Housing Stability: Not on file               Family History:   Family history reviewed and is non contributory         Allergies:      Allergies   Allergen Reactions     Nka [No Known Allergies]                Medications:     Prior to Admission medications    Medication Sig Last Dose Taking? Auth Provider   acetaminophen (TYLENOL) 325 MG tablet Take 1-3 tablets (325-975 mg) by mouth every 6 hours as needed for mild pain or fever (> 101 F)   Jesika Theodore PA-C   amantadine (SYMMETREL) 100 MG capsule Take 1 capsule (100 mg) by mouth daily   Jesika Theodore PA-C   amLODIPine (NORVASC) 5 MG tablet Take 2 tablets (10 mg) by mouth daily   Janette Juares MD   atenolol (TENORMIN) 25 MG tablet Take 1 tablet (25 mg) by mouth 2 times daily   Jesika Theodore PA-C   atorvastatin (LIPITOR) 80 MG tablet Take 80 mg by mouth daily   Unknown, Entered By History   blood glucose (NO BRAND SPECIFIED) test strip Use 1  "strip to test 3 times daily (before meals and bedtime). E11.29, R80.9, Z79.4   Reported, Patient   blood glucose monitoring (ONE TOUCH ULTRA 2) meter device kit Use as directed. Pharmacy dispense brand based on insurance. E11,29, R80.9, Z79.4   Reported, Patient   clopidogrel (PLAVIX) 75 MG tablet Take 75 mg by mouth daily   Unknown, Entered By History   Continuous Blood Gluc Sensor (FREESTYLE SHERWIN 2 SENSOR) MISC Use with [place] 2 [place]   Reported, Patient   doxazosin (CARDURA) 2 MG tablet Take 1 tablet (2 mg) by mouth daily   Alivia Herzog, CNP   FLUoxetine (PROZAC) 20 MG/5ML solution 5 mLs (20 mg) by Oral or Feeding Tube route daily   Taty Bolton MD   hydrochlorothiazide (HYDRODIURIL) 25 MG tablet    Reported, Patient   hydrocortisone 2.5 % cream    Reported, Patient   insulin aspart (NOVOLOG PEN) 100 UNIT/ML pen Take three times daily with Jevity feedings, Take 5 units with one can of Jevity and 10 units with 2 cans of Jevity.   Taty Bolton MD   insulin detemir (LEVEMIR PEN) 100 UNIT/ML pen Take 10 units every morning and 18 units every evening.   Taty Bolton MD   Insulin Pen Needle (PEN NEEDLES 5/16\") 31G X 8 MM MISC Inject 1 each Subcutaneous   Reported, Patient   Lancets MISC Use as instructed 3 times daily (before meals and bedtime). E11.29, R80.9, Z79.4   Reported, Patient   losartan (COZAAR) 100 MG tablet    Reported, Patient   metFORMIN (GLUCOPHAGE) 1000 MG tablet Take 1 tablet (1,000 mg) by mouth 2 times daily (with meals)   Jesika Theodore PA-C   Nutritional Supplements (JEVITY 1.5 ARLETH) LIQD Bolus Jevity 1.5 (5 cans in total) TID via syringe or gravity bag (7am - 1 can, 12pm - 2 cans, and 5pm - 2 cans). Free water flush of 60 mL before and after each feeding.   Taty Bolton MD   ondansetron (ZOFRAN-ODT) 4 MG ODT tab Take 1 tablet (4 mg) by mouth every 6 hours as needed for nausea or vomiting   Taty Bolton MD   polyethylene glycol (MIRALAX) 17 g packet Take 17 g " by mouth daily as needed for constipation   Jesika Theodore PA-C   sennosides (SENOKOT) 8.6 MG tablet Take 1 tablet by mouth At Bedtime   Jesika Theodore PA-C   sildenafil (VIAGRA) 100 MG tablet Take 100 mg by mouth   Reported, Patient              Review of Systems:   A Comprehensive greater than 10 system review of systems was carried out.  Pertinent positives and negatives are noted above.  Otherwise negative for contributory information.            Physical Exam:   Blood pressure 105/63, pulse 97, temperature 98.6  F (37  C), temperature source Axillary, resp. rate 22, SpO2 97 %.  Exam:  GENERAL: Somnolent  PSYCH: Non verbal  HEENT:  Will not open eyes. Oxy mask in place  NECK:  Supple, no neck vein distention, adenopathy or bruits, normal thyroid.  HEART:  Normal S1, S2 with no murmur, no pericardial rub, gallops or S3 or S4.  LUNGS:  Clear to auscultation, normal Respiratory effort. No wheezing, rales or ronchi.  ABDOMEN:  Soft, no hepatosplenomegaly, normal bowel sounds. Non-tender, non distended.   EXTREMITIES:  No pedal edema, +2 pulses bilateral and equal.  SKIN:  Dry to touch, No rash, wound or ulcerations.  NEUROLOGIC:  CN 2-12 grossly intact              Data:     Recent Labs   Lab 01/21/22  0637   WBC 9.7   HGB 11.6*   HCT 41.0   MCV 89        Recent Labs   Lab 01/21/22  0637   *   POTASSIUM 4.0   CHLORIDE 120*   CO2 29   ANIONGAP 6   *   BUN 49*   CR 1.31*   GFRESTIMATED 58*   ARLETH 9.0   PROTTOTAL 8.2   ALBUMIN 1.9*   BILITOTAL 0.2   ALKPHOS 120   AST 19   ALT 46     Recent Labs   Lab 01/21/22  0637   LACT 2.3*         Recent Results (from the past 24 hour(s))   XR Chest Port 1 View    Narrative    XR CHEST PORT 1 VIEW 1/21/2022 7:03 AM    HISTORY: respiratory distress    COMPARISON: 10/19/2021      Impression    IMPRESSION: Multifocal patchy infiltrates in both lungs consistent  with pneumonia. No pleural effusion or pneumothorax. Normal heart  size.    TAVARES BRAY MD          SYSTEM ID:  [id number]         Jaclyn Maynard PA-C    This patient was seen and discussed with Dr. Casey who agrees with the current plans as outlined above.

## 2022-01-22 LAB
ANION GAP SERPL CALCULATED.3IONS-SCNC: 6 MMOL/L (ref 3–14)
BUN SERPL-MCNC: 34 MG/DL (ref 7–30)
CALCIUM SERPL-MCNC: 8.3 MG/DL (ref 8.5–10.1)
CHLORIDE BLD-SCNC: 113 MMOL/L (ref 94–109)
CO2 SERPL-SCNC: 30 MMOL/L (ref 20–32)
CREAT SERPL-MCNC: 1.19 MG/DL (ref 0.66–1.25)
ERYTHROCYTE [DISTWIDTH] IN BLOOD BY AUTOMATED COUNT: 14.2 % (ref 10–15)
GFR SERPL CREATININE-BSD FRML MDRD: 65 ML/MIN/1.73M2
GLUCOSE BLD-MCNC: 281 MG/DL (ref 70–99)
GLUCOSE BLDC GLUCOMTR-MCNC: 147 MG/DL (ref 70–99)
GLUCOSE BLDC GLUCOMTR-MCNC: 218 MG/DL (ref 70–99)
GLUCOSE BLDC GLUCOMTR-MCNC: 225 MG/DL (ref 70–99)
GLUCOSE BLDC GLUCOMTR-MCNC: 298 MG/DL (ref 70–99)
GLUCOSE BLDC GLUCOMTR-MCNC: 315 MG/DL (ref 70–99)
GLUCOSE BLDC GLUCOMTR-MCNC: 333 MG/DL (ref 70–99)
GLUCOSE BLDC GLUCOMTR-MCNC: 343 MG/DL (ref 70–99)
HCT VFR BLD AUTO: 33.2 % (ref 40–53)
HGB BLD-MCNC: 9.3 G/DL (ref 13.3–17.7)
MAGNESIUM SERPL-MCNC: 2.3 MG/DL (ref 1.6–2.3)
MAGNESIUM SERPL-MCNC: 2.3 MG/DL (ref 1.6–2.3)
MCH RBC QN AUTO: 25.3 PG (ref 26.5–33)
MCHC RBC AUTO-ENTMCNC: 28 G/DL (ref 31.5–36.5)
MCV RBC AUTO: 90 FL (ref 78–100)
PHOSPHATE SERPL-MCNC: 3.7 MG/DL (ref 2.5–4.5)
PLATELET # BLD AUTO: 200 10E3/UL (ref 150–450)
POTASSIUM BLD-SCNC: 2.6 MMOL/L (ref 3.4–5.3)
POTASSIUM BLD-SCNC: 4 MMOL/L (ref 3.4–5.3)
RBC # BLD AUTO: 3.68 10E6/UL (ref 4.4–5.9)
SODIUM SERPL-SCNC: 148 MMOL/L (ref 133–144)
SODIUM SERPL-SCNC: 149 MMOL/L (ref 133–144)
SODIUM SERPL-SCNC: 151 MMOL/L (ref 133–144)
WBC # BLD AUTO: 9.1 10E3/UL (ref 4–11)

## 2022-01-22 PROCEDURE — 80051 ELECTROLYTE PANEL: CPT | Performed by: PHYSICIAN ASSISTANT

## 2022-01-22 PROCEDURE — 120N000001 HC R&B MED SURG/OB

## 2022-01-22 PROCEDURE — 258N000003 HC RX IP 258 OP 636: Performed by: PHYSICIAN ASSISTANT

## 2022-01-22 PROCEDURE — 250N000011 HC RX IP 250 OP 636: Performed by: INTERNAL MEDICINE

## 2022-01-22 PROCEDURE — 85027 COMPLETE CBC AUTOMATED: CPT | Performed by: PHYSICIAN ASSISTANT

## 2022-01-22 PROCEDURE — 99221 1ST HOSP IP/OBS SF/LOW 40: CPT | Performed by: STUDENT IN AN ORGANIZED HEALTH CARE EDUCATION/TRAINING PROGRAM

## 2022-01-22 PROCEDURE — 250N000013 HC RX MED GY IP 250 OP 250 PS 637: Performed by: PHYSICIAN ASSISTANT

## 2022-01-22 PROCEDURE — 250N000011 HC RX IP 250 OP 636: Performed by: PHYSICIAN ASSISTANT

## 2022-01-22 PROCEDURE — 258N000002 HC RX IP 258 OP 250: Performed by: INTERNAL MEDICINE

## 2022-01-22 PROCEDURE — 84295 ASSAY OF SERUM SODIUM: CPT | Performed by: INTERNAL MEDICINE

## 2022-01-22 PROCEDURE — 36415 COLL VENOUS BLD VENIPUNCTURE: CPT | Performed by: PHYSICIAN ASSISTANT

## 2022-01-22 PROCEDURE — 36415 COLL VENOUS BLD VENIPUNCTURE: CPT | Performed by: INTERNAL MEDICINE

## 2022-01-22 PROCEDURE — 272N000078 HC NUTRITION PRODUCT INTERMEDIATE LITER

## 2022-01-22 PROCEDURE — 83735 ASSAY OF MAGNESIUM: CPT | Performed by: PHYSICIAN ASSISTANT

## 2022-01-22 PROCEDURE — 84132 ASSAY OF SERUM POTASSIUM: CPT | Performed by: INTERNAL MEDICINE

## 2022-01-22 PROCEDURE — 84100 ASSAY OF PHOSPHORUS: CPT | Performed by: PHYSICIAN ASSISTANT

## 2022-01-22 PROCEDURE — 99233 SBSQ HOSP IP/OBS HIGH 50: CPT | Performed by: INTERNAL MEDICINE

## 2022-01-22 RX ORDER — POTASSIUM CHLORIDE 7.45 MG/ML
10 INJECTION INTRAVENOUS
Status: COMPLETED | OUTPATIENT
Start: 2022-01-22 | End: 2022-01-22

## 2022-01-22 RX ORDER — POTASSIUM CHLORIDE 7.45 MG/ML
10 INJECTION INTRAVENOUS
Status: DISPENSED | OUTPATIENT
Start: 2022-01-22 | End: 2022-01-22

## 2022-01-22 RX ORDER — SODIUM CHLORIDE 450 MG/100ML
INJECTION, SOLUTION INTRAVENOUS CONTINUOUS
Status: ACTIVE | OUTPATIENT
Start: 2022-01-22 | End: 2022-01-23

## 2022-01-22 RX ADMIN — POTASSIUM CHLORIDE 10 MEQ: 7.46 INJECTION, SOLUTION INTRAVENOUS at 12:52

## 2022-01-22 RX ADMIN — INSULIN ASPART 4 UNITS: 100 INJECTION, SOLUTION INTRAVENOUS; SUBCUTANEOUS at 21:49

## 2022-01-22 RX ADMIN — FLUOXETINE HYDROCHLORIDE 20 MG: 20 LIQUID ORAL at 09:50

## 2022-01-22 RX ADMIN — INSULIN ASPART 2 UNITS: 100 INJECTION, SOLUTION INTRAVENOUS; SUBCUTANEOUS at 09:33

## 2022-01-22 RX ADMIN — ATORVASTATIN CALCIUM 80 MG: 40 TABLET, FILM COATED ORAL at 09:50

## 2022-01-22 RX ADMIN — DEXAMETHASONE SODIUM PHOSPHATE 6 MG: 10 INJECTION, SOLUTION INTRAMUSCULAR; INTRAVENOUS at 09:49

## 2022-01-22 RX ADMIN — SODIUM CHLORIDE: 4.5 INJECTION, SOLUTION INTRAVENOUS at 09:34

## 2022-01-22 RX ADMIN — AMLODIPINE BESYLATE 10 MG: 10 TABLET ORAL at 09:51

## 2022-01-22 RX ADMIN — ATENOLOL 25 MG: 25 TABLET ORAL at 09:50

## 2022-01-22 RX ADMIN — INSULIN ASPART 1 UNITS: 100 INJECTION, SOLUTION INTRAVENOUS; SUBCUTANEOUS at 12:42

## 2022-01-22 RX ADMIN — AMANTADINE HYDROCHLORIDE 100 MG: 100 CAPSULE ORAL at 09:51

## 2022-01-22 RX ADMIN — TAZOBACTAM SODIUM AND PIPERACILLIN SODIUM 3.38 G: 375; 3 INJECTION, SOLUTION INTRAVENOUS at 11:56

## 2022-01-22 RX ADMIN — INSULIN ASPART 4 UNITS: 100 INJECTION, SOLUTION INTRAVENOUS; SUBCUTANEOUS at 05:03

## 2022-01-22 RX ADMIN — TAZOBACTAM SODIUM AND PIPERACILLIN SODIUM 3.38 G: 375; 3 INJECTION, SOLUTION INTRAVENOUS at 01:23

## 2022-01-22 RX ADMIN — CLOPIDOGREL BISULFATE 75 MG: 75 TABLET ORAL at 09:50

## 2022-01-22 RX ADMIN — INSULIN ASPART 4 UNITS: 100 INJECTION, SOLUTION INTRAVENOUS; SUBCUTANEOUS at 00:55

## 2022-01-22 RX ADMIN — INSULIN ASPART 2 UNITS: 100 INJECTION, SOLUTION INTRAVENOUS; SUBCUTANEOUS at 17:41

## 2022-01-22 RX ADMIN — POTASSIUM CHLORIDE 10 MEQ: 7.46 INJECTION, SOLUTION INTRAVENOUS at 14:35

## 2022-01-22 RX ADMIN — AZITHROMYCIN MONOHYDRATE 250 MG: 500 INJECTION, POWDER, LYOPHILIZED, FOR SOLUTION INTRAVENOUS at 09:40

## 2022-01-22 RX ADMIN — POTASSIUM CHLORIDE 10 MEQ: 7.46 INJECTION, SOLUTION INTRAVENOUS at 17:39

## 2022-01-22 RX ADMIN — TAZOBACTAM SODIUM AND PIPERACILLIN SODIUM 3.38 G: 375; 3 INJECTION, SOLUTION INTRAVENOUS at 05:51

## 2022-01-22 RX ADMIN — POTASSIUM CHLORIDE 10 MEQ: 7.46 INJECTION, SOLUTION INTRAVENOUS at 16:33

## 2022-01-22 RX ADMIN — TAZOBACTAM SODIUM AND PIPERACILLIN SODIUM 3.38 G: 375; 3 INJECTION, SOLUTION INTRAVENOUS at 18:47

## 2022-01-22 ASSESSMENT — ACTIVITIES OF DAILY LIVING (ADL)
ADLS_ACUITY_SCORE: 36
ADLS_ACUITY_SCORE: 32
ADLS_ACUITY_SCORE: 36
ADLS_ACUITY_SCORE: 30
ADLS_ACUITY_SCORE: 36
ADLS_ACUITY_SCORE: 32
ADLS_ACUITY_SCORE: 36
ADLS_ACUITY_SCORE: 30
ADLS_ACUITY_SCORE: 36
ADLS_ACUITY_SCORE: 30
ADLS_ACUITY_SCORE: 36

## 2022-01-22 ASSESSMENT — MIFFLIN-ST. JEOR: SCORE: 1192.16

## 2022-01-22 NOTE — PLAN OF CARE
Day RN  VS monitored, RA, non-verbal, lift if OOB, hemiplegia R side, g tube infusing at 30/hr, /147, IV Zithromax/Zosyn cont, critical lab of K 2.6 this AM, MD notified and electrolyte protocols order, K currently being replaced via IV, ordoñez flushed with hematuria and small clot OP, urology following (see their note), no s/s pain, updated dtr via phone, will cont to monitor.

## 2022-01-22 NOTE — PROGRESS NOTES
Cross cover.  1. Hypernatremia.  -Increased free water to 150 mL every 4 hours.  -On D5W at 100 mL.  -Monitor sodium  2. uncontrolled blood.  -Patient on D5W and also on tube feed.  -Increased Levemir from 4 to 10 units at bedtime and continue 10 units in the morning.  -NovoLog 3 units every 4 hours with sliding scale, to hold for glucose level less than 100  -Continue sliding scale and monitor glucose.  3.  Gross hematuria.  Flush Felix catheter with 100 mL NS as needed to remove the clots.

## 2022-01-22 NOTE — PLAN OF CARE
VSS, pt is total care with right side hemiplegia.  Nonverbal baseline.  Felix patent, small clots outside of catheter and urine is red and pink.  Pt turned throughout the night every 2-3 hours.  BS check every 4 hours.  BS have been above 300's.  Additional 7 units of Novalog given at 0400 on top of 4 units per sliding scale and 3 units added.  Continous D5w running at 100/hr. Tubing feeding changed to 30 mL/hr this am at 0700.

## 2022-01-22 NOTE — CONSULTS
Urology Consult    Name:  Jimmy Otto  MRN:  [id number]  Age/: 72 year old, 1950    CC: gross hematuria on ordoñez catheter    HPI: Jimmy Otto is a(n) 72 year old male with H/o hemiplegia and non verbal status and presenting with recent respiratory issues and hematuria since catheter exchange at the ED.  Has covid infection with possible respiratory issues.  We were consulted for persistent hematuria with passage of some clots.ordoñez catheter continues to drain with intermittent catheter flushes.  Currently is on Plavix possible for an earlier stroke    Past Medical History:  Past Medical History:   Diagnosis Date     Diabetes mellitus (H)      Hypercholesteremia      Hypertension      Unspecified cerebral artery occlusion with cerebral infarction     TIA        Past Surgical History:  No past surgical history on file.    Allergies:     Allergies   Allergen Reactions     Nka [No Known Allergies]        Medications:  No current facility-administered medications on file prior to encounter.  amantadine (SYMMETREL) 100 MG capsule, Take 1 capsule (100 mg) by mouth daily  amLODIPine (NORVASC) 5 MG tablet, Take 2 tablets (10 mg) by mouth daily  atenolol (TENORMIN) 25 MG tablet, Take 1 tablet (25 mg) by mouth 2 times daily  atorvastatin (LIPITOR) 80 MG tablet, Take 80 mg by mouth daily  clopidogrel (PLAVIX) 75 MG tablet, Take 75 mg by mouth daily  Continuous Blood Gluc Sensor (FREESTYLE TITI 2 SENSOR) [facility] [place], [place], Use with Titi 2 Lapaz  doxazosin (CARDURA) 2 MG tablet, Take 1 tablet (2 mg) by mouth daily  FLUoxetine (PROZAC) 20 MG/5ML solution, 5 mLs (20 mg) by Oral or Feeding Tube route daily  insulin aspart (NOVOLOG FLEXPEN) 100 UNIT/ML pen, Inject 4-8 Units Subcutaneous 3 times daily (with meals) Take three times daily with Jevity feedings, Take 4 units with one can of Jevity and 8 units with 2 cans of Jevity.  insulin detemir (LEVEMIR PEN) 100 UNIT/ML pen, Inject 8 Units Subcutaneous every morning  insulin detemir (LEVEMIR  "PEN) 100 UNIT/ML pen, Inject 4 Units Subcutaneous every evening  metFORMIN (GLUCOPHAGE) 1000 MG tablet, Take 1 tablet (1,000 mg) by mouth 2 times daily (with meals)  polyethylene glycol (MIRALAX) 17 g packet, Take 17 g by mouth daily as needed for constipation  sennosides (SENOKOT) 8.6 MG tablet, Take 1 tablet by mouth daily as needed for constipation  acetaminophen (TYLENOL) 325 MG tablet, Take 1-3 tablets (325-975 mg) by mouth every 6 hours as needed for mild pain or fever (> 101 F)  blood glucose (NO BRAND SPECIFIED) test strip, Use 1 strip to test 3 times daily (before meals and bedtime). E11.29, R80.9, Z79.4  Insulin Pen Needle (PEN NEEDLES 5/16\") 31G X 8 MM MISC, Inject 1 each Subcutaneous  Lancets MISC, Use as instructed 3 times daily (before meals and bedtime). E11.29, R80.9, Z79.4  Nutritional Supplements (JEVITY 1.5 ARLETH) LIQD, Bolus Jevity 1.5 (5 cans in total) TID via syringe or gravity bag (7am - 1 can, 12pm - 2 cans, and 5pm - 2 cans). Free water flush of 60 mL before and after each feeding.  ondansetron (ZOFRAN-ODT) 4 MG ODT tab, Take 1 tablet (4 mg) by mouth every 6 hours as needed for nausea or vomiting        Social History:  Social History     Socioeconomic History     Marital status:      Spouse name: Not on file     Number of children: Not on file     Years of education: Not on file     Highest education level: Not on file   Occupational History     Not on file   Tobacco Use     Smoking status: Never Smoker     Smokeless tobacco: Never Used   Substance and Sexual Activity     Alcohol use: Yes     Comment: occassional     Drug use: No     Sexual activity: Not on file   Other Topics Concern     Not on file   Social History Narrative     Not on file     Social Determinants of Health     Financial Resource Strain: Not on file   Food Insecurity: Not on file   Transportation Needs: Not on file   Physical Activity: Not on file   Stress: Not on file   Social Connections: Not on file   Intimate " "Partner Violence: Not on file   Housing Stability: Not on file       Family History:  No family history on file.    ROS:  The remainder of the complete ROS was negative unless noted in the HPI.    Exam:  /72   Pulse 68   Temp 96.9  F (36.1  C) (Temporal)   Resp 20   Ht 1.626 m (5' 4\")   Wt 53.1 kg (117 lb 1.6 oz)   SpO2 97%   BMI 20.10 kg/m    General: non verbal and lying in bed.   Resp: CTAB, no crackles or wheezes  Cardiac: Regular rate; extremities warm;   Abdomen: Soft, nontender, nondistended. .  : cherry red urine in the catheter with some clots in the tubing.  Extremities: No LE edema or obvious joint abnormalities  Skin: Warm and dry, no jaundice or rash    Labs:  Lab Results   Component Value Date    WBC 9.1 01/22/2022    WBC 9.7 01/21/2022    WBC 7.1 12/16/2021    WBC 5.6 11/01/2021    WBC 6.6 10/28/2021    HGB 9.3 (L) 01/22/2022    HGB 11.6 (L) 01/21/2022    HGB 12.6 (L) 12/16/2021    HGB 11.3 (L) 11/01/2021    HGB 12.6 (L) 10/28/2021    HCT 33.2 (L) 01/22/2022    HCT 41.0 01/21/2022    HCT 40.3 12/16/2021    HCT 37.7 (L) 11/01/2021    HCT 42.2 10/28/2021     01/22/2022     01/21/2022     12/16/2021     11/02/2021     11/01/2021     (H) 01/22/2022     (H) 01/21/2022     (H) 01/21/2022     (H) 01/21/2022     12/16/2021    POTASSIUM 2.6 (LL) 01/22/2022    POTASSIUM 4.0 01/21/2022    POTASSIUM 4.3 12/16/2021    POTASSIUM 3.8 11/04/2021    POTASSIUM 4.1 11/03/2021    CHLORIDE 113 (H) 01/22/2022    CHLORIDE 120 (H) 01/21/2022    CHLORIDE 100 12/16/2021    CHLORIDE 103 11/02/2021    CHLORIDE 105 11/01/2021    CO2 30 01/22/2022    CO2 29 01/21/2022    CO2 28 12/16/2021    CO2 30 11/02/2021    CO2 30 11/01/2021    BUN 34 (H) 01/22/2022    BUN 49 (H) 01/21/2022    BUN 32 (H) 12/16/2021    BUN 17 11/02/2021    BUN 16 11/01/2021    CR 1.19 01/22/2022    CR 1.31 (H) 01/21/2022    CR 1.02 12/16/2021    CR 1.11 11/02/2021    CR 1.01 " 11/01/2021     (H) 01/22/2022     (H) 01/22/2022     (H) 01/22/2022     (H) 01/22/2022     (H) 01/22/2022    SED 66 (H) 09/19/2021    DD 1.31 (H) 01/21/2022    NTBNPI 696 01/21/2022    TROPONIN <0.015 10/13/2021    TROPONIN <0.015 09/18/2021    TROPONIN <0.015 09/14/2021    TROPI <0.012 08/16/2012    TROPI <0.012 08/14/2012    TROPI <0.012 08/13/2012    TROPI <0.012 08/13/2012    AST 19 01/21/2022    AST 15 12/16/2021    AST 15 10/13/2021    AST 20 09/15/2021    AST 36 08/13/2012    ALT 46 01/21/2022    ALT 28 12/16/2021    ALT 19 10/13/2021    ALT 22 09/15/2021    ALT 13 08/13/2012    ALKPHOS 120 01/21/2022    ALKPHOS 123 12/16/2021    ALKPHOS 158 (H) 10/13/2021    ALKPHOS 80 09/15/2021    ALKPHOS 87 08/13/2012    BILITOTAL 0.2 01/21/2022    BILITOTAL 0.3 12/16/2021    BILITOTAL 0.4 10/13/2021    BILITOTAL 0.3 09/15/2021    BILITOTAL 0.6 08/13/2012    INR 0.96 11/01/2021    INR 1.03 09/16/2021    INR 0.94 09/14/2021    INR 1.00 08/16/2012    INR 0.96 08/13/2012           Assessment and Plan: Jimmy Otto is a(n) 72 year old male with complex medical history and persistent hematuria since catheter exchange in the ED.  Also has had prior history of hematuria and evaluation for the same in 11/2021 as an outpatient.  However, urine cytology was negative and a CT Urogram has not been done.  His HB has seen a drop for 11.3 to 9.7 in the last 24 hours or so.  He is currently on Plavix that may be a cause for the persistent hematuria.    I irrigated the ordoñez  catheter with about 500 ml of saline with removal of several small clots and improvement in the intensity of the hematuria until no clots were evident.    Recommendations:  1. Continue trending Hb  2. Please consider holding Plavix in view of the persistent hematuria and decreasing Hb  3. Continue flushing/irigating the catheter with 50 -100 ml of saline every 4-6 hour to prevent clots.  4. In the event of non -improvement of the  hematuria, would recommend 3 way ordoñez catheter and CBI.  5. PLan for a CT Urogram and outpatient cystoscopy.      Calvin Borja MD  [facility]

## 2022-01-22 NOTE — PROGRESS NOTES
[facility]  Hospitalist Progress Note  Jorge Stewart MD 01/22/2022      Reason for Stay (Diagnosis): respiratory failure         Assessment and Plan:      Summary of Stay: Jimmy Otto is a medically complex 72 year old male with a PMH significant for CVA causing hemiplegia and non verbal status, indwelling ordoñez catheter, malnutrition with G tube feedings,  recent COVID infection and DM II who presented with increased work of breathing, hypoxia and increased somnolence found to have hypernatremia, positive COVID-19 swab and chest x-ray showing multifocal pneumonia.  He also has had hematuria with clotting here in the hospital following Ordoñez exchange in the ER.     Specifically, when he arrived he was mildly tachycardic and hypoxic to the point that he required non rebreather at 8 litres which was shortly weaned to 4 litres. Lab work remarkable for sodium 155, chloride 120, creatinine 1.31, normal LFTs, CRP 22.1, lactic acid 2.3, , troponin 10, glucose 299, WBC 9.7 and Hgb 11.6. CXR showed multifocal patchy infiltrates both lungs, UA looks possibly infected but was taken from indwelling ordoñez and sent for culture, blood cultures drawn and CT PE study ordered. He was given Azithromycin, Ceftriaxone and Dexamethasone 6 mg.       #Acute hypoxemic respiratory failure: Likely related to COVID-19 infection.  Procalcitonin negative arguing against a bacterial pneumonia but in the setting of grossly abnormal urine we will continue antibiotics at least 1 day more.  -Continue supplemental oxygen  -CXR shows multifocal infiltrates. Per family positive for COVID on 1/1 (has had one vaccination)  -Troponin negative and BNP normal without evidence of fluid overload  -Continue Azithromycin,  Zosyn  -Continue Dexamethasone for now  -CT PE study negative for clot but does show multifocal pneumonia in addition to some lobar consolidation which could represent a bacterial pneumonia     #Hypernatremia,  hyperchloremia: Due to free water deficit related to inability to eat/drink and recent infectious process.  -Tube feeds restarted, nutrition following  -Free water flushes to 150 cc per 4-hour  -Currently getting 0.45 normal saline at 100 cc/h, likely will reduce in the next day or so.     #Possible UTI, hematuria  -Felix was exchanged in the ER, appears to have now developed significant clotting/bleeding.  -Irrigating Felix catheter periodically  -Urology consulted  -Continue Zosyn for now pending cultures  -Monitor hemoglobin     #Hx of CVA with hemiplegia  #Dysphagia with G tube placement on 11/1  #Severe malnutrition  #Non verbal status  -Stroke in 9/2021  -Cared for by family and home care nurses  -Modified diet with 1:1 supervision if eating  -Continue Plavix and Amantadine     #DM II with hyperglycemia  [PTA detemir 10 units in AM and 18 units in PM, Aspart 5 units with each can of Jevity, Metformin 1000 mg BID  -Has been hyperglycemic here in the setting of steroids and initially holding his long-acting insulin  -Titrate detemir to 14 units twice daily  -3 units aspart scheduled every 4 hours with tube feeds.  Need to hold if tube feeds are stopped for any reason.  -Every 4 hours sliding scale insulin     #CKD III  -Baseline creatinine 1-1.4     #HTN  -Continue PTA Amlodipine, Atenolol, Doxazosin, Losartan on parameters     #HLP  -Continue Atorvastatin     #Depression  -Continue PTA Fluoxetine    Hypokalemia: Starting electrolyte replacement protocol.  Tube feeds also being restarted.     Social: Lives with family who provide total care  Code: My colleague discussed with POA and they have chosen Full code  VTE prophylaxis: Lovenox  Disposition: At least 3 more days expected        Interval History (Subjective):      I assumed care today  Sodium trending down, free water flushes increased overnight  Stopping dextrose containing fluids, changed to 0.45 normal saline  Blood sugar starting to improve, now in the  "200s after being in the 300s all night  Titrated insulin as above  Tube feeds restarted but not yet at goal  Hematuria noted, urology was already consulted                  Physical Exam:      Last Vital Signs:  /72   Pulse 68   Temp 96.9  F (36.1  C) (Temporal)   Resp 20   Ht 1.626 m (5' 4\")   Wt 53.1 kg (117 lb 1.6 oz)   SpO2 97%   BMI 20.10 kg/m        Intake/Output Summary (Last 24 hours) at 1/22/2022 1132  Last data filed at 1/22/2022 1119  Gross per 24 hour   Intake 147 ml   Output 1770 ml   Net -1623 ml       General:  awake, no acute distress.  HEENT: NC/AT, eyes anicteric  Cardiac: RRR, S1, S2.  No murmurs appreciated.  Pulmonary: Normal chest rise, normal work of breathing.   Abdomen: soft, non-tender, non-distended.   Extremities: no deformities.  Warm, well perfused.  Skin: no rashes or lesions noted.  Warm and Dry.  Neuro: Non verbal.  Seems to open eyes to voice today.  No clonus or tremor.         Medications:      All current medications were reviewed with changes reflected in problem list.         Data:      All new lab and imaging data was reviewed.   Labs:  Recent Labs   Lab 01/22/22  0931 01/22/22  0830   NA  --  149*   POTASSIUM  --  2.6*   CHLORIDE  --  113*   CO2  --  30   ANIONGAP  --  6   * 281*   BUN  --  34*   CR  --  1.19   GFRESTIMATED  --  65   ARLETH  --  8.3*     Recent Labs   Lab 01/22/22  0830   WBC 9.1   HGB 9.3*   HCT 33.2*   MCV 90         Imaging:   Recent Results (from the past 48 hour(s))   XR Chest Port 1 View    Narrative    XR CHEST PORT 1 VIEW 1/21/2022 7:03 AM    HISTORY: respiratory distress    COMPARISON: 10/19/2021      Impression    IMPRESSION: Multifocal patchy infiltrates in both lungs consistent  with pneumonia. No pleural effusion or pneumothorax. Normal heart  size.    TAVARES BRAY MD         SYSTEM ID:  [id number]   CT Chest Pulmonary Embolism w Contrast    Narrative    CT CHEST PULMONARY EMBOLISM WITH CONTRAST 1/21/2022 10:39 " AM    CLINICAL HISTORY: PE suspected, high probability. Recent COVID,  respiratory failure, evaluate pulmonary embolus.    TECHNIQUE: CT angiogram chest during arterial phase injection IV  contrast. 2D and 3D MIP reconstructions were performed by the CT  technologist. Dose reduction techniques were used.     CONTRAST: 61mL Isovue-370    COMPARISON: None.    FINDINGS:  ANGIOGRAM CHEST: Pulmonary arteries are normal caliber and negative  for pulmonary emboli. Thoracic aorta is normal in caliber and negative  for dissection.     LUNGS AND PLEURA: There are patchy bilateral groundglass and  interstitial opacities with focal airspace consolidation in the right  lower lobe. No pleural effusion or pneumothorax.    MEDIASTINUM/AXILLAE: No thoracic or axillary lymphadenopathy. Thyroid  and esophagus are unremarkable.    CORONARY ARTERY CALCIFICATION: Mild.    UPPER ABDOMEN: A percutaneous gastrostomy tube is noted.    MUSCULOSKELETAL: No destructive bone lesions.      Impression    IMPRESSION:  1.  No evidence of pulmonary embolism.  2.  Patchy bilateral groundglass and interstitial opacities have a  typical appearance of COVID 19. Superimposed airspace consolidation in  the right lower lobe concerning for superimposed bacterial pneumonia.    ALEXYS ORTEGA MD         SYSTEM ID:  [id number]         Alexys Stewart MD

## 2022-01-22 NOTE — PLAN OF CARE
"BP (!) 148/85 (BP Location: Left arm, Patient Position: Supine)   Pulse 85   Temp 97.9  F (36.6  C) (Temporal)   Resp 30   Ht 1.626 m (5' 4\")   Wt 58.6 kg (129 lb 3.2 oz)   SpO2 96%   BMI 22.18 kg/m    Orientation: unable to assess.  Resp: Diminished LS in bases  Cardio: WDL  Neuro: Hemiplegia (right side)  Skin: WDL  Pain: Nonverbal signs sometimes present. Restless  CMS: Severely impaired in RLE and RUE. Moderately impaired LLE. Mildly impaired LUE.  Dressing: None  Activity: Bedrest  Drains: Felix  Diet: Tube feed. 15ml/hr  Voiding: Red. Clots. Felix.  Discharge Plan: Correct electrolyte imbalance. Improve gas exchange.    Patient admitted and was lethargic. Unable to respond to questions. During shift began to become more restless and able to move left-side extremities. Lost IV access in left hand d/t movement. No-no present on right arm with IV infusing d5w at 100ml/hr.    Patient more alert, but still unable to answer questions. RN called daughter and she states he was able to answer yes/no questions with a thumbs up/down from left hand. States he understands english. Stated he drank thickened liquids (unable to recall consistency) and ate minced moist foods. States confusion has increased and he has stopped responding the way he used to. Hematuria started approximately 2 weeks ago per family.    Patient has large clots of blood in urine that also bypass the catheter. Attempted to flush, but no return after flushing 100ml per order. Felix replaced and currently functional.    Patient given PRN tylenol for restlessness. BG remains elevated, but new insulin orders received and administered.    Tube feed started at 1900 at 15ml/hr. Next increase should be an extra 15ml/hr at 0700 if tolerating. Goal is 45 ml/hr.  "

## 2022-01-23 ENCOUNTER — APPOINTMENT (OUTPATIENT)
Dept: SPEECH THERAPY | Facility: CLINIC | Age: 72
DRG: 698 | End: 2022-01-23
Attending: INTERNAL MEDICINE
Payer: MEDICARE

## 2022-01-23 LAB
ANION GAP SERPL CALCULATED.3IONS-SCNC: 3 MMOL/L (ref 3–14)
ANION GAP SERPL CALCULATED.3IONS-SCNC: 6 MMOL/L (ref 3–14)
BACTERIA UR CULT: ABNORMAL
BUN SERPL-MCNC: 29 MG/DL (ref 7–30)
BUN SERPL-MCNC: 30 MG/DL (ref 7–30)
CALCIUM SERPL-MCNC: 8 MG/DL (ref 8.5–10.1)
CALCIUM SERPL-MCNC: 8 MG/DL (ref 8.5–10.1)
CHLORIDE BLD-SCNC: 112 MMOL/L (ref 94–109)
CHLORIDE BLD-SCNC: 113 MMOL/L (ref 94–109)
CO2 SERPL-SCNC: 27 MMOL/L (ref 20–32)
CO2 SERPL-SCNC: 29 MMOL/L (ref 20–32)
CREAT SERPL-MCNC: 1.01 MG/DL (ref 0.66–1.25)
CREAT SERPL-MCNC: 1.04 MG/DL (ref 0.66–1.25)
CRP SERPL-MCNC: 37.7 MG/L (ref 0–8)
ERYTHROCYTE [DISTWIDTH] IN BLOOD BY AUTOMATED COUNT: 13.9 % (ref 10–15)
GFR SERPL CREATININE-BSD FRML MDRD: 76 ML/MIN/1.73M2
GFR SERPL CREATININE-BSD FRML MDRD: 79 ML/MIN/1.73M2
GLUCOSE BLD-MCNC: 388 MG/DL (ref 70–99)
GLUCOSE BLD-MCNC: 397 MG/DL (ref 70–99)
GLUCOSE BLDC GLUCOMTR-MCNC: 279 MG/DL (ref 70–99)
GLUCOSE BLDC GLUCOMTR-MCNC: 309 MG/DL (ref 70–99)
GLUCOSE BLDC GLUCOMTR-MCNC: 318 MG/DL (ref 70–99)
GLUCOSE BLDC GLUCOMTR-MCNC: 352 MG/DL (ref 70–99)
GLUCOSE BLDC GLUCOMTR-MCNC: 364 MG/DL (ref 70–99)
GLUCOSE BLDC GLUCOMTR-MCNC: 371 MG/DL (ref 70–99)
GLUCOSE BLDC GLUCOMTR-MCNC: 381 MG/DL (ref 70–99)
GLUCOSE BLDC GLUCOMTR-MCNC: 401 MG/DL (ref 70–99)
HCT VFR BLD AUTO: 31.6 % (ref 40–53)
HGB BLD-MCNC: 9.1 G/DL (ref 13.3–17.7)
MAGNESIUM SERPL-MCNC: 2.4 MG/DL (ref 1.6–2.3)
MCH RBC QN AUTO: 25.4 PG (ref 26.5–33)
MCHC RBC AUTO-ENTMCNC: 28.8 G/DL (ref 31.5–36.5)
MCV RBC AUTO: 88 FL (ref 78–100)
PHOSPHATE SERPL-MCNC: 3.4 MG/DL (ref 2.5–4.5)
PLATELET # BLD AUTO: 208 10E3/UL (ref 150–450)
POTASSIUM BLD-SCNC: 3.4 MMOL/L (ref 3.4–5.3)
POTASSIUM BLD-SCNC: 3.8 MMOL/L (ref 3.4–5.3)
POTASSIUM BLD-SCNC: 4 MMOL/L (ref 3.4–5.3)
RBC # BLD AUTO: 3.58 10E6/UL (ref 4.4–5.9)
SODIUM SERPL-SCNC: 145 MMOL/L (ref 133–144)
SODIUM SERPL-SCNC: 145 MMOL/L (ref 133–144)
WBC # BLD AUTO: 9.3 10E3/UL (ref 4–11)

## 2022-01-23 PROCEDURE — 92610 EVALUATE SWALLOWING FUNCTION: CPT | Mod: GN

## 2022-01-23 PROCEDURE — 250N000011 HC RX IP 250 OP 636: Performed by: INTERNAL MEDICINE

## 2022-01-23 PROCEDURE — 84100 ASSAY OF PHOSPHORUS: CPT | Performed by: PHYSICIAN ASSISTANT

## 2022-01-23 PROCEDURE — 250N000011 HC RX IP 250 OP 636: Performed by: PHYSICIAN ASSISTANT

## 2022-01-23 PROCEDURE — 83735 ASSAY OF MAGNESIUM: CPT | Performed by: PHYSICIAN ASSISTANT

## 2022-01-23 PROCEDURE — 250N000013 HC RX MED GY IP 250 OP 250 PS 637: Performed by: PHYSICIAN ASSISTANT

## 2022-01-23 PROCEDURE — 85027 COMPLETE CBC AUTOMATED: CPT | Performed by: PHYSICIAN ASSISTANT

## 2022-01-23 PROCEDURE — 80048 BASIC METABOLIC PNL TOTAL CA: CPT | Performed by: INTERNAL MEDICINE

## 2022-01-23 PROCEDURE — 36415 COLL VENOUS BLD VENIPUNCTURE: CPT | Performed by: INTERNAL MEDICINE

## 2022-01-23 PROCEDURE — 86140 C-REACTIVE PROTEIN: CPT | Performed by: INTERNAL MEDICINE

## 2022-01-23 PROCEDURE — 99233 SBSQ HOSP IP/OBS HIGH 50: CPT | Performed by: INTERNAL MEDICINE

## 2022-01-23 PROCEDURE — 36415 COLL VENOUS BLD VENIPUNCTURE: CPT | Performed by: PHYSICIAN ASSISTANT

## 2022-01-23 PROCEDURE — 120N000001 HC R&B MED SURG/OB

## 2022-01-23 PROCEDURE — 258N000002 HC RX IP 258 OP 250: Performed by: INTERNAL MEDICINE

## 2022-01-23 PROCEDURE — 82310 ASSAY OF CALCIUM: CPT | Performed by: PHYSICIAN ASSISTANT

## 2022-01-23 PROCEDURE — 84132 ASSAY OF SERUM POTASSIUM: CPT | Performed by: INTERNAL MEDICINE

## 2022-01-23 PROCEDURE — 272N000078 HC NUTRITION PRODUCT INTERMEDIATE LITER

## 2022-01-23 RX ORDER — POTASSIUM CHLORIDE 7.45 MG/ML
10 INJECTION INTRAVENOUS
Status: COMPLETED | OUTPATIENT
Start: 2022-01-23 | End: 2022-01-23

## 2022-01-23 RX ORDER — LEVOFLOXACIN 5 MG/ML
500 INJECTION, SOLUTION INTRAVENOUS EVERY 24 HOURS
Status: DISCONTINUED | OUTPATIENT
Start: 2022-01-23 | End: 2022-01-26 | Stop reason: HOSPADM

## 2022-01-23 RX ADMIN — TAZOBACTAM SODIUM AND PIPERACILLIN SODIUM 3.38 G: 375; 3 INJECTION, SOLUTION INTRAVENOUS at 05:41

## 2022-01-23 RX ADMIN — ATENOLOL 25 MG: 25 TABLET ORAL at 09:34

## 2022-01-23 RX ADMIN — INSULIN ASPART 5 UNITS: 100 INJECTION, SOLUTION INTRAVENOUS; SUBCUTANEOUS at 04:11

## 2022-01-23 RX ADMIN — AMLODIPINE BESYLATE 10 MG: 10 TABLET ORAL at 09:34

## 2022-01-23 RX ADMIN — INSULIN ASPART 4 UNITS: 100 INJECTION, SOLUTION INTRAVENOUS; SUBCUTANEOUS at 12:03

## 2022-01-23 RX ADMIN — DEXAMETHASONE SODIUM PHOSPHATE 6 MG: 10 INJECTION, SOLUTION INTRAMUSCULAR; INTRAVENOUS at 09:34

## 2022-01-23 RX ADMIN — FLUOXETINE HYDROCHLORIDE 20 MG: 20 LIQUID ORAL at 09:49

## 2022-01-23 RX ADMIN — INSULIN ASPART 5 UNITS: 100 INJECTION, SOLUTION INTRAVENOUS; SUBCUTANEOUS at 09:29

## 2022-01-23 RX ADMIN — AMANTADINE HYDROCHLORIDE 100 MG: 100 CAPSULE ORAL at 09:34

## 2022-01-23 RX ADMIN — ATORVASTATIN CALCIUM 80 MG: 40 TABLET, FILM COATED ORAL at 09:34

## 2022-01-23 RX ADMIN — POTASSIUM CHLORIDE 10 MEQ: 7.46 INJECTION, SOLUTION INTRAVENOUS at 11:24

## 2022-01-23 RX ADMIN — INSULIN ASPART 5 UNITS: 100 INJECTION, SOLUTION INTRAVENOUS; SUBCUTANEOUS at 00:14

## 2022-01-23 RX ADMIN — TAZOBACTAM SODIUM AND PIPERACILLIN SODIUM 3.38 G: 375; 3 INJECTION, SOLUTION INTRAVENOUS at 00:18

## 2022-01-23 RX ADMIN — DOXAZOSIN 2 MG: 1 TABLET ORAL at 09:34

## 2022-01-23 RX ADMIN — LEVOFLOXACIN 500 MG: 500 INJECTION, SOLUTION INTRAVENOUS at 09:53

## 2022-01-23 RX ADMIN — SODIUM CHLORIDE: 4.5 INJECTION, SOLUTION INTRAVENOUS at 06:31

## 2022-01-23 RX ADMIN — POTASSIUM CHLORIDE 10 MEQ: 7.46 INJECTION, SOLUTION INTRAVENOUS at 13:07

## 2022-01-23 RX ADMIN — ATENOLOL 25 MG: 25 TABLET ORAL at 20:47

## 2022-01-23 ASSESSMENT — ACTIVITIES OF DAILY LIVING (ADL)
ADLS_ACUITY_SCORE: 30

## 2022-01-23 ASSESSMENT — MIFFLIN-ST. JEOR: SCORE: 1224.37

## 2022-01-23 NOTE — PLAN OF CARE
"/63 (BP Location: Left arm)   Pulse 68   Temp (!) 95.9  F (35.5  C) (Temporal)   Resp 18   Ht 1.626 m (5' 4\")   Wt 53.1 kg (117 lb 1.6 oz)   SpO2 98%   BMI 20.10 kg/m    Orientation: Oriented patient to unit.  Resp: Diminished LS in bases  Cardio: WDL  Neuro: Right-sided hemiplegia. Swallowing difficulty per family.  Skin: WDL  Pain: Denies  CMS: Limited mobility on right-side.  Dressing: None  Activity: Bedrest  Drains: None  Diet: Tube feed 45ml/hr  Voiding: Felix. Red with small clots.  Discharge Plan: Improved electrolyte imbalance.    Patient increased to 45ml/hr feeding per diet orders. Patient able to respond with thumbs up/down to yes/no questions. RN oriented patient to unit. Patient watching TV.    Patient BG improved over yesterday. Staying under 300 as opposed to 400. Hematuria  improving. Catheter is patent. Urine remains a clear red color. Flushing q4 hours per urology note.    K was critically low at 2.6. Replaced and rechecked at 4.0.  "

## 2022-01-23 NOTE — PROGRESS NOTES
"   01/23/22 0932   General Information   Onset of Illness/Injury or Date of Surgery 01/21/22   Referring Physician Dr. Stewart   Patient/Family Therapy Goal Statement (SLP) pt indicating he wants something to drink   Pertinent History of Current Problem Per H&P, \"Jimmy Otto is a medically complex 72 year old male with a PMH significant for CVA causing hemiplegia and non verbal status, indwelling ordoñez catheter, malnutrition with G tube feedings,  recent COVID infection and DM II who presents with increased work of breathing, hypoxia and increased somnolence.\"   General Observations Pt awake with reduced attention, limited command following and verbal output.   Past History of Dysphagia Pt seen for SLP services during multiple past admissions. Most recent recommend was for puree diet (4) with mildly thick liquid (2) via tsp or small cup sip. Per MD, with possible diet upgrade during home therapy (\"minced meat\").   Pain Assessment   Patient Currently in Pain No   Type of Evaluation   Type of Evaluation Swallow Evaluation   Oral Motor   Oral Musculature unable to assess due to poor participation/comprehension   Mucosal Quality   (fair, ?partial coating on lingual surface)   Dentition (Oral Motor)   Dentition (Oral Motor) natural dentition;adequate dentition   Cough/Swallow/Gag Reflex (Oral Motor)   Volitional Throat Clear/Cough (Oral Motor) unable/difficult to assess   Volitional Swallow (Oral Motor) unable/difficult to assess   Vocal Quality/Secretion Management (Oral Motor)   Vocal Quality (Oral Motor) aphonia;breathy   Secretion Management (Oral Motor) WNL   General Swallowing Observations   Current Diet/Method of Nutritional Intake (General Swallowing Observations, NIS) NPO;gastrostomy tube (PEG)   Swallowing Evaluation Clinical swallow evaluation   Clinical Swallow Evaluation   Feeding Assistance dependent   Clinical Swallow Evaluation Textures Trialed mildly thick liquids;pureed   Clinical Swallow Eval: Mildly " Thick Liquids   Mode of Presentation spoon;fed by clinician   Oral Phase delayed AP movement;effortful AP movement   Pharyngeal Phase suspect impaired;reduction in laryngeal movement  (?delayed swallow)   Clinical Swallow Evaluation: Puree Solid Texture Trial   Mode of Presentation, Puree spoon;fed by clinician   Oral Phase, Puree delayed AP movement;effortful AP movement   Pharyngeal Phase, Puree suspect impaired;reduction in laryngeal movement  (?delayed swallow)   Swallowing Recommendations   Diet Consistency Recommendations pureed (level 4);mildly thick liquids (level 2)   Supervision Level for Intake 1:1 supervision needed   Mode of Delivery Recommendations bolus size, small;liquids via spoon only;slow rate of intake   Swallowing Maneuver Recommendations alternate food and liquid intake   Monitoring/Assistance Required (Eating/Swallowing) check mouth frequently for oral residue/pocketing;monitor for cough or change in vocal quality with intake   Recommended Feeding/Eating Techniques (Swallow Eval) maintain upright posture during/after eating for 30 minutes;provide assist with feeding   Medication Administration Recommendations, Swallowing (SLP) non-orally   Instrumental Assessment Recommendations instrumental evaluation not recommended at this time  (consider VFSS as indicated)   Comment, Swallowing Recommendations Pt currently presents with moderate oral and suspected ongoing pharyngeal dysphagia. Noted reduced acceptance and fair, passive containment with reduced labial seal. Bolus formation/propulsion and lingual coordination appeared moderate+ reduced and prolonged. Pt with increased oral transit time without appreciable oral residue. Suspect delayed swallow with reduced hyolaryngeal elevation. + delayed questionable cough x1 following completion of PO trials (pt also with sneeze immediately following possible cough). No other cough, throat clear, wet vocal quality, eye watering, or increased WOB.   General  Therapy Interventions   Planned Therapy Interventions Dysphagia Treatment   Dysphagia treatment Modified diet education;Instruction of safe swallow strategies   SLP Therapy Assessment/Plan   Criteria for Skilled Therapeutic Interventions Met (SLP Eval) yes;treatment indicated   SLP Diagnosis moderate oral and suspected ongoing pharyngeal dysphagia   Rehab Potential (SLP Eval) good, to achieve stated therapy goals   Therapy Frequency (SLP Eval) 3 times/wk   Predicted Duration of Therapy Intervention (SLP Eval) 1-2 weeks   Therapy Plan Review/Discharge Plan (SLP)   Therapy Plan Review (SLP) evaluation/treatment results reviewed;participants included   SLP Discharge Planning    SLP Discharge Recommendation (DC Rec) home with home care speech therapy   SLP Rationale for DC Rec Pt with prior home SLP needs; suspect will benefit from continuing   SLP Brief overview of current status  Recommend cautions PO intake of puree (4) and mildly thick liquid (2) via tsp -- upright posture, slow rate, small bites, ensure pt has swallowed before giving more, hold PO if cough, throat clear, wet vocal quality, eye watering, or increased WOB observed. Question pt's ability to meet nutrition/hydration needs via PO intake; would continue non-oral feeding PRN.    Total Evaluation Time   Total Evaluation Time (Minutes) 19

## 2022-01-23 NOTE — PLAN OF CARE
Pt pleasant and calm sleeping well throughout the night.  Pillows placed under all extremities.  VSS on RA saturation 94%.  Felix patent, urine remains red/pink color.  Continuous IV 1/2 NS at 75/hr, continuous g-tube feeding at 45/hr. BS during the night were 388/352.  Pt responsive to yes/no questions with thumbs up and down.  Continue with cares.

## 2022-01-23 NOTE — PROGRESS NOTES
Still hyperglycemic.  Increased scheduled aspart to 5 unit(s) every four hours (hold if bg <140 or tube feeds are held), increase evening long acting insulin to 18 units.  Continue sliding scale insulin as well.  Also, plan to stop decadron for covid as he is no longer hypoxic which should lead to considerably better control in the next 24 hours.    Jorge Stewart MD

## 2022-01-23 NOTE — PLAN OF CARE
Day RN  VS monitored, denies pain, responds with thumbs up/down or mouths some words, more responsive today, abx changed to IV Levaquin, g tube infusing 45/hr, SLP evaluated and advanced diet to pureed and mildly thick liquids, /318, ordoñez patent, strawberry colored urine with a few small clots with flushing, K replaced, non-verbal, will cont to monitor.

## 2022-01-23 NOTE — PROGRESS NOTES
Reviewed the patient today along with the nurses    He seems to be doing better,. NO CBI, urine watermelon pink. No clots despite 6 hourly hand irrigation. Plavix withheld from today.      Plan   1. Continue monitoring for Hematuria and serial Hb  2. No need for CBI for now  3. Urology will signoff.   4. CT urogram prior to discharge  5. Follow up in urology clinic for outpatient cystoscopy.      Calvin Borja MD  Urology

## 2022-01-23 NOTE — PROGRESS NOTES
[facility]  Hospitalist Progress Note  Jorge Stewart MD 01/23/2022      Reason for Stay (Diagnosis): respiratory failure         Assessment and Plan:      Summary of Stay: Jimmy Otto is a medically complex 72 year old male with a PMH significant for CVA causing hemiplegia and non verbal status, indwelling ordoñez catheter, malnutrition with G tube feedings,  recent COVID infection and DM II who presented with increased work of breathing, hypoxia and increased somnolence found to have hypernatremia, positive COVID-19 swab and chest x-ray showing multifocal pneumonia.  He also has had hematuria with clotting here in the hospital following Ordoñez exchange in the ER.     Specifically, when he arrived he was mildly tachycardic and hypoxic to the point that he required non rebreather at 8 litres which was shortly weaned to 4 litres. Lab work remarkable for sodium 155, chloride 120, creatinine 1.31, normal LFTs, CRP 22.1, lactic acid 2.3, , troponin 10, glucose 299, WBC 9.7 and Hgb 11.6. CXR showed multifocal patchy infiltrates both lungs, UA looks possibly infected but was taken from indwelling ordoñez and sent for culture, blood cultures drawn and CT PE study ordered. He was given Azithromycin, Ceftriaxone and Dexamethasone 6 mg.       #Acute hypoxemic respiratory failure: Likely related to COVID-19 infection.  Procalcitonin negative arguing against a bacterial pneumonia but in the setting of grossly abnormal urine we will continue antibiotics at least 1 day more.  -Continue supplemental oxygen  -CXR shows multifocal infiltrates. Per family positive for COVID on 1/1 (has had one vaccination)  -Troponin negative and BNP normal without evidence of fluid overload  -stop azithromycin/zosyn, start levofloxacin on 1/23 for pseudomonas in urine.  -Continue Dexamethasone for now  -CT PE study negative for clot but does show multifocal pneumonia in addition to some lobar consolidation which could  represent a bacterial pneumonia     #Hypernatremia, hyperchloremia: Due to free water deficit related to inability to eat/drink and recent infectious process.  -Tube feeds restarted, nutrition following  -Free water flushes to 150 cc per 4-hour  -Currently getting 0.45 normal saline at 100 cc/h, likely will reduce in the next day or so.     #Pseudomonas UTI, hematuria.  Suspect hematuria is mostly due to traumatic ordoñez exchange.  Pseudomonas pan-sensitive.  -Ordoñez was exchanged in the ER, appears to have now developed significant clotting/bleeding though this is clearing.  -holding plavix for now pending further improvement.  ?restart in a day or so.  -Irrigating Ordoñez catheter q4H  -Urology following.  -Zosyn 1/21 - 1/23,   -levofloxacin 1-23 to present.  -Monitor hemoglobin     #Hx of CVA with hemiplegia  #Dysphagia with G tube placement on 11/1  #Severe malnutrition  #Non verbal status  -Stroke in 9/2021  -Cared for by family and home care nurses  -Modified diet with 1:1 supervision if eating  -chronically on Plavix and Amantadine     #DM II with hyperglycemia  [PTA detemir 10 units in AM and 18 units in PM, Aspart 5 units with each can of Jevity, Metformin 1000 mg BID  -Has been hyperglycemic here in the setting of steroids and initially holding his long-acting insulin  -Titrate detemir to 14 units twice daily  -3 units aspart scheduled every 4 hours with tube feeds.  Need to hold if tube feeds are stopped for any reason.  -Every 4 hours sliding scale insulin     #CKD III  -Baseline creatinine 1-1.4     #HTN  -Continue PTA Amlodipine, Atenolol, Doxazosin, Losartan on parameters     #HLP  -Continue Atorvastatin     #Depression  -Continue PTA Fluoxetine    Hypokalemia: Starting electrolyte replacement protocol.  Tube feeds also being restarted.     Social: Lives with family who provide total care  Code: My colleague discussed with POA and they have chosen Full code  VTE prophylaxis: Lovenox  Disposition: At least  "3 more days expected        Interval History (Subjective):      Restarting scheduled q4H aspart as blood sugars trended up again  Continuing IVF  Holding plavix for hematuria  Sodium improved  Mental status also seems improved   SLP consult to see if he is safe for diet advancement.  Tailoring antibiotics as above to levaquin.                  Physical Exam:      Last Vital Signs:  BP (!) 144/76 (BP Location: Left arm)   Pulse 75   Temp 97.5  F (36.4  C) (Temporal)   Resp 18   Ht 1.626 m (5' 4\")   Wt 56.3 kg (124 lb 3.2 oz)   SpO2 98%   BMI 21.32 kg/m          Intake/Output Summary (Last 24 hours) at 1/23/2022 0844  Last data filed at 1/23/2022 0631  Gross per 24 hour   Intake 2841 ml   Output 1575 ml   Net 1266 ml           General:  awake, no acute distress.  HEENT: NC/AT, eyes anicteric  Cardiac: RRR, S1, S2.  No murmurs appreciated.  Pulmonary: Normal chest rise, normal work of breathing.   Abdomen: soft, non-tender, non-distended.   Extremities: no deformities.  Warm, well perfused.  Skin: no rashes or lesions noted.  Warm and Dry.  Neuro: Non verbal.  Tracks and opens eyes to voice today.  Thumbs up as response to some questions. No clonus or tremor.         Medications:      All current medications were reviewed with changes reflected in problem list.         Data:      All new lab and imaging data was reviewed.   Labs:  Recent Labs   Lab 01/23/22  0748 01/23/22  0709 01/23/22  0410 01/23/22  0028   NA  --  145*  --  145*   POTASSIUM  --  3.4  --  4.0   CHLORIDE  --  112*  --  113*   CO2  --   --   --  29   ANIONGAP  --   --   --  3   *  --    < > 388*   BUN  --   --   --  30   CR  --   --   --  1.04   GFRESTIMATED  --   --   --  76   ARLETH  --   --   --  8.0*    < > = values in this interval not displayed.     Recent Labs   Lab 01/23/22  0709   WBC 9.3   HGB 9.1*   HCT 31.6*   MCV 88         Imaging:   Recent Results (from the past 48 hour(s))   XR Chest Port 1 View    Narrative    XR " CHEST PORT 1 VIEW 1/21/2022 7:03 AM    HISTORY: respiratory distress    COMPARISON: 10/19/2021      Impression    IMPRESSION: Multifocal patchy infiltrates in both lungs consistent  with pneumonia. No pleural effusion or pneumothorax. Normal heart  size.    TAVARES BRAY MD         SYSTEM ID:  [id number]   CT Chest Pulmonary Embolism w Contrast    Narrative    CT CHEST PULMONARY EMBOLISM WITH CONTRAST 1/21/2022 10:39 AM    CLINICAL HISTORY: PE suspected, high probability. Recent COVID,  respiratory failure, evaluate pulmonary embolus.    TECHNIQUE: CT angiogram chest during arterial phase injection IV  contrast. 2D and 3D MIP reconstructions were performed by the CT  technologist. Dose reduction techniques were used.     CONTRAST: 61mL Isovue-370    COMPARISON: None.    FINDINGS:  ANGIOGRAM CHEST: Pulmonary arteries are normal caliber and negative  for pulmonary emboli. Thoracic aorta is normal in caliber and negative  for dissection.     LUNGS AND PLEURA: There are patchy bilateral groundglass and  interstitial opacities with focal airspace consolidation in the right  lower lobe. No pleural effusion or pneumothorax.    MEDIASTINUM/AXILLAE: No thoracic or axillary lymphadenopathy. Thyroid  and esophagus are unremarkable.    CORONARY ARTERY CALCIFICATION: Mild.    UPPER ABDOMEN: A percutaneous gastrostomy tube is noted.    MUSCULOSKELETAL: No destructive bone lesions.      Impression    IMPRESSION:  1.  No evidence of pulmonary embolism.  2.  Patchy bilateral groundglass and interstitial opacities have a  typical appearance of COVID 19. Superimposed airspace consolidation in  the right lower lobe concerning for superimposed bacterial pneumonia.    ALEXYS ORTEGA MD         SYSTEM ID:  [id number]         Alexys Stewart MD

## 2022-01-24 LAB
ANION GAP SERPL CALCULATED.3IONS-SCNC: 4 MMOL/L (ref 3–14)
BUN SERPL-MCNC: 24 MG/DL (ref 7–30)
CALCIUM SERPL-MCNC: 8.6 MG/DL (ref 8.5–10.1)
CHLORIDE BLD-SCNC: 110 MMOL/L (ref 94–109)
CO2 SERPL-SCNC: 30 MMOL/L (ref 20–32)
CREAT SERPL-MCNC: 0.93 MG/DL (ref 0.66–1.25)
ERYTHROCYTE [DISTWIDTH] IN BLOOD BY AUTOMATED COUNT: 13.6 % (ref 10–15)
GFR SERPL CREATININE-BSD FRML MDRD: 87 ML/MIN/1.73M2
GLUCOSE BLD-MCNC: 211 MG/DL (ref 70–99)
GLUCOSE BLDC GLUCOMTR-MCNC: 183 MG/DL (ref 70–99)
GLUCOSE BLDC GLUCOMTR-MCNC: 210 MG/DL (ref 70–99)
GLUCOSE BLDC GLUCOMTR-MCNC: 237 MG/DL (ref 70–99)
GLUCOSE BLDC GLUCOMTR-MCNC: 250 MG/DL (ref 70–99)
GLUCOSE BLDC GLUCOMTR-MCNC: 296 MG/DL (ref 70–99)
GLUCOSE BLDC GLUCOMTR-MCNC: 311 MG/DL (ref 70–99)
HCT VFR BLD AUTO: 34.1 % (ref 40–53)
HGB BLD-MCNC: 10 G/DL (ref 13.3–17.7)
MAGNESIUM SERPL-MCNC: 2.2 MG/DL (ref 1.6–2.3)
MCH RBC QN AUTO: 25.7 PG (ref 26.5–33)
MCHC RBC AUTO-ENTMCNC: 29.3 G/DL (ref 31.5–36.5)
MCV RBC AUTO: 88 FL (ref 78–100)
PHOSPHATE SERPL-MCNC: 2.8 MG/DL (ref 2.5–4.5)
PLATELET # BLD AUTO: 255 10E3/UL (ref 150–450)
POTASSIUM BLD-SCNC: 2.9 MMOL/L (ref 3.4–5.3)
POTASSIUM BLD-SCNC: 3.7 MMOL/L (ref 3.4–5.3)
RBC # BLD AUTO: 3.89 10E6/UL (ref 4.4–5.9)
SODIUM SERPL-SCNC: 144 MMOL/L (ref 133–144)
WBC # BLD AUTO: 8.9 10E3/UL (ref 4–11)

## 2022-01-24 PROCEDURE — 84100 ASSAY OF PHOSPHORUS: CPT | Performed by: INTERNAL MEDICINE

## 2022-01-24 PROCEDURE — 250N000013 HC RX MED GY IP 250 OP 250 PS 637: Performed by: INTERNAL MEDICINE

## 2022-01-24 PROCEDURE — 272N000078 HC NUTRITION PRODUCT INTERMEDIATE LITER

## 2022-01-24 PROCEDURE — 99233 SBSQ HOSP IP/OBS HIGH 50: CPT | Performed by: INTERNAL MEDICINE

## 2022-01-24 PROCEDURE — 250N000011 HC RX IP 250 OP 636: Performed by: INTERNAL MEDICINE

## 2022-01-24 PROCEDURE — 84132 ASSAY OF SERUM POTASSIUM: CPT | Performed by: INTERNAL MEDICINE

## 2022-01-24 PROCEDURE — 36415 COLL VENOUS BLD VENIPUNCTURE: CPT | Performed by: PHYSICIAN ASSISTANT

## 2022-01-24 PROCEDURE — 83735 ASSAY OF MAGNESIUM: CPT | Performed by: INTERNAL MEDICINE

## 2022-01-24 PROCEDURE — 250N000013 HC RX MED GY IP 250 OP 250 PS 637: Performed by: PHYSICIAN ASSISTANT

## 2022-01-24 PROCEDURE — 120N000001 HC R&B MED SURG/OB

## 2022-01-24 PROCEDURE — 36415 COLL VENOUS BLD VENIPUNCTURE: CPT | Performed by: INTERNAL MEDICINE

## 2022-01-24 PROCEDURE — 82310 ASSAY OF CALCIUM: CPT | Performed by: PHYSICIAN ASSISTANT

## 2022-01-24 PROCEDURE — 85027 COMPLETE CBC AUTOMATED: CPT | Performed by: PHYSICIAN ASSISTANT

## 2022-01-24 RX ORDER — DIPHENHYDRAMINE HCL 25 MG
50 CAPSULE ORAL
Status: DISCONTINUED | OUTPATIENT
Start: 2022-01-24 | End: 2022-01-26 | Stop reason: HOSPADM

## 2022-01-24 RX ORDER — DIPHENHYDRAMINE HYDROCHLORIDE 50 MG/ML
50 INJECTION INTRAMUSCULAR; INTRAVENOUS
Status: DISCONTINUED | OUTPATIENT
Start: 2022-01-24 | End: 2022-01-26 | Stop reason: HOSPADM

## 2022-01-24 RX ORDER — POTASSIUM CHLORIDE 1.5 G/1.58G
40 POWDER, FOR SOLUTION ORAL ONCE
Status: COMPLETED | OUTPATIENT
Start: 2022-01-24 | End: 2022-01-24

## 2022-01-24 RX ADMIN — AMLODIPINE BESYLATE 10 MG: 10 TABLET ORAL at 09:52

## 2022-01-24 RX ADMIN — ATORVASTATIN CALCIUM 80 MG: 40 TABLET, FILM COATED ORAL at 09:52

## 2022-01-24 RX ADMIN — ATENOLOL 25 MG: 25 TABLET ORAL at 09:52

## 2022-01-24 RX ADMIN — DOXAZOSIN 2 MG: 1 TABLET ORAL at 09:52

## 2022-01-24 RX ADMIN — AMANTADINE HYDROCHLORIDE 100 MG: 100 CAPSULE ORAL at 09:52

## 2022-01-24 RX ADMIN — POTASSIUM CHLORIDE 40 MEQ: 1.5 POWDER, FOR SOLUTION ORAL at 09:51

## 2022-01-24 RX ADMIN — LEVOFLOXACIN 500 MG: 500 INJECTION, SOLUTION INTRAVENOUS at 09:55

## 2022-01-24 RX ADMIN — ATENOLOL 25 MG: 25 TABLET ORAL at 20:48

## 2022-01-24 RX ADMIN — FLUOXETINE HYDROCHLORIDE 20 MG: 20 LIQUID ORAL at 09:51

## 2022-01-24 ASSESSMENT — ACTIVITIES OF DAILY LIVING (ADL)
ADLS_ACUITY_SCORE: 30
ADLS_ACUITY_SCORE: 37
ADLS_ACUITY_SCORE: 43
ADLS_ACUITY_SCORE: 30
ADLS_ACUITY_SCORE: 39
ADLS_ACUITY_SCORE: 37
ADLS_ACUITY_SCORE: 37
ADLS_ACUITY_SCORE: 30
ADLS_ACUITY_SCORE: 37
ADLS_ACUITY_SCORE: 30
ADLS_ACUITY_SCORE: 37
ADLS_ACUITY_SCORE: 39
ADLS_ACUITY_SCORE: 30
ADLS_ACUITY_SCORE: 37
ADLS_ACUITY_SCORE: 30
ADLS_ACUITY_SCORE: 37
ADLS_ACUITY_SCORE: 37
ADLS_ACUITY_SCORE: 39

## 2022-01-24 ASSESSMENT — MIFFLIN-ST. JEOR: SCORE: 1227.54

## 2022-01-24 NOTE — PROGRESS NOTES
Pt is non-verbal, repositioning ,lung sounds dimished, up with assist of two-lift. Right side weakness,T. feeding intact.DD4 pureed diet, Ordoñez catheter producing pinkish urine,Blood sugar checks q4hrs.183/210,on Levaquin antibiotic.ordoñez flushes well.On pulsate mattress. incontinent of bowel, x2 loose stools on this shift. Urology following.

## 2022-01-24 NOTE — PROGRESS NOTES
[facility]  Patient is currently open to home care services with [facility]. The patient is currently receiving RN and HHA services.  [facility]  and team have been notified of patient admission.  [facility] liaison will continue to follow patient during stay.  If appropriate provide orders to resume home care at time of discharge.

## 2022-01-24 NOTE — PLAN OF CARE
"BP (!) 152/87   Pulse 71   Temp 98  F (36.7  C) (Temporal)   Resp 18   Ht 1.626 m (5' 4\")   Wt 56.3 kg (124 lb 3.2 oz)   SpO2 98%   BMI 21.32 kg/m    Orientation: alert. Non-verbal  Resp: Diminished bases  Cardio: WDL  Neuro: Swallowing difficulty. Hemiplegia  Skin: WDL  Pain: Denies  CMS: Right-sided weakness  Dressing: CDI. G-tube dressing  Activity: Bedrest  Drains: None  Diet: Pureed. Mildly thickened liquids.  Voiding: Felix. Pink urine.  Discharge Plan: Continue treatment.    Pulsate mattress delivered. Lifted patient to place mattress. Pump not functional. Original mattress placed. Total linen change d/t fecal incontinence. Felix flushing and draining well. VSS.    BG elevated to over 400. Received orders for more novolog. BG below 300 at HS.      "

## 2022-01-24 NOTE — PLAN OF CARE
Pt is nonverbal. ZAYDA orientation. Alert. Responds to yes/no questions, gives thumbs up/down. Denies pain. Resting comfortably in the room. Ordoñez hand irrigated q4h today, no clots aspirated, initially urine was red, with second round changed to pink. Per urology will discontinue hand irrigation. Will continue to monitor ordoñez output. Had 675 output today. Continuous g tube feeding running at 45 mL/hr. Monitoring BGs, insulin given q4h. IV levaquin administered. Turning q2h. Will continue to monitor.

## 2022-01-24 NOTE — PROGRESS NOTES
[facility]    Ho is currently on service with [facility] for home enteral nutrition (Jevity 1.5, 5 cans daily). Home care provided by [facility].     Liaison will follow along. If applicable at discharge, please include Home Infusion Referral in discharge orders.     Thank you,    Claire Kan RN  [facility] Liaison  [phone] (M-F 8a-5p)  [phone] Office

## 2022-01-24 NOTE — CONSULTS
Care Management Follow Up    Length of Stay (days): 3    Expected Discharge Date: 01/25/2022     Concerns to be Addressed:       Patient plan of care discussed at interdisciplinary rounds: Yes    Anticipated Discharge Disposition:       Anticipated Discharge Services: home infusion, home care   Anticipated Discharge DME:        Additional Information:  CM following along with [facility] home care and [facility]. Pt lives at home with spouse, expected discharge back home with resumption home care services of RN/HHA and Home infusion for is tube feedings.  Pt will needs Home Infusion Referral for tube feedings and Home care RN/HHA orders on discharge.     Will continue to follow with discharge needs.    Paulette Elliott RN, BSN CTS  Care Coordinator     [phone]

## 2022-01-25 LAB
ANION GAP SERPL CALCULATED.3IONS-SCNC: 6 MMOL/L (ref 3–14)
BUN SERPL-MCNC: 20 MG/DL (ref 7–30)
CALCIUM SERPL-MCNC: 9.2 MG/DL (ref 8.5–10.1)
CHLORIDE BLD-SCNC: 106 MMOL/L (ref 94–109)
CO2 SERPL-SCNC: 30 MMOL/L (ref 20–32)
CREAT SERPL-MCNC: 0.91 MG/DL (ref 0.66–1.25)
ERYTHROCYTE [DISTWIDTH] IN BLOOD BY AUTOMATED COUNT: 14 % (ref 10–15)
GFR SERPL CREATININE-BSD FRML MDRD: 90 ML/MIN/1.73M2
GLUCOSE BLD-MCNC: 267 MG/DL (ref 70–99)
GLUCOSE BLDC GLUCOMTR-MCNC: 240 MG/DL (ref 70–99)
GLUCOSE BLDC GLUCOMTR-MCNC: 258 MG/DL (ref 70–99)
GLUCOSE BLDC GLUCOMTR-MCNC: 270 MG/DL (ref 70–99)
GLUCOSE BLDC GLUCOMTR-MCNC: 274 MG/DL (ref 70–99)
GLUCOSE BLDC GLUCOMTR-MCNC: 288 MG/DL (ref 70–99)
GLUCOSE BLDC GLUCOMTR-MCNC: 387 MG/DL (ref 70–99)
HCT VFR BLD AUTO: 36.4 % (ref 40–53)
HGB BLD-MCNC: 10.9 G/DL (ref 13.3–17.7)
HOLD SPECIMEN: NORMAL
MAGNESIUM SERPL-MCNC: 2.2 MG/DL (ref 1.6–2.3)
MCH RBC QN AUTO: 25.6 PG (ref 26.5–33)
MCHC RBC AUTO-ENTMCNC: 29.9 G/DL (ref 31.5–36.5)
MCV RBC AUTO: 86 FL (ref 78–100)
PHOSPHATE SERPL-MCNC: 4 MG/DL (ref 2.5–4.5)
PLATELET # BLD AUTO: 264 10E3/UL (ref 150–450)
POTASSIUM BLD-SCNC: 3.3 MMOL/L (ref 3.4–5.3)
POTASSIUM BLD-SCNC: 3.4 MMOL/L (ref 3.4–5.3)
POTASSIUM BLD-SCNC: 3.7 MMOL/L (ref 3.4–5.3)
RBC # BLD AUTO: 4.25 10E6/UL (ref 4.4–5.9)
SODIUM SERPL-SCNC: 142 MMOL/L (ref 133–144)
WBC # BLD AUTO: 7.8 10E3/UL (ref 4–11)

## 2022-01-25 PROCEDURE — 82310 ASSAY OF CALCIUM: CPT | Performed by: PHYSICIAN ASSISTANT

## 2022-01-25 PROCEDURE — 272N000078 HC NUTRITION PRODUCT INTERMEDIATE LITER

## 2022-01-25 PROCEDURE — 250N000011 HC RX IP 250 OP 636: Performed by: INTERNAL MEDICINE

## 2022-01-25 PROCEDURE — 85027 COMPLETE CBC AUTOMATED: CPT | Performed by: PHYSICIAN ASSISTANT

## 2022-01-25 PROCEDURE — 84100 ASSAY OF PHOSPHORUS: CPT | Performed by: INTERNAL MEDICINE

## 2022-01-25 PROCEDURE — 36415 COLL VENOUS BLD VENIPUNCTURE: CPT | Performed by: PHYSICIAN ASSISTANT

## 2022-01-25 PROCEDURE — 120N000001 HC R&B MED SURG/OB

## 2022-01-25 PROCEDURE — 36415 COLL VENOUS BLD VENIPUNCTURE: CPT | Performed by: INTERNAL MEDICINE

## 2022-01-25 PROCEDURE — 99233 SBSQ HOSP IP/OBS HIGH 50: CPT | Performed by: INTERNAL MEDICINE

## 2022-01-25 PROCEDURE — 83735 ASSAY OF MAGNESIUM: CPT | Performed by: INTERNAL MEDICINE

## 2022-01-25 PROCEDURE — 250N000013 HC RX MED GY IP 250 OP 250 PS 637: Performed by: PHYSICIAN ASSISTANT

## 2022-01-25 PROCEDURE — 250N000013 HC RX MED GY IP 250 OP 250 PS 637: Performed by: INTERNAL MEDICINE

## 2022-01-25 PROCEDURE — 84132 ASSAY OF SERUM POTASSIUM: CPT | Performed by: INTERNAL MEDICINE

## 2022-01-25 RX ORDER — POTASSIUM CHLORIDE 20MEQ/15ML
10 LIQUID (ML) ORAL ONCE
Status: COMPLETED | OUTPATIENT
Start: 2022-01-25 | End: 2022-01-25

## 2022-01-25 RX ORDER — POTASSIUM CHLORIDE 750 MG/1
10 TABLET, EXTENDED RELEASE ORAL ONCE
Status: DISCONTINUED | OUTPATIENT
Start: 2022-01-25 | End: 2022-01-25

## 2022-01-25 RX ORDER — POTASSIUM CHLORIDE 1.5 G/1.58G
20 POWDER, FOR SOLUTION ORAL ONCE
Status: COMPLETED | OUTPATIENT
Start: 2022-01-25 | End: 2022-01-25

## 2022-01-25 RX ADMIN — POTASSIUM CHLORIDE 10 MEQ: 20 SOLUTION ORAL at 22:44

## 2022-01-25 RX ADMIN — AMLODIPINE BESYLATE 10 MG: 10 TABLET ORAL at 09:41

## 2022-01-25 RX ADMIN — AMANTADINE HYDROCHLORIDE 100 MG: 100 CAPSULE ORAL at 09:41

## 2022-01-25 RX ADMIN — POTASSIUM CHLORIDE 20 MEQ: 1.5 POWDER, FOR SOLUTION ORAL at 10:43

## 2022-01-25 RX ADMIN — FLUOXETINE HYDROCHLORIDE 20 MG: 20 LIQUID ORAL at 09:55

## 2022-01-25 RX ADMIN — ATENOLOL 25 MG: 25 TABLET ORAL at 09:41

## 2022-01-25 RX ADMIN — POTASSIUM CHLORIDE 20 MEQ: 1.5 POWDER, FOR SOLUTION ORAL at 16:15

## 2022-01-25 RX ADMIN — METFORMIN HYDROCHLORIDE 1000 MG: 500 TABLET, FILM COATED ORAL at 19:04

## 2022-01-25 RX ADMIN — ATORVASTATIN CALCIUM 80 MG: 40 TABLET, FILM COATED ORAL at 09:41

## 2022-01-25 RX ADMIN — LEVOFLOXACIN 500 MG: 500 INJECTION, SOLUTION INTRAVENOUS at 10:11

## 2022-01-25 RX ADMIN — ATENOLOL 25 MG: 25 TABLET ORAL at 19:04

## 2022-01-25 RX ADMIN — DOXAZOSIN 2 MG: 1 TABLET ORAL at 09:41

## 2022-01-25 ASSESSMENT — ACTIVITIES OF DAILY LIVING (ADL)
ADLS_ACUITY_SCORE: 41
ADLS_ACUITY_SCORE: 41
ADLS_ACUITY_SCORE: 43
ADLS_ACUITY_SCORE: 41
ADLS_ACUITY_SCORE: 43
ADLS_ACUITY_SCORE: 41
ADLS_ACUITY_SCORE: 43
ADLS_ACUITY_SCORE: 41
ADLS_ACUITY_SCORE: 43

## 2022-01-25 ASSESSMENT — MIFFLIN-ST. JEOR: SCORE: 1234.35

## 2022-01-25 NOTE — PLAN OF CARE
Pt non-vernal; orientation status unknown. VS WDL on room air. Rating of 0 on PAINAD scale. R-sided hemiplegia. Bedrest. Felix in place for neurogenic bladder. Tube feeding at 45mL. Saline locked. Plans to discharge home with infusion in 1-2 days.

## 2022-01-25 NOTE — PROVIDER NOTIFICATION
"Notified provider about pt blood sugar. \"FYI.. blood sugar was 387. 9 units of novolog given.\"   "

## 2022-01-25 NOTE — PROGRESS NOTES
[facility]  Hospitalist Progress Note  Jorge Stewart MD 01/25/2022      Reason for Stay (Diagnosis): respiratory failure         Assessment and Plan:      Summary of Stay: Jimmy Otto is a medically complex 72 year old male with a PMH significant for CVA causing hemiplegia and non verbal status, indwelling ordoñez catheter, malnutrition with G tube feedings,  recent COVID infection and DM II who presented 1/21/22 with increased work of breathing, hypoxia and increased somnolence found to have hypernatremia, positive COVID-19 swab and chest x-ray showing multifocal pneumonia.  He also has had hematuria with clotting here in the hospital following Ordoñez exchange in the ER.     Specifically, when he arrived he was mildly tachycardic and hypoxic to the point that he required non rebreather at 8 litres which was shortly weaned to 4 litres. Lab work remarkable for sodium 155, chloride 120, creatinine 1.31, normal LFTs, CRP 22.1, lactic acid 2.3, , troponin 10, glucose 299, WBC 9.7 and Hgb 11.6. CXR showed multifocal patchy infiltrates both lungs, UA looks possibly infected but was taken from indwelling ordoñez and sent for culture, blood cultures drawn and CT PE study ordered. He was given Azithromycin, Ceftriaxone and Dexamethasone 6 mg.    Hypernatremia clinically improving.  Has had significant steroid-induced hyperglycemia requiring daily insulin titration.    Tailored abx to levofloxacin on 1/23.    Hypoxia resolved, stopping decadron 1/24.    I discussed vaccination status with his daughter, Clemente.  He had 2 doses of the Pfizer vaccine most recently in March 2021.  She has been trying to get him a booster but due to his mobility issues has had great issues with this.  The reason for this admission was not COVID-19 and he had a home test that was positive and very early January on 1/1.  As such, he is likely close to a month out from his Covid infection which was relatively mild.  I think he  would benefit from getting a booster and due to significant logistical issues that so far have precluded him from getting it as an outpatient his family would like him to get it here which seems very reasonable.  I ordered this for 1/25/2022.       #Acute hypoxemic respiratory failure: Likely related to COVID-19 infection.  Procalcitonin negative arguing against a bacterial pneumonia but in the setting of grossly abnormal urine we will continue antibiotics at least 1 day more.  -weaned off supplemental oxygen  -CXR shows multifocal infiltrates. Per family positive for COVID on 1/1.  -Troponin negative and BNP normal without evidence of fluid overload  -Stopped azithromycin/zosyn, started levofloxacin on 1/23 for pseudomonas in urine.  -Stopped Dexamethasone 1/24 given resolutionof hypoxia and ongoing steroid induced hyperglycemia.  -CT PE study negative for clot but does show multifocal pneumonia in addition to some lobar consolidation which could represent a bacterial pneumonia.  Should be covered w/ above abx.     #Hypernatremia, hyperchloremia: Due to free water deficit related to inability to eat/drink and recent infectious process.  -Tube feeds restarted, nutrition following  -Free water flushes to 150 cc per 4-hour  -stopped IVF.     #Pseudomonas UTI associated with indwelling Ordoñez catheter, hematuria.  Suspect hematuria is mostly due to traumatic ordoñez exchange.  Pseudomonas pan-sensitive.  Plan on 7 days of total treatment.  -Ordoñez was exchanged in the ER, appears to have now developed significant clotting/bleeding though this is clearing.  -holding plavix for now pending further improvement.  ?restart in a day or so.  -no longer having clots.  Ok to stop irrigating if draining well.  -Urology signed off.  -Zosyn 1/21 - 1/23,   -levofloxacin 1-23 to present.  -Monitor hemoglobin      #Hx of CVA with hemiplegia  #Dysphagia with G tube placement on 11/1  #Severe malnutrition  #Non verbal status  -Stroke in  "9/2021  -Cared for by family and home care nurses  -Modified diet with 1:1 supervision if eating  -chronically on Plavix and Amantadine  -restart plavix once hematuria resolves.     #DM II with hyperglycemia  [PTA detemir 10 units in AM and 18 units in PM, Aspart 5 units with each can of Jevity, Metformin 1000 mg BID  -Has been hyperglycemic here in the setting of steroids and initially holding his long-acting insulin  -Titrate detemir to 14 units QAM, 18 U QHS  -3 units aspart scheduled every 4 hours with tube feeds.  Need to hold if tube feeds are stopped for any reason.  -Every 4 hours sliding scale insulin      #CKD III  -Baseline creatinine 1-1.4     #HTN  -Continue PTA Amlodipine, Atenolol, Doxazosin, Losartan on parameters     #HLP  -Continue Atorvastatin     #Depression  -Continue PTA Fluoxetine    Hypokalemia: change to high scale replacement protocol.  Tube feeds restarted.     Social: Lives with family who provide total care  Code: My colleague discussed with POA and they have chosen Full code  VTE prophylaxis: Lovenox  Disposition: Home with family Wednesday.        Interval History (Subjective):      titrating insulin  Stopped decadron today  Hematuria clearing.   covid booster  Quite alert  ?home tomorrow.  I called his daughter, Clemente, for an update and to coordinate anticipated discharge tomorrow.                  Physical Exam:      Last Vital Signs:  BP (!) 175/91 (BP Location: Left arm)   Pulse 75   Temp 97.3  F (36.3  C) (Temporal)   Resp 20   Ht 1.626 m (5' 4\")   Wt 57.3 kg (126 lb 6.4 oz)   SpO2 96%   BMI 21.70 kg/m          General:  awake, no acute distress.  Non-verbal.  HEENT: NC/AT, eyes anicteric  Cardiac: RRR, S1, S2.  No murmurs appreciated.  Pulmonary: Normal chest rise, normal work of breathing.   Abdomen: soft, non-tender, non-distended.   Extremities: no deformities.  Warm, well perfused.  Skin: no rashes or lesions noted.  Warm and Dry.  Neuro: Non verbal.  Tracks and opens " eyes to voice today.  Thumbs up as response to some questions. No clonus or tremor.         Medications:      All current medications were reviewed with changes reflected in problem list.         Data:      All new lab and imaging data was reviewed.   Labs:  Recent Labs   Lab 01/25/22  1041 01/25/22  0633   NA  --  142   POTASSIUM  --  3.3*   CHLORIDE  --  106   CO2  --  30   ANIONGAP  --  6   * 267*   BUN  --  20   CR  --  0.91   GFRESTIMATED  --  90   ARLETH  --  9.2     Recent Labs   Lab 01/25/22  0633   WBC 7.8   HGB 10.9*   HCT 36.4*   MCV 86         Imaging:   Recent Results (from the past 48 hour(s))   XR Chest Port 1 View    Narrative    XR CHEST PORT 1 VIEW 1/21/2022 7:03 AM    HISTORY: respiratory distress    COMPARISON: 10/19/2021      Impression    IMPRESSION: Multifocal patchy infiltrates in both lungs consistent  with pneumonia. No pleural effusion or pneumothorax. Normal heart  size.    TAVARES BRAY MD         SYSTEM ID:  [id number]   CT Chest Pulmonary Embolism w Contrast    Narrative    CT CHEST PULMONARY EMBOLISM WITH CONTRAST 1/21/2022 10:39 AM    CLINICAL HISTORY: PE suspected, high probability. Recent COVID,  respiratory failure, evaluate pulmonary embolus.    TECHNIQUE: CT angiogram chest during arterial phase injection IV  contrast. 2D and 3D MIP reconstructions were performed by the CT  technologist. Dose reduction techniques were used.     CONTRAST: 61mL Isovue-370    COMPARISON: None.    FINDINGS:  ANGIOGRAM CHEST: Pulmonary arteries are normal caliber and negative  for pulmonary emboli. Thoracic aorta is normal in caliber and negative  for dissection.     LUNGS AND PLEURA: There are patchy bilateral groundglass and  interstitial opacities with focal airspace consolidation in the right  lower lobe. No pleural effusion or pneumothorax.    MEDIASTINUM/AXILLAE: No thoracic or axillary lymphadenopathy. Thyroid  and esophagus are unremarkable.    CORONARY ARTERY CALCIFICATION:  Mild.    UPPER ABDOMEN: A percutaneous gastrostomy tube is noted.    MUSCULOSKELETAL: No destructive bone lesions.      Impression    IMPRESSION:  1.  No evidence of pulmonary embolism.  2.  Patchy bilateral groundglass and interstitial opacities have a  typical appearance of COVID 19. Superimposed airspace consolidation in  the right lower lobe concerning for superimposed bacterial pneumonia.    ALEXYS ORTEGA MD         SYSTEM ID:  [id number]         Alexys Stewart MD

## 2022-01-25 NOTE — PLAN OF CARE
VSS. On room air. Denied pain, nausea, and numbness/tingling. Bedrest. G tube in place, dressing clean dry and intact. Felix in place, adequate output, yellow clear. BM x1, incontinent. Blood sugars 274 and 387. K replaced this morning. Provider notified after the 387 blood sugar. Possible discharge home tomorrow.

## 2022-01-25 NOTE — PLAN OF CARE
"/70 (BP Location: Left arm)   Pulse 62   Temp 97.1  F (36.2  C) (Temporal)   Resp 18   Ht 1.626 m (5' 4\")   Wt 56.7 kg (124 lb 14.4 oz)   SpO2 96%   BMI 21.44 kg/m    Orientation: aox4  Resp: Diminished in bases  Cardio: WDL  Neuro: Hemiparesis  Skin: WDL  Pain: Denies  CMS: Hemiplegia  Dressing: T-drain over feeding tube insertion  Activity: Bedrest  Drains: None  Diet: Puree, mildly thick.  Voiding: Felix. Pink  Discharge Plan: Home with family in 1-2 days.    Felix patent and draining clear pink urine. Patient alert. Unable to determine orientation d/t being non-verbal. Able to answer yes/no questions intermittently with thumbs up/down. Able to swallow spoonfuls of water without coughing.  at 1800. VSS on room air.      "

## 2022-01-26 ENCOUNTER — HOME INFUSION (PRE-WILLOW HOME INFUSION) (OUTPATIENT)
Dept: PHARMACY | Facility: CLINIC | Age: 72
End: 2022-01-26
Payer: MEDICARE

## 2022-01-26 VITALS
OXYGEN SATURATION: 96 % | SYSTOLIC BLOOD PRESSURE: 146 MMHG | WEIGHT: 127.4 LBS | TEMPERATURE: 97.8 F | BODY MASS INDEX: 21.75 KG/M2 | DIASTOLIC BLOOD PRESSURE: 65 MMHG | HEIGHT: 64 IN | HEART RATE: 76 BPM | RESPIRATION RATE: 18 BRPM

## 2022-01-26 LAB
ANION GAP SERPL CALCULATED.3IONS-SCNC: 4 MMOL/L (ref 3–14)
BACTERIA BLD CULT: NO GROWTH
BACTERIA BLD CULT: NO GROWTH
BUN SERPL-MCNC: 20 MG/DL (ref 7–30)
CALCIUM SERPL-MCNC: 9.2 MG/DL (ref 8.5–10.1)
CHLORIDE BLD-SCNC: 105 MMOL/L (ref 94–109)
CO2 SERPL-SCNC: 31 MMOL/L (ref 20–32)
CREAT SERPL-MCNC: 0.9 MG/DL (ref 0.66–1.25)
ERYTHROCYTE [DISTWIDTH] IN BLOOD BY AUTOMATED COUNT: 13.7 % (ref 10–15)
GFR SERPL CREATININE-BSD FRML MDRD: >90 ML/MIN/1.73M2
GLUCOSE BLD-MCNC: 161 MG/DL (ref 70–99)
GLUCOSE BLDC GLUCOMTR-MCNC: 109 MG/DL (ref 70–99)
GLUCOSE BLDC GLUCOMTR-MCNC: 122 MG/DL (ref 70–99)
GLUCOSE BLDC GLUCOMTR-MCNC: 171 MG/DL (ref 70–99)
GLUCOSE BLDC GLUCOMTR-MCNC: 96 MG/DL (ref 70–99)
HCT VFR BLD AUTO: 33.6 % (ref 40–53)
HGB BLD-MCNC: 10.2 G/DL (ref 13.3–17.7)
HOLD SPECIMEN: NORMAL
HOLD SPECIMEN: NORMAL
MAGNESIUM SERPL-MCNC: 2.1 MG/DL (ref 1.6–2.3)
MCH RBC QN AUTO: 25.8 PG (ref 26.5–33)
MCHC RBC AUTO-ENTMCNC: 30.4 G/DL (ref 31.5–36.5)
MCV RBC AUTO: 85 FL (ref 78–100)
PLATELET # BLD AUTO: 251 10E3/UL (ref 150–450)
POTASSIUM BLD-SCNC: 3.6 MMOL/L (ref 3.4–5.3)
RBC # BLD AUTO: 3.96 10E6/UL (ref 4.4–5.9)
SODIUM SERPL-SCNC: 140 MMOL/L (ref 133–144)
WBC # BLD AUTO: 9.5 10E3/UL (ref 4–11)

## 2022-01-26 PROCEDURE — 91305 HC RX IP 250 OP 636: CPT | Performed by: INTERNAL MEDICINE

## 2022-01-26 PROCEDURE — 272N000078 HC NUTRITION PRODUCT INTERMEDIATE LITER

## 2022-01-26 PROCEDURE — 83735 ASSAY OF MAGNESIUM: CPT | Performed by: INTERNAL MEDICINE

## 2022-01-26 PROCEDURE — 250N000011 HC RX IP 250 OP 636: Performed by: INTERNAL MEDICINE

## 2022-01-26 PROCEDURE — 250N000013 HC RX MED GY IP 250 OP 250 PS 637: Performed by: INTERNAL MEDICINE

## 2022-01-26 PROCEDURE — 0054A HC ADMIN COVID VAC PFIZER TRS-SUCR, BOOSTER: CPT | Performed by: INTERNAL MEDICINE

## 2022-01-26 PROCEDURE — 85027 COMPLETE CBC AUTOMATED: CPT | Performed by: PHYSICIAN ASSISTANT

## 2022-01-26 PROCEDURE — 82310 ASSAY OF CALCIUM: CPT | Performed by: PHYSICIAN ASSISTANT

## 2022-01-26 PROCEDURE — 99239 HOSP IP/OBS DSCHRG MGMT >30: CPT | Performed by: INTERNAL MEDICINE

## 2022-01-26 PROCEDURE — 250N000013 HC RX MED GY IP 250 OP 250 PS 637: Performed by: PHYSICIAN ASSISTANT

## 2022-01-26 PROCEDURE — 36415 COLL VENOUS BLD VENIPUNCTURE: CPT | Performed by: PHYSICIAN ASSISTANT

## 2022-01-26 RX ORDER — POTASSIUM CHLORIDE 20MEQ/15ML
10 LIQUID (ML) ORAL ONCE
Status: COMPLETED | OUTPATIENT
Start: 2022-01-26 | End: 2022-01-26

## 2022-01-26 RX ADMIN — FLUOXETINE HYDROCHLORIDE 20 MG: 20 LIQUID ORAL at 10:05

## 2022-01-26 RX ADMIN — BNT162B2 30 MCG: 0.23 INJECTION, SUSPENSION INTRAMUSCULAR at 12:05

## 2022-01-26 RX ADMIN — METFORMIN HYDROCHLORIDE 1000 MG: 500 TABLET, FILM COATED ORAL at 09:52

## 2022-01-26 RX ADMIN — DOXAZOSIN 2 MG: 1 TABLET ORAL at 09:53

## 2022-01-26 RX ADMIN — LEVOFLOXACIN 500 MG: 500 INJECTION, SOLUTION INTRAVENOUS at 09:43

## 2022-01-26 RX ADMIN — AMLODIPINE BESYLATE 10 MG: 10 TABLET ORAL at 09:52

## 2022-01-26 RX ADMIN — AMANTADINE HYDROCHLORIDE 100 MG: 100 CAPSULE ORAL at 09:52

## 2022-01-26 RX ADMIN — ATORVASTATIN CALCIUM 80 MG: 40 TABLET, FILM COATED ORAL at 09:53

## 2022-01-26 RX ADMIN — POTASSIUM CHLORIDE 10 MEQ: 1.5 SOLUTION ORAL at 10:05

## 2022-01-26 RX ADMIN — ATENOLOL 25 MG: 25 TABLET ORAL at 09:53

## 2022-01-26 ASSESSMENT — ACTIVITIES OF DAILY LIVING (ADL)
ADLS_ACUITY_SCORE: 41
ADLS_ACUITY_SCORE: 43
ADLS_ACUITY_SCORE: 41

## 2022-01-26 ASSESSMENT — MIFFLIN-ST. JEOR: SCORE: 1238.88

## 2022-01-26 NOTE — PROGRESS NOTES
Care Management Discharge Note    Discharge Date: 01/27/2022       Discharge Disposition:  Home     Discharge Services:  Home care RN/HHA, Home infusion for TF    Discharge DME:      Discharge Transportation: family       Additional Information:  Pt expected to discharge today. Contacted [facility] to notify them that pt will be discharging today and resume home tube feeds. Pt also open with [facility] home care RN/HHA.      Paulette Elliott RN, BSN CTS  Care Coordinator  [facility]   [phone]

## 2022-01-26 NOTE — PLAN OF CARE
Speech Language Therapy Discharge Summary    Reason for therapy discharge:    Discharged to home.    Progress towards therapy goal(s). See goals on Care Plan in [facility] electronic health record for goal details.  Goals not met.  Barriers to achieving goals:   discharge from facility.    Therapy recommendation(s):    Continued therapy is recommended.  Rationale/Recommendations:  Pt with chronic dysphagia, baseline diet modification/SLP needs. Resume  SLP services.     At time of discharge: puree (4) and mildly thick liquid (2) via tsp -- upright posture, slow rate, small bites, ensure pt has swallowed before giving more, hold PO if cough, throat clear, wet vocal quality, eye watering, or increased WOB observed. Continue G-TF as well for full nutritional support.

## 2022-01-26 NOTE — PLAN OF CARE
"/73 (BP Location: Left arm)   Pulse 69   Temp 97.2  F (36.2  C) (Temporal)   Resp 18   Ht 1.626 m (5' 4\")   Wt 57.3 kg (126 lb 6.4 oz)   SpO2 97%   BMI 21.70 kg/m    Neuro: Nonverbal. Answers yes or no questions sometimes.   Cardiac: WNL  Lungs: WNL  GI: Incontinent  : Felix  Pain: restful  IV: Saline locked  Meds: metformin  Labs/tests: Mg 2.2, K 3.7  Diet: Puree, thickened liquids  Activity: bedrest  Misc: K and Mg protocol. R side paralyzed, R arm contracted. BG q4. G tube.   Plan: Possible discharge home tomorrow. Will continue to monitor and provide cares.       "

## 2022-01-26 NOTE — DISCHARGE SUMMARY
[facility]  Discharge Summary  Name: Jimmy Otto    MRN: [id number]  YOB: 1950    Age: 72 year old  Date of Discharge:  1/26/2022  Date of Admission: 1/21/2022  Primary Care Provider: Manny Worrell  Discharge Physician:  Perez Stewart MD  Discharging Service:  Hospitalist      Hospital Course/Discharge Diagnoses:  Jimmy Otto is a medically complex 72 year old male with a PMH significant for CVA causing hemiplegia and non verbal status, indwelling ordoñez catheter, malnutrition with G tube feedings,  recent COVID infection and DM II who presented 1/21/22 with increased work of breathing, hypoxia and increased somnolence found to have hypernatremia, positive COVID-19 swab and chest x-ray showing multifocal pneumonia.  He also has had hematuria with clotting here in the hospital following Ordoñez exchange in the ER.     Specifically, when he arrived he was mildly tachycardic and hypoxic to the point that he required non rebreather at 8 litres which was shortly weaned to 4 litres. Lab work remarkable for sodium 155, chloride 120, creatinine 1.31, normal LFTs, CRP 22.1, lactic acid 2.3, , troponin 10, glucose 299, WBC 9.7 and Hgb 11.6. CXR showed multifocal patchy infiltrates both lungs, UA looks possibly infected but was taken from indwelling ordoñez and sent for culture, blood cultures drawn and CT PE study ordered. He was given Azithromycin, Ceftriaxone and Dexamethasone 6 mg.     Hypernatremia improved as expected with increase in free water replacement.    He had significant steroid-induced hyperglycemia requiring daily insulin titration for multiple days here in the hospital.     Urine culture grew pansensitive Pseudomonas and antibiotics were tailored to levofloxacin on 1/23.     Hypoxia resolved, and thankfully we were able to stop decadron 1/24.     I discussed vaccination status with his daughter, Clemente.  He had 2 doses of the Pfizer vaccine most recently in March 2021.  She  has been trying to get him a booster but due to his mobility issues has had great issues with this.  The reason for this admission was not COVID-19 and he had a home test that was positive and very early January on 1/1.  As such, he is likely close to a month out from his Covid infection which was relatively mild.  I think he would benefit from getting a booster and due to significant logistical issues that so far have precluded him from getting it as an outpatient his family would like him to get it here which seems very reasonable.  Anticipate this will happen prior to discharge today.    The past couple of days I have updated his daughter, Clemente who seems to be extremely keyed in to his medical issues in detail oriented.  I did discuss that it will be important to monitor his sodium in the outpatient setting as if his sodium starts to rise it could indicate that he is not getting enough free water.  We did increase his free water flushes here and I discussed this with her as well.  Home health referral has been made so that hopefully lab draws can be performed by nursing staff in the home setting.    At this point he has completed 6 full days of appropriate antibiotics and has been afebrile and clinically dramatically improved so in the setting of labile blood sugars we will stop levofloxacin after this morning's dose.        #Acute hypoxemic respiratory failure: Likely related to COVID-19 infection.  Procalcitonin negative arguing against a bacterial pneumonia but in the setting of grossly abnormal urine we will continue antibiotics at least 1 day more.  -weaned off supplemental oxygen  -CXR shows multifocal infiltrates. Per family positive for COVID on 1/1.  -Troponin negative and BNP normal without evidence of fluid overload  -Stopped azithromycin/zosyn, started levofloxacin on 1/23 for pseudomonas in urine.  -Stopped Dexamethasone 1/24 given resolutionof hypoxia and ongoing steroid induced  hyperglycemia.  -CT PE study negative for clot but does show multifocal pneumonia in addition to some lobar consolidation which could represent a bacterial pneumonia.  Should be covered w/ above abx.     #Hypernatremia, hyperchloremia: Due to free water deficit related to inability to eat/drink and recent infectious process.  -Tube feeds restarted, nutrition following  -Free water flushes to 150 cc per 4-hour, discussed this with his daughter.  This represents an increase to what they were doing at home before.     #Pseudomonas UTI associated with indwelling Ordoñez catheter, hematuria.  Suspect hematuria is mostly due to traumatic ordoñez exchange.  Pseudomonas pan-sensitive.    Completed antibiotics to treat UTI.  -Ordoñez was exchanged in the ER, he did develop significant clotting/bleeding though this is essentially cleared at this time.  -Okay to restart Plavix.  -Urology signed off.  -Zosyn 1/21 - 1/23,   -levofloxacin 1-23 to 1/26.     #Hx of CVA with hemiplegia  #Dysphagia with G tube placement on 11/1  #Severe malnutrition  #Non verbal status  -Stroke in 9/2021  -Cared for by family and home care nurses  -Modified diet with 1:1 supervision if eating  -chronically on Plavix and Amantadine  -restart plavix     #DM II with hyperglycemia  [PTA detemir 10 units in AM and 18 units in PM, Aspart 5 units with each can of Jevity, Metformin 1000 mg BID  -Plan to resume home regimen as after discussion with his daughter the actually had been adjusting to get to this point as he was having some nighttime lows and I suspect his higher insulin requirements here in the hospital were related to a combination of steroids and potentially a slightly different tube feed regimen than what they normally do at home.  She seems to be diligent in monitoring blood sugars and expresses a very good understanding of his regimen and rationale for different medications.     #CKD III  -Baseline creatinine 1-1.4     #HTN  -Continue PTA  Amlodipine, Atenolol, Doxazosin, Losartan on parameters     #HLP  -Continue Atorvastatin     #Depression  -Continue PTA Fluoxetine     Hypokalemia:  Replaced per protocol Tube feeds restarted as well.      Discharge Disposition:  Discharged to home     Allergies:  Allergies   Allergen Reactions     Nka [No Known Allergies]         Discharge Medications:        Review of your medicines      CONTINUE these medicines which have NOT CHANGED      Dose / Directions   acetaminophen 325 MG tablet  Commonly known as: TYLENOL  Used for: Pain      Dose: 325-975 mg  Take 1-3 tablets (325-975 mg) by mouth every 6 hours as needed for mild pain or fever (> 101 F)  Refills: 0     amantadine 100 MG capsule  Commonly known as: SYMMETREL  Used for: Cerebrovascular accident (CVA), unspecified mechanism (H)      Dose: 100 mg  Take 1 capsule (100 mg) by mouth daily  Refills: 0     amLODIPine 5 MG tablet  Commonly known as: NORVASC  Used for: Benign essential hypertension      Dose: 10 mg  Take 2 tablets (10 mg) by mouth daily  Quantity: 30 tablet  Refills: 0     atenolol 25 MG tablet  Commonly known as: TENORMIN  Used for: Benign essential hypertension      Dose: 25 mg  Take 1 tablet (25 mg) by mouth 2 times daily  Refills: 0     atorvastatin 80 MG tablet  Commonly known as: LIPITOR      Dose: 80 mg  Take 80 mg by mouth daily  Refills: 0     blood glucose test strip  Commonly known as: NO BRAND SPECIFIED      Use 1 strip to test 3 times daily (before meals and bedtime). E11.29, R80.9, Z79.4  Refills: 0     clopidogrel 75 MG tablet  Commonly known as: PLAVIX      Dose: 75 mg  Take 75 mg by mouth daily  Refills: 0     doxazosin 2 MG tablet  Commonly known as: CARDURA  Used for: Benign prostatic hyperplasia with urinary retention      Dose: 2 mg  Take 1 tablet (2 mg) by mouth daily  Quantity: 90 tablet  Refills: 3     FLUoxetine 20 MG/5ML solution  Commonly known as: PROzac  Used for: Depression, unspecified depression type, Cerebrovascular  "accident (CVA), unspecified mechanism (H)      Dose: 20 mg  5 mLs (20 mg) by Oral or Feeding Tube route daily  Quantity: 150 mL  Refills: 0     FreeStyle Sherwin 2 Sensor Misc      Use with Sherwin 2 [place]  Refills: 0     * insulin detemir 100 UNIT/ML pen  Commonly known as: LEVEMIR PEN      Dose: 8 Units  Inject 8 Units Subcutaneous every morning  Refills: 0     * insulin detemir 100 UNIT/ML pen  Commonly known as: LEVEMIR PEN      Dose: 4 Units  Inject 4 Units Subcutaneous every evening  Refills: 0     Jevity 1.5 [place] Liqd  Used for: Cerebrovascular accident (CVA), unspecified mechanism (H)      Bolus Jevity 1.5 (5 cans in total) TID via syringe or gravity bag (7am - 1 can, 12pm - 2 cans, and 5pm - 2 cans). Free water flush of 60 mL before and after each feeding.  Quantity: 95045 mL  Refills: 0     Lancets Misc      Use as instructed 3 times daily (before meals and bedtime). E11.29, R80.9, Z79.4  Refills: 0     metFORMIN 1000 MG tablet  Commonly known as: GLUCOPHAGE  Used for: Type 2 diabetes mellitus with chronic kidney disease, with long-term current use of insulin, unspecified CKD stage (H)      Dose: 1,000 mg  Take 1 tablet (1,000 mg) by mouth 2 times daily (with meals)  Refills: 0     NovoLOG FLEXPEN 100 UNIT/ML pen  Generic drug: insulin aspart      Dose: 4-8 Units  Inject 4-8 Units Subcutaneous 3 times daily (with meals) Take three times daily with Jevity feedings, Take 4 units with one can of Jevity and 8 units with 2 cans of Jevity.  Refills: 0     ondansetron 4 MG ODT tab  Commonly known as: ZOFRAN-ODT  Used for: Cerebrovascular accident (CVA), unspecified mechanism (H)      Dose: 4 mg  Take 1 tablet (4 mg) by mouth every 6 hours as needed for nausea or vomiting  Quantity: 15 tablet  Refills: 0     pen needles 5/16\" 31G X 8 MM Misc      Dose: 1 each  Inject 1 each Subcutaneous  Refills: 0     polyethylene glycol 17 g packet  Commonly known as: MIRALAX  Used for: Constipation, unspecified constipation type   " "   Dose: 17 g  Take 17 g by mouth daily as needed for constipation  Refills: 0     sennosides 8.6 MG tablet  Commonly known as: SENOKOT      Dose: 1 tablet  Take 1 tablet by mouth daily as needed for constipation  Refills: 0         * This list has 2 medication(s) that are the same as other medications prescribed for you. Read the directions carefully, and ask your doctor or other care provider to review them with you.                Condition on Discharge:  Discharge condition: Stable       Code status on discharge: Full Code     History of Illness:  See detailed admission note for full details.    Physical Exam:  Vital signs:  Temp: 97.8  F (36.6  C) Temp src: Temporal BP: (!) 146/65 Pulse: 76   Resp: 18 SpO2: 96 % O2 Device: None (Room air) Oxygen Delivery: 2.5 LPM Height: 162.6 cm (5' 4\") Weight: 57.8 kg (127 lb 6.4 oz)  Estimated body mass index is 21.87 kg/m  as calculated from the following:    Height as of this encounter: 1.626 m (5' 4\").    Weight as of this encounter: 57.8 kg (127 lb 6.4 oz).    Wt Readings from Last 1 Encounters:   01/26/22 57.8 kg (127 lb 6.4 oz)     General:  awake, no acute distress.  Non-verbal.  HEENT: NC/AT, eyes anicteric  Cardiac: RRR, S1, S2.  No murmurs appreciated.  Pulmonary: Normal chest rise, normal work of breathing.   Abdomen: soft, non-tender, non-distended.   Extremities: no deformities.  Warm, well perfused.  Skin: no rashes or lesions noted.  Warm and Dry.  Neuro: Non verbal.  Tracks and opens eyes to voice today.  Thumbs up as response to some questions. No clonus or tremor.    Procedures other than Imaging:  Felix catheter exchange     Imaging:  Results for orders placed or performed during the hospital encounter of 01/21/22   XR Chest Port 1 View    Narrative    XR CHEST PORT 1 VIEW 1/21/2022 7:03 AM    HISTORY: respiratory distress    COMPARISON: 10/19/2021      Impression    IMPRESSION: Multifocal patchy infiltrates in both lungs consistent  with pneumonia. No " pleural effusion or pneumothorax. Normal heart  size.    TAVARES BRAY MD         SYSTEM ID:  [id number]   CT Chest Pulmonary Embolism w Contrast    Narrative    CT CHEST PULMONARY EMBOLISM WITH CONTRAST 1/21/2022 10:39 AM    CLINICAL HISTORY: PE suspected, high probability. Recent COVID,  respiratory failure, evaluate pulmonary embolus.    TECHNIQUE: CT angiogram chest during arterial phase injection IV  contrast. 2D and 3D MIP reconstructions were performed by the CT  technologist. Dose reduction techniques were used.     CONTRAST: 61mL Isovue-370    COMPARISON: None.    FINDINGS:  ANGIOGRAM CHEST: Pulmonary arteries are normal caliber and negative  for pulmonary emboli. Thoracic aorta is normal in caliber and negative  for dissection.     LUNGS AND PLEURA: There are patchy bilateral groundglass and  interstitial opacities with focal airspace consolidation in the right  lower lobe. No pleural effusion or pneumothorax.    MEDIASTINUM/AXILLAE: No thoracic or axillary lymphadenopathy. Thyroid  and esophagus are unremarkable.    CORONARY ARTERY CALCIFICATION: Mild.    UPPER ABDOMEN: A percutaneous gastrostomy tube is noted.    MUSCULOSKELETAL: No destructive bone lesions.      Impression    IMPRESSION:  1.  No evidence of pulmonary embolism.  2.  Patchy bilateral groundglass and interstitial opacities have a  typical appearance of COVID 19. Superimposed airspace consolidation in  the right lower lobe concerning for superimposed bacterial pneumonia.    ALEXYS ORTEGA MD         SYSTEM ID:  [id number]        Consultations:  Consultation during this admission received from urology.       Recent Lab Results:  Recent Labs   Lab 01/26/22  0809 01/25/22  0633 01/24/22  0630   WBC 9.5 7.8 8.9   HGB 10.2* 10.9* 10.0*   HCT 33.6* 36.4* 34.1*   MCV 85 86 88    264 255          Lab Results   Component Value Date     01/26/2022     01/25/2022     01/24/2022     08/16/2012     08/13/2012    Lab  Results   Component Value Date    CHLORIDE 105 01/26/2022    CHLORIDE 106 01/25/2022    CHLORIDE 110 01/24/2022    CHLORIDE 101 08/16/2012    CHLORIDE 99 08/13/2012    Lab Results   Component Value Date    BUN 20 01/26/2022    BUN 20 01/25/2022    BUN 24 01/24/2022    BUN 18 08/16/2012    BUN 14 08/13/2012      Lab Results   Component Value Date    POTASSIUM 3.6 01/26/2022    POTASSIUM 3.7 01/25/2022    POTASSIUM 3.4 01/25/2022    POTASSIUM 4.0 08/16/2012    POTASSIUM 3.6 08/13/2012    Lab Results   Component Value Date    CO2 31 01/26/2022    CO2 30 01/25/2022    CO2 30 01/24/2022    CO2 26 08/16/2012    CO2 26 08/13/2012    Lab Results   Component Value Date    CR 0.90 01/26/2022    CR 0.91 01/25/2022    CR 0.93 01/24/2022    CR 0.96 08/16/2012    CR 0.91 08/13/2012        No results for input(s): CULT in the last 168 hours.       Pending Results:    Unresulted Labs Ordered in the Past 30 Days of this Admission     Date and Time Order Name Status Description    1/21/2022  8:06 AM Blood Culture Hand, Right Preliminary            Discharge Instructions and Follow-Up:   Discharge Procedure Orders   Medication Therapy Management Referral   Referral Priority: Routine Referral Type: Med Therapy Management   Requested Specialty: Pharmacist   Number of Visits Requested: 1     Home care nursing referral   Referral Priority: Routine: Next available opening Referral Type: Home Health Therapies & Aides   Number of Visits Requested: 1     Home infusion referral   Referral Priority: Routine: Next available opening Referral Type: Consultation   Number of Visits Requested: 1     Reason for your hospital stay   Order Comments: You were admitted for high sodium level and a urinary tract infection in the setting of a recent COVID-19 infection.  Your oxygen levels recovered quite quickly and you were treated with antibiotics for urinary tract infection.     Follow-up and recommended labs and tests    Order Comments: Please follow-up  as able with your primary care doctor.    I do recommend that your home nurse recheck a basic metabolic panel within 3 to 5 days of hospital discharge if possible.  This will help determine if his free water flushes are appropriate as if his sodium starts to rise above the upper limit of normal he likely needs to have more water throughout the day.     Activity   Order Comments: Your activity upon discharge: activity as tolerated with assistance as needed     Order Specific Question Answer Comments   Is discharge order? Yes      MD face to face encounter   Order Comments: Documentation of Face to Face and Certification for Home Health Services    I certify that patient: Jimmy Otto is under my care and that I, or a nurse practitioner or physician's assistant working with me, had a face-to-face encounter that meets the physician face-to-face encounter requirements with this patient on: 1/25/2022.    This encounter with the patient was in whole, or in part, for the following medical condition, which is the primary reason for home health care: History of stroke, tube feed dependent.    I certify that, based on my findings, the following services are medically necessary home health services: Nursing.    My clinical findings support the need for the above services because: Nurse is needed: To assess home safety, medication changes, lab draws pain particular attention to sodium level after changes in medications or other medical regimen..    Further, I certify that my clinical findings support that this patient is homebound (i.e. absences from home require considerable and taxing effort and are for medical reasons or [facility] services or infrequently or of short duration when for other reasons) because: Leaving home is medically contraindicated for the following reason(s): Patient is bedbound..    Based on the above findings. I certify that this patient is confined to the home and needs intermittent skilled nursing care,  physical therapy and/or speech therapy.  The patient is under my care, and I have initiated the establishment of the plan of care.  This patient will be followed by a physician who will periodically review the plan of care.  Physician/Provider to provide follow up care: Manny Worrell    Attending hospital physician (the Medicare certified [place] provider): Jorge Stewart MD  Physician Signature: See electronic signature associated with these discharge orders.  Date: 1/25/2022     Diet   Order Comments: Follow this diet upon discharge:       Please resume previous home tube feeding regimen though as discussed he should have 150 mL of free water flushed through his feeding tube approximately every 4 hours while awake.        Combination Diet Pureed Diet (level 4); Mildly Thick (level 2) (via tsp); No Straws     Order Specific Question Answer Comments   Is discharge order? Yes        Total time spent in face to face contact with the patient and coordinating discharge was:  50 Minutes.

## 2022-01-26 NOTE — PLAN OF CARE
Patient notified via [employer].   Pt alert; unable to assess orientation as pt is non verbal. VS stable; afebrile. K replaced. Denies pain. CMS intact. Up w/ Ax2, using mechanical lift. Turned and repositioned. Felix patent and draining. Tolerating pureed diet but minimal appetite. Tube feed running continuously @ 45 ml/hr.     COVID booster given.     Reviewed discharge instructions and medications with patient and daughter. Questions answered. Patient discharged to home via daughter with, discharge instructions, and belongings at this time.

## 2022-01-27 ENCOUNTER — MYC REFILL (OUTPATIENT)
Dept: NEUROLOGY | Facility: CLINIC | Age: 72
End: 2022-01-27
Payer: MEDICARE

## 2022-01-27 ENCOUNTER — PATIENT OUTREACH (OUTPATIENT)
Dept: CARE COORDINATION | Facility: CLINIC | Age: 72
End: 2022-01-27
Payer: MEDICARE

## 2022-01-27 DIAGNOSIS — I10 BENIGN ESSENTIAL HYPERTENSION: ICD-10-CM

## 2022-01-27 DIAGNOSIS — Z71.89 OTHER SPECIFIED COUNSELING: ICD-10-CM

## 2022-01-27 NOTE — PROGRESS NOTES
Clinic Care Coordination Contact  [facility]/Voicemail       Clinical Data: Care Coordinator Outreach  Outreach attempted x 1.Unable to leave a  message on patient's voicemail with call back information.  Plan:  Care Coordinator will try to reach patient again in 1-2 business days.      Brenda Connors  Community Health Worker  [facility]  Ph:[phone]

## 2022-01-28 ENCOUNTER — TELEPHONE (OUTPATIENT)
Dept: PHARMACY | Facility: OTHER | Age: 72
End: 2022-01-28
Payer: MEDICARE

## 2022-01-28 RX ORDER — AMLODIPINE BESYLATE 5 MG/1
10 TABLET ORAL DAILY
Qty: 60 TABLET | Refills: 5 | Status: SHIPPED | OUTPATIENT
Start: 2022-01-28

## 2022-01-28 NOTE — TELEPHONE ENCOUNTER
Last visit with Dr. Juares: 12/22/21.    Follow-up: not scheduled. Due 6/2022.     Routed to Dr. Juares. Zahida Wise RN 1/28/2022 4:46 PM

## 2022-01-28 NOTE — PROGRESS NOTES
Clinic Care Coordination Contact  [facility]/Voicemail       Clinical Data: Care Coordinator Outreach  Outreach attempted x 2. Unable to lrave a  message on patient's voicemail with call back information.  Plan:  Care Coordinator will do no further outreaches at this time.    Brenda Connors  Community Health Worker  [facility]  Ph:[phone]

## 2022-01-28 NOTE — TELEPHONE ENCOUNTER
MTM referral from: Transitions of Care (recent hospital discharge or ED visit)    MTM referral outreach attempt #2 on January 28, 2022 at 10:19 AM      Outcome: Patient not reachable after several attempts, will route to MTM Pharmacist/Provider as an FYI.  [facility] scheduling number is [phone].  Thank you for the referral.    Meet Coyne, MT coordinator

## 2022-02-01 ENCOUNTER — LAB REQUISITION (OUTPATIENT)
Dept: LAB | Facility: CLINIC | Age: 72
End: 2022-02-01
Payer: MEDICARE

## 2022-02-01 DIAGNOSIS — N18.30 CHRONIC KIDNEY DISEASE, STAGE 3 UNSPECIFIED (H): ICD-10-CM

## 2022-02-01 LAB
ANION GAP SERPL CALCULATED.3IONS-SCNC: 7 MMOL/L (ref 3–14)
BUN SERPL-MCNC: 22 MG/DL (ref 7–30)
CALCIUM SERPL-MCNC: 8.3 MG/DL (ref 8.5–10.1)
CHLORIDE BLD-SCNC: 98 MMOL/L (ref 94–109)
CO2 SERPL-SCNC: 27 MMOL/L (ref 20–32)
CREAT SERPL-MCNC: 0.92 MG/DL (ref 0.66–1.25)
GFR SERPL CREATININE-BSD FRML MDRD: 88 ML/MIN/1.73M2
GLUCOSE BLD-MCNC: 232 MG/DL (ref 70–99)
POTASSIUM BLD-SCNC: 4 MMOL/L (ref 3.4–5.3)
SODIUM SERPL-SCNC: 132 MMOL/L (ref 133–144)

## 2022-02-01 PROCEDURE — 80048 BASIC METABOLIC PNL TOTAL CA: CPT | Mod: ORL | Performed by: NURSE PRACTITIONER

## 2022-02-03 ENCOUNTER — PRE VISIT (OUTPATIENT)
Dept: UROLOGY | Facility: CLINIC | Age: 72
End: 2022-02-03
Payer: MEDICARE

## 2022-02-04 ENCOUNTER — TELEPHONE (OUTPATIENT)
Dept: UROLOGY | Facility: CLINIC | Age: 72
End: 2022-02-04
Payer: MEDICARE

## 2022-02-04 DIAGNOSIS — R33.9 URINARY RETENTION: Primary | ICD-10-CM

## 2022-02-04 NOTE — TELEPHONE ENCOUNTER
Spoke with Alba (Home Care RN) today.  Per Alivia Herzog CNP, patient will need to complete a UA/UC prior to his Urodynamics Study on 2/14 to rule out UTI.  Alba stated she does home care for Ho on Tuesdays and Fridays, so will collect the sample on 2/8 when she sees him next.  Patient will also need orders for catheter exchanges at home after UDS is completed, if a ordoñez catheter is still required. Orders placed for UA/UC.  Will watch for results.    Luba Cisneros, CMA

## 2022-02-07 NOTE — PROGRESS NOTES
This is a recent snapshot of the patient's [facility] medical record.  For current drug dose and complete information and questions, call [phone] or In Basket pool, [facility] home infusion ([id number])  CSN Number:  [id number]

## 2022-02-08 ENCOUNTER — LAB REQUISITION (OUTPATIENT)
Dept: LAB | Facility: CLINIC | Age: 72
End: 2022-02-08
Payer: MEDICARE

## 2022-02-08 DIAGNOSIS — N39.0 URINARY TRACT INFECTION, SITE NOT SPECIFIED: ICD-10-CM

## 2022-02-08 LAB
ALBUMIN UR-MCNC: 50 MG/DL
APPEARANCE UR: CLEAR
BILIRUB UR QL STRIP: NEGATIVE
COLOR UR AUTO: ABNORMAL
GLUCOSE UR STRIP-MCNC: NEGATIVE MG/DL
HGB UR QL STRIP: NEGATIVE
KETONES UR STRIP-MCNC: NEGATIVE MG/DL
LEUKOCYTE ESTERASE UR QL STRIP: NEGATIVE
MUCOUS THREADS #/AREA URNS LPF: PRESENT /LPF
NITRATE UR QL: NEGATIVE
PH UR STRIP: 6.5 [PH] (ref 5–7)
RBC URINE: 5 /HPF
SP GR UR STRIP: 1.01 (ref 1–1.03)
SQUAMOUS EPITHELIAL: <1 /HPF
UROBILINOGEN UR STRIP-MCNC: NORMAL MG/DL
WBC URINE: 3 /HPF
YEAST #/AREA URNS HPF: ABNORMAL /HPF

## 2022-02-08 PROCEDURE — 81001 URINALYSIS AUTO W/SCOPE: CPT | Mod: ORL | Performed by: NURSE PRACTITIONER

## 2022-02-11 ENCOUNTER — PATIENT OUTREACH (OUTPATIENT)
Dept: UROLOGY | Facility: CLINIC | Age: 72
End: 2022-02-11
Payer: MEDICARE

## 2022-02-11 ENCOUNTER — LAB REQUISITION (OUTPATIENT)
Dept: LAB | Facility: CLINIC | Age: 72
End: 2022-02-11
Payer: MEDICARE

## 2022-02-11 DIAGNOSIS — R33.9 URINARY RETENTION: Primary | ICD-10-CM

## 2022-02-11 DIAGNOSIS — N39.0 URINARY TRACT INFECTION, SITE NOT SPECIFIED: ICD-10-CM

## 2022-02-11 LAB
ALBUMIN UR-MCNC: 70 MG/DL
APPEARANCE UR: CLEAR
BACTERIA #/AREA URNS HPF: ABNORMAL /HPF
BILIRUB UR QL STRIP: NEGATIVE
COLOR UR AUTO: YELLOW
GLUCOSE UR STRIP-MCNC: NEGATIVE MG/DL
HGB UR QL STRIP: NEGATIVE
KETONES UR STRIP-MCNC: NEGATIVE MG/DL
LEUKOCYTE ESTERASE UR QL STRIP: ABNORMAL
NITRATE UR QL: NEGATIVE
PH UR STRIP: 7.5 [PH] (ref 5–7)
RBC URINE: 1 /HPF
SP GR UR STRIP: 1.01 (ref 1–1.03)
UROBILINOGEN UR STRIP-MCNC: NORMAL MG/DL
WBC URINE: 5 /HPF

## 2022-02-11 PROCEDURE — 81001 URINALYSIS AUTO W/SCOPE: CPT | Mod: ORL | Performed by: NURSE PRACTITIONER

## 2022-02-11 PROCEDURE — 87106 FUNGI IDENTIFICATION YEAST: CPT | Mod: ORL | Performed by: NURSE PRACTITIONER

## 2022-02-11 NOTE — TELEPHONE ENCOUNTER
Received a call from Alba armijo [facility] nurse regarding pts urine sample and being able to proceed with UDS scheduled for Monday. Clarified with provider that pt needs to have a UC associated with his urine sample in order to proceed with his procedure on Monday.     Placed orders for UA/UC to be collected. Touched base with Alba who is able to collect the urine. Also confirmed with daughter Phallnichelle that there is a chance that the procedure will need to be canceled last minute on Monday depending on the lab results. Writer will be in contact on Monday to confirm appointment or reschedule. Joany expressed understanding. Will continue to follow.

## 2022-02-14 ENCOUNTER — ANCILLARY PROCEDURE (OUTPATIENT)
Dept: RADIOLOGY | Facility: AMBULATORY SURGERY CENTER | Age: 72
End: 2022-02-14
Attending: NURSE PRACTITIONER
Payer: MEDICARE

## 2022-02-14 ENCOUNTER — ALLIED HEALTH/NURSE VISIT (OUTPATIENT)
Dept: UROLOGY | Facility: CLINIC | Age: 72
End: 2022-02-14
Payer: MEDICARE

## 2022-02-14 ENCOUNTER — PATIENT OUTREACH (OUTPATIENT)
Dept: UROLOGY | Facility: CLINIC | Age: 72
End: 2022-02-14

## 2022-02-14 ENCOUNTER — HOME INFUSION (PRE-WILLOW HOME INFUSION) (OUTPATIENT)
Dept: PHARMACY | Facility: CLINIC | Age: 72
End: 2022-02-14

## 2022-02-14 ENCOUNTER — MEDICAL CORRESPONDENCE (OUTPATIENT)
Dept: HEALTH INFORMATION MANAGEMENT | Facility: CLINIC | Age: 72
End: 2022-02-14

## 2022-02-14 VITALS — SYSTOLIC BLOOD PRESSURE: 104 MMHG | HEART RATE: 76 BPM | DIASTOLIC BLOOD PRESSURE: 65 MMHG

## 2022-02-14 DIAGNOSIS — R33.9 URINARY RETENTION: Primary | ICD-10-CM

## 2022-02-14 DIAGNOSIS — R39.89 URINARY PROBLEM: ICD-10-CM

## 2022-02-14 DIAGNOSIS — B37.49 YEAST UTI: Primary | ICD-10-CM

## 2022-02-14 LAB — BACTERIA UR CULT: ABNORMAL

## 2022-02-14 PROCEDURE — 74430 CONTRAST X-RAY BLADDER: CPT | Performed by: NURSE PRACTITIONER

## 2022-02-14 PROCEDURE — 51600 INJECTION FOR BLADDER X-RAY: CPT | Performed by: NURSE PRACTITIONER

## 2022-02-14 PROCEDURE — 51741 ELECTRO-UROFLOWMETRY FIRST: CPT | Performed by: NURSE PRACTITIONER

## 2022-02-14 PROCEDURE — 99207 PR NO CHARGE INJECTABLE MED/DRUG: CPT | Performed by: NURSE PRACTITIONER

## 2022-02-14 PROCEDURE — 51726 COMPLEX CYSTOMETROGRAM: CPT | Performed by: NURSE PRACTITIONER

## 2022-02-14 PROCEDURE — 51784 ANAL/URINARY MUSCLE STUDY: CPT | Performed by: NURSE PRACTITIONER

## 2022-02-14 RX ORDER — FLUCONAZOLE 100 MG/1
100 TABLET ORAL DAILY
Qty: 4 TABLET | Refills: 0 | Status: SHIPPED | OUTPATIENT
Start: 2022-02-14 | End: 2022-02-18

## 2022-02-14 RX ORDER — FLUCONAZOLE 200 MG/1
200 TABLET ORAL DAILY
Qty: 1 TABLET | Refills: 0 | Status: SHIPPED | OUTPATIENT
Start: 2022-02-14 | End: 2022-02-15

## 2022-02-14 NOTE — PATIENT INSTRUCTIONS
UROLOGY CLINIC VISIT PATIENT INSTRUCTIONS    -Will keep indwelling catheter in place due to apparent lack of voluntary bladder control and high-pressure spasms on urodynamics.  -Initiate daily bladder irrigations.  -Follow up with me in 2-3 months via virtual visit to discuss how things are going and consider if further UTI prevention measures are needed.     If you have any issues, questions or concerns in the meantime, do not hesitate to contact us at [phone] or via [employer].       Alivia Herzog, CNP  Department of Urology

## 2022-02-14 NOTE — NURSING NOTE
Chief Complaint   Patient presents with     Urodynamics Study       Blood pressure 104/65, pulse 76. There is no height or weight on file to calculate BMI.    Patient Active Problem List   Diagnosis     Cerebral artery occlusion with cerebral infarction (H)     Stroke (H)     Right sided weakness     CVA (cerebral vascular accident) (H)     Adult failure to thrive     Dehydration     Oral thrush     Acute UTI     Nausea and vomiting, intractability of vomiting not specified, unspecified vomiting type     Aphasia as late effect of cerebrovascular accident     Type 2 diabetes mellitus with hyperglycemia, with long-term current use of insulin (H)     Dysarthria due to cerebrovascular accident     Essential hypertension     Hemiplegia following CVA (cerebrovascular accident) (H)     Hyperlipidemia     Imbalance     Late effects of CVA (cerebrovascular accident)     Low back pain     Microalbuminuria     Spasticity     Torn rotator cuff     Hypernatremia     Acute respiratory failure with hypoxia (H)     Urinary tract infection without hematuria, site unspecified     Pneumonia due to 2019 novel coronavirus       Allergies   Allergen Reactions     Nka [No Known Allergies]        Current Outpatient Medications   Medication Sig Dispense Refill     acetaminophen (TYLENOL) 325 MG tablet Take 1-3 tablets (325-975 mg) by mouth every 6 hours as needed for mild pain or fever (> 101 F)       amantadine (SYMMETREL) 100 MG capsule Take 1 capsule (100 mg) by mouth daily       amLODIPine (NORVASC) 5 MG tablet Take 2 tablets (10 mg) by mouth daily 60 tablet 5     atenolol (TENORMIN) 25 MG tablet Take 1 tablet (25 mg) by mouth 2 times daily       atorvastatin (LIPITOR) 80 MG tablet Take 80 mg by mouth daily       blood glucose (NO BRAND SPECIFIED) test strip Use 1 strip to test 3 times daily (before meals and bedtime). E11.29, R80.9, Z79.4       clopidogrel (PLAVIX) 75 MG tablet Take 75 mg by mouth daily       Continuous Blood Gluc  "Sensor (FREESTYLE TITI 2 SENSOR) MISC Use with Titi 2 [employer]       doxazosin (CARDURA) 2 MG tablet Take 1 tablet (2 mg) by mouth daily 90 tablet 3     fluconazole (DIFLUCAN) 100 MG tablet Take 1 tablet (100 mg) by mouth daily for 4 days Following 200 mg loading dose 4 tablet 0     fluconazole (DIFLUCAN) 200 MG tablet Take 1 tablet (200 mg) by mouth daily for 1 dose 1 tablet 0     FLUoxetine (PROZAC) 20 MG/5ML solution 5 mLs (20 mg) by Oral or Feeding Tube route daily 150 mL 0     insulin aspart (NOVOLOG FLEXPEN) 100 UNIT/ML pen Inject 4-8 Units Subcutaneous 3 times daily (with meals) Take three times daily with Jevity feedings, Take 4 units with one can of Jevity and 8 units with 2 cans of Jevity.       insulin detemir (LEVEMIR PEN) 100 UNIT/ML pen Inject 8 Units Subcutaneous every morning       insulin detemir (LEVEMIR PEN) 100 UNIT/ML pen Inject 4 Units Subcutaneous every evening       Insulin Pen Needle (PEN NEEDLES 5/16\") 31G X 8 MM MISC Inject 1 each Subcutaneous       Lancets MISC Use as instructed 3 times daily (before meals and bedtime). E11.29, R80.9, Z79.4       metFORMIN (GLUCOPHAGE) 1000 MG tablet Take 1 tablet (1,000 mg) by mouth 2 times daily (with meals)       Nutritional Supplements (JEVITY 1.5 ARLETH) LIQD Bolus Jevity 1.5 (5 cans in total) TID via syringe or gravity bag (7am - 1 can, 12pm - 2 cans, and 5pm - 2 cans). Free water flush of 60 mL before and after each feeding. 56622 mL 0     ondansetron (ZOFRAN-ODT) 4 MG ODT tab Take 1 tablet (4 mg) by mouth every 6 hours as needed for nausea or vomiting 15 tablet 0     polyethylene glycol (MIRALAX) 17 g packet Take 17 g by mouth daily as needed for constipation       sennosides (SENOKOT) 8.6 MG tablet Take 1 tablet by mouth daily as needed for constipation         Social History     Tobacco Use     Smoking status: Never Smoker     Smokeless tobacco: Never Used   Substance Use Topics     Alcohol use: Yes     Comment: occassional     Drug use: No "       Invasive Procedure Safety Checklist:    Procedure: Urodynamics    Action: Complete sections and checkboxes as appropriate.  Pre-procedure:  1. Patient ID Verified with 2 identifiers ([place] and  or MRN) : YES    2. Procedure and site verified with patient/designee (when able) : YES    3. Accurate consent documentation in medical record : YES    4. H&P (or appropriate assessment) documented in medical record : N/A  H&P must be up to 30 days prior to procedure an updated within 24 hours of Procedure as applicable.     5. Relevant diagnostic and radiology test results appropriately labeled and displayed as applicable : YES    6. Blood products, implants, devices, and/or special equipment available for the procedure as applicable : YES    7. Procedure site(s) marked with provider initials [Exclusions: none] : NO    8. Marking not required. Reason : Yes  Procedure does not require site marking    Time Out:     Time-Out performed immediately prior to starting procedure, including verbal and active participation of all team members addressing: YES    1. Correct patient identity.  2. Confirmed that the correct side and site are marked.  3. An accurate procedure to be done.  4. Agreement on the procedure to be done.  5. Correct patient position.  6. Relevant images and results are properly labeled and appropriately displayed.  7. The need to administer antibiotics or fluids for irrigation purposes during the procedure as applicable.  8. Safety precautions based on patient history or medication use.    During Procedure: Verification of correct person, site, and procedure occurs any time the responsibility for care of the patient is transferred to another member of the care team.    The following medication was given:     MEDICATION:  Omnipaque (Iohexol Injection) (240mgI/mL)  ROUTE: Provider Administered  SITE: Provider Administered via catheter  DOSE: 200mL  LOT #: [id number]  : [facility]  EXPIRATION  DATE: 04/08/22  NDC#: [address]-06   Was there drug waste? No    Prior to injection, verified patient identity using patient's name and date of birth.  Due to injection administration, patient instructed to remain in clinic for 15 minutes  afterwards, and to report any adverse reaction to me immediately.    Drug Amount Wasted:  None.  Vial/Syringe: Single dose vial    Jimmy Otto comes into clinic today at the request of Alivia Herzog CNP,  for Urodynamics.    Order has been verified: Yes.    The following medication was given: See Above    Removal:  16 Fr straight tipped latex ordoñez catheter removed from urethral meatus without difficulty after removing 10 of fluid from the balloon.    Insertion:  16 Fr straight tipped latex straight catheter inserted into urethral meatus in the usual sterile fashion without difficulty.  Received 19 ml yellow urine output.     Patient did tolerate procedure well.    Patient instructed as to where to call or go for pain, fever, leakage, or decreased urine flow.     This service provided today was under the direct supervision of Alivia Herzog CNP, who was available if needed.    Alexei Guillen, EMT  2/14/2022  3:00 PM

## 2022-02-14 NOTE — PROGRESS NOTES
PREPROCEDURE DIAGNOSES:    1. Urinary retention in the setting of CVA with right-sided hemiplegia, managed with Felix    POSTPROCEDURE DIAGNOSES:  -Normal bladder capacity (~450 mL). Sensations not reported as the patient is non-verbal.  -He has large, very high-pressure contractions coming from both the Pves and Pabd catheters throughout the study, which seem consistent with each other. Given this consistency, this is most likely Valsalva effort.   -Good bladder compliance without clear DO/DOI.  -No true voiding phase throughout the study. He does leak a small amount of urine intermittently throughout the filling process, though denies through gestures to his daughter that he is trying to void.   -EMG quiet, so no apparent guarding  -Fluoroscopy reveals a moderately-trabeculated bladder wall without clear diverticulae or cellules.  No vesicoureteral reflux was observed.  The bladder neck appears closed during filling. No formal voiding phase during the study, therefore no voiding images were obtained.     PROCEDURE:    -Uroflowmetry.  -Sterile urethral catheterization for measurement of postvoid residual urine volume.  -Complex filling cystometrogram with measurement of bladder and rectal pressures.  -Complex voiding cystometrogram with measurement of bladder and rectal pressures.  -Electromyography of the pelvic floor during urodynamics.  -Fluoroscopic imaging of the bladder during urodynamics, at least 3 views.    -Interpretation of urodynamics and flouroscopic imaging.      INDICATIONS FOR PROCEDURE:  Mr. Jimym Otto is a pleasant 72 year old male with urinary retention in the setting of CVA with right-sided hemiplegia, managed with Felix. Baseline video urodynamic assessment is requested today to better characterize Mr. Jimmy Otto's voiding dysfunction.      VOIDING DIARY:  Not completed.     DESCRIPTION OF PROCEDURE:  Risks, benefits, and alternatives to urodynamics were discussed with the patient and he wished to  proceed.  Urodynamics are planned to better assess the primary etiology for Mr. Otto's urologic dysfunction.  After informed consent was obtained, the patient was taken to the procedure room where the study was initiated. Findings below.     PRE-STUDY UROFLOWMETRY:  Voided volume: 89 mL.  Maximum flow rate: 10.7 mL/sec.  Average flow rate: 7.1 mL/sec.  Character of the curve: Bell-shaped.  Postvoid residual by catheter: 10 mL.  Pretest urine dipstick was negative for leukocytes and nitrites.    Next a 7F double-lumen urodynamics catheter was inserted into the bladder under sterile technique via the urethra.  A 7F abdominal manometry catheter was placed in the rectum.  EMG pads were placed on both sides of the anal verge.  The bladder was filled with 200 mL of Omnipaque at 30 mL/minute and serial pressures were recorded.  With coughing there was an appropriate rise in vesical and abdominal pressures with no change in detrusor pressure, confirming good study catheter placement.    DURING THE FILLING PHASE:  Sensations not reported as patient is non-verbal.     Maximum capacity at the completion of the study: ~450 mL     Uninhibited detrusor contractions: He has large, very high-pressure contractions coming from both the Pves and Pabd catheters, which seem consistent with each other. Given this consistency, this is most likely Valsalva effort.   Compliance: Pressures appear to return close to baseline between spikes in pressure from apparent Valsalva.   Continence: A few instances of leakage throughout the study, at which the patient denied via gestures to his daughter that he was trying to void.   EMG: Mostly quiet during filling.    DURING THE VOIDING PHASE:  No true voiding phase throughout the study.     FLUOROSCOPIC IMAGING OF THE BLADDER DURING URODYNAMICS:  Please note, image numbers on UDS tracings correlate with iSite series numbers on PACS images. Fluoroscopy during today's procedure demonstrated a  moderately-trabeculated bladder wall without clear diverticulae or cellules.  No vesicoureteral reflux was observed.  The bladder neck appears closed during filling. No formal voiding phase during the study, therefore no voiding images were obtained.  At the completion of the study, all catheters were removed and the patient was brought back into the consultation room to further discuss today's study results.      ASSESSMENT/PLAN:  Mr. Jimmy Otto is a pleasant 72 year old male with urinary retention in the setting of CVA with right-sided hemiplegia, managed with Felix who demonstrated the following findings today on urodynamic evaluation:    -Normal bladder capacity (~450 mL). Sensations not reported as the patient is non-verbal.  -He has large, very high-pressure contractions coming from both the Pves and Pabd catheters throughout the study, which seem consistent with each other. Given this consistency, this is most likely Valsalva effort.   -Good bladder compliance without clear DO/DOI.  -No true voiding phase throughout the study. He does leak a small amount of urine intermittently throughout the filling process, though denies through gestures to his daughter that he is trying to void.   -EMG quiet, so no apparent guarding  -Fluoroscopy reveals a moderately-trabeculated bladder wall without clear diverticulae or cellules.  No vesicoureteral reflux was observed.  The bladder neck appears closed during filling. No formal voiding phase during the study, therefore no voiding images were obtained.     The results were discussed today with his daughter, showing apparent Valsalva, with high-pressure rise in both catheters. No appreciable, volitionally detrusor contraction to demonstrate volitional bladder control. Therefore, recommend that his Felix catheter remain in place with monthly exchanges. His daughter's primary concern going forward is UTI-prevention with this catheter in place. Will initiate daily bladder  irrigations and see how he does over the next few months. Will follow up in 2-3 months via virtual visit to check in. If he has continued to have infections by then, will consider adding nightly gentamicin instillations, and we discussed this today in brief.      Thank you for allowing me to participate in the care of Mr. Jimmy Otto and please don't hesitate to contact me with any questions or concerns.      This procedure was performed under a collaborative agreement with Dr. Adan Yarbrough, Professor and  of Urology, [facility].    FEMI Salmeron, CNP  Department of Urology

## 2022-02-14 NOTE — TELEPHONE ENCOUNTER
Reached out to daughter Clemente regarding pts UDS procedure today. Per Alivia, pt can go ahead with procedure but looks like he is growing yeast. Alivia will send medication to preferred pharmacy. Daughter confirmed that she will  medication and have pt take on the way to appointment today. Answered all questions. Will continue to follow.

## 2022-02-16 ENCOUNTER — TELEPHONE (OUTPATIENT)
Dept: UROLOGY | Facility: CLINIC | Age: 72
End: 2022-02-16
Payer: MEDICARE

## 2022-02-16 NOTE — TELEPHONE ENCOUNTER
[facility] Call Center    Phone Message    May a detailed message be left on voicemail: yes     Reason for Call: Other: . tiffany with [facility] is calling, she stated the pt has already been taking fluconazole (DIFLUCAN) for thrush, prescribed by . Alivia has given pt another rx for fluconazole (DIFLUCAN) , should he be doubling doses? Also the family was under the impression pt would be on a different antibiotic, please call home care at [phone], thank you    Action Taken: Message routed to:  [facility] uro    Travel Screening: Not Applicable

## 2022-02-17 NOTE — TELEPHONE ENCOUNTER
Spoke to home care nurse Kaity. She reports pt has been taking fluconazole 100 mg 1 tablet daily and has 4 days left. Informed her that additional is not needed. She verbalized understanding.     Mona Crowe RN MSN

## 2022-02-17 NOTE — TELEPHONE ENCOUNTER
I don't see fluconazole on his med list aside from what I prescribed. Can we clarify what dose/frequency he is taking it? He does not need an antibiotic as his urine grew only Candida yeast. Thanks!

## 2022-02-24 ENCOUNTER — TELEPHONE (OUTPATIENT)
Dept: UROLOGY | Facility: CLINIC | Age: 72
End: 2022-02-24
Payer: MEDICARE

## 2022-02-24 NOTE — TELEPHONE ENCOUNTER
Spoke to home care nurse. She reports that family mentioned something about irrigating catheter for infection prevention. Nurse denies any concerns with ordoñez catheter. Catheter is draining well. No irrigation needed at this time.     Mona Crowe RN MSN

## 2022-02-24 NOTE — TELEPHONE ENCOUNTER
[facility] Call Center    Phone Message    May a detailed message be left on voicemail: yes     Reason for Call: Order(s): Home Care Orders: Other: [facility] from [facility] called as pts daughter mentioned to her that Alivia said something about irrigating the cath.   Please call [facility] with verbal orders for cath irrigation and instructions.  [phone]. Thank you    Action Taken: Message routed to:  [facility] URO    Travel Screening: Not Applicable

## 2022-02-28 ENCOUNTER — MEDICAL CORRESPONDENCE (OUTPATIENT)
Dept: HEALTH INFORMATION MANAGEMENT | Facility: CLINIC | Age: 72
End: 2022-02-28
Payer: MEDICARE

## 2022-03-04 ENCOUNTER — PATIENT OUTREACH (OUTPATIENT)
Dept: UROLOGY | Facility: CLINIC | Age: 72
End: 2022-03-04
Payer: MEDICARE

## 2022-03-04 NOTE — TELEPHONE ENCOUNTER
Reached out to [facility] for pts home care orders regarding catheter and balloon size. According to the last documented size on Feb 14th post his UDS, pts catheter size is 16Fr with a 10cc balloon. Confirmed with [facility]ba that she had the same orders. Plan will be to change catheter once a month-plan for on or around March 14th. No other questions. Will continue to follow as needed.

## 2022-03-05 ENCOUNTER — MEDICAL CORRESPONDENCE (OUTPATIENT)
Dept: HEALTH INFORMATION MANAGEMENT | Facility: CLINIC | Age: 72
End: 2022-03-05
Payer: MEDICARE

## 2022-03-30 ENCOUNTER — LAB REQUISITION (OUTPATIENT)
Dept: LAB | Facility: CLINIC | Age: 72
End: 2022-03-30
Payer: MEDICARE

## 2022-03-30 DIAGNOSIS — E11.21 TYPE 2 DIABETES MELLITUS WITH DIABETIC NEPHROPATHY (H): ICD-10-CM

## 2022-03-30 DIAGNOSIS — I12.9 HYPERTENSIVE CHRONIC KIDNEY DISEASE WITH STAGE 1 THROUGH STAGE 4 CHRONIC KIDNEY DISEASE, OR UNSPECIFIED CHRONIC KIDNEY DISEASE: ICD-10-CM

## 2022-03-30 DIAGNOSIS — E11.22 TYPE 2 DIABETES MELLITUS WITH DIABETIC CHRONIC KIDNEY DISEASE (H): ICD-10-CM

## 2022-03-30 LAB
ANION GAP SERPL CALCULATED.3IONS-SCNC: 9 MMOL/L (ref 3–14)
BUN SERPL-MCNC: 24 MG/DL (ref 7–30)
CALCIUM SERPL-MCNC: 9.7 MG/DL (ref 8.5–10.1)
CHLORIDE BLD-SCNC: 99 MMOL/L (ref 94–109)
CO2 SERPL-SCNC: 29 MMOL/L (ref 20–32)
CREAT SERPL-MCNC: 0.9 MG/DL (ref 0.66–1.25)
CREAT UR-MCNC: 100 MG/DL
CREAT UR-MCNC: 100 MG/DL
GFR SERPL CREATININE-BSD FRML MDRD: >90 ML/MIN/1.73M2
GLUCOSE BLD-MCNC: 208 MG/DL (ref 70–99)
HBA1C MFR BLD: 7.1 % (ref 0–5.6)
POTASSIUM BLD-SCNC: 4.4 MMOL/L (ref 3.4–5.3)
PROT UR-MCNC: 2.35 G/L
PROT/CREAT 24H UR: 2.35 G/G CR (ref 0–0.2)
SODIUM SERPL-SCNC: 137 MMOL/L (ref 133–144)

## 2022-03-30 PROCEDURE — 80048 BASIC METABOLIC PNL TOTAL CA: CPT | Mod: ORL | Performed by: INTERNAL MEDICINE

## 2022-03-30 PROCEDURE — 84156 ASSAY OF PROTEIN URINE: CPT | Mod: ORL | Performed by: INTERNAL MEDICINE

## 2022-03-30 PROCEDURE — 82570 ASSAY OF URINE CREATININE: CPT | Mod: ORL | Performed by: INTERNAL MEDICINE

## 2022-03-30 PROCEDURE — 83036 HEMOGLOBIN GLYCOSYLATED A1C: CPT | Mod: ORL | Performed by: INTERNAL MEDICINE

## 2022-03-30 PROCEDURE — 82043 UR ALBUMIN QUANTITATIVE: CPT | Mod: ORL | Performed by: INTERNAL MEDICINE

## 2022-03-31 LAB
CREAT UR-MCNC: 102 MG/DL
MICROALBUMIN UR-MCNC: 1620 MG/L
MICROALBUMIN/CREAT UR: 1588.24 MG/G CR (ref 0–17)

## 2022-04-04 NOTE — PROGRESS NOTES
This is a recent snapshot of the patient's [facility] medical record.  For current drug dose and complete information and questions, call [phone] or In Basket pool, [facility] ([id number])  CSN Number:  [id number]

## 2022-04-07 ENCOUNTER — PRE VISIT (OUTPATIENT)
Dept: UROLOGY | Facility: CLINIC | Age: 72
End: 2022-04-07
Payer: MEDICARE

## 2022-04-07 NOTE — TELEPHONE ENCOUNTER
Reason for visit: symptom check      Dx/Hx/Sx: urinary retention     Records/imaging/labs/orders: in epic     At Rooming: video visit

## 2022-04-15 ENCOUNTER — VIRTUAL VISIT (OUTPATIENT)
Dept: UROLOGY | Facility: CLINIC | Age: 72
End: 2022-04-15
Payer: MEDICARE

## 2022-04-15 ENCOUNTER — TELEPHONE (OUTPATIENT)
Dept: UROLOGY | Facility: CLINIC | Age: 72
End: 2022-04-15

## 2022-04-15 DIAGNOSIS — R33.9 URINARY RETENTION: Primary | ICD-10-CM

## 2022-04-15 PROCEDURE — 99213 OFFICE O/P EST LOW 20 MIN: CPT | Mod: 95 | Performed by: NURSE PRACTITIONER

## 2022-04-15 NOTE — LETTER
4/15/2022       RE: Jimmy Otto  [address] [place] [address]     Dear Colleague,    Thank you for referring your patient, Jimmy Otto, to the [facility] [place] at [facility]. Please see a copy of my visit note below.    Jimmy is a 72 year old who is being evaluated via a billable video visit.      How would you like to obtain your AVS? MyChart  If the video visit is dropped, the invitation should be resent by: Text to cell phone: [phone]  Will anyone else be joining your video visit? No    Video Start Time: 1:34 PM  Video-Visit Details    Type of service:  Video Visit    Video End Time: 1:43 PM    Originating Location (pt. Location): Home    Distant Location (provider location):  [facility] [place]     Platform used for Video Visit: Padma Otto complains of   Chief Complaint   Patient presents with     Follow Up     Symptom check        Assessment/Plan:  72 year old male with a history of CVA with right-sided hemiplegia and aphasia, as well as urinary retention, found to have no volitionally detrusor contraction on urodynamics, and has therefore been managed with chronic Felix catheter with monthly exchanges done by home care. He has not had any recurrent infections since our last visit. However, he does have a lot of sediment in his catheter by the end of the month. Bladder irrigations were planned at our last visit but not carried out. Will order this to be done now on a weekly basis. Discussed signs and symptoms of true UTI to watch for.   -Continue monthly catheter exchanges.  -Initiate month bladder irrigations.  -Follow up with me in 6 months, or sooner if needed.     Alivia Herzog, CNP  Department of Urology      Subjective:   72 year old male with a history of CVA right right-sided hemiplegia and aphasia, as well as urinary retention c/b an episode of gross hematuria, which resolved. UA  repeated 4 months ago and showed 4 RBCs and urine cytology negative. UDS pursued to evaluate detrusor function given his CVA, showing apparent Valsalva, with high-pressure rise in both catheters. No appreciable, volitionally detrusor contraction to demonstrate volitional bladder control. Therefore, it was recommended that his Felix catheter remain in place with monthly exchanges. To help prevent recurrent CAUTIs, daily bladder irrigations were also planned. However, as his urine has remained clear, irrigations were not started.     Visit is conducted by the patient's daughter, Clemente. Patient is visible on camera. She states that the patient has continued to do well with the catheter in place. This is being exchanged monthly by his home care nurse who visits once per week. His daughter has noticed, however, that the urine is his Felix tubing has a lot of sediment in it by the end of the month before it is exchanged.       Objective:     Exam:   Patient is a 72 year old male   No vital signs obtained due to virtual visit.  General Appearance: Well groomed, hygenic  Respiratory: No cough, no respiratory distress or labored breathing  Musculoskeletal: Lying in bed  Psychiatric: Alert   Further examination is deferred due to the nature of our visit.

## 2022-04-15 NOTE — NURSING NOTE
Chief Complaint   Patient presents with     Follow Up     Symptom check        There were no vitals taken for this visit. There is no height or weight on file to calculate BMI.    Patient Active Problem List   Diagnosis     Cerebral artery occlusion with cerebral infarction (H)     Stroke (H)     Right sided weakness     CVA (cerebral vascular accident) (H)     Adult failure to thrive     Dehydration     Oral thrush     Acute UTI     Nausea and vomiting, intractability of vomiting not specified, unspecified vomiting type     Aphasia as late effect of cerebrovascular accident     Type 2 diabetes mellitus with hyperglycemia, with long-term current use of insulin (H)     Dysarthria due to cerebrovascular accident     Essential hypertension     Hemiplegia following CVA (cerebrovascular accident) (H)     Hyperlipidemia     Imbalance     Late effects of CVA (cerebrovascular accident)     Low back pain     Microalbuminuria     Spasticity     Torn rotator cuff     Hypernatremia     Acute respiratory failure with hypoxia (H)     Urinary tract infection without hematuria, site unspecified     Pneumonia due to 2019 novel coronavirus       Allergies   Allergen Reactions     Nka [No Known Allergies]        Current Outpatient Medications   Medication Sig Dispense Refill     acetaminophen (TYLENOL) 325 MG tablet Take 1-3 tablets (325-975 mg) by mouth every 6 hours as needed for mild pain or fever (> 101 F)       amantadine (SYMMETREL) 100 MG capsule Take 1 capsule (100 mg) by mouth daily       amLODIPine (NORVASC) 5 MG tablet Take 2 tablets (10 mg) by mouth daily 60 tablet 5     atenolol (TENORMIN) 25 MG tablet Take 1 tablet (25 mg) by mouth 2 times daily       atorvastatin (LIPITOR) 80 MG tablet Take 80 mg by mouth daily       blood glucose (NO BRAND SPECIFIED) test strip Use 1 strip to test 3 times daily (before meals and bedtime). E11.29, R80.9, Z79.4       clopidogrel (PLAVIX) 75 MG tablet Take 75 mg by mouth daily        "Continuous Blood Gluc Sensor (FREESTYLE TITI 2 SENSOR) MISC Use with Titi 2 [employer]       doxazosin (CARDURA) 2 MG tablet Take 1 tablet (2 mg) by mouth daily 90 tablet 3     FLUoxetine (PROZAC) 20 MG/5ML solution 5 mLs (20 mg) by Oral or Feeding Tube route daily 150 mL 0     insulin aspart (NOVOLOG FLEXPEN) 100 UNIT/ML pen Inject 4-8 Units Subcutaneous 3 times daily (with meals) Take three times daily with Jevity feedings, Take 4 units with one can of Jevity and 8 units with 2 cans of Jevity.       insulin detemir (LEVEMIR PEN) 100 UNIT/ML pen Inject 8 Units Subcutaneous every morning       insulin detemir (LEVEMIR PEN) 100 UNIT/ML pen Inject 4 Units Subcutaneous every evening       Insulin Pen Needle (PEN NEEDLES 5/16\") 31G X 8 MM MISC Inject 1 each Subcutaneous       Lancets MISC Use as instructed 3 times daily (before meals and bedtime). E11.29, R80.9, Z79.4       metFORMIN (GLUCOPHAGE) 1000 MG tablet Take 1 tablet (1,000 mg) by mouth 2 times daily (with meals)       Nutritional Supplements (JEVITY 1.5 ARLETH) LIQD Bolus Jevity 1.5 (5 cans in total) TID via syringe or gravity bag (7am - 1 can, 12pm - 2 cans, and 5pm - 2 cans). Free water flush of 60 mL before and after each feeding. 33665 mL 0     ondansetron (ZOFRAN-ODT) 4 MG ODT tab Take 1 tablet (4 mg) by mouth every 6 hours as needed for nausea or vomiting 15 tablet 0     polyethylene glycol (MIRALAX) 17 g packet Take 17 g by mouth daily as needed for constipation       sennosides (SENOKOT) 8.6 MG tablet Take 1 tablet by mouth daily as needed for constipation         Social History     Tobacco Use     Smoking status: Never Smoker     Smokeless tobacco: Never Used   Substance Use Topics     Alcohol use: Yes     Comment: occassional     Drug use: No       Nallely Horn  4/15/2022  1:21 PM  "

## 2022-04-15 NOTE — PATIENT INSTRUCTIONS
UROLOGY CLINIC VISIT PATIENT INSTRUCTIONS    -Irrigate the Felix catheter once weekly per the below instructions:     Instill 120 mL and pull back 60 mL = 120 mL  Instill 60 mL and pull back 60 mL = 180 mL  Instill 60 mL and pull back 60 mL = 240 mL  Repeat until urine is clear using a minimum of 240 mL of saline.     -Continue with monthly catheter exchanges.   -Follow up with me in 6 months, or sooner as needed.     If you have any issues, questions or concerns in the meantime, do not hesitate to contact us at [phone] or via [employer].     Alivia Herzog, CNP  Department of Urology

## 2022-04-15 NOTE — PROGRESS NOTES
Jimmy is a 72 year old who is being evaluated via a billable video visit.      How would you like to obtain your AVS? [email or web address]  If the video visit is dropped, the invitation should be resent by: Text to cell phone: [phone]  Will anyone else be joining your video visit? No    Video Start Time: 1:34 PM  Video-Visit Details    Type of service:  Video Visit    Video End Time: 1:43 PM    Originating Location (pt. Location): Home    Distant Location (provider location):  [facility] [place]     Platform used for Video Visit: Padma Otto complains of   Chief Complaint   Patient presents with     Follow Up     Symptom check        Assessment/Plan:  72 year old male with a history of CVA with right-sided hemiplegia and aphasia, as well as urinary retention, found to have no volitionally detrusor contraction on urodynamics, and has therefore been managed with chronic Felix catheter with monthly exchanges done by home care. He has not had any recurrent infections since our last visit. However, he does have a lot of sediment in his catheter by the end of the month. Bladder irrigations were planned at our last visit but not carried out. Will order this to be done now on a weekly basis. Discussed signs and symptoms of true UTI to watch for.   -Continue monthly catheter exchanges.  -Initiate month bladder irrigations.  -Follow up with me in 6 months, or sooner if needed.     Alivia Herzog, CNP  Department of Urology      Subjective:   72 year old male with a history of CVA right right-sided hemiplegia and aphasia, as well as urinary retention c/b an episode of gross hematuria, which resolved. UA repeated 4 months ago and showed 4 RBCs and urine cytology negative. UDS pursued to evaluate detrusor function given his CVA, showing apparent Valsalva, with high-pressure rise in both catheters. No appreciable, volitionally detrusor contraction to demonstrate volitional bladder control. Therefore, it  was recommended that his Felix catheter remain in place with monthly exchanges. To help prevent recurrent CAUTIs, daily bladder irrigations were also planned. However, as his urine has remained clear, irrigations were not started.     Visit is conducted by the patient's daughter, Clemente. Patient is visible on camera. She states that the patient has continued to do well with the catheter in place. This is being exchanged monthly by his home care nurse who visits once per week. His daughter has noticed, however, that the urine is his Felix tubing has a lot of sediment in it by the end of the month before it is exchanged.       Objective:     Exam:   Patient is a 72 year old male   No vital signs obtained due to virtual visit.  General Appearance: Well groomed, hygenic  Respiratory: No cough, no respiratory distress or labored breathing  Musculoskeletal: Lying in bed  Psychiatric: Alert   Further examination is deferred due to the nature of our visit.

## 2022-04-15 NOTE — TELEPHONE ENCOUNTER
----- Message from Emilia Sharma RN sent at 4/15/2022  2:19 PM CDT -----  Regarding: FW: Home care orders  Would you be able to fax these? If not, I can complete it on Monday. No rush if you can't    Thanks  Emilia Sharma RN    ----- Message -----  From: Alivia Herzog CNP  Sent: 4/15/2022   2:02 PM CDT  To: Emilia Sharma RN  Subject: Home care orders                                 Pramod Nieto,     I'd like this patient's home care nurse to start irrigating his Felix once weekly when they are there, per the following instructions:     Instill 120 mL and pull back 60 mL = 120 mL  Instill 60 mL and pull back 60 mL = 180 mL  Instill 60 mL and pull back 60 mL = 240 mL    Repeat until urine is clear using a minimum of 240 mL of saline.     Can you fax these orders?     Thanks!    Alivia

## 2022-04-15 NOTE — TELEPHONE ENCOUNTER
Writer called brian López's home care nurse and will email irrigation order to [email or web address]

## 2022-04-27 NOTE — PROGRESS NOTES
This is a recent snapshot of the patient's [facility] medical record.  For current drug dose and complete information and questions, call [phone] or In Basket pool, fv home infusion ([id number])  CSN Number:  [id number]

## 2022-04-28 ENCOUNTER — TELEPHONE (OUTPATIENT)
Dept: UROLOGY | Facility: CLINIC | Age: 72
End: 2022-04-28
Payer: MEDICARE

## 2022-04-28 NOTE — TELEPHONE ENCOUNTER
[facility] Call Center    Phone Message    May a detailed message be left on voicemail: yes     Reason for Call: Fabienne from [facility] is calling to see if Alivia Herzog received the order she faxed on 04/21/22 for catheter irrigation orders.  Please reach out to Fabienne at [phone] ext. [phone] to clarify.  Thank you.    Action Taken: Other: Urology    Travel Screening: Not Applicable

## 2022-05-02 ENCOUNTER — MEDICAL CORRESPONDENCE (OUTPATIENT)
Dept: HEALTH INFORMATION MANAGEMENT | Facility: CLINIC | Age: 72
End: 2022-05-02
Payer: MEDICARE

## 2022-05-03 ENCOUNTER — HOME INFUSION (PRE-WILLOW HOME INFUSION) (OUTPATIENT)
Dept: PHARMACY | Facility: CLINIC | Age: 72
End: 2022-05-03
Payer: MEDICARE

## 2022-05-03 NOTE — TELEPHONE ENCOUNTER
[facility] Call Center    Phone Message    May a detailed message be left on voicemail: yes     Reason for Call: What is status of irrigation orders? Please reach out to Fabienne.    Action Taken: Message routed to:  [facility] URology    Travel Screening: Not Applicable

## 2022-05-04 ENCOUNTER — MEDICAL CORRESPONDENCE (OUTPATIENT)
Dept: HEALTH INFORMATION MANAGEMENT | Facility: CLINIC | Age: 72
End: 2022-05-04

## 2022-05-09 NOTE — PROGRESS NOTES
This is a recent snapshot of the patient's [facility] medical record.  For current drug dose and complete information and questions, call [phone] or In Basket pool, fv home infusion ([id number])  CSN Number:  [id number]

## 2022-05-09 NOTE — PROGRESS NOTES
This is a recent snapshot of the patient's [facility] medical record.  For current drug dose and complete information and questions, call [phone] or In Basket pool, fv home infusion ([id number])  CSN Number:  [id number]

## 2022-05-11 ENCOUNTER — APPOINTMENT (OUTPATIENT)
Dept: GENERAL RADIOLOGY | Facility: CLINIC | Age: 72
End: 2022-05-11
Attending: EMERGENCY MEDICINE
Payer: MEDICARE

## 2022-05-11 ENCOUNTER — HOSPITAL ENCOUNTER (EMERGENCY)
Facility: CLINIC | Age: 72
Discharge: HOME | End: 2022-05-11
Attending: EMERGENCY MEDICINE | Admitting: EMERGENCY MEDICINE
Payer: MEDICARE

## 2022-05-11 VITALS
TEMPERATURE: 98 F | RESPIRATION RATE: 18 BRPM | SYSTOLIC BLOOD PRESSURE: 113 MMHG | DIASTOLIC BLOOD PRESSURE: 64 MMHG | HEART RATE: 65 BPM | OXYGEN SATURATION: 96 %

## 2022-05-11 DIAGNOSIS — K94.23 GASTROSTOMY TUBE DYSFUNCTION (H): ICD-10-CM

## 2022-05-11 PROCEDURE — 49450 REPLACE G/C TUBE PERC: CPT

## 2022-05-11 PROCEDURE — 99284 EMERGENCY DEPT VISIT MOD MDM: CPT | Mod: 25

## 2022-05-11 NOTE — ED TRIAGE NOTES
Patient presents with complaints of hole in feeding tube which they noticed this morning. ABC intact without need for intervention at this time.          2017

## 2022-05-11 NOTE — ED NOTES
Per IR, do not recommend replacing cap. IR states they will come down and replaced device since it has been in place since November.

## 2022-05-11 NOTE — PROGRESS NOTES
Patient tolerated replacement of 14 Prydeinig gastrostomy well by Dr Teixeira.  Placement verified with fluoroscopy.   Patient back to ER via cart.

## 2022-05-11 NOTE — ED PROVIDER NOTES
"  History     Chief Complaint:  Feeding Tube Change       HPI:  Jimmy Otto is a 72 year old male who presents with a feeding tube change.  History came from the patient and supplemented by son present at bedside.  Patient does have a PMH significant for previous stroke, with feeding tube placement.  Earlier today, they noted one of the plugs covering one of the ports to his G-tube had a hole in the plug.  They subsequently noted drainage coming from the plug.  They present to the ED for further evaluation.  No other concerns are voiced at this time.  Patient denies any abdominal pain, fevers, vomiting, or diarrhea.  Tube has been in place for approximately 3 months.    Allergies:  Nka [No Known Allergies]     Medications:    acetaminophen (TYLENOL) 325 MG tablet  amantadine (SYMMETREL) 100 MG capsule  amLODIPine (NORVASC) 5 MG tablet  atenolol (TENORMIN) 25 MG tablet  atorvastatin (LIPITOR) 80 MG tablet  blood glucose (NO BRAND SPECIFIED) test strip  clopidogrel (PLAVIX) 75 MG tablet  Continuous Blood Gluc Sensor (FREESTYLE TITI 2 SENSOR) MISC  doxazosin (CARDURA) 2 MG tablet  FLUoxetine (PROZAC) 20 MG/5ML solution  insulin aspart (NOVOLOG FLEXPEN) 100 UNIT/ML pen  insulin detemir (LEVEMIR PEN) 100 UNIT/ML pen  insulin detemir (LEVEMIR PEN) 100 UNIT/ML pen  Insulin Pen Needle (PEN NEEDLES 5/16\") 31G X 8 MM MISC  Lancets MISC  metFORMIN (GLUCOPHAGE) 1000 MG tablet  Nutritional Supplements (JEVITY 1.5 ARLETH) LIQD  ondansetron (ZOFRAN-ODT) 4 MG ODT tab  polyethylene glycol (MIRALAX) 17 g packet  sennosides (SENOKOT) 8.6 MG tablet        Past Medical History:    Past Medical History:   Diagnosis Date     Diabetes mellitus (H)      Hypercholesteremia      Hypertension      Unspecified cerebral artery occlusion with cerebral infarction      Patient Active Problem List    Diagnosis Date Noted     Cerebral artery occlusion with cerebral infarction (H) 08/13/2012     Priority: High     Problem list name updated by automated " process. Provider to review       Hypernatremia 01/21/2022     Priority: Medium     Acute respiratory failure with hypoxia (H) 01/21/2022     Priority: Medium     Urinary tract infection without hematuria, site unspecified 01/21/2022     Priority: Medium     Pneumonia due to 2019 novel coronavirus 01/21/2022     Priority: Medium     Adult failure to thrive 10/14/2021     Priority: Medium     Dehydration 10/14/2021     Priority: Medium     Oral thrush 10/14/2021     Priority: Medium     Acute UTI 10/14/2021     Priority: Medium     Nausea and vomiting, intractability of vomiting not specified, unspecified vomiting type 10/14/2021     Priority: Medium     CVA (cerebral vascular accident) (H) 09/17/2021     Priority: Medium     Right sided weakness 09/14/2021     Priority: Medium     Type 2 diabetes mellitus with hyperglycemia, with long-term current use of insulin (H) 08/06/2020     Priority: Medium     Microalbuminuria 03/13/2015     Priority: Medium     Spasticity 08/25/2013     Priority: Medium     Low back pain 06/12/2013     Priority: Medium     Torn rotator cuff 09/09/2012     Priority: Medium     Formatting of this note might be different from the original.  Possible Torn rotator cuff, Right, vs Arthritis vs other etiol       Aphasia as late effect of cerebrovascular accident 08/31/2012     Priority: Medium     Dysarthria due to cerebrovascular accident 08/31/2012     Priority: Medium     Hyperlipidemia 08/31/2012     Priority: Medium     Imbalance 08/31/2012     Priority: Medium     Late effects of CVA (cerebrovascular accident) 08/31/2012     Priority: Medium     Hemiplegia following CVA (cerebrovascular accident) (H) 08/20/2012     Priority: Medium     Formatting of this note might be different from the original.  left internal capsule, causing right sided hemiplegic symptoms       Stroke (H) 08/13/2012     Priority: Medium     Formatting of this note might be different from the original.  deep left  hemispheric infarct 8/13/12, extension of stroke 8/16/12 with   right hemiparesis and aphasia.   ; Stroke (HCC)       Essential hypertension 05/15/2008     Priority: Medium     Formatting of this note might be different from the original.  Hypertension          Past Surgical History:    Past Surgical History:   Procedure Laterality Date     IR CYSTOGRAM  2/14/2022        Family History:    family history is not on file.    Social History:   reports that he has never smoked. He has never used smokeless tobacco. He reports current alcohol use. He reports that he does not use drugs.    Review of Systems:  10 point ROS is negative aside from that mentioned in the HPI      Physical Exam     Patient Vitals for the past 24 hrs:   BP Temp Pulse Resp SpO2   05/11/22 1349 113/64 -- 65 18 96 %   05/11/22 1027 114/62 98  F (36.7  C) 66 18 95 %      General:   Well-nourished  Eyes:   Conjunctiva without injection or scleral icterus  Resp:   Lungs CTAB   No crackles, wheezing or audible rubs   Good air movement  CV:    Normal rate, regular rhythm   S1 and S2 present   No murmur, gallop or rub  GI:   BS present   Abdomen soft without distention   Non-tender to light and deep palpation   G-tube in place   No surrounding erythema or drainage   One of port plugs with hole in center   No guarding or rebound tenderness  Skin:   Warm, dry, well perfused   No rashes or open wounds on exposed skin  MSK:   Moves all extremities   No focal deformities or swelling  Neuro:   Alert  Psych:   Normal affect, normal mood   Non-verbal, baseline      Emergency Department Course     Procedures:  G-tube exchange performed by IR    Interventions:  Medications   iohexol (OMNIPAQUE) 240 mg/mL solution 50 mL (10 mLs Per G Tube Given by Other 5/11/22 1254)        Emergency Department Course / Reassessment:  Nursing notes and vitals reviewed.  11:10 AM: I performed an exam of the patient as documented above.      The patient was sent for X-ray while in the  emergency department, results above.      ED Course as of 05/11/22 1351   Wed May 11, 2022   1340 Patient re-checked.        I discussed the treatment plan with the patient and daughter. They expressed understanding of this plan and consented to discharge. They will be discharged home with instructions for care and follow up. In addition, the patient will return to the emergency department if their symptoms persist, worsen, if new symptoms arise or if there is any concern.  All questions were answered.    I personally reviewed the imaging and laboratory results with the Patient and answered all related questions prior to discharge.      Impression & Plan      Medical Decision Making:  Jimmy Otto is a 72 year old male who presents to the ER for evaluation of G-tube problem.  Vital signs on presentation reveal no acute abnormalities.  Patient presenting with malfunctioning G-tube.  Evidence of perforation and Of one of the ports.  In discussion with IR, rather than replacing it, they felt that G-tube should be replaced in its entirety.  This was last placed in November.  Patient underwent successful G-tube exchange with interventional radiology.  No current signs of infection.  No other medical concerns voiced by family or patient.  We will plan discharge home with ongoing outpatient cares.  Return to ED with any new or troubling symptoms.    Diagnosis:    ICD-10-CM    1. Gastrostomy tube dysfunction (H)  K94.23       5/11/2022   Dennys Roberts MD Roach, Brian Donald, MD  05/11/22 7877

## 2022-05-20 ENCOUNTER — TELEPHONE (OUTPATIENT)
Dept: UROLOGY | Facility: CLINIC | Age: 72
End: 2022-05-20
Payer: MEDICARE

## 2022-06-06 ENCOUNTER — MEDICAL CORRESPONDENCE (OUTPATIENT)
Dept: HEALTH INFORMATION MANAGEMENT | Facility: CLINIC | Age: 72
End: 2022-06-06
Payer: MEDICARE

## 2022-06-13 ENCOUNTER — MEDICAL CORRESPONDENCE (OUTPATIENT)
Dept: HEALTH INFORMATION MANAGEMENT | Facility: CLINIC | Age: 72
End: 2022-06-13

## 2022-06-15 ENCOUNTER — HOSPITAL ENCOUNTER (EMERGENCY)
Facility: CLINIC | Age: 72
Discharge: HOME | End: 2022-06-15
Attending: EMERGENCY MEDICINE | Admitting: EMERGENCY MEDICINE
Payer: MEDICARE

## 2022-06-15 VITALS
RESPIRATION RATE: 18 BRPM | DIASTOLIC BLOOD PRESSURE: 69 MMHG | OXYGEN SATURATION: 98 % | TEMPERATURE: 98.4 F | HEART RATE: 79 BPM | SYSTOLIC BLOOD PRESSURE: 143 MMHG

## 2022-06-15 DIAGNOSIS — N30.01 ACUTE CYSTITIS WITH HEMATURIA: ICD-10-CM

## 2022-06-15 LAB
ALBUMIN UR-MCNC: 200 MG/DL
APPEARANCE UR: ABNORMAL
BACTERIA #/AREA URNS HPF: ABNORMAL /HPF
BILIRUB UR QL STRIP: NEGATIVE
COLOR UR AUTO: ABNORMAL
GLUCOSE BLDC GLUCOMTR-MCNC: 144 MG/DL (ref 70–99)
GLUCOSE UR STRIP-MCNC: NEGATIVE MG/DL
HGB UR QL STRIP: ABNORMAL
KETONES UR STRIP-MCNC: NEGATIVE MG/DL
LEUKOCYTE ESTERASE UR QL STRIP: ABNORMAL
NITRATE UR QL: POSITIVE
PH UR STRIP: 7 [PH] (ref 5–7)
RBC URINE: >182 /HPF
SP GR UR STRIP: 1.01 (ref 1–1.03)
UROBILINOGEN UR STRIP-MCNC: NORMAL MG/DL
WBC URINE: >182 /HPF

## 2022-06-15 PROCEDURE — 81001 URINALYSIS AUTO W/SCOPE: CPT | Performed by: EMERGENCY MEDICINE

## 2022-06-15 PROCEDURE — 93005 ELECTROCARDIOGRAM TRACING: CPT

## 2022-06-15 PROCEDURE — 99284 EMERGENCY DEPT VISIT MOD MDM: CPT | Mod: 25

## 2022-06-15 PROCEDURE — 87086 URINE CULTURE/COLONY COUNT: CPT | Performed by: EMERGENCY MEDICINE

## 2022-06-15 PROCEDURE — 51702 INSERT TEMP BLADDER CATH: CPT

## 2022-06-15 PROCEDURE — 93005 ELECTROCARDIOGRAM TRACING: CPT | Mod: 76,59

## 2022-06-15 PROCEDURE — 250N000013 HC RX MED GY IP 250 OP 250 PS 637: Performed by: EMERGENCY MEDICINE

## 2022-06-15 RX ORDER — CIPROFLOXACIN 500 MG/1
500 TABLET, FILM COATED ORAL 2 TIMES DAILY
Qty: 14 TABLET | Refills: 0 | Status: SHIPPED | OUTPATIENT
Start: 2022-06-15 | End: 2022-06-22

## 2022-06-15 RX ORDER — CIPROFLOXACIN 250 MG/1
500 TABLET, FILM COATED ORAL ONCE
Status: COMPLETED | OUTPATIENT
Start: 2022-06-15 | End: 2022-06-15

## 2022-06-15 RX ADMIN — CIPROFLOXACIN HYDROCHLORIDE 500 MG: 250 TABLET, FILM COATED ORAL at 21:32

## 2022-06-16 LAB
ATRIAL RATE - MUSE: 77 BPM
DIASTOLIC BLOOD PRESSURE - MUSE: NORMAL MMHG
INTERPRETATION ECG - MUSE: NORMAL
P AXIS - MUSE: -10 DEGREES
PR INTERVAL - MUSE: 150 MS
QRS DURATION - MUSE: 76 MS
QT - MUSE: 396 MS
QTC - MUSE: 448 MS
R AXIS - MUSE: -29 DEGREES
SYSTOLIC BLOOD PRESSURE - MUSE: NORMAL MMHG
T AXIS - MUSE: 1 DEGREES
VENTRICULAR RATE- MUSE: 77 BPM

## 2022-06-16 NOTE — ED NOTES
Pt had some bleed on and around the meatus, family reports placed by home health. Attempted to irrigate ordoñez and 10 ml caused pain for patient, so irrigation stopped, no drainage at this point. When balloon deflated urine came out rapidly. 16 Arabic ordoñez placed without any difficulties. Draining red tinted urine that rapidly became clear after about 100 ml urine. Pt reports immediate relief. UA sent at request of family

## 2022-06-16 NOTE — DISCHARGE INSTRUCTIONS
Please take the ciprofloxacin 2 hours after your tube feeds.  You should watch your blood sugars very closely as the ciprofloxacin can make your blood sugars drop.  Return to the ER if having fevers, chills, nausea, vomiting or other concerns.  We also cultured your urine if this antibiotic is not appropriate a nurse will call you to change his medication.  Return to the ER with any concerns.  Follow-up closely with your primary care provider.

## 2022-06-16 NOTE — ED TRIAGE NOTES
Here for concern of catheter not draining since 9am. Stated feels like bladder is full. Stated that this morning, his catheter was changed by home health nurse. ABCs intact.      Triage Assessment     Row Name 06/15/22 1942       Triage Assessment (Adult)    Airway WDL WDL       Respiratory WDL    Respiratory WDL WDL       Cardiac WDL    Cardiac WDL WDL

## 2022-06-16 NOTE — ED PROVIDER NOTES
"  History     Chief Complaint:  Catheter Problem      HPI   Jimmy Otto is a 72 year old male who presents with catheter concern.  Patient's home care nurse came to change the Felix catheter today.  It was noted to have some blood at the meatus.  It was not draining properly and therefore they presented to the ER.  Patient has a stroke and has chronic Felix catheter as well as G-tube.  He does not take anything by mouth.  He denies any fevers, chills, nausea, vomiting, back pain or other concerns.  Family is also concerned about possible UTI.  Patient is nonverbal but does shake his head yes and no.    Review of Systems   All other systems reviewed and are negative.    Allergies:    Nka [No Known Allergies]      Medications:      ciprofloxacin (CIPRO) 500 MG tablet  ciprofloxacin (CIPRO) 500 MG tablet  acetaminophen (TYLENOL) 325 MG tablet  amantadine (SYMMETREL) 100 MG capsule  amLODIPine (NORVASC) 5 MG tablet  atenolol (TENORMIN) 25 MG tablet  atorvastatin (LIPITOR) 80 MG tablet  blood glucose (NO BRAND SPECIFIED) test strip  clopidogrel (PLAVIX) 75 MG tablet  Continuous Blood Gluc Sensor (FREESTYLE TITI 2 SENSOR) MISC  doxazosin (CARDURA) 2 MG tablet  FLUoxetine (PROZAC) 20 MG/5ML solution  insulin aspart (NOVOLOG FLEXPEN) 100 UNIT/ML pen  insulin detemir (LEVEMIR PEN) 100 UNIT/ML pen  insulin detemir (LEVEMIR PEN) 100 UNIT/ML pen  Insulin Pen Needle (PEN NEEDLES 5/16\") 31G X 8 MM MISC  Lancets MISC  metFORMIN (GLUCOPHAGE) 1000 MG tablet  Nutritional Supplements (JEVITY 1.5 ARLETH) LIQD  ondansetron (ZOFRAN-ODT) 4 MG ODT tab  polyethylene glycol (MIRALAX) 17 g packet  sennosides (SENOKOT) 8.6 MG tablet        Past Medical History:      Past Medical History:   Diagnosis Date     Diabetes mellitus (H)      Hypercholesteremia      Hypertension      Unspecified cerebral artery occlusion with cerebral infarction      Patient Active Problem List    Diagnosis Date Noted     Cerebral artery occlusion with cerebral infarction " (H) 08/13/2012     Priority: High     Problem list name updated by automated process. Provider to review       Hypernatremia 01/21/2022     Priority: Medium     Acute respiratory failure with hypoxia (H) 01/21/2022     Priority: Medium     Urinary tract infection without hematuria, site unspecified 01/21/2022     Priority: Medium     Pneumonia due to 2019 novel coronavirus 01/21/2022     Priority: Medium     Adult failure to thrive 10/14/2021     Priority: Medium     Dehydration 10/14/2021     Priority: Medium     Oral thrush 10/14/2021     Priority: Medium     Acute UTI 10/14/2021     Priority: Medium     Nausea and vomiting, intractability of vomiting not specified, unspecified vomiting type 10/14/2021     Priority: Medium     CVA (cerebral vascular accident) (H) 09/17/2021     Priority: Medium     Right sided weakness 09/14/2021     Priority: Medium     Type 2 diabetes mellitus with hyperglycemia, with long-term current use of insulin (H) 08/06/2020     Priority: Medium     Microalbuminuria 03/13/2015     Priority: Medium     Spasticity 08/25/2013     Priority: Medium     Low back pain 06/12/2013     Priority: Medium     Torn rotator cuff 09/09/2012     Priority: Medium     Formatting of this note might be different from the original.  Possible Torn rotator cuff, Right, vs Arthritis vs other etiol       Aphasia as late effect of cerebrovascular accident 08/31/2012     Priority: Medium     Dysarthria due to cerebrovascular accident 08/31/2012     Priority: Medium     Hyperlipidemia 08/31/2012     Priority: Medium     Imbalance 08/31/2012     Priority: Medium     Late effects of CVA (cerebrovascular accident) 08/31/2012     Priority: Medium     Hemiplegia following CVA (cerebrovascular accident) (H) 08/20/2012     Priority: Medium     Formatting of this note might be different from the original.  left internal capsule, causing right sided hemiplegic symptoms       Stroke (H) 08/13/2012     Priority: Medium      Formatting of this note might be different from the original.  deep left hemispheric infarct 8/13/12, extension of stroke 8/16/12 with   right hemiparesis and aphasia.   ; Stroke (HCC)       Essential hypertension 05/15/2008     Priority: Medium     Formatting of this note might be different from the original.  Hypertension          Past Surgical History:      Past Surgical History:   Procedure Laterality Date     IR CYSTOGRAM  2/14/2022       Family History:      No family history on file.    Social History:  Manny Worrell  The patient presents to the ED with family member    Physical Exam     Patient Vitals for the past 24 hrs:   BP Temp Temp src Pulse Resp SpO2   06/15/22 2245 (!) 143/69 -- -- 79 -- --   06/15/22 2130 (!) 145/85 -- -- -- -- --   06/15/22 1943 (!) 150/87 98.4  F (36.9  C) Temporal 88 18 98 %       Physical Exam  General: Resting on the bed.  Head: No obvious trauma to head.  Ears, Nose, Throat:  External ears normal.  Nose normal.   Eyes:  Conjunctivae clear.   CV: Regular rate and rhythm.  No murmurs.      Respiratory: Effort normal and breath sounds normal.  No wheezing or crackles.   Gastrointestinal: Soft.  No distension. There is no tenderness.  There is no rigidity, no rebound and no guarding. G tube in place on the LUQ  Musculoskeletal: No cva tenderness    Neuro: Alert. Moving all extremities appropriately.  Normal speech.    Skin: Skin is warm and dry.  No rash noted.   : Felix catheter in place.  There is some blood near the meatus.  Able to retract foreskin with no appreciable adhesions.  Foreskin retracted after examination.  Nontender penis to palpation, nontender testicles.      Emergency Department Course   ECG:  ECG taken at 2109  Normal sinus rhythm   No significant changes as compared to prior, dated 1/21/22.  Rate 77 bpm. AL interval 150 ms. QRS duration 76 ms. QT/QTc 448 ms. P-R-T axes -10 -29 1.       Laboratory:  Labs Ordered and Resulted from Time of ED Arrival to  Time of ED Departure   ROUTINE UA WITH MICROSCOPIC REFLEX TO CULTURE - Abnormal       Result Value    Color Urine Orange (*)     Appearance Urine Slightly Cloudy (*)     Glucose Urine Negative      Bilirubin Urine Negative      Ketones Urine Negative      Specific Gravity Urine 1.014      Blood Urine Large (*)     pH Urine 7.0      Protein Albumin Urine 200  (*)     Urobilinogen Urine Normal      Nitrite Urine Positive (*)     Leukocyte Esterase Urine Large (*)     Bacteria Urine Few (*)     RBC Urine >182 (*)     WBC Urine >182 (*)    GLUCOSE BY METER - Abnormal    GLUCOSE BY METER POCT 144 (*)    URINE CULTURE       Procedures:      Emergency Department Course:             Reviewed:  I reviewed nursing notes, vitals, past medical history and Care Everywhere    Assessments:   I obtained history and examined the patient as noted above.    I rechecked the patient and explained findings.     Interventions:  Medications   ciprofloxacin (CIPRO) tablet 500 mg (500 mg Oral Given 6/15/22 2132)       Disposition:  The patient was discharged to home.     Impression & Plan        Medical Decision Makin-year-old male with history of stroke presents with Felix catheter malfunction.  Vital signs reassuring.  Broad differentials pursued include not limited to catheter malfunction, UTI, pyelonephritis, nephrolithiasis, diverticulitis, colitis, etc.  Nursing attempted to flush the catheter and catheter continued to not drain.  Therefore catheter was exchanged in the ER without difficulty.  There was some mild bleeding from the meatus following exchange.  I assume this is secondary to having to Felix catheters placed in 1 day in addition to some likely mild trauma from this procedure.  Bleeding appeared to be improving over the course of stay.  UA does look concerning for infection and I consulted pharmacy.  They recommended ciprofloxacin given recent pseudomonal UTI.  We discussed risk-benefit with patient and family  member related to patient's diabetes medications.  Patient has no flank pain, no abdominal pain, no fevers or chills.  Vital signs are reassuring.  No signs of sepsis.  No indication for imaging.  There are some mild blood in the catheter bag but this appears to be clearing as well.  There is no clots.  No indication for continuous bladder irrigation.  Suspect the blood is likely related to the catheter exchange as opposed to kidney stone or other pathology given patient has no flank pain.  We observe the patient in the ER and did watch patient's glucose following administration and this remained normal.  We will continue to watch closely at home.  We also discussed the risks of a tendon issue secondary to the ciprofloxacin but patient is currently nonambulatory.  EKG was checked with a normal QTC making several appear to be a safe option at this point.  Return precautions were given.  Patient was discharged home.    Covid-19  Jimmy Otto was evaluated during a global COVID-19 pandemic, which necessitated consideration that the patient might be at risk for infection with the SARS-CoV-2 virus that causes COVID-19.   Applicable protocols for evaluation were followed during the patient's care.   COVID-19 was considered as part of the patient's evaluation.    Diagnosis:    ICD-10-CM    1. Acute cystitis with hematuria  N30.01        Discharge Medications:  Discharge Medication List as of 6/15/2022  9:19 PM      START taking these medications    Details   ciprofloxacin (CIPRO) 500 MG tablet Take 1 tablet (500 mg) by mouth 2 times daily for 7 days, Disp-14 tablet, R-0, Local Print              Olivia Fairbanks MD  06/16/22 0009

## 2022-06-16 NOTE — ED NOTES
Daughter updated on pt status and educated on abx usage after tube feedings. Daughter states understanding.

## 2022-06-17 LAB
BACTERIA UR CULT: ABNORMAL

## 2022-06-17 NOTE — RESULT ENCOUNTER NOTE
Final urine culture report is negative.  Adult Negative Urine culture parameters per protocol: Any # Urogenital single or mixed organism, <10,000 col/ml single organism (cath/midstream), and > 3 organisms (No susceptibilities performed).  [facility] discharge antibiotic prescribed (If applicable): Ciprofloxacin  Treatment recommendations per [facility] ED Lab Result Urine Culture protocol.  No change in treatment

## 2022-06-21 LAB
ATRIAL RATE - MUSE: 76 BPM
DIASTOLIC BLOOD PRESSURE - MUSE: NORMAL MMHG
INTERPRETATION ECG - MUSE: NORMAL
P AXIS - MUSE: -11 DEGREES
PR INTERVAL - MUSE: 162 MS
QRS DURATION - MUSE: 74 MS
QT - MUSE: 392 MS
QTC - MUSE: 441 MS
R AXIS - MUSE: -29 DEGREES
SYSTOLIC BLOOD PRESSURE - MUSE: NORMAL MMHG
T AXIS - MUSE: 4 DEGREES
VENTRICULAR RATE- MUSE: 76 BPM

## 2022-06-24 ENCOUNTER — PATIENT OUTREACH (OUTPATIENT)
Dept: UROLOGY | Facility: CLINIC | Age: 72
End: 2022-06-24

## 2022-06-24 NOTE — TELEPHONE ENCOUNTER
Reached out to  nurse Alba to discuss pts irrigation plan. Pt to be irrigating once weekly and PRN for increased sediment and decreased output. Alba indicates that she has been irrigating once weekly but will update plan for PRN as well. No other questions. Will continue to follow as needed.

## 2022-07-27 NOTE — PROGRESS NOTES
This is a recent snapshot of the patient's [facility] medical record.  For current drug dose and complete information and questions, call [phone] or In Basket pool, fv home infusion ([id number])  CSN Number:  [id number]

## 2022-08-06 ENCOUNTER — HEALTH MAINTENANCE LETTER (OUTPATIENT)
Age: 72
End: 2022-08-06

## 2022-08-31 ENCOUNTER — HOME INFUSION (PRE-WILLOW HOME INFUSION) (OUTPATIENT)
Dept: PHARMACY | Facility: CLINIC | Age: 72
End: 2022-08-31

## 2022-09-09 ENCOUNTER — HOSPITAL ENCOUNTER (OUTPATIENT)
Dept: GENERAL RADIOLOGY | Facility: CLINIC | Age: 72
Discharge: HOME | End: 2022-09-09
Attending: INTERNAL MEDICINE | Admitting: INTERNAL MEDICINE
Payer: MEDICARE

## 2022-09-09 DIAGNOSIS — Z93.1 GASTROSTOMY TUBE IN PLACE (H): ICD-10-CM

## 2022-09-09 PROCEDURE — 43762 RPLC GTUBE NO REVJ TRC: CPT

## 2022-09-09 NOTE — PROGRESS NOTES
Pt here with his daughter for a g-tube exchange. #14 fr BEVERLEY G tube was removed by Dr Teixeira and replaced without difficulty.The tube was flushed and pt left in satisfactory condition.

## 2022-10-04 PROBLEM — R11.2 NAUSEA AND VOMITING: Status: ACTIVE | Noted: 2021-10-14

## 2022-10-22 ENCOUNTER — HEALTH MAINTENANCE LETTER (OUTPATIENT)
Age: 72
End: 2022-10-22

## 2022-10-27 ENCOUNTER — DOCUMENTATION ONLY (OUTPATIENT)
Dept: RESPIRATORY THERAPY | Facility: CLINIC | Age: 72
End: 2022-10-27

## 2022-10-27 NOTE — PROGRESS NOTES
PT WAS GIVEN INSTRUCTION AND EDUCATION ON THE CARE AND MAINTENANCE OF THE SUCTION UNIT AND ASSOCIATED SUPPLIES IN ACCORDANCE WITH THE [facility] PRACTICE GUIDELINES.  THE SUCTION MACHINE RECEIVED A QUALITY CHECK FOR PROPER CLEANING AND FUNCTION OF THE UNIT PRIOR TO SET UP ON THE PATIENT.    RX: SUCTION MACHINE & SUPPLIES  ORAL AND/OR TRACH? ORAL  RX SIGNED BY: AMANDA CRAIN  DATE SIGNED: 10/25/22  DIANNA: [id number]  DIAGNOSIS: I69.320 - Aphasia following cerebral infarction    DATE PATIENT WAS SETUP ON: 10/27/22  LOCATION OF SETUP: HOME  SETUP BY: BRANDYN ENGLISH [facility] MEDICAL [facility]

## 2022-10-27 NOTE — PROGRESS NOTES
This is a recent snapshot of the patient's [facility] medical record.  For current drug dose and complete information and questions, call [phone] or In Basket pool, fv home infusion ([id number])  CSN Number:  [id number]

## 2022-10-28 ENCOUNTER — APPOINTMENT (OUTPATIENT)
Dept: CT IMAGING | Facility: CLINIC | Age: 72
End: 2022-10-28
Attending: NURSE PRACTITIONER
Payer: MEDICARE

## 2022-10-28 ENCOUNTER — HOSPITAL ENCOUNTER (OUTPATIENT)
Facility: CLINIC | Age: 72
Setting detail: OBSERVATION
Discharge: HOME | End: 2022-10-29
Attending: NURSE PRACTITIONER | Admitting: INTERNAL MEDICINE
Payer: MEDICARE

## 2022-10-28 ENCOUNTER — APPOINTMENT (OUTPATIENT)
Dept: GENERAL RADIOLOGY | Facility: CLINIC | Age: 72
End: 2022-10-28
Attending: NURSE PRACTITIONER
Payer: MEDICARE

## 2022-10-28 DIAGNOSIS — I21.4 NSTEMI (NON-ST ELEVATED MYOCARDIAL INFARCTION) (H): ICD-10-CM

## 2022-10-28 DIAGNOSIS — I69.359 HEMIPLEGIA FOLLOWING CVA (CEREBROVASCULAR ACCIDENT) (H): ICD-10-CM

## 2022-10-28 DIAGNOSIS — I69.320 APHASIA AS LATE EFFECT OF CEREBROVASCULAR ACCIDENT: ICD-10-CM

## 2022-10-28 DIAGNOSIS — J69.0 ASPIRATION PNEUMONITIS (H): Primary | ICD-10-CM

## 2022-10-28 DIAGNOSIS — R07.9 CHEST PAIN: ICD-10-CM

## 2022-10-28 LAB
ALBUMIN SERPL BCG-MCNC: 3.4 G/DL (ref 3.5–5.2)
ALP SERPL-CCNC: 148 U/L (ref 40–129)
ALT SERPL W P-5'-P-CCNC: 27 U/L (ref 10–50)
ANION GAP SERPL CALCULATED.3IONS-SCNC: 13 MMOL/L (ref 7–15)
AST SERPL W P-5'-P-CCNC: 29 U/L (ref 10–50)
BASOPHILS # BLD AUTO: 0.1 10E3/UL (ref 0–0.2)
BASOPHILS NFR BLD AUTO: 1 %
BILIRUB SERPL-MCNC: 0.2 MG/DL
BUN SERPL-MCNC: 26.2 MG/DL (ref 8–23)
CALCIUM SERPL-MCNC: 9.8 MG/DL (ref 8.8–10.2)
CHLORIDE SERPL-SCNC: 96 MMOL/L (ref 98–107)
CREAT SERPL-MCNC: 0.91 MG/DL (ref 0.67–1.17)
D DIMER PPP FEU-MCNC: 0.77 UG/ML FEU (ref 0–0.5)
DEPRECATED HCO3 PLAS-SCNC: 29 MMOL/L (ref 22–29)
EOSINOPHIL # BLD AUTO: 0.3 10E3/UL (ref 0–0.7)
EOSINOPHIL NFR BLD AUTO: 4 %
ERYTHROCYTE [DISTWIDTH] IN BLOOD BY AUTOMATED COUNT: 15.6 % (ref 10–15)
FLUAV RNA SPEC QL NAA+PROBE: NEGATIVE
FLUBV RNA RESP QL NAA+PROBE: NEGATIVE
GFR SERPL CREATININE-BSD FRML MDRD: 90 ML/MIN/1.73M2
GLUCOSE BLDC GLUCOMTR-MCNC: 107 MG/DL (ref 70–99)
GLUCOSE SERPL-MCNC: 120 MG/DL (ref 70–99)
HCT VFR BLD AUTO: 41.4 % (ref 40–53)
HGB BLD-MCNC: 12.3 G/DL (ref 13.3–17.7)
HOLD SPECIMEN: NORMAL
IMM GRANULOCYTES # BLD: 0 10E3/UL
IMM GRANULOCYTES NFR BLD: 0 %
LYMPHOCYTES # BLD AUTO: 1.7 10E3/UL (ref 0.8–5.3)
LYMPHOCYTES NFR BLD AUTO: 26 %
MCH RBC QN AUTO: 24.6 PG (ref 26.5–33)
MCHC RBC AUTO-ENTMCNC: 29.7 G/DL (ref 31.5–36.5)
MCV RBC AUTO: 83 FL (ref 78–100)
MONOCYTES # BLD AUTO: 0.6 10E3/UL (ref 0–1.3)
MONOCYTES NFR BLD AUTO: 9 %
NEUTROPHILS # BLD AUTO: 3.8 10E3/UL (ref 1.6–8.3)
NEUTROPHILS NFR BLD AUTO: 60 %
NRBC # BLD AUTO: 0 10E3/UL
NRBC BLD AUTO-RTO: 0 /100
NT-PROBNP SERPL-MCNC: 432 PG/ML (ref 0–900)
PLATELET # BLD AUTO: 200 10E3/UL (ref 150–450)
POTASSIUM SERPL-SCNC: 4.3 MMOL/L (ref 3.4–5.3)
PROCALCITONIN SERPL IA-MCNC: 0.04 NG/ML
PROT SERPL-MCNC: 7.4 G/DL (ref 6.4–8.3)
RBC # BLD AUTO: 4.99 10E6/UL (ref 4.4–5.9)
RSV RNA SPEC NAA+PROBE: NEGATIVE
SARS-COV-2 RNA RESP QL NAA+PROBE: NEGATIVE
SODIUM SERPL-SCNC: 138 MMOL/L (ref 136–145)
TROPONIN T SERPL HS-MCNC: 33 NG/L
TROPONIN T SERPL HS-MCNC: 34 NG/L
WBC # BLD AUTO: 6.5 10E3/UL (ref 4–11)

## 2022-10-28 PROCEDURE — G0378 HOSPITAL OBSERVATION PER HR: HCPCS

## 2022-10-28 PROCEDURE — 36415 COLL VENOUS BLD VENIPUNCTURE: CPT | Performed by: NURSE PRACTITIONER

## 2022-10-28 PROCEDURE — 85379 FIBRIN DEGRADATION QUANT: CPT | Performed by: NURSE PRACTITIONER

## 2022-10-28 PROCEDURE — 258N000003 HC RX IP 258 OP 636: Performed by: PHYSICIAN ASSISTANT

## 2022-10-28 PROCEDURE — C9803 HOPD COVID-19 SPEC COLLECT: HCPCS

## 2022-10-28 PROCEDURE — 82962 GLUCOSE BLOOD TEST: CPT

## 2022-10-28 PROCEDURE — 71046 X-RAY EXAM CHEST 2 VIEWS: CPT

## 2022-10-28 PROCEDURE — 84145 PROCALCITONIN (PCT): CPT | Performed by: NURSE PRACTITIONER

## 2022-10-28 PROCEDURE — 250N000013 HC RX MED GY IP 250 OP 250 PS 637: Performed by: PHYSICIAN ASSISTANT

## 2022-10-28 PROCEDURE — 250N000011 HC RX IP 250 OP 636: Performed by: NURSE PRACTITIONER

## 2022-10-28 PROCEDURE — 87637 SARSCOV2&INF A&B&RSV AMP PRB: CPT | Performed by: NURSE PRACTITIONER

## 2022-10-28 PROCEDURE — 99220 PR INITIAL OBSERVATION CARE,LEVEL III: CPT | Performed by: PHYSICIAN ASSISTANT

## 2022-10-28 PROCEDURE — 85025 COMPLETE CBC W/AUTO DIFF WBC: CPT | Performed by: NURSE PRACTITIONER

## 2022-10-28 PROCEDURE — 83880 ASSAY OF NATRIURETIC PEPTIDE: CPT | Mod: GZ | Performed by: NURSE PRACTITIONER

## 2022-10-28 PROCEDURE — 93005 ELECTROCARDIOGRAM TRACING: CPT

## 2022-10-28 PROCEDURE — 250N000009 HC RX 250: Performed by: NURSE PRACTITIONER

## 2022-10-28 PROCEDURE — 84484 ASSAY OF TROPONIN QUANT: CPT | Performed by: NURSE PRACTITIONER

## 2022-10-28 PROCEDURE — 80053 COMPREHEN METABOLIC PANEL: CPT | Performed by: NURSE PRACTITIONER

## 2022-10-28 PROCEDURE — G1010 CDSM STANSON: HCPCS

## 2022-10-28 PROCEDURE — 99285 EMERGENCY DEPT VISIT HI MDM: CPT | Mod: 25

## 2022-10-28 RX ORDER — ASPIRIN 81 MG/1
81 TABLET, CHEWABLE ORAL DAILY
Status: DISCONTINUED | OUTPATIENT
Start: 2022-10-29 | End: 2022-10-29 | Stop reason: HOSPADM

## 2022-10-28 RX ORDER — ONDANSETRON 4 MG/1
4 TABLET, ORALLY DISINTEGRATING ORAL EVERY 6 HOURS PRN
Status: DISCONTINUED | OUTPATIENT
Start: 2022-10-28 | End: 2022-10-29 | Stop reason: HOSPADM

## 2022-10-28 RX ORDER — ATENOLOL 25 MG/1
25 TABLET ORAL 2 TIMES DAILY
Status: DISCONTINUED | OUTPATIENT
Start: 2022-10-28 | End: 2022-10-29 | Stop reason: HOSPADM

## 2022-10-28 RX ORDER — SODIUM CHLORIDE 9 MG/ML
INJECTION, SOLUTION INTRAVENOUS CONTINUOUS
Status: ACTIVE | OUTPATIENT
Start: 2022-10-28 | End: 2022-10-29

## 2022-10-28 RX ORDER — CLOPIDOGREL BISULFATE 75 MG/1
75 TABLET ORAL DAILY
Status: DISCONTINUED | OUTPATIENT
Start: 2022-10-29 | End: 2022-10-29 | Stop reason: HOSPADM

## 2022-10-28 RX ORDER — DOXAZOSIN 1 MG/1
2 TABLET ORAL DAILY
Status: DISCONTINUED | OUTPATIENT
Start: 2022-10-29 | End: 2022-10-29 | Stop reason: HOSPADM

## 2022-10-28 RX ORDER — ONDANSETRON 2 MG/ML
4 INJECTION INTRAMUSCULAR; INTRAVENOUS EVERY 6 HOURS PRN
Status: DISCONTINUED | OUTPATIENT
Start: 2022-10-28 | End: 2022-10-29 | Stop reason: HOSPADM

## 2022-10-28 RX ORDER — DEXTROSE MONOHYDRATE 100 MG/ML
INJECTION, SOLUTION INTRAVENOUS CONTINUOUS PRN
Status: DISCONTINUED | OUTPATIENT
Start: 2022-10-28 | End: 2022-10-29 | Stop reason: HOSPADM

## 2022-10-28 RX ORDER — IPRATROPIUM BROMIDE AND ALBUTEROL SULFATE 2.5; .5 MG/3ML; MG/3ML
3 SOLUTION RESPIRATORY (INHALATION) 3 TIMES DAILY
Status: DISCONTINUED | OUTPATIENT
Start: 2022-10-29 | End: 2022-10-29 | Stop reason: HOSPADM

## 2022-10-28 RX ORDER — IOPAMIDOL 755 MG/ML
500 INJECTION, SOLUTION INTRAVASCULAR ONCE
Status: COMPLETED | OUTPATIENT
Start: 2022-10-28 | End: 2022-10-28

## 2022-10-28 RX ORDER — AMLODIPINE BESYLATE 10 MG/1
10 TABLET ORAL DAILY
Status: DISCONTINUED | OUTPATIENT
Start: 2022-10-29 | End: 2022-10-29 | Stop reason: HOSPADM

## 2022-10-28 RX ORDER — ACETAMINOPHEN 325 MG/1
325-975 TABLET ORAL EVERY 6 HOURS PRN
Status: DISCONTINUED | OUTPATIENT
Start: 2022-10-28 | End: 2022-10-29 | Stop reason: HOSPADM

## 2022-10-28 RX ORDER — FLUOXETINE 20 MG/5ML
20 SOLUTION ORAL DAILY
Status: DISCONTINUED | OUTPATIENT
Start: 2022-10-29 | End: 2022-10-29 | Stop reason: HOSPADM

## 2022-10-28 RX ORDER — POLYETHYLENE GLYCOL 3350 17 G/17G
17 POWDER, FOR SOLUTION ORAL DAILY PRN
Status: DISCONTINUED | OUTPATIENT
Start: 2022-10-28 | End: 2022-10-29 | Stop reason: HOSPADM

## 2022-10-28 RX ORDER — AMOXICILLIN AND CLAVULANATE POTASSIUM 400; 57 MG/5ML; MG/5ML
875 POWDER, FOR SUSPENSION ORAL 2 TIMES DAILY
Status: DISCONTINUED | OUTPATIENT
Start: 2022-10-28 | End: 2022-10-29 | Stop reason: HOSPADM

## 2022-10-28 RX ORDER — NYSTATIN 100000/ML
SUSPENSION, ORAL (FINAL DOSE FORM) ORAL
COMMUNITY
Start: 2022-10-22

## 2022-10-28 RX ADMIN — AMOXICILLIN AND CLAVULANATE POTASSIUM 875 MG: 400; 57 POWDER, FOR SUSPENSION ORAL at 23:15

## 2022-10-28 RX ADMIN — SODIUM CHLORIDE: 9 INJECTION, SOLUTION INTRAVENOUS at 22:39

## 2022-10-28 RX ADMIN — SODIUM CHLORIDE 73 ML: 9 INJECTION, SOLUTION INTRAVENOUS at 18:58

## 2022-10-28 RX ADMIN — ATENOLOL 25 MG: 25 TABLET ORAL at 23:15

## 2022-10-28 RX ADMIN — IOPAMIDOL 51 ML: 755 INJECTION, SOLUTION INTRAVENOUS at 18:58

## 2022-10-28 RX ADMIN — METFORMIN HYDROCHLORIDE 500 MG: 500 TABLET ORAL at 23:15

## 2022-10-28 ASSESSMENT — ENCOUNTER SYMPTOMS
NECK PAIN: 0
BACK PAIN: 0
VOMITING: 0
FEVER: 0
MYALGIAS: 0

## 2022-10-28 ASSESSMENT — ACTIVITIES OF DAILY LIVING (ADL)
ADLS_ACUITY_SCORE: 41
ADLS_ACUITY_SCORE: 39
ADLS_ACUITY_SCORE: 41

## 2022-10-28 NOTE — ED TRIAGE NOTES
Pt arrives to the ED due to ED due to chest pain that began yesterday. Per daughter pt had been pointing at his chest for the home health nurse when asked where pain is. Pt is bed bound and non-verbal due to stroke from last year. Per daughter, home health nurse also stated he was moaning more and sounds more congested.

## 2022-10-28 NOTE — ED PROVIDER NOTES
History   Chief Complaint:  Chest Pain       HPI   Jimmy Otto is a 72 year old male on Plavix with history of CVA, HTN, hyperlipidemia, and type II diabetes mellitus who presents with chest pain beginning last evening. Per patient's daughter, he was moaning in pain and pointing to his chest when the home health nurse came to check on him today. The pain does not radiate into his arms, neck, or back. It is localized to his upper abdomen and lower chest. Per daughter, the chest pain feels like a tightness. He initially rated the severity of the pain as a 3-/10, but says it is now a 5-6/10. Patient has also been more congested recently. No vomiting or fevers. He does not ambulate and is nonverbal since his CVA. Patient recently had an echocardiogram in 2021 (see results below).  His daughter, who is his POA, notes no fever, emesis, trauma, abdominal pain, change in bowel status.    Echocardiogram Results (09/15/2021):  Left Ventricle  The left ventricle is normal in size. There is borderline concentric left  ventricular hypertrophy. Left ventricular systolic function is normal. Left  ventricular diastolic function is normal. Normal left ventricular wall motion.  No evidence of left ventricular mass or tumors.     Right Ventricle  The right ventricle is normal in structure, function and size.     Atria  Normal left atrial size. Right atrial size is normal. There is no color  Doppler evidence of an atrial shunt.     Mitral Valve  The mitral valve leaflets are mildly thickened. The mitral valve leaflets  appear normal. There is no evidence of stenosis, fluttering, or prolapse.     Tricuspid Valve  Normal tricuspid valve.     Aortic Valve  There is trivial trileaflet aortic sclerosis.     Pulmonic Valve  Normal pulmonic valve.     Vessels  The aortic root is normal size. The inferior vena cava is normal.     Pericardium  The pericardium appears normal.    Review of Systems   Constitutional: Negative for fever.   HENT:  Positive for congestion.    Cardiovascular: Positive for chest pain.   Gastrointestinal: Negative for vomiting.   Musculoskeletal: Negative for back pain, myalgias and neck pain.     Allergies:  No Known Allergies    Medications:  Symmetrel  Norvasc  Tenormin  Lipitor  Plavix  Cardura  Prozac  Novolog Flexpen  Levemir Pen  Glucophage  Zofran  Senokot  Mycostatin   Keflex     Past Medical History:     CVA due to cerebral artery occlusion  Oral thrush  UTI  Aphasia  Type II diabetes mellitus  Hyperlipidemia  HTN  Dysarthria  Hemiplegia due to CVA, right   Spasticity  Torn rotator cuff, right  Acute respiratory failure  COVID pneumonia    Malnutrition   Acute renal failure  Depression   Cataracts     Family History:    Mother: Cataracts     Social History:  The patient presents to the ED with his daughter  Arrives via private vehicle  PCP: Nallely Fong MD, Internal Medicine    Physical Exam     Patient Vitals for the past 24 hrs:   BP Temp Temp src Pulse Resp SpO2 Weight   10/28/22 1615 138/73 -- -- 74 -- 99 % --   10/28/22 1604 (!) 140/73 -- -- -- -- 99 % --   10/28/22 1600 -- -- -- 71 -- 99 % --   10/28/22 1559 (!) 140/73 -- -- 71 -- 98 % --   10/28/22 1553 -- -- -- -- -- 99 % --   10/28/22 1552 -- -- -- -- -- 98 % --   10/28/22 1551 -- -- -- -- -- 99 % --   10/28/22 1550 -- -- -- -- -- 99 % --   10/28/22 1549 -- -- -- -- -- 98 % --   10/28/22 1548 -- -- -- -- -- 99 % --   10/28/22 1547 -- -- -- -- -- 99 % --   10/28/22 1546 -- -- -- -- -- 99 % --   10/28/22 1545 137/75 -- -- 70 -- 98 % --   10/28/22 1544 -- -- -- -- -- 99 % --   10/28/22 1531 137/74 -- -- 72 -- 99 % --   10/28/22 1525 -- -- -- -- -- 99 % --   10/28/22 1524 -- -- -- -- -- 99 % --   10/28/22 1523 -- -- -- -- -- 99 % --   10/28/22 1522 -- -- -- -- -- 99 % --   10/28/22 1521 -- -- -- -- -- 99 % --   10/28/22 1520 (!) 141/76 -- -- -- -- 99 % --   10/28/22 1500 132/78 -- -- 67 -- 99 % --   10/28/22 1451 136/76 -- -- -- -- 99 % --   10/28/22 1436  137/76 -- -- -- -- 98 % --   10/28/22 1421 139/82 -- -- -- -- 100 % --   10/28/22 1406 (!) 140/78 -- -- -- -- 100 % --   10/28/22 1351 -- -- -- -- -- 98 % --   10/28/22 1336 134/76 -- -- -- -- 97 % --   10/28/22 1331 134/76 -- -- 71 -- 99 % --   10/28/22 1229 121/78 97  F (36.1  C) Temporal 71 22 99 % 54.4 kg (120 lb)       Physical Exam   Nursing notes reviewed. Vitals reviewed.  General: Alert. Well kept.  Eyes:  Conjunctiva non-injected, non-icteric.  Neck/Throat: Moist mucous membranes.  Normal voice.  Cardiac: Regular rhythm. Normal heart sounds with no murmur/rubs/click. No lower extremity edema.   Pulmonary: Coarse breath sounds bilateral.  No wheezing.  Abdomen: Soft. Non-distended. Non-tender to palpation. No masses. No guarding or rebound.  G-tube in left upper quadrant.    : Felix catheter.  Musculoskeletal: limited lower extremity motion, but moves all extremities.  Neurological: Alert and non verbal.  Patient points to area of pain and nods yes/no to questions.   Skin: Warm and dry without rashes or petechiae. Normal appearance of visualized exposed skin.  Psych: Good eye contact.      Emergency Department Course   ECG  ECG results from 10/28/22   EKG 12 lead     Value    Systolic Blood Pressure     Diastolic Blood Pressure     Ventricular Rate 70    Atrial Rate 70    NH Interval 172    QRS Duration 72        QTc 432    P Axis 22    R AXIS -29    T Axis 20    Interpretation ECG      Sinus rhythm  Minimal voltage criteria for LVH, may be normal variant  Borderline ECG  When compared with ECG of 15-SUZANNA-2022 21:11,  No significant change was found       Imaging:  XR Chest 2 Views   Final Result   IMPRESSION: AP and lateral views of the chest were obtained.   Cardiomediastinal silhouette is within normal limits. No suspicious   focal pulmonary opacities. No significant pleural effusion or   pneumothorax.      DELFINO LYNN MD            SYSTEM ID:  [id number]      CT Chest Pulmonary Embolism w  Contrast    (Results Pending)     Report per radiology    Laboratory:  Labs Ordered and Resulted from Time of ED Arrival to Time of ED Departure   TROPONIN T, HIGH SENSITIVITY - Abnormal       Result Value    Troponin T, High Sensitivity 34 (*)    COMPREHENSIVE METABOLIC PANEL - Abnormal    Sodium 138      Potassium 4.3      Chloride 96 (*)     Carbon Dioxide (CO2) 29      Anion Gap 13      Urea Nitrogen 26.2 (*)     Creatinine 0.91      Calcium 9.8      Glucose 120 (*)     Alkaline Phosphatase 148 (*)     AST 29      ALT 27      Protein Total 7.4      Albumin 3.4 (*)     Bilirubin Total 0.2      GFR Estimate 90     CBC WITH PLATELETS AND DIFFERENTIAL - Abnormal    WBC Count 6.5      RBC Count 4.99      Hemoglobin 12.3 (*)     Hematocrit 41.4      MCV 83      MCH 24.6 (*)     MCHC 29.7 (*)     RDW 15.6 (*)     Platelet Count 200      % Neutrophils 60      % Lymphocytes 26      % Monocytes 9      % Eosinophils 4      % Basophils 1      % Immature Granulocytes 0      NRBCs per 100 WBC 0      Absolute Neutrophils 3.8      Absolute Lymphocytes 1.7      Absolute Monocytes 0.6      Absolute Eosinophils 0.3      Absolute Basophils 0.1      Absolute Immature Granulocytes 0.0      Absolute NRBCs 0.0     TROPONIN T, HIGH SENSITIVITY - Abnormal    Troponin T, High Sensitivity 33 (*)    D DIMER QUANTITATIVE - Abnormal    D-Dimer Quantitative 0.77 (*)    NT PROBNP INPATIENT - Normal    N terminal Pro BNP Inpatient 432     PROCALCITONIN - Normal    Procalcitonin 0.04     INFLUENZA A/B & SARS-COV2 PCR MULTIPLEX - Normal    Influenza A PCR Negative      Influenza B PCR Negative      RSV PCR Negative      SARS CoV2 PCR Negative        Emergency Department Course:   Reviewed:  I reviewed nursing notes, vitals, past medical history and Care Everywhere    Assessments:  1313 I obtained history and examined the patient as noted above.   1630 I rechecked the patient and explained findings. He denies current pain    Consults:  1826 I spoke  with 11 for the last 7years slight try to BM today, accepts the patient for Dr. Noel.     Interventions:  Medications - No data to display    Disposition:  The patient was admitted to the hospital under the care of Dr. Noel.     Impression & Plan   Medical Decision Making:  Jimmy Otto is a 72 year old male on Plavix with history of CVA, HTN, hyperlipidemia, and type II diabetes mellitus who presents with chest pain beginning this morning.  On exam patient has bilateral 2+ radial pulses with no upper or lower extremity edema.  Patient points to the substernal area when asked where his pain is.  He denies pain that radiates to the back.  EKG shows no acute ischemia or change from prior.  Initial troponin does return elevated and repeat troponin also elevated.  No history of elevated troponin.  Chest x-ray shows no pulmonary opacities, pleural effusion, pneumothorax, pulmonary vascular congestion, although patient has coarse breath sounds bilateral.  I am concerned for early aspiration pneumonia.  Influenza, RSV, COVID testing is negative.  Procalcitonin is normal.  BNP also normal.  Patient is not hypoxic.  Due to his nonverbal status and chest pain D-dimer was obtained and is slightly positive age adjusted.  I doubt PE with no hypoxia or tachycardia.  Patient is on Plavix.  No indication for heparin as patient is currently pain-free.  With the elevated troponin and a heart score of 7 patient will require admission for cardiac work-up.  I spoke with Lauren Burrell, hospitalist, who accepts for admission and will follow-up with the CTA results.      HEART Score  Background  Calculates the overall risk of adverse event in patient's presenting with chest pain.  Based on 5 criteria (each assigned 0-2 points) including suspiciousness of history, EKG, age, risk factors and troponin.    Data  72 year old male  has Cerebral artery occlusion with cerebral infarction (H); Stroke (H); Right sided weakness; CVA (cerebral  vascular accident) (H); Adult failure to thrive; Dehydration; Oral thrush; Acute UTI; Nausea and vomiting, intractability of vomiting not specified, unspecified vomiting type; Aphasia as late effect of cerebrovascular accident; Type 2 diabetes mellitus with hyperglycemia, with long-term current use of insulin (H); Dysarthria due to cerebrovascular accident; Essential hypertension; Hemiplegia following CVA (cerebrovascular accident) (H); Hyperlipidemia; Imbalance; Late effects of CVA (cerebrovascular accident); Low back pain; Microalbuminuria; Spasticity; Torn rotator cuff; Hypernatremia; Acute respiratory failure with hypoxia (H); Urinary tract infection without hematuria, site unspecified; and Pneumonia due to 2019 novel coronavirus on their problem list.   reports that he has never smoked. He has never used smokeless tobacco.  family history is not on file.  Lab Results   Component Value Date    TROPI <0.012 08/16/2012     Criteria   0-2 points for each of 5 items (maximum of 10 points):  Score 1- History moderately suspicious for coronary syndrome  Score 1- EKG with Non-specific repolarization disturbance  Score 2- Age 65 years or older  Score 2- Three or more risk factors for or history of atherosclerotic disease  Score 1- One to 2 times the normal troponin upper limit  Interpretation  Risk of adverse outcome  Heart Score: 7  Total Score 7-10- Adverse Outcome Risk 72.7% - Supports early aggressive management, typically including cardiac catheterization    Diagnosis:    ICD-10-CM    1. NSTEMI (non-ST elevated myocardial infarction) (H)  I21.4       2. Chest pain  R07.9         Scribe Disclosure:  I, Tara Kate, am serving as a scribe at 1:04 PM on 10/28/2022 to document services personally performed by Lois Hawthorne CNP based on my observations and the provider's statements to me.            [place], Lois, CNP  10/29/22 1047

## 2022-10-28 NOTE — PHARMACY-ADMISSION MEDICATION HISTORY
Admission medication history interview status for this patient is complete. See [facility] admission navigator for allergy information, prior to admission medications and immunization status.     Medication history interview done, indicate source(s): Patient's daughter  Medication history resources (including written lists, pill bottles, clinic record): [facility] dispense records  Pharmacy: [facility]/pharmacy #[id number] - [place], [place] - [address] AT [facility] [phone]    Changes made to PTA medication list:  Added: Nystatin susp;  Changed:   1. Metformin 1000mg BID --> 1000mg AM + 500mg PM  2. Novolog to sliding scale at bedtime only  Removed: Amantadine, Levemir HS dose, Senna    Actions taken by pharmacist (provider contacted, etc):None     Additional medication history information:    Patient's daughter notes his Jevity bolus TFs were reduced to 4 cans/day and he has been having low BGs, so they stopped the evening Levemir and scheduled Novolog for coverage of the TF.  Sliding scale before bed only PTA.  He wears a CGM.     Medication reconciliation/reorder completed by provider prior to medication history?  No    Prior to Admission medications    Medication Sig Last Dose Taking? Auth Provider Long Term End Date   acetaminophen (TYLENOL) 325 MG tablet Take 1-3 tablets (325-975 mg) by mouth every 6 hours as needed for mild pain or fever (> 101 F)  Yes Jesika Theodore PA-C     amLODIPine (NORVASC) 5 MG tablet Take 2 tablets (10 mg) by mouth daily 10/28/2022 Yes Janette Juares MD Yes    atenolol (TENORMIN) 25 MG tablet Take 1 tablet (25 mg) by mouth 2 times daily 10/28/2022 at x1 Yes Jesika Theodore PA-C Yes    atorvastatin (LIPITOR) 80 MG tablet Take 80 mg by mouth daily 10/28/2022 Yes Unknown, Entered By History Yes    clopidogrel (PLAVIX) 75 MG tablet Take 75 mg by mouth daily 10/28/2022 Yes Unknown, Entered By History     Continuous Blood Gluc Sensor (FREESTYLE TITI 2 SENSOR) MISC Use  "with [place] 2 [place] Past Week Yes Reported, Patient     doxazosin (CARDURA) 2 MG tablet Take 1 tablet (2 mg) by mouth daily 10/28/2022 Yes Alivia Herzog, CNP Yes    FLUoxetine (PROZAC) 20 MG/5ML solution 5 mLs (20 mg) by Oral or Feeding Tube route daily 10/28/2022 Yes Taty Bolton MD Yes    insulin aspart (NOVOLOG FLEXPEN) 100 UNIT/ML pen Inject 0-5 Units Subcutaneous At Bedtime 1 unit for every 50 over 140  Yes Unknown, Entered By History No    insulin detemir (LEVEMIR PEN) 100 UNIT/ML pen Inject 8 Units Subcutaneous every morning 10/28/2022 Yes Unknown, Entered By History No 12/22/22   Insulin Pen Needle (PEN NEEDLES 5/16\") 31G X 8 MM MISC Inject 1 each Subcutaneous  Yes Reported, Patient     Lancets MISC Use as instructed 3 times daily (before meals and bedtime). E11.29, R80.9, Z79.4  Yes Reported, Patient     metFORMIN (GLUCOPHAGE) 1000 MG tablet 1,000 mg by Oral or Feeding Tube route daily (with breakfast) 10/28/2022 Yes Unknown, Entered By History No    metFORMIN (GLUCOPHAGE) 1000 MG tablet 500 mg by Oral or Feeding Tube route daily (with dinner)  Yes Unknown, Entered By History No    Nutritional Supplements (JEVITY PO) 1 AM + 1 Lunch + 2 dinner  Yes Unknown, Entered By History No    nystatin (MYCOSTATIN) 177585 UNIT/ML suspension USE SMALL AMOUNT TO BRUSH ON WHITE PATCHES IN MOUTH FOUR TIMES DAILY. 10/28/2022 Yes Unknown, Entered By History     ondansetron (ZOFRAN-ODT) 4 MG ODT tab Take 1 tablet (4 mg) by mouth every 6 hours as needed for nausea or vomiting  Yes Taty Bolton MD     polyethylene glycol (MIRALAX) 17 g packet Take 17 g by mouth daily as needed for constipation  Yes Jesika Theodore PA-C           "

## 2022-10-28 NOTE — ED NOTES
[facility]  ED Nurse Handoff Report    Jimmy Otto is a 72 year old male   ED Chief complaint: Chest Pain  . ED Diagnosis:   Final diagnoses:   NSTEMI (non-ST elevated myocardial infarction) (H)   Chest pain     Allergies:   Allergies   Allergen Reactions     Nka [No Known Allergies]        Code Status: Prior  Activity level - Baseline/Home:  Assist X 1. Activity Level - Current:   Assist X 1. Lift room needed: No. Bariatric: No   Needed: No   Isolation: No. Infection: Not Applicable.     Vital Signs:   Vitals:    10/28/22 1559 10/28/22 1600 10/28/22 1604 10/28/22 1615   BP: (!) 140/73  (!) 140/73 138/73   Pulse: 71 71  74   Resp:       Temp:       TempSrc:       SpO2: 98% 99% 99% 99%   Weight:           Cardiac Rhythm:  ,   Cardiac  Cardiac Rhythm: Normal sinus rhythm  Pain level:    Patient confused: No. Patient Falls Risk: Yes.   Elimination Status: Has voided   Patient Report - Initial Complaint: Pt arrives to the ED due to ED due to chest pain that began yesterday. Per daughter pt had been pointing at his chest for the home health nurse when asked where pain is. Pt is bed bound and non-verbal due to stroke from last year. Per daughter, home health nurse also stated he was moaning more and sounds more congested. . Focused Assessment: NSR. No chest pain since arrival to hospital.    Tests Performed: Labs, Xray. Abnormal Results:   Labs Ordered and Resulted from Time of ED Arrival to Time of ED Departure   TROPONIN T, HIGH SENSITIVITY - Abnormal       Result Value    Troponin T, High Sensitivity 34 (*)    COMPREHENSIVE METABOLIC PANEL - Abnormal    Sodium 138      Potassium 4.3      Chloride 96 (*)     Carbon Dioxide (CO2) 29      Anion Gap 13      Urea Nitrogen 26.2 (*)     Creatinine 0.91      Calcium 9.8      Glucose 120 (*)     Alkaline Phosphatase 148 (*)     AST 29      ALT 27      Protein Total 7.4      Albumin 3.4 (*)     Bilirubin Total 0.2      GFR Estimate 90     CBC WITH PLATELETS  AND DIFFERENTIAL - Abnormal    WBC Count 6.5      RBC Count 4.99      Hemoglobin 12.3 (*)     Hematocrit 41.4      MCV 83      MCH 24.6 (*)     MCHC 29.7 (*)     RDW 15.6 (*)     Platelet Count 200      % Neutrophils 60      % Lymphocytes 26      % Monocytes 9      % Eosinophils 4      % Basophils 1      % Immature Granulocytes 0      NRBCs per 100 WBC 0      Absolute Neutrophils 3.8      Absolute Lymphocytes 1.7      Absolute Monocytes 0.6      Absolute Eosinophils 0.3      Absolute Basophils 0.1      Absolute Immature Granulocytes 0.0      Absolute NRBCs 0.0     TROPONIN T, HIGH SENSITIVITY - Abnormal    Troponin T, High Sensitivity 33 (*)    NT PROBNP INPATIENT - Normal    N terminal Pro BNP Inpatient 432     PROCALCITONIN - Normal    Procalcitonin 0.04     INFLUENZA A/B & SARS-COV2 PCR MULTIPLEX - Normal    Influenza A PCR Negative      Influenza B PCR Negative      RSV PCR Negative      SARS CoV2 PCR Negative     D DIMER QUANTITATIVE     XR Chest 2 Views   Final Result   IMPRESSION: AP and lateral views of the chest were obtained.   Cardiomediastinal silhouette is within normal limits. No suspicious   focal pulmonary opacities. No significant pleural effusion or   pneumothorax.      DELFINO LYNN MD            SYSTEM ID:  [id number]        .   Treatments provided: See MAR   Family Comments: Daughter at bedside   OBS brochure/video discussed/provided to patient:  Yes  ED Medications: Medications - No data to display  Drips infusing:  No  For the majority of the shift, the patient's behavior Green. Interventions performed were NA.    Sepsis treatment initiated: No     Patient tested for COVID 19 prior to admission: YES    ED Nurse Name/Phone Number: Alba Lacy RN,   5:03 PM      RECEIVING UNIT ED HANDOFF REVIEW    Above ED Nurse Handoff Report was reviewed: Yes  Reviewed by: Mario Tracey RN on October 29, 2022 at 12:48 AM       ]

## 2022-10-29 ENCOUNTER — APPOINTMENT (OUTPATIENT)
Dept: CARDIOLOGY | Facility: CLINIC | Age: 72
End: 2022-10-29
Attending: PHYSICIAN ASSISTANT
Payer: MEDICARE

## 2022-10-29 VITALS
BODY MASS INDEX: 20.6 KG/M2 | TEMPERATURE: 98 F | RESPIRATION RATE: 18 BRPM | HEART RATE: 76 BPM | OXYGEN SATURATION: 95 % | DIASTOLIC BLOOD PRESSURE: 76 MMHG | WEIGHT: 120 LBS | SYSTOLIC BLOOD PRESSURE: 145 MMHG

## 2022-10-29 LAB — LVEF ECHO: NORMAL

## 2022-10-29 PROCEDURE — 94640 AIRWAY INHALATION TREATMENT: CPT

## 2022-10-29 PROCEDURE — G0378 HOSPITAL OBSERVATION PER HR: HCPCS

## 2022-10-29 PROCEDURE — 250N000009 HC RX 250: Performed by: PHYSICIAN ASSISTANT

## 2022-10-29 PROCEDURE — 250N000012 HC RX MED GY IP 250 OP 636 PS 637: Performed by: PHYSICIAN ASSISTANT

## 2022-10-29 PROCEDURE — 93306 TTE W/DOPPLER COMPLETE: CPT | Mod: 26 | Performed by: INTERNAL MEDICINE

## 2022-10-29 PROCEDURE — 99217 PR OBSERVATION CARE DISCHARGE: CPT | Performed by: PHYSICIAN ASSISTANT

## 2022-10-29 PROCEDURE — 96372 THER/PROPH/DIAG INJ SC/IM: CPT | Performed by: PHYSICIAN ASSISTANT

## 2022-10-29 PROCEDURE — 90662 IIV NO PRSV INCREASED AG IM: CPT | Performed by: PHYSICIAN ASSISTANT

## 2022-10-29 PROCEDURE — 999N000157 HC STATISTIC RCP TIME EA 10 MIN

## 2022-10-29 PROCEDURE — 250N000011 HC RX IP 250 OP 636: Performed by: PHYSICIAN ASSISTANT

## 2022-10-29 PROCEDURE — 93306 TTE W/DOPPLER COMPLETE: CPT

## 2022-10-29 PROCEDURE — G0008 ADMIN INFLUENZA VIRUS VAC: HCPCS | Performed by: PHYSICIAN ASSISTANT

## 2022-10-29 PROCEDURE — 250N000013 HC RX MED GY IP 250 OP 250 PS 637: Performed by: PHYSICIAN ASSISTANT

## 2022-10-29 RX ORDER — AMOXICILLIN AND CLAVULANATE POTASSIUM 400; 57 MG/5ML; MG/5ML
875 POWDER, FOR SUSPENSION ORAL 2 TIMES DAILY
Qty: 152.6 ML | Refills: 0 | Status: SHIPPED | OUTPATIENT
Start: 2022-10-29 | End: 2022-11-05

## 2022-10-29 RX ADMIN — GUAIFENESIN 400 MG: 200 SOLUTION ORAL at 13:56

## 2022-10-29 RX ADMIN — GUAIFENESIN 400 MG: 200 SOLUTION ORAL at 09:09

## 2022-10-29 RX ADMIN — AMOXICILLIN AND CLAVULANATE POTASSIUM 875 MG: 400; 57 POWDER, FOR SUSPENSION ORAL at 09:14

## 2022-10-29 RX ADMIN — IPRATROPIUM BROMIDE AND ALBUTEROL SULFATE 3 ML: 2.5; .5 SOLUTION RESPIRATORY (INHALATION) at 08:09

## 2022-10-29 RX ADMIN — CLOPIDOGREL BISULFATE 75 MG: 75 TABLET ORAL at 09:09

## 2022-10-29 RX ADMIN — AMLODIPINE BESYLATE 10 MG: 10 TABLET ORAL at 09:09

## 2022-10-29 RX ADMIN — DOXAZOSIN 2 MG: 1 TABLET ORAL at 09:09

## 2022-10-29 RX ADMIN — INFLUENZA A VIRUS A/VICTORIA/2570/2019 IVR-215 (H1N1) ANTIGEN (FORMALDEHYDE INACTIVATED), INFLUENZA A VIRUS A/DARWIN/9/2021 SAN-010 (H3N2) ANTIGEN (FORMALDEHYDE INACTIVATED), INFLUENZA B VIRUS B/PHUKET/3073/2013 ANTIGEN (FORMALDEHYDE INACTIVATED), AND INFLUENZA B VIRUS B/MICHIGAN/01/2021 ANTIGEN (FORMALDEHYDE INACTIVATED) 0.7 ML: 60; 60; 60; 60 INJECTION, SUSPENSION INTRAMUSCULAR at 14:31

## 2022-10-29 RX ADMIN — ASPIRIN 81 MG CHEWABLE TABLET 81 MG: 81 TABLET CHEWABLE at 09:10

## 2022-10-29 RX ADMIN — FLUOXETINE HYDROCHLORIDE 20 MG: 20 SOLUTION ORAL at 09:14

## 2022-10-29 RX ADMIN — ATENOLOL 25 MG: 25 TABLET ORAL at 09:10

## 2022-10-29 RX ADMIN — IPRATROPIUM BROMIDE AND ALBUTEROL SULFATE 3 ML: 2.5; .5 SOLUTION RESPIRATORY (INHALATION) at 13:56

## 2022-10-29 RX ADMIN — INSULIN DETEMIR 8 UNITS: 100 INJECTION, SOLUTION SUBCUTANEOUS at 09:59

## 2022-10-29 ASSESSMENT — ACTIVITIES OF DAILY LIVING (ADL)
ADLS_ACUITY_SCORE: 49
ADLS_ACUITY_SCORE: 37
ADLS_ACUITY_SCORE: 49
ADLS_ACUITY_SCORE: 41

## 2022-10-29 NOTE — PLAN OF CARE
Patient's After Visit Summary was reviewed with patient and daughter.   Patient verbalized understanding of After Visit Summary, recommended follow up and was given an opportunity to ask questions.   Discharge medications sent home with patient/family: YES, No,  med at pharmacy downstairs  Discharged with daughters.

## 2022-10-29 NOTE — PLAN OF CARE
ROOM # 213-1    Living Situation: Home with family  Facility name: N/A  : Clemente ( Daughter ) [phone]    Activity level at baseline: Total care  Activity level on admit: Total care . Lift     Who will be transporting you at discharge: Clemente ( Daughter)    Patient registered to observation; given Patient Bill of Rights; given the opportunity to ask questions about observation status and their plan of care.  Patient has been oriented to the observation room, bathroom and call light is in place.    Discussed discharge goals and expectations with patient/family.

## 2022-10-29 NOTE — PLAN OF CARE
Goal Outcome Evaluation:  PRIMARY DIAGNOSIS: CHEST PAIN  OUTPATIENT/OBSERVATION GOALS TO BE MET BEFORE DISCHARGE:  1. Ruled out acute coronary syndrome (negative or stable Troponin):  Yes  2. Pain Status: Pain free.  3. Appropriate provocative testing performed: No  - Stress Test Procedure: N/A  - Interpretation of cardiac rhythm per telemetry tech: SR 70s    4. Cleared by Consultants (if applicable):No  5. Return to near baseline physical activity: Yes  Discharge Planner Nurse   Safe discharge environment identified: Yes  Barriers to discharge: Yes   A & O. Lung sound clear bilaterally . No S/SX of shortness of breath noted. Patient in nonverbal . Daughter provided admission information at bed side. NPO effective midnight until 10/29 at 0830 AM  Patient on  1.5 Jevity bolus TID. NS infusing at 100 mL. Felix catheter patent, draining straw yellow color urine. No foul odor, cloudy, or sedimentation noted. BNP negative for pro-calcitonin . Patient on cardiac monitoring, cardiac rhythm SR 70s per telemetry . Total care. Transthoracic Echocardiogram scheduled on 10/29.  BP (!) 157/86 (BP Location: Right arm)   Pulse 74   Temp 98.1  F (36.7  C) (Oral)   Resp 16   SpO2 96%   Will continue to provide care and support.           Entered by: Mario Tracey RN 10/29/2022 5:57 AM     Please review provider order for any additional goals.   Nurse to notify provider when observation goals have been met and patient is ready for discharge.

## 2022-10-29 NOTE — DISCHARGE SUMMARY
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   Pt had been c/o increased chest congestion over the last 1.5 week, harder for him to cough up mucus and yesterday had suction set up installed at home. Today he expressed substernal chest pain when home RN was there which prompted him to be brought to ED.   Work up in the ED reveals: unremarkable vitals, he is not hypoxic.   Labs shows normal WBC, stable hgb at 12 normal BMP, BNP and negative pro-calcitonin. Troponin was mildly elevated at 34 with a repeat of 33.   D-dimer was mildly elevated at 0.77. CXR showed no focal infiltrate. CT chest showed patchy ground-glass opacity in the BL upper lobes and subsegmental atelectasis of the lower lobes. COVID 19 and Influenza was negative. Admitted to observation for \"acs r/out\".    Please see admitting H & P  by Lauren Burrell PA-C, on 10/28/2022 for full details of the encounter.     Hospital Course   Jimmy Otto was admitted on 10/28/2022.  The following problems were addressed during his hospitalization:    #Atypical CP in the setting of mildly elevated trops.   My suspicion for ACS is low.  Symptoms seem atypical though history is somewhat harder to obtain given nonverbal status and through \"thumbs up or down\" communication.  EKG shows sinus rhythm with no ischemic changes.  " "Last echocardiogram was in 9/21 that showed normal EF.   My suspicion is that troponin is elevated due to possible early pulmonary infection (see below).   Plan:  -[facility] observation, telemetry monitoring unremarkable.  -Patient already on Plavix, will add baby aspirin while he is here  - TTE without RWMA. Dtr states pt has never had stress test, but does not have a hx of CAD, I would not advocate for outpt nuclear stress unless ECHO is abnormal and Goals of care have been discussed with family carine in his debilitated status.      Possible Early Pneumonia  Pulm ground-glass infiltrate (BUL) in the setting of recent c/o increase lung congestion.   Recent chest congestion and abnl CT chest raises concern for possible early infection ie aspiration vs residual radiologic findings from COVID 19 (Jan 2022- suspect less likely), though current labs shows normal WBC, pro calcitonin. Reviewed CT findings from Jan 2022 and now and they do appear to be different.   Plan:  -  I suspect he might have early aspiration. He is NPO and get TF and dtr states he has not had any TF malfunction but does endorse increased chest congestion \"rattleness\" and inability to cough (was better able to in the past) requiring suctioning.   -  treat him for possible aspiration PNA given his sxs   - Augmentin liquid BID       Pending Results   None.    Code Status   Full per admitting provider.        Primary Care Physician   Kris Huston      INTERVAL HISTORY  No pain reported since admission. No cough, fever, abnormal work of breathing. Afebrile.     Daughter, Phally at bedside.      BP (!) 145/76 (BP Location: Right arm)   Pulse 76   Temp 98  F (36.7  C) (Axillary)   Resp 18   Wt 54.4 kg (120 lb)   SpO2 95%   BMI 20.60 kg/m      Constitutional: Awake, alert, no apparent distress  Respiratory:  Normal work of breathing. Coarse lung sounds, dec at bases.  Cardiovascular: Regular rate and rhythm, normal S1 and S2, and no murmur " appreciated.   GI: Bowel sounds present. soft, non-distended, non-tender.   Skin/Integument: Warm, dry. no peripheral edema.  Neuro: awake. Nods to yes and no, non verbal. Right hemiparesis.    Psych: Appropriate affect.        Discharge Disposition   Discharged to home with resumption of HHC.  Condition at discharge: Stable    Consultations This Hospital Stay   PHARMACY IP CONSULT    Time Spent on this Encounter   Macrina DODSON PA-C, personally saw the patient today and spent greater than 30 minutes discharging this patient.    Discharge Orders      Palliative Care Referral      Reason for your hospital stay    You were admitted to the observation unit for chest pain. Your heart was monitored overnight with no abnormal findings. Your cardiac enzymes were negative for heart attack. You had an echocardiogram that did not show any heart failure. Unlikely that chest pain was related to your heart.  May be related to pneumonia, dental issues... Other possibilities include reflux, stress, and musculoskeletal strain. We recommend you follow up with your primary doctor if you continue to have pain.     Follow-up and recommended labs and tests     Follow up with primary care provider, Kris Huston, within 7-14  days for hospital follow- up.  The following labs/tests are recommended: Discussed follow-up chest imaging after completion of antibiotics, discuss ongoing aspiration concerns and respiratory symptoms, goals of care etc.  Follow-up with dentist.     Activity    Your activity upon discharge: activity as tolerated     Resume Home Care Services     When to contact your care team    Call your primary care doctor if you have any of the following: temperature greater than 101 F, worsening shortness of breath, increased swelling, worsening pain, new or unrelenting diarrhea, or any other concerning symptoms. Call 911 or go to the emergency room if you need immediate assistance.     Diet    Follow this diet upon  "discharge: Cont Jevity 1 can bkfst/1 can lunch/2 can dinner     Discharge Medications   Discharge Medication List as of 10/29/2022  2:36 PM      START taking these medications    Details   amoxicillin-clavulanate (AUGMENTIN) 400-57 MG/5ML suspension Take 10.9 mLs (875 mg) by mouth 2 times daily for 7 days, Disp-152.6 mL, R-0, E-Prescribe      guaiFENesin (ROBITUSSIN) 20 mg/mL SOLN solution Take 20 mLs (400 mg) by mouth every 4 hours as needed for cough, Disp-118 mL, R-0, E-Prescribe         CONTINUE these medications which have NOT CHANGED    Details   acetaminophen (TYLENOL) 325 MG tablet Take 1-3 tablets (325-975 mg) by mouth every 6 hours as needed for mild pain or fever (> 101 F), Transitional      amLODIPine (NORVASC) 5 MG tablet Take 2 tablets (10 mg) by mouth daily, Disp-60 tablet, R-5, E-Prescribe      atenolol (TENORMIN) 25 MG tablet Take 1 tablet (25 mg) by mouth 2 times daily, Transitional      atorvastatin (LIPITOR) 80 MG tablet Take 80 mg by mouth daily, Historical      clopidogrel (PLAVIX) 75 MG tablet Take 75 mg by mouth daily, Historical      Continuous Blood Gluc Sensor (FREESTYLE TITI 2 SENSOR) MISC Use with Titi 2 Comins, Historical      doxazosin (CARDURA) 2 MG tablet Take 1 tablet (2 mg) by mouth daily, Disp-90 tablet, R-3, E-Prescribe      FLUoxetine (PROZAC) 20 MG/5ML solution 5 mLs (20 mg) by Oral or Feeding Tube route daily, Disp-150 mL, R-0, E-Prescribe      insulin aspart (NOVOLOG FLEXPEN) 100 UNIT/ML pen Inject 0-5 Units Subcutaneous At Bedtime 1 unit for every 50 over 140, Historical      insulin detemir (LEVEMIR PEN) 100 UNIT/ML pen Inject 8 Units Subcutaneous every morning, Historical      Insulin Pen Needle (PEN NEEDLES 5/16\") 31G X 8 MM MISC Inject 1 each Subcutaneous, Historical      Lancets MISC Use as instructed 3 times daily (before meals and bedtime). E11.29, R80.9, Z79.4, Historical      !! metFORMIN (GLUCOPHAGE) 1000 MG tablet 1,000 mg by Oral or Feeding Tube route daily " (with breakfast), Historical      !! metFORMIN (GLUCOPHAGE) 1000 MG tablet 500 mg by Oral or Feeding Tube route daily (with dinner), Historical      Nutritional Supplements (JEVITY PO) 1 AM + 1 Lunch + 2 dinner, Historical      nystatin (MYCOSTATIN) 511325 UNIT/ML suspension USE SMALL AMOUNT TO BRUSH ON WHITE PATCHES IN MOUTH FOUR TIMES DAILY.Historical      ondansetron (ZOFRAN-ODT) 4 MG ODT tab Take 1 tablet (4 mg) by mouth every 6 hours as needed for nausea or vomiting, Disp-15 tablet, R-0, E-Prescribe      polyethylene glycol (MIRALAX) 17 g packet Take 17 g by mouth daily as needed for constipation, Transitional       !! - Potential duplicate medications found. Please discuss with provider.        Allergies   Allergies   Allergen Reactions     Nka [No Known Allergies]      Data   Most Recent 3 CBC's:Recent Labs   Lab Test 10/28/22  1331 01/26/22  0809 01/25/22  0633   WBC 6.5 9.5 7.8   HGB 12.3* 10.2* 10.9*   MCV 83 85 86    251 264      Most Recent 3 BMP's:  Recent Labs   Lab Test 10/28/22  2234 10/28/22  1331 06/15/22  2232 03/30/22  0935 02/01/22  1331   NA  --  138  --  137 132*   POTASSIUM  --  4.3  --  4.4 4.0   CHLORIDE  --  96*  --  99 98   CO2  --  29  --  29 27   BUN  --  26.2*  --  24 22   CR  --  0.91  --  0.90 0.92   ANIONGAP  --  13  --  9 7   ARLETH  --  9.8  --  9.7 8.3*   * 120* 144* 208* 232*     Most Recent 2 LFT's:  Recent Labs   Lab Test 10/28/22  1331 01/21/22  0637   AST 29 19   ALT 27 46   ALKPHOS 148* 120   BILITOTAL 0.2 0.2     Most Recent INR's and Anticoagulation Dosing History:  Anticoagulation Dose History     Recent Dosing and Labs Latest Ref Rng & Units 8/13/2012 8/16/2012 9/14/2021 9/16/2021 11/1/2021    INR 0.85 - 1.15 0.96 1.00 0.94 1.03 0.96        Most Recent 3 Troponin's:  Recent Labs   Lab Test 10/13/21  2152 09/18/21  1535 09/14/21  2142   TROPONIN <0.015 <0.015 <0.015     Most Recent Cholesterol Panel:  Recent Labs   Lab Test 09/15/21  0521   CHOL 172   LDL 92    HDL 32*   TRIG 238*     Most Recent 6 Bacteria Isolates From Any Culture (See EPIC Reports for Culture Details):No lab results found.  Most Recent TSH, T4 and A1c Labs:  Recent Labs   Lab Test 03/30/22  0935   A1C 7.1*     Results for orders placed or performed during the hospital encounter of 10/28/22   XR Chest 2 Views    Narrative    CHEST 2 VIEWS   10/28/2022 1:53 PM     HISTORY: Chest pain with chest congestion.    COMPARISON: Chest CTA on 1/21/2022.      Impression    IMPRESSION: AP and lateral views of the chest were obtained.  Cardiomediastinal silhouette is within normal limits. No suspicious  focal pulmonary opacities. No significant pleural effusion or  pneumothorax.    DELFINO LYNN MD         SYSTEM ID:  [id number]   CT Chest Pulmonary Embolism w Contrast    Narrative    EXAM: CT CHEST PULMONARY EMBOLISM W CONTRAST  LOCATION: [facility]  DATE/TIME: 10/28/2022 7:07 PM    INDICATION: Chest pain. Evaluate for pe versus pneumonia  COMPARISON: None.  TECHNIQUE: CT chest pulmonary angiogram during arterial phase injection of IV contrast. Multiplanar reformats and MIP reconstructions were performed. Dose reduction techniques were used.   CONTRAST: 51mL Isovue 370    FINDINGS:  ANGIOGRAM CHEST: Pulmonary arteries are normal caliber and negative for pulmonary emboli. Thoracic aorta is negative for dissection. No CT evidence of right heart strain.    LUNGS AND PLEURA: Images somewhat degraded by motion artifact. Patchy groundglass opacities both upper lobes. Subsegmental atelectasis both lower lobes. No pleural effusions.    MEDIASTINUM/AXILLAE: No adenopathy.    CORONARY ARTERY CALCIFICATION: Mild.    UPPER ABDOMEN: Normal.    MUSCULOSKELETAL: Mild thoracolumbar curve and degenerative changes of the spine.      Impression    IMPRESSION:  1.  No evidence for pulmonary emboli.  2.  Scattered groundglass opacities both upper lobes suggesting pneumonia, including Covid 19.    Imaging  features can be seen with (COVID-19)  pneumonia, though are nonspecific and can occur with a variety of infectious and noninfectious processes.   Echocardiogram Complete     Value    LVEF  65-70%    [facility]    [id number]  [id number]  [id number]  [id number]^SCOTTIE^COLT^PHUC     [facility]  Echocardiography Laboratory  [address] [place] [address]     Name: CARLOS SCHULTZ  MRN: [id number]  : 1950  Study Date: 10/29/2022 12:39 PM  Age: 72 yrs  Gender: Male  [facility]  Reason For Study: Chest Pain  Ordering Physician: COLT RUBIN  Referring Physician: Manny Lackey  Performed By: Sonya Urias     BSA: 1.6 m2  Height: 64 in  Weight: 120 lb  HR: 70  BP: 145/76 mmHg  ______________________________________________________________________________  Procedure  Complete Portable Echo Adult.  ______________________________________________________________________________  Interpretation Summary     1. The left ventricle is normal in size. Proximal septal thickening is noted.  Hyperdynamic left ventricular function. The visual ejection fraction is 65-  70%. Diastolic Doppler findings (E/E' ratio and/or other parameters) suggest  left ventricular filling pressures are indeterminate. No regional wall motion  abnormalities noted.  2. The right ventricle is normal size. The right ventricular systolic function  is normal.  3. Trace mitral and tricuspid regurgitation.  4. No pericardial effusion.  5. In comparison to the previous report dated 09/15/2021, the findings appear  similar.  ______________________________________________________________________________  Left Ventricle  The left ventricle is normal in size. Proximal septal thickening is noted.  Hyperdynamic left ventricular function. The visual ejection fraction is 65-  70%. Diastolic Doppler findings (E/E' ratio and/or other parameters) suggest  left ventricular filling pressures are indeterminate. No regional wall  motion  abnormalities noted.     Right Ventricle  The right ventricle is normal size. The right ventricular systolic function is  normal.     Atria  Normal left atrial size. Right atrial size is normal.     Mitral Valve  There is trace mitral regurgitation.     Tricuspid Valve  There is trace tricuspid regurgitation.     Aortic Valve  There is mild trileaflet aortic sclerosis. No aortic regurgitation is present.  No aortic stenosis is present.     Pulmonic Valve  There is no pulmonic valvular stenosis.     Vessels  The aortic root is normal size. Normal size ascending aorta. Descending aortic  velocity normal.     Pericardium  There is no pericardial effusion.     Rhythm  Sinus rhythm was noted.  ______________________________________________________________________________  MMode/2D Measurements & Calculations  IVSd: 1.2 cm     LVIDd: 4.3 cm  LVIDs: 2.6 cm  LVPWd: 0.99 cm  FS: 38.8 %  LV mass(C)d: 157.2 grams  LV mass(C)dI: 99.8 grams/m2  Ao root diam: 3.3 cm  LA dimension: 3.4 cm  asc Aorta Diam: 3.2 cm  LA/Ao: 1.0  LVOT diam: 2.0 cm  LVOT area: 3.1 cm2  LA Volume (BP): 24.5 ml  LA Volume Index (BP): 15.6 ml/m2  RWT: 0.46     Doppler Measurements & Calculations  MV E max kathleen: 85.4 cm/sec  MV A max kathleen: 114.0 cm/sec  MV E/A: 0.75  MV dec time: 0.24 sec  LV V1 max P.1 mmHg  LV V1 max: 113.0 cm/sec  LV V1 VTI: 23.9 cm  SV(LVOT): 75.0 ml  SI(LVOT): 47.6 ml/m2  PA acc time: 0.09 sec  TR max kathleen: 262.8 cm/sec  TR max P.6 mmHg  E/E' av.4  Lateral E/e': 8.2  Medial E/e': 14.5     ______________________________________________________________________________  Report approved by: Luciana Sharp 10/29/2022 01:25 PM               Macrina ESTRADASOM  [facility]

## 2022-10-29 NOTE — H&P
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   Pt had been c/o increased chest congestion over the last 1.5 week, harder for him to cough up mucus and yesterday had suction set up installed at home. Today he expressed substernal chest pain when home RN was there which prompted him to be brought to ED.   Work up in the ED reveals: unremarkable vitals, he is not hypoxic.   Labs shows normal WBC, stable hgb at 12 normal BMP, BNP and negative pro-calcitonin. Troponin was mildly elevated at 34 with a repeat of 33.   D-dimer was mildly elevated at 0.77. CXR showed no focal infiltrate. CT chest showed patchy ground-glass opacity in the BL upper lobes and subsegmental atelectasis of the lower lobes. COVID 19 and Influenza was negative. I have been asked to admit him for ACS r/o.     #Atypical CP in the setting of mildly elevated trops.   My suspicion for ACS is low.  Symptoms seem atypical though history is somewhat harder to obtain given nonverbal status and through \"thumbs up or down\" communication.  EKG shows sinus rhythm with no ischemic changes.  Last echocardiogram was in  that showed normal EF.   My suspicion is that troponin is elevated due to possible early pulmonary infection (see below).   Plan:  -[place] observation, will monitor on telemetry  -Will obtain TTE tomorrow  -Patient already on Plavix, will add baby aspirin while he is here  - Dtr states pt has never had stress test, but does not have a hx " "of CAD, I would not advocate for outpt nuclear stress unless ECHO is abnormal and Goals of care have been discussed with family carine in his debilitated status.     #Pulm ground-glass infiltrate (BUL) in the setting of recent c/o increase lung congestion.   Recent chest congestion and abnl CT chest raises concern for possible early infection ie aspiration vs residual radiologic findings from COVID 19 (Jan 2022- suspect less likely), though current labs shows normal WBC, pro calcitonin. Reviewed CT findings from Jan 2022 and now and they do appear to be different.   Plan:  -  I suspect he might have early aspiration. He is NPO and get TF and dtr states he has not had any TF malfunction but does endorse increased chest congestion \"rattleness\" and inability to cough (was better able to in the past) requiring suctioning.   -  I opted to treat him for possible aspiration PNA given his sxs   - Augmentin liquid BID     #Hx of CVA with resultant right parul-paresthesis, non verbal state  #G-tube feedings   -  Pt mainly wheelchair bound, up with TF  -  Cont Jevity 1 can bkfst/1 can lunch/2 can dinner  -  Resume PTA Plavix     #DM- BG stable   -  Resume metformin BID and Levemier insulin   -  Cont sliding scale insulin     #HTN/HLP  -  Cont norvasc, atenolol, cardura     #Depression    -  Cont Prozac            # Hypoalbuminemia: Lowest albumin = 3.4 g/dL (Ref range: 3.5-5.2) at 10/28/2022  1:31 PM, will monitor as appropriate   # Drug Induced Platelet Defect: home medication list includes an antiplatelet medication   # Hypertension: home medication list includes antihypertensive(s)           Awaiting formal pharmacy reconciliation to resume home medications.     DVT Prophylaxis: anticipated short stay, if staying longer, rec subcutaneous Lovenox   Code Status: Full Code d/w family  COVID PCR TESTING STATUS: Negative    Family updated with plan of care: dtr at bedside        Expected Discharge Date: 10/29/2022           "     Lauren Burrell PA-C    Primary Care Physician   Kris Huston    Chief Complaint   Chest pain     History is obtained from the patient    Discussed with Lois Hawthorne in the ED, full chart review including lab work, imaging, and vital signs were reviewed.     History of Present Illness   Jimmy Otto is a medically complex 72 year old male with a PMH significant for CVA with resultant hemiplegia and non verbal status since 2012, indwelling ordoñez catheter, malnutrition with G tube feedings, COVID PNA Jan 2022 requiring admission and need for steroids but no need for escalated COVID drugs), hx of UTI's  and DM II on insulin who presents with episode of chest pain today.   Pt had been c/o increased chest congestion over the last 1.5 week, harder for him to cough up mucus and yesterday had suction set up installed at home. Today he expressed substernal chest pain when home RN was there which prompted him to be brought to ED.   Work up in the ED reveals: unremarkable vitals, he is not hypoxic.   Labs shows normal WBC, stable hgb at 12 normal BMP, BNP and negative pro-calcitonin. Troponin was mildly elevated at 34 with a repeat of 33.   D-dimer was mildly elevated at 0.77. CXR showed no focal infiltrate. CT chest showed patchy ground-glass opacity in the BL upper lobes and subsegmental atelectasis of the lower lobes. COVID 19 and Influenza was negative. I have been asked to admit him for ACS r/o.     Past Medical History    I have reviewed this patient's medical history and updated it with pertinent information if needed.   Past Medical History:   Diagnosis Date     Diabetes mellitus (H)      Hypercholesteremia      Hypertension      Unspecified cerebral artery occlusion with cerebral infarction     TIA 8/11       Past Surgical History   I have reviewed this patient's surgical history and updated it with pertinent information if needed.  Past Surgical History:   Procedure Laterality Date     IR CYSTOGRAM  2/14/2022  "      Prior to Admission Medications   Prior to Admission Medications   Prescriptions Last Dose Informant Patient Reported? Taking?   Continuous Blood Gluc Sensor (FREESTYLE TITI 2 SENSOR) MISC Past Week  Yes Yes   Sig: Use with Titi 2 [place]   FLUoxetine (PROZAC) 20 MG/5ML solution 10/28/2022  No Yes   Si mLs (20 mg) by Oral or Feeding Tube route daily   Insulin Pen Needle (PEN NEEDLES \") 31G X 8 MM MISC   Yes Yes   Sig: Inject 1 each Subcutaneous   Lancets MISC   Yes Yes   Sig: Use as instructed 3 times daily (before meals and bedtime). E11.29, R80.9, Z79.4   Nutritional Supplements (JEVITY PO)   Yes Yes   Si AM + 1 Lunch + 2 dinner   acetaminophen (TYLENOL) 325 MG tablet   No Yes   Sig: Take 1-3 tablets (325-975 mg) by mouth every 6 hours as needed for mild pain or fever (> 101 F)   amLODIPine (NORVASC) 5 MG tablet 10/28/2022  No Yes   Sig: Take 2 tablets (10 mg) by mouth daily   atenolol (TENORMIN) 25 MG tablet 10/28/2022 at x1  No Yes   Sig: Take 1 tablet (25 mg) by mouth 2 times daily   atorvastatin (LIPITOR) 80 MG tablet 10/28/2022  Yes Yes   Sig: Take 80 mg by mouth daily   clopidogrel (PLAVIX) 75 MG tablet 10/28/2022  Yes Yes   Sig: Take 75 mg by mouth daily   doxazosin (CARDURA) 2 MG tablet 10/28/2022  No Yes   Sig: Take 1 tablet (2 mg) by mouth daily   insulin aspart (NOVOLOG FLEXPEN) 100 UNIT/ML pen   Yes Yes   Sig: Inject 0-5 Units Subcutaneous At Bedtime 1 unit for every 50 over 140   insulin detemir (LEVEMIR PEN) 100 UNIT/ML pen 10/28/2022  Yes Yes   Sig: Inject 8 Units Subcutaneous every morning   metFORMIN (GLUCOPHAGE) 1000 MG tablet 10/28/2022  Yes Yes   Si,000 mg by Oral or Feeding Tube route daily (with breakfast)   metFORMIN (GLUCOPHAGE) 1000 MG tablet   Yes Yes   Si mg by Oral or Feeding Tube route daily (with dinner)   nystatin (MYCOSTATIN) 610974 UNIT/ML suspension 10/28/2022  Yes Yes   Sig: USE SMALL AMOUNT TO BRUSH ON WHITE PATCHES IN MOUTH FOUR TIMES DAILY. "   ondansetron (ZOFRAN-ODT) 4 MG ODT tab   No Yes   Sig: Take 1 tablet (4 mg) by mouth every 6 hours as needed for nausea or vomiting   polyethylene glycol (MIRALAX) 17 g packet   No Yes   Sig: Take 17 g by mouth daily as needed for constipation      Facility-Administered Medications: None     Allergies   Allergies   Allergen Reactions     Nka [No Known Allergies]        Social History   I have reviewed this patient's social history and updated it with pertinent information if needed. Jimmy Otto  reports that he has never smoked. He has never used smokeless tobacco. He reports current alcohol use. He reports that he does not use drugs.    Family History   I have reviewed this patient's family history and updated it with pertinent information if needed.     Review of Systems   The 10 point Review of Systems is negative other than noted in the HPI or here.     Physical Exam   Temp: 97  F (36.1  C) Temp src: Temporal BP: (!) 142/79 Pulse: 76   Resp: 22 SpO2: 99 % O2 Device: None (Room air)    Vital Signs with Ranges  Temp:  [97  F (36.1  C)] 97  F (36.1  C)  Pulse:  [67-79] 76  Resp:  [22] 22  BP: (121-148)/(73-82) 142/79  SpO2:  [97 %-100 %] 99 %  120 lbs 0 oz    Constitutional: Awake, alert,  no apparent distress. Nods to yes and no  Eyes: Conjunctiva and pupils examined and normal.  HEENT: Moist mucous membranes, normal dentition.  Respiratory: Coarse BS, dec at bases, no crackles or wheezing.  Cardiovascular: Regular rate and rhythm, normal S1 and S2, and no murmur noted.  GI: Soft, non-distended, non-tender, bowel sounds present. No rebound tenderness or guarding.  Lymph/Hematologic: No anterior cervical or supraclavicular adenopathy.  Skin: No rashes, no cyanosis, no edema.  Musculoskeletal: No deformities noted.  No erythema or tenderness. Moving all extremities.  Neurologic: right hemiparesis, dec strength of the left arm and legs 4/5. Non-verbal.    Psychiatric: Appropriate affect.    Data   Data reviewed today:       EKG: sinus   Imaging:   Recent Results (from the past 24 hour(s))   XR Chest 2 Views    Narrative    CHEST 2 VIEWS   10/28/2022 1:53 PM     HISTORY: Chest pain with chest congestion.    COMPARISON: Chest CTA on 1/21/2022.      Impression    IMPRESSION: AP and lateral views of the chest were obtained.  Cardiomediastinal silhouette is within normal limits. No suspicious  focal pulmonary opacities. No significant pleural effusion or  pneumothorax.    DELFINO LYNN MD         SYSTEM ID:  [id number]   CT Chest Pulmonary Embolism w Contrast    Narrative    EXAM: CT CHEST PULMONARY EMBOLISM W CONTRAST  LOCATION: [facility]  DATE/TIME: 10/28/2022 7:07 PM    INDICATION: Chest pain. Evaluate for pe versus pneumonia  COMPARISON: None.  TECHNIQUE: CT chest pulmonary angiogram during arterial phase injection of IV contrast. Multiplanar reformats and MIP reconstructions were performed. Dose reduction techniques were used.   CONTRAST: 51mL Isovue 370    FINDINGS:  ANGIOGRAM CHEST: Pulmonary arteries are normal caliber and negative for pulmonary emboli. Thoracic aorta is negative for dissection. No CT evidence of right heart strain.    LUNGS AND PLEURA: Images somewhat degraded by motion artifact. Patchy groundglass opacities both upper lobes. Subsegmental atelectasis both lower lobes. No pleural effusions.    MEDIASTINUM/AXILLAE: No adenopathy.    CORONARY ARTERY CALCIFICATION: Mild.    UPPER ABDOMEN: Normal.    MUSCULOSKELETAL: Mild thoracolumbar curve and degenerative changes of the spine.      Impression    IMPRESSION:  1.  No evidence for pulmonary emboli.  2.  Scattered groundglass opacities both upper lobes suggesting pneumonia, including Covid 19.    Imaging features can be seen with (COVID-19)  pneumonia, though are nonspecific and can occur with a variety of infectious and noninfectious processes.       Recent Labs   Lab 10/28/22  1331   WBC 6.5   HGB 12.3*   MCV 83          POTASSIUM 4.3   CHLORIDE 96*   CO2 29   BUN 26.2*   CR 0.91   ANIONGAP 13   ARLETH 9.8   *   ALBUMIN 3.4*   PROTTOTAL 7.4   BILITOTAL 0.2   ALKPHOS 148*   ALT 27   AST 29           Lauren Burrell PA-C  [facility] Medicine  October 28, 2022  Securely message with the Vocera Web Console (learn more here)  Text page via [employer] Paging/Directory

## 2022-10-29 NOTE — PLAN OF CARE
Goal Outcome Evaluation:  PRIMARY DIAGNOSIS: CHEST PAIN  OUTPATIENT/OBSERVATION GOALS TO BE MET BEFORE DISCHARGE:  1. Ruled out acute coronary syndrome (negative or stable Troponin):  Yes  2. Pain Status: Pain free.  3. Appropriate provocative testing performed: No  - Stress Test Procedure: N/A  - Interpretation of cardiac rhythm per telemetry tech: SR 70s    4. Cleared by Consultants (if applicable):No  5. Return to near baseline physical activity: Yes  Discharge Planner Nurse   Safe discharge environment identified: Yes  Barriers to discharge: Yes       Entered by: Mario Tracey RN 10/29/2022 2:13 AM     Please review provider order for any additional goals.   Nurse to notify provider when observation goals have been met and patient is ready for discharge.

## 2022-10-29 NOTE — PLAN OF CARE
Goal Outcome Evaluation:  PRIMARY DIAGNOSIS: CHEST PAIN  OUTPATIENT/OBSERVATION GOALS TO BE MET BEFORE DISCHARGE:  1. Ruled out acute coronary syndrome (negative or stable Troponin):  Yes  2. Pain Status: Pain free.  3. Appropriate provocative testing performed: No  - Stress Test Procedure: N/A  - Interpretation of cardiac rhythm per telemetry tech: SR 70s     4. Cleared by Consultants (if applicable):No  5. Return to near baseline physical activity: Yes  Discharge Planner Nurse   Safe discharge environment identified: Yes  Barriers to discharge: Yes   A & O. Lung sound congested bilaterally . No S/SX of shortness of breath noted. Patient in nonverbal . Daughter at bed side helping with communication. Pt is NPO. Patient on 1.5 Jevity bolus TID. IV SL. Felix catheter patent, draining yellow color urine. No foul odor, cloudy, or sedimentation noted. Patient on cardiac monitoring, cardiac rhythm SR 70s per telemetry. Total care. Transthoracic Echocardiogram completed, cleared for discharge.

## 2022-10-29 NOTE — PLAN OF CARE
Goal Outcome Evaluation:  PRIMARY DIAGNOSIS: CHEST PAIN  OUTPATIENT/OBSERVATION GOALS TO BE MET BEFORE DISCHARGE:  1. Ruled out acute coronary syndrome (negative or stable Troponin):  Yes  2. Pain Status: Pain free.  3. Appropriate provocative testing performed: No  - Stress Test Procedure: N/A  - Interpretation of cardiac rhythm per telemetry tech: SR 70s     4. Cleared by Consultants (if applicable):No  5. Return to near baseline physical activity: Yes  Discharge Planner Nurse   Safe discharge environment identified: Yes  Barriers to discharge: Yes   A & O. Lung sound congested bilaterally . No S/SX of shortness of breath noted. Patient in nonverbal . Daughter at bed side helping with communication. Pt is NPO. Patient on 1.5 Jevity bolus TID. IV SL. Felix catheter patent, draining yellow color urine. No foul odor, cloudy, or sedimentation noted. Patient on cardiac monitoring, cardiac rhythm SR 70s per telemetry. Total care. Transthoracic Echocardiogram scheduled on 10/29.

## 2022-10-31 ENCOUNTER — TELEPHONE (OUTPATIENT)
Dept: UROLOGY | Facility: CLINIC | Age: 72
End: 2022-10-31

## 2022-10-31 ENCOUNTER — TELEPHONE (OUTPATIENT)
Dept: MULTI SPECIALTY CLINIC | Facility: CLINIC | Age: 72
End: 2022-10-31

## 2022-10-31 ENCOUNTER — MEDICAL CORRESPONDENCE (OUTPATIENT)
Dept: HEALTH INFORMATION MANAGEMENT | Facility: CLINIC | Age: 72
End: 2022-10-31

## 2022-10-31 LAB
ATRIAL RATE - MUSE: 70 BPM
DIASTOLIC BLOOD PRESSURE - MUSE: NORMAL MMHG
INTERPRETATION ECG - MUSE: NORMAL
P AXIS - MUSE: 22 DEGREES
PR INTERVAL - MUSE: 172 MS
QRS DURATION - MUSE: 72 MS
QT - MUSE: 400 MS
QTC - MUSE: 432 MS
R AXIS - MUSE: -29 DEGREES
SYSTOLIC BLOOD PRESSURE - MUSE: NORMAL MMHG
T AXIS - MUSE: 20 DEGREES
VENTRICULAR RATE- MUSE: 70 BPM

## 2022-10-31 NOTE — TELEPHONE ENCOUNTER
Daughter Clemente would like more information on Pall Care and services. Please call her at [phone].

## 2022-10-31 NOTE — TELEPHONE ENCOUNTER
[facility] Call Center    Phone Message    May a detailed message be left on voicemail: yes     Reason for Call: Nallely from [facility] called stating that patients current order for irrigating is raymond complicated wants to know if they can go back to the normal as needed 30-60 mil with sterile water and saline. Please contact Nallely at [phone] in regards to this message. Thank you     Action Taken: Message routed to:  [facility] ([facility] Urology    Travel Screening: Not Applicable

## 2022-11-01 ENCOUNTER — PATIENT OUTREACH (OUTPATIENT)
Dept: CARE COORDINATION | Facility: CLINIC | Age: 72
End: 2022-11-01

## 2022-11-01 ENCOUNTER — MEDICAL CORRESPONDENCE (OUTPATIENT)
Dept: HEALTH INFORMATION MANAGEMENT | Facility: CLINIC | Age: 72
End: 2022-11-01

## 2022-11-01 NOTE — PROGRESS NOTES
Clinic Care Coordination Contact  [facility]/Voicemail       Clinical Data: Care Coordinator Outreach  Outreach attempted x 2.  Left message who stated every thing was going well had no questions or concerns. Care Coordinator will do no further outreaches at this time.    Bettina Garcia  [phone]  Care

## 2022-11-01 NOTE — TELEPHONE ENCOUNTER
Spoke to home care nurse Nallely. She reports that family has not been irrigating daily. Reviewed irrigation procedure with nurse. She reports that family and pt have not been having issues with clogging. OK to switch to PRN since they are not doing it daily anyways. Reiterated importance of daily flushing and she verbalized understanding.     Mnoa Crowe RN MSN

## 2022-11-03 NOTE — TELEPHONE ENCOUNTER
Phone call placed to genoveva Cornejo. Explained the involvement of the outpatient palliative care team specific to pt's illness. She will talk with her mom and siblings and review this information with them. If they'd like to schedule a visit, they will call back. Provided my direct phone number and the phone number to schedule.    JOEL Cast, RN  Palliative Care Nurse Clinician    [phone] (Direct)  [phone] (Main)  [phone] (Appointment Scheduling)

## 2022-11-06 DIAGNOSIS — N40.1 BENIGN PROSTATIC HYPERPLASIA WITH URINARY RETENTION: ICD-10-CM

## 2022-11-06 DIAGNOSIS — R33.8 BENIGN PROSTATIC HYPERPLASIA WITH URINARY RETENTION: ICD-10-CM

## 2022-11-14 RX ORDER — DOXAZOSIN 2 MG/1
2 TABLET ORAL DAILY
Qty: 90 TABLET | Refills: 3 | OUTPATIENT
Start: 2022-11-14

## 2022-11-25 DIAGNOSIS — R33.8 BENIGN PROSTATIC HYPERPLASIA WITH URINARY RETENTION: ICD-10-CM

## 2022-11-25 DIAGNOSIS — N40.1 BENIGN PROSTATIC HYPERPLASIA WITH URINARY RETENTION: ICD-10-CM

## 2022-11-25 RX ORDER — DOXAZOSIN 2 MG/1
2 TABLET ORAL DAILY
Qty: 90 TABLET | Refills: 3 | Status: SHIPPED | OUTPATIENT
Start: 2022-11-25 | End: 2023-12-05

## 2022-12-10 ENCOUNTER — HEALTH MAINTENANCE LETTER (OUTPATIENT)
Age: 72
End: 2022-12-10

## 2022-12-27 ENCOUNTER — PRE VISIT (OUTPATIENT)
Dept: UROLOGY | Facility: CLINIC | Age: 72
End: 2022-12-27

## 2022-12-27 NOTE — TELEPHONE ENCOUNTER
Reason for visit: follow up      Dx/Hx/Sx: urinary retention     Records/imaging/labs/orders: in epic    At Rooming: video visit

## 2023-01-02 ENCOUNTER — HOSPITAL ENCOUNTER (EMERGENCY)
Facility: CLINIC | Age: 73
Discharge: HOME | End: 2023-01-02
Attending: EMERGENCY MEDICINE | Admitting: EMERGENCY MEDICINE
Payer: MEDICARE

## 2023-01-02 ENCOUNTER — HOSPITAL ENCOUNTER (OUTPATIENT)
Dept: GENERAL RADIOLOGY | Facility: CLINIC | Age: 73
Discharge: HOME | End: 2023-01-02
Attending: INTERNAL MEDICINE | Admitting: RADIOLOGY
Payer: MEDICARE

## 2023-01-02 ENCOUNTER — APPOINTMENT (OUTPATIENT)
Dept: GENERAL RADIOLOGY | Facility: CLINIC | Age: 73
End: 2023-01-02
Attending: EMERGENCY MEDICINE
Payer: MEDICARE

## 2023-01-02 VITALS
WEIGHT: 126 LBS | BODY MASS INDEX: 21.63 KG/M2 | DIASTOLIC BLOOD PRESSURE: 64 MMHG | RESPIRATION RATE: 18 BRPM | HEART RATE: 65 BPM | SYSTOLIC BLOOD PRESSURE: 115 MMHG | OXYGEN SATURATION: 97 % | TEMPERATURE: 97.9 F

## 2023-01-02 DIAGNOSIS — M62.81 GENERALIZED MUSCLE WEAKNESS: ICD-10-CM

## 2023-01-02 DIAGNOSIS — U07.1 INFECTION DUE TO 2019 NOVEL CORONAVIRUS: ICD-10-CM

## 2023-01-02 DIAGNOSIS — K94.23 GASTROSTOMY TUBE DYSFUNCTION (H): ICD-10-CM

## 2023-01-02 LAB
FLUAV RNA SPEC QL NAA+PROBE: NEGATIVE
FLUBV RNA RESP QL NAA+PROBE: NEGATIVE
GLUCOSE BLDC GLUCOMTR-MCNC: 112 MG/DL (ref 70–99)
RSV RNA SPEC NAA+PROBE: NEGATIVE
SARS-COV-2 RNA RESP QL NAA+PROBE: POSITIVE

## 2023-01-02 PROCEDURE — 71046 X-RAY EXAM CHEST 2 VIEWS: CPT

## 2023-01-02 PROCEDURE — 87637 SARSCOV2&INF A&B&RSV AMP PRB: CPT | Performed by: EMERGENCY MEDICINE

## 2023-01-02 PROCEDURE — 93005 ELECTROCARDIOGRAM TRACING: CPT

## 2023-01-02 PROCEDURE — 49450 REPLACE G/C TUBE PERC: CPT

## 2023-01-02 PROCEDURE — 99285 EMERGENCY DEPT VISIT HI MDM: CPT | Mod: CS,25

## 2023-01-02 PROCEDURE — C9803 HOPD COVID-19 SPEC COLLECT: HCPCS

## 2023-01-02 RX ADMIN — DIATRIZOATE MEGLUMINE AND DIATRIZOATE SODIUM 15 ML: 660; 100 SOLUTION ORAL; RECTAL at 11:08

## 2023-01-02 NOTE — PROGRESS NOTES
Patient tolerated exchange of 14 [place] G tube well by Dr Gunter under imagingr.  Patient and family state understanding of instructions.

## 2023-01-02 NOTE — ED TRIAGE NOTES
Pt presents for evaluation of labored breathing since last night. Has had a cough for about a week. Had his G-tube changed today as a routine. Hx of CVA. Is nonverbal due to this. Presents with daughter who states pt can answer yes/no questions and point to areas of discomfort. Pt lives with wife, has home care. Was pointing at his chest this morning. Has been having a green mucoid drool.

## 2023-01-03 LAB
ATRIAL RATE - MUSE: 69 BPM
DIASTOLIC BLOOD PRESSURE - MUSE: NORMAL MMHG
INTERPRETATION ECG - MUSE: NORMAL
P AXIS - MUSE: 16 DEGREES
PR INTERVAL - MUSE: 196 MS
QRS DURATION - MUSE: 74 MS
QT - MUSE: 416 MS
QTC - MUSE: 445 MS
R AXIS - MUSE: -23 DEGREES
SYSTOLIC BLOOD PRESSURE - MUSE: NORMAL MMHG
T AXIS - MUSE: -6 DEGREES
VENTRICULAR RATE- MUSE: 69 BPM

## 2023-01-03 NOTE — ED PROVIDER NOTES
"    History     Chief Complaint:  Shortness of Breath       HPI   Jimmy Otto is a 73 year old male who presents with generalized weakness and cough/sob.  Sx started this AM.  He was due for g tube change today and so family wanted him checked out for cough/sob.  No known fever or sick contacts.      Decreased energy level but otherwise at baseline per family.  Gets all calories/hydration.meds through g tube.  .      Independent Historian: daughter        ROS:  Review of Systems   ROS: 10 point ROS neg other than the symptoms noted above in the HPI.      Allergies:  Nka [No Known Allergies]     Medications:    acetaminophen (TYLENOL) 325 MG tablet  amLODIPine (NORVASC) 5 MG tablet  atenolol (TENORMIN) 25 MG tablet  atorvastatin (LIPITOR) 80 MG tablet  clopidogrel (PLAVIX) 75 MG tablet  Continuous Blood Gluc Sensor (FREESTYLE TITI 2 SENSOR) MISC  doxazosin (CARDURA) 2 MG tablet  FLUoxetine (PROZAC) 20 MG/5ML solution  insulin aspart (NOVOLOG FLEXPEN) 100 UNIT/ML pen  Insulin Pen Needle (PEN NEEDLES 5/16\") 31G X 8 MM MISC  Lancets MISC  metFORMIN (GLUCOPHAGE) 1000 MG tablet  metFORMIN (GLUCOPHAGE) 1000 MG tablet  Nutritional Supplements (JEVITY PO)  nystatin (MYCOSTATIN) 792137 UNIT/ML suspension  ondansetron (ZOFRAN-ODT) 4 MG ODT tab  polyethylene glycol (MIRALAX) 17 g packet        Past Medical History:    Past Medical History:   Diagnosis Date     Diabetes mellitus (H)      Hypercholesteremia      Hypertension      Unspecified cerebral artery occlusion with cerebral infarction        Past Surgical History:    Past Surgical History:   Procedure Laterality Date     IR CYSTOGRAM  2/14/2022        Family History:    family history is not on file.    Social History:   reports that he has never smoked. He has never used smokeless tobacco. He reports current alcohol use. He reports that he does not use drugs.  PCP: Manny Worrell     Physical Exam     Patient Vitals for the past 24 hrs:   BP Temp Temp src Pulse Resp SpO2 " Weight   01/02/23 1647 115/64 -- -- 65 18 97 % --   01/02/23 1134 108/59 97.9  F (36.6  C) Oral 68 18 96 % 57.2 kg (126 lb)        Physical Exam  HENT:  mmm, no rhinorrhea  Eyes: periorbital tissues and sclera normal   Neck: supple, no abnormal swelling  Lungs:  CTAB,  no resp distress  CV: rrr, no m/r/g, ppi  Abd: soft, nontender, nondistended, no rebound/masses/guarding/hsm  Ext: no peripheral edema  Skin: warm, dry, well perfused, no rashes/bruising/lesions on exposed skin  Neuro: awake, alert        Emergency Department Course   ECG:  ECG results from 01/02/23   EKG 12-lead, tracing only     Value    Systolic Blood Pressure     Diastolic Blood Pressure     Ventricular Rate 69    Atrial Rate 69    NC Interval 196    QRS Duration 74        QTc 445    P Axis 16    R AXIS -23    T Axis -6    Interpretation ECG      Sinus rhythm  Minimal voltage criteria for LVH, may be normal variant  Borderline ECG  When compared with ECG of 28-OCT-2022 12:40,  No significant change was found         Imaging:  Chest XR,  PA & LAT   Final Result   IMPRESSION: AP and lateral views of the chest were obtained.   Cardiomediastinal silhouette is within normal limits. Mild left   basilar pulmonary opacities, likely atelectasis. No significant   pleural effusion or pneumothorax.      DELFINO LYNN MD            SYSTEM ID:  [id number]         Report per radiology    Laboratory:  Labs Ordered and Resulted from Time of ED Arrival to Time of ED Departure   INFLUENZA A/B & SARS-COV2 PCR MULTIPLEX - Abnormal       Result Value    Influenza A PCR Negative      Influenza B PCR Negative      RSV PCR Negative      SARS CoV2 PCR Positive (*)    GLUCOSE BY METER - Abnormal    GLUCOSE BY METER POCT 112 (*)         Procedures       Emergency Department Course & Assessments:             Interventions:  Medications - No data to display     Independent Interpretation (X-rays, CTs, rhythm strip):  CXR without significant lobar pneumonia     Social  Determinants of Health affecting care:         Disposition:  The patient was discharged to home.     Impression & Plan        Medical Decision Makin y M here w family.  Concerns about increased work of breathing and cough.  Covid pcr +.  CXR without obvious e/o assoc lobar pna.  Not hypoxic, no increased wob in ED.  No indication for admission.  Medications interact w paxlivod and so not prescribed.  DC home, discussed signs and symptoms of when to return, family comfortable and agreeable with that plan.       Diagnosis:    ICD-10-CM    1. Infection due to 2019 novel coronavirus  U07.1       2. Generalized muscle weakness  M62.81            Discharge Medications:  Discharge Medication List as of 2023  4:44 PM           Scribe Disclosure:  I, Olga Alvarez, am serving as a scribe at 7:26 PM on 2023 to document services personally performed by No att. providers found based on my observations and the provider's statements to me.     2023   No att. providers found          Isaiah Ferguson MD  23 [id number]

## 2023-01-24 ENCOUNTER — TELEPHONE (OUTPATIENT)
Dept: UROLOGY | Facility: CLINIC | Age: 73
End: 2023-01-24

## 2023-01-24 ENCOUNTER — VIRTUAL VISIT (OUTPATIENT)
Dept: UROLOGY | Facility: CLINIC | Age: 73
End: 2023-01-24
Payer: MEDICARE

## 2023-01-24 DIAGNOSIS — R33.9 URINARY RETENTION: Primary | ICD-10-CM

## 2023-01-24 PROCEDURE — 99213 OFFICE O/P EST LOW 20 MIN: CPT | Mod: 95 | Performed by: NURSE PRACTITIONER

## 2023-01-24 NOTE — PATIENT INSTRUCTIONS
UROLOGY CLINIC VISIT PATIENT INSTRUCTIONS    -Resume bladder irrigations. Will connect with your home care nurse to get these restarted.   -Follow up with me in one year.     If you have any issues, questions or concerns in the meantime, do not hesitate to contact us at [phone] or via [employer].     Alivia Herzog, CNP  Department of Urology

## 2023-01-24 NOTE — LETTER
1/24/2023       RE: Jimmy Otto  [address] [place] [address]     Dear Colleague,    Thank you for referring your patient, Jimmy Otto, to the [facility]OLOG[facility] [place] at [facility]. Please see a copy of my visit note below.    Jimmy is a 73 year old who is being evaluated via a billable video visit.      How would you like to obtain your AVS? MyChart  If the video visit is dropped, the invitation should be resent by: Text to cell phone: [phone]  Will anyone else be joining your video visit? No    Video-Visit Details    Type of service:  Video Visit   Video Start Time: 11:12 AM  Video End Time: 11:22 AM    Originating Location (pt. Location): Home  Distant Location (provider location):  Off-site  Platform used for Video Visit: Padma Otto complains of   Chief Complaint   Patient presents with     Video Visit     6 month follow up        Assessment/Plan:  73 year old male with urinary retention 2/2 CVA, managed with Felix. Has done well with this since our last visit and having this exchanged monthly by home care. Has also had weekly bladder irrigations done by home care to prevent UTIs, though this was recently stopped due to a new nurse taking over that, per their report today, does not feel comfortable doing this due to its complexity. Given his high risk of UTIs, bladder irrigations need to be continued. Will connect with [facility] to clarify the issue to see if we can come to a solution on this. Ultimately, if the nursing staff is unable to complete these weekly, his daughter states that she would be willing to learn and do it herself as she typically visits him on a regular basis. Will otherwise plan to follow up with them in one year to check in.      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  UDS completed last year to evaluate detrusor function given his CVA, showed apparent Valsalva, with high-pressure rise in both catheters. No appreciable, volitionally detrusor contraction to demonstrate volitional bladder control. Therefore, it was recommended that his Felix remain in place with monthly exchanges. Our last visit was in April, conducted through his daughter, who noted some sediment in the catheter toward the end of the month when it is due to be changed. Weekly bladder irrigations ordered.     Visit conducted via his daughter, Clemente. Patient present on video. She states that things have been going well. Catheter has been exchanged monthly by home care nurse. The only issue is that the bladder irrigations were stopped due to him getting a new home care nurse. They were told that the process of performing the irrigations was \"too complex\" and therefore stopped.     Objective:     Exam:   Patient is a 73 year old male   No vital signs obtained due to virtual visit.  General Appearance: Well groomed, hygenic  Respiratory: No cough, no respiratory distress or labored breathing  Musculoskeletal: Laying in bed  Skin: No discoloration or apparent rashes  Neurologic: No tremors  Psychiatric: Alert   Further examination is deferred due to the nature of our visit.     "

## 2023-01-24 NOTE — PROGRESS NOTES
Jimmy is a 73 year old who is being evaluated via a billable video visit.      How would you like to obtain your AVS? MyChart  If the video visit is dropped, the invitation should be resent by: Text to cell phone: [phone]  Will anyone else be joining your video visit? No    Video-Visit Details    Type of service:  Video Visit   Video Start Time: 11:12 AM  Video End Time: 11:22 AM    Originating Location (pt. Location): Home  Distant Location (provider location):  Off-site  Platform used for Video Visit: [employer]Well     Jimmy Otto complains of   Chief Complaint   Patient presents with     Video Visit     6 month follow up        Assessment/Plan:  73 year old male with urinary retention 2/2 CVA, managed with Felix. Has done well with this since our last visit and having this exchanged monthly by home care. Has also had weekly bladder irrigations done by home care to prevent UTIs, though this was recently stopped due to a new nurse taking over that, per their report today, does not feel comfortable doing this due to its complexity. Given his high risk of UTIs, bladder irrigations need to be continued. Will connect with [facility] [employer] to clarify the issue to see if we can come to a solution on this. Ultimately, if the nursing staff is unable to complete these weekly, his daughter states that she would be willing to learn and do it herself as she typically visits him on a regular basis. Will otherwise plan to follow up with them in one year to check in.      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  UDS completed last year to evaluate detrusor function given his CVA, showed apparent Valsalva, with high-pressure rise in both catheters. No appreciable, volitionally detrusor contraction to demonstrate volitional bladder control. Therefore, it  "was recommended that his Felix remain in place with monthly exchanges. Our last visit was in April, conducted through his daughter, who noted some sediment in the catheter toward the end of the month when it is due to be changed. Weekly bladder irrigations ordered.     Visit conducted via his daughter, Clemente. Patient present on video. She states that things have been going well. Catheter has been exchanged monthly by home care nurse. The only issue is that the bladder irrigations were stopped due to him getting a new home care nurse. They were told that the process of performing the irrigations was \"too complex\" and therefore stopped.     Objective:     Exam:   Patient is a 73 year old male   No vital signs obtained due to virtual visit.  General Appearance: Well groomed, hygenic  Respiratory: No cough, no respiratory distress or labored breathing  Musculoskeletal: Laying in bed  Skin: No discoloration or apparent rashes  Neurologic: No tremors  Psychiatric: Alert   Further examination is deferred due to the nature of our visit.             "

## 2023-01-24 NOTE — TELEPHONE ENCOUNTER
"Alivia Herzog, Mona Gill RN Hi Angie,     Patient with a chronic Felix who I saw today for follow up. He has home care due to history of a stroke and they manage his Felix exchanges. They have also been doing weekly bladder irrigations to prevent UTIs, though his daughter reports today that they were assigned a new nurse who no longer feels comfortable doing this due to its \"complexity.\" Therefore, the irrigations have not been getting done. I would like these to be resumed due to his high risk for UTIs. He gets home care through [facility]. In Emilia's absence, are you able to contact them somehow and clarify the inability to do the flushes? Do they need us to walk through the process or send them a step-by-step guide on how to do it?     Thanks for your help!     Alivia     [facility] home care nurse is Valeri JUNIOR [phone] or [phone] for main number to [facility].     Spoke to nurse Trammell. She confirms that they are seeing pt every other week and she had the other understanding that the family would be doing it the off weeks. She reports that she has been doing it every other week otherwise. She will discuss with the family to ensure this is getting done and will call clinic if additional support is needed.     Mona Crowe, MSN RN        "

## 2023-01-25 ENCOUNTER — LAB REQUISITION (OUTPATIENT)
Dept: LAB | Facility: CLINIC | Age: 73
End: 2023-01-25
Payer: MEDICARE

## 2023-01-25 DIAGNOSIS — E11.22 TYPE 2 DIABETES MELLITUS WITH DIABETIC CHRONIC KIDNEY DISEASE (H): ICD-10-CM

## 2023-01-25 DIAGNOSIS — N18.30 CHRONIC KIDNEY DISEASE, STAGE 3 UNSPECIFIED (H): ICD-10-CM

## 2023-01-25 DIAGNOSIS — I12.9 HYPERTENSIVE CHRONIC KIDNEY DISEASE WITH STAGE 1 THROUGH STAGE 4 CHRONIC KIDNEY DISEASE, OR UNSPECIFIED CHRONIC KIDNEY DISEASE: ICD-10-CM

## 2023-01-25 LAB
ALBUMIN SERPL BCG-MCNC: 3.2 G/DL (ref 3.5–5.2)
ALP SERPL-CCNC: 128 U/L (ref 40–129)
ALT SERPL W P-5'-P-CCNC: 25 U/L (ref 10–50)
ANION GAP SERPL CALCULATED.3IONS-SCNC: 16 MMOL/L (ref 7–15)
AST SERPL W P-5'-P-CCNC: 29 U/L (ref 10–50)
BILIRUB SERPL-MCNC: 0.2 MG/DL
BUN SERPL-MCNC: 27.4 MG/DL (ref 8–23)
CALCIUM SERPL-MCNC: 9 MG/DL (ref 8.8–10.2)
CHLORIDE SERPL-SCNC: 95 MMOL/L (ref 98–107)
CREAT SERPL-MCNC: 0.9 MG/DL (ref 0.67–1.17)
DEPRECATED HCO3 PLAS-SCNC: 24 MMOL/L (ref 22–29)
ERYTHROCYTE [DISTWIDTH] IN BLOOD BY AUTOMATED COUNT: 14.5 % (ref 10–15)
GFR SERPL CREATININE-BSD FRML MDRD: 90 ML/MIN/1.73M2
GLUCOSE SERPL-MCNC: 225 MG/DL (ref 70–99)
HBA1C MFR BLD: 8.3 %
HCT VFR BLD AUTO: 37 % (ref 40–53)
HGB BLD-MCNC: 11.6 G/DL (ref 13.3–17.7)
MAGNESIUM SERPL-MCNC: 2 MG/DL (ref 1.7–2.3)
MCH RBC QN AUTO: 26 PG (ref 26.5–33)
MCHC RBC AUTO-ENTMCNC: 31.4 G/DL (ref 31.5–36.5)
MCV RBC AUTO: 83 FL (ref 78–100)
PHOSPHATE SERPL-MCNC: 3.3 MG/DL (ref 2.5–4.5)
PLATELET # BLD AUTO: 195 10E3/UL (ref 150–450)
POTASSIUM SERPL-SCNC: 4.2 MMOL/L (ref 3.4–5.3)
PROT SERPL-MCNC: 7.4 G/DL (ref 6.4–8.3)
RBC # BLD AUTO: 4.46 10E6/UL (ref 4.4–5.9)
SODIUM SERPL-SCNC: 135 MMOL/L (ref 136–145)
WBC # BLD AUTO: 6.1 10E3/UL (ref 4–11)

## 2023-01-25 PROCEDURE — 84100 ASSAY OF PHOSPHORUS: CPT | Mod: ORL | Performed by: INTERNAL MEDICINE

## 2023-01-25 PROCEDURE — 80053 COMPREHEN METABOLIC PANEL: CPT | Mod: ORL | Performed by: INTERNAL MEDICINE

## 2023-01-25 PROCEDURE — 85027 COMPLETE CBC AUTOMATED: CPT | Mod: ORL | Performed by: INTERNAL MEDICINE

## 2023-01-25 PROCEDURE — 83735 ASSAY OF MAGNESIUM: CPT | Mod: ORL | Performed by: INTERNAL MEDICINE

## 2023-01-25 PROCEDURE — 83036 HEMOGLOBIN GLYCOSYLATED A1C: CPT | Mod: ORL | Performed by: INTERNAL MEDICINE

## 2023-02-13 ENCOUNTER — HOSPITAL ENCOUNTER (EMERGENCY)
Facility: CLINIC | Age: 73
Discharge: HOME | End: 2023-02-13
Attending: EMERGENCY MEDICINE | Admitting: EMERGENCY MEDICINE
Payer: MEDICARE

## 2023-02-13 VITALS
SYSTOLIC BLOOD PRESSURE: 106 MMHG | TEMPERATURE: 97.1 F | RESPIRATION RATE: 18 BRPM | DIASTOLIC BLOOD PRESSURE: 73 MMHG | HEART RATE: 75 BPM | OXYGEN SATURATION: 96 %

## 2023-02-13 DIAGNOSIS — B37.0 CANDIDIASIS OF MOUTH: ICD-10-CM

## 2023-02-13 DIAGNOSIS — T83.011A MALFUNCTION OF FOLEY CATHETER, INITIAL ENCOUNTER (H): ICD-10-CM

## 2023-02-13 DIAGNOSIS — H61.22 IMPACTED CERUMEN OF LEFT EAR: ICD-10-CM

## 2023-02-13 DIAGNOSIS — H66.90 EAR INFECTION: ICD-10-CM

## 2023-02-13 LAB
ALBUMIN UR-MCNC: 300 MG/DL
APPEARANCE UR: ABNORMAL
BACTERIA #/AREA URNS HPF: ABNORMAL /HPF
BILIRUB UR QL STRIP: NEGATIVE
COLOR UR AUTO: YELLOW
GLUCOSE UR STRIP-MCNC: NEGATIVE MG/DL
HGB UR QL STRIP: ABNORMAL
KETONES UR STRIP-MCNC: NEGATIVE MG/DL
LEUKOCYTE ESTERASE UR QL STRIP: ABNORMAL
MUCOUS THREADS #/AREA URNS LPF: PRESENT /LPF
NITRATE UR QL: NEGATIVE
PH UR STRIP: 7 [PH] (ref 5–7)
RBC URINE: 25 /HPF
SP GR UR STRIP: 1.01 (ref 1–1.03)
SQUAMOUS EPITHELIAL: 1 /HPF
UROBILINOGEN UR STRIP-MCNC: NORMAL MG/DL
WBC URINE: 118 /HPF

## 2023-02-13 PROCEDURE — 81001 URINALYSIS AUTO W/SCOPE: CPT | Performed by: EMERGENCY MEDICINE

## 2023-02-13 PROCEDURE — 51702 INSERT TEMP BLADDER CATH: CPT

## 2023-02-13 PROCEDURE — 250N000009 HC RX 250: Performed by: EMERGENCY MEDICINE

## 2023-02-13 PROCEDURE — 99283 EMERGENCY DEPT VISIT LOW MDM: CPT

## 2023-02-13 PROCEDURE — 87088 URINE BACTERIA CULTURE: CPT | Performed by: EMERGENCY MEDICINE

## 2023-02-13 PROCEDURE — 250N000013 HC RX MED GY IP 250 OP 250 PS 637: Performed by: EMERGENCY MEDICINE

## 2023-02-13 RX ORDER — FLUCONAZOLE 100 MG/1
100 TABLET ORAL DAILY
Qty: 10 TABLET | Refills: 0 | Status: SHIPPED | OUTPATIENT
Start: 2023-02-13 | End: 2023-02-23

## 2023-02-13 RX ORDER — NYSTATIN 100000/ML
500000 SUSPENSION, ORAL (FINAL DOSE FORM) ORAL 4 TIMES DAILY
Qty: 200 ML | Refills: 0 | Status: SHIPPED | OUTPATIENT
Start: 2023-02-13 | End: 2023-02-13

## 2023-02-13 RX ORDER — LIDOCAINE HYDROCHLORIDE 20 MG/ML
6 JELLY TOPICAL ONCE
Status: COMPLETED | OUTPATIENT
Start: 2023-02-13 | End: 2023-02-13

## 2023-02-13 RX ADMIN — LIDOCAINE HYDROCHLORIDE 6 ML: 20 JELLY TOPICAL at 16:45

## 2023-02-13 RX ADMIN — CARBAMIDE PEROXIDE 6.5% 3 DROP: 6.5 LIQUID AURICULAR (OTIC) at 18:21

## 2023-02-13 ASSESSMENT — ENCOUNTER SYMPTOMS: ABDOMINAL PAIN: 1

## 2023-02-13 ASSESSMENT — ACTIVITIES OF DAILY LIVING (ADL): ADLS_ACUITY_SCORE: 41

## 2023-02-13 NOTE — ED TRIAGE NOTES
Pt presents to ED with leaking catheter and pain at catheter site for 3 days. Last changed 1 week ago. Per sister there is still output in the bag but the pain and the leaking around the catheter has gotten worse.      Triage Assessment     Row Name 02/13/23 0928       Triage Assessment (Adult)    Airway WDL WDL

## 2023-02-14 NOTE — DISCHARGE INSTRUCTIONS
You have what appears to be an ear infection involving the left ear.  However, the appearance is not typical for an acute otitis media.  I recommended follow-up with ENT, and and prescribing a course of antibiotics for the infection.  Return to the ED for increased ear pain, discharge from the ear, or for any other concerns.  In the meantime, avoid putting any fluids in the left ear.    The urine test does show inflammatory changes, as well as bacteria.  This is expected in a patient who has chronic indwelling Felix catheter.  At this point, I do not recommend starting antibiotics for urinary tract infection.  The antibiotics given for the ear infection will not also cover a bladder infection.  If Ho develops abdominal pain, fever, confusion, generalized weakness, these may be indications that he has a bladder infection that requires treatment.    I note that you have severe thrush that has not been responsive to nystatin.  I have prescribed fluconazole, which does have an interaction with the blood thinner Plavix, which are also taking.  The fluconazole slightly decreases the effectiveness of the blood thinner.  Unfortunately, there is not another good alternative.  Because the oral thrush is causing significant symptoms and pain, I have recommended using it to improve quality of life.

## 2023-02-16 LAB — BACTERIA UR CULT: ABNORMAL

## 2023-03-10 ENCOUNTER — HOSPITAL ENCOUNTER (EMERGENCY)
Facility: CLINIC | Age: 73
Discharge: HOME | End: 2023-03-11
Attending: EMERGENCY MEDICINE | Admitting: EMERGENCY MEDICINE
Payer: MEDICARE

## 2023-03-10 DIAGNOSIS — Z46.6 URINARY CATHETER (FOLEY) CHANGE REQUIRED: ICD-10-CM

## 2023-03-10 LAB
BASOPHILS # BLD AUTO: 0.1 10E3/UL (ref 0–0.2)
BASOPHILS NFR BLD AUTO: 1 %
EOSINOPHIL # BLD AUTO: 0.1 10E3/UL (ref 0–0.7)
EOSINOPHIL NFR BLD AUTO: 1 %
ERYTHROCYTE [DISTWIDTH] IN BLOOD BY AUTOMATED COUNT: 14.2 % (ref 10–15)
HCT VFR BLD AUTO: 41.3 % (ref 40–53)
HGB BLD-MCNC: 12.9 G/DL (ref 13.3–17.7)
IMM GRANULOCYTES # BLD: 0 10E3/UL
IMM GRANULOCYTES NFR BLD: 0 %
LYMPHOCYTES # BLD AUTO: 2.6 10E3/UL (ref 0.8–5.3)
LYMPHOCYTES NFR BLD AUTO: 24 %
MCH RBC QN AUTO: 26.2 PG (ref 26.5–33)
MCHC RBC AUTO-ENTMCNC: 31.2 G/DL (ref 31.5–36.5)
MCV RBC AUTO: 84 FL (ref 78–100)
MONOCYTES # BLD AUTO: 0.6 10E3/UL (ref 0–1.3)
MONOCYTES NFR BLD AUTO: 6 %
NEUTROPHILS # BLD AUTO: 7.5 10E3/UL (ref 1.6–8.3)
NEUTROPHILS NFR BLD AUTO: 68 %
NRBC # BLD AUTO: 0 10E3/UL
NRBC BLD AUTO-RTO: 0 /100
PLATELET # BLD AUTO: 208 10E3/UL (ref 150–450)
RBC # BLD AUTO: 4.93 10E6/UL (ref 4.4–5.9)
WBC # BLD AUTO: 10.9 10E3/UL (ref 4–11)

## 2023-03-10 PROCEDURE — 99283 EMERGENCY DEPT VISIT LOW MDM: CPT

## 2023-03-10 PROCEDURE — 51702 INSERT TEMP BLADDER CATH: CPT

## 2023-03-10 PROCEDURE — 81001 URINALYSIS AUTO W/SCOPE: CPT | Performed by: EMERGENCY MEDICINE

## 2023-03-10 PROCEDURE — 85025 COMPLETE CBC W/AUTO DIFF WBC: CPT | Performed by: EMERGENCY MEDICINE

## 2023-03-10 PROCEDURE — 36415 COLL VENOUS BLD VENIPUNCTURE: CPT | Performed by: EMERGENCY MEDICINE

## 2023-03-10 PROCEDURE — 80048 BASIC METABOLIC PNL TOTAL CA: CPT | Performed by: EMERGENCY MEDICINE

## 2023-03-10 RX ORDER — LIDOCAINE HYDROCHLORIDE 20 MG/ML
6 JELLY TOPICAL ONCE
Status: COMPLETED | OUTPATIENT
Start: 2023-03-10 | End: 2023-03-11

## 2023-03-10 ASSESSMENT — ACTIVITIES OF DAILY LIVING (ADL): ADLS_ACUITY_SCORE: 39

## 2023-03-10 ASSESSMENT — ENCOUNTER SYMPTOMS
COUGH: 0
FEVER: 0
ABDOMINAL PAIN: 0
DYSURIA: 1
CHILLS: 0
SHORTNESS OF BREATH: 0

## 2023-03-11 VITALS
TEMPERATURE: 97.9 F | SYSTOLIC BLOOD PRESSURE: 135 MMHG | OXYGEN SATURATION: 98 % | DIASTOLIC BLOOD PRESSURE: 71 MMHG | HEART RATE: 80 BPM | RESPIRATION RATE: 18 BRPM

## 2023-03-11 LAB
ALBUMIN UR-MCNC: 100 MG/DL
ANION GAP SERPL CALCULATED.3IONS-SCNC: 25 MMOL/L (ref 7–15)
APPEARANCE UR: CLEAR
BILIRUB UR QL STRIP: NEGATIVE
BUN SERPL-MCNC: 26.1 MG/DL (ref 8–23)
CALCIUM SERPL-MCNC: 10.1 MG/DL (ref 8.8–10.2)
CHLORIDE SERPL-SCNC: 90 MMOL/L (ref 98–107)
COLOR UR AUTO: ABNORMAL
CREAT SERPL-MCNC: 0.98 MG/DL (ref 0.67–1.17)
DEPRECATED HCO3 PLAS-SCNC: 20 MMOL/L (ref 22–29)
GFR SERPL CREATININE-BSD FRML MDRD: 81 ML/MIN/1.73M2
GLUCOSE SERPL-MCNC: 263 MG/DL (ref 70–99)
GLUCOSE UR STRIP-MCNC: NEGATIVE MG/DL
HGB UR QL STRIP: ABNORMAL
KETONES UR STRIP-MCNC: NEGATIVE MG/DL
LEUKOCYTE ESTERASE UR QL STRIP: NEGATIVE
NITRATE UR QL: NEGATIVE
PH UR STRIP: 6.5 [PH] (ref 5–7)
POTASSIUM SERPL-SCNC: 4.3 MMOL/L (ref 3.4–5.3)
RBC URINE: 3 /HPF
SODIUM SERPL-SCNC: 135 MMOL/L (ref 136–145)
SP GR UR STRIP: 1.01 (ref 1–1.03)
UROBILINOGEN UR STRIP-MCNC: NORMAL MG/DL
WBC URINE: 1 /HPF

## 2023-03-11 PROCEDURE — 250N000009 HC RX 250: Performed by: EMERGENCY MEDICINE

## 2023-03-11 RX ADMIN — LIDOCAINE HYDROCHLORIDE 6 ML: 20 JELLY TOPICAL at 00:21

## 2023-03-11 ASSESSMENT — ACTIVITIES OF DAILY LIVING (ADL): ADLS_ACUITY_SCORE: 41

## 2023-03-11 NOTE — ED PROVIDER NOTES
"    History     Chief Complaint:  Catheter Problem       HPI   Jimmy Otto is a 73 year old male with a history of indwelling cathter for 1.5 y approx that was changed earlier today and nurse and family noticed has not been functioning properly since 2pm.   Has been causing lower pelvis discomfort since.  No other sx or problems today.  Is cared for  nearly full cares       Independent Historian:    Daughter    Review of External Notes:      ROS:  Review of Systems   Constitutional: Negative for chills and fever.   Respiratory: Negative for cough and shortness of breath.    Cardiovascular: Negative for chest pain.   Gastrointestinal: Negative for abdominal pain.   Genitourinary: Positive for dysuria.       Allergies:  Nka [No Known Allergies]     Medications:    acetaminophen (TYLENOL) 325 MG tablet  amLODIPine (NORVASC) 5 MG tablet  atenolol (TENORMIN) 25 MG tablet  atorvastatin (LIPITOR) 80 MG tablet  clopidogrel (PLAVIX) 75 MG tablet  Continuous Blood Gluc Sensor (FREESTYLE TITI 2 SENSOR) MISC  doxazosin (CARDURA) 2 MG tablet  FLUoxetine (PROZAC) 20 MG/5ML solution  insulin aspart (NOVOLOG FLEXPEN) 100 UNIT/ML pen  Insulin Pen Needle (PEN NEEDLES 5/16\") 31G X 8 MM MISC  Lancets MISC  metFORMIN (GLUCOPHAGE) 1000 MG tablet  metFORMIN (GLUCOPHAGE) 1000 MG tablet  Nutritional Supplements (JEVITY PO)  nystatin (MYCOSTATIN) 762199 UNIT/ML suspension  ondansetron (ZOFRAN-ODT) 4 MG ODT tab  polyethylene glycol (MIRALAX) 17 g packet        Past Medical History:    Past Medical History:   Diagnosis Date     Diabetes mellitus (H)      Hypercholesteremia      Hypertension      Unspecified cerebral artery occlusion with cerebral infarction        Past Surgical History:    Past Surgical History:   Procedure Laterality Date     IR CYSTOGRAM  2/14/2022        Family History:    family history is not on file.    Social History:   reports that he has never smoked. He has never used smokeless tobacco. He reports current alcohol use. " He reports that he does not use drugs.  PCP: Manny Worrell     Physical Exam     Patient Vitals for the past 24 hrs:   BP Temp Temp src Pulse Resp SpO2   03/10/23 2248 (!) 168/96 -- -- 68 -- 93 %   03/10/23 2247 -- 97.9  F (36.6  C) Temporal -- 18 --        Physical Exam  Constitutional: Patient is well appearing. No distress.  Resting comfortably in bed  Eyes: Conjunctivae are normal. No scleral icterus.  Neck: Normal range of motion. Neck supple.   Cardiovascular: Normal rate, regular rhythm, normal heart sounds and intact distal perfusion.   Pulmonary/Chest: Breath sounds normal. No respiratory distress.  Abdominal: Soft. Bowel sounds are normal. No distension. No tenderness. No rebound or guarding.   ordoñez in place, draining scant pink tinged urine   Musculoskeletal: Normal range of motion. No edema or tenderness.   Neurological: Alert eyes open no new deficits  Skin: Warm and dry. No rash noted. Not diaphoretic.     Emergency Department Course           Laboratory:  Labs Ordered and Resulted from Time of ED Arrival to Time of ED Departure   BASIC METABOLIC PANEL - Abnormal       Result Value    Sodium 135 (*)     Potassium 4.3      Chloride 90 (*)     Carbon Dioxide (CO2) 20 (*)     Anion Gap 25 (*)     Urea Nitrogen 26.1 (*)     Creatinine 0.98      Calcium 10.1      Glucose 263 (*)     GFR Estimate 81     ROUTINE UA WITH MICROSCOPIC REFLEX TO CULTURE - Abnormal    Color Urine Light Yellow      Appearance Urine Clear      Glucose Urine Negative      Bilirubin Urine Negative      Ketones Urine Negative      Specific Gravity Urine 1.009      Blood Urine Small (*)     pH Urine 6.5      Protein Albumin Urine 100 (*)     Urobilinogen Urine Normal      Nitrite Urine Negative      Leukocyte Esterase Urine Negative      RBC Urine 3 (*)     WBC Urine 1     CBC WITH PLATELETS AND DIFFERENTIAL - Abnormal    WBC Count 10.9      RBC Count 4.93      Hemoglobin 12.9 (*)     Hematocrit 41.3      MCV 84      MCH 26.2  (*)     MCHC 31.2 (*)     RDW 14.2      Platelet Count 208      % Neutrophils 68      % Lymphocytes 24      % Monocytes 6      % Eosinophils 1      % Basophils 1      % Immature Granulocytes 0      NRBCs per 100 WBC 0      Absolute Neutrophils 7.5      Absolute Lymphocytes 2.6      Absolute Monocytes 0.6      Absolute Eosinophils 0.1      Absolute Basophils 0.1      Absolute Immature Granulocytes 0.0      Absolute NRBCs 0.0          Procedures       Emergency Department Course & Assessments:             Interventions:  Medications   lidocaine (XYLOCAINE) 2 % external gel 6 mL (6 mLs Urethral $Given 3/11/23 0021)        Independent Interpretation (X-rays, CTs, rhythm strip):      Consultations/Discussion of Management or Tests:         Social Determinants of Health affecting care:  Full cares  Assessments:      Disposition:  The patient was discharged to home.     Impression & Plan      Medical Decision Making:  Pt had clogged ordoñez catheter with retention of about 340ml.  After changing catheter it drained and flowing freely.  No changes in BMP.  UA does not look infected.  Discharged into care of family.     Diagnosis:    ICD-10-CM    1. Urinary catheter (Ordoñez) change required  Z46.6            Discharge Medications:  New Prescriptions    No medications on file            3/10/2023   Paulo Jensen MD Stevens, Andrew C, MD  03/11/23 0113

## 2023-03-11 NOTE — ED TRIAGE NOTES
Pt has indwelling ordoñez catheter. Pt had his catheter changed today, family reports the catheter hasnt drained since 1400

## 2023-04-01 ENCOUNTER — HEALTH MAINTENANCE LETTER (OUTPATIENT)
Age: 73
End: 2023-04-01

## 2023-04-07 ENCOUNTER — LAB REQUISITION (OUTPATIENT)
Dept: LAB | Facility: CLINIC | Age: 73
End: 2023-04-07
Payer: MEDICARE

## 2023-04-07 DIAGNOSIS — N39.0 URINARY TRACT INFECTION, SITE NOT SPECIFIED: ICD-10-CM

## 2023-04-07 LAB
ALBUMIN UR-MCNC: 100 MG/DL
APPEARANCE UR: ABNORMAL
BACTERIA #/AREA URNS HPF: ABNORMAL /HPF
BILIRUB UR QL STRIP: NEGATIVE
COLOR UR AUTO: YELLOW
GLUCOSE UR STRIP-MCNC: 100 MG/DL
HGB UR QL STRIP: ABNORMAL
KETONES UR STRIP-MCNC: NEGATIVE MG/DL
LEUKOCYTE ESTERASE UR QL STRIP: ABNORMAL
NITRATE UR QL: NEGATIVE
PH UR STRIP: 6.5 [PH] (ref 5–7)
RBC URINE: 5 /HPF
SP GR UR STRIP: 1.01 (ref 1–1.03)
SQUAMOUS EPITHELIAL: <1 /HPF
UROBILINOGEN UR STRIP-MCNC: NORMAL MG/DL
WBC CLUMPS #/AREA URNS HPF: PRESENT /HPF
WBC URINE: >182 /HPF

## 2023-04-07 PROCEDURE — 81001 URINALYSIS AUTO W/SCOPE: CPT | Mod: ORL | Performed by: INTERNAL MEDICINE

## 2023-04-07 PROCEDURE — 87088 URINE BACTERIA CULTURE: CPT | Mod: ORL | Performed by: INTERNAL MEDICINE

## 2023-04-10 LAB — BACTERIA UR CULT: ABNORMAL

## 2023-04-21 ENCOUNTER — APPOINTMENT (OUTPATIENT)
Dept: GENERAL RADIOLOGY | Facility: CLINIC | Age: 73
End: 2023-04-21
Attending: EMERGENCY MEDICINE
Payer: MEDICARE

## 2023-04-21 ENCOUNTER — HOSPITAL ENCOUNTER (EMERGENCY)
Facility: CLINIC | Age: 73
Discharge: HOME | End: 2023-04-21
Attending: EMERGENCY MEDICINE | Admitting: EMERGENCY MEDICINE
Payer: MEDICARE

## 2023-04-21 VITALS
RESPIRATION RATE: 14 BRPM | HEART RATE: 75 BPM | OXYGEN SATURATION: 99 % | TEMPERATURE: 97.5 F | DIASTOLIC BLOOD PRESSURE: 95 MMHG | SYSTOLIC BLOOD PRESSURE: 138 MMHG

## 2023-04-21 DIAGNOSIS — R05.9 COUGH, UNSPECIFIED TYPE: ICD-10-CM

## 2023-04-21 DIAGNOSIS — B37.0 THRUSH: ICD-10-CM

## 2023-04-21 LAB
ANION GAP SERPL CALCULATED.3IONS-SCNC: 13 MMOL/L (ref 7–15)
BASOPHILS # BLD AUTO: 0.1 10E3/UL (ref 0–0.2)
BASOPHILS NFR BLD AUTO: 1 %
BUN SERPL-MCNC: 26.4 MG/DL (ref 8–23)
CALCIUM SERPL-MCNC: 9.8 MG/DL (ref 8.8–10.2)
CHLORIDE SERPL-SCNC: 91 MMOL/L (ref 98–107)
CREAT SERPL-MCNC: 0.86 MG/DL (ref 0.67–1.17)
DEPRECATED HCO3 PLAS-SCNC: 29 MMOL/L (ref 22–29)
EOSINOPHIL # BLD AUTO: 0.3 10E3/UL (ref 0–0.7)
EOSINOPHIL NFR BLD AUTO: 5 %
ERYTHROCYTE [DISTWIDTH] IN BLOOD BY AUTOMATED COUNT: 14.6 % (ref 10–15)
FLUAV RNA SPEC QL NAA+PROBE: NEGATIVE
FLUBV RNA RESP QL NAA+PROBE: NEGATIVE
GFR SERPL CREATININE-BSD FRML MDRD: >90 ML/MIN/1.73M2
GLUCOSE SERPL-MCNC: 276 MG/DL (ref 70–99)
HCT VFR BLD AUTO: 41.1 % (ref 40–53)
HGB BLD-MCNC: 13.1 G/DL (ref 13.3–17.7)
HOLD SPECIMEN: NORMAL
HOLD SPECIMEN: NORMAL
IMM GRANULOCYTES # BLD: 0 10E3/UL
IMM GRANULOCYTES NFR BLD: 0 %
LYMPHOCYTES # BLD AUTO: 1.4 10E3/UL (ref 0.8–5.3)
LYMPHOCYTES NFR BLD AUTO: 22 %
MCH RBC QN AUTO: 26.6 PG (ref 26.5–33)
MCHC RBC AUTO-ENTMCNC: 31.9 G/DL (ref 31.5–36.5)
MCV RBC AUTO: 84 FL (ref 78–100)
MONOCYTES # BLD AUTO: 0.6 10E3/UL (ref 0–1.3)
MONOCYTES NFR BLD AUTO: 9 %
NEUTROPHILS # BLD AUTO: 4.1 10E3/UL (ref 1.6–8.3)
NEUTROPHILS NFR BLD AUTO: 63 %
NRBC # BLD AUTO: 0 10E3/UL
NRBC BLD AUTO-RTO: 0 /100
PLATELET # BLD AUTO: 232 10E3/UL (ref 150–450)
POTASSIUM SERPL-SCNC: 4.2 MMOL/L (ref 3.4–5.3)
RBC # BLD AUTO: 4.92 10E6/UL (ref 4.4–5.9)
RSV RNA SPEC NAA+PROBE: NEGATIVE
SARS-COV-2 RNA RESP QL NAA+PROBE: NEGATIVE
SODIUM SERPL-SCNC: 133 MMOL/L (ref 136–145)
WBC # BLD AUTO: 6.4 10E3/UL (ref 4–11)

## 2023-04-21 PROCEDURE — 71046 X-RAY EXAM CHEST 2 VIEWS: CPT

## 2023-04-21 PROCEDURE — 85025 COMPLETE CBC W/AUTO DIFF WBC: CPT | Performed by: EMERGENCY MEDICINE

## 2023-04-21 PROCEDURE — C9803 HOPD COVID-19 SPEC COLLECT: HCPCS

## 2023-04-21 PROCEDURE — 87637 SARSCOV2&INF A&B&RSV AMP PRB: CPT | Performed by: EMERGENCY MEDICINE

## 2023-04-21 PROCEDURE — 80048 BASIC METABOLIC PNL TOTAL CA: CPT | Performed by: EMERGENCY MEDICINE

## 2023-04-21 PROCEDURE — 36415 COLL VENOUS BLD VENIPUNCTURE: CPT | Performed by: EMERGENCY MEDICINE

## 2023-04-21 PROCEDURE — 99284 EMERGENCY DEPT VISIT MOD MDM: CPT | Mod: CS,25

## 2023-04-21 RX ORDER — NYSTATIN 100000/ML
500000 SUSPENSION, ORAL (FINAL DOSE FORM) ORAL 4 TIMES DAILY
Qty: 280 ML | Refills: 0 | Status: SHIPPED | OUTPATIENT
Start: 2023-04-21 | End: 2023-05-05

## 2023-04-21 ASSESSMENT — ACTIVITIES OF DAILY LIVING (ADL): ADLS_ACUITY_SCORE: 41

## 2023-04-21 ASSESSMENT — ENCOUNTER SYMPTOMS
SHORTNESS OF BREATH: 0
COUGH: 1
FEVER: 0

## 2023-04-21 NOTE — ED TRIAGE NOTES
Pt arrives with family with c/o cough for 2 weeks and concerns for PNA. Home care nurse reported to MD that pt had crackles to lungs during visit today. No known fevers. Pt is non-ambulatory and has a g-tube.     Triage Assessment     Row Name 04/21/23 4820       Triage Assessment (Adult)    Airway WDL WDL       Respiratory WDL    Respiratory WDL X;cough    Cough Frequency frequent       Skin Circulation/Temperature WDL    Skin Circulation/Temperature WDL WDL       Cardiac WDL    Cardiac WDL WDL       Peripheral/Neurovascular WDL    Peripheral Neurovascular WDL WDL       Cognitive/Neuro/Behavioral WDL    Cognitive/Neuro/Behavioral WDL WDL

## 2023-04-22 NOTE — ED PROVIDER NOTES
History     Chief Complaint:  Shortness of Breath and Cough     HPI   Jimmy Otto is a 73 year old male on Plavix with a history of HTN, hypercholesteremia, diabetes mellitus, and CVA resulting in aphasia and right-sided hemiplegia who presents with two weeks of a cough. Patient's family member reports that his home care nurse visited him today and was concerned about crackles in his lungs. They contacted his PCP who referred him to the ED at this time. Patient currently denies feeling short of breath, though his family reports that he often has a difficult time catching his breath when he coughs. No recent fevers. His family also note an additional concern for thrush due to white patches on his tongue noted by his speech therapist. He is not currently being treated for thrush.     Independent Historian:   Family - They report additional history as noted above.     Review of External Notes: None    Review of Systems   Constitutional: Negative for fever.   HENT: Positive for mouth sores (white plaques on tongue).    Respiratory: Positive for cough. Negative for shortness of breath.    All other systems reviewed and are negative.    Allergies:  No Known Allergies     Medications:  Norvasc  Tenormin  Lipitor  Plavix  Cardura  Prozac  Novolog Flexpen  Glucophage  Levimir     Past Medical History:    CVA- hemiplegia and aphasia  Depression  Diabetes mellitus  HTN  Hypercholesteremia   Torn rotator cuff, right  H/o COVID-19 pneumonia     Past Surgical History:    Endoscopic insertion tube gastrostomy      Social History:  Arrives via private vehicle with family members.   PCP: Nallely Fong MD, Family Medicine     Physical Exam     Patient Vitals for the past 24 hrs:   BP Temp Temp src Pulse Resp SpO2   04/21/23 2220 -- -- -- 75 14 99 %   04/21/23 2205 138/95 -- -- 78 -- --   04/21/23 1807 138/79 97.5  F (36.4  C) Temporal 74 20 98 %      Physical Exam  Nursing note and vitals reviewed.  Constitutional: Cooperative.    HENT:   Mouth/Throat: Mucous membranes are normal. White plaques on tongue consistent with thrush.  Cardiovascular: Normal rate, regular rhythm and normal heart sounds.  No murmur.  Pulmonary/Chest: Effort normal and breath sounds normal. No respiratory distress. No wheezes. No rales.   Abdominal: Soft. Normal appearance and bowel sounds are normal. No distension. There is no tenderness. There is no rigidity and no guarding.   /GI: Felix catheter and G- tube noted on exam   Musculoskeletal: No edema in LEs.  Atrophy of the right upper and lower extremity noted  Neurological: Alert. Residual right-sided neurological deficits from previous stroke.  Oriented x3 normal strength on the left side.   Skin: Skin is warm and dry.  Psychiatric: Normal mood and affect.     Emergency Department Course     Imaging:  XR Chest 2 Views   Preliminary Result   IMPRESSION: Heart size and pulmonary vascularity within normal limits. Slight scattered interstitial linear scarring or atelectasis. No acute-appearing infiltrates or consolidation. Mild elevation right hemidiaphragm. Hypertrophic changes thoracic spine.    No definite acute findings.       Report per radiology    Laboratory:  Labs Ordered and Resulted from Time of ED Arrival to Time of ED Departure   BASIC METABOLIC PANEL - Abnormal       Result Value    Sodium 133 (*)     Potassium 4.2      Chloride 91 (*)     Carbon Dioxide (CO2) 29      Anion Gap 13      Urea Nitrogen 26.4 (*)     Creatinine 0.86      Calcium 9.8      Glucose 276 (*)     GFR Estimate >90     CBC WITH PLATELETS AND DIFFERENTIAL - Abnormal    WBC Count 6.4      RBC Count 4.92      Hemoglobin 13.1 (*)     Hematocrit 41.1      MCV 84      MCH 26.6      MCHC 31.9      RDW 14.6      Platelet Count 232      % Neutrophils 63      % Lymphocytes 22      % Monocytes 9      % Eosinophils 5      % Basophils 1      % Immature Granulocytes 0      NRBCs per 100 WBC 0      Absolute Neutrophils 4.1      Absolute  Lymphocytes 1.4      Absolute Monocytes 0.6      Absolute Eosinophils 0.3      Absolute Basophils 0.1      Absolute Immature Granulocytes 0.0      Absolute NRBCs 0.0     INFLUENZA A/B, RSV, & SARS-COV2 PCR - Normal    Influenza A PCR Negative      Influenza B PCR Negative      RSV PCR Negative      SARS CoV2 PCR Negative        Assessments:  2207 I obtained history and examined the patient as noted above.  2304 I rechecked the patient and explained findings to patient and his family.     Independent Interpretation (X-rays, CTs, rhythm strip):  Chest x-ray reviewed by myself.  Atelectasis noted but no infiltrate    Social Determinants of Health affecting care:   None    Disposition:  The patient was discharged to home.     Impression & Plan      Medical Decision Making:  Mr. Otto is a 73 year old with history of stroke who presents with concern for cough and some crackles on the lungs. I do not appreciate this on my exam. Chest XR does show mild atelectasis but certainly no pulmonary edema, infiltrate, or other concerns. He states he is not short of breath. He is not hypoxic. He does have an exam consistent with oral candidiasis for which we will prescribe Nystatin. Family is comfortable with plan and he will be discharged home.      Diagnosis:    ICD-10-CM    1. Cough, unspecified type  R05.9       2. Thrush  B37.0            Discharge Medications:  New Prescriptions    NYSTATIN (MYCOSTATIN) 115822 UNIT/ML SUSPENSION    Take 5 mLs (500,000 Units) by mouth 4 times daily for 14 days        Scribe Disclosure:  I, Tara Kate, am serving as a scribe at 10:02 PM on 4/21/2023 to document services personally performed by Jerzy Sharma MD based on my observations and the provider's statements to me.   4/21/2023   Jerzy Sharma MD Amdahl, John, MD  04/22/23 4053

## 2023-05-31 ENCOUNTER — HOSPITAL ENCOUNTER (OUTPATIENT)
Dept: GENERAL RADIOLOGY | Facility: CLINIC | Age: 73
Discharge: HOME | End: 2023-05-31
Attending: INTERNAL MEDICINE | Admitting: INTERNAL MEDICINE
Payer: MEDICARE

## 2023-05-31 DIAGNOSIS — Z93.1 GASTROSTOMY IN PLACE (H): ICD-10-CM

## 2023-05-31 PROCEDURE — 43762 RPLC GTUBE NO REVJ TRC: CPT

## 2023-05-31 NOTE — PROGRESS NOTES
Gastrostomy tube exchanged at bedside by Dr. Teixeira. Pt tolerated the procedure well. 14 German tube placed and in position for immediate use.

## 2023-06-01 ENCOUNTER — HEALTH MAINTENANCE LETTER (OUTPATIENT)
Age: 73
End: 2023-06-01

## 2023-07-21 NOTE — NURSING NOTE
Chief Complaint   Patient presents with     Consult     Hematuria/urinary retention     Luba Cisneros, CMA   
POST-OP DIAGNOSIS:  Hip osteoarthritis 21-Jul-2023 11:00:17  Dario Castillo

## 2023-08-27 ENCOUNTER — MEDICAL CORRESPONDENCE (OUTPATIENT)
Dept: HEALTH INFORMATION MANAGEMENT | Facility: CLINIC | Age: 73
End: 2023-08-27

## 2023-09-09 ENCOUNTER — HOSPITAL ENCOUNTER (EMERGENCY)
Facility: CLINIC | Age: 73
Discharge: LEFT BEFORE DISCHARGE | End: 2023-09-09
Admitting: EMERGENCY MEDICINE
Payer: MEDICARE

## 2023-09-09 VITALS
DIASTOLIC BLOOD PRESSURE: 76 MMHG | OXYGEN SATURATION: 97 % | RESPIRATION RATE: 20 BRPM | TEMPERATURE: 97.4 F | HEART RATE: 63 BPM | SYSTOLIC BLOOD PRESSURE: 133 MMHG

## 2023-09-09 LAB — GLUCOSE BLDC GLUCOMTR-MCNC: 354 MG/DL (ref 70–99)

## 2023-09-09 PROCEDURE — 82962 GLUCOSE BLOOD TEST: CPT

## 2023-09-09 PROCEDURE — 99281 EMR DPT VST MAYX REQ PHY/QHP: CPT

## 2023-09-09 NOTE — ED TRIAGE NOTES
Patient brought in for a blood sugar reading of Low. BS in triage is 354. Acting normal otherwise. Now concerned their sensor for reading the sugars may need to be replaced.

## 2023-09-13 ENCOUNTER — LAB REQUISITION (OUTPATIENT)
Dept: LAB | Facility: CLINIC | Age: 73
End: 2023-09-13
Payer: MEDICARE

## 2023-09-13 DIAGNOSIS — Z79.4 LONG TERM (CURRENT) USE OF INSULIN (H): ICD-10-CM

## 2023-09-13 DIAGNOSIS — E11.8 TYPE 2 DIABETES MELLITUS WITH UNSPECIFIED COMPLICATIONS (H): ICD-10-CM

## 2023-09-13 DIAGNOSIS — E78.5 HYPERLIPIDEMIA, UNSPECIFIED: ICD-10-CM

## 2023-09-13 LAB
ALBUMIN SERPL BCG-MCNC: 3 G/DL (ref 3.5–5.2)
ALP SERPL-CCNC: 145 U/L (ref 40–129)
ALT SERPL W P-5'-P-CCNC: 40 U/L (ref 0–70)
ANION GAP SERPL CALCULATED.3IONS-SCNC: 14 MMOL/L (ref 7–15)
AST SERPL W P-5'-P-CCNC: 32 U/L (ref 0–45)
BILIRUB SERPL-MCNC: 0.3 MG/DL
BUN SERPL-MCNC: 24.5 MG/DL (ref 8–23)
CALCIUM SERPL-MCNC: 8.9 MG/DL (ref 8.8–10.2)
CHLORIDE SERPL-SCNC: 95 MMOL/L (ref 98–107)
CREAT SERPL-MCNC: 0.92 MG/DL (ref 0.67–1.17)
CREAT UR-MCNC: 41.8 MG/DL
DEPRECATED HCO3 PLAS-SCNC: 24 MMOL/L (ref 22–29)
EGFRCR SERPLBLD CKD-EPI 2021: 88 ML/MIN/1.73M2
ERYTHROCYTE [DISTWIDTH] IN BLOOD BY AUTOMATED COUNT: 13.6 % (ref 10–15)
GLUCOSE SERPL-MCNC: 272 MG/DL (ref 70–99)
HBA1C MFR BLD: 9.4 %
HCT VFR BLD AUTO: 34.9 % (ref 40–53)
HGB BLD-MCNC: 11.3 G/DL (ref 13.3–17.7)
MCH RBC QN AUTO: 26.6 PG (ref 26.5–33)
MCHC RBC AUTO-ENTMCNC: 32.4 G/DL (ref 31.5–36.5)
MCV RBC AUTO: 82 FL (ref 78–100)
MICROALBUMIN UR-MCNC: 2194 MG/L
MICROALBUMIN/CREAT UR: 5248.8 MG/G CR (ref 0–17)
PLATELET # BLD AUTO: 211 10E3/UL (ref 150–450)
POTASSIUM SERPL-SCNC: 3.7 MMOL/L (ref 3.4–5.3)
PROT SERPL-MCNC: 6.8 G/DL (ref 6.4–8.3)
RBC # BLD AUTO: 4.25 10E6/UL (ref 4.4–5.9)
SODIUM SERPL-SCNC: 133 MMOL/L (ref 136–145)
WBC # BLD AUTO: 7 10E3/UL (ref 4–11)

## 2023-09-13 PROCEDURE — 80053 COMPREHEN METABOLIC PANEL: CPT | Mod: ORL | Performed by: INTERNAL MEDICINE

## 2023-09-13 PROCEDURE — 85027 COMPLETE CBC AUTOMATED: CPT | Mod: ORL | Performed by: INTERNAL MEDICINE

## 2023-09-13 PROCEDURE — 83036 HEMOGLOBIN GLYCOSYLATED A1C: CPT | Mod: ORL | Performed by: INTERNAL MEDICINE

## 2023-09-13 PROCEDURE — 82570 ASSAY OF URINE CREATININE: CPT | Mod: ORL | Performed by: INTERNAL MEDICINE

## 2023-09-22 ENCOUNTER — HOSPITAL ENCOUNTER (OUTPATIENT)
Facility: CLINIC | Age: 73
Discharge: HOME | End: 2023-09-22
Admitting: RADIOLOGY
Payer: MEDICARE

## 2023-09-22 ENCOUNTER — APPOINTMENT (OUTPATIENT)
Dept: GENERAL RADIOLOGY | Facility: CLINIC | Age: 73
End: 2023-09-22
Attending: INTERNAL MEDICINE
Payer: MEDICARE

## 2023-09-22 DIAGNOSIS — Z93.1 GASTROSTOMY TUBE IN PLACE (H): ICD-10-CM

## 2023-09-22 PROCEDURE — 43762 RPLC GTUBE NO REVJ TRC: CPT

## 2023-09-22 ASSESSMENT — ACTIVITIES OF DAILY LIVING (ADL)
ADLS_ACUITY_SCORE: 41

## 2023-09-23 ASSESSMENT — ACTIVITIES OF DAILY LIVING (ADL)
ADLS_ACUITY_SCORE: 41

## 2023-09-24 ASSESSMENT — ACTIVITIES OF DAILY LIVING (ADL)
ADLS_ACUITY_SCORE: 41

## 2023-09-25 ENCOUNTER — DOCUMENTATION ONLY (OUTPATIENT)
Dept: UROLOGY | Facility: CLINIC | Age: 73
End: 2023-09-25
Payer: MEDICARE

## 2023-09-25 ASSESSMENT — ACTIVITIES OF DAILY LIVING (ADL)
ADLS_ACUITY_SCORE: 41

## 2023-09-25 NOTE — NURSING NOTE
Writer reached out to [facility] RN Nany at [phone] to provider verbal orders for pt to receive catheter exchanges with a coude tipped catheter due to difficulty passing with last exchange.     Writer left a detailed VM with call back name and number.     Will continue to follow as needed.

## 2023-09-26 ENCOUNTER — DOCUMENTATION ONLY (OUTPATIENT)
Dept: UROLOGY | Facility: CLINIC | Age: 73
End: 2023-09-26
Payer: MEDICARE

## 2023-09-26 NOTE — PROGRESS NOTES
Type of Form Received: Order (VO)    Form Received (Date) 9/26/23   Form Filled out Yes, date 9/26/23   Placed in provider folder Yes       Received Completed forms Yes   Faxed Forms Faxed To: [facility]  Fax Number: [phone]   Sent to HIM (Date) 10/2/23

## 2023-10-02 ENCOUNTER — MEDICAL CORRESPONDENCE (OUTPATIENT)
Dept: UROLOGY | Facility: CLINIC | Age: 73
End: 2023-10-02
Payer: MEDICARE

## 2023-10-26 ENCOUNTER — MEDICAL CORRESPONDENCE (OUTPATIENT)
Dept: HEALTH INFORMATION MANAGEMENT | Facility: CLINIC | Age: 73
End: 2023-10-26

## 2023-11-01 NOTE — ED PROVIDER NOTES
History     Chief Complaint:  Catheter Problem     Daughter-in-law is interpreting; declines   HPI   Jimmy Otto is a 73 year old male on Plavix with a history of type II diabetes, hypertension, and stroke with right-sided hemiplegia who presents with a Felix catheter problem. Per a note from Jimmy's daughter, Jimmy last had his Felix catheter changed last Wednesday and during the switch they had to do it twice due to some pain. Following the second placement, Jimmy had normal urine output. However, last night Jimmy had complained of some abdominal pain and his family found that his catheter tubing was pinched. They adjusted the tubing and he had normal urine flow again. Overnight, Jimmy noticed that there was some urine leaking from his catheter tubing and he was able to take it off with his left leg. His family cleaned the catheter and were able to replace it. This morning, Jimmy again started to complain of some abdominal pain but his output was flowing normally. However, due to him pulling it out last night, they wanted to check the catheter and were also concerned for a UTI. During evaluation, Jimmy's sister-in-law noted some leaking from the tubing but she denied that he was currently in any pain. Incidentally, Jimmy also complained of some left ear pain with possible drainage.     Independent Historian:   Supplemented by outside note and sister-in-law as above    Review of External Notes: Seen for COVID 19 infection 1/2/23     ROS:  Review of Systems   HENT: Positive for ear discharge (possible left) and ear pain (left).    Gastrointestinal: Positive for abdominal pain (resolved).   Genitourinary:        (+) Felix leaking   All other systems reviewed and are negative.      Allergies:  The patient has no known allergies     Medications:    Atenolol  Fluoxetine   Amlodipine  Levemir  Metformin  Atorvastatin  Plavix  Doxazosin  Novolog    Past Medical History:    Chronic indwelling Felix  Hyperlipidemia  Stroke  Right sided  hemiplegia  Hypertension  Type II diabetes mellitus  Stage 3 CKD  Depression  Dysarthria   Hypercholesterolemia     Past Surgical History:    IR cystogram     Family History:    Cataract - mother    Social History:  Patient is accompanied in the ED by his sister-in-law.  PCP: Manny Worrell     Physical Exam     Patient Vitals for the past 24 hrs:   BP Temp Temp src Pulse Resp SpO2   02/13/23 1827 -- -- -- -- -- 96 %   02/13/23 0930 106/73 -- -- 75 18 97 %   02/13/23 0928 -- 97.1  F (36.2  C) Temporal 69 18 98 %        Physical Exam  General: chronically ill, non-verbal.    HENT: mucous membranes moist. Impacted cerumen in bilateral canals.  After cerumen removed, patient has a thickened, white TM on the left.    CV: regular rate, regular rhythm  Resp: normal effort, clear throughout, no crackles or wheezing  GI: abdomen soft and nontender, no guarding  : ordoñez catheter with small amount of leakage onto depends.  Yellow urine in tubing and back.  MSK: no bony tenderness  Skin: appropriately warm and dry  Extremities: no edema  Neuro: alert but non-verbal.  Psych: unable to assess.      Emergency Department Course     Laboratory:  Labs Ordered and Resulted from Time of ED Arrival to Time of ED Departure   UA MACROSCOPIC WITH REFLEX TO MICRO AND CULTURE - Abnormal       Result Value    Color Urine Yellow      Appearance Urine Slightly Cloudy (*)     Glucose Urine Negative      Bilirubin Urine Negative      Ketones Urine Negative      Specific Gravity Urine 1.013      Blood Urine Trace (*)     pH Urine 7.0      Protein Albumin Urine 300 (*)     Urobilinogen Urine Normal      Nitrite Urine Negative      Leukocyte Esterase Urine Large (*)     Bacteria Urine Few (*)     Mucus Urine Present (*)     RBC Urine 25 (*)     WBC Urine 118 (*)     Squamous Epithelials Urine 1     URINE CULTURE        Procedures   none    Emergency Department Course & Assessments:       Interventions:  Medications   carbamide peroxide  (DEBROX) 6.5 % otic solution 3 drop (3 drops Left Ear Given 2/13/23 1821)   lidocaine (XYLOCAINE) 2 % external gel 6 mL (6 mLs Urethral Given 2/13/23 1645)        Independent Interpretation (X-rays, CTs, rhythm strip):  none     Consultations/Discussion of Management or Tests:  none        Social Determinants of Health affecting care:   English as a second language    Assessments:  1608 I obtained history and examined the patient as noted above.  1820 I rechecked and updated the patient. At this time, the patient was deemed safe to return home, and he and his sister-in-law agreed to the plan of care.    Disposition:  The patient was discharged to home.     Impression & Plan    CMS Diagnoses: None    Medical Decision Making:  Jimmy Otto is a 73 year old male on Plavix with a history of type II diabetes, hypertension, and stroke with right-sided hemiplegia who presents with a Felix catheter problem.  On exam, the patient is nontoxic, without fever.  He is up with obvious leakage of urine around the current Felix catheter.  He also though has some urine in the Felix bag.  At this point, no symptoms to suggest urinary tract infection.  Repeat UA was checked from the new Felix catheter, which was replaced by the ED RN without difficulty.  New Felix catheter seems to be functioning normally.  I noted to the daughter that the patient does have signs of inflammation in his urine specimen, which is expected in a patient with chronic indwelling Felix catheter.  This was only checked because his daughter is requesting it to be checked.  Otherwise, the patient had multiple other incidental complaints.  They complained of discharge from the left ear.  The patient did have a lot of cerumen and debris in the ear canal, which was removed at the bedside.  The tympanic membrane appears abnormal, as described above.  Patient will be started on antibiotics for possible otitis media with rupture, but I have also placed referral for ENT for  Supervision was available further evaluation.  It is difficult to tell if his hearing is affected, as he is nonverbal.  I also noted that the patient has oral candidiasis which has been refractory to treatment with oral nystatin.  He was started on fluconazole, and I discussed with the patient's daughter-in-law at the bedside that there is an interaction with Plavix.  This appears to decrease the effectiveness of Plavix.  I decided to prescribe the medication anyway given that this is causing significant decrease in the patient's quality of life.  In addition, there is not a good alternative.  Return to the ED for recurrent Felix catheter malfunction, fevers, altered mental status, or other concerns.    Diagnosis:    ICD-10-CM    1. Malfunction of Felix catheter, initial encounter (H)  T83.011A       2. Ear infection  H66.90 Adult ENT  Referral      3. Impacted cerumen of left ear  H61.22            Discharge Medications:  New Prescriptions    AMOXICILLIN-CLAVULANATE (AUGMENTIN) 875-125 MG TABLET    Take 1 tablet by mouth 2 times daily for 7 days        Scribe Disclosure:  I, Radha Collins, am serving as a scribe at 4:17 PM on 2/13/2023 to document services personally performed by Tosin Pickens MD based on my observations and the provider's statements to me.     2/13/2023   Tosin Pickens MD Pepper, Tracy Lynn, MD  02/13/23 4358     Never smoker

## 2023-12-02 DIAGNOSIS — R33.8 BENIGN PROSTATIC HYPERPLASIA WITH URINARY RETENTION: ICD-10-CM

## 2023-12-02 DIAGNOSIS — N40.1 BENIGN PROSTATIC HYPERPLASIA WITH URINARY RETENTION: ICD-10-CM

## 2023-12-07 NOTE — TELEPHONE ENCOUNTER
doxazosin (CARDURA) 2 MG tablet     90 tablet 3 11/25/2022     Last Office Visit : 1-  Future Office visit:  none    Antiadrenergic Antihypertensives [id number]/05/2023 10:26 AM   Protocol Details Recent (6 mo) or future (30 days) visit within the authorizing provider's specialty

## 2023-12-11 RX ORDER — DOXAZOSIN 2 MG/1
2 TABLET ORAL DAILY
Qty: 90 TABLET | Refills: 3 | Status: SHIPPED | OUTPATIENT
Start: 2023-12-11

## 2023-12-21 NOTE — PROGRESS NOTES
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  Hospital course complicated by acute kidney injury, hypokalemia needing repletion, and urinary retention and hematuria needing Felix catheter and urology consult.     Admission to inpatient rehab:  9/17/21  Impairment group code: Stroke Ischemic 01.3 Bilateral Involvement, new R Thalamic stroke in setting of R sided weakness from prior stroke    Home/discharge set for 10/8 as of today      Assessment:     1)  Type 2 Diabetes Mellitus; poor control.  A1c 11.5%  2)  S/p stroke; R hemiparesis  3)  ALEX/CKD     Plan:       -  decrease Levemir to 16 units at 2000    -  Novolog 1:10g CHO all meals and snacks     -  Novolog medium resistance from high resistance sliding scale insulin TID AC and HS    -  continue Victoza to 1.8 mg    -  BG monitoring TID AC, HS and 0200    -  continue Metformin to 500 mg PO BID, from once daily, monitor kidney function closely    -  Hypoglycemia protocol    -  Carb counting protocol    -  Will follow      Outpatient diabetes follow up: TBD     Plan discussed with patient, rigo/wife and bedside RN, and primary team via this note.             Interval History:     The last 24 hours progress and nursing notes reviewed.  No acute events this interval.  BG trend on  Lower end - will reduce Levemir and prandial  Renal status ->  Creat 1.47/GFR 47    Eating is consistently small meals 30-50 g cho per meal        Met with Phally and wife who were here for education with Rehab - reviewed aspects of care in detail - discharge  Pt sleeping   "date is still set for 10/8 and it is not clear today if that will be home or TCU  Did review what a sliding scale insulin regimen would look like by having Phally review scales with writer - felt with the specifics the scale provided, it was clear and simple.      Reassured IDS will plan for a clear/concise and simplified plan for home.  All questions and concerns addressed.       Recent Labs   Lab 10/02/21  0732 10/02/21  0549 10/02/21  0220 10/01/21  2056 10/01/21  1704 10/01/21  1157   * 117* 103* 154* 89 125*           Nutrition:     Orders Placed This Encounter      Combination Diet Consistent Carb 60 Grams CHO per Meal Diet; Pureed Diet (level 4); Slightly Thick (level 1)    Supplements: Vanilla or strawberry Ensure Enlive once daily at breakfast meal up to BID PRN (can send at dinner as well if requested by nursing or family) - SLP preformed flow test and approved these flavors to be given to pt    SF Gelatein - Please send orange flavor at 2PM        PTA Regimen:        Levemir 28 units at bedtime  \"Novolog - varied doses based on chos\"  Clinic notes reveal:  Novolog 2 unit(s) per carb choice (2per 15)  Novolog 1 unit(s) per 50> 150     Metformin 500 mg BID (dose reduced for decreased renal fxn)     BG monitoring frequency:  FreeStyle Sherwin - intermittent use     Diet: regular, no restrictions  Eats sporadically - AM - bagel or banana  Lunch - sporadic  Dinner - rice/beg/meat  Loves to drink [employer]     Exercise: sedentary          Review of Systems:   CC:  Denies - no GI distress    Constitutional:   No fever/chills  ENT/Mouth:   No hearing changes, no ear pain, no sore throat, no rhinorrhea, no difficulty swallowing  Eyes:  No eye pain, no discharge, no vision changes  CV:   No CP/SOB, no new edema  Resp:  No cough, no wheezing  GI:   No N/V, no constipation, no diarrhea  :  No dysuria, frequency, or hematuria - cath   Musk:  No new joint swelling/pain, no back pain  Skin/heme:   No new " "rashes/bruises/open areas.  No pruritis  Neuro:   No new weakness, no numbness/tingling, no headache  Psych:   No new anxiety, denies epression  Endocrine:  No polyuria or polydipsia         Medications:   Steroid planning:  no       Physical Exam:   BP (!) 150/79 (BP Location: Right arm)   Pulse 94   Temp 97.2  F (36.2  C) (Oral)   Resp 16   Ht 1.626 m (5' 4\")   Wt 66.5 kg (146 lb 11.2 oz)   SpO2 98%   BMI 25.18 kg/m      General:   NAD, pleasant,  resting comfortably in bed. Fatigued appearing  HEENT:  NC/AT. MMM, sclera not injected  Lungs:  unremarkable, no new cough, no SOB  ABD:   soft,  non-tender,  no lipodystrophy noted  Skin:  warm and dry, no obvious lesions/rash  Feet:   CMS intact, PPP  MSK:   moving all extremities  Lymp:   no LE edema  Mental Status:  fatigued - open eyes when spoken to   Psych:   Cooperative         Data:     Lab Results   Component Value Date    A1C 11.5 09/15/2021    A1C 7.6 08/14/2012          CBC RESULTS:   Recent Labs   Lab Test 10/02/21  0549 09/27/21  0602 09/27/21  0602   WBC  --   --  7.8   RBC  --   --  4.69   HGB 11.5*   < > 12.7*   HCT  --   --  39.6*   MCV  --   --  84   MCH  --   --  27.1   MCHC  --   --  32.1   RDW  --   --  13.1   PLT  --   --  278    < > = values in this interval not displayed.     Recent Labs   Lab Test 10/02/21  0732 10/02/21  0549 10/01/21  1157 10/01/21  0542   NA  --  144  --  145*   POTASSIUM  --  3.6  --  3.3*   CHLORIDE  --  112*  --  112*   CO2  --  30  --  30   ANIONGAP  --  2*  --  3   * 117*   < > 115*   BUN  --  29  --  40*   CR  --  1.47*  --  1.64*   ARLETH  --  8.6  --  8.9    < > = values in this interval not displayed.     Liver Function Studies -   Recent Labs   Lab Test 09/15/21  0521   PROTTOTAL 6.6*   ALBUMIN 2.6*   BILITOTAL 0.3   ALKPHOS 80   AST 20   ALT 22     Lab Results   Component Value Date    INR 1.03 09/16/2021    INR 0.94 09/14/2021    INR 1.00 08/16/2012    INR 0.96 08/13/2012       Keri Amaral, " APRN CNP   Inpatient Diabetes Management Service  Pager - [phone]  Friday - Monday 0800 - 1600 hrs  After hours above - Diabetes Management Team job code: [id number]    I spent a total of 35 minutes bedside and on the inpatient unit managing glycemic care.  Over 50% of my time on the unit was spent counseling the patient and/or coordinating care regarding acute hyperglycemic management.  See note for details.

## 2023-12-25 ENCOUNTER — MEDICAL CORRESPONDENCE (OUTPATIENT)
Dept: HEALTH INFORMATION MANAGEMENT | Facility: CLINIC | Age: 73
End: 2023-12-25

## 2024-01-05 ENCOUNTER — HOSPITAL ENCOUNTER (OUTPATIENT)
Facility: CLINIC | Age: 74
Discharge: HOME | End: 2024-01-05
Attending: RADIOLOGY | Admitting: RADIOLOGY
Payer: MEDICARE

## 2024-01-05 ENCOUNTER — HOSPITAL ENCOUNTER (OUTPATIENT)
Dept: GENERAL RADIOLOGY | Facility: CLINIC | Age: 74
Discharge: HOME | End: 2024-01-05
Attending: INTERNAL MEDICINE | Admitting: INTERNAL MEDICINE
Payer: MEDICARE

## 2024-01-05 DIAGNOSIS — Z93.1 GASTROSTOMY IN PLACE (H): ICD-10-CM

## 2024-01-05 PROCEDURE — 43762 RPLC GTUBE NO REVJ TRC: CPT

## 2024-01-05 NOTE — PROGRESS NOTES
Assisted Dr. Antoine in 14 Fr [facility] Pt daughter and son in law at bedside. Daughter signed consent. Pt tolerated procedure with medication. No immediate adverse effects noted. Pt left in stable condition in care of daughter.

## 2024-01-12 ENCOUNTER — PRE VISIT (OUTPATIENT)
Dept: UROLOGY | Facility: CLINIC | Age: 74
End: 2024-01-12
Payer: MEDICARE

## 2024-01-14 ENCOUNTER — HEALTH MAINTENANCE LETTER (OUTPATIENT)
Age: 74
End: 2024-01-14

## 2024-01-26 ENCOUNTER — TELEPHONE (OUTPATIENT)
Dept: UROLOGY | Facility: CLINIC | Age: 74
End: 2024-01-26

## 2024-01-26 ENCOUNTER — VIRTUAL VISIT (OUTPATIENT)
Dept: UROLOGY | Facility: CLINIC | Age: 74
End: 2024-01-26
Payer: MEDICARE

## 2024-01-26 DIAGNOSIS — R33.9 URINARY RETENTION: Primary | ICD-10-CM

## 2024-01-26 PROCEDURE — 99213 OFFICE O/P EST LOW 20 MIN: CPT | Mod: 95 | Performed by: NURSE PRACTITIONER

## 2024-01-26 NOTE — PATIENT INSTRUCTIONS
UROLOGY CLINIC VISIT PATIENT INSTRUCTIONS    -Will resent an order indicating the need for bladder irrigations.   -Follow up with me in one year, or sooner if needed.    If you have any issues, questions or concerns in the meantime, do not hesitate to contact us at [phone] or via [employer].     Alivia Herzog, CNP  Department of Urology

## 2024-01-26 NOTE — LETTER
1/26/2024       RE: Jimmy Otto  [address] [place] [address]     Dear Colleague,    Thank you for referring your patient, Jimmy Otto, to the [facility] [place] at [facility]. Please see a copy of my visit note below.    Virtual Visit Details    Type of service:  Video Visit   Video Start Time: 11:29 AM  Video End Time: 11:37 AM    Originating Location (pt. Location): Home  Distant Location (provider location):  Off-site  Platform used for Video Visit: Padma    CC: Urinary retention    Assessment/Plan:  74 year old male with chronic urinary retention following CVA, managed with Felix catheter. Monthly cath changes per home care RN. Ordered to have bladder irrigations but this has not been getting done due to different nurses helping him each visit. Order has been faxed, stating that this is to be done, though will refax this now. Am comfortable with them doing this just monthly, about mid-way between catheter exchanges. Taking doxazosin regularly; this was refilled last month.  -He will otherwise follow up with me in one year, or sooner if needed.    Alivia Herzog, CNP  Department of Urology      Subjective:   74 year old male with a history of CVA with right-sided hemiplegia and aphasia, as well as urinary retention c/b an episode of gross hematuria, which resolved.  UDS completed two years ago to evaluate detrusor function given his CVA, showed apparent Valsalva, with high-pressure rise in both catheters. No appreciable, volitionally detrusor contraction to demonstrate volitional bladder control. Therefore, it was recommended that his Felix remain in place with monthly exchanges by home care. Our last visit was one year ago. Was also receiving weekly bladder irrigations, though due to home care personnel change, his new nurse did not feel comfortable with this and it was stopped.     He is again joined by his daughter, Clemente. She  states that while the patient has continued to have regular catheter exchanges monthly that have gone well, the bladder irrigations have yet to be resumed. She notes that the issue began when his primary nurse went on leave and never returned. Since that time, they have seen different nurses and therefore the irrigations never happened. Interested in getting this ordered and communicated again now as his catheter sometimes becomes partly clogged as it gets to be time for exchange.     Objective:     Exam:   Patient is a 74 year old male   No vital signs obtained due to virtual visit.  General Appearance: Well groomed, hygenic; no verbal response  Respiratory: No cough, no respiratory distress or labored breathing  Musculoskeletal: Grossly normal with no gross deficits  Skin: No discoloration or apparent rashes  Neurologic: No tremors  Psychiatric: Alert  Further examination is deferred due to the nature of our visit.

## 2024-01-26 NOTE — TELEPHONE ENCOUNTER
Message sent to Merlyn to request this be completed.      Alivia Herzog, Alanis Blunt, RN  Pramod Thapa,    This is a patient I've been seeing for a while who has urinary retention after CVA, managed with chronic Felix that home care exchanges monthly. Has also had some issues with UTIs and catheter occlusion, for which he was started on bladder irrigations in 04/2022. This was being done by his home care nurse for a while, but then that person went on maternity leave and since then the irrigations have not been getting done. He gets home care services through [facility] and I'm wondering if we can fax another order stating that he should have at least monthly bladder irrigations mid-month, between catheter exchanges per the following instructions:    Irrigate through Felix catheter monthly.    Instill 120 mL and pull back 60 mL = 120 mL  Instill 60 mL and pull back 60 mL = 180 mL  Instill 60 mL and pull back 60 mL = 240 mL    Repeat until urine is clear using a minimum of 240 mL of saline.    Let me know if you have any questions about this.    Thanks!    Alivia

## 2024-01-26 NOTE — PROGRESS NOTES
Virtual Visit Details    Type of service:  Video Visit   Video Start Time: 11:29 AM  Video End Time: 11:37 AM    Originating Location (pt. Location): Home  Distant Location (provider location):  Off-site  Platform used for Video Visit: Padma    CC: Urinary retention    Assessment/Plan:  74 year old male with chronic urinary retention following CVA, managed with Felix catheter. Monthly cath changes per home care RN. Ordered to have bladder irrigations but this has not been getting done due to different nurses helping him each visit. Order has been faxed, stating that this is to be done, though will refax this now. Am comfortable with them doing this just monthly, about mid-way between catheter exchanges. Taking doxazosin regularly; this was refilled last month.  -He will otherwise follow up with me in one year, or sooner if needed.    Alivia Herzog, CNP  Department of Urology      Subjective:   74 year old male with a history of CVA with right-sided hemiplegia and aphasia, as well as urinary retention c/b an episode of gross hematuria, which resolved.  UDS completed two years ago to evaluate detrusor function given his CVA, showed apparent Valsalva, with high-pressure rise in both catheters. No appreciable, volitionally detrusor contraction to demonstrate volitional bladder control. Therefore, it was recommended that his Felix remain in place with monthly exchanges by home care. Our last visit was one year ago. Was also receiving weekly bladder irrigations, though due to home care personnel change, his new nurse did not feel comfortable with this and it was stopped.     He is again joined by his daughter, Clemente. She states that while the patient has continued to have regular catheter exchanges monthly that have gone well, the bladder irrigations have yet to be resumed. She notes that the issue began when his primary nurse went on leave and never returned. Since that time, they have seen different nurses and  therefore the irrigations never happened. Interested in getting this ordered and communicated again now as his catheter sometimes becomes partly clogged as it gets to be time for exchange.     Objective:     Exam:   Patient is a 74 year old male   No vital signs obtained due to virtual visit.  General Appearance: Well groomed, hygenic; no verbal response  Respiratory: No cough, no respiratory distress or labored breathing  Musculoskeletal: Grossly normal with no gross deficits  Skin: No discoloration or apparent rashes  Neurologic: No tremors  Psychiatric: Alert  Further examination is deferred due to the nature of our visit.

## 2024-01-26 NOTE — NURSING NOTE
Is the patient currently in the state of [place]? YES    Visit mode:VIDEO    If the visit is dropped, the patient can be reconnected by: VIDEO VISIT: Text to cell phone:   Telephone Information:   Mobile [phone]       Will anyone else be joining the visit? NO  (If patient encounters technical issues they should call [phone])    How would you like to obtain your AVS? MyChart    Are changes needed to the allergy or medication list? No    Reason for visit: RECHECK    Dariana LOVE

## 2024-01-31 ENCOUNTER — TELEPHONE (OUTPATIENT)
Dept: UROLOGY | Facility: CLINIC | Age: 74
End: 2024-01-31
Payer: MEDICARE

## 2024-01-31 NOTE — TELEPHONE ENCOUNTER
Writer spoke to RAHAT Slater with [facility] at [phone]. Writer provider verbal order for monthly bladder irrigations as follows.     Complete monthly bladder irrigations mid-month, between catheter exchanges per the following instructions:     Irrigate through Felix catheter monthly.     Instill 120 mL and pull back 60 mL = 120 mL   Instill 60 mL and pull back 60 mL = 180 mL   Instill 60 mL and pull back 60 mL = 240 mL     Repeat until urine is clear using a minimum of 240 mL of saline.     Bryon able to provide Read back of order and will update patients care team.     Thank you,  Alia Cotter,PATRICKN RN-Triage-Urology

## 2024-02-05 ENCOUNTER — DOCUMENTATION ONLY (OUTPATIENT)
Dept: UROLOGY | Facility: CLINIC | Age: 74
End: 2024-02-05
Payer: MEDICARE

## 2024-02-05 NOTE — PROGRESS NOTES
Type of Form Received: Order [facility] [id number]    Form Received (Date) 2/5/24   Form Filled out Yes, date 2/5/24   Placed in provider folder Yes       Received Completed forms Yes   Faxed Forms Faxed To: [facility]  Fax Number: [phone]   Sent to HIM (Date) 2/5/24

## 2024-02-07 ENCOUNTER — LAB REQUISITION (OUTPATIENT)
Dept: LAB | Facility: CLINIC | Age: 74
End: 2024-02-07
Payer: MEDICARE

## 2024-02-07 DIAGNOSIS — E11.29 TYPE 2 DIABETES MELLITUS WITH OTHER DIABETIC KIDNEY COMPLICATION (H): ICD-10-CM

## 2024-02-07 DIAGNOSIS — Z79.4 LONG TERM (CURRENT) USE OF INSULIN (H): ICD-10-CM

## 2024-02-07 DIAGNOSIS — R80.9 PROTEINURIA, UNSPECIFIED: ICD-10-CM

## 2024-02-07 DIAGNOSIS — Z79.899 OTHER LONG TERM (CURRENT) DRUG THERAPY: ICD-10-CM

## 2024-02-07 LAB
ALBUMIN MFR UR ELPH: 795.4 MG/DL
ALBUMIN SERPL BCG-MCNC: 3.4 G/DL (ref 3.5–5.2)
ALP SERPL-CCNC: 119 U/L (ref 40–150)
ALT SERPL W P-5'-P-CCNC: 31 U/L (ref 0–70)
ANION GAP SERPL CALCULATED.3IONS-SCNC: 15 MMOL/L (ref 7–15)
AST SERPL W P-5'-P-CCNC: 24 U/L (ref 0–45)
BASOPHILS # BLD AUTO: 0.1 10E3/UL (ref 0–0.2)
BASOPHILS NFR BLD AUTO: 1 %
BILIRUB SERPL-MCNC: 0.3 MG/DL
BUN SERPL-MCNC: 29.8 MG/DL (ref 8–23)
CALCIUM SERPL-MCNC: 9.3 MG/DL (ref 8.8–10.2)
CHLORIDE SERPL-SCNC: 97 MMOL/L (ref 98–107)
CREAT SERPL-MCNC: 1.19 MG/DL (ref 0.67–1.17)
CREAT UR-MCNC: 68 MG/DL
DEPRECATED HCO3 PLAS-SCNC: 28 MMOL/L (ref 22–29)
EGFRCR SERPLBLD CKD-EPI 2021: 64 ML/MIN/1.73M2
EOSINOPHIL # BLD AUTO: 0 10E3/UL (ref 0–0.7)
EOSINOPHIL NFR BLD AUTO: 1 %
ERYTHROCYTE [DISTWIDTH] IN BLOOD BY AUTOMATED COUNT: 15.1 % (ref 10–15)
GLUCOSE SERPL-MCNC: 208 MG/DL (ref 70–99)
HBA1C MFR BLD: 8.6 %
HCT VFR BLD AUTO: 37.5 % (ref 40–53)
HGB BLD-MCNC: 12 G/DL (ref 13.3–17.7)
IMM GRANULOCYTES # BLD: 0 10E3/UL
IMM GRANULOCYTES NFR BLD: 0 %
LYMPHOCYTES # BLD AUTO: 1.2 10E3/UL (ref 0.8–5.3)
LYMPHOCYTES NFR BLD AUTO: 18 %
MCH RBC QN AUTO: 26.1 PG (ref 26.5–33)
MCHC RBC AUTO-ENTMCNC: 32 G/DL (ref 31.5–36.5)
MCV RBC AUTO: 82 FL (ref 78–100)
MONOCYTES # BLD AUTO: 0.5 10E3/UL (ref 0–1.3)
MONOCYTES NFR BLD AUTO: 7 %
NEUTROPHILS # BLD AUTO: 5.1 10E3/UL (ref 1.6–8.3)
NEUTROPHILS NFR BLD AUTO: 73 %
NRBC # BLD AUTO: 0 10E3/UL
NRBC BLD AUTO-RTO: 0 /100
PLATELET # BLD AUTO: 219 10E3/UL (ref 150–450)
POTASSIUM SERPL-SCNC: 3.9 MMOL/L (ref 3.4–5.3)
PROT SERPL-MCNC: 7.4 G/DL (ref 6.4–8.3)
PROT/CREAT 24H UR: 11.7 MG/MG CR (ref 0–0.2)
RBC # BLD AUTO: 4.6 10E6/UL (ref 4.4–5.9)
SODIUM SERPL-SCNC: 140 MMOL/L (ref 135–145)
WBC # BLD AUTO: 6.8 10E3/UL (ref 4–11)

## 2024-02-07 PROCEDURE — 83036 HEMOGLOBIN GLYCOSYLATED A1C: CPT | Mod: ORL | Performed by: INTERNAL MEDICINE

## 2024-02-07 PROCEDURE — 85025 COMPLETE CBC W/AUTO DIFF WBC: CPT | Mod: ORL | Performed by: INTERNAL MEDICINE

## 2024-02-07 PROCEDURE — 80053 COMPREHEN METABOLIC PANEL: CPT | Mod: ORL | Performed by: INTERNAL MEDICINE

## 2024-02-07 PROCEDURE — 80061 LIPID PANEL: CPT | Mod: ORL | Performed by: INTERNAL MEDICINE

## 2024-02-07 PROCEDURE — 84156 ASSAY OF PROTEIN URINE: CPT | Mod: ORL | Performed by: INTERNAL MEDICINE

## 2024-02-07 PROCEDURE — 82607 VITAMIN B-12: CPT | Mod: ORL | Performed by: INTERNAL MEDICINE

## 2024-02-07 PROCEDURE — 83721 ASSAY OF BLOOD LIPOPROTEIN: CPT | Mod: ORL | Performed by: INTERNAL MEDICINE

## 2024-02-08 LAB
CHOLEST SERPL-MCNC: 97 MG/DL
FASTING STATUS PATIENT QL REPORTED: ABNORMAL
HDLC SERPL-MCNC: 30 MG/DL
LDLC SERPL CALC-MCNC: 49 MG/DL
LDLC SERPL DIRECT ASSAY-MCNC: 48 MG/DL
NONHDLC SERPL-MCNC: 67 MG/DL
TRIGL SERPL-MCNC: 88 MG/DL
VIT B12 SERPL-MCNC: 810 PG/ML (ref 232–1245)

## 2024-02-23 ENCOUNTER — MEDICAL CORRESPONDENCE (OUTPATIENT)
Dept: HEALTH INFORMATION MANAGEMENT | Facility: CLINIC | Age: 74
End: 2024-02-23

## 2024-04-18 ENCOUNTER — LAB REQUISITION (OUTPATIENT)
Dept: LAB | Facility: CLINIC | Age: 74
End: 2024-04-18
Payer: MEDICARE

## 2024-04-18 DIAGNOSIS — I10 ESSENTIAL (PRIMARY) HYPERTENSION: ICD-10-CM

## 2024-04-18 LAB
ANION GAP SERPL CALCULATED.3IONS-SCNC: 14 MMOL/L (ref 7–15)
BUN SERPL-MCNC: 30.1 MG/DL (ref 8–23)
CALCIUM SERPL-MCNC: 8.7 MG/DL (ref 8.8–10.2)
CHLORIDE SERPL-SCNC: 95 MMOL/L (ref 98–107)
CREAT SERPL-MCNC: 1.28 MG/DL (ref 0.67–1.17)
DEPRECATED HCO3 PLAS-SCNC: 26 MMOL/L (ref 22–29)
EGFRCR SERPLBLD CKD-EPI 2021: 59 ML/MIN/1.73M2
GLUCOSE SERPL-MCNC: 228 MG/DL (ref 70–99)
POTASSIUM SERPL-SCNC: 3.9 MMOL/L (ref 3.4–5.3)
SODIUM SERPL-SCNC: 135 MMOL/L (ref 135–145)

## 2024-04-18 PROCEDURE — 80048 BASIC METABOLIC PNL TOTAL CA: CPT | Mod: ORL | Performed by: INTERNAL MEDICINE

## 2024-04-26 ENCOUNTER — MEDICAL CORRESPONDENCE (OUTPATIENT)
Dept: HEALTH INFORMATION MANAGEMENT | Facility: CLINIC | Age: 74
End: 2024-04-26
Payer: MEDICARE

## 2024-05-09 ENCOUNTER — LAB REQUISITION (OUTPATIENT)
Dept: LAB | Facility: CLINIC | Age: 74
End: 2024-05-09
Payer: MEDICARE

## 2024-05-09 DIAGNOSIS — I10 ESSENTIAL (PRIMARY) HYPERTENSION: ICD-10-CM

## 2024-05-09 LAB
ANION GAP SERPL CALCULATED.3IONS-SCNC: 13 MMOL/L (ref 7–15)
BUN SERPL-MCNC: 46.3 MG/DL (ref 8–23)
CALCIUM SERPL-MCNC: 8.9 MG/DL (ref 8.8–10.2)
CHLORIDE SERPL-SCNC: 92 MMOL/L (ref 98–107)
CREAT SERPL-MCNC: 1.6 MG/DL (ref 0.67–1.17)
DEPRECATED HCO3 PLAS-SCNC: 30 MMOL/L (ref 22–29)
EGFRCR SERPLBLD CKD-EPI 2021: 45 ML/MIN/1.73M2
GLUCOSE SERPL-MCNC: 198 MG/DL (ref 70–99)
POTASSIUM SERPL-SCNC: 3.9 MMOL/L (ref 3.4–5.3)
SODIUM SERPL-SCNC: 135 MMOL/L (ref 135–145)

## 2024-05-09 PROCEDURE — 80048 BASIC METABOLIC PNL TOTAL CA: CPT | Mod: ORL | Performed by: INTERNAL MEDICINE

## 2024-05-12 ENCOUNTER — HOSPITAL ENCOUNTER (EMERGENCY)
Facility: CLINIC | Age: 74
Discharge: HOME | End: 2024-05-13
Attending: EMERGENCY MEDICINE | Admitting: EMERGENCY MEDICINE
Payer: MEDICARE

## 2024-05-12 ENCOUNTER — APPOINTMENT (OUTPATIENT)
Dept: ULTRASOUND IMAGING | Facility: CLINIC | Age: 74
End: 2024-05-12
Attending: EMERGENCY MEDICINE
Payer: MEDICARE

## 2024-05-12 DIAGNOSIS — N43.3 HYDROCELE, UNSPECIFIED HYDROCELE TYPE: ICD-10-CM

## 2024-05-12 DIAGNOSIS — R10.32 LEFT GROIN PAIN: ICD-10-CM

## 2024-05-12 LAB
ALBUMIN UR-MCNC: 600 MG/DL
APPEARANCE UR: CLEAR
BACTERIA #/AREA URNS HPF: ABNORMAL /HPF
BILIRUB UR QL STRIP: NEGATIVE
COLOR UR AUTO: YELLOW
GLUCOSE UR STRIP-MCNC: 70 MG/DL
HGB UR QL STRIP: NEGATIVE
KETONES UR STRIP-MCNC: NEGATIVE MG/DL
LEUKOCYTE ESTERASE UR QL STRIP: NEGATIVE
MUCOUS THREADS #/AREA URNS LPF: PRESENT /LPF
NITRATE UR QL: NEGATIVE
PH UR STRIP: 7 [PH] (ref 5–7)
RBC URINE: 5 /HPF
SP GR UR STRIP: 1.02 (ref 1–1.03)
UROBILINOGEN UR STRIP-MCNC: NORMAL MG/DL
WBC URINE: 3 /HPF

## 2024-05-12 PROCEDURE — 93976 VASCULAR STUDY: CPT

## 2024-05-12 PROCEDURE — 76870 US EXAM SCROTUM: CPT

## 2024-05-12 PROCEDURE — 250N000013 HC RX MED GY IP 250 OP 250 PS 637: Performed by: EMERGENCY MEDICINE

## 2024-05-12 PROCEDURE — 81001 URINALYSIS AUTO W/SCOPE: CPT | Performed by: EMERGENCY MEDICINE

## 2024-05-12 PROCEDURE — 99284 EMERGENCY DEPT VISIT MOD MDM: CPT | Mod: 25

## 2024-05-12 RX ORDER — OXYCODONE HYDROCHLORIDE 5 MG/1
5 TABLET ORAL ONCE
Status: COMPLETED | OUTPATIENT
Start: 2024-05-12 | End: 2024-05-12

## 2024-05-12 RX ADMIN — OXYCODONE HYDROCHLORIDE 5 MG: 5 TABLET ORAL at 23:11

## 2024-05-12 ASSESSMENT — COLUMBIA-SUICIDE SEVERITY RATING SCALE - C-SSRS
6. HAVE YOU EVER DONE ANYTHING, STARTED TO DO ANYTHING, OR PREPARED TO DO ANYTHING TO END YOUR LIFE?: NO
1. IN THE PAST MONTH, HAVE YOU WISHED YOU WERE DEAD OR WISHED YOU COULD GO TO SLEEP AND NOT WAKE UP?: NO
2. HAVE YOU ACTUALLY HAD ANY THOUGHTS OF KILLING YOURSELF IN THE PAST MONTH?: NO

## 2024-05-12 ASSESSMENT — ACTIVITIES OF DAILY LIVING (ADL)
ADLS_ACUITY_SCORE: 43
ADLS_ACUITY_SCORE: 43

## 2024-05-13 VITALS
OXYGEN SATURATION: 94 % | HEART RATE: 79 BPM | DIASTOLIC BLOOD PRESSURE: 80 MMHG | RESPIRATION RATE: 18 BRPM | SYSTOLIC BLOOD PRESSURE: 154 MMHG | TEMPERATURE: 98.2 F

## 2024-05-13 NOTE — ED PROVIDER NOTES
Emergency Department Note      History of Present Illness     Chief Complaint  Catheter Problem (Pain, appears to be draining in triage.)    PAPO Otto is a 74 year old male with history of CVA, hypertension, hyperlipidemia, and type 2 diabetes mellitus presenting for evaluation of a catheter problem. Jimmy's relative reports pain in his catheter site since 1800 tonight. She notes similar pain with catheter insertion but states that this catheter was last changed one week ago. She notes use of Tylenol at 2000 without relief. She denies fevers or vomiting.  When patient is asked where his pain is he points to the left hemiscrotum/testicle.    Independent Historian  The patient's relative provides the above history.    Review of External Notes  None    Past Medical History   Medical History and Problem List  Cerebrovascular accident - hemiplegia and aphasia  Depressive disorder  Type 2 diabetes mellitus  Hyperlipidemia  Hypertension  Acute respiratory with hypoxia  G tube feedings  Acute renal failure    Medications  Norvasc  Tenormin  Lipitor  Plavix  Cardura  Prozac  Insulin  Metformin  Jevity  Mycostatin  Levemir  Zestril    Surgical History   Endoscopic insertion tube gastrostomy  IR cystogram    Physical Exam   Patient Vitals for the past 24 hrs:   BP Temp Temp src Pulse Resp SpO2   05/12/24 2352 -- -- -- -- -- 94 %   05/12/24 2351 (!) 154/80 -- -- 79 18 (!) 86 %   05/12/24 2156 (!) 156/84 98.2  F (36.8  C) Oral 79 18 100 %     Physical Exam      Eyes:    Conjunctiva normal  Neck:     Supple, no meningismus.     CV:     Regular rate and rhythm.      No murmurs, rubs or gallops.      PULM:    Clear to auscultation bilateral.       No respiratory distress.      Good air exchange.  ABD:    Soft, non-distended.       No abdominal tenderness.     G-tube present     No pulsatile masses.       No rebound, guarding or rigidity.  :   Carlos stage V male genitalia.     No inguinal lymphadenopathy.     No inguinal  hernia.     Felix catheter in place     Testes in a normal position/lie.     Minimal left testicular tenderness.     No epididymal tenderness.  MSK:     No gross deformity to all four extremities.   LYMPH:   No cervical lymphadenopathy.  NEURO:   Alert.      No tremor     Non-verbal  Skin:    Warm, dry         Diagnostics   Lab Results   Labs Ordered and Resulted from Time of ED Arrival to Time of ED Departure   ROUTINE UA WITH MICROSCOPIC REFLEX TO CULTURE - Abnormal       Result Value    Color Urine Yellow      Appearance Urine Clear      Glucose Urine 70 (*)     Bilirubin Urine Negative      Ketones Urine Negative      Specific Gravity Urine 1.021      Blood Urine Negative      pH Urine 7.0      Protein Albumin Urine 600 (*)     Urobilinogen Urine Normal      Nitrite Urine Negative      Leukocyte Esterase Urine Negative      Bacteria Urine Few (*)     Mucus Urine Present (*)     RBC Urine 5 (*)     WBC Urine 3       Imaging  US Testicular & Scrotum w Doppler Ltd   Final Result   IMPRESSION:   1.  No acute findings.   2.  Tiny bilateral epididymal head cysts.   3.  Small bilateral hydroceles.           Independent Interpretation  None    ED Course    Medications Administered  Medications   oxyCODONE (ROXICODONE) tablet 5 mg (5 mg Per G Tube $Given 5/12/24 2311)     Procedures  Procedures    Discussion of Management  None    Social Determinants of Health adding to complexity of care  None    ED Course  ED Course as of 05/13/24 0004   Sun May 12, 2024   2220 I obtained history and examined the patient as noted above.      Medical Decision Making / Diagnosis   MIPS     None    ILANA    Jimmy Otto is a 74 year old male with a history of chronic urinary retention with Felix catheter presents with left groin/hemiscrotal pain.  Felix catheter is in good position and functioning appropriately.  There is no associated urinary tract infection.  No clinical signs of soft tissue infection.  Testicular ultrasound is without  epididymitis, testicular torsion or alternative features to account for pain.  He has small hydrocele/epididymal cyst but would not expect this to cause his discomfort.  Upon reexamination, he is sleeping and resting comfortably.  No sinister pathology noted.  He has no significant abdominal tenderness to require advanced imaging of the abdomen.  Patient safe for discharge home with close follow-up with primary care physician if symptoms persist or return to ED for worsening symptoms.    Disposition  The patient was discharged.     ICD-10 Codes:    ICD-10-CM    1. Left groin pain  R10.32       2. Hydrocele, unspecified hydrocele type  N43.3            Discharge Medications  New Prescriptions    No medications on file     Scribe Disclosure:  Lyle DODSON, am serving as a scribe at 10:16 PM on 5/12/2024 to document services personally performed by Gerald Beyer MD based on my observations and the provider's statements to me.     [employer]      Gerald Beyer MD  05/13/24 0005

## 2024-05-13 NOTE — ED TRIAGE NOTES
Patient comes to ED for evaluation of pain with ordoñez catheter.  Appears to be draining, denies fever/nausea/vomiting.  Last changed a week ago. Alert but non verbal.     Triage Assessment (Adult)       Row Name 05/12/24 3030          Triage Assessment    Airway WDL WDL        Respiratory WDL    Respiratory WDL WDL        Skin Circulation/Temperature WDL    Skin Circulation/Temperature WDL WDL        Cardiac WDL    Cardiac WDL WDL        Peripheral/Neurovascular WDL    Peripheral Neurovascular WDL WDL        Cognitive/Neuro/Behavioral WDL    Cognitive/Neuro/Behavioral WDL WDL

## 2024-06-02 ENCOUNTER — HEALTH MAINTENANCE LETTER (OUTPATIENT)
Age: 74
End: 2024-06-02

## 2024-06-05 ENCOUNTER — MEDICAL CORRESPONDENCE (OUTPATIENT)
Dept: UROLOGY | Facility: CLINIC | Age: 74
End: 2024-06-05
Payer: MEDICARE

## 2024-06-13 ENCOUNTER — HOSPITAL ENCOUNTER (OUTPATIENT)
Dept: GENERAL RADIOLOGY | Facility: CLINIC | Age: 74
Discharge: HOME | End: 2024-06-13
Attending: INTERNAL MEDICINE | Admitting: INTERNAL MEDICINE
Payer: MEDICARE

## 2024-06-13 DIAGNOSIS — Z93.1 GASTROSTOMY TUBE IN PLACE (H): ICD-10-CM

## 2024-06-13 PROCEDURE — 43762 RPLC GTUBE NO REVJ TRC: CPT

## 2024-06-13 NOTE — PROGRESS NOTES
Gastrotomy tube exchanged without difficulty by Dr. Villagran. Pt tolerated the procedure well. Tube ready for immediate use. 14 [place] tube used. Tube changed while patient remained in wheelchair due to chronic use.

## 2024-07-31 ENCOUNTER — LAB REQUISITION (OUTPATIENT)
Dept: LAB | Facility: CLINIC | Age: 74
End: 2024-07-31
Payer: MEDICARE

## 2024-07-31 DIAGNOSIS — E11.29 TYPE 2 DIABETES MELLITUS WITH OTHER DIABETIC KIDNEY COMPLICATION (H): ICD-10-CM

## 2024-07-31 DIAGNOSIS — R19.4 CHANGE IN BOWEL HABIT: ICD-10-CM

## 2024-07-31 DIAGNOSIS — R80.9 PROTEINURIA, UNSPECIFIED: ICD-10-CM

## 2024-07-31 LAB
ALBUMIN SERPL BCG-MCNC: 2.9 G/DL (ref 3.5–5.2)
ALP SERPL-CCNC: 114 U/L (ref 40–150)
ALT SERPL W P-5'-P-CCNC: 32 U/L (ref 0–70)
ANION GAP SERPL CALCULATED.3IONS-SCNC: 17 MMOL/L (ref 7–15)
AST SERPL W P-5'-P-CCNC: 33 U/L (ref 0–45)
BASOPHILS # BLD AUTO: 0.1 10E3/UL (ref 0–0.2)
BASOPHILS NFR BLD AUTO: 1 %
BILIRUB SERPL-MCNC: 0.2 MG/DL
BUN SERPL-MCNC: 32.7 MG/DL (ref 8–23)
CALCIUM SERPL-MCNC: 8.6 MG/DL (ref 8.8–10.4)
CHLORIDE SERPL-SCNC: 95 MMOL/L (ref 98–107)
CREAT SERPL-MCNC: 1.49 MG/DL (ref 0.67–1.17)
EGFRCR SERPLBLD CKD-EPI 2021: 49 ML/MIN/1.73M2
EOSINOPHIL # BLD AUTO: 0.3 10E3/UL (ref 0–0.7)
EOSINOPHIL NFR BLD AUTO: 4 %
ERYTHROCYTE [DISTWIDTH] IN BLOOD BY AUTOMATED COUNT: 14.3 % (ref 10–15)
GLUCOSE SERPL-MCNC: 135 MG/DL (ref 70–99)
HCO3 SERPL-SCNC: 23 MMOL/L (ref 22–29)
HCT VFR BLD AUTO: 31.2 % (ref 40–53)
HGB BLD-MCNC: 10.2 G/DL (ref 13.3–17.7)
IMM GRANULOCYTES # BLD: 0 10E3/UL
IMM GRANULOCYTES NFR BLD: 0 %
LYMPHOCYTES # BLD AUTO: 1.7 10E3/UL (ref 0.8–5.3)
LYMPHOCYTES NFR BLD AUTO: 25 %
MCH RBC QN AUTO: 26.6 PG (ref 26.5–33)
MCHC RBC AUTO-ENTMCNC: 32.7 G/DL (ref 31.5–36.5)
MCV RBC AUTO: 82 FL (ref 78–100)
MONOCYTES # BLD AUTO: 0.6 10E3/UL (ref 0–1.3)
MONOCYTES NFR BLD AUTO: 8 %
NEUTROPHILS # BLD AUTO: 4.2 10E3/UL (ref 1.6–8.3)
NEUTROPHILS NFR BLD AUTO: 61 %
NRBC # BLD AUTO: 0 10E3/UL
NRBC BLD AUTO-RTO: 0 /100
PLATELET # BLD AUTO: 215 10E3/UL (ref 150–450)
POTASSIUM SERPL-SCNC: 4.1 MMOL/L (ref 3.4–5.3)
PROT SERPL-MCNC: 6.7 G/DL (ref 6.4–8.3)
RBC # BLD AUTO: 3.83 10E6/UL (ref 4.4–5.9)
SODIUM SERPL-SCNC: 135 MMOL/L (ref 135–145)
WBC # BLD AUTO: 6.9 10E3/UL (ref 4–11)

## 2024-07-31 PROCEDURE — 85025 COMPLETE CBC W/AUTO DIFF WBC: CPT | Mod: ORL | Performed by: INTERNAL MEDICINE

## 2024-07-31 PROCEDURE — 80053 COMPREHEN METABOLIC PANEL: CPT | Mod: ORL | Performed by: INTERNAL MEDICINE

## 2024-08-21 ENCOUNTER — MEDICAL CORRESPONDENCE (OUTPATIENT)
Dept: HEALTH INFORMATION MANAGEMENT | Facility: CLINIC | Age: 74
End: 2024-08-21

## 2024-09-05 ENCOUNTER — ENROLLMENT (OUTPATIENT)
Dept: HOME HEALTH SERVICES | Facility: HOME HEALTH | Age: 74
End: 2024-09-05
Payer: MEDICARE

## 2024-09-13 ENCOUNTER — LAB REQUISITION (OUTPATIENT)
Dept: LAB | Facility: CLINIC | Age: 74
End: 2024-09-13
Payer: MEDICARE

## 2024-09-13 DIAGNOSIS — Z87.440 PERSONAL HISTORY OF URINARY (TRACT) INFECTIONS: ICD-10-CM

## 2024-09-13 LAB
ALBUMIN UR-MCNC: 600 MG/DL
APPEARANCE UR: ABNORMAL
BACTERIA #/AREA URNS HPF: ABNORMAL /HPF
BILIRUB UR QL STRIP: NEGATIVE
COLOR UR AUTO: YELLOW
GLUCOSE UR STRIP-MCNC: 100 MG/DL
HGB UR QL STRIP: NEGATIVE
HYALINE CASTS: 57 /LPF
KETONES UR STRIP-MCNC: NEGATIVE MG/DL
LEUKOCYTE ESTERASE UR QL STRIP: ABNORMAL
MUCOUS THREADS #/AREA URNS LPF: PRESENT /LPF
NITRATE UR QL: NEGATIVE
PH UR STRIP: 6.5 [PH] (ref 5–7)
RBC URINE: 2 /HPF
SP GR UR STRIP: 1.02 (ref 1–1.03)
SQUAMOUS EPITHELIAL: 1 /HPF
UROBILINOGEN UR STRIP-MCNC: NORMAL MG/DL
WBC CLUMPS #/AREA URNS HPF: PRESENT /HPF
WBC URINE: 102 /HPF

## 2024-09-13 PROCEDURE — 81001 URINALYSIS AUTO W/SCOPE: CPT | Mod: ORL | Performed by: INTERNAL MEDICINE

## 2024-09-13 PROCEDURE — 87086 URINE CULTURE/COLONY COUNT: CPT | Mod: ORL | Performed by: INTERNAL MEDICINE

## 2024-09-14 LAB
BACTERIA UR CULT: ABNORMAL

## 2024-09-28 NOTE — PROGRESS NOTES
10/26/2021:PT MEETS MEDICARE CRITERIA FOR ENTERAL. ANTICIPATED LENGTH OF THERAPY MUST BE 90 DAYS OR GREATER AT THE TIME OF SERVICE. 1 MONTH PUMP LETTER MUST BE ATTACHED. ([facility] CANNOT DO NURSING IF PT IS HB)  OTHER THERAPY: RETURN TO INTAKE FOR COVERAGE DETERMINATION    COVERAGE ISSUE-TPA WILL ONLY BE COVERED IN AN OUTPATIENT SETTING  NO [employer] COVERAGE    [facility] CANNOT DO NURSING IF PT IS HB, IF NOT CAN BE IVN IF PT AGREES TO SELF PAY. AD

## 2024-10-09 NOTE — TELEPHONE ENCOUNTER
Please see the attached refill request.   Reason for visit: consult for hematuria and urinary retention      Dx/Hx/Sx: hematuria, urinary retention, has ordoñez    Records/imaging/labs/orders: in epic     At Rooming: video visit

## 2024-10-17 ENCOUNTER — HOSPITAL ENCOUNTER (OUTPATIENT)
Dept: GENERAL RADIOLOGY | Facility: CLINIC | Age: 74
Discharge: HOME | End: 2024-10-17
Attending: INTERNAL MEDICINE | Admitting: INTERNAL MEDICINE
Payer: MEDICARE

## 2024-10-17 DIAGNOSIS — Z93.1 RECEIVES FEEDINGS THROUGH GASTROSTOMY (H): ICD-10-CM

## 2024-10-17 PROCEDURE — 49450 REPLACE G/C TUBE PERC: CPT

## 2024-10-17 NOTE — PROGRESS NOTES
Pt her for routine #14 fr G tube exchange, Dr Villagran did procedure under fluoro and used 15 mL Omni 240 contrast, no complications, pt left under daughters care.

## 2024-10-20 ENCOUNTER — HEALTH MAINTENANCE LETTER (OUTPATIENT)
Age: 74
End: 2024-10-20

## 2024-10-20 ENCOUNTER — MEDICAL CORRESPONDENCE (OUTPATIENT)
Dept: HEALTH INFORMATION MANAGEMENT | Facility: CLINIC | Age: 74
End: 2024-10-20
Payer: MEDICARE

## 2024-10-23 ENCOUNTER — APPOINTMENT (OUTPATIENT)
Dept: ULTRASOUND IMAGING | Facility: CLINIC | Age: 74
DRG: 683 | End: 2024-10-23
Attending: EMERGENCY MEDICINE
Payer: MEDICARE

## 2024-10-23 ENCOUNTER — HOSPITAL ENCOUNTER (INPATIENT)
Facility: CLINIC | Age: 74
LOS: 3 days | Discharge: HOME | DRG: 683 | End: 2024-10-26
Attending: EMERGENCY MEDICINE | Admitting: STUDENT IN AN ORGANIZED HEALTH CARE EDUCATION/TRAINING PROGRAM
Payer: MEDICARE

## 2024-10-23 ENCOUNTER — APPOINTMENT (OUTPATIENT)
Dept: GENERAL RADIOLOGY | Facility: CLINIC | Age: 74
DRG: 683 | End: 2024-10-23
Attending: EMERGENCY MEDICINE
Payer: MEDICARE

## 2024-10-23 DIAGNOSIS — I50.9 ACUTE CONGESTIVE HEART FAILURE, UNSPECIFIED HEART FAILURE TYPE (H): ICD-10-CM

## 2024-10-23 DIAGNOSIS — B37.0 THRUSH: ICD-10-CM

## 2024-10-23 DIAGNOSIS — R62.7 ADULT FAILURE TO THRIVE: Primary | ICD-10-CM

## 2024-10-23 DIAGNOSIS — N17.9 AKI (ACUTE KIDNEY INJURY) (H): ICD-10-CM

## 2024-10-23 DIAGNOSIS — I63.50 CEREBRAL ARTERY OCCLUSION WITH CEREBRAL INFARCTION (H): ICD-10-CM

## 2024-10-23 DIAGNOSIS — E87.20 LACTIC ACIDOSIS: ICD-10-CM

## 2024-10-23 DIAGNOSIS — R22.31 LOCALIZED SWELLING OF RIGHT UPPER EXTREMITY: ICD-10-CM

## 2024-10-23 LAB
ALBUMIN SERPL BCG-MCNC: 2.9 G/DL (ref 3.5–5.2)
ALBUMIN UR-MCNC: 300 MG/DL
ALP SERPL-CCNC: 106 U/L (ref 40–150)
ALT SERPL W P-5'-P-CCNC: 36 U/L (ref 0–70)
ANION GAP SERPL CALCULATED.3IONS-SCNC: 18 MMOL/L (ref 7–15)
APPEARANCE UR: CLEAR
AST SERPL W P-5'-P-CCNC: 37 U/L (ref 0–45)
BACTERIA #/AREA URNS HPF: ABNORMAL /HPF
BASOPHILS # BLD AUTO: 0.1 10E3/UL (ref 0–0.2)
BASOPHILS NFR BLD AUTO: 1 %
BILIRUB DIRECT SERPL-MCNC: <0.2 MG/DL (ref 0–0.3)
BILIRUB SERPL-MCNC: <0.2 MG/DL
BILIRUB UR QL STRIP: NEGATIVE
BUN SERPL-MCNC: 41.8 MG/DL (ref 8–23)
CALCIUM SERPL-MCNC: 8.7 MG/DL (ref 8.8–10.4)
CHLORIDE SERPL-SCNC: 95 MMOL/L (ref 98–107)
COLOR UR AUTO: ABNORMAL
CREAT SERPL-MCNC: 1.96 MG/DL (ref 0.67–1.17)
EGFRCR SERPLBLD CKD-EPI 2021: 35 ML/MIN/1.73M2
EOSINOPHIL # BLD AUTO: 0.4 10E3/UL (ref 0–0.7)
EOSINOPHIL NFR BLD AUTO: 6 %
ERYTHROCYTE [DISTWIDTH] IN BLOOD BY AUTOMATED COUNT: 14.6 % (ref 10–15)
FLUAV RNA SPEC QL NAA+PROBE: NEGATIVE
FLUBV RNA RESP QL NAA+PROBE: NEGATIVE
GLUCOSE SERPL-MCNC: 117 MG/DL (ref 70–99)
GLUCOSE UR STRIP-MCNC: NEGATIVE MG/DL
HCO3 SERPL-SCNC: 23 MMOL/L (ref 22–29)
HCT VFR BLD AUTO: 34.4 % (ref 40–53)
HGB BLD-MCNC: 10.7 G/DL (ref 13.3–17.7)
HGB UR QL STRIP: NEGATIVE
HOLD SPECIMEN: NORMAL
HOLD SPECIMEN: NORMAL
IMM GRANULOCYTES # BLD: 0 10E3/UL
IMM GRANULOCYTES NFR BLD: 0 %
KETONES UR STRIP-MCNC: NEGATIVE MG/DL
LACTATE SERPL-SCNC: 4.2 MMOL/L (ref 0.7–2)
LACTATE SERPL-SCNC: 4.7 MMOL/L (ref 0.7–2)
LEUKOCYTE ESTERASE UR QL STRIP: ABNORMAL
LYMPHOCYTES # BLD AUTO: 2 10E3/UL (ref 0.8–5.3)
LYMPHOCYTES NFR BLD AUTO: 29 %
MCH RBC QN AUTO: 25.8 PG (ref 26.5–33)
MCHC RBC AUTO-ENTMCNC: 31.1 G/DL (ref 31.5–36.5)
MCV RBC AUTO: 83 FL (ref 78–100)
MONOCYTES # BLD AUTO: 0.7 10E3/UL (ref 0–1.3)
MONOCYTES NFR BLD AUTO: 10 %
MUCOUS THREADS #/AREA URNS LPF: PRESENT /LPF
NEUTROPHILS # BLD AUTO: 3.8 10E3/UL (ref 1.6–8.3)
NEUTROPHILS NFR BLD AUTO: 55 %
NITRATE UR QL: NEGATIVE
NRBC # BLD AUTO: 0 10E3/UL
NRBC BLD AUTO-RTO: 0 /100
NT-PROBNP SERPL-MCNC: 1876 PG/ML (ref 0–900)
PH UR STRIP: 7 [PH] (ref 5–7)
PLATELET # BLD AUTO: 204 10E3/UL (ref 150–450)
POTASSIUM SERPL-SCNC: 4.3 MMOL/L (ref 3.4–5.3)
PROCALCITONIN SERPL IA-MCNC: 0.07 NG/ML
PROT SERPL-MCNC: 6.3 G/DL (ref 6.4–8.3)
RBC # BLD AUTO: 4.14 10E6/UL (ref 4.4–5.9)
RBC URINE: <1 /HPF
RSV RNA SPEC NAA+PROBE: NEGATIVE
SARS-COV-2 RNA RESP QL NAA+PROBE: NEGATIVE
SODIUM SERPL-SCNC: 136 MMOL/L (ref 135–145)
SP GR UR STRIP: 1.01 (ref 1–1.03)
SQUAMOUS EPITHELIAL: <1 /HPF
TROPONIN T SERPL HS-MCNC: 46 NG/L
TROPONIN T SERPL HS-MCNC: 51 NG/L
UROBILINOGEN UR STRIP-MCNC: NORMAL MG/DL
WBC # BLD AUTO: 6.9 10E3/UL (ref 4–11)
WBC URINE: 10 /HPF

## 2024-10-23 PROCEDURE — 83880 ASSAY OF NATRIURETIC PEPTIDE: CPT | Performed by: EMERGENCY MEDICINE

## 2024-10-23 PROCEDURE — 96365 THER/PROPH/DIAG IV INF INIT: CPT

## 2024-10-23 PROCEDURE — 81001 URINALYSIS AUTO W/SCOPE: CPT | Performed by: EMERGENCY MEDICINE

## 2024-10-23 PROCEDURE — 83605 ASSAY OF LACTIC ACID: CPT | Performed by: EMERGENCY MEDICINE

## 2024-10-23 PROCEDURE — 93005 ELECTROCARDIOGRAM TRACING: CPT

## 2024-10-23 PROCEDURE — 99285 EMERGENCY DEPT VISIT HI MDM: CPT | Mod: 25

## 2024-10-23 PROCEDURE — 250N000011 HC RX IP 250 OP 636: Performed by: EMERGENCY MEDICINE

## 2024-10-23 PROCEDURE — 36415 COLL VENOUS BLD VENIPUNCTURE: CPT | Performed by: EMERGENCY MEDICINE

## 2024-10-23 PROCEDURE — 96367 TX/PROPH/DG ADDL SEQ IV INF: CPT

## 2024-10-23 PROCEDURE — 258N000003 HC RX IP 258 OP 636: Performed by: EMERGENCY MEDICINE

## 2024-10-23 PROCEDURE — 84145 PROCALCITONIN (PCT): CPT | Performed by: EMERGENCY MEDICINE

## 2024-10-23 PROCEDURE — 87086 URINE CULTURE/COLONY COUNT: CPT | Performed by: EMERGENCY MEDICINE

## 2024-10-23 PROCEDURE — 85025 COMPLETE CBC W/AUTO DIFF WBC: CPT | Performed by: EMERGENCY MEDICINE

## 2024-10-23 PROCEDURE — 80053 COMPREHEN METABOLIC PANEL: CPT | Performed by: EMERGENCY MEDICINE

## 2024-10-23 PROCEDURE — 258N000003 HC RX IP 258 OP 636: Performed by: STUDENT IN AN ORGANIZED HEALTH CARE EDUCATION/TRAINING PROGRAM

## 2024-10-23 PROCEDURE — 71046 X-RAY EXAM CHEST 2 VIEWS: CPT

## 2024-10-23 PROCEDURE — 82248 BILIRUBIN DIRECT: CPT | Performed by: STUDENT IN AN ORGANIZED HEALTH CARE EDUCATION/TRAINING PROGRAM

## 2024-10-23 PROCEDURE — 87637 SARSCOV2&INF A&B&RSV AMP PRB: CPT | Performed by: EMERGENCY MEDICINE

## 2024-10-23 PROCEDURE — 83735 ASSAY OF MAGNESIUM: CPT | Performed by: STUDENT IN AN ORGANIZED HEALTH CARE EDUCATION/TRAINING PROGRAM

## 2024-10-23 PROCEDURE — 93971 EXTREMITY STUDY: CPT | Mod: RT

## 2024-10-23 PROCEDURE — 99223 1ST HOSP IP/OBS HIGH 75: CPT | Mod: AI | Performed by: STUDENT IN AN ORGANIZED HEALTH CARE EDUCATION/TRAINING PROGRAM

## 2024-10-23 PROCEDURE — 120N000001 HC R&B MED SURG/OB

## 2024-10-23 PROCEDURE — 84484 ASSAY OF TROPONIN QUANT: CPT | Performed by: EMERGENCY MEDICINE

## 2024-10-23 RX ORDER — ACETAMINOPHEN 650 MG/1
650 SUPPOSITORY RECTAL EVERY 4 HOURS PRN
Status: DISCONTINUED | OUTPATIENT
Start: 2024-10-23 | End: 2024-10-26 | Stop reason: HOSPADM

## 2024-10-23 RX ORDER — AMOXICILLIN 250 MG
1 CAPSULE ORAL 2 TIMES DAILY PRN
Status: DISCONTINUED | OUTPATIENT
Start: 2024-10-23 | End: 2024-10-26 | Stop reason: HOSPADM

## 2024-10-23 RX ORDER — ACETAMINOPHEN 325 MG/1
650 TABLET ORAL EVERY 4 HOURS PRN
Status: DISCONTINUED | OUTPATIENT
Start: 2024-10-23 | End: 2024-10-26 | Stop reason: HOSPADM

## 2024-10-23 RX ORDER — ONDANSETRON 2 MG/ML
4 INJECTION INTRAMUSCULAR; INTRAVENOUS EVERY 6 HOURS PRN
Status: DISCONTINUED | OUTPATIENT
Start: 2024-10-23 | End: 2024-10-26 | Stop reason: HOSPADM

## 2024-10-23 RX ORDER — AZITHROMYCIN 500 MG/5ML
500 INJECTION, POWDER, LYOPHILIZED, FOR SOLUTION INTRAVENOUS ONCE
Status: COMPLETED | OUTPATIENT
Start: 2024-10-23 | End: 2024-10-23

## 2024-10-23 RX ORDER — NICOTINE POLACRILEX 4 MG
15-30 LOZENGE BUCCAL
Status: DISCONTINUED | OUTPATIENT
Start: 2024-10-23 | End: 2024-10-26 | Stop reason: HOSPADM

## 2024-10-23 RX ORDER — ONDANSETRON 4 MG/1
4 TABLET, ORALLY DISINTEGRATING ORAL EVERY 6 HOURS PRN
Status: DISCONTINUED | OUTPATIENT
Start: 2024-10-23 | End: 2024-10-26 | Stop reason: HOSPADM

## 2024-10-23 RX ORDER — CALCIUM CARBONATE 500 MG/1
1000 TABLET, CHEWABLE ORAL 4 TIMES DAILY PRN
Status: DISCONTINUED | OUTPATIENT
Start: 2024-10-23 | End: 2024-10-24

## 2024-10-23 RX ORDER — CEFTRIAXONE 2 G/1
2 INJECTION, POWDER, FOR SOLUTION INTRAMUSCULAR; INTRAVENOUS ONCE
Status: COMPLETED | OUTPATIENT
Start: 2024-10-23 | End: 2024-10-23

## 2024-10-23 RX ORDER — LIDOCAINE 40 MG/G
CREAM TOPICAL
Status: DISCONTINUED | OUTPATIENT
Start: 2024-10-23 | End: 2024-10-26 | Stop reason: HOSPADM

## 2024-10-23 RX ORDER — DEXTROSE MONOHYDRATE 25 G/50ML
25-50 INJECTION, SOLUTION INTRAVENOUS
Status: DISCONTINUED | OUTPATIENT
Start: 2024-10-23 | End: 2024-10-26 | Stop reason: HOSPADM

## 2024-10-23 RX ORDER — AMOXICILLIN 250 MG
2 CAPSULE ORAL 2 TIMES DAILY PRN
Status: DISCONTINUED | OUTPATIENT
Start: 2024-10-23 | End: 2024-10-26 | Stop reason: HOSPADM

## 2024-10-23 RX ORDER — FUROSEMIDE 10 MG/ML
40 INJECTION INTRAMUSCULAR; INTRAVENOUS ONCE
Status: COMPLETED | OUTPATIENT
Start: 2024-10-23 | End: 2024-10-23

## 2024-10-23 RX ADMIN — SODIUM CHLORIDE 1000 ML: 9 INJECTION, SOLUTION INTRAVENOUS at 23:42

## 2024-10-23 RX ADMIN — AZITHROMYCIN MONOHYDRATE 500 MG: 500 INJECTION, POWDER, LYOPHILIZED, FOR SOLUTION INTRAVENOUS at 21:43

## 2024-10-23 RX ADMIN — CEFTRIAXONE 2 G: 2 INJECTION, POWDER, FOR SOLUTION INTRAMUSCULAR; INTRAVENOUS at 21:01

## 2024-10-23 RX ADMIN — SODIUM CHLORIDE 500 ML: 9 INJECTION, SOLUTION INTRAVENOUS at 23:03

## 2024-10-23 RX ADMIN — SODIUM CHLORIDE 1000 ML: 9 INJECTION, SOLUTION INTRAVENOUS at 21:01

## 2024-10-23 RX ADMIN — FUROSEMIDE 40 MG: 10 INJECTION, SOLUTION INTRAMUSCULAR; INTRAVENOUS at 22:12

## 2024-10-23 ASSESSMENT — ACTIVITIES OF DAILY LIVING (ADL)
ADLS_ACUITY_SCORE: 0

## 2024-10-24 ENCOUNTER — APPOINTMENT (OUTPATIENT)
Dept: CARDIOLOGY | Facility: CLINIC | Age: 74
DRG: 683 | End: 2024-10-24
Attending: STUDENT IN AN ORGANIZED HEALTH CARE EDUCATION/TRAINING PROGRAM
Payer: MEDICARE

## 2024-10-24 ENCOUNTER — HOME INFUSION (OUTPATIENT)
Dept: HOME HEALTH SERVICES | Facility: HOME HEALTH | Age: 74
End: 2024-10-24
Payer: MEDICARE

## 2024-10-24 DIAGNOSIS — E43 SEVERE PROTEIN-CALORIE MALNUTRITION (H): Primary | ICD-10-CM

## 2024-10-24 DIAGNOSIS — R11.2 NAUSEA WITH VOMITING: ICD-10-CM

## 2024-10-24 DIAGNOSIS — R13.11 DYSPHAGIA, ORAL PHASE: ICD-10-CM

## 2024-10-24 DIAGNOSIS — I63.50 CEREBRAL ARTERY OCCLUSION WITH CEREBRAL INFARCTION (H): ICD-10-CM

## 2024-10-24 DIAGNOSIS — E86.0 DEHYDRATION: ICD-10-CM

## 2024-10-24 LAB
ADV 40+41 DNA STL QL NAA+NON-PROBE: NEGATIVE
ANION GAP SERPL CALCULATED.3IONS-SCNC: 9 MMOL/L (ref 7–15)
ASTRO TYP 1-8 RNA STL QL NAA+NON-PROBE: NEGATIVE
ATRIAL RATE - MUSE: 69 BPM
BACTERIA UR CULT: NORMAL
BUN SERPL-MCNC: 39.7 MG/DL (ref 8–23)
C CAYETANENSIS DNA STL QL NAA+NON-PROBE: NEGATIVE
C DIFF TOX B STL QL: NEGATIVE
CALCIUM SERPL-MCNC: 8.2 MG/DL (ref 8.8–10.4)
CAMPYLOBACTER DNA SPEC NAA+PROBE: NEGATIVE
CHLORIDE SERPL-SCNC: 103 MMOL/L (ref 98–107)
CREAT SERPL-MCNC: 1.83 MG/DL (ref 0.67–1.17)
CRYPTOSP DNA STL QL NAA+NON-PROBE: NEGATIVE
DIASTOLIC BLOOD PRESSURE - MUSE: NORMAL MMHG
E COLI O157 DNA STL QL NAA+NON-PROBE: NORMAL
E HISTOLYT DNA STL QL NAA+NON-PROBE: NEGATIVE
EAEC ASTA GENE ISLT QL NAA+PROBE: NEGATIVE
EC STX1+STX2 GENES STL QL NAA+NON-PROBE: NEGATIVE
EGFRCR SERPLBLD CKD-EPI 2021: 38 ML/MIN/1.73M2
EPEC EAE GENE STL QL NAA+NON-PROBE: NEGATIVE
ERYTHROCYTE [DISTWIDTH] IN BLOOD BY AUTOMATED COUNT: 14.6 % (ref 10–15)
ETEC LTA+ST1A+ST1B TOX ST NAA+NON-PROBE: NEGATIVE
G LAMBLIA DNA STL QL NAA+NON-PROBE: NEGATIVE
GLUCOSE BLDC GLUCOMTR-MCNC: 204 MG/DL (ref 70–99)
GLUCOSE BLDC GLUCOMTR-MCNC: 322 MG/DL (ref 70–99)
GLUCOSE BLDC GLUCOMTR-MCNC: 89 MG/DL (ref 70–99)
GLUCOSE BLDC GLUCOMTR-MCNC: 91 MG/DL (ref 70–99)
GLUCOSE BLDC GLUCOMTR-MCNC: 92 MG/DL (ref 70–99)
GLUCOSE BLDC GLUCOMTR-MCNC: 95 MG/DL (ref 70–99)
GLUCOSE SERPL-MCNC: 99 MG/DL (ref 70–99)
HCO3 SERPL-SCNC: 25 MMOL/L (ref 22–29)
HCT VFR BLD AUTO: 29.3 % (ref 40–53)
HGB BLD-MCNC: 9.4 G/DL (ref 13.3–17.7)
INTERPRETATION ECG - MUSE: NORMAL
LACTATE SERPL-SCNC: 3.1 MMOL/L (ref 0.7–2)
LVEF ECHO: NORMAL
MAGNESIUM SERPL-MCNC: 1.7 MG/DL (ref 1.7–2.3)
MAGNESIUM SERPL-MCNC: 1.9 MG/DL (ref 1.7–2.3)
MCH RBC QN AUTO: 26.3 PG (ref 26.5–33)
MCHC RBC AUTO-ENTMCNC: 32.1 G/DL (ref 31.5–36.5)
MCV RBC AUTO: 82 FL (ref 78–100)
NOROVIRUS GI+II RNA STL QL NAA+NON-PROBE: NEGATIVE
P AXIS - MUSE: 8 DEGREES
P SHIGELLOIDES DNA STL QL NAA+NON-PROBE: NEGATIVE
PHOSPHATE SERPL-MCNC: 3 MG/DL (ref 2.5–4.5)
PLATELET # BLD AUTO: 195 10E3/UL (ref 150–450)
POTASSIUM SERPL-SCNC: 3.7 MMOL/L (ref 3.4–5.3)
PR INTERVAL - MUSE: 176 MS
QRS DURATION - MUSE: 72 MS
QT - MUSE: 424 MS
QTC - MUSE: 454 MS
R AXIS - MUSE: -26 DEGREES
RBC # BLD AUTO: 3.57 10E6/UL (ref 4.4–5.9)
RVA RNA STL QL NAA+NON-PROBE: NEGATIVE
SALMONELLA SP RPOD STL QL NAA+PROBE: NEGATIVE
SAPO I+II+IV+V RNA STL QL NAA+NON-PROBE: NEGATIVE
SHIGELLA SP+EIEC IPAH ST NAA+NON-PROBE: NEGATIVE
SODIUM SERPL-SCNC: 137 MMOL/L (ref 135–145)
SYSTOLIC BLOOD PRESSURE - MUSE: NORMAL MMHG
T AXIS - MUSE: -1 DEGREES
V CHOLERAE DNA SPEC QL NAA+PROBE: NEGATIVE
VENTRICULAR RATE- MUSE: 69 BPM
VIBRIO DNA SPEC NAA+PROBE: NEGATIVE
WBC # BLD AUTO: 6.1 10E3/UL (ref 4–11)
Y ENTEROCOL DNA STL QL NAA+PROBE: NEGATIVE

## 2024-10-24 PROCEDURE — 93306 TTE W/DOPPLER COMPLETE: CPT | Mod: 26 | Performed by: INTERNAL MEDICINE

## 2024-10-24 PROCEDURE — 93306 TTE W/DOPPLER COMPLETE: CPT

## 2024-10-24 PROCEDURE — 250N000012 HC RX MED GY IP 250 OP 636 PS 637: Performed by: STUDENT IN AN ORGANIZED HEALTH CARE EDUCATION/TRAINING PROGRAM

## 2024-10-24 PROCEDURE — 87507 IADNA-DNA/RNA PROBE TQ 12-25: CPT | Performed by: STUDENT IN AN ORGANIZED HEALTH CARE EDUCATION/TRAINING PROGRAM

## 2024-10-24 PROCEDURE — 87493 C DIFF AMPLIFIED PROBE: CPT | Performed by: STUDENT IN AN ORGANIZED HEALTH CARE EDUCATION/TRAINING PROGRAM

## 2024-10-24 PROCEDURE — 80048 BASIC METABOLIC PNL TOTAL CA: CPT | Performed by: STUDENT IN AN ORGANIZED HEALTH CARE EDUCATION/TRAINING PROGRAM

## 2024-10-24 PROCEDURE — 99232 SBSQ HOSP IP/OBS MODERATE 35: CPT | Performed by: STUDENT IN AN ORGANIZED HEALTH CARE EDUCATION/TRAINING PROGRAM

## 2024-10-24 PROCEDURE — 120N000001 HC R&B MED SURG/OB

## 2024-10-24 PROCEDURE — 250N000013 HC RX MED GY IP 250 OP 250 PS 637: Performed by: STUDENT IN AN ORGANIZED HEALTH CARE EDUCATION/TRAINING PROGRAM

## 2024-10-24 PROCEDURE — 85027 COMPLETE CBC AUTOMATED: CPT | Performed by: STUDENT IN AN ORGANIZED HEALTH CARE EDUCATION/TRAINING PROGRAM

## 2024-10-24 PROCEDURE — 258N000003 HC RX IP 258 OP 636: Performed by: STUDENT IN AN ORGANIZED HEALTH CARE EDUCATION/TRAINING PROGRAM

## 2024-10-24 PROCEDURE — 36415 COLL VENOUS BLD VENIPUNCTURE: CPT | Performed by: STUDENT IN AN ORGANIZED HEALTH CARE EDUCATION/TRAINING PROGRAM

## 2024-10-24 PROCEDURE — 83605 ASSAY OF LACTIC ACID: CPT | Performed by: STUDENT IN AN ORGANIZED HEALTH CARE EDUCATION/TRAINING PROGRAM

## 2024-10-24 PROCEDURE — 84100 ASSAY OF PHOSPHORUS: CPT | Performed by: STUDENT IN AN ORGANIZED HEALTH CARE EDUCATION/TRAINING PROGRAM

## 2024-10-24 PROCEDURE — 83735 ASSAY OF MAGNESIUM: CPT | Performed by: STUDENT IN AN ORGANIZED HEALTH CARE EDUCATION/TRAINING PROGRAM

## 2024-10-24 PROCEDURE — 999N000157 HC STATISTIC RCP TIME EA 10 MIN

## 2024-10-24 RX ORDER — ATENOLOL 25 MG/1
25 TABLET ORAL 2 TIMES DAILY
Status: DISCONTINUED | OUTPATIENT
Start: 2024-10-24 | End: 2024-10-26 | Stop reason: HOSPADM

## 2024-10-24 RX ORDER — AMLODIPINE BESYLATE 5 MG/1
5 TABLET ORAL DAILY
COMMUNITY

## 2024-10-24 RX ORDER — CLOPIDOGREL BISULFATE 75 MG/1
75 TABLET ORAL DAILY
Status: DISCONTINUED | OUTPATIENT
Start: 2024-10-24 | End: 2024-10-26 | Stop reason: HOSPADM

## 2024-10-24 RX ORDER — SODIUM CHLORIDE, SODIUM LACTATE, POTASSIUM CHLORIDE, CALCIUM CHLORIDE 600; 310; 30; 20 MG/100ML; MG/100ML; MG/100ML; MG/100ML
INJECTION, SOLUTION INTRAVENOUS CONTINUOUS
Status: DISCONTINUED | OUTPATIENT
Start: 2024-10-24 | End: 2024-10-26 | Stop reason: HOSPADM

## 2024-10-24 RX ORDER — AMLODIPINE BESYLATE 5 MG/1
5 TABLET ORAL DAILY
Status: DISCONTINUED | OUTPATIENT
Start: 2024-10-24 | End: 2024-10-26 | Stop reason: HOSPADM

## 2024-10-24 RX ORDER — BENZONATATE 100 MG/1
100 CAPSULE ORAL 3 TIMES DAILY PRN
Status: DISCONTINUED | OUTPATIENT
Start: 2024-10-24 | End: 2024-10-26 | Stop reason: HOSPADM

## 2024-10-24 RX ORDER — DEXTROSE MONOHYDRATE 100 MG/ML
INJECTION, SOLUTION INTRAVENOUS CONTINUOUS PRN
Status: DISCONTINUED | OUTPATIENT
Start: 2024-10-24 | End: 2024-10-24

## 2024-10-24 RX ORDER — HYDRALAZINE HYDROCHLORIDE 25 MG/1
25 TABLET, FILM COATED ORAL 4 TIMES DAILY PRN
Status: DISCONTINUED | OUTPATIENT
Start: 2024-10-24 | End: 2024-10-25

## 2024-10-24 RX ORDER — LISINOPRIL 40 MG/1
40 TABLET ORAL DAILY
Status: ON HOLD | COMMUNITY
End: 2024-10-26

## 2024-10-24 RX ORDER — CODEINE PHOSPHATE AND GUAIFENESIN 10; 100 MG/5ML; MG/5ML
5 SOLUTION ORAL EVERY 4 HOURS PRN
Status: DISCONTINUED | OUTPATIENT
Start: 2024-10-24 | End: 2024-10-25

## 2024-10-24 RX ORDER — DOXAZOSIN 1 MG/1
2 TABLET ORAL DAILY
Status: DISCONTINUED | OUTPATIENT
Start: 2024-10-24 | End: 2024-10-26 | Stop reason: HOSPADM

## 2024-10-24 RX ORDER — CALCIUM CARBONATE 500 MG/1
1000 TABLET, CHEWABLE ORAL 4 TIMES DAILY PRN
Status: DISCONTINUED | OUTPATIENT
Start: 2024-10-24 | End: 2024-10-26 | Stop reason: HOSPADM

## 2024-10-24 RX ORDER — AMINO ACIDS/PROTEIN HYDROLYS 11G-40/45
1 LIQUID IN PACKET (ML) ORAL DAILY
Status: DISCONTINUED | OUTPATIENT
Start: 2024-10-25 | End: 2024-10-26 | Stop reason: HOSPADM

## 2024-10-24 RX ORDER — INSULIN GLARGINE 100 [IU]/ML
7 INJECTION, SOLUTION SUBCUTANEOUS EVERY MORNING
COMMUNITY

## 2024-10-24 RX ORDER — GUAIFENESIN 600 MG/1
15 TABLET, EXTENDED RELEASE ORAL DAILY
Status: DISCONTINUED | OUTPATIENT
Start: 2024-10-25 | End: 2024-10-26 | Stop reason: HOSPADM

## 2024-10-24 RX ORDER — FLUOXETINE 20 MG/5ML
20 SOLUTION ORAL DAILY
Status: DISCONTINUED | OUTPATIENT
Start: 2024-10-24 | End: 2024-10-26 | Stop reason: HOSPADM

## 2024-10-24 RX ADMIN — AMLODIPINE BESYLATE 5 MG: 5 TABLET ORAL at 12:40

## 2024-10-24 RX ADMIN — DOXAZOSIN 2 MG: 1 TABLET ORAL at 12:40

## 2024-10-24 RX ADMIN — GUAIFENESIN AND CODEINE PHOSPHATE 5 ML: 100; 10 SOLUTION ORAL at 21:17

## 2024-10-24 RX ADMIN — ATENOLOL 25 MG: 25 TABLET ORAL at 21:12

## 2024-10-24 RX ADMIN — INSULIN ASPART 2 UNITS: 100 INJECTION, SOLUTION INTRAVENOUS; SUBCUTANEOUS at 21:11

## 2024-10-24 RX ADMIN — SODIUM CHLORIDE, POTASSIUM CHLORIDE, SODIUM LACTATE AND CALCIUM CHLORIDE: 600; 310; 30; 20 INJECTION, SOLUTION INTRAVENOUS at 15:37

## 2024-10-24 RX ADMIN — FLUOXETINE HYDROCHLORIDE 20 MG: 20 SOLUTION ORAL at 12:39

## 2024-10-24 RX ADMIN — ATENOLOL 25 MG: 25 TABLET ORAL at 12:41

## 2024-10-24 RX ADMIN — CLOPIDOGREL BISULFATE 75 MG: 75 TABLET ORAL at 12:40

## 2024-10-24 NOTE — PHARMACY-ADMISSION MEDICATION HISTORY
Pharmacist Admission Medication History    Admission medication history is complete. The information provided in this note is only as accurate as the sources available at the time of the update.    Information Source(s): Family member and CareEverywhere/SureScripts via phone    Pertinent Information: For patients on insulin therapy:  Do you use sliding scale insulin based on blood sugars? Yes  Do you typically eat three meals a day? No  has FT  How many times do you check your blood glucose per day? Has continuous blood glucose monitor  How many episodes of hypoglycemia do you typically have per month? Family member didn't know      Changes made to PTA medication list:  Added: insulins, lisinopril  Deleted: Nystatin, novolog  Changed: amlodipine    Allergies reviewed with patient and updates made in EHR: yes    Medication History Completed By: Keysha Huddleston [facility] 10/24/2024 10:57 AM    PTA Med List   Medication Sig Last Dose/Taking    amLODIPine (NORVASC) 5 MG tablet Take 5 mg by mouth daily. 10/23/2024 Morning    atenolol (TENORMIN) 25 MG tablet Take 1 tablet (25 mg) by mouth 2 times daily 10/23/2024 Morning    atorvastatin (LIPITOR) 80 MG tablet Take 80 mg by mouth daily 10/23/2024 Morning    clopidogrel (PLAVIX) 75 MG tablet Take 75 mg by mouth daily 10/23/2024 Morning    doxazosin (CARDURA) 2 MG tablet Take 1 tablet (2 mg) by mouth daily 10/23/2024 Morning    FLUoxetine (PROZAC) 20 MG/5ML solution 5 mLs (20 mg) by Oral or Feeding Tube route daily 10/23/2024 Morning    insulin glargine 100 UNIT/ML pen Inject 7 Units subcutaneously every morning. 10/23/2024 Morning    Insulin Lispro (ADMELOG SC) Inject subcutaneously. Sliding scale Taking    lisinopril (ZESTRIL) 40 MG tablet Take 40 mg by mouth daily. 10/23/2024 Morning    metFORMIN (GLUCOPHAGE) 1000 MG tablet 1,000 mg by Oral or Feeding Tube route daily (with breakfast) 10/23/2024 Morning    metFORMIN (GLUCOPHAGE) 1000 MG tablet 500 mg by Oral or Feeding Tube  route daily (with dinner) 10/22/2024 Evening    Nutritional Supplements (JEVITY PO) 1 AM + 1 Lunch + 2 dinner Taking    polyethylene glycol (MIRALAX) 17 g packet Take 17 g by mouth daily as needed for constipation Taking As Needed

## 2024-10-24 NOTE — PROGRESS NOTES
[facility]    Patient is currently on service with [facility] for enteral nutrition - Jevity 1.5, 5 cartons/day (1 carton at 7am, 2 cartons at 12pm and 2 cartons at 5pm OR 1 carton 5x/day).    Liaison will follow along. If applicable at discharge, please include Home Infusion Referral in discharge orders.     Thank you,    Claire Kan RN  [facility] Liaison  [phone] (M-F 8a-5p)  [phone] Office

## 2024-10-24 NOTE — PLAN OF CARE
"Pertinent assessments: Alert, sleeping off and on, lungs diminished, non-productive cought, repositioned as able per family, TF given at 1800. 1U insulin given for BG.    Major Shift Events: cough suppressant ordered, otherwise uneventful    Treatment Plan: BG checks, TF, discharge pending          Goal Outcome Evaluation:      Plan of Care Reviewed With: patient, child    Overall Patient Progress: improvingOverall Patient Progress: improving    Outcome Evaluation: cough suppressant ordered, pt comfortable      Problem: Adult Inpatient Plan of Care  Goal: Plan of Care Review  Description: The Plan of Care Review/Shift note should be completed every shift.  The Outcome Evaluation is a brief statement about your assessment that the patient is improving, declining, or no change.  This information will be displayed automatically on your shift  note.  Outcome: Progressing  Flowsheets (Taken 10/24/2024 1830)  Outcome Evaluation: cough suppressant ordered, pt comfortable  Plan of Care Reviewed With:   patient   child  Overall Patient Progress: improving  Goal: Patient-Specific Goal (Individualized)  Description: You can add care plan individualizations to a care plan. Examples of Individualization might be:  \"Parent requests to be called daily at 9am for status\", \"I have a hard time hearing out of my right ear\", or \"Do not touch me to wake me up as it startles  me\".  Outcome: Progressing  Goal: Absence of Hospital-Acquired Illness or Injury  Outcome: Progressing  Intervention: Identify and Manage Fall Risk  Recent Flowsheet Documentation  Taken 10/24/2024 1540 by Fozia Da Silva RN  Safety Promotion/Fall Prevention:   activity supervised   lighting adjusted   safety round/check completed   supervised activity  Intervention: Prevent Skin Injury  Recent Flowsheet Documentation  Taken 10/24/2024 1540 by Fozia Da Silva, RN  Skin Protection: adhesive use limited  Intervention: Prevent and Manage VTE (Venous Thromboembolism) " Risk  Recent Flowsheet Documentation  Taken 10/24/2024 1540 by Fozia Da Silva, RN  VTE Prevention/Management: SCDs off (sequential compression devices)  Intervention: Prevent Infection  Recent Flowsheet Documentation  Taken 10/24/2024 1540 by Fozia Da Silva, RN  Infection Prevention: rest/sleep promoted  Goal: Optimal Comfort and Wellbeing  Outcome: Progressing  Goal: Readiness for Transition of Care  Outcome: Progressing

## 2024-10-24 NOTE — H&P
[facility]    History and Physical - Hospitalist Service       Date of Admission:  10/23/2024    Assessment & Plan      Jimmy Otto is a 74 year old male with PMH significant for CVA with resultant R-sided weakness/nonverbal status/PEG tube, T2DM, HTN admitted on 10/23/2024 with reported fever, cough.     Diarrhea:  Daughter reports 4 to 5 days of diarrheal illness.  Sounds like 3-4 watery bowel movements per day.  She reports this is ongoing.  Reported fever.  Patient denies abdominal pain.  Abdominal exam is benign.  -C. Difficile, enteric panel  -1.5 L bolus on presentation followed by 100 cc/h overnight  -Enteric precautions  -Monitor stool output    Lactic acidosis:  Lactic acid 4.7 on presentation.  Recheck 4.2.  Unclear etiology.  Question if it is related to dehydration related to the above diarrheal illness.  Of note, the patient also takes metformin which is likely contributing.  -Reviewed fluid resuscitation as above  -Repeat lactic acid in 2 hours    Acute kidney injury on CKD:  Recent baseline creatinine is 1.2-1.5.  Presents with a creatinine of 1.96.  Suspect acute kidney injury is related to dehydration in the setting of diarrheal illness as above.  -Fluid resuscitation  -BMP in a.m.  -Bladder scans to ensure not retaining    Cough  Report of dyspnea:  Reportedly has been having a cough and some dyspnea.  Home health nurse was concerned for pneumonia as he was having some low-grade fevers.  On my exam, his lungs are clear.  He has a wet cough but it sounds upper airway.  Given his history of stroke and dysphagia he may have some difficulty clearing upper airway secretions.  CXR clear.   -Received ceftriaxone and azithromycin in ED, will hold off on further antibiotics  -Continuous oximetry    Elevated troponin & BNP:  ProBNP 1800.  Trop 51 -->46.  Patient denies chest pain.  EKG non-ischemic.  Daughter at bedside actually reports that he has much less edema than usual.  His exam is  not consistent with CHF.  There is no edema present, no rales on exam.  BNP and trop may be elevated in the setting of emilie.   -Monitor for edema  -Consider TTE    Left arm swelling:  This is new.  Unclear etiology.  He has bruising to the left arm.  Unclear etiology.  Chronic R-sided hemiplegia so may be related to inactivity.  US negative for DVT.    -Monitor    Hx CVA with R-sided weakness, nonverbal status, PEG tube dependent:  -NPO  -Nutrition consultation for tube feedings    T2DM:  Hold PTA metformin.  Will have sliding scale insulin q4h given peg dependence.    HTN:  PTA atenolol.        Diet:  Tube feeds per nutrition  DVT Prophylaxis: Low Risk/Ambulatory with no VTE prophylaxis indicated  Felix Catheter: Not present  Lines: None     Cardiac Monitoring: None  Code Status:  FULL    Clinically Significant Risk Factors Present on Admission          # Hypochloremia: Lowest Cl = 95 mmol/L in last 2 days, will monitor as appropriate        # Drug Induced Platelet Defect: home medication list includes an antiplatelet medication   # Hypertension: Noted on problem list    # Anemia: based on hgb <11                  Disposition Plan     Medically Ready for Discharge: Anticipated in 2-4 Days           Zachery Vega DO  Hospitalist Service  [facility]  Securely message with [employer] (more info)  Text page via [facility] Paging/Directory     ______________________________________________________________________    Chief Complaint   cough    History is obtained from the patient's daughter    History of Present Illness   Jimmy Otto is a 74 year old male with PMH significant for CVA with resultant R-sided weakness/nonverbal status/PEG tube, T2DM, HTN admitted on 10/23/2024 with reported fever, cough.     Home health nurse reports wet sounding cough, dyspnea, and low grade fevers.  Was concerned for pneumonia and so he was sent for further eval.  The daughter is at bedside and reports that he has had 4-5 days  of watery diarrhea.  The patient denies any chest pain, shortness of breath, abdominal pain, or pain elsewhere.       Past Medical History    Past Medical History:   Diagnosis Date    Cerebrovascular accident - Hemiplegia and Aphasia     Depressive disorder     Diabetes mellitus     Hypercholesteremia     Hypertension        Past Surgical History   Past Surgical History:   Procedure Laterality Date    ENDOSCOPIC INSERTION TUBE GASTROSTOMY      IR CYSTOGRAM  2022       Prior to Admission Medications   Prior to Admission Medications   Prescriptions Last Dose Informant Patient Reported? Taking?   FLUoxetine (PROZAC) 20 MG/5ML solution   No No   Si mLs (20 mg) by Oral or Feeding Tube route daily   Nutritional Supplements (JEVITY PO)   Yes No   Si AM + 1 Lunch + 2 dinner   amLODIPine (NORVASC) 5 MG tablet   No No   Sig: Take 2 tablets (10 mg) by mouth daily   atenolol (TENORMIN) 25 MG tablet   No No   Sig: Take 1 tablet (25 mg) by mouth 2 times daily   atorvastatin (LIPITOR) 80 MG tablet   Yes No   Sig: Take 80 mg by mouth daily   clopidogrel (PLAVIX) 75 MG tablet   Yes No   Sig: Take 75 mg by mouth daily   doxazosin (CARDURA) 2 MG tablet   No No   Sig: Take 1 tablet (2 mg) by mouth daily   insulin aspart (NOVOLOG FLEXPEN) 100 UNIT/ML pen   Yes No   Sig: Inject 0-5 Units Subcutaneous At Bedtime 1 unit for every 50 over 140   metFORMIN (GLUCOPHAGE) 1000 MG tablet   Yes No   Si,000 mg by Oral or Feeding Tube route daily (with breakfast)   metFORMIN (GLUCOPHAGE) 1000 MG tablet   Yes No   Si mg by Oral or Feeding Tube route daily (with dinner)   nystatin (MYCOSTATIN) 873912 UNIT/ML suspension   Yes No   Sig: USE SMALL AMOUNT TO BRUSH ON WHITE PATCHES IN MOUTH FOUR TIMES DAILY.   polyethylene glycol (MIRALAX) 17 g packet   No No   Sig: Take 17 g by mouth daily as needed for constipation      Facility-Administered Medications: None        Review of Systems    The 10 point Review of Systems is negative  other than noted in the HPI or here.     Social History   I have reviewed this patient's social history and updated it with pertinent information if needed.  Social History     Tobacco Use    Smoking status: Never    Smokeless tobacco: Never   Substance Use Topics    Alcohol use: Yes     Comment: occassional    Drug use: No         Family History   No reported family history.      Allergies   Allergies   Allergen Reactions    Nka [No Known Allergies]         Physical Exam   Vital Signs: Temp: 97.4  F (36.3  C) Temp src: Temporal BP: (!) 154/79 Pulse: 74   Resp: 22 SpO2: 97 % O2 Device: None (Room air)    Weight: 0 lbs 0 oz    General Appearance: Patient awake & alert.  No apparent distress.  Non-verbal.  Nods or shakes head yes/no to questions.   Respiratory: Lungs are CTAB.  Work of breathing appears normal on room air.  He has a wet cough but sounds upper airway in nature.  Protecting airway.  Cardiovascular: Regular rate and rhythm.  No murmurs rubs or gallops.  There is no edema present.  GI: Benign.  Soft.  Non-tender.  Bowel sounds active.   Skin: No rashes or lesions exposed skin.  Neuro:  The patient is moving all extremities.  No obvious focal asymmetries.   Other: Patient is appropriate.  Right arm with some swelling.     Medical Decision Making       75 MINUTES SPENT BY ME on the date of service doing chart review, history, exam, documentation & further activities per the note.      Data   ------------------------- PAST 24 HR DATA REVIEWED -----------------------------------------------    I have personally reviewed the following data over the past 24 hrs:    6.9  \   10.7 (L)   / 204     136 95 (L) 41.8 (H) /  117 (H)   4.3 23 1.96 (H) \     ALT: 36 AST: 37 AP: 106 TBILI: <0.2   ALB: 2.9 (L) TOT PROTEIN: 6.3 (L) LIPASE: N/A     Trop: 46 (H) BNP: 1,876 (H)     Procal: 0.07 CRP: N/A Lactic Acid: 4.2 (HH)         Imaging results reviewed over the past 24 hrs:   Recent Results (from the past 24 hours)   US  Upper Extremity Venous Duplex Right    Narrative    EXAM: US UPPER EXTREMITY VENOUS DUPLEX RIGHT  LOCATION: [facility]  DATE: 10/23/2024    INDICATION: Upper extremity pain/swelling  COMPARISON: None.  TECHNIQUE: Venous Duplex ultrasound of the right upper extremity with (when possible) and without compression, augmentation, and duplex. Color flow and spectral Doppler with waveform analysis performed.    FINDINGS: Ultrasound includes evaluation of the internal jugular vein, innominate vein, subclavian vein, axillary vein, and brachial vein. The superficial cephalic and basilic veins were also evaluated where seen.     RIGHT: No deep venous thrombosis. No superficial thrombophlebitis.      Impression    IMPRESSION:  1.  No deep venous thrombosis in the right upper extremity.   Chest XR,  PA & LAT    Narrative    EXAM: XR CHEST 2 VIEWS  LOCATION: [facility]  DATE: 10/23/2024    INDICATION: Cough, low grade fever  COMPARISON: 4/21/2023.      Impression    IMPRESSION: Negative chest.

## 2024-10-24 NOTE — PROGRESS NOTES
[facility]    Medicine Progress Note - Hospitalist Service    Date of Admission:  10/23/2024    Assessment & Plan   74 year old male with PMH significant for CVA with resultant R-sided weakness/nonverbal status/PEG tube, T2DM, HTN admitted on 10/23/2024 after his home care nurse reported what sounded cough with low-grade fever.  Patient also had diarrhea with 4-5 loose bowel movements per day but per daughter patient also has chronic diarrhea.  Patient got COVID and influenza shots 6 days before admission.    Upon arrival the patient was afebrile and hemodynamically stable.  Blood pressure was high at 168/89.  Workup was significant for BUN/creatinine of 41.8/1.96 with anion gap of 18 and lactate of 4.7.  proBNP was 1876 with mildly elevated troponin of 51.  Tested negative for influenza, COVID and RSV.  Chest x-ray was clear.  He also has chronic right upper extremity edema and venous ultrasound was negative for DVT.    He was given NS bolus x 1.5 L followed by NS at 100 mL/h for another liter.  He was also given a dose of ceftriaxone and azithromycin in the ER which was not continued.      Diarrhea.  Likely due to tube feeding, enteric panel and C. difficile negative.  Started Banatrol 3 times daily.    Lactic acidosis.  Secondary to ALEX and metformin.  Lactate improved from 4.7-3.1.    ALEX on CKD.  Baseline creatinine of 1.2-1.5, presented with creatinine of 1.96.  ALEX is likely due to dehydration with diarrheal illness.  Hold lisinopril  Continue LR at 75 mL/h overnight.    Congested cough, reports of dyspnea..  It appears patient has bronchitis ordered today reaction to the COVID and flu vaccine he recently received.  Chest x-ray is clear, no leukocytosis and procalcitonin negative.  Given dose of ceftriaxone and azithromycin in the ER but will not continue antibiotics.  BNP is likely elevated due to renal insufficiency.  Normal EF of CHF with no IVC dilatation.    Right arm  swelling.  Venous ultrasound negative for DVT..    Essential hypertension.  Resumed amlodipine, atenolol and doxazosin, holding lisinopril for ALEX.    History of stroke.  Continue Plavix.  PEG tube dependent.    Diabetes mellitus type 2.  Metformin held due to lactic acidosis, continuing sliding scale..          Diet: NPO for Medical/Clinical Reasons Except for: NPO but receiving Tube Feeding  Adult Formula Bolus Feeding: Jevity 1.5; Route: Gastrostomy; 3 times daily; Volume per Bolus: 1; Can(s)/ Carton(s); 1 carton at ~9 AM, 1 carton at noon, and 2 cartons at 6 pm via gravity bag (or timings per patient/nursing preference)    DVT Prophylaxis: Pneumatic Compression Devices  Felix Catheter: PRESENT, indication: Other (Comment) (Chronic)  Lines: None     Cardiac Monitoring: None  Code Status: Full Code      Clinically Significant Risk Factors Present on Admission          # Hypochloremia: Lowest Cl = 95 mmol/L in last 2 days, will monitor as appropriate      # Hypoalbuminemia: Lowest albumin = 2.9 g/dL at 10/23/2024 10:03 PM, will monitor as appropriate   # Drug Induced Platelet Defect: home medication list includes an antiplatelet medication   # Hypertension: Noted on problem list  # Acute heart failure with preserved ejection fraction: heart failure noted on problem list, last echo with EF >50%, and receiving IV diuretics   # Anemia: based on hgb <11                  Disposition Plan     Medically Ready for Discharge: Anticipated Tomorrow             Jarret Tolliver MD  Hospitalist Service  [facility]  Securely message with [employer] - is now [employer] (more info)  Text page via [employer] Paging/Directory   ______________________________________________________________________    Interval History   Continues to have loose stool.  Patient is nonverbal but appears comfortable.    Physical Exam   Vital Signs: Temp: 98.7  F (37.1  C) Temp src: Axillary BP: (!) 181/85 Pulse: 73   Resp: 16 SpO2: 96 % O2 Device: None (Room air)     Weight: 133 lbs 13.11 oz    General Appearance: Nonverbal but appears comfortable  Respiratory: Coarse breath sounds.  Cardiovascular: S1-S2 normal  GI: Soft and nontender  Skin: No rash  Other: Trace lower extremity edema.  Right hemiplegia.      Medical Decision Making       MANAGEMENT DISCUSSED with the following over the past 24 hours: Patient's daughter and RN       Data     I have personally reviewed the following data over the past 24 hrs:    6.1  \   9.4 (L)   / 195     137 103 39.7 (H) /  95   3.7 25 1.83 (H) \     ALT: 36 AST: 37 AP: 106 TBILI: <0.2   ALB: 2.9 (L) TOT PROTEIN: 6.3 (L) LIPASE: N/A     Trop: 46 (H) BNP: 1,876 (H)     Procal: 0.07 CRP: N/A Lactic Acid: 3.1 (H)         Imaging results reviewed over the past 24 hrs:   Recent Results (from the past 24 hours)   US Upper Extremity Venous Duplex Right    Narrative    EXAM: US UPPER EXTREMITY VENOUS DUPLEX RIGHT  LOCATION: [facility]  DATE: 10/23/2024    INDICATION: Upper extremity pain/swelling  COMPARISON: None.  TECHNIQUE: Venous Duplex ultrasound of the right upper extremity with (when possible) and without compression, augmentation, and duplex. Color flow and spectral Doppler with waveform analysis performed.    FINDINGS: Ultrasound includes evaluation of the internal jugular vein, innominate vein, subclavian vein, axillary vein, and brachial vein. The superficial cephalic and basilic veins were also evaluated where seen.     RIGHT: No deep venous thrombosis. No superficial thrombophlebitis.      Impression    IMPRESSION:  1.  No deep venous thrombosis in the right upper extremity.   Chest XR,  PA & LAT    Narrative    EXAM: XR CHEST 2 VIEWS  LOCATION: [facility]  DATE: 10/23/2024    INDICATION: Cough, low grade fever  COMPARISON: 4/21/2023.      Impression    IMPRESSION: Negative chest.   Echocardiogram Complete   Result Value    LVEF  60-65%    Narrative     [id number]  [id number]  [id number]  [id number]^[email or web address]^[email or web address]     [facility]  Echocardiography Laboratory  [address] [place] [address]     Name: CARLOS SCHULTZ  MRN: [id number]  : 1950  Study Date: 10/24/2024 10:40 AM  Age: 74 yrs  Gender: Male  Patient Location: [facility]  Reason For Study: CHF  Ordering Physician: YELENA GREENWOOD  Performed By: Jia Abrams     BSA: 1.7 m2  Height: 67 in  Weight: 133 lb  BP: 166/75 mmHg  ______________________________________________________________________________  Procedure  Complete Echo Adult.  ______________________________________________________________________________  Interpretation Summary     The visual ejection fraction is 60-65%.  Left ventricular diastolic function is normal.  Compared to prior study, there is no significant change.  ______________________________________________________________________________  Left Ventricle  The left ventricle is normal in size. There is normal left ventricular wall  thickness. The visual ejection fraction is 60-65%. Left ventricular diastolic  function is normal.     Right Ventricle  The right ventricle is normal in structure, function and size.     Atria  Normal left atrial size. Right atrial size is normal.     Mitral Valve  The mitral valve leaflets appear normal. There is no evidence of stenosis,  fluttering, or prolapse.     Tricuspid Valve  Normal tricuspid valve.     Aortic Valve  There is mild trileaflet aortic sclerosis.     Vessels  The aortic root is normal size. Normal size ascending aorta. The inferior vena  cava is normal.     Pericardium  There is no pericardial effusion.     Rhythm  Sinus rhythm was noted.     ______________________________________________________________________________  MMode/2D Measurements & Calculations  IVSd: 1.0 cm  LVIDd: 4.1 cm  LVIDs: 2.4 cm  LVPWd: 0.67 cm     FS: 41.9 %  LV mass(C)d: 105.0 grams  LV mass(C)dI: 61.7 grams/m2  Ao root diam: 3.4 cm  asc Aorta Diam: 3.2  cm  LVOT diam: 2.0 cm  LVOT area: 3.0 cm2  Ao root diam index Ht(cm/m): 2.0  Ao root diam index BSA (cm/m2): 2.0  Asc Ao diam index BSA (cm/m2): 1.9  Asc Ao diam index Ht(cm/m): 1.9  LA Volume (BP): 46.3 ml     LA Volume Index (BP): 27.2 ml/m2  RWT: 0.33  TAPSE: 1.8 cm     Doppler Measurements & Calculations  MV E max kathleen: 78.1 cm/sec  MV A max kathleen: 103.2 cm/sec  MV E/A: 0.76  MV dec slope: 303.8 cm/sec2  MV dec time: 0.26 sec  E/E' avg: 10.2  Lateral E/e': 8.6  Medial E/e': 11.8     ______________________________________________________________________________  Report approved by: Luciana Ferris 10/24/2024 12:42 PM

## 2024-10-24 NOTE — PROVIDER NOTIFICATION
DATE/TIME OF CALL RECEIVED FROM LAB:  10/24/24 at 3:15 AM   LAB TEST:  Lactic Acid  LAB VALUE:  3.1  PROVIDER NOTIFIED?: Yes  PROVIDER NAME: Amy Parmar  DATE/TIME LAB VALUE REPORTED TO PROVIDER: 3:15 AM  MECHANISM OF PROVIDER NOTIFICATION: Page  PROVIDER RESPONSE: pending

## 2024-10-24 NOTE — ED TRIAGE NOTES
Pt arrives with daughter. Home health nurse concerned for pneumonia, pt having low-grade fevers, cough, shortness of breath. Symptoms began a couple days ago. Left hand swelling is new as well, no redness. Hx CVA, nonverbal, has PEG and ordoñez catheter.

## 2024-10-24 NOTE — PROGRESS NOTES
10/24/24 0012   Skin   Skin WDL X;color;integrity   Skin Color pale;redness blanchable   Skin Integrity abrasion/excoriation;bruised (ecchymotic);scab   Abrasion / Excoriation Buttocks/IT;Sacrum/Coccyx   Bruised (ecchymotic) location Right;Left;Fore Arm   Redness blanchable location Buttocks/IT;Sacrum/Coccyx   Scab Back   Device Skin Interventions Taken No adjustments needed   Additional Documentation   (open wound)       Admission/Transfer from: ED  2 RN skin assessment completed. Yes  Significant findings include: wound on sacrum  LDA added (if applicable)? YES  Requested WOC Nurse Consult from MD? YES

## 2024-10-24 NOTE — PLAN OF CARE
"Goal Outcome Evaluation:    End of Shift Summary  For vital signs and complete assessments, please see documentation flowsheets.     Pertinent assessments: Alert, sleeping off and on throughout shift. Nonverbal. VSS, afebrile & sats maintained on RA - BP elevated but improved with resumption of PTA meds. Unclear response when asked about pain, grimaced with repositioning otherwise no nonverbal indicators noted. Tolerating TF via G-tube. Loose/soft BM x2 thi shift. Felix patent with adequate UOP. Up to chair with lift x1. Family at bedside.    Major Shift Events: Echo completed.  Treatment Plan:  IVF  Bedside Nurse: Alba Watts RN       Plan of Care Reviewed With: patient, child      Problem: Adult Inpatient Plan of Care  Goal: Plan of Care Review  Description: The Plan of Care Review/Shift note should be completed every shift.  The Outcome Evaluation is a brief statement about your assessment that the patient is improving, declining, or no change.  This information will be displayed automatically on your shift  note.  Outcome: Progressing  Flowsheets (Taken 10/24/2024 4381)  Plan of Care Reviewed With:   patient   child  Goal: Patient-Specific Goal (Individualized)  Description: You can add care plan individualizations to a care plan. Examples of Individualization might be:  \"Parent requests to be called daily at 9am for status\", \"I have a hard time hearing out of my right ear\", or \"Do not touch me to wake me up as it startles  me\".  Outcome: Progressing  Goal: Absence of Hospital-Acquired Illness or Injury  Outcome: Progressing  Intervention: Identify and Manage Fall Risk  Recent Flowsheet Documentation  Taken 10/24/2024 1000 by Alba Watts, RN  Safety Promotion/Fall Prevention:   activity supervised   clutter free environment maintained   lighting adjusted   increase visualization of patient   safety round/check completed   treat reversible contributory factors   treat underlying cause  Intervention: Prevent " and Manage VTE (Venous Thromboembolism) Risk  Recent Flowsheet Documentation  Taken 10/24/2024 1000 by Alba Watts, RN  VTE Prevention/Management: SCDs off (sequential compression devices)  Goal: Optimal Comfort and Wellbeing  Outcome: Progressing  Goal: Readiness for Transition of Care  Outcome: Progressing

## 2024-10-24 NOTE — PLAN OF CARE
"  Pt arrived from ED. Pt's daughter in law at bedside, and helps with communication. Pt nonverbal but understands. Able to point where it hurts. Chronic ordoñez, last changed on Friday. Dependent on TF. G-tube in place. Redness blanchable and open area noted on buttock. WOC consult requested. Has been having frequent diarrhea. Sample stool send to lab to r/o c.diff. Currently on enteric precaution. Denied chest pain, SOB. Infrequent cough, congested nonproductive. IVF completed. Bed bound, baseline. Uses lift for transfers. Blood sugar check. On K+ and Mg protocol. Current lactic acid 3.1, provider notified. R sided weakness due to previous stroke.    Goal Outcome Evaluation:      Plan of Care Reviewed With: patient    Overall Patient Progress: improvingOverall Patient Progress: improving    Outcome Evaluation: Nonverbal. Lactic acid 3.1. Open area on coccyx.      Problem: Adult Inpatient Plan of Care  Goal: Plan of Care Review  Description: The Plan of Care Review/Shift note should be completed every shift.  The Outcome Evaluation is a brief statement about your assessment that the patient is improving, declining, or no change.  This information will be displayed automatically on your shift  note.  Outcome: Progressing  Flowsheets (Taken 10/24/2024 0647)  Outcome Evaluation: Nonverbal. Lactic acid 3.1. Open area on coccyx.  Plan of Care Reviewed With: patient  Overall Patient Progress: improving  Goal: Patient-Specific Goal (Individualized)  Description: You can add care plan individualizations to a care plan. Examples of Individualization might be:  \"Parent requests to be called daily at 9am for status\", \"I have a hard time hearing out of my right ear\", or \"Do not touch me to wake me up as it startles  me\".  Outcome: Progressing  Goal: Absence of Hospital-Acquired Illness or Injury  Outcome: Progressing  Intervention: Identify and Manage Fall Risk  Recent Flowsheet Documentation  Taken 10/24/2024 0012 by Leana " RAHAT Macdonald  Safety Promotion/Fall Prevention:   activity supervised   clutter free environment maintained   nonskid shoes/slippers when out of bed   patient and family education   safety round/check completed   supervised activity  Intervention: Prevent Skin Injury  Recent Flowsheet Documentation  Taken 10/24/2024 0012 by Renae Dueñas RN  Skin Protection:   adhesive use limited   incontinence pads utilized  Intervention: Prevent and Manage VTE (Venous Thromboembolism) Risk  Recent Flowsheet Documentation  Taken 10/24/2024 0012 by Renae Dueñas RN  VTE Prevention/Management: SCDs off (sequential compression devices)  Intervention: Prevent Infection  Recent Flowsheet Documentation  Taken 10/24/2024 0012 by Renae Dueñas RN  Infection Prevention: rest/sleep promoted  Goal: Optimal Comfort and Wellbeing  Outcome: Progressing  Goal: Readiness for Transition of Care  Outcome: Progressing  Intervention: Mutually Develop Transition Plan  Recent Flowsheet Documentation  Taken 10/24/2024 0000 by Renae Dueñas RN  Equipment Currently Used at Home:   lift device   wheelchair, manual   hospital bed     Problem: Pain Acute  Goal: Optimal Pain Control and Function  Outcome: Progressing  Intervention: Prevent or Manage Pain  Recent Flowsheet Documentation  Taken 10/24/2024 0012 by Renae Dueñas RN  Sensory Stimulation Regulation: care clustered  Medication Review/Management: medications reviewed     Problem: Comorbidity Management  Goal: Blood Glucose Levels Within Targeted Range  Outcome: Progressing  Intervention: Monitor and Manage Glycemia  Recent Flowsheet Documentation  Taken 10/24/2024 0012 by Renae Dueñas RN  Medication Review/Management: medications reviewed  Goal: Blood Pressure in Desired Range  Outcome: Progressing  Intervention: Maintain Blood Pressure Management  Recent Flowsheet Documentation  Taken 10/24/2024 0012 by Renae Dueñas RN  Medication Review/Management: medications reviewed

## 2024-10-24 NOTE — ED NOTES
ED Nurse Handoff Report    ED Chief complaint: Fever  . ED Diagnosis:   Final diagnoses:   Acute congestive heart failure, unspecified heart failure type (H)   Lactic acidosis   Localized swelling of right upper extremity   ALEX (acute kidney injury) (H)       Allergies:   Allergies   Allergen Reactions    Nka [No Known Allergies]        Code Status: Full Code    Activity level - Baseline/Home:  in bed.  Activity Level - Current:   lift.   Lift room needed: Yes.   Bariatric: No   Needed: No - patient is nonverbal but understand communications  Isolation: Yes - C-diff rule out.   Infection: C-diff rule out - still needs to collect stool sample.     Respiratory status: Room air    Vital Signs (within 30 minutes):   Vitals:    10/23/24 2115 10/23/24 2130 10/23/24 2145 10/23/24 2200   BP: (!) 172/86 (!) 160/81 (!) 160/93 (!) 154/79   Pulse: 75 75 78 74   Resp:       Temp:       TempSrc:       SpO2: 97% 95% 97% 97%       Cardiac Rhythm:  ,      Pain level:    Patient confused: No.   Patient Falls Risk:  Bed bound .   Elimination Status: Urethral catheter (ordoñez) in place; refer to orders to discontinue as per protocol      Patient Report - Initial Complaint: Fever.   Focused Assessment: Jimmy Otto is a 74 year old male with a history of stroke, hyperlipidemia, hypertension, and type 2 diabetes mellitus who presents to the ED for a fever. Per the patient's daughter, the patient is right-side affected from a previous stroke, nonverbal, dependent on a feeding tube, and has a chronic Ordoñez catheter in place. His last Ordoñez change was on Friday. He is able to point to where it hurts. Today, the patient's home health nurse noticed that he had a low grade fever, as well as some crackling in his lungs. They also thought the patient was in some pain. Because of these symptoms, they voiced concern for pneumonia. Per the patient's wife, he has had a cough since last Thursday. Daughter notes  the patient has not been sleeping well. She also noticed that his right arm is more swollen that the left. She adds that he has been having frequent diarrhea. He has not had antibiotics recently. Patient did not receive any antipyretics prior to arrival. When asked where he is having pain, he points to his chest. He also denies headache, shortness of breath, or abdominal pain.     Abnormal Results:   Labs Ordered and Resulted from Time of ED Arrival to Time of ED Departure   BASIC METABOLIC PANEL - Abnormal       Result Value    Sodium 136      Potassium 4.3      Chloride 95 (*)     Carbon Dioxide (CO2) 23      Anion Gap 18 (*)     Urea Nitrogen 41.8 (*)     Creatinine 1.96 (*)     GFR Estimate 35 (*)     Calcium 8.7 (*)     Glucose 117 (*)    LACTIC ACID WHOLE BLOOD - Abnormal    Lactic Acid 4.7 (*)    TROPONIN T, HIGH SENSITIVITY - Abnormal    Troponin T, High Sensitivity 51 (*)    CBC WITH PLATELETS AND DIFFERENTIAL - Abnormal    WBC Count 6.9      RBC Count 4.14 (*)     Hemoglobin 10.7 (*)     Hematocrit 34.4 (*)     MCV 83      MCH 25.8 (*)     MCHC 31.1 (*)     RDW 14.6      Platelet Count 204      % Neutrophils 55      % Lymphocytes 29      % Monocytes 10      % Eosinophils 6      % Basophils 1      % Immature Granulocytes 0      NRBCs per 100 WBC 0      Absolute Neutrophils 3.8      Absolute Lymphocytes 2.0      Absolute Monocytes 0.7      Absolute Eosinophils 0.4      Absolute Basophils 0.1      Absolute Immature Granulocytes 0.0      Absolute NRBCs 0.0     NT PROBNP INPATIENT - Abnormal    N terminal Pro BNP Inpatient 1,876 (*)    INFLUENZA A/B, RSV, & SARS-COV2 PCR - Normal    Influenza A PCR Negative      Influenza B PCR Negative      RSV PCR Negative      SARS CoV2 PCR Negative     ROUTINE UA WITH MICROSCOPIC REFLEX TO CULTURE   TROPONIN T, HIGH SENSITIVITY   LACTIC ACID WHOLE BLOOD   PROCALCITONIN   HEPATIC FUNCTION PANEL   URINE CULTURE        Chest XR,  PA & LAT   Final Result   IMPRESSION: Negative  chest.      US Upper Extremity Venous Duplex Right   Final Result   IMPRESSION:   1.  No deep venous thrombosis in the right upper extremity.          Treatments provided: IVF, Antibiotic, Diuretic, Monitor  Family Comments: Daughter or Spouse will come be with patient  OBS brochure/video discussed/provided to patient:  N/A  ED Medications:   Medications   azithromycin (ZITHROMAX) 500 mg in  mL intermittent infusion (500 mg Intravenous $New Bag 10/23/24 2143)   sodium chloride 0.9% BOLUS 1,000 mL (0 mLs Intravenous Stopped 10/23/24 2206)   cefTRIAXone (ROCEPHIN) 2 g vial to attach to  ml bag for ADULTS or NS 50 ml bag for PEDS (0 g Intravenous Stopped 10/23/24 2143)   furosemide (LASIX) injection 40 mg (40 mg Intravenous $Given 10/23/24 2212)       Drips infusing:  Yes.  For the majority of the shift this patient was Green.   Interventions performed were NA.    Sepsis treatment initiated: Yes    Per the ED Provider, Time Zero for severe sepsis or septic shock is:  2002    3 Hour Severe Sepsis Bundle Completion:  1. Initial Lactic Acid Result:   Recent Labs   Lab Test 10/23/24  2219 10/23/24  2002 01/21/22  0637   LACT 4.2* 4.7* 2.3*     2. Blood Cultures before Antibiotics: No  3. Broad Spectrum Antibiotics Administered:     Anti-infectives (From now, onward)      Start     Dose/Rate Route Frequency Ordered Stop    10/23/24 2025  azithromycin (ZITHROMAX) 500 mg in  mL intermittent infusion         500 mg  over 1 Hours Intravenous ONCE 10/23/24 2020            4. 1000 ml of IV fluids have been given so far      6 Hour Severe Sepsis Bundle Completion:    1. Repeat Lactic Acid Level: Last result   Lab Results   Component Value Date    LACT 4.2 (HH) 10/23/2024     2. Patient currently on Vasopressors =  No    Cares/treatment/interventions/medications to be completed following ED care: See Admit Orders    ED Nurse Name: Tyson Shoemaker RN  10:25 PM    RECEIVING UNIT ED HANDOFF REVIEW    Above ED Nurse  Handoff Report was reviewed: Yes  Reviewed by: Renae Dueñas RN on October 23, 2024 at 11:40 PM   I Jenniferera called the ED to inform them the note was read: Yes

## 2024-10-24 NOTE — PROGRESS NOTES
10/24/24 0012   Skin   Skin WDL X;color;integrity   Skin Color pale;redness blanchable   Skin Integrity abrasion/excoriation;bruised (ecchymotic);scab   Abrasion / Excoriation Buttocks/IT;Sacrum/Coccyx   Bruised (ecchymotic) location Right;Left;Fore Arm   Redness blanchable location Buttocks/IT;Sacrum/Coccyx   Scab Back   Device Skin Interventions Taken No adjustments needed     Admission/Transfer from: ED  2 RN skin assessment completed. Yes  Significant findings include: wound on buttock.  LDA added (if applicable)? Yes  Requested WOC Nurse Consult from MD? No

## 2024-10-24 NOTE — CONSULTS
CLINICAL NUTRITION SERVICES  -  ASSESSMENT NOTE      Recommendations:   - Diet as ordered by MD, 100% enteral reliant at baseline.  - Restart of home regimen, added 1 protein packet to best meet nutrition needs and given WOCN consulted for possible open area:    - Access: GT baseline  - Frequency: Gravity feedings TID   - 1 carton at ~9 AM, 1 carton at noon, and 2 cartons at 6 pm (or timings per patient/nursing preference)  -  948 mL provides 1420 kcal (23 kcal/kg), 60 g protein, 204 g CHO, 20 g fiber, and 720 mL free water daily  - 1 prosource daily to total 80 g protein (1.3 g protein/kg)  - MVI/M daily with WOCN consulted for possible open area and meets <100% RDIs  - Free water flush: 120 mL before and after feedings (totals 720 mL)  10/24: ok to restart home regimen as outlined    - Added on phosphorus and magnesium from this AM.     MALNUTRITION:  % Weight Loss:  None noted  % Intake:  Decreased intake does not meet criteria for malnutrition   Subcutaneous Fat Loss:  None observed in face  Muscle Loss:  None observed in face, baseline CVA w/ R-sided weakness, has mild to moderate age or chronic disease-related muscle loss  Fluid Retention: None documented    Malnutrition Diagnosis: Patient does not meet two of the above criteria necessary for diagnosing malnutrition          REASON FOR ASSESSMENT  Jimmy Otto is a 74 year old male seen by Registered Dietitian for Provider Order - Registered Dietitian to Assess and Order TF per Medical Nutrition Therapy Protocol.    PMH of: CVA w/ R-sided weakness, non-verbal, dysphagia with GT, DMII, HTN, CKD.    Admit 2/2: Diarrhea, ALEX, L arm swelling.    NUTRITION HISTORY  - Information obtained from patient's daughter in room and chart.  Daughter placed call to patient's wife who does enteral administration in the home setting.  - Per review of previous RD notes, GT originally placed in 2021.  Confirmed w/ family, has been enteral reliant for years.  He is NPO, 100% enteral  "reliant, and administers all meds via GT.  - 4 cartons Jevity 1.5/day via gravity bag + 60 mL syringe water flushes before and after feedings + additional 3 syringes BID reported as home regimen.    1 carton at 9 AM  1 carton at noon or 1 pm  2 cartons at 6 or 7 pm    - This provides 948 mL, 1420 kcal, 60 g protein, 204 g CHO, 20 g fiber, and 720 mL free water daily + additional 720 mL in water flushes (totals 1440 mL free water daily, also has additional free water with medication administration).   - Consistently receives his feedings and no recent concerns with tolerance.  Last received a feeding yesterday afternoon.  - Allergies: NKFA.      CURRENT NUTRITION ORDERS  Diet Order:     NPO    Current Intake/Tolerance:  No enteral orders or water flushes yet.       ANTHROPOMETRICS  Height: 5' 7\"  Weight: 133 lbs 13.11 oz  Body mass index is 20.96 kg/m .  Weight Status:  Normal BMI  Weight History:  Wt Readings from Last 10 Encounters:   10/24/24 60.7 kg (133 lb 13.1 oz)   01/02/23 57.2 kg (126 lb)   10/28/22 54.4 kg (120 lb)   01/26/22 57.8 kg (127 lb 6.4 oz)   11/04/21 59 kg (130 lb)   10/01/21 66.5 kg (146 lb 11.2 oz)   09/16/21 61.5 kg (135 lb 9.3 oz)   08/14/12 68.5 kg (151 lb)     - No edema documented.  Admit wt via bed scale.   - Limited recent wt trends available but consistent with that from 2023.   - No wt changes per family.    LABS: Reviewed:  - Na and K from this AM WNL  - Ordering add-on phosphorus and magnesium, magnesium from last evening WNL but no recent phosphorus       MEDICATIONS: Reviewed.    GI: Stooling patterns noted w/ 3 BMs recorded since admission.    SKIN: WOCN consult pending.        ASSESSED NUTRITION NEEDS PER APPROVED PRACTICE GUIDELINES:    Dosing Weight 61 kg   Estimated Energy Needs: 3961-2588 kcals - [employer]  Justification: maintenance without activity factor up to activity factor 1.2  Estimated Protein Needs: 61-73 grams protein - 1-1.2 g pro/Kg  Justification: " "preservation of lean body mass  Estimated Fluid Needs: per MD      NUTRITION DIAGNOSIS:  Inadequate enteral nutrition infusion related to interruption to regimen for hospital admit as evidenced by no use of EN since yesterday afternoon.    NUTRITION INTERVENTIONS  Recommendations / Nutrition Prescription  See above.    Implementation  Nutrition education: Provided education on above.    Multivitamin/Mineral + EN Composition, EN Schedule, and Feeding Tube Flush: As above.     Collaboration and Referral of Nutrition care: Discussed POC with team during rounds and checked-in w/ RN on unit.    Nutrition goals:  EN to meet % daily nutrition needs.    MONITORING AND EVALUATION:  Progress towards goals will be monitored and evaluated per protocol and Practice Guidelines          Carly Casas RDN, , LD  Clinical Dietitian  [facility] Message Group: \"Dietitian [[place]]\"  Office: [phone]  Pagers:  3rd floor/ICU: [phone]  All other floors: [phone]  Weekend/holiday: [phone]    "

## 2024-10-24 NOTE — ED PROVIDER NOTES
Emergency Department Note      History of Present Illness     Chief Complaint   Fever    PAPO Otto is a 74 year old male with a history of stroke, hyperlipidemia, hypertension, and type 2 diabetes mellitus who presents to the ED for a fever. Per the patient's daughter, the patient is right-side affected from a previous stroke, nonverbal, dependent on a feeding tube, and has a chronic Felix catheter in place. His last Felix change was on Friday. He is able to point to where it hurts. Today, the patient's home health nurse noticed that he had a low grade fever, as well as some crackling in his lungs. They also thought the patient was in some pain. Because of these symptoms, they voiced concern for pneumonia. Per the patient's wife, he has had a cough since last Thursday. Daughter notes the patient has not been sleeping well. She also noticed that his right arm is more swollen that the left. She adds that he has been having frequent diarrhea. He has not had antibiotics recently. Patient did not receive any antipyretics prior to arrival. When asked where he is having pain, he points to his chest. He also denies headache, shortness of breath, or abdominal pain.     Independent Historian   Daughter as detailed above.    Review of External Notes   None    Past Medical History   Medical History and Problem List   Chronic Felix catheter  Failure to thrive  Microalbuminuria  Spasticity  Hyperlipidemia  Stroke  CVA  Asphasia  Hemiplegia  Hypertension  Type 2 diabetes mellitus     Medications   Doxazosin  Nystatin  Atorvastatin  Clopidogrel   Lisinopril  Amlodipine  Insulin glargine  Insulin lispro  Metformin  Atenolol  Fluoxetine    Surgical History   Gastrostomy tube insertion  Cystogram    Physical Exam   Patient Vitals for the past 24 hrs:   BP Temp Temp src Pulse Resp SpO2   10/23/24 2200 (!) 154/79 -- -- 74 -- 97 %   10/23/24 2145 (!) 160/93 -- -- 78 -- 97 %   10/23/24 2130 (!) 160/81 -- -- 75 -- 95 %   10/23/24  2115 (!) 172/86 -- -- 75 -- 97 %   10/23/24 2111 -- -- -- -- -- 95 %   10/23/24 2109 (!) 172/83 -- -- 76 22 100 %   10/23/24 1930 (!) 179/111 -- -- 72 -- 98 %   10/23/24 1914 (!) 168/89 -- -- -- -- --   10/23/24 1912 -- 97.4  F (36.3  C) Temporal 69 18 100 %     Physical Exam  General: Alert, no acute distress; able to answer simple questions with shaking/nodding head or giving thumbs up/down with the left hand  HEENT:  Moist mucous membranes.  Posterior oropharynx clear, no exudates.  Conjunctiva normal.   CV:  RRR, no m/r/g, skin warm and well perfused  Pulm:  CTAB, no wheezes/ronchi/rales.  No acute distress, breathing comfortably  GI:  Soft, nontender, nondistended.  No rebound or guarding.  Normal bowel sounds  :  Ordoñez catheter in place; yellow clear urine noted in ordoñez bag  MSK:  No focal areas of edema, erythema, or tenderness  Skin:  WWP, no rashes, no lower extremity edema, skin color normal, no diaphoresis    Diagnostics   Lab Results   Labs Ordered and Resulted from Time of ED Arrival to Time of ED Departure   BASIC METABOLIC PANEL - Abnormal       Result Value    Sodium 136      Potassium 4.3      Chloride 95 (*)     Carbon Dioxide (CO2) 23      Anion Gap 18 (*)     Urea Nitrogen 41.8 (*)     Creatinine 1.96 (*)     GFR Estimate 35 (*)     Calcium 8.7 (*)     Glucose 117 (*)    LACTIC ACID WHOLE BLOOD - Abnormal    Lactic Acid 4.7 (*)    ROUTINE UA WITH MICROSCOPIC REFLEX TO CULTURE - Abnormal    Color Urine Light Yellow      Appearance Urine Clear      Glucose Urine Negative      Bilirubin Urine Negative      Ketones Urine Negative      Specific Gravity Urine 1.008      Blood Urine Negative      pH Urine 7.0      Protein Albumin Urine 300 (*)     Urobilinogen Urine Normal      Nitrite Urine Negative      Leukocyte Esterase Urine Moderate (*)     Bacteria Urine Few (*)     Mucus Urine Present (*)     RBC Urine <1      WBC Urine 10 (*)     Squamous Epithelials Urine <1     TROPONIN T, HIGH SENSITIVITY  - Abnormal    Troponin T, High Sensitivity 51 (*)    CBC WITH PLATELETS AND DIFFERENTIAL - Abnormal    WBC Count 6.9      RBC Count 4.14 (*)     Hemoglobin 10.7 (*)     Hematocrit 34.4 (*)     MCV 83      MCH 25.8 (*)     MCHC 31.1 (*)     RDW 14.6      Platelet Count 204      % Neutrophils 55      % Lymphocytes 29      % Monocytes 10      % Eosinophils 6      % Basophils 1      % Immature Granulocytes 0      NRBCs per 100 WBC 0      Absolute Neutrophils 3.8      Absolute Lymphocytes 2.0      Absolute Monocytes 0.7      Absolute Eosinophils 0.4      Absolute Basophils 0.1      Absolute Immature Granulocytes 0.0      Absolute NRBCs 0.0     NT PROBNP INPATIENT - Abnormal    N terminal Pro BNP Inpatient 1,876 (*)    LACTIC ACID WHOLE BLOOD - Abnormal    Lactic Acid 4.2 (*)    HEPATIC FUNCTION PANEL - Abnormal    Protein Total 6.3 (*)     Albumin 2.9 (*)     Bilirubin Total <0.2      Alkaline Phosphatase 106      AST 37      ALT 36      Bilirubin Direct <0.20     INFLUENZA A/B, RSV, & SARS-COV2 PCR - Normal    Influenza A PCR Negative      Influenza B PCR Negative      RSV PCR Negative      SARS CoV2 PCR Negative     TROPONIN T, HIGH SENSITIVITY   PROCALCITONIN   URINE CULTURE     Imaging   Chest XR,  PA & LAT   Final Result   IMPRESSION: Negative chest.      US Upper Extremity Venous Duplex Right   Final Result   IMPRESSION:   1.  No deep venous thrombosis in the right upper extremity.        EKG   ECG taken at 1937, ECG read at 1937  Normal sinus rhythm  Minimal voltage criteria for LVH, may be normal variant (R in aVL)  Borderline ECG   Rate 69 bpm. AR interval 176 ms. QRS duration 72 ms. QT/QTc 424/454 ms. P-R-T axes 8 -26 -1.    Independent Interpretation   CXR: No infiltrate, pleural effusion, or cardiomegaly.    ED Course    Medications Administered   Medications   azithromycin (ZITHROMAX) 500 mg in  mL intermittent infusion (500 mg Intravenous $New Bag 10/23/24 8554)   sodium chloride 0.9% BOLUS 1,000 mL  (0 mLs Intravenous Stopped 10/23/24 2206)   cefTRIAXone (ROCEPHIN) 2 g vial to attach to  ml bag for ADULTS or NS 50 ml bag for PEDS (0 g Intravenous Stopped 10/23/24 2143)   furosemide (LASIX) injection 40 mg (40 mg Intravenous $Given 10/23/24 2212)     Procedures   Procedures     Discussion of Management   Admitting Hospitalist, Dr. Vega    ED Course   ED Course as of 10/23/24 2241   Wed Oct 23, 2024   1925 I obtained history and examined the patient as noted above.       Additional Documentation  None    Medical Decision Making / Diagnosis   CMS Diagnoses: Lactic acid elevated due to unknown primary cause, possibly due to hypoperfusion from CHF. At this time there are no signs of sepsis or septic shock and None    MIPS       None    MDM   Jimmy Otto is a 74 year old male who is nonverbal with history of CVA and right-sided weakness, s/p FT placement, s/p chronic ordoñez catheter presenting to the ER with daughter with concerns for possible pneumonia.  Reports home nurse noted patient had wet sounding cough and low-grade fevers to 99F.  Apparently coughing has been present since last Thursday.  Patient is afebrile here, hemodynamically stable and is not hypoxic or in respiratory distress.  Initial lab studies notable for lactic acidosis and given concern for pneumonia, initially was given fluids and antibiotics to cover for community-acquired pneumonia.  However, chest x-ray is largely unremarkable, white count is normal, slight ALEX noted from baseline.  Limited viral testing is negative for RSV, influenza, COVID-19.  Procalcitonin was added.  Broadened differential given noted right upper extremity swelling and ultrasound is negative for DVT.  EKG shows no acute ischemic appearing changes and high-sensitivity troponin is elevated, delta troponin is pending.  UA is not overly convincing for UTI.  proBNP added and is elevated.  Given wet sounding cough and right upper extremity swelling, concern for possible new  CHF.  Lasix ordered. He has a benign abdominal exam and is nontoxic appearing; he does not appear septic. Unclear why he has persistent lactic acidosis at this time.  He remained stable on room air.  He will be admitted to the hospital for further monitoring and care.     Disposition   The patient was admitted to the hospital.     Diagnosis     ICD-10-CM    1. Acute congestive heart failure, unspecified heart failure type (H)  I50.9       2. Lactic acidosis  E87.20       3. Localized swelling of right upper extremity  R22.31       4. ALEX (acute kidney injury) (H)  N17.9            Discharge Medications   New Prescriptions    No medications on file     Scribe Disclosure:  Conrado DODSON, am serving as a scribe at 7:23 PM on 10/23/2024 to document services personally performed by Selvin Wade MD based on my observations and the provider's statements to me.        Selvin Wade MD  10/23/24 8544

## 2024-10-25 LAB
ANION GAP SERPL CALCULATED.3IONS-SCNC: 10 MMOL/L (ref 7–15)
BASOPHILS # BLD AUTO: 0.1 10E3/UL (ref 0–0.2)
BASOPHILS NFR BLD AUTO: 1 %
BUN SERPL-MCNC: 35.3 MG/DL (ref 8–23)
CALCIUM SERPL-MCNC: 8.4 MG/DL (ref 8.8–10.4)
CHLORIDE SERPL-SCNC: 101 MMOL/L (ref 98–107)
CREAT SERPL-MCNC: 1.91 MG/DL (ref 0.67–1.17)
EGFRCR SERPLBLD CKD-EPI 2021: 36 ML/MIN/1.73M2
EOSINOPHIL # BLD AUTO: 0.2 10E3/UL (ref 0–0.7)
EOSINOPHIL NFR BLD AUTO: 5 %
ERYTHROCYTE [DISTWIDTH] IN BLOOD BY AUTOMATED COUNT: 14.8 % (ref 10–15)
GLUCOSE BLDC GLUCOMTR-MCNC: 180 MG/DL (ref 70–99)
GLUCOSE BLDC GLUCOMTR-MCNC: 284 MG/DL (ref 70–99)
GLUCOSE BLDC GLUCOMTR-MCNC: 294 MG/DL (ref 70–99)
GLUCOSE BLDC GLUCOMTR-MCNC: 304 MG/DL (ref 70–99)
GLUCOSE BLDC GLUCOMTR-MCNC: 419 MG/DL (ref 70–99)
GLUCOSE SERPL-MCNC: 208 MG/DL (ref 70–99)
HCO3 SERPL-SCNC: 25 MMOL/L (ref 22–29)
HCT VFR BLD AUTO: 29.4 % (ref 40–53)
HGB BLD-MCNC: 9.4 G/DL (ref 13.3–17.7)
IMM GRANULOCYTES # BLD: 0 10E3/UL
IMM GRANULOCYTES NFR BLD: 0 %
LYMPHOCYTES # BLD AUTO: 1.5 10E3/UL (ref 0.8–5.3)
LYMPHOCYTES NFR BLD AUTO: 28 %
MAGNESIUM SERPL-MCNC: 2 MG/DL (ref 1.7–2.3)
MCH RBC QN AUTO: 26.3 PG (ref 26.5–33)
MCHC RBC AUTO-ENTMCNC: 32 G/DL (ref 31.5–36.5)
MCV RBC AUTO: 82 FL (ref 78–100)
MONOCYTES # BLD AUTO: 0.5 10E3/UL (ref 0–1.3)
MONOCYTES NFR BLD AUTO: 10 %
NEUTROPHILS # BLD AUTO: 3 10E3/UL (ref 1.6–8.3)
NEUTROPHILS NFR BLD AUTO: 56 %
NRBC # BLD AUTO: 0 10E3/UL
NRBC BLD AUTO-RTO: 0 /100
PHOSPHATE SERPL-MCNC: 3.5 MG/DL (ref 2.5–4.5)
PLATELET # BLD AUTO: 196 10E3/UL (ref 150–450)
POTASSIUM SERPL-SCNC: 3.6 MMOL/L (ref 3.4–5.3)
RBC # BLD AUTO: 3.57 10E6/UL (ref 4.4–5.9)
SODIUM SERPL-SCNC: 136 MMOL/L (ref 135–145)
WBC # BLD AUTO: 5.3 10E3/UL (ref 4–11)

## 2024-10-25 PROCEDURE — 36415 COLL VENOUS BLD VENIPUNCTURE: CPT | Performed by: STUDENT IN AN ORGANIZED HEALTH CARE EDUCATION/TRAINING PROGRAM

## 2024-10-25 PROCEDURE — 80048 BASIC METABOLIC PNL TOTAL CA: CPT | Performed by: STUDENT IN AN ORGANIZED HEALTH CARE EDUCATION/TRAINING PROGRAM

## 2024-10-25 PROCEDURE — 99232 SBSQ HOSP IP/OBS MODERATE 35: CPT | Performed by: STUDENT IN AN ORGANIZED HEALTH CARE EDUCATION/TRAINING PROGRAM

## 2024-10-25 PROCEDURE — 258N000003 HC RX IP 258 OP 636: Performed by: STUDENT IN AN ORGANIZED HEALTH CARE EDUCATION/TRAINING PROGRAM

## 2024-10-25 PROCEDURE — 250N000013 HC RX MED GY IP 250 OP 250 PS 637: Performed by: STUDENT IN AN ORGANIZED HEALTH CARE EDUCATION/TRAINING PROGRAM

## 2024-10-25 PROCEDURE — 85014 HEMATOCRIT: CPT | Performed by: STUDENT IN AN ORGANIZED HEALTH CARE EDUCATION/TRAINING PROGRAM

## 2024-10-25 PROCEDURE — 84100 ASSAY OF PHOSPHORUS: CPT | Performed by: STUDENT IN AN ORGANIZED HEALTH CARE EDUCATION/TRAINING PROGRAM

## 2024-10-25 PROCEDURE — 85004 AUTOMATED DIFF WBC COUNT: CPT | Performed by: STUDENT IN AN ORGANIZED HEALTH CARE EDUCATION/TRAINING PROGRAM

## 2024-10-25 PROCEDURE — 83735 ASSAY OF MAGNESIUM: CPT | Performed by: STUDENT IN AN ORGANIZED HEALTH CARE EDUCATION/TRAINING PROGRAM

## 2024-10-25 PROCEDURE — 120N000001 HC R&B MED SURG/OB

## 2024-10-25 RX ORDER — CODEINE PHOSPHATE AND GUAIFENESIN 10; 100 MG/5ML; MG/5ML
5 SOLUTION ORAL EVERY 4 HOURS PRN
Status: DISCONTINUED | OUTPATIENT
Start: 2024-10-25 | End: 2024-10-26 | Stop reason: HOSPADM

## 2024-10-25 RX ORDER — HYDRALAZINE HYDROCHLORIDE 25 MG/1
25 TABLET, FILM COATED ORAL 4 TIMES DAILY PRN
Status: DISCONTINUED | OUTPATIENT
Start: 2024-10-25 | End: 2024-10-26 | Stop reason: HOSPADM

## 2024-10-25 RX ADMIN — ATENOLOL 25 MG: 25 TABLET ORAL at 21:40

## 2024-10-25 RX ADMIN — SODIUM CHLORIDE, POTASSIUM CHLORIDE, SODIUM LACTATE AND CALCIUM CHLORIDE: 600; 310; 30; 20 INJECTION, SOLUTION INTRAVENOUS at 03:29

## 2024-10-25 RX ADMIN — ATENOLOL 25 MG: 25 TABLET ORAL at 09:49

## 2024-10-25 RX ADMIN — HYDRALAZINE HYDROCHLORIDE 25 MG: 25 TABLET ORAL at 15:54

## 2024-10-25 RX ADMIN — INSULIN ASPART 3 UNITS: 100 INJECTION, SOLUTION INTRAVENOUS; SUBCUTANEOUS at 21:38

## 2024-10-25 RX ADMIN — FLUOXETINE HYDROCHLORIDE 20 MG: 20 SOLUTION ORAL at 09:54

## 2024-10-25 RX ADMIN — AMLODIPINE BESYLATE 5 MG: 5 TABLET ORAL at 09:49

## 2024-10-25 RX ADMIN — DOXAZOSIN 2 MG: 1 TABLET ORAL at 09:49

## 2024-10-25 RX ADMIN — CLOPIDOGREL BISULFATE 75 MG: 75 TABLET ORAL at 09:49

## 2024-10-25 RX ADMIN — Medication 60 ML: at 09:52

## 2024-10-25 RX ADMIN — Medication 15 ML: at 09:49

## 2024-10-25 RX ADMIN — SODIUM CHLORIDE, POTASSIUM CHLORIDE, SODIUM LACTATE AND CALCIUM CHLORIDE: 600; 310; 30; 20 INJECTION, SOLUTION INTRAVENOUS at 18:42

## 2024-10-25 NOTE — PLAN OF CARE
"To Do:  End of Shift Summary  For vital signs and complete assessments, please see documentation flowsheets.     Pertinent assessments: Alert, nonverbal. VSS. No sign of pain. Congested cough, Robitussin AC given. Repositioned. Mouth care done. Had BM 1x.  Major Shift Events Paged RT to assess pt, unable to expectorate secretions. RT was able to suction secretions orally.  Treatment Plan: TF, IVF  Bedside Nurse: Amy Messer RN   Problem: Adult Inpatient Plan of Care  Goal: Plan of Care Review  Description: The Plan of Care Review/Shift note should be completed every shift.  The Outcome Evaluation is a brief statement about your assessment that the patient is improving, declining, or no change.  This information will be displayed automatically on your shift  note.  Outcome: Not Progressing  Flowsheets (Taken 10/25/2024 0654)  Outcome Evaluation: Suctioned 1x by RT, breathing improved  Plan of Care Reviewed With:   patient   child  Overall Patient Progress: improving  Goal: Patient-Specific Goal (Individualized)  Description: You can add care plan individualizations to a care plan. Examples of Individualization might be:  \"Parent requests to be called daily at 9am for status\", \"I have a hard time hearing out of my right ear\", or \"Do not touch me to wake me up as it startles  me\".  Outcome: Not Progressing  Goal: Absence of Hospital-Acquired Illness or Injury  Outcome: Not Progressing  Intervention: Identify and Manage Fall Risk  Recent Flowsheet Documentation  Taken 10/24/2024 1951 by Amy Messer RN  Safety Promotion/Fall Prevention:   activity supervised   increase visualization of patient   nonskid shoes/slippers when out of bed   safety round/check completed   room near nurse's station  Intervention: Prevent Skin Injury  Recent Flowsheet Documentation  Taken 10/25/2024 0341 by Amy Messer, RN  Body Position:   turned   right   side-lying  Taken 10/24/2024 1951 by Amy Messer RN  Body Position:   " turned   right   side-lying  Intervention: Prevent and Manage VTE (Venous Thromboembolism) Risk  Recent Flowsheet Documentation  Taken 10/24/2024 1951 by Amy Messer RN  VTE Prevention/Management: SCDs off (sequential compression devices)  Goal: Optimal Comfort and Wellbeing  Outcome: Not Progressing  Goal: Readiness for Transition of Care  Outcome: Not Progressing     Problem: Pain Acute  Goal: Optimal Pain Control and Function  Outcome: Not Progressing  Intervention: Prevent or Manage Pain  Recent Flowsheet Documentation  Taken 10/24/2024 1951 by Amy Messer RN  Medication Review/Management: medications reviewed     Problem: Comorbidity Management  Goal: Blood Glucose Levels Within Targeted Range  Outcome: Not Progressing  Intervention: Monitor and Manage Glycemia  Recent Flowsheet Documentation  Taken 10/24/2024 1951 by Amy Messer RN  Medication Review/Management: medications reviewed  Goal: Blood Pressure in Desired Range  Outcome: Not Progressing  Intervention: Maintain Blood Pressure Management  Recent Flowsheet Documentation  Taken 10/24/2024 1951 by Amy Messer RN  Medication Review/Management: medications reviewed     Problem: Skin Injury Risk Increased  Goal: Skin Health and Integrity  Outcome: Not Progressing  Intervention: Plan: Nurse Driven Intervention: Positioning  Recent Flowsheet Documentation  Taken 10/24/2024 1951 by Amy Messer RN  Plan: Positioning Interventions: REPOSITION Left/Right (No supine) q2h  Intervention: Plan: Nurse Driven Intervention: Moisture Management  Recent Flowsheet Documentation  Taken 10/24/2024 1951 by Amy Messer RN  Moisture Interventions:   Barrier ointment (CriticAid, Triad paste)   Fecal collection device   Incontinence pad  Bathing/Skin Care: incontinence care  Intervention: Plan: Nurse Driven Intervention: Friction and Shear  Recent Flowsheet Documentation  Taken 10/24/2024 1951 by Amy Messer RN  Friction/Shear Interventions: HOB 30  degrees or less  Intervention: Optimize Skin Protection  Recent Flowsheet Documentation  Taken 10/25/2024 0341 by Amy Messer, RN  Activity Management: activity adjusted per tolerance  Head of Bed (HOB) Positioning: HOB at 30 degrees  Taken 10/24/2024 1951 by Amy Messer, RN  Activity Management: activity adjusted per tolerance  Head of Bed (HOB) Positioning: HOB at 30 degrees   Goal Outcome Evaluation:      Plan of Care Reviewed With: patient, child    Overall Patient Progress: improvingOverall Patient Progress: improving    Outcome Evaluation: Suctioned 1x by RT, breathing improved

## 2024-10-25 NOTE — PROGRESS NOTES
[facility]    Medicine Progress Note - Hospitalist Service    Date of Admission:  10/23/2024    Assessment & Plan   74 year old male with PMH significant for CVA with resultant R-sided weakness/nonverbal status/PEG tube, T2DM, HTN admitted on 10/23/2024 after his home care nurse reported what sounded cough with low-grade fever.  Patient also had diarrhea with 4-5 loose bowel movements per day but per daughter patient also has chronic diarrhea.  Patient got COVID and influenza shots 6 days before admission.    Upon arrival the patient was afebrile and hemodynamically stable.  Blood pressure was high at 168/89.  Workup was significant for BUN/creatinine of 41.8/1.96 with anion gap of 18 and lactate of 4.7.  proBNP was 1876 with mildly elevated troponin of 51.  Tested negative for influenza, COVID and RSV.  Chest x-ray was clear.  He also has chronic right upper extremity edema and venous ultrasound was negative for DVT.    He was given NS bolus x 1.5 L followed by NS at 100 mL/h for another liter.  He was also given a dose of ceftriaxone and azithromycin in the ER which was not continued.      Diarrhea.  Likely due to tube feeding, enteric panel and C. difficile negative.  Started Banatrol 3 times daily, diarrhea significantly improved.    Lactic acidosis.  Secondary to ALEX and metformin.  Lactate improved from 4.7-3.1.    ALEX on CKD.  Baseline creatinine of 1.2-1.5, presented with creatinine of 1.96.  Creatinine is nearly improved to 1.83 and is again 1.91 today.  ALEX is likely due to dehydration with diarrheal illness.  Hold lisinopril  Continue LR at 50 mL/h overnight.    Congested cough, reports of dyspnea..  It appears patient has bronchitis ordered today reaction to the COVID and flu vaccine he recently received.  Chest x-ray is clear, no leukocytosis and procalcitonin negative.  Given dose of ceftriaxone and azithromycin in the ER but did not continue antibiotics.  Acute Heart Failure  with Preserved Ejection Fraction Considered and Ruled Out.  BNP is likely elevated due to renal insufficiency.  Normal EF and no evidence of CHF with no IVC dilatation.  Patient has crackles on examination but as per the patient's daughter these are chronic since patient was diagnosed with COVID a few years ago.  Likely has some residual scarring.  Hold off on IV fluids if patient develops worsening shortness of breath.    Right arm swelling.  Venous ultrasound negative for DVT..    Essential hypertension.    Resumed amlodipine, atenolol and doxazosin, holding lisinopril for ALEX.  As needed hydralazine available.    History of stroke.  Continue Plavix.  PEG tube dependent, resumed tube feeding.    Elevated troponins.  Secondary to ALEX  Diabetes mellitus type 2.  Metformin held due to lactic acidosis, continuing sliding scale..          Diet: NPO for Medical/Clinical Reasons Except for: NPO but receiving Tube Feeding  Adult Formula Bolus Feeding: Jevity 1.5; Route: Gastrostomy; 3 times daily; Volume per Bolus: 1; Can(s)/ Carton(s); 1 carton at ~9 AM, 1 carton at noon, and 2 cartons at 6 pm via gravity bag (or timings per patient/nursing preference)    DVT Prophylaxis: Pneumatic Compression Devices  Felix Catheter: PRESENT, indication: Other (Comment) (chronic)  Lines: None     Cardiac Monitoring: None  Code Status: Full Code      Clinically Significant Risk Factors          # Hypochloremia: Lowest Cl = 95 mmol/L in last 2 days, will monitor as appropriate      # Hypoalbuminemia: Lowest albumin = 2.9 g/dL at 10/23/2024 10:03 PM, will monitor as appropriate     # Hypertension: Noted on problem list    # Acute heart failure with preserved ejection fraction: heart failure noted on problem list, last echo with EF >50%, and receiving IV diuretics                     Disposition Plan     Medically Ready for Discharge: Anticipated Tomorrow             Jarret Tolliver MD  Hospitalist Service  [facility]  Hospital  Securely message with [facility] (more info)  Text page via [employer] Paging/Directory   ______________________________________________________________________    Interval History   Continues to have loose stool.  Patient is nonverbal but appears comfortable.    Physical Exam   Vital Signs: Temp: 98  F (36.7  C) Temp src: Axillary BP: (!) 167/73 Pulse: 68   Resp: 16 SpO2: 96 % O2 Device: None (Room air)    Weight: 132 lbs 15 oz    General Appearance: Nonverbal but appears comfortable  Respiratory: Coarse breath sounds.  Cardiovascular: S1-S2 normal  GI: Soft and nontender  Skin: No rash  Other: Trace lower extremity edema.  Right hemiplegia.      Medical Decision Making       MANAGEMENT DISCUSSED with the following over the past 24 hours: Patient's daughter and RN       Data     I have personally reviewed the following data over the past 24 hrs:    5.3  \   9.4 (L)   / 196     136 101 35.3 (H) /  284 (H)   3.6 25 1.91 (H) \       Imaging results reviewed over the past 24 hrs:   No results found for this or any previous visit (from the past 24 hours).

## 2024-10-25 NOTE — CONSULTS
Care Management Initial Consult    General Information  Assessment completed with: Children, Pt's daughter Phally  Type of CM/SW Visit: Initial Assessment    Primary Care Provider verified and updated as needed: Yes   Readmission within the last 30 days:     Reason for Consult: discharge planning  Advance Care Planning: Advance Care Planning Reviewed: no concerns identified        Communication Assessment  Patient's communication style:  (Nonverbal)    Hearing Difficulty or Deaf: no   Wear Glasses or Blind: no    Cognitive  Cognitive/Neuro/Behavioral: .WDL except  Level of Consciousness: alert  Arousal Level: opens eyes spontaneously  Orientation: other (see comments) (ZAYDA)  Mood/Behavior: calm, cooperative  Best Language: 3 - Mute  Speech: unable to speak    Living Environment:   People in home: spouse     Current living Arrangements: house      Able to return to prior arrangements: yes     Family/Social Support:  Care provided by: spouse/significant other, homecare agency  Provides care for: no one, unable/limited ability to care for self  Marital Status:   Support system: Wife, Children          Description of Support System: Supportive, Involved    Support Assessment: Adequate family and caregiver support, Adequate social supports    Current Resources:   Patient receiving home care services: Yes  Skilled Home Care Services: Skilled Nursing  Community Resources: Home Care, Home Infusion  Equipment currently used at home: wheelchair, manual, hospital bed  Supplies currently used at home: Incontinence Supplies    Employment/Financial:  Employment Status: retired, disabled     Financial Concerns: none   Referral to Financial Worker: No     Does the patient's insurance plan have a 3 day qualifying hospital stay waiver?  No    Lifestyle & Psychosocial Needs:  Social Drivers of Health     Food Insecurity: Low Risk  (10/24/2024)    Food Insecurity     Within the past 12 months, did you worry that your food would  run out before you got money to buy more?: No     Within the past 12 months, did the food you bought just not last and you didn t have money to get more?: No   Depression: Not at risk (2/2/2024)    Received from [facility]    PHQ-2     PHQ-2 Score: 0   Housing Stability: Low Risk  (10/24/2024)    Housing Stability     Do you have housing? : Yes     Are you worried about losing your housing?: No   Tobacco Use: Low Risk  (1/26/2024)    Patient History     Smoking Tobacco Use: Never     Smokeless Tobacco Use: Never     Passive Exposure: Not on file   Financial Resource Strain: Low Risk  (10/24/2024)    Financial Resource Strain     Within the past 12 months, have you or your family members you live with been unable to get utilities (heat, electricity) when it was really needed?: No   Alcohol Use: Not on file   Transportation Needs: Low Risk  (10/24/2024)    Transportation Needs     Within the past 12 months, has lack of transportation kept you from medical appointments, getting your medicines, non-medical meetings or appointments, work, or from getting things that you need?: No   Physical Activity: Not on file   Interpersonal Safety: Unknown (10/24/2024)    Interpersonal Safety     Do you feel physically and emotionally safe where you currently live?: Patient unable to answer     Within the past 12 months, have you been hit, slapped, kicked or otherwise physically hurt by someone?: Patient unable to answer     Within the past 12 months, have you been humiliated or emotionally abused in other ways by your partner or ex-partner?: Patient unable to answer   Stress: Not on file   Social Connections: Not on file   Health Literacy: Not on file     Functional Status:  Prior to admission patient needed assistance:     SW consulted for patient identified as an elevated unplanned readmission risk of 20%. Patient was admitted 10/23 for diarrhea, cough, ALEX. Patient with Hx of CVA, is nonverbal.       SW placed call to pt's  daughter Clemente ([phone]) to complete initial assessment. Patient lives at home with wife (doesn't speak english). Pt is WC bound from a prior CVA. Family assists with transfers and all cares. Pt has a hospital bed at home. Family is working with pt's PCP to acquire an electric michell lift to assist with transfers at home. Pt has a ordoñez catheter and PEG tube for enteral feedings.     Patient is currently open to [facility] for Home RN and is currently on service with [facility] for enteral nutrition - Jevity 1.5, 5 cartons/day (1 carton at 7am, 2 cartons at 12pm and 2 cartons at 5pm OR 1 carton 5x/day).     Mental Health Status:  Mental Health Status: No Current Concerns       Chemical Dependency Status:  Chemical Dependency Status: No Current Concerns           Values/Beliefs:  Spiritual, Cultural Beliefs, Buddhism Practices, Values that affect care: yes             Discussed  Partnership in Safe Discharge Planning  document with patient/family: Yes    Additional Information:  Plan: Home with family and resumption of [facility] and [facility] when medically stable. Daughter anticipates that family can transport at discharge. RN CC will continue to follow.     JOSE ALEJANDRO Balderrama, [facility]   Inpatient Care Coordination  [facility]   [phone]

## 2024-10-25 NOTE — PLAN OF CARE
Goal Outcome Evaluation:      Plan of Care Reviewed With: child    Overall Patient Progress: improvingOverall Patient Progress: improving    Outcome Evaluation: Completed initial SW assesment with daughter. CC/SW will continue to follow for discharge home with resumption of [facility] HC RN and [facility] at discharge.

## 2024-10-26 VITALS
OXYGEN SATURATION: 97 % | DIASTOLIC BLOOD PRESSURE: 72 MMHG | BODY MASS INDEX: 20.87 KG/M2 | RESPIRATION RATE: 20 BRPM | WEIGHT: 132.94 LBS | HEART RATE: 73 BPM | HEIGHT: 67 IN | SYSTOLIC BLOOD PRESSURE: 158 MMHG | TEMPERATURE: 99.2 F

## 2024-10-26 LAB
ANION GAP SERPL CALCULATED.3IONS-SCNC: 13 MMOL/L (ref 7–15)
BUN SERPL-MCNC: 33 MG/DL (ref 8–23)
CALCIUM SERPL-MCNC: 8.2 MG/DL (ref 8.8–10.4)
CHLORIDE SERPL-SCNC: 100 MMOL/L (ref 98–107)
CREAT SERPL-MCNC: 1.94 MG/DL (ref 0.67–1.17)
EGFRCR SERPLBLD CKD-EPI 2021: 36 ML/MIN/1.73M2
GLUCOSE BLDC GLUCOMTR-MCNC: 280 MG/DL (ref 70–99)
GLUCOSE BLDC GLUCOMTR-MCNC: 383 MG/DL (ref 70–99)
GLUCOSE SERPL-MCNC: 231 MG/DL (ref 70–99)
HCO3 SERPL-SCNC: 24 MMOL/L (ref 22–29)
HOLD SPECIMEN: NORMAL
MAGNESIUM SERPL-MCNC: 2.1 MG/DL (ref 1.7–2.3)
POTASSIUM SERPL-SCNC: 3.3 MMOL/L (ref 3.4–5.3)
POTASSIUM SERPL-SCNC: 3.9 MMOL/L (ref 3.4–5.3)
SODIUM SERPL-SCNC: 137 MMOL/L (ref 135–145)

## 2024-10-26 PROCEDURE — 36415 COLL VENOUS BLD VENIPUNCTURE: CPT | Performed by: STUDENT IN AN ORGANIZED HEALTH CARE EDUCATION/TRAINING PROGRAM

## 2024-10-26 PROCEDURE — 250N000012 HC RX MED GY IP 250 OP 636 PS 637: Performed by: STUDENT IN AN ORGANIZED HEALTH CARE EDUCATION/TRAINING PROGRAM

## 2024-10-26 PROCEDURE — 250N000013 HC RX MED GY IP 250 OP 250 PS 637: Performed by: STUDENT IN AN ORGANIZED HEALTH CARE EDUCATION/TRAINING PROGRAM

## 2024-10-26 PROCEDURE — 82947 ASSAY GLUCOSE BLOOD QUANT: CPT | Performed by: STUDENT IN AN ORGANIZED HEALTH CARE EDUCATION/TRAINING PROGRAM

## 2024-10-26 PROCEDURE — 83735 ASSAY OF MAGNESIUM: CPT | Performed by: STUDENT IN AN ORGANIZED HEALTH CARE EDUCATION/TRAINING PROGRAM

## 2024-10-26 PROCEDURE — 80048 BASIC METABOLIC PNL TOTAL CA: CPT | Performed by: STUDENT IN AN ORGANIZED HEALTH CARE EDUCATION/TRAINING PROGRAM

## 2024-10-26 PROCEDURE — 84132 ASSAY OF SERUM POTASSIUM: CPT | Performed by: STUDENT IN AN ORGANIZED HEALTH CARE EDUCATION/TRAINING PROGRAM

## 2024-10-26 PROCEDURE — 99239 HOSP IP/OBS DSCHRG MGMT >30: CPT | Performed by: STUDENT IN AN ORGANIZED HEALTH CARE EDUCATION/TRAINING PROGRAM

## 2024-10-26 RX ORDER — FLUCONAZOLE 40 MG/ML
100 POWDER, FOR SUSPENSION ORAL DAILY
Qty: 17.5 ML | Refills: 0 | Status: SHIPPED | OUTPATIENT
Start: 2024-10-26 | End: 2024-11-02

## 2024-10-26 RX ORDER — AMINO ACIDS/PROTEIN HYDROLYS 11G-40/45
60 LIQUID IN PACKET (ML) ORAL DAILY
Qty: 5400 ML | Refills: 2 | Status: SHIPPED | OUTPATIENT
Start: 2024-10-27

## 2024-10-26 RX ORDER — FLUCONAZOLE 40 MG/ML
100 POWDER, FOR SUSPENSION ORAL DAILY
Status: DISCONTINUED | OUTPATIENT
Start: 2024-10-26 | End: 2024-10-26 | Stop reason: HOSPADM

## 2024-10-26 RX ORDER — AMINO ACIDS/PROTEIN HYDROLYS 11G-40/45
60 LIQUID IN PACKET (ML) ORAL DAILY
Status: SHIPPED
Start: 2024-10-27 | End: 2024-10-26

## 2024-10-26 RX ORDER — POLYETHYLENE GLYCOL 3350 17 G/17G
17 POWDER, FOR SOLUTION ORAL DAILY PRN
Status: DISCONTINUED | OUTPATIENT
Start: 2024-10-26 | End: 2024-10-26 | Stop reason: HOSPADM

## 2024-10-26 RX ORDER — POTASSIUM CHLORIDE 20MEQ/15ML
20 LIQUID (ML) ORAL ONCE
Status: COMPLETED | OUTPATIENT
Start: 2024-10-26 | End: 2024-10-26

## 2024-10-26 RX ADMIN — Medication 15 ML: at 08:50

## 2024-10-26 RX ADMIN — ATENOLOL 25 MG: 25 TABLET ORAL at 08:50

## 2024-10-26 RX ADMIN — INSULIN GLARGINE 7 UNITS: 100 INJECTION, SOLUTION SUBCUTANEOUS at 09:55

## 2024-10-26 RX ADMIN — FLUCONAZOLE 100 MG: 40 POWDER, FOR SUSPENSION ORAL at 12:45

## 2024-10-26 RX ADMIN — FLUOXETINE HYDROCHLORIDE 20 MG: 20 SOLUTION ORAL at 09:07

## 2024-10-26 RX ADMIN — AMLODIPINE BESYLATE 5 MG: 5 TABLET ORAL at 08:50

## 2024-10-26 RX ADMIN — Medication 60 ML: at 08:54

## 2024-10-26 RX ADMIN — POTASSIUM CHLORIDE 20 MEQ: 20 SOLUTION ORAL at 08:50

## 2024-10-26 RX ADMIN — DOXAZOSIN 2 MG: 1 TABLET ORAL at 08:50

## 2024-10-26 RX ADMIN — CLOPIDOGREL BISULFATE 75 MG: 75 TABLET ORAL at 08:50

## 2024-10-26 ASSESSMENT — ACTIVITIES OF DAILY LIVING (ADL)
ADLS_ACUITY_SCORE: 0

## 2024-10-26 NOTE — DISCHARGE SUMMARY
[facility]  Hospitalist Discharge Summary      Date of Admission:  10/23/2024  Date of Discharge:  10/26/2024  Discharging Provider: Jarret Tolliver MD  Discharge Service: Hospitalist Service    Discharge Diagnoses     Diarrhea secondary to tube feeding..  Worsening of CKD versus ALEX.  Bronchitis.  Lactic acidosis.  Essential hypertension.  History of stroke.  Left-sided hemiplegia and aphasia.  Diabetes mellitus type 2.  Chronic indwelling Felix, changed every month.  PEG tube dependent.    Clinically Significant Risk Factors          Follow-ups Needed After Discharge   Follow-up Appointments     Follow-up and recommended labs and tests       Follow up with primary care provider, Nallely Fong, within 7 days   for hospital follow- up.  The following labs/tests are recommended: CBC   and BMP.            Discharge Disposition   Discharged to home  Condition at discharge: Stable    Hospital Course   74 year old male with PMH significant for CVA with resultant R-sided weakness/nonverbal status/PEG tube, T2DM, HTN admitted on 10/23/2024 after his home care nurse reported what sounded cough with low-grade fever.  Patient also had diarrhea with 4-5 loose bowel movements per day but per daughter patient also has chronic diarrhea.  Patient got COVID and influenza shots 6 days before admission.    Upon arrival the patient was afebrile and hemodynamically stable.  Blood pressure was high at 168/89.  Workup was significant for BUN/creatinine of 41.8/1.96 with anion gap of 18 and lactate of 4.7.  proBNP was 1876 with mildly elevated troponin of 51.  Tested negative for influenza, COVID and RSV.  Chest x-ray was clear.  He also has chronic right upper extremity edema and venous ultrasound was negative for DVT.    He was given NS bolus x 1.5 L followed by NS at 100 mL/h for another liter.  He was also given a dose of ceftriaxone and azithromycin in the ER which was not continued.      Diarrhea.  Likely  due to tube feeding, enteric panel and C. difficile negative.  Started Banatrol 3 times daily, diarrhea significantly improved.    Lactic acidosis.  Secondary to ALEX and metformin.  Lactate improved from 4.7-3.1.    ALEX on CKD.  Baseline creatinine of 1.2-1.5, presented with creatinine of 1.96.  Creatinine is nearly improved to 1.83 and is again 1.94 today.  ALEX initially thought to be due to dehydration with diarrheal illness but renal function has not significantly improved and this might represent worsening of CKD.  Discontinue lisinopril    Congested cough, reports of dyspnea..  It appears patient has bronchitis ordered today reaction to the COVID and flu vaccine he recently received.  Chest x-ray is clear, no leukocytosis and procalcitonin negative.  Given dose of ceftriaxone and azithromycin in the ER but did not continue antibiotics.  Acute Heart Failure with Preserved Ejection Fraction Considered and Ruled Out.  BNP is likely elevated due to renal insufficiency.  Normal EF and no evidence of CHF with no IVC dilatation.  Patient has crackles on examination but as per the patient's daughter these are chronic since patient was diagnosed with COVID a few years ago.  Likely has some residual scarring.  Hold off on IV fluids if patient develops worsening shortness of breath.    Right arm swelling.  Venous ultrasound negative for DVT..    Essential hypertension.    Resumed amlodipine, atenolol and doxazosin, stopped lisinopril for ALEX.  As needed hydralazine available.    History of stroke.  Continue Plavix.  PEG tube dependent, resumed tube feeding.    Elevated troponins.  Secondary to ALEX  Diabetes mellitus type 2. Metformin held due to lactic acidosis, continuing sliding scale and Lantus.  Goals of care.  I had detailed discussion regarding goals of care with patient's daughters at bedside.  I explained that doing CPR and putting on life support systems and event of cardiac arrest or respiratory failure would  not yield desired outcomes and would prolong the dying process making it more painful.  Daughters understand that and want to discuss with their mother before making a final decision.  Left as full code for now and they will continue discussions with your primary doctor.    Consultations This Hospital Stay   NUTRITION SERVICES ADULT IP CONSULT  PHARMACY IP CONSULT  CARE MANAGEMENT / SOCIAL WORK IP CONSULT    Code Status   Full Code    Time Spent on this Encounter   IJarret MD, personally saw the patient today and spent greater than 30 minutes discharging this patient.       Jarret Tolliver MD  [place] [address] [facility]  [address] [place] [address]  Phone: [phone]  Fax: [phone]  ______________________________________________________________________    Physical Exam   Vital Signs: Temp: 98.2  F (36.8  C) Temp src: Oral BP: (!) 165/75 Pulse: 67   Resp: 18 SpO2: 95 % O2 Device: None (Room air)    Weight: 132 lbs 15 oz  General Appearance:  Nonverbal but appears comfortable  Respiratory: Coarse breath sounds.  Cardiovascular: S1-S2 normal  GI: Soft and nontender.  Has a PEG tube.  Skin: No rash  Other:  Trace lower extremity edema.  Has indwelling Felix.         Primary Care Physician   Nallely Fong    Discharge Orders      Home Infusion Referral      Reason for your hospital stay    Diarrhea.  Worsening of CKD.     Follow-up and recommended labs and tests     Follow up with primary care provider, Nallely Fong, within 7 days for hospital follow- up.  The following labs/tests are recommended: CBC and BMP.     Activity    Your activity upon discharge: activity as tolerated     Diet    Follow this diet upon discharge: Current Diet:Orders Placed This Encounter      Adult Formula Bolus Feeding: Jevity 1.5; Route: Gastrostomy; 3 times daily; Volume per Bolus: 1; Can(s)/ Carton(s); 1 carton at ~9 AM, 1 carton at noon, and 2 cartons at 6 pm via gravity bag (or  timings per patient/nursing preference)      NPO for Medical/Clinical Reasons Except for: NPO but receiving Tube Feeding    Free water order:  Free water route: Gastric   Free       Discharge Follow-up Details      Home Infusion Referral      Preferred Location: [facility] - [phone]            Significant Results and Procedures   Most Recent 3 CBC's:  Recent Labs   Lab Test 10/25/24  0726 10/24/24  0654 10/23/24  2002   WBC 5.3 6.1 6.9   HGB 9.4* 9.4* 10.7*   MCV 82 82 83    195 204     Most Recent 3 BMP's:  Recent Labs   Lab Test 10/26/24  0952 10/26/24  0634 10/26/24  0322 10/25/24  0831 10/25/24  0726 10/24/24  0855 10/24/24  0654   NA  --  137  --   --  136  --  137   POTASSIUM  --  3.3*  --   --  3.6  --  3.7   CHLORIDE  --  100  --   --  101  --  103   CO2  --  24  --   --  25  --  25   BUN  --  33.0*  --   --  35.3*  --  39.7*   CR  --  1.94*  --   --  1.91*  --  1.83*   ANIONGAP  --  13  --   --  10  --  9   ARLETH  --  8.2*  --   --  8.4*  --  8.2*   * 231* 280*   < > 208*   < > 99    < > = values in this interval not displayed.     Most Recent 2 LFT's:  Recent Labs   Lab Test 10/23/24  2203 07/31/24  1300   AST 37 33   ALT 36 32   ALKPHOS 106 114   BILITOTAL <0.2 0.2   ,   Results for orders placed or performed during the hospital encounter of 10/23/24   Chest XR,  PA & LAT    Narrative    EXAM: XR CHEST 2 VIEWS  LOCATION: [facility]  DATE: 10/23/2024    INDICATION: Cough, low grade fever  COMPARISON: 4/21/2023.      Impression    IMPRESSION: Negative chest.   US Upper Extremity Venous Duplex Right    Narrative    EXAM: US UPPER EXTREMITY VENOUS DUPLEX RIGHT  LOCATION: [facility]  DATE: 10/23/2024    INDICATION: Upper extremity pain/swelling  COMPARISON: None.  TECHNIQUE: Venous Duplex ultrasound of the right upper extremity with (when possible) and without compression, augmentation, and duplex. Color flow and spectral  Doppler with waveform analysis performed.    FINDINGS: Ultrasound includes evaluation of the internal jugular vein, innominate vein, subclavian vein, axillary vein, and brachial vein. The superficial cephalic and basilic veins were also evaluated where seen.     RIGHT: No deep venous thrombosis. No superficial thrombophlebitis.      Impression    IMPRESSION:  1.  No deep venous thrombosis in the right upper extremity.   Echocardiogram Complete     Value    LVEF  60-65%    [facility]    [id number]  [id number]  [id number]  [id number]^TIMO^YELENA     [facility]  Echocardiography Laboratory  [address] [place] [address]     Name: CARLOS SCHULTZ  MRN: [id number]  : 1950  Study Date: 10/24/2024 10:40 AM  Age: 74 yrs  Gender: Male  Patient Location: [facility]  Reason For Study: CHF  Ordering Physician: YELENA GREENWOOD  Performed By: Jia Abrams     BSA: 1.7 m2  Height: 67 in  Weight: 133 lb  BP: 166/75 mmHg  ______________________________________________________________________________  Procedure  Complete Echo Adult.  ______________________________________________________________________________  Interpretation Summary     The visual ejection fraction is 60-65%.  Left ventricular diastolic function is normal.  Compared to prior study, there is no significant change.  ______________________________________________________________________________  Left Ventricle  The left ventricle is normal in size. There is normal left ventricular wall  thickness. The visual ejection fraction is 60-65%. Left ventricular diastolic  function is normal.     Right Ventricle  The right ventricle is normal in structure, function and size.     Atria  Normal left atrial size. Right atrial size is normal.     Mitral Valve  The mitral valve leaflets appear normal. There is no evidence of stenosis,  fluttering, or prolapse.     Tricuspid Valve  Normal tricuspid valve.     Aortic Valve  There is mild trileaflet aortic sclerosis.      Vessels  The aortic root is normal size. Normal size ascending aorta. The inferior vena  cava is normal.     Pericardium  There is no pericardial effusion.     Rhythm  Sinus rhythm was noted.     ______________________________________________________________________________  MMode/2D Measurements & Calculations  IVSd: 1.0 cm  LVIDd: 4.1 cm  LVIDs: 2.4 cm  LVPWd: 0.67 cm     FS: 41.9 %  LV mass(C)d: 105.0 grams  LV mass(C)dI: 61.7 grams/m2  Ao root diam: 3.4 cm  asc Aorta Diam: 3.2 cm  LVOT diam: 2.0 cm  LVOT area: 3.0 cm2  Ao root diam index Ht(cm/m): 2.0  Ao root diam index BSA (cm/m2): 2.0  Asc Ao diam index BSA (cm/m2): 1.9  Asc Ao diam index Ht(cm/m): 1.9  LA Volume (BP): 46.3 ml     LA Volume Index (BP): 27.2 ml/m2  RWT: 0.33  TAPSE: 1.8 cm     Doppler Measurements & Calculations  MV E max kathleen: 78.1 cm/sec  MV A max kathleen: 103.2 cm/sec  MV E/A: 0.76  MV dec slope: 303.8 cm/sec2  MV dec time: 0.26 sec  E/E' avg: 10.2  Lateral E/e': 8.6  Medial E/e': 11.8     ______________________________________________________________________________  Report approved by: Luciana Ferris 10/24/2024 12:42 PM             Discharge Medications   Current Discharge Medication List        START taking these medications    Details   Banana Flakes (BANATROL PLUS) Place 1 packet into Feeding Tube 3 times daily.  Qty: 90 packet, Refills: 3    Associated Diagnoses: Adult failure to thrive      fluconazole (DIFLUCAN) 40 MG/ML suspension Take 2.5 mLs (100 mg) by mouth daily for 7 days.  Qty: 17.5 mL, Refills: 0    Associated Diagnoses: Thrush      Prosource TF20 ENfit Compatibl EN LIQD (PROSOURCE TF20) packet Place 60 mLs into Feeding Tube daily.    Associated Diagnoses: Adult failure to thrive           CONTINUE these medications which have NOT CHANGED    Details   amLODIPine (NORVASC) 5 MG tablet Take 5 mg by mouth daily.      atenolol (TENORMIN) 25 MG tablet Take 1 tablet (25 mg) by mouth 2 times daily    Associated Diagnoses: Benign  essential hypertension      atorvastatin (LIPITOR) 80 MG tablet Take 80 mg by mouth daily      clopidogrel (PLAVIX) 75 MG tablet Take 75 mg by mouth daily      doxazosin (CARDURA) 2 MG tablet Take 1 tablet (2 mg) by mouth daily  Qty: 90 tablet, Refills: 3    Associated Diagnoses: Benign prostatic hyperplasia with urinary retention      FLUoxetine (PROZAC) 20 MG/5ML solution 5 mLs (20 mg) by Oral or Feeding Tube route daily  Qty: 150 mL, Refills: 0    Associated Diagnoses: Depression, unspecified depression type; Cerebrovascular accident (CVA), unspecified mechanism (H)      insulin glargine 100 UNIT/ML pen Inject 7 Units subcutaneously every morning.      Insulin Lispro (ADMELOG SC) Inject subcutaneously. Sliding scale      !! Nutritional Supplements (JEVITY PO) 1 AM + 1 Lunch + 2 dinner      polyethylene glycol (MIRALAX) 17 g packet Take 17 g by mouth daily as needed for constipation    Associated Diagnoses: Constipation, unspecified constipation type      !! Jevity 1.5 [place] [address] 1,186 mLs into G tube daily for 8 days. Infuse via gravity bag  Water flush: 60ml before and after each feeding  Qty: 9488 mL, Refills: 0    Associated Diagnoses: Severe protein-calorie malnutrition (H); Dehydration; Dysphagia, oral phase; Nausea with vomiting; Cerebral artery occlusion with cerebral infarction (H)       !! - Potential duplicate medications found. Please discuss with provider.        STOP taking these medications       lisinopril (ZESTRIL) 40 MG tablet Comments:   Reason for Stopping:         metFORMIN (GLUCOPHAGE) 1000 MG tablet Comments:   Reason for Stopping:         metFORMIN (GLUCOPHAGE) 1000 MG tablet Comments:   Reason for Stopping:             Allergies   Allergies   Allergen Reactions    Nka [No Known Allergies]

## 2024-10-26 NOTE — PROGRESS NOTES
Care Management Discharge Note    Discharge Date: 10/26/2024       Discharge Disposition: Home, Home Care, Home Infusion    Discharge Services:      Discharge DME:      Discharge Transportation: family or friend will provide    Private pay costs discussed: Not applicable    Does the patient's insurance plan have a 3 day qualifying hospital stay waiver?  No        Education Provided on the Discharge Plan: Yes  Persons Notified of Discharge Plans: [facility] and [facility]  Patient/Family in Agreement with the Plan: yes    Handoff Referral Completed: No, handoff not indicated or clinically appropriate    Additional Information:  Medically ready for discharge home today with family. [facility] Both agencies have been notified of discharge today. [facility] notified that patient will be discharged on Banatrol which script sent to [facility] to fill for home.  Family to provide transportation.    Rosalva Trivedi RN BSN OCN  Care Coordinator  [facility]  [phone]      Rosalva Trivedi RN

## 2024-10-26 NOTE — PLAN OF CARE
Discharge Note    Patient discharged to home via private vehicle  accompanied by daughter.  IV: Discontinued  Prescriptions printed and given to patient/family and filled and given to patient/family.   Belongings reviewed and sent with patient.   Home medications returned to patient: NA  Equipment sent with: N/A.   patient and family verbalizes understanding of discharge instructions. AVS given to family.  Additional education completed?  Nutrition supplementation and new medication              Goal Outcome Evaluation:      Plan of Care Reviewed With: patient, child    Overall Patient Progress: improvingOverall Patient Progress: improving    Outcome Evaluation: pt ready for d/c      Problem: Adult Inpatient Plan of Care  Goal: Plan of Care Review  Description: The Plan of Care Review/Shift note should be completed every shift.  The Outcome Evaluation is a brief statement about your assessment that the patient is improving, declining, or no change.  This information will be displayed automatically on your shift  note.  Recent Flowsheet Documentation  Taken 10/26/2024 1332 by Fozia Da Silva RN  Outcome Evaluation: pt ready for d/c  Plan of Care Reviewed With:   patient   child  Overall Patient Progress: improving  Goal: Absence of Hospital-Acquired Illness or Injury  Intervention: Identify and Manage Fall Risk  Recent Flowsheet Documentation  Taken 10/26/2024 1020 by Fozia Da Silva RN  Safety Promotion/Fall Prevention:   activity supervised   clutter free environment maintained   lighting adjusted   patient and family education  Intervention: Prevent and Manage VTE (Venous Thromboembolism) Risk  Recent Flowsheet Documentation  Taken 10/26/2024 1020 by Fozia Da Silva RN  VTE Prevention/Management: SCDs off (sequential compression devices)  Intervention: Prevent Infection  Recent Flowsheet Documentation  Taken 10/26/2024 1020 by Fozia Da Silva RN  Infection Prevention:   rest/sleep promoted   single patient  room provided

## 2024-10-26 NOTE — PLAN OF CARE
"Pertinent assessments: Alert, nonverbal. Elevated pressures, otherwise VSS on RA. No signs of pain. Ax2 with lift. LR running @50. 1 small BM this shift. Congested cough. Mouth cares completed.    Major Shift Events: none  Treatment Plan: TF, IVF  Bedside Nurse: Virgen Dickinson RN     Problem: Adult Inpatient Plan of Care  Goal: Plan of Care Review  Description: The Plan of Care Review/Shift note should be completed every shift.  The Outcome Evaluation is a brief statement about your assessment that the patient is improving, declining, or no change.  This information will be displayed automatically on your shift  note.  Outcome: Progressing  Flowsheets (Taken 10/26/2024 0653)  Outcome Evaluation: No reports of pain, VSS, afebrile, tolerating TF  Plan of Care Reviewed With: patient  Overall Patient Progress: improving  Goal: Patient-Specific Goal (Individualized)  Description: You can add care plan individualizations to a care plan. Examples of Individualization might be:  \"Parent requests to be called daily at 9am for status\", \"I have a hard time hearing out of my right ear\", or \"Do not touch me to wake me up as it startles  me\".  Outcome: Progressing  Goal: Absence of Hospital-Acquired Illness or Injury  Outcome: Progressing  Intervention: Identify and Manage Fall Risk  Recent Flowsheet Documentation  Taken 10/25/2024 2140 by Virgen Dickinson RN  Safety Promotion/Fall Prevention:   activity supervised   clutter free environment maintained   lighting adjusted   patient and family education  Intervention: Prevent Skin Injury  Recent Flowsheet Documentation  Taken 10/25/2024 2140 by Virgen Dickinson RN  Body Position: weight shifting  Skin Protection: adhesive use limited  Intervention: Prevent and Manage VTE (Venous Thromboembolism) Risk  Recent Flowsheet Documentation  Taken 10/25/2024 2140 by Virgen Dickinson RN  VTE Prevention/Management: SCDs off (sequential compression devices)  Intervention: Prevent Infection  Recent " Flowsheet Documentation  Taken 10/25/2024 2140 by Virgen Dickinson, RN  Infection Prevention:   rest/sleep promoted   single patient room provided  Goal: Optimal Comfort and Wellbeing  Outcome: Progressing  Goal: Readiness for Transition of Care  Outcome: Progressing   Goal Outcome Evaluation:      Plan of Care Reviewed With: patient    Overall Patient Progress: improvingOverall Patient Progress: improving    Outcome Evaluation: No reports of pain, VSS, afebrile, tolerating TF

## 2024-10-28 ENCOUNTER — HOME INFUSION BILLING (OUTPATIENT)
Dept: HOME HEALTH SERVICES | Facility: HOME HEALTH | Age: 74
End: 2024-10-28
Payer: MEDICARE

## 2024-10-28 ENCOUNTER — PATIENT OUTREACH (OUTPATIENT)
Dept: CARE COORDINATION | Facility: CLINIC | Age: 74
End: 2024-10-28
Payer: MEDICARE

## 2024-10-28 VITALS — HEIGHT: 67 IN | BODY MASS INDEX: 21.11 KG/M2 | WEIGHT: 134.48 LBS

## 2024-10-28 RX ORDER — AMINO AC/PROTEIN HYDR/WHEY PRO 10G-100/30
45 LIQUID (ML) ORAL DAILY
Qty: 1350 ML | Refills: 11 | Status: ACTIVE | OUTPATIENT
Start: 2024-10-28 | End: 2024-10-28

## 2024-10-28 RX ORDER — AMINO AC/PROTEIN HYDR/WHEY PRO 10G-100/30
45 LIQUID (ML) ORAL DAILY
Qty: 1350 ML | Refills: 11 | Status: DISPENSED | OUTPATIENT
Start: 2024-10-26 | End: 2025-10-26

## 2024-10-28 NOTE — PROGRESS NOTES
[facility]: [facility]    Background: Transitional Care Management program identified per system criteria and reviewed by [facility] team for possible outreach.    Assessment: Upon chart review, [facility] Team member will not proceed with patient outreach related to this episode of Transitional Care Management program due to reason below:    Patient's daughter, Phallnichelle, answered. States patient is non-verbal.  No consent on file and no ACP information in chart.  Daughter stated she was not sure how he is doing today but that a home care nurse will be there today around 1030am.  RN provided 24/7 nurse line information but was unable to provide reason for call or any information as no consent on file.     Plan: Transitional Care Management episode addressed appropriately per reason noted above.      Heide Knight RN  [facility] [place], [facility]    *Connected Care Resource Team does NOT follow patient ongoing. Referrals are identified based on internal discharge reports and the outreach is to ensure patient has an understanding of their discharge instructions.

## 2024-10-28 NOTE — PROGRESS NOTES
"[facility] Nutrition Assessment     Type of therapy: Enteral    Anthropometrics/Weight Goals  Height: 1.702 m (5' 7\")  Weight: 61 kg (134 lb 7.7 oz)  IBW Male (kg): 66.1  BMI (kg): 21.06  Weight history / comments:: 0/24/24 60.7 kg (133 lb 13.1 oz)  01/02/23 57.2 kg (126 lb)  10/28/22 54.4 kg (120 lb)  01/26/22 57.8 kg (127 lb 6.4 oz)  11/04/21 59 kg (130 lb)  10/01/21 66.5 kg (146 lb 11.2 oz)  09/16/21 61.5 kg (135 lb 9.3 oz)  08/14/12 68.5 kg (151 lb)    Nutrition and Medical History  Reason for Nutrition Support: Swallowing difficulty  Feeding tube type: GT  Date Placed: 10/17/24    Current Nutrition Orders  Oral Diet: NPO    Enteral Nutrition Orders  Enteral Formula: Jevity 1.5 + 1 Packet Prosource  Rate/Frequency: 4 cartons/day  Water Flushes: 120ml before and after feedings  Enteral Nutrition Provides: 1420 kcal (23 kcal/kg), 60 g protein, 204 g CHO, 20 g fiber, and 720 mL free water daily  - 1 prosource daily to total 80 g protein (1.3 g protein/kg)    Assessed Nutritional Needs  Kcal Needs: 2858-2625 kcals  Protein Needs: 61-73 grams protein - 1-1.2 g pro/Kg  Fluid Needs: 1ml/kcal  Nutrition Support is meeting what percent of needs: 100%    Lab Information  Labs: BUN 33.0H, Creat 1.94H,  per meter during inpatiet stay on 10/26/24    Plan  Follow up/Recommendations: ANDRES.  Pt infusing 4 cartons/day at home (5/day was previous order).  Prosource was added during inpatient stay to help best meet nutrition needs and given WOCN consulted for possible open area:Will update home orders to reflect the 4 cartons/day + addition of Prosource daily      Molly Siemens, RD, CNSC, LD  [facility] Dietitian  "

## 2024-10-29 ENCOUNTER — HOME INFUSION (OUTPATIENT)
Dept: HOME HEALTH SERVICES | Facility: HOME HEALTH | Age: 74
End: 2024-10-29
Payer: MEDICARE

## 2024-11-04 ENCOUNTER — LAB REQUISITION (OUTPATIENT)
Dept: LAB | Facility: CLINIC | Age: 74
End: 2024-11-04
Payer: MEDICARE

## 2024-11-04 DIAGNOSIS — E11.22 TYPE 2 DIABETES MELLITUS WITH DIABETIC CHRONIC KIDNEY DISEASE (H): ICD-10-CM

## 2024-11-04 LAB
ANION GAP SERPL CALCULATED.3IONS-SCNC: 11 MMOL/L (ref 7–15)
BASOPHILS # BLD AUTO: 0.1 10E3/UL (ref 0–0.2)
BASOPHILS NFR BLD AUTO: 1 %
BUN SERPL-MCNC: 45.9 MG/DL (ref 8–23)
CALCIUM SERPL-MCNC: 8.1 MG/DL (ref 8.8–10.4)
CHLORIDE SERPL-SCNC: 96 MMOL/L (ref 98–107)
CREAT SERPL-MCNC: 2.12 MG/DL (ref 0.67–1.17)
EGFRCR SERPLBLD CKD-EPI 2021: 32 ML/MIN/1.73M2
EOSINOPHIL # BLD AUTO: 0.3 10E3/UL (ref 0–0.7)
EOSINOPHIL NFR BLD AUTO: 5 %
ERYTHROCYTE [DISTWIDTH] IN BLOOD BY AUTOMATED COUNT: 15.1 % (ref 10–15)
EST. AVERAGE GLUCOSE BLD GHB EST-MCNC: 206 MG/DL
GLUCOSE SERPL-MCNC: 296 MG/DL (ref 70–99)
HBA1C MFR BLD: 8.8 %
HCO3 SERPL-SCNC: 26 MMOL/L (ref 22–29)
HCT VFR BLD AUTO: 26.7 % (ref 40–53)
HGB BLD-MCNC: 9 G/DL (ref 13.3–17.7)
IMM GRANULOCYTES # BLD: 0 10E3/UL
IMM GRANULOCYTES NFR BLD: 0 %
LYMPHOCYTES # BLD AUTO: 1.5 10E3/UL (ref 0.8–5.3)
LYMPHOCYTES NFR BLD AUTO: 29 %
MCH RBC QN AUTO: 26.9 PG (ref 26.5–33)
MCHC RBC AUTO-ENTMCNC: 33.7 G/DL (ref 31.5–36.5)
MCV RBC AUTO: 80 FL (ref 78–100)
MONOCYTES # BLD AUTO: 0.5 10E3/UL (ref 0–1.3)
MONOCYTES NFR BLD AUTO: 9 %
NEUTROPHILS # BLD AUTO: 2.9 10E3/UL (ref 1.6–8.3)
NEUTROPHILS NFR BLD AUTO: 56 %
NRBC # BLD AUTO: 0 10E3/UL
NRBC BLD AUTO-RTO: 0 /100
PLATELET # BLD AUTO: 187 10E3/UL (ref 150–450)
POTASSIUM SERPL-SCNC: 4 MMOL/L (ref 3.4–5.3)
RBC # BLD AUTO: 3.34 10E6/UL (ref 4.4–5.9)
SODIUM SERPL-SCNC: 133 MMOL/L (ref 135–145)
WBC # BLD AUTO: 5.2 10E3/UL (ref 4–11)

## 2024-11-04 PROCEDURE — 85025 COMPLETE CBC W/AUTO DIFF WBC: CPT | Mod: ORL | Performed by: INTERNAL MEDICINE

## 2024-11-04 PROCEDURE — 80048 BASIC METABOLIC PNL TOTAL CA: CPT | Mod: ORL | Performed by: INTERNAL MEDICINE

## 2024-11-04 PROCEDURE — 83036 HEMOGLOBIN GLYCOSYLATED A1C: CPT | Mod: ORL | Performed by: INTERNAL MEDICINE

## 2024-11-26 ENCOUNTER — HOME INFUSION BILLING (OUTPATIENT)
Dept: HOME HEALTH SERVICES | Facility: HOME HEALTH | Age: 74
End: 2024-11-26
Payer: MEDICARE

## 2024-11-26 PROCEDURE — B4036 ENTERAL FEED SUP KIT GRAV BY: HCPCS

## 2024-11-26 PROCEDURE — A4450 NON-WATERPROOF TAPE: HCPCS

## 2024-11-26 PROCEDURE — B4152 EF CALORIE DENSE>/=1.5KCAL: HCPCS

## 2024-11-26 PROCEDURE — A6402 STERILE GAUZE <= 16 SQ IN: HCPCS

## 2024-11-26 PROCEDURE — B4155 EF INCOMPLETE/MODULAR: HCPCS

## 2024-11-27 PROCEDURE — B4036 ENTERAL FEED SUP KIT GRAV BY: HCPCS

## 2024-11-28 PROCEDURE — B4036 ENTERAL FEED SUP KIT GRAV BY: HCPCS

## 2024-11-29 PROCEDURE — B4036 ENTERAL FEED SUP KIT GRAV BY: HCPCS

## 2024-11-30 PROCEDURE — B4036 ENTERAL FEED SUP KIT GRAV BY: HCPCS

## 2024-12-01 PROCEDURE — B4036 ENTERAL FEED SUP KIT GRAV BY: HCPCS

## 2024-12-02 ENCOUNTER — LAB REQUISITION (OUTPATIENT)
Dept: LAB | Facility: CLINIC | Age: 74
End: 2024-12-02
Payer: MEDICARE

## 2024-12-02 DIAGNOSIS — E11.22 TYPE 2 DIABETES MELLITUS WITH DIABETIC CHRONIC KIDNEY DISEASE (H): ICD-10-CM

## 2024-12-02 LAB
ANION GAP SERPL CALCULATED.3IONS-SCNC: 14 MMOL/L (ref 7–15)
BASOPHILS # BLD AUTO: 0 10E3/UL (ref 0–0.2)
BASOPHILS NFR BLD AUTO: 1 %
BUN SERPL-MCNC: 53 MG/DL (ref 8–23)
CALCIUM SERPL-MCNC: 8.1 MG/DL (ref 8.8–10.4)
CHLORIDE SERPL-SCNC: 98 MMOL/L (ref 98–107)
CREAT SERPL-MCNC: 2.44 MG/DL (ref 0.67–1.17)
EGFRCR SERPLBLD CKD-EPI 2021: 27 ML/MIN/1.73M2
EOSINOPHIL # BLD AUTO: 0.3 10E3/UL (ref 0–0.7)
EOSINOPHIL NFR BLD AUTO: 6 %
ERYTHROCYTE [DISTWIDTH] IN BLOOD BY AUTOMATED COUNT: 14.7 % (ref 10–15)
GLUCOSE SERPL-MCNC: 398 MG/DL (ref 70–99)
HCO3 SERPL-SCNC: 25 MMOL/L (ref 22–29)
HCT VFR BLD AUTO: 28.7 % (ref 40–53)
HGB BLD-MCNC: 9.3 G/DL (ref 13.3–17.7)
IMM GRANULOCYTES # BLD: 0 10E3/UL
IMM GRANULOCYTES NFR BLD: 0 %
LYMPHOCYTES # BLD AUTO: 1.1 10E3/UL (ref 0.8–5.3)
LYMPHOCYTES NFR BLD AUTO: 24 %
MCH RBC QN AUTO: 27 PG (ref 26.5–33)
MCHC RBC AUTO-ENTMCNC: 32.4 G/DL (ref 31.5–36.5)
MCV RBC AUTO: 83 FL (ref 78–100)
MONOCYTES # BLD AUTO: 0.4 10E3/UL (ref 0–1.3)
MONOCYTES NFR BLD AUTO: 9 %
NEUTROPHILS # BLD AUTO: 2.8 10E3/UL (ref 1.6–8.3)
NEUTROPHILS NFR BLD AUTO: 61 %
NRBC # BLD AUTO: 0 10E3/UL
NRBC BLD AUTO-RTO: 0 /100
PLATELET # BLD AUTO: 149 10E3/UL (ref 150–450)
POTASSIUM SERPL-SCNC: 4 MMOL/L (ref 3.4–5.3)
RBC # BLD AUTO: 3.44 10E6/UL (ref 4.4–5.9)
SODIUM SERPL-SCNC: 137 MMOL/L (ref 135–145)
WBC # BLD AUTO: 4.6 10E3/UL (ref 4–11)

## 2024-12-02 PROCEDURE — 80048 BASIC METABOLIC PNL TOTAL CA: CPT | Mod: ORL | Performed by: INTERNAL MEDICINE

## 2024-12-02 PROCEDURE — B4036 ENTERAL FEED SUP KIT GRAV BY: HCPCS

## 2024-12-02 PROCEDURE — 85025 COMPLETE CBC W/AUTO DIFF WBC: CPT | Mod: ORL | Performed by: INTERNAL MEDICINE

## 2024-12-03 PROCEDURE — B4036 ENTERAL FEED SUP KIT GRAV BY: HCPCS

## 2024-12-04 PROCEDURE — B4036 ENTERAL FEED SUP KIT GRAV BY: HCPCS

## 2024-12-05 PROCEDURE — B4036 ENTERAL FEED SUP KIT GRAV BY: HCPCS

## 2024-12-06 PROCEDURE — B4036 ENTERAL FEED SUP KIT GRAV BY: HCPCS

## 2024-12-07 PROCEDURE — B4036 ENTERAL FEED SUP KIT GRAV BY: HCPCS

## 2024-12-08 PROCEDURE — B4036 ENTERAL FEED SUP KIT GRAV BY: HCPCS

## 2024-12-09 PROCEDURE — B4036 ENTERAL FEED SUP KIT GRAV BY: HCPCS

## 2024-12-10 PROCEDURE — B4036 ENTERAL FEED SUP KIT GRAV BY: HCPCS

## 2024-12-11 PROCEDURE — B4036 ENTERAL FEED SUP KIT GRAV BY: HCPCS

## 2024-12-12 PROCEDURE — B4036 ENTERAL FEED SUP KIT GRAV BY: HCPCS

## 2024-12-13 PROCEDURE — B4036 ENTERAL FEED SUP KIT GRAV BY: HCPCS

## 2024-12-14 PROCEDURE — B4036 ENTERAL FEED SUP KIT GRAV BY: HCPCS

## 2024-12-15 PROCEDURE — B4036 ENTERAL FEED SUP KIT GRAV BY: HCPCS

## 2024-12-16 PROCEDURE — B4036 ENTERAL FEED SUP KIT GRAV BY: HCPCS

## 2024-12-17 PROCEDURE — B4036 ENTERAL FEED SUP KIT GRAV BY: HCPCS

## 2024-12-18 PROCEDURE — B4036 ENTERAL FEED SUP KIT GRAV BY: HCPCS

## 2024-12-19 ENCOUNTER — MEDICAL CORRESPONDENCE (OUTPATIENT)
Dept: HEALTH INFORMATION MANAGEMENT | Facility: CLINIC | Age: 74
End: 2024-12-19

## 2024-12-19 PROCEDURE — B4036 ENTERAL FEED SUP KIT GRAV BY: HCPCS

## 2024-12-20 PROCEDURE — B4036 ENTERAL FEED SUP KIT GRAV BY: HCPCS

## 2024-12-21 PROCEDURE — B4036 ENTERAL FEED SUP KIT GRAV BY: HCPCS

## 2024-12-22 PROCEDURE — B4036 ENTERAL FEED SUP KIT GRAV BY: HCPCS

## 2024-12-23 PROCEDURE — B4036 ENTERAL FEED SUP KIT GRAV BY: HCPCS

## 2024-12-24 PROCEDURE — B4036 ENTERAL FEED SUP KIT GRAV BY: HCPCS

## 2024-12-25 PROCEDURE — B4036 ENTERAL FEED SUP KIT GRAV BY: HCPCS

## 2025-01-01 ENCOUNTER — APPOINTMENT (OUTPATIENT)
Dept: GENERAL RADIOLOGY | Facility: CLINIC | Age: 75
DRG: 291 | End: 2025-01-01
Attending: INTERNAL MEDICINE
Payer: MEDICARE

## 2025-01-01 ENCOUNTER — APPOINTMENT (OUTPATIENT)
Dept: CT IMAGING | Facility: CLINIC | Age: 75
DRG: 291 | End: 2025-01-01
Attending: EMERGENCY MEDICINE
Payer: MEDICARE

## 2025-01-01 ENCOUNTER — APPOINTMENT (OUTPATIENT)
Dept: ULTRASOUND IMAGING | Facility: CLINIC | Age: 75
DRG: 291 | End: 2025-01-01
Attending: HOSPITALIST
Payer: MEDICARE

## 2025-01-01 PROCEDURE — 272N000706 US THORACENTESIS

## 2025-01-01 PROCEDURE — 71250 CT THORAX DX C-: CPT

## 2025-01-01 PROCEDURE — 74018 RADEX ABDOMEN 1 VIEW: CPT

## 2025-01-01 PROCEDURE — 70450 CT HEAD/BRAIN W/O DYE: CPT

## 2025-01-13 ENCOUNTER — LAB REQUISITION (OUTPATIENT)
Dept: LAB | Facility: CLINIC | Age: 75
End: 2025-01-13
Payer: MEDICARE

## 2025-01-13 DIAGNOSIS — I12.9 HYPERTENSIVE CHRONIC KIDNEY DISEASE WITH STAGE 1 THROUGH STAGE 4 CHRONIC KIDNEY DISEASE, OR UNSPECIFIED CHRONIC KIDNEY DISEASE: ICD-10-CM

## 2025-01-13 LAB
ALBUMIN SERPL BCG-MCNC: 2.7 G/DL (ref 3.5–5.2)
ALP SERPL-CCNC: 148 U/L (ref 40–150)
ALT SERPL W P-5'-P-CCNC: 83 U/L (ref 0–70)
ANION GAP SERPL CALCULATED.3IONS-SCNC: 12 MMOL/L (ref 7–15)
AST SERPL W P-5'-P-CCNC: 52 U/L (ref 0–45)
BILIRUB SERPL-MCNC: 0.2 MG/DL
BUN SERPL-MCNC: 65.7 MG/DL (ref 8–23)
CALCIUM SERPL-MCNC: 8.2 MG/DL (ref 8.8–10.4)
CHLORIDE SERPL-SCNC: 97 MMOL/L (ref 98–107)
CREAT SERPL-MCNC: 3.23 MG/DL (ref 0.67–1.17)
EGFRCR SERPLBLD CKD-EPI 2021: 19 ML/MIN/1.73M2
ERYTHROCYTE [DISTWIDTH] IN BLOOD BY AUTOMATED COUNT: 14 % (ref 10–15)
GLUCOSE SERPL-MCNC: 334 MG/DL (ref 70–99)
HCO3 SERPL-SCNC: 27 MMOL/L (ref 22–29)
HCT VFR BLD AUTO: 30.7 % (ref 40–53)
HGB BLD-MCNC: 9.9 G/DL (ref 13.3–17.7)
MCH RBC QN AUTO: 26.8 PG (ref 26.5–33)
MCHC RBC AUTO-ENTMCNC: 32.2 G/DL (ref 31.5–36.5)
MCV RBC AUTO: 83 FL (ref 78–100)
PLATELET # BLD AUTO: 197 10E3/UL (ref 150–450)
POTASSIUM SERPL-SCNC: 3.9 MMOL/L (ref 3.4–5.3)
PROT SERPL-MCNC: 6.4 G/DL (ref 6.4–8.3)
RBC # BLD AUTO: 3.69 10E6/UL (ref 4.4–5.9)
SODIUM SERPL-SCNC: 136 MMOL/L (ref 135–145)
WBC # BLD AUTO: 5 10E3/UL (ref 4–11)

## 2025-01-13 PROCEDURE — 80053 COMPREHEN METABOLIC PANEL: CPT | Mod: ORL | Performed by: INTERNAL MEDICINE

## 2025-01-13 PROCEDURE — 85027 COMPLETE CBC AUTOMATED: CPT | Mod: ORL | Performed by: INTERNAL MEDICINE

## 2025-01-14 ENCOUNTER — HOSPITAL ENCOUNTER (INPATIENT)
Facility: CLINIC | Age: 75
DRG: 682 | End: 2025-01-14
Attending: EMERGENCY MEDICINE | Admitting: INTERNAL MEDICINE
Payer: MEDICARE

## 2025-01-14 ENCOUNTER — APPOINTMENT (OUTPATIENT)
Dept: ULTRASOUND IMAGING | Facility: CLINIC | Age: 75
DRG: 682 | End: 2025-01-14
Attending: EMERGENCY MEDICINE
Payer: MEDICARE

## 2025-01-14 ENCOUNTER — APPOINTMENT (OUTPATIENT)
Dept: GENERAL RADIOLOGY | Facility: CLINIC | Age: 75
DRG: 682 | End: 2025-01-14
Attending: EMERGENCY MEDICINE
Payer: MEDICARE

## 2025-01-14 DIAGNOSIS — N17.9 ACUTE RENAL FAILURE SUPERIMPOSED ON CHRONIC KIDNEY DISEASE, UNSPECIFIED ACUTE RENAL FAILURE TYPE, UNSPECIFIED CKD STAGE: ICD-10-CM

## 2025-01-14 DIAGNOSIS — R60.1 ANASARCA: ICD-10-CM

## 2025-01-14 DIAGNOSIS — N18.4 STAGE 4 CHRONIC KIDNEY DISEASE (H): ICD-10-CM

## 2025-01-14 DIAGNOSIS — J81.0 ACUTE PULMONARY EDEMA (H): ICD-10-CM

## 2025-01-14 DIAGNOSIS — N18.9 ACUTE RENAL FAILURE SUPERIMPOSED ON CHRONIC KIDNEY DISEASE, UNSPECIFIED ACUTE RENAL FAILURE TYPE, UNSPECIFIED CKD STAGE: ICD-10-CM

## 2025-01-14 DIAGNOSIS — I63.9 CEREBROVASCULAR ACCIDENT (CVA), UNSPECIFIED MECHANISM (H): ICD-10-CM

## 2025-01-14 DIAGNOSIS — D50.0 IRON DEFICIENCY ANEMIA DUE TO CHRONIC BLOOD LOSS: ICD-10-CM

## 2025-01-14 DIAGNOSIS — I69.90 LATE EFFECTS OF CVA (CEREBROVASCULAR ACCIDENT): ICD-10-CM

## 2025-01-14 DIAGNOSIS — I10 ESSENTIAL HYPERTENSION: Primary | ICD-10-CM

## 2025-01-14 DIAGNOSIS — R62.7 ADULT FAILURE TO THRIVE: ICD-10-CM

## 2025-01-14 LAB
ALBUMIN SERPL BCG-MCNC: 3 G/DL (ref 3.5–5.2)
ALBUMIN UR-MCNC: 600 MG/DL
ALP SERPL-CCNC: 157 U/L (ref 40–150)
ALT SERPL W P-5'-P-CCNC: 78 U/L (ref 0–70)
AMMONIA PLAS-SCNC: 19 UMOL/L (ref 16–60)
ANION GAP SERPL CALCULATED.3IONS-SCNC: 13 MMOL/L (ref 7–15)
APPEARANCE UR: CLEAR
AST SERPL W P-5'-P-CCNC: 46 U/L (ref 0–45)
BACTERIA #/AREA URNS HPF: ABNORMAL /HPF
BASE EXCESS BLDV CALC-SCNC: 4.6 MMOL/L (ref -3–3)
BASOPHILS # BLD AUTO: 0.1 10E3/UL (ref 0–0.2)
BASOPHILS NFR BLD AUTO: 1 %
BILIRUB SERPL-MCNC: 0.2 MG/DL
BILIRUB UR QL STRIP: NEGATIVE
BUN SERPL-MCNC: 68 MG/DL (ref 8–23)
CALCIUM SERPL-MCNC: 8.2 MG/DL (ref 8.8–10.4)
CHLORIDE SERPL-SCNC: 95 MMOL/L (ref 98–107)
COLOR UR AUTO: YELLOW
CREAT SERPL-MCNC: 3.27 MG/DL (ref 0.67–1.17)
EGFRCR SERPLBLD CKD-EPI 2021: 19 ML/MIN/1.73M2
EOSINOPHIL # BLD AUTO: 0.2 10E3/UL (ref 0–0.7)
EOSINOPHIL NFR BLD AUTO: 4 %
ERYTHROCYTE [DISTWIDTH] IN BLOOD BY AUTOMATED COUNT: 13.9 % (ref 10–15)
FLUAV RNA SPEC QL NAA+PROBE: NEGATIVE
FLUBV RNA RESP QL NAA+PROBE: NEGATIVE
GLUCOSE SERPL-MCNC: 239 MG/DL (ref 70–99)
GLUCOSE UR STRIP-MCNC: 300 MG/DL
HCO3 BLDV-SCNC: 32 MMOL/L (ref 21–28)
HCO3 SERPL-SCNC: 26 MMOL/L (ref 22–29)
HCT VFR BLD AUTO: 29.1 % (ref 40–53)
HGB BLD-MCNC: 9.2 G/DL (ref 13.3–17.7)
HGB UR QL STRIP: ABNORMAL
HOLD SPECIMEN: NORMAL
HOLD SPECIMEN: NORMAL
HYALINE CASTS: 1 /LPF
IMM GRANULOCYTES # BLD: 0 10E3/UL
IMM GRANULOCYTES NFR BLD: 0 %
KETONES UR STRIP-MCNC: NEGATIVE MG/DL
LACTATE SERPL-SCNC: 0.7 MMOL/L (ref 0.7–2)
LEUKOCYTE ESTERASE UR QL STRIP: ABNORMAL
LIPASE SERPL-CCNC: 5 U/L (ref 13–60)
LYMPHOCYTES # BLD AUTO: 1.3 10E3/UL (ref 0.8–5.3)
LYMPHOCYTES NFR BLD AUTO: 23 %
MCH RBC QN AUTO: 26.3 PG (ref 26.5–33)
MCHC RBC AUTO-ENTMCNC: 31.6 G/DL (ref 31.5–36.5)
MCV RBC AUTO: 83 FL (ref 78–100)
MONOCYTES # BLD AUTO: 0.5 10E3/UL (ref 0–1.3)
MONOCYTES NFR BLD AUTO: 10 %
MUCOUS THREADS #/AREA URNS LPF: PRESENT /LPF
NEUTROPHILS # BLD AUTO: 3.4 10E3/UL (ref 1.6–8.3)
NEUTROPHILS NFR BLD AUTO: 62 %
NITRATE UR QL: NEGATIVE
NRBC # BLD AUTO: 0 10E3/UL
NRBC BLD AUTO-RTO: 0 /100
NT-PROBNP SERPL-MCNC: 6638 PG/ML (ref 0–900)
O2/TOTAL GAS SETTING VFR VENT: 21 %
OXYHGB MFR BLDV: 70 % (ref 70–75)
PCO2 BLDV: 54 MM HG (ref 40–50)
PH BLDV: 7.38 [PH] (ref 7.32–7.43)
PH UR STRIP: 6.5 [PH] (ref 5–7)
PHOSPHATE SERPL-MCNC: 6.3 MG/DL (ref 2.5–4.5)
PLATELET # BLD AUTO: 196 10E3/UL (ref 150–450)
PO2 BLDV: 38 MM HG (ref 25–47)
POTASSIUM SERPL-SCNC: 3.7 MMOL/L (ref 3.4–5.3)
PROT SERPL-MCNC: 6.8 G/DL (ref 6.4–8.3)
RBC # BLD AUTO: 3.5 10E6/UL (ref 4.4–5.9)
RBC URINE: 44 /HPF
RSV RNA SPEC NAA+PROBE: NEGATIVE
SAO2 % BLDV: 70.5 % (ref 70–75)
SARS-COV-2 RNA RESP QL NAA+PROBE: NEGATIVE
SODIUM SERPL-SCNC: 134 MMOL/L (ref 135–145)
SP GR UR STRIP: 1.02 (ref 1–1.03)
UROBILINOGEN UR STRIP-MCNC: NORMAL MG/DL
WBC # BLD AUTO: 5.5 10E3/UL (ref 4–11)
WBC URINE: 22 /HPF

## 2025-01-14 PROCEDURE — 85045 AUTOMATED RETICULOCYTE COUNT: CPT | Performed by: INTERNAL MEDICINE

## 2025-01-14 PROCEDURE — 85041 AUTOMATED RBC COUNT: CPT | Performed by: EMERGENCY MEDICINE

## 2025-01-14 PROCEDURE — 83690 ASSAY OF LIPASE: CPT | Performed by: EMERGENCY MEDICINE

## 2025-01-14 PROCEDURE — 120N000001 HC R&B MED SURG/OB

## 2025-01-14 PROCEDURE — 250N000011 HC RX IP 250 OP 636: Performed by: EMERGENCY MEDICINE

## 2025-01-14 PROCEDURE — 82140 ASSAY OF AMMONIA: CPT | Performed by: EMERGENCY MEDICINE

## 2025-01-14 PROCEDURE — 83880 ASSAY OF NATRIURETIC PEPTIDE: CPT | Performed by: EMERGENCY MEDICINE

## 2025-01-14 PROCEDURE — 87040 BLOOD CULTURE FOR BACTERIA: CPT | Performed by: EMERGENCY MEDICINE

## 2025-01-14 PROCEDURE — 82607 VITAMIN B-12: CPT | Performed by: INTERNAL MEDICINE

## 2025-01-14 PROCEDURE — 83605 ASSAY OF LACTIC ACID: CPT | Performed by: EMERGENCY MEDICINE

## 2025-01-14 PROCEDURE — 36415 COLL VENOUS BLD VENIPUNCTURE: CPT | Performed by: EMERGENCY MEDICINE

## 2025-01-14 PROCEDURE — 82805 BLOOD GASES W/O2 SATURATION: CPT | Performed by: EMERGENCY MEDICINE

## 2025-01-14 PROCEDURE — 82668 ASSAY OF ERYTHROPOIETIN: CPT | Performed by: INTERNAL MEDICINE

## 2025-01-14 PROCEDURE — 99285 EMERGENCY DEPT VISIT HI MDM: CPT | Mod: 25

## 2025-01-14 PROCEDURE — 84484 ASSAY OF TROPONIN QUANT: CPT | Performed by: INTERNAL MEDICINE

## 2025-01-14 PROCEDURE — 83550 IRON BINDING TEST: CPT | Performed by: INTERNAL MEDICINE

## 2025-01-14 PROCEDURE — 84100 ASSAY OF PHOSPHORUS: CPT | Performed by: EMERGENCY MEDICINE

## 2025-01-14 PROCEDURE — 85004 AUTOMATED DIFF WBC COUNT: CPT | Performed by: EMERGENCY MEDICINE

## 2025-01-14 PROCEDURE — 80053 COMPREHEN METABOLIC PANEL: CPT | Performed by: EMERGENCY MEDICINE

## 2025-01-14 PROCEDURE — 84443 ASSAY THYROID STIM HORMONE: CPT | Performed by: INTERNAL MEDICINE

## 2025-01-14 PROCEDURE — 71045 X-RAY EXAM CHEST 1 VIEW: CPT

## 2025-01-14 PROCEDURE — 82728 ASSAY OF FERRITIN: CPT | Performed by: INTERNAL MEDICINE

## 2025-01-14 PROCEDURE — 93005 ELECTROCARDIOGRAM TRACING: CPT

## 2025-01-14 PROCEDURE — 81001 URINALYSIS AUTO W/SCOPE: CPT | Performed by: EMERGENCY MEDICINE

## 2025-01-14 PROCEDURE — 99223 1ST HOSP IP/OBS HIGH 75: CPT | Mod: AI | Performed by: INTERNAL MEDICINE

## 2025-01-14 PROCEDURE — 87637 SARSCOV2&INF A&B&RSV AMP PRB: CPT | Performed by: EMERGENCY MEDICINE

## 2025-01-14 PROCEDURE — 96374 THER/PROPH/DIAG INJ IV PUSH: CPT

## 2025-01-14 PROCEDURE — 93970 EXTREMITY STUDY: CPT

## 2025-01-14 RX ORDER — CLOPIDOGREL BISULFATE 75 MG/1
75 TABLET ORAL DAILY
Status: DISCONTINUED | OUTPATIENT
Start: 2025-01-15 | End: 2025-01-18 | Stop reason: HOSPADM

## 2025-01-14 RX ORDER — POLYETHYLENE GLYCOL 3350 17 G/17G
17 POWDER, FOR SOLUTION ORAL DAILY
Status: DISCONTINUED | OUTPATIENT
Start: 2025-01-15 | End: 2025-01-15

## 2025-01-14 RX ORDER — AMOXICILLIN 250 MG
1 CAPSULE ORAL 2 TIMES DAILY PRN
Status: DISCONTINUED | OUTPATIENT
Start: 2025-01-14 | End: 2025-01-18 | Stop reason: HOSPADM

## 2025-01-14 RX ORDER — POLYETHYLENE GLYCOL 3350 17 G/17G
17 POWDER, FOR SOLUTION ORAL DAILY PRN
Status: DISCONTINUED | OUTPATIENT
Start: 2025-01-14 | End: 2025-01-15

## 2025-01-14 RX ORDER — MICONAZOLE NITRATE 20 MG/G
CREAM TOPICAL 2 TIMES DAILY
Status: DISCONTINUED | OUTPATIENT
Start: 2025-01-14 | End: 2025-01-18 | Stop reason: HOSPADM

## 2025-01-14 RX ORDER — FLUOXETINE 20 MG/5ML
20 SOLUTION ORAL DAILY
Status: DISCONTINUED | OUTPATIENT
Start: 2025-01-15 | End: 2025-01-18 | Stop reason: HOSPADM

## 2025-01-14 RX ORDER — SALIVA STIMULANT COMB. NO.3
1 SPRAY, NON-AEROSOL (ML) MUCOUS MEMBRANE 4 TIMES DAILY
Status: DISCONTINUED | OUTPATIENT
Start: 2025-01-15 | End: 2025-01-18 | Stop reason: HOSPADM

## 2025-01-14 RX ORDER — ONDANSETRON 2 MG/ML
4 INJECTION INTRAMUSCULAR; INTRAVENOUS EVERY 6 HOURS PRN
Status: DISCONTINUED | OUTPATIENT
Start: 2025-01-14 | End: 2025-01-18 | Stop reason: HOSPADM

## 2025-01-14 RX ORDER — AMOXICILLIN 250 MG
2 CAPSULE ORAL 2 TIMES DAILY PRN
Status: DISCONTINUED | OUTPATIENT
Start: 2025-01-14 | End: 2025-01-18 | Stop reason: HOSPADM

## 2025-01-14 RX ORDER — LIDOCAINE 40 MG/G
CREAM TOPICAL
Status: DISCONTINUED | OUTPATIENT
Start: 2025-01-14 | End: 2025-01-18 | Stop reason: HOSPADM

## 2025-01-14 RX ORDER — ACETAMINOPHEN 650 MG/1
650 SUPPOSITORY RECTAL EVERY 4 HOURS PRN
Status: DISCONTINUED | OUTPATIENT
Start: 2025-01-14 | End: 2025-01-15

## 2025-01-14 RX ORDER — ACETAMINOPHEN 325 MG/1
650 TABLET ORAL EVERY 4 HOURS PRN
Status: DISCONTINUED | OUTPATIENT
Start: 2025-01-14 | End: 2025-01-15

## 2025-01-14 RX ORDER — ONDANSETRON 4 MG/1
4 TABLET, ORALLY DISINTEGRATING ORAL EVERY 6 HOURS PRN
Status: DISCONTINUED | OUTPATIENT
Start: 2025-01-14 | End: 2025-01-18 | Stop reason: HOSPADM

## 2025-01-14 RX ORDER — CALCIUM CARBONATE 500 MG/1
1000 TABLET, CHEWABLE ORAL 2 TIMES DAILY PRN
Status: DISCONTINUED | OUTPATIENT
Start: 2025-01-14 | End: 2025-01-15

## 2025-01-14 RX ORDER — FUROSEMIDE 10 MG/ML
60 INJECTION INTRAMUSCULAR; INTRAVENOUS ONCE
Status: COMPLETED | OUTPATIENT
Start: 2025-01-14 | End: 2025-01-14

## 2025-01-14 RX ORDER — ATORVASTATIN CALCIUM 40 MG/1
80 TABLET, FILM COATED ORAL DAILY
Status: DISCONTINUED | OUTPATIENT
Start: 2025-01-15 | End: 2025-01-15

## 2025-01-14 RX ORDER — DOXAZOSIN 1 MG/1
2 TABLET ORAL DAILY
Status: DISCONTINUED | OUTPATIENT
Start: 2025-01-15 | End: 2025-01-18 | Stop reason: HOSPADM

## 2025-01-14 RX ADMIN — FUROSEMIDE 60 MG: 10 INJECTION, SOLUTION INTRAMUSCULAR; INTRAVENOUS at 22:52

## 2025-01-14 ASSESSMENT — ACTIVITIES OF DAILY LIVING (ADL)
ADLS_ACUITY_SCORE: 69

## 2025-01-14 ASSESSMENT — COLUMBIA-SUICIDE SEVERITY RATING SCALE - C-SSRS: IS THE PATIENT NOT ABLE TO COMPLETE C-SSRS: UNABLE TO VERBALIZE

## 2025-01-15 ENCOUNTER — APPOINTMENT (OUTPATIENT)
Dept: CARDIOLOGY | Facility: CLINIC | Age: 75
DRG: 682 | End: 2025-01-15
Attending: INTERNAL MEDICINE
Payer: MEDICARE

## 2025-01-15 LAB
ALBUMIN MFR UR ELPH: 112.6 MG/DL
ALBUMIN SERPL BCG-MCNC: 2.5 G/DL (ref 3.5–5.2)
ALP SERPL-CCNC: 136 U/L (ref 40–150)
ALT SERPL W P-5'-P-CCNC: 62 U/L (ref 0–70)
ANION GAP SERPL CALCULATED.3IONS-SCNC: 13 MMOL/L (ref 7–15)
AST SERPL W P-5'-P-CCNC: 39 U/L (ref 0–45)
ATRIAL RATE - MUSE: 60 BPM
BILIRUB SERPL-MCNC: 0.2 MG/DL
BUN SERPL-MCNC: 67.8 MG/DL (ref 8–23)
CALCIUM SERPL-MCNC: 8.1 MG/DL (ref 8.8–10.4)
CHLORIDE SERPL-SCNC: 96 MMOL/L (ref 98–107)
CREAT SERPL-MCNC: 3.39 MG/DL (ref 0.67–1.17)
CREAT UR-MCNC: 40.4 MG/DL
CREAT UR-MCNC: 40.5 MG/DL
DIASTOLIC BLOOD PRESSURE - MUSE: NORMAL MMHG
EGFRCR SERPLBLD CKD-EPI 2021: 18 ML/MIN/1.73M2
ERYTHROCYTE [DISTWIDTH] IN BLOOD BY AUTOMATED COUNT: 13.9 % (ref 10–15)
FERRITIN SERPL-MCNC: 102 NG/ML (ref 31–409)
FRACT EXCRET NA UR+SERPL-RTO: 1.9 %
GLUCOSE BLDC GLUCOMTR-MCNC: 108 MG/DL (ref 70–99)
GLUCOSE BLDC GLUCOMTR-MCNC: 151 MG/DL (ref 70–99)
GLUCOSE BLDC GLUCOMTR-MCNC: 202 MG/DL (ref 70–99)
GLUCOSE BLDC GLUCOMTR-MCNC: 376 MG/DL (ref 70–99)
GLUCOSE BLDC GLUCOMTR-MCNC: 97 MG/DL (ref 70–99)
GLUCOSE SERPL-MCNC: 206 MG/DL (ref 70–99)
HCO3 SERPL-SCNC: 26 MMOL/L (ref 22–29)
HCT VFR BLD AUTO: 27 % (ref 40–53)
HGB BLD-MCNC: 8.8 G/DL (ref 13.3–17.7)
INTERPRETATION ECG - MUSE: NORMAL
IRON BINDING CAPACITY (ROCHE): 261 UG/DL (ref 240–430)
IRON SATN MFR SERPL: 12 % (ref 15–46)
IRON SERPL-MCNC: 32 UG/DL (ref 61–157)
LVEF ECHO: NORMAL
MAGNESIUM SERPL-MCNC: 3.2 MG/DL (ref 1.7–2.3)
MCH RBC QN AUTO: 27.2 PG (ref 26.5–33)
MCHC RBC AUTO-ENTMCNC: 32.6 G/DL (ref 31.5–36.5)
MCV RBC AUTO: 84 FL (ref 78–100)
P AXIS - MUSE: NORMAL DEGREES
PHOSPHATE SERPL-MCNC: 6.6 MG/DL (ref 2.5–4.5)
PLATELET # BLD AUTO: 171 10E3/UL (ref 150–450)
POTASSIUM SERPL-SCNC: 3.4 MMOL/L (ref 3.4–5.3)
PR INTERVAL - MUSE: NORMAL MS
PROT SERPL-MCNC: 5.9 G/DL (ref 6.4–8.3)
PROT/CREAT 24H UR: 2.78 MG/MG CR (ref 0–0.2)
QRS DURATION - MUSE: 72 MS
QT - MUSE: 472 MS
QTC - MUSE: 475 MS
R AXIS - MUSE: -16 DEGREES
RBC # BLD AUTO: 3.23 10E6/UL (ref 4.4–5.9)
RETICS # AUTO: 0.06 10E6/UL (ref 0.03–0.1)
RETICS/RBC NFR AUTO: 1.8 % (ref 0.5–2)
SODIUM SERPL-SCNC: 135 MMOL/L (ref 135–145)
SODIUM UR-SCNC: 32 MMOL/L
SYSTOLIC BLOOD PRESSURE - MUSE: NORMAL MMHG
T AXIS - MUSE: 8 DEGREES
TROPONIN T SERPL HS-MCNC: 75 NG/L
TROPONIN T SERPL HS-MCNC: 81 NG/L
TSH SERPL DL<=0.005 MIU/L-ACNC: 3.53 UIU/ML (ref 0.3–4.2)
VENTRICULAR RATE- MUSE: 61 BPM
VIT B12 SERPL-MCNC: 3181 PG/ML (ref 232–1245)
WBC # BLD AUTO: 5.9 10E3/UL (ref 4–11)

## 2025-01-15 PROCEDURE — 82570 ASSAY OF URINE CREATININE: CPT | Performed by: INTERNAL MEDICINE

## 2025-01-15 PROCEDURE — 250N000012 HC RX MED GY IP 250 OP 636 PS 637: Performed by: INTERNAL MEDICINE

## 2025-01-15 PROCEDURE — 84100 ASSAY OF PHOSPHORUS: CPT | Performed by: HOSPITALIST

## 2025-01-15 PROCEDURE — 85027 COMPLETE CBC AUTOMATED: CPT | Performed by: INTERNAL MEDICINE

## 2025-01-15 PROCEDURE — 99222 1ST HOSP IP/OBS MODERATE 55: CPT | Performed by: INTERNAL MEDICINE

## 2025-01-15 PROCEDURE — 250N000013 HC RX MED GY IP 250 OP 250 PS 637: Performed by: INTERNAL MEDICINE

## 2025-01-15 PROCEDURE — 250N000011 HC RX IP 250 OP 636: Performed by: INTERNAL MEDICINE

## 2025-01-15 PROCEDURE — 83735 ASSAY OF MAGNESIUM: CPT | Performed by: HOSPITALIST

## 2025-01-15 PROCEDURE — 84155 ASSAY OF PROTEIN SERUM: CPT | Performed by: INTERNAL MEDICINE

## 2025-01-15 PROCEDURE — 999N000208 ECHOCARDIOGRAM COMPLETE

## 2025-01-15 PROCEDURE — 255N000002 HC RX 255 OP 636: Performed by: INTERNAL MEDICINE

## 2025-01-15 PROCEDURE — 99233 SBSQ HOSP IP/OBS HIGH 50: CPT | Performed by: HOSPITALIST

## 2025-01-15 PROCEDURE — 82962 GLUCOSE BLOOD TEST: CPT

## 2025-01-15 PROCEDURE — 84484 ASSAY OF TROPONIN QUANT: CPT | Performed by: INTERNAL MEDICINE

## 2025-01-15 PROCEDURE — 36415 COLL VENOUS BLD VENIPUNCTURE: CPT | Performed by: INTERNAL MEDICINE

## 2025-01-15 PROCEDURE — 93306 TTE W/DOPPLER COMPLETE: CPT | Mod: 26 | Performed by: INTERNAL MEDICINE

## 2025-01-15 PROCEDURE — 84156 ASSAY OF PROTEIN URINE: CPT | Performed by: INTERNAL MEDICINE

## 2025-01-15 PROCEDURE — 84300 ASSAY OF URINE SODIUM: CPT | Performed by: INTERNAL MEDICINE

## 2025-01-15 PROCEDURE — 120N000001 HC R&B MED SURG/OB

## 2025-01-15 PROCEDURE — 84450 TRANSFERASE (AST) (SGOT): CPT | Performed by: INTERNAL MEDICINE

## 2025-01-15 RX ORDER — POLYETHYLENE GLYCOL 3350 17 G/17G
17 POWDER, FOR SOLUTION ORAL DAILY PRN
Status: DISCONTINUED | OUTPATIENT
Start: 2025-01-15 | End: 2025-01-18 | Stop reason: HOSPADM

## 2025-01-15 RX ORDER — DEXTROSE MONOHYDRATE 100 MG/ML
INJECTION, SOLUTION INTRAVENOUS CONTINUOUS PRN
Status: DISCONTINUED | OUTPATIENT
Start: 2025-01-15 | End: 2025-01-18 | Stop reason: HOSPADM

## 2025-01-15 RX ORDER — ATORVASTATIN CALCIUM 40 MG/1
80 TABLET, FILM COATED ORAL DAILY
Status: DISCONTINUED | OUTPATIENT
Start: 2025-01-16 | End: 2025-01-18 | Stop reason: HOSPADM

## 2025-01-15 RX ORDER — NICOTINE POLACRILEX 4 MG
15-30 LOZENGE BUCCAL
Status: DISCONTINUED | OUTPATIENT
Start: 2025-01-15 | End: 2025-01-18 | Stop reason: HOSPADM

## 2025-01-15 RX ORDER — POLYETHYLENE GLYCOL 3350 17 G/17G
17 POWDER, FOR SOLUTION ORAL DAILY
Status: DISCONTINUED | OUTPATIENT
Start: 2025-01-16 | End: 2025-01-18 | Stop reason: HOSPADM

## 2025-01-15 RX ORDER — SEVELAMER CARBONATE FOR ORAL SUSPENSION 800 MG/1
0.8 POWDER, FOR SUSPENSION ORAL
Status: DISCONTINUED | OUTPATIENT
Start: 2025-01-15 | End: 2025-01-18 | Stop reason: HOSPADM

## 2025-01-15 RX ORDER — ACETAMINOPHEN 325 MG/1
650 TABLET ORAL EVERY 4 HOURS PRN
Status: DISCONTINUED | OUTPATIENT
Start: 2025-01-15 | End: 2025-01-18 | Stop reason: HOSPADM

## 2025-01-15 RX ORDER — ACETAMINOPHEN 650 MG/1
650 SUPPOSITORY RECTAL EVERY 4 HOURS PRN
Status: DISCONTINUED | OUTPATIENT
Start: 2025-01-15 | End: 2025-01-18 | Stop reason: HOSPADM

## 2025-01-15 RX ORDER — FUROSEMIDE 10 MG/ML
40 INJECTION INTRAMUSCULAR; INTRAVENOUS ONCE
Status: COMPLETED | OUTPATIENT
Start: 2025-01-15 | End: 2025-01-15

## 2025-01-15 RX ORDER — DEXTROSE MONOHYDRATE 25 G/50ML
25-50 INJECTION, SOLUTION INTRAVENOUS
Status: DISCONTINUED | OUTPATIENT
Start: 2025-01-15 | End: 2025-01-18 | Stop reason: HOSPADM

## 2025-01-15 RX ORDER — SEVELAMER CARBONATE FOR ORAL SUSPENSION 800 MG/1
0.8 POWDER, FOR SUSPENSION ORAL
Status: DISCONTINUED | OUTPATIENT
Start: 2025-01-15 | End: 2025-01-15

## 2025-01-15 RX ORDER — CALCIUM CARBONATE 500 MG/1
1000 TABLET, CHEWABLE ORAL 2 TIMES DAILY PRN
Status: DISCONTINUED | OUTPATIENT
Start: 2025-01-15 | End: 2025-01-18 | Stop reason: HOSPADM

## 2025-01-15 RX ADMIN — DOXAZOSIN 2 MG: 1 TABLET ORAL at 09:51

## 2025-01-15 RX ADMIN — FLUOXETINE 20 MG: 20 SOLUTION ORAL at 09:51

## 2025-01-15 RX ADMIN — ATORVASTATIN CALCIUM 80 MG: 40 TABLET, FILM COATED ORAL at 09:52

## 2025-01-15 RX ADMIN — SEVELAMER CARBONATE 0.8 G: 800 POWDER, FOR SUSPENSION ORAL at 18:26

## 2025-01-15 RX ADMIN — INSULIN GLARGINE 9 UNITS: 100 INJECTION, SOLUTION SUBCUTANEOUS at 09:44

## 2025-01-15 RX ADMIN — Medication 1 SPRAY: at 09:44

## 2025-01-15 RX ADMIN — INSULIN ASPART 2 UNITS: 100 INJECTION, SOLUTION INTRAVENOUS; SUBCUTANEOUS at 09:42

## 2025-01-15 RX ADMIN — CLOPIDOGREL BISULFATE 75 MG: 75 TABLET ORAL at 09:52

## 2025-01-15 RX ADMIN — INSULIN ASPART 5 UNITS: 100 INJECTION, SOLUTION INTRAVENOUS; SUBCUTANEOUS at 03:43

## 2025-01-15 RX ADMIN — FUROSEMIDE 40 MG: 10 INJECTION, SOLUTION INTRAMUSCULAR; INTRAVENOUS at 13:54

## 2025-01-15 RX ADMIN — MICONAZOLE NITRATE: 2 CREAM TOPICAL at 20:48

## 2025-01-15 RX ADMIN — HUMAN ALBUMIN MICROSPHERES AND PERFLUTREN 4 ML: 10; .22 INJECTION, SOLUTION INTRAVENOUS at 09:29

## 2025-01-15 ASSESSMENT — ACTIVITIES OF DAILY LIVING (ADL)
ADLS_ACUITY_SCORE: 89
ADLS_ACUITY_SCORE: 69
ADLS_ACUITY_SCORE: 89
ADLS_ACUITY_SCORE: 69
ADLS_ACUITY_SCORE: 69
ADLS_ACUITY_SCORE: 73
ADLS_ACUITY_SCORE: 69
ADLS_ACUITY_SCORE: 85
ADLS_ACUITY_SCORE: 73
ADLS_ACUITY_SCORE: 85
ADLS_ACUITY_SCORE: 89
ADLS_ACUITY_SCORE: 69
ADLS_ACUITY_SCORE: 85
ADLS_ACUITY_SCORE: 69

## 2025-01-15 NOTE — PLAN OF CARE
BP (P) 128/41 (BP Location: Left arm)   Pulse (P) 60   Temp (P) 98.6  F (37  C) (Oral)   Resp (P) 18   SpO2 94%     PT arrived on unit @1215. Admission and assessment completed. Skin CDI except sacral wound. Felix intact. NPO. English speaking but mostly nonverbal.

## 2025-01-15 NOTE — H&P
History and Physical     Jimmy Otto MRN# [id number]   YOB: 1950 Age: 75 year old      Date of Admission:  1/14/2025    Primary care provider: Nallely Fong          Assessment and Plan:     Summary of Stay: Jimmy Otto is a 75 year old male with a history of htn, hlp, dm2 most recent echo 10/24 EF 60-65% nl LV fxn, hx of stroke with left sided hemiplegia and aphasia-non verbal, PEG tube in place on TFs, and chronic indwelling ordoñez for neurogenic bladder, CKD 3b with progressive renal dysfunction 1.8-->2.4 when last measured in 12/24, chronic normocytic anemia - also progressive and dwindling into the 9's over the past 4 months admitted on 1/14/2025 with some lethargy and home RN who felt that he was starting to sound junky on his lung exam     Family visited over the holidays and one of the grandkids was sick with URI that swept through the family.  He mostly recovered from his respiratory sx but didn't really bounce back.  They have a home RN who comes in weekly and she noticed that he was starting to retain fluid and his lungs were sounding crackly.  Then his monthly blood tests were completed yesterday and notable for progressive renal dysfunction bun/creat 66/3.23 so was told to come into the ER     ED VS notable for mod hypertension 160's/70-90's but otherwise wnl and afebrile   CMP with A on CKD bun/creat 68/3.3, elevated LFTs AST/ALT 46/78 alk phos 157 nl bili BGs elevated 240  CBC with normocytic anemia 9.2    UA with glucose, protein, sm blood 44 rbc's, small LE 22 wbcs, few bacteria, hyaline casts   Covid/flu/rsv negative     CXR pulmonary edema and ? Cardiac enlargement   Bilateral LE edema negative for dvt    BCx NGTD    I am asked to admit for pulmonary edema, concern for new CHF with anasarca complicated by ALEX on CKD     Problem List:   Anasarca  Suspected CHF  Most recent echo was 10/24 at which time LV fxn was 60-65% and described as normal. I do wonder if his recent URI might be  implicated here. Hypertension contributing. Doubt CAD Does not drink alcohol   -tele, echo, trops   -rec'd single dose of furosemide 60 mg IV- follow renal function   -ck tsh, serial troponins   -I/O, weight daily     Elevated troponin   Typically runs in the 30-50 range, currently at 81  Urvashi in am   Ck EKG     ALEX on CKD   Looks to have been declining over the past year  Last measured 10/2/2024 bun/creat 53/2.44.  currently at 68/3.27  Also looks to have anasarca so ? CRS vs just CKD progression.  His UA and bun/creat are consistent with prerenal azotemia   I spoke with the dtrs who accompany him to the ER re: the potential need for dialysis.  At this point they would be open to hearing about dialysis options understanding that HD would be very hard for them to manage, but PD might be ok.    If renal function continues to decline will ask for renal's input  Ck FeNa but not sure how helpful that will be given CKD  Ck spot protein to creat ratio to assess how much protein he's spilling,       Elevated LFTs  Not previously elevated and I suspect hepatic congestion   Monitor periodically    Htn, hlp  Pta is on amlodipine 5 mg , atenolol 25 mg bid, doxazosin 2 mg every day, atorvastatin 80 mg  -held amlodipine due to LE edema, held BB due to suspected decompensated HF  -continued dox and atorva       Hx of stroke with left sided hemiplegia  Aphasia and dysphagia with PEG in place for TFs  Chronic neurogenic bladder with chronic indwelling ordoñez (changed 1/13 and is changed monthly)  He is mostly non-verbal at baseline, family helps with communication.  Lives with his wife and has HH RN weekly.  Not sure of other services.  Wheelchair bound and I believe requires a lift   -resumed TF via peg as at home  -chronic ordoñez in  place, changed monthly and just changed     Slowly progress normocytic anemia  Decline seems to mimic his decline in renal function .  May benefit from aranesp or other JOSE LUIS   Monitor   Ck iron  studies, B12, epo level, folate, tsh       DVT Prophylaxis: Pneumatic Compression Devices  Code Status: Full Code for now.  They are not sure that they will continue with full code status and will look their mom into the conversation   Functional Status: wheelchair bound  Ordoñez: chronic indwelling ordoñez   Access:   PIV               Time spent 75 minutes reviewing epic including notes/labs/prior hx, current medications.  In addition to interviewing and examining the patient, updated patient and family regarding plan of care          Chief Complaint:     Progressive renal disease and concern for new CHF       History of Present Illness:    Jimmy Otto is a 75 year old male with a history of htn, hlp, dm2 most recent echo 10/24 EF 60-65% nl LV fxn, hx of stroke with left sided hemiplegia and aphasia-non verbal, PEG tube in place on TFs, and chronic indwelling ordoñez for neurogenic bladder, CKD 3b with progressive renal dysfunction 1.8-->2.4 when last measured in 12/24, chronic normocytic anemia - also progressive and dwindling into the 9's over the past 4 months admitted on 1/14/2025 with some lethargy and home RN who felt that he was starting to sound junky on his lung exam     Family visited over the holidays and one of the grandkids was sick with URI that swept through the family.  He mostly recovered from his respiratory sx but didn't really bounce back.  They have a home RN who comes in weekly and she noticed that he was starting to retain fluid and his lungs were sounding crackly.  Then his monthly blood tests were completed yesterday and notable for progressive renal dysfunction bun/creat 66/3.23 so was told to come into the ER     ED VS notable for mod hypertension 160's/70-90's but otherwise wnl and afebrile   CMP with A on CKD bun/creat 68/3.3, elevated LFTs AST/ALT 46/78 alk phos 157 nl bili BGs elevated 240  CBC with normocytic anemia 9.2    UA with glucose, protein, sm blood 44 rbc's, small LE 22 wbcs, few  bacteria, hyaline casts   Covid/flu/rsv negative     CXR pulmonary edema and ? Cardiac enlargement   Bilateral LE edema negative for dvt    BCx NGTD    I am asked to admit for pulmonary edema, concern for new CHF with anasarca complicated by ALEX on CKD       The history is obtained in discussion with the ER provider Lauren Omalley MD and the daughters of Mr Otto, unfortunately he is unable to provide any history       Epic and Care everywhere were extensively reviewed        Past Medical History:     Past Medical History:   Diagnosis Date    Cerebrovascular accident - Hemiplegia and Aphasia     Depressive disorder     Diabetes mellitus     Felix catheter in place     Hypercholesteremia     Hypertension     S/P percutaneous endoscopic gastrostomy (PEG) tube placement (H)              Past Surgical History:     Past Surgical History:   Procedure Laterality Date    ENDOSCOPIC INSERTION TUBE GASTROSTOMY      IR CYSTOGRAM  02/14/2022             Social History:     Social History     Tobacco Use    Smoking status: Never    Smokeless tobacco: Never   Substance Use Topics    Alcohol use: Yes     Comment: occassional             Family History:   I have reviewed this patient's family history         Allergies:     Allergies   Allergen Reactions    Nka [No Known Allergies]              Medications:     Prior to Admission medications    Medication Sig Last Dose Taking? Auth Provider Long Term End Date   amLODIPine (NORVASC) 5 MG tablet Take 5 mg by mouth daily. 1/14/2025 Morning Yes Unknown, Entered By History Yes    atenolol (TENORMIN) 25 MG tablet Take 1 tablet (25 mg) by mouth 2 times daily 1/14/2025 Evening Yes Jesika Theodore PA-C Yes    atorvastatin (LIPITOR) 80 MG tablet Take 80 mg by mouth daily 1/14/2025 Morning Yes Unknown, Entered By History Yes    Banana Flakes (BANATROL PLUS) Place 1 packet into Feeding Tube 3 times daily.  Yes Jarret Tolliver MD     clopidogrel (PLAVIX) 75 MG tablet Take 75 mg by mouth daily  1/14/2025 Morning Yes Unknown, Entered By History     doxazosin (CARDURA) 2 MG tablet Take 1 tablet (2 mg) by mouth daily. 1/14/2025 Morning Yes Alivia Herzog CNP Yes    FLUoxetine (PROZAC) 20 MG/5ML solution 5 mLs (20 mg) by Oral or Feeding Tube route daily 1/14/2025 Morning Yes Taty Bolton MD Yes    insulin glargine 100 UNIT/ML pen Inject 9 Units subcutaneously every morning. 1/14/2025 Morning Yes Unknown, Entered By History     Insulin Lispro (ADMELOG SC) For BG <140 no insulin. 140-189 give 1 unit. 190-239 give 2 units. 240-289 give 3. 290-340 give 4 units. >340 give 5 units. Usual daily dose 12 units per day.  Yes Unknown, Entered By History Yes    Jevity 1.5 [address] 948 mLs into G tube daily. Infuse via gravity bag  4 cartons/day  Water flush: 120ml before and after feedings  Yes Nallely Fong MD No 10/26/25   polyethylene glycol (MIRALAX) 17 g packet Take 17 g by mouth daily as needed for constipation  Yes Jesika Theodore PA-C     Prosource TF PO LIQD (PROSOURCE TF) Place 45 mLs into G tube daily. Infuse via syringe   1 packet Prosource daily  Water flush: 120ml before and after feedings More than a month Yes Nallely Fong MD No 10/26/25               Review of Systems:     A Comprehensive greater than 10 system review of systems was carried out.  Pertinent positives and negatives are noted above.  Otherwise negative for contributory information.           Physical Exam:   Blood pressure (!) 166/95, pulse 54, temperature 97.5  F (36.4  C), temperature source Temporal, resp. rate 20, SpO2 97%.  Exam:    General:  laying on the gurney, eyes closed, not participating in examination or history   HEENT:  Head nc/at   Neck is supple  Lungs: cta b nl effort   CV:  RRR no m/r/g 1-2 + dense partially  pitting edema   Abd:  S/nt/nd no r/g  Neuro: unable to assess   Skin:  W/d no c/c               Data:     Results for orders placed or performed during the hospital encounter of  01/14/25   US Lower Extremity Venous Duplex Bilateral     Status: None    Narrative    EXAM: US LOWER EXTREMITY VENOUS DUPLEX BILATERAL  LOCATION: [facility]  DATE: 1/14/2025    INDICATION: new edema R > L, worsening renal function  COMPARISON: None.  TECHNIQUE: Venous Duplex ultrasound of bilateral lower extremities with and without compression, augmentation and duplex. Color flow and spectral Doppler with waveform analysis performed.    FINDINGS: Exam includes the common femoral, femoral, popliteal veins as well as segmentally visualized deep calf veins and greater saphenous vein.     RIGHT: No deep vein thrombosis. No superficial thrombophlebitis. No popliteal cyst.    LEFT: No deep vein thrombosis. No superficial thrombophlebitis. No popliteal cyst.      Impression    IMPRESSION:  1.  No deep venous thrombosis in the bilateral lower extremities.   XR Chest 1 View     Status: None    Narrative    EXAM: XR CHEST 1 VIEW  LOCATION: [facility]  DATE: 1/14/2025    INDICATION: anasarca, crackles  COMPARISON: 10/23/2024      Impression    IMPRESSION: Cardiac enlargement. Pulmonary vascular congestion. Interstitial opacities in both lower lungs. Findings compatible volume overload/CHF. Bilateral pleural fluid collections, greater on the right with associated basilar atelectasis. Aortic   calcification. Degenerative hypertrophic changes in the spine and both shoulders.   Influenza A/B, RSV and SARS-CoV2 PCR (COVID-19) Nasopharyngeal     Status: Normal    Specimen: Nasopharyngeal; Swab   Result Value Ref Range    Influenza A PCR Negative Negative    Influenza B PCR Negative Negative    RSV PCR Negative Negative    SARS CoV2 PCR Negative Negative    Narrative    Testing was performed using the Xpert Xpress CoV2/Flu/RSV Assay on the [employer] GeneXpert Instrument. This test should be ordered for the detection of SARS-CoV2, influenza, and RSV viruses in individuals with signs and  symptoms of respiratory tract infection. This test is for in vitro diagnostic use under the [place] FDA for laboratories certified under CLIA to perform high or moderate complexity testing. This test has been [place] FDA cleared. A negative result does not rule out the presence of PCR inhibitors in the specimen or target RNA in concentration below the limit of detection for the assay. If only one viral target is positive but coinfection with multiple targets is suspected, the sample should be re-tested with another FDA cleared, approved, or authorized test, if coninfection would change clinical management. This test was validated by the [facility]. These laboratories are certified under the Clinical Laboratory Improvement Amendments of 1988 (CLIA-88) as qualified to perfom high complexity laboratory testing.   [place] Draw     Status: None    Narrative    The following orders were created for panel order [place] Draw.  Procedure                               Abnormality         Status                     ---------                               -----------         ------                     Extra Blue Top Tube[[id number]]                              Final result               Extra Red Top Tube[[id number]]                               Final result                 Please view results for these tests on the individual orders.   Comprehensive metabolic panel     Status: Abnormal   Result Value Ref Range    Sodium 134 (L) 135 - 145 mmol/L    Potassium 3.7 3.4 - 5.3 mmol/L    Carbon Dioxide (CO2) 26 22 - 29 mmol/L    Anion Gap 13 7 - 15 mmol/L    Urea Nitrogen 68.0 (H) 8.0 - 23.0 mg/dL    Creatinine 3.27 (H) 0.67 - 1.17 mg/dL    GFR Estimate 19 (L) >60 mL/min/1.73m2    Calcium 8.2 (L) 8.8 - 10.4 mg/dL    Chloride 95 (L) 98 - 107 mmol/L    Glucose 239 (H) 70 - 99 mg/dL    Alkaline Phosphatase 157 (H) 40 - 150 U/L    AST 46 (H) 0 - 45 U/L    ALT 78 (H) 0 - 70 U/L    Protein Total 6.8 6.4 - 8.3 g/dL    Albumin 3.0  (L) 3.5 - 5.2 g/dL    Bilirubin Total 0.2 <=1.2 mg/dL   Lactic acid whole blood with 1x repeat in 2 hr when >2     Status: Normal   Result Value Ref Range    Lactic Acid, Initial 0.7 0.7 - 2.0 mmol/L   Lipase     Status: Abnormal   Result Value Ref Range    Lipase 5 (L) 13 - 60 U/L   Blood gas venous     Status: Abnormal   Result Value Ref Range    pH Venous 7.38 7.32 - 7.43    pCO2 Venous 54 (H) 40 - 50 mm Hg    pO2 Venous 38 25 - 47 mm Hg    Bicarbonate Venous 32 (H) 21 - 28 mmol/L    Base Excess/Deficit Venous 4.6 (H) -3.0 - 3.0 mmol/L    FIO2 21     Oxyhemoglobin Venous 70 70 - 75 %    O2 Sat, Venous 70.5 70.0 - 75.0 %    Narrative    In healthy individuals, oxyhemoglobin (O2Hb) and oxygen saturation (SO2) are approximately equal. In the presence of dyshemoglobins, oxyhemoglobin can be considerably lower than oxygen saturation.   Ammonia (on ice)     Status: Normal   Result Value Ref Range    Ammonia 19 16 - 60 umol/L   Extra Blue Top Tube     Status: None   Result Value Ref Range    Hold Specimen JIC    Extra Red Top Tube     Status: None   Result Value Ref Range    Hold Specimen JIC    CBC with platelets and differential     Status: Abnormal   Result Value Ref Range    WBC Count 5.5 4.0 - 11.0 10e3/uL    RBC Count 3.50 (L) 4.40 - 5.90 10e6/uL    Hemoglobin 9.2 (L) 13.3 - 17.7 g/dL    Hematocrit 29.1 (L) 40.0 - 53.0 %    MCV 83 78 - 100 fL    MCH 26.3 (L) 26.5 - 33.0 pg    MCHC 31.6 31.5 - 36.5 g/dL    RDW 13.9 10.0 - 15.0 %    Platelet Count 196 150 - 450 10e3/uL    % Neutrophils 62 %    % Lymphocytes 23 %    % Monocytes 10 %    % Eosinophils 4 %    % Basophils 1 %    % Immature Granulocytes 0 %    NRBCs per 100 WBC 0 <1 /100    Absolute Neutrophils 3.4 1.6 - 8.3 10e3/uL    Absolute Lymphocytes 1.3 0.8 - 5.3 10e3/uL    Absolute Monocytes 0.5 0.0 - 1.3 10e3/uL    Absolute Eosinophils 0.2 0.0 - 0.7 10e3/uL    Absolute Basophils 0.1 0.0 - 0.2 10e3/uL    Absolute Immature Granulocytes 0.0 <=0.4 10e3/uL    Absolute  NRBCs 0.0 10e3/uL   Phosphorus     Status: Abnormal   Result Value Ref Range    Phosphorus 6.3 (H) 2.5 - 4.5 mg/dL   UA with Microscopic     Status: Abnormal   Result Value Ref Range    Color Urine Yellow Colorless, Straw, Light Yellow, Yellow    Appearance Urine Clear Clear    Glucose Urine 300 (A) Negative mg/dL    Bilirubin Urine Negative Negative    Ketones Urine Negative Negative mg/dL    Specific Gravity Urine 1.017 1.003 - 1.035    Blood Urine Small (A) Negative    pH Urine 6.5 5.0 - 7.0    Protein Albumin Urine 600 (A) Negative mg/dL    Urobilinogen Urine Normal Normal, 2.0 mg/dL    Nitrite Urine Negative Negative    Leukocyte Esterase Urine Small (A) Negative    Bacteria Urine Few (A) None Seen /HPF    Mucus Urine Present (A) None Seen /LPF    RBC Urine 44 (H) <=2 /HPF    WBC Urine 22 (H) <=5 /HPF    Hyaline Casts Urine 1 <=2 /LPF   Nt probnp inpatient     Status: Abnormal   Result Value Ref Range    N terminal Pro BNP Inpatient 6,638 (H) 0 - 900 pg/mL   CBC with Platelets & Differential     Status: Abnormal    Narrative    The following orders were created for panel order CBC with Platelets & Differential.  Procedure                               Abnormality         Status                     ---------                               -----------         ------                     CBC with platelets and d...[[id number]]  Abnormal            Final result                 Please view results for these tests on the individual orders.

## 2025-01-15 NOTE — ED NOTES
[facility]  ED Nurse Handoff Report    ED Chief complaint: Altered Mental Status and Abnormal Labs  . ED Diagnosis:   Final diagnoses:   Acute pulmonary edema (H)   Acute renal failure superimposed on chronic kidney disease, unspecified acute renal failure type, unspecified CKD stage   Anasarca       Allergies:   Allergies   Allergen Reactions    Nka [No Known Allergies]        Code Status: Full Code    Activity level - Baseline/Home:  lift.  Activity Level - Current:   lift.   Lift room needed: Yes.   Bariatric: No   Needed: No   Isolation: No.   Infection: Not Applicable.     Respiratory status: Room air    Vital Signs (within 30 minutes):   Vitals:    01/14/25 2023 01/14/25 2130   BP: (!) 163/78 (!) 166/95   Pulse: 55 54   Resp: 20    Temp: 97.5  F (36.4  C)    TempSrc: Temporal    SpO2: 97% 97%       Cardiac Rhythm:  ,      Pain level:    Patient confused: Yes.   Patient Falls Risk:  nonambulatory .   Elimination Status: Urethral catheter (ordoñez) in place; orders for patient to discharge with ordoñez      Patient Report - Initial Complaint: AMS, abnormal labs.   Focused Assessment: Jimmy Otto is a 75 year old male who presents to the ED with his two daughters for abnormal labs. The patient is nonverbal but responds with thumbs up and thumbs down. The patient's daughter reports that the patient was admitted to the hospital in October due to acute kidney failure. Since then the patient has had their labs drawn every month. His GFR has been continuously decreasing over time. While staying in the hospital, the patient's GFR was 35. Yesterday, the patient's labs were drawn . The patient's family was called and informed that his GFR came back at 19. The patient has also been feeling more fatigued. His daughters suspect that this was due to getting sick over the holidays when his relatives were visiting. Denies shortness of breath, fever, and chills. The patient is noted to have had a  catheter change yesterday.       Abnormal Results:   Labs Ordered and Resulted from Time of ED Arrival to Time of ED Departure   COMPREHENSIVE METABOLIC PANEL - Abnormal       Result Value    Sodium 134 (*)     Potassium 3.7      Carbon Dioxide (CO2) 26      Anion Gap 13      Urea Nitrogen 68.0 (*)     Creatinine 3.27 (*)     GFR Estimate 19 (*)     Calcium 8.2 (*)     Chloride 95 (*)     Glucose 239 (*)     Alkaline Phosphatase 157 (*)     AST 46 (*)     ALT 78 (*)     Protein Total 6.8      Albumin 3.0 (*)     Bilirubin Total 0.2     LIPASE - Abnormal    Lipase 5 (*)    BLOOD GAS VENOUS - Abnormal    pH Venous 7.38      pCO2 Venous 54 (*)     pO2 Venous 38      Bicarbonate Venous 32 (*)     Base Excess/Deficit Venous 4.6 (*)     FIO2 21      Oxyhemoglobin Venous 70      O2 Sat, Venous 70.5     CBC WITH PLATELETS AND DIFFERENTIAL - Abnormal    WBC Count 5.5      RBC Count 3.50 (*)     Hemoglobin 9.2 (*)     Hematocrit 29.1 (*)     MCV 83      MCH 26.3 (*)     MCHC 31.6      RDW 13.9      Platelet Count 196      % Neutrophils 62      % Lymphocytes 23      % Monocytes 10      % Eosinophils 4      % Basophils 1      % Immature Granulocytes 0      NRBCs per 100 WBC 0      Absolute Neutrophils 3.4      Absolute Lymphocytes 1.3      Absolute Monocytes 0.5      Absolute Eosinophils 0.2      Absolute Basophils 0.1      Absolute Immature Granulocytes 0.0      Absolute NRBCs 0.0     PHOSPHORUS - Abnormal    Phosphorus 6.3 (*)    ROUTINE UA WITH MICROSCOPIC - Abnormal    Color Urine Yellow      Appearance Urine Clear      Glucose Urine 300 (*)     Bilirubin Urine Negative      Ketones Urine Negative      Specific Gravity Urine 1.017      Blood Urine Small (*)     pH Urine 6.5      Protein Albumin Urine 600 (*)     Urobilinogen Urine Normal      Nitrite Urine Negative      Leukocyte Esterase Urine Small (*)     Bacteria Urine Few (*)     Mucus Urine Present (*)     RBC Urine 44 (*)     WBC Urine 22 (*)     Hyaline Casts Urine  1     INFLUENZA A/B, RSV AND SARS-COV2 PCR - Normal    Influenza A PCR Negative      Influenza B PCR Negative      RSV PCR Negative      SARS CoV2 PCR Negative     LACTIC ACID WHOLE BLOOD WITH 1X REPEAT IN 2 HR WHEN >2 - Normal    Lactic Acid, Initial 0.7     AMMONIA - Normal    Ammonia 19     NT PROBNP INPATIENT   BLOOD CULTURE   BLOOD CULTURE        US Lower Extremity Venous Duplex Bilateral   Final Result   IMPRESSION:   1.  No deep venous thrombosis in the bilateral lower extremities.      XR Chest 1 View   Final Result   IMPRESSION: Cardiac enlargement. Pulmonary vascular congestion. Interstitial opacities in both lower lungs. Findings compatible volume overload/CHF. Bilateral pleural fluid collections, greater on the right with associated basilar atelectasis. Aortic    calcification. Degenerative hypertrophic changes in the spine and both shoulders.          Treatments provided: see MAR  Family Comments: daughters at bedside  OBS brochure/video discussed/provided to patient:  Yes  ED Medications:   Medications   furosemide (LASIX) injection 60 mg (60 mg Intravenous $Given 1/14/25 2448)       Drips infusing:  No  For the majority of the shift this patient was Green.   Interventions performed were NA.    Sepsis treatment initiated: No    Cares/treatment/interventions/medications to be completed following ED care: NA    ED Nurse Name: Shelia Jaime RN  11:01 PM

## 2025-01-15 NOTE — CONSULTS
CLINICAL NUTRITION SERVICES - ASSESSMENT NOTE    RECOMMENDATIONS FOR MDs/PROVIDERS TO ORDER:  Fluid recommendations per MD/Nephrology - please adjust FWF as necessary     Malnutrition Status:    Patient does not meet two of the established criteria necessary for diagnosing malnutrition    Registered Dietitian Interventions:  Please obtain admit weight when able     Ordered renal friendly tube feeding formula d/t elevated phosphorus. Starting with continuous feeds as patient is reported to have a history of tolerance issues. I don't suspect he will refeed, but will monitor electrolytes in case. Please replace as indicated.     Nutrition Support Enteral:  Type of Feeding Tube: GT   Enteral Frequency:  Continuous  Enteral Regimen: Nepro @ goal 35 ml/hr (840 ml/day) to provide 1512 kcals (~25 kcal/kg/day), 68 g PRO (~1.1 g/kg/day), 613 ml free H2O, 135 g CHO and 21 g Fiber daily.   Free Water Flush: 60 mL q 4 (or per MD direction)     Once okay to resume feeds per MD, begin at 15 mL/hr x 12 hours. If tolerating and labs WNL, okay to advance by 10 mL every 8 hours until goal rate is reached.    I discontinued banatrol order. Patient is NPO and banatrol is not part of his home TF regimen. We can re-order if indicated.        REASON FOR ASSESSMENT  Provider order - Registered Dietitian to order TF per Medical Nutrition Therapy Guidelines    SUBJECTIVE INFORMATION  Assessed patient in room. Information obtained from family member, pt is non verbal at baseline.     PMH: ischemic stroke, hypertension, hyperlipidemia, type 2 diabetes mellitus, and chronic kidney disease     Patient admitted for Altered Mental Status and Abnormal Labs.     NUTRITION HISTORY  Patient has chronic GT d/t swallowing difficulty (placed 10/17/24). 100% reliant on GT for nutrition.     Home nutrition support plan per RD note from [facility] (10/24/24):   Enteral Formula: Jevity 1.5 + 1 Packet Prosource  Rate/Frequency: 4 cartons/day  Water Flushes: 120ml  "before and after feedings  Enteral Nutrition Provides: 1420 kcal (23 kcal/kg), 60 g protein, 204 g CHO, 20 g fiber, and 720 mL free water daily  - 1 prosource daily to total 80 g protein (1.3 g protein/kg)    Family reports they would do 1 carton bolus in the morning, 1 carton bolus in the afternoon, and 2 carton bolus in the evenings. They have been consistent with feeds and report his weight has been stable and up trending.   Family notes that when patient first started tube feeds it took him a couple of weeks to eventually tolerate feeds.   Family noted pt had nausea with vomiting on transport to the hospital yesterday.      CURRENT NUTRITION ORDERS  Diet: NPO    CURRENT INTAKE/TOLERANCE  Patient is 100% GT reliant. WC bound at basline. Mostly non-verbal.   Per Nephrology - \"Suggest nutrition consult to switch from JVD to lower phosphorus tube feed\". Agree with this, will switch to renal friendly formula.     LABS  Nutrition-relevant labs:  BUN 67.8, Cr 3.39, mag 3.2 (H), Phos 6.6 (H)    MEDICATIONS  Nutrition-relevant medications:  banatrol ordered? Lasix, insulin, lantus    SYSTEM FINDINGS    Skin/wounds: no edema or PI noted; anasarca and volume overload noted per Nephrology     I/Os: no BM noted, admitted recently       ANTHROPOMETRICS  Height: 5'7\"  Most Recent Weight:  61 kg (wt taken 2 months ago)   IBW: 148 lbs  % IBW: 91%  BMI (kg/m ): 21, Normal BMI  Weight History: Unable to assess for wt loss, no admit wt taken. Admit wt will likely be inaccurate d/t fluid status.  Wt Readings from Last 20 Encounters:   10/25/24 60.3 kg (132 lb 15 oz)   10/28/24 61 kg (134 lb 7.7 oz)   01/02/23 57.2 kg (126 lb)   10/28/22 54.4 kg (120 lb)   01/26/22 57.8 kg (127 lb 6.4 oz)   11/04/21 59 kg (130 lb)   10/01/21 66.5 kg (146 lb 11.2 oz)   09/16/21 61.5 kg (135 lb 9.3 oz)   08/14/12 68.5 kg (151 lb)        ASSESSED NUTRITION NEEDS  Dosing Weight: 61 kg  Estimated Energy Needs: 3096-8440 kcals/day ([address] " "Linda)  Justification: maintenance without activity factor up to activity factor 1.2   Estimated Protein Needs: 49-61 grams protein/day (0.8-1 grams of pro/kg)  Justification: CKD, preservation of lean body mass   Estimated Fluid Needs: per MD      MALNUTRITION  % Intake: Decreased intake does not meet criteria  % Weight Loss: Unable to assess - no admit wt taken   Subcutaneous Fat Loss: Unable to assess accurately d/t anasarca   Muscle Loss: Unable to assess, WC at baseline, mild losses r/t age  Fluid Accumulation/Edema: None noted  Malnutrition Diagnosis: Patient does not meet two of the established criteria necessary for diagnosing malnutrition  Malnutrition Present on Admission: No    NUTRITION DIAGNOSIS  Inadequate oral intake related to swallowing difficulty as evidenced by need for EN to meet nutrition needs.     INTERVENTIONS  Enteral nutrition management    Goals  Tolerate new EN formula  EN to provide % of needs     Monitoring/Evaluation  Progress toward goals will be monitored and evaluated per policy.      Karen Macias MS, RD, LD  Clinical Dietitian II  [facility] Message Group: \"Dietitian [[place]]\"  Office Phone: [phone]  Pagers: 3rd floor/ICU: [phone]  All other floors: [phone]  Weekend/holiday: [phone]    "

## 2025-01-15 NOTE — CONSULTS
Nephrology Initial Consult  January 15, 2025      Jimmy Otto MRN:[id number] YOB: 1950  Date of Admission:1/14/2025  Primary care provider: Nallely Fong  Requesting physician: Amy Parmar MD    ASSESSMENT AND RECOMMENDATIONS:   1 proteinuric CKD 4-progressive and rather rapid worsening of renal function over the last 1.5 years.  Nephrotic range proteinuria with UACR of 5.2 g/grams in 2023  -Likely advanced diabetic nephropathy  -Has microscopic hematuria but UA unreliable with chronic Felix    2 elevated creatinine-unclear if there is an ALEX component or if this is continued rapid progression of CKD    3 anasarca, this CHF/volume overload -with advanced CKD, nephrotic syndrome.  Echo shows normal EF and RV function.  Dilated IVC.    4 hypertension-PTA meds-atenolol 25, amlodipine 5, Cardura 2    5 anemia in CKD-iron stores are low.  Await ferritin.  Will order Venofer after reviewing ferritin results.    6 BMD-hypophosphatemia    Recommendation-  -Lasix 40 mg IV x 1 today.  Switch to oral diuretics tomorrow.  -Continue Cardura.  Hold atenolol -> patient is relatively bradycardic.  Atenolol is renally excreted.  Can resume amlodipine  -IV Venofer after reviewing ferritin results  -Limited vasculitis panel  -Suggest nutrition consult to switch from JVD to lower phosphorus tube feed.  -Sevelamer 3 times daily with tube feeds    He does not need dialysis now but likely imminent given rapid decline in kidney function.  Discussed with daughter.  Discussed difficulty in dialysis given his clinical status.  Daughter works in dialysis pressures at [employer] and understands, but needs to discuss it further with her siblings.      Thank you for the consult. Will continue to follow along with you .    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  Also, monthly routine lab test shows increasing creatinine to 3.2  Afebrile  Bilateral leg edema-negative for DVT.  Viral panel-influenza/COVID/RSV negative.   Blood pressure okay  Chest x-ray-cardiac enlargement, pulmonary vascular congestion, bilateral pleural effusion-right more than left  Echo-EF 55-60%.  Increased left ventricular filling pressures.  Mild concentric LVH.  Normal RV function.  Dilated IVC.  NT proBNP 6600  Other labs-chronic normocytic normochromic anemia, hemoglobin 8.8.  Phosphorus 6.3  Iron sat 12%, low iron.  Ferritin pending    Daughter at bedside who provides history.  Reports patient is getting monthly lab checks with recent worsening in renal function.  Recently recovered from viral URI type symptoms.  He is n.p.o. and gets 4 cans of Jevity through PEG tube per day.    Renal history-  High-grade proteinuria going back to 2021.  UACR is high as 5.8 g/gram  UA unreliable with chronic Felix.  Some intermittent microscopic hematuria  Creatinine trend          PAST MEDICAL HISTORY:  Reviewed with patient on 01/15/2025  and is as listed in HPI.       MEDICATIONS:  PTA Meds  Prior to Admission medications    Medication Sig Last Dose Taking? Auth Provider Long Term End Date   amLODIPine (NORVASC) 5 MG tablet Take 5 mg by mouth daily. 1/14/2025 Morning Yes Unknown, Entered By History Yes    atenolol (TENORMIN) 25 MG tablet Take 1 tablet (25 mg) by mouth 2 times daily 1/14/2025 Evening Yes Jesika Theodore PA-C Yes    atorvastatin (LIPITOR) 80 MG tablet Take 80 mg by mouth daily 1/14/2025 Morning Yes Unknown, Entered By History Yes    Banana Flakes (BANATROL PLUS) Place 1 packet into Feeding Tube  3 times daily.  Yes Jarret Tolliver MD     clopidogrel (PLAVIX) 75 MG tablet Take 75 mg by mouth daily 1/14/2025 Morning Yes Unknown, Entered By History     doxazosin (CARDURA) 2 MG tablet Take 1 tablet (2 mg) by mouth daily. 1/14/2025 Morning Yes Alivia Herzog, CNP Yes    FLUoxetine (PROZAC) 20 MG/5ML solution 5 mLs (20 mg) by Oral or Feeding Tube route daily 1/14/2025 Morning Yes Taty Bolton MD Yes    insulin glargine 100 UNIT/ML pen Inject 9 Units subcutaneously every morning. 1/14/2025 Morning Yes Unknown, Entered By History     Insulin Lispro (ADMELOG SC) For BG <140 no insulin. 140-189 give 1 unit. 190-239 give 2 units. 240-289 give 3. 290-340 give 4 units. >340 give 5 units. Usual daily dose 12 units per day.  Yes Unknown, Entered By History Yes    Jevity 1.5 [address] 948 mLs into G tube daily. Infuse via gravity bag  4 cartons/day  Water flush: 120ml before and after feedings  Yes Nallely Fong MD No 10/26/25   polyethylene glycol (MIRALAX) 17 g packet Take 17 g by mouth daily as needed for constipation  Yes Jesika Theodore PA-C     Prosource TF PO LIQD (PROSOURCE TF) Place 45 mLs into G tube daily. Infuse via syringe   1 packet Prosource daily  Water flush: 120ml before and after feedings More than a month Yes Nallely Fong MD No 10/26/25      Current Meds  Current Facility-Administered Medications   Medication Dose Route Frequency Provider Last Rate Last Admin    artificial saliva (BIOTENE MT) solution 1 spray  1 spray Mouth/Throat 4x Daily Amy Parmar MD   1 spray at 01/15/25 0944    atorvastatin (LIPITOR) tablet 80 mg  80 mg Oral Daily Amy Parmar MD   80 mg at 01/15/25 0952    banatrol plus packet 1 packet  1 packet Per Feeding Tube TID Amy Parmar MD        clopidogrel (PLAVIX) tablet 75 mg  75 mg Oral or Feeding Tube Daily Amy Parmar MD   75 mg at 01/15/25 5184    doxazosin (CARDURA) tablet 2 mg  2 mg Oral or Feeding Tube Daily Amy Parmar MD   2 mg  at 01/15/25 0951    FLUoxetine (PROzac) solution 20 mg  20 mg Oral or Feeding Tube Daily Amy Parmar MD   20 mg at 01/15/25 0951    insulin aspart (NovoLOG) injection (RAPID ACTING)  1-7 Units Subcutaneous Q4H Caleb Wright MD   2 Units at 01/15/25 0942    insulin glargine (LANTUS PEN) injection 9 Units  9 Units Subcutaneous QAM Amy Parmar MD   9 Units at 01/15/25 0944    miconazole (MICATIN) 2 % cream   Topical BID Amy Parmar MD        polyethylene glycol (MIRALAX) Packet 17 g  17 g Oral Daily Amy Parmar MD        sodium chloride (PF) 0.9% PF flush 3 mL  3 mL Intracatheter Q8H Amy Parmar MD   3 mL at 01/15/25 0952     Infusion Meds  Current Facility-Administered Medications   Medication Dose Route Frequency Provider Last Rate Last Admin       ALLERGIES:    Allergies   Allergen Reactions    Nka [No Known Allergies]        REVIEW OF SYSTEMS:  A comprehensive of systems was negative except as noted above.    SOCIAL HISTORY:   Lives with wife    FAMILY MEDICAL HISTORY:   No family history on file.      PHYSICAL EXAM:   Temp  Av.7  F (36.5  C)  Min: 97.5  F (36.4  C)  Max: 97.9  F (36.6  C)      Pulse  Av.7  Min: 54  Max: 58 Resp  Av  Min: 16  Max: 20  SpO2  Av %  Min: 95 %  Max: 97 %       /77   Pulse 58   Temp 97.9  F (36.6  C) (Oral)   Resp 18   SpO2 95%    Date 01/15/25 0700 - 25 0659   Shift 9275-5281 2724-3706 8770-3832 24 Hour Total   INTAKE   Shift Total       OUTPUT   Urine 750   750   Shift Total 750   750   Weight (kg)          Admit       GENERAL APPEARANCE: no distress,  awake  EYES: no scleral icterus, pupils equal  HENT: NC/AT,  mouth  without ulcers or lesions  Lymphatics: no cervical or supraclavicular LAD  Endo: no moon facies, no goiter  Pulmonary: Diminished at bases  CV: regular rhythm, normal rate, no rub   - JVP -   - Edema+  GI: soft, nontender,   MS: no evidence of inflammation in joints  : + ordoñez  SKIN: no rash, warm, dry, no  cyanosis  Neuro-right hemiplegia, aphasia    LABS:   CMP  Recent Labs   Lab 01/15/25  0840 01/15/25  0812 01/15/25  0215 01/14/25 2048 01/13/25  1315     --   --  134* 136   POTASSIUM 3.4  --   --  3.7 3.9   CHLORIDE 96*  --   --  95* 97*   CO2 26  --   --  26 27   ANIONGAP 13  --   --  13 12   * 202* 376* 239* 334*   BUN 67.8*  --   --  68.0* 65.7*   CR 3.39*  --   --  3.27* 3.23*   GFRESTIMATED 18*  --   --  19* 19*   ARLETH 8.1*  --   --  8.2* 8.2*   PHOS  --   --   --  6.3*  --    PROTTOTAL 5.9*  --   --  6.8 6.4   ALBUMIN 2.5*  --   --  3.0* 2.7*   BILITOTAL 0.2  --   --  0.2 0.2   ALKPHOS 136  --   --  157* 148   AST 39  --   --  46* 52*   ALT 62  --   --  78* 83*     CBC  Recent Labs   Lab 01/15/25  0840 01/14/25 2048 01/13/25  1315   HGB 8.8* 9.2* 9.9*   WBC 5.9 5.5 5.0   RBC 3.23* 3.50* 3.69*   HCT 27.0* 29.1* 30.7*   MCV 84 83 83   MCH 27.2 26.3* 26.8   MCHC 32.6 31.6 32.2   RDW 13.9 13.9 14.0    196 197     INRNo lab results found in last 7 days.  ABG  Recent Labs   Lab 01/14/25 2059   O2PER 21      URINE STUDIES  Recent Labs   Lab Test 01/14/25  2229 10/23/24  2108 09/13/24  1420 05/12/24  2232   COLOR Yellow Light Yellow Yellow Yellow   APPEARANCE Clear Clear Slightly Cloudy* Clear   URINEGLC 300* Negative 100* 70*   URINEBILI Negative Negative Negative Negative   URINEKETONE Negative Negative Negative Negative   SG 1.017 1.008 1.019 1.021   UBLD Small* Negative Negative Negative   URINEPH 6.5 7.0 6.5 7.0   PROTEIN 600* 300* 600* 600*   NITRITE Negative Negative Negative Negative   LEUKEST Small* Moderate* Moderate* Negative   RBCU 44* <1 2 5*   WBCU 22* 10* 102* 3     Recent Labs   Lab Test 03/30/22  0915   UTPG 2.35*     PTH  No lab results found.  IRON STUDIES  Recent Labs   Lab Test 01/14/25  2048   IRON 32*      IRONSAT 12*       IMAGING:  Personally reviewed the images and findings are as listed in HPI     Yvonne Matias MD

## 2025-01-15 NOTE — PLAN OF CARE
Hypertension is stable. She just got her bp rx.  Continue current med. Continue to modify diet and lifestyle. Reviewed last labs.      [facility]    ED Boarding Nurse Handoff Addendum Report:    Date/time: 1/15/2025, 7:01 AM    Activity Level: lift    Fall Risk: Yes:  patient and family education    Active Infusions: None    Current Meds Due: None    Current care needs: Total cares    Oxygen requirements (liters/min and/or FiO2): No    Respiratory status: Room air    Vital signs (within last 30 minutes):    Vitals:    01/14/25 2023 01/14/25 2130   BP: (!) 163/78 (!) 166/95   Pulse: 55 54   Resp: 20    Temp: 97.5  F (36.4  C)    TempSrc: Temporal    SpO2: 97% 97%       Focused assessment within last 30 minutes:    [facility]    ED Boarding Nurse name: Zofia Apodaca RN       RECEIVING UNIT ED HANDOFF REVIEW    Above ED Nurse Handoff Report was reviewed: Yes  Reviewed by: Kj Timmons RN on January 15, 2025 at 11:12 AM   I Vocera called the ED to inform them the note was read: Yes

## 2025-01-15 NOTE — PROGRESS NOTES
Cross cover notified of patient's glucose of 376.  Patient appears to be on glargine 9 units daily, sliding scale insulin, and metformin at home.  No insulin orders currently.  N.p.o., but does tube feeds.  -Ordered glargine 9 units to start this morning  -Ordered medium dose sliding scale every 4 hours  -Give 4 units aspart now

## 2025-01-15 NOTE — PROGRESS NOTES
[facility]    ED Boarding Nurse Handoff Addendum Report:    Date/time: 1/15/2025, 11:56 AM    Activity Level: assist of 2    Fall Risk: Yes:  patient and family education, assistive device/personal items within reach, and activity supervised    Active Infusions: No    Current Meds Due: See MAR    Current care needs: None    Oxygen requirements (liters/min and/or FiO2): RA    Respiratory status: Room air    Vital signs (within last 30 minutes):    Vitals:    01/14/25 2023 01/14/25 2130 01/15/25 0809 01/15/25 1150   BP: (!) 163/78 (!) 166/95 (!) 155/81 136/77   BP Location:   Right arm    Pulse: 55 54  58   Resp: 20  16 18   Temp: 97.5  F (36.4  C)  97.9  F (36.6  C)    TempSrc: Temporal  Oral    SpO2: 97% 97% 95% 95%       Focused assessment within last 30 minutes:    Pt is non-verbal patient, A2 with lift. Daughter at the bedside. Nephrology and nutrition consult. Meds through G-tube. Felix patent, with clear/yellow OP. Transferred to room 328.    ED Boarding Nurse name: Daksha Obrien RN

## 2025-01-15 NOTE — ED TRIAGE NOTES
"Patient arrives with family members. Had recent labs. Doctor concerned about GFR. Concerned about lung issues and lung sounds. Decribes as \"purring\".  Nonverbal at baseline. Daughters state he is more tired and less responsive at this time.     Has chronic ordoñez, just changed on Monday.         "

## 2025-01-15 NOTE — PHARMACY-ADMISSION MEDICATION HISTORY
Pharmacist Admission Medication History    Admission medication history is complete. The information provided in this note is only as accurate as the sources available at the time of the update.    Information Source(s): Family member and CareEverywhere/SureScripts via in-person    Pertinent Information: None    Changes made to PTA medication list:  Added: None  Deleted: None  Changed: Glargine 7 units --> 9 units QAM    Allergies reviewed with patient and updates made in EHR: yes    Medication History Completed By: Johnny Coy [facility] 1/15/2025 1:30 AM    PTA Med List   Medication Sig Note Last Dose/Taking    amLODIPine (NORVASC) 5 MG tablet Take 5 mg by mouth daily.  1/14/2025 Morning    atenolol (TENORMIN) 25 MG tablet Take 1 tablet (25 mg) by mouth 2 times daily  1/14/2025 Evening    atorvastatin (LIPITOR) 80 MG tablet Take 80 mg by mouth daily  1/14/2025 Morning    Banana Flakes (BANATROL PLUS) Place 1 packet into Feeding Tube 3 times daily.  Taking    clopidogrel (PLAVIX) 75 MG tablet Take 75 mg by mouth daily  1/14/2025 Morning    doxazosin (CARDURA) 2 MG tablet Take 1 tablet (2 mg) by mouth daily.  1/14/2025 Morning    FLUoxetine (PROZAC) 20 MG/5ML solution 5 mLs (20 mg) by Oral or Feeding Tube route daily  1/14/2025 Morning    insulin glargine 100 UNIT/ML pen Inject 9 Units subcutaneously every morning.  1/14/2025 Morning    Insulin Lispro (ADMELOG SC) For BG <140 no insulin. 140-189 give 1 unit. 190-239 give 2 units. 240-289 give 3. 290-340 give 4 units. >340 give 5 units. Usual daily dose 12 units per day.  Taking    Jevity 1.5 [address] 948 mLs into G tube daily. Infuse via gravity bag  4 cartons/day  Water flush: 120ml before and after feedings 1/14/2025: 1 in AM, 1 Lunch, 2 Dinner Taking    polyethylene glycol (MIRALAX) 17 g packet Take 17 g by mouth daily as needed for constipation  Taking As Needed    Prosource TF PO LIQD (PROSOURCE TF) Place 45 mLs into G tube daily. Infuse via syringe   1 packet  Prosource daily  Water flush: 120ml before and after feedings  More than a month

## 2025-01-15 NOTE — PROGRESS NOTES
[facility]    Hospitalist Progress Note      Assessment & Plan   Jimmy Otto is a 75 year old male with a history of htn, hlp, dm2 most recent echo 10/24 EF 60-65% nl LV fxn, hx of stroke with left sided hemiplegia and aphasia-non verbal, PEG tube in place on TFs, and chronic indwelling ordoñez for neurogenic bladder, CKD 3b with progressive renal dysfunction, chronic normocytic anemia presents on 1/14/2025 with lethargy and congested cough.     #Congested cough. Probable volume overload related to progressive renal dysfunction and recent URI. Improved on 1/15.  ALEX on CKD3-4: Family visited over the holidays and one of the grandkids was sick with URI that swept through the family. He mostly recovered from his respiratory sx but didn't really bounce back. They have a home RN who comes in weekly and she noticed that he was starting to retain fluid and his lungs were sounding crackly. Then his monthly blood tests were completed yesterday and notable for progressive renal dysfunction bun/creat 66/3.23 so was told to come into the ER.  -In ER, pt afebrile and HDS.  He is mildly hypertensive.  BMP with BUN/Cr 68/3.3.  CBC without leukocytosis and hgb at baseline of 9.2.  UA without clear UTI.  CXR with pulmonary vascular congestion.  BNP elevated at 6600.  Hs troponin mildly elevated at 81 and downtrending on recheck.  ECG with junctional rhythm.  LE doppler negative for DVT.  -Pt received dose of IV lasix on evening of 1/15.  Renal function slightly worse.  He is on RA and appears comfortable. Hold further IV lasix.  I suspect he is third spacing.  -TTE shows EF 55-60% without RMA and nl RV function.  -Will ask for nephrology input given progressive kidney dysfunction.  -Planning of family meeting tomorrow, 1/16 with wife presents and interpretor given his multiple, severe medical comorbidities particularly given progressive renal failure, aneima.     #NSTEMI: Type 2 in setting of renal dysfunction and  recent viral illness.     #Hx of CVA with right sided hemiplegia. Aphasia and dysphagia with PEG tube. Chronic neurogenic bladder with chronic ordoñez: (changed 1/13 and is changed monthly)  He is mostly non-verbal at baseline, family helps with communication.  Lives with his wife and has HH RN weekly.  Not sure of other services.  Wheelchair bound and I believe requires a lift   -resumed TF via peg as at home  -chronic ordoñez in  place, changed monthly and just changed     #Elevated LFT's: Monitor. No AP. Likely related to congestion.  #HTN. HL: Holding amlodipine with LE edema (third spacing).  Hold beta blockade with relative bradycardia.  On doxazosin and statin.  #IDDM2: Resume home lantus 9 units in AM. Sliding scale insulin.   #Normocytic anemia: Not bleeding. Suspect from renal dysfunction.     DVT Prophylaxis: Pneumatic Compression Devices  Code Status: Full Code  Medically Ready for Discharge: Anticipated in 2-4 Days    Daughter updated at bedside.     Demetrius De Santiago MD    Interval History   Assumed care. Patient comfortable. No increased wob or cough. No pain.     -Data reviewed today: I reviewed all new labs and imaging results over the last 24 hours. I personally reviewed     Physical Exam   Temp: 97.9  F (36.6  C) Temp src: Oral BP: (!) 155/81 Pulse: 54   Resp: 16 SpO2: 95 % O2 Device: None (Room air)    There were no vitals filed for this visit.  Vital Signs with Ranges  Temp:  [97.5  F (36.4  C)-97.9  F (36.6  C)] 97.9  F (36.6  C)  Pulse:  [54-55] 54  Resp:  [16-20] 16  BP: (155-166)/(78-95) 155/81  SpO2:  [95 %-97 %] 95 %  No intake/output data recorded.    Constitutional: Chronic right sided hemiparesis.   HEENT: Normocephalic. MMM, No elevation of JVD noted.   Respiratory: Nl WOB, crackles more at bases in both lungs. No wheeze.   Cardiovascular: Regular, no murmur  GI: BS+, NT, ND  Skin/Integumen: R>L LE edema. Wwp.   Neuro: chronic right sided hemiparesis. Nonverbal     Medications   Current  Facility-Administered Medications   Medication Dose Route Frequency Provider Last Rate Last Admin     Current Facility-Administered Medications   Medication Dose Route Frequency Provider Last Rate Last Admin    artificial saliva (BIOTENE MT) solution 1 spray  1 spray Mouth/Throat 4x Daily Amy Parmar MD   1 spray at 01/15/25 0944    atorvastatin (LIPITOR) tablet 80 mg  80 mg Oral Daily Amy Parmar MD   80 mg at 01/15/25 0952    banatrol plus packet 1 packet  1 packet Per Feeding Tube TID Amy Parmar MD        clopidogrel (PLAVIX) tablet 75 mg  75 mg Oral or Feeding Tube Daily Amy Parmar MD   75 mg at 01/15/25 0952    doxazosin (CARDURA) tablet 2 mg  2 mg Oral or Feeding Tube Daily Amy Parmar MD   2 mg at 01/15/25 0951    FLUoxetine (PROzac) solution 20 mg  20 mg Oral or Feeding Tube Daily Amy Parmar MD   20 mg at 01/15/25 0951    insulin aspart (NovoLOG) injection (RAPID ACTING)  1-7 Units Subcutaneous Q4H Caleb Wright MD   2 Units at 01/15/25 0942    insulin glargine (LANTUS PEN) injection 9 Units  9 Units Subcutaneous QAM Amy Parmar MD   9 Units at 01/15/25 0944    miconazole (MICATIN) 2 % cream   Topical BID Amy Parmar MD        polyethylene glycol (MIRALAX) Packet 17 g  17 g Oral Daily Amy Parmar MD        sodium chloride (PF) 0.9% PF flush 3 mL  3 mL Intracatheter Q8H Amy Parmar MD   3 mL at 01/15/25 0952       Data   Recent Labs   Lab 01/15/25  0840 01/15/25  0812 01/15/25  0215 01/14/25  2048 01/13/25  1315   WBC 5.9  --   --  5.5 5.0   HGB 8.8*  --   --  9.2* 9.9*   MCV 84  --   --  83 83     --   --  196 197     --   --  134* 136   POTASSIUM 3.4  --   --  3.7 3.9   CHLORIDE 96*  --   --  95* 97*   CO2 26  --   --  26 27   BUN 67.8*  --   --  68.0* 65.7*   CR 3.39*  --   --  3.27* 3.23*   ANIONGAP 13  --   --  13 12   ARLETH 8.1*  --   --  8.2* 8.2*   * 202* 376* 239* 334*   ALBUMIN 2.5*  --   --  3.0* 2.7*   PROTTOTAL 5.9*  --   --  6.8 6.4    BILITOTAL 0.2  --   --  0.2 0.2   ALKPHOS 136  --   --  157* 148   ALT 62  --   --  78* 83*   AST 39  --   --  46* 52*   LIPASE  --   --   --  5*  --        Recent Results (from the past 24 hours)   XR Chest 1 View    Narrative    EXAM: XR CHEST 1 VIEW  LOCATION: [facility]  DATE: 2025    INDICATION: anasarca, crackles  COMPARISON: 10/23/2024      Impression    IMPRESSION: Cardiac enlargement. Pulmonary vascular congestion. Interstitial opacities in both lower lungs. Findings compatible volume overload/CHF. Bilateral pleural fluid collections, greater on the right with associated basilar atelectasis. Aortic   calcification. Degenerative hypertrophic changes in the spine and both shoulders.   US Lower Extremity Venous Duplex Bilateral    Narrative    EXAM: US LOWER EXTREMITY VENOUS DUPLEX BILATERAL  LOCATION: [facility]  DATE: 2025    INDICATION: new edema R > L, worsening renal function  COMPARISON: None.  TECHNIQUE: Venous Duplex ultrasound of bilateral lower extremities with and without compression, augmentation and duplex. Color flow and spectral Doppler with waveform analysis performed.    FINDINGS: Exam includes the common femoral, femoral, popliteal veins as well as segmentally visualized deep calf veins and greater saphenous vein.     RIGHT: No deep vein thrombosis. No superficial thrombophlebitis. No popliteal cyst.    LEFT: No deep vein thrombosis. No superficial thrombophlebitis. No popliteal cyst.      Impression    IMPRESSION:  1.  No deep venous thrombosis in the bilateral lower extremities.   Echocardiogram Complete   Result Value    LVEF  55-60%    Narrative    [id number]  [id number]  [id number]  [id number]^COLETTE^RACHEL^T     [facility]  Echocardiography Laboratory  [address] [place] [address]     Name: CARLOS SCHULTZ  MRN: [id number]  : 1950  Study Date: 01/15/2025 08:46 AM  Age: 75 yrs  Gender: Male  Patient Location:  [facility]  Reason For Study: CHF  Ordering Physician: RACHEL ALVARENGA  Referring Physician: Nallely Fong  Performed By: Carline Lacy     BSA: 1.7 m2  Height: 67 in  Weight: 134 lb  HR: 60  BP: 155/81 mmHg  ______________________________________________________________________________  Procedure  Echocardiogram with two-dimensional, color and spectral Doppler. Optison (NDC  #[id number]) given intravenously. Technically difficult study.Extremely  difficult acoustic windows despite the use of contrast for endcardial border  definition.  ______________________________________________________________________________  Interpretation Summary     Left ventricular systolic function is normal.  The visual ejection fraction is 55-60%.  No regional wall motion abnormalities noted.  Ejection fraction unchanged compared to previous study from October 2024. The  study was technically difficult.  ______________________________________________________________________________  Left Ventricle  The left ventricle is normal in size. There is mild concentric left  ventricular hypertrophy. Left ventricular systolic function is normal. The  visual ejection fraction is 55-60%. Diastolic Doppler findings (E/E' ratio  and/or other parameters) suggest left ventricular filling pressures are  increased. No regional wall motion abnormalities noted.     Right Ventricle  The right ventricle is normal size. The right ventricular systolic function is  normal.     Atria  Normal left atrial size. The left atrium is not well visualized. Right atrium  not well visualized.     Mitral Valve  There is mild mitral annular calcification. There is trace mitral  regurgitation. Evaluation of regurgitation is inadequate.     Tricuspid Valve  No tricuspid regurgitation. Right ventricular systolic pressure could not be  approximated due to inadequate tricuspid regurgitation.     Aortic Valve  There is mild trileaflet aortic sclerosis. No aortic stenosis is  present.     Pulmonic Valve  The pulmonic valve is not well visualized.     Vessels  The aortic root is normal size. Dilated IVC. Collapsibility was not assessed.     Pericardium  There is no pericardial effusion.     Rhythm  Sinus rhythm was noted.  ______________________________________________________________________________  MMode/2D Measurements & Calculations  IVSd: 1.2 cm     LVIDd: 4.5 cm  LVIDs: 3.1 cm  LVPWd: 1.3 cm  IVC diam: 2.4 cm  FS: 29.5 %  LV mass(C)d: 212.5 grams  LV mass(C)dI: 124.6 grams/m2  Ao root diam: 3.3 cm  LVOT diam: 1.9 cm  LVOT area: 3.0 cm2  Ao root diam index Ht(cm/m): 2.0  Ao root diam index BSA (cm/m2): 1.9  LA Volume (BP): 47.7 ml  LA Volume Index (BP): 27.9 ml/m2  RV Base: 4.3 cm     RWT: 0.59  TAPSE: 2.0 cm     Doppler Measurements & Calculations  MV E max rick: 92.0 cm/sec  MV A max rick: 104.9 cm/sec  MV E/A: 0.88  MV max P.5 mmHg  MV mean P.9 mmHg  MV V2 VTI: 42.1 cm  MV dec slope: 536.0 cm/sec2  MV dec time: 0.17 sec  PA V2 max: 85.8 cm/sec  PA max P.9 mmHg  PA acc time: 0.11 sec  E/E' av.7  Lateral E/e': 12.8  Medial E/e': 14.6  RV S Rick: 11.6 cm/sec     ______________________________________________________________________________  Report approved by: Nestor Barry MD on 01/15/2025 09:34 AM

## 2025-01-15 NOTE — ED PROVIDER NOTES
Emergency Department Note      History of Present Illness     Chief Complaint   Altered Mental Status and Abnormal Labs      PAPO Otto is a 75 year old male with history of ischemic stroke, hypertension, hyperlipidemia, type 2 diabetes mellitus, and chronic kidney disease who presents to the ED with his two daughters for abnormal labs. The patient is nonverbal but responds with thumbs up and thumbs down, understands English, native Cambodian speaker. The patient's daughter reports that the patient was admitted to the hospital in October due to acute kidney failure. Since then the patient has had their labs drawn every month. His GFR has been continuously decreasing over time.  Yesterday, the patient's labs were drawn. The patient's family was called and informed that his GFR came back at 19 and emergency department evaluation was recommended. The patient has also been feeling more fatigued. His daughters suspect that this was due to getting sick over the holidays when his relatives were visiting.  Denies shortness of breath, fever, and chills. No vomiting or diarrhea.  Mostly sits.  Getting more swollen so they know that the extent seems to vary between when he is seated or when he is laying down.  No difficulty breathing but they have noticed a sound change that they are describing as purring.  The patient is noted to have had a ordoñez catheter change yesterday.      Independent Historian   Daughter as detailed above.    Review of External Notes   Results phone call today with the recommendation to be seen consistent with the above    Past Medical History     Medical History and Problem List   Type 2 diabetes mellitus  Hypertension   Ischemic stroke  Hyperlipidemia  Depression  Chronic kidney disease    Medications   Amlodipine  Insulin glargine   Atenolol  Fluoxetine   Doxazosin   Plavix   Atorvastatin    Surgical History   Gastrostomy tube insertion  Cystogram     Physical Exam     Patient Vitals for the  past 24 hrs:   BP Temp Temp src Pulse Resp SpO2   01/14/25 2130 (!) 166/95 -- -- 54 -- 97 %   01/14/25 2023 (!) 163/78 97.5  F (36.4  C) Temporal 55 20 97 %     Physical Exam  Eyes:               Sclera white; Pupils are equal and round  ENT:                External ears and nares normal  CV:                  Rate as above with regular rhythm   Resp:               Bibasilar crackles                          Non-labored, no retractions or accessory muscle use  GI:                   Abdomen is soft, non-tender, non-distended                          No rebound tenderness or peritoneal features  MS:                  Moves all extremities, pitting edema arms, legs, lower abdomen, RLE > LLE  Skin:                Warm and dry  Neuro:             Alert, sitting in chair unassisted, at baseline nonverbal and response with thumbs up/down  :                  Felix catheter with yellow urine             Diagnostics     Lab Results   Labs Ordered and Resulted from Time of ED Arrival to Time of ED Departure   COMPREHENSIVE METABOLIC PANEL - Abnormal       Result Value    Sodium 134 (*)     Potassium 3.7      Carbon Dioxide (CO2) 26      Anion Gap 13      Urea Nitrogen 68.0 (*)     Creatinine 3.27 (*)     GFR Estimate 19 (*)     Calcium 8.2 (*)     Chloride 95 (*)     Glucose 239 (*)     Alkaline Phosphatase 157 (*)     AST 46 (*)     ALT 78 (*)     Protein Total 6.8      Albumin 3.0 (*)     Bilirubin Total 0.2     LIPASE - Abnormal    Lipase 5 (*)    BLOOD GAS VENOUS - Abnormal    pH Venous 7.38      pCO2 Venous 54 (*)     pO2 Venous 38      Bicarbonate Venous 32 (*)     Base Excess/Deficit Venous 4.6 (*)     FIO2 21      Oxyhemoglobin Venous 70      O2 Sat, Venous 70.5     CBC WITH PLATELETS AND DIFFERENTIAL - Abnormal    WBC Count 5.5      RBC Count 3.50 (*)     Hemoglobin 9.2 (*)     Hematocrit 29.1 (*)     MCV 83      MCH 26.3 (*)     MCHC 31.6      RDW 13.9      Platelet Count 196      % Neutrophils 62      % Lymphocytes  23      % Monocytes 10      % Eosinophils 4      % Basophils 1      % Immature Granulocytes 0      NRBCs per 100 WBC 0      Absolute Neutrophils 3.4      Absolute Lymphocytes 1.3      Absolute Monocytes 0.5      Absolute Eosinophils 0.2      Absolute Basophils 0.1      Absolute Immature Granulocytes 0.0      Absolute NRBCs 0.0     PHOSPHORUS - Abnormal    Phosphorus 6.3 (*)    ROUTINE UA WITH MICROSCOPIC - Abnormal    Color Urine Yellow      Appearance Urine Clear      Glucose Urine 300 (*)     Bilirubin Urine Negative      Ketones Urine Negative      Specific Gravity Urine 1.017      Blood Urine Small (*)     pH Urine 6.5      Protein Albumin Urine 600 (*)     Urobilinogen Urine Normal      Nitrite Urine Negative      Leukocyte Esterase Urine Small (*)     Bacteria Urine Few (*)     Mucus Urine Present (*)     RBC Urine 44 (*)     WBC Urine 22 (*)     Hyaline Casts Urine 1     NT PROBNP INPATIENT - Abnormal    N terminal Pro BNP Inpatient 6,638 (*)    TROPONIN T, HIGH SENSITIVITY - Abnormal    Troponin T, High Sensitivity 81 (*)    PROTEIN RANDOM URINE - Abnormal    Total Protein Urine mg/dL 112.6      Total Protein Urine mg/mg Creat 2.78 (*)     Creatinine Urine mg/dL 40.5     INFLUENZA A/B, RSV AND SARS-COV2 PCR - Normal    Influenza A PCR Negative      Influenza B PCR Negative      RSV PCR Negative      SARS CoV2 PCR Negative     LACTIC ACID WHOLE BLOOD WITH 1X REPEAT IN 2 HR WHEN >2 - Normal    Lactic Acid, Initial 0.7     AMMONIA - Normal    Ammonia 19     FRACTIONAL EXCRETION OF SODIUM    Creatinine Urine mg/dL 40.4      Sodium Urine mmol/L 32      %FENA 1.9     BLOOD CULTURE   BLOOD CULTURE   FRACTIONAL EXCRETION OF SODIUM       Imaging   US Lower Extremity Venous Duplex Bilateral   Final Result   IMPRESSION:   1.  No deep venous thrombosis in the bilateral lower extremities.      XR Chest 1 View   Final Result   IMPRESSION: Cardiac enlargement. Pulmonary vascular congestion. Interstitial opacities in both  lower lungs. Findings compatible volume overload/CHF. Bilateral pleural fluid collections, greater on the right with associated basilar atelectasis. Aortic    calcification. Degenerative hypertrophic changes in the spine and both shoulders.        Independent Interpretation   CXR - patchy lower changes most c/w pulmonary edema    ED Course      Medications Administered   Medications   furosemide (LASIX) injection 60 mg (60 mg Intravenous $Given 1/14/25 2252)       Procedures   Procedures     Discussion of Management   Admitting Hospitalist, Dr. Parmar    ED Course   ED Course as of 01/14/25 2314 Tue Jan 14, 2025 2054 I obtained history and performed physical exam as noted above.    2256 I spoke with Dr. Parmar, of the hospitalist team, regarding the patient. They accepted the patient for admission.     2300 I updated the patient on plans to admit.       Additional Documentation  None    Medical Decision Making / Diagnosis     CMS Diagnoses: None    MIPS       None    MDM   He presents with findings of volume overload on exam with clinical concern for pulmonary edema as well.  Potential for cardiorenal syndrome with this combination.  However prerenal pathology such as dehydration which can be due to third spacing can also cause acute changes in kidney function.  Decision of diuretics versus IV fluids will be made after his evaluation is complete.  With pulmonary edema on his chest x-ray, diuretics are the appropriate initial treatment.  Urinalysis was obtained from his new catheter with no clinical concern for infection.  Instead I want this available for further evaluation of his renal function.  Admission arranged.      Disposition   The patient was admitted to the hospital.     Diagnosis     ICD-10-CM    1. Acute pulmonary edema (H)  J81.0       2. Acute renal failure superimposed on chronic kidney disease, unspecified acute renal failure type, unspecified CKD stage  N17.9     N18.9       3. Anasarca  R60.1             Scribe Disclosure:  I, Carito Carlton, am serving as a scribe at 10:38 PM on 1/14/2025 to document services personally performed by Lauren Omalley MD based on my observations and the provider's statements to me.        Lauren Omalley MD  01/15/25 0355

## 2025-01-16 VITALS
DIASTOLIC BLOOD PRESSURE: 70 MMHG | RESPIRATION RATE: 15 BRPM | SYSTOLIC BLOOD PRESSURE: 166 MMHG | TEMPERATURE: 98.7 F | BODY MASS INDEX: 22.44 KG/M2 | WEIGHT: 143.3 LBS | HEART RATE: 64 BPM | OXYGEN SATURATION: 93 %

## 2025-01-16 LAB
ANION GAP SERPL CALCULATED.3IONS-SCNC: 10 MMOL/L (ref 7–15)
BACTERIA BLD CULT: NORMAL
BACTERIA BLD CULT: NORMAL
BUN SERPL-MCNC: 67.6 MG/DL (ref 8–23)
C3 SERPL-MCNC: 104 MG/DL (ref 81–157)
C4 SERPL-MCNC: 31 MG/DL (ref 13–39)
CALCIUM SERPL-MCNC: 8.6 MG/DL (ref 8.8–10.4)
CHLORIDE SERPL-SCNC: 97 MMOL/L (ref 98–107)
CREAT SERPL-MCNC: 3.42 MG/DL (ref 0.67–1.17)
EGFRCR SERPLBLD CKD-EPI 2021: 18 ML/MIN/1.73M2
EPO SERPL-ACNC: 12 MU/ML
FOLATE SERPL-MCNC: 19.5 NG/ML (ref 4.6–34.8)
GLUCOSE BLDC GLUCOMTR-MCNC: 106 MG/DL (ref 70–99)
GLUCOSE BLDC GLUCOMTR-MCNC: 112 MG/DL (ref 70–99)
GLUCOSE BLDC GLUCOMTR-MCNC: 152 MG/DL (ref 70–99)
GLUCOSE BLDC GLUCOMTR-MCNC: 153 MG/DL (ref 70–99)
GLUCOSE BLDC GLUCOMTR-MCNC: 159 MG/DL (ref 70–99)
GLUCOSE BLDC GLUCOMTR-MCNC: 161 MG/DL (ref 70–99)
GLUCOSE SERPL-MCNC: 136 MG/DL (ref 70–99)
HBV SURFACE AB SERPL IA-ACNC: 49.9 M[IU]/ML
HBV SURFACE AB SERPL IA-ACNC: REACTIVE M[IU]/ML
HBV SURFACE AG SERPL QL IA: NONREACTIVE
HCO3 SERPL-SCNC: 27 MMOL/L (ref 22–29)
HCV AB SERPL QL IA: NONREACTIVE
HOLD SPECIMEN: NORMAL
KAPPA LC FREE SER-MCNC: 17.23 MG/DL (ref 0.33–1.94)
KAPPA LC FREE/LAMBDA FREE SER NEPH: 1.19 {RATIO} (ref 0.26–1.65)
LAMBDA LC FREE SERPL-MCNC: 14.54 MG/DL (ref 0.57–2.63)
MAGNESIUM SERPL-MCNC: 3 MG/DL (ref 1.7–2.3)
PHOSPHATE SERPL-MCNC: 6.4 MG/DL (ref 2.5–4.5)
POTASSIUM SERPL-SCNC: 3.3 MMOL/L (ref 3.4–5.3)
SODIUM SERPL-SCNC: 134 MMOL/L (ref 135–145)

## 2025-01-16 PROCEDURE — 86334 IMMUNOFIX E-PHORESIS SERUM: CPT | Mod: 26 | Performed by: PATHOLOGY

## 2025-01-16 PROCEDURE — 250N000013 HC RX MED GY IP 250 OP 250 PS 637: Performed by: INTERNAL MEDICINE

## 2025-01-16 PROCEDURE — 250N000011 HC RX IP 250 OP 636: Performed by: INTERNAL MEDICINE

## 2025-01-16 PROCEDURE — 99233 SBSQ HOSP IP/OBS HIGH 50: CPT | Performed by: HOSPITALIST

## 2025-01-16 PROCEDURE — 82746 ASSAY OF FOLIC ACID SERUM: CPT | Performed by: INTERNAL MEDICINE

## 2025-01-16 PROCEDURE — 87340 HEPATITIS B SURFACE AG IA: CPT | Performed by: INTERNAL MEDICINE

## 2025-01-16 PROCEDURE — 84520 ASSAY OF UREA NITROGEN: CPT | Performed by: HOSPITALIST

## 2025-01-16 PROCEDURE — 80048 BASIC METABOLIC PNL TOTAL CA: CPT | Performed by: HOSPITALIST

## 2025-01-16 PROCEDURE — 86160 COMPLEMENT ANTIGEN: CPT | Performed by: INTERNAL MEDICINE

## 2025-01-16 PROCEDURE — 99232 SBSQ HOSP IP/OBS MODERATE 35: CPT | Performed by: INTERNAL MEDICINE

## 2025-01-16 PROCEDURE — 120N000001 HC R&B MED SURG/OB

## 2025-01-16 PROCEDURE — 83735 ASSAY OF MAGNESIUM: CPT | Performed by: HOSPITALIST

## 2025-01-16 PROCEDURE — 250N000013 HC RX MED GY IP 250 OP 250 PS 637: Performed by: HOSPITALIST

## 2025-01-16 PROCEDURE — 86036 ANCA SCREEN EACH ANTIBODY: CPT | Performed by: INTERNAL MEDICINE

## 2025-01-16 PROCEDURE — 86706 HEP B SURFACE ANTIBODY: CPT | Performed by: INTERNAL MEDICINE

## 2025-01-16 PROCEDURE — 86038 ANTINUCLEAR ANTIBODIES: CPT | Performed by: INTERNAL MEDICINE

## 2025-01-16 PROCEDURE — 86803 HEPATITIS C AB TEST: CPT | Performed by: INTERNAL MEDICINE

## 2025-01-16 PROCEDURE — 258N000003 HC RX IP 258 OP 636: Performed by: INTERNAL MEDICINE

## 2025-01-16 PROCEDURE — 86334 IMMUNOFIX E-PHORESIS SERUM: CPT | Performed by: PATHOLOGY

## 2025-01-16 PROCEDURE — 84100 ASSAY OF PHOSPHORUS: CPT | Performed by: HOSPITALIST

## 2025-01-16 PROCEDURE — 36415 COLL VENOUS BLD VENIPUNCTURE: CPT | Performed by: HOSPITALIST

## 2025-01-16 PROCEDURE — 83521 IG LIGHT CHAINS FREE EACH: CPT | Performed by: INTERNAL MEDICINE

## 2025-01-16 RX ORDER — TORSEMIDE 20 MG/1
20 TABLET ORAL DAILY
Status: DISCONTINUED | OUTPATIENT
Start: 2025-01-16 | End: 2025-01-18 | Stop reason: HOSPADM

## 2025-01-16 RX ORDER — METHYLPREDNISOLONE SODIUM SUCCINATE 40 MG/ML
40 INJECTION INTRAMUSCULAR; INTRAVENOUS
Status: DISCONTINUED | OUTPATIENT
Start: 2025-01-16 | End: 2025-01-18 | Stop reason: HOSPADM

## 2025-01-16 RX ORDER — AMLODIPINE BESYLATE 5 MG/1
5 TABLET ORAL DAILY
Status: DISCONTINUED | OUTPATIENT
Start: 2025-01-16 | End: 2025-01-16

## 2025-01-16 RX ORDER — ALBUTEROL SULFATE 90 UG/1
1-2 INHALANT RESPIRATORY (INHALATION)
Status: DISCONTINUED | OUTPATIENT
Start: 2025-01-16 | End: 2025-01-18 | Stop reason: HOSPADM

## 2025-01-16 RX ORDER — DIPHENHYDRAMINE HYDROCHLORIDE 50 MG/ML
25 INJECTION INTRAMUSCULAR; INTRAVENOUS
Status: DISCONTINUED | OUTPATIENT
Start: 2025-01-16 | End: 2025-01-18 | Stop reason: HOSPADM

## 2025-01-16 RX ORDER — HYDROXYZINE HYDROCHLORIDE 25 MG/1
25 TABLET, FILM COATED ORAL EVERY 6 HOURS PRN
Status: DISCONTINUED | OUTPATIENT
Start: 2025-01-16 | End: 2025-01-17

## 2025-01-16 RX ORDER — TORSEMIDE 20 MG/1
20 TABLET ORAL DAILY
Status: DISCONTINUED | OUTPATIENT
Start: 2025-01-16 | End: 2025-01-16

## 2025-01-16 RX ORDER — TORSEMIDE 20 MG/1
20 TABLET ORAL DAILY
Status: DISCONTINUED | OUTPATIENT
Start: 2025-01-17 | End: 2025-01-16

## 2025-01-16 RX ORDER — AMLODIPINE BESYLATE 5 MG/1
5 TABLET ORAL DAILY
Status: DISCONTINUED | OUTPATIENT
Start: 2025-01-17 | End: 2025-01-16

## 2025-01-16 RX ORDER — ALBUTEROL SULFATE 0.83 MG/ML
2.5 SOLUTION RESPIRATORY (INHALATION)
Status: DISCONTINUED | OUTPATIENT
Start: 2025-01-16 | End: 2025-01-18 | Stop reason: HOSPADM

## 2025-01-16 RX ORDER — DIPHENHYDRAMINE HYDROCHLORIDE 50 MG/ML
50 INJECTION INTRAMUSCULAR; INTRAVENOUS
Status: DISCONTINUED | OUTPATIENT
Start: 2025-01-16 | End: 2025-01-18 | Stop reason: HOSPADM

## 2025-01-16 RX ORDER — AMLODIPINE BESYLATE 5 MG/1
5 TABLET ORAL DAILY
Status: DISCONTINUED | OUTPATIENT
Start: 2025-01-16 | End: 2025-01-17

## 2025-01-16 RX ADMIN — Medication 1 SPRAY: at 12:15

## 2025-01-16 RX ADMIN — DOXAZOSIN 2 MG: 1 TABLET ORAL at 08:38

## 2025-01-16 RX ADMIN — INSULIN ASPART 1 UNITS: 100 INJECTION, SOLUTION INTRAVENOUS; SUBCUTANEOUS at 08:32

## 2025-01-16 RX ADMIN — SEVELAMER CARBONATE 0.8 G: 800 POWDER, FOR SUSPENSION ORAL at 12:39

## 2025-01-16 RX ADMIN — IRON SUCROSE 200 MG: 20 INJECTION, SOLUTION INTRAVENOUS at 12:06

## 2025-01-16 RX ADMIN — INSULIN ASPART 1 UNITS: 100 INJECTION, SOLUTION INTRAVENOUS; SUBCUTANEOUS at 12:37

## 2025-01-16 RX ADMIN — INSULIN GLARGINE 9 UNITS: 100 INJECTION, SOLUTION SUBCUTANEOUS at 08:34

## 2025-01-16 RX ADMIN — ATORVASTATIN CALCIUM 80 MG: 40 TABLET, FILM COATED ORAL at 08:38

## 2025-01-16 RX ADMIN — Medication 1 SPRAY: at 08:45

## 2025-01-16 RX ADMIN — FLUOXETINE 20 MG: 20 SOLUTION ORAL at 08:38

## 2025-01-16 RX ADMIN — SEVELAMER CARBONATE 0.8 G: 800 POWDER, FOR SUSPENSION ORAL at 09:44

## 2025-01-16 RX ADMIN — AMLODIPINE BESYLATE 5 MG: 5 TABLET ORAL at 13:42

## 2025-01-16 RX ADMIN — HYDROXYZINE HYDROCHLORIDE 25 MG: 25 TABLET, FILM COATED ORAL at 12:39

## 2025-01-16 RX ADMIN — CLOPIDOGREL BISULFATE 75 MG: 75 TABLET ORAL at 08:38

## 2025-01-16 RX ADMIN — TORSEMIDE 20 MG: 20 TABLET ORAL at 13:42

## 2025-01-16 RX ADMIN — INSULIN ASPART 1 UNITS: 100 INJECTION, SOLUTION INTRAVENOUS; SUBCUTANEOUS at 22:41

## 2025-01-16 RX ADMIN — SEVELAMER CARBONATE 0.8 G: 800 POWDER, FOR SUSPENSION ORAL at 18:18

## 2025-01-16 RX ADMIN — INSULIN ASPART 1 UNITS: 100 INJECTION, SOLUTION INTRAVENOUS; SUBCUTANEOUS at 16:00

## 2025-01-16 ASSESSMENT — ACTIVITIES OF DAILY LIVING (ADL)
ADLS_ACUITY_SCORE: 87
ADLS_ACUITY_SCORE: 87
ADLS_ACUITY_SCORE: 91
ADLS_ACUITY_SCORE: 89
ADLS_ACUITY_SCORE: 91
ADLS_ACUITY_SCORE: 89
ADLS_ACUITY_SCORE: 91
ADLS_ACUITY_SCORE: 91
ADLS_ACUITY_SCORE: 89
ADLS_ACUITY_SCORE: 91
ADLS_ACUITY_SCORE: 89
ADLS_ACUITY_SCORE: 89
ADLS_ACUITY_SCORE: 87
ADLS_ACUITY_SCORE: 87
ADLS_ACUITY_SCORE: 91
ADLS_ACUITY_SCORE: 87
ADLS_ACUITY_SCORE: 89
ADLS_ACUITY_SCORE: 87

## 2025-01-16 NOTE — PLAN OF CARE
"Pt nonverbal @baseline, family remain at bedside TF increased to 25ml@ 0420 with q4 60ml water flushes. SB on tele. VSS except for elevated BP, on RA. NPO maintained. Felix care completed. Reposition as tolerated. Plan for family today to decide on need for dialysis. POC maintained.  Problem: Adult Inpatient Plan of Care  Goal: Plan of Care Review  Description: The Plan of Care Review/Shift note should be completed every shift.  The Outcome Evaluation is a brief statement about your assessment that the patient is improving, declining, or no change.  This information will be displayed automatically on your shift  note.  Outcome: Progressing  Flowsheets (Taken 1/16/2025 0648)  Plan of Care Reviewed With:   patient   family  Overall Patient Progress: no change  Goal: Patient-Specific Goal (Individualized)  Description: You can add care plan individualizations to a care plan. Examples of Individualization might be:  \"Parent requests to be called daily at 9am for status\", \"I have a hard time hearing out of my right ear\", or \"Do not touch me to wake me up as it startles  me\".  Outcome: Progressing  Goal: Absence of Hospital-Acquired Illness or Injury  Outcome: Progressing  Intervention: Identify and Manage Fall Risk  Recent Flowsheet Documentation  Taken 1/16/2025 0020 by Chikis العلي RN  Safety Promotion/Fall Prevention:   assistive device/personal items within reach   activity supervised   clutter free environment maintained   increased rounding and observation   increase visualization of patient   nonskid shoes/slippers when out of bed   patient and family education   safety round/check completed  Intervention: Prevent Skin Injury  Recent Flowsheet Documentation  Taken 1/16/2025 0020 by Chikis العلي RN  Body Position:   weight shifting   log-rolled  Intervention: Prevent and Manage VTE (Venous Thromboembolism) Risk  Recent Flowsheet Documentation  Taken 1/16/2025 0020 by Chikis العلي RN  VTE " Prevention/Management: SCDs off (sequential compression devices)  Intervention: Prevent Infection  Recent Flowsheet Documentation  Taken 1/16/2025 0020 by Chikis العلي RN  Infection Prevention:   single patient room provided   rest/sleep promoted   hand hygiene promoted  Goal: Optimal Comfort and Wellbeing  Outcome: Progressing  Goal: Readiness for Transition of Care  Outcome: Progressing   Goal Outcome Evaluation:      Plan of Care Reviewed With: patient, family    Overall Patient Progress: no changeOverall Patient Progress: no change

## 2025-01-16 NOTE — PROGRESS NOTES
Nephrology Progress Note  01/16/2025         Assessment & Recommendations:   1 proteinuric CKD 4-progressive and rather rapid worsening of renal function over the last 1.5 years.  Nephrotic range proteinuria with UACR of 5.2 g/grams in 2023  -Likely advanced diabetic nephropathy  -Has microscopic hematuria but UA unreliable with chronic Felix     2 elevated creatinine-unclear if there is an ALEX component or if this is continued rapid progression of CKD     3 anasarca,  CHF/volume overload -with advanced CKD, nephrotic syndrome.  Echo shows normal EF and RV function.  Dilated IVC.  -Breathing better with diuresis     4 hypertension-PTA meds-atenolol 25, amlodipine 5, Cardura 2     5 anemia in CKD-iron stores are low.  A     6 BMD-hyperphosphatemia  Jevity switched to Nepro.  Started on sevelamer     Recommendation-  -resume amlodipine  -IV Venofer  -Switch to torsemide 20 mg daily  -Tube feeds changed to Nepro  -Aranesp prior to discharge  -Okay to discharge from renal standpoint in a.m. if labs stable      Long discussion today with patient, patient's wife, 2 daughters at bedside and son over the phone with the help of .  Discussed rapid progression of CKD including likely minute dialysis over next few months, high likelihood of readmission etc.  Discussed different dialysis modalities  Discussed difficulty with long-term dialysis and patient given his comorbidities.  Family will continue discussion and come to a decision and subsequent clinic visits.    Total time more than 40 minutes        Yvonne Matias MD  [facility] - Nephrology   [phone]      Interval History :   Seen / examined.   No acute issues overnight.  2.5 L urine output  Breathing better.  Blood pressure high        Physical Exam:   I/O last 3 completed shifts:  In: 60 [NG/GT:60]  Out: 2075 [Urine:2075]  BP (!) 174/75 (BP Location: Right arm, Patient Position: Semi-Capellan's, Cuff Size: Adult Regular)   Pulse 65   Temp 97.7   F (36.5  C) (Axillary)   Resp 25   Wt 65 kg (143 lb 4.8 oz)   SpO2 95%   BMI 22.44 kg/m      GENERAL APPEARANCE: alert and no distress  Pulmonary: Diminished at bases  CV: regular rhythm, normal rate, no rub   - JVP -   - Edema+  GI: soft, nontender,   MS: no evidence of inflammation in joints  : + ordoñez  SKIN: no rash, warm, dry, no cyanosis  Neuro-right hemiplegia, aphasia    Labs:   All labs reviewed by me  Electrolytes/Renal -   Recent Labs   Lab Test 01/16/25  0830 01/16/25  0642 01/16/25  0419 01/15/25  1219 01/15/25  0840 01/15/25  0215 01/14/25  2048 10/26/24  0952 10/26/24  0634   NA  --  134*  --   --  135  --  134*   < > 137   POTASSIUM  --  3.3*  --   --  3.4  --  3.7   < > 3.3*   CHLORIDE  --  97*  --   --  96*  --  95*   < > 100   CO2  --  27  --   --  26  --  26   < > 24   BUN  --  67.6*  --   --  67.8*  --  68.0*   < > 33.0*   CR  --  3.42*  --   --  3.39*  --  3.27*   < > 1.94*   * 136* 112*   < > 206*   < > 239*   < > 231*   ARLETH  --  8.6*  --   --  8.1*  --  8.2*   < > 8.2*   MAG  --  3.0*  --   --  3.2*  --   --   --  2.1   PHOS  --  6.4*  --   --  6.6*  --  6.3*  --   --     < > = values in this interval not displayed.       CBC -   Recent Labs   Lab Test 01/15/25  0840 01/14/25  2048 01/13/25  1315   WBC 5.9 5.5 5.0   HGB 8.8* 9.2* 9.9*    196 197       LFTs -   Recent Labs   Lab Test 01/15/25  0840 01/14/25  2048 01/13/25  1315   ALKPHOS 136 157* 148   BILITOTAL 0.2 0.2 0.2   ALT 62 78* 83*   AST 39 46* 52*   PROTTOTAL 5.9* 6.8 6.4   ALBUMIN 2.5* 3.0* 2.7*       Iron Panel -   Recent Labs   Lab Test 01/14/25 2048   IRON 32*   IRONSAT 12*   MONICA 102           Current Medications:  Current Facility-Administered Medications   Medication Dose Route Frequency Provider Last Rate Last Admin    artificial saliva (BIOTENE MT) solution 1 spray  1 spray Mouth/Throat 4x Daily Amy Parmar MD   1 spray at 01/16/25 1215    atorvastatin (LIPITOR) tablet 80 mg  80 mg Per G Tube Daily  Amy Parmar MD   80 mg at 01/16/25 0838    clopidogrel (PLAVIX) tablet 75 mg  75 mg Oral or Feeding Tube Daily Amy Parmar MD   75 mg at 01/16/25 0838    doxazosin (CARDURA) tablet 2 mg  2 mg Oral or Feeding Tube Daily Amy Parmar MD   2 mg at 01/16/25 0838    FLUoxetine (PROzac) solution 20 mg  20 mg Oral or Feeding Tube Daily Amy Parmar MD   20 mg at 01/16/25 0838    insulin aspart (NovoLOG) injection (RAPID ACTING)  1-7 Units Subcutaneous Q4H Caleb Wright MD   1 Units at 01/16/25 0832    insulin glargine (LANTUS PEN) injection 9 Units  9 Units Subcutaneous QAM Amy Parmar MD   9 Units at 01/16/25 0834    iron sucrose (VENOFER) 200 mg in sodium chloride 0.9 % 120 mL intermittent infusion  200 mg Intravenous Q24H Yvonne Matias  mL/hr at 01/16/25 1206 200 mg at 01/16/25 1206    miconazole (MICATIN) 2 % cream   Topical BID Amy Parmar MD   Given at 01/15/25 2048    polyethylene glycol (MIRALAX) Packet 17 g  17 g Per G Tube Daily mAy Parmar MD        sevelamer carbonate (RENVELA) Packet 0.8 g  0.8 g Per G Tube TID w/meals Yvonne Matias MD   0.8 g at 01/16/25 0944    sodium chloride (PF) 0.9% PF flush 3 mL  3 mL Intracatheter Q8H Amy Parmar MD   3 mL at 01/16/25 0941     Current Facility-Administered Medications   Medication Dose Route Frequency Provider Last Rate Last Admin    dextrose 10% infusion   Intravenous Continuous PRN Demetrius De Santiago MD Jiwan Thapa, MD

## 2025-01-16 NOTE — PLAN OF CARE
"Pt is non verbal. Pt is on tube feeding Nepro with Carbsteady @ 15ml/hr with water flushes 60ml q4hrs. Pt is on blood sugar checks. No insulin per sliding scale. Pt is on tele monitoring. Pt is NPO no exception. Pt was wound on coccyx. Mepilex in place. Pt has a Felix catheter which is patent and intact. Pt is assist of two in transfer and cares. Pt is sleeping in bed. Bed alarm in place and call light within reach. Will continue to monitor and assess pt.             Goal Outcome Evaluation:      Plan of Care Reviewed With: patient    Overall Patient Progress: improvingOverall Patient Progress: improving    Outcome Evaluation: pt is non verbal. pt is on Tube feeding. pt is NPO. pt is on Tele monitoring.      Problem: Adult Inpatient Plan of Care  Goal: Plan of Care Review  Description: The Plan of Care Review/Shift note should be completed every shift.  The Outcome Evaluation is a brief statement about your assessment that the patient is improving, declining, or no change.  This information will be displayed automatically on your shift  note.  1/15/2025 2136 by Reese Gomez RN  Outcome: Progressing  Flowsheets (Taken 1/15/2025 2136)  Outcome Evaluation: pt is non verbal. pt is on Tube feeding. pt is NPO. pt is on Tele monitoring.  Plan of Care Reviewed With: patient  Overall Patient Progress: improving  1/15/2025 2135 by Reese Gomez RN  Outcome: Progressing  Flowsheets (Taken 1/15/2025 2135)  Outcome Evaluation: pt is non verbal. pt is on Tube feeding. pt is NPO. pt is on Tele monitoring.  Plan of Care Reviewed With: patient  Overall Patient Progress: improving  Goal: Patient-Specific Goal (Individualized)  Description: You can add care plan individualizations to a care plan. Examples of Individualization might be:  \"Parent requests to be called daily at 9am for status\", \"I have a hard time hearing out of my right ear\", or \"Do not touch me to wake me up as it startles  me\".  1/15/2025 2136 " by Reese Gomez RN  Outcome: Progressing  1/15/2025 2135 by Reese Gomez RN  Outcome: Progressing  Goal: Absence of Hospital-Acquired Illness or Injury  1/15/2025 2136 by Reese Gomez RN  Outcome: Progressing  1/15/2025 2135 by Reese Gomez RN  Outcome: Progressing  Intervention: Identify and Manage Fall Risk  Recent Flowsheet Documentation  Taken 1/15/2025 2053 by Reese Gomez RN  Safety Promotion/Fall Prevention:   assistive device/personal items within reach   activity supervised   clutter free environment maintained   increased rounding and observation   increase visualization of patient   nonskid shoes/slippers when out of bed   patient and family education   safety round/check completed  Intervention: Prevent Skin Injury  Recent Flowsheet Documentation  Taken 1/15/2025 2053 by Reese Gomez RN  Body Position:   weight shifting   log-rolled  Intervention: Prevent and Manage VTE (Venous Thromboembolism) Risk  Recent Flowsheet Documentation  Taken 1/15/2025 2053 by Reese Gomez RN  VTE Prevention/Management: SCDs off (sequential compression devices)  Intervention: Prevent Infection  Recent Flowsheet Documentation  Taken 1/15/2025 2053 by Reese Gomez RN  Infection Prevention:   single patient room provided   rest/sleep promoted   hand hygiene promoted  Goal: Optimal Comfort and Wellbeing  1/15/2025 2136 by Reese Gomez RN  Outcome: Progressing  1/15/2025 2135 by Reese Gomez RN  Outcome: Progressing  Goal: Readiness for Transition of Care  1/15/2025 2136 by Reese Gomez RN  Outcome: Progressing  1/15/2025 2135 by Reese Gomez RN  Outcome: Progressing     Problem: Electrolyte Imbalance  Goal: Electrolyte Balance  1/15/2025 2136 by Reese Gomez RN  Outcome: Progressing  1/15/2025 2135 by Reese Gomez RN  Outcome: Progressing     Problem:  Skin Injury Risk Increased  Goal: Skin Health and Integrity  1/15/2025 2136 by Reese Gomez RN  Outcome: Progressing  1/15/2025 2135 by Reese Gomez RN  Outcome: Progressing  Intervention: Plan: Nurse Driven Intervention: Moisture Management  Recent Flowsheet Documentation  Taken 1/15/2025 2053 by Reese Gomez RN  Moisture Interventions: Urinary collection device  Intervention: Plan: Nurse Driven Intervention: Friction and Shear  Recent Flowsheet Documentation  Taken 1/15/2025 2053 by Reese Gomez RN  Friction/Shear Interventions: HOB 30 degrees or less  Intervention: Optimize Skin Protection  Recent Flowsheet Documentation  Taken 1/15/2025 2053 by Reese Gomez RN  Activity Management:   activity encouraged   activity adjusted per tolerance  Head of Bed (HOB) Positioning: HOB at 30-45 degrees     Problem: Comorbidity Management  Goal: Maintenance of Asthma Control  1/15/2025 2136 by Reese Gomez RN  Outcome: Progressing  1/15/2025 2135 by Reese Gomez RN  Outcome: Progressing  Intervention: Maintain Asthma Symptom Control  Recent Flowsheet Documentation  Taken 1/15/2025 2053 by Reese Gomez RN  Medication Review/Management: medications reviewed  Goal: Maintenance of Behavioral Health Symptom Control  1/15/2025 2136 by Reese Gomez RN  Outcome: Progressing  1/15/2025 2135 by Reese Gomez RN  Outcome: Progressing  Intervention: Maintain Behavioral Health Symptom Control  Recent Flowsheet Documentation  Taken 1/15/2025 2053 by Reese Gomez RN  Medication Review/Management: medications reviewed  Goal: Maintenance of COPD Symptom Control  1/15/2025 2136 by Reese Gomez RN  Outcome: Progressing  1/15/2025 2135 by Reese Gomez RN  Outcome: Progressing  Intervention: Maintain COPD Symptom Control  Recent Flowsheet Documentation  Taken 1/15/2025 2053 by Lisa  Reese Baez RN  Medication Review/Management: medications reviewed  Goal: Blood Glucose Levels Within Targeted Range  1/15/2025 2136 by Reese Gomez RN  Outcome: Progressing  1/15/2025 2135 by Reese Gomez RN  Outcome: Progressing  Intervention: Monitor and Manage Glycemia  Recent Flowsheet Documentation  Taken 1/15/2025 2053 by Reese Gomez RN  Medication Review/Management: medications reviewed  Goal: Maintenance of Heart Failure Symptom Control  1/15/2025 2136 by Reese Gomez RN  Outcome: Progressing  1/15/2025 2135 by Reese Gomez RN  Outcome: Progressing  Intervention: Maintain Heart Failure Management  Recent Flowsheet Documentation  Taken 1/15/2025 2053 by Reese Gomez RN  Medication Review/Management: medications reviewed  Goal: Blood Pressure in Desired Range  1/15/2025 2136 by Reese Gomez RN  Outcome: Progressing  1/15/2025 2135 by Reese Gomez RN  Outcome: Progressing  Intervention: Maintain Blood Pressure Management  Recent Flowsheet Documentation  Taken 1/15/2025 2053 by Reese Gomez RN  Medication Review/Management: medications reviewed  Goal: Maintenance of Osteoarthritis Symptom Control  1/15/2025 2136 by Reese Gomez RN  Outcome: Progressing  1/15/2025 2135 by Reese Gomez RN  Outcome: Progressing  Intervention: Maintain Osteoarthritis Symptom Control  Recent Flowsheet Documentation  Taken 1/15/2025 2053 by Reese Gomez RN  Assistive Device Utilized: lift device  Activity Management:   activity encouraged   activity adjusted per tolerance  Medication Review/Management: medications reviewed  Goal: Bariatric Home Regimen Maintained  1/15/2025 2136 by Reese Gomez RN  Outcome: Progressing  1/15/2025 2135 by Reese Gomez RN  Outcome: Progressing  Intervention: Maintain and Manage Postbariatric Surgery Care  Recent  Flowsheet Documentation  Taken 1/15/2025 2053 by Reese Gomez RN  Medication Review/Management: medications reviewed  Goal: Maintenance of Seizure Control  1/15/2025 2136 by Reese Gomez RN  Outcome: Progressing  1/15/2025 2135 by Reese Gomez RN  Outcome: Progressing  Intervention: Maintain Seizure Symptom Control  Recent Flowsheet Documentation  Taken 1/15/2025 2053 by Reese Gomez RN  Medication Review/Management: medications reviewed     Problem: Fall Injury Risk  Goal: Absence of Fall and Fall-Related Injury  1/15/2025 2136 by Reese Gomez RN  Outcome: Progressing  1/15/2025 2135 by Reese Gomez RN  Outcome: Progressing  Intervention: Identify and Manage Contributors  Recent Flowsheet Documentation  Taken 1/15/2025 2053 by Reese Gomez RN  Medication Review/Management: medications reviewed  Intervention: Promote Injury-Free Environment  Recent Flowsheet Documentation  Taken 1/15/2025 2053 by Reese Gomez RN  Safety Promotion/Fall Prevention:   assistive device/personal items within reach   activity supervised   clutter free environment maintained   increased rounding and observation   increase visualization of patient   nonskid shoes/slippers when out of bed   patient and family education   safety round/check completed     Problem: Pain Acute  Goal: Optimal Pain Control and Function  1/15/2025 2136 by Reese Gomez RN  Outcome: Progressing  1/15/2025 2135 by Reese Gomez RN  Outcome: Progressing  Intervention: Prevent or Manage Pain  Recent Flowsheet Documentation  Taken 1/15/2025 2053 by Reese Gomez RN  Medication Review/Management: medications reviewed     Problem: Adult Inpatient Plan of Care  Goal: Plan of Care Review  Description: The Plan of Care Review/Shift note should be completed every shift.  The Outcome Evaluation is a brief statement about your assessment  that the patient is improving, declining, or no change.  This information will be displayed automatically on your shift  note.  1/15/2025 2136 by Reese Gomez RN  Outcome: Progressing  Flowsheets (Taken 1/15/2025 2136)  Outcome Evaluation: pt is non verbal. pt is on Tube feeding. pt is NPO. pt is on Tele monitoring.  Plan of Care Reviewed With: patient  Overall Patient Progress: improving  1/15/2025 2135 by Reese Gomez RN  Outcome: Progressing  Flowsheets (Taken 1/15/2025 2135)  Outcome Evaluation: pt is non verbal. pt is on Tube feeding. pt is NPO. pt is on Tele monitoring.  Plan of Care Reviewed With: patient  Overall Patient Progress: improving  Goal: Absence of Hospital-Acquired Illness or Injury  Intervention: Identify and Manage Fall Risk  Recent Flowsheet Documentation  Taken 1/15/2025 2053 by Reese Gomez RN  Safety Promotion/Fall Prevention:   assistive device/personal items within reach   activity supervised   clutter free environment maintained   increased rounding and observation   increase visualization of patient   nonskid shoes/slippers when out of bed   patient and family education   safety round/check completed  Intervention: Prevent Skin Injury  Recent Flowsheet Documentation  Taken 1/15/2025 2053 by Reese Gomez RN  Body Position:   weight shifting   log-rolled  Intervention: Prevent and Manage VTE (Venous Thromboembolism) Risk  Recent Flowsheet Documentation  Taken 1/15/2025 2053 by Reese Gomez RN  VTE Prevention/Management: SCDs off (sequential compression devices)  Intervention: Prevent Infection  Recent Flowsheet Documentation  Taken 1/15/2025 2053 by Reese Gomez RN  Infection Prevention:   single patient room provided   rest/sleep promoted   hand hygiene promoted     Problem: Skin Injury Risk Increased  Goal: Skin Health and Integrity  Intervention: Plan: Nurse Driven Intervention: Moisture Management  Recent  Flowsheet Documentation  Taken 1/15/2025 2053 by Reese Gomez RN  Moisture Interventions: Urinary collection device  Intervention: Plan: Nurse Driven Intervention: Friction and Shear  Recent Flowsheet Documentation  Taken 1/15/2025 2053 by Reese Gomez RN  Friction/Shear Interventions: HOB 30 degrees or less  Intervention: Optimize Skin Protection  Recent Flowsheet Documentation  Taken 1/15/2025 2053 by Reese Gomez RN  Activity Management:   activity encouraged   activity adjusted per tolerance  Head of Bed (HOB) Positioning: HOB at 30-45 degrees     Problem: Comorbidity Management  Goal: Maintenance of Asthma Control  Intervention: Maintain Asthma Symptom Control  Recent Flowsheet Documentation  Taken 1/15/2025 2053 by Reese Gomez RN  Medication Review/Management: medications reviewed  Goal: Maintenance of Behavioral Health Symptom Control  Intervention: Maintain Behavioral Health Symptom Control  Recent Flowsheet Documentation  Taken 1/15/2025 2053 by Reese Gomez RN  Medication Review/Management: medications reviewed  Goal: Maintenance of COPD Symptom Control  Intervention: Maintain COPD Symptom Control  Recent Flowsheet Documentation  Taken 1/15/2025 2053 by Reese Gomez RN  Medication Review/Management: medications reviewed  Goal: Blood Glucose Levels Within Targeted Range  Intervention: Monitor and Manage Glycemia  Recent Flowsheet Documentation  Taken 1/15/2025 2053 by Reese Gomez RN  Medication Review/Management: medications reviewed  Goal: Maintenance of Heart Failure Symptom Control  Intervention: Maintain Heart Failure Management  Recent Flowsheet Documentation  Taken 1/15/2025 2053 by Reese Gomez RN  Medication Review/Management: medications reviewed  Goal: Blood Pressure in Desired Range  Intervention: Maintain Blood Pressure Management  Recent Flowsheet Documentation  Taken 1/15/2025 2053 by  Reese Gomez RN  Medication Review/Management: medications reviewed  Goal: Maintenance of Osteoarthritis Symptom Control  Intervention: Maintain Osteoarthritis Symptom Control  Recent Flowsheet Documentation  Taken 1/15/2025 2053 by Reese Gomez RN  Assistive Device Utilized: lift device  Activity Management:   activity encouraged   activity adjusted per tolerance  Medication Review/Management: medications reviewed  Goal: Bariatric Home Regimen Maintained  Intervention: Maintain and Manage Postbariatric Surgery Care  Recent Flowsheet Documentation  Taken 1/15/2025 2053 by Reese Gomez RN  Medication Review/Management: medications reviewed  Goal: Maintenance of Seizure Control  Intervention: Maintain Seizure Symptom Control  Recent Flowsheet Documentation  Taken 1/15/2025 2053 by Reese Gomez RN  Medication Review/Management: medications reviewed     Problem: Fall Injury Risk  Goal: Absence of Fall and Fall-Related Injury  Intervention: Identify and Manage Contributors  Recent Flowsheet Documentation  Taken 1/15/2025 2053 by Reese Gomez RN  Medication Review/Management: medications reviewed  Intervention: Promote Injury-Free Environment  Recent Flowsheet Documentation  Taken 1/15/2025 2053 by Reese Gomez RN  Safety Promotion/Fall Prevention:   assistive device/personal items within reach   activity supervised   clutter free environment maintained   increased rounding and observation   increase visualization of patient   nonskid shoes/slippers when out of bed   patient and family education   safety round/check completed     Problem: Pain Acute  Goal: Optimal Pain Control and Function  Intervention: Prevent or Manage Pain  Recent Flowsheet Documentation  Taken 1/15/2025 2053 by Reese Gomez RN  Medication Review/Management: medications reviewed

## 2025-01-16 NOTE — PLAN OF CARE
"Goal Outcome Evaluation:      Plan of Care Reviewed With: patient    Overall Patient Progress: improvingOverall Patient Progress: improving    Outcome Evaluation: .    Vitals VSS  Neuro Lethargic, nonverbal   Respiratory 96% RA, LS clear  Cardiac/Tele SB  GI/ Felix in place. Incontinent of bowel, formed bowel movement x1   Skin Intact except sacral wound with foam covering   LDAs PIV SL  Labs BG 97  Diet NPO, TF initiated at 1600 @15 mL/hr with goal rate of 35 mL/hr. Programmed 60 mL free water flushed Q4 hr  Activity A2 lift, Q 2 hr repositioning   Plan Continue with POC.       Problem: Adult Inpatient Plan of Care  Goal: Plan of Care Review  Description: The Plan of Care Review/Shift note should be completed every shift.  The Outcome Evaluation is a brief statement about your assessment that the patient is improving, declining, or no change.  This information will be displayed automatically on your shift  note.  Outcome: Progressing  Flowsheets (Taken 1/15/2025 1902)  Outcome Evaluation: .  Plan of Care Reviewed With: patient  Overall Patient Progress: improving  Goal: Patient-Specific Goal (Individualized)  Description: You can add care plan individualizations to a care plan. Examples of Individualization might be:  \"Parent requests to be called daily at 9am for status\", \"I have a hard time hearing out of my right ear\", or \"Do not touch me to wake me up as it startles  me\".  Outcome: Progressing  Goal: Absence of Hospital-Acquired Illness or Injury  Outcome: Progressing  Intervention: Identify and Manage Fall Risk  Recent Flowsheet Documentation  Taken 1/15/2025 1547 by Hallie Rodrigues, RN  Safety Promotion/Fall Prevention:   assistive device/personal items within reach   clutter free environment maintained   safety round/check completed  Intervention: Prevent Skin Injury  Recent Flowsheet Documentation  Taken 1/15/2025 1730 by Hallie Rodrigues, RN  Body Position:   log-rolled   side-lying   " left  Intervention: Prevent and Manage VTE (Venous Thromboembolism) Risk  Recent Flowsheet Documentation  Taken 1/15/2025 1547 by Hallie Rodrigues, RN  VTE Prevention/Management: SCDs off (sequential compression devices)  Intervention: Prevent Infection  Recent Flowsheet Documentation  Taken 1/15/2025 1547 by Hallie Rodrigues, RN  Infection Prevention: rest/sleep promoted  Goal: Optimal Comfort and Wellbeing  Outcome: Progressing  Goal: Readiness for Transition of Care  Outcome: Progressing

## 2025-01-16 NOTE — PROGRESS NOTES
[facility]    Hospitalist Progress Note      Assessment & Plan   Jimmy Otto is a 75 year old male with a history of htn, hlp, dm2 most recent echo 10/24 EF 60-65% nl LV fxn, hx of stroke with left sided hemiplegia and aphasia-non verbal, PEG tube in place on TFs, and chronic indwelling ordoñez for neurogenic bladder, CKD 3b with progressive renal dysfunction, chronic normocytic anemia presents on 1/14/2025 with lethargy and congested cough.      #Congested cough. Probable volume overload related to progressive renal dysfunction and recent URI. ALEX on CKD3-4: Family visited over the holidays and one of the grandkids was sick with URI that swept through the family. He mostly recovered from his respiratory sx but didn't really bounce back. They have a home RN who comes in weekly and she noticed that he was starting to retain fluid and his lungs were sounding crackly. Then his monthly blood tests were completed yesterday and notable for progressive renal dysfunction bun/creat 66/3.23 so was told to come into the ER.  -In ER, pt afebrile and HDS.  He is mildly hypertensive.  BMP with BUN/Cr 68/3.3.  CBC without leukocytosis and hgb at baseline of 9.2.  UA without clear UTI.  CXR with pulmonary vascular congestion.  BNP elevated at 6600.  Hs troponin mildly elevated at 81 and downtrending on recheck.  ECG with junctional rhythm.  LE doppler negative for DVT.  -Pt received dose of IV lasix  on 1/15 and 1/16.  Defer to nephrology further diuresis.    -TTE shows EF 55-60% without RMA and nl RV function.  -Nephrology consulted.     #Goals of care: Discussed with patient's wife, 2 daughters at bedside and son by phone with ipad Cambodian interpretor on 1/16.  I discussed Mr. Otto's progressive chronic medical illnesses including progressive kidney failure in setting of bed bound/wheelchair bound status on tube feeds.  I discussed that given his progressive disease his overall prognosis is quite poor and  likely on order of months.  We discussed code status and patient's wife is thinking he would not want CPR as she thinks he would not want to be in a worse situation than he is now if he were to go through resuscitation.  They are going to discuss a bit more as family and we will reconvene tomorrow.  For now, he remains full code and full restorative cares.     #NSTEMI: Type 2 in setting of renal dysfunction and recent viral illness.      #Hx of CVA with right sided hemiplegia. Aphasia and dysphagia with PEG tube. Chronic neurogenic bladder with chronic ordoñez: (changed 1/13 and is changed monthly)  He is mostly non-verbal at baseline, family helps with communication.  Lives with his wife and has HH RN weekly.  Not sure of other services.  Wheelchair bound and I believe requires a lift   -resumed TF via peg as at home  -chronic ordoñez in  place, changed monthly and just changed      #Elevated LFT's: Monitor. No AP. Likely related to congestion.  #HTN. HL: Resumed amlodipine.  Hold beta blockade with relative bradycardia.  On doxazosin and statin.  #IDDM2: Resume home lantus 9 units in AM. Sliding scale insulin.   #Normocytic anemia: Not bleeding. Suspect from renal dysfunction.  IV iron started on 1/16.      DVT Prophylaxis: Pneumatic Compression Devices  Code Status: Full Code  Medically Ready for Discharge: Anticipated in 2-4 Days    Demetrius De Santiago MD    Interval History   No events.  Pt seen with ipad interpretor. No pain or discomfort.  Discussed goals of care as detailed above.  Tolerating tube feeds. Renal function stable to slightly worse.     -Data reviewed today: I reviewed all new labs and imaging results over the last 24 hours. I personally reviewed     Physical Exam   Temp: 97.7  F (36.5  C) Temp src: Axillary BP: (!) 174/75 Pulse: 65   Resp: 25 SpO2: 95 % O2 Device: None (Room air)    Vitals:    01/16/25 0715   Weight: 65 kg (143 lb 4.8 oz)     Vital Signs with Ranges  Temp:  [97.7  F (36.5  C)-98.7  F (37.1   C)] 97.7  F (36.5  C)  Pulse:  [57-69] 65  Resp:  [16-25] 25  BP: (141-174)/(64-85) 174/75  SpO2:  [94 %-97 %] 95 %  I/O last 3 completed shifts:  In: 60 [NG/GT:60]  Out: 2075 [Urine:2075]    Constitutional: Chronic right sided hemiparesis.   HEENT: Normocephalic. MMM, No elevation of JVD noted.   Respiratory: Nl WOB, crackles more at bases in both lungs. No wheeze.   Cardiovascular: Regular, no murmur  GI: BS+, NT, ND  Skin/Integumen: R>L LE edema. Wwp.   Neuro: chronic right sided hemiparesis. Nonverbal     Medications   Current Facility-Administered Medications   Medication Dose Route Frequency Provider Last Rate Last Admin    dextrose 10% infusion   Intravenous Continuous PRN Demetrius De Santiago MD         Current Facility-Administered Medications   Medication Dose Route Frequency Provider Last Rate Last Admin    amLODIPine (NORVASC) tablet 5 mg  5 mg Oral Daily Yvonne Matias MD        artificial saliva (BIOTENE MT) solution 1 spray  1 spray Mouth/Throat 4x Daily Amy Parmar MD   1 spray at 01/16/25 1215    atorvastatin (LIPITOR) tablet 80 mg  80 mg Per G Tube Daily Amy Parmar MD   80 mg at 01/16/25 0838    clopidogrel (PLAVIX) tablet 75 mg  75 mg Oral or Feeding Tube Daily Amy Parmar MD   75 mg at 01/16/25 0838    doxazosin (CARDURA) tablet 2 mg  2 mg Oral or Feeding Tube Daily Amy Parmar MD   2 mg at 01/16/25 0838    FLUoxetine (PROzac) solution 20 mg  20 mg Oral or Feeding Tube Daily Amy Parmar MD   20 mg at 01/16/25 0838    insulin aspart (NovoLOG) injection (RAPID ACTING)  1-7 Units Subcutaneous Q4H Caleb Wright MD   1 Units at 01/16/25 0832    insulin glargine (LANTUS PEN) injection 9 Units  9 Units Subcutaneous QAM Amy Parmar MD   9 Units at 01/16/25 0834    iron sucrose (VENOFER) 200 mg in sodium chloride 0.9 % 120 mL intermittent infusion  200 mg Intravenous Q24H Yvonne Matias  mL/hr at 01/16/25 1206 200 mg at 01/16/25 1206    miconazole (MICATIN) 2 % cream   Topical BID  Amy Parmar MD   Given at 01/15/25 2048    polyethylene glycol (MIRALAX) Packet 17 g  17 g Per G Tube Daily Amy Parmar MD        sevelamer carbonate (RENVELA) Packet 0.8 g  0.8 g Per G Tube TID w/meals Yvonne Matias MD   0.8 g at 01/16/25 0944    sodium chloride (PF) 0.9% PF flush 3 mL  3 mL Intracatheter Q8H Amy Parmar MD   3 mL at 01/16/25 0941       Data   Recent Labs   Lab 01/16/25  0830 01/16/25  0642 01/16/25  0419 01/15/25  1219 01/15/25  0840 01/15/25  0215 01/14/25 2048 01/13/25  1315   WBC  --   --   --   --  5.9  --  5.5 5.0   HGB  --   --   --   --  8.8*  --  9.2* 9.9*   MCV  --   --   --   --  84  --  83 83   PLT  --   --   --   --  171  --  196 197   NA  --  134*  --   --  135  --  134* 136   POTASSIUM  --  3.3*  --   --  3.4  --  3.7 3.9   CHLORIDE  --  97*  --   --  96*  --  95* 97*   CO2  --  27  --   --  26  --  26 27   BUN  --  67.6*  --   --  67.8*  --  68.0* 65.7*   CR  --  3.42*  --   --  3.39*  --  3.27* 3.23*   ANIONGAP  --  10  --   --  13  --  13 12   ARLETH  --  8.6*  --   --  8.1*  --  8.2* 8.2*   * 136* 112*   < > 206*   < > 239* 334*   ALBUMIN  --   --   --   --  2.5*  --  3.0* 2.7*   PROTTOTAL  --   --   --   --  5.9*  --  6.8 6.4   BILITOTAL  --   --   --   --  0.2  --  0.2 0.2   ALKPHOS  --   --   --   --  136  --  157* 148   ALT  --   --   --   --  62  --  78* 83*   AST  --   --   --   --  39  --  46* 52*   LIPASE  --   --   --   --   --   --  5*  --     < > = values in this interval not displayed.       No results found for this or any previous visit (from the past 24 hours).

## 2025-01-16 NOTE — PLAN OF CARE
"[place]ute, HTN, RA. rFLACC: 0, calm. PRN atarax given for itching. Chronic indwelling ordoñez in place. Two small open areas on sacrum, wound cleanser + new foam dressing applied. A2 w/q2h repos. Discharge TBD.     Goal Outcome Evaluation:      Plan of Care Reviewed With: patient, spouse, child, family    Overall Patient Progress: no changeOverall Patient Progress: no change    Outcome Evaluation: TF @ 35 mL/hr (at goal rate)    Problem: Adult Inpatient Plan of Care  Goal: Plan of Care Review  Description: The Plan of Care Review/Shift note should be completed every shift.  The Outcome Evaluation is a brief statement about your assessment that the patient is improving, declining, or no change.  This information will be displayed automatically on your shift  note.  Outcome: Progressing  Flowsheets (Taken 1/16/2025 1351)  Outcome Evaluation: TF @ 35 mL/hr (at goal rate)  Plan of Care Reviewed With:   patient   spouse   child   family  Overall Patient Progress: no change  Goal: Patient-Specific Goal (Individualized)  Description: You can add care plan individualizations to a care plan. Examples of Individualization might be:  \"Parent requests to be called daily at 9am for status\", \"I have a hard time hearing out of my right ear\", or \"Do not touch me to wake me up as it startles  me\".  Outcome: Progressing  Goal: Absence of Hospital-Acquired Illness or Injury  Outcome: Progressing  Intervention: Identify and Manage Fall Risk  Recent Flowsheet Documentation  Taken 1/16/2025 0949 by Myrna Whipple RN  Safety Promotion/Fall Prevention:   treat reversible contributory factors   treat underlying cause   safety round/check completed   room organization consistent   room near nurse's station   room door open  Intervention: Prevent Skin Injury  Recent Flowsheet Documentation  Taken 1/16/2025 1353 by Myrna Whipple RN  Body Position:   turned   right   side-lying 30 degrees  Taken 1/16/2025 1156 by Myrna Whipple RN  Body " Position:   turned   left   side-lying 30 degrees  Intervention: Prevent and Manage VTE (Venous Thromboembolism) Risk  Recent Flowsheet Documentation  Taken 1/16/2025 0949 by Myrna Whipple RN  VTE Prevention/Management: SCDs off (sequential compression devices)  Goal: Optimal Comfort and Wellbeing  Outcome: Progressing  Goal: Readiness for Transition of Care  Outcome: Progressing     Problem: Electrolyte Imbalance  Goal: Electrolyte Balance  Outcome: Progressing     Problem: Skin Injury Risk Increased  Goal: Skin Health and Integrity  Outcome: Progressing  Intervention: Plan: Nurse Driven Intervention: Moisture Management  Recent Flowsheet Documentation  Taken 1/16/2025 1156 by Myrna Whipple RN  Bathing/Skin Care: incontinence care  Taken 1/16/2025 0949 by Myrna Whipple RN  Moisture Interventions:   Urinary collection device   Encourage regular toileting   No brief in bed   Incontinence pad  Intervention: Plan: Nurse Driven Intervention: Friction and Shear  Recent Flowsheet Documentation  Taken 1/16/2025 0949 by Myrna Whipple RN  Friction/Shear Interventions:   HOB 30 degrees or less   Silicone foam sacral dressing  Intervention: Optimize Skin Protection  Recent Flowsheet Documentation  Taken 1/16/2025 1353 by Myrna Whipple RN  Activity Management: activity adjusted per tolerance  Head of Bed (HOB) Positioning: HOB at 30 degrees  Taken 1/16/2025 1156 by Myrna Whipple RN  Activity Management: activity adjusted per tolerance  Head of Bed (HOB) Positioning: HOB at 30 degrees     Problem: Comorbidity Management  Goal: Blood Glucose Levels Within Targeted Range  Outcome: Progressing  Intervention: Monitor and Manage Glycemia  Recent Flowsheet Documentation  Taken 1/16/2025 0949 by Myrna Whipple RN  Medication Review/Management: medications reviewed  Goal: Maintenance of Heart Failure Symptom Control  Outcome: Progressing  Intervention: Maintain Heart Failure Management  Recent Flowsheet  Documentation  Taken 1/16/2025 0949 by Myrna Whipple RN  Medication Review/Management: medications reviewed  Goal: Blood Pressure in Desired Range  Outcome: Progressing  Intervention: Maintain Blood Pressure Management  Recent Flowsheet Documentation  Taken 1/16/2025 0949 by Myrna Whipple RN  Medication Review/Management: medications reviewed     Problem: Fall Injury Risk  Goal: Absence of Fall and Fall-Related Injury  Outcome: Progressing  Intervention: Identify and Manage Contributors  Recent Flowsheet Documentation  Taken 1/16/2025 0949 by Myrna Whipple RN  Medication Review/Management: medications reviewed  Intervention: Promote Injury-Free Environment  Recent Flowsheet Documentation  Taken 1/16/2025 0949 by Myran Whipple RN  Safety Promotion/Fall Prevention:   treat reversible contributory factors   treat underlying cause   safety round/check completed   room organization consistent   room near nurse's station   room door open     Problem: Pain Acute  Goal: Optimal Pain Control and Function  Outcome: Progressing  Intervention: Prevent or Manage Pain  Recent Flowsheet Documentation  Taken 1/16/2025 0949 by Myrna Whipple RN  Medication Review/Management: medications reviewed

## 2025-01-16 NOTE — PROGRESS NOTES
Notified provider about indwelling ordoñez catheter discussed removal or continued need.    Did provider choose to remove indwelling ordoñez catheter? NO    Provider's ordoñez indication for keeping indwelling ordoñez catheter: Indication for continued use: Neurogenic Bladder    Is there an order for indwelling ordoñez catheter? YES    *If there is a plan to keep ordoñez catheter in place at discharge daily notification with provider is not necessary, but please add a notation in the treatment team sticky note that the patient will be discharging with the catheter.      *Chronic ordoñez d/t hx of neurogenic bladder

## 2025-01-17 LAB
ANA SER QL IF: NEGATIVE
ANCA AB PATTERN SER IF-IMP: NORMAL
ANION GAP SERPL CALCULATED.3IONS-SCNC: 13 MMOL/L (ref 7–15)
BASE EXCESS BLDV CALC-SCNC: 6.2 MMOL/L (ref -3–3)
BUN SERPL-MCNC: 67.9 MG/DL (ref 8–23)
C-ANCA TITR SER IF: NORMAL {TITER}
CALCIUM SERPL-MCNC: 8.5 MG/DL (ref 8.8–10.4)
CHLORIDE SERPL-SCNC: 97 MMOL/L (ref 98–107)
CREAT SERPL-MCNC: 3.51 MG/DL (ref 0.67–1.17)
EGFRCR SERPLBLD CKD-EPI 2021: 17 ML/MIN/1.73M2
ERYTHROCYTE [DISTWIDTH] IN BLOOD BY AUTOMATED COUNT: 13.9 % (ref 10–15)
GLUCOSE BLDC GLUCOMTR-MCNC: 154 MG/DL (ref 70–99)
GLUCOSE BLDC GLUCOMTR-MCNC: 189 MG/DL (ref 70–99)
GLUCOSE BLDC GLUCOMTR-MCNC: 190 MG/DL (ref 70–99)
GLUCOSE BLDC GLUCOMTR-MCNC: 199 MG/DL (ref 70–99)
GLUCOSE BLDC GLUCOMTR-MCNC: 222 MG/DL (ref 70–99)
GLUCOSE BLDC GLUCOMTR-MCNC: 241 MG/DL (ref 70–99)
GLUCOSE SERPL-MCNC: 208 MG/DL (ref 70–99)
HCO3 BLDV-SCNC: 31 MMOL/L (ref 21–28)
HCO3 SERPL-SCNC: 26 MMOL/L (ref 22–29)
HCT VFR BLD AUTO: 26.4 % (ref 40–53)
HGB BLD-MCNC: 8.5 G/DL (ref 13.3–17.7)
LACTATE SERPL-SCNC: 0.8 MMOL/L (ref 0.7–2)
MAGNESIUM SERPL-MCNC: 3 MG/DL (ref 1.7–2.3)
MCH RBC QN AUTO: 26.5 PG (ref 26.5–33)
MCHC RBC AUTO-ENTMCNC: 32.2 G/DL (ref 31.5–36.5)
MCV RBC AUTO: 82 FL (ref 78–100)
O2/TOTAL GAS SETTING VFR VENT: 0 %
OXYHGB MFR BLDV: 90 % (ref 70–75)
PCO2 BLDV: 44 MM HG (ref 40–50)
PH BLDV: 7.46 [PH] (ref 7.32–7.43)
PHOSPHATE SERPL-MCNC: 5.8 MG/DL (ref 2.5–4.5)
PLATELET # BLD AUTO: 177 10E3/UL (ref 150–450)
PO2 BLDV: 60 MM HG (ref 25–47)
POTASSIUM SERPL-SCNC: 3.1 MMOL/L (ref 3.4–5.3)
PROT PATTERN SERPL IFE-IMP: NORMAL
RBC # BLD AUTO: 3.21 10E6/UL (ref 4.4–5.9)
SAO2 % BLDV: 92.5 % (ref 70–75)
SODIUM SERPL-SCNC: 136 MMOL/L (ref 135–145)
WBC # BLD AUTO: 5.9 10E3/UL (ref 4–11)

## 2025-01-17 PROCEDURE — 250N000013 HC RX MED GY IP 250 OP 250 PS 637: Performed by: INTERNAL MEDICINE

## 2025-01-17 PROCEDURE — 36415 COLL VENOUS BLD VENIPUNCTURE: CPT | Performed by: HOSPITALIST

## 2025-01-17 PROCEDURE — 250N000011 HC RX IP 250 OP 636: Mod: JZ | Performed by: INTERNAL MEDICINE

## 2025-01-17 PROCEDURE — 99239 HOSP IP/OBS DSCHRG MGMT >30: CPT | Performed by: HOSPITALIST

## 2025-01-17 PROCEDURE — 120N000001 HC R&B MED SURG/OB

## 2025-01-17 PROCEDURE — 250N000013 HC RX MED GY IP 250 OP 250 PS 637: Performed by: HOSPITALIST

## 2025-01-17 PROCEDURE — 82805 BLOOD GASES W/O2 SATURATION: CPT | Performed by: HOSPITALIST

## 2025-01-17 PROCEDURE — 83605 ASSAY OF LACTIC ACID: CPT | Performed by: HOSPITALIST

## 2025-01-17 PROCEDURE — 250N000011 HC RX IP 250 OP 636: Performed by: INTERNAL MEDICINE

## 2025-01-17 PROCEDURE — 80048 BASIC METABOLIC PNL TOTAL CA: CPT | Performed by: HOSPITALIST

## 2025-01-17 PROCEDURE — 83735 ASSAY OF MAGNESIUM: CPT | Performed by: HOSPITALIST

## 2025-01-17 PROCEDURE — 258N000003 HC RX IP 258 OP 636: Performed by: INTERNAL MEDICINE

## 2025-01-17 PROCEDURE — 85027 COMPLETE CBC AUTOMATED: CPT | Performed by: HOSPITALIST

## 2025-01-17 PROCEDURE — 99232 SBSQ HOSP IP/OBS MODERATE 35: CPT | Performed by: INTERNAL MEDICINE

## 2025-01-17 PROCEDURE — 84520 ASSAY OF UREA NITROGEN: CPT | Performed by: HOSPITALIST

## 2025-01-17 PROCEDURE — 84100 ASSAY OF PHOSPHORUS: CPT | Performed by: HOSPITALIST

## 2025-01-17 RX ORDER — AMLODIPINE BESYLATE 10 MG/1
10 TABLET ORAL DAILY
Status: DISCONTINUED | OUTPATIENT
Start: 2025-01-18 | End: 2025-01-18 | Stop reason: HOSPADM

## 2025-01-17 RX ORDER — TORSEMIDE 20 MG/1
20 TABLET ORAL DAILY
Qty: 30 TABLET | Refills: 0 | Status: SHIPPED | OUTPATIENT
Start: 2025-01-18 | End: 2025-02-17

## 2025-01-17 RX ORDER — SEVELAMER CARBONATE FOR ORAL SUSPENSION 800 MG/1
0.8 POWDER, FOR SUSPENSION ORAL
Qty: 90 PACKET | Refills: 0 | Status: SHIPPED | OUTPATIENT
Start: 2025-01-17 | End: 2025-02-16

## 2025-01-17 RX ORDER — AMLODIPINE BESYLATE 5 MG/1
10 TABLET ORAL DAILY
Qty: 60 TABLET | Refills: 0 | Status: SHIPPED | OUTPATIENT
Start: 2025-01-17 | End: 2025-02-16

## 2025-01-17 RX ORDER — FERROUS SULFATE 325(65) MG
325 TABLET ORAL 2 TIMES DAILY
Qty: 60 TABLET | Refills: 0 | Status: SHIPPED | OUTPATIENT
Start: 2025-01-17 | End: 2025-02-16

## 2025-01-17 RX ORDER — POTASSIUM CHLORIDE 20MEQ/15ML
20 LIQUID (ML) ORAL ONCE
Status: COMPLETED | OUTPATIENT
Start: 2025-01-17 | End: 2025-01-17

## 2025-01-17 RX ADMIN — ATORVASTATIN CALCIUM 80 MG: 40 TABLET, FILM COATED ORAL at 09:31

## 2025-01-17 RX ADMIN — FLUOXETINE 20 MG: 20 SOLUTION ORAL at 09:34

## 2025-01-17 RX ADMIN — INSULIN GLARGINE 9 UNITS: 100 INJECTION, SOLUTION SUBCUTANEOUS at 09:57

## 2025-01-17 RX ADMIN — SEVELAMER CARBONATE 0.8 G: 800 POWDER, FOR SUSPENSION ORAL at 18:12

## 2025-01-17 RX ADMIN — INSULIN ASPART 2 UNITS: 100 INJECTION, SOLUTION INTRAVENOUS; SUBCUTANEOUS at 01:17

## 2025-01-17 RX ADMIN — MICONAZOLE NITRATE: 2 CREAM TOPICAL at 21:19

## 2025-01-17 RX ADMIN — SEVELAMER CARBONATE 0.8 G: 800 POWDER, FOR SUSPENSION ORAL at 13:35

## 2025-01-17 RX ADMIN — CLOPIDOGREL BISULFATE 75 MG: 75 TABLET ORAL at 09:32

## 2025-01-17 RX ADMIN — IRON SUCROSE 200 MG: 20 INJECTION, SOLUTION INTRAVENOUS at 13:33

## 2025-01-17 RX ADMIN — ACETAMINOPHEN 650 MG: 325 TABLET, FILM COATED ORAL at 18:10

## 2025-01-17 RX ADMIN — TORSEMIDE 20 MG: 20 TABLET ORAL at 09:31

## 2025-01-17 RX ADMIN — INSULIN ASPART 3 UNITS: 100 INJECTION, SOLUTION INTRAVENOUS; SUBCUTANEOUS at 14:12

## 2025-01-17 RX ADMIN — INSULIN ASPART 2 UNITS: 100 INJECTION, SOLUTION INTRAVENOUS; SUBCUTANEOUS at 18:21

## 2025-01-17 RX ADMIN — INSULIN ASPART 1 UNITS: 100 INJECTION, SOLUTION INTRAVENOUS; SUBCUTANEOUS at 05:35

## 2025-01-17 RX ADMIN — POTASSIUM CHLORIDE 20 MEQ: 20 SOLUTION ORAL at 09:34

## 2025-01-17 RX ADMIN — SEVELAMER CARBONATE 0.8 G: 800 POWDER, FOR SUSPENSION ORAL at 09:33

## 2025-01-17 RX ADMIN — DOXAZOSIN 2 MG: 1 TABLET ORAL at 09:33

## 2025-01-17 RX ADMIN — DARBEPOETIN ALFA 40 MCG: 40 SOLUTION INTRAVENOUS; SUBCUTANEOUS at 13:33

## 2025-01-17 RX ADMIN — AMLODIPINE BESYLATE 5 MG: 5 TABLET ORAL at 09:32

## 2025-01-17 RX ADMIN — INSULIN ASPART 2 UNITS: 100 INJECTION, SOLUTION INTRAVENOUS; SUBCUTANEOUS at 09:58

## 2025-01-17 RX ADMIN — INSULIN ASPART 1 UNITS: 100 INJECTION, SOLUTION INTRAVENOUS; SUBCUTANEOUS at 21:17

## 2025-01-17 ASSESSMENT — ACTIVITIES OF DAILY LIVING (ADL)
ADLS_ACUITY_SCORE: 87
DEPENDENT_IADLS:: CLEANING;COOKING;LAUNDRY;SHOPPING;INCONTINENCE;TRANSPORTATION;MONEY MANAGEMENT;MEDICATION MANAGEMENT;MEAL PREPARATION
ADLS_ACUITY_SCORE: 87

## 2025-01-17 NOTE — PLAN OF CARE
"Temp: 98.7  F (37.1  C) Temp src: Oral BP: (!) 166/70 Pulse: 64 (Tele)   Resp: 15 SpO2: 93 % O2 Device: None (Room air)       Alert, nonverbal. VSS. Lift. Reposition. Mepilex to sacrum. NPO- G tube feedings at 35 mL/hr with 60 mL flush Q 4 hr. Tubing replaced this AM. Tele: SR. Chronic ordoñez in place, ordoñez cares completed. Family to further discuss care options re: Dialysis. Continue plan of care. Discharge planning TBD.       Goal Outcome Evaluation:      Plan of Care Reviewed With: patient    Overall Patient Progress: no changeOverall Patient Progress: no change    Outcome Evaluation: TF @ 35 mL/hr. Dialysis?      Problem: Adult Inpatient Plan of Care  Goal: Plan of Care Review  Description: The Plan of Care Review/Shift note should be completed every shift.  The Outcome Evaluation is a brief statement about your assessment that the patient is improving, declining, or no change.  This information will be displayed automatically on your shift  note.  Outcome: Progressing  Flowsheets (Taken 1/17/2025 0641)  Outcome Evaluation: TF @ 35 mL/hr. Dialysis?  Plan of Care Reviewed With: patient  Overall Patient Progress: no change  Goal: Patient-Specific Goal (Individualized)  Description: You can add care plan individualizations to a care plan. Examples of Individualization might be:  \"Parent requests to be called daily at 9am for status\", \"I have a hard time hearing out of my right ear\", or \"Do not touch me to wake me up as it startles  me\".  Outcome: Progressing  Goal: Absence of Hospital-Acquired Illness or Injury  Outcome: Progressing  Intervention: Identify and Manage Fall Risk  Recent Flowsheet Documentation  Taken 1/17/2025 0134 by Mckenzie Sparks, RN  Safety Promotion/Fall Prevention: safety round/check completed  Intervention: Prevent Skin Injury  Recent Flowsheet Documentation  Taken 1/17/2025 0134 by Mckenzie Sparks, RN  Skin Protection:   adhesive use limited   transparent dressing maintained   tubing/devices " free from skin contact  Intervention: Prevent and Manage VTE (Venous Thromboembolism) Risk  Recent Flowsheet Documentation  Taken 1/17/2025 0134 by Mckenzie Sparks RN  VTE Prevention/Management: SCDs off (sequential compression devices)  Intervention: Prevent Infection  Recent Flowsheet Documentation  Taken 1/17/2025 0134 by Mckenzie Sparks RN  Infection Prevention:   rest/sleep promoted   single patient room provided  Goal: Optimal Comfort and Wellbeing  Outcome: Progressing  Goal: Readiness for Transition of Care  Outcome: Progressing     Problem: Electrolyte Imbalance  Goal: Electrolyte Balance  Outcome: Progressing     Problem: Skin Injury Risk Increased  Goal: Skin Health and Integrity  Outcome: Progressing  Intervention: Plan: Nurse Driven Intervention: Positioning  Recent Flowsheet Documentation  Taken 1/17/2025 0134 by Mckenize Sparks RN  Plan: Positioning Interventions:   REPOSITION Left/Right (No supine) q2h   HOB 30 degrees or less   OFF-LOAD HEELS with pillows  Intervention: Plan: Nurse Driven Intervention: Moisture Management  Recent Flowsheet Documentation  Taken 1/17/2025 0134 by Mckenzie Sparks RN  Moisture Interventions:   Urinary collection device   Incontinence pad  Intervention: Plan: Nurse Driven Intervention: Friction and Shear  Recent Flowsheet Documentation  Taken 1/17/2025 0134 by Mckenzie Sparks RN  Friction/Shear Interventions:   Pad bony prominence (elbow pads, heel pads, ear protectors)   Silicone foam sacral dressing  Intervention: Optimize Skin Protection  Recent Flowsheet Documentation  Taken 1/17/2025 0134 by Mckenzie Sparks RN  Pressure Reduction Techniques:   heels elevated off bed   weight shift assistance provided  Skin Protection:   adhesive use limited   transparent dressing maintained   tubing/devices free from skin contact  Activity Management: activity adjusted per tolerance     Problem: Comorbidity Management  Goal: Blood Glucose Levels Within Targeted Range  Outcome:  Progressing  Intervention: Monitor and Manage Glycemia  Recent Flowsheet Documentation  Taken 1/17/2025 0134 by Mckenzie Sparks RN  Medication Review/Management: medications reviewed  Goal: Maintenance of Heart Failure Symptom Control  Outcome: Progressing  Intervention: Maintain Heart Failure Management  Recent Flowsheet Documentation  Taken 1/17/2025 0134 by Mckenzie Sparks RN  Medication Review/Management: medications reviewed  Goal: Blood Pressure in Desired Range  Outcome: Progressing  Intervention: Maintain Blood Pressure Management  Recent Flowsheet Documentation  Taken 1/17/2025 0134 by Mckenzie Sparks RN  Medication Review/Management: medications reviewed     Problem: Fall Injury Risk  Goal: Absence of Fall and Fall-Related Injury  Outcome: Progressing  Intervention: Identify and Manage Contributors  Recent Flowsheet Documentation  Taken 1/17/2025 0134 by Mckenzie Sparks RN  Medication Review/Management: medications reviewed  Intervention: Promote Injury-Free Environment  Recent Flowsheet Documentation  Taken 1/17/2025 0134 by Mckenzie Sparks RN  Safety Promotion/Fall Prevention: safety round/check completed     Problem: Pain Acute  Goal: Optimal Pain Control and Function  Outcome: Progressing  Intervention: Optimize Psychosocial Wellbeing  Recent Flowsheet Documentation  Taken 1/17/2025 0134 by Mckenzie Sparks RN  Diversional Activities: tablet  Intervention: Prevent or Manage Pain  Recent Flowsheet Documentation  Taken 1/17/2025 0134 by Mckenzie Sparks RN  Medication Review/Management: medications reviewed

## 2025-01-17 NOTE — PROGRESS NOTES
Patient was going to discharge but more somnolent than baseline per daughters at bedside.  My concern is that he is very chronically deconditioned, nonverbal and really minimally interactive at baseline.  He is not febrile and no white count.  Renal function not getting better but GFR relatively stable.  No recent sedating med.  He received atarax yesterday.     -Will check VBG. Monitor overnight in hospital. My suspicion is that the combination of his baseline CVA with significant neuro deficits, with progressive anemia, renal failure make him more somnolent at times.    -I strongly encouraged daughters to talk again to their mom about code status.  My concern is that he will continue to decline over time given severity of his chronic medical problems.  They are going to do this.     Cancel discharge today.  If he perks up a bit toward his baseline he may be able to discharge tomorrow. I think, unfortunately, either way he would be high risk of readmission and clinical decline.     Addendum: Patient's wife arrived to see patient.  She has made decision to make patient DNR/OK with temporary intubation.  Code status changed.      Demetrius De Santiago MD

## 2025-01-17 NOTE — CONSULTS
Care Management Initial Consult    General Information  Assessment completed with: Children,    Type of CM/SW Visit: Initial Assessment    Primary Care Provider verified and updated as needed: Yes   Readmission within the last 30 days: no previous admission in last 30 days      Reason for Consult: discharge planning      Communication Assessment  Patient's communication style: spoken language (English or Bilingual)    Hearing Difficulty or Deaf: no   Wear Glasses or Blind: no    Cognitive  Cognitive/Neuro/Behavioral: .WDL except  Level of Consciousness: lethargic, somnolent  Arousal Level: arouses to repeated stimulation, arouses to vigorous stimulation  Orientation: other (see comments) (tr non verbal)  Mood/Behavior: calm, cooperative  Best Language: 3 - Mute  Speech: unable to speak    Living Environment:   People in home: spouse, child(melchor), adult     Current living Arrangements: house      Able to return to prior arrangements: yes     Family/Social Support:  Care provided by: spouse/significant other, child(melchor)  Provides care for: no one, unable/limited ability to care for self  Marital Status:   Support system: Wife, Children          Description of Support System: Supportive, Involved    Support Assessment: Adequate family and caregiver support, Adequate social supports    Current Resources:   Patient receiving home care services: Yes  Skilled Home Care Services: Skilled Nursing, Occupational Therapy     Community Resources: Home Infusion, Home Care  Equipment currently used at home: wheelchair, manual, hospital bed  Supplies currently used at home: Incontinence Supplies, Diabetic Supplies, Enteral Nutrition & Supplies    Employment/Financial:  Employment Status: retired, disabled        Financial Concerns: none      Does the patient's insurance plan have a 3 day qualifying hospital stay waiver?  No    Lifestyle & Psychosocial Needs:  Social Drivers of Health     Food Insecurity: Low Risk   (1/15/2025)    Food Insecurity     Within the past 12 months, did you worry that your food would run out before you got money to buy more?: No     Within the past 12 months, did the food you bought just not last and you didn t have money to get more?: No   Depression: Not at risk (2/2/2024)    Received from [employer]    PHQ-2     PHQ-2 Score: 0   Housing Stability: Low Risk  (1/15/2025)    Housing Stability     Do you have housing? : Yes     Are you worried about losing your housing?: No   Tobacco Use: Low Risk  (10/30/2024)    Received from [employer]    Patient History     Smoking Tobacco Use: Never     Smokeless Tobacco Use: Never     Passive Exposure: Not on file   Financial Resource Strain: Low Risk  (1/15/2025)    Financial Resource Strain     Within the past 12 months, have you or your family members you live with been unable to get utilities (heat, electricity) when it was really needed?: No   Alcohol Use: Not on file   Transportation Needs: Low Risk  (1/15/2025)    Transportation Needs     Within the past 12 months, has lack of transportation kept you from medical appointments, getting your medicines, non-medical meetings or appointments, work, or from getting things that you need?: No   Physical Activity: Not on file   Interpersonal Safety: Unknown (1/15/2025)    Interpersonal Safety     Do you feel physically and emotionally safe where you currently live?: Patient unable to answer     Within the past 12 months, have you been hit, slapped, kicked or otherwise physically hurt by someone?: Patient unable to answer     Within the past 12 months, have you been humiliated or emotionally abused in other ways by your partner or ex-partner?: Patient unable to answer   Stress: Not on file   Social Connections: Not on file   Health Literacy: Not on file       Functional Status:  Prior to admission patient needed assistance:   Dependent ADLs:: Wheelchair-with assist, Bathing, Dressing, Grooming,  Incontinence, Positioning, Transfers, Eating  Dependent IADLs:: Cleaning, Cooking, Laundry, Shopping, Incontinence, Transportation, Money Management, Medication Management, Meal Preparation     Values/Beliefs:  Spiritual, Cultural Beliefs, Temple Practices, Values that affect care: yes             Discussed  Partnership in Safe Discharge Planning  document with patient/family: No    Additional Information:  Care management consult for discharge planning/disposition. Patient admitted for anasarca, acute pulmonary edema, acute renal failure, CKD.     Met with patient and daughters at bedside. Patient has a history of CVA. At baseline patient is non-verbal and non-ambulatory. He is NPO and is fed by enteral feedings. He is total care by his wife and children. Patient is open to home care with [facility] for nursing and OT. He uses [facility] for his enteral feedings and supplies.     Patient had a change to his enteral feeding formula. [facility] is working on a benefit check for insurance coverage.     Transportation will likely be via stretcher. Family typically transports patient in car however patient currently fatigued and family uncertain. He does have a half of flight of stairs once inside home to get to living area.     Patient will need resumption of home care and home infusion referral at discharge.     Next Steps: discharge home, arrange transport    Valeri Mosher RN  Care Coordinator  [facility]       2

## 2025-01-17 NOTE — DISCHARGE SUMMARY
[facility]    Discharge Summary  Hospitalist    Date of Admission:  1/14/2025  Date of Discharge:  1/17/2025  Discharging Provider: Demetrius De Santiago MD  Date of Service (when I saw the patient): 01/17/25    Discharge Diagnoses   #Congested cough. Volume overload secondary to progressive CKD and recent URI  #Progressive CKD4  #NSTEMI  #Hx of CVA with right sided hemiplegia. Aphasia and dysphagia with PEG tube. Chronic neurogenic bladder with chronic ordoñez  #Elevated LFT's  #HTN. HL  #IDDM2  #Normocytic anemia    Hospital Course   Jimmy Otto is a 75 year old male with a history of htn, hlp, dm2 most recent echo 10/24 EF 60-65% nl LV fxn, hx of stroke with left sided hemiplegia and aphasia-non verbal, PEG tube in place on TFs, and chronic indwelling ordoñez for neurogenic bladder, CKD 3b with progressive renal dysfunction, chronic normocytic anemia presents on 1/14/2025 with lethargy and congested cough.      #Congested cough. Probable volume overload related to progressive renal dysfunction and recent URI. ALEX on CKD3-4: Family visited over the holidays and one of the grandkids was sick with URI that swept through the family. He mostly recovered from his respiratory sx but didn't really bounce back. They have a home RN who comes in weekly and she noticed that he was starting to retain fluid and his lungs were sounding crackly. Then his monthly blood tests were completed yesterday and notable for progressive renal dysfunction bun/creat 66/3.23 so was told to come into the ER.  -In ER, pt afebrile and HDS.  He is mildly hypertensive.  BMP with BUN/Cr 68/3.3.  CBC without leukocytosis and hgb at baseline of 9.2.  UA without clear UTI.  CXR with pulmonary vascular congestion.  BNP elevated at 6600.  Hs troponin mildly elevated at 81 and downtrending on recheck.  ECG with junctional rhythm.  LE doppler negative for DVT.  -Pt received dose of IV lasix  on 1/15 and 1/16.  Will discharge on torsemide.     -TTE  shows EF 55-60% without RMA and nl RV function.  -Patient's is chronically very deconditioned and weak.  I explained to family that I anticipate his overall function and clinical status will continue to decline with time.  I strongly encouraged goals of care converstaions to be ongoing as I suspect his overall prognosis is very guarded.  -Nephrology consulted.      #Goals of care: Discussed with patient's wife, 2 daughters at bedside and son by phone with ipad Cambodian interpretor on 1/16.  I discussed Mr. Otto's progressive chronic medical illnesses including progressive kidney failure in setting of bed bound/wheelchair bound status on tube feeds.  I discussed that given his progressive disease his overall prognosis is quite poor and likely on order of months.  We discussed code status and patient's wife is thinking he would not want CPR as she thinks he would not want to be in a worse situation than he is now if he were to go through resuscitation.  They are going to discuss a bit more as family and we will reconvene tomorrow.  For now, he remains full code and full restorative cares.  -I highly encouraged continued goals of care conversations as outpatient as family not able to make decision by time of discharge.  Recommend POLST be done with PCP follow up.      #NSTEMI: Type 2 in setting of renal dysfunction and recent viral illness.      #Hx of CVA with right sided hemiplegia. Aphasia and dysphagia with PEG tube. Chronic neurogenic bladder with chronic ordoñez: (changed 1/13 and is changed monthly)  He is mostly non-verbal at baseline, family helps with communication.  Lives with his wife and has HH RN weekly.  Not sure of other services.  Wheelchair bound and I believe requires a lift   -resumed TF via peg as at home  -chronic odroñez in  place, changed monthly and just changed      #Elevated LFT's: Monitor. No AP. Likely related to congestion.  #HTN. HL: Increased amlodipine to 10 mg.  Will be on torsemide as  well on discharge.    #IDDM2: Resume home lantus 9 units in AM. Sliding scale insulin.   #Normocytic anemia: Not bleeding. Suspect from renal dysfunction.  IV iron started on 1/16. Will discharge on iron through G tube.   #Hypokalemia, hypocalcemia: Replacement    Demetrius De Santiago MD    Pending Results   These results will be followed up by Hospital medicine  Unresulted Labs Ordered in the Past 30 Days of this Admission       Date and Time Order Name Status Description    1/16/2025 12:01 AM Protein Immunofixation Serum In process     1/14/2025  8:27 PM Blood Culture Arm, Right Preliminary     1/14/2025  8:27 PM Blood Culture Arm, Left Preliminary             Code Status   Full Code       Primary Care Physician   Nallely Fong    Physical Exam   Temp: 98.6  F (37  C) Temp src: Oral BP: (!) 183/77 Pulse: 103   Resp: 16 SpO2: 93 % O2 Device: None (Room air)    Vitals:    01/16/25 0715 01/17/25 0516   Weight: 65 kg (143 lb 4.8 oz) 65.5 kg (144 lb 6.4 oz)     Vital Signs with Ranges  Temp:  [97.7  F (36.5  C)-98.7  F (37.1  C)] 98.6  F (37  C)  Pulse:  [] 103  Resp:  [15-25] 16  BP: (159-183)/(68-77) 183/77  SpO2:  [93 %-97 %] 93 %  I/O last 3 completed shifts:  In: 1708 [I.V.:3; NG/GT:1705]  Out: 750 [Urine:750]    Constitutional: Chronic right sided hemiparesis. Somnolent.   HEENT: Normocephalic. MMM, No elevation of JVD noted.   Respiratory: Nl WOB, crackles more at bases in both lungs. No wheeze.   Cardiovascular: Regular, no murmur  GI: BS+, NT, ND  Skin/Integumen: R>L LE edema. Wwp.   Neuro: chronic right sided hemiparesis. Nonverbal     Discharge Disposition   Discharged to home  Condition at discharge: Guarded    Consultations This Hospital Stay   CARE MANAGEMENT / SOCIAL WORK IP CONSULT  NEPHROLOGY IP CONSULT  NUTRITION SERVICES ADULT IP CONSULT  PHARMACY IP CONSULT    Time Spent on this Encounter   I, Demetrius De Santiago MD, personally saw the patient today and spent greater than 30 minutes discharging this  patient.    Discharge Orders      Home Infusion Referral      Reason for your hospital stay    You were hospitalized for progressive kidney failure.  You were seen by nephrology (kidney specialist).  You received IV medicine to remove fluid.  You were on room air by the time of discharge.  Unfortunately, kidney disease will continue to progress with time.  I strongly recommend continued goals of care conversations with regard to CODE STATUS (cardiopulmonary resuscitation and intubation/being on a ventilator), dialysis.      You will need to follow-up with primary care next week with repeat set of labs.  You should also do a POLST form through your primary care physician after more discussion with family.    He will need to follow-up with nephrology in the coming weeks as well per their instructions.     Follow-up and recommended labs and tests     Follow up with primary care provider, Nallely Fong, within 7 days for hospital follow- up.  The following labs/tests are recommended: CBC and BMP.    Follow-up with nephrology per their instructions in the coming weeks.     Activity    Your activity upon discharge: activity as tolerated     Resume Home Care Services     Full Code     Diet    Follow this diet upon discharge: Tube feeds     Discharge Medications   Current Discharge Medication List        START taking these medications    Details   ferrous sulfate (FEROSUL) 325 (65 Fe) MG tablet Place 1 tablet (325 mg) into Feeding Tube 2 times daily.  Qty: 60 tablet, Refills: 0    Associated Diagnoses: Iron deficiency anemia due to chronic blood loss      sevelamer carbonate, RENVELA, 0.8 GM PACK Packet Place 1 packet (0.8 g) into G tube 3 times daily (with meals).  Qty: 90 packet, Refills: 0    Associated Diagnoses: Stage 4 chronic kidney disease (H)      torsemide (DEMADEX) 20 MG tablet Place 1 tablet (20 mg) into G tube daily.  Qty: 30 tablet, Refills: 0    Associated Diagnoses: Anasarca; Stage 4 chronic kidney  disease (H)           CONTINUE these medications which have CHANGED    Details   amLODIPine (NORVASC) 5 MG tablet Take 2 tablets (10 mg) by mouth daily.  Qty: 60 tablet, Refills: 0    Associated Diagnoses: Essential hypertension           CONTINUE these medications which have NOT CHANGED    Details   atorvastatin (LIPITOR) 80 MG tablet Take 80 mg by mouth daily      clopidogrel (PLAVIX) 75 MG tablet Take 75 mg by mouth daily      doxazosin (CARDURA) 2 MG tablet Take 1 tablet (2 mg) by mouth daily.  Qty: 90 tablet, Refills: 3    Associated Diagnoses: Benign prostatic hyperplasia with urinary retention      FLUoxetine (PROZAC) 20 MG/5ML solution 5 mLs (20 mg) by Oral or Feeding Tube route daily  Qty: 150 mL, Refills: 0    Associated Diagnoses: Depression, unspecified depression type; Cerebrovascular accident (CVA), unspecified mechanism (H)      insulin glargine 100 UNIT/ML pen Inject 9 Units subcutaneously every morning.      Insulin Lispro (ADMELOG SC) For BG <140 no insulin. 140-189 give 1 unit. 190-239 give 2 units. 240-289 give 3. 290-340 give 4 units. >340 give 5 units. Usual daily dose 12 units per day.      !! Jevity 1.5 [place] Place 948 mLs into G tube daily. Infuse via gravity bag  4 cartons/day  Water flush: 120ml before and after feedings  Qty: 54343 mL, Refills: 11    Associated Diagnoses: Severe protein-calorie malnutrition; Dehydration; Dysphagia, oral phase; Nausea with vomiting; Cerebral artery occlusion with cerebral infarction (H)      polyethylene glycol (MIRALAX) 17 g packet Take 17 g by mouth daily as needed for constipation    Associated Diagnoses: Constipation, unspecified constipation type      !! Prosource TF PO LIQD (PROSOURCE TF) Place 45 mLs into G tube daily. Infuse via syringe   1 packet Prosource daily  Water flush: 120ml before and after feedings  Qty: 1350 mL, Refills: 11    Associated Diagnoses: Severe protein-calorie malnutrition; Dehydration; Dysphagia, oral phase; Nausea with  vomiting; Cerebral artery occlusion with cerebral infarction (H)       !! - Potential duplicate medications found. Please discuss with provider.        STOP taking these medications       atenolol (TENORMIN) 25 MG tablet Comments:   Reason for Stopping:         Banana Flakes (BANATROL PLUS) Comments:   Reason for Stopping:             Allergies   Allergies   Allergen Reactions    Nka [No Known Allergies]      Data   Most Recent 3 CBC's:  Recent Labs   Lab Test 01/17/25  0723 01/15/25  0840 01/14/25 2048   WBC 5.9 5.9 5.5   HGB 8.5* 8.8* 9.2*   MCV 82 84 83    171 196      Most Recent 3 BMP's:  Recent Labs   Lab Test 01/17/25  0904 01/17/25  0723 01/17/25  0516 01/16/25  0830 01/16/25  0642 01/15/25  1219 01/15/25  0840   NA  --  136  --   --  134*  --  135   POTASSIUM  --  3.1*  --   --  3.3*  --  3.4   CHLORIDE  --  97*  --   --  97*  --  96*   CO2  --  26  --   --  27  --  26   BUN  --  67.9*  --   --  67.6*  --  67.8*   CR  --  3.51*  --   --  3.42*  --  3.39*   ANIONGAP  --  13  --   --  10  --  13   ARLETH  --  8.5*  --   --  8.6*  --  8.1*   * 208* 189*   < > 136*   < > 206*    < > = values in this interval not displayed.     Most Recent 2 LFT's:  Recent Labs   Lab Test 01/15/25  0840 01/14/25 2048   AST 39 46*   ALT 62 78*   ALKPHOS 136 157*   BILITOTAL 0.2 0.2     Most Recent INR's and Anticoagulation Dosing History:  Anticoagulation Dose History          Latest Ref Rng & Units 8/13/2012 8/16/2012 9/14/2021 9/16/2021 11/1/2021   Recent Dosing and Labs   INR 0.85 - 1.15 0.96  1.00  0.94        Effective 7/11/2021, the reference range for this assay has changed. 1.03        Effective 7/11/2021, the reference range for this assay has changed. 0.96      Most Recent 3 Troponin's:  Recent Labs   Lab Test 10/13/21  2152 09/18/21  1535 09/14/21 2142   TROPONIN <0.015 <0.015 <0.015     Most Recent Cholesterol Panel:  Recent Labs   Lab Test 02/07/24  1147   CHOL 97   LDL 49  48   HDL 30*   TRIG 88      Most Recent 6 Bacteria Isolates From Any Culture (See EPIC Reports for Culture Details):No lab results found.  Most Recent TSH, T4 and A1c Labs:  Recent Labs   Lab Test 01/14/25 2048 11/04/24  1140   TSH 3.53  --    A1C  --  8.8*

## 2025-01-17 NOTE — PROGRESS NOTES
[facility]    Patient is currently on service with [facility] for home tube feeding (Jevity 1.5, 4 cans/day & Prosource 1 packet/day).    Liaison will follow along. If applicable at discharge, please include Home Infusion Referral in discharge orders.     Thank you,    Claire Kan RN  [facility] Liaison  [phone] (M-F 8a-5p)  [phone] Office

## 2025-01-17 NOTE — PROGRESS NOTES
"Nephrology Progress Note  01/17/2025         Assessment & Recommendations:   1 proteinuric CKD 4-progressive and rather rapid worsening of renal function over the last 1.5 years.  Nephrotic range proteinuria with UACR of 5.2 g/grams in 2023  -Likely advanced diabetic nephropathy  -Has microscopic hematuria but UA unreliable with chronic Felix     2 elevated creatinine-unclear if there is an ALEX component or if this is continued rapid progression of CKD     3 anasarca,  CHF/volume overload -with advanced CKD, nephrotic syndrome.  Echo shows normal EF and RV function.  Dilated IVC.  -Breathing better with diuresis     4 hypertension-PTA meds-atenolol 25, amlodipine 5, Cardura 2  -Atenolol on hold  Blood pressure remains high     5 anemia in CKD-iron stores are low.  A     6 BMD-hyperphosphatemia  Jevity switched to Nepro.  Started on sevelamer.  Phos improving     Recommendation-  -increase amlodipine to 10 mg daily  -Hold atenolol on discharge.  Can switch to carvedilol as needed.  For severe response to higher dose amlodipine.  -IV Venofer.  Suggest oral ferrous sulfate twice daily on discharge  -Continue torsemide 20 mg daily  -Continue to replace potassium  -Tube feeds changed to Nepro  -Aranesp prior to discharge  -Okay to discharge from renal standpoint .  Will set up follow-up with [facility] nephrology in next couple weeks.        Long discussion again today with patient, patient's wife, 2 daughters at bedside and\" over the phone with the help of .  Discussed rapid progression of CKD including likely minute dialysis over next few months, high likelihood of readmission etc.  Discussed different dialysis modalities  Discussed difficulty with long-term dialysis and patient given his comorbidities.  Family will continue discussion and come to a decision and subsequent clinic visits.    Total time more than 35 minutes    Discussed with primary team in person    Yvonne Matias MD  [facility] - " Nephrology   [phone]      Interval History :   Seen / examined.   No acute issues overnight.  Breathing better.  Creatinine marginally up      Physical Exam:   I/O last 3 completed shifts:  In: 1708 [I.V.:3; NG/GT:1705]  Out: 750 [Urine:750]  BP (!) 183/77 (BP Location: Left arm)   Pulse 103   Temp 98.6  F (37  C) (Oral)   Resp 16   Wt 65.5 kg (144 lb 6.4 oz)   SpO2 93%   BMI 22.62 kg/m      GENERAL APPEARANCE: alert and no distress  Pulmonary: Diminished at bases  CV: regular rhythm, normal rate, no rub   - JVP -   - Edema+  GI: soft, nontender,   MS: no evidence of inflammation in joints  : + ordoñez  SKIN: no rash, warm, dry, no cyanosis  Neuro-right hemiplegia, aphasia    Labs:   All labs reviewed by me  Electrolytes/Renal -   Recent Labs   Lab Test 01/17/25  0904 01/17/25  0723 01/17/25  0516 01/16/25  0830 01/16/25  0642 01/15/25  1219 01/15/25  0840   NA  --  136  --   --  134*  --  135   POTASSIUM  --  3.1*  --   --  3.3*  --  3.4   CHLORIDE  --  97*  --   --  97*  --  96*   CO2  --  26  --   --  27  --  26   BUN  --  67.9*  --   --  67.6*  --  67.8*   CR  --  3.51*  --   --  3.42*  --  3.39*   * 208* 189*   < > 136*   < > 206*   ARLETH  --  8.5*  --   --  8.6*  --  8.1*   MAG  --  3.0*  --   --  3.0*  --  3.2*   PHOS  --  5.8*  --   --  6.4*  --  6.6*    < > = values in this interval not displayed.       CBC -   Recent Labs   Lab Test 01/17/25  0723 01/15/25  0840 01/14/25  2048   WBC 5.9 5.9 5.5   HGB 8.5* 8.8* 9.2*    171 196       LFTs -   Recent Labs   Lab Test 01/15/25  0840 01/14/25 2048 01/13/25  1315   ALKPHOS 136 157* 148   BILITOTAL 0.2 0.2 0.2   ALT 62 78* 83*   AST 39 46* 52*   PROTTOTAL 5.9* 6.8 6.4   ALBUMIN 2.5* 3.0* 2.7*       Iron Panel -   Recent Labs   Lab Test 01/14/25 2048   IRON 32*   IRONSAT 12*   MONICA 102           Current Medications:  Current Facility-Administered Medications   Medication Dose Route Frequency Provider Last Rate Last Admin    amLODIPine  (NORVASC) tablet 5 mg  5 mg Per G Tube Daily Amy Parmar MD   5 mg at 01/17/25 0932    artificial saliva (BIOTENE MT) solution 1 spray  1 spray Mouth/Throat 4x Daily Amy Parmar MD   1 spray at 01/16/25 1215    atorvastatin (LIPITOR) tablet 80 mg  80 mg Per G Tube Daily Amy Parmar MD   80 mg at 01/17/25 0931    clopidogrel (PLAVIX) tablet 75 mg  75 mg Oral or Feeding Tube Daily Amy Parmar MD   75 mg at 01/17/25 0932    doxazosin (CARDURA) tablet 2 mg  2 mg Oral or Feeding Tube Daily Amy Parmar MD   2 mg at 01/17/25 0933    FLUoxetine (PROzac) solution 20 mg  20 mg Oral or Feeding Tube Daily Amy Parmar MD   20 mg at 01/17/25 0934    insulin aspart (NovoLOG) injection (RAPID ACTING)  1-7 Units Subcutaneous Q4H Caleb Wright MD   2 Units at 01/17/25 0958    insulin glargine (LANTUS PEN) injection 9 Units  9 Units Subcutaneous QAM Amy Parmar MD   9 Units at 01/17/25 0957    iron sucrose (VENOFER) 200 mg in sodium chloride 0.9 % 120 mL intermittent infusion  200 mg Intravenous Q24H Yvonne Matias  mL/hr at 01/16/25 1206 200 mg at 01/16/25 1206    miconazole (MICATIN) 2 % cream   Topical BID Amy Parmar MD   Given at 01/15/25 2048    polyethylene glycol (MIRALAX) Packet 17 g  17 g Per G Tube Daily Amy Parmar MD        sevelamer carbonate (RENVELA) Packet 0.8 g  0.8 g Per G Tube TID w/meals Yvonne Matias MD   0.8 g at 01/17/25 0933    sodium chloride (PF) 0.9% PF flush 3 mL  3 mL Intracatheter Q8H Amy Parmar MD   3 mL at 01/16/25 0941    torsemide (DEMADEX) tablet 20 mg  20 mg Per G Tube Daily Amy Parmar MD   20 mg at 01/17/25 0931     Current Facility-Administered Medications   Medication Dose Route Frequency Provider Last Rate Last Admin    dextrose 10% infusion   Intravenous Continuous PRN Demetrius De Santiago MD Jiwan Thapa, MD

## 2025-01-17 NOTE — PROGRESS NOTES
[facility]    Patient is currently receiving SN OT home care services from [facility].  and home health team have been notified of patients admission. [facility] liaison will continue to follow patient during hospital stay. If appropriate, provide home care orders to resume services at the time of discharge. If patients discharge date changes, please reach out to clinical liaison or the HUB to ensure SOC/ANDRES date is still appropriate for a safe discharge plan.     Thank you,     FESTUS Garcia, LPN  Home Care Liaison   Cell: [phone]

## 2025-01-17 NOTE — DISCHARGE INSTRUCTIONS
Nutrition - okay to run continuous or switch to bolus after discharge if patient prefers. Total volume (either continuous or bolus) should equal 840 ml/day

## 2025-01-18 ENCOUNTER — MEDICAL CORRESPONDENCE (OUTPATIENT)
Dept: HOME HEALTH SERVICES | Facility: HOME HEALTH | Age: 75
End: 2025-01-18
Payer: MEDICARE

## 2025-01-18 ENCOUNTER — HOME INFUSION BILLING (OUTPATIENT)
Dept: HOME HEALTH SERVICES | Facility: HOME HEALTH | Age: 75
End: 2025-01-18
Payer: MEDICARE

## 2025-01-18 VITALS
SYSTOLIC BLOOD PRESSURE: 165 MMHG | WEIGHT: 146.61 LBS | HEART RATE: 63 BPM | OXYGEN SATURATION: 94 % | RESPIRATION RATE: 17 BRPM | DIASTOLIC BLOOD PRESSURE: 68 MMHG | TEMPERATURE: 97.3 F | BODY MASS INDEX: 22.96 KG/M2

## 2025-01-18 LAB
ALBUMIN SERPL BCG-MCNC: 2.4 G/DL (ref 3.5–5.2)
ANION GAP SERPL CALCULATED.3IONS-SCNC: 10 MMOL/L (ref 7–15)
BUN SERPL-MCNC: 67.4 MG/DL (ref 8–23)
CALCIUM SERPL-MCNC: 8.6 MG/DL (ref 8.8–10.4)
CHLORIDE SERPL-SCNC: 97 MMOL/L (ref 98–107)
CREAT SERPL-MCNC: 3.53 MG/DL (ref 0.67–1.17)
EGFRCR SERPLBLD CKD-EPI 2021: 17 ML/MIN/1.73M2
ERYTHROCYTE [DISTWIDTH] IN BLOOD BY AUTOMATED COUNT: 13.9 % (ref 10–15)
GLUCOSE BLDC GLUCOMTR-MCNC: 146 MG/DL (ref 70–99)
GLUCOSE BLDC GLUCOMTR-MCNC: 158 MG/DL (ref 70–99)
GLUCOSE BLDC GLUCOMTR-MCNC: 176 MG/DL (ref 70–99)
GLUCOSE BLDC GLUCOMTR-MCNC: 198 MG/DL (ref 70–99)
GLUCOSE SERPL-MCNC: 213 MG/DL (ref 70–99)
HCO3 SERPL-SCNC: 28 MMOL/L (ref 22–29)
HCT VFR BLD AUTO: 25.5 % (ref 40–53)
HGB BLD-MCNC: 8.3 G/DL (ref 13.3–17.7)
MCH RBC QN AUTO: 26.9 PG (ref 26.5–33)
MCHC RBC AUTO-ENTMCNC: 32.5 G/DL (ref 31.5–36.5)
MCV RBC AUTO: 83 FL (ref 78–100)
PHOSPHATE SERPL-MCNC: 5.5 MG/DL (ref 2.5–4.5)
PLATELET # BLD AUTO: 164 10E3/UL (ref 150–450)
POTASSIUM SERPL-SCNC: 3.1 MMOL/L (ref 3.4–5.3)
RBC # BLD AUTO: 3.08 10E6/UL (ref 4.4–5.9)
SODIUM SERPL-SCNC: 135 MMOL/L (ref 135–145)
WBC # BLD AUTO: 5.3 10E3/UL (ref 4–11)

## 2025-01-18 PROCEDURE — 250N000011 HC RX IP 250 OP 636: Performed by: INTERNAL MEDICINE

## 2025-01-18 PROCEDURE — 36415 COLL VENOUS BLD VENIPUNCTURE: CPT | Performed by: HOSPITALIST

## 2025-01-18 PROCEDURE — B4036 ENTERAL FEED SUP KIT GRAV BY: HCPCS

## 2025-01-18 PROCEDURE — 99232 SBSQ HOSP IP/OBS MODERATE 35: CPT | Performed by: INTERNAL MEDICINE

## 2025-01-18 PROCEDURE — 80069 RENAL FUNCTION PANEL: CPT | Performed by: INTERNAL MEDICINE

## 2025-01-18 PROCEDURE — 36415 COLL VENOUS BLD VENIPUNCTURE: CPT | Performed by: INTERNAL MEDICINE

## 2025-01-18 PROCEDURE — 250N000013 HC RX MED GY IP 250 OP 250 PS 637: Performed by: INTERNAL MEDICINE

## 2025-01-18 PROCEDURE — 85041 AUTOMATED RBC COUNT: CPT | Performed by: HOSPITALIST

## 2025-01-18 PROCEDURE — 85018 HEMOGLOBIN: CPT | Performed by: HOSPITALIST

## 2025-01-18 PROCEDURE — B4154 EF SPEC METABOLIC NONINHERIT: HCPCS

## 2025-01-18 PROCEDURE — 99207 PR NO BILLABLE SERVICE THIS VISIT: CPT | Performed by: INTERNAL MEDICINE

## 2025-01-18 PROCEDURE — 258N000003 HC RX IP 258 OP 636: Performed by: INTERNAL MEDICINE

## 2025-01-18 RX ORDER — POTASSIUM CHLORIDE 1.5 G/1.58G
40 POWDER, FOR SOLUTION ORAL ONCE
Status: COMPLETED | OUTPATIENT
Start: 2025-01-18 | End: 2025-01-18

## 2025-01-18 RX ADMIN — SEVELAMER CARBONATE 0.8 G: 800 POWDER, FOR SUSPENSION ORAL at 09:34

## 2025-01-18 RX ADMIN — MICONAZOLE NITRATE: 2 CREAM TOPICAL at 09:37

## 2025-01-18 RX ADMIN — ATORVASTATIN CALCIUM 80 MG: 40 TABLET, FILM COATED ORAL at 09:31

## 2025-01-18 RX ADMIN — INSULIN ASPART 1 UNITS: 100 INJECTION, SOLUTION INTRAVENOUS; SUBCUTANEOUS at 01:43

## 2025-01-18 RX ADMIN — FLUOXETINE 20 MG: 20 SOLUTION ORAL at 09:30

## 2025-01-18 RX ADMIN — Medication 1 SPRAY: at 09:43

## 2025-01-18 RX ADMIN — AMLODIPINE BESYLATE 10 MG: 10 TABLET ORAL at 09:31

## 2025-01-18 RX ADMIN — CLOPIDOGREL BISULFATE 75 MG: 75 TABLET ORAL at 09:30

## 2025-01-18 RX ADMIN — TORSEMIDE 20 MG: 20 TABLET ORAL at 09:30

## 2025-01-18 RX ADMIN — SEVELAMER CARBONATE 0.8 G: 800 POWDER, FOR SUSPENSION ORAL at 13:33

## 2025-01-18 RX ADMIN — IRON SUCROSE 200 MG: 20 INJECTION, SOLUTION INTRAVENOUS at 13:19

## 2025-01-18 RX ADMIN — INSULIN ASPART 1 UNITS: 100 INJECTION, SOLUTION INTRAVENOUS; SUBCUTANEOUS at 04:15

## 2025-01-18 RX ADMIN — INSULIN ASPART 1 UNITS: 100 INJECTION, SOLUTION INTRAVENOUS; SUBCUTANEOUS at 09:43

## 2025-01-18 RX ADMIN — DOXAZOSIN 2 MG: 1 TABLET ORAL at 09:30

## 2025-01-18 RX ADMIN — POTASSIUM CHLORIDE 40 MEQ: 1.5 POWDER, FOR SOLUTION ORAL at 11:12

## 2025-01-18 RX ADMIN — INSULIN GLARGINE 9 UNITS: 100 INJECTION, SOLUTION SUBCUTANEOUS at 09:44

## 2025-01-18 RX ADMIN — POLYETHYLENE GLYCOL 3350 17 G: 17 POWDER, FOR SOLUTION ORAL at 09:35

## 2025-01-18 ASSESSMENT — ACTIVITIES OF DAILY LIVING (ADL)
ADLS_ACUITY_SCORE: 87
ADLS_ACUITY_SCORE: 87
ADLS_ACUITY_SCORE: 86
ADLS_ACUITY_SCORE: 86
ADLS_ACUITY_SCORE: 87
ADLS_ACUITY_SCORE: 87
ADLS_ACUITY_SCORE: 86
ADLS_ACUITY_SCORE: 87
ADLS_ACUITY_SCORE: 87
ADLS_ACUITY_SCORE: 86

## 2025-01-18 NOTE — PROGRESS NOTES
Care Management Discharge Note    Discharge Date: 01/18/2025       Discharge Disposition: Home, Home Care, Home Infusion    Discharge Services: Home Care    Discharge DME: None    Discharge Transportation: agency    Private pay costs discussed: Not applicable    Education Provided on the Discharge Plan:  yes  Persons Notified of Discharge Plans: patient, family  Patient/Family in Agreement with the Plan: yes    Handoff Referral Completed: No, handoff not indicated or clinically appropriate    Additional Information:  Patient discharging home today with family. He will have resumption of home care services through [facility] and continue with [facility] for enteral feedings. Discharge orders faxed to [facility] with resumption of care orders.   Updated [facility] that patient is discharging. He has a change in his tube feeding formula. They will coordinate getting new formula to patient's home.  Updated daughter and patient at bedside. Daughter feels patient is more alert today and states she thinks they are able to transport patient home.    Valeri Mosher RN  Care Coordinator  [facility]

## 2025-01-18 NOTE — PLAN OF CARE
"K one time replace, code status changed (wrist band printed), tele SR, g tube 35ml/h goal rate, chx ordoñez. Pt's discharge today canceled d/t somnolence (see VBG results), family at bedside, PRN APAP given x1 for facial grimacing.      Goal Outcome Evaluation:    Plan of Care Reviewed With: patient, spouse, family, child  Overall Patient Progress: decliningOverall Patient Progress: declining  Outcome Evaluation: somnolent, discharge canceled today    Problem: Adult Inpatient Plan of Care  Goal: Plan of Care Review  Description: The Plan of Care Review/Shift note should be completed every shift.  The Outcome Evaluation is a brief statement about your assessment that the patient is improving, declining, or no change.  This information will be displayed automatically on your shift  note.  Outcome: Not Progressing  Flowsheets (Taken 1/17/2025 1902)  Outcome Evaluation: somnolent, discharge canceled today  Plan of Care Reviewed With:   patient   spouse   family   child  Overall Patient Progress: declining  Goal: Patient-Specific Goal (Individualized)  Description: You can add care plan individualizations to a care plan. Examples of Individualization might be:  \"Parent requests to be called daily at 9am for status\", \"I have a hard time hearing out of my right ear\", or \"Do not touch me to wake me up as it startles  me\".  Outcome: Not Progressing  Goal: Absence of Hospital-Acquired Illness or Injury  Outcome: Not Progressing  Intervention: Identify and Manage Fall Risk  Recent Flowsheet Documentation  Taken 1/17/2025 1000 by Rhonda Pack, RN  Safety Promotion/Fall Prevention:   safety round/check completed   supervised activity  Intervention: Prevent Skin Injury  Recent Flowsheet Documentation  Taken 1/17/2025 1700 by Rhonda Pack, RN  Body Position:   turned   right  Taken 1/17/2025 1300 by Rhonda Pack, RN  Body Position:   turned   left  Goal: Optimal Comfort and Wellbeing  Outcome: Not Progressing  Goal: Readiness for " Transition of Care  Outcome: Not Progressing     Problem: Electrolyte Imbalance  Goal: Electrolyte Balance  Outcome: Not Progressing     Problem: Skin Injury Risk Increased  Goal: Skin Health and Integrity  Outcome: Not Progressing  Intervention: Plan: Nurse Driven Intervention: Moisture Management  Recent Flowsheet Documentation  Taken 1/17/2025 1000 by Rhonda Pack, RN  Moisture Interventions:   No brief in bed   Incontinence pad   Urinary collection device  Intervention: Plan: Nurse Driven Intervention: Friction and Shear  Recent Flowsheet Documentation  Taken 1/17/2025 1000 by Rhonda Pack RN  Friction/Shear Interventions:   Silicone foam sacral dressing   Pad bony prominence (elbow pads, heel pads, ear protectors)  Intervention: Optimize Skin Protection  Recent Flowsheet Documentation  Taken 1/17/2025 1700 by Rhonda Pack, RN  Activity Management: activity adjusted per tolerance  Head of Bed (HOB) Positioning: HOB at 30-45 degrees     Problem: Comorbidity Management  Goal: Blood Glucose Levels Within Targeted Range  Outcome: Not Progressing  Goal: Maintenance of Heart Failure Symptom Control  Outcome: Not Progressing  Goal: Blood Pressure in Desired Range  Outcome: Not Progressing     Problem: Fall Injury Risk  Goal: Absence of Fall and Fall-Related Injury  Outcome: Not Progressing  Intervention: Promote Injury-Free Environment  Recent Flowsheet Documentation  Taken 1/17/2025 1000 by Rhonda Pack, RN  Safety Promotion/Fall Prevention:   safety round/check completed   supervised activity     Problem: Pain Acute  Goal: Optimal Pain Control and Function  Outcome: Not Progressing

## 2025-01-18 NOTE — PROGRESS NOTES
Nephrology Progress Note  01/18/2025         Assessment & Recommendations:   1 proteinuric CKD 4-progressive and rather rapid worsening of renal function over the last 1.5 years.  Nephrotic range proteinuria with UACR of 5.2 g/grams in 2023  -Likely advanced diabetic nephropathy  -Has microscopic hematuria but UA unreliable with chronic Felix  -Creatinine stable.  Will likely not improve much more     2 elevated creatinine-unclear if there is an ALEX component or if this is continued rapid progression of CKD     3 anasarca,  CHF/volume overload -with advanced CKD, nephrotic syndrome.  Echo shows normal EF and RV function.  Dilated IVC.  -Breathing better with diuresis     4 hypertension-PTA meds-atenolol 25, amlodipine 5, Cardura 2  -Atenolol on hold  Blood pressure remains high     5 anemia in CKD-iron stores are low.  Venofer x 3 given this admission.  Aranesp 40 mcg x 1 given on 1/17     6 BMD-hyperphosphatemia  Jevity switched to Nepro.  Started on sevelamer.  Phos improving     Recommendation-  -increased amlodipine to 10 mg daily  -Hold atenolol on discharge.  Can switch to carvedilol as needed.  For severe response to higher dose amlodipine.  -Received IV Venofer x 3 doses, Aranesp 40 mcg x 1  -Continue torsemide 20 mg daily  -Discharged on KCl 20 mill equivalent daily  -Tube feeds changed to Nepro  -Okay to discharge from renal standpoint .  Will set up follow-up with [facility] nephrology in next couple weeks.        Long discussion with patient, patient's wife, 2 daughters at bedside and uncle and brother  over the phone with the help of  over last couple days .  Discussed rapid progression of CKD including likely minute dialysis over next few months, high likelihood of readmission etc.  Discussed different dialysis modalities  Discussed difficulty with long-term dialysis and patient given his comorbidities.  Family will continue discussion and come to a decision and subsequent clinic  visits.        Yvonne Matias MD  [facility] - Nephrology   [phone]      Interval History :   Seen / examined.   No acute issues overnight.  Discharge held yesterday on account of somnolence.  Much more awake today.  Creatinine stable.  Potassium 3.1      Physical Exam:   I/O last 3 completed shifts:  In: 1584 [I.V.:6; NG/GT:1060]  Out: 1400 [Urine:1400]  BP (!) 165/68 (BP Location: Left arm)   Pulse 63   Temp 97.3  F (36.3  C) (Oral)   Resp 17   Wt 66.5 kg (146 lb 9.7 oz)   SpO2 94%   BMI 22.96 kg/m      GENERAL APPEARANCE: alert and no distress  Pulmonary: Diminished at bases  CV: regular rhythm, normal rate, no rub   - JVP -   - Edema+  GI: soft, nontender,   MS: no evidence of inflammation in joints  : + ordoñez  SKIN: no rash, warm, dry, no cyanosis  Neuro-right hemiplegia, aphasia    Labs:   All labs reviewed by me  Electrolytes/Renal -   Recent Labs   Lab Test 01/18/25  0934 01/18/25  0811 01/18/25  0406 01/17/25  0904 01/17/25  0723 01/16/25  0830 01/16/25  0642 01/15/25  1219 01/15/25  0840     --   --   --  136  --  134*  --  135   POTASSIUM 3.1*  --   --   --  3.1*  --  3.3*  --  3.4   CHLORIDE 97*  --   --   --  97*  --  97*  --  96*   CO2 28  --   --   --  26  --  27  --  26   BUN 67.4*  --   --   --  67.9*  --  67.6*  --  67.8*   CR 3.53*  --   --   --  3.51*  --  3.42*  --  3.39*   * 176* 158*   < > 208*   < > 136*   < > 206*   ARLETH 8.6*  --   --   --  8.5*  --  8.6*  --  8.1*   MAG  --   --   --   --  3.0*  --  3.0*  --  3.2*   PHOS 5.5*  --   --   --  5.8*  --  6.4*  --  6.6*    < > = values in this interval not displayed.       CBC -   Recent Labs   Lab Test 01/18/25  0632 01/17/25  0723 01/15/25  0840   WBC 5.3 5.9 5.9   HGB 8.3* 8.5* 8.8*    177 171       LFTs -   Recent Labs   Lab Test 01/18/25  0934 01/15/25  0840 01/14/25 2048 01/13/25  1315   ALKPHOS  --  136 157* 148   BILITOTAL  --  0.2 0.2 0.2   ALT  --  62 78* 83*   AST  --  39 46* 52*   PROTTOTAL   --  5.9* 6.8 6.4   ALBUMIN 2.4* 2.5* 3.0* 2.7*       Iron Panel -   Recent Labs   Lab Test 01/14/25 2048   IRON 32*   IRONSAT 12*   MONICA 102           Current Medications:  Current Facility-Administered Medications   Medication Dose Route Frequency Provider Last Rate Last Admin    amLODIPine (NORVASC) tablet 10 mg  10 mg Per G Tube Daily Yvonne Matias MD   10 mg at 01/18/25 0931    artificial saliva (BIOTENE MT) solution 1 spray  1 spray Mouth/Throat 4x Daily Amy Parmar MD   1 spray at 01/18/25 0943    atorvastatin (LIPITOR) tablet 80 mg  80 mg Per G Tube Daily Amy Parmar MD   80 mg at 01/18/25 0931    clopidogrel (PLAVIX) tablet 75 mg  75 mg Oral or Feeding Tube Daily Amy Parmar MD   75 mg at 01/18/25 0930    doxazosin (CARDURA) tablet 2 mg  2 mg Oral or Feeding Tube Daily Amy Parmar MD   2 mg at 01/18/25 0930    FLUoxetine (PROzac) solution 20 mg  20 mg Oral or Feeding Tube Daily Amy Parmar MD   20 mg at 01/18/25 0930    insulin aspart (NovoLOG) injection (RAPID ACTING)  1-7 Units Subcutaneous Q4H Caleb Wright MD   1 Units at 01/18/25 0943    insulin glargine (LANTUS PEN) injection 9 Units  9 Units Subcutaneous QAM Amy Parmar MD   9 Units at 01/18/25 0944    iron sucrose (VENOFER) 200 mg in sodium chloride 0.9 % 120 mL intermittent infusion  200 mg Intravenous Q24H Yvonne Matias  mL/hr at 01/17/25 1333 200 mg at 01/17/25 1333    miconazole (MICATIN) 2 % cream   Topical BID Amy Parmar MD   Given at 01/18/25 0937    polyethylene glycol (MIRALAX) Packet 17 g  17 g Per G Tube Daily Amy Parmar MD   17 g at 01/18/25 0935    potassium chloride (KLOR-CON) Packet 40 mEq  40 mEq Per Feeding Tube Once Melchor Noel MD        sevelamer carbonate (RENVELA) Packet 0.8 g  0.8 g Per G Tube TID w/meals Yvonne Matias MD   0.8 g at 01/18/25 0934    sodium chloride (PF) 0.9% PF flush 3 mL  3 mL Intracatheter Q8H Amy Parmar MD   3 mL at 01/18/25 0410    torsemide (DEMADEX)  tablet 20 mg  20 mg Per G Tube Daily Amy Parmar MD   20 mg at 01/18/25 4586     Current Facility-Administered Medications   Medication Dose Route Frequency Provider Last Rate Last Admin    dextrose 10% infusion   Intravenous Continuous PRN Demetrius De Santiago MD Jiwan Thapa, MD

## 2025-01-18 NOTE — PLAN OF CARE
"Shift Note 3814-0801:  Pt is unable to speak and communicates with family via hand squeezes per shift report. Family at beside.  Activity: bedrest.   Tele: SB w notched P waves.  Respiratory: LS clear, on RA.   B, 146, 158. Q4h BG checks.   Nehrology following, cleared Pt for discharge.   Other: Tube feeds at 35 mL/hr w 60 mL flush q4h  Plan of Care: Discharge  pending Pt alertness.      Goal Outcome Evaluation:      Plan of Care Reviewed With: patient, family    Overall Patient Progress: improvingOverall Patient Progress: improving    Outcome Evaluation: Opening eyes spontaneously now. Tracking well. Needing 1 unit insulin q4h throughout night.      Problem: Adult Inpatient Plan of Care  Goal: Plan of Care Review  Description: The Plan of Care Review/Shift note should be completed every shift.  The Outcome Evaluation is a brief statement about your assessment that the patient is improving, declining, or no change.  This information will be displayed automatically on your shift  note.  Outcome: Progressing  Flowsheets (Taken 2025 0505)  Outcome Evaluation: Opening eyes spontaneously now. Tracking well. Needing 1 unit insulin q4h throughout night.  Plan of Care Reviewed With:   patient   family  Overall Patient Progress: improving  Goal: Patient-Specific Goal (Individualized)  Description: You can add care plan individualizations to a care plan. Examples of Individualization might be:  \"Parent requests to be called daily at 9am for status\", \"I have a hard time hearing out of my right ear\", or \"Do not touch me to wake me up as it startles  me\".  Outcome: Progressing  Goal: Absence of Hospital-Acquired Illness or Injury  Outcome: Progressing  Intervention: Identify and Manage Fall Risk  Recent Flowsheet Documentation  Taken 2025 by Paulo Frazier RN  Safety Promotion/Fall Prevention:   activity supervised   clutter free environment maintained   assistive device/personal items within reach   " lighting adjusted   mobility aid in reach   nonskid shoes/slippers when out of bed   room organization consistent   safety round/check completed  Intervention: Prevent Skin Injury  Recent Flowsheet Documentation  Taken 1/18/2025 0423 by Paulo Frazier RN  Body Position:   turned   right  Taken 1/18/2025 0131 by Paulo Frazier RN  Body Position:   turned   left  Taken 1/18/2025 0005 by Paulo Frazier RN  Body Position:   turned   right  Taken 1/17/2025 2127 by Paulo Frazier RN  Body Position:   turned   left  Intervention: Prevent and Manage VTE (Venous Thromboembolism) Risk  Recent Flowsheet Documentation  Taken 1/17/2025 2127 by Paulo Frazier RN  VTE Prevention/Management: SCDs off (sequential compression devices)  Intervention: Prevent Infection  Recent Flowsheet Documentation  Taken 1/17/2025 2127 by Paulo Frazier RN  Infection Prevention:   hand hygiene promoted   personal protective equipment utilized   rest/sleep promoted   single patient room provided  Goal: Optimal Comfort and Wellbeing  Outcome: Progressing  Goal: Readiness for Transition of Care  Outcome: Progressing     Problem: Electrolyte Imbalance  Goal: Electrolyte Balance  Outcome: Progressing     Problem: Skin Injury Risk Increased  Goal: Skin Health and Integrity  Outcome: Progressing  Intervention: Plan: Nurse Driven Intervention: Positioning  Recent Flowsheet Documentation  Taken 1/17/2025 2127 by Paulo Frazier RN  Plan: Positioning Interventions: REPOSITION Left/Right (No supine) q2h  Intervention: Plan: Nurse Driven Intervention: Moisture Management  Recent Flowsheet Documentation  Taken 1/17/2025 2127 by Paulo Frazier RN  Moisture Interventions:   No brief in bed   Incontinence pad   Urinary collection device  Intervention: Plan: Nurse Driven Intervention: Friction and Shear  Recent Flowsheet Documentation  Taken 1/17/2025 2127 by Paulo Frazier RN  Friction/Shear Interventions: Silicone foam sacral  dressing  Intervention: Optimize Skin Protection  Recent Flowsheet Documentation  Taken 1/18/2025 0423 by Paulo Frazier RN  Head of Bed (HOB) Positioning: HOB at 30 degrees  Taken 1/18/2025 0131 by Paulo Frazier RN  Head of Bed (HOB) Positioning: HOB at 30-45 degrees  Taken 1/18/2025 0005 by Paulo Frazier RN  Head of Bed (HOB) Positioning: HOB at 30-45 degrees  Taken 1/17/2025 2127 by Paulo Frazier RN  Activity Management: activity adjusted per tolerance  Head of Bed (HOB) Positioning: HOB at 30-45 degrees     Problem: Comorbidity Management  Goal: Blood Glucose Levels Within Targeted Range  Outcome: Progressing  Goal: Maintenance of Heart Failure Symptom Control  Outcome: Progressing  Goal: Blood Pressure in Desired Range  Outcome: Progressing     Problem: Fall Injury Risk  Goal: Absence of Fall and Fall-Related Injury  Outcome: Progressing  Intervention: Promote Injury-Free Environment  Recent Flowsheet Documentation  Taken 1/17/2025 2127 by Paulo Frazier RN  Safety Promotion/Fall Prevention:   activity supervised   clutter free environment maintained   assistive device/personal items within reach   lighting adjusted   mobility aid in reach   nonskid shoes/slippers when out of bed   room organization consistent   safety round/check completed     Problem: Pain Acute  Goal: Optimal Pain Control and Function  Outcome: Progressing

## 2025-01-18 NOTE — PLAN OF CARE
"Tube feeding stopped at 11:30 OK per nutrition, 809 total feed given, consults from nutrition, home infusion and nephrology today (see notes). Pt alert and responsive to questions with thumbs up or down, discharge instructions and meds reviewed with family, accompanied down in w/c by staff and transported home in private vehicle by family.    Goal Outcome Evaluation:    Plan of Care Reviewed With: patient, child, family  Overall Patient Progress: improvingOverall Patient Progress: improving  Outcome Evaluation: discharged home with home care    Problem: Adult Inpatient Plan of Care  Goal: Plan of Care Review  Description: The Plan of Care Review/Shift note should be completed every shift.  The Outcome Evaluation is a brief statement about your assessment that the patient is improving, declining, or no change.  This information will be displayed automatically on your shift  note.  Outcome: Adequate for Care Transition  Flowsheets (Taken 1/18/2025 1427)  Outcome Evaluation: discharged home with home care  Plan of Care Reviewed With:   patient   child   family  Overall Patient Progress: improving  Goal: Patient-Specific Goal (Individualized)  Description: You can add care plan individualizations to a care plan. Examples of Individualization might be:  \"Parent requests to be called daily at 9am for status\", \"I have a hard time hearing out of my right ear\", or \"Do not touch me to wake me up as it startles  me\".  Outcome: Adequate for Care Transition  Goal: Absence of Hospital-Acquired Illness or Injury  Outcome: Adequate for Care Transition  Intervention: Identify and Manage Fall Risk  Recent Flowsheet Documentation  Taken 1/18/2025 1012 by Rhonda Pack, RN  Safety Promotion/Fall Prevention:   safety round/check completed   supervised activity  Goal: Optimal Comfort and Wellbeing  Outcome: Adequate for Care Transition  Goal: Readiness for Transition of Care  Outcome: Adequate for Care Transition     Problem: Electrolyte " Imbalance  Goal: Electrolyte Balance  Outcome: Adequate for Care Transition     Problem: Skin Injury Risk Increased  Goal: Skin Health and Integrity  Outcome: Adequate for Care Transition  Intervention: Plan: Nurse Driven Intervention: Moisture Management  Recent Flowsheet Documentation  Taken 1/18/2025 1012 by Rhonda Pack RN  Moisture Interventions:   No brief in bed   Incontinence pad   Urinary collection device  Intervention: Plan: Nurse Driven Intervention: Friction and Shear  Recent Flowsheet Documentation  Taken 1/18/2025 1012 by Rhonda Pack RN  Friction/Shear Interventions: Silicone foam sacral dressing     Problem: Comorbidity Management  Goal: Blood Glucose Levels Within Targeted Range  Outcome: Adequate for Care Transition  Goal: Maintenance of Heart Failure Symptom Control  Outcome: Adequate for Care Transition  Goal: Blood Pressure in Desired Range  Outcome: Adequate for Care Transition     Problem: Fall Injury Risk  Goal: Absence of Fall and Fall-Related Injury  Outcome: Adequate for Care Transition  Intervention: Promote Injury-Free Environment  Recent Flowsheet Documentation  Taken 1/18/2025 1012 by Rhonda Pack, RN  Safety Promotion/Fall Prevention:   safety round/check completed   supervised activity     Problem: Pain Acute  Goal: Optimal Pain Control and Function  Outcome: Adequate for Care Transition

## 2025-01-18 NOTE — PROGRESS NOTES
[facility]  Hospitalist Progress Note  Admit 1/14/2025  8:40 PM    Name: Jimmy Otto    MRN: [id number]  Provider:  Melchor Noel MD, [facility]    Date of Service: 01/18/2025     Refer to discharge summary by Dr. De Santiago         Summary of hospital stay   Summary of Stay: Jimmy Otto is a 75 year old male who was admitted on 1/14/2025    75-year-old male with a history of hypertension, hyperlipidemia, diabetes mellitus type 2, chronic kidney disease stage 3b, chronic neurogenic bladder with indwelling Felix, chronic normocytic anemia, and prior CVA with left hemiplegia and aphasia (PEG tube for TFs) presented with lethargy and a congested cough. He recently recovered from a URI, but progressive renal dysfunction (BUN/Cr 68/3.3) and volume overload were noted. CXR revealed pulmonary vascular congestion, and BNP was elevated at 6600. He was treated with IV Lasix and will discharge on torsemide. Goals of care were discussed extensively, and the patient's code status was changed to DNR with allowance for temporary intubation. He remains somnolent, likely due to a combination of his chronic medical conditions and recent illness.     History of Present Illness: The patient, with a significant medical history including hypertension, hyperlipidemia, type 2 diabetes mellitus, CKD stage 3b, chronic neurogenic bladder, normocytic anemia, and prior cerebrovascular accident (CVA) with resultant left hemiplegia and aphasia, presented with lethargy and a congested cough. He had recently recovered from an upper respiratory infection (URI) but exhibited progressive renal dysfunction and signs of volume overload.    Renal and Volume Status: The patient was noted to have elevated BUN/Creatinine levels (68/3.3) and pulmonary vascular congestion on chest X-ray, along with an elevated BNP of 6600. He was treated with IV Lasix, leading to planned discharge with torsemide to manage volume status. Renal function and electrolytes will be  monitored closely.        Discharge Diagnoses:  Volume overload/ALEX on CKD stage 3b-4  NSTEMI (Type 2)  Progressive CKD  History of CVA with left hemiplegia, aphasia, and dysphagia  Normocytic anemia  Hypertension  Hyperlipidemia  Type 2 diabetes mellitus  Electrolyte imbalances (hypokalemia, hypocalcemia)  Elevated liver function tests  Code Status:  DNR, with allowance for temporary intubation.  Disposition: The patient has returned to baseline, fully alert, with no concerns expressed by the family. Discharged home with instructions to monitor for any signs of deterioration, given the high risk for readmission and continued decline. Encouraged outpatient follow-up for goals of care discussions and POLST completion.        This morning 1/18 patient back to baseline fully awake, daughter in the room had no concern with patient going home discharge summary and orders done by Dr. De Santiago yesterday please refer to the discharge summary        Medically Ready for Discharge: Ready Now       Entered: Melchor Noel MD 01/18/2025, 7:44 AM                      Physical Exam:   Physical Exam   Temp: 98  F (36.7  C) Temp src: Axillary BP: (!) 159/68 Pulse: 68   Resp: 16 SpO2: 96 % O2 Device: None (Room air)    Vitals:    01/16/25 0715 01/17/25 0516 01/18/25 0646   Weight: 65 kg (143 lb 4.8 oz) 65.5 kg (144 lb 6.4 oz) 66.5 kg (146 lb 9.7 oz)     I/O last 3 completed shifts:  In: 1584 [I.V.:6; NG/GT:1060]  Out: 1400 [Urine:1400]          GENERAL:  Comfortable.   EYES: PERRLA, Normal conjunctiva.  HEART:  Normal S1, S2 with no edema.  LUNGS:  Clear to auscultation, normal Respiratory effort.  ABDOMEN:  Soft, no hepatosplenomegaly, normal bowel sounds.    Medications   Current Facility-Administered Medications   Medication Dose Route Frequency Provider Last Rate Last Admin    dextrose 10% infusion   Intravenous Continuous PRN Demetrius De Santiago MD         Current Facility-Administered Medications   Medication Dose Route Frequency  Provider Last Rate Last Admin    amLODIPine (NORVASC) tablet 10 mg  10 mg Per G Tube Daily Yvonne Matias MD        artificial saliva (BIOTENE MT) solution 1 spray  1 spray Mouth/Throat 4x Daily Amy Parmar MD   1 spray at 01/16/25 1215    atorvastatin (LIPITOR) tablet 80 mg  80 mg Per G Tube Daily Amy Parmar MD   80 mg at 01/17/25 0931    clopidogrel (PLAVIX) tablet 75 mg  75 mg Oral or Feeding Tube Daily Amy Parmar MD   75 mg at 01/17/25 0932    doxazosin (CARDURA) tablet 2 mg  2 mg Oral or Feeding Tube Daily Amy Parmar MD   2 mg at 01/17/25 0933    FLUoxetine (PROzac) solution 20 mg  20 mg Oral or Feeding Tube Daily Amy Parmar MD   20 mg at 01/17/25 0934    insulin aspart (NovoLOG) injection (RAPID ACTING)  1-7 Units Subcutaneous Q4H Caleb Wright MD   1 Units at 01/18/25 0415    insulin glargine (LANTUS PEN) injection 9 Units  9 Units Subcutaneous QAM Amy Parmar MD   9 Units at 01/17/25 0957    iron sucrose (VENOFER) 200 mg in sodium chloride 0.9 % 120 mL intermittent infusion  200 mg Intravenous Q24H Yvonne Matias  mL/hr at 01/17/25 1333 200 mg at 01/17/25 1333    miconazole (MICATIN) 2 % cream   Topical BID Amy Parmar MD   Given at 01/17/25 2119    polyethylene glycol (MIRALAX) Packet 17 g  17 g Per G Tube Daily Amy Parmar MD        sevelamer carbonate (RENVELA) Packet 0.8 g  0.8 g Per G Tube TID w/meals Yvonne Matias MD   0.8 g at 01/17/25 1812    sodium chloride (PF) 0.9% PF flush 3 mL  3 mL Intracatheter Q8H Amy Parmar MD   3 mL at 01/18/25 0410    torsemide (DEMADEX) tablet 20 mg  20 mg Per G Tube Daily Amy Parmar MD   20 mg at 01/17/25 0931     Data     -Data reviewed today:  I personally reviewed  all new labs and imaging results over the last 24 hours.    Recent Labs   Lab 01/18/25  0632 01/17/25  0723 01/15/25  0840   WBC 5.3 5.9 5.9   HGB 8.3* 8.5* 8.8*   HCT 25.5* 26.4* 27.0*   MCV 83 82 84    177 171     Recent Labs   Lab 01/18/25  0811  01/18/25  0406 01/18/25  0134 01/17/25  0904 01/17/25  0723 01/16/25  0830 01/16/25  0642 01/15/25  1219 01/15/25  0840   NA  --   --   --   --  136  --  134*  --  135   POTASSIUM  --   --   --   --  3.1*  --  3.3*  --  3.4   CHLORIDE  --   --   --   --  97*  --  97*  --  96*   CO2  --   --   --   --  26  --  27  --  26   ANIONGAP  --   --   --   --  13  --  10  --  13   * 158* 146*   < > 208*   < > 136*   < > 206*   BUN  --   --   --   --  67.9*  --  67.6*  --  67.8*   CR  --   --   --   --  3.51*  --  3.42*  --  3.39*   GFRESTIMATED  --   --   --   --  17*  --  18*  --  18*   ARLETH  --   --   --   --  8.5*  --  8.6*  --  8.1*    < > = values in this interval not displayed.       No results found for this or any previous visit (from the past 24 hours).    This document was produced using voice recognition software

## 2025-01-19 PROCEDURE — B4036 ENTERAL FEED SUP KIT GRAV BY: HCPCS

## 2025-01-20 ENCOUNTER — PATIENT OUTREACH (OUTPATIENT)
Dept: CARE COORDINATION | Facility: CLINIC | Age: 75
End: 2025-01-20
Payer: MEDICARE

## 2025-01-20 LAB
BACTERIA BLD CULT: NO GROWTH
BACTERIA BLD CULT: NO GROWTH

## 2025-01-20 PROCEDURE — B4036 ENTERAL FEED SUP KIT GRAV BY: HCPCS

## 2025-01-20 NOTE — PROGRESS NOTES
[facility]: [facility]    Background: Transitional Care Management program identified per system criteria and reviewed by [facility] team for possible outreach.    Assessment: Upon chart review, [facility] Team member will not proceed with patient outreach related to this episode of Transitional Care Management program due to reason below:    Patient has a follow up appointment with an appropriate provider today for hospital discharge    Plan: Transitional Care Management episode addressed appropriately per reason noted above.      GERI Graves  [facility] [place]    *Connected Care Resource Team does NOT follow patient ongoing. Referrals are identified based on internal discharge reports and the outreach is to ensure patient has an understanding of their discharge instructions.

## 2025-01-21 PROCEDURE — B4036 ENTERAL FEED SUP KIT GRAV BY: HCPCS

## 2025-01-22 PROCEDURE — B4036 ENTERAL FEED SUP KIT GRAV BY: HCPCS

## 2025-01-23 ENCOUNTER — LAB REQUISITION (OUTPATIENT)
Dept: LAB | Facility: CLINIC | Age: 75
End: 2025-01-23
Payer: MEDICARE

## 2025-01-23 DIAGNOSIS — N18.4 CHRONIC KIDNEY DISEASE, STAGE 4 (SEVERE) (H): ICD-10-CM

## 2025-01-23 DIAGNOSIS — N17.9 ACUTE KIDNEY FAILURE, UNSPECIFIED: ICD-10-CM

## 2025-01-23 LAB
ANION GAP SERPL CALCULATED.3IONS-SCNC: 12 MMOL/L (ref 7–15)
BASOPHILS # BLD AUTO: 0.1 10E3/UL (ref 0–0.2)
BASOPHILS NFR BLD AUTO: 1 %
BUN SERPL-MCNC: 81.9 MG/DL (ref 8–23)
CALCIUM SERPL-MCNC: 9.4 MG/DL (ref 8.8–10.4)
CHLORIDE SERPL-SCNC: 92 MMOL/L (ref 98–107)
CREAT SERPL-MCNC: 4.19 MG/DL (ref 0.67–1.17)
EGFRCR SERPLBLD CKD-EPI 2021: 14 ML/MIN/1.73M2
EOSINOPHIL # BLD AUTO: 0.4 10E3/UL (ref 0–0.7)
EOSINOPHIL NFR BLD AUTO: 6 %
ERYTHROCYTE [DISTWIDTH] IN BLOOD BY AUTOMATED COUNT: 14.5 % (ref 10–15)
GLUCOSE SERPL-MCNC: 213 MG/DL (ref 70–99)
HCO3 SERPL-SCNC: 29 MMOL/L (ref 22–29)
HCT VFR BLD AUTO: 26.5 % (ref 40–53)
HGB BLD-MCNC: 8.5 G/DL (ref 13.3–17.7)
IMM GRANULOCYTES # BLD: 0.1 10E3/UL
IMM GRANULOCYTES NFR BLD: 1 %
LYMPHOCYTES # BLD AUTO: 1.3 10E3/UL (ref 0.8–5.3)
LYMPHOCYTES NFR BLD AUTO: 20 %
MCH RBC QN AUTO: 26.4 PG (ref 26.5–33)
MCHC RBC AUTO-ENTMCNC: 32.1 G/DL (ref 31.5–36.5)
MCV RBC AUTO: 82 FL (ref 78–100)
MONOCYTES # BLD AUTO: 0.7 10E3/UL (ref 0–1.3)
MONOCYTES NFR BLD AUTO: 11 %
NEUTROPHILS # BLD AUTO: 3.8 10E3/UL (ref 1.6–8.3)
NEUTROPHILS NFR BLD AUTO: 61 %
NRBC # BLD AUTO: 0 10E3/UL
NRBC BLD AUTO-RTO: 0 /100
PLATELET # BLD AUTO: 199 10E3/UL (ref 150–450)
POTASSIUM SERPL-SCNC: 2.9 MMOL/L (ref 3.4–5.3)
RBC # BLD AUTO: 3.22 10E6/UL (ref 4.4–5.9)
SODIUM SERPL-SCNC: 133 MMOL/L (ref 135–145)
WBC # BLD AUTO: 6.2 10E3/UL (ref 4–11)

## 2025-01-23 PROCEDURE — 85025 COMPLETE CBC W/AUTO DIFF WBC: CPT | Mod: ORL | Performed by: INTERNAL MEDICINE

## 2025-01-23 PROCEDURE — B4036 ENTERAL FEED SUP KIT GRAV BY: HCPCS

## 2025-01-23 PROCEDURE — 80048 BASIC METABOLIC PNL TOTAL CA: CPT | Mod: ORL | Performed by: INTERNAL MEDICINE

## 2025-01-24 PROCEDURE — B4036 ENTERAL FEED SUP KIT GRAV BY: HCPCS

## 2025-01-25 PROCEDURE — B4036 ENTERAL FEED SUP KIT GRAV BY: HCPCS

## 2025-01-26 PROCEDURE — B4036 ENTERAL FEED SUP KIT GRAV BY: HCPCS

## 2025-01-27 ENCOUNTER — HOME INFUSION BILLING (OUTPATIENT)
Dept: HOME HEALTH SERVICES | Facility: HOME HEALTH | Age: 75
End: 2025-01-27
Payer: MEDICARE

## 2025-01-27 PROCEDURE — B4036 ENTERAL FEED SUP KIT GRAV BY: HCPCS

## 2025-01-27 PROCEDURE — A6402 STERILE GAUZE <= 16 SQ IN: HCPCS

## 2025-01-27 PROCEDURE — A4450 NON-WATERPROOF TAPE: HCPCS

## 2025-01-27 PROCEDURE — B4154 EF SPEC METABOLIC NONINHERIT: HCPCS

## 2025-01-28 PROCEDURE — B4036 ENTERAL FEED SUP KIT GRAV BY: HCPCS

## 2025-01-29 PROCEDURE — B4036 ENTERAL FEED SUP KIT GRAV BY: HCPCS

## 2025-01-30 PROCEDURE — B4036 ENTERAL FEED SUP KIT GRAV BY: HCPCS

## 2025-01-31 ENCOUNTER — LAB REQUISITION (OUTPATIENT)
Dept: LAB | Facility: CLINIC | Age: 75
End: 2025-01-31
Payer: MEDICARE

## 2025-01-31 DIAGNOSIS — N18.4 CHRONIC KIDNEY DISEASE, STAGE 4 (SEVERE) (H): ICD-10-CM

## 2025-01-31 LAB
ALBUMIN SERPL BCG-MCNC: 3 G/DL (ref 3.5–5.2)
ALP SERPL-CCNC: 174 U/L (ref 40–150)
ALT SERPL W P-5'-P-CCNC: 44 U/L (ref 0–70)
ANION GAP SERPL CALCULATED.3IONS-SCNC: 17 MMOL/L (ref 7–15)
AST SERPL W P-5'-P-CCNC: 45 U/L (ref 0–45)
BILIRUB DIRECT SERPL-MCNC: <0.2 MG/DL (ref 0–0.3)
BILIRUB SERPL-MCNC: 0.2 MG/DL
BUN SERPL-MCNC: 82.7 MG/DL (ref 8–23)
CALCIUM SERPL-MCNC: 9.3 MG/DL (ref 8.8–10.4)
CHLORIDE SERPL-SCNC: 92 MMOL/L (ref 98–107)
CREAT SERPL-MCNC: 3.82 MG/DL (ref 0.67–1.17)
EGFRCR SERPLBLD CKD-EPI 2021: 16 ML/MIN/1.73M2
ERYTHROCYTE [DISTWIDTH] IN BLOOD BY AUTOMATED COUNT: 14.3 % (ref 10–15)
GLUCOSE SERPL-MCNC: 151 MG/DL (ref 70–99)
HCO3 SERPL-SCNC: 26 MMOL/L (ref 22–29)
HCT VFR BLD AUTO: 30.5 % (ref 40–53)
HGB BLD-MCNC: 9.9 G/DL (ref 13.3–17.7)
MCH RBC QN AUTO: 26.5 PG (ref 26.5–33)
MCHC RBC AUTO-ENTMCNC: 32.5 G/DL (ref 31.5–36.5)
MCV RBC AUTO: 82 FL (ref 78–100)
PLATELET # BLD AUTO: 222 10E3/UL (ref 150–450)
POTASSIUM SERPL-SCNC: 2.7 MMOL/L (ref 3.4–5.3)
PROT SERPL-MCNC: 7.6 G/DL (ref 6.4–8.3)
RBC # BLD AUTO: 3.74 10E6/UL (ref 4.4–5.9)
SODIUM SERPL-SCNC: 135 MMOL/L (ref 135–145)
WBC # BLD AUTO: 7.8 10E3/UL (ref 4–11)

## 2025-01-31 PROCEDURE — B4036 ENTERAL FEED SUP KIT GRAV BY: HCPCS

## 2025-01-31 PROCEDURE — 85027 COMPLETE CBC AUTOMATED: CPT | Mod: ORL | Performed by: INTERNAL MEDICINE

## 2025-01-31 PROCEDURE — 82248 BILIRUBIN DIRECT: CPT | Mod: ORL | Performed by: INTERNAL MEDICINE

## 2025-02-01 PROCEDURE — B4036 ENTERAL FEED SUP KIT GRAV BY: HCPCS

## 2025-02-02 PROCEDURE — B4036 ENTERAL FEED SUP KIT GRAV BY: HCPCS

## 2025-02-03 PROCEDURE — B4036 ENTERAL FEED SUP KIT GRAV BY: HCPCS

## 2025-02-04 PROCEDURE — B4036 ENTERAL FEED SUP KIT GRAV BY: HCPCS

## 2025-02-05 PROCEDURE — B4036 ENTERAL FEED SUP KIT GRAV BY: HCPCS

## 2025-02-06 PROCEDURE — B4036 ENTERAL FEED SUP KIT GRAV BY: HCPCS

## 2025-02-07 PROCEDURE — B4036 ENTERAL FEED SUP KIT GRAV BY: HCPCS

## 2025-02-08 PROCEDURE — B4036 ENTERAL FEED SUP KIT GRAV BY: HCPCS

## 2025-02-09 PROCEDURE — B4036 ENTERAL FEED SUP KIT GRAV BY: HCPCS

## 2025-02-10 PROCEDURE — B4036 ENTERAL FEED SUP KIT GRAV BY: HCPCS

## 2025-02-11 ENCOUNTER — HOSPITAL ENCOUNTER (EMERGENCY)
Facility: CLINIC | Age: 75
Discharge: HOME | End: 2025-02-11
Attending: STUDENT IN AN ORGANIZED HEALTH CARE EDUCATION/TRAINING PROGRAM | Admitting: STUDENT IN AN ORGANIZED HEALTH CARE EDUCATION/TRAINING PROGRAM
Payer: MEDICARE

## 2025-02-11 ENCOUNTER — APPOINTMENT (OUTPATIENT)
Dept: GENERAL RADIOLOGY | Facility: CLINIC | Age: 75
End: 2025-02-11
Attending: RADIOLOGY
Payer: MEDICARE

## 2025-02-11 VITALS
RESPIRATION RATE: 18 BRPM | SYSTOLIC BLOOD PRESSURE: 141 MMHG | TEMPERATURE: 97.6 F | HEART RATE: 81 BPM | OXYGEN SATURATION: 95 % | DIASTOLIC BLOOD PRESSURE: 71 MMHG

## 2025-02-11 DIAGNOSIS — T85.528A DISLODGED GASTROSTOMY TUBE: Primary | ICD-10-CM

## 2025-02-11 PROBLEM — D63.1 ANEMIA IN CHRONIC KIDNEY DISEASE: Status: ACTIVE | Noted: 2025-02-10

## 2025-02-11 PROBLEM — N18.4 STAGE 4 CHRONIC KIDNEY DISEASE (H): Status: ACTIVE | Noted: 2025-02-10

## 2025-02-11 PROBLEM — Z97.8 CHRONIC INDWELLING FOLEY CATHETER: Status: ACTIVE | Noted: 2021-11-22

## 2025-02-11 PROBLEM — N18.9 ANEMIA IN CHRONIC KIDNEY DISEASE: Status: ACTIVE | Noted: 2025-02-10

## 2025-02-11 PROBLEM — E87.6 HYPOKALEMIA: Status: ACTIVE | Noted: 2025-02-10

## 2025-02-11 PROBLEM — E11.21 DIABETIC NEPHROPATHY (H): Status: ACTIVE | Noted: 2025-02-10

## 2025-02-11 PROCEDURE — 99285 EMERGENCY DEPT VISIT HI MDM: CPT | Mod: 25

## 2025-02-11 PROCEDURE — 250N000009 HC RX 250: Performed by: RADIOLOGY

## 2025-02-11 PROCEDURE — 96372 THER/PROPH/DIAG INJ SC/IM: CPT | Performed by: RADIOLOGY

## 2025-02-11 PROCEDURE — 250N000011 HC RX IP 250 OP 636: Performed by: STUDENT IN AN ORGANIZED HEALTH CARE EDUCATION/TRAINING PROGRAM

## 2025-02-11 PROCEDURE — 96372 THER/PROPH/DIAG INJ SC/IM: CPT | Performed by: STUDENT IN AN ORGANIZED HEALTH CARE EDUCATION/TRAINING PROGRAM

## 2025-02-11 PROCEDURE — B4036 ENTERAL FEED SUP KIT GRAV BY: HCPCS

## 2025-02-11 PROCEDURE — C1769 GUIDE WIRE: HCPCS

## 2025-02-11 RX ORDER — LIDOCAINE HYDROCHLORIDE 10 MG/ML
1-30 INJECTION, SOLUTION EPIDURAL; INFILTRATION; INTRACAUDAL; PERINEURAL ONCE
Status: COMPLETED | OUTPATIENT
Start: 2025-02-11 | End: 2025-02-11

## 2025-02-11 RX ORDER — CALCIUM ACETATE 667 MG/1
667 TABLET ORAL
Qty: 90 TABLET | Refills: 0 | Status: SHIPPED | OUTPATIENT
Start: 2025-02-11 | End: 2025-03-13

## 2025-02-11 RX ORDER — KETOROLAC TROMETHAMINE 15 MG/ML
15 INJECTION, SOLUTION INTRAMUSCULAR; INTRAVENOUS ONCE
Status: COMPLETED | OUTPATIENT
Start: 2025-02-11 | End: 2025-02-11

## 2025-02-11 RX ADMIN — LIDOCAINE HYDROCHLORIDE 10 ML: 10 INJECTION, SOLUTION EPIDURAL; INFILTRATION; INTRACAUDAL; PERINEURAL at 15:58

## 2025-02-11 RX ADMIN — KETOROLAC TROMETHAMINE 15 MG: 15 INJECTION, SOLUTION INTRAMUSCULAR; INTRAVENOUS at 16:56

## 2025-02-11 ASSESSMENT — ACTIVITIES OF DAILY LIVING (ADL)
ADLS_ACUITY_SCORE: 68

## 2025-02-11 ASSESSMENT — COLUMBIA-SUICIDE SEVERITY RATING SCALE - C-SSRS
6. HAVE YOU EVER DONE ANYTHING, STARTED TO DO ANYTHING, OR PREPARED TO DO ANYTHING TO END YOUR LIFE?: NO
2. HAVE YOU ACTUALLY HAD ANY THOUGHTS OF KILLING YOURSELF IN THE PAST MONTH?: NO
1. IN THE PAST MONTH, HAVE YOU WISHED YOU WERE DEAD OR WISHED YOU COULD GO TO SLEEP AND NOT WAKE UP?: NO

## 2025-02-11 NOTE — ED PROVIDER NOTES
Emergency Department Note      History of Present Illness     Chief Complaint   Gtube Problem      HPI   Jimmy Otto is a very pleasant 75 year old male with history of aphasia, history of CVA, G-tube dependence, presenting with G-tube problem.  Family were attempting to inject sevelamer through the patient's G-tube.  It got clogged.  His wife was attempting to unclog the tube but accidentally pulled it out.  Family noticed that the tube is now missing his balloon.  Patient is nonverbal at baseline and is acting at baseline per family.  Patient's had a G-tube tract for about 3 years.  This one was last replaced about 2 weeks ago.    Independent Historian   Daughter as detailed above.    Review of External Notes   I personally reviewed notes from the patient's progress note dated  1/30/2025 . This provided me with information regarding  recent tube change .     I personally reviewed the patient's chart, including available medication list and available past medical history, past surgical history, family history, and social history.    Physical Exam     Patient Vitals for the past 24 hrs:   BP Temp Temp src Pulse Resp SpO2   02/11/25 1455 -- -- -- -- -- 96 %   02/11/25 1440 -- -- -- -- -- 95 %   02/11/25 1425 -- -- -- -- -- 96 %   02/11/25 1423 -- -- -- 72 -- 97 %   02/11/25 1410 -- -- -- -- -- 97 %   02/11/25 1408 -- -- -- -- -- 96 %   02/11/25 1401 -- 97.6  F (36.4  C) Temporal -- 18 96 %   02/11/25 1358 138/64 -- -- 109 -- --     Physical Exam      Diagnostics     Lab Results   Labs Ordered and Resulted from Time of ED Arrival to Time of ED Departure - No data to display    Imaging   XR Gastrostomy Tube Replacement    (Results Pending)       EKG   No ECG performed.     Independent Interpretation - See ED Course Below    ED Course      Medications Administered   Medications - No data to display    Procedures   Procedures     Replacement of Gastrostomy Tube     Procedure: Replacement of Gastrostomy Tube    Consent:  Verbal    Indication: Dislodged tube     Procedure Detail:  Viscous lidocaine was applied.   A 14 fr gastric tube was lubricated and I attempted to advance tube through the tract.  This was unsuccessful.  The tube would not advance.    Patient Status: The patient tolerated the procedure well: Yes.  The procedure was unsuccessful.      Discussion of Management - See ED Course Below    ED Course   Independent Interpretation / Discussion of Management / Repeat Assessments       Additional Documentation  None    Medical Decision Making / Diagnosis     CMS Diagnoses: None    MIPS       None    MDM   Patient presenting with dislodged feeding tube.  No other concerns at this time.  I attempted to replace tube at bedside.  This was unsuccessful.  We therefore requested IR perform this procedure, which they were able to do.  They requested I send calcium acetate to the IV pharmacy as they were not able to pick this up at the pharmacy the prescription was sent to. I did this.    Disposition   The patient was discharged.     Diagnosis     ICD-10-CM    1. Dislodged gastrostomy tube  T85.528A            Discharge Medications   New Prescriptions    No medications on file        Peter Dennison MD  02/11/25 4907

## 2025-02-11 NOTE — PROGRESS NOTES
Patient tolerated exchange of 14 [place] of [place] gastrostomy tube well by Dr Antoine.  Location verified with xray and contrast.  Daughter signed for patient.  OK to use now.

## 2025-02-11 NOTE — ED TRIAGE NOTES
Pt arrives via EMS from home d/t g-tube dislodgement. Per EMS, wife was trying to unclog tube earlier today with [employer] but accidentally pulled tube out. Placed 2 x2 over opening. Sent old BEVERLEY 14 Fr along. Pt bed-bound, non-verbal, acting at baseline per wife. Pt will give thumbs up or down with L hand to communicate. ABC intact.

## 2025-02-12 ENCOUNTER — LAB REQUISITION (OUTPATIENT)
Dept: LAB | Facility: CLINIC | Age: 75
End: 2025-02-12
Payer: MEDICARE

## 2025-02-12 DIAGNOSIS — N18.4 CHRONIC KIDNEY DISEASE, STAGE 4 (SEVERE) (H): ICD-10-CM

## 2025-02-12 DIAGNOSIS — E87.6 HYPOKALEMIA: ICD-10-CM

## 2025-02-12 LAB
ALBUMIN SERPL BCG-MCNC: 3.1 G/DL (ref 3.5–5.2)
ANION GAP SERPL CALCULATED.3IONS-SCNC: 11 MMOL/L (ref 7–15)
BUN SERPL-MCNC: 97.8 MG/DL (ref 8–23)
CALCIUM SERPL-MCNC: 8.9 MG/DL (ref 8.8–10.4)
CHLORIDE SERPL-SCNC: 90 MMOL/L (ref 98–107)
CREAT SERPL-MCNC: 3.93 MG/DL (ref 0.67–1.17)
EGFRCR SERPLBLD CKD-EPI 2021: 15 ML/MIN/1.73M2
GLUCOSE SERPL-MCNC: 150 MG/DL (ref 70–99)
HCO3 SERPL-SCNC: 28 MMOL/L (ref 22–29)
PHOSPHATE SERPL-MCNC: 3.3 MG/DL (ref 2.5–4.5)
POTASSIUM SERPL-SCNC: 4.2 MMOL/L (ref 3.4–5.3)
SODIUM SERPL-SCNC: 129 MMOL/L (ref 135–145)

## 2025-02-12 PROCEDURE — B4036 ENTERAL FEED SUP KIT GRAV BY: HCPCS

## 2025-02-12 PROCEDURE — 80069 RENAL FUNCTION PANEL: CPT | Mod: ORL | Performed by: INTERNAL MEDICINE

## 2025-02-13 PROCEDURE — B4036 ENTERAL FEED SUP KIT GRAV BY: HCPCS

## 2025-02-14 PROCEDURE — B4036 ENTERAL FEED SUP KIT GRAV BY: HCPCS

## 2025-02-15 PROCEDURE — B4036 ENTERAL FEED SUP KIT GRAV BY: HCPCS

## 2025-02-16 ENCOUNTER — APPOINTMENT (OUTPATIENT)
Dept: GENERAL RADIOLOGY | Facility: CLINIC | Age: 75
End: 2025-02-16
Attending: EMERGENCY MEDICINE
Payer: MEDICARE

## 2025-02-16 ENCOUNTER — HOSPITAL ENCOUNTER (INPATIENT)
Facility: CLINIC | Age: 75
End: 2025-02-16
Attending: EMERGENCY MEDICINE | Admitting: INTERNAL MEDICINE
Payer: MEDICARE

## 2025-02-16 DIAGNOSIS — N35.919 URETHRAL SPASM: ICD-10-CM

## 2025-02-16 DIAGNOSIS — I10 ESSENTIAL HYPERTENSION: ICD-10-CM

## 2025-02-16 DIAGNOSIS — N18.9 ACUTE RENAL FAILURE SUPERIMPOSED ON CHRONIC KIDNEY DISEASE, UNSPECIFIED ACUTE RENAL FAILURE TYPE, UNSPECIFIED CKD STAGE: ICD-10-CM

## 2025-02-16 DIAGNOSIS — N17.9 ACUTE RENAL FAILURE SUPERIMPOSED ON CHRONIC KIDNEY DISEASE, UNSPECIFIED ACUTE RENAL FAILURE TYPE, UNSPECIFIED CKD STAGE: ICD-10-CM

## 2025-02-16 DIAGNOSIS — I69.90 LATE EFFECTS OF CVA (CEREBROVASCULAR ACCIDENT): Primary | ICD-10-CM

## 2025-02-16 DIAGNOSIS — R00.1 JUNCTIONAL BRADYCARDIA: ICD-10-CM

## 2025-02-16 DIAGNOSIS — I63.50 CEREBRAL ARTERY OCCLUSION WITH CEREBRAL INFARCTION (H): ICD-10-CM

## 2025-02-16 DIAGNOSIS — D50.0 IRON DEFICIENCY ANEMIA DUE TO CHRONIC BLOOD LOSS: ICD-10-CM

## 2025-02-16 DIAGNOSIS — E87.70 HYPERVOLEMIA, UNSPECIFIED HYPERVOLEMIA TYPE: ICD-10-CM

## 2025-02-16 DIAGNOSIS — E87.5 HYPERKALEMIA: ICD-10-CM

## 2025-02-16 DIAGNOSIS — E87.6 HYPOKALEMIA: ICD-10-CM

## 2025-02-16 DIAGNOSIS — E11.21 DIABETIC NEPHROPATHY ASSOCIATED WITH TYPE 2 DIABETES MELLITUS (H): ICD-10-CM

## 2025-02-16 DIAGNOSIS — N18.4 STAGE 4 CHRONIC KIDNEY DISEASE (H): ICD-10-CM

## 2025-02-16 DIAGNOSIS — R13.11 DYSPHAGIA, ORAL PHASE: ICD-10-CM

## 2025-02-16 DIAGNOSIS — E43 SEVERE PROTEIN-CALORIE MALNUTRITION: ICD-10-CM

## 2025-02-16 DIAGNOSIS — K21.00 GASTROESOPHAGEAL REFLUX DISEASE WITH ESOPHAGITIS WITHOUT HEMORRHAGE: ICD-10-CM

## 2025-02-16 DIAGNOSIS — J10.1 INFLUENZA A: ICD-10-CM

## 2025-02-16 LAB
ALBUMIN SERPL BCG-MCNC: 3.1 G/DL (ref 3.5–5.2)
ALP SERPL-CCNC: 127 U/L (ref 40–150)
ALT SERPL W P-5'-P-CCNC: 39 U/L (ref 0–70)
ANION GAP SERPL CALCULATED.3IONS-SCNC: 11 MMOL/L (ref 7–15)
AST SERPL W P-5'-P-CCNC: ABNORMAL U/L
ATRIAL RATE - MUSE: NORMAL BPM
BASOPHILS # BLD AUTO: 0 10E3/UL (ref 0–0.2)
BASOPHILS NFR BLD AUTO: 1 %
BILIRUB SERPL-MCNC: 0.2 MG/DL
BUN SERPL-MCNC: 111.3 MG/DL (ref 8–23)
CALCIUM SERPL-MCNC: 9.2 MG/DL (ref 8.8–10.4)
CHLORIDE SERPL-SCNC: 91 MMOL/L (ref 98–107)
CREAT SERPL-MCNC: 4.26 MG/DL (ref 0.67–1.17)
DIASTOLIC BLOOD PRESSURE - MUSE: NORMAL MMHG
EGFRCR SERPLBLD CKD-EPI 2021: 14 ML/MIN/1.73M2
EOSINOPHIL # BLD AUTO: 0.1 10E3/UL (ref 0–0.7)
EOSINOPHIL NFR BLD AUTO: 1 %
ERYTHROCYTE [DISTWIDTH] IN BLOOD BY AUTOMATED COUNT: 14.7 % (ref 10–15)
FLUAV RNA SPEC QL NAA+PROBE: POSITIVE
FLUBV RNA RESP QL NAA+PROBE: NEGATIVE
GLUCOSE SERPL-MCNC: 255 MG/DL (ref 70–99)
HCO3 SERPL-SCNC: 25 MMOL/L (ref 22–29)
HCT VFR BLD AUTO: 25.3 % (ref 40–53)
HGB BLD-MCNC: 8.1 G/DL (ref 13.3–17.7)
IMM GRANULOCYTES # BLD: 0 10E3/UL
IMM GRANULOCYTES NFR BLD: 0 %
INTERPRETATION ECG - MUSE: NORMAL
LACTATE SERPL-SCNC: 1.6 MMOL/L (ref 0.7–2)
LYMPHOCYTES # BLD AUTO: 0.9 10E3/UL (ref 0.8–5.3)
LYMPHOCYTES NFR BLD AUTO: 13 %
MAGNESIUM SERPL-MCNC: 3.1 MG/DL (ref 1.7–2.3)
MCH RBC QN AUTO: 26.4 PG (ref 26.5–33)
MCHC RBC AUTO-ENTMCNC: 32 G/DL (ref 31.5–36.5)
MCV RBC AUTO: 82 FL (ref 78–100)
MONOCYTES # BLD AUTO: 0.6 10E3/UL (ref 0–1.3)
MONOCYTES NFR BLD AUTO: 8 %
NEUTROPHILS # BLD AUTO: 5.5 10E3/UL (ref 1.6–8.3)
NEUTROPHILS NFR BLD AUTO: 77 %
NRBC # BLD AUTO: 0 10E3/UL
NRBC BLD AUTO-RTO: 0 /100
NT-PROBNP SERPL-MCNC: ABNORMAL PG/ML (ref 0–900)
P AXIS - MUSE: NORMAL DEGREES
PLATELET # BLD AUTO: 174 10E3/UL (ref 150–450)
POTASSIUM SERPL-SCNC: 6.2 MMOL/L (ref 3.4–5.3)
PR INTERVAL - MUSE: NORMAL MS
PROT SERPL-MCNC: 6.8 G/DL (ref 6.4–8.3)
QRS DURATION - MUSE: 82 MS
QT - MUSE: 528 MS
QTC - MUSE: 446 MS
R AXIS - MUSE: -1 DEGREES
RBC # BLD AUTO: 3.07 10E6/UL (ref 4.4–5.9)
RSV RNA SPEC NAA+PROBE: NEGATIVE
SARS-COV-2 RNA RESP QL NAA+PROBE: NEGATIVE
SODIUM SERPL-SCNC: 127 MMOL/L (ref 135–145)
SYSTOLIC BLOOD PRESSURE - MUSE: NORMAL MMHG
T AXIS - MUSE: 49 DEGREES
T4 FREE SERPL-MCNC: 1.47 NG/DL (ref 0.9–1.7)
TROPONIN T SERPL HS-MCNC: 106 NG/L
TSH SERPL DL<=0.005 MIU/L-ACNC: 8.72 UIU/ML (ref 0.3–4.2)
VENTRICULAR RATE- MUSE: 43 BPM
WBC # BLD AUTO: 7.1 10E3/UL (ref 4–11)

## 2025-02-16 PROCEDURE — 250N000011 HC RX IP 250 OP 636: Performed by: EMERGENCY MEDICINE

## 2025-02-16 PROCEDURE — 84155 ASSAY OF PROTEIN SERUM: CPT | Performed by: EMERGENCY MEDICINE

## 2025-02-16 PROCEDURE — 84484 ASSAY OF TROPONIN QUANT: CPT | Performed by: EMERGENCY MEDICINE

## 2025-02-16 PROCEDURE — 93005 ELECTROCARDIOGRAM TRACING: CPT

## 2025-02-16 PROCEDURE — 84443 ASSAY THYROID STIM HORMONE: CPT | Performed by: EMERGENCY MEDICINE

## 2025-02-16 PROCEDURE — 250N000013 HC RX MED GY IP 250 OP 250 PS 637: Performed by: EMERGENCY MEDICINE

## 2025-02-16 PROCEDURE — 83036 HEMOGLOBIN GLYCOSYLATED A1C: CPT | Performed by: HOSPITALIST

## 2025-02-16 PROCEDURE — B4036 ENTERAL FEED SUP KIT GRAV BY: HCPCS

## 2025-02-16 PROCEDURE — 71045 X-RAY EXAM CHEST 1 VIEW: CPT

## 2025-02-16 PROCEDURE — 83605 ASSAY OF LACTIC ACID: CPT | Performed by: EMERGENCY MEDICINE

## 2025-02-16 PROCEDURE — 80048 BASIC METABOLIC PNL TOTAL CA: CPT | Performed by: INTERNAL MEDICINE

## 2025-02-16 PROCEDURE — 83735 ASSAY OF MAGNESIUM: CPT | Performed by: EMERGENCY MEDICINE

## 2025-02-16 PROCEDURE — 84075 ASSAY ALKALINE PHOSPHATASE: CPT | Performed by: EMERGENCY MEDICINE

## 2025-02-16 PROCEDURE — 84439 ASSAY OF FREE THYROXINE: CPT | Performed by: EMERGENCY MEDICINE

## 2025-02-16 PROCEDURE — 87637 SARSCOV2&INF A&B&RSV AMP PRB: CPT | Performed by: EMERGENCY MEDICINE

## 2025-02-16 PROCEDURE — 36415 COLL VENOUS BLD VENIPUNCTURE: CPT | Performed by: INTERNAL MEDICINE

## 2025-02-16 PROCEDURE — 210N000002 HC R&B HEART CARE

## 2025-02-16 PROCEDURE — 85025 COMPLETE CBC W/AUTO DIFF WBC: CPT | Performed by: EMERGENCY MEDICINE

## 2025-02-16 PROCEDURE — 36415 COLL VENOUS BLD VENIPUNCTURE: CPT | Performed by: EMERGENCY MEDICINE

## 2025-02-16 PROCEDURE — 83880 ASSAY OF NATRIURETIC PEPTIDE: CPT | Performed by: EMERGENCY MEDICINE

## 2025-02-16 PROCEDURE — 99291 CRITICAL CARE FIRST HOUR: CPT | Mod: 25

## 2025-02-16 PROCEDURE — 99223 1ST HOSP IP/OBS HIGH 75: CPT | Mod: AI | Performed by: INTERNAL MEDICINE

## 2025-02-16 PROCEDURE — 87040 BLOOD CULTURE FOR BACTERIA: CPT | Performed by: EMERGENCY MEDICINE

## 2025-02-16 PROCEDURE — 82247 BILIRUBIN TOTAL: CPT | Performed by: EMERGENCY MEDICINE

## 2025-02-16 RX ORDER — AMOXICILLIN 250 MG
2 CAPSULE ORAL 2 TIMES DAILY PRN
Status: DISCONTINUED | OUTPATIENT
Start: 2025-02-16 | End: 2025-02-26 | Stop reason: HOSPADM

## 2025-02-16 RX ORDER — SEVELAMER CARBONATE 0.8 G/1
0.8 POWDER, FOR SUSPENSION ORAL
Status: DISCONTINUED | OUTPATIENT
Start: 2025-02-17 | End: 2025-02-18

## 2025-02-16 RX ORDER — AMLODIPINE BESYLATE 10 MG/1
10 TABLET ORAL DAILY
Status: DISCONTINUED | OUTPATIENT
Start: 2025-02-17 | End: 2025-02-17

## 2025-02-16 RX ORDER — DOXAZOSIN 1 MG/1
2 TABLET ORAL DAILY
Status: DISCONTINUED | OUTPATIENT
Start: 2025-02-17 | End: 2025-02-17

## 2025-02-16 RX ORDER — AMOXICILLIN 250 MG
1 CAPSULE ORAL 2 TIMES DAILY PRN
Status: DISCONTINUED | OUTPATIENT
Start: 2025-02-16 | End: 2025-02-26 | Stop reason: HOSPADM

## 2025-02-16 RX ORDER — FERROUS SULFATE 325(65) MG
325 TABLET ORAL 2 TIMES DAILY
Status: DISCONTINUED | OUTPATIENT
Start: 2025-02-16 | End: 2025-02-16

## 2025-02-16 RX ORDER — CLOPIDOGREL BISULFATE 75 MG/1
75 TABLET ORAL DAILY
Status: DISCONTINUED | OUTPATIENT
Start: 2025-02-17 | End: 2025-02-26 | Stop reason: HOSPADM

## 2025-02-16 RX ORDER — CALCIUM CARBONATE 500 MG/1
1000 TABLET, CHEWABLE ORAL 4 TIMES DAILY PRN
Status: DISCONTINUED | OUTPATIENT
Start: 2025-02-16 | End: 2025-02-26 | Stop reason: HOSPADM

## 2025-02-16 RX ORDER — ONDANSETRON 2 MG/ML
4 INJECTION INTRAMUSCULAR; INTRAVENOUS ONCE
Status: COMPLETED | OUTPATIENT
Start: 2025-02-16 | End: 2025-02-16

## 2025-02-16 RX ORDER — FLUOXETINE 20 MG/5ML
20 SOLUTION ORAL DAILY
Status: DISCONTINUED | OUTPATIENT
Start: 2025-02-17 | End: 2025-02-26 | Stop reason: HOSPADM

## 2025-02-16 RX ORDER — LIDOCAINE 40 MG/G
CREAM TOPICAL
Status: DISCONTINUED | OUTPATIENT
Start: 2025-02-16 | End: 2025-02-26 | Stop reason: HOSPADM

## 2025-02-16 RX ORDER — NICOTINE POLACRILEX 4 MG
15-30 LOZENGE BUCCAL
Status: DISCONTINUED | OUTPATIENT
Start: 2025-02-16 | End: 2025-02-26 | Stop reason: HOSPADM

## 2025-02-16 RX ORDER — FERROUS SULFATE 300 MG/5ML
300 LIQUID (ML) ORAL 2 TIMES DAILY
Status: DISCONTINUED | OUTPATIENT
Start: 2025-02-16 | End: 2025-02-17

## 2025-02-16 RX ORDER — FERROUS SULFATE 300 MG/5ML
325 LIQUID (ML) ORAL 2 TIMES DAILY
Status: DISCONTINUED | OUTPATIENT
Start: 2025-02-16 | End: 2025-02-16

## 2025-02-16 RX ORDER — CALCIUM ACETATE 667 MG/1
667 TABLET ORAL
Status: DISCONTINUED | OUTPATIENT
Start: 2025-02-17 | End: 2025-02-16

## 2025-02-16 RX ORDER — FUROSEMIDE 10 MG/ML
20 INJECTION INTRAMUSCULAR; INTRAVENOUS ONCE
Status: COMPLETED | OUTPATIENT
Start: 2025-02-16 | End: 2025-02-16

## 2025-02-16 RX ORDER — CALCIUM GLUCONATE 20 MG/ML
1 INJECTION, SOLUTION INTRAVENOUS ONCE
Status: COMPLETED | OUTPATIENT
Start: 2025-02-16 | End: 2025-02-16

## 2025-02-16 RX ORDER — DEXTROSE MONOHYDRATE 25 G/50ML
25-50 INJECTION, SOLUTION INTRAVENOUS
Status: DISCONTINUED | OUTPATIENT
Start: 2025-02-16 | End: 2025-02-26 | Stop reason: HOSPADM

## 2025-02-16 RX ORDER — ONDANSETRON 2 MG/ML
INJECTION INTRAMUSCULAR; INTRAVENOUS
Status: COMPLETED
Start: 2025-02-16 | End: 2025-02-16

## 2025-02-16 RX ORDER — ATORVASTATIN CALCIUM 40 MG/1
80 TABLET, FILM COATED ORAL DAILY
Status: DISCONTINUED | OUTPATIENT
Start: 2025-02-17 | End: 2025-02-17

## 2025-02-16 RX ADMIN — ONDANSETRON 4 MG: 2 INJECTION INTRAMUSCULAR; INTRAVENOUS at 17:38

## 2025-02-16 RX ADMIN — FUROSEMIDE 20 MG: 10 INJECTION, SOLUTION INTRAVENOUS at 16:40

## 2025-02-16 RX ADMIN — CALCIUM GLUCONATE 1 G: 20 INJECTION, SOLUTION INTRAVENOUS at 16:39

## 2025-02-16 RX ADMIN — ONDANSETRON 4 MG: 2 INJECTION, SOLUTION INTRAMUSCULAR; INTRAVENOUS at 17:38

## 2025-02-16 RX ADMIN — SODIUM ZIRCONIUM CYCLOSILICATE 10 G: 5 POWDER, FOR SUSPENSION ORAL at 17:51

## 2025-02-16 ASSESSMENT — ACTIVITIES OF DAILY LIVING (ADL)
ADLS_ACUITY_SCORE: 68

## 2025-02-16 NOTE — ED NOTES
[facility]  ED Nurse Handoff Report    ED Chief complaint: Bradycardia, Catheter Problem, and Urinary Retention      ED Diagnosis:   Final diagnoses:   Hypervolemia, unspecified hypervolemia type   Acute renal failure superimposed on chronic kidney disease, unspecified acute renal failure type, unspecified CKD stage   Hyperkalemia   Influenza A   Junctional bradycardia       Code Status: Inpatient team to discuss code status with patient    Allergies:   Allergies   Allergen Reactions    Nka [No Known Allergies]        Patient Story: BIBA after complaint of decreased urine output and red-tinged urine through chronic ordoñez catheter. The catheter was replaced today at 1000. Only about 100 mL out since then.  Has a G-tube.    EMS report bradycardia with rate in the 40s. 89% oxygen saturation on room air, but family denies URI symptoms. Diabetic and glucose today was 382 en route.    Hx of CVA and is at his baseline per family (right sided weakness and non-verbal).     Triage Assessment (Adult)       Row Name 02/16/25 1457          Triage Assessment    Airway WDL WDL        Respiratory WDL    Respiratory WDL WDL        Skin Circulation/Temperature WDL    Skin Circulation/Temperature WDL WDL        Cardiac WDL    Cardiac WDL X     Cardiac Rhythm SB        Peripheral/Neurovascular WDL    Peripheral Neurovascular WDL WDL        Cognitive/Neuro/Behavioral WDL    Cognitive/Neuro/Behavioral WDL WDL                         Focused Assessment:   Abnormal Labs Resulted from Time of ED Arrival to Time of ED Departure   COMPREHENSIVE METABOLIC PANEL - Abnormal       Result Value    Sodium 127 (*)     Potassium 6.2 (*)     Carbon Dioxide (CO2) 25      Anion Gap 11      Urea Nitrogen 111.3 (*)     Creatinine 4.26 (*)     GFR Estimate 14 (*)     Calcium 9.2      Chloride 91 (*)     Glucose 255 (*)     Alkaline Phosphatase 127      AST        ALT 39      Protein Total 6.8      Albumin 3.1 (*)     Bilirubin Total 0.2      TROPONIN T, HIGH SENSITIVITY - Abnormal    Troponin T, High Sensitivity 106 (*)    MAGNESIUM - Abnormal    Magnesium 3.1 (*)    INFLUENZA A/B, RSV AND SARS-COV2 PCR - Abnormal    Influenza A PCR Positive (*)     Influenza B PCR Negative      RSV PCR Negative      SARS CoV2 PCR Negative     TSH WITH FREE T4 REFLEX - Abnormal    TSH 8.72 (*)    CBC WITH PLATELETS AND DIFFERENTIAL - Abnormal    WBC Count 7.1      RBC Count 3.07 (*)     Hemoglobin 8.1 (*)     Hematocrit 25.3 (*)     MCV 82      MCH 26.4 (*)     MCHC 32.0      RDW 14.7      Platelet Count 174      % Neutrophils 77      % Lymphocytes 13      % Monocytes 8      % Eosinophils 1      % Basophils 1      % Immature Granulocytes 0      NRBCs per 100 WBC 0      Absolute Neutrophils 5.5      Absolute Lymphocytes 0.9      Absolute Monocytes 0.6      Absolute Eosinophils 0.1      Absolute Basophils 0.0      Absolute Immature Granulocytes 0.0      Absolute NRBCs 0.0     NT PROBNP INPATIENT - Abnormal    N terminal Pro BNP Inpatient 19,616 (*)        XR Chest Port 1 View   Final Result   IMPRESSION: Diffuse mixed interstitial and airspace opacities likely due to moderate pulmonary edema. Small right and trace left pleural effusions and mild bibasilar atelectasis. Mild cardiomegaly. No pneumothorax.          Treatments and/or interventions provided:   Medications   calcium gluconate 1 g in 50 mL in sodium chloride intermittent infusion (1 g Intravenous $Given 2/16/25 1639)   sodium zirconium cyclosilicate (LOKELMA) packet 10 g (has no administration in time range)     Followed by   sodium zirconium cyclosilicate (LOKELMA) packet 10 g (has no administration in time range)   furosemide (LASIX) injection 20 mg (20 mg Intravenous $Given 2/16/25 1640)       Patient's response to treatments and/or interventions: Unchanged    To be done/followed up on inpatient unit:  Follow Plan of Care, Blood work, and Meds    Does this patient have any cognitive concerns?:   Non-verbal    Activity level - Baseline/Home:  Total Care  Activity Level - Current:   Total Care    Patient's Preferred language: English   Needed?: No    Isolation: Droplet  Infection: Influenza  Patient tested for COVID 19 prior to admission: YES  Bariatric?: No    Vital Signs:   Vitals:    02/16/25 1513 02/16/25 1538 02/16/25 1558 02/16/25 1618   BP: 135/62 127/53 135/56 134/60   Pulse: (!) 42 (!) 40 (!) 39 (!) 39   Resp: 20 20 16 19   Temp:       TempSrc:       SpO2: 96% 96% 97% 97%   Weight:           Cardiac Rhythm:Cardiac Rhythm: Sinus bradycardia    Was the PSS-3 completed:   Yes  What interventions are required if any?               Family Comments: Family at Bedside  OBS brochure/video discussed/provided to patient/family: N/A    For the majority of the shift this patient's behavior was Green.   Behavioral interventions performed were None.    ED NURSE PHONE NUMBER: [phone]

## 2025-02-16 NOTE — H&P
[facility]    History and Physical - Hospitalist Service       Date of Admission:  2/16/2025    Assessment & Plan      Jimmy Otto is a 75 year old male admitted on 2/16/2025.  Patient has history of htn, hlp, dm2 most recent echo 10/24 EF 60-65% nl LV fxn, hx of stroke with left sided hemiplegia and aphasia-non verbal, PEG tube in place on TFs, and chronic indwelling ordoñez for neurogenic bladder, CKD 3b with progressive renal dysfunction, chronic normocytic anemia was brought into the ER due to concern for decreasing urine output.    Patient was brought into the ER due to concern for decreasing urine output, noticed darker urine after Ordoñez catheter rest placement this a.m.  Also concern for junctional bradycardia and route without any symptoms of syncope.  Patient also was hypoxic with SpO2 range around 85-88, requiring oxygen supplementation.  No concern for cough fever or chills noted per family.  1 episode of emesis this a.m. monitored tube feeds given this morning per family.  Most recent increase in torsemide dose from 10 mg to 20 mg done in nephrology clinic on 2/7/2025.    Lab workup in the ER showing hyponatremia sodium dropped from 1 29-1 27, hypokalemia potassium increased from 4.2-6.2, worsening ALEX on CKD creatinine up to 4.26, magnesium 3.1, elevated NT proBNP at 19,006.6, troponin 106, TSH 8.72.  CBC hemoglobin 8.1, hematocrit 25.3 dropped from prior 9.9 in January 2025.  Viral panel positive for influenza A.  Chest x-ray completed showing diffuse mixed interstitial and airspace opacities likely in the setting of moderate pulmonary edema.  Small right and trace left pleural effusion and mild bibasilar atelectasis.  Mild cardiomegaly.  No pneumothorax.    Patient received IV calcium gluconate, IV Lasix 20 mg, Lokelma for hyperkalemia treatment.  Blood cultures ordered and pending.  Recheck troponin ordered and pending.  UA ordered and pending.    Hospitalized 1/14 - 1/17/25 [facility]  c lethargy and cough, likely volume + URI. Diuresed IV, transitioned to PO torsemide 20mg daily at time of dc. . TTE c EF of 55-60%. Creat elevations 1.1 --> 3.4 over the past yr.   Nephrology clinic visit most recent on 2/7/2025: Noted to have significant anasarca, due to volume overload, torsemide dose was increased back to 20 mg.  Patient required potassium replacement at that time over the weekend.    Upon chart review, detailed goals of care discussion was held the prior hospitalization, discussion about output comorbidities, progressive underlying CKD, poor prognosis discussed CODE STATUS changed to DNR/DNI.    ALEX on CKD   Hyperkalemia  Hyponatremia  -Progressive worsening underlying CKD over the last few years noted on chart review  -Patient closely managed in nephrology clinic as outpatient most recent adjustment of torsemide from 10 mg to 20 mg noted  -Worsening creatinine from baseline of 3.8 currently at 4.26  -Consulted nephrology, will need goals of care discussion about management in the setting of recurrent hospitalizations with similar symptoms  -Hyperkalemia treatment given in ER, recheck potassium and if persistently elevated will need repeat Rx  -one dose of IV lasix 20 mg given in ER,will hold off further unless worsening respiratory status overnight.  -hesitant to schedule IV Lasix in AM given renal dysfunction will hold off until renal consult  -Significant volume overload bilateral lower extremity edema and anasarca noted hypervolemia in the setting of renal dysfunction leading to hyponatremia  -Diuretic management according to nephrology  -BMP recheck in a.m.  -Intake output monitoring  -Continue tube feeds per RD.  Acute on chronic diastolic CHF exacerbation  Anasarca  Junctional bradycardia  Influenza  -Patient has significant volume overload despite increasing dose of torsemide per nephrology last week.  -Significant elevation in NT proBNP at 19,000, most recent echo reviewed EF preserved  at 55 to 60% no regional wall motion abnormalities  -Elevated troponin 106, recheck troponin pending likely in the setting of underlying worsening renal failure.  -Consulted nephrology, will need help with diuresis in the setting of worsening creatinine  -Consult to cardiology, in the setting of junctional bradycardia although asymptomatic  -Continue to monitor electrolytes  -Monitor on telemetry.  Acute on chronic anemia  -Hemoglobin baseline around 9, anemia of chronic disease in the setting of underlying CKD  -PTA ferrous sulfate 325 mg twice daily continued    Diabetes mellitus type 2  -Hemoglobin A1c ordered and pending  -Patient is on Lantus 9 units subcu a.m., and insulin lispro sliding scale  -Continue sliding scale with ongoing tube feeds  -Based on blood glucose restart Lantus as able    Hypertension  -PTA amlodipine 10 mg daily, doxazosin 2 mg daily  -Hold antihypertensives overnight and restart once blood pressure and heart rate more stable.    Hyperlipidemia  -PTA atorvastatin 80 mg daily continued    #Hx of CVA with right sided hemiplegia. Aphasia and dysphagia with PEG tube. Chronic neurogenic bladder with chronic ordoñez:   -Patient is bedbound, nonverbal, chronic neurological deficits present from prior CVA  -PTA Plavix 75 mg daily continued  -Consult to RD, continue tube feed management  -Ordoñez catheter was replaced on 2/16/2025  -Monitor intake and output.  -Goals of care discussion  -Consider consult to palliative care pending nephrology consult.              Diet: NPO for Medical/Clinical Reasons Except for: NPO but receiving Tube Feeding  DVT Prophylaxis: Pneumatic Compression Devices  Ordoñez Catheter: Not present  Lines: None     Cardiac Monitoring: ACTIVE order. Indication: Electrolyte Imbalance (24 hours)- Magnesium <1.3 mg/ml; Potassium < =2.8 or > 5.5 mg/ml  Code Status: No CPR- Pre-arrest intubation OK    Clinically Significant Risk Factors Present on Admission        # Hyperkalemia: Highest  K = 6.2 mmol/L in last 2 days, will monitor as appropriate  # Hyponatremia: Lowest Na = 127 mmol/L in last 2 days, will monitor as appropriate  # Hypochloremia: Lowest Cl = 91 mmol/L in last 2 days, will monitor as appropriate      # Hypoalbuminemia: Lowest albumin = 3.1 g/dL at 2/16/2025  3:15 PM, will monitor as appropriate   # Drug Induced Platelet Defect: home medication list includes an antiplatelet medication    # Acute Kidney Injury, unspecified: based on a >150% or 0.3 mg/dL increase in last creatinine compared to past 90 day average, will monitor renal function  # Hypertension: Noted on problem list  # Acute heart failure with preserved ejection fraction: heart failure noted on problem list, last echo with EF >50%, and receiving IV diuretics     # Anemia: based on hgb <11      # DMII: A1C = N/A within past 6 months         # Financial/Environmental Concerns:           Disposition Plan     Medically Ready for Discharge: Anticipated in 2-4 Days           John Hernandez MD  Hospitalist Service  [facility]  Securely message with [facility] (more info)  Text page via [facility] Paging/Directory     ______________________________________________________________________    Chief Complaint   Decreased urine output    Chart review    History of Present Illness   Jimmy Otto is a 75 year old male who has a past medical history of diabetes mellitus, depression, chronic Felix catheter in place, G-tube in place, hypertension, hyperlipidemia, prior history of CVA s/p longstanding neurological deficits, patient is nonverbal.    Patient was brought into the ER due to concern for decreasing urine output, noticed darker urine after Felix catheter rest placement this a.m.  Also concern for junctional bradycardia and route without any symptoms of syncope.  Patient also was hypoxic with SpO2 range around 85-88, requiring oxygen supplementation.  No concern for cough fever or chills noted per family.  1 episode of  emesis this a.m. monitored tube feeds given this morning per family.  Most recent increase in torsemide dose from 10 mg to 20 mg done in nephrology clinic on 2025.    Hospitalized  - 25 [facility] c lethargy and cough, likely volume + URI. Diuresed IV, transitioned to PO torsemide 20mg daily at time of dc. . TTE c EF of 55-60%. Creat elevations 1.1 --> 3.4 over the past yr.   Nephrology clinic visit most recent on 2025: Noted to have significant anasarca, due to volume overload, torsemide dose was increased back to 20 mg.  Patient required potassium replacement at that time over the weekend.    Upon chart review, detailed goals of care discussion was held the prior hospitalization, discussion about output comorbidities, progressive underlying CKD, poor prognosis discussed CODE STATUS changed to DNR/DNI.  Goals of care discussion held in nephrology clinic, discussed about less likelihood that patient would tolerate PD/HD, medical management was chosen temporarily.  But patient is showing progressive worsening and will need definitive discussion this hospitalization if hemodialysis is a viable possibility versus transition to comfort measures.  Past Medical History    Past Medical History:   Diagnosis Date    Cerebrovascular accident - Hemiplegia and Aphasia     Depressive disorder     Diabetes mellitus     Felix catheter in place     Hypercholesteremia     Hypertension     S/P percutaneous endoscopic gastrostomy (PEG) tube placement (H)        Past Surgical History   Past Surgical History:   Procedure Laterality Date    ENDOSCOPIC INSERTION TUBE GASTROSTOMY      IR CYSTOGRAM  2022       Prior to Admission Medications   Prior to Admission Medications   Prescriptions Last Dose Informant Patient Reported? Taking?   FLUoxetine (PROZAC) 20 MG/5ML solution 2025  No Yes   Si mLs (20 mg) by Oral or Feeding Tube route daily   Insulin Lispro (ADMELOG SC)   Yes Yes   Sig: For BG <140 no insulin.  140-189 give 1 unit. 190-239 give 2 units. 240-289 give 3. 290-340 give 4 units. >340 give 5 units. Usual daily dose 12 units per day.   Nepro With Carb Steady 8 oz 2/16/2025  No Yes   Sig: Place 840 mLs into G tube daily. Nepro Infuse 840mLs into G-tube daily via gravity bag - 3.5 cartons / day. 1 carton at 0900, 1 carton at 1200 and 1.5 cartons at 1800.    Water flush: 60mL Q 4 hours   amLODIPine (NORVASC) 5 MG tablet 2/16/2025  No Yes   Sig: Take 2 tablets (10 mg) by mouth daily.   atorvastatin (LIPITOR) 80 MG tablet 2/16/2025  Yes Yes   Sig: Take 80 mg by mouth daily   calcium acetate (CALPHRON) 667 MG TABS tablet   No No   Sig: Take 1 tablet (667 mg) by mouth 3 times daily (with meals).   clopidogrel (PLAVIX) 75 MG tablet 2/16/2025  Yes Yes   Sig: Take 75 mg by mouth daily   doxazosin (CARDURA) 2 MG tablet 2/16/2025  No Yes   Sig: Take 1 tablet (2 mg) by mouth daily.   ferrous sulfate (FEROSUL) 325 (65 Fe) MG tablet 2/16/2025  No Yes   Sig: Place 1 tablet (325 mg) into Feeding Tube 2 times daily.   insulin glargine 100 UNIT/ML pen 2/16/2025  Yes Yes   Sig: Inject 9 Units subcutaneously every morning.   polyethylene glycol (MIRALAX) 17 g packet   No Yes   Sig: Take 17 g by mouth daily as needed for constipation   sevelamer carbonate, RENVELA, 0.8 GM PACK Packet 2/16/2025  No Yes   Sig: Place 1 packet (0.8 g) into G tube 3 times daily (with meals).   torsemide (DEMADEX) 20 MG tablet 2/16/2025  No Yes   Sig: Place 1 tablet (20 mg) into G tube daily.      Facility-Administered Medications: None        Review of Systems    The 10 point Review of Systems is negative other than noted in the HPI or here.     Social History   I have reviewed this patient's social history and updated it with pertinent information if needed.  Social History     Tobacco Use    Smoking status: Never    Smokeless tobacco: Never   Substance Use Topics    Alcohol use: Yes     Comment: occassional    Drug use: No         Family History     No  significant family history      Allergies   Allergies   Allergen Reactions    Nka [No Known Allergies]         Physical Exam   Vital Signs: Temp: 98  F (36.7  C) Temp src: Axillary BP: 121/60 Pulse: 61   Resp: (!) 33 SpO2: 92 % O2 Device: Nasal cannula Oxygen Delivery: 10 LPM  Weight: 160 lbs 4.39 oz    Constitutional: appears in distress uncomfortable,has secretions in mouth needing suction  Eyes: Conjunctiva and pupils examined and normal.  HEENT: Moist mucous membranes, normal dentition.  Respiratory: decreased BL basal BS,mild crackles throughout  Cardiovascular: Regular rate and rhythm, normal S1 and S2  GI: Soft, non-distended, non-tender, normal bowel sounds.  Lymph/Hematologic: No anterior cervical or supraclavicular adenopathy.  Skin: No rashes, no cyanosis, no edema.  Musculoskeletal: No joint swelling, erythema or tenderness.  Neurologic: mouth deviation long standing neuro deficits weakness of UE persistent,no speech

## 2025-02-16 NOTE — ED TRIAGE NOTES
BIBA after complaint of decreased urine output and red-tinged urine through chronic ordoñez catheter. The catheter was replaced today at 1000. Only about 100 mL out since then.  Has a G-tube.    EMS report bradycardia with rate in the 40s. 89% oxygen saturation on room air, but family denies URI symptoms. Diabetic and glucose today was 382 en route.    Hx of CVA and is at his baseline per family (right sided weakness and non-verbal).     Triage Assessment (Adult)       Row Name 02/16/25 6870          Triage Assessment    Airway WDL WDL        Respiratory WDL    Respiratory WDL WDL        Skin Circulation/Temperature WDL    Skin Circulation/Temperature WDL WDL        Cardiac WDL    Cardiac WDL X     Cardiac Rhythm SB        Peripheral/Neurovascular WDL    Peripheral Neurovascular WDL WDL        Cognitive/Neuro/Behavioral WDL    Cognitive/Neuro/Behavioral WDL WDL

## 2025-02-16 NOTE — ED PROVIDER NOTES
Emergency Department Note      History of Present Illness     Chief Complaint   Bradycardia, Catheter Problem, and Urinary Retention      PAPO Otto is a 75 year old male who presents to the ED for evaluation of diminished urine output.  The patient has a history of a CVA and is bedbound.  He has risk for aspiration due to dysphagia and is completely G-tube dependent.  His family gives him his feeds, most recently at breakfast.  The patient has a Felix catheter in place.  Reportedly his urine was darker than usual and he had his catheter replaced this morning by visiting nurse.  There was some yellow urine but much less than would be expected.  He was referred in to the ED given the decreased urine output.  He does respond to some questions.  En route he was noted to have a junctional bradycardia which is new for him.  His medication list had noted atenolol but per pharmacy and review of his medication list this has not recently been filled.    The patient was noted to be hypoxic en route with an O2 sat in the upper 80s to low 90s.  He is placed on nasal cannula oxygen.  Family denies fever or coughing although he did have an episode of vomiting could potentially aspirated earlier today.  The patient is nonverbal but does answer questions by squeezing hand is giving thumbs up and is responding in this way.    The patient has chronic kidney disease.  He is on torsemide.    Independent Historian   History taken from EMS who note the bradycardia and hypoxia en route.    Patient's daughter is present and notes that he has been getting his normal tube feeds but his urine output is much less than would be expected.    Review of External Notes   Nephrology notes reviewed from February 10, 2025.  The patient has a chronic kidney disease.  He gets potassium replacement.  He recently started torsemide so it was expected that he may have a bump in his creatinine.    Past Medical History     Medical History and Problem  List   Past Medical History:   Diagnosis Date    Cerebrovascular accident - Hemiplegia and Aphasia     Depressive disorder     Diabetes mellitus     Felix catheter in place     Hypercholesteremia     Hypertension     S/P percutaneous endoscopic gastrostomy (PEG) tube placement (H)        Medications   amLODIPine (NORVASC) 5 MG tablet  atorvastatin (LIPITOR) 80 MG tablet  clopidogrel (PLAVIX) 75 MG tablet  doxazosin (CARDURA) 2 MG tablet  ferrous sulfate (FEROSUL) 325 (65 Fe) MG tablet  FLUoxetine (PROZAC) 20 MG/5ML solution  insulin glargine 100 UNIT/ML pen  Insulin Lispro (ADMELOG SC)  Nepro With Carb Steady 8 oz  polyethylene glycol (MIRALAX) 17 g packet  sevelamer carbonate, RENVELA, 0.8 GM PACK Packet  torsemide (DEMADEX) 20 MG tablet  calcium acetate (CALPHRON) 667 MG TABS tablet        Surgical History   Past Surgical History:   Procedure Laterality Date    ENDOSCOPIC INSERTION TUBE GASTROSTOMY      IR CYSTOGRAM  02/14/2022       Physical Exam     Patient Vitals for the past 24 hrs:   BP Temp Temp src Pulse Resp SpO2 Weight   02/16/25 1504 -- 97.6  F (36.4  C) Axillary -- -- -- --   02/16/25 1459 -- -- -- -- 20 93 % --   02/16/25 1458 132/61 -- -- (!) 43 -- -- --   02/16/25 1457 -- -- -- -- 16 -- 72.7 kg (160 lb 4.4 oz)     Physical Exam  Constitutional:       Comments: Nonverbal.   HENT:      Head: Atraumatic.      Right Ear: External ear normal.      Left Ear: External ear normal.      Mouth/Throat:      Mouth: Mucous membranes are dry.   Eyes:      General: No scleral icterus.     Conjunctiva/sclera: Conjunctivae normal.   Cardiovascular:      Rate and Rhythm: Regular rhythm. Bradycardia present.      Heart sounds: Normal heart sounds.   Pulmonary:      Effort: Pulmonary effort is normal. No respiratory distress.      Breath sounds: Normal breath sounds.   Abdominal:      General: There is no distension.      Palpations: Abdomen is soft.      Tenderness: There is no abdominal tenderness.      Comments:  G-tube in place.  No erythema or discharge.   Genitourinary:     Comments: Felix catheter in place.  There is 50 to 70 mL of yellow urine in the bag.  Musculoskeletal:         General: No deformity.      Cervical back: Neck supple.      Right lower leg: Edema present.      Left lower leg: Edema present.      Comments: 2+ edema around the ankles and calves.   Skin:     General: Skin is warm.      Capillary Refill: Capillary refill takes less than 2 seconds.      Findings: No rash.   Neurological:      Comments: Nonverbal.  Eyes closed.  Right hemiparesis.  He does squeeze his left hand on command.           Diagnostics     Lab Results   Labs Ordered and Resulted from Time of ED Arrival to Time of ED Departure   COMPREHENSIVE METABOLIC PANEL - Abnormal       Result Value    Sodium 127 (*)     Potassium 6.2 (*)     Carbon Dioxide (CO2) 25      Anion Gap 11      Urea Nitrogen 111.3 (*)     Creatinine 4.26 (*)     GFR Estimate 14 (*)     Calcium 9.2      Chloride 91 (*)     Glucose 255 (*)     Alkaline Phosphatase 127      AST        ALT 39      Protein Total 6.8      Albumin 3.1 (*)     Bilirubin Total 0.2     TROPONIN T, HIGH SENSITIVITY - Abnormal    Troponin T, High Sensitivity 106 (*)    MAGNESIUM - Abnormal    Magnesium 3.1 (*)    INFLUENZA A/B, RSV AND SARS-COV2 PCR - Abnormal    Influenza A PCR Positive (*)     Influenza B PCR Negative      RSV PCR Negative      SARS CoV2 PCR Negative     TSH WITH FREE T4 REFLEX - Abnormal    TSH 8.72 (*)    CBC WITH PLATELETS AND DIFFERENTIAL - Abnormal    WBC Count 7.1      RBC Count 3.07 (*)     Hemoglobin 8.1 (*)     Hematocrit 25.3 (*)     MCV 82      MCH 26.4 (*)     MCHC 32.0      RDW 14.7      Platelet Count 174      % Neutrophils 77      % Lymphocytes 13      % Monocytes 8      % Eosinophils 1      % Basophils 1      % Immature Granulocytes 0      NRBCs per 100 WBC 0      Absolute Neutrophils 5.5      Absolute Lymphocytes 0.9      Absolute Monocytes 0.6      Absolute  Eosinophils 0.1      Absolute Basophils 0.0      Absolute Immature Granulocytes 0.0      Absolute NRBCs 0.0     NT PROBNP INPATIENT - Abnormal    N terminal Pro BNP Inpatient 19,616 (*)    LACTIC ACID WHOLE BLOOD WITH 1X REPEAT IN 2 HR WHEN >2 - Normal    Lactic Acid, Initial 1.6     T4 FREE - Normal    Free T4 1.47     ROUTINE UA WITH MICROSCOPIC   TROPONIN T, HIGH SENSITIVITY   BLOOD CULTURE   BLOOD CULTURE       Imaging   XR Chest Port 1 View   Final Result   IMPRESSION: Diffuse mixed interstitial and airspace opacities likely due to moderate pulmonary edema. Small right and trace left pleural effusions and mild bibasilar atelectasis. Mild cardiomegaly. No pneumothorax.          EKG   ECG results from 02/16/25   EKG 12-lead, tracing only     Value    Systolic Blood Pressure     Diastolic Blood Pressure     Ventricular Rate 43    Atrial Rate     MS Interval     QRS Duration 82        QTc 446    P Axis     R AXIS -1    T Axis 49    Interpretation ECG      Junctional bradycardia  Septal infarct , age undetermined  Abnormal ECG  When compared with ECG of 15-Alex-2025 03:24,  Septal infarct is now Present  Nonspecific T wave abnormality has replaced inverted T waves in Inferior leads          Independent Interpretation   Chest x-ray dependently interpreted.  Bilateral pulmonary edema.    ED Course      Medications Administered   Medications   calcium gluconate 1 g in 50 mL in sodium chloride intermittent infusion (1 g Intravenous $Given 2/16/25 1639)   furosemide (LASIX) injection 20 mg (has no administration in time range)   sodium zirconium cyclosilicate (LOKELMA) packet 10 g (has no administration in time range)     Followed by   sodium zirconium cyclosilicate (LOKELMA) packet 10 g (has no administration in time range)     Medical Decision Making / Diagnosis     ILANA Otto is a 75 year old male who presents to the ED for evaluation of diminished urine output.  He is hypoxic and appears volume overloaded.   Last echocardiogram in October demonstrated normal ejection fraction.  His volume overload is more likely related to his chronic kidney disease.    He recently started torsemide.  Creatinine up from baseline.  However, given his hypoxia and pulmonary edema give a small dose of Lasix.    The patient is hyperkalemic.  He does not have a wide-complex rhythm but rather has his persistent junctional rhythm which has been seen in the past.  He is slightly bradycardic today.  This may be medication related or related to his electrolyte disturbances.  He was given calcium.  Given his renal failure I will hold off on giving him insulin.  He was given Lokelma.    Patient is also influenza A positive.  This may be the underlying precipitant for his overall decompensation.  It is not clear how long he has been flu positive so hold off on Tamiflu for the time being.    Critical Care time was 30 minutes for this patient excluding procedures.     Disposition   The patient was admitted to the hospital to Dr Hernandez.     Diagnosis     ICD-10-CM    1. Hypervolemia, unspecified hypervolemia type  E87.70       2. Acute renal failure superimposed on chronic kidney disease, unspecified acute renal failure type, unspecified CKD stage  N17.9     N18.9       3. Hyperkalemia  E87.5       4. Influenza A  J10.1       5. Junctional bradycardia  R00.1             Adan Ivey MD  02/16/25 [id number]

## 2025-02-16 NOTE — PHARMACY-ADMISSION MEDICATION HISTORY
Pharmacist Admission Medication History    Admission medication history is complete. The information provided in this note is only as accurate as the sources available at the time of the update.    Information Source(s): Family member and Hospital records via in-person    Pertinent Information: Pt daughter provided medication list from most recent discharge from [facility] 1/17/25.   - Calcium acetate pt has not picked up from pharmacy as of yet.     Changes made to PTA medication list:  Added: None  Deleted: None  Changed: None    Allergies reviewed with patient and updates made in EHR: no    Medication History Completed By: Renae Nice [facility] 2/16/2025 4:30 PM    PTA Med List   Medication Sig Last Dose/Taking    amLODIPine (NORVASC) 5 MG tablet Take 2 tablets (10 mg) by mouth daily. 2/16/2025    atorvastatin (LIPITOR) 80 MG tablet Take 80 mg by mouth daily 2/16/2025    clopidogrel (PLAVIX) 75 MG tablet Take 75 mg by mouth daily 2/16/2025    doxazosin (CARDURA) 2 MG tablet Take 1 tablet (2 mg) by mouth daily. 2/16/2025    ferrous sulfate (FEROSUL) 325 (65 Fe) MG tablet Place 1 tablet (325 mg) into Feeding Tube 2 times daily. 2/16/2025    FLUoxetine (PROZAC) 20 MG/5ML solution 5 mLs (20 mg) by Oral or Feeding Tube route daily 2/16/2025    insulin glargine 100 UNIT/ML pen Inject 9 Units subcutaneously every morning. 2/16/2025    Insulin Lispro (ADMELOG SC) For BG <140 no insulin. 140-189 give 1 unit. 190-239 give 2 units. 240-289 give 3. 290-340 give 4 units. >340 give 5 units. Usual daily dose 12 units per day. Taking    Nepro With Carb Steady 8 oz Place 840 mLs into G tube daily. Nepro Infuse 840mLs into G-tube daily via gravity bag - 3.5 cartons / day. 1 carton at 0900, 1 carton at 1200 and 1.5 cartons at 1800.    Water flush: 60mL Q 4 hours 2/16/2025    polyethylene glycol (MIRALAX) 17 g packet Take 17 g by mouth daily as needed for constipation Taking As Needed    sevelamer carbonate, RENVELA, 0.8 GM PACK Packet  Place 1 packet (0.8 g) into G tube 3 times daily (with meals). 2/16/2025    torsemide (DEMADEX) 20 MG tablet Place 1 tablet (20 mg) into G tube daily. 2/16/2025

## 2025-02-17 ENCOUNTER — APPOINTMENT (OUTPATIENT)
Dept: GENERAL RADIOLOGY | Facility: CLINIC | Age: 75
End: 2025-02-17
Payer: MEDICARE

## 2025-02-17 ENCOUNTER — RESULTS ONLY (OUTPATIENT)
Dept: ADMINISTRATIVE | Facility: CLINIC | Age: 75
End: 2025-02-17
Payer: MEDICARE

## 2025-02-17 LAB
ALBUMIN SERPL BCG-MCNC: 3.1 G/DL (ref 3.5–5.2)
ALP SERPL-CCNC: 117 U/L (ref 40–150)
ALT SERPL W P-5'-P-CCNC: 36 U/L (ref 0–70)
ANION GAP SERPL CALCULATED.3IONS-SCNC: 11 MMOL/L (ref 7–15)
ANION GAP SERPL CALCULATED.3IONS-SCNC: 15 MMOL/L (ref 7–15)
AST SERPL W P-5'-P-CCNC: 37 U/L (ref 0–45)
ATRIAL RATE - MUSE: 65 BPM
BILIRUB SERPL-MCNC: 0.3 MG/DL
BUN SERPL-MCNC: 121.5 MG/DL (ref 8–23)
BUN SERPL-MCNC: 123.8 MG/DL (ref 8–23)
CALCIUM SERPL-MCNC: 9.3 MG/DL (ref 8.8–10.4)
CALCIUM SERPL-MCNC: 9.7 MG/DL (ref 8.8–10.4)
CHLORIDE SERPL-SCNC: 90 MMOL/L (ref 98–107)
CHLORIDE SERPL-SCNC: 93 MMOL/L (ref 98–107)
CREAT SERPL-MCNC: 4.48 MG/DL (ref 0.67–1.17)
CREAT SERPL-MCNC: 4.61 MG/DL (ref 0.67–1.17)
DIASTOLIC BLOOD PRESSURE - MUSE: NORMAL MMHG
EGFRCR SERPLBLD CKD-EPI 2021: 13 ML/MIN/1.73M2
EGFRCR SERPLBLD CKD-EPI 2021: 13 ML/MIN/1.73M2
EST. AVERAGE GLUCOSE BLD GHB EST-MCNC: 206 MG/DL
GLUCOSE BLDC GLUCOMTR-MCNC: 125 MG/DL (ref 70–99)
GLUCOSE BLDC GLUCOMTR-MCNC: 129 MG/DL (ref 70–99)
GLUCOSE BLDC GLUCOMTR-MCNC: 137 MG/DL (ref 70–99)
GLUCOSE BLDC GLUCOMTR-MCNC: 147 MG/DL (ref 70–99)
GLUCOSE BLDC GLUCOMTR-MCNC: 177 MG/DL (ref 70–99)
GLUCOSE BLDC GLUCOMTR-MCNC: 186 MG/DL (ref 70–99)
GLUCOSE BLDC GLUCOMTR-MCNC: 201 MG/DL (ref 70–99)
GLUCOSE BLDC GLUCOMTR-MCNC: 205 MG/DL (ref 70–99)
GLUCOSE BLDC GLUCOMTR-MCNC: 218 MG/DL (ref 70–99)
GLUCOSE BLDC GLUCOMTR-MCNC: 221 MG/DL (ref 70–99)
GLUCOSE BLDC GLUCOMTR-MCNC: 226 MG/DL (ref 70–99)
GLUCOSE SERPL-MCNC: 228 MG/DL (ref 70–99)
GLUCOSE SERPL-MCNC: 237 MG/DL (ref 70–99)
HBA1C MFR BLD: 8.8 %
HCO3 SERPL-SCNC: 22 MMOL/L (ref 22–29)
HCO3 SERPL-SCNC: 25 MMOL/L (ref 22–29)
INTERPRETATION ECG - MUSE: NORMAL
P AXIS - MUSE: NORMAL DEGREES
PHOSPHATE SERPL-MCNC: 2.7 MG/DL (ref 2.5–4.5)
POTASSIUM SERPL-SCNC: 5.2 MMOL/L (ref 3.4–5.3)
POTASSIUM SERPL-SCNC: 5.5 MMOL/L (ref 3.4–5.3)
POTASSIUM SERPL-SCNC: 5.9 MMOL/L (ref 3.4–5.3)
POTASSIUM SERPL-SCNC: 6 MMOL/L (ref 3.4–5.3)
POTASSIUM SERPL-SCNC: 6.1 MMOL/L (ref 3.4–5.3)
POTASSIUM SERPL-SCNC: 6.1 MMOL/L (ref 3.4–5.3)
PR INTERVAL - MUSE: NORMAL MS
PROCALCITONIN SERPL IA-MCNC: 0.29 NG/ML
PROT SERPL-MCNC: 6.8 G/DL (ref 6.4–8.3)
QRS DURATION - MUSE: 80 MS
QT - MUSE: 530 MS
QTC - MUSE: 520 MS
R AXIS - MUSE: -4 DEGREES
SODIUM SERPL-SCNC: 127 MMOL/L (ref 135–145)
SODIUM SERPL-SCNC: 129 MMOL/L (ref 135–145)
SYSTOLIC BLOOD PRESSURE - MUSE: NORMAL MMHG
T AXIS - MUSE: 21 DEGREES
TROPONIN T SERPL HS-MCNC: 105 NG/L
TROPONIN T SERPL HS-MCNC: 108 NG/L
VENTRICULAR RATE- MUSE: 58 BPM

## 2025-02-17 PROCEDURE — 258N000003 HC RX IP 258 OP 636

## 2025-02-17 PROCEDURE — 84100 ASSAY OF PHOSPHORUS: CPT | Performed by: INTERNAL MEDICINE

## 2025-02-17 PROCEDURE — 250N000012 HC RX MED GY IP 250 OP 636 PS 637: Performed by: INTERNAL MEDICINE

## 2025-02-17 PROCEDURE — 74018 RADEX ABDOMEN 1 VIEW: CPT

## 2025-02-17 PROCEDURE — 250N000011 HC RX IP 250 OP 636: Performed by: HOSPITALIST

## 2025-02-17 PROCEDURE — 84484 ASSAY OF TROPONIN QUANT: CPT

## 2025-02-17 PROCEDURE — 200N000001 HC R&B ICU

## 2025-02-17 PROCEDURE — 84132 ASSAY OF SERUM POTASSIUM: CPT

## 2025-02-17 PROCEDURE — 99291 CRITICAL CARE FIRST HOUR: CPT

## 2025-02-17 PROCEDURE — 93010 ELECTROCARDIOGRAM REPORT: CPT | Performed by: INTERNAL MEDICINE

## 2025-02-17 PROCEDURE — 36415 COLL VENOUS BLD VENIPUNCTURE: CPT | Performed by: INTERNAL MEDICINE

## 2025-02-17 PROCEDURE — 250N000011 HC RX IP 250 OP 636: Performed by: INTERNAL MEDICINE

## 2025-02-17 PROCEDURE — B4036 ENTERAL FEED SUP KIT GRAV BY: HCPCS

## 2025-02-17 PROCEDURE — 250N000011 HC RX IP 250 OP 636

## 2025-02-17 PROCEDURE — 93005 ELECTROCARDIOGRAM TRACING: CPT

## 2025-02-17 PROCEDURE — 250N000013 HC RX MED GY IP 250 OP 250 PS 637: Performed by: HOSPITALIST

## 2025-02-17 PROCEDURE — 84132 ASSAY OF SERUM POTASSIUM: CPT | Performed by: INTERNAL MEDICINE

## 2025-02-17 PROCEDURE — 80048 BASIC METABOLIC PNL TOTAL CA: CPT

## 2025-02-17 PROCEDURE — 71045 X-RAY EXAM CHEST 1 VIEW: CPT

## 2025-02-17 PROCEDURE — 82040 ASSAY OF SERUM ALBUMIN: CPT | Performed by: INTERNAL MEDICINE

## 2025-02-17 PROCEDURE — 250N000012 HC RX MED GY IP 250 OP 636 PS 637

## 2025-02-17 PROCEDURE — 250N000012 HC RX MED GY IP 250 OP 636 PS 637: Performed by: HOSPITALIST

## 2025-02-17 PROCEDURE — 250N000013 HC RX MED GY IP 250 OP 250 PS 637: Performed by: EMERGENCY MEDICINE

## 2025-02-17 PROCEDURE — 36415 COLL VENOUS BLD VENIPUNCTURE: CPT

## 2025-02-17 PROCEDURE — 84145 PROCALCITONIN (PCT): CPT

## 2025-02-17 PROCEDURE — 84155 ASSAY OF PROTEIN SERUM: CPT | Performed by: INTERNAL MEDICINE

## 2025-02-17 PROCEDURE — 99207 PR NO BILLABLE SERVICE THIS VISIT: CPT | Performed by: HOSPITALIST

## 2025-02-17 PROCEDURE — 250N000013 HC RX MED GY IP 250 OP 250 PS 637: Performed by: INTERNAL MEDICINE

## 2025-02-17 PROCEDURE — 99291 CRITICAL CARE FIRST HOUR: CPT | Performed by: INTERNAL MEDICINE

## 2025-02-17 PROCEDURE — 36415 COLL VENOUS BLD VENIPUNCTURE: CPT | Performed by: HOSPITALIST

## 2025-02-17 PROCEDURE — 84132 ASSAY OF SERUM POTASSIUM: CPT | Performed by: HOSPITALIST

## 2025-02-17 PROCEDURE — 258N000001 HC RX 258

## 2025-02-17 PROCEDURE — 999N000157 HC STATISTIC RCP TIME EA 10 MIN

## 2025-02-17 PROCEDURE — 99222 1ST HOSP IP/OBS MODERATE 55: CPT | Performed by: INTERNAL MEDICINE

## 2025-02-17 RX ORDER — DEXTROSE MONOHYDRATE 25 G/50ML
25-50 INJECTION, SOLUTION INTRAVENOUS
Status: CANCELLED | OUTPATIENT
Start: 2025-02-17

## 2025-02-17 RX ORDER — PIPERACILLIN SODIUM, TAZOBACTAM SODIUM 2; .25 G/10ML; G/10ML
2.25 INJECTION, POWDER, LYOPHILIZED, FOR SOLUTION INTRAVENOUS EVERY 6 HOURS
Status: DISCONTINUED | OUTPATIENT
Start: 2025-02-17 | End: 2025-02-19

## 2025-02-17 RX ORDER — ATORVASTATIN CALCIUM 10 MG/1
20 TABLET, FILM COATED ORAL DAILY
Status: DISCONTINUED | OUTPATIENT
Start: 2025-02-18 | End: 2025-02-19

## 2025-02-17 RX ORDER — CALCIUM GLUCONATE 20 MG/ML
1 INJECTION, SOLUTION INTRAVENOUS ONCE
Status: COMPLETED | OUTPATIENT
Start: 2025-02-17 | End: 2025-02-17

## 2025-02-17 RX ORDER — OSELTAMIVIR PHOSPHATE 6 MG/ML
30 FOR SUSPENSION ORAL DAILY
Status: COMPLETED | OUTPATIENT
Start: 2025-02-17 | End: 2025-02-26

## 2025-02-17 RX ORDER — NICOTINE POLACRILEX 4 MG
15-30 LOZENGE BUCCAL
Status: DISCONTINUED | OUTPATIENT
Start: 2025-02-17 | End: 2025-02-17

## 2025-02-17 RX ORDER — DEXTROSE MONOHYDRATE 100 MG/ML
INJECTION, SOLUTION INTRAVENOUS CONTINUOUS PRN
Status: DISCONTINUED | OUTPATIENT
Start: 2025-02-17 | End: 2025-02-26 | Stop reason: HOSPADM

## 2025-02-17 RX ORDER — FUROSEMIDE 10 MG/ML
100 INJECTION INTRAMUSCULAR; INTRAVENOUS EVERY 8 HOURS
Status: COMPLETED | OUTPATIENT
Start: 2025-02-17 | End: 2025-02-19

## 2025-02-17 RX ORDER — CHLOROTHIAZIDE SODIUM 500 MG/1
500 INJECTION INTRAVENOUS ONCE
Status: COMPLETED | OUTPATIENT
Start: 2025-02-17 | End: 2025-02-17

## 2025-02-17 RX ORDER — ONDANSETRON 2 MG/ML
4 INJECTION INTRAMUSCULAR; INTRAVENOUS EVERY 6 HOURS PRN
Status: DISCONTINUED | OUTPATIENT
Start: 2025-02-17 | End: 2025-02-26 | Stop reason: HOSPADM

## 2025-02-17 RX ORDER — FUROSEMIDE 10 MG/ML
INJECTION INTRAMUSCULAR; INTRAVENOUS
Status: COMPLETED
Start: 2025-02-17 | End: 2025-02-17

## 2025-02-17 RX ORDER — B COMPLEX C NO.10/FOLIC ACID 900MCG/5ML
5 LIQUID (ML) ORAL DAILY
Status: DISCONTINUED | OUTPATIENT
Start: 2025-02-17 | End: 2025-02-26 | Stop reason: HOSPADM

## 2025-02-17 RX ORDER — DEXTROSE MONOHYDRATE 25 G/50ML
25 INJECTION, SOLUTION INTRAVENOUS ONCE
Status: CANCELLED | OUTPATIENT
Start: 2025-02-17 | End: 2025-02-17

## 2025-02-17 RX ORDER — DEXTROSE MONOHYDRATE 25 G/50ML
25-50 INJECTION, SOLUTION INTRAVENOUS
Status: DISCONTINUED | OUTPATIENT
Start: 2025-02-17 | End: 2025-02-17

## 2025-02-17 RX ORDER — FUROSEMIDE 10 MG/ML
40 INJECTION INTRAMUSCULAR; INTRAVENOUS ONCE
Status: COMPLETED | OUTPATIENT
Start: 2025-02-17 | End: 2025-02-17

## 2025-02-17 RX ORDER — NICOTINE POLACRILEX 4 MG
15-30 LOZENGE BUCCAL
Status: CANCELLED | OUTPATIENT
Start: 2025-02-17

## 2025-02-17 RX ORDER — DEXTROSE MONOHYDRATE 25 G/50ML
25 INJECTION, SOLUTION INTRAVENOUS ONCE
Status: COMPLETED | OUTPATIENT
Start: 2025-02-17 | End: 2025-02-17

## 2025-02-17 RX ADMIN — PIPERACILLIN AND TAZOBACTAM 2.25 G: 2; .25 INJECTION, POWDER, FOR SOLUTION INTRAVENOUS at 08:36

## 2025-02-17 RX ADMIN — CALCIUM GLUCONATE 1 G: 20 INJECTION, SOLUTION INTRAVENOUS at 09:31

## 2025-02-17 RX ADMIN — SEVELAMER CARBONATE 0.8 G: 800 POWDER, FOR SUSPENSION ORAL at 18:35

## 2025-02-17 RX ADMIN — INSULIN ASPART 2 UNITS: 100 INJECTION, SOLUTION INTRAVENOUS; SUBCUTANEOUS at 04:13

## 2025-02-17 RX ADMIN — FLUOXETINE 20 MG: 20 SOLUTION ORAL at 12:58

## 2025-02-17 RX ADMIN — PIPERACILLIN AND TAZOBACTAM 2.25 G: 2; .25 INJECTION, POWDER, FOR SOLUTION INTRAVENOUS at 20:14

## 2025-02-17 RX ADMIN — INSULIN ASPART 2 UNITS: 100 INJECTION, SOLUTION INTRAVENOUS; SUBCUTANEOUS at 00:48

## 2025-02-17 RX ADMIN — FUROSEMIDE 100 MG: 10 INJECTION, SOLUTION INTRAMUSCULAR; INTRAVENOUS at 20:04

## 2025-02-17 RX ADMIN — ONDANSETRON 4 MG: 2 INJECTION, SOLUTION INTRAMUSCULAR; INTRAVENOUS at 16:52

## 2025-02-17 RX ADMIN — FUROSEMIDE 100 MG: 10 INJECTION, SOLUTION INTRAMUSCULAR; INTRAVENOUS at 12:45

## 2025-02-17 RX ADMIN — FUROSEMIDE 40 MG: 10 INJECTION, SOLUTION INTRAMUSCULAR; INTRAVENOUS at 00:46

## 2025-02-17 RX ADMIN — INSULIN ASPART 1 UNITS: 100 INJECTION, SOLUTION INTRAVENOUS; SUBCUTANEOUS at 08:46

## 2025-02-17 RX ADMIN — PIPERACILLIN AND TAZOBACTAM 2.25 G: 2; .25 INJECTION, POWDER, FOR SOLUTION INTRAVENOUS at 03:13

## 2025-02-17 RX ADMIN — CHLOROTHIAZIDE SODIUM 500 MG: 500 INJECTION, POWDER, LYOPHILIZED, FOR SOLUTION INTRAVENOUS at 12:44

## 2025-02-17 RX ADMIN — FUROSEMIDE 40 MG: 10 INJECTION INTRAMUSCULAR; INTRAVENOUS at 00:46

## 2025-02-17 RX ADMIN — DEXTROSE MONOHYDRATE 25 G: 25 INJECTION, SOLUTION INTRAVENOUS at 02:40

## 2025-02-17 RX ADMIN — SODIUM CHLORIDE 7.2 UNITS: 9 INJECTION, SOLUTION INTRAVENOUS at 02:40

## 2025-02-17 RX ADMIN — INSULIN GLARGINE 5 UNITS: 100 INJECTION, SOLUTION SUBCUTANEOUS at 10:27

## 2025-02-17 RX ADMIN — ATORVASTATIN CALCIUM 80 MG: 40 TABLET, FILM COATED ORAL at 08:40

## 2025-02-17 RX ADMIN — SODIUM ZIRCONIUM CYCLOSILICATE 10 G: 5 POWDER, FOR SUSPENSION ORAL at 06:14

## 2025-02-17 RX ADMIN — CLOPIDOGREL BISULFATE 75 MG: 75 TABLET ORAL at 08:41

## 2025-02-17 RX ADMIN — MINERAL SUPPLEMENT IRON 300 MG / 5 ML STRENGTH LIQUID 100 PER BOX UNFLAVORED 300 MG: at 08:41

## 2025-02-17 RX ADMIN — ONDANSETRON 4 MG: 2 INJECTION, SOLUTION INTRAMUSCULAR; INTRAVENOUS at 00:47

## 2025-02-17 RX ADMIN — PIPERACILLIN AND TAZOBACTAM 2.25 G: 2; .25 INJECTION, POWDER, FOR SOLUTION INTRAVENOUS at 16:44

## 2025-02-17 RX ADMIN — OSELTAMIVIR PHOSPHATE 30 MG: 6 POWDER, FOR SUSPENSION ORAL at 12:58

## 2025-02-17 RX ADMIN — DEXTROSE MONOHYDRATE 300 ML: 100 INJECTION, SOLUTION INTRAVENOUS at 02:44

## 2025-02-17 RX ADMIN — Medication 5 ML: at 16:45

## 2025-02-17 ASSESSMENT — ACTIVITIES OF DAILY LIVING (ADL)
ADLS_ACUITY_SCORE: 85
ADLS_ACUITY_SCORE: 102
ADLS_ACUITY_SCORE: 91
ADLS_ACUITY_SCORE: 87
ADLS_ACUITY_SCORE: 86
ADLS_ACUITY_SCORE: 91
ADLS_ACUITY_SCORE: 87
ADLS_ACUITY_SCORE: 87
ADLS_ACUITY_SCORE: 91
ADLS_ACUITY_SCORE: 97
ADLS_ACUITY_SCORE: 85
ADLS_ACUITY_SCORE: 91
ADLS_ACUITY_SCORE: 101
ADLS_ACUITY_SCORE: 92
ADLS_ACUITY_SCORE: 83
ADLS_ACUITY_SCORE: 92
ADLS_ACUITY_SCORE: 91
ADLS_ACUITY_SCORE: 87
ADLS_ACUITY_SCORE: 83

## 2025-02-17 NOTE — PROGRESS NOTES
Received critical result call from lab, k 6.1. House MD aware, transferring to ICU, hyperkalemia protocol ordered.

## 2025-02-17 NOTE — CONSULTS
Nephrology Consultation      Jimmy Otto MRN# [id number]   YOB: 1950 Age: 75 year old   Date of Admission: 2/16/2025     Reason for consult: I was asked by Dr. Lechuga to evaulate this patient for chronic kidney disease.           Assessment and Plan:   Chronic kidney disease stage V: He has a long history of chronic kidney disease.  2/12/2025 creatinine 3.93 estimated GFR 15.  2/17/2025 creatinine 4.61 potassium 6.1 bicarbonate 25 sodium 129 hemoglobin 8.1.  He has been treated with calcium dextrose IV Lasix and Lokelma.  His repeat potassium is 5.9.    He likely has progression of chronic kidney disease and some acute deterioration given his acute illness with influenza A.  He is nonverbal and has hemiparesis.  He is nonambulatory.  He has a feeding tube in the abdomen and a chronic indwelling Felix.      I had a long discussion with family members.  He is not a candidate for peritoneal dialysis given his PEG.  He would not be able to go to an outpatient unit because he would need a Benjie lift and intervention to get him from wheelchair to dialysis chair.  Furthermore given his debilitated state his mortality on dialysis would be quite high and in my experience he would spend most of his time in the hospital.  I advised the family that he should be on medical care for his chronic kidney disease but without technological interventions like dialysis or comfort care and hospice.  They are going to consider this.    For today we will continue daily Lokelma to reduce the potassium.  I am going to put him on intravenous Lasix and chlorothiazide to treat fluid retention.  His respiratory distress is most likely from influenza A rather than volume overload based on his chest x-ray.    Anemia.  He has an element of iron deficient C and has gotten Venofer in the past.  He also has chronic kidney disease associated anemia.  We will give him a dose of Aranesp although he is unlikely to respond given his current  inflammatory state.  If he stabilizes we can give him intravenous iron.             Chief Complaint:   Respiratory failure due to influenza A.  He also had an episode of aspiration pneumonia    History is obtained from the electronic health record         History of Present Illness:   This patient is a 75 year old male who presents with respiratory failure due to influenza A.  He had an observed episode aspiration pneumonia and was transferred to the ICU.  His baseline is left parul-.  Plegia, aphasia, PEG, indwelling Felix catheter.  He has a history of congestive heart failure, encephalopathy, hypertension and diabetes.    He has been on treatment for hyperkalemia with calcium, dextrose, insulin and Lokelma.  He has gotten intravenous Lasix.  For his aspiration pneumonia he is on Zosyn.    His weight in the hospital has dropped from 72.7-70.8.  His urine output so far today is 100 mL and he has had 650 mL in GI output.  His pulse is 45 respiratory rate 18 blood pressure 164/66    Chest x-ray showed small bilateral pleural effusions right greater than left.  There was no description of pulmonary edema.  His baseline creatinine is 3.93 with an estimated GFR of 15.  Today his creatinine is 4.61 potassium 5.9 bicarbonate 25 sodium 129.  His calcium is 9.3 albumin 3.1 and hemoglobin 8.1.    Comorbidities include influenza A and respiratory failure as well as aspiration pneumonia.  Congestive heart failure, encephalopathy, hypertension and diabetes.                Past Medical History:     Past Medical History:   Diagnosis Date    Cerebrovascular accident - Hemiplegia and Aphasia     Depressive disorder     Diabetes mellitus     Felix catheter in place     Hypercholesteremia     Hypertension     S/P percutaneous endoscopic gastrostomy (PEG) tube placement (H)              Past Surgical History:     Past Surgical History:   Procedure Laterality Date    ENDOSCOPIC INSERTION TUBE GASTROSTOMY      IR CYSTOGRAM  02/14/2022                Social History:     Social History     Tobacco Use    Smoking status: Never    Smokeless tobacco: Never   Substance Use Topics    Alcohol use: Yes     Comment: occassional             Family History:   No family history on file.          Allergies:     Allergies   Allergen Reactions    Nka [No Known Allergies]              Medications:   I have reviewed this patient's current medications  Current Facility-Administered Medications   Medication Dose Route Frequency Provider Last Rate Last Admin    [START ON 2/18/2025] atorvastatin (LIPITOR) tablet 20 mg  20 mg Oral or Feeding Tube Daily Darell Santiago MD        calcium gluconate 1 g in 50 mL in sodium chloride intermittent infusion  1 g Intravenous Once Matthias Lechuga MD   1 g at 02/17/25 0931    chlorothiazide (DIURIL) injection 500 mg  500 mg Intravenous Once Darell Santiago MD        clopidogrel (PLAVIX) tablet 75 mg  75 mg Oral or Feeding Tube Daily John Hernandez MD   75 mg at 02/17/25 0841    FLUoxetine (PROzac) solution 20 mg  20 mg Oral or Feeding Tube Daily John Hernandez MD        furosemide (LASIX) injection 100 mg  100 mg Intravenous Q8H Darell Santiago MD        insulin aspart (NovoLOG) injection (RAPID ACTING)  1-7 Units Subcutaneous Q4H John Hernandez MD   1 Units at 02/17/25 0846    insulin glargine (LANTUS PEN) injection 5 Units  5 Units Subcutaneous QAM AC Matthias Lechuga MD        piperacillin-tazobactam (ZOSYN) 2.25 g vial to attach to  ml bag  2.25 g Intravenous Q6H Renae Cuadra NP   2.25 g at 02/17/25 0836    sevelamer carbonate (RENVELA) Packet 0.8 g  0.8 g Per G Tube TID w/meals John Hernandez MD        sodium chloride (PF) 0.9% PF flush 3 mL  3 mL Intracatheter Q8H John Hernandez MD   3 mL at 02/17/25 0045    [START ON 2/18/2025] sodium zirconium cyclosilicate (LOKELMA) packet 10 g  10 g Oral Daily Darell Santiago MD                 Review of Systems:     Unable.             Physical Exam:   Vitals were reviewed  Temp: 99.5  F (37.5  C) Temp src: Axillary BP: (!) 163/62 Pulse: 50   Resp: 20 SpO2: 94 % O2 Device: High Flow Nasal Cannula (HFNC) Oxygen Delivery: 45 LPM  Wt Readings from Last 4 Encounters:   25 70.8 kg (156 lb 1.4 oz)   25 66.5 kg (146 lb 9.7 oz)   10/25/24 60.3 kg (132 lb 15 oz)   10/28/24 61 kg (134 lb 7.7 oz)     Blood pressure range: Systolic (24hrs), Av , Min:121 , Max:163     Blood pressure range: Diastolic (24hrs), Av, Min:50, Max:66      Intake/Output Summary (Last 24 hours) at 2025 1024  Last data filed at 2025 0932  Gross per 24 hour   Intake 565 ml   Output 750 ml   Net -185 ml     Patient is nonverbal.  He does not respond to me during the exam  He is on high flow nasal cannula O2  Left upper quadrant feeding tube in place in the abdomen  Felix catheter in place  Right hemiparesis is evident  Lungs show coarse breath sounds with upper airway noise and wheezing  Cardiac exam faint sounds atrial fibrillation with bradycardia  Abdomen minimal bowel sounds but it appears soft the PEG tube is in place with a clean dressing   exam shows a Felix catheter in place  Lower extremities show trace-1+ edema         Data:   All laboratory data reviewed

## 2025-02-17 NOTE — PROGRESS NOTES
[facility]    Patient is currently receiving SN OT home care services from [facility].  and home health team have been notified of patients admission. [facility] liaison will continue to follow patient during hospital stay. If appropriate, provide home care orders to resume services at the time of discharge. If patients discharge date changes, please reach out to clinical liaison or the HUB to ensure SOC/ANDRES date is still appropriate for a safe discharge plan.     Thank you,     FESTUS Garcia, LPN  Home Care Liaison   Cell: [phone]

## 2025-02-17 NOTE — PROGRESS NOTES
RECEIVING UNIT ED HANDOFF REVIEW    ED Nurse Handoff Report was reviewed by: Vicenta Valdez RN on February 16, 2025 at 8:43 PM

## 2025-02-17 NOTE — CONSULTS
"CLINICAL NUTRITION SERVICES - ASSESSMENT NOTE    Malnutrition Status:    % Intake: Decreased intake does not meet criteria  % Weight Loss: None noted - fluid retention, potential for masking  Subcutaneous Fat Loss: Deferred  Muscle Loss: Deferred  Fluid Accumulation/Edema: mild- moderate  Malnutrition Diagnosis: Unable to determine due to lack of NFPE  Malnutrition Present on Admission: Unable to assess    Registered Dietitian Interventions:  - Will initiate continuous TF regimen given emesis this morning and admission to ICU. Can adjust back to usual bolus schedule as able.     Goal TF NovaSource Renal @ 30 ml/hr (720 ml) provides 1440 kcal (22 kcal/kg), 65 g pro (98 g/kg), 132 g CHO, 516 ml free water, and 0 g fiber daily.    Start TF @ 10 ml/hr. Advance by 10 mL q 12 hours until goal rate reached.   +Add nephronex to meet RDIs     REASON FOR ASSESSMENT  Provider order - Registered Dietitian to order TF per Medical Nutrition Therapy Guidelines    SUBJECTIVE INFORMATION  Discussed during IDT rounds. Pt is nonverbal. Chart reviewed.     NUTRITION HISTORY  Pt is GT dependent. According to ED note, pt most recently received TF at breakfast on 2/16.    Formula was changed from Jevity 1.5 --> Nepro during recent [facility] admission.     Noted in recent [facility] plan of care snapshot (1/18/25), home regimen is as follows:   Nepro 3.5 cartons/day (8 oz per carton) --> provides 830 mL, 1493 kcal, 67 g protein, 133 g CHO, 21 g fiber.  60 mL flush before and after each bolus      Carried forward from recent admission to [facility] -->     \"Patient has chronic GT d/t swallowing difficulty (placed 10/17/24). 100% reliant on GT for nutrition.      Family reports they would do 1 carton bolus in the morning, 1 carton bolus in the afternoon, and 2 carton bolus in the evenings. They have been consistent with feeds and report his weight has been stable and up trending.   Family notes that when patient first started tube feeds it took him a couple of " "weeks to eventually tolerate feeds.   Family noted pt had nausea with vomiting on transport to the hospital yesterday.\"      CURRENT NUTRITION ORDERS  Diet: NPO    CURRENT INTAKE/TOLERANCE  N/A - nothing since admission.     LABS  Nutrition-relevant labs:  Elevated potassium - 6.1 >> 5.9    MEDICATIONS  Nutrition-relevant medications:  Med sliding scale insulin + 5 units lantus  Renvela, Lokelma    ANTHROPOMETRICS  Height: 167.6 cm (5' 6\")  Most Recent Weight: 70.8 kg (156 lb 1.4 oz)  IBW: 64.5 kg  % IBW: 110%  BMI (kg/m ): Overweight BMI 25-29.9  Weight History: wt is up from recent admission. Noted edema (mild-moderate) in flowsheets.   Wt Readings from Last 10 Encounters:   02/17/25 70.8 kg (156 lb 1.4 oz)   01/18/25 66.5 kg (146 lb 9.7 oz)   10/25/24 60.3 kg (132 lb 15 oz)   10/28/24 61 kg (134 lb 7.7 oz)   01/02/23 57.2 kg (126 lb)   10/28/22 54.4 kg (120 lb)   01/26/22 57.8 kg (127 lb 6.4 oz)   11/04/21 59 kg (130 lb)   10/01/21 66.5 kg (146 lb 11.2 oz)   09/16/21 61.5 kg (135 lb 9.3 oz)     ASSESSED NUTRITION NEEDS  Dosing Weight: 66.5 kg, based on suspect closer to dry weight   Estimated Energy Needs: 0149-3852 kcals/day (20 - 25 kcals/kg)  Justification: Maintenance  Estimated Protein Needs: 53-66 grams protein/day (0.8-1 grams of pro/kg)  Justification: CKD and Hypercatabolism with acute illness  Estimated Fluid Needs: defer to nephrology     SYSTEM FINDINGS    GT in place   Skin/wounds: No PI  GI symptoms: No BM this admission yet    MALNUTRITION  % Intake: Decreased intake does not meet criteria  % Weight Loss: None noted - fluid retention, potential for masking  Subcutaneous Fat Loss:  Deferred  Muscle Loss:  Deferred  Fluid Accumulation/Edema: mild- moderate  Malnutrition Diagnosis: Unable to determine due to lack of NFPE  Malnutrition Present on Admission: Unable to assess    NUTRITION DIAGNOSIS  Inadequate protein energy intake related to hospitalization and TF on hold as evidenced by last TF " received yesterday morning.     INTERVENTIONS  Collaboration by nutrition professional with other providers  Enteral nutrition management  Management of flushing of feeding tube    Goals  TF @ goal to provide % estimated needs.      Monitoring/Evaluation  Progress toward goals will be monitored and evaluated per policy.    Viv Webb RD, LD  Pager: [phone]  Available on [employer]

## 2025-02-17 NOTE — PROGRESS NOTES
Palliative consult received. Patient will be seen tomorrow. Thanks.     Casandra KAHN, CNS  Palliative Medicine   [facility]  Securely message with [facility] Palliative Care [facility]

## 2025-02-17 NOTE — CODE/RAPID RESPONSE
[facility]    House MARIA LUZ RRT Note  2/17/2025   Time Called: 0018    RRT called for: hypoxia     Assessment & Plan   #Acute hypoxic respiratory failure secondary to influenza A, acute on chronic diastolic CHF exacerbation in the setting of CKD stage IV  #Aspiration pneumonitis  #Vomiting likely secondary to uremia versus bowel blockage  #Toxic metabolic encephalopathy versus uremic encephalopathy  #Hyperkalemia  #Paroxysmal atrial fibrillation with slow ventricular response  #Junctional bradycardia  #Elevated troponin (flat delta) likely worsening renal failure     Nursing staff at bedside and witnessed patient with moderate brown, bilious vomit all over his chest.  Oxygen saturations dropped to mid 70s to low 80% and unable to recover saturations with 15 L oxygen mask prompting RRT evaluation.    On my arrival patient sitting upright in hospital bed appearing somnolent in acute respiratory distress.  Heart rate 50 and junctional with intermittent runs of slow atrial fibrillation.  /54.  RR 26.  SpO2 78% on 15 L oxy mask.  He was immediately placed on high flow nasal cannula with slow recovery of saturations to low 90's% on Fi 1.0 50 LPM.  Unable to obtain ROS secondary to mentation history of aphasia.  Daughters at bedside report he is able to show thumbs up or down in point to things. On exam pupils PERRL.  No spontaneous movement of left upper extremity however localizes to pain.  Right-sided hemiplegia.  Coarse rales  breath sounds to anterior lateral fields.  Abdomen is distended with third spaced fluid.  Bilateral lower extremity peripheral edema.  Felix catheter in place with minimal yellow urine.  Skin is warm and dry.    Etiology of emesis likely secondary to underlying uremia however alternatively considered loss of vagal tone in the setting of junctional bradycardia.  Daughters also provide history that G-tube balloon ruptured and G-tube was replaced via IR last week.   Providers at that time said ruptured balloon will naturally pass to the bowels.  Considered obstructive pathology for emesis.    Goals of care  Provider expressed concern regarding patient's clinical status and multiorgan failure.  Given his altered mental status and maximal level high flow nasal cannula he will be transferred to ICU. Many of his problems stem from his chronic kidney disease/ESRD.  He will need nephrology to weigh in and discuss hemodialysis.  Daughters continue to agree prearrest intubation is aligned with their wishes and will reassess pending consultants recommendations.      INTERVENTIONS:  -HFNC; maxed at 100% FiO2  -pCXR and 1v ABD xray  -Hyper kalemia protocol ordered with repeat potassium ordered for 0200 with subsequent every 4 hours draws  -Troponin, procalcitonin   -Will initiate on empiric Zosyn for aspiration pneumonia given frailty and significant oxygen requirements  -Transfer to ICU.  Spoke with Dr. Gallo. Hospitalist will remain primary pending pt's need for intubation.     I personally reviewed the radiographs small bilateral pleural effusion with patchy opacities bilaterally.  No pneumothorax.  No widened mediastinum    Working diagnosis: Acute hypoxic respiratory failure secondary to aspiration the setting of ESRD    At the end of the RRT patient remains somnolent with improved oxygenation on maximum high flow.  Hemodynamically stable.    Disposition ICU     Discussed with and defer further cares to hospitalist attending physician Dr. Lechuga    Interval History     Jimmy Otto is a 75 year old male admitted on 2/16/2025.  Patient has history of htn, hlp, dm2 most recent echo 10/24 EF 60-65% nl LV fxn, hx of stroke with left sided hemiplegia and aphasia-non verbal, PEG tube in place on TFs, and chronic indwelling ordoñez for neurogenic bladder, CKD 3b with progressive renal dysfunction, chronic normocytic anemia was brought into the ER due to concern for decreasing urine output.      Patient was brought into the ER due to concern for decreasing urine output, noticed darker urine after Felix catheter rest placement this a.m.  Also concern for junctional bradycardia and route without any symptoms of syncope.  Patient also was hypoxic with SpO2 range around 85-88, requiring oxygen supplementation.  No concern for cough fever or chills noted per family.  1 episode of emesis this a.m. monitored tube feeds given this morning per family.  Most recent increase in torsemide dose from 10 mg to 20 mg done in nephrology clinic on 2/7/2025.    Code Status: No CPR- Pre-arrest intubation OK    Allergies   Allergies   Allergen Reactions    Nka [No Known Allergies]        Physical Exam   Vital Signs with Ranges:  Temp:  [97.6  F (36.4  C)-98  F (36.7  C)] 98  F (36.7  C)  Pulse:  [] 50  Resp:  [16-33] 25  BP: (121-155)/(50-66) 129/54  FiO2 (%):  [95 %] 95 %  SpO2:  [85 %-97 %] 93 %  No intake/output data recorded.    Physical Exam  Constitutional:       General: He is in acute distress.      Appearance: He is ill-appearing and toxic-appearing.   Eyes:      Pupils: Pupils are equal, round, and reactive to light.   Cardiovascular:      Rate and Rhythm: Regular rhythm. Bradycardia present.      Pulses: Normal pulses.      Heart sounds: Normal heart sounds.   Pulmonary:      Effort: Tachypnea, accessory muscle usage and respiratory distress present.      Breath sounds: Rales present.   Abdominal:      General: There is distension.      Tenderness: There is no abdominal tenderness.   Musculoskeletal:      Right lower leg: 3+ Edema present.      Left lower leg: 3+ Edema present.   Skin:     General: Skin is warm and dry.   Neurological:      GCS: GCS eye subscore is 3. GCS verbal subscore is 1. GCS motor subscore is 5.      Comments: Baseline right sided paresis           Data     EKG:  Interpreted by Renae Cuadra NP  Time reviewed: 0037  Symptoms at time of EKG: vomiting and hypoxia    Rhythm: atrial  fibrillation - controlled  Rate: Bradycardia and 40-50  Axis: Normal  Ectopy: none  Conduction: normal  ST Segments/ T Waves: QTc prolongation 520  Q Waves: none  Comparison to prior: replaced junctional bradycardia     Clinical Impression: atrial fibrillation (new onset)      Troponin:    105    IMAGING: (X-ray/CT/MRI)   Recent Results (from the past 24 hours)   XR Chest Port 1 View    Narrative    EXAM: XR CHEST PORT 1 VIEW  LOCATION: [facility]  DATE: 2/16/2025    INDICATION: hypoxia  COMPARISON: 1/14/2025      Impression    IMPRESSION: Diffuse mixed interstitial and airspace opacities likely due to moderate pulmonary edema. Small right and trace left pleural effusions and mild bibasilar atelectasis. Mild cardiomegaly. No pneumothorax.   XR Chest Port 1 View    Narrative    EXAM: XR CHEST PORT 1 VIEW  LOCATION: [facility]  DATE: 2/17/2025    INDICATION: hypoxia s p emesis  COMPARISON: 2/16/2025      Impression    IMPRESSION: Heart size is stable. Small bilateral pleural effusions are redemonstrated, right greater than left. Patchy parenchymal opacities persist compatible with asymmetric edema versus multifocal pneumonitis. No pneumothorax. No acute bony   abnormality.   XR Abdomen Port 1 View    Narrative    EXAM: ABDOMEN SINGLE VIEW PORTABLE  LOCATION: [facility]  DATE: 2/17/2025    INDICATION: Nausea.  COMPARISON: None.    FINDINGS: A percutaneous gastrostomy tube is present with balloon tip projecting over the gastric body. There are a few loops of mildly distended gas-filled small bowel in the mid abdomen. Gas and a small amount of stool are scattered within nondistended   colon. No visualized bowel wall thickening or pneumatosis. Apparent interstitial thickening within the central aspects of the lungs. Moderate-sized right pleural effusion. A few patchy opacities in the mid and lower right lung.      Impression    IMPRESSION:   1.  "A few loops of mildly distended gas-filled small bowel are present in the mid abdomen. These are within normal limits. There is no convincing evidence of a bowel obstruction.  2. A small amount of stool is scattered within the colon.        CBC with Diff:  Recent Labs   Lab Test 02/16/25  1515 11/02/21  0609 11/01/21  0735   WBC 7.1   < > 5.6   HGB 8.1*   < > 11.3*   MCV 82   < > 88      < > 224   INR  --   --  0.96    < > = values in this interval not displayed.        Lactic Acid:    Lab Results   Component Value Date    LACT 1.6 02/16/2025    LACT 0.8 01/17/2025           Comprehensive Metabolic Panel:  Recent Labs   Lab 02/17/25  0056 02/16/25  2328 02/16/25  1515 02/12/25  1130   NA  --  127* 127* 129*   POTASSIUM 6.1* 6.0* 6.2* 4.2   CHLORIDE  --  90* 91* 90*   CO2  --  22 25 28   ANIONGAP  --  15 11 11   GLC  --  237* 255* 150*   BUN  --  121.5* 111.3* 97.8*   CR  --  4.48* 4.26* 3.93*   GFRESTIMATED  --  13* 14* 15*   ARLETH  --  9.7 9.2 8.9   MAG  --   --  3.1*  --    PHOS  --   --   --  3.3   PROTTOTAL  --   --  6.8  --    ALBUMIN  --   --  3.1* 3.1*   BILITOTAL  --   --  0.2  --    ALKPHOS  --   --  127  --    ALT  --   --  39  --        UA:  No results for input(s): \"COLOR\", \"APPEARANCE\", \"URINEGLC\", \"URINEBILI\", \"URINEKETONE\", \"SG\", \"UBLD\", \"URINEPH\", \"PROTEIN\", \"UROBILINOGEN\", \"NITRITE\", \"LEUKEST\", \"RBCU\", \"WBCU\" in the last 168 hours.        Time Spent on this Encounter   I spent 72 minutes (1393 - 0160) of critical care time on the unit/floor managing the care of Jimmy Otto. Upon evaluation, this patient had a high probability of imminent or life-threatening deterioration due to hypoxic respiratory failure, which required my direct attention, intervention, and personal management .100% of my time was spent at the bedside counseling the patient and/or coordinating care regarding services listed in this note.    Renae Cuadra NP  [facility]  Securely message with the " [employer] Web Console (learn more here)  Text page via [facility] Paging/Directory

## 2025-02-17 NOTE — PROGRESS NOTES
[facility]    Patient is currently on service with [facility] for home tube feeding - Nepro with carbsteady 3.5 cartons/day via gravity bolus.     Liaison will follow along. If applicable at discharge, please include Home Infusion Referral in discharge orders.     Thank you,    Claire Kan RN  [facility] Liaison  [phone] (M-F 8a-5p)  [phone] Office

## 2025-02-17 NOTE — PLAN OF CARE
Neuro: Nonverbal, baseline. R hemiplegic. Follows simple and bare minimal commands. Can squeeze LUE and slightly move LLE toes'. Pupils reactive. Per daughter, changed in AMS as pt will nod/shake head when asking questions in English. Pt was not responsive to questions.   Pain/CPOT: none  Cardiac: junctional rhythm, HR in the mid 40's and sustaining. Consult placed  Resp: Remains on HFNC, 70%/45LPM. LS coarse/rales. Attempted to wean down to 55%/45LPM, pt desat to the high 80's and took about 10 minutes to sustain better O2 sats  GI/: chronic ordoñez, anuric. Overall 50cc since arrival onto unit. No BM on shift. G-tube open to gravity with ~50/hr brown, grainy output. Clamped in between for meds Consulted with A Wolfgram NP overnight about collecting UA from ordoñez. Ordoñez was exchanged (per daughter by home care RN 2/16). NP suggest to differ to days as pt has a hx of BPH and difficult cath placement. Possible consult to urology.   Diet: NPO  Mobility/Activity: total cares, X2 lift, T/R  Skin: Pink excoriated pre-existing WOC on buttocks. Overall edema +2/+4  LDAs: x2 PIV    Consults: nephrology, cardiology, nutrition    Major Shift Events:    - 0200: arrived on unit. RRT called for emesis episode with increasing needs of O2. ICU for airway protection.   -0245: K shift x1. Insulin, D50 and D10 300cc bolus provided. Post 2 hrs K check, K remains at 6.1. MD notified and aware, 0800 lokelma 10g to give early.      Goal Outcome Evaluation:      Plan of Care Reviewed With: patient    Overall Patient Progress: no changeOverall Patient Progress: no change    Outcome Evaluation: Remains on HFNC

## 2025-02-17 NOTE — CONSULTS
Cardiology Consultation      Jimmy Otto MRN# [id number]   YOB: 1950 Age: 75 year old   Date of Admission: 2/16/2025     Reason for consult: Junctional bradycardia, acute hypoxemic respiratory failure           Assessment and Plan:     75-year-old gentleman with a past medical history significant for hypertension, diabetes, dyslipidemia and CVA with residual left-sided hemiplegia/aphasia, PEG tube with tube feeds and a chronic indwelling Felix catheter for neurogenic bladder who was admitted on 2/16/2025 with decreasing urine output and worsening renal failure.    This morning he was transferred to the ICU for acute hypoxemic respiratory failure secondary to influenza A as well as possible aspiration pneumonitis.  Cardiology were consulted due to concerns regarding junctional bradycardia upon presentation.    IMPRESSION:    Acute hypoxemic respiratory failure secondary to advanced age as well as possible aspiration pneumonitis.   Acute on chronic renal insufficiency.  Probable acute diastolic heart failure secondary to #1 and #2.  Normal left ventricular systolic function on echocardiography dated 1/15/2025.  History of CVA with residual left-sided hemiplegia/aphasia.  Junctional bradycardia, intermittent.  Currently in sinus bradycardia.    PLAN    -I suspect that his junctional bradycardia is related to his metabolic abnormalities in the setting of his acute on chronic renal failure and associated electrolyte derangements including hyperkalemia.  In any event, he is hemodynamically stable right now and there are no indications for pacemaker implantation.    -Regarding his acute hypoxemic respiratory failure, this is multifactorial with a probable component of diastolic heart failure.  The only effective way to address this would be through dialysis, which would require a conversation regarding overall goals of care.    -We will await results of overall goals of care conversation.  Please call with  "questions.    I personally examined and evaluated the patient today.  Jimmy Otto was in (or remains in) critical condition today due to acute hypoxemic respiratory failure.   I spent a total of 35 minutes providing critical care services at the bedside, evaluating the patient, directing care and reviewing laboratory values and radiologic reports.          Chief Complaint:   Bradycardia, Catheter Problem, and Urinary Retention           History of Present Illness:   This patient is a 75 year old male with a history of a CVA i in the past with significant residual disability such as left-sided hemiplegia/aphasia, placement of a PEG tube for tube feeds and a chronic indwelling Felix for neurogenic bladder.  He also has a history of CKD stage III with progressive renal insufficiency and chronic anemia.    He was admitted to [facility] on 2025 with decreasing urine output and was also noted to have junctional bradycardia.  During his admission he experienced emesis and subsequent acute hypoxemic respiratory failure that required transfer to the ICU.  He is currently on high flow oxygen.    His admission evaluation was consistent with possible aspiration pneumonitis as well as influenza A.  He also had an elevated N-terminal proBNP and diffuse bilateral infiltrates on chest x-ray suggestive of an element of diastolic heart failure.  Cardiology were consulted for further evaluation.    Currently his telemetry demonstrates sinus rhythm.  He has been hemodynamically stable.         Physical Exam:   Vitals were reviewed  Blood pressure 139/61, pulse (!) 47, temperature 99.5  F (37.5  C), temperature source Axillary, resp. rate 23, height 1.676 m (5' 6\"), weight 70.8 kg (156 lb 1.4 oz), SpO2 (!) 90%.  Temperatures:  Current - Temp: 99.5  F (37.5  C); Max - Temp  Av.6  F (37  C)  Min: 97.6  F (36.4  C)  Max: 99.5  F (37.5  C)  Respiration range: Resp  Av  Min: 16  Max: 33  Pulse range: Pulse  Av.5  Min: " 38  Max: 105  Blood pressure range: Systolic (24hrs), Av , Min:121 , Max:155   ; Diastolic (24hrs), Av, Min:50, Max:66    Pulse oximetry range: SpO2  Av.6 %  Min: 85 %  Max: 100 %    Intake/Output Summary (Last 24 hours) at 2025 0853  Last data filed at 2025 0837  Gross per 24 hour   Intake 465 ml   Output 750 ml   Net -285 ml     Constitutional:   Somnolent     Eyes:   Lids and lashes normal, pupils equal, round and reactive to light, extra ocular muscles intact, sclera clear, conjunctiva normal     Neck:   supple, symmetrical, trachea midline, no JVD     Back:   symmetric     Lungs:   Decreased breath sounds at bases       Cardiovascular:   Bradycardic but regular     Abdomen:   non-tender     Musculoskeletal:   motor strength is 5 out of 5 all extremities bilaterally     Neurologic:   Grossly nonfocal     Skin:   no bruising or bleeding     Additional findings:     No edema                 Past Medical History:   I have reviewed this patient's past medical history  Past Medical History:   Diagnosis Date    Cerebrovascular accident - Hemiplegia and Aphasia     Depressive disorder     Diabetes mellitus     Felix catheter in place     Hypercholesteremia     Hypertension     S/P percutaneous endoscopic gastrostomy (PEG) tube placement (H)              Past Surgical History:   I have reviewed this patient's past surgical history  Past Surgical History:   Procedure Laterality Date    ENDOSCOPIC INSERTION TUBE GASTROSTOMY      IR CYSTOGRAM  2022               Social History:   I have reviewed this patient's social history  Social History     Tobacco Use    Smoking status: Never    Smokeless tobacco: Never   Substance Use Topics    Alcohol use: Yes     Comment: occassional             Family History:   I have reviewed this patient's family history  No family history on file.          Allergies:     Allergies   Allergen Reactions    Nka [No Known Allergies]              Medications:   I have  reviewed this patient's current medications  Medications Prior to Admission   Medication Sig Dispense Refill Last Dose/Taking    amLODIPine (NORVASC) 5 MG tablet Take 2 tablets (10 mg) by mouth daily. 60 tablet 0 2025    atorvastatin (LIPITOR) 80 MG tablet Take 80 mg by mouth daily   2025    clopidogrel (PLAVIX) 75 MG tablet Take 75 mg by mouth daily   2025    doxazosin (CARDURA) 2 MG tablet Take 1 tablet (2 mg) by mouth daily. 90 tablet 3 2025    [] ferrous sulfate (FEROSUL) 325 (65 Fe) MG tablet Place 1 tablet (325 mg) into Feeding Tube 2 times daily. 60 tablet 0 2025    FLUoxetine (PROZAC) 20 MG/5ML solution 5 mLs (20 mg) by Oral or Feeding Tube route daily 150 mL 0 2025    insulin glargine 100 UNIT/ML pen Inject 9 Units subcutaneously every morning.   2025    Insulin Lispro (ADMELOG SC) For BG <140 no insulin. 140-189 give 1 unit. 190-239 give 2 units. 240-289 give 3. 290-340 give 4 units. >340 give 5 units. Usual daily dose 12 units per day.   Taking    Nepro With Carb Steady 8 oz Place 840 mLs into G tube daily. Nepro Infuse 840mLs into G-tube daily via gravity bag - 3.5 cartons / day. 1 carton at 0900, 1 carton at 1200 and 1.5 cartons at 1800.    Water flush: 60mL Q 4 hours 16312 mL 11 2025    polyethylene glycol (MIRALAX) 17 g packet Take 17 g by mouth daily as needed for constipation   Taking As Needed    [] sevelamer carbonate, RENVELA, 0.8 GM PACK Packet Place 1 packet (0.8 g) into G tube 3 times daily (with meals). 90 packet 0 2025    torsemide (DEMADEX) 20 MG tablet Place 1 tablet (20 mg) into G tube daily. 30 tablet 0 2025    calcium acetate (CALPHRON) 667 MG TABS tablet Take 1 tablet (667 mg) by mouth 3 times daily (with meals). 90 tablet 0      Current Facility-Administered Medications   Medication Dose Route Frequency Provider Last Rate Last Admin    [Held by provider] amLODIPine (NORVASC) tablet 10 mg  10 mg Oral or Feeding Tube Daily  John Hernandez MD        atorvastatin (LIPITOR) tablet 80 mg  80 mg Oral or Feeding Tube Daily John Hernandez MD   80 mg at 02/17/25 0840    calcium carbonate (TUMS) chewable tablet 1,000 mg  1,000 mg Oral 4x Daily PRN John Hernandez MD        calcium gluconate 1 g in 50 mL in sodium chloride intermittent infusion  1 g Intravenous Once Matthias Lechuga MD        clopidogrel (PLAVIX) tablet 75 mg  75 mg Oral or Feeding Tube Daily John Hernandez MD   75 mg at 02/17/25 0841    glucose gel 15-30 g  15-30 g Oral Q15 Min PRN John Hernandez MD        Or    dextrose 50 % injection 25-50 mL  25-50 mL Intravenous Q15 Min PRN John Hernandez MD        Or    glucagon injection 1 mg  1 mg Subcutaneous Q15 Min PRN John Hernandez MD        [Held by provider] doxazosin (CARDURA) tablet 2 mg  2 mg Oral or Feeding Tube Daily John Hernandez MD        ferrous sulfate 300 (60 Fe) MG/5ML solution 300 mg  300 mg Per Feeding Tube BID John Hernandez MD   300 mg at 02/17/25 0841    FLUoxetine (PROzac) solution 20 mg  20 mg Oral or Feeding Tube Daily John Hernandez MD        insulin aspart (NovoLOG) injection (RAPID ACTING)  1-7 Units Subcutaneous Q4H John Hernandez MD   1 Units at 02/17/25 0846    insulin glargine (LANTUS PEN) injection 5 Units  5 Units Subcutaneous QAM AC Matthias Lechuga MD        lidocaine (LMX4) cream   Topical Q1H PRN John Hernandez MD        lidocaine 1 % 0.1-1 mL  0.1-1 mL Other Q1H PRN John Hernandez MD        ondansetron (ZOFRAN) injection 4 mg  4 mg Intravenous Q6H PRN John Hernandez MD   4 mg at 02/17/25 0047    piperacillin-tazobactam (ZOSYN) 2.25 g vial to attach to  ml bag  2.25 g Intravenous Q6H Renae Cuadra NP   2.25 g at 02/17/25 0836    senna-docusate (SENOKOT-S/PERICOLACE) 8.6-50 MG per tablet 1 tablet  1 tablet Oral BID John Heard MD        Or    senna-docusate (SENOKOT-S/PERICOLACE) 8.6-50 MG per tablet 2 tablet  2 tablet Oral BID John Heard MD         sevelamer carbonate (RENVELA) Packet 0.8 g  0.8 g Per G Tube TID w/meals John Hernandez MD        sodium chloride (PF) 0.9% PF flush 3 mL  3 mL Intracatheter Q8H John Hernandez MD   3 mL at 02/17/25 0045    sodium chloride (PF) 0.9% PF flush 3 mL  3 mL Intracatheter q1 min prn John Hernandez MD        sodium zirconium cyclosilicate (LOKELMA) packet 10 g  10 g Oral Daily Adan Ivey MD   10 g at 02/17/25 0614             Review of Systems:     The 10 point Review of Systems is negative other than noted in the HPI            Data:   All laboratory data reviewed  Results for orders placed or performed during the hospital encounter of 02/16/25 (from the past 24 hours)   EKG 12-lead, tracing only   Result Value Ref Range    Systolic Blood Pressure  mmHg    Diastolic Blood Pressure  mmHg    Ventricular Rate 43 BPM    Atrial Rate  BPM    SD Interval  ms    QRS Duration 82 ms     ms    QTc 446 ms    P Axis  degrees    R AXIS -1 degrees    T Axis 49 degrees    Interpretation ECG       Junctional bradycardia  Septal infarct , age undetermined  Abnormal ECG  When compared with ECG of 15-Alex-2025 03:24,  Septal infarct is now Present  Nonspecific T wave abnormality has replaced inverted T waves in Inferior leads  Confirmed by GENERATED REPORT, COMPUTER ([id number]),  James Jarquin ([id number]) on 2/16/2025 6:35:25 PM     CBC with platelets differential    Narrative    The following orders were created for panel order CBC with platelets differential.  Procedure                               Abnormality         Status                     ---------                               -----------         ------                     CBC with platelets and d...[[id number]]  Abnormal            Final result               RBC and Platelet Morphology[[id number]]                                                   Please view results for these tests on the individual orders.   Comprehensive metabolic panel   Result Value  Ref Range    Sodium 127 (L) 135 - 145 mmol/L    Potassium 6.2 (HH) 3.4 - 5.3 mmol/L    Carbon Dioxide (CO2) 25 22 - 29 mmol/L    Anion Gap 11 7 - 15 mmol/L    Urea Nitrogen 111.3 (H) 8.0 - 23.0 mg/dL    Creatinine 4.26 (H) 0.67 - 1.17 mg/dL    GFR Estimate 14 (L) >60 mL/min/1.73m2    Calcium 9.2 8.8 - 10.4 mg/dL    Chloride 91 (L) 98 - 107 mmol/L    Glucose 255 (H) 70 - 99 mg/dL    Alkaline Phosphatase 127 40 - 150 U/L    AST      ALT 39 0 - 70 U/L    Protein Total 6.8 6.4 - 8.3 g/dL    Albumin 3.1 (L) 3.5 - 5.2 g/dL    Bilirubin Total 0.2 <=1.2 mg/dL   Lactic acid whole blood with 1x repeat in 2 hr when >2   Result Value Ref Range    Lactic Acid, Initial 1.6 0.7 - 2.0 mmol/L   Troponin T, High Sensitivity   Result Value Ref Range    Troponin T, High Sensitivity 106 (HH) <=22 ng/L   Magnesium   Result Value Ref Range    Magnesium 3.1 (H) 1.7 - 2.3 mg/dL   Blood Culture Peripheral Blood    Specimen: Peripheral Blood   Result Value Ref Range    Culture No growth after 12 hours    TSH with free T4 reflex   Result Value Ref Range    TSH 8.72 (H) 0.30 - 4.20 uIU/mL   CBC with platelets and differential   Result Value Ref Range    WBC Count 7.1 4.0 - 11.0 10e3/uL    RBC Count 3.07 (L) 4.40 - 5.90 10e6/uL    Hemoglobin 8.1 (L) 13.3 - 17.7 g/dL    Hematocrit 25.3 (L) 40.0 - 53.0 %    MCV 82 78 - 100 fL    MCH 26.4 (L) 26.5 - 33.0 pg    MCHC 32.0 31.5 - 36.5 g/dL    RDW 14.7 10.0 - 15.0 %    Platelet Count 174 150 - 450 10e3/uL    % Neutrophils 77 %    % Lymphocytes 13 %    % Monocytes 8 %    % Eosinophils 1 %    % Basophils 1 %    % Immature Granulocytes 0 %    NRBCs per 100 WBC 0 <1 /100    Absolute Neutrophils 5.5 1.6 - 8.3 10e3/uL    Absolute Lymphocytes 0.9 0.8 - 5.3 10e3/uL    Absolute Monocytes 0.6 0.0 - 1.3 10e3/uL    Absolute Eosinophils 0.1 0.0 - 0.7 10e3/uL    Absolute Basophils 0.0 0.0 - 0.2 10e3/uL    Absolute Immature Granulocytes 0.0 <=0.4 10e3/uL    Absolute NRBCs 0.0 10e3/uL   Nt probnp inpatient (BNP)    Result Value Ref Range    N terminal Pro BNP Inpatient 19,616 (H) 0 - 900 pg/mL   T4 free   Result Value Ref Range    Free T4 1.47 0.90 - 1.70 ng/dL   Hemoglobin A1c   Result Value Ref Range    Estimated Average Glucose 206 (H) <117 mg/dL    Hemoglobin A1C 8.8 (H) <5.7 %   XR Chest Port 1 View    Narrative    EXAM: XR CHEST PORT 1 VIEW  LOCATION: [facility]  DATE: 2/16/2025    INDICATION: hypoxia  COMPARISON: 1/14/2025      Impression    IMPRESSION: Diffuse mixed interstitial and airspace opacities likely due to moderate pulmonary edema. Small right and trace left pleural effusions and mild bibasilar atelectasis. Mild cardiomegaly. No pneumothorax.   Influenza A/B, RSV and SARS-CoV2 PCR (COVID-19) Nasopharyngeal    Specimen: Nasopharyngeal; Swab   Result Value Ref Range    Influenza A PCR Positive (A) Negative    Influenza B PCR Negative Negative    RSV PCR Negative Negative    SARS CoV2 PCR Negative Negative    Narrative    Testing was performed using the Xpert Xpress CoV2/Flu/RSV Assay on the Cepheid GeneXpert Instrument. This test should be ordered for the detection of SARS-CoV2, influenza, and RSV viruses in individuals with signs and symptoms of respiratory tract infection. This test is for in vitro diagnostic use under the [place] FDA for laboratories certified under CLIA to perform high or moderate complexity testing. This test has been [place] FDA cleared. A negative result does not rule out the presence of PCR inhibitors in the specimen or target RNA in concentration below the limit of detection for the assay. If only one viral target is positive but coinfection with multiple targets is suspected, the sample should be re-tested with another FDA cleared, approved, or authorized test, if coninfection would change clinical management. This test was validated by the [facility]. These laboratories are certified under the Clinical Laboratory Improvement Amendments of 1988  (CLIA-88) as qualified to perfom high complexity laboratory testing.   Basic metabolic panel   Result Value Ref Range    Sodium 127 (L) 135 - 145 mmol/L    Potassium 6.0 (H) 3.4 - 5.3 mmol/L    Chloride 90 (L) 98 - 107 mmol/L    Carbon Dioxide (CO2) 22 22 - 29 mmol/L    Anion Gap 15 7 - 15 mmol/L    Urea Nitrogen 121.5 (H) 8.0 - 23.0 mg/dL    Creatinine 4.48 (H) 0.67 - 1.17 mg/dL    GFR Estimate 13 (L) >60 mL/min/1.73m2    Calcium 9.7 8.8 - 10.4 mg/dL    Glucose 237 (H) 70 - 99 mg/dL   EKG 12-lead, tracing only   Result Value Ref Range    Systolic Blood Pressure  mmHg    Diastolic Blood Pressure  mmHg    Ventricular Rate 58 BPM    Atrial Rate 65 BPM    NM Interval  ms    QRS Duration 80 ms     ms    QTc 520 ms    P Axis  degrees    R AXIS -4 degrees    T Axis 21 degrees    Interpretation ECG       Atrial fibrillation with slow ventricular response with a competing junctional pacemaker  Nonspecific ST abnormality  Prolonged QT  Abnormal ECG  When compared with ECG of 16-Feb-2025 15:02,  Atrial fibrillation has replaced Junctional rhythm  QT has lengthened     XR Chest Port 1 View    Narrative    EXAM: XR CHEST PORT 1 VIEW  LOCATION: [facility]  DATE: 2/17/2025    INDICATION: hypoxia s p emesis  COMPARISON: 2/16/2025      Impression    IMPRESSION: Heart size is stable. Small bilateral pleural effusions are redemonstrated, right greater than left. Patchy parenchymal opacities persist compatible with asymmetric edema versus multifocal pneumonitis. No pneumothorax. No acute bony   abnormality.   XR Abdomen Port 1 View    Narrative    EXAM: ABDOMEN SINGLE VIEW PORTABLE  LOCATION: [facility]  DATE: 2/17/2025    INDICATION: Nausea.  COMPARISON: None.    FINDINGS: A percutaneous gastrostomy tube is present with balloon tip projecting over the gastric body. There are a few loops of mildly distended gas-filled small bowel in the mid abdomen. Gas and a small amount of  stool are scattered within nondistended   colon. No visualized bowel wall thickening or pneumatosis. Apparent interstitial thickening within the central aspects of the lungs. Moderate-sized right pleural effusion. A few patchy opacities in the mid and lower right lung.      Impression    IMPRESSION:   1. A few loops of mildly distended gas-filled small bowel are present in the mid abdomen. These are within normal limits. There is no convincing evidence of a bowel obstruction.  2. A small amount of stool is scattered within the colon.    Troponin T, High Sensitivity   Result Value Ref Range    Troponin T, High Sensitivity 105 (HH) <=22 ng/L   Procalcitonin   Result Value Ref Range    Procalcitonin 0.29 <0.50 ng/mL   Potassium   Result Value Ref Range    Potassium 6.1 (HH) 3.4 - 5.3 mmol/L   Glucose by meter   Result Value Ref Range    GLUCOSE BY METER POCT 201 (H) 70 - 99 mg/dL   Glucose by meter   Result Value Ref Range    GLUCOSE BY METER POCT 226 (H) 70 - 99 mg/dL   Glucose by meter   Result Value Ref Range    GLUCOSE BY METER POCT 218 (H) 70 - 99 mg/dL   Glucose by meter   Result Value Ref Range    GLUCOSE BY METER POCT 221 (H) 70 - 99 mg/dL   Comprehensive metabolic panel   Result Value Ref Range    Sodium 129 (L) 135 - 145 mmol/L    Potassium 6.1 (HH) 3.4 - 5.3 mmol/L    Carbon Dioxide (CO2) 25 22 - 29 mmol/L    Anion Gap 11 7 - 15 mmol/L    Urea Nitrogen 123.8 (H) 8.0 - 23.0 mg/dL    Creatinine 4.61 (H) 0.67 - 1.17 mg/dL    GFR Estimate 13 (L) >60 mL/min/1.73m2    Calcium 9.3 8.8 - 10.4 mg/dL    Chloride 93 (L) 98 - 107 mmol/L    Glucose 228 (H) 70 - 99 mg/dL    Alkaline Phosphatase 117 40 - 150 U/L    AST 37 0 - 45 U/L    ALT 36 0 - 70 U/L    Protein Total 6.8 6.4 - 8.3 g/dL    Albumin 3.1 (L) 3.5 - 5.2 g/dL    Bilirubin Total 0.3 <=1.2 mg/dL   Troponin T, High Sensitivity   Result Value Ref Range    Troponin T, High Sensitivity 108 (HH) <=22 ng/L   Glucose by meter   Result Value Ref Range    GLUCOSE BY  "METER POCT 205 (H) 70 - 99 mg/dL   Glucose by meter   Result Value Ref Range    GLUCOSE BY METER POCT 186 (H) 70 - 99 mg/dL   Glucose by meter   Result Value Ref Range    GLUCOSE BY METER POCT 177 (H) 70 - 99 mg/dL   Potassium   Result Value Ref Range    Potassium 5.9 (H) 3.4 - 5.3 mmol/L   Glucose by meter   Result Value Ref Range    GLUCOSE BY METER POCT 147 (H) 70 - 99 mg/dL     EKG results: Intermittent junctional bradycardia, currently sinus rhythm.    Clinically Significant Risk Factors Present on Admission        # Hyperkalemia: Highest K = 6.2 mmol/L in last 2 days, will monitor as appropriate  # Hyponatremia: Lowest Na = 127 mmol/L in last 2 days, will monitor as appropriate  # Hypochloremia: Lowest Cl = 90 mmol/L in last 2 days, will monitor as appropriate      # Hypoalbuminemia: Lowest albumin = 3.1 g/dL at 2/17/2025  4:28 AM, will monitor as appropriate   # Drug Induced Platelet Defect: home medication list includes an antiplatelet medication  # Acute Kidney Injury, unspecified: based on a >150% or 0.3 mg/dL increase in last creatinine compared to past 90 day average, will monitor renal function  # Hypertension: Noted on problem list  # Acute heart failure with preserved ejection fraction: heart failure noted on problem list, last echo with EF >50%, and receiving IV diuretics    # Acute Hypoxic Respiratory Failure: Documented O2 saturation < 90%. Continue supplemental oxygen as needed   # Anemia: based on hgb <11      # DMII: A1C = 8.8 % (Ref range: <5.7 %) within past 6 months   # Overweight: Estimated body mass index is 25.19 kg/m  as calculated from the following:    Height as of this encounter: 1.676 m (5' 6\").    Weight as of this encounter: 70.8 kg (156 lb 1.4 oz).       # Financial/Environmental Concerns:                        "

## 2025-02-17 NOTE — PROGRESS NOTES
Care plan note:      Recent Vitals:  Temp: 98  F (36.7  C) Temp src: Axillary BP: 121/60 Pulse: 56   Resp: 28 SpO2: 94 % O2 Device: Nasal cannula      Orientation/Neuro: Baseline Non-verbal, able to follow some commands - Rt hemiplegia, minimal toes movement on Left, has weak grasp on L hand, minimal head movement, no shoulder shrug - appears to be pt baseline.  Pain: Following rFLACC score- non-verbal   Tele: Junctional   IV medications: None   Mobility: Total w/ lift   Skin:  rectal excoriation- noted intact old pink skin injury    Resp: Coarse crackles with foamy secretions in mouth- reported vomiting in ED- minimal secretions suctioned via [place].  GI:  Incontinent  : Felix Catheter- chronic     Diet: Tolerating diet:  Strict NPO  Orders Placed This Encounter      NPO for Medical/Clinical Reasons Except for: NPO but receiving Tube Feeding      Safety/Concerns:  Fall Risk, Rohit Risk, Aspiration precautions, and Isolation precautions  Aggression Color: Green    Plan: Pt admitted to unit. Following BS Q4H. Awaiting Cards, nephrology and nutrition consult. - uses tube feeds at baseline.  Following k+  and kidney function closely.    Continue to monitor.      Vicenta Valdez RN

## 2025-02-17 NOTE — PROGRESS NOTES
[facility]    Medicine Progress Note - Hospitalist Service    Date of Admission:  2/16/2025    Assessment & Plan       Jimmy Otto is a 75 year old male  history of htn, hlp, dm2 most recent echo 10/24 EF 60-65% nl LV fxn, hx of stroke with left sided hemiplegia and aphasia-non verbal, PEG tube in place on TFs, and chronic indwelling ordoñez for neurogenic bladder, CKD 3b with progressive renal dysfunction, chronic normocytic anemia was brought into the ER due to concern for decreasing urine output.       ALEX on CKD stage IV  Hyperkalemia  Hyponatremia  Hypochloremia  -His recent creatinine on 2/12 was 3.93 with sodium of 129 and potassium was 4.2  -On admission sodium of 127, potassium of 6.2 with chloride of 91, BUN of 111.3 and creatinine of 4.26  -Patient in the ED did receive treatment for hyperkalemia including calcium gluconate, insulin, Lokelma  -He continues to have elevated potassium along with BUN and has minimal urine output  -Nephrology consulted and I had long discussion with Dr. Bray who also had long discussion with the family and he is not a candidate for peritoneal dialysis given his back and will not be able to go to outpatient unit because he will need higher left and given his debilitated state it mortality and dialysis would be quite high and he recommended family that he should be on medical care for his kidney disease without technological intervention like dialysis and or either comfort care or hospice  -Nephrology did put him on IV Lasix and chlorothiazide to treat the fluid retention and they think his respiratory distress from influenza A rather than volume overload  -Continue Lokelma and repeat potassium later    Acute hypoxic respiratory failure multifactorial likely due to volume overload due to acute on chronic diastolic CHF and exacerbation  influenza A infection   likely aspiration event  -On admission patient was requiring 10 L of oxygen  -proBNP  significantly elevated at 1 9, 616, WBC count normal, Pro-Westley normal at 0.29  -Chest x-ray on admit at 2/16 showed diffuse mixed interstitial and airspace opacities likely due to moderate pulmonary edema and small right and trace left pleural effusion and mid bibasilar atelectasis with no pneumothorax  -Patient did receive 20 IV Lasix on admit  -RRT on 2/16 p.m. as patient oxygen needs increased after he vomited and repeat chest x-ray showed small bilateral pleural effusion right greater than left and patchy parenchymal opacities with differential including asymmetric edema versus multifocal pneumonitis with no pneumothorax  -COVID-19 negative but positive for influenza A  -Most likely patient has combination of above things  -Continue with IV Zosyn  -Will start patient on Tamiflu  -Diuretics as per nephrology  -Continue on high flow and patient seen multiple times during the day and around 3 PM he is saturating around 92% and he is on 80% FiO2 and needs suctioning      Elevated troponin likely due to demand due to underlying renal failure and volume overload type II NSTEMI  Junctional bradycardia  -His troponins have been flat around 106 -108 and delta is negative and EKG shows junctional rhythm   -cardiology consulted and discussed with Dr. Garay his junctional bradycardia is likely due to his metabolic abnormalities including worsening renal failure and hyperkalemia and there is no indication for pacemaker implantation and he also mentioned that his volume overload will be likely corrected with dialysis  -Continue to monitor on telemetry    Goals of care conversation  -I had detailed goals of care conversation and palliative care team consult has been placed but they are not available for consult on 2/17 given staffing issues  -Discussed with family multiple times during the course of the day and his initial CODE STATUS was no CPR but previous arrest intubation okay and discussed with daughter Kailey who is the POA  along with daughter Tracy and son Mr. Grace who lives in [place] and family understand that patient is critically ill and his prognosis is guarded and they have changed his CODE STATUS to DNR/DNI but still want interventions to be done      Encephalopathy likely due to uremia and infectious causes  -Patient at baseline has underlying hemiplegia and as per family there is change in his mentation  -He is also diagnosed with influenza A and hypoxic respiratory failure and has underlying renal failure with elevated BUN likely uremia  -Continue to treat underlying causes    Nausea and vomiting episode-resolved  -Patient had 1 episode of nausea and x-ray was done of the abdomen which showed few loops of mildly distended gas-filled small bowel and are within normal limits with no evidence of any bowel obstruction and small amount of stool in the colon and G-tube in place  -On exam does not have any significant distention  -This was most likely due to underlying uremia  -Continue with supportive treatment      Acute on chronic anemia  -Hemoglobin baseline around 9, anemia of chronic disease in the setting of underlying CKD  -PTA ferrous sulfate 325 mg twice daily continued     Diabetes mellitus type 2  -Patient is on Lantus 9 units subcu a.m., and insulin lispro sliding scale  -Continue sliding scale with ongoing tube feeds  -Will monitor blood sugars and will introduce PTA Lantus at 5 units     Hypertension  -PTA amlodipine 10 mg daily, doxazosin 2 mg daily  -Hold antihypertensives for now.     Hyperlipidemia  -PTA atorvastatin 80 mg daily continued     Hx of CVA with right sided hemiplegia. Aphasia and dysphagia with PEG tube.   Chronic neurogenic bladder with chronic ordoñez:   -Patient is bedbound, nonverbal, chronic neurological deficits present from prior CVA  -PTA Plavix 75 mg daily continued  -Continue tube feeds  -Ordoñez catheter was replaced on 2/16/2025  -Monitor intake and output.    Subclinical  "hypothyroidism  -His TSH is mildly elevated at 8.72 but free T4 is normal  -This is most likely due to underlying acute illness          Diet: NPO for Medical/Clinical Reasons Except for: NPO but receiving Tube Feeding    DVT Prophylaxis: Pneumatic Compression Devices, will need to see definitive plan from the nephrology team  Felix Catheter: Not present  Lines: None     Cardiac Monitoring: ACTIVE order. Indication: Electrolyte Imbalance (24 hours)- Magnesium <1.3 mg/ml; Potassium < =2.8 or > 5.5 mg/ml  Code Status: No CPR- Pre-arrest intubation OK      Clinically Significant Risk Factors Present on Admission        # Hyperkalemia: Highest K = 6.2 mmol/L in last 2 days, will monitor as appropriate  # Hyponatremia: Lowest Na = 127 mmol/L in last 2 days, will monitor as appropriate  # Hypochloremia: Lowest Cl = 90 mmol/L in last 2 days, will monitor as appropriate      # Hypoalbuminemia: Lowest albumin = 3.1 g/dL at 2/17/2025  4:28 AM, will monitor as appropriate   # Drug Induced Platelet Defect: home medication list includes an antiplatelet medication  # Acute Kidney Injury, unspecified: based on a >150% or 0.3 mg/dL increase in last creatinine compared to past 90 day average, will monitor renal function  # Hypertension: Noted on problem list  # Acute heart failure with preserved ejection fraction: heart failure noted on problem list, last echo with EF >50%, and receiving IV diuretics    # Acute Hypoxic Respiratory Failure: Documented O2 saturation < 90%. Continue supplemental oxygen as needed   # Anemia: based on hgb <11      # DMII: A1C = N/A within past 6 months   # Overweight: Estimated body mass index is 25.19 kg/m  as calculated from the following:    Height as of this encounter: 1.676 m (5' 6\").    Weight as of this encounter: 70.8 kg (156 lb 1.4 oz).       # Financial/Environmental Concerns:           Social Drivers of Health    Food Insecurity: Unknown (2/16/2025)    Food Insecurity     Within the past 12 " months, did you worry that your food would run out before you got money to buy more?: Patient unable to answer     Within the past 12 months, did the food you bought just not last and you didn t have money to get more?: Patient unable to answer   Housing Stability: Unknown (2/16/2025)    Housing Stability     Do you have housing? : Patient unable to answer     Are you worried about losing your housing?: Patient unable to answer   Financial Resource Strain: Unknown (2/16/2025)    Financial Resource Strain     Within the past 12 months, have you or your family members you live with been unable to get utilities (heat, electricity) when it was really needed?: Patient unable to answer   Transportation Needs: Unknown (2/16/2025)    Transportation Needs     Within the past 12 months, has lack of transportation kept you from medical appointments, getting your medicines, non-medical meetings or appointments, work, or from getting things that you need?: Patient unable to answer   Interpersonal Safety: Unknown (2/16/2025)    Interpersonal Safety     Do you feel physically and emotionally safe where you currently live?: Patient unable to answer     Within the past 12 months, have you been hit, slapped, kicked or otherwise physically hurt by someone?: Patient unable to answer     Within the past 12 months, have you been humiliated or emotionally abused in other ways by your partner or ex-partner?: Patient unable to answer          Disposition Plan     Medically Ready for Discharge: Anticipated in 2-4 Days, improvement in mentation, respiratory status, renal function             Matthias Lechuga MD  Hospitalist Service  [facility]  Securely message with [employer] (more info)  Text page via [email or web address] Paging/Directory     This note was completed in part using dictation via the Dragon voice recognition software. Some word and grammatical errors may occur and must be interpreted in the appropriate clinical context. If  there are any questions pertaining to this issue, please contact me for further clarification.      Patient is critically ill and prognosis is guarded and this was communicated to the family    I am on admitting shift and his care in the morning will be taken over by one of my colleagues from hospital medicine team    ______________________________________________________________________    Interval History     -Patient is new to me and was admitted on 2/16 for decreased urine output and was diagnosed with ALEX on CKD 3, hyperkalemia, elevated proBNP, elevated troponin and x-ray was consistent with changes consistent with fluid overload    -Rapid response overnight and needed transfer to ICU as he was put on high flow and continue to have persistent hyperkalemia    -Patient is AAO times 0  -Most of the history obtained from the family and discussion with the nursing staff and his urine output is very minimal and he continues to remain on high flow        Physical Exam   Vital Signs: Temp: 98.9  F (37.2  C) Temp src: Axillary BP: 128/50 Pulse: (!) 45   Resp: 17 SpO2: 92 % O2 Device: High Flow Nasal Cannula (HFNC) Oxygen Delivery: 45 LPM  Weight: 156 lbs 1.37 oz        General: AO x 0  Respiratory: He is on high flow nasal cannula  Cardiovascular: Irregular regular rate , S1 and S2 normal with no murmer or rubs or gallops  Abdomen:   soft , non tender , non distended and bowel sound present and has G-tube in place  Skin: No skin rashes or lesions to inspection or palpation.  Neurologic: Not able to assess because of his clinical condition  Musculoskeletal: Normal Range of motion over upper and lower extremities bilaterally   Psychiatric: Not able to assess because of his clinical condition    Lines: IV, chronic Felix and feeding tube    Medical Decision Making       Time spent in care of patient is 75 minutes and I reviewed labs including CBC, comprehensive metabolic panel, viral panel, x-ray of the chest, x-ray of the  abdomen and discussed plan of care in detail with the patient family, nursing staff and the nephrology and the cardiology team      Data     I have personally reviewed the following data over the past 24 hrs:    7.1  \   8.1 (L)   / 174     129 (L) 93 (L) 123.8 (H) /  177 (H)   6.1 (HH) 25 4.61 (H) \     ALT: 36 AST: 37 AP: 117 TBILI: 0.3   ALB: 3.1 (L) TOT PROTEIN: 6.8 LIPASE: N/A     Trop: 108 (HH) BNP: 19,616 (H)     TSH: 8.72 (H) T4: 1.47 A1C: N/A     Procal: 0.29 CRP: N/A Lactic Acid: 1.6         Imaging results reviewed over the past 24 hrs:   Recent Results (from the past 24 hours)   XR Chest Port 1 View    Narrative    EXAM: XR CHEST PORT 1 VIEW  LOCATION: [facility]  DATE: 2/16/2025    INDICATION: hypoxia  COMPARISON: 1/14/2025      Impression    IMPRESSION: Diffuse mixed interstitial and airspace opacities likely due to moderate pulmonary edema. Small right and trace left pleural effusions and mild bibasilar atelectasis. Mild cardiomegaly. No pneumothorax.   XR Chest Port 1 View    Narrative    EXAM: XR CHEST PORT 1 VIEW  LOCATION: [facility]  DATE: 2/17/2025    INDICATION: hypoxia s p emesis  COMPARISON: 2/16/2025      Impression    IMPRESSION: Heart size is stable. Small bilateral pleural effusions are redemonstrated, right greater than left. Patchy parenchymal opacities persist compatible with asymmetric edema versus multifocal pneumonitis. No pneumothorax. No acute bony   abnormality.   XR Abdomen Port 1 View    Narrative    EXAM: ABDOMEN SINGLE VIEW PORTABLE  LOCATION: [facility]  DATE: 2/17/2025    INDICATION: Nausea.  COMPARISON: None.    FINDINGS: A percutaneous gastrostomy tube is present with balloon tip projecting over the gastric body. There are a few loops of mildly distended gas-filled small bowel in the mid abdomen. Gas and a small amount of stool are scattered within nondistended   colon. No visualized bowel wall  thickening or pneumatosis. Apparent interstitial thickening within the central aspects of the lungs. Moderate-sized right pleural effusion. A few patchy opacities in the mid and lower right lung.      Impression    IMPRESSION:   1. A few loops of mildly distended gas-filled small bowel are present in the mid abdomen. These are within normal limits. There is no convincing evidence of a bowel obstruction.  2. A small amount of stool is scattered within the colon.

## 2025-02-18 LAB
ABO + RH BLD: NORMAL
ANION GAP SERPL CALCULATED.3IONS-SCNC: 12 MMOL/L (ref 7–15)
ATRIAL RATE - MUSE: 87 BPM
BLD GP AB SCN SERPL QL: NEGATIVE
BLD PROD TYP BPU: NORMAL
BLOOD COMPONENT TYPE: NORMAL
BUN SERPL-MCNC: 130.8 MG/DL (ref 8–23)
CALCIUM SERPL-MCNC: 8.9 MG/DL (ref 8.8–10.4)
CHLORIDE SERPL-SCNC: 94 MMOL/L (ref 98–107)
CODING SYSTEM: NORMAL
CREAT SERPL-MCNC: 5.23 MG/DL (ref 0.67–1.17)
CROSSMATCH: NORMAL
DIASTOLIC BLOOD PRESSURE - MUSE: NORMAL MMHG
EGFRCR SERPLBLD CKD-EPI 2021: 11 ML/MIN/1.73M2
ERYTHROCYTE [DISTWIDTH] IN BLOOD BY AUTOMATED COUNT: 14.9 % (ref 10–15)
GLUCOSE BLDC GLUCOMTR-MCNC: 135 MG/DL (ref 70–99)
GLUCOSE BLDC GLUCOMTR-MCNC: 140 MG/DL (ref 70–99)
GLUCOSE BLDC GLUCOMTR-MCNC: 156 MG/DL (ref 70–99)
GLUCOSE BLDC GLUCOMTR-MCNC: 160 MG/DL (ref 70–99)
GLUCOSE BLDC GLUCOMTR-MCNC: 165 MG/DL (ref 70–99)
GLUCOSE BLDC GLUCOMTR-MCNC: 195 MG/DL (ref 70–99)
GLUCOSE SERPL-MCNC: 163 MG/DL (ref 70–99)
HCO3 SERPL-SCNC: 28 MMOL/L (ref 22–29)
HCT VFR BLD AUTO: 21.1 % (ref 40–53)
HGB BLD-MCNC: 6.8 G/DL (ref 13.3–17.7)
INTERPRETATION ECG - MUSE: NORMAL
ISSUE DATE AND TIME: NORMAL
MAGNESIUM SERPL-MCNC: 3 MG/DL (ref 1.7–2.3)
MCH RBC QN AUTO: 26.7 PG (ref 26.5–33)
MCHC RBC AUTO-ENTMCNC: 32.2 G/DL (ref 31.5–36.5)
MCV RBC AUTO: 83 FL (ref 78–100)
P AXIS - MUSE: NORMAL DEGREES
PHOSPHATE SERPL-MCNC: 3.4 MG/DL (ref 2.5–4.5)
PLATELET # BLD AUTO: 162 10E3/UL (ref 150–450)
POTASSIUM SERPL-SCNC: 5 MMOL/L (ref 3.4–5.3)
PR INTERVAL - MUSE: NORMAL MS
QRS DURATION - MUSE: 80 MS
QT - MUSE: 508 MS
QTC - MUSE: 536 MS
R AXIS - MUSE: -12 DEGREES
RBC # BLD AUTO: 2.55 10E6/UL (ref 4.4–5.9)
SODIUM SERPL-SCNC: 134 MMOL/L (ref 135–145)
SPECIMEN EXP DATE BLD: NORMAL
SYSTOLIC BLOOD PRESSURE - MUSE: NORMAL MMHG
T AXIS - MUSE: 12 DEGREES
UNIT ABO/RH: NORMAL
UNIT NUMBER: NORMAL
UNIT STATUS: NORMAL
UNIT TYPE ISBT: 6200
VENTRICULAR RATE- MUSE: 67 BPM
WBC # BLD AUTO: 13.4 10E3/UL (ref 4–11)

## 2025-02-18 PROCEDURE — 250N000013 HC RX MED GY IP 250 OP 250 PS 637: Performed by: INTERNAL MEDICINE

## 2025-02-18 PROCEDURE — P9016 RBC LEUKOCYTES REDUCED: HCPCS | Performed by: HOSPITALIST

## 2025-02-18 PROCEDURE — 250N000011 HC RX IP 250 OP 636: Performed by: INTERNAL MEDICINE

## 2025-02-18 PROCEDURE — 99232 SBSQ HOSP IP/OBS MODERATE 35: CPT | Performed by: INTERNAL MEDICINE

## 2025-02-18 PROCEDURE — 83735 ASSAY OF MAGNESIUM: CPT | Performed by: INTERNAL MEDICINE

## 2025-02-18 PROCEDURE — 999N000157 HC STATISTIC RCP TIME EA 10 MIN

## 2025-02-18 PROCEDURE — 36415 COLL VENOUS BLD VENIPUNCTURE: CPT | Performed by: HOSPITALIST

## 2025-02-18 PROCEDURE — 80048 BASIC METABOLIC PNL TOTAL CA: CPT | Performed by: HOSPITALIST

## 2025-02-18 PROCEDURE — 250N000011 HC RX IP 250 OP 636

## 2025-02-18 PROCEDURE — 200N000001 HC R&B ICU

## 2025-02-18 PROCEDURE — 86900 BLOOD TYPING SEROLOGIC ABO: CPT | Performed by: INTERNAL MEDICINE

## 2025-02-18 PROCEDURE — 86850 RBC ANTIBODY SCREEN: CPT | Performed by: INTERNAL MEDICINE

## 2025-02-18 PROCEDURE — 99497 ADVNCD CARE PLAN 30 MIN: CPT | Mod: 25 | Performed by: NURSE PRACTITIONER

## 2025-02-18 PROCEDURE — 85027 COMPLETE CBC AUTOMATED: CPT | Performed by: HOSPITALIST

## 2025-02-18 PROCEDURE — 84100 ASSAY OF PHOSPHORUS: CPT | Performed by: INTERNAL MEDICINE

## 2025-02-18 PROCEDURE — 86923 COMPATIBILITY TEST ELECTRIC: CPT | Performed by: HOSPITALIST

## 2025-02-18 PROCEDURE — 99498 ADVNCD CARE PLAN ADDL 30 MIN: CPT | Mod: 25 | Performed by: NURSE PRACTITIONER

## 2025-02-18 PROCEDURE — B4036 ENTERAL FEED SUP KIT GRAV BY: HCPCS

## 2025-02-18 PROCEDURE — 99223 1ST HOSP IP/OBS HIGH 75: CPT | Mod: 25 | Performed by: NURSE PRACTITIONER

## 2025-02-18 PROCEDURE — 250N000013 HC RX MED GY IP 250 OP 250 PS 637: Performed by: HOSPITALIST

## 2025-02-18 PROCEDURE — 99233 SBSQ HOSP IP/OBS HIGH 50: CPT | Performed by: HOSPITALIST

## 2025-02-18 RX ORDER — CHLOROTHIAZIDE SODIUM 500 MG/1
500 INJECTION INTRAVENOUS ONCE
Status: COMPLETED | OUTPATIENT
Start: 2025-02-18 | End: 2025-02-18

## 2025-02-18 RX ADMIN — Medication 5 ML: at 17:32

## 2025-02-18 RX ADMIN — INSULIN ASPART 1 UNITS: 100 INJECTION, SOLUTION INTRAVENOUS; SUBCUTANEOUS at 08:31

## 2025-02-18 RX ADMIN — SEVELAMER CARBONATE 0.8 G: 800 POWDER, FOR SUSPENSION ORAL at 08:34

## 2025-02-18 RX ADMIN — SODIUM ZIRCONIUM CYCLOSILICATE 10 G: 5 POWDER, FOR SUSPENSION ORAL at 08:36

## 2025-02-18 RX ADMIN — CLOPIDOGREL BISULFATE 75 MG: 75 TABLET ORAL at 08:32

## 2025-02-18 RX ADMIN — INSULIN ASPART 1 UNITS: 100 INJECTION, SOLUTION INTRAVENOUS; SUBCUTANEOUS at 03:58

## 2025-02-18 RX ADMIN — FUROSEMIDE 100 MG: 10 INJECTION, SOLUTION INTRAMUSCULAR; INTRAVENOUS at 04:00

## 2025-02-18 RX ADMIN — PIPERACILLIN AND TAZOBACTAM 2.25 G: 2; .25 INJECTION, POWDER, FOR SOLUTION INTRAVENOUS at 21:25

## 2025-02-18 RX ADMIN — INSULIN ASPART 2 UNITS: 100 INJECTION, SOLUTION INTRAVENOUS; SUBCUTANEOUS at 21:43

## 2025-02-18 RX ADMIN — PIPERACILLIN AND TAZOBACTAM 2.25 G: 2; .25 INJECTION, POWDER, FOR SOLUTION INTRAVENOUS at 09:20

## 2025-02-18 RX ADMIN — INSULIN GLARGINE 5 UNITS: 100 INJECTION, SOLUTION SUBCUTANEOUS at 08:31

## 2025-02-18 RX ADMIN — ATORVASTATIN CALCIUM 20 MG: 10 TABLET, FILM COATED ORAL at 08:33

## 2025-02-18 RX ADMIN — OSELTAMIVIR PHOSPHATE 30 MG: 6 POWDER, FOR SUSPENSION ORAL at 08:33

## 2025-02-18 RX ADMIN — CHLOROTHIAZIDE SODIUM 500 MG: 500 INJECTION, POWDER, LYOPHILIZED, FOR SOLUTION INTRAVENOUS at 12:18

## 2025-02-18 RX ADMIN — FLUOXETINE 20 MG: 20 SOLUTION ORAL at 12:28

## 2025-02-18 RX ADMIN — FUROSEMIDE 100 MG: 10 INJECTION, SOLUTION INTRAMUSCULAR; INTRAVENOUS at 21:26

## 2025-02-18 RX ADMIN — INSULIN ASPART 1 UNITS: 100 INJECTION, SOLUTION INTRAVENOUS; SUBCUTANEOUS at 00:05

## 2025-02-18 RX ADMIN — INSULIN ASPART 1 UNITS: 100 INJECTION, SOLUTION INTRAVENOUS; SUBCUTANEOUS at 16:40

## 2025-02-18 RX ADMIN — PIPERACILLIN AND TAZOBACTAM 2.25 G: 2; .25 INJECTION, POWDER, FOR SOLUTION INTRAVENOUS at 04:00

## 2025-02-18 RX ADMIN — FUROSEMIDE 100 MG: 10 INJECTION, SOLUTION INTRAMUSCULAR; INTRAVENOUS at 12:28

## 2025-02-18 RX ADMIN — PIPERACILLIN AND TAZOBACTAM 2.25 G: 2; .25 INJECTION, POWDER, FOR SOLUTION INTRAVENOUS at 16:42

## 2025-02-18 ASSESSMENT — ACTIVITIES OF DAILY LIVING (ADL)
ADLS_ACUITY_SCORE: 101
ADLS_ACUITY_SCORE: 97
ADLS_ACUITY_SCORE: 101
ADLS_ACUITY_SCORE: 101
ADLS_ACUITY_SCORE: 97
ADLS_ACUITY_SCORE: 101
ADLS_ACUITY_SCORE: 97
ADLS_ACUITY_SCORE: 101
ADLS_ACUITY_SCORE: 97
ADLS_ACUITY_SCORE: 101
ADLS_ACUITY_SCORE: 97
ADLS_ACUITY_SCORE: 97
ADLS_ACUITY_SCORE: 101
ADLS_ACUITY_SCORE: 101

## 2025-02-18 NOTE — PLAN OF CARE
Goal Outcome Evaluation:      Plan of Care Reviewed With: patient, child    Overall Patient Progress: decliningOverall Patient Progress: declining    Outcome Evaluation: Pt is non-verbal, follow some command, BINTA GREER absent movement, BLE w/ 1/5 strength vladimirjadon PENNY moves purposefully. Tele Junctional bradycardia in 40's for most of the shift, notified MD. O2 sat dropped down to 87-88, increased FiO2 from 80 to 100% but now back at 80%  and 45L to 50L. LS coarse w/ small red-streaked thick secretion. G-tube TF at 20ml/hr. Felix replaced at 2130 w/ small bleeding from insertion site. LBM 2/17. Bedbound(baseline). Edematious extremities. Please see flowsheet for skin detail. Critical hgb 6.8, notified hospitalist and will pass it to day team.Palliative will see the pt today.  Problem: Comorbidity Management  Goal: Blood Glucose Levels Within Targeted Range  Intervention: Monitor and Manage Glycemia  Recent Flowsheet Documentation  Taken 2/18/2025 0000 by Mauri Balderas RN  Medication Review/Management: medications reviewed  Taken 2/17/2025 2000 by Mauri Balderas RN  Medication Review/Management: medications reviewed  Goal: Blood Pressure in Desired Range  Intervention: Maintain Blood Pressure Management  Recent Flowsheet Documentation  Taken 2/18/2025 0000 by Mauri Balderas RN  Medication Review/Management: medications reviewed  Taken 2/17/2025 2000 by Mauri Balderas RN  Medication Review/Management: medications reviewed     Problem: Gas Exchange Impaired  Goal: Optimal Gas Exchange  Intervention: Optimize Oxygenation and Ventilation  Recent Flowsheet Documentation  Taken 2/18/2025 0200 by Mauri Balderas RN  Head of Bed (HOB) Positioning: HOB at 20-30 degrees  Taken 2/18/2025 0000 by Mauri Balderas RN  Head of Bed (HOB) Positioning: HOB at 20-30 degrees  Taken 2/17/2025 2200 by Mauri Balderas RN  Head of Bed (HOB) Positioning: HOB at 30 degrees  Taken 2/17/2025 2000 by Mauri Balderas RN  Head of Bed (HOB)  Positioning: HOB at 20-30 degrees     Problem: Risk for Delirium  Goal: Improved Behavioral Control  Intervention: Minimize Safety Risk  Recent Flowsheet Documentation  Taken 2/18/2025 0000 by Mauri Balderas RN  Enhanced Safety Measures: review medications for side effects with activity  Trust Relationship/Rapport:   care explained   choices provided   thoughts/feelings acknowledged   emotional support provided   empathic listening provided   questions answered   questions encouraged   reassurance provided  Taken 2/17/2025 2000 by Mauri Balderas RN  Enhanced Safety Measures: review medications for side effects with activity  Trust Relationship/Rapport:   care explained   choices provided   thoughts/feelings acknowledged   emotional support provided   empathic listening provided   questions answered   questions encouraged   reassurance provided

## 2025-02-18 NOTE — PROGRESS NOTES
[facility]  Hospitalist Progress Note        Isidro Soliz MD   02/18/2025        Interval History:        - Patient baseline nonverbal; appears very lethargic and not conversant, does follow simple commands; continues on 50 lts HFNC  - critically ill ; palliative care to evaluate 2/18  - sodium 127---> 134; K 6.2--- 5.0; ---> 130; Cr 4.26---> 5.23; nephrology following and ordered for intermittent diuresis with Chlorthalidone and IV Lasix; to continue Lokelma; suggest medical management without dialysis  - Hb 6.8-- d/w family, will transfuse with one unit FL BC (2/18)  - WBC 7.1---> 13.4    - Junctional bradycardia converted to sinus rhythm; cardiology suggest no need for pacemaker placement at this time         Assessment and Plan:        Jimmy Otto is a 75 year old male with PMH of HTN, DLD, diabetes , hx of stroke (with right sided hemiplegia and aphasia-non verbal at baseline), PEG tube feeding,  chronic indwelling ordoñez (for neurogenic bladder), CKD 3b (with progressive renal dysfunction), chronic normocytic anemia who as brought to the ED with concern for decreasing urine output and was also noted with influenza with respiratory failure, junctional bradycardia, acute on chronic renal failure and anemia; admitted inpatient 2/16/25.      ALXE on CKD stage IV  neurogenic bladder with chronic indwelling Ordoñez catheter  Hyperkalemia  Hyponatremia  Hypochloremia  - recent Cr on 2/12 was 3.93 with Na 129 and normal potassium   - sodium 127---> 134; K 6.2--- 5.0; ---> 130; Cr 4.26---> 5.23  - did get K shifting meds in ED  - Ordoñez catheter replaced on admission  - nephrology following and ordered for intermittent diuresis with Chlorthalidone and IV Lasix; to continue Lokelma; suggest medical management without dialysis (not a candidate for peritoneal dialysis due to PEG and not a good candidate for HD-- need for michell lift)     Acute hypoxic respiratory failure multifactorial likely  due to volume overload due to acute on chronic diastolic CHF and exacerbation  influenza A infection   likely aspiration event  - on admission patient was requiring 10 L of oxygen  - proBNP significantly elevated at 19, 616; Pro-Westley normal at 0.29  - Chest x-ray 2/16 noted with diffuse mixed interstitial and airspace opacities likely due to moderate pulmonary edema and small right and trace left pleural effusion and mid bibasilar atelectasis with no pneumothorax  - 2/16: influenza A positive; COVID and RSV negative  - RRT on 2/16 with increased O2 needs and repeat CXR with some concern for asymmetric edema versus multifocal pneumonitis  - currently on 50 lts HFNC  - getting intermittent IV diuresis per nephrology as above  - will continue with empiric IV Zosyn; continue Tamiflu renally dosed  - patient is DNR/DNI     Elevated troponin likely due to demand due to underlying renal failure and volume overload type II NSTEMI  Junctional bradycardia  - serial troponins with no significant trend 106 --- 105 --- 108 ; EKG noted with junctional rhythm   - has converted to sinus rhythm  - evaluated by cardiology, note junctional bradycardia is likely due to metabolic abnormalities with worsening renal failure and hyperkalemia and note no indication for pacemaker implantation  - Continue to monitor on telemetry    Encephalopathy likely due to uremia and infectious causes  H/o CVA with residual right hemiplegia and baseline nonverbal status  chronic dysphagia, PEG tube feeds  Dyslipidemia  Hypertension  - baseline right hemiplegia, backbone status from prior CVA  -some encephalopathy in the setting of acute illness ; treating underlying causes as above  -nutrition following for tube feeds  -continue Plavix 75 mg daily, Lipitor 20 mg daily  - PTA amlodipine 10 mg daily, doxazosin 2 mg daily; Hold antihypertensives for now.     Acute on chronic anemia  - baseline Hb around 9, anemia of chronic disease in the setting of  underlying CKD  - PTA ferrous sulfate 325 mg twice daily continued  - Hb 8.1---6.8; hemodynamically stable with no suggestion of active bleed  - d/w family, okay to transfuse prn for Hb <7-- ordered one unit PRBC 2/18; will monitor Hb     Diabetes mellitus type 2  - PTA on Lantus 9 units subcu a.m., and insulin lispro sliding scale  - Continue sliding scale with ongoing tube feeds  - continue low-dose Lantus at 5 units daily  - hypoglycemia protocol     Subclinical hypothyroidism  -TSH is mildly elevated at 8.72 but free T4 is normal; most likely due to underlying acute illness    Goals of care   - patient critically ill; family aware ; not a candidate for dialysis per nephrology as noted above  - code status changed to DNR/DNI  - to have palliative care discussion on 2/18/25     DVT Prophylaxis: Pneumatic Compression Devices    Code Status: DNR/DNI     Diet:   NPO for Medical/Clinical Reasons Except for: NPO but receiving Tube Feeding  Adult Formula Drip Feeding: Continuous Novasource Renal; Gastrostomy; Goal Rate: 30; mL/hr; Start TF @ 10 ml/hr. Advance by 10 mL q 12 hours until goal rate reached.; Do not advance tube feeding rate unless K+ is = or > 3.0, Mg++ is = or > 1.5, an...      Disposition:   Medically Ready for Discharge: Anticipated in 5+ Days    High medical complexity    Care plan discussed with nursing and patient's daughter present in room    Total time spent today 52 minutes       Clinically Significant Risk Factors        # Hyperkalemia: Highest K = 6.2 mmol/L in last 2 days, will monitor as appropriate  # Hyponatremia: Lowest Na = 127 mmol/L in last 2 days, will monitor as appropriate  # Hypochloremia: Lowest Cl = 90 mmol/L in last 2 days, will monitor as appropriate      # Hypoalbuminemia: Lowest albumin = 3.1 g/dL at 2/17/2025  4:28 AM, will monitor as appropriate    # Acute Kidney Injury, unspecified: based on a >150% or 0.3 mg/dL increase in last creatinine compared to past 90 day average,  "will monitor renal function    # Hypertension: Noted on problem list  # Acute heart failure with preserved ejection fraction: heart failure noted on problem list, last echo with EF >50%, and receiving IV diuretics          # DMII: A1C = 8.8 % (Ref range: <5.7 %) within past 6 months         # Financial/Environmental Concerns:                              Physical Exam:      Blood pressure 129/74, pulse 78, temperature 99.1  F (37.3  C), temperature source Oral, resp. rate 16, height 1.676 m (5' 6\"), weight 70 kg (154 lb 5.2 oz), SpO2 99%.  Vitals:    02/16/25 2245 02/17/25 0215 02/18/25 0200   Weight: 72.3 kg (159 lb 6.3 oz) 70.8 kg (156 lb 1.4 oz) 70 kg (154 lb 5.2 oz)     Vital Signs with Ranges  Temp:  [99.1  F (37.3  C)-99.9  F (37.7  C)] 99.1  F (37.3  C)  Pulse:  [44-79] 78  Resp:  [16-30] 16  BP: (117-165)/() 129/74  FiO2 (%):  [65 %-100 %] 80 %  SpO2:  [86 %-100 %] 99 %  I/O's Last 24 hours  I/O last 3 completed shifts:  In: 670 [I.V.:400; NG/GT:100; IV Piggyback:20]  Out: 1245 [Urine:1095; Emesis/NG output:150]    Constitutional: very lethargic and somnolent; mostly nonverbal, follow simple commands as hands squeezing; resting comfortably in no apparent distress    On HFNC   Oral cavity: Moist mucosa   Cardiovascular: Normal s1 s2, regular rate and rhythm, no murmur   Lungs: B/l clear to auscultation, no wheezes or crepitations   Abdomen: Soft, nt, nd, no guarding, rigidity or rebound; BS +; G tube in place   LE : B/l edema ++   Musculoskeletal/Neuro Right hemiplegia, non-verbal   Psychiatry: nonverbal; lethargic  Felix catheter in place with clear urine                Medications:        Current Facility-Administered Medications   Medication Dose Route Frequency Provider Last Rate Last Admin    atorvastatin (LIPITOR) tablet 20 mg  20 mg Oral or Feeding Tube Daily Darell Santiago MD        B and C vitamin Complex with folic acid (NEPHRONEX) liquid 5 mL  5 mL Per Feeding Tube Daily Mary, " MD John   5 mL at 02/17/25 1645    clopidogrel (PLAVIX) tablet 75 mg  75 mg Oral or Feeding Tube Daily John Hernandez MD   75 mg at 02/17/25 0841    FLUoxetine (PROzac) solution 20 mg  20 mg Oral or Feeding Tube Daily John Hernandez MD   20 mg at 02/17/25 1258    furosemide (LASIX) injection 100 mg  100 mg Intravenous Q8H Darell Santiago MD   100 mg at 02/18/25 0400    insulin aspart (NovoLOG) injection (RAPID ACTING)  1-7 Units Subcutaneous Q4H John Hernandez MD   1 Units at 02/18/25 0358    insulin glargine (LANTUS PEN) injection 5 Units  5 Units Subcutaneous QAM AC Matthias Lechuga MD   5 Units at 02/17/25 1027    oseltamivir (TAMIFLU) suspension 30 mg  30 mg Oral or Feeding Tube Daily Matthias eLchuga MD   30 mg at 02/17/25 1258    piperacillin-tazobactam (ZOSYN) 2.25 g vial to attach to  ml bag  2.25 g Intravenous Q6H Renae Cuadra NP   2.25 g at 02/18/25 0400    sevelamer carbonate (RENVELA) Packet 0.8 g  0.8 g Per G Tube TID w/meals John Hernandez MD   0.8 g at 02/17/25 1835    sodium chloride (PF) 0.9% PF flush 3 mL  3 mL Intracatheter Q8H John Hernandez MD   3 mL at 02/17/25 1645    sodium zirconium cyclosilicate (LOKELMA) packet 10 g  10 g Oral Daily Darell Santiago MD         PRN Meds:   Current Facility-Administered Medications   Medication Dose Route Frequency Provider Last Rate Last Admin    calcium carbonate (TUMS) chewable tablet 1,000 mg  1,000 mg Oral 4x Daily PRN John Hernandez MD        dextrose 10% infusion   Intravenous Continuous PRN John Hernandez MD        glucose gel 15-30 g  15-30 g Oral Q15 Min PRN John Hernandez MD        Or    dextrose 50 % injection 25-50 mL  25-50 mL Intravenous Q15 Min PRN John Hernandez MD        Or    glucagon injection 1 mg  1 mg Subcutaneous Q15 Min PRJohn Blake MD        lidocaine (LMX4) cream   Topical Q1H PRN John Hernandez MD        lidocaine 1 % 0.1-1 mL  0.1-1 mL Other Q1H PRJohn Blake MD         ondansetron (ZOFRAN) injection 4 mg  4 mg Intravenous Q6H PRJohn Blake MD   4 mg at 02/17/25 1652    senna-docusate (SENOKOT-S/PERICOLACE) 8.6-50 MG per tablet 1 tablet  1 tablet Oral BID John Moody MD        Or    senna-docusate (SENOKOT-S/PERICOLACE) 8.6-50 MG per tablet 2 tablet  2 tablet Oral BID John Moody MD        sodium chloride (PF) 0.9% PF flush 3 mL  3 mL Intracatheter q1 min John Moody MD                Data:      All new lab and imaging data was reviewed.   Recent Labs   Lab Test 02/18/25  0456 02/16/25  1515 01/31/25  1015 11/02/21  0609 11/01/21  0735 09/16/21  1003 09/16/21  0957 09/15/21  0521 09/14/21  2145   WBC 13.4* 7.1 7.8   < > 5.6   < >  --    < >  --    HGB 6.8* 8.1* 9.9*   < > 11.3*   < >  --    < >  --    MCV 83 82 82   < > 88   < >  --    < >  --     174 222   < > 224   < >  --    < >  --    INR  --   --   --   --  0.96  --  1.03  --  0.94    < > = values in this interval not displayed.      Recent Labs   Lab Test 02/18/25  0456 02/18/25  0356 02/18/25  0002 02/17/25  2131 02/17/25  1658 02/17/25  1422 02/17/25  0442 02/17/25  0428 02/17/25  0056 02/16/25  2328   *  --   --   --   --   --   --  129*  --  127*   POTASSIUM 5.0  --   --  5.2  --  5.5*   < > 6.1*   < > 6.0*   CHLORIDE 94*  --   --   --   --   --   --  93*  --  90*   CO2 28  --   --   --   --   --   --  25  --  22   .8*  --   --   --   --   --   --  123.8*  --  121.5*   CR 5.23*  --   --   --   --   --   --  4.61*  --  4.48*   ANIONGAP 12  --   --   --   --   --   --  11  --  15   ARLETH 8.9  --   --   --   --   --   --  9.3  --  9.7   * 156* 140*  --    < >  --    < > 228*   < > 237*    < > = values in this interval not displayed.     Recent Labs   Lab Test 10/13/21  2152 09/18/21  1535 09/14/21  2142   TROPONIN <0.015 <0.015 <0.015

## 2025-02-18 NOTE — PLAN OF CARE
Paged hospitalist re: below:     Pt has chronic ordoñez, was exchanged yesterday. In acute on chronic ALEX, not a candidate for HD. We geve 500mg Chlorthiazide and 100mg Lasix with minimal output. Family thought pt was pointing to bladder for pain. Bladder is distended, I tried deflating the balloon and advancing and pure red blood started bypassing around catheter. Pt has a lot of difficulty with cath insertions d/t BPH. What would you like to do? Thanks!    Per response from hospitalist Marni Granado: Bladder scanned pt for 332mL. Paged MD back with volume and that pt appears symptomatic (lower abdomen distended and pt grimaces with palpation of bladder). Per MD, replace ordoñez.

## 2025-02-18 NOTE — CONSULTS
Palliative Care Consultation Note  [facility]      Patient: Jimmy Otto  Date of Admission:  2/16/2025    Requesting Clinician / Team: Critical care   Reason for consult:   Goals of care  Patient and family support     Recommendations & Counseling     GOALS OF CARE:     Advance Care Planning Discussion 2/18/2025. Naldo DODSON NP  along with Jaclyn Flores with palliative SW met with Patient and their family today at the hospital to discuss Advance Care Planning. Jimmy Otto  has questionable capacity as he is non-verbal, but family is able to read cues and feels that he does understand.   He  was not present for this discussion due to his acute illness .  Those present were informed of the voluntary nature of this discussion and wished to proceed.  The discussion included:   Family shared Jimmy's chronic medical conditions including his recurrent urinary retention secondary to catheter blockage.  They were hopeful that his ER admission would be short-lived as it has been in the past and that he would be able to return home.  However, they understand that his chronic illnesses compounded with influenza A is caused significant debility and worsening overall function including his cardiac, pulmonary, renal function.  We reviewed his overall functional status.  Family provides all care to him at home.  He has been nonverbal however family is able to communicate with him with nonverbal cues including hand gestures.  Family is aware that he is not a dialysis candidate.  Reviewed that as to why he is not a candidate however recommended that family speak with nephrology for specifics.  Family has heard from several different teams and providers regarding next steps and are wondering/questioning how to best proceed.  They wondered the difference between palliative medicine and hospice care and how Jimmy's medical course may look.  Discussed what continuing restorative/life-prolonging care entails,  including continued (re)admissions to the hospital, continuing with preventative and primary care, seeing disease/organ-specific specialties for medical treatment in hopes to prolong life for as long as possible.   Shared that with any treatment, there will be risks and benefits that may affect overall quality of life.    We discussed that if/when disease treatments are no longer effective or when their quality of life is too negatively impacted, patient may elect to de-escalate disease treatments and pursue conservative medical management as he is now versus transitioning slowly to quality of life and comfort focused goals.  Education provided on the role of palliative care while continuing with disease treatments (support/coping, symptom management).  Education provided on transition to comfort-focused goals of car. Shared that  that current treatments that promote comfort and quality of life are continued. Anticipatory guidance was given regarding feeding, hunger, fluids at end of life. We discussed utilization of medications to ease air hunger, agitation and restlessness.   Education provided regarding hospice philosophy, prognostic, and eligibility criteria. Discussed what services are provided and those that are not. Discussed common misconceptions.   Recommend that family meet with a hospice agency for an informational meeting. Family is agreeable. Will connect with SW.  Family is hopeful for recovery but aware that he may not. They asked for possible indications of when to stop disease treatment and transition to comfort care/hospice.  Reviewed common symptoms with end-stage renal disease including increased lethargy and more time sleeping.    Shared if he is having worsening electrolyte derangements, issues with urination/urine output, hemodynamic instability etc  these may be indications that medical management has reached its limits.  At this time, family wishes to continue with conservative medical  management with the hope for recovery and that he will be able to return home.  They are aware if this is not possible they have the potential for enrollment in hospice.  This discussion began at 1345 and ended at 1450 for a total of 65 minutes.     ADVANCE CARE PLANNING:  No health care directive on file. Per system policy, Surrogate Decision-makers for Patients With Diminished Decision-making Capacity offers guidance on possible decision-makers.   There is no POLST form on file, defer to patient and/or next of kin for decisions   Code status: No CPR- Do NOT Intubate    DECISION MAKING:  Patient's decision making preferences: shared with support from loved ones  Patient has decision-making capacity today for complex decisions: Unreliable      MEDICAL MANAGEMENT:   We are not actively managing symptoms at this time.    PSYCHOSOCIAL/SPIRITUAL SUPPORT:  Family   [place] community: [place]     Palliative Care will continue to follow. Thank you for the consult and allowing us to aid in the care of Jimmy Otto.    These recommendations have been discussed with bedside and primary team.    Naldo Andres NP  [facility], Palliative Care  Securely message with the Vocera Web Console (learn more here) or  Text page via [employer] Paging/Directory     Chart documentation was completed, in part, with Study[employer] voice-recognition software. Even though reviewed, some grammatical, spelling, and word errors may remain.    Assessment      Jimmy Otto is a 75 year old male with a past medical history of hypertension, DM, prior CVA with right-sided hemiplegia, aphasia, feeding tube dependence, neurogenic bladder with chronic indwelling Felix catheter, chronic kidney disease, anemia who presented to the emergency department with his family for decreased urine output but was subsequently found to be positive for flu Shahrzad a, new onset junctional bradycardia as well as acute on chronic renal failure.  The palliative care service was consulted for goals  of care support and patient family support        History of Present Illness   Met with Ho, his 2 daughters Wayne Cornejo, wife Amanda, and son Sathya/Paw.     Introduced the role of palliative care as an interdisciplinary team that cares for patients with serious illness to help support symptom management, communication, coping for patients and their families as well as support with medical decision making.    Prognosis, Goals, & Planning:   Functional Status just prior to this current hospitalization:  Outpatient Palliative Performance Score (PPS) 20%  Extensive disease. Bedbound & requires total care, minimal intake or just sips. Normal LOC or drowsy or confused..  ECOG4 (Completely disabled and dependent on others for selfcare; bedbound)    Code Status was addressed today:   No already DNR/DNI which is clinically appropriate          Coping, Meaning, & Spirituality:   Mood, coping, and/or meaning in the context of serious illness were addressed today: Yes    Social:   Living situation: lives with family family provides daily care including all ADLs.  Per chart review he does receive skilled nursing and OT home care services.    Medications:  Reviewed this patient's medication profile and medications from this hospitalization.   [place] Board of Pharmacy Data Base Reviewed: Yes:   reviewed - no record of controlled substances prescribed.    ROS:  Comprehensive ROS is reviewed and is negative except as here & per HPI:     Physical Exam   Vital Signs with Ranges  Temp:  [97.5  F (36.4  C)-99.9  F (37.7  C)] 97.7  F (36.5  C)  Pulse:  [44-81] 78  Resp:  [14-30] 15  BP: (117-165)/() 134/68  FiO2 (%):  [60 %-100 %] 60 %  SpO2:  [86 %-100 %] 95 %  Wt Readings from Last 10 Encounters:   02/18/25 70 kg (154 lb 5.2 oz)   01/18/25 66.5 kg (146 lb 9.7 oz)   10/25/24 60.3 kg (132 lb 15 oz)   10/28/24 61 kg (134 lb 7.7 oz)   01/02/23 57.2 kg (126 lb)   10/28/22 54.4 kg (120 lb)   01/26/22 57.8 kg (127 lb 6.4 oz)    11/04/21 59 kg (130 lb)   10/01/21 66.5 kg (146 lb 11.2 oz)   09/16/21 61.5 kg (135 lb 9.3 oz)     154 lbs 5.15 oz    Physical Exam  Vitals and nursing note reviewed.   Constitutional:       General: He is not in acute distress.  HENT:      Head: Normocephalic.      Mouth/Throat:      Mouth: Mucous membranes are dry.      Pharynx: Oropharynx is clear.   Eyes:      Extraocular Movements: Extraocular movements intact.      Conjunctiva/sclera: Conjunctivae normal.      Pupils: Pupils are equal, round, and reactive to light.   Cardiovascular:      Rate and Rhythm: Normal rate and regular rhythm.      Pulses: Normal pulses.   Pulmonary:      Effort: Pulmonary effort is normal. No respiratory distress.      Breath sounds: No wheezing.   Abdominal:      General: Abdomen is flat. There is no distension.      Tenderness: There is no abdominal tenderness.   Skin:     General: Skin is warm and dry.      Capillary Refill: Capillary refill takes less than 2 seconds.   Neurological:      Mental Status: Mental status is at baseline. He is lethargic.           Data reviewed:  Results for orders placed or performed during the hospital encounter of 02/16/25 (from the past 24 hours)   Glucose by meter   Result Value Ref Range    GLUCOSE BY METER POCT 129 (H) 70 - 99 mg/dL   Glucose by meter   Result Value Ref Range    GLUCOSE BY METER POCT 137 (H) 70 - 99 mg/dL   Potassium   Result Value Ref Range    Potassium 5.2 3.4 - 5.3 mmol/L   Glucose by meter   Result Value Ref Range    GLUCOSE BY METER POCT 140 (H) 70 - 99 mg/dL   Glucose by meter   Result Value Ref Range    GLUCOSE BY METER POCT 156 (H) 70 - 99 mg/dL   CBC with platelets   Result Value Ref Range    WBC Count 13.4 (H) 4.0 - 11.0 10e3/uL    RBC Count 2.55 (L) 4.40 - 5.90 10e6/uL    Hemoglobin 6.8 (LL) 13.3 - 17.7 g/dL    Hematocrit 21.1 (L) 40.0 - 53.0 %    MCV 83 78 - 100 fL    MCH 26.7 26.5 - 33.0 pg    MCHC 32.2 31.5 - 36.5 g/dL    RDW 14.9 10.0 - 15.0 %    Platelet Count  162 150 - 450 10e3/uL   Basic metabolic panel   Result Value Ref Range    Sodium 134 (L) 135 - 145 mmol/L    Potassium 5.0 3.4 - 5.3 mmol/L    Chloride 94 (L) 98 - 107 mmol/L    Carbon Dioxide (CO2) 28 22 - 29 mmol/L    Anion Gap 12 7 - 15 mmol/L    Urea Nitrogen 130.8 (H) 8.0 - 23.0 mg/dL    Creatinine 5.23 (H) 0.67 - 1.17 mg/dL    GFR Estimate 11 (L) >60 mL/min/1.73m2    Calcium 8.9 8.8 - 10.4 mg/dL    Glucose 163 (H) 70 - 99 mg/dL   Magnesium   Result Value Ref Range    Magnesium 3.0 (H) 1.7 - 2.3 mg/dL   Phosphorus   Result Value Ref Range    Phosphorus 3.4 2.5 - 4.5 mg/dL   ABO/Rh type and screen    Narrative    The following orders were created for panel order ABO/Rh type and screen.  Procedure                               Abnormality         Status                     ---------                               -----------         ------                     Adult Type and Screen[[id number]]                            Final result                 Please view results for these tests on the individual orders.   Adult Type and Screen   Result Value Ref Range    ABO/RH(D) AB POS     Antibody Screen Negative Negative    SPECIMEN EXPIRATION DATE [id number]    Glucose by meter   Result Value Ref Range    GLUCOSE BY METER POCT 165 (H) 70 - 99 mg/dL   CONDITIONAL Prepare red blood cells (unit)   Result Value Ref Range    Blood Component Type Red Blood Cells     Product Code [id number]     Unit Status Transfused     Unit Number [id number]     CROSSMATCH Compatible     CODING SYSTEM [id number]     ISSUE DATE AND TIME [id number]     UNIT ABO/RH A+     UNIT TYPE ISBT 6200    Glucose by meter   Result Value Ref Range    GLUCOSE BY METER POCT 135 (H) 70 - 99 mg/dL       Medical Decision Making       Please see A&P for additional details of medical decision making.  NOTE(S)/MEDICAL RECORDS REVIEWED over the past 24 hours: History and physical, critical care progress notes, care coordination notes, social work notes,  hospital medicine notes, cardiology consults, nephrology consults,, daily nephrology notes, nursing notes  SUPPLEMENTAL HISTORY, in addition to the patient's history, over the past 24 hours obtained from:   - Spouse or significant other  - Family, daughters, son  Medical complexity over the past 24 hours:  - Decision to DE-ESCALATE CARE based on prognosis  - Decision regarding ESCALATION OF LEVEL OF CARE

## 2025-02-18 NOTE — PROGRESS NOTES
Assessment and Plan:     CKD-5: Now with ALEX superimposed. Progression of CKD, acute kidney injury associated with Influenza A. Baseline Cr recently about 3.50.     Yesterday treated with chlorothiazide and lasix IV.   Getting renvela. Lokelma 10 gm per day.     Labs show sodium 134, potassium 5.0, bicarbonate 28, , creatinine 5.23.  His phosphorus is 3.4, magnesium 3.0, calcium 8.9.    Urine output yesterday 500 mL, so far today 995 mL.  I will repeat his diuretic treatment.    Continue Lokelma.  We can stop the Renvela.    Medical management without dialysis. Discussed with daughter.             Interval History:   Influenza A:  on tamiflu and zosyn. Not hypotensive, on HFNC.     CVA: non ambulatory, PEG for TF, chronic indwelling ordoñez, R hemiparesis, aphasia.     Anemia:     DM:            Review of Systems:   Unable.          Medications:     Current Facility-Administered Medications   Medication Dose Route Frequency Provider Last Rate Last Admin    atorvastatin (LIPITOR) tablet 20 mg  20 mg Oral or Feeding Tube Daily Darell Santiago MD   20 mg at 02/18/25 0833    B and C vitamin Complex with folic acid (NEPHRONEX) liquid 5 mL  5 mL Per Feeding Tube Daily John Hernandez MD   5 mL at 02/17/25 1645    clopidogrel (PLAVIX) tablet 75 mg  75 mg Oral or Feeding Tube Daily John Hernandez MD   75 mg at 02/18/25 0832    FLUoxetine (PROzac) solution 20 mg  20 mg Oral or Feeding Tube Daily John Hernandez MD   20 mg at 02/17/25 1258    furosemide (LASIX) injection 100 mg  100 mg Intravenous Q8H Darell Santiago MD   100 mg at 02/18/25 0400    insulin aspart (NovoLOG) injection (RAPID ACTING)  1-7 Units Subcutaneous Q4H John Hernandez MD   1 Units at 02/18/25 0831    insulin glargine (LANTUS PEN) injection 5 Units  5 Units Subcutaneous QAM AC Matthias Lechuga MD   5 Units at 02/18/25 0831    oseltamivir (TAMIFLU) suspension 30 mg  30 mg Oral or Feeding Tube Daily Matthias Lechuga MD   30  mg at 02/18/25 0833    piperacillin-tazobactam (ZOSYN) 2.25 g vial to attach to  ml bag  2.25 g Intravenous Q6H Renae Cuadra NP   2.25 g at 02/18/25 0920    sevelamer carbonate (RENVELA) Packet 0.8 g  0.8 g Per G Tube TID w/meals John Hernandez MD   0.8 g at 02/18/25 0834    sodium chloride (PF) 0.9% PF flush 3 mL  3 mL Intracatheter Q8H John Hernandez MD   3 mL at 02/17/25 1645    sodium zirconium cyclosilicate (LOKELMA) packet 10 g  10 g Oral Daily Darell Santiago MD   10 g at 02/18/25 0836     Current Facility-Administered Medications   Medication Dose Route Frequency Provider Last Rate Last Admin    dextrose 10% infusion   Intravenous Continuous PRN John Hernandez MD         Current active medications and PTA medications reviewed, see medication list for details.            Physical Exam:   Vitals were reviewed  Patient Vitals for the past 24 hrs:   BP Temp Temp src Pulse Resp SpO2 Weight   02/18/25 0953 -- -- -- -- -- (!) 91 % --   02/18/25 0900 (!) 142/80 -- -- 81 18 100 % --   02/18/25 0800 (!) 141/71 98.5  F (36.9  C) Oral 76 16 97 % --   02/18/25 0700 (!) 134/99 -- -- 76 16 98 % --   02/18/25 0600 129/74 -- -- 78 16 99 % --   02/18/25 0500 130/64 -- -- 79 17 100 % --   02/18/25 0400 120/53 99.1  F (37.3  C) Oral (!) 44 18 95 % --   02/18/25 0212 -- -- -- (!) 49 18 (!) 91 % --   02/18/25 0210 -- -- -- 52 18 (!) 90 % --   02/18/25 0200 131/60 -- -- 51 22 (!) 87 % 70 kg (154 lb 5.2 oz)   02/18/25 0100 135/58 -- -- 52 20 95 % --   02/18/25 0000 (!) 142/59 99.9  F (37.7  C) Oral 55 20 95 % --   02/17/25 2326 -- -- -- 50 20 94 % --   02/17/25 2200 131/62 -- -- 50 19 (!) 90 % --   02/17/25 2100 (!) 164/57 -- -- 70 30 94 % --   02/17/25 2005 (!) 127/101 -- -- 66 26 95 % --   02/17/25 2000 -- 99.7  F (37.6  C) Oral 65 28 93 % --   02/17/25 1833 -- -- -- -- -- 94 % --   02/17/25 1800 117/53 -- -- 52 18 96 % --   02/17/25 1700 (!) 135/102 -- -- 64 25 92 % --   02/17/25 1600 127/62 -- -- (!)  49 19 94 % --   25 1557 127/62 -- -- 50 18 94 % --   25 1500 -- -- -- -- -- (!) 86 % --   25 1400 (!) 165/73 -- -- 63 28 (!) 90 % --   25 1300 (!) 152/63 -- -- 51 22 95 % --   25 1258 -- -- -- -- -- 93 % --   25 1200 138/64 99.2  F (37.3  C) Axillary (!) 47 20 (!) 91 % --   25 1100 130/62 -- -- (!) 45 21 92 % --       Temp:  [98.5  F (36.9  C)-99.9  F (37.7  C)] 98.5  F (36.9  C)  Pulse:  [44-81] 81  Resp:  [16-30] 18  BP: (117-165)/() 142/80  FiO2 (%):  [65 %-100 %] 65 %  SpO2:  [86 %-100 %] 91 %    Temperatures:  Current - Temp: 98.5  F (36.9  C); Max - Temp  Av.3  F (37.4  C)  Min: 98.5  F (36.9  C)  Max: 99.9  F (37.7  C)  Respiration range: Resp  Av.5  Min: 16  Max: 30  Pulse range: Pulse  Av  Min: 44  Max: 81  Blood pressure range: Systolic (24hrs), Av , Min:117 , Max:165   ; Diastolic (24hrs), Av, Min:53, Max:102    Pulse oximetry range: SpO2  Av.6 %  Min: 86 %  Max: 100 %    I/O last 3 completed shifts:  In: 670 [I.V.:400; NG/GT:100; IV Piggyback:20]  Out: 1245 [Urine:1095; Emesis/NG output:150]      Intake/Output Summary (Last 24 hours) at 2025 1054  Last data filed at 2025 0900  Gross per 24 hour   Intake 610 ml   Output 1545 ml   Net -935 ml     Unresponsive, HFNC O2  Lungs with coarse BS  Cor RRR nl S1 S2 no M, mild JVD  Abd : PEG in place, firm to palp  CECI ordoñez  LE 1+ edema         Wt Readings from Last 4 Encounters:   25 70 kg (154 lb 5.2 oz)   25 66.5 kg (146 lb 9.7 oz)   10/25/24 60.3 kg (132 lb 15 oz)   10/28/24 61 kg (134 lb 7.7 oz)          Data:          Lab Results   Component Value Date     2025     2025     2025     2012     2012    Lab Results   Component Value Date    CHLORIDE 94 2025    CHLORIDE 93 2025    CHLORIDE 90 2025    CHLORIDE 99 2022    CHLORIDE 98 2022    CHLORIDE 105 2022    CHLORIDE 101  08/16/2012    CHLORIDE 99 08/13/2012    Lab Results   Component Value Date    .8 02/18/2025    .8 02/17/2025    .5 02/16/2025    BUN 24 03/30/2022    BUN 22 02/01/2022    BUN 20 01/26/2022    BUN 18 08/16/2012    BUN 14 08/13/2012      Lab Results   Component Value Date    POTASSIUM 5.0 02/18/2025    POTASSIUM 5.2 02/17/2025    POTASSIUM 5.5 02/17/2025    POTASSIUM 4.4 03/30/2022    POTASSIUM 4.0 02/01/2022    POTASSIUM 3.6 01/26/2022    POTASSIUM 4.0 08/16/2012    POTASSIUM 3.6 08/13/2012    Lab Results   Component Value Date    CO2 28 02/18/2025    CO2 25 02/17/2025    CO2 22 02/16/2025    CO2 29 03/30/2022    CO2 27 02/01/2022    CO2 31 01/26/2022    CO2 26 08/16/2012    CO2 26 08/13/2012    Lab Results   Component Value Date    CR 5.23 02/18/2025    CR 4.61 02/17/2025    CR 4.48 02/16/2025    CR 0.96 08/16/2012    CR 0.91 08/13/2012        Recent Labs   Lab Test 02/18/25 0456 02/16/25  1515 01/31/25  1015   WBC 13.4* 7.1 7.8   HGB 6.8* 8.1* 9.9*   HCT 21.1* 25.3* 30.5*   MCV 83 82 82    174 222     Recent Labs   Lab Test 02/17/25  0428 02/16/25  1515 01/31/25  1015 01/15/25  0840 01/14/25  2059   AST 37  --  45 39  --    ALT 36 39 44 62  --    ALKPHOS 117 127 174* 136  --    BILITOTAL 0.3 0.2 0.2 0.2  --    RUSS  --   --   --   --  19       Recent Labs   Lab Test 02/18/25  0456 02/16/25  1515 01/17/25  0723   MAG 3.0* 3.1* 3.0*     Recent Labs   Lab Test 02/18/25  0456 02/17/25  0801 02/12/25  1130   PHOS 3.4 2.7 3.3     Recent Labs   Lab Test 02/18/25  0456 02/17/25  0428 02/16/25  2328   ARLETH 8.9 9.3 9.7       Lab Results   Component Value Date    ARLETH 8.9 02/18/2025     Lab Results   Component Value Date    WBC 13.4 (H) 02/18/2025    HGB 6.8 (LL) 02/18/2025    HCT 21.1 (L) 02/18/2025    MCV 83 02/18/2025     02/18/2025     Lab Results   Component Value Date     (L) 02/18/2025    POTASSIUM 5.0 02/18/2025    CHLORIDE 94 (L) 02/18/2025    CO2 28 02/18/2025     (H)  02/18/2025     Lab Results   Component Value Date    .8 (H) 02/18/2025    CR 5.23 (H) 02/18/2025     Lab Results   Component Value Date    MAG 3.0 (H) 02/18/2025     Lab Results   Component Value Date    PHOS 3.4 02/18/2025       Creatinine   Date Value Ref Range Status   02/18/2025 5.23 (H) 0.67 - 1.17 mg/dL Final   02/17/2025 4.61 (H) 0.67 - 1.17 mg/dL Final   02/16/2025 4.48 (H) 0.67 - 1.17 mg/dL Final   02/16/2025 4.26 (H) 0.67 - 1.17 mg/dL Final   02/12/2025 3.93 (H) 0.67 - 1.17 mg/dL Final   01/31/2025 3.82 (H) 0.67 - 1.17 mg/dL Final   08/16/2012 0.96 0.66 - 1.25 mg/dL Final   08/13/2012 0.91 0.66 - 1.25 mg/dL Final       Attestation:  I have reviewed today's vital signs, notes, medications, labs and imaging.     Darell Santiago MD

## 2025-02-18 NOTE — PROVIDER NOTIFICATION
MD Notification    Notified Person: MD    Notified Person Name: Paulo Grace    Notification Date/Time:2/18/2025 0309     Notification Interaction:Jo Ann    Purpose of Notification: pt's HR running in 49-47 bpm, tele-junctional bradycardia, O2 sat 87-89% had to increased HFNC from 80% to 100% and no other symptoms present.    Orders Received:    Comments: Aware

## 2025-02-18 NOTE — PLAN OF CARE
Goal Outcome Evaluation:  Goals of care meeting with palliative today.  Continue current medical management.  Hgb 6.8 PRBC x 1 given.  Diuresing with IV lasix.  HFNC FiO2 55%/50 liters.  Incontinent of stool x 3.  Continues to have bright red blood from penile meatus.  Urine has clear to straw coloring.

## 2025-02-18 NOTE — CONSULTS
Palliative Care Initial Social Work Note  Location: [facility]    Patient Info:  Per EMR, Jimmy Otto is a 75 year old male with PMH of HTN, DLD, diabetes , hx of stroke (with right sided hemiplegia and aphasia-non verbal at baseline), PEG tube feeding,  chronic indwelling ordoñez (for neurogenic bladder), CKD 3b (with progressive renal dysfunction), chronic normocytic anemia who as brought to the ED with concern for decreasing urine output and was also noted with influenza with respiratory failure, junctional bradycardia, acute on chronic renal failure and anemia; admitted inpatient 2/16/25.      Palliative care consulted for goals of care.    Brief summary of visit: Palliative care team (this writer and FEMI Hitchcock) completed initial assessment this afternoon with patient's two daughters, son who just flew back to [place] from [place] today (he had just returned back to [place] yesterday, but then got the call that his dad was declining and he should come back), and patient spouse (who does not speak english, but the adult children translate for her). Patient did not participate in this meeting as he is very lethargic, nonverbal, and family say he can get overwhelmed and sad from too much information.    Introduced palliative care as an extra layer of support, helping patients and families dealing with chronic and/or serious illnesses.    Palliative care helps patients and families navigate their care while focusing on the whole person; providing emotional, social and spiritual support. Palliative care social work can assist with adjustment to illness and coping, processing of information, emotional and decisional support needs, non-pharm techniques for stress and anxiety and can provide effective communication and caring support.     Medical update provided to family, who also share what the recent medical course has been. Family share that at baseline patient communicates with thumbs up/thumbs down, will squeeze their hands or  "scratch their hands, and that even though he is non verbal, he is alert. They share that when patient is having issues with his catheter it greatly affects his alertness/interaction -  once the catheter is changed and working again, they say their dad \"is back to himself\" and is responding and interacting. They also share that when patient has been hospitalized in the past, he usually experiences delirium on day 2, pushes the nurse call button many times at night, but then the delirium usually clears up the next day. A family member usually try to stay the night with patient.    Family ask very appropriate questions; they seem to have a very good understanding of their dad's chronic disease, understanding that he is not a candidate for dialysis; they read all of the medical notes in the chart, are very strong advocates for him. Dad is cared for at home by his wife, Amanda; an aunt/uncle who are neighbors, come over to help transfer him to wheelchair for feedings, and then back to bed. [facility].  Family share about recent outpatient nephrology appointment where they thought there would be a discussion about dialysis but when they arrived for that appointment they realized there had been a miscommunication and were told that he was in fact not a candidate and that their dad was disappointed, \"very sad\" about that as he was willing to try dialysis.     Discussed various care pathways (including palliative and hospice, the Medicare hospice benefit/interdisciplinary team/philosophy/where hospice can be received/dispositions, that hospice will be appropriate when Mr. Otto no longer wants to receive treatments and/or go back to the hospital); family feel like \"we want to try what we can\" at this time, but are very thankful for the education and information. They are also receptive to having an informational meeting with a hospice agency to get more information to inform their decision making moving " "forward (APRN MIKAYLA Naldo called ICU SW to inform of this request and ask assistance in connecting family to an informational meeting with hospice). They do not feel their dad is ready for hospice yet, but they would be receptive of this service in the future, when there are not anymore beneficial medical treatments for their dad.     Family share experience that influences their medical decision-making of grandma being hospitalized with pneumonia and were told they had the option of \"grandma being intubated and never waking up again or choosing comfort care\"; they elected comfort care so they could spend meaningful time with her, they were moved to a different floor in the hospital, and then the nephrologist came to see them and said that they could have fixed what the issue was if they had not elected comfort care; the daughters still feel guilt and regret that they agreed to comfort care too soon and find that because of this experience are hesitant to agree to hospice too soon; if there are still medical treatments that their dad could benefit from, they want to choose those right now, or at least have the choice and conversation about them.     Family very appreciative of updates and are very good support for patient.    Date of Admission: 2/16/2025    Reason for consult: Goals of care    Sources of information:   Family: daughters [place] (pronouced Dah-vee), Phally (pronounced Cayla); son Phalla (pronounced Cecily)    Other:   chart review    Recommendations & Plan:  PCSW will continue to follow while the palliative care team remains involved; ongoing assessment of pt, spouse and family coping, emotional and decisional support needs     Symptoms & Concerns Addressed Today:  Family    Strengths Identified:    Very strong family support of eachother    Relationships & Support:  Aspects of relationships and support assessed today:  Identified family members: daughters [place] and Phally, son Phalla, spouse " Amanda  Professional supports: [facility]  Family coping: adequate  Bereavement Risk concerns: continue to assess    Coping, Mental Health & Adjustment to Illness:   Adjustment to Illness/Hospitalization    Goals, Decision Making & Advance Care Planning:   Prognosis, Goals, and/or Advance Care Planning were assessed today: Yes  If yes, brief summary of discussion: see above  Preferred language: patient preferred language is cambod/[place], adult children fluent in english and translate for patient and spouse  Patient's decision making preferences: shared with support from loved ones  I have concerns about patient and family's health literacy? No,adult children are very involved  Patient has a completed Health Care Directive: Unknown or unable to assess.  Code status per chart review: No CPR / No Intubation      Clinical Social Work Interventions:   Assessment of palliative specific issues    Introduction of Palliative clinical social work interventions  Facilitation of processing of thoughts/feelings  Family communication facilitated  Goals of care discussion/facilitation      CRISTINA Correia  [facility], Palliative Care  Securely message with the [employer] Web Console (learn more here) or  Text page via [employer] Paging/Directory

## 2025-02-18 NOTE — PLAN OF CARE
Goal Outcome Evaluation:      Plan of Care Reviewed With: family    Overall Patient Progress: decliningOverall Patient Progress: declining    Outcome Evaluation: Assumed cares from [id number]. Goals of care conversations held with family today and hospitalist; pt changed to DNI (was already DNR). Follows some simple commands, meeting blood pressure goals without the use of anti-hypertensives. On HFNC, 85%, 45L. NT suctioned pt multiple times this morning. Tube feeding restarted per order. No BM this shift. Lantus started. Diuretics given; see MAR. See seperate note re: dania red blood bypassing around ordoñez.      Problem: Adult Inpatient Plan of Care  Goal: Plan of Care Review  Description: The Plan of Care Review/Shift note should be completed every shift.  The Outcome Evaluation is a brief statement about your assessment that the patient is improving, declining, or no change.  This information will be displayed automatically on your shift  note.  Outcome: Not Progressing  Flowsheets (Taken 2/17/2025 1927)  Outcome Evaluation:   Assumed cares from [id number]. Goals of care conversations held with family today and hospitalist   pt changed to DNI (was already DNR). Follows some simple commands, meeting blood pressure goals without the use of anti-hypertensives. On HFNC, 85%, 45L. NT suctioned pt multiple times this morning. Tube feeding restarted per order. No BM this shift. Lantus started. Diuretics given   see MAR. See seperate note re: dania red blood bypassing around ordoñez.  Plan of Care Reviewed With: family  Overall Patient Progress: declining  Goal: Optimal Comfort and Wellbeing  Outcome: Not Progressing  Intervention: Provide Person-Centered Care  Recent Flowsheet Documentation  Taken 2/17/2025 1600 by Yarely Kendrick, RN  Trust Relationship/Rapport:   care explained   reassurance provided   thoughts/feelings acknowledged  Taken 2/17/2025 1200 by Yarely Kendrick, RN  Trust Relationship/Rapport:   care explained    reassurance provided   thoughts/feelings acknowledged  Taken 2/17/2025 0800 by Yarely Kendrick, RN  Trust Relationship/Rapport:   care explained   reassurance provided   thoughts/feelings acknowledged  Goal: Readiness for Transition of Care  Outcome: Not Progressing     Problem: Comorbidity Management  Goal: Blood Glucose Levels Within Targeted Range  Outcome: Progressing  Intervention: Monitor and Manage Glycemia  Recent Flowsheet Documentation  Taken 2/17/2025 1600 by Yarely Kendrick, RN  Medication Review/Management: medications reviewed  Taken 2/17/2025 1200 by Yarely Kendrick, RN  Medication Review/Management: medications reviewed  Taken 2/17/2025 0800 by Yarely Kendrick, RN  Medication Review/Management: medications reviewed  Goal: Blood Pressure in Desired Range  Outcome: Progressing  Intervention: Maintain Blood Pressure Management  Recent Flowsheet Documentation  Taken 2/17/2025 1600 by Yarely Kendrick, RN  Medication Review/Management: medications reviewed  Taken 2/17/2025 1200 by Yarely Kendrick, RN  Medication Review/Management: medications reviewed  Taken 2/17/2025 0800 by Yarely Kendrick, RN  Medication Review/Management: medications reviewed

## 2025-02-18 NOTE — PROGRESS NOTES
Antimicrobial Stewardship Team Note    Antimicrobial Stewardship Program - A joint venture between [facility] and [facility] Consultant [employer] to optimize antibiotic management.     Patient: Jimmy Otto  MRN: [id number]  Allergies: Nka [no known allergies]    Brief Summary: Jimmy Otto is a 75 year old male admitted on 2/16/25 with decreasing urine output and ALEX on CKD. PMH significant for HTN, diabetes, hx of stroke (with right sided hemiplegia and aphasia/non-verbal at baseline), PEG tube feeding, chronic indwelling ordoñez, and CKD3. He was found to be influenza A positive so was started on Tamiflu in addition to Zosyn with concern for aspiration pneumonia.    WBC count 7.1 on admission, 13.4 today. Afebrile, although several low grade fevers. Procalcitonin negative at 0.29. Increasing O2 requirements, currently 50 LPM HFNC. CXR on admission with diffuse mixed interstitial and airspace opacities likely due to moderate pulmonary edema. Small right and trace left pleural effusions and mild bibasilar atelectasis. CXR 2/17 with patchy parenchymal opacities persist compatible with asymmetric edema vs multifocal pneumonitis. Blood cultures negative.         Active Anti-infective Medications   (From admission, onward)                 Start     Stop    02/17/25 1100  oseltamivir  30 mg,   Oral,   DAILY        Influenza       --    02/17/25 0300  piperacillin-tazobactam  2.25 g,   Intravenous,   EVERY 6 HOURS        Aspiration Pneumonia       --                  Assessment: Influenza A  Patient presented with decreased urine output and ALEX, found to be Influenza A positive. He was started on Tamiflu in addition to Zosyn with concern for aspiration pneumonia. Currently, imaging and negative procalcitonin do not support bacterial pneumonia diagnosis. Recommend stopping Zosyn and considering 10 day course of Tamiflu given worsening O2 needs and comorbidities. If strong clinical concern for bacterial pneumonia,  would narrow to ceftriaxone.    Recommendations:  Stop Zosyn. If strong clinical concern for bacterial pneumonia, would narrow to ceftriaxone 2g q24h to complete 5 day course (2/21).  Consider 10 day course of Tamiflu (2/26).    Discussed with ID Staff MD Loyda Ervin, PharmD, [facility]    Vital Signs/Clinical Features:  Vitals         02/16 0700  02/17 0659 02/17 0700  02/18 0659 02/18 0700  02/18 1357   Most Recent      Temp ( F) 97.6 -  98.9    99.1 -  99.9    97.5 -  98.5     97.7 (36.5) 02/18 1230    Pulse 38 -  105    44 -  79    70 -  81     78 02/18 1230    Resp 16 -  33    16 -  30    14 -  19     15 02/18 1230    /60 -  155/65    117/53 -  165/73    118/60 -  142/80     134/68 02/18 1230    SpO2 (%) 85 -  100    86 -  100    91 -  100     95 02/18 1230            Labs  Estimated Creatinine Clearance: 12.1 mL/min (A) (based on SCr of 5.23 mg/dL (H)).  Recent Labs   Lab Test 01/31/25  1015 02/12/25  1130 02/16/25  1515 02/16/25  2328 02/17/25  0428 02/18/25  0456   CR 3.82* 3.93* 4.26* 4.48* 4.61* 5.23*       Recent Labs   Lab Test 01/17/25  0723 01/18/25  0632 01/23/25  1115 01/31/25  1015 02/16/25  1515 02/18/25  0456   WBC 5.9 5.3 6.2 7.8 7.1 13.4*   HGB 8.5* 8.3* 8.5* 9.9* 8.1* 6.8*   HCT 26.4* 25.5* 26.5* 30.5* 25.3* 21.1*   MCV 82 83 82 82 82 83    164 199 222 174 162       Recent Labs   Lab Test 10/23/24  2203 01/13/25  1315 01/14/25  2048 01/15/25  0840 01/18/25  0934 01/31/25  1015 02/12/25  1130 02/16/25  1515 02/17/25  0428   BILITOTAL <0.2 0.2 0.2 0.2  --  0.2  --  0.2 0.3   ALKPHOS 106 148 157* 136  --  174*  --  127 117   ALBUMIN 2.9* 2.7* 3.0* 2.5* 2.4* 3.0* 3.1* 3.1* 3.1*   AST 37 52* 46* 39  --  45  --   --  37   ALT 36 83* 78* 62  --  44  --  39 36       Recent Labs   Lab Test 09/19/21  1347 10/13/21  2243 01/21/22  0637 01/23/22  0709 10/28/22  1331 10/23/24  2002 10/23/24  2203 10/23/24  2219 10/24/24  0219 01/14/25  2048 01/17/25  1456 02/16/25  1515  02/17/25  0056   PCAL 0.26  --  <0.05  --  0.04  --  0.07  --   --   --   --   --  0.29   LACT 1.2   < > 2.3*  --   --  4.7*  --  4.2* 3.1* 0.7 0.8 1.6  --    CRP 28.0*  --  22.1* 37.7*  --   --   --   --   --   --   --   --   --    SED 66*  --   --   --   --   --   --   --   --   --   --   --   --     < > = values in this interval not displayed.             Culture Results:  7-Day Micro Results       Procedure Component Value Units Date/Time    Blood Culture Arm, Left [[id number]]  (Normal) Collected: 02/16/25 2328    Order Status: Completed Lab Status: Preliminary result Updated: 02/18/25 0431    Specimen: Blood from Arm, Left      Culture No growth after 1 day    Blood Culture Peripheral Blood [[id number]]  (Normal) Collected: 02/16/25 1515    Order Status: Completed Lab Status: Preliminary result Updated: 02/17/25 1901    Specimen: Peripheral Blood      Culture No growth after 1 day            Recent Labs   Lab Test 04/07/23  1451 05/12/24  2232 09/13/24  1420 10/23/24  2108 01/14/25  2229   URINEPH 6.5 7.0 6.5 7.0 6.5   NITRITE Negative Negative Negative Negative Negative   LEUKEST Large* Negative Moderate* Moderate* Small*   WBCU >182* 3 102* 10* 22*                   Recent Labs   Lab Test 10/24/24  0016   CDBPCT Negative       Imaging: XR Abdomen Port 1 View    Result Date: 2/17/2025  EXAM: ABDOMEN SINGLE VIEW PORTABLE LOCATION: [facility] DATE: 2/17/2025 INDICATION: Nausea. COMPARISON: None. FINDINGS: A percutaneous gastrostomy tube is present with balloon tip projecting over the gastric body. There are a few loops of mildly distended gas-filled small bowel in the mid abdomen. Gas and a small amount of stool are scattered within nondistended  colon. No visualized bowel wall thickening or pneumatosis. Apparent interstitial thickening within the central aspects of the lungs. Moderate-sized right pleural effusion. A few patchy opacities in the mid and lower right lung.      IMPRESSION: 1. A few loops of mildly distended gas-filled small bowel are present in the mid abdomen. These are within normal limits. There is no convincing evidence of a bowel obstruction. 2. A small amount of stool is scattered within the colon.     XR Chest Port 1 View    Result Date: 2/17/2025  EXAM: XR CHEST PORT 1 VIEW LOCATION: [facility] DATE: 2/17/2025 INDICATION: hypoxia s p emesis COMPARISON: 2/16/2025     IMPRESSION: Heart size is stable. Small bilateral pleural effusions are redemonstrated, right greater than left. Patchy parenchymal opacities persist compatible with asymmetric edema versus multifocal pneumonitis. No pneumothorax. No acute bony abnormality.    XR Chest Port 1 View    Result Date: 2/16/2025  EXAM: XR CHEST PORT 1 VIEW LOCATION: [facility] DATE: 2/16/2025 INDICATION: hypoxia COMPARISON: 1/14/2025     IMPRESSION: Diffuse mixed interstitial and airspace opacities likely due to moderate pulmonary edema. Small right and trace left pleural effusions and mild bibasilar atelectasis. Mild cardiomegaly. No pneumothorax.    XR Gastrostomy Tube Replacement    Result Date: 2/14/2025  EXAM: XR GASTROSTOMY TUBE REPLACEMENT LOCATION: [facility] DATE: 2/11/2025 INDICATION: Broken balloon. COMPARISON: None. RADIATION DOSE: Total Air Kerma 8.1 mGy. FLUOROSCOPIC TIME: 0.6 minutes. TECHNIQUE: Patient was brought to the interventional radiology department and placed in a supine position. The tube had fallen out. Initial attempts were made to replace the tube, though unable. Therefore, 1% Lidocaine was used for local anesthetic throughout the tract. A 5 Romanian Kumpe catheter was advanced through the tract and into the gastric lumen. Contrast was injected to confirm appropriate position. A superstiff Amplatz wire was placed. After serial dilatation, a 14 Romanian BEVERLEY gastrostomy tube was replaced. Balloon was inflated with 6 mL of  dilute contrast. Contrast was again injected into the gastric lumen confirming appropriate position. Total of 2 spot fluoroscopic images confirm appropriate placement of new gastrostomy tube.     IMPRESSION: 14 Kosovan BEVERLEY gastrostomy tube replaced through the tract after serial dilatation. The tube is ready for immediate use.

## 2025-02-19 ENCOUNTER — APPOINTMENT (OUTPATIENT)
Dept: ULTRASOUND IMAGING | Facility: CLINIC | Age: 75
End: 2025-02-19
Attending: ANESTHESIOLOGY
Payer: MEDICARE

## 2025-02-19 LAB
ANION GAP SERPL CALCULATED.3IONS-SCNC: 12 MMOL/L (ref 7–15)
BUN SERPL-MCNC: 133.3 MG/DL (ref 8–23)
CALCIUM SERPL-MCNC: 8.8 MG/DL (ref 8.8–10.4)
CHLORIDE SERPL-SCNC: 96 MMOL/L (ref 98–107)
CREAT SERPL-MCNC: 5.26 MG/DL (ref 0.67–1.17)
EGFRCR SERPLBLD CKD-EPI 2021: 11 ML/MIN/1.73M2
GLUCOSE BLDC GLUCOMTR-MCNC: 137 MG/DL (ref 70–99)
GLUCOSE BLDC GLUCOMTR-MCNC: 138 MG/DL (ref 70–99)
GLUCOSE BLDC GLUCOMTR-MCNC: 149 MG/DL (ref 70–99)
GLUCOSE BLDC GLUCOMTR-MCNC: 177 MG/DL (ref 70–99)
GLUCOSE BLDC GLUCOMTR-MCNC: 187 MG/DL (ref 70–99)
GLUCOSE BLDC GLUCOMTR-MCNC: 237 MG/DL (ref 70–99)
GLUCOSE SERPL-MCNC: 168 MG/DL (ref 70–99)
HCO3 SERPL-SCNC: 29 MMOL/L (ref 22–29)
HGB BLD-MCNC: 7.2 G/DL (ref 13.3–17.7)
HGB BLD-MCNC: 7.8 G/DL (ref 13.3–17.7)
MAGNESIUM SERPL-MCNC: 3 MG/DL (ref 1.7–2.3)
PHOSPHATE SERPL-MCNC: 4.1 MG/DL (ref 2.5–4.5)
POTASSIUM SERPL-SCNC: 3.8 MMOL/L (ref 3.4–5.3)
SODIUM SERPL-SCNC: 137 MMOL/L (ref 135–145)

## 2025-02-19 PROCEDURE — 85018 HEMOGLOBIN: CPT | Performed by: HOSPITALIST

## 2025-02-19 PROCEDURE — 250N000013 HC RX MED GY IP 250 OP 250 PS 637: Performed by: HOSPITALIST

## 2025-02-19 PROCEDURE — 250N000009 HC RX 250: Performed by: INTERNAL MEDICINE

## 2025-02-19 PROCEDURE — 99232 SBSQ HOSP IP/OBS MODERATE 35: CPT | Performed by: INTERNAL MEDICINE

## 2025-02-19 PROCEDURE — 250N000013 HC RX MED GY IP 250 OP 250 PS 637: Performed by: INTERNAL MEDICINE

## 2025-02-19 PROCEDURE — B4036 ENTERAL FEED SUP KIT GRAV BY: HCPCS

## 2025-02-19 PROCEDURE — 36415 COLL VENOUS BLD VENIPUNCTURE: CPT | Performed by: HOSPITALIST

## 2025-02-19 PROCEDURE — 80048 BASIC METABOLIC PNL TOTAL CA: CPT | Performed by: HOSPITALIST

## 2025-02-19 PROCEDURE — 84100 ASSAY OF PHOSPHORUS: CPT | Performed by: INTERNAL MEDICINE

## 2025-02-19 PROCEDURE — 85018 HEMOGLOBIN: CPT | Performed by: INTERNAL MEDICINE

## 2025-02-19 PROCEDURE — 999N000157 HC STATISTIC RCP TIME EA 10 MIN

## 2025-02-19 PROCEDURE — 83735 ASSAY OF MAGNESIUM: CPT | Performed by: INTERNAL MEDICINE

## 2025-02-19 PROCEDURE — 99233 SBSQ HOSP IP/OBS HIGH 50: CPT | Performed by: HOSPITALIST

## 2025-02-19 PROCEDURE — 93970 EXTREMITY STUDY: CPT

## 2025-02-19 PROCEDURE — 36415 COLL VENOUS BLD VENIPUNCTURE: CPT | Performed by: INTERNAL MEDICINE

## 2025-02-19 PROCEDURE — 250N000011 HC RX IP 250 OP 636

## 2025-02-19 PROCEDURE — 200N000001 HC R&B ICU

## 2025-02-19 RX ORDER — FUROSEMIDE 20 MG/1
40 TABLET ORAL DAILY
Status: DISCONTINUED | OUTPATIENT
Start: 2025-02-19 | End: 2025-02-20

## 2025-02-19 RX ORDER — FERROUS SULFATE 325(65) MG
325 TABLET ORAL 2 TIMES DAILY
Status: DISCONTINUED | OUTPATIENT
Start: 2025-02-19 | End: 2025-02-20

## 2025-02-19 RX ORDER — AMLODIPINE BESYLATE 5 MG/1
5 TABLET ORAL DAILY
Status: DISCONTINUED | OUTPATIENT
Start: 2025-02-19 | End: 2025-02-20

## 2025-02-19 RX ORDER — HYDRALAZINE HYDROCHLORIDE 20 MG/ML
10 INJECTION INTRAMUSCULAR; INTRAVENOUS EVERY 4 HOURS PRN
Status: DISCONTINUED | OUTPATIENT
Start: 2025-02-19 | End: 2025-02-26 | Stop reason: HOSPADM

## 2025-02-19 RX ORDER — CEFTRIAXONE 1 G/1
1 INJECTION, POWDER, FOR SOLUTION INTRAMUSCULAR; INTRAVENOUS EVERY 24 HOURS
Status: COMPLETED | OUTPATIENT
Start: 2025-02-20 | End: 2025-02-22

## 2025-02-19 RX ADMIN — INSULIN ASPART 1 UNITS: 100 INJECTION, SOLUTION INTRAVENOUS; SUBCUTANEOUS at 04:37

## 2025-02-19 RX ADMIN — PANTOPRAZOLE SODIUM 40 MG: 40 INJECTION, POWDER, FOR SOLUTION INTRAVENOUS at 08:44

## 2025-02-19 RX ADMIN — INSULIN ASPART 1 UNITS: 100 INJECTION, SOLUTION INTRAVENOUS; SUBCUTANEOUS at 16:07

## 2025-02-19 RX ADMIN — INSULIN ASPART 2 UNITS: 100 INJECTION, SOLUTION INTRAVENOUS; SUBCUTANEOUS at 20:25

## 2025-02-19 RX ADMIN — SODIUM ZIRCONIUM CYCLOSILICATE 10 G: 5 POWDER, FOR SUSPENSION ORAL at 08:39

## 2025-02-19 RX ADMIN — Medication 5 ML: at 16:04

## 2025-02-19 RX ADMIN — INSULIN GLARGINE 5 UNITS: 100 INJECTION, SOLUTION SUBCUTANEOUS at 08:44

## 2025-02-19 RX ADMIN — PIPERACILLIN AND TAZOBACTAM 2.25 G: 2; .25 INJECTION, POWDER, FOR SOLUTION INTRAVENOUS at 16:04

## 2025-02-19 RX ADMIN — FLUOXETINE 20 MG: 20 SOLUTION ORAL at 11:54

## 2025-02-19 RX ADMIN — INSULIN ASPART 1 UNITS: 100 INJECTION, SOLUTION INTRAVENOUS; SUBCUTANEOUS at 00:39

## 2025-02-19 RX ADMIN — PIPERACILLIN AND TAZOBACTAM 2.25 G: 2; .25 INJECTION, POWDER, FOR SOLUTION INTRAVENOUS at 07:05

## 2025-02-19 RX ADMIN — ATORVASTATIN CALCIUM 20 MG: 10 TABLET, FILM COATED ORAL at 08:39

## 2025-02-19 RX ADMIN — PIPERACILLIN AND TAZOBACTAM 2.25 G: 2; .25 INJECTION, POWDER, FOR SOLUTION INTRAVENOUS at 09:30

## 2025-02-19 RX ADMIN — PANTOPRAZOLE SODIUM 40 MG: 40 INJECTION, POWDER, FOR SOLUTION INTRAVENOUS at 20:33

## 2025-02-19 RX ADMIN — FUROSEMIDE 40 MG: 20 TABLET ORAL at 14:56

## 2025-02-19 RX ADMIN — OSELTAMIVIR PHOSPHATE 30 MG: 6 POWDER, FOR SUSPENSION ORAL at 08:38

## 2025-02-19 RX ADMIN — FERROUS SULFATE TAB 325 MG (65 MG ELEMENTAL FE) 325 MG: 325 (65 FE) TAB at 20:33

## 2025-02-19 RX ADMIN — CLOPIDOGREL BISULFATE 75 MG: 75 TABLET ORAL at 13:49

## 2025-02-19 RX ADMIN — AMLODIPINE BESYLATE 5 MG: 5 TABLET ORAL at 17:05

## 2025-02-19 RX ADMIN — PANTOPRAZOLE SODIUM 40 MG: 40 INJECTION, POWDER, FOR SOLUTION INTRAVENOUS at 01:44

## 2025-02-19 ASSESSMENT — ACTIVITIES OF DAILY LIVING (ADL)
ADLS_ACUITY_SCORE: 101
DEPENDENT_IADLS:: CLEANING;COOKING;LAUNDRY;SHOPPING;INCONTINENCE;TRANSPORTATION;MONEY MANAGEMENT;MEDICATION MANAGEMENT;MEAL PREPARATION
ADLS_ACUITY_SCORE: 101

## 2025-02-19 NOTE — PROGRESS NOTES
Care Management Follow Up    Length of Stay (days): 3    Expected Discharge Date: 02/25/2025     Concerns to be Addressed:       Patient plan of care discussed at interdisciplinary rounds: Yes    Anticipated Discharge Disposition:        Anticipated Discharge Services:    Anticipated Discharge DME:      Patient/family educated on Medicare website which has current facility and service quality ratings:    Education Provided on the Discharge Plan:    Patient/Family in Agreement with the Plan:      Referrals Placed by CM/SW:    Private pay costs discussed: Not applicable    Discussed  Partnership in Safe Discharge Planning  document with patient/family: No     Handoff Completed: No, handoff not indicated or clinically appropriate    Additional Information:  Writer met with family briefly to see if they would like a meeting with Hospice agency     Family agreed would like an informational meeting - writer called Ernesto with [facility] to see if he had availability to speak with family     Daughter Wayne   Phalla 1  [phone] would like to both be present for the meeting     Next Steps:   Complete consult and speak with family regarding where hospice might take place    ANIRUDH Brito

## 2025-02-19 NOTE — PROVIDER NOTIFICATION
Pagejadon PANCHAL regarding increasing BP, currently systolic 160's.    Order rec'd.    Will monitor.

## 2025-02-19 NOTE — PROGRESS NOTES
Shift 3p-7p    HFNC 50L 55%, adequate SpO2  LS coarse  Neuros unchanged  Meatal bleeding seems to be subsiding.    Large UOP.  TF increased to goal rate of 30ml/hr.  Family at bedside.

## 2025-02-19 NOTE — PLAN OF CARE
[facility] Intensive Care Unit   Nursing Note                                                       Neuro:  PERRL.  Moves LUE only.  Follows commands intermittently.  Cardiovascular:  RRR.  Hemodynamically stable without pressors.  Pulmonary:  Tolerating HFNC.  Oxygen saturation > 92  GI/:  Adequate UOP with lasix.  Endocrine: Glucose controlled with SSI.  Skin:  Intact except incisional areas.  Protective mepilex applied to sacrum.  Restraints:  Not in use or necessary.  Rectal tube inserted for liquid black stool. GI consult requested for AM. TF held @ 0300 per hospitalist order.  RUE edematous. Lymphedema vs IV infiltration. Dr. Dunn examined. Ace applied for compression, elevated.  AM labs noted; electrolytes replaced as indicated.  Continue to monitor closely.      ROUTINE IP LABS (Last four results)  BMP  Recent Labs   Lab 02/19/25  0512 02/19/25  0437 02/19/25  0039 02/18/25  2136 02/18/25  0828 02/18/25  0456 02/18/25  0002 02/17/25  2131 02/17/25  1658 02/17/25  1422 02/17/25  0442 02/17/25  0428 02/17/25  0056 02/16/25  2328     --   --   --   --  134*  --   --   --   --   --  129*  --  127*   POTASSIUM 3.8  --   --   --   --  5.0  --  5.2  --  5.5*   < > 6.1*   < > 6.0*   CHLORIDE 96*  --   --   --   --  94*  --   --   --   --   --  93*  --  90*   ARLETH 8.8  --   --   --   --  8.9  --   --   --   --   --  9.3  --  9.7   CO2 29  --   --   --   --  28  --   --   --   --   --  25  --  22   .3*  --   --   --   --  130.8*  --   --   --   --   --  123.8*  --  121.5*   CR 5.26*  --   --   --   --  5.23*  --   --   --   --   --  4.61*  --  4.48*   * 149* 177* 195*   < > 163*   < >  --    < >  --    < > 228*   < > 237*    < > = values in this interval not displayed.     CBC  Recent Labs   Lab 02/19/25  0512 02/19/25  0143 02/18/25  0456 02/16/25  1515   WBC  --   --  13.4* 7.1   RBC  --   --  2.55* 3.07*   HGB 7.8* 7.2* 6.8* 8.1*   HCT  --   --  21.1* 25.3*   MCV  --   --  83 82    MCH  --   --  26.7 26.4*   MCHC  --   --  32.2 32.0   RDW  --   --  14.9 14.7   PLT  --   --  162 174       LACTIC ACID  Lactic Acid (mmol/L)   Date Value   01/17/2025 0.8   10/24/2024 3.1 (H)   10/23/2024 4.2 (HH)   10/23/2024 4.7 (HH)     Lactic Acid, Initial (mmol/L)   Date Value   02/16/2025 1.6   01/14/2025 0.7       Intake/Output Summary (Last 24 hours) at 2/19/2025 0646  Last data filed at 2/19/2025 0400  Gross per 24 hour   Intake 1751 ml   Output 3300 ml   Net -1549 ml       Edil Lama MSN RN PHN CCRN  [facility]  Intensive Care Unit              Problem: Swallowing Impairment  Goal: Optimal Eating and Swallowing Without Aspiration  Outcome: Not Progressing     Problem: Risk for Delirium  Goal: Optimal Coping  Outcome: Not Progressing     Problem: Skin Injury Risk Increased  Goal: Skin Health and Integrity  Outcome: Progressing   Goal Outcome Evaluation:

## 2025-02-19 NOTE — PROGRESS NOTES
Assessment and Plan:     CKD-5: ALEX associated with acute illness due to Influenza A.   Lytes are nominal. Cr: 5.23 > 5.26.     On lokelma 10 gm per day, S/P trial of diuretics.     UO yest 2995 ml. So far today 2125 ml.   Adm wt 72.7 > > wt today 67.7. /71.     Stop lokelma. Monitor labs.   Discussed with daughter medical management without dialysis vs hospice.     For now I will adjust free water per feeding tube and add in oral diuretics for BP and fluid balance.             Interval History:   Influenza A:  on tamiflu and zosyn. Not hypotensive, on HFNC.      CVA: non ambulatory, PEG for TF, chronic indwelling ordoñez, R hemiparesis, aphasia.      Anemia:      DM:                 Review of Systems:   Unable.           Medications:     Current Facility-Administered Medications   Medication Dose Route Frequency Provider Last Rate Last Admin    atorvastatin (LIPITOR) tablet 20 mg  20 mg Oral or Feeding Tube Daily Darell Santiago MD   20 mg at 02/19/25 0839    B and C vitamin Complex with folic acid (NEPHRONEX) liquid 5 mL  5 mL Per Feeding Tube Daily John Hernandez MD   5 mL at 02/18/25 1732    clopidogrel (PLAVIX) tablet 75 mg  75 mg Oral or Feeding Tube Daily Isidro Soliz MD   75 mg at 02/18/25 0832    FLUoxetine (PROzac) solution 20 mg  20 mg Oral or Feeding Tube Daily John Hernandez MD   20 mg at 02/19/25 1154    insulin aspart (NovoLOG) injection (RAPID ACTING)  1-7 Units Subcutaneous Q4H John Hernandez MD   1 Units at 02/19/25 0437    insulin glargine (LANTUS PEN) injection 5 Units  5 Units Subcutaneous QAM AC Matthias Lechuga MD   5 Units at 02/19/25 0844    oseltamivir (TAMIFLU) suspension 30 mg  30 mg Oral or Feeding Tube Daily Matthias Lechuga MD   30 mg at 02/19/25 0838    pantoprazole (PROTONIX) IV push injection 40 mg  40 mg Intravenous BID De Guzman, Edouard Romo MD   40 mg at 02/19/25 0844    piperacillin-tazobactam (ZOSYN) 2.25 g vial to attach to  ml bag  2.25 g  Intravenous Q6H Renae Cuadra, NP   2.25 g at 02/19/25 0930    sodium chloride (PF) 0.9% PF flush 3 mL  3 mL Intracatheter Q8H John Hernandez MD   3 mL at 02/19/25 0028    sodium zirconium cyclosilicate (LOKELMA) packet 10 g  10 g Oral or Feeding Tube Daily John Hernandez MD   10 g at 02/19/25 0839     Current Facility-Administered Medications   Medication Dose Route Frequency Provider Last Rate Last Admin    dextrose 10% infusion   Intravenous Continuous PRN John Hernandez MD         Current active medications and PTA medications reviewed, see medication list for details.            Physical Exam:   Vitals were reviewed  Patient Vitals for the past 24 hrs:   BP Temp Temp src Pulse Resp SpO2 Weight   02/19/25 1233 (!) 176/71 -- -- 77 17 92 % --   02/19/25 1200 (!) 162/83 -- -- 80 12 (!) 91 % --   02/19/25 1100 (!) 169/73 -- -- 82 20 (!) 91 % --   02/19/25 1000 (!) 163/77 -- -- 74 16 94 % --   02/19/25 0900 (!) 156/79 -- -- 72 15 94 % --   02/19/25 0800 (!) 165/69 98  F (36.7  C) Oral 72 18 94 % --   02/19/25 0717 -- -- -- -- -- 95 % --   02/19/25 0600 (!) 140/70 -- -- 79 14 96 % --   02/19/25 0500 136/62 -- -- 78 14 94 % --   02/19/25 0400 127/66 97.7  F (36.5  C) Oral 67 15 98 % 67.7 kg (149 lb 4 oz)   02/19/25 0300 (!) 148/89 -- -- 76 16 97 % --   02/19/25 0230 (!) 144/65 -- -- 74 15 98 % --   02/19/25 0200 -- -- -- 79 22 (!) 89 % --   02/19/25 0130 126/60 -- -- 70 15 97 % --   02/19/25 0100 136/67 -- -- 71 14 97 % --   02/19/25 0000 135/73 -- -- 70 21 97 % --   02/18/25 2300 122/57 -- -- 68 -- 98 % --   02/18/25 2215 -- 98  F (36.7  C) Oral -- -- -- --   02/18/25 2200 (!) 150/79 -- -- 80 -- 95 % --   02/18/25 2100 (!) 146/67 -- -- 71 14 96 % --   02/18/25 2000 (!) 142/67 -- -- 72 15 96 % --   02/18/25 1900 130/67 -- -- 70 14 95 % --   02/18/25 1800 (!) 141/80 -- -- 74 15 94 % --   02/18/25 1700 126/58 -- -- 71 15 96 % --   02/18/25 1600 119/58 97.9  F (36.6  C) Oral 73 15 97 % --   02/18/25 1530 (!)  141/77 97.9  F (36.6  C) -- 84 (!) 34 92 % --   25 1500 (!) 151/75 -- -- 89 18 -- --   25 1400 (!) 141/67 98  F (36.7  C) Oral 82 20 94 % --       Temp:  [97.7  F (36.5  C)-98  F (36.7  C)] 98  F (36.7  C)  Pulse:  [67-89] 77  Resp:  [12-34] 17  BP: (119-176)/(57-89) 176/71  FiO2 (%):  [35 %-55 %] 35 %  SpO2:  [89 %-98 %] 92 %    Temperatures:  Current - Temp: 98  F (36.7  C); Max - Temp  Av.9  F (36.6  C)  Min: 97.7  F (36.5  C)  Max: 98  F (36.7  C)  Respiration range: Resp  Av.8  Min: 12  Max: 34  Pulse range: Pulse  Av.2  Min: 67  Max: 89  Blood pressure range: Systolic (24hrs), Av , Min:119 , Max:176   ; Diastolic (24hrs), Av, Min:57, Max:89    Pulse oximetry range: SpO2  Av.9 %  Min: 89 %  Max: 98 %    I/O last 3 completed shifts:  In: 1781 [I.V.:100; NG/GT:721; IV Piggyback:30]  Out: 3300 [Urine:3300]      Intake/Output Summary (Last 24 hours) at 2025 1323  Last data filed at 2025 1200  Gross per 24 hour   Intake 1290 ml   Output 3675 ml   Net -2385 ml       Resting in bed  On NC O2       Wt Readings from Last 4 Encounters:   25 67.7 kg (149 lb 4 oz)   25 66.5 kg (146 lb 9.7 oz)   10/25/24 60.3 kg (132 lb 15 oz)   10/28/24 61 kg (134 lb 7.7 oz)          Data:          Lab Results   Component Value Date     2025     2025     2025     2012     2012    Lab Results   Component Value Date    CHLORIDE 96 2025    CHLORIDE 94 2025    CHLORIDE 93 2025    CHLORIDE 99 2022    CHLORIDE 98 2022    CHLORIDE 105 2022    CHLORIDE 101 2012    CHLORIDE 99 2012    Lab Results   Component Value Date    .3 2025    .8 2025    .8 2025    BUN 24 2022    BUN 22 2022    BUN 20 2022    BUN 18 2012    BUN 14 2012      Lab Results   Component Value Date    POTASSIUM 3.8 2025    POTASSIUM 5.0  02/18/2025    POTASSIUM 5.2 02/17/2025    POTASSIUM 4.4 03/30/2022    POTASSIUM 4.0 02/01/2022    POTASSIUM 3.6 01/26/2022    POTASSIUM 4.0 08/16/2012    POTASSIUM 3.6 08/13/2012    Lab Results   Component Value Date    CO2 29 02/19/2025    CO2 28 02/18/2025    CO2 25 02/17/2025    CO2 29 03/30/2022    CO2 27 02/01/2022    CO2 31 01/26/2022    CO2 26 08/16/2012    CO2 26 08/13/2012    Lab Results   Component Value Date    CR 5.26 02/19/2025    CR 5.23 02/18/2025    CR 4.61 02/17/2025    CR 0.96 08/16/2012    CR 0.91 08/13/2012        Recent Labs   Lab Test 02/19/25  0512 02/19/25  0143 02/18/25  0456 02/16/25  1515 01/31/25  1015   WBC  --   --  13.4* 7.1 7.8   HGB 7.8* 7.2* 6.8* 8.1* 9.9*   HCT  --   --  21.1* 25.3* 30.5*   MCV  --   --  83 82 82   PLT  --   --  162 174 222     Recent Labs   Lab Test 02/17/25  0428 02/16/25  1515 01/31/25  1015 01/15/25  0840 01/14/25  2059   AST 37  --  45 39  --    ALT 36 39 44 62  --    ALKPHOS 117 127 174* 136  --    BILITOTAL 0.3 0.2 0.2 0.2  --    RUSS  --   --   --   --  19       Recent Labs   Lab Test 02/19/25  0512 02/18/25  0456 02/16/25  1515   MAG 3.0* 3.0* 3.1*     Recent Labs   Lab Test 02/19/25  0512 02/18/25  0456 02/17/25  0801   PHOS 4.1 3.4 2.7     Recent Labs   Lab Test 02/19/25  0512 02/18/25  0456 02/17/25  0428   ARLETH 8.8 8.9 9.3       Lab Results   Component Value Date    ARLETH 8.8 02/19/2025     Lab Results   Component Value Date    WBC 13.4 (H) 02/18/2025    HGB 7.8 (L) 02/19/2025    HCT 21.1 (L) 02/18/2025    MCV 83 02/18/2025     02/18/2025     Lab Results   Component Value Date     02/19/2025    POTASSIUM 3.8 02/19/2025    CHLORIDE 96 (L) 02/19/2025    CO2 29 02/19/2025     (H) 02/19/2025     Lab Results   Component Value Date    .3 (H) 02/19/2025    CR 5.26 (H) 02/19/2025     Lab Results   Component Value Date    MAG 3.0 (H) 02/19/2025     Lab Results   Component Value Date    PHOS 4.1 02/19/2025       Creatinine   Date Value  Ref Range Status   02/19/2025 5.26 (H) 0.67 - 1.17 mg/dL Final   02/18/2025 5.23 (H) 0.67 - 1.17 mg/dL Final   02/17/2025 4.61 (H) 0.67 - 1.17 mg/dL Final   02/16/2025 4.48 (H) 0.67 - 1.17 mg/dL Final   02/16/2025 4.26 (H) 0.67 - 1.17 mg/dL Final   02/12/2025 3.93 (H) 0.67 - 1.17 mg/dL Final   08/16/2012 0.96 0.66 - 1.25 mg/dL Final   08/13/2012 0.91 0.66 - 1.25 mg/dL Final       Attestation:  I have reviewed today's vital signs, notes, medications, labs and imaging.     Darell Santiago MD

## 2025-02-19 NOTE — PROVIDER NOTIFICATION
Brief update:    Paged re: inky color diarrhea throughout the day 2/18/2025, request for rectal tube.    On tube feedings, Zosyn.  Per nursing, dark-colored stools had been attributed to iron supplementation in the past, but not currently on iron supplementation.  Per his nurse, does not appear to be melenic stool    Has chronic anemia in the setting of stage IV chronic kidney disease with baseline hemoglobin typically in the 9 range.    Received 1 unit of packed red blood cells for hemoglobin of 6.8 2/18/2025 AM      Rectal tube ordered.    Timed hemoglobin recheck x 1 now given dark-colored stool and blood transfusion 2/18 AM; ensure adequate response to transfusion    Have added IV Protonix twice daily    Patient care order placed to hold tube feeds at 3 AM.  Will defer gastroenterology consultation to rounding team in a.m. as I see there have been goals of care discussions with a guarded prognosis.  In the same vein, have held his Plavix for a.m.  Can be reviewed and potentially resumed in a.m. by rounding team pending hemoglobin trend and plan for regarding GI consultation.    Edouard De Guzman MD  1:19 AM

## 2025-02-19 NOTE — PROGRESS NOTES
[facility]  Hospitalist Progress Note        Isidro Soliz MD   02/19/2025        Interval History:        - No significant change in clinical condition; palliative care met with family (2/18), remains DNR/DNI but family want to continue with restorative cares for now   - continues on high flow nasal cannula, weaned down to 35 L  - sodium improved to 137 but renal function worsening, Cr -->5.26  - noted with some dark stools, not grossly melanotic, hemodynamically stable and stable hemoglobin after 1 unit PRBC on 2/18; Hb 6.8---> 7.2---7.8  - will not be an appropriate candidate for endoscopic /colonoscopy at evaluation at this time; will monitor clinically with repeat Hb and discuss goals of care again if has suggestion of active bleed  - started on IV Protonix; will continue with PTA Plavix  - nephrology following; adequate diuresis with trial of diuretics; stopped Lokelma (2/19); adjusted free water per feeding tubes; started Lasix 40 mg daily (2/19)  - per AMT recommendations, will switch Zosyn to Rocephin (2/19; to complete 5 days course) and also adjusted Tamiflu for a 10 days course  - following for disposition; family provided with hospice information         Assessment and Plan:        Jimmy Otto is a 75 year old male with PMH of HTN, DLD, diabetes , hx of stroke (with right sided hemiplegia and aphasia-non verbal at baseline), PEG tube feeding,  chronic indwelling ordoñez (for neurogenic bladder), CKD 3b (with progressive renal dysfunction), chronic normocytic anemia who as brought to the ED with concern for decreasing urine output and was also noted with influenza with respiratory failure, junctional bradycardia, acute on chronic renal failure and anemia; admitted inpatient 2/16/25.      ALEX on CKD stage IV  neurogenic bladder with chronic indwelling Ordoñez catheter  Hyperkalemia  Hyponatremia  Hypochloremia  - recent Cr on 2/12 was 3.93 with Na 129 and normal potassium   -  sodium 127---> 134---137; K 6.2--- 5.0---3.8; ---> 130; Cr 4.26---> 5.26  - did get K shifting meds in ED and started on Lokelma  - Felix catheter replaced on admission  - nephrology following and ordered for intermittent diuresis with Chlorthalidone and IV Lasix; suggest medical management without dialysis (not a candidate for peritoneal dialysis due to PEG and not a good candidate for HD-- need for michell lift)  - adequate diuresis with trial of diuretics; nephrology stopped Lokelma (2/19); adjusted free water per feeding tubes; started Lasix 40 mg daily (2/19)     Acute hypoxic respiratory failure multifactorial likely due to volume overload due to acute on chronic diastolic CHF and exacerbation  influenza A infection   likely aspiration event  - PTA On Torsemide 20 mg daily  - on admission patient was requiring 10 L of oxygen  - proBNP significantly elevated at 19, 616; Pro-Westley normal at 0.29  - Chest x-ray 2/16 noted with diffuse mixed interstitial and airspace opacities likely due to moderate pulmonary edema and small right and trace left pleural effusion and mid bibasilar atelectasis with no pneumothorax  - 2/16: influenza A positive; COVID and RSV negative  - RRT on 2/16 with increased O2 needs and repeat CXR with some concern for asymmetric edema versus multifocal pneumonitis requiring 50 lts HFNC  - getting diuresis per nephrology as above with Lasix while holding PTA torsemide     - continues on high flow nasal cannula, weaned down to 35 L  - per AMT recommendations, will switch empiric Zosyn to Rocephin (2/19; to complete 5 days course) and also adjusted Tamiflu for a 10 days course (renally dosed)     Elevated troponin likely due to demand due to underlying renal failure and volume overload type II NSTEMI  Junctional bradycardia  - serial troponins with no significant trend 106 --- 105 --- 108 ; EKG noted with junctional rhythm   - has converted to sinus rhythm  - evaluated by cardiology, note  junctional bradycardia is likely due to metabolic abnormalities with worsening renal failure and hyperkalemia and note no indication for pacemaker implantation  - Continue to monitor on telemetry    Encephalopathy likely due to uremia and infectious causes  H/o CVA with residual right hemiplegia and baseline nonverbal status  chronic dysphagia, PEG tube feeds  Dyslipidemia  Hypertension  - baseline right hemiplegia, non-verbal status from prior CVA  - some encephalopathy in the setting of acute illness ; treating underlying causes as above  - nutrition following for tube feeds  - continue Plavix 75 mg daily; holding PTA Lipitor 20 mg daily  - PTA amlodipine 10 mg daily, doxazosin 2 mg daily-- will resume Amlodipine at 5 mg daily (2/19) since getting hypertensive; will continue to hold PTA Doxazosin     Acute on chronic anemia  Dark stools  - baseline Hb around 9, anemia of chronic disease in the setting of underlying CKD  - PTA ferrous sulfate 325 mg twice daily continued  - Hb 8.1---6.8-- 1 unit PRBC on 2/18  - noted with some dark stools (on iron), not grossly melanotic, hemodynamically stable and stable hemoglobin after 1 unit PRBC on 2/18; Hb 6.8---> 7.2---7.8  - will not be an appropriate candidate for endoscopic /colonoscopy at evaluation at this time; will monitor clinically with repeat Hb and discuss goals of care again if has suggestion of active bleed  - started on IV Protonix  - will monitor Hb     Diabetes mellitus type 2  - PTA on Lantus 9 units subcu a.m., and insulin lispro sliding scale  - Continue sliding scale with ongoing tube feeds  - continue low-dose Lantus at 5 units daily  - hypoglycemia protocol     Subclinical hypothyroidism  -TSH is mildly elevated at 8.72 but free T4 is normal; most likely due to underlying acute illness    Goals of care   - patient critically ill; family aware ; not a candidate for dialysis per nephrology as noted above  - code status changed to DNR/DNI  - No significant  "change in clinical condition; palliative care met with family (2/18), remains DNR/DNI but family want to continue with restorative cares for now   -  following for disposition; family provided with hospice information     DVT Prophylaxis: Pneumatic Compression Devices    Code Status: DNR/DNI     Diet:   NPO for Medical/Clinical Reasons Except for: NPO but receiving Tube Feeding  Adult Formula Drip Feeding: Continuous Novasource Renal; Gastrostomy; Goal Rate: 30; mL/hr; Start TF @ 10 ml/hr. Advance by 10 mL q 12 hours until goal rate reached.; Do not advance tube feeding rate unless K+ is = or > 3.0, Mg++ is = or > 1.5, an...      Disposition:   Medically Ready for Discharge: Anticipated in 5+ Days    High medical complexity    Care plan discussed with nursing        Clinically Significant Risk Factors        # Hyperkalemia: Highest K = 5.5 mmol/L in last 2 days, will monitor as appropriate  # Hyponatremia: Lowest Na = 134 mmol/L in last 2 days, will monitor as appropriate  # Hypochloremia: Lowest Cl = 94 mmol/L in last 2 days, will monitor as appropriate      # Hypoalbuminemia: Lowest albumin = 3.1 g/dL at 2/17/2025  4:28 AM, will monitor as appropriate      # Acute Kidney Injury, unspecified: based on a >150% or 0.3 mg/dL increase in last creatinine compared to past 90 day average, will monitor renal function    # Hypertension: Noted on problem list    # Acute heart failure with preserved ejection fraction: heart failure noted on problem list, last echo with EF >50%, and receiving IV diuretics          # DMII: A1C = 8.8 % (Ref range: <5.7 %) within past 6 months         # Financial/Environmental Concerns:                              Physical Exam:      Blood pressure (!) 165/69, pulse 72, temperature 98  F (36.7  C), temperature source Oral, resp. rate 18, height 1.676 m (5' 6\"), weight 67.7 kg (149 lb 4 oz), SpO2 94%.  Vitals:    02/17/25 0215 02/18/25 0200 02/19/25 0400   Weight: 70.8 kg (156 lb 1.4 " oz) 70 kg (154 lb 5.2 oz) 67.7 kg (149 lb 4 oz)     Vital Signs with Ranges  Temp:  [97.5  F (36.4  C)-98  F (36.7  C)] 98  F (36.7  C)  Pulse:  [67-89] 72  Resp:  [14-34] 18  BP: (118-165)/(57-89) 165/69  FiO2 (%):  [35 %-65 %] 35 %  SpO2:  [89 %-98 %] 94 %  I/O's Last 24 hours  I/O last 3 completed shifts:  In: 1721 [I.V.:100; NG/GT:721; IV Piggyback:30]  Out: 3300 [Urine:3300]    Constitutional: Baseline non-verbal; briefly opens eyes with voice; resting comfortably in no apparent distress    On HFNC   Oral cavity: Moist mucosa   Cardiovascular: Normal s1 s2, regular rate and rhythm, no murmur   Lungs: B/l clear to auscultation, no wheezes or crepitations   Abdomen: Soft, nt, nd, no guarding, rigidity or rebound; BS +; G tube in place   LE : B/l edema ++   Musculoskeletal/Neuro Right hemiplegia, non-verbal   Psychiatry: nonverbal; lethargic  Felix catheter in place with clear urine                Medications:        Current Facility-Administered Medications   Medication Dose Route Frequency Provider Last Rate Last Admin    atorvastatin (LIPITOR) tablet 20 mg  20 mg Oral or Feeding Tube Daily Darell Santiago MD   20 mg at 02/19/25 0839    B and C vitamin Complex with folic acid (NEPHRONEX) liquid 5 mL  5 mL Per Feeding Tube Daily John Hernandez MD   5 mL at 02/18/25 1732    [Held by provider] clopidogrel (PLAVIX) tablet 75 mg  75 mg Oral or Feeding Tube Daily John Hernandez MD   75 mg at 02/18/25 0832    FLUoxetine (PROzac) solution 20 mg  20 mg Oral or Feeding Tube Daily John Hernandez MD   20 mg at 02/18/25 1228    insulin aspart (NovoLOG) injection (RAPID ACTING)  1-7 Units Subcutaneous Q4H John Hernandez MD   1 Units at 02/19/25 0437    insulin glargine (LANTUS PEN) injection 5 Units  5 Units Subcutaneous QAM AC Matthias Lechuga MD   5 Units at 02/19/25 0844    oseltamivir (TAMIFLU) suspension 30 mg  30 mg Oral or Feeding Tube Daily Matthias Lechuga MD   30 mg at 02/19/25 0838    pantoprazole  (PROTONIX) IV push injection 40 mg  40 mg Intravenous BID Gab, Edouard Romo MD   40 mg at 02/19/25 0844    piperacillin-tazobactam (ZOSYN) 2.25 g vial to attach to  ml bag  2.25 g Intravenous Q6H Renae Cuadra NP   2.25 g at 02/19/25 0705    sodium chloride (PF) 0.9% PF flush 3 mL  3 mL Intracatheter Q8H John Hernandez MD   3 mL at 02/19/25 0028    sodium zirconium cyclosilicate (LOKELMA) packet 10 g  10 g Oral or Feeding Tube Daily John Hernandez MD   10 g at 02/19/25 0839     PRN Meds:   Current Facility-Administered Medications   Medication Dose Route Frequency Provider Last Rate Last Admin    calcium carbonate (TUMS) chewable tablet 1,000 mg  1,000 mg Oral 4x Daily PRN John Hernandez MD        dextrose 10% infusion   Intravenous Continuous PRN John Hernandez MD        glucose gel 15-30 g  15-30 g Oral Q15 Min PRN John Hernandez MD        Or    dextrose 50 % injection 25-50 mL  25-50 mL Intravenous Q15 Min John Heard MD        Or    glucagon injection 1 mg  1 mg Subcutaneous Q15 Min John Heard MD        lidocaine (LMX4) cream   Topical Q1H PRN John Hernandez MD        lidocaine 1 % 0.1-1 mL  0.1-1 mL Other Q1H John Heard MD        ondansetron (ZOFRAN) injection 4 mg  4 mg Intravenous Q6H PRN John Hernandez MD   4 mg at 02/17/25 1652    senna-docusate (SENOKOT-S/PERICOLACE) 8.6-50 MG per tablet 1 tablet  1 tablet Oral BID JOSSELYNN John Hernandez MD        Or    senna-docusate (SENOKOT-S/PERICOLACE) 8.6-50 MG per tablet 2 tablet  2 tablet Oral BID JOSSELYNN John Hernandez MD        sodium chloride (PF) 0.9% PF flush 3 mL  3 mL Intracatheter q1 min josselynn John Hernandez MD                Data:      All new lab and imaging data was reviewed.   Recent Labs   Lab Test 02/19/25  0512 02/19/25  0143 02/18/25  0456 02/16/25  1515 01/31/25  1015 11/02/21  0609 11/01/21  0735 09/16/21  1003 09/16/21  0957 09/15/21  0521 09/14/21  2145   WBC  --   --  13.4* 7.1 7.8   < > 5.6    < >  --    < >  --    HGB 7.8* 7.2* 6.8* 8.1* 9.9*   < > 11.3*   < >  --    < >  --    MCV  --   --  83 82 82   < > 88   < >  --    < >  --    PLT  --   --  162 174 222   < > 224   < >  --    < >  --    INR  --   --   --   --   --   --  0.96  --  1.03  --  0.94    < > = values in this interval not displayed.      Recent Labs   Lab Test 02/19/25  0842 02/19/25  0512 02/19/25  0437 02/18/25  0828 02/18/25  0456 02/18/25  0002 02/17/25 2131 02/17/25  0442 02/17/25  0428   NA  --  137  --   --  134*  --   --   --  129*   POTASSIUM  --  3.8  --   --  5.0  --  5.2   < > 6.1*   CHLORIDE  --  96*  --   --  94*  --   --   --  93*   CO2  --  29  --   --  28  --   --   --  25   BUN  --  133.3*  --   --  130.8*  --   --   --  123.8*   CR  --  5.26*  --   --  5.23*  --   --   --  4.61*   ANIONGAP  --  12  --   --  12  --   --   --  11   ARLETH  --  8.8  --   --  8.9  --   --   --  9.3   * 168* 149*   < > 163*   < >  --    < > 228*    < > = values in this interval not displayed.     Recent Labs   Lab Test 10/13/21  2152 09/18/21  1535 09/14/21 2142   TROPONIN <0.015 <0.015 <0.015

## 2025-02-19 NOTE — PROGRESS NOTES
Asked by nursing to evaluate for possible IV infiltration.  Noted 1 arm was swollen greater than the other.  Ordered duplex for the morning and IV infiltration workup.\    Alexei Dunn MD  Critical Care Medicine

## 2025-02-19 NOTE — CONSULTS
Care Management Initial Consult    General Information  Assessment completed with: Children,         Primary Care Provider verified and updated as needed:     Readmission within the last 30 days: no previous admission in last 30 days      Reason for Consult: discharge planning  Advance Care Planning:            Communication Assessment  Patient's communication style: spoken language (English or Bilingual)    Hearing Difficulty or Deaf: no   Wear Glasses or Blind: no    Cognitive  Cognitive/Neuro/Behavioral: .WDL except  Level of Consciousness: lethargic  Arousal Level: opens eyes spontaneously, arouses to voice, arouses to repeated stimulation  Orientation: other (see comments) (ZAYDA-nonverbal)  Mood/Behavior: withdrawn  Best Language: 3 - Mute  Speech: unable to speak (nonverbal baseline)    Living Environment:   People in home: spouse, child(melchor), adult     Current living Arrangements: house      Able to return to prior arrangements: yes       Family/Social Support:  Care provided by: spouse/significant other, child(melchor)  Provides care for: no one, unable/limited ability to care for self     Support system:            Description of Support System:           Current Resources:   Patient receiving home care services: Yes        Community Resources: Home Infusion, Home Care  Equipment currently used at home: other (see comments) (home care equipment)  Supplies currently used at home: Incontinence Supplies, Diabetic Supplies, Enteral Nutrition & Supplies    Employment/Financial:  Employment Status: retired, disabled        Financial Concerns: none           Does the patient's insurance plan have a 3 day qualifying hospital stay waiver?  No    Lifestyle & Psychosocial Needs:  Social Drivers of Health     Food Insecurity: Unknown (2/16/2025)    Food Insecurity     Within the past 12 months, did you worry that your food would run out before you got money to buy more?: Patient unable to answer     Within the past 12  months, did the food you bought just not last and you didn t have money to get more?: Patient unable to answer   Depression: Not at risk (2/2/2024)    Received from [facility]    PHQ-2     PHQ-2 Score: 0   Housing Stability: Unknown (2/16/2025)    Housing Stability     Do you have housing? : Patient unable to answer     Are you worried about losing your housing?: Patient unable to answer   Tobacco Use: Low Risk  (2/7/2025)    Received from [employer]    Patient History     Smoking Tobacco Use: Never     Smokeless Tobacco Use: Never     Passive Exposure: Never   Financial Resource Strain: Unknown (2/16/2025)    Financial Resource Strain     Within the past 12 months, have you or your family members you live with been unable to get utilities (heat, electricity) when it was really needed?: Patient unable to answer   Alcohol Use: Not on file   Transportation Needs: Unknown (2/16/2025)    Transportation Needs     Within the past 12 months, has lack of transportation kept you from medical appointments, getting your medicines, non-medical meetings or appointments, work, or from getting things that you need?: Patient unable to answer   Physical Activity: Not on file   Interpersonal Safety: Unknown (2/16/2025)    Interpersonal Safety     Do you feel physically and emotionally safe where you currently live?: Patient unable to answer     Within the past 12 months, have you been hit, slapped, kicked or otherwise physically hurt by someone?: Patient unable to answer     Within the past 12 months, have you been humiliated or emotionally abused in other ways by your partner or ex-partner?: Patient unable to answer   Stress: Not on file   Social Connections: Not on file   Health Literacy: Not on file       Functional Status:  Prior to admission patient needed assistance:   Dependent ADLs:: Wheelchair-with assist, Bathing, Dressing, Grooming, Incontinence, Positioning, Transfers, Eating  Dependent IADLs::  Cleaning, Cooking, Laundry, Shopping, Incontinence, Transportation, Money Management, Medication Management, Meal Preparation       Mental Health Status:          Chemical Dependency Status:                Values/Beliefs:  Spiritual, Cultural Beliefs, Rastafarian Practices, Values that affect care: yes               Discussed  Partnership in Safe Discharge Planning  document with patient/family: No    Additional Information:  Consult for discharge planning     75 year old male admitted on 2/16/2025.  Patient has history of htn, hlp, dm2 most recent echo 10/24 EF 60-65% nl LV fxn, hx of stroke with left sided hemiplegia and aphasia-non verbal, PEG tube in place on TFs, and chronic indwelling ordoñez for neurogenic bladder, CKD 3b with progressive renal dysfunction, chronic normocytic anemia was brought into the ER due to concern for decreasing urine output.     Writer spoke with patients family members Tressa and Wayne. Patient is cared for by his spouse, neighbors and other family. Patient has a hospital bed, and is wheelchair bound, Patient has a peg tube and a ordoñez catheter. Open to [facility] care  for home RN and home infusion    Family interested in hospice information     Next Steps: Care coordination continue to follow     ANIRUDH Brito

## 2025-02-20 VITALS
TEMPERATURE: 97.9 F | HEART RATE: 91 BPM | DIASTOLIC BLOOD PRESSURE: 76 MMHG | BODY MASS INDEX: 22.92 KG/M2 | SYSTOLIC BLOOD PRESSURE: 163 MMHG | WEIGHT: 142.64 LBS | HEIGHT: 66 IN | RESPIRATION RATE: 24 BRPM | OXYGEN SATURATION: 93 %

## 2025-02-20 LAB
BACTERIA BLD CULT: NORMAL
BACTERIA BLD CULT: NORMAL
GLUCOSE BLDC GLUCOMTR-MCNC: 208 MG/DL (ref 70–99)
GLUCOSE BLDC GLUCOMTR-MCNC: 228 MG/DL (ref 70–99)
GLUCOSE BLDC GLUCOMTR-MCNC: 234 MG/DL (ref 70–99)
GLUCOSE BLDC GLUCOMTR-MCNC: 236 MG/DL (ref 70–99)
GLUCOSE BLDC GLUCOMTR-MCNC: 241 MG/DL (ref 70–99)
GLUCOSE BLDC GLUCOMTR-MCNC: 244 MG/DL (ref 70–99)
GLUCOSE BLDC GLUCOMTR-MCNC: 267 MG/DL (ref 70–99)
PHOSPHATE SERPL-MCNC: 4.1 MG/DL (ref 2.5–4.5)

## 2025-02-20 PROCEDURE — 250N000013 HC RX MED GY IP 250 OP 250 PS 637: Performed by: HOSPITALIST

## 2025-02-20 PROCEDURE — 250N000013 HC RX MED GY IP 250 OP 250 PS 637: Performed by: INTERNAL MEDICINE

## 2025-02-20 PROCEDURE — 99232 SBSQ HOSP IP/OBS MODERATE 35: CPT | Performed by: HOSPITALIST

## 2025-02-20 PROCEDURE — 250N000009 HC RX 250: Performed by: INTERNAL MEDICINE

## 2025-02-20 PROCEDURE — 120N000013 HC R&B IMCU

## 2025-02-20 PROCEDURE — B4036 ENTERAL FEED SUP KIT GRAV BY: HCPCS

## 2025-02-20 PROCEDURE — 250N000009 HC RX 250: Performed by: HOSPITALIST

## 2025-02-20 PROCEDURE — 84100 ASSAY OF PHOSPHORUS: CPT | Performed by: INTERNAL MEDICINE

## 2025-02-20 PROCEDURE — 250N000011 HC RX IP 250 OP 636: Performed by: HOSPITALIST

## 2025-02-20 PROCEDURE — 36415 COLL VENOUS BLD VENIPUNCTURE: CPT | Performed by: INTERNAL MEDICINE

## 2025-02-20 RX ORDER — LIDOCAINE 40 MG/G
CREAM TOPICAL
Status: DISCONTINUED | OUTPATIENT
Start: 2025-02-20 | End: 2025-02-26 | Stop reason: HOSPADM

## 2025-02-20 RX ORDER — FERROUS SULFATE 325(65) MG
325 TABLET ORAL EVERY OTHER DAY
Status: DISCONTINUED | OUTPATIENT
Start: 2025-02-22 | End: 2025-02-20

## 2025-02-20 RX ORDER — AMLODIPINE BESYLATE 10 MG/1
10 TABLET ORAL DAILY
Status: DISCONTINUED | OUTPATIENT
Start: 2025-02-21 | End: 2025-02-26 | Stop reason: HOSPADM

## 2025-02-20 RX ORDER — DOXAZOSIN 1 MG/1
2 TABLET ORAL DAILY
Status: DISCONTINUED | OUTPATIENT
Start: 2025-02-20 | End: 2025-02-26 | Stop reason: HOSPADM

## 2025-02-20 RX ORDER — ACETAMINOPHEN 650 MG/1
650 SUPPOSITORY RECTAL EVERY 4 HOURS PRN
Status: DISCONTINUED | OUTPATIENT
Start: 2025-02-20 | End: 2025-02-24 | Stop reason: ALTCHOICE

## 2025-02-20 RX ORDER — FUROSEMIDE 20 MG/1
40 TABLET ORAL DAILY
Status: DISCONTINUED | OUTPATIENT
Start: 2025-02-21 | End: 2025-02-21

## 2025-02-20 RX ORDER — ACETAMINOPHEN 325 MG/1
650 TABLET ORAL EVERY 4 HOURS PRN
Status: DISCONTINUED | OUTPATIENT
Start: 2025-02-20 | End: 2025-02-24 | Stop reason: ALTCHOICE

## 2025-02-20 RX ADMIN — OSELTAMIVIR PHOSPHATE 30 MG: 6 POWDER, FOR SUSPENSION ORAL at 08:23

## 2025-02-20 RX ADMIN — ACETAMINOPHEN 650 MG: 325 TABLET, FILM COATED ORAL at 22:12

## 2025-02-20 RX ADMIN — Medication 5 ML: at 15:59

## 2025-02-20 RX ADMIN — CALCIUM CARBONATE (ANTACID) CHEW TAB 500 MG 1000 MG: 500 CHEW TAB at 15:05

## 2025-02-20 RX ADMIN — Medication 5 MG: at 23:29

## 2025-02-20 RX ADMIN — INSULIN ASPART 2 UNITS: 100 INJECTION, SOLUTION INTRAVENOUS; SUBCUTANEOUS at 12:04

## 2025-02-20 RX ADMIN — PANTOPRAZOLE SODIUM 40 MG: 40 INJECTION, POWDER, FOR SOLUTION INTRAVENOUS at 08:27

## 2025-02-20 RX ADMIN — INSULIN ASPART 3 UNITS: 100 INJECTION, SOLUTION INTRAVENOUS; SUBCUTANEOUS at 20:04

## 2025-02-20 RX ADMIN — INSULIN ASPART 2 UNITS: 100 INJECTION, SOLUTION INTRAVENOUS; SUBCUTANEOUS at 08:21

## 2025-02-20 RX ADMIN — CEFTRIAXONE SODIUM 1 G: 1 INJECTION, POWDER, FOR SOLUTION INTRAMUSCULAR; INTRAVENOUS at 08:18

## 2025-02-20 RX ADMIN — INSULIN ASPART 3 UNITS: 100 INJECTION, SOLUTION INTRAVENOUS; SUBCUTANEOUS at 23:33

## 2025-02-20 RX ADMIN — AMLODIPINE BESYLATE 5 MG: 5 TABLET ORAL at 08:23

## 2025-02-20 RX ADMIN — FLUOXETINE 20 MG: 20 SOLUTION ORAL at 13:15

## 2025-02-20 RX ADMIN — CLOPIDOGREL BISULFATE 75 MG: 75 TABLET ORAL at 08:23

## 2025-02-20 RX ADMIN — INSULIN ASPART 2 UNITS: 100 INJECTION, SOLUTION INTRAVENOUS; SUBCUTANEOUS at 01:02

## 2025-02-20 RX ADMIN — DOXAZOSIN 2 MG: 2 TABLET ORAL at 22:12

## 2025-02-20 RX ADMIN — FUROSEMIDE 40 MG: 20 TABLET ORAL at 08:23

## 2025-02-20 RX ADMIN — INSULIN ASPART 2 UNITS: 100 INJECTION, SOLUTION INTRAVENOUS; SUBCUTANEOUS at 04:14

## 2025-02-20 RX ADMIN — PANTOPRAZOLE SODIUM 40 MG: 40 INJECTION, POWDER, FOR SOLUTION INTRAVENOUS at 20:04

## 2025-02-20 RX ADMIN — INSULIN ASPART 3 UNITS: 100 INJECTION, SOLUTION INTRAVENOUS; SUBCUTANEOUS at 16:01

## 2025-02-20 RX ADMIN — FERROUS SULFATE TAB 325 MG (65 MG ELEMENTAL FE) 325 MG: 325 (65 FE) TAB at 08:23

## 2025-02-20 ASSESSMENT — ACTIVITIES OF DAILY LIVING (ADL)
ADLS_ACUITY_SCORE: 103
ADLS_ACUITY_SCORE: 101
ADLS_ACUITY_SCORE: 103

## 2025-02-20 NOTE — PROVIDER NOTIFICATION
Brief update;    BG increased on tube feedings    Increased lantus to 9 units in AM (home dose)    Additional 2 units novolog now. Will keep on med dose sliding scale    Edouard De Guzman MD  1:15 AM

## 2025-02-20 NOTE — PLAN OF CARE
"  Problem: Adult Inpatient Plan of Care  Goal: Plan of Care Review    Outcome: Progressing  Flowsheets (Taken 2/19/2025 1850)  Outcome Evaluation: This morning HFNC 35/35 and now 6L NC.  Seems more alert, responsive.  Can nod head for \"yes\".  Son says that at baseline pt able to verbalize yes/no.  LS coarse.  Afebrile.  TF resumed and at goal.  Plavix resumed.  No signs of bleeding.  Good UOP per ordoñez, clear christine.  Liquid brown/black stool from rectal tube.  Family supportive at bedside.  Goal: Patient-Specific Goal (Individualized)  Description: You can add care plan individualizations to a care plan. Examples of Individualization might be:  \"Parent requests to be called daily at 9am for status\", \"I have a hard time hearing out of my right ear\", or \"Do not touch me to wake me up as it startles  me\".  Outcome: Progressing  Goal: Absence of Hospital-Acquired Illness or Injury  Outcome: Progressing  Intervention: Identify and Manage Fall Risk  Recent Flowsheet Documentation  Taken 2/19/2025 1600 by Felix Estrada RN  Safety Promotion/Fall Prevention:   clutter free environment maintained   room near nurse's station   safety round/check completed  Taken 2/19/2025 1200 by Felix Estrada RN  Safety Promotion/Fall Prevention:   clutter free environment maintained   room near nurse's station   safety round/check completed  Taken 2/19/2025 0800 by Felix Estrada RN  Safety Promotion/Fall Prevention:   clutter free environment maintained   room near nurse's station   safety round/check completed  Intervention: Prevent Skin Injury  Recent Flowsheet Documentation  Taken 2/19/2025 1800 by Felix Estrada RN  Body Position:   turned   side-lying  Taken 2/19/2025 1600 by Felix Estrada RN  Body Position:   turned   side-lying  Skin Protection:   adhesive use limited   incontinence pads utilized   pulse oximeter probe site changed   silicone foam dressing in place   skin to device " areas padded   skin to skin areas padded   skin sealant/moisture barrier applied   tubing/devices free from skin contact  Taken 2/19/2025 1400 by Felix Estrada RN  Body Position:   turned   side-lying  Taken 2/19/2025 1200 by Felix Estrada RN  Body Position:   turned   side-lying  Skin Protection:   adhesive use limited   incontinence pads utilized   pulse oximeter probe site changed   silicone foam dressing in place   skin to device areas padded   skin to skin areas padded   skin sealant/moisture barrier applied   tubing/devices free from skin contact  Taken 2/19/2025 1000 by Felix Estrada RN  Body Position:   turned   side-lying  Taken 2/19/2025 0800 by Felix Estrada RN  Body Position:   turned   side-lying  Skin Protection:   adhesive use limited   incontinence pads utilized   pulse oximeter probe site changed   silicone foam dressing in place   skin to device areas padded   skin to skin areas padded   skin sealant/moisture barrier applied   tubing/devices free from skin contact  Intervention: Prevent and Manage VTE (Venous Thromboembolism) Risk  Recent Flowsheet Documentation  Taken 2/19/2025 1600 by Felix Estrada RN  VTE Prevention/Management: SCDs on (sequential compression devices)  Taken 2/19/2025 1200 by Felix Estrada RN  VTE Prevention/Management: SCDs on (sequential compression devices)  Taken 2/19/2025 0800 by Felix Estrada RN  VTE Prevention/Management: SCDs on (sequential compression devices)  Intervention: Prevent Infection  Recent Flowsheet Documentation  Taken 2/19/2025 1600 by Felix Estrada RN  Infection Prevention: single patient room provided  Taken 2/19/2025 1200 by Felix Estrada RN  Infection Prevention: single patient room provided  Taken 2/19/2025 0800 by Felix Estrada RN  Infection Prevention: single patient room provided  Goal: Optimal Comfort and Wellbeing  Outcome:  Progressing  Intervention: Provide Person-Centered Care  Recent Flowsheet Documentation  Taken 2/19/2025 1600 by Felix Estrada RN  Trust Relationship/Rapport:   care explained   thoughts/feelings acknowledged   emotional support provided   reassurance provided   choices provided   empathic listening provided   questions answered   questions encouraged  Taken 2/19/2025 1200 by Felix Estrada RN  Trust Relationship/Rapport:   care explained   thoughts/feelings acknowledged   emotional support provided   reassurance provided   choices provided   empathic listening provided   questions answered   questions encouraged  Taken 2/19/2025 0800 by Felix Estrada RN  Trust Relationship/Rapport:   care explained   thoughts/feelings acknowledged   emotional support provided   reassurance provided  Goal: Readiness for Transition of Care  Outcome: Progressing     Problem: Skin Injury Risk Increased  Goal: Skin Health and Integrity  Outcome: Progressing  Intervention: Plan: Nurse Driven Intervention: Moisture Management  Recent Flowsheet Documentation  Taken 2/19/2025 1600 by Felix Estrada RN  Bathing/Skin Care:   incontinence care   linen changed   moisturizer applied  Taken 2/19/2025 1200 by Felix Estrada RN  Bathing/Skin Care:   incontinence care   linen changed   moisturizer applied  Taken 2/19/2025 0800 by Felix Estrada RN  Bathing/Skin Care:   incontinence care   linen changed   moisturizer applied  Intervention: Optimize Skin Protection  Recent Flowsheet Documentation  Taken 2/19/2025 1800 by Felix Estrada RN  Activity Management: bedrest  Head of Bed (HOB) Positioning: HOB at 30 degrees  Taken 2/19/2025 1600 by Felix Estrada RN  Skin Protection:   adhesive use limited   incontinence pads utilized   pulse oximeter probe site changed   silicone foam dressing in place   skin to device areas padded   skin to skin areas padded   skin  sealant/moisture barrier applied   tubing/devices free from skin contact  Activity Management: bedrest  Head of Bed (HOB) Positioning: HOB at 30 degrees  Taken 2/19/2025 1400 by Felix Estrada RN  Head of Bed (HOB) Positioning: HOB at 20-30 degrees  Taken 2/19/2025 1200 by Felix Estrada RN  Skin Protection:   adhesive use limited   incontinence pads utilized   pulse oximeter probe site changed   silicone foam dressing in place   skin to device areas padded   skin to skin areas padded   skin sealant/moisture barrier applied   tubing/devices free from skin contact  Activity Management: bedrest  Head of Bed (HOB) Positioning: HOB at 30 degrees  Taken 2/19/2025 1000 by Felix Estrada RN  Activity Management: bedrest  Head of Bed (HOB) Positioning: HOB at 30 degrees  Taken 2/19/2025 0800 by Felix Estrada RN  Skin Protection:   adhesive use limited   incontinence pads utilized   pulse oximeter probe site changed   silicone foam dressing in place   skin to device areas padded   skin to skin areas padded   skin sealant/moisture barrier applied   tubing/devices free from skin contact  Activity Management: bedrest  Head of Bed (HOB) Positioning: HOB at 30 degrees     Problem: Comorbidity Management  Goal: Blood Glucose Levels Within Targeted Range  Outcome: Progressing  Intervention: Monitor and Manage Glycemia  Recent Flowsheet Documentation  Taken 2/19/2025 1600 by Fleix Estrada RN  Medication Review/Management: medications reviewed  Taken 2/19/2025 1200 by Felix Estrada RN  Medication Review/Management: medications reviewed  Taken 2/19/2025 0800 by Felix Estrada RN  Medication Review/Management: medications reviewed  Goal: Blood Pressure in Desired Range  Outcome: Progressing  Intervention: Maintain Blood Pressure Management  Recent Flowsheet Documentation  Taken 2/19/2025 1600 by Felix Estrada RN  Medication Review/Management:  medications reviewed  Taken 2/19/2025 1200 by Felix Estrada RN  Medication Review/Management: medications reviewed  Taken 2/19/2025 0800 by Felix Estrada RN  Medication Review/Management: medications reviewed     Problem: Infection  Goal: Absence of Infection Signs and Symptoms  Outcome: Progressing  Intervention: Prevent or Manage Infection  Recent Flowsheet Documentation  Taken 2/19/2025 1600 by Felix Estrada RN  Isolation Precautions:   droplet precautions maintained   airborne precautions maintained  Taken 2/19/2025 1200 by Felix Estrada RN  Isolation Precautions:   droplet precautions maintained   airborne precautions maintained  Taken 2/19/2025 0800 by Felix Estrada RN  Isolation Precautions:   droplet precautions maintained   airborne precautions maintained     Problem: Swallowing Impairment  Goal: Optimal Eating and Swallowing Without Aspiration  Outcome: Progressing  Intervention: Optimize Eating and Swallowing  Recent Flowsheet Documentation  Taken 2/19/2025 1600 by Felix Estrada RN  Aspiration Precautions:   oral hygiene care promoted   respiratory status monitored  Taken 2/19/2025 1200 by Felix Estrada RN  Aspiration Precautions:   oral hygiene care promoted   respiratory status monitored  Taken 2/19/2025 0800 by Felix Estrada RN  Aspiration Precautions:   oral hygiene care promoted   respiratory status monitored     Problem: Gas Exchange Impaired  Goal: Optimal Gas Exchange  Outcome: Progressing  Intervention: Optimize Oxygenation and Ventilation  Recent Flowsheet Documentation  Taken 2/19/2025 1800 by Felix Estrada RN  Head of Bed (HOB) Positioning: HOB at 30 degrees  Taken 2/19/2025 1600 by Felix Estrada RN  Airway/Ventilation Management:   airway patency maintained   calming measures promoted   humidification applied   oxygen therapy provided   pulmonary hygiene promoted  Head of Bed (HOB)  Positioning: HOB at 30 degrees  Taken 2/19/2025 1400 by Felix Estrada RN  Head of Bed (HOB) Positioning: HOB at 20-30 degrees  Taken 2/19/2025 1200 by Felix Estrada RN  Airway/Ventilation Management:   airway patency maintained   calming measures promoted   humidification applied   oxygen therapy provided   pulmonary hygiene promoted  Head of Bed (HOB) Positioning: HOB at 30 degrees  Taken 2/19/2025 1000 by Felix Estrada RN  Head of Bed (HOB) Positioning: HOB at 30 degrees  Taken 2/19/2025 0800 by Felix Estrada RN  Airway/Ventilation Management:   airway patency maintained   calming measures promoted   humidification applied   oxygen therapy provided   pulmonary hygiene promoted  Head of Bed (HOB) Positioning: HOB at 30 degrees     Problem: Risk for Delirium  Goal: Optimal Coping  Outcome: Progressing  Goal: Improved Behavioral Control  Outcome: Progressing  Intervention: Minimize Safety Risk  Recent Flowsheet Documentation  Taken 2/19/2025 1600 by Felix Etsrada RN  Enhanced Safety Measures: review medications for side effects with activity  Trust Relationship/Rapport:   care explained   thoughts/feelings acknowledged   emotional support provided   reassurance provided   choices provided   empathic listening provided   questions answered   questions encouraged  Taken 2/19/2025 1200 by Felix Estrada RN  Enhanced Safety Measures: review medications for side effects with activity  Trust Relationship/Rapport:   care explained   thoughts/feelings acknowledged   emotional support provided   reassurance provided   choices provided   empathic listening provided   questions answered   questions encouraged  Taken 2/19/2025 0800 by Felix Estrada RN  Enhanced Safety Measures: review medications for side effects with activity  Trust Relationship/Rapport:   care explained   thoughts/feelings acknowledged   emotional support provided   reassurance  "provided  Goal: Improved Attention and Thought Clarity  Outcome: Progressing  Intervention: Maximize Cognitive Function  Recent Flowsheet Documentation  Taken 2/19/2025 1600 by Felix Estrada, RN  Sensory Stimulation Regulation:   care clustered   lighting decreased   quiet environment promoted  Reorientation Measures: reorientation provided  Taken 2/19/2025 1200 by Felix Estrada, RN  Sensory Stimulation Regulation:   care clustered   lighting decreased   quiet environment promoted  Reorientation Measures: reorientation provided  Taken 2/19/2025 0800 by Felix Estrada, RN  Sensory Stimulation Regulation:   care clustered   lighting decreased   quiet environment promoted  Reorientation Measures: reorientation provided  Goal: Improved Sleep  Outcome: Progressing   Goal Outcome Evaluation:                 Outcome Evaluation: This morning HFNC 35/35 and now 6L NC.  Seems more alert, responsive.  Can nod head for \"yes\".  Son says that at baseline pt able to verbalize yes/no.  LS coarse.  Afebrile.  TF resumed and at goal.  Plavix resumed.  No signs of bleeding.  Good UOP per ordoñez, clear christine.  Liquid brown/black stool from rectal tube.  Family supportive at bedside.      "

## 2025-02-20 NOTE — PROGRESS NOTES
Assessment and Plan:     CKD-5: No new labs. Not a dialysis candidate. Brisk UO.  He is on lasix 40 mg po/FT daily.     Date/Time Weight Weight Method   02/20/25 0400 64.7 kg (142 lb 10.2 oz) Bed scale   02/19/25 0400 67.7 kg (149 lb 4 oz) Bed scale   02/18/25 0200 70 kg (154 lb 5.2 oz) Bed scale   02/17/25 0215 70.8 kg (156 lb 1.4 oz) Bed scale   02/16/25 2245 72.3 kg (159 lb 6.3 oz) Bed scale   02/16/25 1457 72.7 kg (160 lb 4.4 oz      I/O 930/3350. UO SFT 1575 ml. Wt is down.             Interval History:   Nutrition: on TF.        Influenza A:     CVA: non-abulatory, PEG, ordoñez, aphasia, R hemiparesis.     Anemia: on Ferrous sulfate.     DM       Hypertension: on amlodipine and lasix. Reasonable control.            Review of Systems:   Unable.           Medications:     Current Facility-Administered Medications   Medication Dose Route Frequency Provider Last Rate Last Admin    amLODIPine (NORVASC) tablet 5 mg  5 mg Oral or Feeding Tube Daily Isidro Soliz MD   5 mg at 02/20/25 0823    B and C vitamin Complex with folic acid (NEPHRONEX) liquid 5 mL  5 mL Per Feeding Tube Daily John Hernandez MD   5 mL at 02/19/25 1604    cefTRIAXone (ROCEPHIN) 1 g vial to attach to  mL bag for ADULTS or NS 50 mL bag for PEDS  1 g Intravenous Q24H Isidro Soliz MD   1 g at 02/20/25 0818    clopidogrel (PLAVIX) tablet 75 mg  75 mg Oral or Feeding Tube Daily Isidro Soliz MD   75 mg at 02/20/25 0823    ferrous sulfate (FEROSUL) tablet 325 mg  325 mg Per Feeding Tube BID Isidro Soliz MD   325 mg at 02/20/25 0823    FLUoxetine (PROzac) solution 20 mg  20 mg Oral or Feeding Tube Daily John Hernandez MD   20 mg at 02/20/25 1315    [START ON 2/21/2025] furosemide (LASIX) tablet 40 mg  40 mg Oral or Feeding Tube Daily John Hernandez MD        insulin aspart (NovoLOG) injection (RAPID ACTING)  1-7 Units Subcutaneous Q4H John Hernandez MD   2 Units at 02/20/25 1204    insulin glargine  (LANTUS PEN) injection 9 Units  9 Units Subcutaneous QAM AC De Guzman, Edouard Romo MD   9 Units at 02/20/25 0822    oseltamivir (TAMIFLU) suspension 30 mg  30 mg Oral or Feeding Tube Daily Isidro Soliz MD   30 mg at 02/20/25 0823    pantoprazole (PROTONIX) IV push injection 40 mg  40 mg Intravenous BID De Guzman, Edouard Romo MD   40 mg at 02/20/25 0827    sodium chloride (PF) 0.9% PF flush 3 mL  3 mL Intracatheter Q8H John Hernandez MD   3 mL at 02/20/25 0635     Current Facility-Administered Medications   Medication Dose Route Frequency Provider Last Rate Last Admin    dextrose 10% infusion   Intravenous Continuous PRN John Hernandez MD         Current active medications and PTA medications reviewed, see medication list for details.            Physical Exam:   Vitals were reviewed  Patient Vitals for the past 24 hrs:   BP Temp Temp src Pulse Resp SpO2 Weight   02/20/25 1300 (!) 170/82 -- -- 86 17 96 % --   02/20/25 1200 (!) 143/74 98  F (36.7  C) Axillary 80 18 97 % --   02/20/25 1100 (!) 148/71 -- -- 81 14 96 % --   02/20/25 1000 (!) 141/64 -- -- 81 21 97 % --   02/20/25 0900 (!) 158/82 -- -- 80 16 94 % --   02/20/25 0800 (!) 142/68 97.7  F (36.5  C) Axillary 82 18 (!) 91 % --   02/20/25 0700 (!) 151/65 -- -- 84 20 (!) 88 % --   02/20/25 0600 (!) 163/75 -- -- 82 11 92 % --   02/20/25 0500 (!) 154/80 -- -- 80 22 93 % --   02/20/25 0400 139/63 98.3  F (36.8  C) Axillary 79 19 99 % 64.7 kg (142 lb 10.2 oz)   02/20/25 0300 134/68 -- -- 81 17 93 % --   02/20/25 0200 135/63 -- -- 84 17 95 % --   02/20/25 0100 136/67 -- -- 81 17 93 % --   02/20/25 0000 (!) 168/64 98.3  F (36.8  C) Rectal 83 17 94 % --   02/19/25 2300 (!) 159/74 -- -- 82 14 96 % --   02/19/25 2200 (!) 166/68 -- -- 78 16 97 % --   02/19/25 2100 (!) 172/70 -- -- 83 11 94 % --   02/19/25 2030 (!) 154/75 -- -- 80 17 96 % --   02/19/25 2000 (!) 155/74 -- -- 78 (!) 32 96 % --   02/19/25 1930 (!) 161/68 -- -- 78 19 98 % --   02/19/25 1900 (!) 152/65 -- --  81 16 96 % --   25 1830 (!) 170/70 -- -- 81 21 96 % --   25 1800 (!) 162/78 -- -- 77 15 97 % --   25 1730 (!) 157/79 -- -- 79 21 97 % --   25 1700 (!) 135/106 -- -- 77 16 96 % --   25 1600 (!) 160/87 98.3  F (36.8  C) Oral 73 12 98 % --   25 1500 (!) 171/77 -- -- 75 17 96 % --   25 1400 (!) 169/75 -- -- 80 15 96 % --       Temp:  [97.7  F (36.5  C)-98.3  F (36.8  C)] 98  F (36.7  C)  Pulse:  [73-86] 86  Resp:  [11-32] 17  BP: (134-172)/() 170/82  SpO2:  [88 %-99 %] 96 %    Temperatures:  Current - Temp: 98  F (36.7  C); Max - Temp  Av.1  F (36.7  C)  Min: 97.7  F (36.5  C)  Max: 98.3  F (36.8  C)  Respiration range: Resp  Av.4  Min: 11  Max: 32  Pulse range: Pulse  Av.2  Min: 73  Max: 86  Blood pressure range: Systolic (24hrs), Av , Min:134 , Max:172   ; Diastolic (24hrs), Av, Min:63, Max:106    Pulse oximetry range: SpO2  Av.3 %  Min: 88 %  Max: 99 %    I/O last 3 completed shifts:  In: 960 [NG/GT:240]  Out: 3725 [Urine:3325; Stool:400]      Intake/Output Summary (Last 24 hours) at 2025 1359  Last data filed at 2025 1200  Gross per 24 hour   Intake 960 ml   Output 3200 ml   Net -2240 ml              Wt Readings from Last 4 Encounters:   25 64.7 kg (142 lb 10.2 oz)   25 66.5 kg (146 lb 9.7 oz)   10/25/24 60.3 kg (132 lb 15 oz)   10/28/24 61 kg (134 lb 7.7 oz)          Data:          Lab Results   Component Value Date     2025     2025     2025     2012     2012    Lab Results   Component Value Date    CHLORIDE 96 2025    CHLORIDE 94 2025    CHLORIDE 93 2025    CHLORIDE 99 2022    CHLORIDE 98 2022    CHLORIDE 105 2022    CHLORIDE 101 2012    CHLORIDE 99 2012    Lab Results   Component Value Date    .3 2025    .8 2025    .8 2025    BUN 24 2022    BUN 22 2022    BUN  20 01/26/2022    BUN 18 08/16/2012    BUN 14 08/13/2012      Lab Results   Component Value Date    POTASSIUM 3.8 02/19/2025    POTASSIUM 5.0 02/18/2025    POTASSIUM 5.2 02/17/2025    POTASSIUM 4.4 03/30/2022    POTASSIUM 4.0 02/01/2022    POTASSIUM 3.6 01/26/2022    POTASSIUM 4.0 08/16/2012    POTASSIUM 3.6 08/13/2012    Lab Results   Component Value Date    CO2 29 02/19/2025    CO2 28 02/18/2025    CO2 25 02/17/2025    CO2 29 03/30/2022    CO2 27 02/01/2022    CO2 31 01/26/2022    CO2 26 08/16/2012    CO2 26 08/13/2012    Lab Results   Component Value Date    CR 5.26 02/19/2025    CR 5.23 02/18/2025    CR 4.61 02/17/2025    CR 0.96 08/16/2012    CR 0.91 08/13/2012        Recent Labs   Lab Test 02/19/25  0512 02/19/25  0143 02/18/25  0456 02/16/25  1515 01/31/25  1015   WBC  --   --  13.4* 7.1 7.8   HGB 7.8* 7.2* 6.8* 8.1* 9.9*   HCT  --   --  21.1* 25.3* 30.5*   MCV  --   --  83 82 82   PLT  --   --  162 174 222     Recent Labs   Lab Test 02/17/25  0428 02/16/25  1515 01/31/25  1015 01/15/25  0840 01/14/25 2059   AST 37  --  45 39  --    ALT 36 39 44 62  --    ALKPHOS 117 127 174* 136  --    BILITOTAL 0.3 0.2 0.2 0.2  --    RUSS  --   --   --   --  19       Recent Labs   Lab Test 02/19/25  0512 02/18/25  0456 02/16/25  1515   MAG 3.0* 3.0* 3.1*     Recent Labs   Lab Test 02/20/25  0531 02/19/25  0512 02/18/25  0456   PHOS 4.1 4.1 3.4     Recent Labs   Lab Test 02/19/25  0512 02/18/25  0456 02/17/25  0428   ARLETH 8.8 8.9 9.3       Lab Results   Component Value Date    ARLETH 8.8 02/19/2025     Lab Results   Component Value Date    WBC 13.4 (H) 02/18/2025    HGB 7.8 (L) 02/19/2025    HCT 21.1 (L) 02/18/2025    MCV 83 02/18/2025     02/18/2025     Lab Results   Component Value Date     02/19/2025    POTASSIUM 3.8 02/19/2025    CHLORIDE 96 (L) 02/19/2025    CO2 29 02/19/2025     (H) 02/20/2025     Lab Results   Component Value Date    .3 (H) 02/19/2025    CR 5.26 (H) 02/19/2025     Lab Results    Component Value Date    MAG 3.0 (H) 02/19/2025     Lab Results   Component Value Date    PHOS 4.1 02/20/2025       Creatinine   Date Value Ref Range Status   02/19/2025 5.26 (H) 0.67 - 1.17 mg/dL Final   02/18/2025 5.23 (H) 0.67 - 1.17 mg/dL Final   02/17/2025 4.61 (H) 0.67 - 1.17 mg/dL Final   02/16/2025 4.48 (H) 0.67 - 1.17 mg/dL Final   02/16/2025 4.26 (H) 0.67 - 1.17 mg/dL Final   02/12/2025 3.93 (H) 0.67 - 1.17 mg/dL Final   08/16/2012 0.96 0.66 - 1.25 mg/dL Final   08/13/2012 0.91 0.66 - 1.25 mg/dL Final       Attestation:  I have reviewed today's vital signs, notes, medications, labs and imaging.     Darell Santiago MD

## 2025-02-20 NOTE — PROGRESS NOTES
CLINICAL NUTRITION SERVICES - REASSESSMENT NOTE     Malnutrition Status:    % Intake: No decreased intake noted  % Weight Loss: Weight loss does not meet criteria  (diuresing)  Subcutaneous Fat Loss: None observed (UE)  Muscle Loss: Chronic d/t age related, WC bound   Fluid Accumulation/Edema: Mild, 1+  Malnutrition Diagnosis: Patient does not meet two of the established criteria necessary for diagnosing malnutrition  Malnutrition Present on Admission: Unable to assess    Registered Dietitian Interventions:  Team meeting involving nutrition professional - Patient discussed today during interdisciplinary bedside rounds    Will monitor POC, BUN trends and need to decrease protein provisions        SUBJECTIVE INFORMATION  Patient not available for interview due to intubated status     CURRENT NUTRITION ORDERS  Diet: NPO  Nutrition Support: TF resumed as per home on 2/16, advanced to goal on 2/18, and continues as follows ~    Nutrition Support Enteral:  Type of Feeding Tube: G-tube   Enteral Frequency:  Continuous  Enteral Regimen: Novasource Renal at 30 mL/hr  Total Enteral Provisions: 1440 kcal (22 kcal/kg), 65 g protein (0.9 g/kg), 132 g CHO, 516 mL H2O, 0 g fiber   Free Water Flush: 60 mL every 4 hours     CURRENT INTAKE/TOLERANCE  Tolerating above without any documented issues      NEW FINDINGS  Weight: 64.7 kg (down d/t Lasix/diuresis), I/O 960/3725  Skin/wounds: No documented pressure injuries   GI symptoms: Stool = 400 mL yest, x 4 on 2/18  Nutrition-relevant labs:  Phos NL, BUN yest = 133.3 (H), -236  Nutrition-relevant medications:  Lasix, Lantus 9 units in am + Med sliding scale insulin, Nephronex     Noted family considering Hospice     ASSESSED NUTRITION NEEDS  Dosing Weight: 66.5 kg, based on suspect closer to dry weight   Estimated Energy Needs: 2665-1844 kcals/day (20 - 25 kcals/kg)  Justification: Maintenance  Estimated Protein Needs: 53-66 grams protein/day (0.8-1 grams of  pro/kg)  Justification: CKD and Hypercatabolism with acute illness  Estimated Fluid Needs: defer to nephrology     MALNUTRITION  % Intake: No decreased intake noted  % Weight Loss: Weight loss does not meet criteria  (diuresing)  Subcutaneous Fat Loss: None observed (UE)  Muscle Loss:  Chronic d/t age related, WC bound   Fluid Accumulation/Edema: Mild, 1+  Malnutrition Diagnosis: Patient does not meet two of the established criteria necessary for diagnosing malnutrition  Malnutrition Present on Admission: Unable to assess    EVALUATION OF THE PROGRESS TOWARD GOALS   Previous Goals (2/17)  TF @ goal to provide % estimated needs   Evaluation: Met    Previous Nutrition Diagnosis (2/17)  Inadequate protein energy intake related to hospitalization and TF on hold as evidenced by last TF received yesterday morning   Evaluation: Resolved    NUTRITION DIAGNOSIS  Predicted excessive nutrient intake (protein)  related to CKD with ALEX, BUN >> 100    INTERVENTIONS  Team meeting involving nutrition professional - Patient discussed today during interdisciplinary bedside rounds    Will monitor POC, BUN trends and need to decrease protein provisions     Goals  TF will continue to meet % needs      Monitoring/Evaluation      Progress toward goals will be monitored and evaluated per policy    Maddi Vicente RD, LD, CNSC   Clinical Dietitian - [facility]

## 2025-02-20 NOTE — PLAN OF CARE
Care provided [id number]  Goal Outcome Evaluation:      Plan of Care Reviewed With: patient, child    Overall Patient Progress: no changeOverall Patient Progress: no change    Outcome Evaluation: Remains on NC 4-6 L, coarse lung sounds, weak productive cough. Neuros at baseline, alert throughout night, did not sleep much. Afebrile, all other VSS. BG elevated, additional 2 units given at 0100 per Dr. De Guzman, lantus also increased. Rectal tube in place with liquid brown/black stool. Chronic ordoñez in place with adequate UOP. Son at bedside throughout night. Plan to transition to lower level of care when appropriate.      Problem: Adult Inpatient Plan of Care  Goal: Optimal Comfort and Wellbeing  Intervention: Provide Person-Centered Care  Recent Flowsheet Documentation  Taken 2/20/2025 0400 by Savannah Mc RN  Trust Relationship/Rapport:   care explained   choices provided   emotional support provided   empathic listening provided   questions encouraged   questions answered   reassurance provided   thoughts/feelings acknowledged  Taken 2/20/2025 0000 by Savannah Mc RN  Trust Relationship/Rapport:   care explained   choices provided   emotional support provided   empathic listening provided   questions encouraged   questions answered   reassurance provided   thoughts/feelings acknowledged     Problem: Skin Injury Risk Increased  Goal: Skin Health and Integrity  Intervention: Optimize Skin Protection  Recent Flowsheet Documentation  Taken 2/20/2025 0400 by Savannah Mc RN  Skin Protection:   adhesive use limited   incontinence pads utilized   pulse oximeter probe site changed   silicone foam dressing in place   skin to device areas padded   skin to skin areas padded   skin sealant/moisture barrier applied   tubing/devices free from skin contact  Activity Management: activity adjusted per tolerance  Head of Bed (HOB) Positioning: HOB at 30 degrees  Taken 2/20/2025 0200 by Savannah Mc RN  Head of Bed  (HOB) Positioning: HOB at 30 degrees  Taken 2/20/2025 0000 by Savannah Mc, RN  Skin Protection:   adhesive use limited   incontinence pads utilized   pulse oximeter probe site changed   silicone foam dressing in place   skin to device areas padded   skin to skin areas padded   skin sealant/moisture barrier applied   tubing/devices free from skin contact  Activity Management: activity adjusted per tolerance  Head of Bed (HOB) Positioning: HOB at 30 degrees

## 2025-02-20 NOTE — PROGRESS NOTES
Care Management Follow Up    Length of Stay (days): 4    Expected Discharge Date: 02/25/2025     Concerns to be Addressed: discharge planning     Patient plan of care discussed at interdisciplinary rounds: Yes    Anticipated Discharge Disposition: Home Care, Hospice     Anticipated Discharge Services:    Anticipated Discharge DME:      Patient/family educated on Medicare website which has current facility and service quality ratings:    Education Provided on the Discharge Plan:    Patient/Family in Agreement with the Plan: yes    Referrals Placed by CM/SW:    Private pay costs discussed: Not applicable    Discussed  Partnership in Safe Discharge Planning  document with patient/family: No     Handoff Completed: No, handoff not indicated or clinically appropriate    Additional Information:  Writer dropped off Hospice letter with pricing and information and also provided a list of agencies    Next Steps: Continue to follow for planning     ANIRUDH Brito

## 2025-02-20 NOTE — PROGRESS NOTES
[facility]  Hospitalist Progress Note        Isidro Soliz MD   02/20/2025        Interval History:        - Seems much more alert and awake today  - HFNC oxygen weaned down to 4 lts  - repeat labs from 2/20 pending  - blood glucose in 200s; increase Lantus to 9 units daily  - will transfer out of ICU to C         Assessment and Plan:        Jimmy Otto is a 75 year old male with PMH of HTN, DLD, diabetes , hx of stroke (with right sided hemiplegia and aphasia-non verbal at baseline), PEG tube feeding,  chronic indwelling ordoñez (for neurogenic bladder), CKD 3b (with progressive renal dysfunction), chronic normocytic anemia who as brought to the ED with concern for decreasing urine output and was also noted with influenza with respiratory failure, junctional bradycardia, acute on chronic renal failure and anemia; admitted inpatient 2/16/25.      ALEX on CKD stage IV  neurogenic bladder with chronic indwelling Ordoñez catheter  Hyperkalemia  Hyponatremia  Hypochloremia  - recent Cr on 2/12 was 3.93 with Na 129 and normal potassium   - sodium 127--->137; K 6.2--->3.8; Cr 4.26---> 5.26  - did get K shifting meds in ED and was started on Lokelma  - Ordoñez catheter replaced on admission  - nephrology following and ordered for intermittent diuresis with Chlorthalidone and IV Lasix; suggest medical management without dialysis (not a candidate for peritoneal dialysis due to PEG and not a good candidate for HD-- need for michell lift)  - adequate diuresis with trial of diuretics; nephrology stopped Lokelma (2/19); adjusted free water per feeding tubes; started Lasix 40 mg daily (2/19)  -will monitor BMP     Acute hypoxic respiratory failure multifactorial likely due to volume overload due to acute on chronic diastolic CHF and exacerbation  influenza A infection   likely aspiration event  - PTA On Torsemide 20 mg daily  - on admission patient was requiring 10 L of oxygen, increased to 50 lts HFNC  - proBNP  significantly elevated at 19, 616; Pro-Westley normal at 0.29  - Chest x-ray 2/16 noted with diffuse mixed interstitial and airspace opacities likely due to moderate pulmonary edema and small right and trace left pleural effusion and mid bibasilar atelectasis with no pneumothorax  - 2/16: influenza A positive; COVID and RSV negative  - RRT on 2/16 with increased O2 needs and repeat CXR with some concern for asymmetric edema versus multifocal pneumonitis requiring 50 lts HFNC  - getting diuresis per nephrology as above with Lasix while holding PTA torsemide    - 2/20: HFNC oxygen weaned down to 4 lts  - per AMT recommendations, switched empiric Zosyn to Rocephin (2/19); to complete 5 days course) and also adjusted Tamiflu for a 10 days course (renally dosed)     Elevated troponin likely due to demand due to underlying renal failure and volume overload type II NSTEMI  Junctional bradycardia  - serial troponins with no significant trend 106 --- 105 --- 108 ; EKG noted with junctional rhythm   - has converted to sinus rhythm  - evaluated by cardiology, note junctional bradycardia is likely due to metabolic abnormalities with worsening renal failure and hyperkalemia and note no indication for pacemaker implantation  - Continue to monitor on telemetry    Encephalopathy likely due to uremia and infectious causes  H/o CVA with residual right hemiplegia and baseline nonverbal status  chronic dysphagia, PEG tube feeds  Dyslipidemia  Hypertension  - baseline right hemiplegia, non-verbal status from prior CVA  - some encephalopathy in the setting of acute illness ; treating underlying causes as above  - nutrition following for tube feeds  - continue Plavix 75 mg daily; holding PTA Lipitor 20 mg daily  - PTA amlodipine 10 mg daily, doxazosin 2 mg daily-- resumed Amlodipine at 5 mg daily (2/19)-- will increase to 10 mg (2/20) since still hypertensive  - will resume PTA Doxazosin (2/20)     Acute on chronic anemia  Dark stools  -  baseline Hb around 9, anemia of chronic disease in the setting of underlying CKD  - PTA ferrous sulfate 325 mg twice daily continued  - Hb 8.1---6.8-- 1 unit PRBC on 2/18  - 2/19: reported some dark stools (on iron), not grossly melanotic, hemodynamically stable and stable hemoglobin after 1 unit PRBC on 2/18; Hb 6.8---> 7.2---7.8  - will not be an appropriate candidate for endoscopic /colonoscopy at evaluation at this time; will monitor clinically with repeat Hb and discuss goals of care again if has suggestion of active bleed  - started on IV Protonix     Diabetes mellitus type 2  - PTA on Lantus 9 units subcu a.m., and insulin lispro sliding scale  - Continue sliding scale with ongoing tube feeds  - Lantus increased to PTA dose 9 units daily (2/20)  - hypoglycemia protocol     Subclinical hypothyroidism  -TSH is mildly elevated at 8.72 but free T4 is normal; most likely due to underlying acute illness    Goals of care   -on presentation noted patient critically ill; family aware ; not a candidate for dialysis per nephrology as noted above  - code status changed to DNR/DNI  - palliative care met with family (2/18), remains DNR/DNI but family want to continue with restorative cares for now   -  following for disposition     DVT Prophylaxis: Pneumatic Compression Devices    Code Status: DNR/DNI     Diet:   NPO for Medical/Clinical Reasons Except for: NPO but receiving Tube Feeding  Adult Formula Drip Feeding: Continuous Novasource Renal; Gastrostomy; Goal Rate: 30; mL/hr; Start TF @ 10 ml/hr. Advance by 10 mL q 12 hours until goal rate reached.; Do not advance tube feeding rate unless K+ is = or > 3.0, Mg++ is = or > 1.5, an...      Disposition:   Medically Ready for Discharge: Anticipated in 2-4 Days  -[place] [facility] [place]    High medical complexity    Care plan discussed with nursing and patient's family including daughter present in room       Clinically Significant Risk Factors        "   # Hypochloremia: Lowest Cl = 96 mmol/L in last 2 days, will monitor as appropriate      # Hypoalbuminemia: Lowest albumin = 3.1 g/dL at 2/17/2025  4:28 AM, will monitor as appropriate      # Acute Kidney Injury, unspecified: based on a >150% or 0.3 mg/dL increase in last creatinine compared to past 90 day average, will monitor renal function    # Hypertension: Noted on problem list    # Acute heart failure with preserved ejection fraction: heart failure noted on problem list, last echo with EF >50%, and receiving IV diuretics          # DMII: A1C = 8.8 % (Ref range: <5.7 %) within past 6 months         # Financial/Environmental Concerns: none                            Physical Exam:      Blood pressure (!) 163/75, pulse 82, temperature 98.3  F (36.8  C), temperature source Axillary, resp. rate 11, height 1.676 m (5' 6\"), weight 64.7 kg (142 lb 10.2 oz), SpO2 92%.  Vitals:    02/18/25 0200 02/19/25 0400 02/20/25 0400   Weight: 70 kg (154 lb 5.2 oz) 67.7 kg (149 lb 4 oz) 64.7 kg (142 lb 10.2 oz)     Vital Signs with Ranges  Temp:  [98.3  F (36.8  C)] 98.3  F (36.8  C)  Pulse:  [72-84] 82  Resp:  [11-32] 11  BP: (134-176)/() 163/75  FiO2 (%):  [35 %] 35 %  SpO2:  [91 %-99 %] 92 %  I/O's Last 24 hours  I/O last 3 completed shifts:  In: 960 [NG/GT:240]  Out: 3725 [Urine:3325; Stool:400]    Constitutional: Baseline non-verbal; more alert and awake ; follow simple commands ; resting comfortably in no apparent distress       Oral cavity: Moist mucosa   Cardiovascular: Normal s1 s2, regular rate and rhythm, no murmur   Lungs: B/l clear to auscultation, no wheezes or crepitations   Abdomen: Soft, nt, nd, no guarding, rigidity or rebound; BS +; G tube in place   LE : B/l edema +   Musculoskeletal/Neuro Right hemiplegia, non-verbal   Psychiatry: nonverbal  Felix catheter in place                Medications:        Current Facility-Administered Medications   Medication Dose Route Frequency Provider Last Rate Last Admin "    amLODIPine (NORVASC) tablet 5 mg  5 mg Oral or Feeding Tube Daily Isidro Soliz MD   5 mg at 02/19/25 1705    B and C vitamin Complex with folic acid (NEPHRONEX) liquid 5 mL  5 mL Per Feeding Tube Daily John Hernandez MD   5 mL at 02/19/25 1604    cefTRIAXone (ROCEPHIN) 1 g vial to attach to  mL bag for ADULTS or NS 50 mL bag for PEDS  1 g Intravenous Q24H Isidro Soliz MD        clopidogrel (PLAVIX) tablet 75 mg  75 mg Oral or Feeding Tube Daily Isidro Soliz MD   75 mg at 02/19/25 1349    ferrous sulfate (FEROSUL) tablet 325 mg  325 mg Per Feeding Tube BID Isidro Soliz MD   325 mg at 02/19/25 2033    FLUoxetine (PROzac) solution 20 mg  20 mg Oral or Feeding Tube Daily John Hernandez MD   20 mg at 02/19/25 1154    furosemide (LASIX) tablet 40 mg  40 mg Oral Daily TolDarell marie MD   40 mg at 02/19/25 1456    insulin aspart (NovoLOG) injection (RAPID ACTING)  1-7 Units Subcutaneous Q4H John Hernandez MD   2 Units at 02/20/25 0414    insulin glargine (LANTUS PEN) injection 9 Units  9 Units Subcutaneous QAM AC Edouard De Guzman MD        oseltamivir (TAMIFLU) suspension 30 mg  30 mg Oral or Feeding Tube Daily Isidro Soliz MD   30 mg at 02/19/25 0838    pantoprazole (PROTONIX) IV push injection 40 mg  40 mg Intravenous BID Edouard De Guzman MD   40 mg at 02/19/25 2033    sodium chloride (PF) 0.9% PF flush 3 mL  3 mL Intracatheter Q8H John Hernandez MD   3 mL at 02/20/25 0635     PRN Meds:   Current Facility-Administered Medications   Medication Dose Route Frequency Provider Last Rate Last Admin    calcium carbonate (TUMS) chewable tablet 1,000 mg  1,000 mg Oral 4x Daily PRN John Hernandez MD        dextrose 10% infusion   Intravenous Continuous PRN John Hernandez MD        glucose gel 15-30 g  15-30 g Oral Q15 Min PRN John Hernandez MD        Or    dextrose 50 % injection 25-50 mL  25-50 mL Intravenous Q15 Min PRJohn Blake MD        Or     glucagon injection 1 mg  1 mg Subcutaneous Q15 Min PRN John Hernandez MD        hydrALAZINE (APRESOLINE) injection 10 mg  10 mg Intravenous Q4H PRN Isidro Soliz MD        lidocaine (LMX4) cream   Topical Q1H PRJohn Blake MD        lidocaine 1 % 0.1-1 mL  0.1-1 mL Other Q1H John Heard MD        ondansetron (ZOFRAN) injection 4 mg  4 mg Intravenous Q6H PRN John Hernandez MD   4 mg at 02/17/25 1652    senna-docusate (SENOKOT-S/PERICOLACE) 8.6-50 MG per tablet 1 tablet  1 tablet Oral BID John Heard MD        Or    senna-docusate (SENOKOT-S/PERICOLACE) 8.6-50 MG per tablet 2 tablet  2 tablet Oral BID John Heard MD        sodium chloride (PF) 0.9% PF flush 3 mL  3 mL Intracatheter q1 min John Heard MD                Data:      All new lab and imaging data was reviewed.   Recent Labs   Lab Test 02/19/25  0512 02/19/25  0143 02/18/25  0456 02/16/25  1515 01/31/25  1015 11/02/21  0609 11/01/21  0735 09/16/21  1003 09/16/21  0957 09/15/21  0521 09/14/21  2145   WBC  --   --  13.4* 7.1 7.8   < > 5.6   < >  --    < >  --    HGB 7.8* 7.2* 6.8* 8.1* 9.9*   < > 11.3*   < >  --    < >  --    MCV  --   --  83 82 82   < > 88   < >  --    < >  --    PLT  --   --  162 174 222   < > 224   < >  --    < >  --    INR  --   --   --   --   --   --  0.96  --  1.03  --  0.94    < > = values in this interval not displayed.      Recent Labs   Lab Test 02/20/25  0412 02/20/25  0042 02/19/25 2022 02/19/25  0842 02/19/25  0512 02/18/25  0828 02/18/25  0456 02/18/25  0002 02/17/25 2131 02/17/25 0442 02/17/25 0428   NA  --   --   --   --  137  --  134*  --   --   --  129*   POTASSIUM  --   --   --   --  3.8  --  5.0  --  5.2   < > 6.1*   CHLORIDE  --   --   --   --  96*  --  94*  --   --   --  93*   CO2  --   --   --   --  29  --  28  --   --   --  25   BUN  --   --   --   --  133.3*  --  130.8*  --   --   --  123.8*   CR  --   --   --   --  5.26*  --  5.23*  --   --   --  4.61*    ANIONGAP  --   --   --   --  12  --  12  --   --   --  11   ARLETH  --   --   --   --  8.8  --  8.9  --   --   --  9.3   * 208* 237*   < > 168*   < > 163*   < >  --    < > 228*    < > = values in this interval not displayed.     Recent Labs   Lab Test 10/13/21  2152 09/18/21  1535 09/14/21  2142   TROPONIN <0.015 <0.015 <0.015

## 2025-02-21 LAB
ANION GAP SERPL CALCULATED.3IONS-SCNC: 15 MMOL/L (ref 7–15)
BACTERIA BLD CULT: NO GROWTH
BUN SERPL-MCNC: 109 MG/DL (ref 8–23)
CALCIUM SERPL-MCNC: 8.5 MG/DL (ref 8.8–10.4)
CHLORIDE SERPL-SCNC: 98 MMOL/L (ref 98–107)
CREAT SERPL-MCNC: 5.29 MG/DL (ref 0.67–1.17)
EGFRCR SERPLBLD CKD-EPI 2021: 11 ML/MIN/1.73M2
ERYTHROCYTE [DISTWIDTH] IN BLOOD BY AUTOMATED COUNT: 14.7 % (ref 10–15)
GLUCOSE BLDC GLUCOMTR-MCNC: 228 MG/DL (ref 70–99)
GLUCOSE BLDC GLUCOMTR-MCNC: 237 MG/DL (ref 70–99)
GLUCOSE BLDC GLUCOMTR-MCNC: 238 MG/DL (ref 70–99)
GLUCOSE BLDC GLUCOMTR-MCNC: 254 MG/DL (ref 70–99)
GLUCOSE BLDC GLUCOMTR-MCNC: 266 MG/DL (ref 70–99)
GLUCOSE SERPL-MCNC: 253 MG/DL (ref 70–99)
HCO3 SERPL-SCNC: 29 MMOL/L (ref 22–29)
HCT VFR BLD AUTO: 22.7 % (ref 40–53)
HGB BLD-MCNC: 7.3 G/DL (ref 13.3–17.7)
MAGNESIUM SERPL-MCNC: 3 MG/DL (ref 1.7–2.3)
MCH RBC QN AUTO: 26.6 PG (ref 26.5–33)
MCHC RBC AUTO-ENTMCNC: 32.2 G/DL (ref 31.5–36.5)
MCV RBC AUTO: 83 FL (ref 78–100)
PHOSPHATE SERPL-MCNC: 3.7 MG/DL (ref 2.5–4.5)
PLATELET # BLD AUTO: 176 10E3/UL (ref 150–450)
POTASSIUM SERPL-SCNC: 3 MMOL/L (ref 3.4–5.3)
POTASSIUM SERPL-SCNC: 3.7 MMOL/L (ref 3.4–5.3)
RBC # BLD AUTO: 2.74 10E6/UL (ref 4.4–5.9)
SODIUM SERPL-SCNC: 142 MMOL/L (ref 135–145)
WBC # BLD AUTO: 7.5 10E3/UL (ref 4–11)

## 2025-02-21 PROCEDURE — 250N000013 HC RX MED GY IP 250 OP 250 PS 637: Performed by: INTERNAL MEDICINE

## 2025-02-21 PROCEDURE — 84132 ASSAY OF SERUM POTASSIUM: CPT | Performed by: HOSPITALIST

## 2025-02-21 PROCEDURE — 250N000011 HC RX IP 250 OP 636: Performed by: HOSPITALIST

## 2025-02-21 PROCEDURE — 36415 COLL VENOUS BLD VENIPUNCTURE: CPT | Performed by: INTERNAL MEDICINE

## 2025-02-21 PROCEDURE — 250N000013 HC RX MED GY IP 250 OP 250 PS 637: Performed by: HOSPITALIST

## 2025-02-21 PROCEDURE — 258N000003 HC RX IP 258 OP 636: Performed by: HOSPITALIST

## 2025-02-21 PROCEDURE — 99233 SBSQ HOSP IP/OBS HIGH 50: CPT | Performed by: INTERNAL MEDICINE

## 2025-02-21 PROCEDURE — 120N000013 HC R&B IMCU

## 2025-02-21 PROCEDURE — B4036 ENTERAL FEED SUP KIT GRAV BY: HCPCS

## 2025-02-21 PROCEDURE — 85014 HEMATOCRIT: CPT | Performed by: INTERNAL MEDICINE

## 2025-02-21 PROCEDURE — 99232 SBSQ HOSP IP/OBS MODERATE 35: CPT | Performed by: HOSPITALIST

## 2025-02-21 PROCEDURE — 36415 COLL VENOUS BLD VENIPUNCTURE: CPT | Performed by: HOSPITALIST

## 2025-02-21 PROCEDURE — 84100 ASSAY OF PHOSPHORUS: CPT | Performed by: HOSPITALIST

## 2025-02-21 PROCEDURE — 83735 ASSAY OF MAGNESIUM: CPT | Performed by: HOSPITALIST

## 2025-02-21 PROCEDURE — 80048 BASIC METABOLIC PNL TOTAL CA: CPT | Performed by: HOSPITALIST

## 2025-02-21 RX ORDER — POTASSIUM CHLORIDE 1.5 G/1.58G
20 POWDER, FOR SOLUTION ORAL ONCE
Status: COMPLETED | OUTPATIENT
Start: 2025-02-21 | End: 2025-02-21

## 2025-02-21 RX ORDER — FUROSEMIDE 20 MG/1
20 TABLET ORAL DAILY
Status: DISCONTINUED | OUTPATIENT
Start: 2025-02-22 | End: 2025-02-24 | Stop reason: ALTCHOICE

## 2025-02-21 RX ORDER — POTASSIUM CHLORIDE 1.5 G/1.58G
40 POWDER, FOR SOLUTION ORAL ONCE
Status: COMPLETED | OUTPATIENT
Start: 2025-02-21 | End: 2025-02-21

## 2025-02-21 RX ORDER — POTASSIUM CHLORIDE 1.5 G/1.58G
20 POWDER, FOR SOLUTION ORAL ONCE
Status: DISCONTINUED | OUTPATIENT
Start: 2025-02-21 | End: 2025-02-21

## 2025-02-21 RX ORDER — SODIUM CHLORIDE 9 MG/ML
INJECTION, SOLUTION INTRAVENOUS CONTINUOUS
Status: DISCONTINUED | OUTPATIENT
Start: 2025-02-21 | End: 2025-02-22

## 2025-02-21 RX ADMIN — CLOPIDOGREL BISULFATE 75 MG: 75 TABLET ORAL at 08:02

## 2025-02-21 RX ADMIN — POTASSIUM CHLORIDE 20 MEQ: 1.5 POWDER, FOR SOLUTION ORAL at 06:02

## 2025-02-21 RX ADMIN — FUROSEMIDE 40 MG: 20 TABLET ORAL at 08:02

## 2025-02-21 RX ADMIN — AMLODIPINE BESYLATE 10 MG: 10 TABLET ORAL at 08:02

## 2025-02-21 RX ADMIN — POTASSIUM CHLORIDE 40 MEQ: 1.5 POWDER, FOR SOLUTION ORAL at 12:13

## 2025-02-21 RX ADMIN — INSULIN ASPART 3 UNITS: 100 INJECTION, SOLUTION INTRAVENOUS; SUBCUTANEOUS at 12:20

## 2025-02-21 RX ADMIN — INSULIN ASPART 2 UNITS: 100 INJECTION, SOLUTION INTRAVENOUS; SUBCUTANEOUS at 04:12

## 2025-02-21 RX ADMIN — DOXAZOSIN 2 MG: 2 TABLET ORAL at 08:02

## 2025-02-21 RX ADMIN — Medication 40 MG: at 22:16

## 2025-02-21 RX ADMIN — FLUOXETINE 20 MG: 20 SOLUTION ORAL at 12:13

## 2025-02-21 RX ADMIN — Medication 40 MG: at 08:02

## 2025-02-21 RX ADMIN — Medication 5 ML: at 16:34

## 2025-02-21 RX ADMIN — INSULIN ASPART 2 UNITS: 100 INJECTION, SOLUTION INTRAVENOUS; SUBCUTANEOUS at 17:40

## 2025-02-21 RX ADMIN — SODIUM CHLORIDE: 9 INJECTION, SOLUTION INTRAVENOUS at 18:47

## 2025-02-21 RX ADMIN — INSULIN ASPART 3 UNITS: 100 INJECTION, SOLUTION INTRAVENOUS; SUBCUTANEOUS at 07:53

## 2025-02-21 RX ADMIN — INSULIN ASPART 2 UNITS: 100 INJECTION, SOLUTION INTRAVENOUS; SUBCUTANEOUS at 22:12

## 2025-02-21 RX ADMIN — CEFTRIAXONE SODIUM 1 G: 1 INJECTION, POWDER, FOR SOLUTION INTRAMUSCULAR; INTRAVENOUS at 08:02

## 2025-02-21 RX ADMIN — OSELTAMIVIR PHOSPHATE 30 MG: 6 POWDER, FOR SUSPENSION ORAL at 08:03

## 2025-02-21 ASSESSMENT — ACTIVITIES OF DAILY LIVING (ADL)
ADLS_ACUITY_SCORE: 99
ADLS_ACUITY_SCORE: 103
ADLS_ACUITY_SCORE: 99
ADLS_ACUITY_SCORE: 103
ADLS_ACUITY_SCORE: 103
ADLS_ACUITY_SCORE: 99
ADLS_ACUITY_SCORE: 103
ADLS_ACUITY_SCORE: 99
ADLS_ACUITY_SCORE: 103
ADLS_ACUITY_SCORE: 99
ADLS_ACUITY_SCORE: 103
ADLS_ACUITY_SCORE: 99
ADLS_ACUITY_SCORE: 103

## 2025-02-21 NOTE — PROGRESS NOTES
Assessment and Plan:     CKD-5: ALEX associated with acute illness, influenza A.  Creatinine 5.23 >  5.29.  Sodium 142, potassium 3.0, bicarbonate 29.    Urine output yesterday 2275 mL.  Blood pressure is stable 130/59 with pulse of 73.    Adjust free water per feeding tube.  Patient is getting potassium replacement.  I will decrease the dose of diuretics.    Date/Time Weight Weight Method   02/21/25 0400 65.3 kg (143 lb 15.4 oz) Bed scale   02/20/25 0400 64.7 kg (142 lb 10.2 oz) Bed scale   02/19/25 0400 67.7 kg (149 lb 4 oz) Bed scale   02/18/25 0200 70 kg (154 lb 5.2 oz) Bed scale   02/17/25 0215 70.8 kg (156 lb 1.4 oz) Bed scale   02/16/25 2245 72.3 kg (159 lb 6.3 oz) Bed scale   02/16/25 1457 72.7 kg (160 lb 4.4 oz)                  Interval History:   Influenza A:  on tamiflu and zosyn. Not hypotensive, on HFNC.      CVA: non ambulatory, PEG for TF, chronic indwelling ordoñez, R hemiparesis, aphasia.      Anemia:      DM:        Hypertension: On amlodipine, doxazosin, furosemide 40 mg/day.  Decrease Lasix dose to 20 mg/day.             Review of Systems:   Unable.          Medications:     Current Facility-Administered Medications   Medication Dose Route Frequency Provider Last Rate Last Admin    amLODIPine (NORVASC) tablet 10 mg  10 mg Oral or Feeding Tube Daily Isidro Soliz MD   10 mg at 02/21/25 0802    B and C vitamin Complex with folic acid (NEPHRONEX) liquid 5 mL  5 mL Per Feeding Tube Daily Isidro Soliz MD   5 mL at 02/20/25 1559    cefTRIAXone (ROCEPHIN) 1 g vial to attach to  mL bag for ADULTS or NS 50 mL bag for PEDS  1 g Intravenous Q24H Isidro Soliz MD   1 g at 02/21/25 0802    clopidogrel (PLAVIX) tablet 75 mg  75 mg Oral or Feeding Tube Daily Isidro Soliz MD   75 mg at 02/21/25 0802    doxazosin (CARDURA) tablet 2 mg  2 mg Oral or Feeding Tube Daily Isidro Soliz MD   2 mg at 02/21/25 0802    FLUoxetine (PROzac) solution 20 mg  20 mg Oral or  Feeding Tube Daily Isidro Soliz MD   20 mg at 02/21/25 1213    furosemide (LASIX) tablet 40 mg  40 mg Oral or Feeding Tube Daily Isidro Soliz MD   40 mg at 02/21/25 0802    insulin aspart (NovoLOG) injection (RAPID ACTING)  1-7 Units Subcutaneous Q4H Isidro Soliz MD   3 Units at 02/21/25 1220    insulin glargine (LANTUS PEN) injection 9 Units  9 Units Subcutaneous QAM AC Isidro Soliz MD   9 Units at 02/21/25 0752    oseltamivir (TAMIFLU) suspension 30 mg  30 mg Oral or Feeding Tube Daily Isidro Soliz MD   30 mg at 02/21/25 0803    pantoprazole (PROTONIX) 2 mg/mL suspension 40 mg  40 mg Per Feeding Tube BID Laura Griffin DO   40 mg at 02/21/25 0802    sodium chloride (PF) 0.9% PF flush 3 mL  3 mL Intracatheter Q8H Isidro Soliz MD   3 mL at 02/20/25 0635     Current Facility-Administered Medications   Medication Dose Route Frequency Provider Last Rate Last Admin    dextrose 10% infusion   Intravenous Continuous PRN Isidro Soliz MD        sodium chloride 0.9 % infusion   Intravenous Continuous De Guzman, Edouard Romo MD 10 mL/hr at 02/21/25 0452 Rate Verify at 02/21/25 0452     Current active medications and PTA medications reviewed, see medication list for details.            Physical Exam:   Vitals were reviewed  Patient Vitals for the past 24 hrs:   BP Temp Temp src Pulse Resp SpO2 Weight   02/21/25 1200 130/59 98.2  F (36.8  C) Axillary 73 15 97 % --   02/21/25 1000 137/67 -- -- 80 16 95 % --   02/21/25 0800 (!) 156/72 97.8  F (36.6  C) Oral 82 17 94 % --   02/21/25 0400 (!) 151/75 97.9  F (36.6  C) Oral 80 21 93 % 65.3 kg (143 lb 15.4 oz)   02/21/25 0200 (!) 149/73 -- -- 82 25 (!) 91 % --   02/21/25 0100 (!) 140/75 -- -- 71 15 96 % --   02/21/25 0000 (!) 151/75 98.7  F (37.1  C) Oral 86 18 94 % --   02/20/25 2300 (!) 163/76 -- -- 91 24 93 % --   02/20/25 2200 (!) 169/85 -- -- 91 17 93 % --   02/20/25 2100 (!) 157/85 -- -- 88 16 93 % --   02/20/25 2000  (!) 152/75 99  F (37.2  C) Oral 86 16 100 % --   25 1900 (!) 164/71 -- -- 85 18 96 % --   25 1800 (!) 155/74 -- -- 85 16 96 % --   25 1700 (!) 146/68 -- -- 85 17 96 % --   25 1600 (!) 156/68 97.9  F (36.6  C) Oral 86 16 95 % --   25 1500 (!) 140/72 -- -- 81 13 95 % --   25 1400 136/61 -- -- 80 16 95 % --   25 1300 (!) 170/82 -- -- 86 17 96 % --       Temp:  [97.8  F (36.6  C)-99  F (37.2  C)] 98.2  F (36.8  C)  Pulse:  [71-91] 73  Resp:  [13-25] 15  BP: (130-170)/(59-85) 130/59  SpO2:  [91 %-100 %] 97 %    Temperatures:  Current - Temp: 98.2  F (36.8  C); Max - Temp  Av.3  F (36.8  C)  Min: 97.8  F (36.6  C)  Max: 99  F (37.2  C)  Respiration range: Resp  Av.4  Min: 13  Max: 25  Pulse range: Pulse  Av.2  Min: 71  Max: 91  Blood pressure range: Systolic (24hrs), Av , Min:130 , Max:170   ; Diastolic (24hrs), Av, Min:59, Max:85    Pulse oximetry range: SpO2  Av.9 %  Min: 91 %  Max: 100 %    I/O last 3 completed shifts:  In: 1341.33 [I.V.:41.33; NG/GT:630; IV Piggyback:10]  Out:  [Urine:1875; Stool:150]      Intake/Output Summary (Last 24 hours) at 2025 1244  Last data filed at 2025 1200  Gross per 24 hour   Intake 1576.33 ml   Output 1900 ml   Net -323.67 ml     Eyes open, responsive  NC O2, PEG and ordoñez  Lungs clear ant BS  Cor RRR nl S1 S2 no M  LE 1+ edema         Wt Readings from Last 4 Encounters:   25 65.3 kg (143 lb 15.4 oz)   25 66.5 kg (146 lb 9.7 oz)   10/25/24 60.3 kg (132 lb 15 oz)   10/28/24 61 kg (134 lb 7.7 oz)          Data:          Lab Results   Component Value Date     2025     2025     2025     2012     2012    Lab Results   Component Value Date    CHLORIDE 98 2025    CHLORIDE 96 2025    CHLORIDE 94 2025    CHLORIDE 99 2022    CHLORIDE 98 2022    CHLORIDE 105 2022    CHLORIDE 101 2012    CHLORIDE 99  08/13/2012    Lab Results   Component Value Date    .0 02/21/2025    .3 02/19/2025    .8 02/18/2025    BUN 24 03/30/2022    BUN 22 02/01/2022    BUN 20 01/26/2022    BUN 18 08/16/2012    BUN 14 08/13/2012      Lab Results   Component Value Date    POTASSIUM 3.0 02/21/2025    POTASSIUM 3.8 02/19/2025    POTASSIUM 5.0 02/18/2025    POTASSIUM 4.4 03/30/2022    POTASSIUM 4.0 02/01/2022    POTASSIUM 3.6 01/26/2022    POTASSIUM 4.0 08/16/2012    POTASSIUM 3.6 08/13/2012    Lab Results   Component Value Date    CO2 29 02/21/2025    CO2 29 02/19/2025    CO2 28 02/18/2025    CO2 29 03/30/2022    CO2 27 02/01/2022    CO2 31 01/26/2022    CO2 26 08/16/2012    CO2 26 08/13/2012    Lab Results   Component Value Date    CR 5.29 02/21/2025    CR 5.26 02/19/2025    CR 5.23 02/18/2025    CR 0.96 08/16/2012    CR 0.91 08/13/2012        Recent Labs   Lab Test 02/21/25 0442 02/19/25  0512 02/19/25  0143 02/18/25 0456 02/16/25  1515   WBC 7.5  --   --  13.4* 7.1   HGB 7.3* 7.8* 7.2* 6.8* 8.1*   HCT 22.7*  --   --  21.1* 25.3*   MCV 83  --   --  83 82     --   --  162 174     Recent Labs   Lab Test 02/17/25  0428 02/16/25  1515 01/31/25  1015 01/15/25  0840 01/14/25 2059   AST 37  --  45 39  --    ALT 36 39 44 62  --    ALKPHOS 117 127 174* 136  --    BILITOTAL 0.3 0.2 0.2 0.2  --    RUSS  --   --   --   --  19       Recent Labs   Lab Test 02/21/25 0442 02/19/25  0512 02/18/25  0456   MAG 3.0* 3.0* 3.0*     Recent Labs   Lab Test 02/21/25  0442 02/20/25  0531 02/19/25  0512   PHOS 3.7 4.1 4.1     Recent Labs   Lab Test 02/21/25  0442 02/19/25  0512 02/18/25  0456   ARLETH 8.5* 8.8 8.9       Lab Results   Component Value Date    ARLETH 8.5 (L) 02/21/2025     Lab Results   Component Value Date    WBC 7.5 02/21/2025    HGB 7.3 (L) 02/21/2025    HCT 22.7 (L) 02/21/2025    MCV 83 02/21/2025     02/21/2025     Lab Results   Component Value Date     02/21/2025    POTASSIUM 3.0 (L) 02/21/2025    CHLORIDE  98 02/21/2025    CO2 29 02/21/2025     (H) 02/21/2025     Lab Results   Component Value Date    .0 (H) 02/21/2025    CR 5.29 (H) 02/21/2025     Lab Results   Component Value Date    MAG 3.0 (H) 02/21/2025     Lab Results   Component Value Date    PHOS 3.7 02/21/2025       Creatinine   Date Value Ref Range Status   02/21/2025 5.29 (H) 0.67 - 1.17 mg/dL Final   02/19/2025 5.26 (H) 0.67 - 1.17 mg/dL Final   02/18/2025 5.23 (H) 0.67 - 1.17 mg/dL Final   02/17/2025 4.61 (H) 0.67 - 1.17 mg/dL Final   02/16/2025 4.48 (H) 0.67 - 1.17 mg/dL Final   02/16/2025 4.26 (H) 0.67 - 1.17 mg/dL Final   08/16/2012 0.96 0.66 - 1.25 mg/dL Final   08/13/2012 0.91 0.66 - 1.25 mg/dL Final       Attestation:  I have reviewed today's vital signs, notes, medications, labs and imaging.     Darell Santiago MD

## 2025-02-21 NOTE — PROVIDER NOTIFICATION
"MD Notification    Notified Person: MD    Notified Person Name: Dr. Soliz    Notification Date/Time: 2/21, 1330    Notification Interaction: Text Vocera    Purpose of Notification: \"PEG clogged momentarily. Got it unclogged but now seems to be intra-abdominal pressure and contents slowly pulsing out. Holding TF for now. bowel sounds are active and pt denies nausea or discomfort. Do you want LIS or clamp for now?\"    Orders Received: Clamp for a couple of hours    Comments: TF held.   "

## 2025-02-21 NOTE — PROGRESS NOTES
[facility]  Hospitalist Progress Note        Isidro Soliz MD   02/21/2025        Interval History:        - No acute issues overnight except low potassium 3.0, likely diuresis induced -- will supplement potassium per protocol  - creatinine seems stable around 5 with good urine output  - Hb likely stabilizing 7.8---7.3  - BG in 250s with tube feeds; will increase Lantus to 11 units daily and continue medium resistance sliding scale insulin with hypoglycemia protocol         Assessment and Plan:        Jimmy Otto is a 75 year old male with PMH of HTN, DLD, diabetes , hx of stroke (with right sided hemiplegia and aphasia-non verbal at baseline), PEG tube feeding,  chronic indwelling ordoñez (for neurogenic bladder), CKD 3b (with progressive renal dysfunction), chronic normocytic anemia who as brought to the ED with concern for decreasing urine output and was also noted with influenza with respiratory failure, junctional bradycardia, acute on chronic renal failure and anemia; admitted inpatient 2/16/25.      ALEX on CKD stage IV  neurogenic bladder with chronic indwelling Ordoñez catheter  Hyperkalemia  Hyponatremia  Hypochloremia  - recent Cr on 2/12 was 3.93 with Na 129 and normal potassium   - sodium 127--->137; K 6.2--->3.8; Cr 4.26---> 5.26  - did get K shifting meds in ED and was started on Lokelma  - Ordoñez catheter replaced on admission  - nephrology following and ordered for intermittent diuresis with Chlorthalidone and IV Lasix; suggest medical management without dialysis (not a candidate for peritoneal dialysis due to PEG and not a good candidate for HD-- need for michell lift)  - adequate diuresis with trial of diuretics; nephrology stopped Lokelma (2/19); adjusted free water per feeding tubes; started Lasix 40 mg daily (2/19)  -hyperkalemia resolved but now hypokalemic with K 3.0, likely diuresis induced-- will supplement potassium per protocol  - creatinine seems stable around 5 with good  urine output     Acute hypoxic respiratory failure multifactorial likely due to volume overload due to acute on chronic diastolic CHF and exacerbation  influenza A infection   likely aspiration event  - PTA On Torsemide 20 mg daily  - on admission patient was requiring 10 L of oxygen, increased to 50 lts HFNC  - proBNP significantly elevated at 19, 616; Pro-Westley normal at 0.29  - Chest x-ray 2/16 noted with diffuse mixed interstitial and airspace opacities likely due to moderate pulmonary edema and small right and trace left pleural effusion and mid bibasilar atelectasis with no pneumothorax  - 2/16: influenza A positive; COVID and RSV negative  - RRT on 2/16 with increased O2 needs and repeat CXR with some concern for asymmetric edema versus multifocal pneumonitis requiring 50 lts HFNC  - getting diuresis per nephrology as above with Lasix while holding PTA torsemide  - respiratory status improved and HFNC weaned down to 2 L, will likely need home O2  - per AMT recommendations, switched empiric Zosyn to Rocephin (2/19); to complete 5 days course) and also adjusted Tamiflu for a 10 days course (renally dosed)     Elevated troponin likely due to demand due to underlying renal failure and volume overload type II NSTEMI  Junctional bradycardia  - serial troponins with no significant trend 106 --- 105 --- 108 ; EKG noted with junctional rhythm   - has converted to sinus rhythm  - evaluated by cardiology, note junctional bradycardia is likely due to metabolic abnormalities with worsening renal failure and hyperkalemia and note no indication for pacemaker implantation  - Continue to monitor on telemetry    Encephalopathy likely due to uremia and infectious causes  H/o CVA with residual right hemiplegia and baseline nonverbal status  chronic dysphagia, PEG tube feeds  Dyslipidemia  Hypertension  - baseline right hemiplegia, non-verbal status from prior CVA  - some encephalopathy in the setting of acute illness ; treating  underlying causes as above  - nutrition following for tube feeds  - continue Plavix 75 mg daily; holding PTA Lipitor 20 mg daily  - PTA amlodipine 10 mg daily, doxazosin 2 mg daily-- resumed 2/19, BP better     Acute on chronic anemia  Dark stools  - baseline Hb around 9, anemia of chronic disease in the setting of underlying CKD  - PTA ferrous sulfate 325 mg twice daily continued  - Hb 8.1---6.8-- 1 unit PRBC on 2/18  - 2/19: reported some dark stools (on iron), not grossly melanotic, hemodynamically stable and stable hemoglobin after 1 unit PRBC on 2/18; Hb 6.8---> 7.2---7.8---7.3  - will not be an appropriate candidate for endoscopic /colonoscopy at evaluation at this time; will monitor clinically with repeat Hb and discuss goals of care again if has suggestion of active bleed  - started on IV Protonix     Diabetes mellitus type 2  - PTA on Lantus 9 units subcu a.m., and insulin lispro sliding scale  - Continue sliding scale with ongoing tube feeds  - BG in 250s with tube feeds; will increase Lantus to 11 units daily (2/21) and continue medium resistance sliding scale insulin with hypoglycemia protocol    Subclinical hypothyroidism  - TSH is mildly elevated at 8.72 but free T4 is normal; most likely due to underlying acute illness    Goals of care   - on presentation noted patient critically ill; family aware ; not a candidate for dialysis per nephrology as noted above  - code status changed to DNR/DNI  - palliative care met with family (2/18), remains DNR/DNI but family want to continue with restorative cares for now   -  following for disposition     DVT Prophylaxis: Pneumatic Compression Devices    Code Status: DNR/DNI     Diet:   NPO for Medical/Clinical Reasons Except for: NPO but receiving Tube Feeding  Adult Formula Drip Feeding: Continuous Novasource Renal; Gastrostomy; Goal Rate: 30; mL/hr; Start TF @ 10 ml/hr. Advance by 10 mL q 12 hours until goal rate reached.; Do not advance tube feeding  "rate unless K+ is = or > 3.0, Mg++ is = or > 1.5, an...      Disposition:   Medically Ready for Discharge: Anticipated in 2-4 Days  - will transfer out of ICU 2/21    High medical complexity    Care plan discussed with nursing and patient's son present in room       Clinically Significant Risk Factors        # Hypokalemia: Lowest K = 3 mmol/L in last 2 days, will replace as needed        # Hypoalbuminemia: Lowest albumin = 3.1 g/dL at 2/17/2025  4:28 AM, will monitor as appropriate      # Acute Kidney Injury, unspecified: based on a >150% or 0.3 mg/dL increase in last creatinine compared to past 90 day average, will monitor renal function    # Hypertension: Noted on problem list    # Acute heart failure with preserved ejection fraction: heart failure noted on problem list, last echo with EF >50%, and receiving IV diuretics          # DMII: A1C = 8.8 % (Ref range: <5.7 %) within past 6 months         # Financial/Environmental Concerns: none                            Physical Exam:      Blood pressure (!) 151/75, pulse 80, temperature 97.8  F (36.6  C), temperature source Oral, resp. rate 21, height 1.676 m (5' 6\"), weight 65.3 kg (143 lb 15.4 oz), SpO2 93%.  Vitals:    02/19/25 0400 02/20/25 0400 02/21/25 0400   Weight: 67.7 kg (149 lb 4 oz) 64.7 kg (142 lb 10.2 oz) 65.3 kg (143 lb 15.4 oz)     Vital Signs with Ranges  Temp:  [97.8  F (36.6  C)-99  F (37.2  C)] 97.8  F (36.6  C)  Pulse:  [71-91] 80  Resp:  [13-25] 21  BP: (136-170)/(61-85) 151/75  SpO2:  [91 %-100 %] 93 %  I/O's Last 24 hours  I/O last 3 completed shifts:  In: 1341.33 [I.V.:41.33; NG/GT:630; IV Piggyback:10]  Out: 2025 [Urine:1875; Stool:150]    Constitutional: Baseline non-verbal; alert and awake ; follows simple commands ; resting comfortably in no apparent distress       Oral cavity: Moist mucosa   Cardiovascular: Normal s1 s2, regular rate and rhythm, no murmur   Lungs: B/l clear to auscultation, no wheezes or crepitations   Abdomen: Soft, nt, " nd, no guarding, rigidity or rebound; BS +; G tube in place   LE : no edema    Musculoskeletal/Neuro Right hemiplegia, non-verbal   Psychiatry: nonverbal  Felix catheter in place  rectal tube in place                Medications:        Current Facility-Administered Medications   Medication Dose Route Frequency Provider Last Rate Last Admin    amLODIPine (NORVASC) tablet 10 mg  10 mg Oral or Feeding Tube Daily Isidro Soliz MD   10 mg at 02/21/25 0802    B and C vitamin Complex with folic acid (NEPHRONEX) liquid 5 mL  5 mL Per Feeding Tube Daily Isidro Soliz MD   5 mL at 02/20/25 1559    cefTRIAXone (ROCEPHIN) 1 g vial to attach to  mL bag for ADULTS or NS 50 mL bag for PEDS  1 g Intravenous Q24H Isidro Soliz MD   1 g at 02/21/25 0802    clopidogrel (PLAVIX) tablet 75 mg  75 mg Oral or Feeding Tube Daily Isidro Soliz MD   75 mg at 02/21/25 0802    doxazosin (CARDURA) tablet 2 mg  2 mg Oral or Feeding Tube Daily Isidro Soliz MD   2 mg at 02/21/25 0802    FLUoxetine (PROzac) solution 20 mg  20 mg Oral or Feeding Tube Daily Isidro Soliz MD   20 mg at 02/20/25 1315    furosemide (LASIX) tablet 40 mg  40 mg Oral or Feeding Tube Daily Isidro Soliz MD   40 mg at 02/21/25 0802    insulin aspart (NovoLOG) injection (RAPID ACTING)  1-7 Units Subcutaneous Q4H Isidro Soliz MD   3 Units at 02/21/25 0753    insulin glargine (LANTUS PEN) injection 9 Units  9 Units Subcutaneous QAM AC Isidro Soliz MD   9 Units at 02/21/25 0752    oseltamivir (TAMIFLU) suspension 30 mg  30 mg Oral or Feeding Tube Daily Isidro Soliz MD   30 mg at 02/21/25 0803    pantoprazole (PROTONIX) 2 mg/mL suspension 40 mg  40 mg Per Feeding Tube BID Laura Griffin DO   40 mg at 02/21/25 0802    sodium chloride (PF) 0.9% PF flush 3 mL  3 mL Intracatheter Q8H Isidro Soliz MD   3 mL at 02/20/25 0694     PRN Meds:   Current Facility-Administered Medications    Medication Dose Route Frequency Provider Last Rate Last Admin    acetaminophen (TYLENOL) tablet 650 mg  650 mg Oral Q4H PRN Abner Griffinone, DO   650 mg at 02/20/25 2212    Or    acetaminophen (TYLENOL) Suppository 650 mg  650 mg Rectal Q4H PRN Laura Griffin DO        calcium carbonate (TUMS) chewable tablet 1,000 mg  1,000 mg Oral 4x Daily PRN Isidro Soliz MD   1,000 mg at 02/20/25 1505    dextrose 10% infusion   Intravenous Continuous PRN Isidro Soliz MD        glucose gel 15-30 g  15-30 g Oral Q15 Min PRN Isidro Soliz MD        Or    dextrose 50 % injection 25-50 mL  25-50 mL Intravenous Q15 Min PRIsidro Cunningham MD        Or    glucagon injection 1 mg  1 mg Subcutaneous Q15 Min Isidro Levine MD        hydrALAZINE (APRESOLINE) injection 10 mg  10 mg Intravenous Q4H PRIsidro Cunningham MD        lidocaine (LMX4) cream   Topical Q1H PRIsidro Cunningham MD        lidocaine (LMX4) cream   Topical Q1H PRIsidro Cunningham MD        lidocaine 1 % 0.1-1 mL  0.1-1 mL Other Q1H PRIsidro Cunningham MD        lidocaine 1 % 0.1-1 mL  0.1-1 mL Other Q1H PRIsidro Cunningham MD        melatonin tablet 5 mg  5 mg Oral or Feeding Tube At Bedtime PRN Laura Griffin DO   5 mg at 02/20/25 2329    ondansetron (ZOFRAN) injection 4 mg  4 mg Intravenous Q6H PRN Isidro Soliz MD   4 mg at 02/17/25 1652    senna-docusate (SENOKOT-S/PERICOLACE) 8.6-50 MG per tablet 1 tablet  1 tablet Oral BID PRIsidro Cunningham MD        Or    senna-docusate (SENOKOT-S/PERICOLACE) 8.6-50 MG per tablet 2 tablet  2 tablet Oral BID PRIsidro Cunningham Blevins, MD        sodium chloride (PF) 0.9% PF flush 3 mL  3 mL Intracatheter q1 min Isidro Levine MD        sodium chloride (PF) 0.9% PF flush 3 mL  3 mL Intracatheter q1 min Isidro Levine MD                Data:      All new lab and imaging data was reviewed.   Recent Labs   Lab Test  02/21/25  0442 02/19/25  0512 02/19/25  0143 02/18/25  0456 02/16/25  1515 11/02/21  0609 11/01/21  0735 09/16/21  1003 09/16/21  0957 09/15/21  0521 09/14/21  2145   WBC 7.5  --   --  13.4* 7.1   < > 5.6   < >  --    < >  --    HGB 7.3* 7.8* 7.2* 6.8* 8.1*   < > 11.3*   < >  --    < >  --    MCV 83  --   --  83 82   < > 88   < >  --    < >  --      --   --  162 174   < > 224   < >  --    < >  --    INR  --   --   --   --   --   --  0.96  --  1.03  --  0.94    < > = values in this interval not displayed.      Recent Labs   Lab Test 02/21/25  0751 02/21/25 0442 02/21/25  0411 02/19/25  0842 02/19/25  0512 02/18/25  0828 02/18/25 0456   NA  --  142  --   --  137  --  134*   POTASSIUM  --  3.0*  --   --  3.8  --  5.0   CHLORIDE  --  98  --   --  96*  --  94*   CO2  --  29  --   --  29  --  28   BUN  --  109.0*  --   --  133.3*  --  130.8*   CR  --  5.29*  --   --  5.26*  --  5.23*   ANIONGAP  --  15  --   --  12  --  12   ARLETH  --  8.5*  --   --  8.8  --  8.9   * 253* 228*   < > 168*   < > 163*    < > = values in this interval not displayed.     Recent Labs   Lab Test 10/13/21  2152 09/18/21  1535 09/14/21  2142   TROPONIN <0.015 <0.015 <0.015

## 2025-02-21 NOTE — PLAN OF CARE
"  Problem: Adult Inpatient Plan of Care  Goal: Plan of Care Review  Outcome: Progressing  Flowsheets (Taken 2/20/2025 1819)  Outcome Evaluation: Now on 3-5L NC.  LS coarse.  More alert today, using TV remote with L hand, able to nod 'yes' to questions.  Somewhat restless.  Per family neuros appear to be at baseline.  Dtr asked pt in Cambodian if he is in pain and per her she said he denied any specific pain.  Clear christine urine per ordoñez.  TF at goal to PEG tube, button rotated, site WNL. Pt has rectal tube with loose blackish stool, it is noted that pt gets iron pills daily.  Goal: Patient-Specific Goal (Individualized)  Description: You can add care plan individualizations to a care plan. Examples of Individualization might be:  \"Parent requests to be called daily at 9am for status\", \"I have a hard time hearing out of my right ear\", or \"Do not touch me to wake me up as it startles  me\".  Outcome: Progressing  Goal: Absence of Hospital-Acquired Illness or Injury  Outcome: Progressing  Intervention: Identify and Manage Fall Risk  Recent Flowsheet Documentation  Taken 2/20/2025 1600 by Felix Estrada RN  Safety Promotion/Fall Prevention:   clutter free environment maintained   room near nurse's station   safety round/check completed  Taken 2/20/2025 1200 by Felix Estrada RN  Safety Promotion/Fall Prevention:   clutter free environment maintained   room near nurse's station   safety round/check completed  Taken 2/20/2025 0800 by Felix Estrada RN  Safety Promotion/Fall Prevention:   clutter free environment maintained   room near nurse's station   safety round/check completed  Intervention: Prevent Skin Injury  Recent Flowsheet Documentation  Taken 2/20/2025 1600 by Felix Estrada, RN  Body Position:   turned   heels elevated   upper extremity elevated  Skin Protection:   adhesive use limited   incontinence pads utilized   pulse oximeter probe site changed   silicone foam " dressing in place   skin to device areas padded   skin to skin areas padded   skin sealant/moisture barrier applied   tubing/devices free from skin contact  Taken 2/20/2025 1200 by Felix Estrada RN  Body Position:   turned   heels elevated   upper extremity elevated  Skin Protection:   adhesive use limited   incontinence pads utilized   pulse oximeter probe site changed   silicone foam dressing in place   skin to device areas padded   skin to skin areas padded   skin sealant/moisture barrier applied   tubing/devices free from skin contact  Taken 2/20/2025 1000 by Felix Estrada RN  Body Position:   turned   heels elevated   upper extremity elevated  Taken 2/20/2025 0800 by Felix Estrada RN  Body Position:   turned   heels elevated   upper extremity elevated  Skin Protection:   adhesive use limited   incontinence pads utilized   pulse oximeter probe site changed   silicone foam dressing in place   skin to device areas padded   skin to skin areas padded   skin sealant/moisture barrier applied   tubing/devices free from skin contact  Intervention: Prevent and Manage VTE (Venous Thromboembolism) Risk  Recent Flowsheet Documentation  Taken 2/20/2025 1600 by Felix Estrada RN  VTE Prevention/Management: SCDs on (sequential compression devices)  Taken 2/20/2025 1200 by Felix Estrada RN  VTE Prevention/Management: SCDs on (sequential compression devices)  Taken 2/20/2025 0800 by Felix Estrada RN  VTE Prevention/Management: SCDs on (sequential compression devices)  Goal: Optimal Comfort and Wellbeing  Outcome: Progressing  Intervention: Provide Person-Centered Care  Recent Flowsheet Documentation  Taken 2/20/2025 1600 by Felix Estrada RN  Trust Relationship/Rapport:   care explained   thoughts/feelings acknowledged   emotional support provided   reassurance provided  Taken 2/20/2025 1200 by Felix Estrada RN  Trust Relationship/Rapport:    care explained   thoughts/feelings acknowledged   emotional support provided   reassurance provided  Taken 2/20/2025 0800 by Felix Estrada RN  Trust Relationship/Rapport:   care explained   thoughts/feelings acknowledged   emotional support provided   reassurance provided  Goal: Readiness for Transition of Care  Outcome: Progressing     Problem: Skin Injury Risk Increased  Goal: Skin Health and Integrity  Outcome: Progressing  Intervention: Plan: Nurse Driven Intervention: Moisture Management  Recent Flowsheet Documentation  Taken 2/20/2025 1600 by Felix Estrada RN  Bathing/Skin Care:   incontinence care   linen changed   moisturizer applied  Taken 2/20/2025 1200 by Felix Estrada RN  Bathing/Skin Care:   incontinence care   linen changed   moisturizer applied  Taken 2/20/2025 0800 by Felix Estrada RN  Bathing/Skin Care:   incontinence care   linen changed   moisturizer applied  Intervention: Optimize Skin Protection  Recent Flowsheet Documentation  Taken 2/20/2025 1600 by Felix Estrada RN  Skin Protection:   adhesive use limited   incontinence pads utilized   pulse oximeter probe site changed   silicone foam dressing in place   skin to device areas padded   skin to skin areas padded   skin sealant/moisture barrier applied   tubing/devices free from skin contact  Activity Management: bedrest  Head of Bed (HOB) Positioning: HOB at 30-45 degrees  Taken 2/20/2025 1200 by Felix Estrada RN  Skin Protection:   adhesive use limited   incontinence pads utilized   pulse oximeter probe site changed   silicone foam dressing in place   skin to device areas padded   skin to skin areas padded   skin sealant/moisture barrier applied   tubing/devices free from skin contact  Activity Management: bedrest  Head of Bed (HOB) Positioning: HOB at 30-45 degrees  Taken 2/20/2025 1000 by Felix Estrada RN  Head of Bed (HOB) Positioning: HOB at 30-45 degrees  Taken  2/20/2025 0800 by Felix Estrada RN  Skin Protection:   adhesive use limited   incontinence pads utilized   pulse oximeter probe site changed   silicone foam dressing in place   skin to device areas padded   skin to skin areas padded   skin sealant/moisture barrier applied   tubing/devices free from skin contact  Activity Management: bedrest  Head of Bed (HOB) Positioning: HOB at 30-45 degrees     Problem: Comorbidity Management  Goal: Blood Glucose Levels Within Targeted Range  Outcome: Progressing  Intervention: Monitor and Manage Glycemia  Recent Flowsheet Documentation  Taken 2/20/2025 1600 by Felix Estrada RN  Medication Review/Management: medications reviewed  Taken 2/20/2025 1200 by Felix Estrada RN  Medication Review/Management: medications reviewed  Taken 2/20/2025 0800 by Felix Estrada RN  Medication Review/Management: medications reviewed  Goal: Blood Pressure in Desired Range  Outcome: Progressing  Intervention: Maintain Blood Pressure Management  Recent Flowsheet Documentation  Taken 2/20/2025 1600 by Felix Estrada RN  Medication Review/Management: medications reviewed  Taken 2/20/2025 1200 by Felix Estrada RN  Medication Review/Management: medications reviewed  Taken 2/20/2025 0800 by Felix Estrada RN  Medication Review/Management: medications reviewed     Problem: Infection  Goal: Absence of Infection Signs and Symptoms  Outcome: Progressing  Intervention: Prevent or Manage Infection  Recent Flowsheet Documentation  Taken 2/20/2025 1600 by Felix Estrada RN  Isolation Precautions: droplet precautions maintained  Taken 2/20/2025 1200 by Felix Estrada RN  Isolation Precautions: droplet precautions maintained  Taken 2/20/2025 0800 by Felix Estrada RN  Isolation Precautions: droplet precautions maintained     Problem: Swallowing Impairment  Goal: Optimal Eating and Swallowing Without  Aspiration  Outcome: Progressing  Intervention: Optimize Eating and Swallowing  Recent Flowsheet Documentation  Taken 2/20/2025 1600 by Felix Estrada RN  Aspiration Precautions:   oral hygiene care promoted   respiratory status monitored  Taken 2/20/2025 1200 by Felix Estrada RN  Aspiration Precautions:   oral hygiene care promoted   respiratory status monitored  Taken 2/20/2025 0800 by Felix Estrada RN  Aspiration Precautions:   oral hygiene care promoted   respiratory status monitored     Problem: Gas Exchange Impaired  Goal: Optimal Gas Exchange  Outcome: Progressing  Intervention: Optimize Oxygenation and Ventilation  Recent Flowsheet Documentation  Taken 2/20/2025 1600 by Felix Estrada RN  Airway/Ventilation Management:   airway patency maintained   calming measures promoted   humidification applied   oxygen therapy provided   pulmonary hygiene promoted  Head of Bed (HOB) Positioning: HOB at 30-45 degrees  Taken 2/20/2025 1200 by Felix Estrada RN  Airway/Ventilation Management:   airway patency maintained   calming measures promoted   humidification applied   oxygen therapy provided   pulmonary hygiene promoted  Head of Bed (HOB) Positioning: HOB at 30-45 degrees  Taken 2/20/2025 1000 by Felix Estrada RN  Head of Bed (HOB) Positioning: HOB at 30-45 degrees  Taken 2/20/2025 0800 by Felix Estrada RN  Airway/Ventilation Management:   airway patency maintained   calming measures promoted   humidification applied   oxygen therapy provided   pulmonary hygiene promoted  Head of Bed (HOB) Positioning: HOB at 30-45 degrees     Problem: Risk for Delirium  Goal: Optimal Coping  Outcome: Progressing  Goal: Improved Behavioral Control  Outcome: Progressing  Intervention: Minimize Safety Risk  Recent Flowsheet Documentation  Taken 2/20/2025 1600 by Felix Estrada RN  Enhanced Safety Measures: review medications for side effects with  activity  Trust Relationship/Rapport:   care explained   thoughts/feelings acknowledged   emotional support provided   reassurance provided  Taken 2/20/2025 1200 by Felix Estrada RN  Enhanced Safety Measures: review medications for side effects with activity  Trust Relationship/Rapport:   care explained   thoughts/feelings acknowledged   emotional support provided   reassurance provided  Taken 2/20/2025 0800 by Felix Estrada RN  Enhanced Safety Measures: review medications for side effects with activity  Trust Relationship/Rapport:   care explained   thoughts/feelings acknowledged   emotional support provided   reassurance provided  Goal: Improved Attention and Thought Clarity  Outcome: Progressing  Intervention: Maximize Cognitive Function  Recent Flowsheet Documentation  Taken 2/20/2025 1600 by Felix Estrada RN  Sensory Stimulation Regulation:   care clustered   lighting decreased   quiet environment promoted  Reorientation Measures: reorientation provided  Taken 2/20/2025 1200 by Felix Estrada RN  Sensory Stimulation Regulation:   care clustered   lighting decreased   quiet environment promoted  Reorientation Measures: reorientation provided  Taken 2/20/2025 0800 by Felix Estrada RN  Sensory Stimulation Regulation:   care clustered   lighting decreased   quiet environment promoted  Reorientation Measures: reorientation provided  Goal: Improved Sleep  Outcome: Progressing   Goal Outcome Evaluation:                 Outcome Evaluation: Now on 3-5L NC.  LS coarse.  More alert today, using TV remote with L hand, able to nod 'yes' to questions.  Somewhat restless.  Per family neuros appear to be at baseline.  Dtr asked pt in [place]ian if he is in pain and per her she said he denied any specific pain.  Clear christine urine per ordoñez.  TF at goal to PEG tube, button rotated, site WNL. Pt has rectal tube with loose blackish stool, it is noted that pt gets iron pills  daily.

## 2025-02-21 NOTE — PLAN OF CARE
"Goal Outcome Evaluation:      Plan of Care Reviewed With: patient, family    Overall Patient Progress: improvingOverall Patient Progress: improving        Outcome Evaluation: On 2L NC, LS coarse. Congested cough, but unable to get out. Alert this morning, answering some yes/no questions. Sleepier in the afternoon. PEG clogged, but able to unclog with warm water, contents then began spilling back out slowly. Per MD clamp for a couple hours. Rectal tube in place with green/dark stool. Felix in place with good uop. Gave report to nurse on [address], will get packed and sent up.       Problem: Adult Inpatient Plan of Care  Goal: Plan of Care Review  Description: The Plan of Care Review/Shift note should be completed every shift.  The Outcome Evaluation is a brief statement about your assessment that the patient is improving, declining, or no change.  This information will be displayed automatically on your shift  note.  Outcome: Progressing  Flowsheets (Taken 2/21/2025 1420)  Outcome Evaluation: On 2L NC, LS coarse. Congested cough, but unable to get out. Alert this morning, answering some yes/no questions. Sleepier in the afternoon. PEG clogged, but able to unclog with warm water, contents then began spilling back out slowly. Per MD clamp for a couple hours. Rectal tube in place with green/dark stool. Felix in place with good uop. xfer orders in place.  Plan of Care Reviewed With:   patient   family  Overall Patient Progress: improving  Goal: Patient-Specific Goal (Individualized)  Description: You can add care plan individualizations to a care plan. Examples of Individualization might be:  \"Parent requests to be called daily at 9am for status\", \"I have a hard time hearing out of my right ear\", or \"Do not touch me to wake me up as it startles  me\".  Outcome: Progressing  Goal: Absence of Hospital-Acquired Illness or Injury  Outcome: Progressing  Intervention: Identify and Manage Fall Risk  Recent Flowsheet " Documentation  Taken 2/21/2025 1200 by Marcelo Perez RN  Safety Promotion/Fall Prevention:   clutter free environment maintained   room near nurse's station   activity supervised   assistive device/personal items within reach   increased rounding and observation   increase visualization of patient   nonskid shoes/slippers when out of bed   patient and family education   supervised activity   treat reversible contributory factors   treat underlying cause  Taken 2/21/2025 0800 by Marcelo Perez RN  Safety Promotion/Fall Prevention:   clutter free environment maintained   room near nurse's station   activity supervised   assistive device/personal items within reach   increased rounding and observation   increase visualization of patient   nonskid shoes/slippers when out of bed   patient and family education   supervised activity   treat reversible contributory factors   treat underlying cause  Intervention: Prevent Skin Injury  Recent Flowsheet Documentation  Taken 2/21/2025 1300 by Marcelo Perez RN  Body Position:   right   turned   heels elevated   legs elevated   log-rolled   upper extremity elevated  Taken 2/21/2025 1000 by Marcelo Perez RN  Body Position:   left   turned   heels elevated   legs elevated   upper extremity elevated  Taken 2/21/2025 0800 by Marcelo Perez RN  Body Position:   right   turned   heels elevated   legs elevated   upper extremity elevated  Intervention: Prevent and Manage VTE (Venous Thromboembolism) Risk  Recent Flowsheet Documentation  Taken 2/21/2025 1200 by Marcelo Perez RN  VTE Prevention/Management: SCDs on (sequential compression devices)  Taken 2/21/2025 0800 by Marcelo Perez RN  VTE Prevention/Management: SCDs off (sequential compression devices)  Intervention: Prevent Infection  Recent Flowsheet Documentation  Taken 2/21/2025 1200 by Marcelo Perez RN  Infection Prevention:   environmental  surveillance performed   equipment surfaces disinfected   hand hygiene promoted   personal protective equipment utilized   rest/sleep promoted   single patient room provided  Taken 2/21/2025 0800 by Marcelo Perez RN  Infection Prevention:   environmental surveillance performed   equipment surfaces disinfected   hand hygiene promoted   personal protective equipment utilized   rest/sleep promoted   single patient room provided  Goal: Optimal Comfort and Wellbeing  Outcome: Progressing  Intervention: Provide Person-Centered Care  Recent Flowsheet Documentation  Taken 2/21/2025 1200 by Marcelo Perez RN  Trust Relationship/Rapport:   care explained   choices provided   emotional support provided   empathic listening provided   questions answered   questions encouraged   reassurance provided   thoughts/feelings acknowledged  Taken 2/21/2025 0800 by Marcelo Perez RN  Trust Relationship/Rapport:   care explained   choices provided   emotional support provided   empathic listening provided   questions answered   questions encouraged   reassurance provided   thoughts/feelings acknowledged  Goal: Readiness for Transition of Care  Outcome: Progressing     Problem: Skin Injury Risk Increased  Goal: Skin Health and Integrity  Outcome: Progressing  Intervention: Plan: Nurse Driven Intervention: Moisture Management  Recent Flowsheet Documentation  Taken 2/21/2025 1300 by Marcelo Perez RN  Bathing/Skin Care: incontinence care  Taken 2/21/2025 1200 by Marcelo Perez RN  Moisture Interventions:   No brief in bed   Incontinence pad   Urinary collection device   Perineal cleanser  Taken 2/21/2025 0800 by Marcelo Perez RN  Moisture Interventions:   No brief in bed   Incontinence pad   Urinary collection device   Perineal cleanser  Intervention: Plan: Nurse Driven Intervention: Friction and Shear  Recent Flowsheet Documentation  Taken 2/21/2025 1200 by Marcelo Perez  RAHAT MÉNDEZ  Friction/Shear Interventions:   HOB 30 degrees or less   Silicone foam sacral dressing   Repositioning device (TAP system, etc.)  Taken 2/21/2025 0800 by Marcelo Perez RN  Friction/Shear Interventions:   HOB 30 degrees or less   Silicone foam sacral dressing   Repositioning device (TAP system, etc.)  Intervention: Optimize Skin Protection  Recent Flowsheet Documentation  Taken 2/21/2025 1300 by Marcelo Perez RN  Head of Bed (HOB) Positioning: HOB at 30 degrees  Taken 2/21/2025 1200 by Marcelo Perez RN  Activity Management: activity adjusted per tolerance  Taken 2/21/2025 1000 by Marcelo Perez RN  Head of Bed (HOB) Positioning: HOB at 30 degrees  Taken 2/21/2025 0800 by Marcelo Perez RN  Activity Management: activity adjusted per tolerance  Head of Bed (HOB) Positioning: HOB at 30 degrees  Intervention: Promote and Optimize Oral Intake  Recent Flowsheet Documentation  Taken 2/21/2025 1200 by Marcelo Perez RN  Oral Nutrition Promotion: rest periods promoted  Taken 2/21/2025 0800 by Marcelo Perez RN  Oral Nutrition Promotion: rest periods promoted     Problem: Comorbidity Management  Goal: Blood Glucose Levels Within Targeted Range  Outcome: Progressing  Intervention: Monitor and Manage Glycemia  Recent Flowsheet Documentation  Taken 2/21/2025 1200 by Marcelo Perez RN  Medication Review/Management:   medications reviewed   high-risk medications identified  Taken 2/21/2025 0800 by Marcelo Perez RN  Medication Review/Management:   medications reviewed   high-risk medications identified  Goal: Blood Pressure in Desired Range  Outcome: Progressing  Intervention: Maintain Blood Pressure Management  Recent Flowsheet Documentation  Taken 2/21/2025 1200 by Marcelo Perez RN  Medication Review/Management:   medications reviewed   high-risk medications identified  Taken 2/21/2025 0800 by Marcelo Perez  RN  Medication Review/Management:   medications reviewed   high-risk medications identified     Problem: Infection  Goal: Absence of Infection Signs and Symptoms  Outcome: Progressing  Intervention: Prevent or Manage Infection  Recent Flowsheet Documentation  Taken 2/21/2025 1200 by Marcelo Perez RN  Isolation Precautions: droplet precautions maintained  Taken 2/21/2025 0800 by Marcelo Perez RN  Isolation Precautions: droplet precautions maintained     Problem: Gas Exchange Impaired  Goal: Optimal Gas Exchange  Outcome: Progressing  Intervention: Optimize Oxygenation and Ventilation  Recent Flowsheet Documentation  Taken 2/21/2025 1300 by Marcelo Perez RN  Head of Bed (HOB) Positioning: HOB at 30 degrees  Taken 2/21/2025 1000 by Marcelo Perez RN  Head of Bed (HOB) Positioning: HOB at 30 degrees  Taken 2/21/2025 0800 by Marcelo Perez RN  Head of Bed (HOB) Positioning: HOB at 30 degrees     Problem: Risk for Delirium  Goal: Optimal Coping  Outcome: Progressing  Intervention: Optimize Psychosocial Adjustment to Delirium  Recent Flowsheet Documentation  Taken 2/21/2025 1200 by Marcelo Perez RN  Supportive Measures:   active listening utilized   positive reinforcement provided   relaxation techniques promoted  Family/Support System Care:   presence promoted   self-care encouraged   support provided  Taken 2/21/2025 0800 by Marcelo Perez RN  Supportive Measures:   active listening utilized   positive reinforcement provided   relaxation techniques promoted  Family/Support System Care:   presence promoted   self-care encouraged   support provided  Goal: Improved Behavioral Control  Outcome: Progressing  Intervention: Prevent and Manage Agitation  Recent Flowsheet Documentation  Taken 2/21/2025 1200 by Marcelo Perez RN  Environment Familiarity/Consistency: daily routine followed  Taken 2/21/2025 0800 by Marcelo Perez  RN  Environment Familiarity/Consistency: daily routine followed  Intervention: Minimize Safety Risk  Recent Flowsheet Documentation  Taken 2/21/2025 1200 by Marcelo Perez RN  Enhanced Safety Measures:   review medications for side effects with activity   assistive devices when indicated   family to remain at bedside   monitor patients coagulation values   pain management   room near unit station  Trust Relationship/Rapport:   care explained   choices provided   emotional support provided   empathic listening provided   questions answered   questions encouraged   reassurance provided   thoughts/feelings acknowledged  Taken 2/21/2025 0800 by Marcelo Perez RN  Enhanced Safety Measures:   review medications for side effects with activity   assistive devices when indicated   family to remain at bedside   monitor patients coagulation values   pain management   room near unit station  Trust Relationship/Rapport:   care explained   choices provided   emotional support provided   empathic listening provided   questions answered   questions encouraged   reassurance provided   thoughts/feelings acknowledged  Goal: Improved Attention and Thought Clarity  Outcome: Progressing  Intervention: Maximize Cognitive Function  Recent Flowsheet Documentation  Taken 2/21/2025 1200 by Marcelo Perez RN  Sensory Stimulation Regulation:   care clustered   lighting decreased   quiet environment promoted  Reorientation Measures:   calendar in view   clock in view   familiar social contact encouraged   reorientation provided  Taken 2/21/2025 0800 by Marcelo Perez RN  Sensory Stimulation Regulation:   care clustered   lighting decreased   quiet environment promoted  Reorientation Measures:   calendar in view   clock in view   familiar social contact encouraged   reorientation provided  Goal: Improved Sleep  Outcome: Progressing

## 2025-02-21 NOTE — PROVIDER NOTIFICATION
Brief update:    Previously hyperkalemic.  Received Lokelma on the 19th and is on renal tube feeds.  Receiving daily Lasix.  Is now hypokalemic at 3.0.    20 mEq of potassium via NG at this time    Has been making approximately 75 mL/h urine over the past 2 shifts.    Follow potassium.  Creatinine still elevated in the 5 range    Edouard De Guzman MD

## 2025-02-21 NOTE — PLAN OF CARE
"Neuro: Non-verbal baseline, PERRL, follows commands. Baseline R hemiplegia; RUE contracted. Did not sleep much, if at all; prn melatonin ordered & given.    CV: SR, 80s.     Resp: Lung sounds coarse. NC 2L.    GI: Bowel sounds normoactive, RT in place. Diet NPO; TF at goal, 30mL/hr; 30mL flush q4.    : Chronic ordoñez in place, adequate UOP.    Skin: Buttock/sacrum PI.    Activity: Ax2 w/ lift.    Other: blood sugars remain in 200s. Plan to transfer out of ICU in AM. Updated sonSathya, at bedside overnight.      Problem: Comorbidity Management  Goal: Blood Glucose Levels Within Targeted Range  Outcome: Not Progressing  Intervention: Monitor and Manage Glycemia  Recent Flowsheet Documentation  Taken 2/21/2025 0000 by Rohit Eisenberg RN  Medication Review/Management:   medications reviewed   high-risk medications identified  Taken 2/20/2025 2000 by Rohit Eisenberg RN  Medication Review/Management:   medications reviewed   high-risk medications identified     Problem: Adult Inpatient Plan of Care  Goal: Plan of Care Review  Description: The Plan of Care Review/Shift note should be completed every shift.  The Outcome Evaluation is a brief statement about your assessment that the patient is improving, declining, or no change.  This information will be displayed automatically on your shift  note.  Outcome: Progressing  Goal: Patient-Specific Goal (Individualized)  Description: You can add care plan individualizations to a care plan. Examples of Individualization might be:  \"Parent requests to be called daily at 9am for status\", \"I have a hard time hearing out of my right ear\", or \"Do not touch me to wake me up as it startles  me\".  Outcome: Progressing  Goal: Absence of Hospital-Acquired Illness or Injury  Outcome: Progressing  Intervention: Identify and Manage Fall Risk  Recent Flowsheet Documentation  Taken 2/21/2025 0000 by Rohit Eisenberg RN  Safety Promotion/Fall Prevention:   clutter free environment " maintained   room near nurse's station   activity supervised   assistive device/personal items within reach   increased rounding and observation   increase visualization of patient   nonskid shoes/slippers when out of bed   patient and family education   supervised activity   treat reversible contributory factors   treat underlying cause  Taken 2/20/2025 2000 by Rohit Eisenberg RN  Safety Promotion/Fall Prevention:   clutter free environment maintained   room near nurse's station   activity supervised   assistive device/personal items within reach   increased rounding and observation   increase visualization of patient   nonskid shoes/slippers when out of bed   patient and family education   supervised activity   treat reversible contributory factors   treat underlying cause  Intervention: Prevent Skin Injury  Recent Flowsheet Documentation  Taken 2/21/2025 0200 by Rohit Eisenberg RN  Body Position: position maintained  Taken 2/21/2025 0000 by Rohit Eisenberg RN  Body Position:   turned   heels elevated  Taken 2/20/2025 2200 by Rohit Eisenberg RN  Body Position:   turned   heels elevated  Taken 2/20/2025 2000 by Rohit Eisenberg RN  Body Position: position maintained  Intervention: Prevent and Manage VTE (Venous Thromboembolism) Risk  Recent Flowsheet Documentation  Taken 2/21/2025 0000 by Rohit Eisenberg RN  VTE Prevention/Management:   SCDs off (sequential compression devices)   patient refused intervention  Taken 2/20/2025 2000 by Rohit Eisenberg RN  VTE Prevention/Management:   SCDs off (sequential compression devices)   patient refused intervention  Intervention: Prevent Infection  Recent Flowsheet Documentation  Taken 2/21/2025 0000 by Rohit Eisenberg RN  Infection Prevention:   environmental surveillance performed   equipment surfaces disinfected   hand hygiene promoted   personal protective equipment utilized   rest/sleep promoted   single patient room provided  Taken 2/20/2025 2000 by Faina  Rohit AGGARWAL RN  Infection Prevention:   environmental surveillance performed   equipment surfaces disinfected   hand hygiene promoted   personal protective equipment utilized   rest/sleep promoted   single patient room provided  Goal: Optimal Comfort and Wellbeing  Outcome: Progressing  Intervention: Monitor Pain and Promote Comfort  Recent Flowsheet Documentation  Taken 2/20/2025 2200 by Rohit Eisenberg RN  Pain Management Interventions:   medication (see MAR)   care clustered   pillow support provided   repositioned   rest  Intervention: Provide Person-Centered Care  Recent Flowsheet Documentation  Taken 2/21/2025 0000 by Rohit Eisenberg RN  Trust Relationship/Rapport:   care explained   choices provided   emotional support provided   empathic listening provided   questions answered   questions encouraged   reassurance provided   thoughts/feelings acknowledged  Taken 2/20/2025 2000 by Rohit Eisenberg RN  Trust Relationship/Rapport:   care explained   choices provided   emotional support provided   empathic listening provided   questions answered   questions encouraged   reassurance provided   thoughts/feelings acknowledged  Goal: Readiness for Transition of Care  Outcome: Progressing     Problem: Skin Injury Risk Increased  Goal: Skin Health and Integrity  Outcome: Progressing  Intervention: Plan: Nurse Driven Intervention: Moisture Management  Recent Flowsheet Documentation  Taken 2/21/2025 0000 by Rohit Eisenberg RN  Moisture Interventions:   No brief in bed   Incontinence pad   Fecal collection device   Urinary collection device   Perineal cleanser  Taken 2/20/2025 2000 by Rohit Eisenberg RN  Moisture Interventions:   No brief in bed   Incontinence pad   Fecal collection device   Urinary collection device   Perineal cleanser  Intervention: Plan: Nurse Driven Intervention: Friction and Shear  Recent Flowsheet Documentation  Taken 2/21/2025 0000 by Rohit Eisenberg RN  Friction/Shear Interventions:   HOB  30 degrees or less   Silicone foam sacral dressing   Pad bony prominence (elbow pads, heel pads, ear protectors)   Assistive lifting device (portable/ceiling lift, etc.)   Lateral transfer device (hovermat, etc.)   Repositioning device (TAP system, etc.)  Taken 2/20/2025 2000 by Rohit Eisenberg RN  Friction/Shear Interventions:   HOB 30 degrees or less   Silicone foam sacral dressing   Pad bony prominence (elbow pads, heel pads, ear protectors)   Assistive lifting device (portable/ceiling lift, etc.)   Lateral transfer device (hovermat, etc.)   Repositioning device (TAP system, etc.)  Intervention: Optimize Skin Protection  Recent Flowsheet Documentation  Taken 2/21/2025 0200 by Rohit Eisenberg RN  Head of Bed (HOB) Positioning: HOB at 30 degrees  Taken 2/21/2025 0000 by Rohit Eisenberg RN  Activity Management: activity adjusted per tolerance  Head of Bed (HOB) Positioning: HOB at 30 degrees  Taken 2/20/2025 2200 by Rohit Eisenberg RN  Head of Bed (HOB) Positioning: HOB at 30-45 degrees  Taken 2/20/2025 2000 by Rohit Eisenberg RN  Activity Management: activity adjusted per tolerance  Head of Bed (HOB) Positioning: HOB at 30-45 degrees     Problem: Comorbidity Management  Goal: Blood Pressure in Desired Range  Outcome: Progressing  Intervention: Maintain Blood Pressure Management  Recent Flowsheet Documentation  Taken 2/21/2025 0000 by Rohit Eisenberg RN  Medication Review/Management:   medications reviewed   high-risk medications identified  Taken 2/20/2025 2000 by Rohit Eisenberg RN  Medication Review/Management:   medications reviewed   high-risk medications identified     Problem: Infection  Goal: Absence of Infection Signs and Symptoms  Outcome: Progressing  Intervention: Prevent or Manage Infection  Recent Flowsheet Documentation  Taken 2/21/2025 0000 by Rohit Eisenberg RN  Infection Management: aseptic technique maintained  Isolation Precautions: droplet precautions maintained  Taken 2/20/2025  2000 by Rohit Eisenberg RN  Infection Management: aseptic technique maintained  Isolation Precautions: droplet precautions maintained     Problem: Gas Exchange Impaired  Goal: Optimal Gas Exchange  Outcome: Progressing  Intervention: Optimize Oxygenation and Ventilation  Recent Flowsheet Documentation  Taken 2/21/2025 0200 by Rohit Eisenberg RN  Head of Bed (HOB) Positioning: HOB at 30 degrees  Taken 2/21/2025 0000 by Rohit Eisenberg RN  Head of Bed (HOB) Positioning: HOB at 30 degrees  Taken 2/20/2025 2200 by Rohit Eisenberg RN  Head of Bed (HOB) Positioning: HOB at 30-45 degrees  Taken 2/20/2025 2000 by Rohit Eisenberg RN  Head of Bed (HOB) Positioning: HOB at 30-45 degrees     Problem: Risk for Delirium  Goal: Optimal Coping  Outcome: Progressing  Intervention: Optimize Psychosocial Adjustment to Delirium  Recent Flowsheet Documentation  Taken 2/21/2025 0000 by Rohit Eisenberg RN  Supportive Measures:   active listening utilized   decision-making supported   goal-setting facilitated   positive reinforcement provided   relaxation techniques promoted  Taken 2/20/2025 2000 by Rohit Eisenberg RN  Supportive Measures:   active listening utilized   decision-making supported   goal-setting facilitated   positive reinforcement provided   relaxation techniques promoted  Goal: Improved Behavioral Control  Outcome: Progressing  Intervention: Minimize Safety Risk  Recent Flowsheet Documentation  Taken 2/21/2025 0000 by Rohit Eisenberg RN  Enhanced Safety Measures:   review medications for side effects with activity   assistive devices when indicated   family to remain at bedside   monitor patients coagulation values   pain management   room near unit station  Trust Relationship/Rapport:   care explained   choices provided   emotional support provided   empathic listening provided   questions answered   questions encouraged   reassurance provided   thoughts/feelings acknowledged  Taken 2/20/2025 2000 by  Rohit Eisenberg RN  Enhanced Safety Measures:   review medications for side effects with activity   assistive devices when indicated   family to remain at bedside   monitor patients coagulation values   pain management   room near unit station  Trust Relationship/Rapport:   care explained   choices provided   emotional support provided   empathic listening provided   questions answered   questions encouraged   reassurance provided   thoughts/feelings acknowledged  Goal: Improved Attention and Thought Clarity  Outcome: Progressing  Intervention: Maximize Cognitive Function  Recent Flowsheet Documentation  Taken 2/21/2025 0000 by Rohit Eisenberg RN  Sensory Stimulation Regulation:   care clustered   lighting decreased   quiet environment promoted  Reorientation Measures:   calendar in view   clock in view   familiar social contact encouraged   reorientation provided  Taken 2/20/2025 2000 by Rohit Eisenberg RN  Sensory Stimulation Regulation:   care clustered   lighting decreased   quiet environment promoted  Reorientation Measures:   calendar in view   clock in view   familiar social contact encouraged   reorientation provided  Goal: Improved Sleep  Outcome: Progressing  Intervention: Promote Sleep  Recent Flowsheet Documentation  Taken 2/21/2025 0000 by Rohit Eisenberg RN  Sleep/Rest Enhancement:   awakenings minimized   noise level reduced   regular sleep/rest pattern promoted   relaxation techniques promoted   room darkened   medication  Taken 2/20/2025 2000 by Rohit Eisenberg RN  Sleep/Rest Enhancement:   awakenings minimized   noise level reduced   regular sleep/rest pattern promoted   relaxation techniques promoted   room darkened     Problem: Swallowing Impairment  Goal: Optimal Eating and Swallowing Without Aspiration  Outcome: Adequate for Care Transition  Intervention: Optimize Eating and Swallowing  Recent Flowsheet Documentation  Taken 2/21/2025 0000 by Rohit Eisenberg RN  Aspiration  Precautions:   medication route adjusted   oral hygiene care promoted   respiratory status monitored  Taken 2/20/2025 2000 by Rohit Eisenberg, RN  Aspiration Precautions:   medication route adjusted   oral hygiene care promoted   respiratory status monitored

## 2025-02-22 LAB
BACTERIA BLD CULT: NO GROWTH
GLUCOSE BLDC GLUCOMTR-MCNC: 197 MG/DL (ref 70–99)
GLUCOSE BLDC GLUCOMTR-MCNC: 207 MG/DL (ref 70–99)
GLUCOSE BLDC GLUCOMTR-MCNC: 208 MG/DL (ref 70–99)
GLUCOSE BLDC GLUCOMTR-MCNC: 236 MG/DL (ref 70–99)
GLUCOSE BLDC GLUCOMTR-MCNC: 240 MG/DL (ref 70–99)
GLUCOSE BLDC GLUCOMTR-MCNC: 248 MG/DL (ref 70–99)
GLUCOSE BLDC GLUCOMTR-MCNC: 260 MG/DL (ref 70–99)
POTASSIUM SERPL-SCNC: 3.6 MMOL/L (ref 3.4–5.3)

## 2025-02-22 PROCEDURE — 250N000011 HC RX IP 250 OP 636: Performed by: HOSPITALIST

## 2025-02-22 PROCEDURE — 84132 ASSAY OF SERUM POTASSIUM: CPT | Performed by: HOSPITALIST

## 2025-02-22 PROCEDURE — 250N000013 HC RX MED GY IP 250 OP 250 PS 637: Performed by: HOSPITALIST

## 2025-02-22 PROCEDURE — B4036 ENTERAL FEED SUP KIT GRAV BY: HCPCS

## 2025-02-22 PROCEDURE — 36415 COLL VENOUS BLD VENIPUNCTURE: CPT | Performed by: HOSPITALIST

## 2025-02-22 PROCEDURE — 99232 SBSQ HOSP IP/OBS MODERATE 35: CPT | Performed by: HOSPITALIST

## 2025-02-22 PROCEDURE — 250N000013 HC RX MED GY IP 250 OP 250 PS 637: Performed by: INTERNAL MEDICINE

## 2025-02-22 PROCEDURE — 120N000013 HC R&B IMCU

## 2025-02-22 RX ADMIN — OSELTAMIVIR PHOSPHATE 30 MG: 6 POWDER, FOR SUSPENSION ORAL at 09:24

## 2025-02-22 RX ADMIN — INSULIN ASPART 3 UNITS: 100 INJECTION, SOLUTION INTRAVENOUS; SUBCUTANEOUS at 06:43

## 2025-02-22 RX ADMIN — FUROSEMIDE 20 MG: 20 TABLET ORAL at 09:01

## 2025-02-22 RX ADMIN — INSULIN ASPART 2 UNITS: 100 INJECTION, SOLUTION INTRAVENOUS; SUBCUTANEOUS at 14:14

## 2025-02-22 RX ADMIN — CLOPIDOGREL BISULFATE 75 MG: 75 TABLET ORAL at 09:01

## 2025-02-22 RX ADMIN — INSULIN ASPART 3 UNITS: 100 INJECTION, SOLUTION INTRAVENOUS; SUBCUTANEOUS at 02:44

## 2025-02-22 RX ADMIN — FLUOXETINE 20 MG: 20 SOLUTION ORAL at 12:12

## 2025-02-22 RX ADMIN — CEFTRIAXONE SODIUM 1 G: 1 INJECTION, POWDER, FOR SOLUTION INTRAMUSCULAR; INTRAVENOUS at 09:01

## 2025-02-22 RX ADMIN — Medication 40 MG: at 20:21

## 2025-02-22 RX ADMIN — DOXAZOSIN 2 MG: 2 TABLET ORAL at 09:44

## 2025-02-22 RX ADMIN — INSULIN ASPART 3 UNITS: 100 INJECTION, SOLUTION INTRAVENOUS; SUBCUTANEOUS at 09:03

## 2025-02-22 RX ADMIN — Medication 5 ML: at 17:15

## 2025-02-22 RX ADMIN — AMLODIPINE BESYLATE 10 MG: 10 TABLET ORAL at 09:01

## 2025-02-22 RX ADMIN — Medication 40 MG: at 09:24

## 2025-02-22 ASSESSMENT — ACTIVITIES OF DAILY LIVING (ADL)
ADLS_ACUITY_SCORE: 105
ADLS_ACUITY_SCORE: 103
ADLS_ACUITY_SCORE: 105
ADLS_ACUITY_SCORE: 103
ADLS_ACUITY_SCORE: 105
ADLS_ACUITY_SCORE: 103
ADLS_ACUITY_SCORE: 105

## 2025-02-22 NOTE — PROGRESS NOTES
[facility]  Hospitalist Progress Note        Isidro Soliz MD   02/22/2025        Interval History:        - Transferred out of ICU on 2/21/25  - appears somnolent this morning  - BG elevated in 230s-- 250s; will increase Lantus to 14 units daily (2/22) and will increase sliding scale insulin to high resistance         Assessment and Plan:        Jimmy Otto is a 75 year old male with PMH of HTN, DLD, diabetes , hx of stroke (with right sided hemiplegia and aphasia-non verbal at baseline), PEG tube feeding,  chronic indwelling ordoñez (for neurogenic bladder), CKD 3b (with progressive renal dysfunction), chronic normocytic anemia who as brought to the ED with concern for decreasing urine output and was also noted with influenza with respiratory failure, junctional bradycardia, acute on chronic renal failure and anemia; admitted inpatient 2/16/25.      ALEX on CKD stage IV  neurogenic bladder with chronic indwelling Ordoñez catheter  Hyperkalemia  Hyponatremia  Hypochloremia  - recent Cr on 2/12 was 3.93 with Na 129 and normal potassium   - sodium 127--->137; K 6.2--->3.8; Cr 4.26---> 5.26  - did get K shifting meds in ED and was started on Lokelma  - Ordoñez catheter replaced on admission  - nephrology following and ordered for intermittent diuresis with Chlorthalidone and IV Lasix; suggest medical management without dialysis (not a candidate for peritoneal dialysis due to PEG and not a good candidate for HD-- need for michell lift)  - adequate diuresis with trial of diuretics; nephrology stopped Lokelma (2/19); adjusted free water per feeding tubes; started Lasix PO (2/19)  - hyperkalemia resolved but now hypokalemic with K 3.0, likely diuresis induced-- supplementing potassium per protocol; nephrology decreased Lasix to 20 mg daily (2/22)  - creatinine seems stable around 5 with good urine output     Acute hypoxic respiratory failure multifactorial likely due to volume overload due to acute on chronic  diastolic CHF and exacerbation  influenza A infection   likely aspiration event  - PTA On Torsemide 20 mg daily  - on admission patient was requiring 10 L of oxygen, increased to 50 lts HFNC  - proBNP significantly elevated at 19, 616; Pro-Westley normal at 0.29  - Chest x-ray 2/16 noted with diffuse mixed interstitial and airspace opacities likely due to moderate pulmonary edema and small right and trace left pleural effusion and mid bibasilar atelectasis with no pneumothorax  - 2/16: influenza A positive; COVID and RSV negative  - RRT on 2/16 with increased O2 needs and repeat CXR with some concern for asymmetric edema versus multifocal pneumonitis requiring 50 lts HFNC  - getting diuresis per nephrology as above with Lasix while holding PTA torsemide  - respiratory status improved and HFNC weaned down to 2 L, will likely need home O2  - per AMT recommendations, switched empiric Zosyn to Rocephin (2/19); to complete 6 days course on 2/22) and also adjusted Tamiflu for a 10 days course (renally dosed)     Elevated troponin likely due to demand due to underlying renal failure and volume overload type II NSTEMI  Junctional bradycardia  - serial troponins with no significant trend 106 --- 105 --- 108 ; EKG noted with junctional rhythm   - has converted to sinus rhythm  - evaluated by cardiology, note junctional bradycardia is likely due to metabolic abnormalities with worsening renal failure and hyperkalemia and note no indication for pacemaker implantation  - Continue to monitor on telemetry    Encephalopathy likely due to uremia and infectious causes  H/o CVA with residual right hemiplegia and baseline nonverbal status  chronic dysphagia, PEG tube feeds  Dyslipidemia  Hypertension  - baseline right hemiplegia, non-verbal status from prior CVA  - some encephalopathy in the setting of acute illness ; treating underlying causes as above  - nutrition following for tube feeds  - continue Plavix 75 mg daily; holding PTA  Lipitor 20 mg daily  - PTA amlodipine 10 mg daily, doxazosin 2 mg daily-- resumed 2/19, BP better     Acute on chronic anemia  Dark stools  - baseline Hb around 9, anemia of chronic disease in the setting of underlying CKD  - PTA ferrous sulfate 325 mg twice daily continued  - Hb 8.1---6.8-- 1 unit PRBC on 2/18  - 2/19: reported some dark stools (on iron), not grossly melanotic, hemodynamically stable and stable hemoglobin after 1 unit PRBC on 2/18; Hb 6.8---> 7.2---7.8---7.3  - will not be an appropriate candidate for endoscopic /colonoscopy at evaluation at this time; will monitor clinically with repeat Hb and discuss goals of care again if has suggestion of active bleed  - started on IV Protonix     Diabetes mellitus type 2  - PTA on Lantus 9 units subcu a.m., and insulin lispro sliding scale  -  BG elevated in 230s-- 250s; will increase Lantus further to 14 units daily (2/22) and will increase sliding scale insulin to high resistance  - continue with hypoglycemia protocol    Subclinical hypothyroidism  - TSH is mildly elevated at 8.72 but free T4 is normal; most likely due to underlying acute illness    Goals of care   - on presentation noted patient critically ill; family aware ; not a candidate for dialysis per nephrology as noted above  - code status changed to DNR/DNI  - palliative care met with family (2/18), remains DNR/DNI but family want to continue with restorative cares for now   -  following for disposition     DVT Prophylaxis: Pneumatic Compression Devices    Code Status: DNR/DNI     Diet:   NPO for Medical/Clinical Reasons Except for: NPO but receiving Tube Feeding  Adult Formula Drip Feeding: Continuous Novasource Renal; Gastrostomy; Goal Rate: 30; mL/hr; Start TF @ 10 ml/hr. Advance by 10 mL q 12 hours until goal rate reached.; Do not advance tube feeding rate unless K+ is = or > 3.0, Mg++ is = or > 1.5, an...      Disposition:   Medically Ready for Discharge: Anticipated Tomorrow  "pending clinical stability  - transferred out of ICU 2/21    Care plan discussed with nursing and patient's son present in room       Clinically Significant Risk Factors        # Hypokalemia: Lowest K = 3 mmol/L in last 2 days, will replace as needed        # Hypoalbuminemia: Lowest albumin = 3.1 g/dL at 2/17/2025  4:28 AM, will monitor as appropriate      # Acute Kidney Injury, unspecified: based on a >150% or 0.3 mg/dL increase in last creatinine compared to past 90 day average, will monitor renal function    # Hypertension: Noted on problem list    # Chronic heart failure with preserved ejection fraction: heart failure noted on problem list and last echo with EF >50%          # DMII: A1C = 8.8 % (Ref range: <5.7 %) within past 6 months         # Financial/Environmental Concerns: none                            Physical Exam:      Blood pressure 132/58, pulse 82, temperature 99.4  F (37.4  C), temperature source Oral, resp. rate 18, height 1.676 m (5' 6\"), weight 69.4 kg (153 lb), SpO2 96%.  Vitals:    02/20/25 0400 02/21/25 0400 02/22/25 0524   Weight: 64.7 kg (142 lb 10.2 oz) 65.3 kg (143 lb 15.4 oz) 69.4 kg (153 lb)     Vital Signs with Ranges  Temp:  [98.2  F (36.8  C)-99.4  F (37.4  C)] 99.4  F (37.4  C)  Pulse:  [73-83] 82  Resp:  [14-18] 18  BP: (130-149)/(58-70) 132/58  SpO2:  [95 %-98 %] 96 %  I/O's Last 24 hours  I/O last 3 completed shifts:  In: 5119 [I.V.:80; NG/GT:4699; IV Piggyback:100]  Out: 2815 [Urine:2365; Stool:450]    Constitutional: Baseline non-verbal; alert and awake ; somnolent; resting comfortably in no apparent distress       Oral cavity: Moist mucosa   Cardiovascular: Normal s1 s2, regular rate and rhythm, no murmur   Lungs: B/l coarse breath sounds   Abdomen: Soft, nt, nd, no guarding, rigidity or rebound; BS +; G tube in place   LE : no edema    Musculoskeletal/Neuro Right hemiplegia, non-verbal   Psychiatry: nonverbal  Felix catheter in place  rectal tube in place                " Medications:        Current Facility-Administered Medications   Medication Dose Route Frequency Provider Last Rate Last Admin    amLODIPine (NORVASC) tablet 10 mg  10 mg Oral or Feeding Tube Daily Isidro Soliz MD   10 mg at 02/21/25 0802    B and C vitamin Complex with folic acid (NEPHRONEX) liquid 5 mL  5 mL Per Feeding Tube Daily Isidro Soliz MD   5 mL at 02/21/25 1634    cefTRIAXone (ROCEPHIN) 1 g vial to attach to  mL bag for ADULTS or NS 50 mL bag for PEDS  1 g Intravenous Q24H Isidro Soliz MD   1 g at 02/21/25 0802    clopidogrel (PLAVIX) tablet 75 mg  75 mg Oral or Feeding Tube Daily Isidro Soliz MD   75 mg at 02/21/25 0802    doxazosin (CARDURA) tablet 2 mg  2 mg Oral or Feeding Tube Daily Isidro Soliz MD   2 mg at 02/21/25 0802    FLUoxetine (PROzac) solution 20 mg  20 mg Oral or Feeding Tube Daily Isidro Soliz MD   20 mg at 02/21/25 1213    furosemide (LASIX) tablet 20 mg  20 mg Oral or Feeding Tube Daily Darell Santiago MD        insulin aspart (NovoLOG) injection (RAPID ACTING)  1-7 Units Subcutaneous Q4H Isidro Soliz MD   3 Units at 02/22/25 0643    insulin glargine (LANTUS PEN) injection 11 Units  11 Units Subcutaneous QAM AC Isidro Soliz MD        oseltamivir (TAMIFLU) suspension 30 mg  30 mg Oral or Feeding Tube Daily Isidro Soliz MD   30 mg at 02/21/25 0803    pantoprazole (PROTONIX) 2 mg/mL suspension 40 mg  40 mg Per Feeding Tube BID Isidro Soliz MD   40 mg at 02/21/25 2216    sodium chloride (PF) 0.9% PF flush 3 mL  3 mL Intracatheter Q8H Isidro Soliz MD   3 mL at 02/21/25 1637     PRN Meds:   Current Facility-Administered Medications   Medication Dose Route Frequency Provider Last Rate Last Admin    acetaminophen (TYLENOL) tablet 650 mg  650 mg Oral Q4H PRN Isidro Soliz MD   650 mg at 02/20/25 2212    Or    acetaminophen (TYLENOL) Suppository 650 mg  650 mg Rectal Q4H PRN  Isidro Soliz MD        calcium carbonate (TUMS) chewable tablet 1,000 mg  1,000 mg Oral 4x Daily PRN Isidro Soliz MD   1,000 mg at 02/20/25 1505    dextrose 10% infusion   Intravenous Continuous PRN Isidro Soliz MD        glucose gel 15-30 g  15-30 g Oral Q15 Min PRN Isidro Soliz MD        Or    dextrose 50 % injection 25-50 mL  25-50 mL Intravenous Q15 Min PRN Isidro Soliz MD        Or    glucagon injection 1 mg  1 mg Subcutaneous Q15 Min PRIsidro Cunningham MD        hydrALAZINE (APRESOLINE) injection 10 mg  10 mg Intravenous Q4H PRIsidro Cunningham MD        lidocaine (LMX4) cream   Topical Q1H PRN Isidro Soliz MD        lidocaine (LMX4) cream   Topical Q1H PRIsidro Cunningham MD        lidocaine 1 % 0.1-1 mL  0.1-1 mL Other Q1H PRN Isidro Soliz MD        lidocaine 1 % 0.1-1 mL  0.1-1 mL Other Q1H PRIsidro Cunningham MD        melatonin tablet 5 mg  5 mg Oral or Feeding Tube At Bedtime PRIsidro Cunningham MD   5 mg at 02/20/25 2329    ondansetron (ZOFRAN) injection 4 mg  4 mg Intravenous Q6H PRIsidro Cunningham MD   4 mg at 02/17/25 1652    senna-docusate (SENOKOT-S/PERICOLACE) 8.6-50 MG per tablet 1 tablet  1 tablet Oral BID PRIsidro Cunningham MD        Or    senna-docusate (SENOKOT-S/PERICOLACE) 8.6-50 MG per tablet 2 tablet  2 tablet Oral BID PRIsidro Cunningham MD        sodium chloride (PF) 0.9% PF flush 3 mL  3 mL Intracatheter q1 min prIsidro Cunningham MD                Data:      All new lab and imaging data was reviewed.   Recent Labs   Lab Test 02/21/25  0442 02/19/25  0512 02/19/25  0143 02/18/25  0456 02/16/25  1515 11/02/21  0609 11/01/21  0735 09/16/21  1003 09/16/21  0957 09/15/21  0521 09/14/21  2145   WBC 7.5  --   --  13.4* 7.1   < > 5.6   < >  --    < >  --    HGB 7.3* 7.8* 7.2* 6.8* 8.1*   < > 11.3*   < >  --    < >  --    MCV 83  --   --  83 82   < > 88   < >  --    < >  --       --   --  162 174   < > 224   < >  --    < >  --    INR  --   --   --   --   --   --  0.96  --  1.03  --  0.94    < > = values in this interval not displayed.      Recent Labs   Lab Test 02/22/25  0632 02/22/25  0616 02/22/25  0217 02/21/25  2205 02/21/25  2125 02/21/25  0751 02/21/25  0442 02/19/25  0842 02/19/25  0512 02/18/25  0828 02/18/25  0456   NA  --   --   --   --   --   --  142  --  137  --  134*   POTASSIUM  --  3.6  --  3.7  --   --  3.0*  --  3.8  --  5.0   CHLORIDE  --   --   --   --   --   --  98  --  96*  --  94*   CO2  --   --   --   --   --   --  29  --  29  --  28   BUN  --   --   --   --   --   --  109.0*  --  133.3*  --  130.8*   CR  --   --   --   --   --   --  5.29*  --  5.26*  --  5.23*   ANIONGAP  --   --   --   --   --   --  15  --  12  --  12   ARLETH  --   --   --   --   --   --  8.5*  --  8.8  --  8.9   *  --  260*  --  237*   < > 253*   < > 168*   < > 163*    < > = values in this interval not displayed.     Recent Labs   Lab Test 10/13/21  2152 09/18/21  1535 09/14/21 2142   TROPONIN <0.015 <0.015 <0.015

## 2025-02-22 NOTE — PROGRESS NOTES
VSS. 1.5  O2, desat on RA. Aphasia, R hemiplagic. Repo Q2, NPO. PEG tube feeding cont. @ goal. Coccyx mapilex intact. Rectal tube in place, ordoñez good UOP. Family @ bedside.

## 2025-02-22 NOTE — PROGRESS NOTES
Transfer in/out    Transfer to 607 from ICU  Report given to/received from Chris Robertson*    Brief note about patient status upon transfer      Code status: DNR / DNI  Full skin assessment done (add LDA if skin issue present). Initials of 2nd RN :Yes. HA  Fall Risk: Yes- Department fall risk interventions implemented.  Isolation: Droplet  Patient belongings: clothing   If patient is a 72 hour hold/Commitment are belongings removed from room and locked up? NA  Medication drips upon transfer: No  Sign and held orders released? Yes

## 2025-02-22 NOTE — PLAN OF CARE
Goal Outcome Evaluation:               Summary:  Transfer from ICU today. Admitted with influenza, respiratory failure, acute on chronic renal failure and anemia.  DATE & TIME: 2/21/25, pm shift    Cognitive Concerns/ Orientation : patient alert ZAYDA orientation due to baseline non verbal for hx of stroke.   BEHAVIOR & AGGRESSION TOOL COLOR: Green  CIWA SCORE: NA   ABNL VS/O2: VSS. On 2 L oxygen.  MOBILITY: Has not get out of bed. T&R q 2 hrs. Right sided hemiplegia from previous stroke.  PAIN MANAGMENT: Denies.  DIET: NPO. TF running @ goal rate of 30ml/ hr with q 4 hrs free water flush.  BOWEL/BLADDER: Chronic ordoñez for neurogenic bladder. Rectal tube in place due to watery stools.  ABNL LAB/BG: K 3.0 replaced, rechecked 3.7. Cr 5.29, Mag 3.0, Hgb 7.3.  and 237 .  DRAIN/DEVICES: L PIV infusing NS @ 10ml/hr TKO.  TELEMETRY RHYTHM: NA  SKIN: Pale. Scattered bruises. Right foot/ankle +2 edema. Old wound scar on coccyx, mepilex in place.  TESTS/PROCEDURES: None  D/C DAY/GOALS/PLACE: Discharge home with family pending.  OTHER IMPORTANT INFO: Nephrology and Sw following. Son at bedside.

## 2025-02-22 NOTE — PLAN OF CARE
Summary:  Transfer from ICU 2/21/25. Admitted with Influenza, respiratory failure, acute on chronic renal failure and anemia.    DATE & TIME: 2/21/25, 2119 - 7903    Cognitive Concerns/ Orientation : ZAYDA orientation due to baseline non verbal for hx of stroke.   BEHAVIOR & AGGRESSION TOOL COLOR: Green  CIWA SCORE: NA   ABNL VS/O2: VSS. On 2 L oxygen.  MOBILITY:  Turned and repositioned.  Right sided hemiplegia from previous stroke.  PAIN MANAGMENT: Absence of nonverbal indicators of pain  DIET: NPO. TF infusing at  goal rate of 30ml/ hr with q 4 hrs free water flush.  BOWEL/BLADDER: Chronic ordoñez for neurogenic bladder. Rectal tube in place due to watery stools.  ABNL LAB/BG: , 240. AM lab pending  DRAIN/DEVICES: PIV infusing NS @ 10ml/hr TKO.  TELEMETRY RHYTHM: NA  SKIN: Pale. Scattered bruises.  +2 edema to BLE. Old wound scar on coccyx, mepilex in place.  TESTS/PROCEDURES: None  D/C DAY/GOALS/PLACE: Discharge home with family pending.  OTHER IMPORTANT INFO: Nephrology and Sw following. Droplet precautions maintained      Goal Outcome Evaluation:      Plan of Care Reviewed With: patient    Overall Patient Progress: no changeOverall Patient Progress: no change

## 2025-02-23 ENCOUNTER — APPOINTMENT (OUTPATIENT)
Dept: CT IMAGING | Facility: CLINIC | Age: 75
End: 2025-02-23
Attending: HOSPITALIST
Payer: MEDICARE

## 2025-02-23 LAB
ABO + RH BLD: NORMAL
ANION GAP SERPL CALCULATED.3IONS-SCNC: 10 MMOL/L (ref 7–15)
BLD GP AB SCN SERPL QL: NEGATIVE
BLD PROD TYP BPU: NORMAL
BLOOD COMPONENT TYPE: NORMAL
BUN SERPL-MCNC: 95.7 MG/DL (ref 8–23)
CALCIUM SERPL-MCNC: 8.2 MG/DL (ref 8.8–10.4)
CHLORIDE SERPL-SCNC: 104 MMOL/L (ref 98–107)
CODING SYSTEM: NORMAL
CREAT SERPL-MCNC: 4.83 MG/DL (ref 0.67–1.17)
CROSSMATCH: NORMAL
EGFRCR SERPLBLD CKD-EPI 2021: 12 ML/MIN/1.73M2
GLUCOSE BLDC GLUCOMTR-MCNC: 144 MG/DL (ref 70–99)
GLUCOSE BLDC GLUCOMTR-MCNC: 149 MG/DL (ref 70–99)
GLUCOSE BLDC GLUCOMTR-MCNC: 199 MG/DL (ref 70–99)
GLUCOSE BLDC GLUCOMTR-MCNC: 206 MG/DL (ref 70–99)
GLUCOSE BLDC GLUCOMTR-MCNC: 218 MG/DL (ref 70–99)
GLUCOSE BLDC GLUCOMTR-MCNC: 221 MG/DL (ref 70–99)
GLUCOSE SERPL-MCNC: 225 MG/DL (ref 70–99)
HCO3 SERPL-SCNC: 33 MMOL/L (ref 22–29)
HGB BLD-MCNC: 7 G/DL (ref 13.3–17.7)
ISSUE DATE AND TIME: NORMAL
POTASSIUM SERPL-SCNC: 3.1 MMOL/L (ref 3.4–5.3)
POTASSIUM SERPL-SCNC: 3.4 MMOL/L (ref 3.4–5.3)
SODIUM SERPL-SCNC: 147 MMOL/L (ref 135–145)
SPECIMEN EXP DATE BLD: NORMAL
UNIT ABO/RH: NORMAL
UNIT NUMBER: NORMAL
UNIT STATUS: NORMAL
UNIT TYPE ISBT: 6200

## 2025-02-23 PROCEDURE — 99232 SBSQ HOSP IP/OBS MODERATE 35: CPT | Performed by: HOSPITALIST

## 2025-02-23 PROCEDURE — 250N000013 HC RX MED GY IP 250 OP 250 PS 637: Performed by: HOSPITALIST

## 2025-02-23 PROCEDURE — 82310 ASSAY OF CALCIUM: CPT | Performed by: INTERNAL MEDICINE

## 2025-02-23 PROCEDURE — 71250 CT THORAX DX C-: CPT

## 2025-02-23 PROCEDURE — B4036 ENTERAL FEED SUP KIT GRAV BY: HCPCS

## 2025-02-23 PROCEDURE — 86900 BLOOD TYPING SEROLOGIC ABO: CPT | Performed by: INTERNAL MEDICINE

## 2025-02-23 PROCEDURE — 80048 BASIC METABOLIC PNL TOTAL CA: CPT | Performed by: INTERNAL MEDICINE

## 2025-02-23 PROCEDURE — 250N000011 HC RX IP 250 OP 636: Performed by: HOSPITALIST

## 2025-02-23 PROCEDURE — 86850 RBC ANTIBODY SCREEN: CPT | Performed by: INTERNAL MEDICINE

## 2025-02-23 PROCEDURE — P9016 RBC LEUKOCYTES REDUCED: HCPCS | Performed by: HOSPITALIST

## 2025-02-23 PROCEDURE — 36415 COLL VENOUS BLD VENIPUNCTURE: CPT | Performed by: HOSPITALIST

## 2025-02-23 PROCEDURE — 86923 COMPATIBILITY TEST ELECTRIC: CPT | Performed by: HOSPITALIST

## 2025-02-23 PROCEDURE — 250N000013 HC RX MED GY IP 250 OP 250 PS 637: Performed by: INTERNAL MEDICINE

## 2025-02-23 PROCEDURE — 85018 HEMOGLOBIN: CPT | Performed by: HOSPITALIST

## 2025-02-23 PROCEDURE — 120N000013 HC R&B IMCU

## 2025-02-23 PROCEDURE — 84132 ASSAY OF SERUM POTASSIUM: CPT | Performed by: HOSPITALIST

## 2025-02-23 RX ORDER — ACETAMINOPHEN 325 MG/1
650 TABLET ORAL EVERY 8 HOURS
Status: DISCONTINUED | OUTPATIENT
Start: 2025-02-23 | End: 2025-02-24 | Stop reason: ALTCHOICE

## 2025-02-23 RX ORDER — POTASSIUM CHLORIDE 7.45 MG/ML
10 INJECTION INTRAVENOUS
Status: COMPLETED | OUTPATIENT
Start: 2025-02-23 | End: 2025-02-23

## 2025-02-23 RX ADMIN — POTASSIUM CHLORIDE 10 MEQ: 7.46 INJECTION, SOLUTION INTRAVENOUS at 14:47

## 2025-02-23 RX ADMIN — Medication 5 ML: at 17:16

## 2025-02-23 RX ADMIN — FUROSEMIDE 20 MG: 20 TABLET ORAL at 09:29

## 2025-02-23 RX ADMIN — CLOPIDOGREL BISULFATE 75 MG: 75 TABLET ORAL at 09:29

## 2025-02-23 RX ADMIN — OSELTAMIVIR PHOSPHATE 30 MG: 6 POWDER, FOR SUSPENSION ORAL at 09:31

## 2025-02-23 RX ADMIN — Medication 40 MG: at 09:31

## 2025-02-23 RX ADMIN — ACETAMINOPHEN 650 MG: 325 TABLET, FILM COATED ORAL at 19:58

## 2025-02-23 RX ADMIN — POTASSIUM CHLORIDE 10 MEQ: 7.46 INJECTION, SOLUTION INTRAVENOUS at 13:40

## 2025-02-23 RX ADMIN — AMLODIPINE BESYLATE 10 MG: 10 TABLET ORAL at 09:29

## 2025-02-23 RX ADMIN — ACETAMINOPHEN 650 MG: 325 TABLET, FILM COATED ORAL at 09:28

## 2025-02-23 RX ADMIN — FLUOXETINE 20 MG: 20 SOLUTION ORAL at 11:59

## 2025-02-23 RX ADMIN — Medication 40 MG: at 21:59

## 2025-02-23 RX ADMIN — DOXAZOSIN 2 MG: 2 TABLET ORAL at 09:28

## 2025-02-23 ASSESSMENT — ACTIVITIES OF DAILY LIVING (ADL)
ADLS_ACUITY_SCORE: 99
ADLS_ACUITY_SCORE: 105
ADLS_ACUITY_SCORE: 105
ADLS_ACUITY_SCORE: 103
ADLS_ACUITY_SCORE: 99
ADLS_ACUITY_SCORE: 103
ADLS_ACUITY_SCORE: 105
ADLS_ACUITY_SCORE: 105
ADLS_ACUITY_SCORE: 99
ADLS_ACUITY_SCORE: 103
ADLS_ACUITY_SCORE: 99
ADLS_ACUITY_SCORE: 99
ADLS_ACUITY_SCORE: 105
ADLS_ACUITY_SCORE: 103
ADLS_ACUITY_SCORE: 99
ADLS_ACUITY_SCORE: 105
ADLS_ACUITY_SCORE: 105
ADLS_ACUITY_SCORE: 99
ADLS_ACUITY_SCORE: 99

## 2025-02-23 NOTE — PLAN OF CARE
DATE & TIME: 2/22/25 3pm-11pm    Cognitive Concerns/ Orientation : ZAYDA, nonverbal   BEHAVIOR & AGGRESSION TOOL COLOR: green  ABNL VS/O2: VSS/1.5L nasal cannula  MOBILITY: total, Ax2/lift  PAIN MANAGMENT: denies  DIET: NPO, TF @30mL/hr  BOWEL/BLADDER: Felix and rectal tube  ABNL LAB/BG: &197, Creat 5.29, Hgb 7.3  DRAIN/DEVICES: L PIV/Felix/Rectal tube/Peg tube  SKIN: bruises, mepilex for protection on coccyx  D/C DATE: pending to home with family

## 2025-02-23 NOTE — PROGRESS NOTES
Nephrology chart check    Chart, labs, vitals reviewed.  Continue improving serum creatinine, not far from CKD baseline  Noted serum sodium 147 this morning, will increase free water replacement from 60 mL every 4 hours to 240 every 4 hours.  Continue potassium replacement    BMP, nephrology follow-up in a.m.

## 2025-02-23 NOTE — PROGRESS NOTES
[facility]  Hospitalist Progress Note        Isidro Soliz MD   02/23/2025        Interval History:        Patient seen and examined with his son present in room    - Appears slightly uncomfortable this morning with mild tachypnea and likely abdominal pain    -respiratory status remains stable on 2 L oxygen ; rectal tube with good bowel movement and abdomen not distended   - d/w son, as patient unable to clearly verbalize/localize pain, will get CT chest/abdomen/pelvis to further evaluate  - will schedule tylenol TID (2/23)  - Hb 7.0 with no clear evidence of bleeding; rectal tube with no melena; will transfuse another unit PRBC (2/23)  - sodium trending up 147; nephrology increased free water replacement from 60 ML q 4 hrs to 240 ml q 4 hrs  - will monitor BMP    - family wants palliative care revisit to discuss cares at home after discharge         Assessment and Plan:        Jimmy Otto is a 75 year old male with PMH of HTN, DLD, diabetes , hx of stroke (with right sided hemiplegia and aphasia-non verbal at baseline), PEG tube feeding,  chronic indwelling ordoñez (for neurogenic bladder), CKD 3b (with progressive renal dysfunction), chronic normocytic anemia who as brought to the ED with concern for decreasing urine output and was also noted with influenza with respiratory failure, junctional bradycardia, acute on chronic renal failure and anemia; admitted inpatient 2/16/25.      ALEX on CKD stage IV  neurogenic bladder with chronic indwelling Ordoñez catheter  Hyperkalemia  Hyponatremia  Hypochloremia  - recent Cr on 2/12 was 3.93 with Na 129 and normal potassium   - on admission sodium 127; K 6.2; Cr 4.26  - did get K shifting meds in ED and was started on Lokelma  - Ordoñez catheter replaced on admission  - nephrology following and ordered for intermittent diuresis with Chlorthalidone and IV Lasix; suggest medical management without dialysis (not a candidate for peritoneal dialysis due to PEG and  not a good candidate for HD-- need for michell lift)  - adequate diuresis with trial of diuretics; nephrology stopped Lokelma (2/19); started Lasix PO (2/19)  - hyperkalemia resolved but now hypokalemic with K 3.0, likely diuresis induced-- supplementing potassium per protocol; nephrology decreased Lasix to 20 mg daily (2/22)  - creatinine seems stable around 5 with good urine output  - sodium trending up 147; nephrology increased free water replacement from 60 ML q 4 hrs to 240 ml q 4 hrs (2/23)  - will monitor BMP     Acute hypoxic respiratory failure multifactorial likely due to volume overload due to acute on chronic diastolic CHF and exacerbation  influenza A infection   likely aspiration event  - PTA On Torsemide 20 mg daily  - on admission patient was requiring 10 L of oxygen, increased to 50 lts HFNC  - proBNP significantly elevated at 19, 616; Pro-Westley normal at 0.29  - Chest x-ray 2/16 noted with diffuse mixed interstitial and airspace opacities likely due to moderate pulmonary edema and small right and trace left pleural effusion and mid bibasilar atelectasis with no pneumothorax  - 2/16: influenza A positive; COVID and RSV negative  - RRT on 2/16 with increased O2 needs and repeat CXR with some concern for asymmetric edema versus multifocal pneumonitis requiring 50 lts HFNC  - getting diuresis per nephrology as above with Lasix while holding PTA torsemide  - respiratory status improved and HFNC weaned down to 2 L, will likely need home O2  - per AMT recommendations, switched empiric Zosyn to Rocephin (2/19); completed 6 days course on 2/22) and also adjusted Tamiflu for a 10 days course (renally dosed)    - on 2/23: slightly uncomfortable with mild tachypnea and likely abdominal pain    - d/w son, as patient unable to clearly verbalize/localize pain, will get CT chest/abdomen/pelvis to further evaluate  - will schedule tylenol TID (2/23)     Elevated troponin likely due to demand due to underlying renal  failure and volume overload type II NSTEMI  Junctional bradycardia  - serial troponins with no significant trend 106 --- 105 --- 108 ; EKG noted with junctional rhythm   - has converted to sinus rhythm  - evaluated by cardiology, note junctional bradycardia is likely due to metabolic abnormalities with worsening renal failure and hyperkalemia and note no indication for pacemaker implantation  - Continue to monitor on telemetry    Encephalopathy likely due to uremia and infectious causes  H/o CVA with residual right hemiplegia and baseline nonverbal status  chronic dysphagia, PEG tube feeds  Dyslipidemia  Hypertension  - baseline right hemiplegia, non-verbal status from prior CVA  - some encephalopathy in the setting of acute illness ; treating underlying causes as above  - nutrition following for tube feeds  - continue Plavix 75 mg daily; holding PTA Lipitor 20 mg daily  - PTA amlodipine 10 mg daily, doxazosin 2 mg daily-- resumed 2/19     Acute on chronic anemia  Dark stools-- likely due to iron  - baseline Hb around 9, anemia of chronic disease in the setting of underlying CKD  - PTA ferrous sulfate 325 mg twice daily continued  - Hb 8.1---6.8-- 1 unit PRBC on 2/18  - 2/19: noted some dark stools (on iron), not grossly melanotic, hemodynamically stable and stable hemoglobin after 1 unit PRBC on 2/18; Hb 6.8---> 7.2---7.8---7.3  - will not be an appropriate candidate for endoscopic /colonoscopy at evaluation at this time; will monitor clinically with repeat Hb and discuss goals of care again if has suggestion of active bleed  - started on Protonix  - 2/23: Hb 7.0 with no clear evidence of bleeding; rectal tube with no melena; will transfuse another unit PRBC (2/23)     Diabetes mellitus type 2  - PTA on Lantus 9 units subcu a.m., and insulin lispro sliding scale  - BG still in 220s; will increase Lantus further to 18 units daily (2/23) and continue sliding scale insulin at high resistance  - continue with  hypoglycemia protocol    Subclinical hypothyroidism  - TSH is mildly elevated at 8.72 but free T4 is normal; most likely due to underlying acute illness    Goals of care   - on presentation noted patient critically ill; family aware ; not a candidate for dialysis per nephrology as noted above  - code status changed to DNR/DNI  - palliative care met with family (2/18), remains DNR/DNI but family want to continue with restorative cares for now   - family wants palliative care revisit to discuss cares at home after discharge-- ordered for 2/24  -  following for disposition     DVT Prophylaxis: Pneumatic Compression Devices    Code Status: DNR/DNI     Diet:   NPO for Medical/Clinical Reasons Except for: NPO but receiving Tube Feeding  Adult Formula Drip Feeding: Continuous Novasource Renal; Gastrostomy; Goal Rate: 30; mL/hr; Start TF @ 10 ml/hr. Advance by 10 mL q 12 hours until goal rate reached.; Do not advance tube feeding rate unless K+ is = or > 3.0, Mg++ is = or > 1.5, an...      Disposition:   Medically Ready for Discharge: Anticipated Tomorrow pending clinical stability    Care plan discussed with nursing and also extensively discussed treatment care plan with patient's son present in room    High medical complexity       Clinically Significant Risk Factors        # Hypokalemia: Lowest K = 3.1 mmol/L in last 2 days, will replace as needed  # Hypernatremia: Highest Na = 147 mmol/L in last 2 days, will monitor as appropriate       # Hypoalbuminemia: Lowest albumin = 3.1 g/dL at 2/17/2025  4:28 AM, will monitor as appropriate      # Acute Kidney Injury, unspecified: based on a >150% or 0.3 mg/dL increase in last creatinine compared to past 90 day average, will monitor renal function    # Hypertension: Noted on problem list    # Chronic heart failure with preserved ejection fraction: heart failure noted on problem list and last echo with EF >50%          # DMII: A1C = 8.8 % (Ref range: <5.7 %) within  "past 6 months         # Financial/Environmental Concerns: none                            Physical Exam:      Blood pressure (!) 153/76, pulse 90, temperature 99.6  F (37.6  C), temperature source Oral, resp. rate 18, height 1.676 m (5' 6\"), weight 69.4 kg (153 lb), SpO2 94%.  Vitals:    02/21/25 0400 02/22/25 0524 02/23/25 0152   Weight: 65.3 kg (143 lb 15.4 oz) 69.4 kg (153 lb) 69.4 kg (153 lb)     Vital Signs with Ranges  Temp:  [98.4  F (36.9  C)-99.9  F (37.7  C)] 99.6  F (37.6  C)  Pulse:  [81-95] 90  Resp:  [17-18] 18  BP: (126-156)/(59-76) 153/76  SpO2:  [91 %-94 %] 94 %  I/O's Last 24 hours  I/O last 3 completed shifts:  In: 120 [NG/GT:120]  Out: 1350 [Urine:1350]    Constitutional: Baseline non-verbal; alert and awake ; somnolent; appears slightly dyspneic        Oral cavity: Moist mucosa   Cardiovascular: Normal s1 s2, regular rate and rhythm, no murmur   Lungs: B/l coarse breath sounds   Abdomen: Soft, nt, nd, no guarding, rigidity or rebound; BS +; G tube in place   LE : no edema    Musculoskeletal/Neuro Right hemiplegia, non-verbal   Psychiatry: nonverbal  Felix catheter in place  rectal tube in place with brownish stool                Medications:        Current Facility-Administered Medications   Medication Dose Route Frequency Provider Last Rate Last Admin    amLODIPine (NORVASC) tablet 10 mg  10 mg Oral or Feeding Tube Daily Isidro Soliz MD   10 mg at 02/22/25 0901    B and C vitamin Complex with folic acid (NEPHRONEX) liquid 5 mL  5 mL Per Feeding Tube Daily Isidro Soliz MD   5 mL at 02/22/25 1715    clopidogrel (PLAVIX) tablet 75 mg  75 mg Oral or Feeding Tube Daily Isidro Soliz MD   75 mg at 02/22/25 0901    doxazosin (CARDURA) tablet 2 mg  2 mg Oral or Feeding Tube Daily Isidro Soliz MD   2 mg at 02/22/25 0944    FLUoxetine (PROzac) solution 20 mg  20 mg Oral or Feeding Tube Daily Isidro Soliz MD   20 mg at 02/22/25 1212    furosemide (LASIX) tablet " 20 mg  20 mg Oral or Feeding Tube Daily Darell Santiago MD   20 mg at 02/22/25 0901    insulin aspart (NovoLOG) injection (RAPID ACTING)  1-12 Units Subcutaneous Q4H Isidro Soliz MD   3 Units at 02/23/25 0541    insulin glargine (LANTUS PEN) injection 14 Units  14 Units Subcutaneous QAM AC Isidro Soliz MD        oseltamivir (TAMIFLU) suspension 30 mg  30 mg Oral or Feeding Tube Daily Isidro Soliz MD   30 mg at 02/22/25 0924    pantoprazole (PROTONIX) 2 mg/mL suspension 40 mg  40 mg Per Feeding Tube BID Isidro Soliz MD   40 mg at 02/22/25 2021    sodium chloride (PF) 0.9% PF flush 3 mL  3 mL Intracatheter Q8H Isidro Soliz MD   3 mL at 02/22/25 2345     PRN Meds:   Current Facility-Administered Medications   Medication Dose Route Frequency Provider Last Rate Last Admin    acetaminophen (TYLENOL) tablet 650 mg  650 mg Oral Q4H PRN Isidro Soliz MD   650 mg at 02/20/25 2212    Or    acetaminophen (TYLENOL) Suppository 650 mg  650 mg Rectal Q4H PRN Isidro Soliz MD        calcium carbonate (TUMS) chewable tablet 1,000 mg  1,000 mg Oral 4x Daily PRN Isidro Soliz MD   1,000 mg at 02/20/25 1505    dextrose 10% infusion   Intravenous Continuous PRN Isidro Soliz MD        glucose gel 15-30 g  15-30 g Oral Q15 Min PRN Isidro Soliz MD        Or    dextrose 50 % injection 25-50 mL  25-50 mL Intravenous Q15 Min PRIsidro Cunningham MD        Or    glucagon injection 1 mg  1 mg Subcutaneous Q15 Min PRIsidro Cunningham MD        hydrALAZINE (APRESOLINE) injection 10 mg  10 mg Intravenous Q4H PRN Isidro Soliz MD        lidocaine (LMX4) cream   Topical Q1H PRN Isidro Soliz MD        lidocaine (LMX4) cream   Topical Q1H PRN Isidro Soliz MD        lidocaine 1 % 0.1-1 mL  0.1-1 mL Other Q1H PRN Isidro Soliz MD        lidocaine 1 % 0.1-1 mL  0.1-1 mL Other Q1H PRN Isidro Soliz MD         melatonin tablet 5 mg  5 mg Oral or Feeding Tube At Bedtime PRIsidro Cunningham MD   5 mg at 02/20/25 2329    ondansetron (ZOFRAN) injection 4 mg  4 mg Intravenous Q6H PRIsidro Cunningham MD   4 mg at 02/17/25 1652    senna-docusate (SENOKOT-S/PERICOLACE) 8.6-50 MG per tablet 1 tablet  1 tablet Oral BID PRIsidro Cunningham MD        Or    senna-docusate (SENOKOT-S/PERICOLACE) 8.6-50 MG per tablet 2 tablet  2 tablet Oral BID Isidro Levine MD        sodium chloride (PF) 0.9% PF flush 3 mL  3 mL Intracatheter q1 min Isidro Levine MD                Data:      All new lab and imaging data was reviewed.   Recent Labs   Lab Test 02/23/25  0608 02/21/25  0442 02/19/25  0512 02/19/25  0143 02/18/25  0456 02/16/25  1515 11/02/21  0609 11/01/21  0735 09/16/21  1003 09/16/21  0957 09/15/21  0521 09/14/21  2145   WBC  --  7.5  --   --  13.4* 7.1   < > 5.6   < >  --    < >  --    HGB 7.0* 7.3* 7.8*   < > 6.8* 8.1*   < > 11.3*   < >  --    < >  --    MCV  --  83  --   --  83 82   < > 88   < >  --    < >  --    PLT  --  176  --   --  162 174   < > 224   < >  --    < >  --    INR  --   --   --   --   --   --   --  0.96  --  1.03  --  0.94    < > = values in this interval not displayed.      Recent Labs   Lab Test 02/23/25  0608 02/23/25  0541 02/23/25  0134 02/22/25  0632 02/22/25  0616 02/22/25  0217 02/21/25  2205 02/21/25  0751 02/21/25  0442 02/19/25  0842 02/19/25  0512   *  --   --   --   --   --   --   --  142  --  137   POTASSIUM 3.1*  --   --   --  3.6  --  3.7  --  3.0*  --  3.8   CHLORIDE 104  --   --   --   --   --   --   --  98  --  96*   CO2 33*  --   --   --   --   --   --   --  29  --  29   BUN 95.7*  --   --   --   --   --   --   --  109.0*  --  133.3*   CR 4.83*  --   --   --   --   --   --   --  5.29*  --  5.26*   ANIONGAP 10  --   --   --   --   --   --   --  15  --  12   ARLETH 8.2*  --   --   --   --   --   --   --  8.5*  --  8.8   * 199* 206*   < >  --    < >   --    < > 253*   < > 168*    < > = values in this interval not displayed.     Recent Labs   Lab Test 10/13/21  2152 09/18/21  1535 09/14/21  2142   TROPONIN <0.015 <0.015 <0.015

## 2025-02-23 NOTE — PROGRESS NOTES
Summary:  Transfer from ICU 2/21/25. Admitted with Influenza, respiratory failure, acute on chronic renal failure and anemia.     DATE & TIME: 2/23/25 9854-1781            Cognitive Concerns/ Orientation : ZAYDA orientation due to baseline non verbal for hx of stroke.   BEHAVIOR & AGGRESSION TOOL COLOR: Green  ABNL VS/O2: VSS on 1.5-2L NC oxygen.  MOBILITY:  Turned and repositioned Q2H.  Right sided hemiplegia from previous stroke.  PAIN MANAGMENT: Nonverbal pain scale used: c/o pain in ABDOMEN, PRN Tylenol, crushed given thru G-tube.  DIET: NPO. TF infusing at  goal rate of 30ml/ hr with q 4 hrs free water flush at 240 mL.  BOWEL/BLADDER: Chronic ordoñez for neurogenic bladder. Rectal tube in place due to watery stools.  ABNL LAB/BG: , 218, MD ordered one time dose of additional 4U. HGB: 7.0, 1U of RBC given. K: 3.1, being replaced.   DRAIN/DEVICES: PIV SL.  SKIN: Pale. Scattered bruises.  +2 edema to BLE. Old wound scar on coccyx, mepilex in place.  TESTS/PROCEDURES: None  D/C DAY/GOALS/PLACE: Rest, increase K & HGB.   OTHER IMPORTANT INFO: Nephrology and Sw following. Droplet precautions maintained  Possible discharge home tomorrow.

## 2025-02-23 NOTE — PLAN OF CARE
Summary:  Transfer from ICU 2/21/25. Admitted with Influenza, respiratory failure, acute on chronic renal failure and anemia.    DATE & TIME: 2/22/25, 5458 - 8597    Cognitive Concerns/ Orientation : ZAYDA orientation due to baseline non verbal for hx of stroke.   BEHAVIOR & AGGRESSION TOOL COLOR: Green  CIWA SCORE: NA   ABNL VS/O2: VSS on 1.5 L oxygen.  MOBILITY:  Turned and repositioned.  Right sided hemiplegia from previous stroke.  PAIN MANAGMENT: Absence of nonverbal indicators of pain  DIET: NPO. TF infusing at  goal rate of 30ml/ hr with q 4 hrs free water flush.  BOWEL/BLADDER: Chronic ordoñez for neurogenic bladder. Rectal tube in place due to watery stools.  ABNL LAB/BG: ,199  DRAIN/DEVICES: PIV SL.  TELEMETRY RHYTHM: NA  SKIN: Pale. Scattered bruises.  +2 edema to BLE. Old wound scar on coccyx, mepilex in place.  TESTS/PROCEDURES: None  D/C DAY/GOALS/PLACE: Discharge home with family pending.  OTHER IMPORTANT INFO: Nephrology and Sw following. Droplet precautions maintained    Goal Outcome Evaluation:      Plan of Care Reviewed With: patient    Overall Patient Progress: no changeOverall Patient Progress: no change

## 2025-02-24 ENCOUNTER — APPOINTMENT (OUTPATIENT)
Dept: INTERVENTIONAL RADIOLOGY/VASCULAR | Facility: CLINIC | Age: 75
End: 2025-02-24
Attending: HOSPITALIST
Payer: MEDICARE

## 2025-02-24 LAB
ANION GAP SERPL CALCULATED.3IONS-SCNC: 12 MMOL/L (ref 7–15)
BUN SERPL-MCNC: 87.1 MG/DL (ref 8–23)
CALCIUM SERPL-MCNC: 8.1 MG/DL (ref 8.8–10.4)
CHLORIDE SERPL-SCNC: 104 MMOL/L (ref 98–107)
CREAT SERPL-MCNC: 4.4 MG/DL (ref 0.67–1.17)
EGFRCR SERPLBLD CKD-EPI 2021: 13 ML/MIN/1.73M2
GLUCOSE BLDC GLUCOMTR-MCNC: 144 MG/DL (ref 70–99)
GLUCOSE BLDC GLUCOMTR-MCNC: 160 MG/DL (ref 70–99)
GLUCOSE BLDC GLUCOMTR-MCNC: 176 MG/DL (ref 70–99)
GLUCOSE BLDC GLUCOMTR-MCNC: 182 MG/DL (ref 70–99)
GLUCOSE BLDC GLUCOMTR-MCNC: 183 MG/DL (ref 70–99)
GLUCOSE BLDC GLUCOMTR-MCNC: 252 MG/DL (ref 70–99)
GLUCOSE SERPL-MCNC: 218 MG/DL (ref 70–99)
HCO3 SERPL-SCNC: 30 MMOL/L (ref 22–29)
HGB BLD-MCNC: 8.4 G/DL (ref 13.3–17.7)
MAGNESIUM SERPL-MCNC: 2.9 MG/DL (ref 1.7–2.3)
PHOSPHATE SERPL-MCNC: 3.1 MG/DL (ref 2.5–4.5)
POTASSIUM SERPL-SCNC: 3.3 MMOL/L (ref 3.4–5.3)
POTASSIUM SERPL-SCNC: 3.4 MMOL/L (ref 3.4–5.3)
POTASSIUM SERPL-SCNC: 3.5 MMOL/L (ref 3.4–5.3)
SODIUM SERPL-SCNC: 146 MMOL/L (ref 135–145)

## 2025-02-24 PROCEDURE — 120N000013 HC R&B IMCU

## 2025-02-24 PROCEDURE — 49450 REPLACE G/C TUBE PERC: CPT

## 2025-02-24 PROCEDURE — 36415 COLL VENOUS BLD VENIPUNCTURE: CPT | Performed by: HOSPITALIST

## 2025-02-24 PROCEDURE — 85018 HEMOGLOBIN: CPT | Performed by: HOSPITALIST

## 2025-02-24 PROCEDURE — 250N000013 HC RX MED GY IP 250 OP 250 PS 637: Performed by: INTERNAL MEDICINE

## 2025-02-24 PROCEDURE — 84100 ASSAY OF PHOSPHORUS: CPT | Performed by: HOSPITALIST

## 2025-02-24 PROCEDURE — 0D20XUZ CHANGE FEEDING DEVICE IN UPPER INTESTINAL TRACT, EXTERNAL APPROACH: ICD-10-PCS | Performed by: RADIOLOGY

## 2025-02-24 PROCEDURE — 84132 ASSAY OF SERUM POTASSIUM: CPT | Performed by: HOSPITALIST

## 2025-02-24 PROCEDURE — 83735 ASSAY OF MAGNESIUM: CPT | Performed by: HOSPITALIST

## 2025-02-24 PROCEDURE — 99232 SBSQ HOSP IP/OBS MODERATE 35: CPT | Performed by: INTERNAL MEDICINE

## 2025-02-24 PROCEDURE — 99222 1ST HOSP IP/OBS MODERATE 55: CPT | Mod: GC | Performed by: STUDENT IN AN ORGANIZED HEALTH CARE EDUCATION/TRAINING PROGRAM

## 2025-02-24 PROCEDURE — 250N000013 HC RX MED GY IP 250 OP 250 PS 637: Performed by: HOSPITALIST

## 2025-02-24 PROCEDURE — B4036 ENTERAL FEED SUP KIT GRAV BY: HCPCS

## 2025-02-24 PROCEDURE — 99233 SBSQ HOSP IP/OBS HIGH 50: CPT | Performed by: REGISTERED NURSE

## 2025-02-24 PROCEDURE — 82310 ASSAY OF CALCIUM: CPT | Performed by: HOSPITALIST

## 2025-02-24 PROCEDURE — 250N000009 HC RX 250: Performed by: RADIOLOGY

## 2025-02-24 PROCEDURE — 99232 SBSQ HOSP IP/OBS MODERATE 35: CPT | Performed by: HOSPITALIST

## 2025-02-24 PROCEDURE — 250N000009 HC RX 250: Performed by: HOSPITALIST

## 2025-02-24 PROCEDURE — 87086 URINE CULTURE/COLONY COUNT: CPT | Performed by: STUDENT IN AN ORGANIZED HEALTH CARE EDUCATION/TRAINING PROGRAM

## 2025-02-24 PROCEDURE — 99418 PROLNG IP/OBS E/M EA 15 MIN: CPT | Performed by: REGISTERED NURSE

## 2025-02-24 PROCEDURE — 80048 BASIC METABOLIC PNL TOTAL CA: CPT | Performed by: HOSPITALIST

## 2025-02-24 PROCEDURE — 250N000013 HC RX MED GY IP 250 OP 250 PS 637: Performed by: STUDENT IN AN ORGANIZED HEALTH CARE EDUCATION/TRAINING PROGRAM

## 2025-02-24 RX ORDER — ACETAMINOPHEN 325 MG/10.15ML
650 LIQUID ORAL EVERY 4 HOURS PRN
Status: DISCONTINUED | OUTPATIENT
Start: 2025-02-24 | End: 2025-02-26 | Stop reason: HOSPADM

## 2025-02-24 RX ORDER — ACETAMINOPHEN 325 MG/10.15ML
650 LIQUID ORAL EVERY 8 HOURS
Status: DISCONTINUED | OUTPATIENT
Start: 2025-02-24 | End: 2025-02-24

## 2025-02-24 RX ORDER — POTASSIUM CHLORIDE 20MEQ/15ML
20 LIQUID (ML) ORAL ONCE
Status: COMPLETED | OUTPATIENT
Start: 2025-02-24 | End: 2025-02-24

## 2025-02-24 RX ORDER — TOLTERODINE TARTRATE 1 MG/1
1 TABLET, EXTENDED RELEASE ORAL
Status: DISCONTINUED | OUTPATIENT
Start: 2025-02-24 | End: 2025-02-26 | Stop reason: HOSPADM

## 2025-02-24 RX ORDER — LIDOCAINE HYDROCHLORIDE 20 MG/ML
JELLY TOPICAL ONCE
Status: COMPLETED | OUTPATIENT
Start: 2025-02-24 | End: 2025-02-24

## 2025-02-24 RX ORDER — FUROSEMIDE 10 MG/ML
20 SOLUTION ORAL DAILY
Status: DISCONTINUED | OUTPATIENT
Start: 2025-02-25 | End: 2025-02-26 | Stop reason: HOSPADM

## 2025-02-24 RX ORDER — FUROSEMIDE 10 MG/ML
20 SOLUTION ORAL DAILY
Status: DISCONTINUED | OUTPATIENT
Start: 2025-02-25 | End: 2025-02-24

## 2025-02-24 RX ORDER — ACETAMINOPHEN 325 MG/10.15ML
650 LIQUID ORAL EVERY 8 HOURS
Status: DISCONTINUED | OUTPATIENT
Start: 2025-02-24 | End: 2025-02-26 | Stop reason: HOSPADM

## 2025-02-24 RX ORDER — LIDOCAINE HYDROCHLORIDE 20 MG/ML
JELLY TOPICAL EVERY 4 HOURS PRN
Status: DISCONTINUED | OUTPATIENT
Start: 2025-02-24 | End: 2025-02-26 | Stop reason: HOSPADM

## 2025-02-24 RX ORDER — ACETAMINOPHEN 650 MG/1
650 SUPPOSITORY RECTAL EVERY 4 HOURS PRN
Status: DISCONTINUED | OUTPATIENT
Start: 2025-02-24 | End: 2025-02-26 | Stop reason: HOSPADM

## 2025-02-24 RX ORDER — ACETAMINOPHEN 325 MG/1
650 TABLET ORAL EVERY 4 HOURS PRN
Status: DISCONTINUED | OUTPATIENT
Start: 2025-02-24 | End: 2025-02-26 | Stop reason: HOSPADM

## 2025-02-24 RX ADMIN — ACETAMINOPHEN 650 MG: 325 TABLET, FILM COATED ORAL at 05:46

## 2025-02-24 RX ADMIN — CLOPIDOGREL BISULFATE 75 MG: 75 TABLET ORAL at 09:13

## 2025-02-24 RX ADMIN — POTASSIUM CHLORIDE 20 MEQ: 20 SOLUTION ORAL at 09:13

## 2025-02-24 RX ADMIN — ACETAMINOPHEN 650 MG: 325 SUSPENSION ORAL at 13:53

## 2025-02-24 RX ADMIN — FUROSEMIDE 20 MG: 20 TABLET ORAL at 09:13

## 2025-02-24 RX ADMIN — DOXAZOSIN 2 MG: 2 TABLET ORAL at 09:13

## 2025-02-24 RX ADMIN — ACETAMINOPHEN 650 MG: 325 SUSPENSION ORAL at 21:02

## 2025-02-24 RX ADMIN — POTASSIUM CHLORIDE 20 MEQ: 20 SOLUTION ORAL at 14:24

## 2025-02-24 RX ADMIN — OSELTAMIVIR PHOSPHATE 30 MG: 6 POWDER, FOR SUSPENSION ORAL at 09:15

## 2025-02-24 RX ADMIN — AMLODIPINE BESYLATE 10 MG: 10 TABLET ORAL at 09:13

## 2025-02-24 RX ADMIN — LIDOCAINE HYDROCHLORIDE: 20 JELLY TOPICAL at 12:52

## 2025-02-24 RX ADMIN — ACETAMINOPHEN 650 MG: 325 TABLET, FILM COATED ORAL at 09:06

## 2025-02-24 RX ADMIN — LIDOCAINE HYDROCHLORIDE: 20 JELLY TOPICAL at 22:28

## 2025-02-24 RX ADMIN — FLUOXETINE 20 MG: 20 SOLUTION ORAL at 12:09

## 2025-02-24 RX ADMIN — LIDOCAINE HYDROCHLORIDE 6 ML: 20 JELLY TOPICAL at 11:05

## 2025-02-24 RX ADMIN — Medication 40 MG: at 09:15

## 2025-02-24 RX ADMIN — TOLTERODINE TARTRATE 1 MG: 1 TABLET ORAL at 21:02

## 2025-02-24 RX ADMIN — Medication 40 MG: at 21:02

## 2025-02-24 RX ADMIN — Medication 5 ML: at 16:33

## 2025-02-24 ASSESSMENT — ACTIVITIES OF DAILY LIVING (ADL)
ADLS_ACUITY_SCORE: 103
ADLS_ACUITY_SCORE: 99
ADLS_ACUITY_SCORE: 103
ADLS_ACUITY_SCORE: 99
ADLS_ACUITY_SCORE: 103
ADLS_ACUITY_SCORE: 99
ADLS_ACUITY_SCORE: 103
ADLS_ACUITY_SCORE: 103
ADLS_ACUITY_SCORE: 95
ADLS_ACUITY_SCORE: 99
ADLS_ACUITY_SCORE: 95
ADLS_ACUITY_SCORE: 99

## 2025-02-24 NOTE — CONSULTS
IR consult request for a PEG tube malfunction.     Chart reviewed. G tube replaced at  2/11/25.CT done which demonstrates the g tube in good position. It appears the g tube is being used. If there is anything more IR needs to know please contact me.     Consult will be completed. Dr Soliz messaged.     Total time: 20 minutes     Thanks, Toshia [facility] Interventional Radiology CNP ([phone]) (phone [phone])

## 2025-02-24 NOTE — PROGRESS NOTES
[facility]  Hospitalist Progress Note        Isidro Soliz MD   02/24/2025        Interval History:        - CT chest/abd/pelvis noted likely moderate pulmonary edema with moderate to large volume bilateral pleural effusions, right larger than left; also noted more focal opacities scattered throughout both lungs, may represent areas of edema or superimposed infectious/inflammatory process; CT noted no definite acute abnormality in the abdomen or pelvis  - discussed right side US guided thoracentesis -- family will let us know if they want to proceed  - CT also noted sequela of chronic calcific pancreatitis with new or enlarging lesion in the pancreatic neck measuring up to 3.6 cm, favor a cystic lesion but solid mass is not entirely excluded-- rec non-emergent MRI/MRCP; discussed this with family, would not probably change treatment care plan given his significant comorbidities-- family can discussed with PCP and consider further evaluation as needed as outpatient  - IR exchanged G tube (2/24) due to malfunction  - discussed with pharmacy and switched tylenol and Lasix to liquid to decrease chances of clogging  - family requesting a urology consultation for ureteral pain and mild intermittent hematuria-- no note of gross hematuria  - family wants palliative care revisit to discuss cares at home after discharge-- consult ordered (2/24)  - BG in 250s, will increase Lantus to 20 units daily         Assessment and Plan:        Jimmy Otto is a 75 year old male with PMH of HTN, DLD, diabetes , hx of stroke (with right sided hemiplegia and aphasia-non verbal at baseline), PEG tube feeding,  chronic indwelling ordoñez (for neurogenic bladder), CKD 3b (with progressive renal dysfunction), chronic normocytic anemia who as brought to the ED with concern for decreasing urine output and was also noted with influenza with respiratory failure, junctional bradycardia, acute on chronic renal failure and anemia;  admitted inpatient 2/16/25.      ALEX on CKD stage IV  neurogenic bladder with chronic indwelling Felix catheter  Hyperkalemia  Hyponatremia  Hypochloremia  - recent Cr on 2/12 was 3.93 with Na 129 and normal potassium   - on admission sodium 127; K 6.2; Cr 4.26  - did get K shifting meds in ED and was started on Lokelma  - Felix catheter replaced on admission  - nephrology following and ordered for intermittent diuresis with Chlorthalidone and IV Lasix; suggest medical management without dialysis (not a candidate for peritoneal dialysis due to PEG and not a good candidate for HD-- need for michell lift)  - adequate diuresis with trial of diuretics; nephrology stopped Lokelma (2/19); started Lasix PO (2/19)  - hyperkalemia resolved but now hypokalemic with K 3.0, likely diuresis induced-- supplementing potassium per protocol; nephrology decreased Lasix to 20 mg daily (2/22)  - creatinine seems stable to improved from peak 5.29--->4.4 with good urine output  - nephrology increased free water replacement from 60 ML q 4 hrs to 240 ml q 4 hrs (2/23) for Na 147  - will monitor BMP     Acute hypoxic respiratory failure multifactorial likely due to volume overload due to acute on chronic diastolic CHF and exacerbation  influenza A infection   likely aspiration event  Bilateral pleural effusion (right>left)  - PTA On Torsemide 20 mg daily  - on admission patient was requiring 10 L of oxygen, increased to 50 lts HFNC  - proBNP significantly elevated at 19, 616; Pro-Westley normal at 0.29  - Chest x-ray 2/16 noted with diffuse mixed interstitial and airspace opacities likely due to moderate pulmonary edema and small right and trace left pleural effusion and mid bibasilar atelectasis with no pneumothorax  - 2/16: influenza A positive; COVID and RSV negative  - RRT on 2/16 with increased O2 needs and repeat CXR with some concern for asymmetric edema versus multifocal pneumonitis requiring 50 lts HFNC  - getting diuresis per nephrology  as above with Lasix while holding PTA torsemide  - respiratory status improved and HFNC weaned down to 2 L, will likely need home O2  - CT chest/abd/pelvis (2/23) noted likely moderate pulmonary edema with moderate to large volume bilateral pleural effusions, right larger than left; also noted more focal opacities scattered throughout both lungs, may represent areas of edema or superimposed infectious/inflammatory process; CT noted no definite acute abnormality in the abdomen or pelvis  - discussed right sided US guided thoracentesis -- family will let us know if they want to proceed  - per AMT recommendations, switched empiric Zosyn to Rocephin (2/19); completed 6 days course on 2/22) and also adjusted Tamiflu for a 10 days course (renally dosed)  - continue scheduled tylenol TID (2/23)     Elevated troponin likely due to demand due to underlying renal failure and volume overload type II NSTEMI  Junctional bradycardia  - serial troponins with no significant trend 106 --- 105 --- 108 ; EKG noted with junctional rhythm   - has converted to sinus rhythm  - evaluated by cardiology, note junctional bradycardia is likely due to metabolic abnormalities with worsening renal failure and hyperkalemia and note no indication for pacemaker implantation  - Continue to monitor on telemetry    Encephalopathy likely due to uremia and infectious causes  H/o CVA with residual right hemiplegia and baseline nonverbal status  chronic dysphagia, PEG tube feeds  G tube exchange 2/24/25  Dyslipidemia  Hypertension  - baseline right hemiplegia, non-verbal status from prior CVA  - some encephalopathy in the setting of acute illness ; treating underlying causes as above  - nutrition following for tube feeds  - continue Plavix 75 mg daily; holding PTA Lipitor 20 mg daily  - PTA amlodipine 10 mg daily, doxazosin 2 mg daily-- resumed 2/19  - IR exchanged G tube (2/24) due to malfunction  - discussed with pharmacy and switched tylenol and Lasix  to liquid to decrease chances of clogging     Acute on chronic anemia  Dark stools-- likely due to iron  - baseline Hb around 9, anemia of chronic disease in the setting of underlying CKD  - PTA ferrous sulfate 325 mg twice daily continued  - Hb 8.1---6.8-- 1 unit PRBC on 2/18  - 2/19: noted some dark stools (on iron), not grossly melanotic, hemodynamically stable and stable hemoglobin after 1 unit PRBC on 2/18; Hb 6.8---> 7.2---7.8---7.3  - will not be an appropriate candidate for endoscopic /colonoscopy at evaluation at this time; will monitor clinically with repeat Hb and discuss goals of care again if has suggestion of active bleed  - started on Protonix  - 2/23: Hb 7.0 with no clear evidence of bleeding; rectal tube with no melena; transfused another unit PRBC (2/23)-- Hb 8.4     Diabetes mellitus type 2  - PTA on Lantus 9 units subcu a.m., and insulin lispro sliding scale  - BG still in 220--250s; will increase Lantus further to 20 units daily (2/23) and continue sliding scale insulin at high resistance  - continue with hypoglycemia protocol    Subclinical hypothyroidism  - TSH is mildly elevated at 8.72 but free T4 is normal; most likely due to underlying acute illness    Cystic pancreatic lesions  - CT abdomen on 2/23 also noted sequela of chronic calcific pancreatitis with new or enlarging lesion in the pancreatic neck measuring up to 3.6 cm, favor a cystic lesion but solid mass is not entirely excluded-- rec non-emergent MRI/MRCP  - discussed this with family, would not probably change treatment care plan given his significant comorbidities-- family can discussed with PCP and consider further evaluation as needed as outpatient    Urethral pain/mild intermittent hematuria  - 2/24: family requesting a urology consultation for ureteral pain and mild intermittent hematuria-- no note of gross hematuria    Goals of care   - on presentation noted patient critically ill; family aware ; not a candidate for dialysis  "per nephrology as noted above  - code status changed to DNR/DNI  - palliative care met with family (2/18), remains DNR/DNI but family want to continue with restorative cares for now   - family wants palliative care revisit to discuss cares at home after discharge-- ordered for 2/24  -  following for disposition     DVT Prophylaxis: Pneumatic Compression Devices    Code Status: DNR/DNI     Diet:   NPO for Medical/Clinical Reasons Except for: NPO but receiving Tube Feeding  Adult Formula Drip Feeding: Continuous Novasource Renal; Gastrostomy; Goal Rate: 30; mL/hr; Start TF @ 10 ml/hr. Advance by 10 mL q 12 hours until goal rate reached.; Do not advance tube feeding rate unless K+ is = or > 3.0, Mg++ is = or > 1.5, an...      Disposition:   Medically Ready for Discharge: Anticipated in 2-4 Days pending clinical stability    Care plan discussed with nursing and also extensively discussed treatment care plan with patient's son and daughter present in room    High medical complexity       Clinically Significant Risk Factors        # Hypokalemia: Lowest K = 3.1 mmol/L in last 2 days, will replace as needed  # Hypernatremia: Highest Na = 147 mmol/L in last 2 days, will monitor as appropriate       # Hypoalbuminemia: Lowest albumin = 3.1 g/dL at 2/17/2025  4:28 AM, will monitor as appropriate      # Acute Kidney Injury, unspecified: based on a >150% or 0.3 mg/dL increase in last creatinine compared to past 90 day average, will monitor renal function    # Hypertension: Noted on problem list    # Chronic heart failure with preserved ejection fraction: heart failure noted on problem list and last echo with EF >50%          # DMII: A1C = 8.8 % (Ref range: <5.7 %) within past 6 months         # Financial/Environmental Concerns: none                            Physical Exam:      Blood pressure (!) 145/74, pulse 89, temperature 99.8  F (37.7  C), temperature source Axillary, resp. rate 20, height 1.676 m (5' 6\"), " weight 69.4 kg (153 lb), SpO2 97%.  Vitals:    02/21/25 0400 02/22/25 0524 02/23/25 0152   Weight: 65.3 kg (143 lb 15.4 oz) 69.4 kg (153 lb) 69.4 kg (153 lb)     Vital Signs with Ranges  Temp:  [98.6  F (37  C)-99.8  F (37.7  C)] 99.8  F (37.7  C)  Pulse:  [82-94] 89  Resp:  [18-22] 20  BP: (134-148)/(61-74) 145/74  SpO2:  [93 %-97 %] 97 %  I/O's Last 24 hours  I/O last 3 completed shifts:  In: 460 [NG/GT:160]  Out: 1350 [Urine:900; Emesis/NG output:450]    Constitutional: Baseline non-verbal; alert and awake ; somnolent; in no apparent distress       Oral cavity: Moist mucosa   Cardiovascular: Normal s1 s2, regular rate and rhythm, no murmur   Lungs: B/l coarse breath sounds, decreased breath sounds at bases, right>left   Abdomen: Soft, nt, nd, no guarding, rigidity or rebound; BS +; G tube in place   LE : no edema    Musculoskeletal/Neuro Right hemiplegia, non-verbal   Psychiatry: nonverbal  Felix catheter in place              Medications:        Current Facility-Administered Medications   Medication Dose Route Frequency Provider Last Rate Last Admin    acetaminophen (TYLENOL) tablet 650 mg  650 mg Per Feeding Tube Q8H Isidro Soliz MD   650 mg at 02/24/25 0546    amLODIPine (NORVASC) tablet 10 mg  10 mg Oral or Feeding Tube Daily Isidro Soliz MD   10 mg at 02/23/25 0929    B and C vitamin Complex with folic acid (NEPHRONEX) liquid 5 mL  5 mL Per Feeding Tube Daily Isidro Soliz MD   5 mL at 02/23/25 1716    clopidogrel (PLAVIX) tablet 75 mg  75 mg Oral or Feeding Tube Daily Isidro Soliz MD   75 mg at 02/23/25 0929    doxazosin (CARDURA) tablet 2 mg  2 mg Oral or Feeding Tube Daily Isidro Soliz MD   2 mg at 02/23/25 0928    FLUoxetine (PROzac) solution 20 mg  20 mg Oral or Feeding Tube Daily Isidro Soliz MD   20 mg at 02/23/25 1159    furosemide (LASIX) tablet 20 mg  20 mg Oral or Feeding Tube Daily Darell Santiago MD   20 mg at 02/23/25 0929    insulin  aspart (NovoLOG) injection (RAPID ACTING)  1-12 Units Subcutaneous Q4H Isidro Soliz MD   2 Units at 02/24/25 0545    insulin glargine (LANTUS PEN) injection 18 Units  18 Units Subcutaneous QAM AC Isidro Soliz MD        oseltamivir (TAMIFLU) suspension 30 mg  30 mg Oral or Feeding Tube Daily Isidro Soliz MD   30 mg at 02/23/25 0931    pantoprazole (PROTONIX) 2 mg/mL suspension 40 mg  40 mg Per Feeding Tube BID Isidro Soliz MD   40 mg at 02/23/25 2159    potassium chloride (KAYCIEL) solution 20 mEq  20 mEq Oral or Feeding Tube Once Isidro Soliz MD        sodium chloride (PF) 0.9% PF flush 3 mL  3 mL Intracatheter Q8H Isidro Soliz MD   3 mL at 02/24/25 0240     PRN Meds:   Current Facility-Administered Medications   Medication Dose Route Frequency Provider Last Rate Last Admin    acetaminophen (TYLENOL) tablet 650 mg  650 mg Oral Q4H PRN Isidro Soliz MD   650 mg at 02/23/25 0928    Or    acetaminophen (TYLENOL) Suppository 650 mg  650 mg Rectal Q4H PRN Isidro Soliz MD        calcium carbonate (TUMS) chewable tablet 1,000 mg  1,000 mg Oral 4x Daily PRN Isidro Soliz MD   1,000 mg at 02/20/25 1505    dextrose 10% infusion   Intravenous Continuous PRN Isidro Soliz MD        glucose gel 15-30 g  15-30 g Oral Q15 Min PRN Isidro Soliz MD        Or    dextrose 50 % injection 25-50 mL  25-50 mL Intravenous Q15 Min PRIsidro Cunningham MD        Or    glucagon injection 1 mg  1 mg Subcutaneous Q15 Min PRN Isidro Soliz MD        hydrALAZINE (APRESOLINE) injection 10 mg  10 mg Intravenous Q4H PRN Isidro Soliz MD        lidocaine (LMX4) cream   Topical Q1H PRN Isidor Soliz MD        lidocaine (LMX4) cream   Topical Q1H PRN Isidro Soliz MD        lidocaine 1 % 0.1-1 mL  0.1-1 mL Other Q1H PRN Isidro Soliz MD        lidocaine 1 % 0.1-1 mL  0.1-1 mL Other Q1H PRN Isidro Soliz MD         melatonin tablet 5 mg  5 mg Oral or Feeding Tube At Bedtime PRIsidro Cunningham MD   5 mg at 02/20/25 2329    ondansetron (ZOFRAN) injection 4 mg  4 mg Intravenous Q6H PRIsidro Cunningham MD   4 mg at 02/17/25 1652    senna-docusate (SENOKOT-S/PERICOLACE) 8.6-50 MG per tablet 1 tablet  1 tablet Oral BID PRIsidro Cnuningham MD        Or    senna-docusate (SENOKOT-S/PERICOLACE) 8.6-50 MG per tablet 2 tablet  2 tablet Oral BID Isidro Levine MD        sodium chloride (PF) 0.9% PF flush 3 mL  3 mL Intracatheter q1 min Isidro Levine MD                Data:      All new lab and imaging data was reviewed.   Recent Labs   Lab Test 02/24/25  0657 02/23/25  0608 02/21/25  0442 02/19/25  0143 02/18/25  0456 02/16/25  1515 11/02/21  0609 11/01/21  0735 09/16/21  1003 09/16/21  0957 09/15/21  0521 09/14/21  2145   WBC  --   --  7.5  --  13.4* 7.1   < > 5.6   < >  --    < >  --    HGB 8.4* 7.0* 7.3*   < > 6.8* 8.1*   < > 11.3*   < >  --    < >  --    MCV  --   --  83  --  83 82   < > 88   < >  --    < >  --    PLT  --   --  176  --  162 174   < > 224   < >  --    < >  --    INR  --   --   --   --   --   --   --  0.96  --  1.03  --  0.94    < > = values in this interval not displayed.      Recent Labs   Lab Test 02/24/25  0657 02/24/25  0521 02/24/25  0127 02/23/25  1814 02/23/25  1659 02/23/25  1030 02/23/25  0608 02/21/25  0751 02/21/25  0442   *  --   --   --   --   --  147*  --  142   POTASSIUM 3.3*  --   --   --  3.4  --  3.1*   < > 3.0*   CHLORIDE 104  --   --   --   --   --  104  --  98   CO2 30*  --   --   --   --   --  33*  --  29   BUN 87.1*  --   --   --   --   --  95.7*  --  109.0*   CR 4.40*  --   --   --   --   --  4.83*  --  5.29*   ANIONGAP 12  --   --   --   --   --  10  --  15   ARLETH 8.1*  --   --   --   --   --  8.2*  --  8.5*   * 183* 144*   < >  --    < > 225*   < > 253*    < > = values in this interval not displayed.     Recent Labs   Lab Test  10/13/21  2152 09/18/21  1535 09/14/21  2142   TROPONIN <0.015 <0.015 <0.015

## 2025-02-24 NOTE — IR NOTE
Procedure Note for IR Procedure Dictation  Fluoro time: 0.1 minutes  Total Fluoro Dose: 0.74 mGy (Air Kerma)  Contrast: 15 mL Omnipaque 240  Local anesthetic: 6 mL 1% lidocaine gel  Conscious sedation: None

## 2025-02-24 NOTE — PROGRESS NOTES
MD Notification    Notified Person: MD    Notified Person Name: Heydi Felix    Notification Date/Time: 2/23/25 1600    Notification Interaction: VM    Purpose of Notification: The tubing into the peg tube malfunctioned; unable to unhook to clamp for CT.     Orders Received: IR consult.     Comments: Able to administer tf thru the other port, clamped the one that is stuck.

## 2025-02-24 NOTE — PROGRESS NOTES
[facility]     Renal Progress Note       SHORTHAND KEY FOR MY NOTES:  c = with, s = without, p = after, a = before, x = except, asx = asymptomatic, tx = transplant or treatment, sx = symptoms or symptomatic, cx = canceled or culture, rxn = reaction, yday = yesterday, nl = normal, abx = antibiotics, fxn = function, dx = diagnosis, dz = disease, m/h = melena/hematochezia, c/d/l/ha = cramping/dizziness/lightheadedness/headache, d/c = discharge or diarrhea/constipation, f/c/n/v = fevers/chills/nausea/vomiting, cp/sob = chest pain/shortness of breath, tbv = total body volume, rxn = reaction, tdc = tunneled dialysis catheter, pta = prior to admission, hd = hemodialysis, pd = peritoneal dialysis, hhd = home hemodialysis, edw = estimated dry wt         Assessment/Plan:     1.  ALEX/CKD V.  Pt's Cr is down to 4.4 today and he is making some urine via the ordoñez.  He is having some pain/bleeding at the penis and is Urology is coming to see him.  A.  Follow labs, uo daily.  B.  Avoid nephrotoxics.    2.  Hypernatremia.  Pt's Na improved slightly today but still corrects to ~148 for the high glc.  His current free water deficit is still ~2L despite increasing the amt of free water yday and he will have ongoing losses from the loop diuretic and suboptimal intake.  A.  Increase free water to 250 ml q 3h.  B.  Check Na later today and then in AM.    3.  HTN.  Decently controlled c furos + dox.  A.  Follow BP, clinically.    4.  FEN.  Other than Na, K is a bit low.  He is on replacement.  Mg is fine.  A.  Follow electrolytes.    Case d/w Dr. Soliz.        Interval History:     Unable.          Medications and Allergies:     Current Facility-Administered Medications   Medication Dose Route Frequency Provider Last Rate Last Admin    acetaminophen (TYLENOL) oral liquid 650 mg  650 mg Oral or Feeding Tube Q8H John Hernandez MD        amLODIPine (NORVASC) tablet 10 mg  10 mg Oral or Feeding Tube Daily  "Isidro Soliz MD   10 mg at 02/24/25 0913    B and C vitamin Complex with folic acid (NEPHRONEX) liquid 5 mL  5 mL Per Feeding Tube Daily Isidro Soliz MD   5 mL at 02/23/25 1716    clopidogrel (PLAVIX) tablet 75 mg  75 mg Oral or Feeding Tube Daily Isidro Soliz MD   75 mg at 02/24/25 0913    doxazosin (CARDURA) tablet 2 mg  2 mg Oral or Feeding Tube Daily Isidro Soliz MD   2 mg at 02/24/25 0913    FLUoxetine (PROzac) solution 20 mg  20 mg Oral or Feeding Tube Daily Isidro Soliz MD   20 mg at 02/24/25 1209    [START ON 2/25/2025] furosemide (LASIX) solution 20 mg  20 mg Oral or Feeding Tube Daily John Hernandez MD        insulin aspart (NovoLOG) injection (RAPID ACTING)  1-12 Units Subcutaneous Q4H Isidro Soliz MD   5 Units at 02/24/25 0907    [START ON 2/25/2025] insulin glargine (LANTUS PEN) injection 20 Units  20 Units Subcutaneous QAM AC Isidro Soliz MD        oseltamivir (TAMIFLU) suspension 30 mg  30 mg Oral or Feeding Tube Daily Isidro Soliz MD   30 mg at 02/24/25 0915    pantoprazole (PROTONIX) 2 mg/mL suspension 40 mg  40 mg Per Feeding Tube BID Isidro Soliz MD   40 mg at 02/24/25 0915    sodium chloride (PF) 0.9% PF flush 3 mL  3 mL Intracatheter Q8H Isidro Soliz MD   3 mL at 02/24/25 0933     Allergies   Allergen Reactions    Nka [No Known Allergies]           Physical Exam:     Vitals were reviewed     , Blood pressure (!) 145/74, pulse 89, temperature 99.8  F (37.7  C), temperature source Axillary, resp. rate 20, height 1.676 m (5' 6\"), weight 69.4 kg (153 lb), SpO2 97%.  Wt Readings from Last 3 Encounters:   02/23/25 69.4 kg (153 lb)   01/18/25 66.5 kg (146 lb 9.7 oz)   10/25/24 60.3 kg (132 lb 15 oz)     Intake/Output Summary (Last 24 hours) at 2/24/2025 1309  Last data filed at 2/24/2025 0550  Gross per 24 hour   Intake 390 ml   Output 1350 ml   Net -960 ml     GENERAL APPEARANCE: lying in bed, not " interactive  RESP: CTA B c good efforts  CV: RRR, nl S1/S2   ABDOMEN: s/nt/nd  EXTREMITIES/SKIN: tr ble edema  OTHER:  + tiago colunga clear yellow urine         Data:     CBC RESULTS:     Recent Labs   Lab 02/24/25  0657 02/23/25  0608 02/21/25  0442 02/19/25  0512 02/19/25  0143 02/18/25  0456   WBC  --   --  7.5  --   --  13.4*   RBC  --   --  2.74*  --   --  2.55*   HGB 8.4* 7.0* 7.3* 7.8* 7.2* 6.8*   HCT  --   --  22.7*  --   --  21.1*   PLT  --   --  176  --   --  162     Basic Metabolic Panel:  Recent Labs   Lab 02/24/25  1231 02/24/25  0849 02/24/25  0657 02/24/25  0521 02/24/25  0127 02/23/25  2149 02/23/25  1814 02/23/25  1659 02/23/25  1030 02/23/25  0608 02/22/25  0632 02/22/25  0616 02/22/25  0217 02/21/25  2205 02/21/25  0751 02/21/25  0442 02/19/25  0842 02/19/25  0512 02/18/25  0828 02/18/25  0456   NA  --   --  146*  --   --   --   --   --   --  147*  --   --   --   --   --  142  --  137  --  134*   POTASSIUM 3.4  --  3.3*  --   --   --   --  3.4  --  3.1*  --  3.6  --  3.7  --  3.0*  --  3.8  --  5.0   CHLORIDE  --   --  104  --   --   --   --   --   --  104  --   --   --   --   --  98  --  96*  --  94*   CO2  --   --  30*  --   --   --   --   --   --  33*  --   --   --   --   --  29  --  29  --  28   BUN  --   --  87.1*  --   --   --   --   --   --  95.7*  --   --   --   --   --  109.0*  --  133.3*  --  130.8*   CR  --   --  4.40*  --   --   --   --   --   --  4.83*  --   --   --   --   --  5.29*  --  5.26*  --  5.23*   GLC  --  252* 218* 183* 144* 149* 144*  --    < > 225*   < >  --    < >  --    < > 253*   < > 168*   < > 163*   ARLETH  --   --  8.1*  --   --   --   --   --   --  8.2*  --   --   --   --   --  8.5*  --  8.8  --  8.9    < > = values in this interval not displayed.     INRNo lab results found in last 7 days.   Attestation:   I have reviewed today's relevant vital signs, notes, medications, labs and imaging.    Thomas Lofton MD  [facility] - Nephrology  [phone]

## 2025-02-24 NOTE — PROGRESS NOTES
CLINICAL NUTRITION SERVICES - REASSESSMENT NOTE     RECOMMENDATIONS FOR MDs/PROVIDERS TO ORDER:  None at this time    Malnutrition Status:    Patient does not meet two of the established criteria necessary for diagnosing malnutrition (2/20)  Malnutrition Present on Admission: Unable to assess    Registered Dietitian Interventions:  Continue TF regimen as follows:   Novasource Renal at 30 mL/hr = 1440 kcal (22 kcal/kg), 65 g protein (0.9 g/kg), 132 g CHO, 516 mL H2O, 0 g fiber  Free Water Flush: 250 mL q4h, MD to adjust as needed  Total Free Water (TF + FWF) = 2016 mL    Future/Additional Recommendations:  Consider transition to home bolus feeding regimen      SUBJECTIVE INFORMATION  Assessed patient in room.     CURRENT NUTRITION ORDERS  Diet: NPO    Nutrition Support:   Type of Feeding Tube: G-tube   Enteral Frequency:  Continuous  Enteral Regimen: Novasource Renal at 30 mL/hr  Total Enteral Provisions: 1440 kcal (22 kcal/kg), 65 g protein (0.9 g/kg), 132 g CHO, 516 mL H2O, 0 g fiber   Free Water Flush: 240 mL every 4 hours   Total Free Water (TF + FWF) = 1956 mL    CURRENT INTAKE/TOLERANCE  Tolerating above, FT malfunction 2/23  TF running again this afternoon, good tolerance per flowsheets     NEW FINDINGS  - G-tube exchange in IR this morning d/t malfunction  - Lantus increased to 20 units daily     Weight:     02/23/25 0152 69.4 kg (153 lb) Bed scale   02/22/25 0524 69.4 kg (153 lb) Bed scale   02/21/25 0400 65.3 kg (143 lb 15.4 oz) Bed scale   02/20/25 0400 64.7 kg (142 lb 10.2 oz) Bed scale   02/19/25 0400 67.7 kg (149 lb 4 oz) Bed scale   02/18/25 0200 70 kg (154 lb 5.2 oz) Bed scale   02/17/25 0215 70.8 kg (156 lb 1.4 oz) Bed scale   02/16/25 2245 72.3 kg (159 lb 6.3 oz) Bed scale   02/16/25 1457 72.7 kg (160 lb 4.4 oz) --     Skin/wounds: Reviewed - Mild 2+ BLE edema, no documented pressure injuries  GI symptoms: Reviewed - rectal tube d/t watery stools  Nutrition-relevant labs:  Reviewed - Na 146 (H) K  3.3 (L) BUN 87.1 (H - improving) Cr 4.4 (H) Mg 2.9 (H) -252  Nutrition-relevant medications:  Reviewed - Nephronex, HSSI q4h, Lantus 20 units    ASSESSED NUTRITION NEEDS (2/17)  Dosing Weight: 66.5 kg, based on suspect closer to dry weight   Estimated Energy Needs: 5574-1532 kcals/day (20 - 25 kcals/kg)  Justification: Maintenance  Estimated Protein Needs: 53-66 grams protein/day (0.8-1 grams of pro/kg)  Justification: CKD and Hypercatabolism with acute illness  Estimated Fluid Needs: defer to nephrology     MALNUTRITION  % Intake: No decreased intake noted  % Weight Loss: Weight loss does not meet criteria  (diuresing)  Subcutaneous Fat Loss: None observed (UE)  Muscle Loss:  Chronic d/t age related, WC bound   Fluid Accumulation/Edema: Mild, 2+ (2/24)  Malnutrition Diagnosis: Patient does not meet two of the established criteria necessary for diagnosing malnutrition  Malnutrition Present on Admission: Unable to assess    EVALUATION OF THE PROGRESS TOWARD GOALS   Previous Goals  TF will continue to meet % needs   Evaluation: Met    Previous Nutrition Diagnosis  Predicted excessive nutrient intake (protein)  related to CKD with ALEX, BUN >> 100  Evaluation: No longer applicable, nutrition diagnosis changed below    NUTRITION DIAGNOSIS  No nutrition diagnosis at this time    INTERVENTIONS  See nutrition interventions above    Goals  TF will continue to meet % needs      Monitoring/Evaluation      Progress toward goals will be monitored and evaluated per policy.    Talisha De La Rosa RD, LD  Clinical Dietitian - [facility]

## 2025-02-24 NOTE — CONSULTS
"Urology Consult    Name: Jimmy Otto    MRN: [id number]   YOB: 1950               Chief Complaint:   Pain at penis and blood at penis    History is obtained from the patient and chart review          History of Present Illness:   \"Jimmy Otto is a 75 year old male with PMH of HTN, DLD, diabetes , hx of stroke (with right sided hemiplegia and aphasia-non verbal at baseline), PEG tube feeding,  chronic indwelling ordoñez (for neurogenic bladder), CKD 3b (with progressive renal dysfunction), chronic normocytic anemia who as brought to the ED with concern for decreasing urine output and was also noted with influenza with respiratory failure, junctional bradycardia, acute on chronic renal failure and anemia; admitted inpatient 2/16/25.\"    Discussed with family who are very involved in his care. They mentioned concerns that he has blood around the catheter. They also have had many problems with catheter placement, requiring multiple attempts and ER visits. Since being in the hospital, they have noticed worsening spasm pain which they have seen him have in association with UTIs.     He last saw urology 01/24/2025 with Alivia Herzog. He has biweekly catheter flushes and monthly catheter exchanges at home. He had UDS 02/2022 demonstrating no ability to volitionally void.           Past Medical History:     Past Medical History:   Diagnosis Date    Cerebrovascular accident - Hemiplegia and Aphasia     Depressive disorder     Diabetes mellitus     Ordoñez catheter in place     Hypercholesteremia     Hypertension     S/P percutaneous endoscopic gastrostomy (PEG) tube placement (H)             Past Surgical History:     Past Surgical History:   Procedure Laterality Date    ENDOSCOPIC INSERTION TUBE GASTROSTOMY      IR CYSTOGRAM  02/14/2022    IR GASTROSTOMY TUBE CHANGE  2/24/2025            Social History:     Social History     Tobacco Use    Smoking status: Never    Smokeless tobacco: Never   Substance Use Topics    Alcohol " use: Yes     Comment: occassional            Family History:   No family history on file.         Allergies:     Allergies   Allergen Reactions    Nka [No Known Allergies]             Medications:     Current Facility-Administered Medications   Medication Dose Route Frequency Provider Last Rate Last Admin    acetaminophen (TYLENOL) tablet 650 mg  650 mg Oral Q4H PRN John Hernandez MD        Or    acetaminophen (TYLENOL) Suppository 650 mg  650 mg Rectal Q4H PRN John Hernandez MD        Or    acetaminophen (TYLENOL) oral liquid 650 mg  650 mg Oral or Feeding Tube Q4H PRN John Hernandez MD        acetaminophen (TYLENOL) oral liquid 650 mg  650 mg Oral or Feeding Tube Q8H John Hernandez MD   650 mg at 02/24/25 1353    amLODIPine (NORVASC) tablet 10 mg  10 mg Oral or Feeding Tube Daily Isidro Soliz MD   10 mg at 02/24/25 0913    B and C vitamin Complex with folic acid (NEPHRONEX) liquid 5 mL  5 mL Per Feeding Tube Daily Isidro Soliz MD   5 mL at 02/23/25 1716    calcium carbonate (TUMS) chewable tablet 1,000 mg  1,000 mg Oral 4x Daily PRN Isidro Soliz MD   1,000 mg at 02/20/25 1505    clopidogrel (PLAVIX) tablet 75 mg  75 mg Oral or Feeding Tube Daily Isidro Soliz MD   75 mg at 02/24/25 0913    dextrose 10% infusion   Intravenous Continuous PRN Isidro Soliz MD        glucose gel 15-30 g  15-30 g Oral Q15 Min PRN Isidro Soliz MD        Or    dextrose 50 % injection 25-50 mL  25-50 mL Intravenous Q15 Min PRN Isidro Soliz MD        Or    glucagon injection 1 mg  1 mg Subcutaneous Q15 Min PRN Isidro Soliz MD        doxazosin (CARDURA) tablet 2 mg  2 mg Oral or Feeding Tube Daily Isidro Soliz MD   2 mg at 02/24/25 0913    FLUoxetine (PROzac) solution 20 mg  20 mg Oral or Feeding Tube Daily Isidro Soliz MD   20 mg at 02/24/25 1209    [START ON 2/25/2025] furosemide (LASIX) solution 20 mg  20 mg Oral or Feeding Tube Daily Mary,  MD John        hydrALAZINE (APRESOLINE) injection 10 mg  10 mg Intravenous Q4H PRIsidro Cunningham MD        insulin aspart (NovoLOG) injection (RAPID ACTING)  1-12 Units Subcutaneous Q4H Isidro Soliz MD   1 Units at 02/24/25 1354    [START ON 2/25/2025] insulin glargine (LANTUS PEN) injection 20 Units  20 Units Subcutaneous QAM AC Isidro Soliz MD        lidocaine (LMX4) cream   Topical Q1H PRIsidro Cunningham MD        lidocaine (LMX4) cream   Topical Q1H PRN Isidro Soliz MD        lidocaine (XYLOCAINE) 2 % external gel   Urethral Q4H PRN Isidro Soliz MD   Given at 02/24/25 1252    lidocaine 1 % 0.1-1 mL  0.1-1 mL Other Q1H PRN Isidro Soliz MD        lidocaine 1 % 0.1-1 mL  0.1-1 mL Other Q1H PRIsidro Cunningham MD        melatonin tablet 5 mg  5 mg Oral or Feeding Tube At Bedtime PRN Isidro Soliz MD   5 mg at 02/20/25 2329    ondansetron (ZOFRAN) injection 4 mg  4 mg Intravenous Q6H PRN Isidro Soliz MD   4 mg at 02/17/25 1652    oseltamivir (TAMIFLU) suspension 30 mg  30 mg Oral or Feeding Tube Daily Isidro Soliz MD   30 mg at 02/24/25 0915    pantoprazole (PROTONIX) 2 mg/mL suspension 40 mg  40 mg Per Feeding Tube BID Isidro Soliz MD   40 mg at 02/24/25 0915    senna-docusate (SENOKOT-S/PERICOLACE) 8.6-50 MG per tablet 1 tablet  1 tablet Oral BID PRN Isidro Soliz MD        Or    senna-docusate (SENOKOT-S/PERICOLACE) 8.6-50 MG per tablet 2 tablet  2 tablet Oral BID PRN Isidro Soliz MD        sodium chloride (PF) 0.9% PF flush 3 mL  3 mL Intracatheter q1 min prn Isidro Soliz MD        sodium chloride (PF) 0.9% PF flush 3 mL  3 mL Intracatheter Q8H Isidro Soliz MD   3 mL at 02/24/25 0933             Review of Systems:    ROS: 10 point ROS neg other than the symptoms noted above in the HPI           Physical Exam:   VS:  T: 99.8    HR: 89    BP: 145/74    RR: 20   GEN: resting  "comfortably  : uncircumcised, urethral meatus patent w/o abnormality small amount of blood around catheter   Felix draining clear yellow urine          Data:   All laboratory data reviewed:    Recent Labs   Lab 02/24/25  0657 02/23/25  0608 02/21/25  0442 02/19/25  0512 02/19/25  0143 02/18/25  0456   WBC  --   --  7.5  --   --  13.4*   HGB 8.4* 7.0* 7.3* 7.8*   < > 6.8*   PLT  --   --  176  --   --  162    < > = values in this interval not displayed.       Recent Labs   Lab 02/24/25  1307 02/24/25  1231 02/24/25  0849 02/24/25  0657 02/24/25  0521 02/23/25  1814 02/23/25  1659 02/23/25  1030 02/23/25  0608 02/21/25  0751 02/21/25  0442 02/20/25  0821 02/20/25  0531 02/19/25  0842 02/19/25  0512 02/18/25  0828 02/18/25  0456   NA  --   --   --  146*  --   --   --   --  147*  --  142  --   --   --  137  --  134*   POTASSIUM  --  3.4  --  3.3*  --   --  3.4  --  3.1*   < > 3.0*  --   --   --  3.8  --  5.0   CHLORIDE  --   --   --  104  --   --   --   --  104  --  98  --   --   --  96*  --  94*   CO2  --   --   --  30*  --   --   --   --  33*  --  29  --   --   --  29  --  28   BUN  --   --   --  87.1*  --   --   --   --  95.7*  --  109.0*  --   --   --  133.3*  --  130.8*   CR  --   --   --  4.40*  --   --   --   --  4.83*  --  5.29*  --   --   --  5.26*  --  5.23*   *  --  252* 218* 183*   < >  --    < > 225*   < > 253*   < >  --    < > 168*   < > 163*   ARLETH  --   --   --  8.1*  --   --   --   --  8.2*  --  8.5*  --   --   --  8.8  --  8.9   MAG  --   --   --  2.9*  --   --   --   --   --   --  3.0*  --   --   --  3.0*  --  3.0*   PHOS  --   --   --  3.1  --   --   --   --   --   --  3.7  --  4.1  --  4.1  --  3.4    < > = values in this interval not displayed.     No lab results found in last 7 days.    Invalid input(s): \"URINEBLOOD\"    All pertinent imaging reviewed:    CT scan of the abdomen:        Renal ultrasound:               Impression and Plan:   Impression:   \"Jimmy Otto is a 75 year old male with " "PMH of HTN, DLD, diabetes , hx of stroke (with right sided hemiplegia and aphasia-non verbal at baseline), PEG tube feeding,  chronic indwelling ordoñez (for neurogenic bladder), CKD 3b (with progressive renal dysfunction), chronic normocytic anemia who as brought to the ED with concern for decreasing urine output and was also noted with influenza with respiratory failure, junctional bradycardia, acute on chronic renal failure and anemia; admitted inpatient 2/16/25.\" Urologic hx of chronic ordoñez and reported difficult exchanges.     1) Hematuria  Discussed that this is likely related to catheter trauma as it is around the catheter and not in the urine, likely prostate. This should self resolve in a few days to 1-2 weeks. No hematuria workup is necessary at this time.   2) Pelvic pain   Based on family description and patient \"pointing\" at penis, this is likely bladder spasm related. We discussed that this can be normal in the setting of having a catheter and neurogenic bladder, and bladder symptoms can be progressive and change with time in a neurogenic setting. Certainly a symptom of UTI can be worsening bladder spasms. We discussed the difference between mucosal invasion vs bacterial colonization, and that he likely will always have a + Ucx but this does not mean he has an infection. However in this setting it is reasonable to proceed with a Ucx. Would also recommend trospium, this does not need to be continued outpatient. Other medications that could be trial would be B&O suppositories. He cannot take mybetriq as this can not be crushed. If Ucx is negative, could consider continued trospium usage and/or B&O suppositories for symptom control.   3) Difficult cath placement  Discussed that likely his prostate is the problem. The have noticed that coude catheters work better. Would recommend using an 18 Fr coude catheter in the future. If he has continued trouble with catheter exchanges an SPT could be considered.     "   Plan:  -UA/Ucx ordered for you  -trospium ordered for you  -patient should have 18 Fr coude ordoñez catheters ordered for home exchanges  -urology will s/o, please page/call with questions    This patient's exam findings, labs, and imaging discussed with urology staff surgeon Dr. Sarahy MD, who developed the treatment plan.    Finn Terry MD  Chief Urology Resident, PGY-5  [phone]

## 2025-02-24 NOTE — PROGRESS NOTES
Patient is on IR schedule today Monday 2/24/25 for a G tube exchange.     Jimmy Otto is a 75 year old male with PMH of HTN, DLD, diabetes , hx of stroke (with right sided hemiplegia and aphasia-non verbal at baseline), PEG tube feeding,  chronic indwelling ordoñez (for neurogenic bladder), CKD 3b (with progressive renal dysfunction), chronic normocytic anemia who as brought to the ED with concern for decreasing urine output and was also noted with influenza with respiratory failure, junctional bradycardia, acute on chronic renal failure and anemia; admitted inpatient 2/16/25.      The g tube connection to tube feeding became stuck and there is a part that is broken in the g tube. Exchange requested.     -No NPO required.   -Phone procedural education reviewed with Daughter Clemente in detail including, but not limited to risks, benefits and alternatives, questions answered with understanding verbalized by her and consent is in IR.     Please contact the IR department at [phone] for procedural related questions.     Total time: 30 minutes     Thanks, Toshia [facility] Interventional Radiology CNP ([phone]) (phone [phone])

## 2025-02-24 NOTE — PHARMACY-CONSULT NOTE
Pharmacy was consulted for the following reason: please review the medications to see if any medication can be modified to to prevent frequent clogging of tube feeds.     Patient's medications have been reviewed. All medications are compatible with feeding tube. I switched acetaminophen and furosemide from tablets to liquid to see if this will decreasing clogging.    Thank you for the consult.   Ttay Salgado RPH on 2/24/2025 at 11:46 AM

## 2025-02-24 NOTE — PLAN OF CARE
Goal Outcome Evaluation:      Summary:  Transfer from ICU 2/21/25. Admitted with Influenza, respiratory failure, acute on chronic renal failure and anemia.    DATE & TIME: 2/23/25, 1002-4879   Cognitive Concerns/ Orientation : ZAYDA orientation due to baseline non verbal for hx of stroke.   BEHAVIOR & AGGRESSION TOOL COLOR: Green  CIWA SCORE: NA   ABNL VS/O2: VSS on 1.5 L oxygen.  MOBILITY:  Turned and repositioned.  Right sided hemiplegia from previous stroke.  PAIN MANAGMENT: Nonverbal pain scale used: No sign of pain, scheduled Tylenol given thru G-tube.   DIET: NPO. TF infusing at  goal rate of 30ml/ hr with q 4 hrs free water flush at 240 mL. Previous shift, tubing into the peg malfunction-MD notified/aware; IR consult.   BOWEL/BLADDER: Chronic ordoñez for neurogenic bladder- catheter completed. Rectal tube in place due to watery stools.  ABNL LAB/BG: Hgb 7.0-received 1 unit today; recheck ordered for AM. K+ 3.0-replaced; recheck 3.4.  DRAIN/DEVICES: PIV SL.  TELEMETRY RHYTHM: NA  SKIN: Pale. Scattered bruises.  +2 edema to BLE. Old wound scar on coccyx, mepilex in place.  TESTS/PROCEDURES: CT completed this shift. IR tomorrow for PEG tube malfunction.   D/C DAY/GOALS/PLACE: Discharge home with family pending.  OTHER IMPORTANT INFO: Nephrology and Sw following. Droplet precautions maintained. Son at bedside.

## 2025-02-24 NOTE — PROGRESS NOTES
PALLIATIVE CARE SOCIAL WORK Progress Note   Location: [facility]      Support visit     PCSW and palliative MARIA LUZ Casandra met with pt (who is laying in bed, with nasal canula oxygen on - pt does not participate in the visit), daughter Wayne, son Tressa, and daughter Clemente is on speaker phone; three young grandchildren present in the room but do not participate.    Family share medical update on their father since he was moved out of the ICU; they have a few concerns they want addressed prior to being discharged home as they do not want to get home and have more issues/have their dad worsen and then need to return to the hospital.  They requested a urology consult as pt has been having pain and bleeding at the penis; during this visit he is observed to have what appears to be waves of pain where his face changes and he reaches out to grab the bar with his left hand, and then it appears the wave passes and his face and hand/arm relax back into the bed. Family worry that there is some kind of internal pain related to the catheter happening as this is not normal for their dad - they worry about a UTI as he has had this in the past after catheter issues.  Family is considering thoracentesis.     Family also had questions about oxygen, if he is discharged home with oxygen will there be education for this? In the ICU they were working towards weaning down the oxygen, they wonder if that is continuing to happen as pt was not on oxygen prior to this hospitalization and they wonder how they will know at home if he requires the oxygen or not - they are hoping he will not need it as this will be another thing for their mom to learn and this would be stressful; they do have [facility] homecare and encouraged them to talk with the discharge care coordinators to talk about equipment needs and that homecare nurse can also help provide needed education in the home.     They had very good questions about palliative care being available  outpatient; education provided on this service, discussed misconceptions and informed that they will still primarily utilize their PCP but that palliative can help with decision making and guidance as disease progresses and they move closer to hospice care in the home. That palliative does not come to the home, typically appointments are virtual. Encouraged them to consider [facility] as their PCP is with Y[facility] Zhao so it may make coordination of care smoother, but they may prefer [facility] outpatient palliative as this is where the other specialists are. Informed that a referral can be made for outpatient palliative services at discharge if they feel this would be helpful. They had questions about services at home, if there are other options to help them avoid having to go the ED when there are issues; family do not feel they are ready for hospice yet as they want to continue to treat what they can, and want to have the option of bringing their dad back to the hospital if there is an infection or issue; then discussed that the homecare nurses are the option available - they can call the agency to express concerns about the complicated nature of their dad's condition and can advocate for an experienced nurse to come to the home to address the catheter changes but that is really is dependent on agency staffing and availability; they feel that the nurse they currently have is one of the more experienced ones that they have had.     At this time family continue to want to pursue treatments that are available for their dad and are also at the same time understanding of his chronic conditions. They are strong advocates for their dad, a member of the family is almost always with him at the hospital.     No other needs identified at this time.    Plan: PCSW will continue to follow while the palliative care team remains involved; ongoing assessment of pt and family coping, emotional and decisional  support needs.     Clinical Social Work Interventions:   Assessment of palliative specific issues    Family communication facilitated    CRISTINA Correia  [facility], Palliative Care  Securely message with the [employer] Web Console (learn more here) or  Text page via [employer] Paging/Directory

## 2025-02-24 NOTE — IR NOTE
Interventional Radiology Intra-procedural Nursing Note    Patient Name: Jimmy Otto  Medical Record Number: [id number]  Today's Date: February 24, 2025    Procedure consented for : G tube change  Start time: 1104  End time: 1108  Report provided to: [facility] RN  Patient depart time and location: 1110 to 607    Note: Patient entered Interventional Radiology Suite number 1 via cart. Patient awake, alert and oriented. Assisted onto procedural table in supine position. Prepped and draped.  Dr. Antoine in room. Time out and procedure started..    Procedure well tolerated by patient without complications. Procedure end with debrief by Dr. Antoine.        Administered medication totals:  Lidocaine gel 6 mls

## 2025-02-24 NOTE — PLAN OF CARE
Summary:  Transfer from ICU 2/21/25. Admitted with Influenza, respiratory failure, acute on chronic renal failure and anemia.    DATE & TIME: 2/23/25, 1629-0732   Cognitive Concerns/ Orientation : ZAYDA orientation due to baseline non verbal for hx of stroke.   BEHAVIOR & AGGRESSION TOOL COLOR: Green  CIWA SCORE: NA   ABNL VS/O2: VSS on 1.5 L oxygen.  MOBILITY:  Turned and repositioned.  Right sided hemiplegia from previous stroke.  PAIN MANAGMENT: Nonverbal pain scale used: c/o pain in abd, scheduled Tylenol given thru G-tube.   DIET: NPO. TF infusing at  goal rate of 30ml/ hr with q 4 hrs free water flush at 240 mL. Tubing into the peg malfunction-MD notified/aware; IR consult.   BOWEL/BLADDER: Chronic ordoñez for neurogenic bladder. Rectal tube in place due to watery stools.  ABNL LAB/BG: Hgb 7.0-received 1 unit today; recheck ordered for AM. K+ 3.0-replaced; recheck 3.4.  DRAIN/DEVICES: PIV SL.  TELEMETRY RHYTHM: NA  SKIN: Pale. Scattered bruises.  +2 edema to BLE. Old wound scar on coccyx, mepilex in place.  TESTS/PROCEDURES: CT completed this shift. IR tomorrow for PEG tube malfunction.   D/C DAY/GOALS/PLACE: Discharge home with family pending.  OTHER IMPORTANT INFO: Nephrology and Sw following. Droplet precautions maintained. Son at bedside.

## 2025-02-24 NOTE — PROGRESS NOTES
PALLIATIVE CARE PROGRESS NOTE  [facility]     Patient Name: Jimmy Otto  Date of Admission: 2/16/2025   Today the patient was seen for: goals of care     Recommendations & Counseling       GOALS OF CARE:   Restorative with limits -not ready for a hospice/comfort focused plan at this time.  Family wishes to optimize Jimmy's condition to avoid frequent hospitalizations once he discharges home. They will continue [facility] home care support.   Imaging identified fluid around the lung was-possible thoracentesis tomorrow.  Discussed that there are risks and benefits to any intervention.  Family will be discussing prior to making a final decision today.  Family requests a palliative outpatient clinic referral for ongoing support regarding goals of care and symptom management. I will place consult.    This discussion started at 2:15 PM and ended at 3:30 PM.    ADVANCE CARE PLANNING:  No health care directive on file. Per system policy, Surrogate Decision-makers for Patients With Diminished Decision-making Capacity offers guidance on possible decision-makers.   There is no POLST form on file, defer to patient and/or next of kin for decisions   Code status: No CPR- Do NOT Intubate     DECISION MAKING:  Patient's decision making preferences: shared with support from loved ones  Patient has decision-making capacity today for complex decisions: Unreliable       MEDICAL MANAGEMENT:   We are not actively managing symptoms at this time.     #Hx of stroke (with right sided hemiplegia and aphasia- non verbal at baseline),   #Dysphagia- has chronic PEG tube feeding.   #Neurogenic bladder- has chronic indwelling ordoñez.      #Family concern for possible urinary pain-   Family had many questions today regarding concerns for possible urinary discomfort as some blood was found around patient's  penis tip at Ordoñez catheter entrance. Wonders if this is bladder spasms? Deferred these questions to urology as they have  been consulted.  Agree with lidocaine at penis tip. Family did not feel this helped much with pain.  Agree with acetaminophen 650 mg every 8 hours and as needed  Family would like to avoid opioid medications due to likelihood of sedation.  Family has requested urology consult    PSYCHOSOCIAL/SPIRITUAL SUPPORT:  Family   [facility]: [facility]     Palliative Care will continue to follow. Thank you for the consult and allowing us to aid in the care of Jimmy Otto.    These recommendations have been discussed with primary and bedside teams.    SANTA Castro  Securely message with [employer] info)  Text page via [employer] Paging/Directory       Chart documentation was completed, in part, with [employer] voice-recognition software. Even though reviewed, some grammatical, spelling, and word errors may remain.         Interval History:     Family care conference in patient room with daughter Clemente Black, son Nirmal. They had requested palliative team return for questions they had regarding post discharge    Imaging identified pulmonary edema with moderate to large volume bilateral effusions-family making decision whether to proceed with thoracentesis.  Family understands that there are risks and benefits with any procedure.     Family is wondering if patient will need ongoing oxygen therapy and if he is still being weaned.  He is currently on 1.5 L via nasal cannula at this time which is a low amount.  Family was hoping that patient would not need oxygen as it increases complexity at home for their mother to manage.  However, they are okay with oxygen if he needs it to maintain his medical stability. Had questions regarding staff that could assist them in learning about managing the oxygen at home. Reviewed how this is usually done by home care or oxygen provider.    Reviewed how primary care provider is the main  of outpatient care. Family stated that Jimmy's PCP is at [facility] and the recommendation from her  was hospice, which family is not ready for at this time.  However they do understand that at some point patient's condition will worsen to the point where hospice would be the best choice.         Assessment          Jimmy Otto is a 75 year old male with PMH of HTN, DLD, diabetes , hx of stroke (with right sided hemiplegia and aphasia-non verbal at baseline), PEG tube feeding,  chronic indwelling ordoñez (for neurogenic bladder), CKD 3b (with progressive renal dysfunction), chronic normocytic anemia who as brought to the ED with concern for decreasing urine output and was also noted with influenza with respiratory failure, junctional bradycardia, acute on chronic renal failure and anemia; admitted inpatient 2/16/25.              Review of Systems:     Besides above, ROS was reviewed and is unremarkable          Physical Exam:   Temp:  [98.6  F (37  C)-99.8  F (37.7  C)] 99.8  F (37.7  C)  Pulse:  [82-94] 89  Resp:  [18-20] 20  BP: (136-145)/(69-74) 145/74  SpO2:  [94 %-97 %] 97 %  152 lbs 15.99 oz    Physical Exam  GENERAL:  Frail, elderly male, lying in bed, appears comfortable.  HEAD: Normocephalic atraumatic  SCLERA: Anicteric  RESPIRATORY: Breathing non labored.  ABDOMEN: Flat, non distended.  NEUROLOGIC: Alert, gives eye contact, follows simple directions- waving left hand on request. Right sided weakness.  PSYCH: Calm.             Current Problem List:   Active Problems:    Acute renal failure superimposed on chronic kidney disease, unspecified acute renal failure type, unspecified CKD stage    Hyperkalemia    Influenza A    Hypervolemia, unspecified hypervolemia type      Allergies   Allergen Reactions    Nka [No Known Allergies]             Data Reviewed:     Results for orders placed or performed during the hospital encounter of 02/16/25 (from the past 24 hours)   CT Chest Abdomen Pelvis w/o Contrast    Narrative    EXAM: CT CHEST ABDOMEN PELVIS W/O CONTRAST  LOCATION: [facility]  DATE:  2/23/2025    INDICATION: shortness of breath; pain abdomen  COMPARISON: Chest radiograph 2/17/2025, chest CT 10/20/2022  TECHNIQUE: CT scan of the chest, abdomen, and pelvis was performed without IV contrast. Multiplanar reformats were obtained. Dose reduction techniques were used.   CONTRAST: None.    FINDINGS:   LUNGS AND PLEURA: Diffuse interlobular septal thickening throughout both lungs with moderate to large bilateral pleural effusions and associated compressive atelectasis, right larger than left. There are some superimposed more focal areas of   consolidation, most pronounced in the right upper and middle lobes (for example series 4 image 102). No pneumothorax.    MEDIASTINUM/AXILLAE: Mildly enlarged mediastinal lymph nodes, most likely related to volume overload, no specific follow-up recommended. Heart is enlarged. Trace pericardial fluid.    CORONARY ARTERY CALCIFICATION: Moderate.    HEPATOBILIARY: Normal.    PANCREAS: Multiple punctate sidebranch calcifications throughout the pancreas. Interval enlargement of low density lesion centered in the pancreatic neck, measuring up to 3.6 cm, uncertain if this is cystic or solid lesion (series 3 image 141). Likely   mild pancreatic ductal dilation the tail with possible exophytic cystic lesions. No significant surrounding fat stranding.    SPLEEN: Normal.    ADRENAL GLANDS: Normal.    KIDNEYS/BLADDER: No hydronephrosis. Urinary bladder is decompressed by Felix catheter.    BOWEL: No obstruction or inflammatory change. Rectal continence device in place. Percutaneous gastrostomy tube is also noted with retention balloon within the lumen of the stomach.    LYMPH NODES: No suspicious lymphadenopathy. No abdominopelvic free fluid.    VASCULATURE: Moderate calcified atherosclerosis. No abdominal aortic aneurysm.    PELVIC ORGANS: Unremarkable.    MUSCULOSKELETAL: No acute bony abnormality. Mild body wall edema.      Impression    IMPRESSION:  1.  Likely moderate  pulmonary edema with moderate to large volume bilateral pleural effusions, right larger than left.  2.  More focal opacities scattered throughout both lungs, may represent areas of edema as described above, but superimposed infectious/inflammatory process is difficult to exclude.  3.  No definite acute abnormality in the abdomen or pelvis.  4.  Sequela of chronic calcific pancreatitis with new or enlarging lesion in the pancreatic neck measuring up to 3.6 cm, favor a cystic lesion but solid mass is not entirely excluded. Recommend further evaluation with MRI/MRCP, could be performed on a   nonemergent/outpatient basis if clinically indicated.   Potassium   Result Value Ref Range    Potassium 3.4 3.4 - 5.3 mmol/L   Glucose by meter   Result Value Ref Range    GLUCOSE BY METER POCT 144 (H) 70 - 99 mg/dL   Glucose by meter   Result Value Ref Range    GLUCOSE BY METER POCT 149 (H) 70 - 99 mg/dL   Glucose by meter   Result Value Ref Range    GLUCOSE BY METER POCT 144 (H) 70 - 99 mg/dL   Glucose by meter   Result Value Ref Range    GLUCOSE BY METER POCT 183 (H) 70 - 99 mg/dL   Basic metabolic panel   Result Value Ref Range    Sodium 146 (H) 135 - 145 mmol/L    Potassium 3.3 (L) 3.4 - 5.3 mmol/L    Chloride 104 98 - 107 mmol/L    Carbon Dioxide (CO2) 30 (H) 22 - 29 mmol/L    Anion Gap 12 7 - 15 mmol/L    Urea Nitrogen 87.1 (H) 8.0 - 23.0 mg/dL    Creatinine 4.40 (H) 0.67 - 1.17 mg/dL    GFR Estimate 13 (L) >60 mL/min/1.73m2    Calcium 8.1 (L) 8.8 - 10.4 mg/dL    Glucose 218 (H) 70 - 99 mg/dL   Magnesium   Result Value Ref Range    Magnesium 2.9 (H) 1.7 - 2.3 mg/dL   Phosphorus   Result Value Ref Range    Phosphorus 3.1 2.5 - 4.5 mg/dL   Hemoglobin   Result Value Ref Range    Hemoglobin 8.4 (L) 13.3 - 17.7 g/dL   Glucose by meter   Result Value Ref Range    GLUCOSE BY METER POCT 252 (H) 70 - 99 mg/dL   IR Gastrostomy Tube Change    [facility] RADIOLOGY  LOCATION: [facility]  DATE:  2/24/2025    PROCEDURE: FLUOROSCOPIC GUIDED GASTROSTOMY EXCHANGE    INTERVENTIONAL RADIOLOGIST: Dennys Alejandro MD.    INDICATION: Existing G-tube is broken, request made for exchange.    MODERATE SEDATION: None.    CONTRAST: 15 mL Omnipaque enteric.  ANTIBIOTICS: None.  ADDITIONAL MEDICATIONS: None.    FLUOROSCOPIC TIME: 0.1 minutes.  RADIATION DOSE: Air Kerma: 0.74 mGy.    COMPLICATIONS: No immediate complications.    STERILE BARRIER TECHNIQUE: Maximum sterile barrier technique was used. Cutaneous antisepsis was performed at the operative site with application of 2% chlorhexidine and large sterile drape. Prior to the procedure, the  and assistant performed   hand hygiene and wore hat, mask, sterile gown, and sterile gloves during the entire procedure.    PROCEDURE:    Under fluoroscopic guidance, the gastric lumen of the pre-existing gastrostomy was injected with contrast and images were obtained. The tube was then exchanged over a wire for a new 14F BEVERLEY gastrostomy which was positioned under fluoroscopic guidance   with its tip in the gastric lumen. The retention balloon was inflated with 5 mL of saline. A post placement contrast injection through the gastric lumen was performed.    FINDINGS:  The initial injection demonstrates that the gastric lumen is patent and in appropriate position.    After exchange, the new gastrostomy is in appropriate position with the gastric lumen emptying into the stomach.      Impression    IMPRESSION:    1.  Successful gastrostomy tube exchange under fluoroscopic guidance as detailed above.  2.  The new tube is ready for use immediately.       Potassium   Result Value Ref Range    Potassium 3.4 3.4 - 5.3 mmol/L   Glucose by meter   Result Value Ref Range    GLUCOSE BY METER POCT 160 (H) 70 - 99 mg/dL       Medical Decision Making       90 MINUTES SPENT BY ME on the date of service doing chart review, history, exam, documentation & further activities per the note.

## 2025-02-25 ENCOUNTER — APPOINTMENT (OUTPATIENT)
Dept: ULTRASOUND IMAGING | Facility: CLINIC | Age: 75
End: 2025-02-25
Attending: HOSPITALIST
Payer: MEDICARE

## 2025-02-25 DIAGNOSIS — Z93.1 GASTROINTESTINAL TUBE PRESENT (H): Primary | ICD-10-CM

## 2025-02-25 LAB
ALBUMIN SERPL BCG-MCNC: 2.9 G/DL (ref 3.5–5.2)
ANION GAP SERPL CALCULATED.3IONS-SCNC: 9 MMOL/L (ref 7–15)
BUN SERPL-MCNC: 81.3 MG/DL (ref 8–23)
CALCIUM SERPL-MCNC: 7.9 MG/DL (ref 8.8–10.4)
CHLORIDE SERPL-SCNC: 104 MMOL/L (ref 98–107)
CREAT SERPL-MCNC: 4.24 MG/DL (ref 0.67–1.17)
EGFRCR SERPLBLD CKD-EPI 2021: 14 ML/MIN/1.73M2
ERYTHROCYTE [DISTWIDTH] IN BLOOD BY AUTOMATED COUNT: 14.8 % (ref 10–15)
GLUCOSE BLDC GLUCOMTR-MCNC: 153 MG/DL (ref 70–99)
GLUCOSE BLDC GLUCOMTR-MCNC: 164 MG/DL (ref 70–99)
GLUCOSE BLDC GLUCOMTR-MCNC: 173 MG/DL (ref 70–99)
GLUCOSE BLDC GLUCOMTR-MCNC: 214 MG/DL (ref 70–99)
GLUCOSE BLDC GLUCOMTR-MCNC: 214 MG/DL (ref 70–99)
GLUCOSE BLDC GLUCOMTR-MCNC: 218 MG/DL (ref 70–99)
GLUCOSE BLDC GLUCOMTR-MCNC: 220 MG/DL (ref 70–99)
GLUCOSE BLDC GLUCOMTR-MCNC: 226 MG/DL (ref 70–99)
GLUCOSE SERPL-MCNC: 223 MG/DL (ref 70–99)
HCO3 SERPL-SCNC: 32 MMOL/L (ref 22–29)
HCT VFR BLD AUTO: 28.6 % (ref 40–53)
HGB BLD-MCNC: 9.1 G/DL (ref 13.3–17.7)
MCH RBC QN AUTO: 27.1 PG (ref 26.5–33)
MCHC RBC AUTO-ENTMCNC: 31.8 G/DL (ref 31.5–36.5)
MCV RBC AUTO: 85 FL (ref 78–100)
PHOSPHATE SERPL-MCNC: 3.1 MG/DL (ref 2.5–4.5)
PLATELET # BLD AUTO: 169 10E3/UL (ref 150–450)
POTASSIUM SERPL-SCNC: 3.4 MMOL/L (ref 3.4–5.3)
POTASSIUM SERPL-SCNC: 3.7 MMOL/L (ref 3.4–5.3)
RBC # BLD AUTO: 3.36 10E6/UL (ref 4.4–5.9)
SODIUM SERPL-SCNC: 145 MMOL/L (ref 135–145)
WBC # BLD AUTO: 7.1 10E3/UL (ref 4–11)

## 2025-02-25 PROCEDURE — 120N000013 HC R&B IMCU

## 2025-02-25 PROCEDURE — 84132 ASSAY OF SERUM POTASSIUM: CPT | Performed by: HOSPITALIST

## 2025-02-25 PROCEDURE — 99232 SBSQ HOSP IP/OBS MODERATE 35: CPT | Performed by: HOSPITALIST

## 2025-02-25 PROCEDURE — 36415 COLL VENOUS BLD VENIPUNCTURE: CPT | Performed by: INTERNAL MEDICINE

## 2025-02-25 PROCEDURE — 250N000009 HC RX 250: Performed by: HOSPITALIST

## 2025-02-25 PROCEDURE — 250N000013 HC RX MED GY IP 250 OP 250 PS 637: Performed by: HOSPITALIST

## 2025-02-25 PROCEDURE — 250N000013 HC RX MED GY IP 250 OP 250 PS 637: Performed by: STUDENT IN AN ORGANIZED HEALTH CARE EDUCATION/TRAINING PROGRAM

## 2025-02-25 PROCEDURE — 82435 ASSAY OF BLOOD CHLORIDE: CPT | Performed by: INTERNAL MEDICINE

## 2025-02-25 PROCEDURE — 250N000009 HC RX 250: Performed by: RADIOLOGY

## 2025-02-25 PROCEDURE — 272N000706 US THORACENTESIS

## 2025-02-25 PROCEDURE — 250N000013 HC RX MED GY IP 250 OP 250 PS 637: Performed by: INTERNAL MEDICINE

## 2025-02-25 PROCEDURE — 84520 ASSAY OF UREA NITROGEN: CPT | Performed by: INTERNAL MEDICINE

## 2025-02-25 PROCEDURE — 85014 HEMATOCRIT: CPT | Performed by: INTERNAL MEDICINE

## 2025-02-25 PROCEDURE — 32555 ASPIRATE PLEURA W/ IMAGING: CPT

## 2025-02-25 PROCEDURE — 36415 COLL VENOUS BLD VENIPUNCTURE: CPT | Performed by: HOSPITALIST

## 2025-02-25 PROCEDURE — B4036 ENTERAL FEED SUP KIT GRAV BY: HCPCS

## 2025-02-25 RX ORDER — LIDOCAINE HYDROCHLORIDE 10 MG/ML
10 INJECTION, SOLUTION EPIDURAL; INFILTRATION; INTRACAUDAL; PERINEURAL ONCE
Status: DISCONTINUED | OUTPATIENT
Start: 2025-02-25 | End: 2025-02-25

## 2025-02-25 RX ORDER — POTASSIUM CHLORIDE 20MEQ/15ML
20 LIQUID (ML) ORAL ONCE
Status: COMPLETED | OUTPATIENT
Start: 2025-02-25 | End: 2025-02-25

## 2025-02-25 RX ADMIN — LIDOCAINE HYDROCHLORIDE ANHYDROUS 10 ML: 10 INJECTION, SOLUTION INFILTRATION at 15:35

## 2025-02-25 RX ADMIN — LIDOCAINE HYDROCHLORIDE: 20 JELLY TOPICAL at 05:47

## 2025-02-25 RX ADMIN — AMLODIPINE BESYLATE 10 MG: 10 TABLET ORAL at 10:07

## 2025-02-25 RX ADMIN — Medication 5 ML: at 17:02

## 2025-02-25 RX ADMIN — ACETAMINOPHEN 650 MG: 325 SUSPENSION ORAL at 13:34

## 2025-02-25 RX ADMIN — POTASSIUM CHLORIDE 20 MEQ: 20 SOLUTION ORAL at 10:07

## 2025-02-25 RX ADMIN — Medication 40 MG: at 10:08

## 2025-02-25 RX ADMIN — ACETAMINOPHEN 650 MG: 325 SUSPENSION ORAL at 03:47

## 2025-02-25 RX ADMIN — Medication 40 MG: at 22:21

## 2025-02-25 RX ADMIN — FUROSEMIDE 20 MG: 10 SOLUTION ORAL at 10:08

## 2025-02-25 RX ADMIN — FLUOXETINE 20 MG: 20 SOLUTION ORAL at 13:16

## 2025-02-25 RX ADMIN — OSELTAMIVIR PHOSPHATE 30 MG: 6 POWDER, FOR SUSPENSION ORAL at 10:08

## 2025-02-25 RX ADMIN — ACETAMINOPHEN 650 MG: 325 SUSPENSION ORAL at 07:03

## 2025-02-25 RX ADMIN — ACETAMINOPHEN 650 MG: 325 SUSPENSION ORAL at 22:20

## 2025-02-25 RX ADMIN — CLOPIDOGREL BISULFATE 75 MG: 75 TABLET ORAL at 10:07

## 2025-02-25 RX ADMIN — TOLTERODINE TARTRATE 1 MG: 1 TABLET ORAL at 07:03

## 2025-02-25 RX ADMIN — DOXAZOSIN 2 MG: 2 TABLET ORAL at 10:07

## 2025-02-25 RX ADMIN — TOLTERODINE TARTRATE 1 MG: 1 TABLET ORAL at 17:03

## 2025-02-25 ASSESSMENT — ACTIVITIES OF DAILY LIVING (ADL)
ADLS_ACUITY_SCORE: 103
ADLS_ACUITY_SCORE: 99
ADLS_ACUITY_SCORE: 103
ADLS_ACUITY_SCORE: 99
ADLS_ACUITY_SCORE: 103
ADLS_ACUITY_SCORE: 99
ADLS_ACUITY_SCORE: 103
ADLS_ACUITY_SCORE: 99
ADLS_ACUITY_SCORE: 99
ADLS_ACUITY_SCORE: 103

## 2025-02-25 NOTE — PLAN OF CARE
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   BEHAVIOR & AGGRESSION TOOL COLOR: Green  CIWA SCORE: NA   ABNL VS/O2: VSS on 1.5 L oxygen.  MOBILITY:  Turned and repositioned.  Right sided hemiplegia from previous stroke.  PAIN MANAGMENT: Nonverbal pain scale used: c/o pain around the  meatus, scheduled Tylenol and PRN tylenol given thru G-tube.   DIET: NPO. TF infusing at  goal rate of 30ml/ hr with q 3hrs free water flush at 250 mL.   BOWEL/BLADDER: Chronic ordoñez for neurogenic bladder. Rectal tube in place due to watery stools.  ABNL LAB/BG: Hgb 8.4-received 1 unit of PRBs yesterday. K+ 3.3-replaced; recheck 3.4,replaced 2nd time, recheck at 1900. /160/182,  Na 146, Cr 4.40  DRAIN/DEVICES: PIV SL.  TELEMETRY RHYTHM: NA  SKIN: Pale. Scattered bruises.  +2 edema to BLE. Old wound scar on coccyx, mepilex changed  TESTS/PROCEDURES: .PEG tube replaced in IR today  D/C DAY/GOALS/PLACE: Discharge home with family pending.  OTHER IMPORTANT INFO: Nephrology and Sw following. Infrequent congested cough, family at bed side.Urology consulted for pain and bloody drainage around the meatus.

## 2025-02-25 NOTE — PROGRESS NOTES
"[facility]  Hospitalist Progress Note        Isidro Soliz MD   02/25/2025        Interval History:        - Nephrology increased free water flushes to 250 ML q 3 hrs; Na---145, Cr down to 4.24  - palliative care along with palliative SW revisited family, to continue restorative cares and will continue with [facility] care home cares support after discharge; to follow with palliative care outpatient  - evaluated by urology, suspect mild hematuria with catheter trauma; added Trospium for likely bladder spasm; \"-patient should have 18 Fr coude ordoñez catheters ordered for home exchanges \"  - respiratory status stable on 1 to 2 L oxygen; family wants to pursue thoracentesis-- ordered (2/25/25)         Assessment and Plan:        Jimmy Otto is a 75 year old male with PMH of HTN, DLD, diabetes , hx of stroke (with right sided hemiplegia and aphasia-non verbal at baseline), PEG tube feeding,  chronic indwelling ordoñez (for neurogenic bladder), CKD 3b (with progressive renal dysfunction), chronic normocytic anemia who as brought to the ED with concern for decreasing urine output and was also noted with influenza with respiratory failure, junctional bradycardia, acute on chronic renal failure and anemia; admitted inpatient 2/16/25.      ALEX on CKD stage IV  neurogenic bladder with chronic indwelling Ordoñez catheter  Hyperkalemia  Hyponatremia  Hypochloremia  - recent Cr on 2/12 was 3.93 with Na 129 and normal potassium   - on admission sodium 127; K 6.2; Cr 4.26  - did get K shifting meds in ED and was started on Lokelma  - Ordoñez catheter replaced on admission  - nephrology following and ordered for intermittent diuresis with Chlorthalidone and IV Lasix; suggest medical management without dialysis (not a candidate for peritoneal dialysis due to PEG and not a good candidate for HD-- need for michell lift)  - adequate diuresis with trial of diuretics; nephrology stopped Lokelma (2/19); started Lasix PO (2/19)  - " hyperkalemia resolved but now hypokalemic with K 3.0, likely diuresis induced-- supplementing potassium per protocol; nephrology decreased Lasix to 20 mg daily (2/22)  - creatinine seems stable to improved from peak 5.29--->4.24 with good urine output  - nephrology increased free water replacement to 250 ml q 3 hrs (2/24) for Na 147-->145  - will monitor BMP intermittently     Acute hypoxic respiratory failure multifactorial likely due to volume overload due to acute on chronic diastolic CHF and exacerbation  influenza A infection   likely aspiration event  Bilateral pleural effusion (right>left)  - PTA On Torsemide 20 mg daily  - on admission patient was requiring 10 L of oxygen, increased to 50 lts HFNC  - proBNP significantly elevated at 19, 616; Pro-Westley normal at 0.29  - Chest x-ray 2/16 noted with diffuse mixed interstitial and airspace opacities likely due to moderate pulmonary edema and small right and trace left pleural effusion and mid bibasilar atelectasis with no pneumothorax  - 2/16: influenza A positive; COVID and RSV negative  - RRT on 2/16 with increased O2 needs and repeat CXR with some concern for asymmetric edema versus multifocal pneumonitis requiring 50 lts HFNC  - getting diuresis per nephrology as above with Lasix while holding PTA torsemide  - respiratory status improved and HFNC weaned down to 2 L, will likely need home O2  - CT chest/abd/pelvis (2/23) noted likely moderate pulmonary edema with moderate to large volume bilateral pleural effusions, right larger than left; also noted more focal opacities scattered throughout both lungs, may represent areas of edema or superimposed infectious/inflammatory process; CT noted no definite acute abnormality in the abdomen or pelvis  - per AMT recommendations, switched empiric Zosyn to Rocephin (2/19); completed 6 days course on 2/22) and also adjusted Tamiflu for a 10 days course (renally dosed)  - continue scheduled tylenol TID (2/23)  - family  wants to pursue thoracentesis-- ordered (2/25/25)     Elevated troponin likely due to demand due to underlying renal failure and volume overload type II NSTEMI  Junctional bradycardia  - serial troponins with no significant trend 106 --- 105 --- 108 ; EKG noted with junctional rhythm   - has converted to sinus rhythm  - evaluated by cardiology, note junctional bradycardia is likely due to metabolic abnormalities with worsening renal failure and hyperkalemia and note no indication for pacemaker implantation  - Continue to monitor on telemetry    Encephalopathy likely due to uremia and infectious causes  H/o CVA with residual right hemiplegia and baseline nonverbal status  chronic dysphagia, PEG tube feeds  G tube exchange 2/24/25  Dyslipidemia  Hypertension  - baseline right hemiplegia, non-verbal status from prior CVA  - some encephalopathy in the setting of acute illness ; treating underlying causes as above  - nutrition following for tube feeds  - continue Plavix 75 mg daily; holding PTA Lipitor 20 mg daily  - PTA amlodipine 10 mg daily, doxazosin 2 mg daily-- resumed 2/19  - IR exchanged G tube (2/24) due to malfunction  - discussed with pharmacy and switched tylenol and Lasix to liquid to decrease chances of clogging     Acute on chronic anemia  Dark stools-- likely due to iron  - baseline Hb around 9, anemia of chronic disease in the setting of underlying CKD  - PTA ferrous sulfate 325 mg twice daily continued  - Hb 8.1---6.8-- 1 unit PRBC on 2/18  - 2/19: noted some dark stools (on iron), not grossly melanotic, hemodynamically stable and stable hemoglobin after 1 unit PRBC on 2/18; Hb 6.8---> 7.2---7.8---7.3  - will not be an appropriate candidate for endoscopic /colonoscopy at evaluation at this time; will monitor clinically with repeat Hb and discuss goals of care again if has suggestion of active bleed  - started on Protonix  - 2/23: Hb 7.0 with no clear evidence of bleeding; rectal tube with no melena;  "transfused another unit PRBC (2/23)-- Hb 8.4--9.1     Diabetes mellitus type 2  - PTA on Lantus 9 units subcu a.m., and insulin lispro sliding scale  - BG in 200s; increased Lantus to 20 units daily (2/23)   - continue sliding scale insulin at high resistance  - continue with hypoglycemia protocol    Subclinical hypothyroidism  - TSH is mildly elevated at 8.72 but free T4 is normal; most likely due to underlying acute illness    Cystic pancreatic lesions  - CT abdomen on 2/23 also noted sequela of chronic calcific pancreatitis with new or enlarging lesion in the pancreatic neck measuring up to 3.6 cm, favor a cystic lesion but solid mass is not entirely excluded-- rec non-emergent MRI/MRCP  - discussed this with family, would not probably change treatment care plan given his significant comorbidities-- family can discussed with PCP and consider further evaluation as needed as outpatient    Urethral pain/mild intermittent hematuria  - evaluated by urology, suspect mild hematuria with catheter trauma; added Trospium for likely bladder spasm; \"-patient should have 18 Fr coude ordoñez catheters ordered for home exchanges \"    Goals of care   - on presentation noted patient critically ill; family aware ; not a candidate for dialysis per nephrology as noted above  - code status changed to DNR/DNI  - palliative care met with family (2/18) and revisited 2/24; to continue restorative cares and will continue with [facility] home cares support after discharge; to follow with palliative care outpatient     DVT Prophylaxis: Pneumatic Compression Devices    Code Status: DNR/DNI     Diet:   NPO for Medical/Clinical Reasons Except for: NPO but receiving Tube Feeding  Adult Formula Drip Feeding: Continuous Novasource Renal; Gastrostomy; Goal Rate: 30; mL/hr; Start TF @ 10 ml/hr. Advance by 10 mL q 12 hours until goal rate reached.; Do not advance tube feeding rate unless K+ is = or > 3.0, Mg++ is = or > 1.5, an...      Disposition: " "  Medically Ready for Discharge: Anticipated Tomorrow pending clinical stability  - ordered for US thoracentesis 2/25    Care plan discussed with nursing and also extensively discussed treatment care plan with patient's son and daughter present in room    High medical complexity       Clinically Significant Risk Factors        # Hypokalemia: Lowest K = 3.3 mmol/L in last 2 days, will replace as needed  # Hypernatremia: Highest Na = 146 mmol/L in last 2 days, will monitor as appropriate       # Hypoalbuminemia: Lowest albumin = 2.9 g/dL at 2/25/2025  6:47 AM, will monitor as appropriate         # Hypertension: Noted on problem list    # Chronic heart failure with preserved ejection fraction: heart failure noted on problem list and last echo with EF >50%          # DMII: A1C = 8.8 % (Ref range: <5.7 %) within past 6 months         # Financial/Environmental Concerns: none                            Physical Exam:      Blood pressure (!) 161/75, pulse 85, temperature 98.6  F (37  C), temperature source Axillary, resp. rate 20, height 1.676 m (5' 6\"), weight 62.5 kg (137 lb 12.6 oz), SpO2 95%.  Vitals:    02/22/25 0524 02/23/25 0152 02/25/25 0337   Weight: 69.4 kg (153 lb) 69.4 kg (153 lb) 62.5 kg (137 lb 12.6 oz)     Vital Signs with Ranges  Temp:  [97.8  F (36.6  C)-98.6  F (37  C)] 98.6  F (37  C)  Pulse:  [82-85] 85  Resp:  [18-20] 20  BP: (115-161)/(75) 161/75  SpO2:  [95 %-96 %] 95 %  I/O's Last 24 hours  I/O last 3 completed shifts:  In: 823 [NG/GT:564]  Out: 1500 [Urine:1100; Stool:400]    Constitutional: Baseline non-verbal; alert and awake ; in no apparent distress       Oral cavity: Moist mucosa   Cardiovascular: Normal s1 s2, regular rate and rhythm, no murmur   Lungs: B/l coarse breath sounds, decreased breath sounds at bases, right>left   Abdomen: Soft, nt, nd, no guarding, rigidity or rebound; BS +; G tube in place   LE : no edema    Musculoskeletal/Neuro Right hemiplegia, non-verbal   Psychiatry: " nonverbal  Felix catheter in place              Medications:        Current Facility-Administered Medications   Medication Dose Route Frequency Provider Last Rate Last Admin    acetaminophen (TYLENOL) oral liquid 650 mg  650 mg Oral or Feeding Tube Q8H John Hernandez MD   650 mg at 02/25/25 0703    amLODIPine (NORVASC) tablet 10 mg  10 mg Oral or Feeding Tube Daily Isidro Soliz MD   10 mg at 02/24/25 0913    B and C vitamin Complex with folic acid (NEPHRONEX) liquid 5 mL  5 mL Per Feeding Tube Daily Isidor Soliz MD   5 mL at 02/24/25 1633    clopidogrel (PLAVIX) tablet 75 mg  75 mg Oral or Feeding Tube Daily Isidro Soliz MD   75 mg at 02/24/25 0913    doxazosin (CARDURA) tablet 2 mg  2 mg Oral or Feeding Tube Daily Isidro Soliz MD   2 mg at 02/24/25 0913    FLUoxetine (PROzac) solution 20 mg  20 mg Oral or Feeding Tube Daily Isidro Soliz MD   20 mg at 02/24/25 1209    furosemide (LASIX) solution 20 mg  20 mg Oral or Feeding Tube Daily John Hernandez MD        insulin aspart (NovoLOG) injection (RAPID ACTING)  1-12 Units Subcutaneous Q4H Isidro Soliz MD   4 Units at 02/25/25 0545    insulin glargine (LANTUS PEN) injection 20 Units  20 Units Subcutaneous QAM AC Isidro Soliz MD   20 Units at 02/25/25 0742    oseltamivir (TAMIFLU) suspension 30 mg  30 mg Oral or Feeding Tube Daily Isidro Soliz MD   30 mg at 02/24/25 0915    pantoprazole (PROTONIX) 2 mg/mL suspension 40 mg  40 mg Per Feeding Tube BID Isidro Soliz MD   40 mg at 02/24/25 2102    potassium chloride (KAYCIEL) solution 20 mEq  20 mEq Oral or Feeding Tube Once Isidro Soliz MD        sodium chloride (PF) 0.9% PF flush 3 mL  3 mL Intracatheter Q8H Isidro Soliz MD   3 mL at 02/25/25 0742    tolterodine (DETROL) tablet 1 mg  1 mg Oral BID AC Finn Terry MD   1 mg at 02/25/25 0703     PRN Meds:   Current Facility-Administered Medications   Medication Dose Route  Frequency Provider Last Rate Last Admin    acetaminophen (TYLENOL) tablet 650 mg  650 mg Oral Q4H PRN John Hernandez MD        Or    acetaminophen (TYLENOL) Suppository 650 mg  650 mg Rectal Q4H PRN John Hernandez MD        Or    acetaminophen (TYLENOL) oral liquid 650 mg  650 mg Oral or Feeding Tube Q4H PRN John Hernandez MD   650 mg at 02/25/25 0347    calcium carbonate (TUMS) chewable tablet 1,000 mg  1,000 mg Oral 4x Daily PRN Isidro Soliz MD   1,000 mg at 02/20/25 1505    dextrose 10% infusion   Intravenous Continuous PRN Isidro Soliz MD        glucose gel 15-30 g  15-30 g Oral Q15 Min PRIsidro Cunningham MD        Or    dextrose 50 % injection 25-50 mL  25-50 mL Intravenous Q15 Min PRIsidro Cunningham MD        Or    glucagon injection 1 mg  1 mg Subcutaneous Q15 Min PRIsidro Cunningham MD        hydrALAZINE (APRESOLINE) injection 10 mg  10 mg Intravenous Q4H PRIsidro Cunningham MD        lidocaine (LMX4) cream   Topical Q1H PRIsidro Cunningham MD        lidocaine (LMX4) cream   Topical Q1H PRIsidro Cunningham MD        lidocaine (XYLOCAINE) 2 % external gel   Urethral Q4H PRIsidro Cunningham MD   Given at 02/25/25 0547    lidocaine 1 % 0.1-1 mL  0.1-1 mL Other Q1H PRIsidro Cunningham MD        lidocaine 1 % 0.1-1 mL  0.1-1 mL Other Q1H PRIsidro Cunningham MD        melatonin tablet 5 mg  5 mg Oral or Feeding Tube At Bedtime PRIsidro Cunningham MD   5 mg at 02/20/25 4839    ondansetron (ZOFRAN) injection 4 mg  4 mg Intravenous Q6H PRIsidro Cunningham MD   4 mg at 02/17/25 1652    senna-docusate (SENOKOT-S/PERICOLACE) 8.6-50 MG per tablet 1 tablet  1 tablet Oral BID PRIsidro Cunningham MD        Or    senna-docusate (SENOKOT-S/PERICOLACE) 8.6-50 MG per tablet 2 tablet  2 tablet Oral BID PRIsidro Cunningham MD        sodium chloride (PF) 0.9% PF flush 3 mL  3 mL Intracatheter q1 min Isidro Hankins MD    3 mL at 02/24/25 2114            Data:      All new lab and imaging data was reviewed.   Recent Labs   Lab Test 02/25/25  0647 02/24/25  0657 02/23/25  0608 02/21/25  0442 02/19/25  0143 02/18/25  0456 11/02/21  0609 11/01/21  0735 09/16/21  1003 09/16/21  0957 09/15/21  0521 09/14/21  2145   WBC 7.1  --   --  7.5  --  13.4*   < > 5.6   < >  --    < >  --    HGB 9.1* 8.4* 7.0* 7.3*   < > 6.8*   < > 11.3*   < >  --    < >  --    MCV 85  --   --  83  --  83   < > 88   < >  --    < >  --      --   --  176  --  162   < > 224   < >  --    < >  --    INR  --   --   --   --   --   --   --  0.96  --  1.03  --  0.94    < > = values in this interval not displayed.      Recent Labs   Lab Test 02/25/25  0647 02/25/25  0527 02/25/25  0119 02/24/25  2219 02/24/25  2123 02/24/25  1307 02/24/25  1231 02/24/25  0849 02/24/25  0657 02/23/25  1030 02/23/25  0608     --   --   --   --   --   --   --  146*  --  147*   POTASSIUM 3.4  --   --   --  3.5  --  3.4  --  3.3*   < > 3.1*   CHLORIDE 104  --   --   --   --   --   --   --  104  --  104   CO2 32*  --   --   --   --   --   --   --  30*  --  33*   BUN 81.3*  --   --   --   --   --   --   --  87.1*  --  95.7*   CR 4.24*  --   --   --   --   --   --   --  4.40*  --  4.83*   ANIONGAP 9  --   --   --   --   --   --   --  12  --  10   ARLETH 7.9*  --   --   --   --   --   --   --  8.1*  --  8.2*   * 220* 153*   < >  --    < >  --    < > 218*   < > 225*    < > = values in this interval not displayed.     Recent Labs   Lab Test 10/13/21  2152 09/18/21  1535 09/14/21  2142   TROPONIN <0.015 <0.015 <0.015

## 2025-02-25 NOTE — PLAN OF CARE
Goal Outcome Evaluation:  Date and Time:2/25/25 0700- 1530  Cognitive Concerns/ Orientation : ZAYDA orientation due to baseline non verbal, hx of stroke.   BEHAVIOR & AGGRESSION TOOL COLOR: Green  CIWA SCORE: NA   ABNL VS/O2: VSS on 1 L oxygen.desats to 88% on RA  MOBILITY:  Total, T/R Q2h, Right sided hemiplegia from previous stroke.  PAIN MANAGMENT:: c/o pain around the  meatus, Scheduled tylenol .  DIET: NPO. TF infusing at  goal rate of 30ml/ hr with q 3hrs free water flush at 250 mL.   BOWEL/BLADDER: Chronic ordoñez for neurogenic bladder. Incontinent bowels  ABNL LAB/BG: Hg 9.1, K 3.4 ,replaced recheck  at 2p. /218, CT chest Bilateral pleural effusion R>L  DRAIN/DEVICES: PIV SL.  TELEMETRY RHYTHM: NA  SKIN: Pale. Scattered bruises.  +1-2 edema to BLE. Old wound scar on coccyx, mepilex in place  TESTS/PROCEDURES: none new  D/C DAY/GOALS/PLACE: Discharge home with family pending.   OTHER IMPORTANT INFO: Nephrology and Sw following. Infrequent congested cough- suctioned occasionally. family at bed side. Continued to have small amount of bloody drainage around meatus.

## 2025-02-25 NOTE — PLAN OF CARE
Goal Outcome Evaluation:       Summary:  Transfer from ICU 2/21/25. Admitted with Influenza, respiratory failure, acute on chronic renal failure and anemia.  Date and Time: 2/24/25-2/25/25 [id number]  Cognitive Concerns/ Orientation : ZAYDA orientation due to baseline non verbal, hx of stroke.   BEHAVIOR & AGGRESSION TOOL COLOR: Green  CIWA SCORE: NA   ABNL VS/O2: VSS on 1 L oxygen.  MOBILITY:  Total, T/R Q2h, Right sided hemiplegia from previous stroke.  PAIN MANAGMENT: Nonverbal pain scale used: c/o pain around the  meatus, Scheduled tylenol given. PRN topical lidocaine and tylenol given x1  DIET: NPO. TF infusing at  goal rate of 30ml/ hr with q 3hrs free water flush at 250 mL.   BOWEL/BLADDER: Chronic ordoñez for neurogenic bladder. Rectal tube dislodged, did not replace, 1 loose BM  ABNL LAB/BG: K 3.5, recheck ordered for AM. ,153,220  DRAIN/DEVICES: PIV SL.  TELEMETRY RHYTHM: NA  SKIN: Pale. Scattered bruises.  +1-2 edema to BLE. Old wound scar on coccyx, mepilex changed  TESTS/PROCEDURES: none new  D/C DAY/GOALS/PLACE: Discharge home with family pending.   OTHER IMPORTANT INFO: Nephrology and Sw following. Infrequent congested cough- suctioned occasionally. family at bed side. Continued to have small amount of bloody drainage around meatus. Family still unclear about decision for thoracentesis

## 2025-02-26 ENCOUNTER — HOME INFUSION (OUTPATIENT)
Dept: HOME HEALTH SERVICES | Facility: HOME HEALTH | Age: 75
End: 2025-02-26
Payer: MEDICARE

## 2025-02-26 VITALS
TEMPERATURE: 98.6 F | BODY MASS INDEX: 24.34 KG/M2 | SYSTOLIC BLOOD PRESSURE: 144 MMHG | HEIGHT: 66 IN | DIASTOLIC BLOOD PRESSURE: 77 MMHG | OXYGEN SATURATION: 90 % | RESPIRATION RATE: 16 BRPM | HEART RATE: 91 BPM | WEIGHT: 151.46 LBS

## 2025-02-26 VITALS — BODY MASS INDEX: 24.09 KG/M2 | WEIGHT: 149.25 LBS

## 2025-02-26 LAB
ALBUMIN SERPL BCG-MCNC: 2.7 G/DL (ref 3.5–5.2)
ANION GAP SERPL CALCULATED.3IONS-SCNC: 11 MMOL/L (ref 7–15)
BACTERIA UR CULT: NO GROWTH
BUN SERPL-MCNC: 78 MG/DL (ref 8–23)
CALCIUM SERPL-MCNC: 7.7 MG/DL (ref 8.8–10.4)
CHLORIDE SERPL-SCNC: 101 MMOL/L (ref 98–107)
CREAT SERPL-MCNC: 4.07 MG/DL (ref 0.67–1.17)
EGFRCR SERPLBLD CKD-EPI 2021: 15 ML/MIN/1.73M2
ERYTHROCYTE [DISTWIDTH] IN BLOOD BY AUTOMATED COUNT: 14.6 % (ref 10–15)
GLUCOSE BLDC GLUCOMTR-MCNC: 146 MG/DL (ref 70–99)
GLUCOSE BLDC GLUCOMTR-MCNC: 148 MG/DL (ref 70–99)
GLUCOSE BLDC GLUCOMTR-MCNC: 191 MG/DL (ref 70–99)
GLUCOSE BLDC GLUCOMTR-MCNC: 194 MG/DL (ref 70–99)
GLUCOSE SERPL-MCNC: 182 MG/DL (ref 70–99)
HCO3 SERPL-SCNC: 30 MMOL/L (ref 22–29)
HCT VFR BLD AUTO: 29.1 % (ref 40–53)
HGB BLD-MCNC: 9.3 G/DL (ref 13.3–17.7)
MCH RBC QN AUTO: 27 PG (ref 26.5–33)
MCHC RBC AUTO-ENTMCNC: 32 G/DL (ref 31.5–36.5)
MCV RBC AUTO: 84 FL (ref 78–100)
PHOSPHATE SERPL-MCNC: 3.1 MG/DL (ref 2.5–4.5)
PLATELET # BLD AUTO: 174 10E3/UL (ref 150–450)
POTASSIUM SERPL-SCNC: 3.1 MMOL/L (ref 3.4–5.3)
POTASSIUM SERPL-SCNC: 3.3 MMOL/L (ref 3.4–5.3)
RBC # BLD AUTO: 3.45 10E6/UL (ref 4.4–5.9)
SODIUM SERPL-SCNC: 142 MMOL/L (ref 135–145)
WBC # BLD AUTO: 6.7 10E3/UL (ref 4–11)

## 2025-02-26 PROCEDURE — 85014 HEMATOCRIT: CPT | Performed by: INTERNAL MEDICINE

## 2025-02-26 PROCEDURE — 36415 COLL VENOUS BLD VENIPUNCTURE: CPT | Performed by: HOSPITALIST

## 2025-02-26 PROCEDURE — 250N000013 HC RX MED GY IP 250 OP 250 PS 637: Performed by: HOSPITALIST

## 2025-02-26 PROCEDURE — 99239 HOSP IP/OBS DSCHRG MGMT >30: CPT | Performed by: HOSPITALIST

## 2025-02-26 PROCEDURE — 250N000013 HC RX MED GY IP 250 OP 250 PS 637: Performed by: INTERNAL MEDICINE

## 2025-02-26 PROCEDURE — 84132 ASSAY OF SERUM POTASSIUM: CPT | Performed by: INTERNAL MEDICINE

## 2025-02-26 PROCEDURE — 36415 COLL VENOUS BLD VENIPUNCTURE: CPT | Performed by: INTERNAL MEDICINE

## 2025-02-26 PROCEDURE — 84132 ASSAY OF SERUM POTASSIUM: CPT | Performed by: HOSPITALIST

## 2025-02-26 PROCEDURE — 0W993ZZ DRAINAGE OF RIGHT PLEURAL CAVITY, PERCUTANEOUS APPROACH: ICD-10-PCS | Performed by: HOSPITALIST

## 2025-02-26 PROCEDURE — 250N000013 HC RX MED GY IP 250 OP 250 PS 637: Performed by: STUDENT IN AN ORGANIZED HEALTH CARE EDUCATION/TRAINING PROGRAM

## 2025-02-26 PROCEDURE — 82040 ASSAY OF SERUM ALBUMIN: CPT | Performed by: INTERNAL MEDICINE

## 2025-02-26 RX ORDER — POTASSIUM CHLORIDE 20MEQ/15ML
20 LIQUID (ML) ORAL ONCE
Status: COMPLETED | OUTPATIENT
Start: 2025-02-26 | End: 2025-02-26

## 2025-02-26 RX ORDER — SEVELAMER CARBONATE 0.8 G/1
0.8 POWDER, FOR SUSPENSION ORAL
Qty: 90 PACKET | Refills: 0 | Status: SHIPPED | OUTPATIENT
Start: 2025-02-26 | End: 2025-03-15

## 2025-02-26 RX ORDER — TOLTERODINE TARTRATE 1 MG/1
1 TABLET, EXTENDED RELEASE ORAL
Qty: 60 TABLET | Refills: 0 | Status: ON HOLD | OUTPATIENT
Start: 2025-02-26 | End: 2025-03-27

## 2025-02-26 RX ORDER — INSULIN GLARGINE 100 [IU]/ML
20 INJECTION, SOLUTION SUBCUTANEOUS EVERY MORNING
Status: SHIPPED
Start: 2025-02-26 | End: 2025-03-15

## 2025-02-26 RX ORDER — POTASSIUM CHLORIDE 1.5 G/1.58G
20 POWDER, FOR SOLUTION ORAL DAILY
Qty: 15 PACKET | Refills: 0 | Status: SHIPPED | OUTPATIENT
Start: 2025-02-26 | End: 2025-03-13

## 2025-02-26 RX ORDER — FUROSEMIDE 10 MG/ML
20 SOLUTION ORAL DAILY
Qty: 60 ML | Refills: 0 | Status: SHIPPED | OUTPATIENT
Start: 2025-02-27 | End: 2025-03-15

## 2025-02-26 RX ORDER — ACETAMINOPHEN 325 MG/10.15ML
650 LIQUID ORAL EVERY 8 HOURS
Qty: 236 ML | Refills: 0 | Status: ON HOLD | OUTPATIENT
Start: 2025-02-26

## 2025-02-26 RX ORDER — FERROUS SULFATE 325(65) MG
325 TABLET ORAL
Qty: 30 TABLET | Refills: 0 | Status: ON HOLD | OUTPATIENT
Start: 2025-02-26 | End: 2025-03-28

## 2025-02-26 RX ORDER — B COMPLEX C NO.10/FOLIC ACID 900MCG/5ML
5 LIQUID (ML) ORAL DAILY
Qty: 150 ML | Refills: 0 | Status: ON HOLD | OUTPATIENT
Start: 2025-02-26 | End: 2025-03-28

## 2025-02-26 RX ORDER — AMLODIPINE BESYLATE 5 MG/1
10 TABLET ORAL DAILY
Qty: 60 TABLET | Refills: 0 | Status: ON HOLD | OUTPATIENT
Start: 2025-02-26 | End: 2025-03-28

## 2025-02-26 RX ADMIN — OSELTAMIVIR PHOSPHATE 30 MG: 6 POWDER, FOR SUSPENSION ORAL at 08:27

## 2025-02-26 RX ADMIN — CLOPIDOGREL BISULFATE 75 MG: 75 TABLET ORAL at 08:26

## 2025-02-26 RX ADMIN — FLUOXETINE 20 MG: 20 SOLUTION ORAL at 12:19

## 2025-02-26 RX ADMIN — AMLODIPINE BESYLATE 10 MG: 10 TABLET ORAL at 08:26

## 2025-02-26 RX ADMIN — FUROSEMIDE 20 MG: 10 SOLUTION ORAL at 08:26

## 2025-02-26 RX ADMIN — DOXAZOSIN 2 MG: 2 TABLET ORAL at 08:26

## 2025-02-26 RX ADMIN — Medication 40 MG: at 08:27

## 2025-02-26 RX ADMIN — TOLTERODINE TARTRATE 1 MG: 1 TABLET ORAL at 07:31

## 2025-02-26 RX ADMIN — POTASSIUM CHLORIDE 20 MEQ: 20 SOLUTION ORAL at 09:49

## 2025-02-26 RX ADMIN — ACETAMINOPHEN 650 MG: 325 SUSPENSION ORAL at 07:31

## 2025-02-26 ASSESSMENT — ACTIVITIES OF DAILY LIVING (ADL)
ADLS_ACUITY_SCORE: 102
ADLS_ACUITY_SCORE: 103
ADLS_ACUITY_SCORE: 102
ADLS_ACUITY_SCORE: 102
ADLS_ACUITY_SCORE: 103
ADLS_ACUITY_SCORE: 98
ADLS_ACUITY_SCORE: 103
ADLS_ACUITY_SCORE: 99
ADLS_ACUITY_SCORE: 103
ADLS_ACUITY_SCORE: 102
ADLS_ACUITY_SCORE: 102

## 2025-02-26 NOTE — PLAN OF CARE
Goal Outcome Evaluation:        Date and Time:2/26/25 [id number]  Cognitive Concerns/ Orientation : ZAYDA orientation due to baseline non verbal, hx of stroke, son at bedside   BEHAVIOR & AGGRESSION TOOL COLOR: Green   ABNL VS/O2: VSS on RA  sats 90- 93%  MOBILITY:  Total, T/R Q2h, Right sided hemiplegia from previous stroke.  PAIN MANAGMENT:  On Scheduled tylenol.   DIET: NPO.  On TF infusing at  goal rate of 30ml/ hr with q 3hrs free water flush at 250 mL.   BOWEL/BLADDER: Chronic ordoñez for neurogenic bladder. Incontinent bowels x2  ABNL LAB/BG: Hg 9.3, K 3.1 ,replaced recheck Cr 4.07, Calcium 7.9.  /146  DRAIN/DEVICES: L PIV SL.,G.Tube  SKIN: Pale. Scattered bruises.  +1-2 edema to BLE. Old wound scar on coccyx, mepilex changed with each stool incontinence.  TESTS/PROCEDURES: U/S thoracentesis done 2/25 with 1 L  removed. Bandaid on right back CDI.  D/C DAY/GOALS/PLACE: Discharge home with family today  OTHER IMPORTANT INFO: Nephrology and Sw following. Infrequent congested cough. Family at bed side.   Discharge    Patient discharged to Home via [facility] stretcher with  family  Care plan note;Done  Listed belongings gathered and given to patient (including from security/pharmacy). yes  Care Plan and Patient education resolved: yes  Prescriptions if needed, hard copies sent with patient  Yes  Medication Bin checked and emptied on discharge yes  SW/care coordinator/charge RN aware of discharge: yes

## 2025-02-26 NOTE — PROGRESS NOTES
[facility]  Start of Care/Resumption of Care Note    I ANDRES    DX: Severe protein-calorie malnutrition [E43]     Therapy: Enteral    Next Dose Due: Today 2/26/25 1800    Delivery plan: No delivery needed today. Follow up with pt/ family next week to schedule delivery.     In-basket sent to \[facility]\"" Scheduling, requesting visit on NA    Per \[facility]\"" care plan, labs are due on NA    Nursing plan: Enteral Only.     Teaching Plan: Liaison Teach Done?: NA    Other Info: Patient to discharge to home today with resumption of Enteral services. Patient's G Tube was replaced on 2/24/25.     Mackenzie Hong, RN 02/26/25

## 2025-02-26 NOTE — PROGRESS NOTES
[facility]    Pt will discharge home today to resume home enteral nutrition. I spoke with pt's daughter, Clemente and she denies the need for additional supplies/formula at this time.     Thank you     Claire Kan RN  [facility] Liaison  [phone] (Mon thru Fri 8am - 5pm)  [phone] Office

## 2025-02-26 NOTE — DISCHARGE SUMMARY
[facility]  Discharge Summary        Jimmy Otto MRN# [id number]   YOB: 1950 Age: 75 year old     Date of Admission:  2/16/2025  Date of Discharge:  2/26/2025  Admitting Physician:  John Hernandez MD  Discharge Physician: Isidro Soliz MD  Discharging Service: Hospitalist     Primary Provider: Nallely Fong  Primary Care Physician Phone Number: [phone]         Discharge Diagnoses/Problem Oriented Hospital Course (Providers):    Jimmy Otto was admitted on 2/16/2025 by John Hernandez MD and I would refer you to their history and physical.  The following problems were addressed during his hospitalization:    Jimmy Otto is a 75 year old male with PMH of HTN, DLD, diabetes , hx of stroke (with right sided hemiplegia and aphasia-non verbal at baseline), PEG tube feeding,  chronic indwelling ordoñez (for neurogenic bladder), CKD 3b (with progressive renal dysfunction), chronic normocytic anemia who as brought to the ED with concern for decreasing urine output and was also noted with influenza with respiratory failure, junctional bradycardia, acute on chronic renal failure and anemia; admitted inpatient 2/16/25.      ALEX on CKD stage IV  neurogenic bladder with chronic indwelling Ordoñez catheter  Hyperkalemia  Hyponatremia  Hypochloremia  - recent Cr on 2/12 was 3.93 with Na 129 and normal potassium   - on admission sodium 127; K 6.2; Cr 4.26  - did get K shifting meds in ED and was started on Lokelma  - Ordoñez catheter replaced on admission  - nephrology followed and ordered for intermittent diuresis with Chlorthalidone and IV Lasix; suggest medical management without dialysis (not a candidate for peritoneal dialysis due to PEG and not a good candidate for HD-- need for michell lift)  - adequate diuresis with trial of diuretics; nephrology stopped Lokelma (2/19); started Lasix PO (2/19)  - hyperkalemia resolved but now hypokalemic with K 3.0, likely diuresis induced-- supplemented  potassium per protocol, ordered 20 meq daily for discharge ; nephrology decreased Lasix to 20 mg daily (2/22)  - creatinine seems stable to improved from peak 5.29--->4.07 with good urine output  - nephrology increased free water replacement; ordered free water flushes 250 ml q 4 hrs for discharge ; Na 147-->142  - monitor BMP intermittently with PCP/home care nursing follow-up     Acute hypoxic respiratory failure multifactorial likely due to volume overload due to acute on chronic diastolic CHF and exacerbation  influenza A infection   likely aspiration event  Bilateral pleural effusion (right>left); s/p US thoracentesis (2/25/25)  - PTA On Torsemide 20 mg daily  - on admission patient was requiring 10 L of oxygen, increased to 50 lts HFNC  - proBNP significantly elevated at 19, 616; Pro-Westley normal at 0.29  - Chest x-ray 2/16 noted with diffuse mixed interstitial and airspace opacities likely due to moderate pulmonary edema and small right and trace left pleural effusion and mid bibasilar atelectasis with no pneumothorax  - 2/16: influenza A positive; COVID and RSV negative  - RRT on 2/16 with increased O2 needs and repeat CXR with some concern for asymmetric edema versus multifocal pneumonitis requiring 50 lts HFNC  - got diuresis per nephrology as above with Lasix while holding PTA torsemide  - respiratory status improved and HFNC weaned down to 2 L and subsequently to room air after thoracentesis  - CT chest/abd/pelvis (2/23) noted likely moderate pulmonary edema with moderate to large volume bilateral pleural effusions, right larger than left; also noted more focal opacities scattered throughout both lungs, may represent areas of edema or superimposed infectious/inflammatory process; CT noted no definite acute abnormality in the abdomen or pelvis  - per AMT recommendations, switched empiric Zosyn to Rocephin (2/19); completed 6 days course on 2/22) and also adjusted Tamiflu for a 10 days course (renally dosed;  completed 2/26)  - continue scheduled tylenol TID (2/23)  - completed right sided thoracentesis with removal of 1 L (2/25/25)     Elevated troponin likely due to demand due to underlying renal failure and volume overload type II NSTEMI  Junctional bradycardia  - serial troponins with no significant trend 106 --- 105 --- 108 ; EKG noted with junctional rhythm   - has converted to sinus rhythm  - evaluated by cardiology, note junctional bradycardia is likely due to metabolic abnormalities with worsening renal failure and hyperkalemia and note no indication for pacemaker implantation     Encephalopathy likely due to uremia and infectious causes  H/o CVA with residual right hemiplegia and baseline nonverbal status  chronic dysphagia, PEG tube feeds  G tube exchange 2/24/25  Dyslipidemia  Hypertension  - baseline right hemiplegia, non-verbal status from prior CVA  - some encephalopathy in the setting of acute illness ; treating underlying causes as above  - nutrition following for tube feeds  - continue Plavix 75 mg daily; resumed PTA Lipitor 20 mg daily on discharge   - PTA amlodipine 10 mg daily, doxazosin 2 mg daily-- resumed 2/19  - IR exchanged G tube (2/24) due to malfunction  - discussed with pharmacy and switched tylenol and Lasix to liquid to decrease chances of clogging     Acute on chronic anemia  Dark stools-- likely due to iron  - baseline Hb around 9, anemia of chronic disease in the setting of underlying CKD  - PTA ferrous sulfate 325 mg twice daily continued  - Hb 8.1---6.8-- 1 unit PRBC on 2/18  - 2/19: noted some dark stools (on iron), not grossly melanotic, hemodynamically stable and stable hemoglobin after 1 unit PRBC on 2/18; Hb 6.8---> 7.2---7.8---7.3  - will not be an appropriate candidate for endoscopic /colonoscopy at evaluation at this time; will monitor clinically with repeat Hb and discuss goals of care again if has suggestion of active bleed  - started on Protonix  - 2/23: Hb 7.0 with no  "clear evidence of bleeding; rectal tube with no melena; transfused another unit PRBC (2/23)-- Hb 8.4--9.1---9.3, stable     Diabetes mellitus type 2  - PTA on Lantus 9 units subcu a.m., and insulin lispro sliding scale  -required increasing dose of Lantus this hospital stay ; being discharged on increased dose of Lantus 20 units daily (2/23)      Subclinical hypothyroidism  - TSH is mildly elevated at 8.72 but free T4 is normal; most likely due to underlying acute illness     Cystic pancreatic lesions  - CT abdomen on 2/23 also noted sequela of chronic calcific pancreatitis with new or enlarging lesion in the pancreatic neck measuring up to 3.6 cm, favor a cystic lesion but solid mass is not entirely excluded-- rec non-emergent MRI/MRCP  - discussed this with family, would not probably change treatment care plan given his significant comorbidities-- family can discuss with PCP and consider further evaluation as needed as outpatient     Urethral pain/mild intermittent hematuria  - evaluated by urology, suspect mild hematuria with catheter trauma; added Trospium (Tolterodine) for likely bladder spasm; \"-patient should have 18 Fr coude ordoñez catheters ordered for home exchanges \"     Goals of care   - on presentation noted patient critically ill; family aware ; not a candidate for dialysis per nephrology as noted above  - code status changed to DNR/DNI  - palliative care met with family (2/18) and revisited 2/24; to continue restorative cares and will continue with [facility] care home cares support after discharge; to follow with palliative care outpatient      Code Status: DNR/DNI       Disposition:   - to home with home care PT/OT/RN    Clinically Significant Risk Factors        # Hypokalemia: Lowest K = 3.1 mmol/L in last 2 days, will replace as needed          # Hypoalbuminemia: Lowest albumin = 2.7 g/dL at 2/26/2025  6:49 AM, will monitor as appropriate       # Hypertension: Noted on problem list  # Chronic heart " failure with preserved ejection fraction: heart failure noted on problem list and last echo with EF >50%          # DMII: A1C = 8.8 % (Ref range: <5.7 %) within past 6 months         # Financial/Environmental Concerns: none                        Brief Hospital Stay Summary Sent Home With Patient in AVS:        Reason for your hospital stay      You were admitted for evaluation of influenza/pneumonia and also for   urinary catheter malfunction which was exchanged.                Pending Results:        Unresulted Labs Ordered in the Past 30 Days of this Admission       No orders found from 1/17/2025 to 2/17/2025.              Discharge Instructions and Follow-Up:      Follow-up Appointments     Follow Up      Follow up with Nephrology in 7-10 days with repeat BMP        Hospital Follow-up with Existing Primary Care Provider (PCP)      Please see details below         Schedule Primary Care visit within: 7 Days   Recommended labs and Imaging (to be ordered by Primary Care Provider):   repeat BMP in 2-3 days               Discharge Disposition:      Discharged to home        Discharge Medications:        Current Discharge Medication List        START taking these medications    Details   acetaminophen (TYLENOL) 325 MG/10.15ML liquid Take 20.3 mLs (650 mg) by mouth or Feeding Tube every 8 hours.  Qty: 236 mL, Refills: 0    Associated Diagnoses: Urethral spasm      B and C vitamin Complex with folic acid (NEPHRONEX) 0.9 MG/5ML LIQD liquid Place 5 mLs into Feeding Tube daily.  Qty: 150 mL, Refills: 0    Comments: Future refills by PCP Dr. Nallely Fong with phone number [phone].  Associated Diagnoses: Stage 4 chronic kidney disease (H)      furosemide (LASIX) 10 MG/ML solution Take 2 mLs (20 mg) by mouth or Feeding Tube daily.  Qty: 60 mL, Refills: 0    Comments: Future refills by PCP Dr. Nallely Fong with phone number [phone].  Associated Diagnoses: Stage 4 chronic kidney disease (H)       pantoprazole (PROTONIX) 2 mg/mL SUSP suspension Place 20 mLs (40 mg) into Feeding Tube daily.  Qty: 600 mL, Refills: 0    Comments: Future refills by PCP Dr. Nallely Fong with phone number [phone].  Associated Diagnoses: Gastroesophageal reflux disease with esophagitis without hemorrhage      potassium chloride (KLOR-CON) 20 MEQ packet Place 20 mEq into Feeding Tube daily for 15 days.  Qty: 15 packet, Refills: 0    Comments: Future refills by PCP Dr. Nallely Fong with phone number [phone].  Associated Diagnoses: Hypokalemia      tolterodine (DETROL) 1 MG tablet Take 1 tablet (1 mg) by mouth or Feeding Tube 2 times daily (before meals).  Qty: 60 tablet, Refills: 0    Comments: Future refills by PCP Dr. Nallely Fong with phone number [phone].  Associated Diagnoses: Urethral spasm           CONTINUE these medications which have CHANGED    Details   amLODIPine (NORVASC) 5 MG tablet Place 2 tablets (10 mg) into G tube daily.  Qty: 60 tablet, Refills: 0    Comments: Future refills by PCP Dr. Nallely Fong with phone number [phone].  Associated Diagnoses: Essential hypertension      ferrous sulfate (FEROSUL) 325 (65 Fe) MG tablet Place 1 tablet (325 mg) into Feeding Tube daily (with breakfast).  Qty: 30 tablet, Refills: 0    Comments: Future refills by PCP Dr. Nallely Fong with phone number [phone].  Associated Diagnoses: Iron deficiency anemia due to chronic blood loss      insulin glargine 100 UNIT/ML pen Inject 20 Units subcutaneously every morning.    Comments: If Lantus is not covered by insurance, may substitute Basaglar or Semglee or other insulin glargine product per insurance preference at same dose and frequency.    Associated Diagnoses: Diabetic nephropathy associated with type 2 diabetes mellitus (H)      sevelamer carbonate, RENVELA, 0.8 GM PACK Packet Place 1 packet (0.8 g) into G tube 3 times daily (with meals).  Qty: 90 packet, Refills: 0     Comments: Future refills by PCP Dr. Nallely Fong with phone number [phone].  Associated Diagnoses: Stage 4 chronic kidney disease (H)           CONTINUE these medications which have NOT CHANGED    Details   atorvastatin (LIPITOR) 80 MG tablet Take 80 mg by mouth daily      clopidogrel (PLAVIX) 75 MG tablet Take 75 mg by mouth daily      doxazosin (CARDURA) 2 MG tablet Take 1 tablet (2 mg) by mouth daily.  Qty: 90 tablet, Refills: 3    Associated Diagnoses: Benign prostatic hyperplasia with urinary retention      FLUoxetine (PROZAC) 20 MG/5ML solution 5 mLs (20 mg) by Oral or Feeding Tube route daily  Qty: 150 mL, Refills: 0    Associated Diagnoses: Depression, unspecified depression type; Cerebrovascular accident (CVA), unspecified mechanism (H)      Insulin Lispro (ADMELOG SC) For BG <140 no insulin. 140-189 give 1 unit. 190-239 give 2 units. 240-289 give 3. 290-340 give 4 units. >340 give 5 units. Usual daily dose 12 units per day.      Nepro With Carb Steady 8 oz Place 840 mLs into G tube daily. Nepro Infuse 840mLs into G-tube daily via gravity bag - 3.5 cartons / day. 1 carton at 0900, 1 carton at 1200 and 1.5 cartons at 1800.    Water flush: 60mL Q 4 hours  Qty: 26617 mL, Refills: 11    Associated Diagnoses: Severe protein-calorie malnutrition; Dysphagia, oral phase; Cerebral artery occlusion with cerebral infarction (H)      polyethylene glycol (MIRALAX) 17 g packet Take 17 g by mouth daily as needed for constipation    Associated Diagnoses: Constipation, unspecified constipation type      calcium acetate (CALPHRON) 667 MG TABS tablet Take 1 tablet (667 mg) by mouth 3 times daily (with meals).  Qty: 90 tablet, Refills: 0           STOP taking these medications       torsemide (DEMADEX) 20 MG tablet Comments:   Reason for Stopping:                 Allergies:         Allergies   Allergen Reactions    Nka [No Known Allergies]            Consultations This Hospital Stay:      Consultation during this  "admission received from cardiology, nephrology, palliative care, interventional radiology, urology        Condition and Physical on Discharge:        Discharge condition: Stable   Vitals: Blood pressure (!) 144/77, pulse 91, temperature 98.6  F (37  C), temperature source Oral, resp. rate 16, height 1.676 m (5' 6\"), weight 68.7 kg (151 lb 7.3 oz), SpO2 (!) 90%.     Constitutional: Baseline non-verbal; alert and awake ; in no apparent distress         Oral cavity: Moist mucosa   Cardiovascular: Normal s1 s2, regular rate and rhythm, no murmur   Lungs: B/l coarse breath sounds, decreased breath sounds at bases, right>left   Abdomen: Soft, nt, nd, no guarding, rigidity or rebound; BS +; G tube in place   LE : no edema    Musculoskeletal/Neuro Right hemiplegia, non-verbal   Psychiatry: nonverbal  Felix catheter in place             Discharge Time:      Greater than 30 minutes.        Image Results From This Hospital Stay (For Non-EPIC Providers):        Results for orders placed or performed during the hospital encounter of 02/16/25   XR Chest Port 1 View    Narrative    EXAM: XR CHEST PORT 1 VIEW  LOCATION: [facility]  DATE: 2/16/2025    INDICATION: hypoxia  COMPARISON: 1/14/2025      Impression    IMPRESSION: Diffuse mixed interstitial and airspace opacities likely due to moderate pulmonary edema. Small right and trace left pleural effusions and mild bibasilar atelectasis. Mild cardiomegaly. No pneumothorax.   XR Chest Port 1 View    Narrative    EXAM: XR CHEST PORT 1 VIEW  LOCATION: [facility]  DATE: 2/17/2025    INDICATION: hypoxia s p emesis  COMPARISON: 2/16/2025      Impression    IMPRESSION: Heart size is stable. Small bilateral pleural effusions are redemonstrated, right greater than left. Patchy parenchymal opacities persist compatible with asymmetric edema versus multifocal pneumonitis. No pneumothorax. No acute bony   abnormality.   XR Abdomen Port 1 View "    Narrative    EXAM: ABDOMEN SINGLE VIEW PORTABLE  LOCATION: [facility]  DATE: 2/17/2025    INDICATION: Nausea.  COMPARISON: None.    FINDINGS: A percutaneous gastrostomy tube is present with balloon tip projecting over the gastric body. There are a few loops of mildly distended gas-filled small bowel in the mid abdomen. Gas and a small amount of stool are scattered within nondistended   colon. No visualized bowel wall thickening or pneumatosis. Apparent interstitial thickening within the central aspects of the lungs. Moderate-sized right pleural effusion. A few patchy opacities in the mid and lower right lung.      Impression    IMPRESSION:   1. A few loops of mildly distended gas-filled small bowel are present in the mid abdomen. These are within normal limits. There is no convincing evidence of a bowel obstruction.  2. A small amount of stool is scattered within the colon.    US Upper Extremity Venous Duplex Bilat    Narrative    EXAM: US UPPER EXTREMITY VENOUS DUPLEX BILATERAL  LOCATION: [facility]  DATE: 2/19/2025    INDICATION: arm swelling  COMPARISON: None.  TECHNIQUE: Venous Duplex ultrasound of both upper extremities with (when possible) and without compression, augmentation, and duplex. Color flow and spectral Doppler with waveform analysis performed.    FINDINGS: Ultrasound includes evaluation of the internal jugular veins, innominate veins, subclavian veins, axillary veins, and brachial veins. The superficial cephalic and basilic veins were also evaluated where seen.    RIGHT: No deep venous thrombosis. No superficial thrombophlebitis.    LEFT: No deep venous thrombosis. No superficial thrombophlebitis.       Impression    IMPRESSION:  1.  No deep venous thrombosis in the bilateral upper extremities.   CT Chest Abdomen Pelvis w/o Contrast    Narrative    EXAM: CT CHEST ABDOMEN PELVIS W/O CONTRAST  LOCATION: [facility]  DATE:  2/23/2025    INDICATION: shortness of breath; pain abdomen  COMPARISON: Chest radiograph 2/17/2025, chest CT 10/20/2022  TECHNIQUE: CT scan of the chest, abdomen, and pelvis was performed without IV contrast. Multiplanar reformats were obtained. Dose reduction techniques were used.   CONTRAST: None.    FINDINGS:   LUNGS AND PLEURA: Diffuse interlobular septal thickening throughout both lungs with moderate to large bilateral pleural effusions and associated compressive atelectasis, right larger than left. There are some superimposed more focal areas of   consolidation, most pronounced in the right upper and middle lobes (for example series 4 image 102). No pneumothorax.    MEDIASTINUM/AXILLAE: Mildly enlarged mediastinal lymph nodes, most likely related to volume overload, no specific follow-up recommended. Heart is enlarged. Trace pericardial fluid.    CORONARY ARTERY CALCIFICATION: Moderate.    HEPATOBILIARY: Normal.    PANCREAS: Multiple punctate sidebranch calcifications throughout the pancreas. Interval enlargement of low density lesion centered in the pancreatic neck, measuring up to 3.6 cm, uncertain if this is cystic or solid lesion (series 3 image 141). Likely   mild pancreatic ductal dilation the tail with possible exophytic cystic lesions. No significant surrounding fat stranding.    SPLEEN: Normal.    ADRENAL GLANDS: Normal.    KIDNEYS/BLADDER: No hydronephrosis. Urinary bladder is decompressed by Felix catheter.    BOWEL: No obstruction or inflammatory change. Rectal continence device in place. Percutaneous gastrostomy tube is also noted with retention balloon within the lumen of the stomach.    LYMPH NODES: No suspicious lymphadenopathy. No abdominopelvic free fluid.    VASCULATURE: Moderate calcified atherosclerosis. No abdominal aortic aneurysm.    PELVIC ORGANS: Unremarkable.    MUSCULOSKELETAL: No acute bony abnormality. Mild body wall edema.      Impression    IMPRESSION:  1.  Likely moderate  pulmonary edema with moderate to large volume bilateral pleural effusions, right larger than left.  2.  More focal opacities scattered throughout both lungs, may represent areas of edema as described above, but superimposed infectious/inflammatory process is difficult to exclude.  3.  No definite acute abnormality in the abdomen or pelvis.  4.  Sequela of chronic calcific pancreatitis with new or enlarging lesion in the pancreatic neck measuring up to 3.6 cm, favor a cystic lesion but solid mass is not entirely excluded. Recommend further evaluation with MRI/MRCP, could be performed on a   nonemergent/outpatient basis if clinically indicated.   IR Gastrostomy Tube Change    Narrative    [facility]  LOCATION: [facility]  DATE: 2/24/2025    PROCEDURE: FLUOROSCOPIC GUIDED GASTROSTOMY EXCHANGE    INTERVENTIONAL RADIOLOGIST: Dennys Alejandro MD.    INDICATION: Existing G-tube is broken, request made for exchange.    MODERATE SEDATION: None.    CONTRAST: 15 mL Omnipaque enteric.  ANTIBIOTICS: None.  ADDITIONAL MEDICATIONS: None.    FLUOROSCOPIC TIME: 0.1 minutes.  RADIATION DOSE: Air Kerma: 0.74 mGy.    COMPLICATIONS: No immediate complications.    STERILE BARRIER TECHNIQUE: Maximum sterile barrier technique was used. Cutaneous antisepsis was performed at the operative site with application of 2% chlorhexidine and large sterile drape. Prior to the procedure, the  and assistant performed   hand hygiene and wore hat, mask, sterile gown, and sterile gloves during the entire procedure.    PROCEDURE:    Under fluoroscopic guidance, the gastric lumen of the pre-existing gastrostomy was injected with contrast and images were obtained. The tube was then exchanged over a wire for a new 14F BEVERLEY gastrostomy which was positioned under fluoroscopic guidance   with its tip in the gastric lumen. The retention balloon was inflated with 5 mL of saline. A post placement contrast injection through the  gastric lumen was performed.    FINDINGS:  The initial injection demonstrates that the gastric lumen is patent and in appropriate position.    After exchange, the new gastrostomy is in appropriate position with the gastric lumen emptying into the stomach.      Impression    IMPRESSION:    1.  Successful gastrostomy tube exchange under fluoroscopic guidance as detailed above.  2.  The new tube is ready for use immediately.       US Thoracentesis    Narrative    EXAM:   1. RIGHT THORACENTESIS  2. ULTRASOUND GUIDANCE  LOCATION: [facility]  DATE: 2/25/2025    INDICATION: Pleural effusion.    PROCEDURE: Informed consent obtained. Time out performed. The chest was prepped and draped in sterile fashion. 10 mL of 1 % lidocaine was infused into the local soft tissues. Under direct ultrasound guidance, a 5 Marshallese catheter system was placed into   the pleural effusion.     1 liter of clear yellow fluid were removed and sent to lab, if requested.    Patient tolerated procedure well.    Ultrasound imaging was obtained and placed in the patient's permanent medical record.      Impression    IMPRESSION:  Status post right ultrasound-guided thoracentesis.           Most Recent Lab Results In EPIC (For Non-EPIC Providers):    Most Recent 3 CBC's:  Recent Labs   Lab Test 02/26/25  0649 02/25/25  0647 02/24/25  0657 02/23/25  0608 02/21/25  0442   WBC 6.7 7.1  --   --  7.5   HGB 9.3* 9.1* 8.4*   < > 7.3*   MCV 84 85  --   --  83    169  --   --  176    < > = values in this interval not displayed.      Most Recent 3 BMP's:  Recent Labs   Lab Test 02/26/25  1151 02/26/25  0810 02/26/25  0649 02/25/25  1638 02/25/25  1614 02/25/25  0741 02/25/25  0647 02/24/25  0849 02/24/25  0657   NA  --   --  142  --   --   --  145  --  146*   POTASSIUM  --   --  3.1*  --  3.7  --  3.4   < > 3.3*   CHLORIDE  --   --  101  --   --   --  104  --  104   CO2  --   --  30*  --   --   --  32*  --  30*   BUN  --   --  78.0*   --   --   --  81.3*  --  87.1*   CR  --   --  4.07*  --   --   --  4.24*  --  4.40*   ANIONGAP  --   --  11  --   --   --  9  --  12   ARLETH  --   --  7.7*  --   --   --  7.9*  --  8.1*   * 194* 182*   < >  --    < > 223*   < > 218*    < > = values in this interval not displayed.     Most Recent 3 Troponin's:  Recent Labs   Lab Test 10/13/21  2152 09/18/21  1535 09/14/21  2142   TROPONIN <0.015 <0.015 <0.015     Most Recent 3 INR's:  Recent Labs   Lab Test 11/01/21  0735 09/16/21  0957 09/14/21  2145   INR 0.96 1.03 0.94     Most Recent 2 LFT's:  Recent Labs   Lab Test 02/17/25  0428 02/16/25  1515 01/31/25  1015   AST 37  --  45   ALT 36 39 44   ALKPHOS 117 127 174*   BILITOTAL 0.3 0.2 0.2     Most Recent Cholesterol Panel:  Recent Labs   Lab Test 02/07/24  1147   CHOL 97   LDL 49  48   HDL 30*   TRIG 88     Most Recent 6 Bacteria Isolates From Any Culture (See EPIC Reports for Culture Details):No lab results found.  Most Recent TSH, T4 and HgbA1c:   Recent Labs   Lab Test 02/16/25  1515   TSH 8.72*   T4 1.47   A1C 8.8*

## 2025-02-26 NOTE — PLAN OF CARE
Summary:  Transfer from ICU 2/21/25. Admitted with Influenza, respiratory failure, acute on chronic renal failure and anemia.    Date and Time:2/25/25 9968-1570  Cognitive Concerns/ Orientation : ZAYDA orientation due to baseline non verbal, hx of stroke, son at bedside   BEHAVIOR & AGGRESSION TOOL COLOR: Green   ABNL VS/O2: VSS on 1.5 L oxygen via oxymask (pt does not nasal cannula in nostrils and pulls off O2).desats to 88% on RA  MOBILITY:  Total, T/R Q2h, Right sided hemiplegia from previous stroke.  PAIN MANAGMENT:  On Scheduled tylenol.   DIET: NPO. TF infusing at  goal rate of 30ml/ hr with q 3hrs free water flush at 250 mL.   BOWEL/BLADDER: Chronic ordoñez for neurogenic bladder. Incontinent bowelsABNL LAB/BG: Hg 9.1, K 3.4 ,replaced rechecked 3.7. Cr 4.24, Calcium 7.9.  , 148  DRAIN/DEVICES: L PIV SL.  SKIN: Pale. Scattered bruises.  +1-2 edema to BLE. Old wound scar on coccyx, mepilex changed with each stool incontinence.  TESTS/PROCEDURES: U/S thoracentesis done 2/25 with 1 L  removed. Bandaid on right back CDI.  D/C DAY/GOALS/PLACE: Discharge home with family pending.   OTHER IMPORTANT INFO: Nephrology and Sw following. Infrequent congested cough. Son, Sathya, at bedside.

## 2025-02-26 NOTE — PROGRESS NOTES
Patient seen and examined    - Had right-sided ultrasound thoracentesis with removal of 1 L fluid (2/25/25); respiratory status stable , oxygen weaned down to room air   - renal function continues to improve with creatinine down to 4.07; K 3.1; will order for KCL 20 meq per feeding tube daily  -will need close monitoring of BMP    Discharge home today with home care PT/OT/RN    Care plan discussed at great length with patient's family including son and daughter present in room; also discussed with nursing and care coordinator    Please see discharge summary for details

## 2025-02-26 NOTE — PROGRESS NOTES
Care Management Discharge Note    Discharge Date: 02/26/2025       Discharge Disposition: Home Care,     Discharge Services:  Skilled RN/OT/PT    Discharge DME:  none    Discharge Transportation: [facility] [id number]    Private pay costs discussed: transportation costs    Does the patient's insurance plan have a 3 day qualifying hospital stay waiver?  No    PAS Confirmation Code:    Patient/family educated on Medicare website which has current facility and service quality ratings:      Education Provided on the Discharge Plan:    Persons Notified of Discharge Plans: Mckenzie GUERRERO; referral sent to [facility] to open [facility] SN/OT/PT; and daughter in patients room aware of plan  Patient/Family in Agreement with the Plan: yes    Handoff Referral Completed: Yes, [facility] PCP: Internal handoff referral completed    Additional Information:     Discharge to home today per MD patient is stable    Susanna Ware RN BSN  Care Coordination  [facility]       supportive

## 2025-02-26 NOTE — PLAN OF CARE
Goal Outcome Evaluation:                      Summary:  Transfer from ICU 2/21/25. Admitted with Influenza, respiratory failure, acute on chronic renal failure and anemia.  Date and Time:2/25/25, pm shift.  Cognitive Concerns/ Orientation : ZAYDA orientation due to baseline non verbal, hx of stroke.   BEHAVIOR & AGGRESSION TOOL COLOR: Green  CIWA SCORE: NA   ABNL VS/O2: VSS on 1 L oxygen.desats to 88% on RA  MOBILITY:  Total, T/R Q2h, Right sided hemiplegia from previous stroke.  PAIN MANAGMENT:  On Scheduled tylenol.   DIET: NPO. TF infusing at  goal rate of 30ml/ hr with q 3hrs free water flush at 250 mL.   BOWEL/BLADDER: Chronic ordoñez for neurogenic bladder. Incontinent bowels. Having loose stools.  ABNL LAB/BG: Hg 9.1, K 3.4 ,replaced rechecked 3.7. Cr 4.24, Calcium 7.9.  /173.  DRAIN/DEVICES: PIV SL.  TELEMETRY RHYTHM: NA  SKIN: Pale. Scattered bruises.  +1-2 edema to BLE. Old wound scar on coccyx, mepilex changed with each stool incontinence.  TESTS/PROCEDURES: US thoracentesis done today. 1 L of fluid removed. Bandaid on right back CDI.  D/C DAY/GOALS/PLACE: Discharge home with family pending.   OTHER IMPORTANT INFO: Nephrology and Sw following. Infrequent congested cough. Son at bed side.

## 2025-02-27 ENCOUNTER — PATIENT OUTREACH (OUTPATIENT)
Dept: CARE COORDINATION | Facility: CLINIC | Age: 75
End: 2025-02-27
Payer: MEDICARE

## 2025-02-27 DIAGNOSIS — Z09 HOSPITAL DISCHARGE FOLLOW-UP: ICD-10-CM

## 2025-02-27 NOTE — PROGRESS NOTES
Clinic Care Coordination Contact  Transitions of Care Outreach  Chief Complaint   Patient presents with    Clinic Care Coordination - Post Hospital       Most Recent Admission Date: 2/16/2025   Most Recent Admission Diagnosis: Hyperkalemia - E87.5  Influenza A - J10.1  Junctional bradycardia - R00.1  Hypervolemia, unspecified hypervolemia type - E87.70  Acute renal failure superimposed on chronic kidney disease, unspecified acute renal failure type, unspecified CKD stage - N17.9, N18.9     Most Recent Discharge Date: 2/26/2025   Most Recent Discharge Diagnosis: Hypervolemia, unspecified hypervolemia type - E87.70  Acute renal failure superimposed on chronic kidney disease, unspecified acute renal failure type, unspecified CKD stage - N17.9, N18.9  Hyperkalemia - E87.5  Influenza A - J10.1  Junctional bradycardia - R00.1  Late effects of CVA (cerebrovascular accident) - I69.90  Severe protein-calorie malnutrition - E43  Dysphagia, oral phase - R13.11  Cerebral artery occlusion with cerebral infarction (H) - I63.50  Stage 4 chronic kidney disease (H) - N18.4  Iron deficiency anemia due to chronic blood loss - D50.0  Essential hypertension - I10  Gastroesophageal reflux disease with esophagitis without hemorrhage - K21.00  Urethral spasm - N35.919  Diabetic nephropathy associated with type 2 diabetes mellitus (H) - E11.21  Hypokalemia - E87.6     Transitions of Care Assessment    Discharge Assessment  How are you doing now that you are home?: good  How are your symptoms? (Red Flag symptoms escalate to triage hotline per guidelines): Improved  Do you know how to contact your clinic care team if you have future questions or changes to your health status? : Yes  Does the patient have their discharge instructions? : Yes  Does the patient have questions regarding their discharge instructions? : No  Were you started on any new medications or were there changes to any of your previous medications? : Yes  Does the patient have  all of their medications?: Yes  Do you have questions regarding any of your medications? : No  Do you have all of your needed medical supplies or equipment (DME)?  (i.e. oxygen tank, CPAP, cane, etc.): Yes                  Follow up Plan     Discharge Follow-Up  Discharge follow up appointment scheduled in alignment with recommended follow up timeframe or Transitions of Risk Category? (Low = within 30 days; Moderate= within 14 days; High= within 7 days): Yes    Future Appointments   Date Time Provider Department Center   3/11/2025 10:00 AM Annabelle Richter, DO [facility]       Outpatient Plan as outlined on AVS reviewed with patient.    For any urgent concerns, please contact our 24 hour nurse triage line: [phone] ([phone])       GERI Teague

## 2025-03-03 ENCOUNTER — HOME INFUSION BILLING (OUTPATIENT)
Dept: HOME HEALTH SERVICES | Facility: HOME HEALTH | Age: 75
End: 2025-03-03
Payer: MEDICARE

## 2025-03-04 ENCOUNTER — DOCUMENTATION ONLY (OUTPATIENT)
Dept: OTHER | Facility: CLINIC | Age: 75
End: 2025-03-04
Payer: MEDICARE

## 2025-03-06 ENCOUNTER — ENROLLMENT (OUTPATIENT)
Dept: HOME HEALTH SERVICES | Facility: HOME HEALTH | Age: 75
End: 2025-03-06
Payer: MEDICARE

## 2025-03-11 DIAGNOSIS — R33.9 URINARY RETENTION: Primary | ICD-10-CM

## 2025-03-11 NOTE — PROGRESS NOTES
Per Wilner 1/24/25 visit notes, HC to provide monthly catheter exchanges and biweekly catheter flushes.   Orders sent to updated HC facility.       Merrick CLAY RN  Urology Triage

## 2025-03-15 ENCOUNTER — HOSPITAL ENCOUNTER (INPATIENT)
Facility: CLINIC | Age: 75
End: 2025-03-15
Attending: EMERGENCY MEDICINE | Admitting: INTERNAL MEDICINE
Payer: MEDICARE

## 2025-03-15 DIAGNOSIS — Z79.4 TYPE 2 DIABETES MELLITUS WITH MICROALBUMINURIA, WITH LONG-TERM CURRENT USE OF INSULIN (H): ICD-10-CM

## 2025-03-15 DIAGNOSIS — N18.4 STAGE 4 CHRONIC KIDNEY DISEASE (H): Primary | ICD-10-CM

## 2025-03-15 DIAGNOSIS — N18.9 ACUTE KIDNEY INJURY SUPERIMPOSED ON CKD: ICD-10-CM

## 2025-03-15 DIAGNOSIS — R09.02 HYPOXIA: ICD-10-CM

## 2025-03-15 DIAGNOSIS — J90 PLEURAL EFFUSION: ICD-10-CM

## 2025-03-15 DIAGNOSIS — R00.1 BRADYCARDIA: ICD-10-CM

## 2025-03-15 DIAGNOSIS — E87.70 HYPERVOLEMIA, UNSPECIFIED HYPERVOLEMIA TYPE: ICD-10-CM

## 2025-03-15 DIAGNOSIS — R80.9 TYPE 2 DIABETES MELLITUS WITH MICROALBUMINURIA, WITH LONG-TERM CURRENT USE OF INSULIN (H): ICD-10-CM

## 2025-03-15 DIAGNOSIS — E11.29 TYPE 2 DIABETES MELLITUS WITH MICROALBUMINURIA, WITH LONG-TERM CURRENT USE OF INSULIN (H): ICD-10-CM

## 2025-03-15 DIAGNOSIS — N17.9 ACUTE KIDNEY INJURY SUPERIMPOSED ON CKD: ICD-10-CM

## 2025-03-15 LAB
ALBUMIN SERPL BCG-MCNC: 3.3 G/DL (ref 3.5–5.2)
ALBUMIN UR-MCNC: 300 MG/DL
ALP SERPL-CCNC: 140 U/L (ref 40–150)
ALT SERPL W P-5'-P-CCNC: 36 U/L (ref 0–70)
ANION GAP SERPL CALCULATED.3IONS-SCNC: 11 MMOL/L (ref 7–15)
APPEARANCE UR: CLEAR
AST SERPL W P-5'-P-CCNC: 31 U/L (ref 0–45)
BASE EXCESS BLDV CALC-SCNC: 4.9 MMOL/L (ref -3–3)
BASOPHILS # BLD AUTO: 0.1 10E3/UL (ref 0–0.2)
BASOPHILS NFR BLD AUTO: 1 %
BILIRUB DIRECT SERPL-MCNC: 0.16 MG/DL (ref 0–0.3)
BILIRUB SERPL-MCNC: 0.3 MG/DL
BILIRUB UR QL STRIP: NEGATIVE
BUN SERPL-MCNC: 111.7 MG/DL (ref 8–23)
CALCIUM SERPL-MCNC: 11.9 MG/DL (ref 8.8–10.4)
CHLORIDE SERPL-SCNC: 90 MMOL/L (ref 98–107)
COLOR UR AUTO: YELLOW
CREAT SERPL-MCNC: 5.67 MG/DL (ref 0.67–1.17)
EGFRCR SERPLBLD CKD-EPI 2021: 10 ML/MIN/1.73M2
EOSINOPHIL # BLD AUTO: 0.1 10E3/UL (ref 0–0.7)
EOSINOPHIL NFR BLD AUTO: 2 %
ERYTHROCYTE [DISTWIDTH] IN BLOOD BY AUTOMATED COUNT: 15.6 % (ref 10–15)
GLUCOSE BLDC GLUCOMTR-MCNC: 124 MG/DL (ref 70–99)
GLUCOSE BLDC GLUCOMTR-MCNC: 128 MG/DL (ref 70–99)
GLUCOSE BLDC GLUCOMTR-MCNC: 129 MG/DL (ref 70–99)
GLUCOSE BLDC GLUCOMTR-MCNC: 136 MG/DL (ref 70–99)
GLUCOSE BLDC GLUCOMTR-MCNC: 138 MG/DL (ref 70–99)
GLUCOSE BLDC GLUCOMTR-MCNC: 140 MG/DL (ref 70–99)
GLUCOSE BLDC GLUCOMTR-MCNC: 151 MG/DL (ref 70–99)
GLUCOSE BLDC GLUCOMTR-MCNC: 178 MG/DL (ref 70–99)
GLUCOSE BLDC GLUCOMTR-MCNC: 92 MG/DL (ref 70–99)
GLUCOSE SERPL-MCNC: 226 MG/DL (ref 70–99)
GLUCOSE UR STRIP-MCNC: 150 MG/DL
HCO3 BLDV-SCNC: 30 MMOL/L (ref 21–28)
HCO3 SERPL-SCNC: 27 MMOL/L (ref 22–29)
HCT VFR BLD AUTO: 27.8 % (ref 40–53)
HGB BLD-MCNC: 8.9 G/DL (ref 13.3–17.7)
HGB UR QL STRIP: ABNORMAL
HOLD SPECIMEN: NORMAL
HOLD SPECIMEN: NORMAL
HYALINE CASTS: 3 /LPF
IMM GRANULOCYTES # BLD: 0 10E3/UL
IMM GRANULOCYTES NFR BLD: 0 %
KETONES UR STRIP-MCNC: NEGATIVE MG/DL
LEUKOCYTE ESTERASE UR QL STRIP: NEGATIVE
LIPASE SERPL-CCNC: 6 U/L (ref 13–60)
LYMPHOCYTES # BLD AUTO: 1 10E3/UL (ref 0.8–5.3)
LYMPHOCYTES NFR BLD AUTO: 15 %
MCH RBC QN AUTO: 26.7 PG (ref 26.5–33)
MCHC RBC AUTO-ENTMCNC: 32 G/DL (ref 31.5–36.5)
MCV RBC AUTO: 84 FL (ref 78–100)
MONOCYTES # BLD AUTO: 0.7 10E3/UL (ref 0–1.3)
MONOCYTES NFR BLD AUTO: 11 %
MUCOUS THREADS #/AREA URNS LPF: PRESENT /LPF
NEUTROPHILS # BLD AUTO: 4.7 10E3/UL (ref 1.6–8.3)
NEUTROPHILS NFR BLD AUTO: 72 %
NITRATE UR QL: NEGATIVE
NRBC # BLD AUTO: 0 10E3/UL
NRBC BLD AUTO-RTO: 0 /100
O2/TOTAL GAS SETTING VFR VENT: 3 %
OXYHGB MFR BLDV: 81 % (ref 70–75)
PCO2 BLDV: 44 MM HG (ref 40–50)
PH BLDV: 7.44 [PH] (ref 7.32–7.43)
PH UR STRIP: 7 [PH] (ref 5–7)
PLATELET # BLD AUTO: 164 10E3/UL (ref 150–450)
PO2 BLDV: 45 MM HG (ref 25–47)
POTASSIUM SERPL-SCNC: 5.8 MMOL/L (ref 3.4–5.3)
POTASSIUM SERPL-SCNC: 5.9 MMOL/L (ref 3.4–5.3)
PROT SERPL-MCNC: 7.5 G/DL (ref 6.4–8.3)
RBC # BLD AUTO: 3.33 10E6/UL (ref 4.4–5.9)
RBC URINE: 3 /HPF
SAO2 % BLDV: 82.5 % (ref 70–75)
SODIUM SERPL-SCNC: 128 MMOL/L (ref 135–145)
SP GR UR STRIP: 1.02 (ref 1–1.03)
TROPONIN T SERPL HS-MCNC: 102 NG/L
TROPONIN T SERPL HS-MCNC: 103 NG/L
UROBILINOGEN UR STRIP-MCNC: NORMAL MG/DL
WBC # BLD AUTO: 6.5 10E3/UL (ref 4–11)
WBC URINE: 4 /HPF

## 2025-03-15 PROCEDURE — 82805 BLOOD GASES W/O2 SATURATION: CPT | Performed by: EMERGENCY MEDICINE

## 2025-03-15 PROCEDURE — 85025 COMPLETE CBC W/AUTO DIFF WBC: CPT | Performed by: EMERGENCY MEDICINE

## 2025-03-15 PROCEDURE — 99285 EMERGENCY DEPT VISIT HI MDM: CPT | Mod: 25

## 2025-03-15 PROCEDURE — 36415 COLL VENOUS BLD VENIPUNCTURE: CPT | Performed by: EMERGENCY MEDICINE

## 2025-03-15 PROCEDURE — 81003 URINALYSIS AUTO W/O SCOPE: CPT | Performed by: EMERGENCY MEDICINE

## 2025-03-15 PROCEDURE — 83690 ASSAY OF LIPASE: CPT | Performed by: EMERGENCY MEDICINE

## 2025-03-15 PROCEDURE — 96375 TX/PRO/DX INJ NEW DRUG ADDON: CPT

## 2025-03-15 PROCEDURE — 93005 ELECTROCARDIOGRAM TRACING: CPT

## 2025-03-15 PROCEDURE — 82962 GLUCOSE BLOOD TEST: CPT

## 2025-03-15 PROCEDURE — 250N000013 HC RX MED GY IP 250 OP 250 PS 637: Performed by: EMERGENCY MEDICINE

## 2025-03-15 PROCEDURE — 250N000013 HC RX MED GY IP 250 OP 250 PS 637: Performed by: INTERNAL MEDICINE

## 2025-03-15 PROCEDURE — 258N000001 HC RX 258: Performed by: EMERGENCY MEDICINE

## 2025-03-15 PROCEDURE — 96374 THER/PROPH/DIAG INJ IV PUSH: CPT

## 2025-03-15 PROCEDURE — 84484 ASSAY OF TROPONIN QUANT: CPT | Performed by: EMERGENCY MEDICINE

## 2025-03-15 PROCEDURE — 120N000001 HC R&B MED SURG/OB

## 2025-03-15 PROCEDURE — 99223 1ST HOSP IP/OBS HIGH 75: CPT | Mod: AI | Performed by: INTERNAL MEDICINE

## 2025-03-15 PROCEDURE — 84132 ASSAY OF SERUM POTASSIUM: CPT | Performed by: EMERGENCY MEDICINE

## 2025-03-15 PROCEDURE — 250N000011 HC RX IP 250 OP 636: Performed by: INTERNAL MEDICINE

## 2025-03-15 PROCEDURE — 250N000009 HC RX 250: Performed by: EMERGENCY MEDICINE

## 2025-03-15 PROCEDURE — 250N000011 HC RX IP 250 OP 636: Mod: JZ | Performed by: EMERGENCY MEDICINE

## 2025-03-15 PROCEDURE — 82248 BILIRUBIN DIRECT: CPT | Performed by: EMERGENCY MEDICINE

## 2025-03-15 PROCEDURE — 87040 BLOOD CULTURE FOR BACTERIA: CPT | Performed by: EMERGENCY MEDICINE

## 2025-03-15 RX ORDER — CALCIUM CARBONATE 500 MG/1
1000 TABLET, CHEWABLE ORAL 4 TIMES DAILY PRN
Status: DISCONTINUED | OUTPATIENT
Start: 2025-03-15 | End: 2025-03-23

## 2025-03-15 RX ORDER — NICOTINE POLACRILEX 4 MG
15-30 LOZENGE BUCCAL
Status: DISCONTINUED | OUTPATIENT
Start: 2025-03-15 | End: 2025-04-06

## 2025-03-15 RX ORDER — DOXAZOSIN 1 MG/1
2 TABLET ORAL DAILY
Status: DISCONTINUED | OUTPATIENT
Start: 2025-03-16 | End: 2025-04-06

## 2025-03-15 RX ORDER — CEFTRIAXONE 2 G/1
2 INJECTION, POWDER, FOR SOLUTION INTRAMUSCULAR; INTRAVENOUS ONCE
Status: COMPLETED | OUTPATIENT
Start: 2025-03-15 | End: 2025-03-15

## 2025-03-15 RX ORDER — FLUOXETINE 20 MG/5ML
20 SOLUTION ORAL DAILY
Status: DISCONTINUED | OUTPATIENT
Start: 2025-03-16 | End: 2025-04-05

## 2025-03-15 RX ORDER — AMOXICILLIN 250 MG
2 CAPSULE ORAL 2 TIMES DAILY PRN
Status: DISCONTINUED | OUTPATIENT
Start: 2025-03-15 | End: 2025-03-20

## 2025-03-15 RX ORDER — AZITHROMYCIN 500 MG/5ML
500 INJECTION, POWDER, LYOPHILIZED, FOR SOLUTION INTRAVENOUS ONCE
Status: COMPLETED | OUTPATIENT
Start: 2025-03-15 | End: 2025-03-15

## 2025-03-15 RX ORDER — FERROUS SULFATE 325(65) MG
325 TABLET ORAL
Status: DISCONTINUED | OUTPATIENT
Start: 2025-03-16 | End: 2025-03-21

## 2025-03-15 RX ORDER — POTASSIUM CHLORIDE 1500 MG/1
20 TABLET, EXTENDED RELEASE ORAL DAILY
COMMUNITY

## 2025-03-15 RX ORDER — DEXTROSE MONOHYDRATE 25 G/50ML
25-50 INJECTION, SOLUTION INTRAVENOUS
Status: DISCONTINUED | OUTPATIENT
Start: 2025-03-15 | End: 2025-04-06

## 2025-03-15 RX ORDER — FUROSEMIDE 10 MG/ML
40 INJECTION INTRAMUSCULAR; INTRAVENOUS ONCE
Status: DISCONTINUED | OUTPATIENT
Start: 2025-03-15 | End: 2025-03-15

## 2025-03-15 RX ORDER — POLYETHYLENE GLYCOL 3350 17 G/17G
17 POWDER, FOR SOLUTION ORAL DAILY PRN
Status: DISCONTINUED | OUTPATIENT
Start: 2025-03-15 | End: 2025-03-20

## 2025-03-15 RX ORDER — FUROSEMIDE 10 MG/ML
40 INJECTION INTRAMUSCULAR; INTRAVENOUS EVERY 8 HOURS
Status: DISCONTINUED | OUTPATIENT
Start: 2025-03-15 | End: 2025-03-16

## 2025-03-15 RX ORDER — LIDOCAINE 40 MG/G
CREAM TOPICAL
Status: DISCONTINUED | OUTPATIENT
Start: 2025-03-15 | End: 2025-04-09

## 2025-03-15 RX ORDER — FAMOTIDINE 20 MG/1
20 TABLET, FILM COATED ORAL EVERY MORNING
COMMUNITY

## 2025-03-15 RX ORDER — DEXTROSE MONOHYDRATE 25 G/50ML
25 INJECTION, SOLUTION INTRAVENOUS ONCE
Status: COMPLETED | OUTPATIENT
Start: 2025-03-15 | End: 2025-03-15

## 2025-03-15 RX ORDER — DEXTROSE MONOHYDRATE 25 G/50ML
25-50 INJECTION, SOLUTION INTRAVENOUS
Status: DISCONTINUED | OUTPATIENT
Start: 2025-03-15 | End: 2025-03-15

## 2025-03-15 RX ORDER — SEVELAMER CARBONATE 0.8 G/1
0.8 POWDER, FOR SUSPENSION ORAL
Status: DISCONTINUED | OUTPATIENT
Start: 2025-03-15 | End: 2025-03-15

## 2025-03-15 RX ORDER — NICOTINE POLACRILEX 4 MG
15-30 LOZENGE BUCCAL
Status: DISCONTINUED | OUTPATIENT
Start: 2025-03-15 | End: 2025-03-15

## 2025-03-15 RX ORDER — ACETAMINOPHEN 325 MG/10.15ML
650 LIQUID ORAL EVERY 8 HOURS
Status: DISCONTINUED | OUTPATIENT
Start: 2025-03-15 | End: 2025-04-07

## 2025-03-15 RX ORDER — ONDANSETRON 2 MG/ML
4 INJECTION INTRAMUSCULAR; INTRAVENOUS EVERY 6 HOURS PRN
Status: DISCONTINUED | OUTPATIENT
Start: 2025-03-15 | End: 2025-04-18 | Stop reason: HOSPADM

## 2025-03-15 RX ORDER — AZITHROMYCIN 250 MG/1
500 TABLET, FILM COATED ORAL ONCE
Status: DISCONTINUED | OUTPATIENT
Start: 2025-03-15 | End: 2025-03-15

## 2025-03-15 RX ORDER — ONDANSETRON 4 MG/1
4 TABLET, ORALLY DISINTEGRATING ORAL EVERY 6 HOURS PRN
Status: DISCONTINUED | OUTPATIENT
Start: 2025-03-15 | End: 2025-04-18 | Stop reason: HOSPADM

## 2025-03-15 RX ORDER — CALCIUM GLUCONATE 20 MG/ML
1 INJECTION, SOLUTION INTRAVENOUS ONCE
Status: COMPLETED | OUTPATIENT
Start: 2025-03-15 | End: 2025-03-15

## 2025-03-15 RX ORDER — CLOPIDOGREL BISULFATE 75 MG/1
75 TABLET ORAL DAILY
Status: DISCONTINUED | OUTPATIENT
Start: 2025-03-16 | End: 2025-04-06

## 2025-03-15 RX ORDER — FUROSEMIDE 20 MG/1
20 TABLET ORAL DAILY
Status: ON HOLD | COMMUNITY
End: 2025-03-27

## 2025-03-15 RX ORDER — B COMPLEX C NO.10/FOLIC ACID 900MCG/5ML
5 LIQUID (ML) ORAL EVERY EVENING
Status: DISCONTINUED | OUTPATIENT
Start: 2025-03-15 | End: 2025-04-07

## 2025-03-15 RX ORDER — CALCIUM GLUCONATE 94 MG/ML
1 INJECTION, SOLUTION INTRAVENOUS ONCE
Status: COMPLETED | OUTPATIENT
Start: 2025-03-15 | End: 2025-03-15

## 2025-03-15 RX ORDER — ALBUTEROL SULFATE 5 MG/ML
10 SOLUTION RESPIRATORY (INHALATION) ONCE
Status: DISCONTINUED | OUTPATIENT
Start: 2025-03-15 | End: 2025-03-15

## 2025-03-15 RX ORDER — AMLODIPINE BESYLATE 10 MG/1
10 TABLET ORAL DAILY
Status: DISCONTINUED | OUTPATIENT
Start: 2025-03-16 | End: 2025-04-06

## 2025-03-15 RX ORDER — INDOMETHACIN 25 MG/1
50 CAPSULE ORAL ONCE
Status: COMPLETED | OUTPATIENT
Start: 2025-03-15 | End: 2025-03-15

## 2025-03-15 RX ORDER — AMOXICILLIN 250 MG
1 CAPSULE ORAL 2 TIMES DAILY PRN
Status: DISCONTINUED | OUTPATIENT
Start: 2025-03-15 | End: 2025-03-20

## 2025-03-15 RX ORDER — CALCIUM ACETATE 667 MG/1
1 TABLET ORAL 2 TIMES DAILY WITH MEALS
COMMUNITY

## 2025-03-15 RX ORDER — DEXTROSE MONOHYDRATE 25 G/50ML
25 INJECTION, SOLUTION INTRAVENOUS ONCE
Status: DISCONTINUED | OUTPATIENT
Start: 2025-03-15 | End: 2025-03-15

## 2025-03-15 RX ORDER — LACTOSE-REDUCED FOOD
840 LIQUID (ML) ORAL DAILY
Status: DISCONTINUED | OUTPATIENT
Start: 2025-03-15 | End: 2025-03-16

## 2025-03-15 RX ORDER — INDOMETHACIN 25 MG/1
50 CAPSULE ORAL ONCE
Status: DISCONTINUED | OUTPATIENT
Start: 2025-03-15 | End: 2025-03-15

## 2025-03-15 RX ORDER — TOLTERODINE TARTRATE 1 MG/1
1 TABLET, EXTENDED RELEASE ORAL
Status: DISCONTINUED | OUTPATIENT
Start: 2025-03-16 | End: 2025-04-06

## 2025-03-15 RX ORDER — ATROPINE SULFATE 0.4 MG/ML
0.4 AMPUL (ML) INJECTION ONCE
Status: COMPLETED | OUTPATIENT
Start: 2025-03-15 | End: 2025-03-15

## 2025-03-15 RX ORDER — FUROSEMIDE 10 MG/ML
100 INJECTION INTRAMUSCULAR; INTRAVENOUS ONCE
Status: COMPLETED | OUTPATIENT
Start: 2025-03-15 | End: 2025-03-15

## 2025-03-15 RX ADMIN — CALCIUM GLUCONATE 1 G: 20 INJECTION, SOLUTION INTRAVENOUS at 12:07

## 2025-03-15 RX ADMIN — Medication 6.8 UNITS: at 17:38

## 2025-03-15 RX ADMIN — ATROPINE SULFATE 0.4 MG: 0.4 INJECTION, SOLUTION INTRAVENOUS at 13:46

## 2025-03-15 RX ADMIN — CEFTRIAXONE 2 G: 2 INJECTION, POWDER, FOR SOLUTION INTRAMUSCULAR; INTRAVENOUS at 14:50

## 2025-03-15 RX ADMIN — Medication 5 ML: at 21:07

## 2025-03-15 RX ADMIN — SODIUM BICARBONATE 50 MEQ: 84 INJECTION INTRAVENOUS at 17:38

## 2025-03-15 RX ADMIN — SODIUM ZIRCONIUM CYCLOSILICATE 10 G: 10 POWDER, FOR SUSPENSION ORAL at 14:50

## 2025-03-15 RX ADMIN — DEXTROSE MONOHYDRATE 25 G: 25 INJECTION, SOLUTION INTRAVENOUS at 18:38

## 2025-03-15 RX ADMIN — ACETAMINOPHEN 650 MG: 160 SOLUTION ORAL at 21:07

## 2025-03-15 RX ADMIN — DEXTROSE MONOHYDRATE 250 ML: 100 INJECTION, SOLUTION INTRAVENOUS at 17:32

## 2025-03-15 RX ADMIN — FUROSEMIDE 100 MG: 10 INJECTION, SOLUTION INTRAVENOUS at 14:50

## 2025-03-15 RX ADMIN — Medication 500 MG: at 16:15

## 2025-03-15 RX ADMIN — CALCIUM GLUCONATE 1 G: 98 INJECTION, SOLUTION INTRAVENOUS at 11:55

## 2025-03-15 RX ADMIN — FUROSEMIDE 40 MG: 10 INJECTION, SOLUTION INTRAVENOUS at 21:09

## 2025-03-15 ASSESSMENT — ACTIVITIES OF DAILY LIVING (ADL)
ADLS_ACUITY_SCORE: 75
ADLS_ACUITY_SCORE: 75
ADLS_ACUITY_SCORE: 70
ADLS_ACUITY_SCORE: 75
ADLS_ACUITY_SCORE: 70
ADLS_ACUITY_SCORE: 70
ADLS_ACUITY_SCORE: 75
ADLS_ACUITY_SCORE: 70
ADLS_ACUITY_SCORE: 75
ADLS_ACUITY_SCORE: 70

## 2025-03-15 NOTE — H&P
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  Patient's daughter states that patient has had some vomiting last week.  He was having increased shortness of breath.  His wife noted that his oxygen saturation was low.  He was brought to emergency room for evaluation.  Initial vital signs showed temperature 97.5, pulse 46, blood pressure 148/61, oxygen saturation 95% on room air.  Laboratory workup showed sodium 128, potassium 5.8, creatinine 5.67, ALT 36, AST 31, troponin T high-sensitivity 103.  WBC 6.5, hemoglobin 8.9, platelets 164.  Venous blood gas showed pH 7.44/pCO2 44, pO2 82.5.  Chest x-ray showed large right pleural effusion with small to moderate left pleural effusion.  Bibasilar consolidation, patchy infiltrate in the central aspect of both lungs progressed on the left and improved on the right.  Felix catheter within decompressed urinary bladder.  Anasarca has progressed.  Nephrology was consulted from emergency room and recommended Lasix 100 mg IV.  Patient was given Lasix 100 mg IV.  He was also given calcium gluconate, Lokelma 10 g per G-tube.  He was also given a dose of IV ceftriaxone and azithromycin.  He was admitted to the hospital for further management    Acute hypoxic respiratory failure likely multifactorial including volume overload due to acute on chronic  diastolic CHF exacerbation  Bilateral pleural effusions, right > left  Patient is on Lasix 20 mg daily.  Per family he had decreased urine output and increased shortness of breath.  Chest x-ray showed large right pleural effusion, moderate left pleural effusion.  He was given Lasix 400 mg IV in the ER per nephrology.  -Will put on Lasix 40 mg Q8.    -Will consult nephrology for further evaluation and diuretic dosing    Hyperkalemia  ALEX on CKD stage IV/V  Nephrotic range proteinuria  Anasarca  Hyponatremia  Baseline creatinine 4.07 on 2/26/2025.  Creatinine 5.67 today. Potassium 5.8.  Patient has of decreased urine output.  Per chart review, nephrology had discussion with the family on 3/3/2025 and family decided to continue with medical management.  Nephrology consulted from emergency room today and recommended Lasix 100 mg IV.  Patient received Lasix 100 mg IV in the ER.  Also received calcium gluconate and Lokelma for hyperkalemia  -Will put him on Lasix 40 mg IV every 8 hours.  -Will recheck BMP in couple of hours and then in the morning  -Will consult nephrology for further evaluation and recommendations  -Felix catheter in place. Will monitor input/output    Bradycardia  Patient has been bradycardic, likely due to metabolic abnormalities. Will monitor on telemetry    Elevated troponin likely due to type II MI, CKD  Patient had no evidence of chest pain. Troponin has been elevated during prior evaluation on 2/26/2025.  Troponin 103 today.    -Will monitor on telemetry    Diabetes mellitus type 2  He is on Lantus insulin 20 units every morning, insulin sliding scale PTA  -Will resume Lantus insulin at home dose  -Will order insulin sliding scale every 4 hours    History of CVA with residual right-sided hemiplegia and baseline nonverbal status  Chronic dysphagia, status post PEG tube placement, G-tube exchange on 2/24/2025  S/p indwelling Felix catheter  Dyslipidemia  Hypertension  -Patient resides at home and  family takes care of him.  He also has a visiting nurse  -Will resume PTA medications including Plavix, amlodipine, atorvastatin  -Felix catheter exchanged on 2/17/2025 and due for exchange on 3/17/2025  -Will consult dietician for tube feeding management    Anemia and chronic kidney disease  -Will resume ferrous sulfate    Depression  -Will resume Prozac    DVT Prophylaxis: Pneumatic Compression Devices    Code Status: DNR/DNI.  CODE STATUS discussed with patient's daughter at bedside.  Consider reconsulting palliative care on Monday    Medically Ready for Discharge: Anticipated in 2-4 Days    Mustapha Fountain MD    Primary Care Physician   Nallely Fong    Chief Complaint   Shortness of breath, hypoxia, bradycardia    History is obtained from the patient's daughter    History of Present Illness   Jimmy Otto is a 75 year old male patient with extensive past medical history including cerebrovascular accident with right-sided hemiplegia and aphasia, diabetes mellitus type 2, hypertension, hyperlipidemia, status post PEG tube placement, chronic kidney disease, nephrotic range proteinuria, advanced diabetic nephropathy, anasarca, anemia, neurogenic bladder with chronic Felix catheter in place, depressive disorder, who was brought from home for evaluation for low oxygen saturations and bradycardia.  Patient is unable to provide any history.  History was obtained from his daughter at bedside.  His daughter stated that patient had some vomiting episodes last week.  He was complaining of some abdominal pain yesterday.  Today, patient's wife noted decreased urine output in the Felix catheter.  Patient was also noted to have low oxygen saturation in 80s.  He had no new cough, fever, diarrhea.  He has chronic indwelling Felix catheter.  Patient was brought to emergency room for evaluation.  Patient's daughter states that patient follows with Dr. Matias for his kidney failure.  They have discussed about dialysis options and  at this time the plan is to manage medically given his complex medical history.  On arrival to emergency room, his vital signs were checked and showed temperature 97.5, pulse 46, blood pressure 148/61, oxygen saturation 95% on room air.  Laboratory workup showed sodium 128, potassium 5.8, creatinine 5.67, ALT 36, AST 31, troponin T high-sensitivity 103.  WBC 6.5, hemoglobin 8.9, platelets 164.  Venous blood gas showed pH 7.44/pCO2 44, pO2 82.5.  Chest x-ray showed large right pleural effusion with small to moderate left pleural effusion.  Bibasilar consolidation, patchy infiltrate in the central aspect of both lungs progressed on the left and improved on the right.  Felix catheter within decompressed urinary bladder.  Anasarca has progressed.  Nephrology was consulted from emergency room and recommended Lasix 100 mg IV.  Patient was given Lasix 100 mg IV.  He was also given calcium gluconate, Lokelma 10 g per G-tube.  He was also given a dose of IV ceftriaxone and azithromycin.  He was admitted to the hospital for further management.      Past Medical History    I have reviewed this patient's medical history and updated it with pertinent information if needed.   Past Medical History:   Diagnosis Date    Cerebrovascular accident - Hemiplegia and Aphasia     Depressive disorder     Diabetes mellitus     Felix catheter in place     Hypercholesteremia     Hypertension     S/P percutaneous endoscopic gastrostomy (PEG) tube placement (H)        Past Surgical History   I have reviewed this patient's surgical history and updated it with pertinent information if needed.  Past Surgical History:   Procedure Laterality Date    ENDOSCOPIC INSERTION TUBE GASTROSTOMY      IR CYSTOGRAM  02/14/2022    IR GASTROSTOMY TUBE CHANGE  2/24/2025       Prior to Admission Medications   Prior to Admission Medications   Prescriptions Last Dose Informant Patient Reported? Taking?   B and C vitamin Complex with folic acid (NEPHRONEX) 0.9 MG/5ML  LIQD liquid   No No   Sig: Place 5 mLs into Feeding Tube daily.   FLUoxetine (PROZAC) 20 MG/5ML solution   No No   Si mLs (20 mg) by Oral or Feeding Tube route daily   Insulin Lispro (ADMELOG SC)   Yes No   Sig: For BG <140 no insulin. 140-189 give 1 unit. 190-239 give 2 units. 240-289 give 3. 290-340 give 4 units. >340 give 5 units. Usual daily dose 12 units per day.   Nepro With Carb Steady 8 oz   No No   Sig: Place 840 mLs into G tube daily. Nepro Infuse 840mLs into G-tube daily via gravity bag - 3.5 cartons / day. 1 carton at 0900, 1 carton at 1200 and 1.5 cartons at 1800.    Water flush: 60mL Q 4 hours   acetaminophen (TYLENOL) 325 MG/10.15ML liquid   No No   Sig: Take 20.3 mLs (650 mg) by mouth or Feeding Tube every 8 hours.   amLODIPine (NORVASC) 5 MG tablet   No No   Sig: Place 2 tablets (10 mg) into G tube daily.   atorvastatin (LIPITOR) 80 MG tablet   Yes No   Sig: Take 80 mg by mouth daily   clopidogrel (PLAVIX) 75 MG tablet   Yes No   Sig: Take 75 mg by mouth daily   doxazosin (CARDURA) 2 MG tablet   No No   Sig: Take 1 tablet (2 mg) by mouth daily.   ferrous sulfate (FEROSUL) 325 (65 Fe) MG tablet   No No   Sig: Place 1 tablet (325 mg) into Feeding Tube daily (with breakfast).   furosemide (LASIX) 10 MG/ML solution   No No   Sig: Take 2 mLs (20 mg) by mouth or Feeding Tube daily.   insulin glargine 100 UNIT/ML pen   No No   Sig: Inject 20 Units subcutaneously every morning.   pantoprazole (PROTONIX) 2 mg/mL SUSP suspension   No No   Sig: Place 20 mLs (40 mg) into Feeding Tube daily.   polyethylene glycol (MIRALAX) 17 g packet   No No   Sig: Take 17 g by mouth daily as needed for constipation   sevelamer carbonate, RENVELA, 0.8 GM PACK Packet   No No   Sig: Place 1 packet (0.8 g) into G tube 3 times daily (with meals).   tolterodine (DETROL) 1 MG tablet   No No   Sig: Take 1 tablet (1 mg) by mouth or Feeding Tube 2 times daily (before meals).      Facility-Administered Medications: None     Allergies    Allergies   Allergen Reactions    Nka [No Known Allergies]        Social History   I have reviewed this patient's social history and updated it with pertinent information if needed. Jimmy Otto  reports that he has never smoked. He has never used smokeless tobacco. He reports current alcohol use. He reports that he does not use drugs.    Family History   I have reviewed this patient's family history and updated it with pertinent information if needed.   No family history on file.    Review of Systems   The 10 point Review of Systems is negative other than noted in the HPI or here. Shortness of breath, hypoxia    Physical Exam   Temp: 97.5  F (36.4  C) Temp src: Axillary BP: (!) 153/65 Pulse: (!) 42   Resp: 19 SpO2: 97 % O2 Device: None (Room air)    Vital Signs with Ranges  Temp:  [97.5  F (36.4  C)] 97.5  F (36.4  C)  Pulse:  [40-48] 42  Resp:  [15-19] 19  BP: (127-172)/(61-73) 153/65  SpO2:  [94 %-98 %] 97 %  0 lbs 0 oz    GEN: opens eyes to verbal stimuli, nonverbal   HEENT:  Normocephalic/atraumatic, no scleral icterus, no nasal discharge, mouth moist.  CV:  Regular rate and rhythm, no murmur or JVD.  S1 + S2 noted, no S3 or S4.  LUNGS:  Clear to auscultation bilaterally without rales/rhonchi/wheezing/retractions.  Symmetric chest rise on inhalation noted.  ABD:  Soft, non-distended. PEG tube in place   Genitourinary: Felix catheter in place.  EXT:  No edema or cyanosis.  Hands/feet warm to touch with good signs of peripheral perfusion.  No joint synovitis noted.  SKIN:  Dry to touch, no exanthems noted in the visualized areas.  NEURO: Right side hemiplegia.     Data     Recent Labs   Lab 03/15/25  1150   WBC 6.5   HGB 8.9*   MCV 84      *   POTASSIUM 5.8*   CHLORIDE 90*   CO2 27   .7*   CR 5.67*   ANIONGAP 11   ARLETH 11.9*   *   ALBUMIN 3.3*   PROTTOTAL 7.5   BILITOTAL 0.3   ALKPHOS 140   ALT 36   AST 31   LIPASE 6*       Recent Results (from the past 24 hours)   CT Chest Abdomen Pelvis  w/o Contrast    Narrative    EXAM: CT CHEST ABDOMEN PELVIS W/O CONTRAST  LOCATION: [facility]  DATE: 3/15/2025    INDICATION: hypoxia, abdominal pain, recent vomiting, decreased urine output  COMPARISON: 2/23/2025  TECHNIQUE: CT scan of the chest, abdomen, and pelvis was performed without IV contrast. Multiplanar reformats were obtained. Dose reduction techniques were used.   CONTRAST: None.    FINDINGS:   LUNGS AND PLEURA: Large right pleural effusion with small moderate left pleural effusion and bibasilar consolidation unchanged. Patchy infiltrates in the central aspect of both lungs again seen progressed on the left and improved on the right.    MEDIASTINUM/AXILLAE: Normal.    CORONARY ARTERY CALCIFICATION: Moderate.    HEPATOBILIARY: Normal.    PANCREAS: Changes of chronic calcific pancreatitis with stable cystic change throughout the pancreas.    SPLEEN: Normal.    ADRENAL GLANDS: Normal.    KIDNEYS/BLADDER: No hydronephrosis.    BOWEL: Normal.    LYMPH NODES: Normal.    VASCULATURE: Normal.    PELVIC ORGANS: Felix catheter within decompressed urinary bladder.    MUSCULOSKELETAL: [Anasarca which has progressed.      Impression    IMPRESSION:  1.  Large right pleural effusion with small to moderate left pleural effusion. Bibasilar consolidation. Patchy infiltrates in the central aspect of both lungs progressed on the left and improved on the right.  2.  Changes of chronic calcific pancreatitis with stable cystic change throughout the pancreas.  3.  Felix catheter within decompressed urinary bladder.  4.  Anasarca has progressed.     Head CT w/o contrast    Narrative    EXAM: CT HEAD W/O CONTRAST  LOCATION: [facility]  DATE: 3/15/2025    INDICATION: vomiting, nonverbal, unclera mental status  COMPARISON: 09/14/2021  TECHNIQUE: Routine CT Head without IV contrast. Multiplanar reformats. Dose reduction techniques were used.    FINDINGS:  INTRACRANIAL CONTENTS: No  intracranial hemorrhage, extraaxial collection, or mass effect.  No CT evidence of acute infarct. Chronic appearing infarction within the medial/anterior right thalamus and likely within the left corona radiata. Moderate to   advanced degree of cerebral parenchymal volume loss and presumed small vessel ischemic disease.    VISUALIZED ORBITS/SINUSES/MASTOIDS: No intraorbital abnormality. No paranasal sinus mucosal disease. No middle ear or mastoid effusion.    BONES/SOFT TISSUES: No acute abnormality.      Impression    IMPRESSION:  1.  No CT evidence for acute intracranial process.  2.  Brain atrophy and presumed chronic microvascular ischemic changes as above.

## 2025-03-15 NOTE — ED NOTES
[facility]  ED Nurse Handoff Report    ED Chief complaint: low oxygen and Bradycardia  . ED Diagnosis:   Final diagnoses:   Hypervolemia, unspecified hypervolemia type   Acute kidney injury superimposed on CKD   Bradycardia   Hypoxia   Pleural effusion       Allergies: No Known Allergies    Code Status: DNR / DNI    Activity level - Baseline/Home:  in bed.  Activity Level - Current:   in bed.   Lift room needed: No.   Bariatric: No   Needed: Yes   Isolation: No.   Infection: Not Applicable.     Respiratory status: Nasal cannula    Vital Signs (within 30 minutes):   Vitals:    03/15/25 1513 03/15/25 1530 03/15/25 1543 03/15/25 1613   BP: 130/72 (!) 120/23 (!) 123/27 132/61   Pulse: (!) 41 (!) 39 (!) 41 (!) 45   Resp: 14 20 17 20   Temp:       TempSrc:       SpO2: 95% 97% 96% 94%       Cardiac Rhythm:  ,      Pain level:    Patient confused:  unsure .   Patient Falls Risk: patient and family education.   Elimination Status: Urethral catheter (ordoñez) in place; orders for patient to discharge with ordoñez       Focused Assessment:        Cardiac (Adult)Cardiac Rhythm:  (junctional)   RespiratoryAirway WDL:  (PT Arrives with low oxygen(oxygen in the mid 80's on room air) and bradycardia. Patient was complaining of some abd. pain and vomiting last week. decreased urine output in Ordoñez catheter and wet sounding cough. + dependent edema)Additional Documentation: Breath Sounds (Group)  Respiratory WDLRespiratory WDL: coughCough Frequency: infrequentCough Type: congested  Breath SoundsBreath Sounds: All FieldsAll Lung Fields Breath Sounds: Anterior:; clear (deminished at bases)       Abnormal Results:   Labs Ordered and Resulted from Time of ED Arrival to Time of ED Departure   BASIC METABOLIC PANEL - Abnormal       Result Value    Sodium 128 (*)     Potassium 5.8 (*)     Chloride 90 (*)     Carbon Dioxide (CO2) 27      Anion Gap 11      Urea Nitrogen 111.7 (*)     Creatinine 5.67 (*)     GFR  Estimate 10 (*)     Calcium 11.9 (*)     Glucose 226 (*)    CBC WITH PLATELETS AND DIFFERENTIAL - Abnormal    WBC Count 6.5      RBC Count 3.33 (*)     Hemoglobin 8.9 (*)     Hematocrit 27.8 (*)     MCV 84      MCH 26.7      MCHC 32.0      RDW 15.6 (*)     Platelet Count 164      % Neutrophils 72      % Lymphocytes 15      % Monocytes 11      % Eosinophils 2      % Basophils 1      % Immature Granulocytes 0      NRBCs per 100 WBC 0      Absolute Neutrophils 4.7      Absolute Lymphocytes 1.0      Absolute Monocytes 0.7      Absolute Eosinophils 0.1      Absolute Basophils 0.1      Absolute Immature Granulocytes 0.0      Absolute NRBCs 0.0     LIPASE - Abnormal    Lipase 6 (*)    HEPATIC FUNCTION PANEL - Abnormal    Protein Total 7.5      Albumin 3.3 (*)     Bilirubin Total 0.3      Alkaline Phosphatase 140      AST 31      ALT 36      Bilirubin Direct 0.16     ROUTINE UA WITH MICROSCOPIC REFLEX TO CULTURE - Abnormal    Color Urine Yellow      Appearance Urine Clear      Glucose Urine 150 (*)     Bilirubin Urine Negative      Ketones Urine Negative      Specific Gravity Urine 1.019      Blood Urine Small (*)     pH Urine 7.0      Protein Albumin Urine 300 (*)     Urobilinogen Urine Normal      Nitrite Urine Negative      Leukocyte Esterase Urine Negative      Mucus Urine Present (*)     RBC Urine 3 (*)     WBC Urine 4      Hyaline Casts Urine 3 (*)    BLOOD GAS VENOUS - Abnormal    pH Venous 7.44 (*)     pCO2 Venous 44      pO2 Venous 45      Bicarbonate Venous 30 (*)     Base Excess/Deficit Venous 4.9 (*)     FIO2 3      Oxyhemoglobin Venous 81 (*)     O2 Sat, Venous 82.5 (*)    TROPONIN T, HIGH SENSITIVITY - Abnormal    Troponin T, High Sensitivity 103 (*)    TROPONIN T, HIGH SENSITIVITY - Abnormal    Troponin T, High Sensitivity 102 (*)    POTASSIUM - Abnormal    Potassium 5.9 (*)    GLUCOSE MONITOR NURSING POCT   GLUCOSE MONITOR NURSING POCT   GLUCOSE MONITOR NURSING POCT   POTASSIUM   GLUCOSE MONITOR NURSING POCT    GLUCOSE MONITOR NURSING POCT   GLUCOSE MONITOR NURSING POCT   BLOOD CULTURE   BLOOD CULTURE        Head CT w/o contrast   Final Result   IMPRESSION:   1.  No CT evidence for acute intracranial process.   2.  Brain atrophy and presumed chronic microvascular ischemic changes as above.      CT Chest Abdomen Pelvis w/o Contrast   Final Result   IMPRESSION:   1.  Large right pleural effusion with small to moderate left pleural effusion. Bibasilar consolidation. Patchy infiltrates in the central aspect of both lungs progressed on the left and improved on the right.   2.  Changes of chronic calcific pancreatitis with stable cystic change throughout the pancreas.   3.  Felix catheter within decompressed urinary bladder.   4.  Anasarca has progressed.             Treatments provided: PIV, LABS, BLOOD CULT, ABX, TURNING, MOUTHCARE, MONITORING, VITALS, EKG  Family Comments: AT BED   OBS brochure/video discussed/provided to patient:  No  ED Medications:   Medications   prochlorperazine (COMPAZINE) injection 5 mg (has no administration in time range)   azithromycin (ZITHROMAX) 500 mg in  mL intermittent infusion (500 mg Intravenous $New Bag 3/15/25 1615)   glucose gel 15-30 g (has no administration in time range)     Or   dextrose 50 % injection 25-50 mL (has no administration in time range)     Or   glucagon injection 1 mg (has no administration in time range)   sodium bicarbonate 8.4 % injection 50 mEq (has no administration in time range)   dextrose 10% BOLUS 300 mL (has no administration in time range)   dextrose 50 % injection 25 g (has no administration in time range)   insulin regular 1 unit/mL injection 6.8 Units (has no administration in time range)   calcium gluconate 10 % injection 1 g (0 g Intravenous Stopped 3/15/25 1407)   calcium gluconate 1 g in 50 mL in sodium chloride intermittent infusion (1 g Intravenous $Given 3/15/25 1207)   atropine injection 0.4 mg (0.4 mg Intravenous $Given 3/15/25 1346)    cefTRIAXone (ROCEPHIN) 2 g vial to attach to  ml bag for ADULTS or NS 50 ml bag for PEDS (0 g Intravenous Stopped 3/15/25 9395)   furosemide (LASIX) injection 100 mg (100 mg Intravenous $Given 3/15/25 1450)   sodium zirconium cyclosilicate (LOKELMA) packet 10 g (10 g Per G Tube $Given 3/15/25 1450)       Drips infusing:  No  For the majority of the shift this patient was Green.   Interventions performed were NONE.    Sepsis treatment initiated: No    Cares/treatment/interventions/medications to be completed following ED care: PER FLOOR PROTOCOL, TELE, POSITIONING FOR COMFORT, I&O, PULM, TOILET, SUCTIONING, VITALS,     ED Nurse Name: Angie Allen RN  4:31 PM

## 2025-03-15 NOTE — ED NOTES
Cardiac (Adult)Cardiac Rhythm:  (junctional)   RespiratoryAirway WDL:  (PT Arrives with low oxygen(oxygen in the mid 80's on room air) and bradycardia. Patient was complaining of some abd. pain and vomiting last week. decreased urine output in Felix catheter and wet sounding cough. + dependent edema)Additional Documentation: Breath Sounds (Group)  Respiratory WDLRespiratory WDL: coughCough Frequency: infrequentCough Type: congested  Breath SoundsBreath Sounds: All FieldsAll Lung Fields Breath Sounds: Anterior:; clear (deminished at bases)

## 2025-03-15 NOTE — ED TRIAGE NOTES
PT Arrives with low oxygen(oxygen in the mid 80's on room air) and bradycardia. Patient was complaining of some abdominal pain yesterday. Last week he was having some vomiting episodes. decreased urine output Felix catheter and wet sounding cough. + dependent edema

## 2025-03-15 NOTE — PHARMACY-ADMISSION MEDICATION HISTORY
Pharmacist Admission Medication History    Admission medication history is complete. The information provided in this note is only as accurate as the sources available at the time of the update.    Information Source(s): Family member and CareEverywhere/SureScripts via in-person    Pertinent Information:   - Due to insurance issues with pantoprazole daughter is currently giving OTC Pepcid instead.  - Daughter was told to stop sevelamer due to it clogging feeding tube and patient is taking calcium acetate BID instead.  - Patient recently finished a 15-day course of potassium packet and family is currently crushing the potassium ER tablet to give via feeding tube.  - Daughter confirmed pt is taking ferrous sulfate daily as per instruction from the nephrologist.    For patients on insulin therapy:  Do you use sliding scale insulin based on blood sugars? Yes  Do you typically eat three meals a day? Yes via feeding tube  How many times do you check your blood glucose per day? A few times per day  How many episodes of hypoglycemia do you typically have per month? 0    Changes made to PTA medication list:  Added: famotidine, potassium tablet, calcium acetate  Deleted: pantoprazole; sevelamer  Changed: Lasix from oral solution to tablet (same dose); Lantus 20 units -> 14 units    Allergies reviewed with patient and updates made in EHR: yes    Medication History Completed By: Zeke Mead RPH 3/15/2025 4:18 PM    PTA Med List   Medication Sig Last Dose/Taking    acetaminophen (TYLENOL) 325 MG/10.15ML liquid Take 20.3 mLs (650 mg) by mouth or Feeding Tube every 8 hours. Taking    amLODIPine (NORVASC) 5 MG tablet Place 2 tablets (10 mg) into G tube daily. 3/15/2025 Morning    atorvastatin (LIPITOR) 80 MG tablet Take 80 mg by mouth or Feeding Tube daily. 3/15/2025 Morning    B and C vitamin Complex with folic acid (NEPHRONEX) 0.9 MG/5ML LIQD liquid Place 5 mLs into Feeding Tube daily. 3/14/2025 Evening    calcium acetate  (CALPHRON) 667 MG TABS tablet Take 1 tablet by mouth 2 times daily (with meals). 3/15/2025 Morning    clopidogrel (PLAVIX) 75 MG tablet Take 75 mg by mouth or Feeding Tube daily. 3/15/2025 Morning    doxazosin (CARDURA) 2 MG tablet Take 1 tablet (2 mg) by mouth daily. 3/15/2025 Morning    famotidine (PEPCID) 20 MG tablet Take 20 mg by mouth or Feeding Tube every morning. 3/15/2025 Morning    ferrous sulfate (FEROSUL) 325 (65 Fe) MG tablet Place 1 tablet (325 mg) into Feeding Tube daily (with breakfast). 3/15/2025 Morning    FLUoxetine (PROZAC) 20 MG/5ML solution 5 mLs (20 mg) by Oral or Feeding Tube route daily 3/15/2025 Morning    furosemide (LASIX) 20 MG tablet Take 20 mg by mouth or Feeding Tube daily. 3/15/2025 Morning    insulin glargine (LANTUS PEN) 100 UNIT/ML pen Inject 14 Units subcutaneously every morning. 3/15/2025 Morning    Insulin Lispro (ADMELOG SC) For BG <140 no insulin. 140-189 give 1 unit. 190-239 give 2 units. 240-289 give 3. 290-340 give 4 units. >340 give 5 units. Usual daily dose 12 units per day. 3/14/2025 Evening    polyethylene glycol (MIRALAX) 17 g packet Take 17 g by mouth daily as needed for constipation Taking As Needed    potassium chloride ER (K-TAB) 20 MEQ CR tablet Take 20 mEq by mouth daily. 3/15/2025 Morning    tolterodine (DETROL) 1 MG tablet Take 1 tablet (1 mg) by mouth or Feeding Tube 2 times daily (before meals). 3/15/2025 Morning

## 2025-03-15 NOTE — ED PROVIDER NOTES
Emergency Department Note      History of Present Illness     Chief Complaint   low oxygen and Bradycardia      HPI   Jimmy Otto is a 75 year old male with history per below who presents by ambulance from home with daughter at the bedside for evaluation of low oxygen and bradycardia.  Patient was complaining of some abdominal pain yesterday.  Last week he was having some vomiting episodes.  Over the recent past, patient's wife has noted decreased urine output in the Felix catheter.  No reported fevers or cough or diarrhea.  No reported change in mental status.    Independent Historian   Daughter as detailed above.    Review of External Notes   I reviewed the discharge summary from 2/26/2025 after admission for ALEX on CKD, hyperkalemia, hyponatremia, hypochloremia, acute hypoxic respiratory failure likely due to volume overload, influenza A infection, likely aspiration event.  She was also noted to have some encephalopathy likely related to uremia and infection.  History of CVA with residual right hemiplegia and baseline nonverbal status with chronic dysphagia and PEG tube dependence was noted.    I reviewed the 3/20/2025 nephrology note from Dr. Matias.  Discussed progressive CKD and anasarca and CHF/volume overload and anemia and hypertension.  Documentation was a discussion regarding multiple previous family discussions regarding dialysis and difficulty/futility of it.  Family decided to continue with medical management at this time.    Past Medical History     Medical History and Problem List   Past Medical History:   Diagnosis Date    Cerebrovascular accident - Hemiplegia and Aphasia     Depressive disorder     Diabetes mellitus     Felix catheter in place     Hypercholesteremia     Hypertension     S/P percutaneous endoscopic gastrostomy (PEG) tube placement (H)        Medications   acetaminophen (TYLENOL) 325 MG/10.15ML liquid  amLODIPine (NORVASC) 5 MG tablet  atorvastatin (LIPITOR) 80 MG tablet  B and C  vitamin Complex with folic acid (NEPHRONEX) 0.9 MG/5ML LIQD liquid  clopidogrel (PLAVIX) 75 MG tablet  doxazosin (CARDURA) 2 MG tablet  ferrous sulfate (FEROSUL) 325 (65 Fe) MG tablet  FLUoxetine (PROZAC) 20 MG/5ML solution  furosemide (LASIX) 10 MG/ML solution  insulin glargine 100 UNIT/ML pen  Insulin Lispro (ADMELOG SC)  Nepro With Carb Steady 8 oz  pantoprazole (PROTONIX) 2 mg/mL SUSP suspension  polyethylene glycol (MIRALAX) 17 g packet  sevelamer carbonate, RENVELA, 0.8 GM PACK Packet  tolterodine (DETROL) 1 MG tablet        Surgical History   Past Surgical History:   Procedure Laterality Date    ENDOSCOPIC INSERTION TUBE GASTROSTOMY      IR CYSTOGRAM  02/14/2022    IR GASTROSTOMY TUBE CHANGE  2/24/2025       Physical Exam     Patient Vitals for the past 24 hrs:   BP Temp Temp src Pulse Resp SpO2   03/15/25 1451 (!) 153/65 -- -- (!) 42 19 --   03/15/25 1436 (!) 143/68 -- -- (!) 42 16 97 %   03/15/25 1421 (!) 143/72 -- -- (!) 43 15 97 %   03/15/25 1406 (!) 150/63 -- -- (!) 45 17 97 %   03/15/25 1351 (!) 172/71 -- -- (!) 48 16 98 %   03/15/25 1336 -- -- -- (!) 40 17 95 %   03/15/25 1321 133/63 -- -- (!) 40 18 96 %   03/15/25 1221 127/73 -- -- (!) 44 19 94 %   03/15/25 1147 (!) 148/61 97.5  F (36.4  C) Axillary (!) 46 18 95 %     Physical Exam  VS: Reviewed per above  HENT: Mucous membranes moist  EYES: sclera anicteric  CV: Rate as noted,  regular rhythm.   RESP: Effort normal. Breath sounds are normal bilaterally.  GI: no discernable tenderness/rebound/guarding, not distended. G tube present  NEURO: Alert, R hemiplegia, nonverbal  MSK: No deformity of the extremities, anasarca noted in extremities.   SKIN: Warm and dry    Diagnostics     Lab Results   Labs Ordered and Resulted from Time of ED Arrival to Time of ED Departure   BASIC METABOLIC PANEL - Abnormal       Result Value    Sodium 128 (*)     Potassium 5.8 (*)     Chloride 90 (*)     Carbon Dioxide (CO2) 27      Anion Gap 11      Urea Nitrogen 111.7 (*)      Creatinine 5.67 (*)     GFR Estimate 10 (*)     Calcium 11.9 (*)     Glucose 226 (*)    CBC WITH PLATELETS AND DIFFERENTIAL - Abnormal    WBC Count 6.5      RBC Count 3.33 (*)     Hemoglobin 8.9 (*)     Hematocrit 27.8 (*)     MCV 84      MCH 26.7      MCHC 32.0      RDW 15.6 (*)     Platelet Count 164      % Neutrophils 72      % Lymphocytes 15      % Monocytes 11      % Eosinophils 2      % Basophils 1      % Immature Granulocytes 0      NRBCs per 100 WBC 0      Absolute Neutrophils 4.7      Absolute Lymphocytes 1.0      Absolute Monocytes 0.7      Absolute Eosinophils 0.1      Absolute Basophils 0.1      Absolute Immature Granulocytes 0.0      Absolute NRBCs 0.0     LIPASE - Abnormal    Lipase 6 (*)    HEPATIC FUNCTION PANEL - Abnormal    Protein Total 7.5      Albumin 3.3 (*)     Bilirubin Total 0.3      Alkaline Phosphatase 140      AST 31      ALT 36      Bilirubin Direct 0.16     ROUTINE UA WITH MICROSCOPIC REFLEX TO CULTURE - Abnormal    Color Urine Yellow      Appearance Urine Clear      Glucose Urine 150 (*)     Bilirubin Urine Negative      Ketones Urine Negative      Specific Gravity Urine 1.019      Blood Urine Small (*)     pH Urine 7.0      Protein Albumin Urine 300 (*)     Urobilinogen Urine Normal      Nitrite Urine Negative      Leukocyte Esterase Urine Negative      Mucus Urine Present (*)     RBC Urine 3 (*)     WBC Urine 4      Hyaline Casts Urine 3 (*)    BLOOD GAS VENOUS - Abnormal    pH Venous 7.44 (*)     pCO2 Venous 44      pO2 Venous 45      Bicarbonate Venous 30 (*)     Base Excess/Deficit Venous 4.9 (*)     FIO2 3      Oxyhemoglobin Venous 81 (*)     O2 Sat, Venous 82.5 (*)    TROPONIN T, HIGH SENSITIVITY - Abnormal    Troponin T, High Sensitivity 103 (*)    TROPONIN T, HIGH SENSITIVITY   GLUCOSE MONITOR NURSING POCT   GLUCOSE MONITOR NURSING POCT   POTASSIUM   GLUCOSE MONITOR NURSING POCT   POTASSIUM   BLOOD CULTURE   BLOOD CULTURE       Imaging   Head CT w/o contrast   Final Result    IMPRESSION:   1.  No CT evidence for acute intracranial process.   2.  Brain atrophy and presumed chronic microvascular ischemic changes as above.      CT Chest Abdomen Pelvis w/o Contrast   Final Result   IMPRESSION:   1.  Large right pleural effusion with small to moderate left pleural effusion. Bibasilar consolidation. Patchy infiltrates in the central aspect of both lungs progressed on the left and improved on the right.   2.  Changes of chronic calcific pancreatitis with stable cystic change throughout the pancreas.   3.  Felix catheter within decompressed urinary bladder.   4.  Anasarca has progressed.           EKG         Independent Interpretation   None    ED Course      Medications Administered   Medications   cefTRIAXone (ROCEPHIN) 2 g vial to attach to  ml bag for ADULTS or NS 50 ml bag for PEDS (2 g Intravenous $New Bag 3/15/25 1450)   prochlorperazine (COMPAZINE) injection 5 mg (has no administration in time range)   azithromycin (ZITHROMAX) 500 mg in  mL intermittent infusion (has no administration in time range)   calcium gluconate 10 % injection 1 g (0 g Intravenous Stopped 3/15/25 1407)   calcium gluconate 1 g in 50 mL in sodium chloride intermittent infusion (1 g Intravenous $Given 3/15/25 1207)   atropine injection 0.4 mg (0.4 mg Intravenous $Given 3/15/25 1346)   furosemide (LASIX) injection 100 mg (100 mg Intravenous $Given 3/15/25 1450)   sodium zirconium cyclosilicate (LOKELMA) packet 10 g (10 g Per G Tube $Given 3/15/25 1450)       Procedures   Procedures     Discussion of Management   Admitting Hospitalist, Dr pendleton  Nephrology, Dr Rudd      Additional Documentation  None    Medical Decision Making / Diagnosis     CMS Diagnoses: IV Antibiotics given and/or elevated Lactate of 0 and no sepsis note found - Delete this reminder and enter the sepsis note or '.edcms' before signing chart.>>>None    MIPS       None    MDM   Jimmy Otto is a 75 year old male who presents to the ER with  bradycardia, hypoxia.  On arrival SpO2 was in the high 80s which normalizes with 4 L O2 per nasal cannula.  He is bradycardic in the 40s.  EKG reveals a  likely junctional rhythm.  This transiently increased with a small dose of atropine.  Patient is normotensive however.  No indication for transcutaneous pacing.  On exam, he is awake but nonverbal and intermittently squeezes fingers to command.  Labs are notable for worsening renal function and hyperkalemia of 5.8 and uremia.  Previous nephrology notes suggest that discussion of dialysis with family has been had few times in the recent past and medical management was preferred.  I again spoke with nephrology over the phone who recommended high-dose Lasix and Lokelma and recheck.  If not improving, shifting is reasonable.  Images today do show recurrent pleural effusions and possible pulmonary opacities.  He was given antibiotics for possible pneumonia.  Patient was admitted to the hospital seen for further cares.    Disposition   The patient was admitted to the hospital.     Diagnosis     ICD-10-CM    1. Hypervolemia, unspecified hypervolemia type  E87.70       2. Acute kidney injury superimposed on CKD  N17.9     N18.9       3. Bradycardia  R00.1       4. Hypoxia  R09.02       5. Pleural effusion  J90            Discharge Medications   New Prescriptions    No medications on file        Rodolfo Edge MD  03/15/25 1266

## 2025-03-16 LAB
ANION GAP SERPL CALCULATED.3IONS-SCNC: 10 MMOL/L (ref 7–15)
ANION GAP SERPL CALCULATED.3IONS-SCNC: 11 MMOL/L (ref 7–15)
BASOPHILS # BLD AUTO: 0.1 10E3/UL (ref 0–0.2)
BASOPHILS NFR BLD AUTO: 1 %
BUN SERPL-MCNC: 109.9 MG/DL (ref 8–23)
BUN SERPL-MCNC: 109.9 MG/DL (ref 8–23)
CALCIUM SERPL-MCNC: 10.9 MG/DL (ref 8.8–10.4)
CALCIUM SERPL-MCNC: 11 MG/DL (ref 8.8–10.4)
CHLORIDE SERPL-SCNC: 91 MMOL/L (ref 98–107)
CHLORIDE SERPL-SCNC: 91 MMOL/L (ref 98–107)
CREAT SERPL-MCNC: 5.96 MG/DL (ref 0.67–1.17)
CREAT SERPL-MCNC: 6.06 MG/DL (ref 0.67–1.17)
CYSTATIN C (ROCHE): 6.1 MG/L (ref 0.6–1)
EGFRCR SERPLBLD CKD-EPI 2021: 9 ML/MIN/1.73M2
EGFRCR SERPLBLD CKD-EPI 2021: 9 ML/MIN/1.73M2
EOSINOPHIL # BLD AUTO: 0.2 10E3/UL (ref 0–0.7)
EOSINOPHIL NFR BLD AUTO: 3 %
ERYTHROCYTE [DISTWIDTH] IN BLOOD BY AUTOMATED COUNT: 15.9 % (ref 10–15)
GFR/BSA.PRED SERPLBLD CYS-BASED-ARV: 7 ML/MIN/1.73M2
GLUCOSE BLDC GLUCOMTR-MCNC: 109 MG/DL (ref 70–99)
GLUCOSE BLDC GLUCOMTR-MCNC: 121 MG/DL (ref 70–99)
GLUCOSE BLDC GLUCOMTR-MCNC: 141 MG/DL (ref 70–99)
GLUCOSE BLDC GLUCOMTR-MCNC: 89 MG/DL (ref 70–99)
GLUCOSE BLDC GLUCOMTR-MCNC: 93 MG/DL (ref 70–99)
GLUCOSE SERPL-MCNC: 127 MG/DL (ref 70–99)
GLUCOSE SERPL-MCNC: 98 MG/DL (ref 70–99)
HCO3 SERPL-SCNC: 27 MMOL/L (ref 22–29)
HCO3 SERPL-SCNC: 28 MMOL/L (ref 22–29)
HCT VFR BLD AUTO: 26.6 % (ref 40–53)
HGB BLD-MCNC: 8.5 G/DL (ref 13.3–17.7)
IMM GRANULOCYTES # BLD: 0 10E3/UL
IMM GRANULOCYTES NFR BLD: 0 %
LDH SERPL L TO P-CCNC: 179 U/L (ref 0–250)
LYMPHOCYTES # BLD AUTO: 1.1 10E3/UL (ref 0.8–5.3)
LYMPHOCYTES NFR BLD AUTO: 21 %
MAGNESIUM SERPL-MCNC: 3.1 MG/DL (ref 1.7–2.3)
MCH RBC QN AUTO: 26.9 PG (ref 26.5–33)
MCHC RBC AUTO-ENTMCNC: 32 G/DL (ref 31.5–36.5)
MCV RBC AUTO: 84 FL (ref 78–100)
MONOCYTES # BLD AUTO: 0.6 10E3/UL (ref 0–1.3)
MONOCYTES NFR BLD AUTO: 12 %
NEUTROPHILS # BLD AUTO: 3.3 10E3/UL (ref 1.6–8.3)
NEUTROPHILS NFR BLD AUTO: 63 %
NRBC # BLD AUTO: 0 10E3/UL
NRBC BLD AUTO-RTO: 0 /100
PHOSPHATE SERPL-MCNC: 4.2 MG/DL (ref 2.5–4.5)
PLATELET # BLD AUTO: 141 10E3/UL (ref 150–450)
POTASSIUM SERPL-SCNC: 5 MMOL/L (ref 3.4–5.3)
POTASSIUM SERPL-SCNC: 5.2 MMOL/L (ref 3.4–5.3)
PROT SERPL-MCNC: 6.4 G/DL (ref 6.4–8.3)
RBC # BLD AUTO: 3.16 10E6/UL (ref 4.4–5.9)
SODIUM SERPL-SCNC: 129 MMOL/L (ref 135–145)
SODIUM SERPL-SCNC: 129 MMOL/L (ref 135–145)
WBC # BLD AUTO: 5.2 10E3/UL (ref 4–11)

## 2025-03-16 PROCEDURE — 80048 BASIC METABOLIC PNL TOTAL CA: CPT | Performed by: INTERNAL MEDICINE

## 2025-03-16 PROCEDURE — 250N000011 HC RX IP 250 OP 636: Performed by: INTERNAL MEDICINE

## 2025-03-16 PROCEDURE — 36415 COLL VENOUS BLD VENIPUNCTURE: CPT | Performed by: HOSPITALIST

## 2025-03-16 PROCEDURE — 99222 1ST HOSP IP/OBS MODERATE 55: CPT | Performed by: INTERNAL MEDICINE

## 2025-03-16 PROCEDURE — 250N000013 HC RX MED GY IP 250 OP 250 PS 637: Performed by: INTERNAL MEDICINE

## 2025-03-16 PROCEDURE — 84155 ASSAY OF PROTEIN SERUM: CPT | Performed by: HOSPITALIST

## 2025-03-16 PROCEDURE — 82610 CYSTATIN C: CPT | Performed by: INTERNAL MEDICINE

## 2025-03-16 PROCEDURE — 83615 LACTATE (LD) (LDH) ENZYME: CPT | Performed by: HOSPITALIST

## 2025-03-16 PROCEDURE — 99233 SBSQ HOSP IP/OBS HIGH 50: CPT | Performed by: HOSPITALIST

## 2025-03-16 PROCEDURE — 84100 ASSAY OF PHOSPHORUS: CPT | Performed by: INTERNAL MEDICINE

## 2025-03-16 PROCEDURE — 250N000012 HC RX MED GY IP 250 OP 636 PS 637: Performed by: INTERNAL MEDICINE

## 2025-03-16 PROCEDURE — 120N000001 HC R&B MED SURG/OB

## 2025-03-16 PROCEDURE — 36415 COLL VENOUS BLD VENIPUNCTURE: CPT | Performed by: INTERNAL MEDICINE

## 2025-03-16 PROCEDURE — 83735 ASSAY OF MAGNESIUM: CPT | Performed by: INTERNAL MEDICINE

## 2025-03-16 PROCEDURE — 85004 AUTOMATED DIFF WBC COUNT: CPT | Performed by: INTERNAL MEDICINE

## 2025-03-16 RX ORDER — DEXTROSE MONOHYDRATE 100 MG/ML
INJECTION, SOLUTION INTRAVENOUS CONTINUOUS PRN
Status: DISCONTINUED | OUTPATIENT
Start: 2025-03-16 | End: 2025-04-06

## 2025-03-16 RX ORDER — FUROSEMIDE 10 MG/ML
80 INJECTION INTRAMUSCULAR; INTRAVENOUS EVERY 8 HOURS
Status: DISCONTINUED | OUTPATIENT
Start: 2025-03-16 | End: 2025-03-18

## 2025-03-16 RX ADMIN — ACETAMINOPHEN 650 MG: 160 SOLUTION ORAL at 21:14

## 2025-03-16 RX ADMIN — FUROSEMIDE 80 MG: 10 INJECTION, SOLUTION INTRAVENOUS at 21:08

## 2025-03-16 RX ADMIN — INSULIN GLARGINE 14 UNITS: 100 INJECTION, SOLUTION SUBCUTANEOUS at 10:44

## 2025-03-16 RX ADMIN — FUROSEMIDE 80 MG: 10 INJECTION, SOLUTION INTRAVENOUS at 13:35

## 2025-03-16 RX ADMIN — TOLTERODINE TARTRATE 1 MG: 1 TABLET, FILM COATED ORAL at 09:07

## 2025-03-16 RX ADMIN — ACETAMINOPHEN 650 MG: 160 SOLUTION ORAL at 05:56

## 2025-03-16 RX ADMIN — AMLODIPINE BESYLATE 10 MG: 10 TABLET ORAL at 09:07

## 2025-03-16 RX ADMIN — FERROUS SULFATE TAB 325 MG (65 MG ELEMENTAL FE) 325 MG: 325 (65 FE) TAB at 09:07

## 2025-03-16 RX ADMIN — ACETAMINOPHEN 650 MG: 160 SOLUTION ORAL at 13:36

## 2025-03-16 RX ADMIN — DOXAZOSIN 2 MG: 1 TABLET ORAL at 09:07

## 2025-03-16 RX ADMIN — FUROSEMIDE 40 MG: 10 INJECTION, SOLUTION INTRAVENOUS at 05:46

## 2025-03-16 RX ADMIN — FLUOXETINE 20 MG: 20 SOLUTION ORAL at 09:07

## 2025-03-16 RX ADMIN — TOLTERODINE TARTRATE 1 MG: 1 TABLET, FILM COATED ORAL at 16:28

## 2025-03-16 RX ADMIN — Medication 5 ML: at 21:13

## 2025-03-16 ASSESSMENT — ACTIVITIES OF DAILY LIVING (ADL)
ADLS_ACUITY_SCORE: 87
ADLS_ACUITY_SCORE: 91
ADLS_ACUITY_SCORE: 87
ADLS_ACUITY_SCORE: 85
ADLS_ACUITY_SCORE: 89
ADLS_ACUITY_SCORE: 87
ADLS_ACUITY_SCORE: 85
ADLS_ACUITY_SCORE: 85
ADLS_ACUITY_SCORE: 91
ADLS_ACUITY_SCORE: 91
ADLS_ACUITY_SCORE: 85
ADLS_ACUITY_SCORE: 85
ADLS_ACUITY_SCORE: 91
ADLS_ACUITY_SCORE: 85
ADLS_ACUITY_SCORE: 87
ADLS_ACUITY_SCORE: 91
ADLS_ACUITY_SCORE: 91
ADLS_ACUITY_SCORE: 85
ADLS_ACUITY_SCORE: 91
ADLS_ACUITY_SCORE: 91
ADLS_ACUITY_SCORE: 89
ADLS_ACUITY_SCORE: 89
ADLS_ACUITY_SCORE: 91

## 2025-03-16 NOTE — CONSULTS
CLINICAL NUTRITION SERVICES - ASSESSMENT NOTE    RECOMMENDATIONS FOR MDs/PROVIDERS TO ORDER:  Messaged MD regarding stopping bolus Nepro ordered in MAR    Recommend ordering electrolyte replacement protocols - Messaged MD    Malnutrition Status:    Malnutrition Diagnosis: Unable to determine due to fluid, unable to conduct NFPE d/t RD off site, and unable to contact family     Malnutrition Present on Admission: Unable to assess    Registered Dietitian Interventions:  Will start pt on continuous tube feeding d/t hx of tolerance issues. Per chart review, pt has previously required renal friendly formula during admissions. Will run renal formula during this admission as well.     Access: G-Tube  Frequency: Continuous  Formula: Nepro  Enteral Regimen: Nepro @ goal 30 ml/hr x 24 hours (720 ml/day)  Total enteral provisions: 1296 kcals (20 kcal/kg/day), 58 g PRO (0.9 g/kg/day), 526 ml free H2O, 116 g CHO and 18 g Fiber daily.   Free Water Flushes: 60 ml q 4h for tube patency    Advancement Instructions: Once mag and phos are back and WNL, please initiate TF at 15 ml/hr x 6 hours. If tolerating, okay to advance to goal rate of 30 ml/hr.    Ordered mag and phos baselines as well as add-ons for the next 2 days. Do not suspect pt will refeed, but will monitor electrolytes just in case.  Continue with Nephronex to meet 100% of RDIs  RD will continue to follow and adjust TF regimen as indicated.    Future/Additional Recommendations:  Transition back to home bolus regimen as able     REASON FOR ASSESSMENT  Provider order - Registered Dietitian to order TF per Medical Nutrition Therapy Guidelines    PMH: cerebrovascular accident with right-sided hemiplegia and aphasia, diabetes mellitus type 2, hypertension, hyperlipidemia, status post PEG tube placement, chronic kidney disease, nephrotic range proteinuria, advanced diabetic nephropathy, anasarca, anemia, neurogenic bladder with chronic Felix catheter in place, depressive  "disorder      Per EMR, pt presented to ED on 3/15 for evaluation of low oxygen and bradycardia. Pt complaining of some abd pain the day PTA. Per H&P, pt was having some vomiting episodes last week.     SUBJECTIVE INFORMATION  Patient not available for interview due to RD off site. Additionally, pt with aphasia and unable to connect with family over phone upon attempts.     NUTRITION HISTORY  - Information obtained from chart review  - Diet at home: Pt 100% reliant on enteral nutrition at baseline.  - Usual intakes: Home regimen as follows...  Nepro 3.5 cartons/day (8 oz per carton). Enteral regimen provides 830 mL, 1493 kcal, 67 g protein, 133 g CHO, 21 g fiber.  60 mL flush before and after each bolus    - Barriers to PO intakes: chronic swallowing difficulty  - Allergies: NKFA    Pt was evaluated by [facility] dietitian last month. During admission to [facility], pt was put on continuous regimen d/t nausea/vomiting.    Attempted to call pt's daughter, but was unable to reach her. Reached out to pt's RN.      CURRENT NUTRITION ORDERS  Diet: None  - Pt 100% enteral reliant at baseline.    CURRENT INTAKE/TOLERANCE  None so far this admission     LABS  Nutrition-relevant labs: Reviewed  Noted: Na 129(L), .9(H), Creat 6.06(H), BG 98    MEDICATIONS  Nutrition-relevant medications: Reviewed  Noted: nephronex, 40 mg Lasix TID, sliding scale insulin, 14 units Lantus daily, Nepro w/ carb steady    Last night, provider ordered Nepro w/ carb steady with the following order details:   - Place 840 mLs into G tube daily. Nepro Infuse 840mLs into G-tube daily via gravity bag - 3.5 cartons / day. 1 carton at 0900, 1 carton at 1200 and 1.5 cartons at 1800.     Water flush: 60mL Q 4 hours     Discussed with current provider about change to continuous tube feeding.      ANTHROPOMETRICS  Height: 167.6 cm (5' 6\")  Most Recent Weight: 70.9 kg (156 lb 4.9 oz)  IBW: 63.8 kg  % IBW: 111%  BMI (kg/m ): Body mass index is 25.23 kg/m . " "(Overweight BMI 25-29.9)  Weight History:  Wt Readings from Last 10 Encounters:   03/16/25 70.9 kg (156 lb 4.9 oz)   02/26/25 68.7 kg (151 lb 7.3 oz)   02/26/25 67.7 kg (149 lb 4 oz)   01/18/25 66.5 kg (146 lb 9.7 oz)   10/25/24 60.3 kg (132 lb 15 oz)   10/28/24 61 kg (134 lb 7.7 oz)   01/02/23 57.2 kg (126 lb)   10/28/22 54.4 kg (120 lb)   01/26/22 57.8 kg (127 lb 6.4 oz)   11/04/21 59 kg (130 lb)     Pt w/ anasarca. Unable to assess accuracy of weight trends d/t fluid.      ASSESSED NUTRITION NEEDS  Dosing Weight: 63.8 kg based on IBW - pt w/ anasarca, unsure of dry wt  Estimated Energy Needs: 0735-7997 kcals/day (20 - 25 kcals/kg)  Justification: Maintenance  Estimated Protein Needs: 51-64 grams protein/day (0.8-1 grams of pro/kg)  Justification: CKD  Estimated Fluid Needs: defer to nephrology     SYSTEM FINDINGS    Skin/wounds:  Edema: pt w/ anasarca  Pressure Injury: None currently documented.  Non-Healing Wound(s): None currently documented.  Surgical Wound(s): None currently documented.    GI symptoms:   Stooling Patterns Reviewed - 1 BM so far this admission.    MALNUTRITION  % Weight Loss: Unable to assess  d/t fluid  % Intake: Unable to assess d/t unable to contact family despite attempts.  Subcutaneous Fat Loss: Deferred  Muscle Loss: Deferred  Fluid Accumulation/Edema:  Pt w/ anasarca. Not thought to be r/t nutrition status.  Malnutrition Diagnosis: Unable to determine due to fluid, unable to conduct NFPE d/t RD off site, and unable to contact family   Malnutrition Present on Admission: Unable to assess    NUTRITION DIAGNOSIS  Swallowing difficulty related to CVA as evidenced by pt 100% reliant on EN at baseline.    INTERVENTIONS  See nutrition interventions above    Goals  EN to meet % of estimated needs     Monitoring/Evaluation  Progress toward goals will be monitored and evaluated per policy.        Danielle Akins, RD, LD  Clinical Dietitian  [facility] Message Group: \"Dietitian [[place]]\"  Office " Phone: [phone]  Pagers: 3rd floor/ICU: [phone]  All other floors: [phone]  Weekend/holiday: [phone]

## 2025-03-16 NOTE — PLAN OF CARE
[id number]    VSS x bradycardia. Tele junctional rhythm 40s (unchanged since admission) w/ prolonged QT. Had to increase O2 to 5L NC. IV lasix. Felix patent. G-Tube clamped, meds per G-tube. Pt strict NPO per family, needs diet order. Yaunker at bedside for family to help suction when necessary. Oral cares done. Turn/repo, HOB raised to assist w/ breathing. Nonverbal. Daughter at bedside all night. Nutrition consulted r/t TF boluses per BL, nephrology consulted. Abd distended, very firm on bilateral sides.     Goal Outcome Evaluation:      Plan of Care Reviewed With: patient    Overall Patient Progress: no changeOverall Patient Progress: no change    Outcome Evaluation: Remains junctional rhythm, K stable at 5.2      Problem: Dysrhythmia  Goal: Normalized Cardiac Rhythm  Outcome: Not Progressing     Problem: Adult Inpatient Plan of Care  Goal: Plan of Care Review  Description: The Plan of Care Review/Shift note should be completed every shift.  The Outcome Evaluation is a brief statement about your assessment that the patient is improving, declining, or no change.  This information will be displayed automatically on your shift  note.  Outcome: Progressing  Flowsheets (Taken 3/16/2025 0740)  Outcome Evaluation: Remains junctional rhythm, K stable at 5.2  Plan of Care Reviewed With: patient  Overall Patient Progress: no change    Goal: Absence of Hospital-Acquired Illness or Injury  Outcome: Progressing  Intervention: Prevent Skin Injury  Recent Flowsheet Documentation  Taken 3/16/2025 0540 by Minnie Rogers, RN  Body Position:   turned   right   log-rolled   weight shifting  Taken 3/16/2025 0037 by Minnie Rogers RN  Body Position:   weight shifting   turned   left  Taken 3/15/2025 2308 by Minnie Rogers RN  Body Position:   log-rolled   weight shifting   heels elevated   turned   right  Taken 3/15/2025 2100 by Minnie Rogers, RN  Body Position:   turned   left   heels elevated   log-rolled   weight  shifting  Taken 3/15/2025 1953 by Minnie Rogers, RN  Body Position:   log-rolled   weight shifting   heels elevated  Intervention: Prevent Infection  Recent Flowsheet Documentation  Taken 3/15/2025 1953 by Minnie Rogers, RN  Infection Prevention:   rest/sleep promoted   environmental surveillance performed  Goal: Optimal Comfort and Wellbeing  Outcome: Progressing  Goal: Readiness for Transition of Care  Outcome: Progressing  Intervention: Mutually Develop Transition Plan  Recent Flowsheet Documentation  Taken 3/15/2025 2327 by Minnie Rogers, RN  Equipment Currently Used at Home: (home care equipment) other (see comments)     Problem: Delirium  Goal: Optimal Coping  Outcome: Progressing  Goal: Improved Behavioral Control  Outcome: Progressing  Goal: Improved Attention and Thought Clarity  Outcome: Progressing  Goal: Improved Sleep  Outcome: Progressing

## 2025-03-16 NOTE — PROGRESS NOTES
Shift summary (time of admission-1930)    Pt arrived from ED to third floor around 1825 w/ dgtr. Pt nonverbal at baseline per dgtr, no command following. VSS except elevated BP on 3L O2 per NC. Tele JR w/ prolong QT. LPIV infusing w/ D10 @ 75 ml/hr. D50 given per orders for BG 92. Felix patent and intact. Gtube intact. Heavy assist of 3+ lift.

## 2025-03-16 NOTE — CONSULTS
[facility]    Nephrology Consultation     Date of Admission:  3/15/2025    Assessment & Plan     Jimmy Otto is a 75 year old male who was admitted on 3/15/2025.     Assessment:  1) CKD stage V, proteinuric progressive CKD.  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  Previously felt to be not a dialysis candidate, plan for hospice when approaching medically refractory state.  Today though daughter reports she has not yet made a decision.  Discussed this is becoming a more urgent decision regarding dialysis versus hospice.  I also stated that he would be a very poor dialysis candidate.    Last creatinine 3.88, GFR 15 on 3/3/2025.  Admission here at 5.67 with mild hyperkalemia.      Anemia: Recent iron labs with iron sats 18%, ferritin 269.  On oral iron.    Hyperkalemia: Given dose Lokelma 10 g yesterday.  Secondary to low GFR along with 20 mEq supplemental daily potassium.    Hyponatremia: Sodium here at 129.  Last value 137 was trending down from discharge at 142.    2) hypervolemia: Visit on 3/3/2025 documented no edema.  Discharged last month on 20 mg daily Lasix.  Increasing edema poss related to lower GFR.  May be difficult to diurese away his pleural effusion.  Consider thoracentesis with any respiratory compromise.    3) bradycardia: Somewhat out of proportion to current potassium value.  Daughter though reports this occurred in the past some heart rate increased with lower potassium values.    Plan/Recs:  1) hold prior additional free water, agree with current 60 cc every 4 hours for tube patency.  Expect him to have his sodium rise back toward the normal range.  2) agree with holding supplemental potassium chloride, expect potassium to improve with diuresis.  Can give additional Lokelma, with high values.  3) increasing Lasix to 80 mg IV every 8 hours  4) continue goals of care discussions.  Check any Cystatin C for another estimated GFR.    Kadeem Simpson, DO  [facility] -  Nephrology  [phone]  --------------------------------------------------------------------------------------------  Reason for Consult     I was asked to see the patient for CKD, hypervolemia    History is obtained from the patient's daughter and chart review.      History of Present Illness     Jimmy Otto is a 75 year old male with known progressive CKD, brought to ER yesterday with hypoxia and low heart rate.  Patient followed by [facility] nephrology, seen during last hospitalization by nephrology as well as in the outpatient setting.  Last visit with Dr. Matias was on 3/3/2025.  On that visit he had normal blood pressures, reported no edema on exam.  Since then he has had progressive swelling.  On Lasix 20 mg daily which is lower dose and he is had in the past this was his discharge dose.  Daughter is caregiver, is able to tell me blood pressures at home checks.  She reports mostly in the 140s with occasional higher values.  Has had no low values at all recently.  Noted recent nausea.  Discussed this may related to uremia if the initial change was lower creatinine in the last week.  Suspect low muscle mass we may be overestimating GFR with creatinine based equation.    Past Medical History   I have reviewed this patient's medical history and updated it with pertinent information if needed.   Past Medical History:   Diagnosis Date    Cerebrovascular accident - Hemiplegia and Aphasia     Depressive disorder     Diabetes mellitus     Felix catheter in place     Hypercholesteremia     Hypertension     S/P percutaneous endoscopic gastrostomy (PEG) tube placement (H)        Past Surgical History   I have reviewed this patient's surgical history and updated it with pertinent information if needed.  Past Surgical History:   Procedure Laterality Date    ENDOSCOPIC INSERTION TUBE GASTROSTOMY      IR CYSTOGRAM  02/14/2022    IR GASTROSTOMY TUBE CHANGE  2/24/2025       Prior to Admission Medications   Prior to Admission  Medications   Prescriptions Last Dose Informant Patient Reported? Taking?   B and C vitamin Complex with folic acid (NEPHRONEX) 0.9 MG/5ML LIQD liquid 3/14/2025 Evening  No Yes   Sig: Place 5 mLs into Feeding Tube daily.   FLUoxetine (PROZAC) 20 MG/5ML solution 3/15/2025 Morning  No Yes   Si mLs (20 mg) by Oral or Feeding Tube route daily   Insulin Lispro (ADMELOG SC) 3/14/2025 Evening  Yes Yes   Sig: For BG <140 no insulin. 140-189 give 1 unit. 190-239 give 2 units. 240-289 give 3. 290-340 give 4 units. >340 give 5 units. Usual daily dose 12 units per day.   Nepro With Carb Steady 8 oz   No No   Sig: Place 840 mLs into G tube daily. Nepro Infuse 840mLs into G-tube daily via gravity bag - 3.5 cartons / day. 1 carton at 0900, 1 carton at 1200 and 1.5 cartons at 1800.    Water flush: 60mL Q 4 hours   acetaminophen (TYLENOL) 325 MG/10.15ML liquid   No Yes   Sig: Take 20.3 mLs (650 mg) by mouth or Feeding Tube every 8 hours.   amLODIPine (NORVASC) 5 MG tablet 3/15/2025 Morning  No Yes   Sig: Place 2 tablets (10 mg) into G tube daily.   atorvastatin (LIPITOR) 80 MG tablet 3/15/2025 Morning  Yes Yes   Sig: Take 80 mg by mouth or Feeding Tube daily.   calcium acetate (CALPHRON) 667 MG TABS tablet 3/15/2025 Morning  Yes Yes   Sig: Take 1 tablet by mouth 2 times daily (with meals).   clopidogrel (PLAVIX) 75 MG tablet 3/15/2025 Morning  Yes Yes   Sig: Take 75 mg by mouth or Feeding Tube daily.   doxazosin (CARDURA) 2 MG tablet 3/15/2025 Morning  No Yes   Sig: Take 1 tablet (2 mg) by mouth daily.   famotidine (PEPCID) 20 MG tablet 3/15/2025 Morning  Yes Yes   Sig: Take 20 mg by mouth or Feeding Tube every morning.   ferrous sulfate (FEROSUL) 325 (65 Fe) MG tablet 3/15/2025 Morning  No Yes   Sig: Place 1 tablet (325 mg) into Feeding Tube daily (with breakfast).   furosemide (LASIX) 20 MG tablet 3/15/2025 Morning  Yes Yes   Sig: Take 20 mg by mouth or Feeding Tube daily.   insulin glargine (LANTUS PEN) 100 UNIT/ML pen  3/15/2025 Morning  Yes Yes   Sig: Inject 14 Units subcutaneously every morning.   polyethylene glycol (MIRALAX) 17 g packet   No Yes   Sig: Take 17 g by mouth daily as needed for constipation   potassium chloride ER (K-TAB) 20 MEQ CR tablet 3/15/2025 Morning  Yes Yes   Sig: Take 20 mEq by mouth daily.   tolterodine (DETROL) 1 MG tablet 3/15/2025 Morning  No Yes   Sig: Take 1 tablet (1 mg) by mouth or Feeding Tube 2 times daily (before meals).      Facility-Administered Medications: None     Allergies   No Known Allergies    Social History   I have reviewed this patient's social history and updated it with pertinent information if needed. Jimmy Otto  reports that he has never smoked. He has never used smokeless tobacco. He reports current alcohol use. He reports that he does not use drugs.    Family History   I have reviewed this patient's family history and updated it with pertinent information if needed.   No family history on file.    Review of Systems   The 10 point Review of Systems is negative other than noted in the HPI.     Physical Exam   Temp: 97.8  F (36.6  C) Temp src: Oral BP: 136/47 Pulse: (!) 43   Resp: 18 SpO2: 93 % O2 Device: Nasal cannula Oxygen Delivery: 5 LPM  Vital Signs with Ranges  Temp:  [97  F (36.1  C)-98  F (36.7  C)] 97.8  F (36.6  C)  Pulse:  [39-74] 43  Resp:  [14-22] 18  BP: (120-172)/(23-73) 136/47  SpO2:  [88 %-98 %] 93 %  156 lbs 4.9 oz    GENERAL: Sleeping, nonverbal  HEENT:  Normocephalic. No gross abnormalities.    CV: Regular, bradycardic, no murmurs, 2+ edema dependently, additionally noted in arms.  RESP: Coarse bilaterally, decreased posteriorly  GI: Abdomen soft/nt/nd,   MUSCULOSKELETAL: extremities nl - no gross deformities noted  SKIN: no suspicious lesions or rashes, dry to touch  NEURO: Unable  PSYCH: unable    Data   BMP  Recent Labs   Lab 03/16/25  0816 03/16/25  0555 03/16/25  0405 03/16/25  0205 03/15/25  1721 03/15/25  1445 03/15/25  1150   NA  --  129*  --  129*  --    "--  128*   POTASSIUM  --  5.2  --  5.0  --  5.9* 5.8*   CHLORIDE  --  91*  --  91*  --   --  90*   ARLETH  --  11.0*  --  10.9*  --   --  11.9*   CO2  --  28  --  27  --   --  27   BUN  --  109.9*  --  109.9*  --   --  111.7*   CR  --  6.06*  --  5.96*  --   --  5.67*   * 98 93 127*   < >  --  226*    < > = values in this interval not displayed.     Phos@[id number](phos:4)  CBC)  Recent Labs   Lab 03/16/25  0555 03/15/25  1150   WBC 5.2 6.5   HGB 8.5* 8.9*   HCT 26.6* 27.8*   MCV 84 84   * 164     Recent Labs   Lab 03/15/25  1150   AST 31   ALT 36   ALKPHOS 140   BILITOTAL 0.3     No lab results found in last 7 days.  No results found for: \"D2VIT\", \"D3VIT\", \"DTOT\"  Recent Labs   Lab 03/16/25  0555   HGB 8.5*   HCT 26.6*   MCV 84     No results for input(s): \"PTHI\" in the last 168 hours.    "

## 2025-03-16 NOTE — PROGRESS NOTES
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      The patient's daughter states that patient has had some vomiting last week.  He was having increased shortness of breath.  His wife noted that his oxygen saturation was low.  He was brought to emergency room for evaluation.    Initial vital signs showed temperature 97.5, pulse 46, blood pressure 148/61, oxygen saturation 95% on room air.  Laboratory workup showed sodium 128, potassium 5.8, creatinine 5.67, ALT 36, AST 31, troponin T high-sensitivity 103.  WBC 6.5, hemoglobin 8.9, platelets 164.  Venous blood gas showed pH 7.44/pCO2 44, pO2 82.5.  Chest x-ray showed large right pleural effusion with small to moderate left pleural effusion.  Bibasilar consolidation, patchy infiltrate in the central aspect of both lungs progressed on the left and improved on the right.  Felix catheter within decompressed urinary bladder.  Anasarca has progressed.    Nephrology was consulted from emergency room and recommended Lasix 100 mg IV.  He was also given calcium gluconate, Lokelma 10 g per G-tube.  He was also given a dose of IV ceftriaxone and azithromycin.  He was admitted to the hospital for further management.    He remains on 5 L of oxygen.  I spoke with interventional radiology who will perform a right-sided thoracentesis tomorrow.  He continues on IV  diuresis and nephrology was formally consulted.  Potassium has improved.  We will meet with palliative care tomorrow to discuss goals of care and possible enrollment into hospice given his poor functional status, elderly age, worsening renal function without being a dialysis candidate, bradycardia without being a pacemaker candidate, and gradual deterioration with no truly reversible process.     Problem list:  Acute hypoxic respiratory failure likely multifactorial including volume overload due to acute on chronic diastolic CHF exacerbation  Bilateral pleural effusions, right > left  Patient is on Lasix 20 mg daily.  Per family he had decreased urine output and increased shortness of breath.  Chest x-ray showed large right pleural effusion, moderate left pleural effusion.  He was given Lasix 2100 mg IV in the ER.  -Nephrology consulted.  -Increasing Lasix to 80 mg 3 times daily.  -Discussed with interventional radiology, will perform right-sided thoracentesis tomorrow.     Hyperkalemia  ALEX on CKD stage V  Nephrotic range proteinuria  Anasarca  Hyponatremia  Baseline creatinine 4.07 on 2/26/2025.  Creatinine 5.67 on admission and potassium 5.8.  He was previously seen by nephrology and his daughter indicates that he was determined not to be a dialysis candidate.  Potassium has improved with shifting agents and Lokelma.  Creatinine has been steadily climbing over the past several weeks.  -Continue IV diuresis per nephrology recommendations.  -Daily BMP.  -Felix in place and tracking I's and O's.     Bradycardia  Junctional rhythm  This was an issue during his recent hospitalization as well.  There may be some metabolic component but I do worry about some underlying conduction disease.  Poor candidate for pacemaker due to the issues outlined above.  -Continue telemetry.  -Pending goals of care may be forced to involve cardiology to discuss appropriateness for PPM.     Demand ischemia  Patient had no evidence of chest  pain. Troponin has been elevated during prior evaluation on 2/26/2025.  Troponin 103 on admission and flat, not consistent with acute coronary syndrome.  Likely demand ischemia from respiratory failure with poor enzyme clearance in the face of CKD.    -No further workup.     Diabetes mellitus type 2  He is on Lantus insulin 20 units every morning, insulin sliding scale PTA.  -Continue home Lantus and medium sliding scale insulin.     History of CVA with residual right-sided hemiplegia and baseline nonverbal status  Chronic dysphagia, status post PEG tube placement, G-tube exchange on 2/24/2025  S/p indwelling Felix catheter  Dyslipidemia  Hypertension  Patient resides at home and family takes care of him.  He also has a visiting nurse.  They appear to provide excellent care and support for him.  -Continue PTA medications including amlodipine and atorvastatin.  -Hold Plavix for thoracentesis tomorrow.  -Felix catheter exchanged on 2/17/2025 and due for exchange on 3/17/2025.  -RD consulted to facilitate tube feeds.     Anemia and chronic kidney disease  -Resumed ferrous sulfate.     Depression  -Resume Prozac.    Goals of care  The patient is chronically ill and essentially full cares as a result of a massive stroke.  He has chronic dysphagia and receives tube feeds via a PEG.  He is essentially bedbound and has residual right-sided hemiplegia and nonverbal status.  He is now faced with rapidly progressing chronic kidney disease leading to concerning electrolyte abnormalities and hypervolemia.  He is an extremely poor dialysis candidate.  He also has persisting bradycardia that I suspect will worsen and is also a poor candidate for PPM.  I had a long discussion with the patient's daughter at the bedside and we are arranging a family conference tomorrow with palliative care to discuss goals of care and possible hospice enrollment.    DVT Prophylaxis: Pneumatic Compression Devices  Code Status: No CPR- Do NOT  Intubate  Diet: Adult Formula Drip Feeding: Continuous Nepro with Carbsteady; Gastrostomy; Goal Rate: 30; mL/hr; Advancement Instructions: Once mag and phos are back and WNL, please initiate TF at 15 ml/hr x 6 hours. If tolerating, okay to advance to goal rate of...    Ordoñez Catheter: PRESENT, indication: Chronic ordoñez  Disposition: Expected discharge in 3-4 days to home with family. Goals prior to discharge include diuresis, thoracentesis, wean O2 and palliative care consultation.   Incidental Findings: None.  Family updated today: Yes daughter at bedside.    40 MINUTES SPENT BY ME on the date of service doing chart review, history, exam, documentation & further activities per the note.      Interval History   Assumed care from previous hospitalist. The history was fully reviewed.  Patient non verbal. History obtained from daughter. Spoke with RN as well.    -Data reviewed today: I personally reviewed all new labs and imaging results over the last 24 hours.     Physical Exam   Temp: 97.8  F (36.6  C) Temp src: Oral BP: 136/47 Pulse: (!) 43   Resp: 18 SpO2: 93 % O2 Device: Nasal cannula Oxygen Delivery: 5 LPM  Vitals:    03/16/25 0623   Weight: 70.9 kg (156 lb 4.9 oz)     Vital Signs with Ranges  Temp:  [97  F (36.1  C)-98  F (36.7  C)] 97.8  F (36.6  C)  Pulse:  [39-74] 43  Resp:  [14-22] 18  BP: (120-172)/(23-73) 136/47  SpO2:  [88 %-98 %] 93 %  I/O last 3 completed shifts:  In: 310 [I.V.:250; NG/GT:60]  Out: 350 [Urine:350]    GENERAL: No apparent distress. Awake, alert, but essentially nonverbal.  HEENT: Normocephalic, atraumatic. Extraocular movements intact.  CARDIOVASCULAR: Regular rate and rhythm without murmurs or rubs. No S3.  PULMONARY: Clear bilaterally.  GASTROINTESTINAL: Soft, non-tender, non-distended. Bowel sounds normoactive.   EXTREMITIES: No cyanosis or clubbing. No edema.  NEUROLOGICAL: CN 2-12 grossly intact, baseline neurological deficits.  DERMATOLOGICAL: No rash, ulcer, bruising, nor  jaundice.     Medications   Current Facility-Administered Medications   Medication Dose Route Frequency Provider Last Rate Last Admin    dextrose 10% infusion   Intravenous Continuous PRN Mustapha Fountain MD         Current Facility-Administered Medications   Medication Dose Route Frequency Provider Last Rate Last Admin    acetaminophen (TYLENOL) oral liquid 650 mg  650 mg Oral or Feeding Tube Q8H Mustapha Fountain MD   650 mg at 03/16/25 0556    amLODIPine (NORVASC) tablet 10 mg  10 mg Per G Tube Daily Mustapha Fountain MD   10 mg at 03/16/25 0907    B and C vitamin Complex with folic acid (NEPHRONEX) liquid 5 mL  5 mL Per Feeding Tube QPM Mustapha Fountain MD   5 mL at 03/15/25 2107    [Held by provider] clopidogrel (PLAVIX) tablet 75 mg  75 mg Per Feeding Tube Daily Mustapha Fountain MD        doxazosin (CARDURA) tablet 2 mg  2 mg Oral or Feeding Tube Daily Mustapha Fountain MD   2 mg at 03/16/25 0907    ferrous sulfate (FEROSUL) tablet 325 mg  325 mg Per Feeding Tube Daily with breakfast Mustapha Fountain MD   325 mg at 03/16/25 0907    FLUoxetine (PROzac) solution 20 mg  20 mg Per Feeding Tube Daily Mustapha Fountain MD   20 mg at 03/16/25 0907    furosemide (LASIX) injection 80 mg  80 mg Intravenous Q8H Kadeem Simpson DO        insulin aspart (NovoLOG) injection (RAPID ACTING)  1-7 Units Subcutaneous Q4H Mustapha Fountain MD        insulin glargine (LANTUS PEN) injection 14 Units  14 Units Subcutaneous QAM Mustapha Fountain MD   14 Units at 03/16/25 1044    sodium chloride (PF) 0.9% PF flush 3 mL  3 mL Intracatheter Q8H Mustapha Fountain MD   3 mL at 03/16/25 1046    tolterodine (DETROL) tablet 1 mg  1 mg Oral or Feeding Tube BID AC Mustapha Fountain MD   1 mg at 03/16/25 0907     Data     Laboratory:  Recent Labs   Lab 03/16/25  0555 03/15/25  1150   WBC 5.2 6.5   HGB 8.5* 8.9*   HCT 26.6* 27.8*   MCV 84 84   * 164  "    Recent Labs   Lab 03/16/25  0816 03/16/25  0555 03/16/25  0405 03/16/25  0205 03/15/25  1721 03/15/25  1445 03/15/25  1150   NA  --  129*  --  129*  --   --  128*   POTASSIUM  --  5.2  --  5.0  --  5.9* 5.8*   CHLORIDE  --  91*  --  91*  --   --  90*   CO2  --  28  --  27  --   --  27   ANIONGAP  --  10  --  11  --   --  11   * 98 93 127*   < >  --  226*   BUN  --  109.9*  --  109.9*  --   --  111.7*   CR  --  6.06*  --  5.96*  --   --  5.67*   GFRESTIMATED  --  9*  --  9*  --   --  10*   ARLETH  --  11.0*  --  10.9*  --   --  11.9*    < > = values in this interval not displayed.     No results for input(s): \"CULT\" in the last 168 hours.    Imaging:  Recent Results (from the past 24 hours)   CT Chest Abdomen Pelvis w/o Contrast    Narrative    EXAM: CT CHEST ABDOMEN PELVIS W/O CONTRAST  LOCATION: [facility]  DATE: 3/15/2025    INDICATION: hypoxia, abdominal pain, recent vomiting, decreased urine output  COMPARISON: 2/23/2025  TECHNIQUE: CT scan of the chest, abdomen, and pelvis was performed without IV contrast. Multiplanar reformats were obtained. Dose reduction techniques were used.   CONTRAST: None.    FINDINGS:   LUNGS AND PLEURA: Large right pleural effusion with small moderate left pleural effusion and bibasilar consolidation unchanged. Patchy infiltrates in the central aspect of both lungs again seen progressed on the left and improved on the right.    MEDIASTINUM/AXILLAE: Normal.    CORONARY ARTERY CALCIFICATION: Moderate.    HEPATOBILIARY: Normal.    PANCREAS: Changes of chronic calcific pancreatitis with stable cystic change throughout the pancreas.    SPLEEN: Normal.    ADRENAL GLANDS: Normal.    KIDNEYS/BLADDER: No hydronephrosis.    BOWEL: Normal.    LYMPH NODES: Normal.    VASCULATURE: Normal.    PELVIC ORGANS: Felix catheter within decompressed urinary bladder.    MUSCULOSKELETAL: [Anasarca which has progressed.      Impression    IMPRESSION:  1.  Large right pleural " effusion with small to moderate left pleural effusion. Bibasilar consolidation. Patchy infiltrates in the central aspect of both lungs progressed on the left and improved on the right.  2.  Changes of chronic calcific pancreatitis with stable cystic change throughout the pancreas.  3.  Felix catheter within decompressed urinary bladder.  4.  Anasarca has progressed.     Head CT w/o contrast    Narrative    EXAM: CT HEAD W/O CONTRAST  LOCATION: [facility]  DATE: 3/15/2025    INDICATION: vomiting, nonverbal, unclera mental status  COMPARISON: 09/14/2021  TECHNIQUE: Routine CT Head without IV contrast. Multiplanar reformats. Dose reduction techniques were used.    FINDINGS:  INTRACRANIAL CONTENTS: No intracranial hemorrhage, extraaxial collection, or mass effect.  No CT evidence of acute infarct. Chronic appearing infarction within the medial/anterior right thalamus and likely within the left corona radiata. Moderate to   advanced degree of cerebral parenchymal volume loss and presumed small vessel ischemic disease.    VISUALIZED ORBITS/SINUSES/MASTOIDS: No intraorbital abnormality. No paranasal sinus mucosal disease. No middle ear or mastoid effusion.    BONES/SOFT TISSUES: No acute abnormality.      Impression    IMPRESSION:  1.  No CT evidence for acute intracranial process.  2.  Brain atrophy and presumed chronic microvascular ischemic changes as above.         Jerzy Holbrook DO MPH  [facility] Hospitalist  [address] [place] [address]  03/16/2025

## 2025-03-17 LAB
% LINING CELLS, BODY FLUID: 1 %
ANION GAP SERPL CALCULATED.3IONS-SCNC: 12 MMOL/L (ref 7–15)
APPEARANCE FLD: CLEAR
ATRIAL RATE - MUSE: NORMAL BPM
ATRIAL RATE - MUSE: NORMAL BPM
BUN SERPL-MCNC: 117.1 MG/DL (ref 8–23)
CALCIUM SERPL-MCNC: 10.6 MG/DL (ref 8.8–10.4)
CHLORIDE SERPL-SCNC: 93 MMOL/L (ref 98–107)
COLOR FLD: YELLOW
CREAT SERPL-MCNC: 5.82 MG/DL (ref 0.67–1.17)
DIASTOLIC BLOOD PRESSURE - MUSE: NORMAL MMHG
DIASTOLIC BLOOD PRESSURE - MUSE: NORMAL MMHG
EGFRCR SERPLBLD CKD-EPI 2021: 9 ML/MIN/1.73M2
ERYTHROCYTE [DISTWIDTH] IN BLOOD BY AUTOMATED COUNT: 15.4 % (ref 10–15)
GLUCOSE BLDC GLUCOMTR-MCNC: 109 MG/DL (ref 70–99)
GLUCOSE BLDC GLUCOMTR-MCNC: 115 MG/DL (ref 70–99)
GLUCOSE BLDC GLUCOMTR-MCNC: 115 MG/DL (ref 70–99)
GLUCOSE BLDC GLUCOMTR-MCNC: 116 MG/DL (ref 70–99)
GLUCOSE BLDC GLUCOMTR-MCNC: 125 MG/DL (ref 70–99)
GLUCOSE BLDC GLUCOMTR-MCNC: 130 MG/DL (ref 70–99)
GLUCOSE BODY FLUID SOURCE: NORMAL
GLUCOSE FLD-MCNC: 137 MG/DL
GLUCOSE SERPL-MCNC: 140 MG/DL (ref 70–99)
HCO3 SERPL-SCNC: 28 MMOL/L (ref 22–29)
HCT VFR BLD AUTO: 25.6 % (ref 40–53)
HGB BLD-MCNC: 8.2 G/DL (ref 13.3–17.7)
INTERPRETATION ECG - MUSE: NORMAL
INTERPRETATION ECG - MUSE: NORMAL
LD BODY BODY FLUID SOURCE: NORMAL
LDH FLD L TO P-CCNC: 70 U/L
LYMPHOCYTES NFR FLD MANUAL: 64 %
MAGNESIUM SERPL-MCNC: 3 MG/DL (ref 1.7–2.3)
MCH RBC QN AUTO: 26.5 PG (ref 26.5–33)
MCHC RBC AUTO-ENTMCNC: 32 G/DL (ref 31.5–36.5)
MCV RBC AUTO: 83 FL (ref 78–100)
MONOS+MACROS NFR FLD MANUAL: 34 %
NEUTS BAND NFR FLD MANUAL: 1 %
P AXIS - MUSE: NORMAL DEGREES
P AXIS - MUSE: NORMAL DEGREES
PHOSPHATE SERPL-MCNC: 4.5 MG/DL (ref 2.5–4.5)
PLATELET # BLD AUTO: 160 10E3/UL (ref 150–450)
POTASSIUM SERPL-SCNC: 4.2 MMOL/L (ref 3.4–5.3)
PR INTERVAL - MUSE: NORMAL MS
PR INTERVAL - MUSE: NORMAL MS
PROT FLD-MCNC: 2 G/DL
PROTEIN BODY FLUID SOURCE: NORMAL
QRS DURATION - MUSE: 72 MS
QRS DURATION - MUSE: 72 MS
QT - MUSE: 502 MS
QT - MUSE: 512 MS
QTC - MUSE: 439 MS
QTC - MUSE: 448 MS
R AXIS - MUSE: 1 DEGREES
R AXIS - MUSE: 4 DEGREES
RBC # BLD AUTO: 3.09 10E6/UL (ref 4.4–5.9)
SODIUM SERPL-SCNC: 133 MMOL/L (ref 135–145)
SPECIMEN SOURCE FLD: NORMAL
SYSTOLIC BLOOD PRESSURE - MUSE: NORMAL MMHG
SYSTOLIC BLOOD PRESSURE - MUSE: NORMAL MMHG
T AXIS - MUSE: 32 DEGREES
T AXIS - MUSE: 6 DEGREES
VENTRICULAR RATE- MUSE: 46 BPM
VENTRICULAR RATE- MUSE: 46 BPM
WBC # BLD AUTO: 5.4 10E3/UL (ref 4–11)
WBC # FLD AUTO: 168 /UL

## 2025-03-17 PROCEDURE — 250N000011 HC RX IP 250 OP 636: Performed by: INTERNAL MEDICINE

## 2025-03-17 PROCEDURE — 85014 HEMATOCRIT: CPT | Performed by: HOSPITALIST

## 2025-03-17 PROCEDURE — 0W993ZZ DRAINAGE OF RIGHT PLEURAL CAVITY, PERCUTANEOUS APPROACH: ICD-10-PCS | Performed by: INTERNAL MEDICINE

## 2025-03-17 PROCEDURE — 87070 CULTURE OTHR SPECIMN AEROBIC: CPT | Performed by: HOSPITALIST

## 2025-03-17 PROCEDURE — 82945 GLUCOSE OTHER FLUID: CPT | Performed by: HOSPITALIST

## 2025-03-17 PROCEDURE — 80048 BASIC METABOLIC PNL TOTAL CA: CPT | Performed by: HOSPITALIST

## 2025-03-17 PROCEDURE — 250N000013 HC RX MED GY IP 250 OP 250 PS 637: Performed by: INTERNAL MEDICINE

## 2025-03-17 PROCEDURE — 99232 SBSQ HOSP IP/OBS MODERATE 35: CPT | Performed by: HOSPITALIST

## 2025-03-17 PROCEDURE — 120N000001 HC R&B MED SURG/OB

## 2025-03-17 PROCEDURE — 84157 ASSAY OF PROTEIN OTHER: CPT | Performed by: HOSPITALIST

## 2025-03-17 PROCEDURE — 89050 BODY FLUID CELL COUNT: CPT | Performed by: HOSPITALIST

## 2025-03-17 PROCEDURE — 83735 ASSAY OF MAGNESIUM: CPT | Performed by: INTERNAL MEDICINE

## 2025-03-17 PROCEDURE — 84100 ASSAY OF PHOSPHORUS: CPT | Performed by: INTERNAL MEDICINE

## 2025-03-17 PROCEDURE — 250N000012 HC RX MED GY IP 250 OP 636 PS 637: Performed by: INTERNAL MEDICINE

## 2025-03-17 PROCEDURE — 83615 LACTATE (LD) (LDH) ENZYME: CPT | Performed by: HOSPITALIST

## 2025-03-17 PROCEDURE — 99223 1ST HOSP IP/OBS HIGH 75: CPT | Performed by: CLINICAL NURSE SPECIALIST

## 2025-03-17 PROCEDURE — 36415 COLL VENOUS BLD VENIPUNCTURE: CPT | Performed by: HOSPITALIST

## 2025-03-17 RX ADMIN — Medication 5 ML: at 21:25

## 2025-03-17 RX ADMIN — INSULIN ASPART 1 UNITS: 100 INJECTION, SOLUTION INTRAVENOUS; SUBCUTANEOUS at 10:05

## 2025-03-17 RX ADMIN — TOLTERODINE TARTRATE 1 MG: 1 TABLET, FILM COATED ORAL at 16:39

## 2025-03-17 RX ADMIN — FUROSEMIDE 80 MG: 10 INJECTION, SOLUTION INTRAVENOUS at 21:24

## 2025-03-17 RX ADMIN — DOXAZOSIN 2 MG: 1 TABLET ORAL at 08:33

## 2025-03-17 RX ADMIN — TOLTERODINE TARTRATE 1 MG: 1 TABLET, FILM COATED ORAL at 06:35

## 2025-03-17 RX ADMIN — FLUOXETINE 20 MG: 20 SOLUTION ORAL at 08:41

## 2025-03-17 RX ADMIN — INSULIN GLARGINE 14 UNITS: 100 INJECTION, SOLUTION SUBCUTANEOUS at 08:31

## 2025-03-17 RX ADMIN — FERROUS SULFATE TAB 325 MG (65 MG ELEMENTAL FE) 325 MG: 325 (65 FE) TAB at 08:33

## 2025-03-17 RX ADMIN — FUROSEMIDE 80 MG: 10 INJECTION, SOLUTION INTRAVENOUS at 13:42

## 2025-03-17 RX ADMIN — ACETAMINOPHEN 650 MG: 160 SOLUTION ORAL at 21:24

## 2025-03-17 RX ADMIN — ACETAMINOPHEN 650 MG: 160 SOLUTION ORAL at 13:47

## 2025-03-17 RX ADMIN — AMLODIPINE BESYLATE 10 MG: 10 TABLET ORAL at 08:33

## 2025-03-17 RX ADMIN — FUROSEMIDE 80 MG: 10 INJECTION, SOLUTION INTRAVENOUS at 06:35

## 2025-03-17 RX ADMIN — ACETAMINOPHEN 650 MG: 160 SOLUTION ORAL at 06:35

## 2025-03-17 ASSESSMENT — ACTIVITIES OF DAILY LIVING (ADL)
ADLS_ACUITY_SCORE: 95
ADLS_ACUITY_SCORE: 99
ADLS_ACUITY_SCORE: 95
ADLS_ACUITY_SCORE: 91
ADLS_ACUITY_SCORE: 95
ADLS_ACUITY_SCORE: 91
ADLS_ACUITY_SCORE: 95
ADLS_ACUITY_SCORE: 91
ADLS_ACUITY_SCORE: 91
ADLS_ACUITY_SCORE: 95
ADLS_ACUITY_SCORE: 91
ADLS_ACUITY_SCORE: 91
ADLS_ACUITY_SCORE: 95
DEPENDENT_IADLS:: CLEANING;COOKING;LAUNDRY;SHOPPING;INCONTINENCE;TRANSPORTATION;MONEY MANAGEMENT;MEDICATION MANAGEMENT;MEAL PREPARATION
ADLS_ACUITY_SCORE: 91
ADLS_ACUITY_SCORE: 95

## 2025-03-17 NOTE — PHARMACY-CONSULT NOTE
Pharmacy Tube Feeding Consult    Medication reviewed for administration by feeding tube and for potential food/drug interactions.  No interactions found.  Recommendation: No changes are needed at this time.     Pharmacy will continue to follow as new medications are ordered.

## 2025-03-17 NOTE — PLAN OF CARE
[id number]    VSS on 4L via NC. Opens eyes/alert, nonverbal at BL. G-Tube w/ TF infusing at goal rate, pt tolerating well. Felix patent. IV lasix. Turn and repo as pt & family allow. PIV SL. Tele SR. Plan for thoracentesis + goals of care conversation w/ palliative today. Family at bedside, very supportive.     Goal Outcome Evaluation:      Plan of Care Reviewed With: patient    Overall Patient Progress: improvingOverall Patient Progress: improving    Outcome Evaluation: SR on tele, HR WDL 60s-70s, UOP improving      Problem: Adult Inpatient Plan of Care  Goal: Plan of Care Review  Description: The Plan of Care Review/Shift note should be completed every shift.  The Outcome Evaluation is a brief statement about your assessment that the patient is improving, declining, or no change.  This information will be displayed automatically on your shift  note.  Outcome: Progressing  Flowsheets (Taken 3/17/2025 0607)  Outcome Evaluation: SR on tele, HR WDL 60s-70s, UOP improving  Plan of Care Reviewed With: patient  Overall Patient Progress: improving  Goal: Absence of Hospital-Acquired Illness or Injury  Outcome: Progressing  Intervention: Prevent Skin Injury  Recent Flowsheet Documentation  Taken 3/17/2025 0430 by Minnie Rogers, RN  Body Position:   log-rolled   turned   right   heels elevated   weight shifting  Taken 3/16/2025 2257 by Minnie Rogers, RN  Body Position: weight shifting  Taken 3/16/2025 2100 by Minnie Rogers, RN  Body Position:   turned   left   log-rolled   heels elevated   weight shifting  Goal: Optimal Comfort and Wellbeing  Outcome: Progressing  Goal: Readiness for Transition of Care  Outcome: Progressing     Problem: Delirium  Goal: Optimal Coping  Outcome: Progressing  Goal: Improved Behavioral Control  Outcome: Progressing  Goal: Improved Attention and Thought Clarity  Outcome: Progressing  Goal: Improved Sleep  Outcome: Progressing     Problem: Dysrhythmia  Goal: Normalized Cardiac  Rhythm  Outcome: Progressing

## 2025-03-17 NOTE — PROGRESS NOTES
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      The patient's daughter states that patient has had some vomiting last week.  He was having increased shortness of breath.  His wife noted that his oxygen saturation was low.  He was brought to emergency room for evaluation.    Initial vital signs showed temperature 97.5, pulse 46, blood pressure 148/61, oxygen saturation 95% on room air.  Laboratory workup showed sodium 128, potassium 5.8, creatinine 5.67, ALT 36, AST 31, troponin T high-sensitivity 103.  WBC 6.5, hemoglobin 8.9, platelets 164.  Venous blood gas showed pH 7.44/pCO2 44, pO2 82.5.  Chest x-ray showed large right pleural effusion with small to moderate left pleural effusion.  Bibasilar consolidation, patchy infiltrate in the central aspect of both lungs progressed on the left and improved on the right.  Felix catheter within decompressed urinary bladder.  Anasarca has progressed.    Nephrology was consulted from emergency room and recommended Lasix 100 mg IV.  He was also given calcium gluconate, Lokelma 10 g per G-tube.  He was also given a dose of IV ceftriaxone and azithromycin.  He was admitted to the hospital for further management.    He remains on 5 L of oxygen.  Interventional radiology will perform a right-sided thoracentesis today.  He continues on IV diuresis and  nephrology was formally consulted.  Potassium and heart rate have improved.  Palliative care and I met with family to discuss goals of care and possible enrollment into hospice given his poor functional status, elderly age, worsening renal function without being a dialysis candidate, bradycardia without being a pacemaker candidate, and gradual deterioration with no truly reversible process.  They understand his poor prognosis and will discuss this with the patient's significant other and likely pursue a comfort based approach in the next day or two.     Problem list:  Acute hypoxic respiratory failure likely multifactorial including volume overload due to acute on chronic diastolic CHF exacerbation  Bilateral pleural effusions, right > left  Patient is on Lasix 20 mg daily.  Per family he had decreased urine output and increased shortness of breath.  Chest x-ray showed large right pleural effusion, moderate left pleural effusion.  He was given Lasix 100 mg IV in the ER.  -Nephrology consulted.  -Continuing on Lasix to 80 mg 3 times daily.  -Discussed with interventional radiology, will perform right-sided thoracentesis today.     Hyperkalemia  ALEX on CKD stage V  Nephrotic range proteinuria  Anasarca  Hyponatremia  Hypercalcemia   Baseline creatinine 4.07 on 2/26/2025.  Creatinine 5.67 on admission and potassium 5.8.  He was previously seen by nephrology and his daughter indicates that he was determined not to be a dialysis candidate.  Potassium has improved with shifting agents and Lokelma.  Creatinine has been steadily climbing over the past several weeks.  -Continue IV diuresis per nephrology recommendations.  -Daily BMP.  -Felix in place and tracking I's and O's.     Bradycardia  Junctional rhythm  This was an issue during his recent hospitalization as well.  There may be some metabolic component but I do worry about some underlying conduction disease.  Poor candidate for pacemaker due to the issues outlined  above.  -Continue telemetry.  -Hold off on cardiology consultation as we appear to be shifting goals toward comfort.     Demand ischemia  Patient had no evidence of chest pain. Troponin has been elevated during prior evaluation on 2/26/2025.  Troponin 103 on admission and flat, not consistent with acute coronary syndrome.  Likely demand ischemia from respiratory failure with poor enzyme clearance in the face of CKD.    -No further workup.     Diabetes mellitus type 2  He is on Lantus insulin 20 units every morning, insulin sliding scale PTA.  -Continue home Lantus and medium sliding scale insulin.     History of CVA with residual right-sided hemiplegia and baseline nonverbal status  Chronic dysphagia, status post PEG tube placement, G-tube exchange on 2/24/2025  S/p indwelling Felix catheter  Dyslipidemia  Hypertension  Patient resides at home and family takes care of him.  He also has a visiting nurse.  They appear to provide excellent care and support for him.  -Continue PTA medications including amlodipine and atorvastatin.  -Hold Plavix for thoracentesis.  -RD consulted to facilitate tube feeds.     Anemia and chronic kidney disease  -Resumed ferrous sulfate.     Depression  -Resume Prozac.    Goals of care  The patient is chronically ill and essentially full cares as a result of a prior massive stroke.  He has chronic dysphagia and receives tube feeds via a PEG.  He is essentially bedbound and has residual right-sided hemiplegia and nonverbal status.  He is now faced with rapidly progressing chronic kidney disease leading to concerning electrolyte abnormalities, hypervolemia, and bradycardia.  He is an extremely poor dialysis candidate and dialysis has not been offered by nephrology.  I had a long discussion with the patient's children with palliative care this morning.  Family all appears to understand his poor prognosis and is supportive of a transition towards comfort and hospice.  They will discuss this  with the patient's significant other today and likely move towards a palliative approach in the next day or two.    DVT Prophylaxis: Pneumatic Compression Devices  Code Status: No CPR- Do NOT Intubate  Diet: Adult Formula Drip Feeding: Continuous Nepro with Carbsteady; Gastrostomy; Goal Rate: 30; mL/hr; Advancement Instructions: Once mag and phos are back and WNL, please initiate TF at 15 ml/hr x 6 hours. If tolerating, okay to advance to goal rate of...    Ordoñez Catheter: PRESENT, indication: Chronic ordoñez  Disposition: Expected discharge in 3-4 days to home with family. Goals prior to discharge include diuresis, thoracentesis, wean O2 and continue goals of care discussion.   Incidental Findings: None.  Family updated today: Yes 3 children at bedside for family conference.    40 MINUTES SPENT BY ME on the date of service doing chart review, history, exam, documentation & further activities per the note.      Interval History   Patient non verbal. History obtained from daughters. Spoke with RN and palliative care as well.    -Data reviewed today: I personally reviewed all new labs and imaging results over the last 24 hours.     Physical Exam   Temp: 99  F (37.2  C) Temp src: Axillary BP: (!) 153/60 Pulse: 78   Resp: 19 SpO2: 98 % O2 Device: Nasal cannula Oxygen Delivery: 1 LPM  Vitals:    03/16/25 0623 03/17/25 0652   Weight: 70.9 kg (156 lb 4.9 oz) 68 kg (149 lb 14.6 oz)     Vital Signs with Ranges  Temp:  [96.7  F (35.9  C)-99  F (37.2  C)] 99  F (37.2  C)  Pulse:  [67-78] 78  Resp:  [16-20] 19  BP: (126-153)/(48-60) 153/60  SpO2:  [94 %-98 %] 98 %  I/O last 3 completed shifts:  In: 967 [NG/GT:952]  Out: 1450 [Urine:1450]    GENERAL: No apparent distress. Awake, alert, but essentially nonverbal.  HEENT: Normocephalic, atraumatic. Extraocular movements intact.  CARDIOVASCULAR: Regular rate and rhythm without murmurs or rubs. No S3.  PULMONARY: Clear bilaterally.  GASTROINTESTINAL: Soft, non-tender, non-distended.  Bowel sounds normoactive.   EXTREMITIES: No cyanosis or clubbing. No edema.  NEUROLOGICAL: CN 2-12 grossly intact, baseline neurological deficits.  DERMATOLOGICAL: No rash, ulcer, bruising, nor jaundice.     Medications   Current Facility-Administered Medications   Medication Dose Route Frequency Provider Last Rate Last Admin    dextrose 10% infusion   Intravenous Continuous PRN Mustapha Fountain MD         Current Facility-Administered Medications   Medication Dose Route Frequency Provider Last Rate Last Admin    acetaminophen (TYLENOL) oral liquid 650 mg  650 mg Oral or Feeding Tube Q8H Mustapha Fountain MD   650 mg at 03/17/25 0635    amLODIPine (NORVASC) tablet 10 mg  10 mg Per G Tube Daily Mustapha Fountain MD   10 mg at 03/17/25 0833    B and C vitamin Complex with folic acid (NEPHRONEX) liquid 5 mL  5 mL Per Feeding Tube QPM Mustapha Fountain MD   5 mL at 03/16/25 2113    [Held by provider] clopidogrel (PLAVIX) tablet 75 mg  75 mg Per Feeding Tube Daily Mustapha Fountain MD        doxazosin (CARDURA) tablet 2 mg  2 mg Oral or Feeding Tube Daily Mustapha Fountain MD   2 mg at 03/17/25 0833    ferrous sulfate (FEROSUL) tablet 325 mg  325 mg Per Feeding Tube Daily with breakfast Mustapha Fountain MD   325 mg at 03/17/25 0833    FLUoxetine (PROzac) solution 20 mg  20 mg Per Feeding Tube Daily Mustapha Fountain MD   20 mg at 03/17/25 0841    furosemide (LASIX) injection 80 mg  80 mg Intravenous Q8H Kadeem Simpson DO   80 mg at 03/17/25 0635    insulin aspart (NovoLOG) injection (RAPID ACTING)  1-7 Units Subcutaneous Q4H Mustapha Fountain MD   1 Units at 03/17/25 1005    insulin glargine (LANTUS PEN) injection 14 Units  14 Units Subcutaneous QAM Mustapha Fountain MD   14 Units at 03/17/25 0831    sodium chloride (PF) 0.9% PF flush 3 mL  3 mL Intracatheter Q8H Mustapha Fountain MD   3 mL at 03/17/25 0841    tolterodine (DETROL) tablet 1 mg   "1 mg Oral or Feeding Tube BID AC Mustapha Fountain MD   1 mg at 03/17/25 0635     Data     Laboratory:  Recent Labs   Lab 03/17/25  0719 03/16/25  0555 03/15/25  1150   WBC 5.4 5.2 6.5   HGB 8.2* 8.5* 8.9*   HCT 25.6* 26.6* 27.8*   MCV 83 84 84    141* 164     Recent Labs   Lab 03/17/25  0719 03/17/25  0425 03/17/25  0012 03/16/25  0816 03/16/25  0555 03/16/25  0405 03/16/25  0205   *  --   --   --  129*  --  129*   POTASSIUM 4.2  --   --   --  5.2  --  5.0   CHLORIDE 93*  --   --   --  91*  --  91*   CO2 28  --   --   --  28  --  27   ANIONGAP 12  --   --   --  10  --  11   * 116* 130*   < > 98   < > 127*   .1*  --   --   --  109.9*  --  109.9*   CR 5.82*  --   --   --  6.06*  --  5.96*   GFRESTIMATED 9*  --   --   --  9*  --  9*   ARLETH 10.6*  --   --   --  11.0*  --  10.9*    < > = values in this interval not displayed.     No results for input(s): \"CULT\" in the last 168 hours.    Imaging:  No results found for this or any previous visit (from the past 24 hours).        Jerzy Holbrook DO MPH  Count includes the [facility] Hospitalist  [address] [place] [address]  03/17/2025   "

## 2025-03-17 NOTE — PROGRESS NOTES
Notified provider about indwelling ordoñez catheter discussed removal or continued need.    Did provider choose to remove indwelling ordoñez catheter? NO    Provider's ordoñez indication for keeping indwelling ordoñez catheter: Indication for continued use: Neurogenic Bladder    Is there an order for indwelling ordoñez catheter? YES    Chronic ordoñez, plan to keep in place. Plan to exchange catheter for new one today.

## 2025-03-17 NOTE — PROGRESS NOTES
Right thoracentesis performed by Dr. Teixeira using image guidance. Pt tolerated the procedure well. 900mls of clear, christine fluid drained. Vital signs stable throughout procedure. Patient left the department in stable condition.  900mls brought to lab for analysis.

## 2025-03-17 NOTE — PLAN OF CARE
Goal Outcome Evaluation:      Plan of Care Reviewed With: child    Overall Patient Progress: improvingOverall Patient Progress: improving         JOSE ALEJANDRO Parsons, DACIA   Care Coordinator/ Med Surg [address] [place] [address]  [phone]

## 2025-03-17 NOTE — PLAN OF CARE
"End of shift note: Pt alert, unable to assess orientation. Family @ bedside. Family met with palliative today. New ordoñez placed. Continues tube feed infusing @ 30 ml/hr. G tube WDL. Thoracentesis done today.    Goal Outcome Evaluation:      Plan of Care Reviewed With: patient, child    Overall Patient Progress: improvingOverall Patient Progress: improving    Outcome Evaluation: Pt does not appear to be in pain. VSS, afebrile, sating well on 1. Pt nonverbal @ baseline. Tele SR. Tube feed infusing @ goal rate. B, 109, 115. NPO.      Problem: Adult Inpatient Plan of Care  Goal: Plan of Care Review  Description: The Plan of Care Review/Shift note should be completed every shift.  The Outcome Evaluation is a brief statement about your assessment that the patient is improving, declining, or no change.  This information will be displayed automatically on your shift  note.  Outcome: Progressing  Flowsheets (Taken 3/17/2025 1816)  Outcome Evaluation: Pt does not appear to be in pain. VSS, afebrile, sating well on 1. Pt nonverbal @ baseline. Tele SR. Tube feed infusing @ goal rate. B, 109, 115. NPO.  Plan of Care Reviewed With:   patient   child  Overall Patient Progress: improving  Goal: Patient-Specific Goal (Individualized)  Description: You can add care plan individualizations to a care plan. Examples of Individualization might be:  \"Parent requests to be called daily at 9am for status\", \"I have a hard time hearing out of my right ear\", or \"Do not touch me to wake me up as it startles  me\".  Outcome: Progressing  Goal: Absence of Hospital-Acquired Illness or Injury  Outcome: Progressing  Intervention: Identify and Manage Fall Risk  Recent Flowsheet Documentation  Taken 3/17/2025 1814 by Lianne Simmons, RN  Safety Promotion/Fall Prevention: safety round/check completed  Taken 3/17/2025 1643 by Lianne Simmons, RN  Safety Promotion/Fall Prevention: safety round/check completed  Taken 3/17/2025 1545 by " Simmons, Lianne, RN  Safety Promotion/Fall Prevention: safety round/check completed  Taken 3/17/2025 1457 by Lianne Simmons RN  Safety Promotion/Fall Prevention: safety round/check completed  Taken 3/17/2025 1315 by Lianne Simmons RN  Safety Promotion/Fall Prevention: safety round/check completed  Taken 3/17/2025 1238 by Lianne Simmons RN  Safety Promotion/Fall Prevention: safety round/check completed  Taken 3/17/2025 1145 by Lianne Simmons RN  Safety Promotion/Fall Prevention: safety round/check completed  Taken 3/17/2025 0945 by Lianne Simmons RN  Safety Promotion/Fall Prevention: safety round/check completed  Taken 3/17/2025 0845 by Lianne Simmons RN  Safety Promotion/Fall Prevention: safety round/check completed  Taken 3/17/2025 0726 by Lianne Simmons RN  Safety Promotion/Fall Prevention: safety round/check completed  Intervention: Prevent Skin Injury  Recent Flowsheet Documentation  Taken 3/17/2025 1643 by Lianne Simmons RN  Body Position:   turned   right  Taken 3/17/2025 1315 by Lianne Simmons RN  Body Position:   turned   left  Taken 3/17/2025 1238 by Lianne Simmons RN  Body Position: weight shifting  Intervention: Prevent and Manage VTE (Venous Thromboembolism) Risk  Recent Flowsheet Documentation  Taken 3/17/2025 0845 by Linane Simmons RN  VTE Prevention/Management: SCDs off (sequential compression devices)  Intervention: Prevent Infection  Recent Flowsheet Documentation  Taken 3/17/2025 0845 by Lianne Simmons RN  Infection Prevention:   single patient room provided   rest/sleep promoted  Goal: Optimal Comfort and Wellbeing  Outcome: Progressing  Goal: Readiness for Transition of Care  Outcome: Progressing     Problem: Delirium  Goal: Optimal Coping  Outcome: Progressing  Goal: Improved Behavioral Control  Outcome: Progressing  Intervention: Minimize Safety Risk  Recent Flowsheet Documentation  Taken 3/17/2025 0845 by Lianne Simmons RN  Enhanced Safety Measures:    family to remain at bedside   room near unit station  Goal: Improved Attention and Thought Clarity  Outcome: Progressing  Goal: Improved Sleep  Outcome: Progressing     Problem: Dysrhythmia  Goal: Normalized Cardiac Rhythm  Outcome: Progressing  Intervention: Monitor and Manage Cardiac Rhythm Effect  Recent Flowsheet Documentation  Taken 3/17/2025 8526 by Lianne Simmons, RN  VTE Prevention/Management: SCDs off (sequential compression devices)

## 2025-03-17 NOTE — CONSULTS
Palliative Care Consultation Note  [facility]      Patient: Jimmy Otto  Date of Admission:  3/15/2025    Requesting Clinician / Team: Hospitalist Jerzy Holbrook DO   Reason for consult: Goals of care       Recommendations & Counseling     GOALS OF CARE:   Life-prolonging with limits - No CPR- Do NOT Intubate. Family needs to discuss the patient's care with his spouse before making a decision about continuing home health care versus hospice on dismissal. They acknowledged Jimmy is not a candidate for dialysis or pacemaker placement.     ADVANCE CARE PLANNING:  No health care directive on file. Per system policy, Surrogate Decision-makers for Patients With Diminished Decision-making Capacity offers guidance on possible decision-makers. Daughter Clemente Clark has been identified as a surrogate decision maker.   There is no POLST form on file, defer to patient and/or next of kin for decisions   Code status: No CPR- Do NOT Intubate    MEDICAL MANAGEMENT:   We are not actively managing symptoms at this time.    PSYCHOSOCIAL/SPIRITUAL SUPPORT:  Family Spouse, two daughters live locally and his son lives in [place].  [place] community: [facility]   [facility]    Palliative Care will continue to follow. Thank you for the consult and allowing us to aid in the care of Jimmy Otto.    These recommendations have been discussed with Hospitalist Jerzy Holbrook.    FEMI Plummer CNS  [facility], Palliative Care  Securely message with the [employer] Web Console (learn more here) or  Text page via [facility] Paging/Directory         Assessment      Jimmy Otto is a 75 year old male admitted 3/15/2025 with acute hypoxic respiratory failure in the setting of acute on chronic diastolic CHF exacerbation with bilateral pleura effusions R>L. The patient and family are known to this writer from the patient's hospitalization October 2021 when the family had made the decision to pursue tube feedings. 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      Today, the patient was seen for:  Goals of care     History of Present Illness   I joined Hospitalist Jerzy Holbrook DO in meeting with the patient's daughters Clemente and Wayne in the family waiting area. They phoned their brother who is in [place] to participate in the family care conference. Dr. Holbrook explained the terminal situation Clarita is facing. I introduced our role as an extra layer of support and how we help patients and families dealing with serious, potentially life-limiting illnesses. I explained the composition of the palliative care team.  Palliative care helps patients and families navigate their care while focusing on the whole person; providing emotional, social and spiritual support  Palliative care often assists with symptom management, information sharing about what to expect from the illness, available treatment options and what effect those options may have on the disease course, and provide effective communication and caring support. We discussed the option to continue home health versus a hospice plan of care. Daughters were wondering whey their father did not continue with [facility] after [facility] at [facility]. Their father had the same home health nurse, Salima for the three years prior to that hospitalization. They acknowledged their father does not need OT or PT, as he has been bed bound for years. They were quite impressed with Salima who had understood their father's non-verbal communication. Appreciate input of  RAHAT Etienne in seeking the reason for the change in home health  providers, as the family was not given a reason their father was dismissed from [facility].     Prognosis, Goals, & Planning:   Functional Status just prior to this current hospitalization:  has been hospitalized 4 times in the past 5 months for recurrent worsening CKD.    Prognosis, Goals, and/or Advance Care Planning:  We discussed general treatment options (full/restorative, selective/conservatives, and comfort only/hospice). We then discussed how these specifically apply to Jimmy's worsening renal failure.  Based on this discussion, family has decided to speak with Jimmy's wife before making any decisions.     Code Status was addressed today:   Yes, We discussed potential risks and rationale of attempting cardiac resuscitation, intubation, and mechanical ventilation.  We also discussed probability of survival as well as quality of life implications.  Based on this discussion, patient or surrogate response/decision: No CPR- Do NOT Intubate      Patient's decision making preferences: unable to assess        Patient has decision-making capacity today for complex decisions: Unreliable            Coping, Meaning, & Spirituality:   Mood, coping, and/or meaning in the context of serious illness were addressed today: Yes    Social:   Living situation: lives with significant other/spouse    Medications:  Reviewed this patient's medication profile and medications from this hospitalization. [place] Board of Pharmacy Data Base Reviewed: Yes:   reviewed - no record of controlled substances prescribed.    ROS:  Comprehensive ROS is reviewed and is negative except as here & per HPI:     Physical Exam   Vital Signs with Ranges  Temp:  [96.7  F (35.9  C)-99  F (37.2  C)] 99  F (37.2  C)  Pulse:  [67-78] 78  Resp:  [16-20] 19  BP: (126-153)/(48-60) 153/60  SpO2:  [94 %-98 %] 98 %  Wt Readings from Last 10 Encounters:   03/17/25 68 kg (149 lb 14.6 oz)   02/26/25 68.7 kg (151 lb 7.3 oz)   02/26/25 67.7 kg (149 lb 4 oz)    01/18/25 66.5 kg (146 lb 9.7 oz)   10/25/24 60.3 kg (132 lb 15 oz)   10/28/24 61 kg (134 lb 7.7 oz)   01/02/23 57.2 kg (126 lb)   10/28/22 54.4 kg (120 lb)   01/26/22 57.8 kg (127 lb 6.4 oz)   11/04/21 59 kg (130 lb)     149 lbs 14.6 oz    PHYSICAL EXAM:  GEN:  Elderly chronically-ill appearing male. Alert, non-verbal looking about the room. Appears comfortable, no apparent distress.  HEENT:  Normocephalic/atraumatic, no scleral icterus, no nasal discharge, mouth moist.  CV:  RRR, S1, S2; no murmurs or other irregularities noted.  +3 DP/PT pulses bilatererally; no edema BLE.  RESP:  Clear to auscultation bilaterally without rales/rhonchi/wheezing/retractions.  Symmetric chest rise on inhalation noted.  Normal respiratory effort.  ABD:  Rounded, soft, non-tender/non-distended.  +BS  M/S:   No wincing or grimace with palpation of extremities.    SKIN:  Warm and dry to touch, no mottling.     Data reviewed:  Results for orders placed or performed during the hospital encounter of 03/15/25   CT Chest Abdomen Pelvis w/o Contrast     Status: None    Narrative    EXAM: CT CHEST ABDOMEN PELVIS W/O CONTRAST  LOCATION: [facility]  DATE: 3/15/2025    INDICATION: hypoxia, abdominal pain, recent vomiting, decreased urine output  COMPARISON: 2/23/2025  TECHNIQUE: CT scan of the chest, abdomen, and pelvis was performed without IV contrast. Multiplanar reformats were obtained. Dose reduction techniques were used.   CONTRAST: None.    FINDINGS:   LUNGS AND PLEURA: Large right pleural effusion with small moderate left pleural effusion and bibasilar consolidation unchanged. Patchy infiltrates in the central aspect of both lungs again seen progressed on the left and improved on the right.    MEDIASTINUM/AXILLAE: Normal.    CORONARY ARTERY CALCIFICATION: Moderate.    HEPATOBILIARY: Normal.    PANCREAS: Changes of chronic calcific pancreatitis with stable cystic change throughout the pancreas.    SPLEEN:  Normal.    ADRENAL GLANDS: Normal.    KIDNEYS/BLADDER: No hydronephrosis.    BOWEL: Normal.    LYMPH NODES: Normal.    VASCULATURE: Normal.    PELVIC ORGANS: Felix catheter within decompressed urinary bladder.    MUSCULOSKELETAL: [Anasarca which has progressed.      Impression    IMPRESSION:  1.  Large right pleural effusion with small to moderate left pleural effusion. Bibasilar consolidation. Patchy infiltrates in the central aspect of both lungs progressed on the left and improved on the right.  2.  Changes of chronic calcific pancreatitis with stable cystic change throughout the pancreas.  3.  Felix catheter within decompressed urinary bladder.  4.  Anasarca has progressed.     Head CT w/o contrast     Status: None    Narrative    EXAM: CT HEAD W/O CONTRAST  LOCATION: [facility]  DATE: 3/15/2025    INDICATION: vomiting, nonverbal, unclera mental status  COMPARISON: 09/14/2021  TECHNIQUE: Routine CT Head without IV contrast. Multiplanar reformats. Dose reduction techniques were used.    FINDINGS:  INTRACRANIAL CONTENTS: No intracranial hemorrhage, extraaxial collection, or mass effect.  No CT evidence of acute infarct. Chronic appearing infarction within the medial/anterior right thalamus and likely within the left corona radiata. Moderate to   advanced degree of cerebral parenchymal volume loss and presumed small vessel ischemic disease.    VISUALIZED ORBITS/SINUSES/MASTOIDS: No intraorbital abnormality. No paranasal sinus mucosal disease. No middle ear or mastoid effusion.    BONES/SOFT TISSUES: No acute abnormality.      Impression    IMPRESSION:  1.  No CT evidence for acute intracranial process.  2.  Brain atrophy and presumed chronic microvascular ischemic changes as above.   Basic metabolic panel (BMP)     Status: Abnormal   Result Value Ref Range    Sodium 128 (L) 135 - 145 mmol/L    Potassium 5.8 (H) 3.4 - 5.3 mmol/L    Chloride 90 (L) 98 - 107 mmol/L    Carbon Dioxide (CO2) 27 22  - 29 mmol/L    Anion Gap 11 7 - 15 mmol/L    Urea Nitrogen 111.7 (H) 8.0 - 23.0 mg/dL    Creatinine 5.67 (H) 0.67 - 1.17 mg/dL    GFR Estimate 10 (L) >60 mL/min/1.73m2    Calcium 11.9 (H) 8.8 - 10.4 mg/dL    Glucose 226 (H) 70 - 99 mg/dL   Extra Tube ([place] Draw)     Status: None    Narrative    The following orders were created for panel order Extra Tube ([place] Draw).  Procedure                               Abnormality         Status                     ---------                               -----------         ------                     Extra Blue Top Tube[[id number]]                             Final result               Extra Red Top Tube[[id number]]                              Final result               Extra Green Top (Lithiu...[[id number]]                                                 Extra Purple Top Tube[[id number]]                                                        Please view results for these tests on the individual orders.   CBC with platelets and differential     Status: Abnormal   Result Value Ref Range    WBC Count 6.5 4.0 - 11.0 10e3/uL    RBC Count 3.33 (L) 4.40 - 5.90 10e6/uL    Hemoglobin 8.9 (L) 13.3 - 17.7 g/dL    Hematocrit 27.8 (L) 40.0 - 53.0 %    MCV 84 78 - 100 fL    MCH 26.7 26.5 - 33.0 pg    MCHC 32.0 31.5 - 36.5 g/dL    RDW 15.6 (H) 10.0 - 15.0 %    Platelet Count 164 150 - 450 10e3/uL    % Neutrophils 72 %    % Lymphocytes 15 %    % Monocytes 11 %    % Eosinophils 2 %    % Basophils 1 %    % Immature Granulocytes 0 %    NRBCs per 100 WBC 0 <1 /100    Absolute Neutrophils 4.7 1.6 - 8.3 10e3/uL    Absolute Lymphocytes 1.0 0.8 - 5.3 10e3/uL    Absolute Monocytes 0.7 0.0 - 1.3 10e3/uL    Absolute Eosinophils 0.1 0.0 - 0.7 10e3/uL    Absolute Basophils 0.1 0.0 - 0.2 10e3/uL    Absolute Immature Granulocytes 0.0 <=0.4 10e3/uL    Absolute NRBCs 0.0 10e3/uL   Extra Blue Top Tube     Status: None   Result Value Ref Range    Hold Specimen [facility]    Extra Red Top Tube     Status: None    Result Value Ref Range    Hold Specimen [facility]    Lipase     Status: Abnormal   Result Value Ref Range    Lipase 6 (L) 13 - 60 U/L   Hepatic function panel     Status: Abnormal   Result Value Ref Range    Protein Total 7.5 6.4 - 8.3 g/dL    Albumin 3.3 (L) 3.5 - 5.2 g/dL    Bilirubin Total 0.3 <=1.2 mg/dL    Alkaline Phosphatase 140 40 - 150 U/L    AST 31 0 - 45 U/L    ALT 36 0 - 70 U/L    Bilirubin Direct 0.16 0.00 - 0.30 mg/dL   UA with Microscopic reflex to Culture     Status: Abnormal    Specimen: Urine, Felix Catheter   Result Value Ref Range    Color Urine Yellow Colorless, Straw, Light Yellow, Yellow    Appearance Urine Clear Clear    Glucose Urine 150 (A) Negative mg/dL    Bilirubin Urine Negative Negative    Ketones Urine Negative Negative mg/dL    Specific Gravity Urine 1.019 1.003 - 1.035    Blood Urine Small (A) Negative    pH Urine 7.0 5.0 - 7.0    Protein Albumin Urine 300 (A) Negative mg/dL    Urobilinogen Urine Normal Normal, 2.0 mg/dL    Nitrite Urine Negative Negative    Leukocyte Esterase Urine Negative Negative    Mucus Urine Present (A) None Seen /LPF    RBC Urine 3 (H) <=2 /HPF    WBC Urine 4 <=5 /HPF    Hyaline Casts Urine 3 (H) <=2 /LPF    Narrative    Urine Culture not indicated   Blood gas venous     Status: Abnormal   Result Value Ref Range    pH Venous 7.44 (H) 7.32 - 7.43    pCO2 Venous 44 40 - 50 mm Hg    pO2 Venous 45 25 - 47 mm Hg    Bicarbonate Venous 30 (H) 21 - 28 mmol/L    Base Excess/Deficit Venous 4.9 (H) -3.0 - 3.0 mmol/L    FIO2 3     Oxyhemoglobin Venous 81 (H) 70 - 75 %    O2 Sat, Venous 82.5 (H) 70.0 - 75.0 %    Narrative    In healthy individuals, oxyhemoglobin (O2Hb) and oxygen saturation (SO2) are approximately equal. In the presence of dyshemoglobins, oxyhemoglobin can be considerably lower than oxygen saturation.   Troponin T, High Sensitivity     Status: Abnormal   Result Value Ref Range    Troponin T, High Sensitivity 103 (HH) <=22 ng/L   Troponin T, High Sensitivity      Status: Abnormal   Result Value Ref Range    Troponin T, High Sensitivity 102 (HH) <=22 ng/L   Potassium     Status: Abnormal   Result Value Ref Range    Potassium 5.9 (H) 3.4 - 5.3 mmol/L   Glucose by meter     Status: Abnormal   Result Value Ref Range    GLUCOSE BY METER POCT 129 (H) 70 - 99 mg/dL   Glucose by meter     Status: Abnormal   Result Value Ref Range    GLUCOSE BY METER POCT 140 (H) 70 - 99 mg/dL   Glucose by meter     Status: Normal   Result Value Ref Range    GLUCOSE BY METER POCT 92 70 - 99 mg/dL   Glucose by meter     Status: Abnormal   Result Value Ref Range    GLUCOSE BY METER POCT 178 (H) 70 - 99 mg/dL   Glucose by meter     Status: Abnormal   Result Value Ref Range    GLUCOSE BY METER POCT 124 (H) 70 - 99 mg/dL   Glucose by meter     Status: Abnormal   Result Value Ref Range    GLUCOSE BY METER POCT 128 (H) 70 - 99 mg/dL   Glucose by meter     Status: Abnormal   Result Value Ref Range    GLUCOSE BY METER POCT 136 (H) 70 - 99 mg/dL   Glucose by meter     Status: Abnormal   Result Value Ref Range    GLUCOSE BY METER POCT 138 (H) 70 - 99 mg/dL   Glucose by meter     Status: Abnormal   Result Value Ref Range    GLUCOSE BY METER POCT 151 (H) 70 - 99 mg/dL   Basic metabolic panel     Status: Abnormal   Result Value Ref Range    Sodium 129 (L) 135 - 145 mmol/L    Potassium 5.2 3.4 - 5.3 mmol/L    Chloride 91 (L) 98 - 107 mmol/L    Carbon Dioxide (CO2) 28 22 - 29 mmol/L    Anion Gap 10 7 - 15 mmol/L    Urea Nitrogen 109.9 (H) 8.0 - 23.0 mg/dL    Creatinine 6.06 (H) 0.67 - 1.17 mg/dL    GFR Estimate 9 (L) >60 mL/min/1.73m2    Calcium 11.0 (H) 8.8 - 10.4 mg/dL    Glucose 98 70 - 99 mg/dL   Glucose by meter     Status: Abnormal   Result Value Ref Range    GLUCOSE BY METER POCT 141 (H) 70 - 99 mg/dL   Basic metabolic panel     Status: Abnormal   Result Value Ref Range    Sodium 129 (L) 135 - 145 mmol/L    Potassium 5.0 3.4 - 5.3 mmol/L    Chloride 91 (L) 98 - 107 mmol/L    Carbon Dioxide (CO2) 27 22 - 29  mmol/L    Anion Gap 11 7 - 15 mmol/L    Urea Nitrogen 109.9 (H) 8.0 - 23.0 mg/dL    Creatinine 5.96 (H) 0.67 - 1.17 mg/dL    GFR Estimate 9 (L) >60 mL/min/1.73m2    Calcium 10.9 (H) 8.8 - 10.4 mg/dL    Glucose 127 (H) 70 - 99 mg/dL   Glucose by meter     Status: Normal   Result Value Ref Range    GLUCOSE BY METER POCT 93 70 - 99 mg/dL   CBC with platelets and differential     Status: Abnormal   Result Value Ref Range    WBC Count 5.2 4.0 - 11.0 10e3/uL    RBC Count 3.16 (L) 4.40 - 5.90 10e6/uL    Hemoglobin 8.5 (L) 13.3 - 17.7 g/dL    Hematocrit 26.6 (L) 40.0 - 53.0 %    MCV 84 78 - 100 fL    MCH 26.9 26.5 - 33.0 pg    MCHC 32.0 31.5 - 36.5 g/dL    RDW 15.9 (H) 10.0 - 15.0 %    Platelet Count 141 (L) 150 - 450 10e3/uL    % Neutrophils 63 %    % Lymphocytes 21 %    % Monocytes 12 %    % Eosinophils 3 %    % Basophils 1 %    % Immature Granulocytes 0 %    NRBCs per 100 WBC 0 <1 /100    Absolute Neutrophils 3.3 1.6 - 8.3 10e3/uL    Absolute Lymphocytes 1.1 0.8 - 5.3 10e3/uL    Absolute Monocytes 0.6 0.0 - 1.3 10e3/uL    Absolute Eosinophils 0.2 0.0 - 0.7 10e3/uL    Absolute Basophils 0.1 0.0 - 0.2 10e3/uL    Absolute Immature Granulocytes 0.0 <=0.4 10e3/uL    Absolute NRBCs 0.0 10e3/uL   Lactate Dehydrogenase     Status: Normal   Result Value Ref Range    Lactate Dehydrogenase 179 0 - 250 U/L   Protein total     Status: Normal   Result Value Ref Range    Protein Total 6.4 6.4 - 8.3 g/dL   Glucose by meter     Status: Abnormal   Result Value Ref Range    GLUCOSE BY METER POCT 121 (H) 70 - 99 mg/dL   Magnesium     Status: Abnormal   Result Value Ref Range    Magnesium 3.1 (H) 1.7 - 2.3 mg/dL   Phosphorus     Status: Normal   Result Value Ref Range    Phosphorus 4.2 2.5 - 4.5 mg/dL   Cystatin C with GFR     Status: Abnormal   Result Value Ref Range    Cystatin C 6.1 (H) 0.6 - 1.0 mg/L    GFR Calculated with Cystatin C 7 (L) >=60 mL/min/1.73m2    Narrative    eGFRcys in adults is calculated using the 2012 CKD-EPI cystatin  c equation which includes age and gender (Katty levine al., [place], DOI: 10.1056/YOFSfi5365819)   Glucose by meter     Status: Abnormal   Result Value Ref Range    GLUCOSE BY METER POCT 109 (H) 70 - 99 mg/dL   Glucose by meter     Status: Normal   Result Value Ref Range    GLUCOSE BY METER POCT 89 70 - 99 mg/dL   Magnesium     Status: Abnormal   Result Value Ref Range    Magnesium 3.0 (H) 1.7 - 2.3 mg/dL   Phosphorus     Status: Normal   Result Value Ref Range    Phosphorus 4.5 2.5 - 4.5 mg/dL   Basic metabolic panel     Status: Abnormal   Result Value Ref Range    Sodium 133 (L) 135 - 145 mmol/L    Potassium 4.2 3.4 - 5.3 mmol/L    Chloride 93 (L) 98 - 107 mmol/L    Carbon Dioxide (CO2) 28 22 - 29 mmol/L    Anion Gap 12 7 - 15 mmol/L    Urea Nitrogen 117.1 (H) 8.0 - 23.0 mg/dL    Creatinine 5.82 (H) 0.67 - 1.17 mg/dL    GFR Estimate 9 (L) >60 mL/min/1.73m2    Calcium 10.6 (H) 8.8 - 10.4 mg/dL    Glucose 140 (H) 70 - 99 mg/dL   CBC with platelets     Status: Abnormal   Result Value Ref Range    WBC Count 5.4 4.0 - 11.0 10e3/uL    RBC Count 3.09 (L) 4.40 - 5.90 10e6/uL    Hemoglobin 8.2 (L) 13.3 - 17.7 g/dL    Hematocrit 25.6 (L) 40.0 - 53.0 %    MCV 83 78 - 100 fL    MCH 26.5 26.5 - 33.0 pg    MCHC 32.0 31.5 - 36.5 g/dL    RDW 15.4 (H) 10.0 - 15.0 %    Platelet Count 160 150 - 450 10e3/uL   Glucose by meter     Status: Abnormal   Result Value Ref Range    GLUCOSE BY METER POCT 125 (H) 70 - 99 mg/dL   Glucose by meter     Status: Abnormal   Result Value Ref Range    GLUCOSE BY METER POCT 130 (H) 70 - 99 mg/dL   Glucose by meter     Status: Abnormal   Result Value Ref Range    GLUCOSE BY METER POCT 116 (H) 70 - 99 mg/dL   EKG 12 lead     Status: None   Result Value Ref Range    Systolic Blood Pressure  mmHg    Diastolic Blood Pressure  mmHg    Ventricular Rate 46 BPM    Atrial Rate  BPM    FL Interval  ms    QRS Duration 72 ms     ms    QTc 448 ms    P Axis  degrees    R AXIS 1 degrees    T Axis 6 degrees     Interpretation ECG       Junctional rhythm  Low voltage QRS  Septal infarct , age undetermined  Abnormal ECG  When compared with ECG of 17-Feb-2025 00:36,  Junctional rhythm has replaced Atrial fibrillation  QT has shortened  Confirmed by - EMERGENCY ROOM, PHYSICIAN ([id number]),  MARKELL LEON ([id number]) on 3/17/2025 6:55:33 AM     EKG 12-lead, tracing only     Status: None   Result Value Ref Range    Systolic Blood Pressure  mmHg    Diastolic Blood Pressure  mmHg    Ventricular Rate 46 BPM    Atrial Rate  BPM    ME Interval  ms    QRS Duration 72 ms     ms    QTc 439 ms    P Axis  degrees    R AXIS 4 degrees    T Axis 32 degrees    Interpretation ECG       Junctional rhythm  Low voltage QRS  Cannot rule out Anterior infarct (cited on or before 15-Mar-2025)  Abnormal ECG  When compared with ECG of 15-Mar-2025 11:45, (unconfirmed)  No significant change was found  Confirmed by - EMERGENCY ROOM, PHYSICIAN ([id number]),  MARKELL LEON ([id number]) on 3/17/2025 6:56:37 AM     Blood Culture Arm, Left     Status: Normal (Preliminary result)    Specimen: Arm, Left; Blood   Result Value Ref Range    Culture No growth after 1 day    Blood Culture Hand, Left     Status: Normal (Preliminary result)    Specimen: Hand, Left; Blood   Result Value Ref Range    Culture No growth after 1 day     Narrative    Only an Aerobic Blood Culture Bottle was collected, interpret results with caution.       CBC + differential     Status: Abnormal    Narrative    The following orders were created for panel order CBC + differential.  Procedure                               Abnormality         Status                     ---------                               -----------         ------                     CBC with platelets and ...[[id number]]  Abnormal            Final result                 Please view results for these tests on the individual orders.   CBC with platelets differential     Status: Abnormal    Narrative    The following orders  were created for panel order CBC with platelets differential.  Procedure                               Abnormality         Status                     ---------                               -----------         ------                     CBC with platelets and ...[[id number]]  Abnormal            Final result                 Please view results for these tests on the individual orders.     MANAGEMENT DISCUSSED with the following over the past 24 hours: Hospitalist Jerzy Holbrook DO and bedside nurse RAHAT Abdalla.   9:30 AM until 10:50 AM - 80 MINUTES SPENT BY ME on the date of service doing chart review, history, exam, documentation & further activities per the note.

## 2025-03-17 NOTE — CONSULTS
Care Management Initial Consult    General Information  Assessment completed with: Children, Phally and Fowler  Type of CM/SW Visit: Initial Assessment    Primary Care Provider verified and updated as needed: Yes   Readmission within the last 30 days: current reason for admission unrelated to previous admission   Return Category: Exacerbation of disease  Reason for Consult: discharge planning, other (see comments) (Elevated readmission risk level)  Advance Care Planning:            Communication Assessment  Patient's communication style: spoken language (non-English)    Hearing Difficulty or Deaf: no   Wear Glasses or Blind: no    Cognitive  Cognitive/Neuro/Behavioral: .WDL except  Level of Consciousness: other (see comments), lethargic (non verbal)  Arousal Level: opens eyes spontaneously  Orientation: other (see comments) (ZAYDA nonverbal per pt baseline)  Mood/Behavior: calm  Best Language: 3 - Mute  Speech: unable to speak (BL)    Living Environment:   People in home: spouse     Current living Arrangements: house      Able to return to prior arrangements: yes       Family/Social Support:  Care provided by: spouse/significant other, child(melchor)  Provides care for: no one, unable/limited ability to care for self  Marital Status:   Support system: Wife, Children, Neighbor          Description of Support System: Supportive, Involved    Support Assessment: Adequate family and caregiver support    Current Resources:   Patient receiving home care services:          Community Resources:    Equipment currently used at home: other (see comments) (home care equipment)  Supplies currently used at home:      Employment/Financial:  Employment Status: retired, disabled        Financial Concerns:             Does the patient's insurance plan have a 3 day qualifying hospital stay waiver?  No    Lifestyle & Psychosocial Needs:  Social Drivers of Health     Food Insecurity: Low Risk  (3/15/2025)    Food Insecurity     Within the  past 12 months, did you worry that your food would run out before you got money to buy more?: No     Within the past 12 months, did the food you bought just not last and you didn t have money to get more?: No   Depression: Not at risk (2/2/2024)    Received from [facility]    PHQ-2     PHQ-2 Score: 0   Housing Stability: Low Risk  (3/15/2025)    Housing Stability     Do you have housing? : Yes     Are you worried about losing your housing?: No   Tobacco Use: Low Risk  (3/3/2025)    Received from [employer]    Patient History     Smoking Tobacco Use: Never     Smokeless Tobacco Use: Never     Passive Exposure: Never   Financial Resource Strain: Low Risk  (3/15/2025)    Financial Resource Strain     Within the past 12 months, have you or your family members you live with been unable to get utilities (heat, electricity) when it was really needed?: No   Alcohol Use: Not on file   Transportation Needs: Low Risk  (3/15/2025)    Transportation Needs     Within the past 12 months, has lack of transportation kept you from medical appointments, getting your medicines, non-medical meetings or appointments, work, or from getting things that you need?: No   Physical Activity: Not on file   Interpersonal Safety: Unknown (3/15/2025)    Interpersonal Safety     Do you feel physically and emotionally safe where you currently live?: Patient unable to answer     Within the past 12 months, have you been hit, slapped, kicked or otherwise physically hurt by someone?: Patient unable to answer     Within the past 12 months, have you been humiliated or emotionally abused in other ways by your partner or ex-partner?: Patient unable to answer   Stress: Not on file   Social Connections: Not on file   Health Literacy: Not on file       Functional Status:  Prior to admission patient needed assistance:   Dependent ADLs:: Wheelchair-with assist, Bathing, Dressing, Grooming, Incontinence, Positioning, Transfers,  Eating  Dependent IADLs:: Cleaning, Cooking, Laundry, Shopping, Incontinence, Transportation, Money Management, Medication Management, Meal Preparation  Assesssment of Functional Status: Not at  functional baseline    Values/Beliefs:  Spiritual, Cultural Beliefs, Nondenominational Practices, Values that affect care:                 Discussed  Partnership in Safe Discharge Planning  document with patient/family: No    Additional Information:  SW consulted for elevated readmission risk level. Pt resides at home with spouse. Daughters present at bedside. Daughters further discussing hospice following palliative consult. Pt previously followed by [facility]. Family stated there are no current needs for Pt.     Family expressed a need for agency transport.     Reviewed potential out of pocket cost for [facility] stretcher transport. Current base rate is $1228.70 and $28.67 per mile to the destination. Pt/family expressed understanding and are agreeable to this.      Patient requires stretcher transportation due to bed bound.    Next Steps: SW to follow for discharge planning.     JOSE ALEJANDRO Parsons, LGSW  [facility] Care Coordinator/ Med Surg [address] [place] [address]  [phone]

## 2025-03-17 NOTE — PROGRESS NOTES
Hypoxia has improved with diuresis.  Urine output 900 mL yesterday and 700 mL so far today.  Cr stable.    Notes of Dr. Peterson, Dr. Simpson and Yarely KAHN reviewed.      All agree that a move to comfort care and hospice is the most humane approach. I agree as well.    Nephrology will sign off.    Thanks.    Manny Gutierrez MD

## 2025-03-18 LAB
ANION GAP SERPL CALCULATED.3IONS-SCNC: 12 MMOL/L (ref 7–15)
BUN SERPL-MCNC: 109.3 MG/DL (ref 8–23)
CALCIUM SERPL-MCNC: 10.9 MG/DL (ref 8.8–10.4)
CHLORIDE SERPL-SCNC: 89 MMOL/L (ref 98–107)
CREAT SERPL-MCNC: 6.14 MG/DL (ref 0.67–1.17)
EGFRCR SERPLBLD CKD-EPI 2021: 9 ML/MIN/1.73M2
ERYTHROCYTE [DISTWIDTH] IN BLOOD BY AUTOMATED COUNT: 15 % (ref 10–15)
GLUCOSE BLDC GLUCOMTR-MCNC: 156 MG/DL (ref 70–99)
GLUCOSE BLDC GLUCOMTR-MCNC: 170 MG/DL (ref 70–99)
GLUCOSE BLDC GLUCOMTR-MCNC: 172 MG/DL (ref 70–99)
GLUCOSE BLDC GLUCOMTR-MCNC: 180 MG/DL (ref 70–99)
GLUCOSE BLDC GLUCOMTR-MCNC: 189 MG/DL (ref 70–99)
GLUCOSE BLDC GLUCOMTR-MCNC: 215 MG/DL (ref 70–99)
GLUCOSE SERPL-MCNC: 197 MG/DL (ref 70–99)
HCO3 SERPL-SCNC: 28 MMOL/L (ref 22–29)
HCT VFR BLD AUTO: 27.2 % (ref 40–53)
HGB BLD-MCNC: 8.6 G/DL (ref 13.3–17.7)
MAGNESIUM SERPL-MCNC: 3.3 MG/DL (ref 1.7–2.3)
MCH RBC QN AUTO: 26.5 PG (ref 26.5–33)
MCHC RBC AUTO-ENTMCNC: 31.6 G/DL (ref 31.5–36.5)
MCV RBC AUTO: 84 FL (ref 78–100)
PHOSPHATE SERPL-MCNC: 4.9 MG/DL (ref 2.5–4.5)
PLATELET # BLD AUTO: 158 10E3/UL (ref 150–450)
POTASSIUM SERPL-SCNC: 3.8 MMOL/L (ref 3.4–5.3)
RBC # BLD AUTO: 3.24 10E6/UL (ref 4.4–5.9)
SODIUM SERPL-SCNC: 129 MMOL/L (ref 135–145)
WBC # BLD AUTO: 5.2 10E3/UL (ref 4–11)

## 2025-03-18 PROCEDURE — 99233 SBSQ HOSP IP/OBS HIGH 50: CPT | Performed by: INTERNAL MEDICINE

## 2025-03-18 PROCEDURE — 99418 PROLNG IP/OBS E/M EA 15 MIN: CPT | Performed by: CLINICAL NURSE SPECIALIST

## 2025-03-18 PROCEDURE — 250N000013 HC RX MED GY IP 250 OP 250 PS 637: Performed by: INTERNAL MEDICINE

## 2025-03-18 PROCEDURE — 85018 HEMOGLOBIN: CPT | Performed by: HOSPITALIST

## 2025-03-18 PROCEDURE — 80048 BASIC METABOLIC PNL TOTAL CA: CPT | Performed by: HOSPITALIST

## 2025-03-18 PROCEDURE — 99233 SBSQ HOSP IP/OBS HIGH 50: CPT | Performed by: CLINICAL NURSE SPECIALIST

## 2025-03-18 PROCEDURE — 250N000011 HC RX IP 250 OP 636: Performed by: INTERNAL MEDICINE

## 2025-03-18 PROCEDURE — 84100 ASSAY OF PHOSPHORUS: CPT | Performed by: INTERNAL MEDICINE

## 2025-03-18 PROCEDURE — 36415 COLL VENOUS BLD VENIPUNCTURE: CPT | Performed by: HOSPITALIST

## 2025-03-18 PROCEDURE — 83735 ASSAY OF MAGNESIUM: CPT | Performed by: INTERNAL MEDICINE

## 2025-03-18 PROCEDURE — 120N000001 HC R&B MED SURG/OB

## 2025-03-18 RX ORDER — FUROSEMIDE 40 MG/1
80 TABLET ORAL
Status: DISCONTINUED | OUTPATIENT
Start: 2025-03-18 | End: 2025-03-18

## 2025-03-18 RX ORDER — FUROSEMIDE 40 MG/1
80 TABLET ORAL
Status: DISCONTINUED | OUTPATIENT
Start: 2025-03-18 | End: 2025-03-21

## 2025-03-18 RX ADMIN — INSULIN ASPART 1 UNITS: 100 INJECTION, SOLUTION INTRAVENOUS; SUBCUTANEOUS at 08:40

## 2025-03-18 RX ADMIN — ACETAMINOPHEN 650 MG: 160 SOLUTION ORAL at 13:38

## 2025-03-18 RX ADMIN — Medication 5 ML: at 20:40

## 2025-03-18 RX ADMIN — FERROUS SULFATE TAB 325 MG (65 MG ELEMENTAL FE) 325 MG: 325 (65 FE) TAB at 08:38

## 2025-03-18 RX ADMIN — FLUOXETINE 20 MG: 20 SOLUTION ORAL at 08:39

## 2025-03-18 RX ADMIN — INSULIN ASPART 1 UNITS: 100 INJECTION, SOLUTION INTRAVENOUS; SUBCUTANEOUS at 17:40

## 2025-03-18 RX ADMIN — ACETAMINOPHEN 650 MG: 160 SOLUTION ORAL at 06:25

## 2025-03-18 RX ADMIN — ACETAMINOPHEN 650 MG: 160 SOLUTION ORAL at 20:44

## 2025-03-18 RX ADMIN — INSULIN GLARGINE 14 UNITS: 100 INJECTION, SOLUTION SUBCUTANEOUS at 08:39

## 2025-03-18 RX ADMIN — FUROSEMIDE 80 MG: 40 TABLET ORAL at 17:40

## 2025-03-18 RX ADMIN — INSULIN ASPART 1 UNITS: 100 INJECTION, SOLUTION INTRAVENOUS; SUBCUTANEOUS at 20:40

## 2025-03-18 RX ADMIN — INSULIN ASPART 1 UNITS: 100 INJECTION, SOLUTION INTRAVENOUS; SUBCUTANEOUS at 04:37

## 2025-03-18 RX ADMIN — DOXAZOSIN 2 MG: 1 TABLET ORAL at 08:38

## 2025-03-18 RX ADMIN — AMLODIPINE BESYLATE 10 MG: 10 TABLET ORAL at 08:38

## 2025-03-18 RX ADMIN — FUROSEMIDE 80 MG: 10 INJECTION, SOLUTION INTRAVENOUS at 06:25

## 2025-03-18 RX ADMIN — INSULIN ASPART 2 UNITS: 100 INJECTION, SOLUTION INTRAVENOUS; SUBCUTANEOUS at 12:00

## 2025-03-18 ASSESSMENT — ACTIVITIES OF DAILY LIVING (ADL)
ADLS_ACUITY_SCORE: 99
ADLS_ACUITY_SCORE: 103
ADLS_ACUITY_SCORE: 99
ADLS_ACUITY_SCORE: 103
ADLS_ACUITY_SCORE: 99
ADLS_ACUITY_SCORE: 99
ADLS_ACUITY_SCORE: 103

## 2025-03-18 NOTE — PLAN OF CARE
Pt is non verbal. Pt denies pain. No acute distress noted. Pt is on Tube feeding @ 30ml/hr via G-tube. Tube feeding was changed during the shift. Pt has Felix catheter for neurogenic bladder. Pt is on Tele monitoring. Pt is on blood sugar every 4 hours. No insulin given per sliding scale. Pt family at bedside. Pt had a BM during the shift. Pt was repositioned during the shift. Pt is assist of two in transfer and cares. pt is sleeping in bed. Bed alarm in place and call ligth within reach.               Goal Outcome Evaluation:      Plan of Care Reviewed With: patient    Overall Patient Progress: improvingOverall Patient Progress: improving    Outcome Evaluation: pt is non verbal. pt aurora tube feeding. pt is on Tele monitoring.      Problem: Adult Inpatient Plan of Care  Goal: Plan of Care Review  Description: The Plan of Care Review/Shift note should be completed every shift.  The Outcome Evaluation is a brief statement about your assessment that the patient is improving, declining, or no change.  This information will be displayed automatically on your shift  note.  Outcome: Progressing  Flowsheets (Taken 3/17/2025 2233)  Outcome Evaluation: pt is non verbal. pt aurora tube feeding. pt is on Tele monitoring.  Plan of Care Reviewed With: patient  Overall Patient Progress: improving  Goal: Absence of Hospital-Acquired Illness or Injury  Intervention: Identify and Manage Fall Risk  Recent Flowsheet Documentation  Taken 3/17/2025 2133 by Reese Gomez, RN  Safety Promotion/Fall Prevention:   activity supervised   assistive device/personal items within reach   clutter free environment maintained   increased rounding and observation   increase visualization of patient   nonskid shoes/slippers when out of bed   patient and family education   lighting adjusted   safety round/check completed  Intervention: Prevent Skin Injury  Recent Flowsheet Documentation  Taken 3/17/2025 2133 by Reese Gomez  RN  Body Position:   turned   right   left   weight shifting  Intervention: Prevent and Manage VTE (Venous Thromboembolism) Risk  Recent Flowsheet Documentation  Taken 3/17/2025 2133 by Reese Gomez RN  VTE Prevention/Management: SCDs off (sequential compression devices)  Intervention: Prevent Infection  Recent Flowsheet Documentation  Taken 3/17/2025 2133 by Reese Gomez RN  Infection Prevention:   single patient room provided   rest/sleep promoted   hand hygiene promoted  Goal: Optimal Comfort and Wellbeing  Intervention: Provide Person-Centered Care  Recent Flowsheet Documentation  Taken 3/17/2025 2133 by Reese Gomez RN  Trust Relationship/Rapport:   reassurance provided   choices provided   care explained     Problem: Delirium  Goal: Optimal Coping  Intervention: Optimize Psychosocial Adjustment to Delirium  Recent Flowsheet Documentation  Taken 3/17/2025 2133 by Reese Gomez RN  Family/Support System Care:   self-care encouraged   involvement promoted   presence promoted  Goal: Improved Behavioral Control  Intervention: Minimize Safety Risk  Recent Flowsheet Documentation  Taken 3/17/2025 2133 by Reese Gomez RN  Enhanced Safety Measures:   family to remain at bedside   room near unit station   pain management  Trust Relationship/Rapport:   reassurance provided   choices provided   care explained     Problem: Dysrhythmia  Goal: Normalized Cardiac Rhythm  Intervention: Monitor and Manage Cardiac Rhythm Effect  Recent Flowsheet Documentation  Taken 3/17/2025 2133 by Reese Gomez RN  VTE Prevention/Management: SCDs off (sequential compression devices)     Problem: Comorbidity Management  Goal: Maintenance of Asthma Control  Intervention: Maintain Asthma Symptom Control  Recent Flowsheet Documentation  Taken 3/17/2025 2133 by Reese Gomez RN  Medication Review/Management: medications reviewed  Goal: Maintenance of Behavioral  Health Symptom Control  Intervention: Maintain Behavioral Health Symptom Control  Recent Flowsheet Documentation  Taken 3/17/2025 2133 by Reese Gomez RN  Medication Review/Management: medications reviewed  Goal: Maintenance of COPD Symptom Control  Intervention: Maintain COPD Symptom Control  Recent Flowsheet Documentation  Taken 3/17/2025 2133 by Reese Gomez RN  Medication Review/Management: medications reviewed  Goal: Blood Glucose Levels Within Targeted Range  Intervention: Monitor and Manage Glycemia  Recent Flowsheet Documentation  Taken 3/17/2025 2133 by Reese Gomez RN  Medication Review/Management: medications reviewed  Goal: Maintenance of Heart Failure Symptom Control  Intervention: Maintain Heart Failure Management  Recent Flowsheet Documentation  Taken 3/17/2025 2133 by Reese Gomez RN  Medication Review/Management: medications reviewed  Goal: Blood Pressure in Desired Range  Intervention: Maintain Blood Pressure Management  Recent Flowsheet Documentation  Taken 3/17/2025 2133 by Reese Gomez RN  Medication Review/Management: medications reviewed  Goal: Maintenance of Osteoarthritis Symptom Control  Intervention: Maintain Osteoarthritis Symptom Control  Recent Flowsheet Documentation  Taken 3/17/2025 2133 by Reese Gomez RN  Assistive Device Utilized: lift device  Activity Management:   activity encouraged   activity adjusted per tolerance  Medication Review/Management: medications reviewed  Goal: Bariatric Home Regimen Maintained  Intervention: Maintain and Manage Postbariatric Surgery Care  Recent Flowsheet Documentation  Taken 3/17/2025 2133 by Reese Gomez RN  Medication Review/Management: medications reviewed  Goal: Maintenance of Seizure Control  Intervention: Maintain Seizure Symptom Control  Recent Flowsheet Documentation  Taken 3/17/2025 2133 by Reese Gomez RN  Medication Review/Management:  medications reviewed     Problem: Pain Acute  Goal: Optimal Pain Control and Function  Intervention: Prevent or Manage Pain  Recent Flowsheet Documentation  Taken 3/17/2025 2133 by Reese Gomez, RN  Medication Review/Management: medications reviewed     Problem: Fall Injury Risk  Goal: Absence of Fall and Fall-Related Injury  Intervention: Identify and Manage Contributors  Recent Flowsheet Documentation  Taken 3/17/2025 2133 by Reese Gomez, RN  Medication Review/Management: medications reviewed  Intervention: Promote Injury-Free Environment  Recent Flowsheet Documentation  Taken 3/17/2025 2133 by Reese Gomez, RN  Safety Promotion/Fall Prevention:   activity supervised   assistive device/personal items within reach   clutter free environment maintained   increased rounding and observation   increase visualization of patient   nonskid shoes/slippers when out of bed   patient and family education   lighting adjusted   safety round/check completed

## 2025-03-18 NOTE — PLAN OF CARE
"Temp: 98.1  F (36.7  C) Temp src: Oral BP: 134/67 Pulse: 74   Resp: 16 SpO2: 99 % O2 Device: Nasal cannula Oxygen Delivery: 1 LPM     Orientation:  Nonverbal  VS: VSS  Pain:  Does not appear to be in pain  Tele:  SR  Activity: A2 Life   Resp:  1L NC  GI:  No nausea and vomiting present  : Felix present.  Skin:  WDL  Lines: PIV SL  Diet: NPO. Tube feeding at 30mL/hr  Plan: Home  Discharge: Pending      Problem: Adult Inpatient Plan of Care  Goal: Plan of Care Review  Description: The Plan of Care Review/Shift note should be completed every shift.  The Outcome Evaluation is a brief statement about your assessment that the patient is improving, declining, or no change.  This information will be displayed automatically on your shift  note.  Outcome: Not Progressing  Flowsheets (Taken 3/18/2025 1351)  Outcome Evaluation: No changes noted  Plan of Care Reviewed With:   patient   family  Overall Patient Progress: no change  Goal: Patient-Specific Goal (Individualized)  Description: You can add care plan individualizations to a care plan. Examples of Individualization might be:  \"Parent requests to be called daily at 9am for status\", \"I have a hard time hearing out of my right ear\", or \"Do not touch me to wake me up as it startles  me\".  Outcome: Not Progressing  Goal: Absence of Hospital-Acquired Illness or Injury  Outcome: Not Progressing  Intervention: Identify and Manage Fall Risk  Recent Flowsheet Documentation  Taken 3/18/2025 1036 by Harjit Bridgse RN  Safety Promotion/Fall Prevention: safety round/check completed  Intervention: Prevent Skin Injury  Recent Flowsheet Documentation  Taken 3/18/2025 0900 by Harjit Bridges RN  Body Position:   turned   left  Goal: Optimal Comfort and Wellbeing  Outcome: Not Progressing  Goal: Readiness for Transition of Care  Outcome: Not Progressing       Goal Outcome Evaluation:      Plan of Care Reviewed With: patient, family    Overall Patient Progress: no " changeOverall Patient Progress: no change    Outcome Evaluation: No changes noted

## 2025-03-18 NOTE — PROGRESS NOTES
"  PALLIATIVE CARE PROGRESS NOTE  [facility]     Patient Name: Jimmy Otto  Date of Admission: 3/15/2025   Today the patient was seen for: Emotional support and goals of care     Recommendations & Counseling     GOALS OF CARE:   Life-prolonging with limits - No CPR- Do NOT Intubate. Family acknowledged Jimmy is not a candidate for dialysis or pacemaker placement.   Family would like time to discuss with Jimmy's wife the decision of home care or hospice on dismissal to home.   Family acknowledged that regardless of choosing home care or hospice the expect there mother will \"freak out and call 911 regardless of the plan.\"   Family interested to learn why after 3 years with [facility], their father was no longer followed after his hospitalization at [facility]. Appreciate Case Management input.        ADVANCE CARE PLANNING:  No health care directive on file. Per system policy, Surrogate Decision-makers for Patients With Diminished Decision-making Capacity offers guidance on possible decision-makers. Daughter Clemnete Clark has been identified as a surrogate decision maker.   There is no POLST form on file, defer to patient and/or next of kin for decisions   Code status: No CPR- Do NOT Intubate     MEDICAL MANAGEMENT:   We are not actively managing symptoms at this time.     PSYCHOSOCIAL/SPIRITUAL SUPPORT:  Family Spouse, two daughters live locally and his son lives in [place].  [place] community: [employer]   Declined Spiritual Health Services    Palliative Care will continue to follow. Thank you for the consult and allowing us to aid in the care of Jimmy Otto.    These recommendations have been discussed with Hospitalist Demetrius Robles MD, Case Management and bedside nurse RAHAT Yates.    FEMI Plummer CNS  [facility], Palliative Care  Securely message with the Vocera Web Console (learn more here) or  Text page via [facility] Paging/Directory        Assessment          Jimmy Otto is a 75 year old male admitted " "3/15/2025 with acute hypoxic respiratory failure in the setting of acute on chronic diastolic CHF exacerbation with bilateral pleura effusions R>L. The patient and family are known to this writer from the patient's hospitalization October 2021 when the family had made the decision to pursue tube feedings. 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       Today, the patient was seen for:  Goals of care       Interval History:     Multidisciplinary collaboration:  Appreciate Case Management input in dismissal planning. Discussed plan with Hospitalist Demetrius Robles MD as family would like to meet again after the patient's son arrives this afternoon.     Patient/family narrative  Met with the patient's two daughters Clemente and Wayne at bedside. Discussed their father's worsening renal function in brief. They acknowledged their brother would arrive this afternoon and would bring their mother to the hospital. They would like to meet with the Nephrologist to explain to their brother the issues with their father's worsening renal function.    At 2 PM in the afternoon Nephrologist Manny Gutierrez MD, Hospitalist Demetrius Robles MD and I met with the patient's wife, son, two daughters and a toddler granddaughter in the family waiting area. Appreciate Dr. Gutierrez's expertise in explaining to the family Ho's end stage renal failure. We discussed the difference between home care (which they are familiar) and hospice. Family acknowledged that regardless of choosing home care or hospice the expect there mother will \"freak out and call 911 regardless of the plan.\" They would like to talk " privately with their mother, as she is 's primary care provider and decide if home care or hospice is right for their father. We discussed the use of a POLST for them to consider options for dismissal.     Review of Systems:     Besides above, ROS was reviewed and is unremarkable        Physical Exam:   Temp:  [97.1  F (36.2  C)-98.6  F (37  C)] (P) 97.1  F (36.2  C)  Pulse:  [74-84] (P) 74  Resp:  [16-18] (P) 16  BP: (131-151)/(51-61) (P) 144/61  SpO2:  [97 %-100 %] (P) 100 %  138 lbs 14.24 oz    Physical Exam  GEN:  Alert, looking at his daughters and granddaughter, non-verbal appears comfortable, no apparent distress.  HEENT:  Normocephalic/atraumatic, no scleral icterus, no nasal discharge, mouth moist.  CV:  Irregular at 74, S1, S2; no murmurs or other irregularities noted.  +3 DP/PT pulses bilatererally; no edema BLE.  RESP:  Clear to auscultation bilaterally without rales/rhonchi/wheezing/retractions.  Symmetric chest rise on inhalation noted.  Normal respiratory effort.  ABD:  Rounded, soft, non-tender/non-distended.  +BS  SKIN:  Warm and dry to touch, no exanthems noted in the visualized areas.    PAIN BEHAVIOR: Cooperative  Psych:  Normal affect.  Calm, cooperative, not conversant.      Data Reviewed:     Results for orders placed or performed during the hospital encounter of 03/15/25 (from the past 24 hours)   Glucose by meter   Result Value Ref Range    GLUCOSE BY METER POCT 109 (H) 70 - 99 mg/dL   Cell count with differential fluid    Narrative    The following orders were created for panel order Cell count with differential fluid.  Procedure                               Abnormality         Status                     ---------                               -----------         ------                     Cell Count Body Fluid[[id number]]                           Final result               Differential Body Fluid[[id number]]                         Final result                 Please view results for  these tests on the individual orders.   Pleural fluid Aerobic Bacterial Culture Routine With Gram Stain    Specimen: Pleural Cavity; Pleural fluid   Result Value Ref Range    Culture No growth, less than 1 day     Gram Stain Result No organisms seen     Gram Stain Result 1+ WBC seen     Narrative    Gram Stain quantification of host cells and microbiological organisms was done on a cytocentrifuged preparation.     Glucose fluid   Result Value Ref Range    Glucose Fluid Source Pleural Cavity, Right     Glucose fluid 137 mg/dL    Narrative    No reference ranges have been established. This result should be interpreted in the context of the patient's clinical condition and compared to simultaneous measurement in the patient's blood. This is a lab developed test. It has not been cleared or approved by the FDA. FDA clearance is not required for clinical use.   Lactate dehydrogenase fluid   Result Value Ref Range    LD Fluid Source Pleural Cavity, Right     Lactate dehydrogenase fluid 70 U/L    Narrative    No reference ranges have been established. This result should be interpreted in the context of the patient's clinical condition and compared to simultaneous measurement in the patient's blood. This is a lab developed test. It has not been cleared or approved by the FDA. FDA clearance is not required for clinical use.   Protein fluid   Result Value Ref Range    Protein Fluid Source Pleural Cavity, Right     Protein Total Fluid 2.0 g/dL    Narrative    No reference ranges have been established. This result should be interpreted in the context of the patient's clinical condition and compared to simultaneous measurement in the patient's blood. This is a lab developed test. It has not been cleared or approved by the FDA. FDA clearance is not required for clinical use.   Cell Count Body Fluid   Result Value Ref Range    Color Yellow Colorless, Yellow    Clarity Clear Clear    Cell Count Fluid Source Pleural Cavity, Right      Total Nucleated Cells 168 /uL    Narrative    No reference ranges have been established.  This result  should be interpreted in the context of the patient's clinical condition and   compared to simultaneous measurement in the patient's blood.         Differential Body Fluid   Result Value Ref Range    % Neutrophils 1 %    % Lymphocytes 64 %    % Monocyte/Macrophages 34 %    % Lining Cells 1 %    Narrative    No reference ranges have been established. This result should be interpreted in the context of the patient's clinical condition and compared to simultaneous measurement in the patient's blood.   US Thoracentesis    Narrative    EXAM:   1. RIGHT THORACENTESIS  2. ULTRASOUND GUIDANCE  LOCATION: [facility]  DATE: 3/17/2025    INDICATION: Pleural effusion.    PROCEDURE: Informed consent obtained. Time out performed. The chest was prepped and draped in sterile fashion. 10 mL of 1 % lidocaine was infused into the local soft tissues. Under direct ultrasound guidance, a 5 Portuguese catheter system was placed into   the pleural effusion.     0.9 liters of clear yellow fluid were removed and sent to lab, if requested.    Patient tolerated procedure well.    Ultrasound imaging was obtained and placed in the patient's permanent medical record.      Impression    IMPRESSION:  Status post right ultrasound-guided thoracentesis.    Reference CPT Code: 90902   Glucose by meter   Result Value Ref Range    GLUCOSE BY METER POCT 115 (H) 70 - 99 mg/dL   Glucose by meter   Result Value Ref Range    GLUCOSE BY METER POCT 115 (H) 70 - 99 mg/dL   Glucose by meter   Result Value Ref Range    GLUCOSE BY METER POCT 170 (H) 70 - 99 mg/dL   Glucose by meter   Result Value Ref Range    GLUCOSE BY METER POCT 180 (H) 70 - 99 mg/dL   Magnesium   Result Value Ref Range    Magnesium 3.3 (H) 1.7 - 2.3 mg/dL   Phosphorus   Result Value Ref Range    Phosphorus 4.9 (H) 2.5 - 4.5 mg/dL   Basic metabolic panel   Result Value Ref  Range    Sodium 129 (L) 135 - 145 mmol/L    Potassium 3.8 3.4 - 5.3 mmol/L    Chloride 89 (L) 98 - 107 mmol/L    Carbon Dioxide (CO2) 28 22 - 29 mmol/L    Anion Gap 12 7 - 15 mmol/L    Urea Nitrogen 109.3 (H) 8.0 - 23.0 mg/dL    Creatinine 6.14 (H) 0.67 - 1.17 mg/dL    GFR Estimate 9 (L) >60 mL/min/1.73m2    Calcium 10.9 (H) 8.8 - 10.4 mg/dL    Glucose 197 (H) 70 - 99 mg/dL   CBC with platelets   Result Value Ref Range    WBC Count 5.2 4.0 - 11.0 10e3/uL    RBC Count 3.24 (L) 4.40 - 5.90 10e6/uL    Hemoglobin 8.6 (L) 13.3 - 17.7 g/dL    Hematocrit 27.2 (L) 40.0 - 53.0 %    MCV 84 78 - 100 fL    MCH 26.5 26.5 - 33.0 pg    MCHC 31.6 31.5 - 36.5 g/dL    RDW 15.0 10.0 - 15.0 %    Platelet Count 158 150 - 450 10e3/uL   Glucose by meter   Result Value Ref Range    GLUCOSE BY METER POCT 189 (H) 70 - 99 mg/dL       MANAGEMENT DISCUSSED with the following over the past 24 hours: Hospitalist Demetrius Robles MD, Case Management and bedside nurse RAHAT Yates.   9 AM until 9:40 AM and 2 PM until 3  MINUTES SPENT BY ME on the date of service doing chart review, history, exam, documentation & further activities per the note.

## 2025-03-18 NOTE — PROGRESS NOTES
I was asked if tolterodine is contraindicated given creatinine is 5.82.  Patient's calculated creatinine clearance is about 10.  Per up to date avoid using tolterodine with this creatinine clearance.  Would hold it.

## 2025-03-18 NOTE — PLAN OF CARE
"VSS on 1LNC. Pt nonverbal. AM labs pending. Tube feed @ 30ml/hr. LPIV SL. SCDs applied. Tele SR. Felix catheter in place. Total cares, Q2 repos. Family at bedside Palliative care following. Continue with POC.         Goal Outcome Evaluation:      Plan of Care Reviewed With: patient    Overall Patient Progress: no changeOverall Patient Progress: no change    Outcome Evaluation: VSS, tele SR      Problem: Adult Inpatient Plan of Care  Goal: Plan of Care Review  Description: The Plan of Care Review/Shift note should be completed every shift.  The Outcome Evaluation is a brief statement about your assessment that the patient is improving, declining, or no change.  This information will be displayed automatically on your shift  note.  Outcome: Progressing  Flowsheets (Taken 3/18/2025 4044)  Outcome Evaluation: VSS, tele SR  Plan of Care Reviewed With: patient  Overall Patient Progress: no change  Goal: Patient-Specific Goal (Individualized)  Description: You can add care plan individualizations to a care plan. Examples of Individualization might be:  \"Parent requests to be called daily at 9am for status\", \"I have a hard time hearing out of my right ear\", or \"Do not touch me to wake me up as it startles  me\".  Outcome: Progressing  Goal: Absence of Hospital-Acquired Illness or Injury  Outcome: Progressing  Intervention: Identify and Manage Fall Risk  Recent Flowsheet Documentation  Taken 3/18/2025 0044 by Nathalie Duran RN  Safety Promotion/Fall Prevention: safety round/check completed  Intervention: Prevent Skin Injury  Recent Flowsheet Documentation  Taken 3/18/2025 0240 by Nathalie Duran RN  Body Position:   turned   left   heels elevated  Taken 3/17/2025 2334 by Nathalie Duran RN  Body Position: (found on left)   turned   right   heels elevated  Intervention: Prevent and Manage VTE (Venous Thromboembolism) Risk  Recent Flowsheet Documentation  Taken 3/18/2025 0240 by Nathalie Duran RN  VTE " Prevention/Management: (placed) SCDs on (sequential compression devices)  Taken 3/18/2025 0044 by Nathalie Duran RN  VTE Prevention/Management: (not set up in room) SCDs off (sequential compression devices)  Goal: Optimal Comfort and Wellbeing  Outcome: Progressing  Goal: Readiness for Transition of Care  Outcome: Progressing     Problem: Delirium  Goal: Optimal Coping  Outcome: Progressing  Goal: Improved Behavioral Control  Outcome: Progressing  Goal: Improved Attention and Thought Clarity  Outcome: Progressing  Goal: Improved Sleep  Outcome: Progressing     Problem: Dysrhythmia  Goal: Normalized Cardiac Rhythm  Outcome: Progressing  Intervention: Monitor and Manage Cardiac Rhythm Effect  Recent Flowsheet Documentation  Taken 3/18/2025 0240 by Nathalie Duran RN  VTE Prevention/Management: (placed) SCDs on (sequential compression devices)  Taken 3/18/2025 0044 by Nathalie Duran RN  VTE Prevention/Management: (not set up in room) SCDs off (sequential compression devices)     Problem: Comorbidity Management  Goal: Maintenance of Asthma Control  Outcome: Progressing  Goal: Maintenance of Behavioral Health Symptom Control  Outcome: Progressing  Goal: Maintenance of COPD Symptom Control  Outcome: Progressing  Goal: Blood Glucose Levels Within Targeted Range  Outcome: Progressing  Goal: Maintenance of Heart Failure Symptom Control  Outcome: Progressing  Goal: Blood Pressure in Desired Range  Outcome: Progressing  Goal: Maintenance of Osteoarthritis Symptom Control  Outcome: Progressing  Goal: Bariatric Home Regimen Maintained  Outcome: Progressing  Goal: Maintenance of Seizure Control  Outcome: Progressing     Problem: Pain Acute  Goal: Optimal Pain Control and Function  Outcome: Progressing     Problem: Fall Injury Risk  Goal: Absence of Fall and Fall-Related Injury  Outcome: Progressing  Intervention: Promote Injury-Free Environment  Recent Flowsheet Documentation  Taken 3/18/2025 0044 by Roger  Nathalie S, RN  Safety Promotion/Fall Prevention: safety round/check completed

## 2025-03-18 NOTE — PROGRESS NOTES
Care Management Follow Up    Length of Stay (days): 3    Expected Discharge Date: 03/21/2025     Concerns to be Addressed: discharge planning     Patient plan of care discussed at interdisciplinary rounds: Yes    Anticipated Discharge Disposition:                Anticipated Discharge Services:    Anticipated Discharge DME:      Patient/family educated on Medicare website which has current facility and service quality ratings:    Education Provided on the Discharge Plan:    Patient/Family in Agreement with the Plan:      Referrals Placed by CM/SW:    Private pay costs discussed: Not applicable    Discussed  Partnership in Safe Discharge Planning  document with patient/family: No     Handoff Completed: No, handoff not indicated or clinically appropriate    Additional Information:  Pt's family wondering why pt was dropped by [facility] after nearly 4 years. PALAK reached out to Katy ([phone]) to request her reach out to family to answer their questions. Katy receptive and stated she will call family today.     Update provided to pt's daughters, who are present at bedside.     Next Steps: PALAK following for discharge plans/    JOSE ALEJANDRO Parsons, DACIA CID Care Coordinator/ Med Surg [address] [place] [address]  [phone]

## 2025-03-18 NOTE — PROGRESS NOTES
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       The patient's daughter states that patient has had some vomiting last week.  He was having increased shortness of breath.  His wife noted that his oxygen saturation was low.  He was brought to emergency room for evaluation.     Initial vital signs showed temperature 97.5, pulse 46, blood pressure 148/61, oxygen saturation 95% on room air.  Laboratory workup showed sodium 128, potassium 5.8, creatinine 5.67, ALT 36, AST 31, troponin T high-sensitivity 103.  WBC 6.5, hemoglobin 8.9, platelets 164.  Venous blood gas showed pH 7.44/pCO2 44, pO2 82.5.  Chest x-ray showed large right pleural effusion with small to moderate left pleural effusion.  Bibasilar consolidation, patchy infiltrate in the central aspect of both lungs progressed on the left and improved on the right.  Felix catheter within decompressed urinary bladder.  Anasarca has progressed.     Nephrology was consulted from emergency room and recommended Lasix 100 mg IV.  He was also given calcium gluconate, Lokelma 10 g per G-tube.  He was also given a dose of IV ceftriaxone and azithromycin.  He was admitted to the hospital for further management.     He remains on 5 L of oxygen.  Interventional radiology performed a right-sided thoracentesis today.  He continues on IV  diuresis and nephrology was formally consulted.  Potassium and heart rate have improved.  Palliative care met with family to discuss goals of care and possible enrollment into hospice given his poor functional status, elderly age, worsening renal function without being a dialysis candidate, bradycardia without being a pacemaker candidate, and gradual deterioration with no truly reversible process.  They understand his poor prognosis and will discuss this with the patient's significant other and likely pursue a comfort based approach in the next day or two.  Care conference being planned 3/18 after son flies in from out of state    Plans today:  - care conference today to discuss goals of care; potential transition to hospice  - family requesting to speak with nephrologist again today.  Communicated with Dr Gutierrez; appreciate him talking with family one more time  - I communicated with Yarely from palliative care service today  -I updated 2 daughters at bedside today    Addendum at 1530: I attended a care conference with Dr. Gutierrez from nephrology and Yarely from palliative care.  Care conference was held outside of the patient's room.  All questions from family were answered.  They remain undecided about whether or not to pursue hospice on discharge and are still considering their options.     Problem list:  Acute hypoxic respiratory failure likely multifactorial including volume overload due to acute on chronic diastolic CHF exacerbation  Bilateral pleural effusions, right > left  Patient is on Lasix 20 mg daily.  Per family he had decreased urine output and increased shortness of breath.  Chest x-ray showed large right pleural effusion, moderate left pleural effusion.  He was given Lasix 100 mg IV in the ER.  -Nephrology consulted.  -Continuing on Lasix to 80 mg 3 times daily.  -s/p R sided thoracentesis with 900 ml removed     Hyperkalemia  ALEX on CKD stage V  Nephrotic range  proteinuria  Anasarca  Hyponatremia  Hypercalcemia   -Creatinine 1 year ago was near 1  -Most recent creatinine 4.07 on 2/26/2025.  Creatinine 5.67 on admission and potassium 5.8.  He was previously seen by nephrology and his daughter indicates that he was determined not to be a dialysis candidate.  Potassium has improved with shifting agents and Lokelma.  Creatinine has been steadily climbing over the past several weeks.  -Continue IV diuresis for now.  -Daily BMP.  -Felix in place and tracking I's and O's.  - appreciate nephrology input.  As per nephrology not a dialysis candidate     Bradycardia  Junctional rhythm  This was an issue during his recent hospitalization as well.  There may be some metabolic component but I do worry about some underlying conduction disease.  Poor candidate for pacemaker due to the issues outlined above.  -Continue telemetry.     Demand ischemia  Patient had no evidence of chest pain. Troponin has been elevated during prior evaluation on 2/26/2025.  Troponin 103 on admission and flat, not consistent with acute coronary syndrome.  Likely demand ischemia from respiratory failure with poor enzyme clearance in the face of CKD.    -No further workup.     Diabetes mellitus type 2  He is on Lantus insulin 20 units every morning, insulin sliding scale PTA.  -Continue home Lantus and medium sliding scale insulin.     History of CVA with residual right-sided hemiplegia and baseline nonverbal status  Chronic dysphagia, status post PEG tube placement, G-tube exchange on 2/24/2025  S/p indwelling Felix catheter  Dyslipidemia  Hypertension  Patient resides at home and family takes care of him.  He also has a visiting nurse.  They appear to provide excellent care and support for him.  -Continue PTA medications including amlodipine and atorvastatin.  -Hold Plavix for thoracentesis.  -RD consulted to facilitate tube feeds.     Anemia and chronic kidney disease  -Resumed ferrous sulfate.      Depression  -Resume Prozac.     Goals of care  The patient is chronically ill and essentially full cares as a result of a prior massive stroke.  He has chronic dysphagia and receives tube feeds via a PEG.  He is essentially bedbound and has residual right-sided hemiplegia and nonverbal status.  He is now faced with rapidly progressing chronic kidney disease leading to concerning electrolyte abnormalities, hypervolemia, and bradycardia.  He is an extremely poor dialysis candidate and dialysis has not been offered by nephrology.  I had a long discussion with the patient's children with palliative care this morning.  Family all appears to understand his poor prognosis and is supportive of a transition towards comfort and hospice.  They will discuss this with the patient's significant other today and likely move towards a palliative approach in the next day or two.     DVT Prophylaxis: Pneumatic Compression Devices  Code Status: No CPR- Do NOT Intubate  Diet: Adult Formula Drip Feeding: Continuous Nepro with Carbsteady; Gastrostomy; Goal Rate: 30; mL/hr; Advancement Instructions: Once mag and phos are back and WNL, please initiate TF at 15 ml/hr x 6 hours. If tolerating, okay to advance to goal rate of...    Ordoñez Catheter: PRESENT, indication: Chronic ordoñez  Disposition: Expected discharge in 3-4 days to home with family. Goals prior to discharge include diuresis, wean O2 and continue goals of care discussion.            Interval History (Subjective):      I met with the patient and family at bedside.  Family had several questions that I addressed today.  They are requesting to speak with the nephrologist again today.  Family conference being planned today after the son flies in from out of state.                  Physical Exam:      Last Vital Signs:  BP (!) 144/61 (BP Location: Left arm, Patient Position: Semi-Capellan's, Cuff Size: Adult Regular)   Pulse 74   Temp 97.1  F (36.2  C) (Axillary)   Resp 16   Wt 63  kg (138 lb 14.2 oz)   SpO2 100%   BMI 22.42 kg/m      I/O last 3 completed shifts:  In: 1279 [NG/GT:951]  Out: 2125 [Urine:2125]    Constitutional: Awake, alert, cooperative, no apparent distress     Respiratory: Clear to auscultation bilaterally, no crackles or wheezing   Cardiovascular: Regular rate and rhythm, normal S1 and S2, and no murmur noted   Abdomen: Normal bowel sounds, soft, non-distended, non-tender   Skin: No rashes, no cyanosis, dry to touch   Neuro: Patient is awake.  Looks around the room.  Does not verbalize or speak to me.   Extremities: No edema, normal range of motion   Other(s): 2 daughters present in the room and updated today       All other systems: Negative          Medications:      All current medications were reviewed with changes reflected in problem list.         Data:      All new lab and imaging data was reviewed.   Labs:       Lab Results   Component Value Date     03/18/2025     03/17/2025     03/16/2025     08/16/2012     08/13/2012    Lab Results   Component Value Date    CHLORIDE 89 03/18/2025    CHLORIDE 93 03/17/2025    CHLORIDE 91 03/16/2025    CHLORIDE 99 03/30/2022    CHLORIDE 98 02/01/2022    CHLORIDE 105 01/26/2022    CHLORIDE 101 08/16/2012    CHLORIDE 99 08/13/2012    Lab Results   Component Value Date    .3 03/18/2025    .1 03/17/2025    .9 03/16/2025    BUN 24 03/30/2022    BUN 22 02/01/2022    BUN 20 01/26/2022    BUN 18 08/16/2012    BUN 14 08/13/2012      Lab Results   Component Value Date    POTASSIUM 3.8 03/18/2025    POTASSIUM 4.2 03/17/2025    POTASSIUM 5.2 03/16/2025    POTASSIUM 4.4 03/30/2022    POTASSIUM 4.0 02/01/2022    POTASSIUM 3.6 01/26/2022    POTASSIUM 4.0 08/16/2012    POTASSIUM 3.6 08/13/2012    Lab Results   Component Value Date    CO2 28 03/18/2025    CO2 28 03/17/2025    CO2 28 03/16/2025    CO2 29 03/30/2022    CO2 27 02/01/2022    CO2 31 01/26/2022    CO2 26 08/16/2012    CO2 26 08/13/2012     Lab Results   Component Value Date    CR 6.14 03/18/2025    CR 5.82 03/17/2025    CR 6.06 03/16/2025    CR 0.96 08/16/2012    CR 0.91 08/13/2012        Recent Labs   Lab 03/18/25  0703   WBC 5.2   HGB 8.6*   HCT 27.2*   MCV 84         Imaging:   Recent Results (from the past 24 hours)   US Thoracentesis    Narrative    EXAM:   1. RIGHT THORACENTESIS  2. ULTRASOUND GUIDANCE  LOCATION: [facility]  DATE: 3/17/2025    INDICATION: Pleural effusion.    PROCEDURE: Informed consent obtained. Time out performed. The chest was prepped and draped in sterile fashion. 10 mL of 1 % lidocaine was infused into the local soft tissues. Under direct ultrasound guidance, a 5 [place] catheter system was placed into   the pleural effusion.     0.9 liters of clear yellow fluid were removed and sent to lab, if requested.    Patient tolerated procedure well.    Ultrasound imaging was obtained and placed in the patient's permanent medical record.      Impression    IMPRESSION:  Status post right ultrasound-guided thoracentesis.    Reference CPT Code: 04321

## 2025-03-18 NOTE — PROGRESS NOTES
03/18/25 1508   Child Life   Location [facility] adult med/surg area   Interaction Intent Introduction of Services;Initial Assessment;Follow Up/Ongoing support   Method in-person   Individuals Present Patient;Caregiver/Adult Family Member;Siblings/Child Family Members   Comments (names or other info) Pt's adult daughters and pt's young granddaughter, Bhavana, are present   Intervention Developmental Play;Supportive Check in   Developmental Play Comment This writer provided coloring sheets and a box of crayons for pt's grandchildren.   Supportive Check in This writer was consulted to support pt's family including young grandchildren.  This writer performed a brief chart review to learn more about pt and family.  CCLS then went to room and introduced self and services to pt's adult daughters and to pt's young granddaughter.  Pt was sleeping/resting in bed and granddaughter was sitting in a wagon with a blanket and her snuggly Piglet stuffy from home.  This writer then conversed with family to assess needs, understand history and build rapport.  Per adult ladies, pt was doing well with what family had already brought with them.  Ladies also relayed that more children would be coming to visit later.  This writer listed off available activities which family declined at this time, relaying that having a large number of small things would most likely mean a mess in the room.  This writer offered crayons and coloring which family agreed to.  Family was encouraged to reach out should needs arise.  No further needs at this time.   Time Spent   Direct Patient Care 10   Indirect Patient Care 10   Total Time Spent (Calc) 20

## 2025-03-18 NOTE — PROGRESS NOTES
Participated in a family conference.  I explained the clinical course of his kidney disease.  I explained that currently he has very advanced kidney disease.  I also explained that given his advanced age and co morbidities that he is not a candidate for chronic dialysis.    All questions were answered.    As he seems close to euvolemic at this point, I have changed from furosemide 80 mg IV Q8H to furosemide 80 mg per FT BID.    Manny Gutierrez MD

## 2025-03-19 LAB
ANION GAP SERPL CALCULATED.3IONS-SCNC: 10 MMOL/L (ref 7–15)
BUN SERPL-MCNC: 114.7 MG/DL (ref 8–23)
CALCIUM SERPL-MCNC: 10.6 MG/DL (ref 8.8–10.4)
CHLORIDE SERPL-SCNC: 90 MMOL/L (ref 98–107)
CREAT SERPL-MCNC: 5.94 MG/DL (ref 0.67–1.17)
EGFRCR SERPLBLD CKD-EPI 2021: 9 ML/MIN/1.73M2
GLUCOSE BLDC GLUCOMTR-MCNC: 153 MG/DL (ref 70–99)
GLUCOSE BLDC GLUCOMTR-MCNC: 171 MG/DL (ref 70–99)
GLUCOSE BLDC GLUCOMTR-MCNC: 180 MG/DL (ref 70–99)
GLUCOSE BLDC GLUCOMTR-MCNC: 207 MG/DL (ref 70–99)
GLUCOSE BLDC GLUCOMTR-MCNC: 210 MG/DL (ref 70–99)
GLUCOSE BLDC GLUCOMTR-MCNC: 211 MG/DL (ref 70–99)
GLUCOSE SERPL-MCNC: 198 MG/DL (ref 70–99)
HCO3 SERPL-SCNC: 31 MMOL/L (ref 22–29)
HOLD SPECIMEN: NORMAL
POTASSIUM SERPL-SCNC: 3.7 MMOL/L (ref 3.4–5.3)
SODIUM SERPL-SCNC: 131 MMOL/L (ref 135–145)

## 2025-03-19 PROCEDURE — 99232 SBSQ HOSP IP/OBS MODERATE 35: CPT | Performed by: CLINICAL NURSE SPECIALIST

## 2025-03-19 PROCEDURE — 250N000011 HC RX IP 250 OP 636: Performed by: INTERNAL MEDICINE

## 2025-03-19 PROCEDURE — 99232 SBSQ HOSP IP/OBS MODERATE 35: CPT | Performed by: INTERNAL MEDICINE

## 2025-03-19 PROCEDURE — 250N000013 HC RX MED GY IP 250 OP 250 PS 637: Performed by: INTERNAL MEDICINE

## 2025-03-19 PROCEDURE — 120N000001 HC R&B MED SURG/OB

## 2025-03-19 PROCEDURE — 36415 COLL VENOUS BLD VENIPUNCTURE: CPT | Performed by: INTERNAL MEDICINE

## 2025-03-19 PROCEDURE — 80048 BASIC METABOLIC PNL TOTAL CA: CPT | Performed by: INTERNAL MEDICINE

## 2025-03-19 RX ADMIN — ACETAMINOPHEN 650 MG: 160 SOLUTION ORAL at 14:56

## 2025-03-19 RX ADMIN — FUROSEMIDE 80 MG: 40 TABLET ORAL at 09:24

## 2025-03-19 RX ADMIN — ACETAMINOPHEN 650 MG: 160 SOLUTION ORAL at 21:00

## 2025-03-19 RX ADMIN — CLOPIDOGREL BISULFATE 75 MG: 75 TABLET ORAL at 09:24

## 2025-03-19 RX ADMIN — Medication 5 ML: at 20:58

## 2025-03-19 RX ADMIN — INSULIN ASPART 2 UNITS: 100 INJECTION, SOLUTION INTRAVENOUS; SUBCUTANEOUS at 21:00

## 2025-03-19 RX ADMIN — ACETAMINOPHEN 650 MG: 160 SOLUTION ORAL at 06:10

## 2025-03-19 RX ADMIN — INSULIN ASPART 2 UNITS: 100 INJECTION, SOLUTION INTRAVENOUS; SUBCUTANEOUS at 09:29

## 2025-03-19 RX ADMIN — INSULIN ASPART 1 UNITS: 100 INJECTION, SOLUTION INTRAVENOUS; SUBCUTANEOUS at 04:02

## 2025-03-19 RX ADMIN — FLUOXETINE 20 MG: 20 SOLUTION ORAL at 09:23

## 2025-03-19 RX ADMIN — FERROUS SULFATE TAB 325 MG (65 MG ELEMENTAL FE) 325 MG: 325 (65 FE) TAB at 09:24

## 2025-03-19 RX ADMIN — AMLODIPINE BESYLATE 10 MG: 10 TABLET ORAL at 09:24

## 2025-03-19 RX ADMIN — ONDANSETRON 4 MG: 2 INJECTION, SOLUTION INTRAMUSCULAR; INTRAVENOUS at 10:47

## 2025-03-19 RX ADMIN — DOXAZOSIN 2 MG: 1 TABLET ORAL at 09:23

## 2025-03-19 RX ADMIN — INSULIN ASPART 2 UNITS: 100 INJECTION, SOLUTION INTRAVENOUS; SUBCUTANEOUS at 11:49

## 2025-03-19 RX ADMIN — INSULIN ASPART 1 UNITS: 100 INJECTION, SOLUTION INTRAVENOUS; SUBCUTANEOUS at 16:14

## 2025-03-19 RX ADMIN — INSULIN GLARGINE 14 UNITS: 100 INJECTION, SOLUTION SUBCUTANEOUS at 09:30

## 2025-03-19 RX ADMIN — FUROSEMIDE 80 MG: 40 TABLET ORAL at 16:14

## 2025-03-19 RX ADMIN — INSULIN ASPART 1 UNITS: 100 INJECTION, SOLUTION INTRAVENOUS; SUBCUTANEOUS at 00:14

## 2025-03-19 ASSESSMENT — ACTIVITIES OF DAILY LIVING (ADL)
ADLS_ACUITY_SCORE: 95
ADLS_ACUITY_SCORE: 99
ADLS_ACUITY_SCORE: 99
ADLS_ACUITY_SCORE: 95
ADLS_ACUITY_SCORE: 99
ADLS_ACUITY_SCORE: 99
ADLS_ACUITY_SCORE: 107
ADLS_ACUITY_SCORE: 99
ADLS_ACUITY_SCORE: 95
ADLS_ACUITY_SCORE: 99
ADLS_ACUITY_SCORE: 99
ADLS_ACUITY_SCORE: 95
ADLS_ACUITY_SCORE: 99
ADLS_ACUITY_SCORE: 95
ADLS_ACUITY_SCORE: 103
ADLS_ACUITY_SCORE: 99
ADLS_ACUITY_SCORE: 95
ADLS_ACUITY_SCORE: 99
ADLS_ACUITY_SCORE: 95

## 2025-03-19 NOTE — PROGRESS NOTES
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       The patient's daughter states that patient has had some vomiting last week.  He was having increased shortness of breath.  His wife noted that his oxygen saturation was low.  He was brought to emergency room for evaluation.     Initial vital signs showed temperature 97.5, pulse 46, blood pressure 148/61, oxygen saturation 95% on room air.  Laboratory workup showed sodium 128, potassium 5.8, creatinine 5.67, ALT 36, AST 31, troponin T high-sensitivity 103.  WBC 6.5, hemoglobin 8.9, platelets 164.  Venous blood gas showed pH 7.44/pCO2 44, pO2 82.5.  Chest x-ray showed large right pleural effusion with small to moderate left pleural effusion.  Bibasilar consolidation, patchy infiltrate in the central aspect of both lungs progressed on the left and improved on the right.  Felix catheter within decompressed urinary bladder.  Anasarca has progressed.     Nephrology was consulted from emergency room and recommended Lasix 100 mg IV.  He was also given calcium gluconate, Lokelma 10 g per G-tube.  He was also given a dose of IV ceftriaxone and azithromycin.  He was admitted to the hospital for further management.     He remains on 5 L of oxygen.  Interventional radiology performed a right-sided thoracentesis today.  He continues on IV  diuresis and nephrology was formally consulted.  Potassium and heart rate have improved.  Palliative care met with family to discuss goals of care and possible enrollment into hospice given his poor functional status, elderly age, worsening renal function without being a dialysis candidate, bradycardia without being a pacemaker candidate, and gradual deterioration with no truly reversible process.  They understand his poor prognosis and a care conference was held with the family on 3/18 to discuss all of the above, with attendees including hospitalist, palliative care, and nephrology.  Nephrology reiterated during the care conference that the patient is a poor dialysis candidate given his multiple comorbidities     Plans today:  -Family (son and daughter) updated at bedside today  -No significant changes made today  -Await patient/family decision making regarding goals of care and potential hospice care at discharge.  Appreciate continued assistance from palliative care in this process    Problem list:  Acute hypoxic respiratory failure likely multifactorial including volume overload due to acute on chronic diastolic CHF exacerbation  Bilateral pleural effusions, right > left  Patient is on Lasix 20 mg daily.  Per family he had decreased urine output and increased shortness of breath.  Chest x-ray showed large right pleural effusion, moderate left pleural effusion.    -Nephrology consulted.  -Completed a course of IV diuresis, now transitioned to oral Lasix 80 mg twice a day  -s/p R sided thoracentesis with 900 ml removed     Hyperkalemia  ALEX on CKD stage V  Nephrotic range proteinuria  Anasarca  Hyponatremia  Hypercalcemia   -Creatinine 1 year ago was near 1  -Most recent creatinine 4.07 on 2/26/2025.  Creatinine 5.67 on admission and potassium 5.8.  He was previously seen by nephrology and his daughter indicates that he was determined not to be a dialysis candidate.  Potassium has improved with shifting agents  and Lokelma.  Creatinine has been steadily climbing over the past several weeks.  -completed IV diuresis; now on PO diuretic  -Daily BMP.  -Felix in place and tracking I's and O's.  - appreciate nephrology input.  As per nephrology not a dialysis candidate     Bradycardia  Junctional rhythm  This was an issue during his recent hospitalization as well.  There may be some metabolic component but I do worry about some underlying conduction disease.  Poor candidate for pacemaker due to the issues outlined above.  -Continue telemetry.     Demand ischemia  Patient had no evidence of chest pain. Troponin has been elevated during prior evaluation on 2/26/2025.  Troponin 103 on admission and flat, not consistent with acute coronary syndrome.  Likely demand ischemia from respiratory failure with poor enzyme clearance in the face of CKD.    -No further workup.     Diabetes mellitus type 2  He is on Lantus insulin 20 units every morning, insulin sliding scale PTA.  -Continue home Lantus and medium sliding scale insulin.     History of CVA with residual right-sided hemiplegia and baseline nonverbal status  Chronic dysphagia, status post PEG tube placement, G-tube exchange on 2/24/2025  S/p indwelling Felix catheter  Dyslipidemia  Hypertension  Patient resides at home and family takes care of him.  He also has a visiting nurse.  They appear to provide excellent care and support for him.  -Continue PTA medications including amlodipine and atorvastatin.  -Hold Plavix for thoracentesis.  -RD consulted to facilitate tube feeds.     Anemia and chronic kidney disease  -Resumed ferrous sulfate.     Depression  -Resume Prozac.     Goals of care  The patient is chronically ill and essentially full cares as a result of a prior massive stroke.  He has chronic dysphagia and receives tube feeds via a PEG.  He is essentially bedbound and has residual right-sided hemiplegia and nonverbal status.  He is now faced with rapidly progressing chronic  kidney disease leading to concerning electrolyte abnormalities, hypervolemia, and bradycardia.  He is an extremely poor dialysis candidate and dialysis has not been offered by nephrology.  Care conference was held on 3/18 with palliative care, nephrology, and this hospitalist.  Goals of care discussed.  Hospice being considered by family but formal decision has not yet been made      DVT Prophylaxis: Pneumatic Compression Devices  Code Status: No CPR- Do NOT Intubate  Diet: Adult Formula Drip Feeding: Continuous Nepro with Carbsteady; Gastrostomy; Goal Rate: 30; mL/hr; Advancement Instructions: Once mag and phos are back and WNL, please initiate TF at 15 ml/hr x 6 hours. If tolerating, okay to advance to goal rate of...    Ordoñez Catheter: PRESENT, indication: Chronic ordoñez  Disposition: Expected discharge after family decides whether or not to pursue hospice care at discharge             Interval History (Subjective):      Patient does not verbally answer questions for me.  Family in room including daughter and son.  All questions asked by family were answered                  Physical Exam:      Last Vital Signs:  /57 (BP Location: Left arm)   Pulse 51   Temp 97.4  F (36.3  C) (Axillary)   Resp 18   Wt 66.7 kg (147 lb 0.8 oz)   SpO2 93%   BMI 23.73 kg/m        Intake/Output Summary (Last 24 hours) at 3/19/2025 1036  Last data filed at 3/19/2025 0553  Gross per 24 hour   Intake 771 ml   Output 1600 ml   Net -829 ml       Constitutional: Awake, alert, cooperative, no apparent distress     Respiratory: Clear to auscultation bilaterally, no crackles or wheezing   Cardiovascular: Regular rate and rhythm, normal S1 and S2, and no murmur noted   Abdomen: Normal bowel sounds, soft, non-distended, non-tender   Skin: No rashes, no cyanosis, dry to touch   Neuro: Alert and awake, did not verbalize, R sided weakness   Extremities: No edema, normal range of motion   Other(s):        All other systems: Negative           Medications:      All current medications were reviewed with changes reflected in problem list.         Data:      All new lab and imaging data was reviewed.   Labs:       Lab Results   Component Value Date     03/19/2025     03/18/2025     03/17/2025     08/16/2012     08/13/2012    Lab Results   Component Value Date    CHLORIDE 90 03/19/2025    CHLORIDE 89 03/18/2025    CHLORIDE 93 03/17/2025    CHLORIDE 99 03/30/2022    CHLORIDE 98 02/01/2022    CHLORIDE 105 01/26/2022    CHLORIDE 101 08/16/2012    CHLORIDE 99 08/13/2012    Lab Results   Component Value Date    .7 03/19/2025    .3 03/18/2025    .1 03/17/2025    BUN 24 03/30/2022    BUN 22 02/01/2022    BUN 20 01/26/2022    BUN 18 08/16/2012    BUN 14 08/13/2012      Lab Results   Component Value Date    POTASSIUM 3.7 03/19/2025    POTASSIUM 3.8 03/18/2025    POTASSIUM 4.2 03/17/2025    POTASSIUM 4.4 03/30/2022    POTASSIUM 4.0 02/01/2022    POTASSIUM 3.6 01/26/2022    POTASSIUM 4.0 08/16/2012    POTASSIUM 3.6 08/13/2012    Lab Results   Component Value Date    CO2 31 03/19/2025    CO2 28 03/18/2025    CO2 28 03/17/2025    CO2 29 03/30/2022    CO2 27 02/01/2022    CO2 31 01/26/2022    CO2 26 08/16/2012    CO2 26 08/13/2012    Lab Results   Component Value Date    CR 5.94 03/19/2025    CR 6.14 03/18/2025    CR 5.82 03/17/2025    CR 0.96 08/16/2012    CR 0.91 08/13/2012        Recent Labs   Lab 03/18/25  0703   WBC 5.2   HGB 8.6*   HCT 27.2*   MCV 84         Imaging:   No results found for this or any previous visit (from the past 24 hours).

## 2025-03-19 NOTE — PLAN OF CARE
"Goal Outcome Evaluation:      Plan of Care Reviewed With: patient      /57 (BP Location: Left arm)   Pulse 51   Temp 97.4  F (36.3  C) (Axillary)   Resp 18   Wt 66.7 kg (147 lb 0.8 oz)   SpO2 93%   BMI 23.73 kg/m     Problem: Gas Exchange Impaired  Goal: Optimal Gas Exchange  Outcome: Not Progressing  Intervention: Optimize Oxygenation and Ventilation  Recent Flowsheet Documentation  Taken 3/19/2025 1300 by Johanny Moore, RN  Head of Bed (HOB) Positioning: HOB at 30 degrees     Problem: Adult Inpatient Plan of Care  Goal: Plan of Care Review  Description: The Plan of Care Review/Shift note should be completed every shift.  The Outcome Evaluation is a brief statement about your assessment that the patient is improving, declining, or no change.  This information will be displayed automatically on your shiftnote.  Outcome: Not Progressing  Flowsheets (Taken 3/19/2025 1400)  Outcome Evaluation: Pt nonverble, repo @ 2hrs, on TF 30 ml/hr continuing, NPO, has nausia and vomiting, PRN IV Zofran was given, pt is on BG check, covered with insuline, ordoñez in place with good output, family present at bed side,  Plan of Care Reviewed With: patient  Goal: Patient-Specific Goal (Individualized)  Description: You can add care plan individualizations to a care plan. Examples of Individualization might be:  \"Parent requests to be called daily at 9am for status\", \"I have a hard time hearing out of my right ear\", or \"Do not touch me to wake me up as it startlesme\".  Outcome: Not Progressing  Goal: Absence of Hospital-Acquired Illness or Injury  Outcome: Not Progressing  Intervention: Identify and Manage Fall Risk  Recent Flowsheet Documentation  Taken 3/19/2025 1300 by Johanny Moore, RN  Safety Promotion/Fall Prevention:   safety round/check completed   room organization consistent   clutter free environment maintained   increased rounding and observation   increase visualization of patient  Intervention: Prevent " Skin Injury  Recent Flowsheet Documentation  Taken 3/19/2025 1300 by Johanny Moore RN  Body Position:   turned   weight shifting   heels elevated   legs elevated   foot of bed elevated   left  Intervention: Prevent and Manage VTE (Venous Thromboembolism) Risk  Recent Flowsheet Documentation  Taken 3/19/2025 1300 by Johanny Moore RN  VTE Prevention/Management: SCDs on (sequential compression devices)  Intervention: Prevent Infection  Recent Flowsheet Documentation  Taken 3/19/2025 1300 by Johanny Moore RN  Infection Prevention:   hand hygiene promoted   rest/sleep promoted  Goal: Optimal Comfort and Wellbeing  Outcome: Not Progressing  Intervention: Provide Person-Centered Care  Recent Flowsheet Documentation  Taken 3/19/2025 1300 by Johanny Moore RN  Trust Relationship/Rapport:   reassurance provided   choices provided   care explained  Goal: Readiness for Transition of Care  Outcome: Not Progressing     Problem: Fall Injury Risk  Goal: Absence of Fall and Fall-Related Injury  Outcome: Not Progressing  Intervention: Identify and Manage Contributors  Recent Flowsheet Documentation  Taken 3/19/2025 1300 by Johanny Moore RN  Medication Review/Management: medications reviewed  Intervention: Promote Injury-Free Environment  Recent Flowsheet Documentation  Taken 3/19/2025 1300 by Johanny Moore RN  Safety Promotion/Fall Prevention:   safety round/check completed   room organization consistent   clutter free environment maintained   increased rounding and observation   increase visualization of patient       Outcome Evaluation: Pt nonverble, repo @ 2hrs, on TF 30 ml/hr continuing, NPO, has nausia and vomiting, PRN IV Zofran was given, pt is on BG check, covered with insuline, ordoñez in place with good output, family present at bed side,

## 2025-03-19 NOTE — PLAN OF CARE
"Nonverbal. Orientation ZAYDA. 1L NC. Gtube/TF running @30ml/hr. BG Q4hrs. Ax2 w lift. Incontinent. Felix catheter. Cleaned and changed dressing on Gtube. Q2hr repo. On lasix. Tele SR. Palliative and hospice following. Plan to discharge when medically ready.           Goal Outcome Evaluation:      Plan of Care Reviewed With: patient          Outcome Evaluation: TF 30ml/hr. BG Q4hrs. Palliative follow.      Problem: Adult Inpatient Plan of Care  Goal: Plan of Care Review  Description: The Plan of Care Review/Shift note should be completed every shift.  The Outcome Evaluation is a brief statement about your assessment that the patient is improving, declining, or no change.  This information will be displayed automatically on your shift  note.  Outcome: Adequate for Care Transition  Flowsheets (Taken 3/18/2025 1924)  Outcome Evaluation: TF 30ml/hr. BG Q4hrs. Palliative follow.  Plan of Care Reviewed With: patient  Goal: Patient-Specific Goal (Individualized)  Description: You can add care plan individualizations to a care plan. Examples of Individualization might be:  \"Parent requests to be called daily at 9am for status\", \"I have a hard time hearing out of my right ear\", or \"Do not touch me to wake me up as it startles  me\".  Outcome: Adequate for Care Transition  Goal: Absence of Hospital-Acquired Illness or Injury  Outcome: Adequate for Care Transition  Intervention: Identify and Manage Fall Risk  Recent Flowsheet Documentation  Taken 3/18/2025 1802 by Rodney Hull RN  Safety Promotion/Fall Prevention: safety round/check completed  Intervention: Prevent Skin Injury  Recent Flowsheet Documentation  Taken 3/18/2025 1802 by Rodney Hull RN  Body Position:   turned   right   log-rolled  Intervention: Prevent Infection  Recent Flowsheet Documentation  Taken 3/18/2025 1802 by Rodney Hull RN  Infection Prevention:   hand hygiene promoted   rest/sleep promoted  Goal: Optimal Comfort and Wellbeing  Outcome: " Adequate for Care Transition  Goal: Readiness for Transition of Care  Outcome: Adequate for Care Transition     Problem: Delirium  Goal: Optimal Coping  Outcome: Adequate for Care Transition  Goal: Improved Behavioral Control  Outcome: Adequate for Care Transition  Intervention: Minimize Safety Risk  Recent Flowsheet Documentation  Taken 3/18/2025 1802 by Rodney Hull RN  Enhanced Safety Measures:   family to remain at bedside   room near unit station   pain management  Goal: Improved Attention and Thought Clarity  Outcome: Adequate for Care Transition  Goal: Improved Sleep  Outcome: Adequate for Care Transition     Problem: Dysrhythmia  Goal: Normalized Cardiac Rhythm  Outcome: Adequate for Care Transition     Problem: Comorbidity Management  Goal: Maintenance of Asthma Control  Outcome: Adequate for Care Transition  Intervention: Maintain Asthma Symptom Control  Recent Flowsheet Documentation  Taken 3/18/2025 1802 by Rodney Hull RN  Medication Review/Management: medications reviewed  Goal: Maintenance of Behavioral Health Symptom Control  Outcome: Adequate for Care Transition  Intervention: Maintain Behavioral Health Symptom Control  Recent Flowsheet Documentation  Taken 3/18/2025 1802 by Rodney Hull RN  Medication Review/Management: medications reviewed  Goal: Maintenance of COPD Symptom Control  Outcome: Adequate for Care Transition  Intervention: Maintain COPD Symptom Control  Recent Flowsheet Documentation  Taken 3/18/2025 1802 by Rodney Hull RN  Medication Review/Management: medications reviewed  Goal: Blood Glucose Levels Within Targeted Range  Outcome: Adequate for Care Transition  Intervention: Monitor and Manage Glycemia  Recent Flowsheet Documentation  Taken 3/18/2025 1802 by Rodney Hull RN  Medication Review/Management: medications reviewed  Goal: Maintenance of Heart Failure Symptom Control  Outcome: Adequate for Care Transition  Intervention: Maintain Heart Failure  Management  Recent Flowsheet Documentation  Taken 3/18/2025 1802 by Rodney Hull RN  Medication Review/Management: medications reviewed  Goal: Blood Pressure in Desired Range  Outcome: Adequate for Care Transition  Intervention: Maintain Blood Pressure Management  Recent Flowsheet Documentation  Taken 3/18/2025 1802 by Rodney Hull RN  Medication Review/Management: medications reviewed  Goal: Maintenance of Osteoarthritis Symptom Control  Outcome: Adequate for Care Transition  Intervention: Maintain Osteoarthritis Symptom Control  Recent Flowsheet Documentation  Taken 3/18/2025 1802 by Rodney Hull RN  Activity Management: activity adjusted per tolerance  Medication Review/Management: medications reviewed  Goal: Bariatric Home Regimen Maintained  Outcome: Adequate for Care Transition  Intervention: Maintain and Manage Postbariatric Surgery Care  Recent Flowsheet Documentation  Taken 3/18/2025 1802 by Rodney Hull RN  Medication Review/Management: medications reviewed  Goal: Maintenance of Seizure Control  Outcome: Adequate for Care Transition  Intervention: Maintain Seizure Symptom Control  Recent Flowsheet Documentation  Taken 3/18/2025 1802 by Rodney Hull RN  Medication Review/Management: medications reviewed     Problem: Pain Acute  Goal: Optimal Pain Control and Function  Outcome: Adequate for Care Transition  Intervention: Prevent or Manage Pain  Recent Flowsheet Documentation  Taken 3/18/2025 1802 by Rodney Hull RN  Medication Review/Management: medications reviewed     Problem: Fall Injury Risk  Goal: Absence of Fall and Fall-Related Injury  Outcome: Adequate for Care Transition  Intervention: Identify and Manage Contributors  Recent Flowsheet Documentation  Taken 3/18/2025 1802 by Rodney Hull RN  Medication Review/Management: medications reviewed  Intervention: Promote Injury-Free Environment  Recent Flowsheet Documentation  Taken 3/18/2025 1802 by Rodney Hull  KASI RN  Safety Promotion/Fall Prevention: safety round/check completed

## 2025-03-19 NOTE — PROGRESS NOTES
"  PALLIATIVE CARE PROGRESS NOTE  [facility]     Patient Name: Jimmy Otto  Date of Admission: 3/15/2025   Today the patient was seen for: Emotional support and dismissal planning.      Recommendations & Counseling     GOALS OF CARE:   Life-prolonging with limits - No CPR- Do NOT Intubate. Family acknowledged Jimmy is not a candidate for dialysis or pacemaker placement.   Family would like time to discuss with Jimmy's wife the decision of home care or hospice on dismissal to home.   Family acknowledged that regardless of choosing home care or hospice the expect there mother will \"freak out and call 911 regardless of the plan.\"   Appreciate Case Management in explaining why after 3 years with [facility], their father was no longer followed after his hospitalization at [facility].        ADVANCE CARE PLANNING:  No health care directive on file. Per system policy, Surrogate Decision-makers for Patients With Diminished Decision-making Capacity offers guidance on possible decision-makers. Daughter Clemente Clark has been identified as a surrogate decision maker.   There is no POLST form on file, defer to patient and/or next of kin for decisions   Code status: No CPR- Do NOT Intubate     MEDICAL MANAGEMENT:   We are not actively managing symptoms at this time.     PSYCHOSOCIAL/SPIRITUAL SUPPORT:  Family Spouse, two daughters live locally and his son lives in [place].  Appreciate input and support of Child Life Specialist.  [place] community: Orthodox   Declined Spiritual Health Services    Palliative Care will continue to follow. Thank you for the consult and allowing us to aid in the care of Jimmy Otto.    These recommendations have been discussed with Hospitalist Demetrius Robles MD and bedside nursing staff.    FEMI Plummer CNS  [facility], Palliative Care  Securely message with the Vocera Web Console (learn more here) or  Text page via Mobile Event Guide Paging/Directory        Assessment          Jimmy Otto is a 75 " "year old male admitted 3/15/2025 with acute hypoxic respiratory failure in the setting of acute on chronic diastolic CHF exacerbation with bilateral pleura effusions R>L. The patient and family are known to this writer from the patient's hospitalization October 2021 when the family had made the decision to pursue tube feedings. 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       3/17/2025 - Family Care conference with 2 daughters, Hospitalist and Palliative Care  3/19/2025 -  Family Care conference with wife, son (who lives in [place]), 2 daughters, Nephrology, Hospitalist and Palliative Care. Daughters acting as  for wife and patient who speak Cambodian.       Interval History:     Multidisciplinary collaboration:  Appreciate input and the diligent care the nursing staff has provided Ho ad his family.    Patient/family narrative  Met with the patient's daughter, Wayne at bedside. She acknowledged her father had an emesis this morning, but otherwise has looked comfortable. She acknowledged our discussion regarding hospice \"It's a lot for my mother to take in. She's the primary one taking care of him, so she needs to make the decision.\"  She explained their family is talking with their mother to explain the benefit of hospice and \"Perhaps tomorrow she will have a decision.\"    Review of Systems:     Patient is not verbal, but looks comfortable         Physical Exam:   Temp:  [97.4  F (36.3  C)-98.4  F (36.9  C)] 97.4  F (36.3  C)  Pulse:  [51-76] 51  Resp:  [16-18] 18  BP: (129-138)/(53-57) 138/57  SpO2:  [93 %-100 %] 93 %  147 lbs .75 oz    Physical Exam  GEN:  " Alert, non-verbal elderly male, appears comfortable, no apparent distress.  HEENT:  Normocephalic/atraumatic, no scleral icterus, no nasal discharge, mouth moist.  CV:  RRR, S1, S2; no murmurs or other irregularities noted.  +3 DP/PT pulses bilatererally; no edema BLE.  RESP:  Anterior breath sounds are clear to auscultation bilaterally without rales/rhonchi/wheezing/retractions.  Symmetric chest rise on inhalation noted.  Normal respiratory effort.      Data Reviewed:     Results for orders placed or performed during the hospital encounter of 03/15/25 (from the past 24 hours)   Glucose by meter   Result Value Ref Range    GLUCOSE BY METER POCT 172 (H) 70 - 99 mg/dL   Glucose by meter   Result Value Ref Range    GLUCOSE BY METER POCT 156 (H) 70 - 99 mg/dL   Glucose by meter   Result Value Ref Range    GLUCOSE BY METER POCT 153 (H) 70 - 99 mg/dL   Glucose by meter   Result Value Ref Range    GLUCOSE BY METER POCT 180 (H) 70 - 99 mg/dL   Basic metabolic panel   Result Value Ref Range    Sodium 131 (L) 135 - 145 mmol/L    Potassium 3.7 3.4 - 5.3 mmol/L    Chloride 90 (L) 98 - 107 mmol/L    Carbon Dioxide (CO2) 31 (H) 22 - 29 mmol/L    Anion Gap 10 7 - 15 mmol/L    Urea Nitrogen 114.7 (H) 8.0 - 23.0 mg/dL    Creatinine 5.94 (H) 0.67 - 1.17 mg/dL    GFR Estimate 9 (L) >60 mL/min/1.73m2    Calcium 10.6 (H) 8.8 - 10.4 mg/dL    Glucose 198 (H) 70 - 99 mg/dL   Extra Purple Top EDTA (LAB USE ONLY)   Result Value Ref Range    Hold Specimen JIC    Glucose by meter   Result Value Ref Range    GLUCOSE BY METER POCT 210 (H) 70 - 99 mg/dL   Glucose by meter   Result Value Ref Range    GLUCOSE BY METER POCT 211 (H) 70 - 99 mg/dL         MANAGEMENT DISCUSSED with the following over the past 24 hours: Hospitalist Demetrius Robles MD and bedside nurse RAHAT Orlando.     40 MINUTES SPENT BY ME on the date of service doing chart review, history, exam, documentation & further activities per the note.

## 2025-03-19 NOTE — PLAN OF CARE
Goal Outcome Evaluation:      Plan of Care Reviewed With: patient    Overall Patient Progress: no changeOverall Patient Progress: no change    Vitals are Temp: 97.8  F (36.6  C) Temp src: Oral BP: 137/55 Pulse: 53   Resp: 16 SpO2: 96 %.  Patient is  non verval . They are 2 assist with Lift.  Pt is a NPO diet. Tube feed running goal rate of 30 ml. They are not showing any nonverbal signs of being in pain.  Patient is Saline locked. Family at bedside.     Problem: Adult Inpatient Plan of Care  Goal: Plan of Care Review  Description: The Plan of Care Review/Shift note should be completed every shift.  The Outcome Evaluation is a brief statement about your assessment that the patient is improving, declining, or no change.  This information will be displayed automatically on your shift  note.  Recent Flowsheet Documentation  Taken 3/19/2025 0509 by Morenita Cadet RN  Plan of Care Reviewed With: patient  Overall Patient Progress: no change  Goal: Absence of Hospital-Acquired Illness or Injury  Intervention: Identify and Manage Fall Risk  Recent Flowsheet Documentation  Taken 3/19/2025 0015 by Morenita Cadet RN  Safety Promotion/Fall Prevention:   clutter free environment maintained   safety round/check completed     Problem: Fall Injury Risk  Goal: Absence of Fall and Fall-Related Injury  Intervention: Promote Injury-Free Environment  Recent Flowsheet Documentation  Taken 3/19/2025 0015 by Morenita Cadet RN  Safety Promotion/Fall Prevention:   clutter free environment maintained   safety round/check completed     Problem: Gas Exchange Impaired  Goal: Optimal Gas Exchange  Outcome: Progressing

## 2025-03-20 LAB
ANION GAP SERPL CALCULATED.3IONS-SCNC: 11 MMOL/L (ref 7–15)
BACTERIA BLD CULT: NO GROWTH
BACTERIA BLD CULT: NO GROWTH
BACTERIA PLR CULT: NORMAL
BUN SERPL-MCNC: 113.9 MG/DL (ref 8–23)
CALCIUM SERPL-MCNC: 10.5 MG/DL (ref 8.8–10.4)
CHLORIDE SERPL-SCNC: 90 MMOL/L (ref 98–107)
CREAT SERPL-MCNC: 5.95 MG/DL (ref 0.67–1.17)
EGFRCR SERPLBLD CKD-EPI 2021: 9 ML/MIN/1.73M2
GLUCOSE BLDC GLUCOMTR-MCNC: 145 MG/DL (ref 70–99)
GLUCOSE BLDC GLUCOMTR-MCNC: 159 MG/DL (ref 70–99)
GLUCOSE BLDC GLUCOMTR-MCNC: 184 MG/DL (ref 70–99)
GLUCOSE BLDC GLUCOMTR-MCNC: 193 MG/DL (ref 70–99)
GLUCOSE BLDC GLUCOMTR-MCNC: 207 MG/DL (ref 70–99)
GLUCOSE BLDC GLUCOMTR-MCNC: 222 MG/DL (ref 70–99)
GLUCOSE SERPL-MCNC: 232 MG/DL (ref 70–99)
GRAM STAIN RESULT: NORMAL
GRAM STAIN RESULT: NORMAL
HCO3 SERPL-SCNC: 31 MMOL/L (ref 22–29)
POTASSIUM SERPL-SCNC: 3.6 MMOL/L (ref 3.4–5.3)
SODIUM SERPL-SCNC: 132 MMOL/L (ref 135–145)

## 2025-03-20 PROCEDURE — 250N000013 HC RX MED GY IP 250 OP 250 PS 637: Performed by: INTERNAL MEDICINE

## 2025-03-20 PROCEDURE — 120N000001 HC R&B MED SURG/OB

## 2025-03-20 PROCEDURE — 99232 SBSQ HOSP IP/OBS MODERATE 35: CPT | Performed by: INTERNAL MEDICINE

## 2025-03-20 PROCEDURE — 80048 BASIC METABOLIC PNL TOTAL CA: CPT | Performed by: INTERNAL MEDICINE

## 2025-03-20 PROCEDURE — 36415 COLL VENOUS BLD VENIPUNCTURE: CPT | Performed by: INTERNAL MEDICINE

## 2025-03-20 RX ORDER — AMOXICILLIN 250 MG
2 CAPSULE ORAL 2 TIMES DAILY PRN
Status: DISCONTINUED | OUTPATIENT
Start: 2025-03-20 | End: 2025-03-21

## 2025-03-20 RX ORDER — POLYETHYLENE GLYCOL 3350 17 G/17G
17 POWDER, FOR SOLUTION ORAL DAILY PRN
Status: DISCONTINUED | OUTPATIENT
Start: 2025-03-20 | End: 2025-04-18 | Stop reason: HOSPADM

## 2025-03-20 RX ORDER — AMOXICILLIN 250 MG
1 CAPSULE ORAL 2 TIMES DAILY PRN
Status: DISCONTINUED | OUTPATIENT
Start: 2025-03-20 | End: 2025-03-21

## 2025-03-20 RX ADMIN — FUROSEMIDE 80 MG: 40 TABLET ORAL at 17:41

## 2025-03-20 RX ADMIN — INSULIN ASPART 1 UNITS: 100 INJECTION, SOLUTION INTRAVENOUS; SUBCUTANEOUS at 20:39

## 2025-03-20 RX ADMIN — FERROUS SULFATE TAB 325 MG (65 MG ELEMENTAL FE) 325 MG: 325 (65 FE) TAB at 09:48

## 2025-03-20 RX ADMIN — FUROSEMIDE 80 MG: 40 TABLET ORAL at 09:48

## 2025-03-20 RX ADMIN — DOXAZOSIN 2 MG: 1 TABLET ORAL at 09:48

## 2025-03-20 RX ADMIN — CLOPIDOGREL BISULFATE 75 MG: 75 TABLET ORAL at 09:48

## 2025-03-20 RX ADMIN — INSULIN ASPART 2 UNITS: 100 INJECTION, SOLUTION INTRAVENOUS; SUBCUTANEOUS at 00:32

## 2025-03-20 RX ADMIN — FLUOXETINE 20 MG: 20 SOLUTION ORAL at 09:55

## 2025-03-20 RX ADMIN — INSULIN ASPART 1 UNITS: 100 INJECTION, SOLUTION INTRAVENOUS; SUBCUTANEOUS at 13:13

## 2025-03-20 RX ADMIN — ACETAMINOPHEN 650 MG: 160 SOLUTION ORAL at 21:02

## 2025-03-20 RX ADMIN — SENNOSIDES AND DOCUSATE SODIUM 1 TABLET: 50; 8.6 TABLET ORAL at 00:32

## 2025-03-20 RX ADMIN — INSULIN GLARGINE 14 UNITS: 100 INJECTION, SOLUTION SUBCUTANEOUS at 09:49

## 2025-03-20 RX ADMIN — INSULIN ASPART 1 UNITS: 100 INJECTION, SOLUTION INTRAVENOUS; SUBCUTANEOUS at 17:41

## 2025-03-20 RX ADMIN — INSULIN ASPART 2 UNITS: 100 INJECTION, SOLUTION INTRAVENOUS; SUBCUTANEOUS at 04:56

## 2025-03-20 RX ADMIN — ACETAMINOPHEN 650 MG: 160 SOLUTION ORAL at 05:09

## 2025-03-20 RX ADMIN — INSULIN ASPART 2 UNITS: 100 INJECTION, SOLUTION INTRAVENOUS; SUBCUTANEOUS at 09:49

## 2025-03-20 RX ADMIN — Medication 5 ML: at 20:39

## 2025-03-20 RX ADMIN — ACETAMINOPHEN 650 MG: 160 SOLUTION ORAL at 14:52

## 2025-03-20 RX ADMIN — AMLODIPINE BESYLATE 10 MG: 10 TABLET ORAL at 09:48

## 2025-03-20 ASSESSMENT — ACTIVITIES OF DAILY LIVING (ADL)
ADLS_ACUITY_SCORE: 107
ADLS_ACUITY_SCORE: 103
ADLS_ACUITY_SCORE: 106
ADLS_ACUITY_SCORE: 103
ADLS_ACUITY_SCORE: 107
ADLS_ACUITY_SCORE: 103
ADLS_ACUITY_SCORE: 103
ADLS_ACUITY_SCORE: 106
ADLS_ACUITY_SCORE: 103
ADLS_ACUITY_SCORE: 107
ADLS_ACUITY_SCORE: 106
ADLS_ACUITY_SCORE: 103
ADLS_ACUITY_SCORE: 106
ADLS_ACUITY_SCORE: 103
ADLS_ACUITY_SCORE: 103
ADLS_ACUITY_SCORE: 106
ADLS_ACUITY_SCORE: 103
ADLS_ACUITY_SCORE: 106
ADLS_ACUITY_SCORE: 103
ADLS_ACUITY_SCORE: 107
ADLS_ACUITY_SCORE: 107
ADLS_ACUITY_SCORE: 103
ADLS_ACUITY_SCORE: 106

## 2025-03-20 NOTE — PROGRESS NOTES
[facility]    Patient is currently on service with [facility] for home tube feeding - Nepro with carbsteady 3.5 cartons/day via gravity bag bolus - 1 carton at 0900h, 1 carton at 1200h, and 1.5 carton at 1800h.    Liaison will follow along. If applicable at discharge, please include Home Infusion Referral in discharge orders.     Thank you,    Claire Kan RN  [facility] Liaison  [phone] (M-F 8a-5p)  [phone] Office

## 2025-03-20 NOTE — PLAN OF CARE
VSS on 1L nc per family request. HR is in the 40s. Pt alert. Non verbal. LS dim. Weak PP. Edema. Check and changed. Felix patent. Turned and repositioned. BG Q4H. Insulin given per orders. Discharge pending family decision regarding hospice/homecare.      Goal Outcome Evaluation:      Plan of Care Reviewed With: patient, family    Overall Patient Progress: no changeOverall Patient Progress: no change    Outcome Evaluation: Continues on TF @30ml/hr. VSS.      Problem: Adult Inpatient Plan of Care  Goal: Plan of Care Review  Description: The Plan of Care Review/Shift note should be completed every shift.  The Outcome Evaluation is a brief statement about your assessment that the patient is improving, declining, or no change.  This information will be displayed automatically on your shift  note.  Recent Flowsheet Documentation  Taken 3/20/2025 0454 by Samantha Lazo, RN  Outcome Evaluation: Continues on TF @30ml/hr. VSS.  Plan of Care Reviewed With:   patient   family  Overall Patient Progress: no change  Goal: Absence of Hospital-Acquired Illness or Injury  Intervention: Prevent Skin Injury  Recent Flowsheet Documentation  Taken 3/20/2025 0030 by Samantha Lazo, RN  Body Position:   left   turned   weight shifting  Taken 3/19/2025 2124 by Samantha Lazo RN  Body Position:   right   turned     Problem: Adult Inpatient Plan of Care  Goal: Plan of Care Review  Description: The Plan of Care Review/Shift note should be completed every shift.  The Outcome Evaluation is a brief statement about your assessment that the patient is improving, declining, or no change.  This information will be displayed automatically on your shiftnote.  Outcome: Progressing  Flowsheets (Taken 3/20/2025 0454)  Outcome Evaluation: Continues on TF @30ml/hr. VSS.  Plan of Care Reviewed With:   patient   family  Overall Patient Progress: no change  Goal: Patient-Specific Goal (Individualized)  Description: You can add care  "plan individualizations to a care plan. Examples of Individualization might be:  \"Parent requests to be called daily at 9am for status\", \"I have a hard time hearing out of my right ear\", or \"Do not touch me to wake me up as it startlesme\".  Outcome: Progressing  Goal: Absence of Hospital-Acquired Illness or Injury  Outcome: Progressing  Intervention: Prevent Skin Injury  Recent Flowsheet Documentation  Taken 3/20/2025 0030 by Samantha Lazo, RN  Body Position:   left   turned   weight shifting  Taken 3/19/2025 2124 by Samantha Lazo, RN  Body Position:   right   turned  Goal: Optimal Comfort and Wellbeing  Outcome: Progressing  Goal: Readiness for Transition of Care  Outcome: Progressing     Problem: Fall Injury Risk  Goal: Absence of Fall and Fall-Related Injury  Outcome: Progressing       "

## 2025-03-20 NOTE — PLAN OF CARE
BP (P) 139/49 (BP Location: Left arm)   Pulse 74   Temp 98  F (36.7  C) (Axillary)   Resp (P) 20   Wt 72.4 kg (159 lb 9.8 oz)   SpO2 94%   BMI 25.76 kg/m      VSS. PT appears calm and pain free. Q2 turn/repo. Continued TF.

## 2025-03-20 NOTE — PROGRESS NOTES
[facility]  Hospitalist Progress Note  Demetrius Robles MD 03/20/2025    Reason for Stay (Diagnosis): progressive CKD         Assessment and Plan:      Summary of Stay: Jimmy Otto is a 75 year old male patient with extensive past medical history including cerebrovascular accident with right-sided hemiplegia and aphasia, diabetes mellitus type 2, hypertension, hyperlipidemia, status post PEG tube placement, chronic kidney disease, nephrotic range proteinuria, advanced diabetic nephropathy, anasarca, anemia, neurogenic bladder with chronic Felix catheter in place, depressive disorder, who was brought from home for evaluation for low oxygen saturations and bradycardia.       The patient's daughter states that patient has had some vomiting last week.  He was having increased shortness of breath.  His wife noted that his oxygen saturation was low.  He was brought to emergency room for evaluation.     Initial vital signs showed temperature 97.5, pulse 46, blood pressure 148/61, oxygen saturation 95% on room air.  Laboratory workup showed sodium 128, potassium 5.8, creatinine 5.67, ALT 36, AST 31, troponin T high-sensitivity 103.  WBC 6.5, hemoglobin 8.9, platelets 164.  Venous blood gas showed pH 7.44/pCO2 44, pO2 82.5.  Chest x-ray showed large right pleural effusion with small to moderate left pleural effusion.  Bibasilar consolidation, patchy infiltrate in the central aspect of both lungs progressed on the left and improved on the right.  Felix catheter within decompressed urinary bladder.  Anasarca has progressed.     Nephrology was consulted from emergency room and recommended Lasix 100 mg IV.  He was also given calcium gluconate, Lokelma 10 g per G-tube.  He was also given a dose of IV ceftriaxone and azithromycin.  He was admitted to the hospital for further management.     He remains on 5 L of oxygen.  Interventional radiology performed a right-sided thoracentesis today.  He continues on IV  diuresis and nephrology was formally consulted.  Potassium and heart rate have improved.  Palliative care met with family to discuss goals of care and possible enrollment into hospice given his poor functional status, elderly age, worsening renal function without being a dialysis candidate, bradycardia without being a pacemaker candidate, and gradual deterioration with no truly reversible process.  They understand his poor prognosis and a care conference was held with the family on 3/18 to discuss all of the above, with attendees including hospitalist, palliative care, and nephrology.  Nephrology reiterated during the care conference that the patient is a poor dialysis candidate given his multiple comorbidities    Update on 3/20: Patient remains stable.  There are no acute reversible medical issues we are addressing currently.  Await family decision regarding hospice care versus discharging home with family and continuing restorative measures.  Once family makes a disposition decision, patient can discharge     Plans today:  -No significant changes made today  -Await patient/family decision making regarding disposition  -When family makes disposition decision patient can discharge  - I updated dtr and son at bedside again today.  All questions answered     Problem list:  Acute hypoxic respiratory failure likely multifactorial including volume overload due to acute on chronic diastolic CHF exacerbation  Bilateral pleural effusions, right > left  Patient is on Lasix 20 mg daily.  Per family he had decreased urine output and increased shortness of breath.  Chest x-ray showed large right pleural effusion, moderate left pleural effusion.    -Nephrology consulted.  -Completed a course of IV diuresis, now transitioned to oral Lasix 80 mg twice a day  -s/p R sided thoracentesis with 900 ml removed     Hyperkalemia  ALEX on CKD stage V  Nephrotic range proteinuria  Anasarca  Hyponatremia  Hypercalcemia   -Creatinine 1 year  ago was near 1  -Most recent creatinine 4.07 on 2/26/2025.  Creatinine 5.67 on admission and potassium 5.8.  He was previously seen by nephrology and his daughter indicates that he was determined not to be a dialysis candidate.  Potassium has improved with shifting agents and Lokelma.  Creatinine has been steadily climbing over the past several weeks.  -completed IV diuresis; now on PO diuretic  -Daily BMP.  -Felix in place and tracking I's and O's.  - appreciate nephrology input.  As per nephrology not a dialysis candidate     Bradycardia  Junctional rhythm  This was an issue during his recent hospitalization as well.  There may be some metabolic component but I do worry about some underlying conduction disease.  Poor candidate for pacemaker due to the issues outlined above.  -Continue telemetry.     Demand ischemia  Patient had no evidence of chest pain. Troponin has been elevated during prior evaluation on 2/26/2025.  Troponin 103 on admission and flat, not consistent with acute coronary syndrome.  Likely demand ischemia from respiratory failure with poor enzyme clearance in the face of CKD.    -No further workup.     Diabetes mellitus type 2  He is on Lantus insulin 20 units every morning, insulin sliding scale PTA.  -Continue home Lantus and medium sliding scale insulin.     History of CVA with residual right-sided hemiplegia and baseline nonverbal status  Chronic dysphagia, status post PEG tube placement, G-tube exchange on 2/24/2025  S/p indwelling Felix catheter  Dyslipidemia  Hypertension  Patient resides at home and family takes care of him.  He also has a visiting nurse.  They appear to provide excellent care and support for him.  -Continue PTA medications including amlodipine and atorvastatin.  -Hold Plavix for thoracentesis.  -RD consulted to facilitate tube feeds.     Anemia and chronic kidney disease  -Resumed ferrous sulfate.     Depression  -Resume Prozac.     Goals of care  The patient is  chronically ill and essentially full cares as a result of a prior massive stroke.  He has chronic dysphagia and receives tube feeds via a PEG.  He is essentially bedbound and has residual right-sided hemiplegia and nonverbal status.  He is now faced with rapidly progressing chronic kidney disease leading to concerning electrolyte abnormalities, hypervolemia, and bradycardia.  He is an extremely poor dialysis candidate and dialysis has not been offered by nephrology.  Care conference was held on 3/18 with palliative care, nephrology, and this hospitalist.  Goals of care discussed.  Hospice being considered by family but formal decision has not yet been made        DVT Prophylaxis: Pneumatic Compression Devices  Code Status: No CPR- Do NOT Intubate  Diet: Adult Formula Drip Feeding: Continuous Nepro with Carbsteady; Gastrostomy; Goal Rate: 30; mL/hr; Advancement Instructions: Once mag and phos are back and WNL, please initiate TF at 15 ml/hr x 6 hours. If tolerating, okay to advance to goal rate of...    Ordoñez Catheter: PRESENT, indication: Chronic ordoñez  Disposition: Expected discharge after family decides whether or not to pursue hospice care at discharge           Interval History (Subjective):      Patient is nonverbal.  Chart reviewed.  I spoke to the bedside nurse.  I spoke with daughter and son at bedside this morning.  Family has not yet made a decision whether or not to pursue hospice at discharge.  Await family decision making; once this is done can proceed with disposition                  Physical Exam:      Last Vital Signs:  BP (!) 146/67 (BP Location: Left arm)   Pulse 77   Temp 97.6  F (36.4  C) (Axillary)   Resp 18   Wt 72.4 kg (159 lb 9.8 oz)   SpO2 99%   BMI 25.76 kg/m        Intake/Output Summary (Last 24 hours) at 3/20/2025 09  Last data filed at 3/20/2025 0514  Gross per 24 hour   Intake 616 ml   Output 1300 ml   Net -684 ml       Constitutional: Awake, alert, cooperative, no apparent  distress     Respiratory: Clear to auscultation bilaterally, no crackles or wheezing   Cardiovascular: Regular rate and rhythm, normal S1 and S2, and no murmur noted   Abdomen: Normal bowel sounds, soft, non-distended, non-tender   Skin: No rashes, no cyanosis, dry to touch   Neuro: Alert and awake, eyes open, non-verbal, R side weakness    Extremities: No edema, normal range of motion   Other(s):        All other systems: Negative          Medications:      All current medications were reviewed with changes reflected in problem list.         Data:      All new lab and imaging data was reviewed.   Labs:       Lab Results   Component Value Date     03/20/2025     03/19/2025     03/18/2025     08/16/2012     08/13/2012    Lab Results   Component Value Date    CHLORIDE 90 03/20/2025    CHLORIDE 90 03/19/2025    CHLORIDE 89 03/18/2025    CHLORIDE 99 03/30/2022    CHLORIDE 98 02/01/2022    CHLORIDE 105 01/26/2022    CHLORIDE 101 08/16/2012    CHLORIDE 99 08/13/2012    Lab Results   Component Value Date    .9 03/20/2025    .7 03/19/2025    .3 03/18/2025    BUN 24 03/30/2022    BUN 22 02/01/2022    BUN 20 01/26/2022    BUN 18 08/16/2012    BUN 14 08/13/2012      Lab Results   Component Value Date    POTASSIUM 3.6 03/20/2025    POTASSIUM 3.7 03/19/2025    POTASSIUM 3.8 03/18/2025    POTASSIUM 4.4 03/30/2022    POTASSIUM 4.0 02/01/2022    POTASSIUM 3.6 01/26/2022    POTASSIUM 4.0 08/16/2012    POTASSIUM 3.6 08/13/2012    Lab Results   Component Value Date    CO2 31 03/20/2025    CO2 31 03/19/2025    CO2 28 03/18/2025    CO2 29 03/30/2022    CO2 27 02/01/2022    CO2 31 01/26/2022    CO2 26 08/16/2012    CO2 26 08/13/2012    Lab Results   Component Value Date    CR 5.95 03/20/2025    CR 5.94 03/19/2025    CR 6.14 03/18/2025    CR 0.96 08/16/2012    CR 0.91 08/13/2012        Recent Labs   Lab 03/18/25  0703 03/17/25  0719 03/16/25  0555   WBC 5.2 5.4 5.2   HGB 8.6* 8.2* 8.5*    HCT 27.2* 25.6* 26.6*   MCV 84 83 84    160 141*      Imaging:   No results found for this or any previous visit (from the past 24 hours).

## 2025-03-21 VITALS
BODY MASS INDEX: 25.76 KG/M2 | DIASTOLIC BLOOD PRESSURE: 59 MMHG | SYSTOLIC BLOOD PRESSURE: 142 MMHG | RESPIRATION RATE: 18 BRPM | WEIGHT: 159.61 LBS | TEMPERATURE: 97.5 F | OXYGEN SATURATION: 95 % | HEART RATE: 72 BPM

## 2025-03-21 LAB
ANION GAP SERPL CALCULATED.3IONS-SCNC: 13 MMOL/L (ref 7–15)
BUN SERPL-MCNC: 116 MG/DL (ref 8–23)
CALCIUM SERPL-MCNC: 10.9 MG/DL (ref 8.8–10.4)
CHLORIDE SERPL-SCNC: 88 MMOL/L (ref 98–107)
CREAT SERPL-MCNC: 5.55 MG/DL (ref 0.67–1.17)
EGFRCR SERPLBLD CKD-EPI 2021: 10 ML/MIN/1.73M2
GLUCOSE BLDC GLUCOMTR-MCNC: 181 MG/DL (ref 70–99)
GLUCOSE BLDC GLUCOMTR-MCNC: 182 MG/DL (ref 70–99)
GLUCOSE BLDC GLUCOMTR-MCNC: 191 MG/DL (ref 70–99)
GLUCOSE BLDC GLUCOMTR-MCNC: 214 MG/DL (ref 70–99)
GLUCOSE BLDC GLUCOMTR-MCNC: 229 MG/DL (ref 70–99)
GLUCOSE BLDC GLUCOMTR-MCNC: 234 MG/DL (ref 70–99)
GLUCOSE SERPL-MCNC: 201 MG/DL (ref 70–99)
HCO3 SERPL-SCNC: 28 MMOL/L (ref 22–29)
HOLD SPECIMEN: NORMAL
POTASSIUM SERPL-SCNC: 3.5 MMOL/L (ref 3.4–5.3)
SODIUM SERPL-SCNC: 129 MMOL/L (ref 135–145)

## 2025-03-21 PROCEDURE — 250N000013 HC RX MED GY IP 250 OP 250 PS 637: Performed by: INTERNAL MEDICINE

## 2025-03-21 PROCEDURE — 36415 COLL VENOUS BLD VENIPUNCTURE: CPT | Performed by: INTERNAL MEDICINE

## 2025-03-21 PROCEDURE — 120N000001 HC R&B MED SURG/OB

## 2025-03-21 PROCEDURE — 99232 SBSQ HOSP IP/OBS MODERATE 35: CPT | Performed by: INTERNAL MEDICINE

## 2025-03-21 PROCEDURE — 250N000011 HC RX IP 250 OP 636: Performed by: INTERNAL MEDICINE

## 2025-03-21 PROCEDURE — 80048 BASIC METABOLIC PNL TOTAL CA: CPT | Performed by: INTERNAL MEDICINE

## 2025-03-21 RX ORDER — FERROUS SULFATE 300 MG/5ML
325 LIQUID (ML) ORAL DAILY
Status: DISCONTINUED | OUTPATIENT
Start: 2025-03-21 | End: 2025-04-05

## 2025-03-21 RX ORDER — FUROSEMIDE 10 MG/ML
80 SOLUTION ORAL
Status: DISCONTINUED | OUTPATIENT
Start: 2025-03-21 | End: 2025-04-06

## 2025-03-21 RX ADMIN — INSULIN GLARGINE 14 UNITS: 100 INJECTION, SOLUTION SUBCUTANEOUS at 09:11

## 2025-03-21 RX ADMIN — ONDANSETRON 4 MG: 2 INJECTION, SOLUTION INTRAMUSCULAR; INTRAVENOUS at 18:25

## 2025-03-21 RX ADMIN — FUROSEMIDE 80 MG: 40 TABLET ORAL at 09:20

## 2025-03-21 RX ADMIN — FLUOXETINE 20 MG: 20 SOLUTION ORAL at 09:38

## 2025-03-21 RX ADMIN — Medication 5 ML: at 21:05

## 2025-03-21 RX ADMIN — FUROSEMIDE 80 MG: 10 SOLUTION ORAL at 16:34

## 2025-03-21 RX ADMIN — INSULIN ASPART 2 UNITS: 100 INJECTION, SOLUTION INTRAVENOUS; SUBCUTANEOUS at 09:11

## 2025-03-21 RX ADMIN — CLOPIDOGREL BISULFATE 75 MG: 75 TABLET ORAL at 09:20

## 2025-03-21 RX ADMIN — AMLODIPINE BESYLATE 10 MG: 10 TABLET ORAL at 09:38

## 2025-03-21 RX ADMIN — ACETAMINOPHEN 650 MG: 160 SOLUTION ORAL at 14:41

## 2025-03-21 RX ADMIN — ACETAMINOPHEN 650 MG: 160 SOLUTION ORAL at 23:05

## 2025-03-21 RX ADMIN — INSULIN ASPART 1 UNITS: 100 INJECTION, SOLUTION INTRAVENOUS; SUBCUTANEOUS at 00:20

## 2025-03-21 RX ADMIN — ACETAMINOPHEN 650 MG: 160 SOLUTION ORAL at 06:11

## 2025-03-21 RX ADMIN — SENNOSIDES AND DOCUSATE SODIUM 1 TABLET: 50; 8.6 TABLET ORAL at 06:42

## 2025-03-21 RX ADMIN — DOXAZOSIN 2 MG: 1 TABLET ORAL at 09:20

## 2025-03-21 RX ADMIN — INSULIN ASPART 1 UNITS: 100 INJECTION, SOLUTION INTRAVENOUS; SUBCUTANEOUS at 04:12

## 2025-03-21 RX ADMIN — INSULIN ASPART 2 UNITS: 100 INJECTION, SOLUTION INTRAVENOUS; SUBCUTANEOUS at 21:04

## 2025-03-21 RX ADMIN — Medication 325 MG: at 11:58

## 2025-03-21 RX ADMIN — INSULIN ASPART 2 UNITS: 100 INJECTION, SOLUTION INTRAVENOUS; SUBCUTANEOUS at 12:05

## 2025-03-21 RX ADMIN — INSULIN ASPART 2 UNITS: 100 INJECTION, SOLUTION INTRAVENOUS; SUBCUTANEOUS at 16:31

## 2025-03-21 ASSESSMENT — ACTIVITIES OF DAILY LIVING (ADL)
ADLS_ACUITY_SCORE: 103

## 2025-03-21 NOTE — PROGRESS NOTES
CLINICAL NUTRITION SERVICES - REASSESSMENT NOTE     RECOMMENDATIONS FOR MDs/PROVIDERS TO ORDER:  None at this time.     Registered Dietitian Interventions:  Continue current TF order.        INFORMATION OBTAINED  Assessed patient in room. Dtr at bedside.     CURRENT NUTRITION ORDERS  Diet: NPO, receiving TF    Nutrition Support: Nepro @ goal 30 ml/hr x 24 hours (720 ml/day)  Total enteral provisions: 1296 kcals (20 kcal/kg/day), 58 g PRO (0.9 g/kg/day), 526 ml free H2O, 116 g CHO and 18 g Fiber daily.   Free Water Flushes: 60 ml q 4h for tube patency    CURRENT INTAKE/TOLERANCE  TF were started on 3/16 and patient has been tolerating well.   Visited with daughter in the room - she reports Ho is tolerating the feeds well. Noted the tube has become clogged a couple of times. Dtr had no other concerns/questions at this time.      NEW FINDINGS  GI :  last BM 3/16     Skin/wounds:  1-3+ edema; anasarca      Nutrition-relevant labs:  Na 129,   Nutrition-relevant medications:  nephronex, ferosul, lasix, insulin, lantus  Weight: weight is significantly up since admit, MD aware, not a dialysis candidate     ASSESSED NUTRITION NEEDS  Dosing Weight: 63.8 kg based on IBW - pt w/ anasarca, unsure of dry wt  Estimated Energy Needs: 4154-4363 kcals/day (20 - 25 kcals/kg)  Justification: Maintenance  Estimated Protein Needs: 51-64 grams protein/day (0.8-1 grams of pro/kg)  Justification: CKD  Estimated Fluid Needs: defer to nephrology     MALNUTRITION  % Weight Loss: Unable to assess  d/t fluid  % Intake: no decrease noted - has been receiving TF since admission  Subcutaneous Fat Loss: none noted  Muscle Loss: none noted  Fluid Accumulation/Edema:  Pt w/ anasarca. Not r/t nutrition status.  Malnutrition Diagnosis: Patient does not meet malnutrition criteria at this time  Malnutrition Present on Admission: No    EVALUATION OF THE PROGRESS TOWARD GOALS   Previous Goals  EN to meet % of estimated needs  Evaluation:  Met    Previous Nutrition Diagnosis  Swallowing difficulty related to CVA as evidenced by pt 100% reliant on EN at baseline.  Evaluation: No change    NUTRITION DIAGNOSIS  Swallowing difficulty related to CVA as evidenced by pt 100% reliant on EN at baseline.    INTERVENTIONS  Enteral nutrition management    GOALS  Tolerate EN at goal rate     MONITORING/EVALUATION  Progress toward goals will be monitored and evaluated per policy.    Karen Macias MS, RD, LD  Pager: [phone]  Available on [employer]

## 2025-03-21 NOTE — PLAN OF CARE
"[id number]  VSS on RA. Nonverbal. Alert to pain and voice at times. Tylenol given as scheduled. BG stable. TF at goal rate of 30ml/hr. Repo /to the shift.     Goal Outcome Evaluation:      Plan of Care Reviewed With: patient    Overall Patient Progress: no change    Outcome Evaluation: COntinue on TF at goal rate of 30,l/hr. Repo t/o the shift.      Problem: Gas Exchange Impaired  Goal: Optimal Gas Exchange  Outcome: Progressing  Intervention: Optimize Oxygenation and Ventilation  Recent Flowsheet Documentation  Taken 3/21/2025 0023 by Rosalva Talley, RAHAT  Head of Bed (HOB) Positioning: HOB at 30 degrees  Taken 3/20/2025 2102 by Rosalva Talley RN  Head of Bed (HOB) Positioning: HOB at 30 degrees     Problem: Adult Inpatient Plan of Care  Goal: Plan of Care Review  Description: The Plan of Care Review/Shift note should be completed every shift.  The Outcome Evaluation is a brief statement about your assessment that the patient is improving, declining, or no change.  This information will be displayed automatically on your shiftnote.  Outcome: Progressing  Flowsheets (Taken 3/21/2025 0340)  Outcome Evaluation: COntinue on TF at goal rate of 30,l/hr. Repo t/o the shift.  Plan of Care Reviewed With: patient  Overall Patient Progress: no change  Goal: Patient-Specific Goal (Individualized)  Description: You can add care plan individualizations to a care plan. Examples of Individualization might be:  \"Parent requests to be called daily at 9am for status\", \"I have a hard time hearing out of my right ear\", or \"Do not touch me to wake me up as it startlesme\".  Outcome: Progressing  Goal: Absence of Hospital-Acquired Illness or Injury  Outcome: Progressing  Intervention: Identify and Manage Fall Risk  Recent Flowsheet Documentation  Taken 3/20/2025 2102 by Rosalva Talley, RAHAT  Safety Promotion/Fall Prevention:   safety round/check completed   room organization consistent   increase visualization of " patient  Intervention: Prevent Skin Injury  Recent Flowsheet Documentation  Taken 3/21/2025 0023 by Rosalva Talley RN  Body Position:   turned   left  Taken 3/20/2025 2102 by Rosalva Talley RN  Body Position: weight shifting  Intervention: Prevent and Manage VTE (Venous Thromboembolism) Risk  Recent Flowsheet Documentation  Taken 3/20/2025 2102 by Rosalva Talley RN  VTE Prevention/Management: SCDs on (sequential compression devices)  Intervention: Prevent Infection  Recent Flowsheet Documentation  Taken 3/20/2025 2102 by Rosalva Talley RN  Infection Prevention:   single patient room provided   rest/sleep promoted  Goal: Optimal Comfort and Wellbeing  Outcome: Progressing  Goal: Readiness for Transition of Care  Outcome: Progressing     Problem: Fall Injury Risk  Goal: Absence of Fall and Fall-Related Injury  Outcome: Progressing  Intervention: Identify and Manage Contributors  Recent Flowsheet Documentation  Taken 3/20/2025 2102 by Rosalva Talley RN  Medication Review/Management: medications reviewed  Intervention: Promote Injury-Free Environment  Recent Flowsheet Documentation  Taken 3/20/2025 2102 by Rosalva Tlaley RN  Safety Promotion/Fall Prevention:   safety round/check completed   room organization consistent   increase visualization of patient

## 2025-03-21 NOTE — PHARMACY
Pharmacy Tube Feeding Consult    Medication reviewed for administration by feeding tube and for potential food/drug interactions.    Recommendation: Recommend changing the following medications to a liquid dosage form: ferrous sulfate tab, furosemide and senokot-S     Pharmacy will continue to follow as new medications are ordered.

## 2025-03-21 NOTE — PLAN OF CARE
Goal Outcome Evaluation:      Plan of Care Reviewed With: patient, child    Overall Patient Progress: decliningOverall Patient Progress: declining    Outcome Evaluation: Sleeping most of the shift, opens eyes occassionally, non verbal. Tube feed 30ml/hr. Minimal residuals. Emesis this evening 50ml - suction provided. IV zofran given. Reposition q2hr. Skin intact, except L thigh scratches & old buttock scars. HR in the 40's. Edema bilateral feet. Felix draining yellow urine, output 300ml from . No BM, passing gas.    Palliative care was paged by RN writer at 1430 - the staff was unable to see the patient today. MD informed. RN writer asked the Palliative RN Naldo to prioritize seeing the pt/family Monday morning.       Problem: Adult Inpatient Plan of Care  Goal: Plan of Care Review  Description: The Plan of Care Review/Shift note should be completed every shift.  The Outcome Evaluation is a brief statement about your assessment that the patient is improving, declining, or no change.  This information will be displayed automatically on your shift  note.  Recent Flowsheet Documentation  Taken 3/21/2025 1834 by Amalia Stinson, RN  Outcome Evaluation: Sleeping most of the shift, opens eyes occassionally, non verbal. Tube feed 30ml/hr. Minimal residuals. Emesis this evening 50ml - suction provided. IV zofran given. Reposition q2hr. Skin intact, except L thigh scratches & old buttock scars. HR in the 40's. Edema bilateral feet. Felix draining yellow urine, output 300ml from . No BM, passing gas.  Plan of Care Reviewed With:   patient   child  Overall Patient Progress: declining  Goal: Absence of Hospital-Acquired Illness or Injury  Intervention: Identify and Manage Fall Risk  Recent Flowsheet Documentation  Taken 3/21/2025 1248 by Amalia Stinson, RN  Safety Promotion/Fall Prevention: safety round/check completed  Intervention: Prevent Skin Injury  Recent Flowsheet Documentation  Taken 3/21/2025 1833 by  Amalia Stinson RN  Body Position:   turned   left   lower extremity elevated  Taken 3/21/2025 1646 by Amalia Stinson RN  Body Position:   turned   right   heels elevated  Taken 3/21/2025 1440 by Amalia Stinson RN  Body Position: refuses positioning  Taken 3/21/2025 1248 by Amalia Stinson RN  Body Position:   turned   left  Taken 3/21/2025 0855 by Amalia Stinson RN  Body Position:   turned   right  Intervention: Prevent and Manage VTE (Venous Thromboembolism) Risk  Recent Flowsheet Documentation  Taken 3/21/2025 0724 by Amalia Stinson RN  VTE Prevention/Management: SCDs on (sequential compression devices)  Intervention: Prevent Infection  Recent Flowsheet Documentation  Taken 3/21/2025 0724 by Amaila Stinson RN  Infection Prevention:   rest/sleep promoted   hand hygiene promoted

## 2025-03-21 NOTE — PROGRESS NOTES
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       The patient's daughter states that patient has had some vomiting last week.  He was having increased shortness of breath.  His wife noted that his oxygen saturation was low.  He was brought to emergency room for evaluation.     Initial vital signs showed temperature 97.5, pulse 46, blood pressure 148/61, oxygen saturation 95% on room air.  Laboratory workup showed sodium 128, potassium 5.8, creatinine 5.67, ALT 36, AST 31, troponin T high-sensitivity 103.  WBC 6.5, hemoglobin 8.9, platelets 164.  Venous blood gas showed pH 7.44/pCO2 44, pO2 82.5.  Chest x-ray showed large right pleural effusion with small to moderate left pleural effusion.  Bibasilar consolidation, patchy infiltrate in the central aspect of both lungs progressed on the left and improved on the right.  Felix catheter within decompressed urinary bladder.  Anasarca has progressed.     Nephrology was consulted from emergency room and recommended Lasix 100 mg IV.  He was also given calcium gluconate, Lokelma 10 g per G-tube.  He was also given a dose of IV ceftriaxone and azithromycin.  He was admitted to the hospital for further management.     He remains on 5 L of oxygen.  Interventional radiology performed a right-sided thoracentesis today.  He continues on IV  diuresis and nephrology was formally consulted.  Potassium and heart rate have improved.  Palliative care met with family to discuss goals of care and possible enrollment into hospice given his poor functional status, elderly age, worsening renal function without being a dialysis candidate, bradycardia without being a pacemaker candidate, and gradual deterioration with no truly reversible process.  They understand his poor prognosis and a care conference was held with the family on 3/18 to discuss all of the above, with attendees including hospitalist, palliative care, and nephrology.  Nephrology reiterated during the care conference that the patient is a poor dialysis candidate given his multiple comorbidities     Update on 3/21: Patient remains stable.  There are no acute reversible medical issues we are addressing currently.  Await family decision regarding hospice care versus discharging home with family and continuing restorative measures.  Once family makes a disposition decision, patient can discharge     Plans today:  -No significant changes made today  -Await patient/family decision making regarding disposition  -When family makes disposition decision patient can discharge.  One of the daughters reports that she expects family will make a decision today  - I updated two dtrs and son at bedside again today.  All questions answered     Problem list:  Acute hypoxic respiratory failure likely multifactorial including volume overload due to acute on chronic diastolic CHF exacerbation  Bilateral pleural effusions, right > left  Patient is on Lasix 20 mg daily.  Per family he had decreased urine output and increased shortness of breath.  Chest x-ray showed large right pleural effusion, moderate left pleural effusion.    -Nephrology consulted.  -Completed a course of IV diuresis, now transitioned to oral Lasix 80 mg twice a day  -s/p R sided thoracentesis with 900 ml removed     Hyperkalemia  ALEX on CKD stage  V  Nephrotic range proteinuria  Anasarca  Hyponatremia  Hypercalcemia   -Creatinine 1 year ago was near 1  -Most recent creatinine 4.07 on 2/26/2025.  Creatinine 5.67 on admission and potassium 5.8.  He was previously seen by nephrology and his daughter indicates that he was determined not to be a dialysis candidate.  Potassium has improved with shifting agents and Lokelma.  Creatinine has been steadily climbing over the past several weeks.  -completed IV diuresis; now on PO diuretic  -Daily BMP.  -Felix in place and tracking I's and O's.  - appreciate nephrology input.  As per nephrology not a dialysis candidate     Bradycardia  Junctional rhythm  This was an issue during his recent hospitalization as well.  There may be some metabolic component but I do worry about some underlying conduction disease.  Poor candidate for pacemaker due to the issues outlined above.  -Continue telemetry.     Demand ischemia  Patient had no evidence of chest pain. Troponin has been elevated during prior evaluation on 2/26/2025.  Troponin 103 on admission and flat, not consistent with acute coronary syndrome.  Likely demand ischemia from respiratory failure with poor enzyme clearance in the face of CKD.    -No further workup.     Diabetes mellitus type 2  He is on Lantus insulin 20 units every morning, insulin sliding scale PTA.  -Continue home Lantus and medium sliding scale insulin.     History of CVA with residual right-sided hemiplegia and baseline nonverbal status  Chronic dysphagia, status post PEG tube placement, G-tube exchange on 2/24/2025  S/p indwelling Felix catheter  Dyslipidemia  Hypertension  Patient resides at home and family takes care of him.  He also has a visiting nurse.  They appear to provide excellent care and support for him.  -Continue PTA medications including amlodipine and atorvastatin.  -Hold Plavix for thoracentesis.  -RD consulted to facilitate tube feeds.     Anemia and chronic kidney disease  -Resumed  ferrous sulfate.     Depression  -Resume Prozac.     Goals of care  The patient is chronically ill and essentially full cares as a result of a prior massive stroke.  He has chronic dysphagia and receives tube feeds via a PEG.  He is essentially bedbound and has residual right-sided hemiplegia and nonverbal status.  He is now faced with rapidly progressing chronic kidney disease leading to concerning electrolyte abnormalities, hypervolemia, and bradycardia.  He is an extremely poor dialysis candidate and dialysis has not been offered by nephrology.  Care conference was held on 3/18 with palliative care, nephrology, and this hospitalist.  Goals of care discussed.  Hospice being considered by family but formal decision has not yet been made        DVT Prophylaxis: Pneumatic Compression Devices  Code Status: No CPR- Do NOT Intubate  Diet: Adult Formula Drip Feeding: Continuous Nepro with Carbsteady; Gastrostomy; Goal Rate: 30; mL/hr; Advancement Instructions: Once mag and phos are back and WNL, please initiate TF at 15 ml/hr x 6 hours. If tolerating, okay to advance to goal rate of...    Ordoñez Catheter: PRESENT, indication: Chronic ordoñez  Disposition: Expected discharge after family decides whether or not to pursue hospice care at discharge           Interval History (Subjective):      Patient unable to provide any historical information as he is nonverbal.  Chart reviewed.  No significant issues overnight.  I updated 2 daughters and son at bedside today.  Await their decision regarding disposition and whether or not hospice will be pursued                  Physical Exam:      Last Vital Signs:  /59 (BP Location: Left arm)   Pulse 79   Temp 98.9  F (37.2  C) (Axillary)   Resp 18   Wt 79.5 kg (175 lb 4.3 oz)   SpO2 93%   BMI 28.29 kg/m        Intake/Output Summary (Last 24 hours) at 3/21/2025 1006  Last data filed at 3/21/2025 0624  Gross per 24 hour   Intake 240 ml   Output 1700 ml   Net -1460 ml        Constitutional: sleeping     Respiratory: Clear to auscultation bilaterally, no crackles or wheezing   Cardiovascular: Regular rate and rhythm, normal S1 and S2, and no murmur noted   Abdomen: Normal bowel sounds, soft, non-distended, non-tender   Skin: No rashes, no cyanosis, dry to touch   Neuro: Sleeping.  I did not wake the patient up   Extremities: No edema, normal range of motion   Other(s):        All other systems: Negative          Medications:      All current medications were reviewed with changes reflected in problem list.         Data:      All new lab and imaging data was reviewed.   Labs:       Lab Results   Component Value Date     03/21/2025     03/20/2025     03/19/2025     08/16/2012     08/13/2012    Lab Results   Component Value Date    CHLORIDE 88 03/21/2025    CHLORIDE 90 03/20/2025    CHLORIDE 90 03/19/2025    CHLORIDE 99 03/30/2022    CHLORIDE 98 02/01/2022    CHLORIDE 105 01/26/2022    CHLORIDE 101 08/16/2012    CHLORIDE 99 08/13/2012    Lab Results   Component Value Date    .0 03/21/2025    .9 03/20/2025    .7 03/19/2025    BUN 24 03/30/2022    BUN 22 02/01/2022    BUN 20 01/26/2022    BUN 18 08/16/2012    BUN 14 08/13/2012      Lab Results   Component Value Date    POTASSIUM 3.5 03/21/2025    POTASSIUM 3.6 03/20/2025    POTASSIUM 3.7 03/19/2025    POTASSIUM 4.4 03/30/2022    POTASSIUM 4.0 02/01/2022    POTASSIUM 3.6 01/26/2022    POTASSIUM 4.0 08/16/2012    POTASSIUM 3.6 08/13/2012    Lab Results   Component Value Date    CO2 28 03/21/2025    CO2 31 03/20/2025    CO2 31 03/19/2025    CO2 29 03/30/2022    CO2 27 02/01/2022    CO2 31 01/26/2022    CO2 26 08/16/2012    CO2 26 08/13/2012    Lab Results   Component Value Date    CR 5.55 03/21/2025    CR 5.95 03/20/2025    CR 5.94 03/19/2025    CR 0.96 08/16/2012    CR 0.91 08/13/2012        Recent Labs   Lab 03/18/25  0703   WBC 5.2   HGB 8.6*   HCT 27.2*   MCV 84         Imaging:    No results found for this or any previous visit (from the past 24 hours).

## 2025-03-22 LAB
ANION GAP SERPL CALCULATED.3IONS-SCNC: 10 MMOL/L (ref 7–15)
BACTERIA PLR CULT: NO GROWTH
BUN SERPL-MCNC: 123.4 MG/DL (ref 8–23)
CALCIUM SERPL-MCNC: 11.3 MG/DL (ref 8.8–10.4)
CHLORIDE SERPL-SCNC: 87 MMOL/L (ref 98–107)
CREAT SERPL-MCNC: 5.81 MG/DL (ref 0.67–1.17)
EGFRCR SERPLBLD CKD-EPI 2021: 10 ML/MIN/1.73M2
GLUCOSE BLDC GLUCOMTR-MCNC: 209 MG/DL (ref 70–99)
GLUCOSE BLDC GLUCOMTR-MCNC: 218 MG/DL (ref 70–99)
GLUCOSE BLDC GLUCOMTR-MCNC: 219 MG/DL (ref 70–99)
GLUCOSE BLDC GLUCOMTR-MCNC: 220 MG/DL (ref 70–99)
GLUCOSE BLDC GLUCOMTR-MCNC: 226 MG/DL (ref 70–99)
GLUCOSE BLDC GLUCOMTR-MCNC: 242 MG/DL (ref 70–99)
GLUCOSE SERPL-MCNC: 229 MG/DL (ref 70–99)
GRAM STAIN RESULT: NORMAL
GRAM STAIN RESULT: NORMAL
HCO3 SERPL-SCNC: 33 MMOL/L (ref 22–29)
POTASSIUM SERPL-SCNC: 3.4 MMOL/L (ref 3.4–5.3)
SODIUM SERPL-SCNC: 130 MMOL/L (ref 135–145)

## 2025-03-22 PROCEDURE — 80048 BASIC METABOLIC PNL TOTAL CA: CPT | Performed by: INTERNAL MEDICINE

## 2025-03-22 PROCEDURE — 36415 COLL VENOUS BLD VENIPUNCTURE: CPT | Performed by: INTERNAL MEDICINE

## 2025-03-22 PROCEDURE — 250N000013 HC RX MED GY IP 250 OP 250 PS 637: Performed by: INTERNAL MEDICINE

## 2025-03-22 PROCEDURE — 99232 SBSQ HOSP IP/OBS MODERATE 35: CPT | Performed by: INTERNAL MEDICINE

## 2025-03-22 PROCEDURE — 120N000001 HC R&B MED SURG/OB

## 2025-03-22 RX ADMIN — AMLODIPINE BESYLATE 10 MG: 10 TABLET ORAL at 08:33

## 2025-03-22 RX ADMIN — FLUOXETINE 20 MG: 20 SOLUTION ORAL at 08:33

## 2025-03-22 RX ADMIN — FUROSEMIDE 80 MG: 10 SOLUTION ORAL at 16:57

## 2025-03-22 RX ADMIN — INSULIN ASPART 2 UNITS: 100 INJECTION, SOLUTION INTRAVENOUS; SUBCUTANEOUS at 16:54

## 2025-03-22 RX ADMIN — FUROSEMIDE 80 MG: 10 SOLUTION ORAL at 08:33

## 2025-03-22 RX ADMIN — ACETAMINOPHEN 650 MG: 160 SOLUTION ORAL at 14:10

## 2025-03-22 RX ADMIN — INSULIN ASPART 2 UNITS: 100 INJECTION, SOLUTION INTRAVENOUS; SUBCUTANEOUS at 04:00

## 2025-03-22 RX ADMIN — ACETAMINOPHEN 650 MG: 160 SOLUTION ORAL at 22:21

## 2025-03-22 RX ADMIN — INSULIN GLARGINE 14 UNITS: 100 INJECTION, SOLUTION SUBCUTANEOUS at 08:03

## 2025-03-22 RX ADMIN — INSULIN ASPART 2 UNITS: 100 INJECTION, SOLUTION INTRAVENOUS; SUBCUTANEOUS at 12:07

## 2025-03-22 RX ADMIN — Medication 325 MG: at 08:34

## 2025-03-22 RX ADMIN — INSULIN ASPART 2 UNITS: 100 INJECTION, SOLUTION INTRAVENOUS; SUBCUTANEOUS at 19:53

## 2025-03-22 RX ADMIN — Medication 5 ML: at 19:56

## 2025-03-22 RX ADMIN — ACETAMINOPHEN 650 MG: 160 SOLUTION ORAL at 05:45

## 2025-03-22 RX ADMIN — CLOPIDOGREL BISULFATE 75 MG: 75 TABLET ORAL at 08:33

## 2025-03-22 RX ADMIN — INSULIN ASPART 2 UNITS: 100 INJECTION, SOLUTION INTRAVENOUS; SUBCUTANEOUS at 00:26

## 2025-03-22 RX ADMIN — DOCUSATE SODIUM 100 MG: 50 LIQUID ORAL at 18:26

## 2025-03-22 RX ADMIN — INSULIN ASPART 2 UNITS: 100 INJECTION, SOLUTION INTRAVENOUS; SUBCUTANEOUS at 08:03

## 2025-03-22 RX ADMIN — SENNOSIDES 5 ML: 8.8 LIQUID ORAL at 18:26

## 2025-03-22 RX ADMIN — DOXAZOSIN 2 MG: 1 TABLET ORAL at 08:33

## 2025-03-22 ASSESSMENT — ACTIVITIES OF DAILY LIVING (ADL)
ADLS_ACUITY_SCORE: 105
ADLS_ACUITY_SCORE: 103
ADLS_ACUITY_SCORE: 103
ADLS_ACUITY_SCORE: 105
ADLS_ACUITY_SCORE: 103
ADLS_ACUITY_SCORE: 105
ADLS_ACUITY_SCORE: 103
ADLS_ACUITY_SCORE: 105
ADLS_ACUITY_SCORE: 103

## 2025-03-22 NOTE — PLAN OF CARE
"Lethargic and non-verbal. Turn and reapportioned. Tube feeding continue.  Continue to monitor.    Problem: Adult Inpatient Plan of Care  Goal: Plan of Care Review  Description: The Plan of Care Review/Shift note should be completed every shift.  The Outcome Evaluation is a brief statement about your assessment that the patient is improving, declining, or no change.  This information will be displayed automatically on your shift  note.  Recent Flowsheet Documentation  Taken 3/21/2025 2225 by Virginie Junior RN  Outcome Evaluation: family at bed side. patient is lethergic and non verbal. No emesis noted during this shift.  Plan of Care Reviewed With:   patient   family  Overall Patient Progress: no change     Problem: Adult Inpatient Plan of Care  Goal: Plan of Care Review  Description: The Plan of Care Review/Shift note should be completed every shift.  The Outcome Evaluation is a brief statement about your assessment that the patient is improving, declining, or no change.  This information will be displayed automatically on your shiftnote.  Outcome: Progressing  Flowsheets (Taken 3/21/2025 2225)  Outcome Evaluation: family at bed side. patient is lethergic and non verbal. No emesis noted during this shift.  Plan of Care Reviewed With:   patient   family  Overall Patient Progress: no change  Goal: Patient-Specific Goal (Individualized)  Description: You can add care plan individualizations to a care plan. Examples of Individualization might be:  \"Parent requests to be called daily at 9am for status\", \"I have a hard time hearing out of my right ear\", or \"Do not touch me to wake me up as it startlesme\".  Outcome: Progressing  Goal: Absence of Hospital-Acquired Illness or Injury  Outcome: Progressing  Goal: Optimal Comfort and Wellbeing  Outcome: Progressing  Goal: Readiness for Transition of Care  Outcome: Progressing     Problem: Fall Injury Risk  Goal: Absence of Fall and Fall-Related Injury  Outcome: " Progressing     Problem: Gas Exchange Impaired  Goal: Optimal Gas Exchange  Outcome: Progressing   Goal Outcome Evaluation:      Plan of Care Reviewed With: patient, family    Overall Patient Progress: no changeOverall Patient Progress: no change    Outcome Evaluation: family at bed side. patient is lethergic and non verbal. No emesis noted during this shift.

## 2025-03-22 NOTE — PROGRESS NOTES
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       The patient's daughter states that patient has had some vomiting last week.  He was having increased shortness of breath.  His wife noted that his oxygen saturation was low.  He was brought to emergency room for evaluation.     Initial vital signs showed temperature 97.5, pulse 46, blood pressure 148/61, oxygen saturation 95% on room air.  Laboratory workup showed sodium 128, potassium 5.8, creatinine 5.67, ALT 36, AST 31, troponin T high-sensitivity 103.  WBC 6.5, hemoglobin 8.9, platelets 164.  Venous blood gas showed pH 7.44/pCO2 44, pO2 82.5.  Chest x-ray showed large right pleural effusion with small to moderate left pleural effusion.  Bibasilar consolidation, patchy infiltrate in the central aspect of both lungs progressed on the left and improved on the right.  Felix catheter within decompressed urinary bladder.  Anasarca has progressed.     Nephrology was consulted from emergency room and recommended Lasix 100 mg IV.  He was also given calcium gluconate, Lokelma 10 g per G-tube.  He was also given a dose of IV ceftriaxone and azithromycin.  He was admitted to the hospital for further management.     He remains on 5 L of oxygen.  Interventional radiology performed a right-sided thoracentesis today.  He continues on IV  diuresis and nephrology was formally consulted.  Potassium and heart rate have improved.  Palliative care met with family to discuss goals of care and possible enrollment into hospice given his poor functional status, elderly age, worsening renal function without being a dialysis candidate, bradycardia without being a pacemaker candidate, and gradual deterioration with no truly reversible process.  They understand his poor prognosis and a care conference was held with the family on 3/18 to discuss all of the above, with attendees including hospitalist, palliative care, and nephrology.  Nephrology reiterated during the care conference that the patient is a poor dialysis candidate given his multiple comorbidities     Update on 3/22: Patient remains stable.  There are no acute reversible medical issues we are addressing currently.  Continue to await family decision regarding hospice care versus discharging home with home cares and continuing restorative measures.  Once family makes a disposition decision, patient can discharge.   Unfortunately, palliative care was unable to meet with family the past 2 days due to palliative care's busy schedule/multiple consults.  Family hoping to meet with palliative again on Monday to solidify decision and readdress questions re: hospice     Plans today:  -No significant changes made today  -Await patient/family decision making regarding disposition  -When family makes disposition decision patient can discharge.      Addendum:  I returned to the patient's room this aftn after family arrived to answer questions; they had none     Problem list:  Acute hypoxic respiratory failure likely multifactorial including volume overload due to acute on chronic diastolic CHF exacerbation  Bilateral pleural effusions, right > left  Patient is on Lasix 20 mg daily.  Per family he had decreased urine output and increased shortness of breath.  Chest x-ray showed large right pleural effusion, moderate  left pleural effusion.    -Nephrology consulted.  -Completed a course of IV diuresis, now transitioned to oral Lasix 80 mg twice a day  -s/p R sided thoracentesis with 900 ml removed     Hyperkalemia  ALEX on CKD stage V  Nephrotic range proteinuria  Anasarca  Hyponatremia  Hypercalcemia   -Creatinine 1 year ago was near 1  -Most recent creatinine 4.07 on 2/26/2025.  Creatinine 5.67 on admission and potassium 5.8.  He was previously seen by nephrology and his daughter indicates that he was determined not to be a dialysis candidate.  Potassium has improved with shifting agents and Lokelma.  Creatinine has been steadily climbing over the past several weeks.  -completed IV diuresis; now on PO diuretic  -Daily BMP.  -Felix in place and tracking I's and O's.  - appreciate nephrology input.  As per nephrology not a dialysis candidate     Bradycardia  Junctional rhythm  This was an issue during his recent hospitalization as well.  There may be some metabolic component but I do worry about some underlying conduction disease.  Poor candidate for pacemaker due to the issues outlined above.  -Continue telemetry.     Demand ischemia  Patient had no evidence of chest pain. Troponin has been elevated during prior evaluation on 2/26/2025.  Troponin 103 on admission and flat, not consistent with acute coronary syndrome.  Likely demand ischemia from respiratory failure with poor enzyme clearance in the face of CKD.    -No further workup.     Diabetes mellitus type 2  He is on Lantus insulin 20 units every morning, insulin sliding scale PTA.  -Continue home Lantus and medium sliding scale insulin.     History of CVA with residual right-sided hemiplegia and baseline nonverbal status  Chronic dysphagia, status post PEG tube placement, G-tube exchange on 2/24/2025  S/p indwelling Felix catheter  Dyslipidemia  Hypertension  Patient resides at home and family takes care of him.  He also has a visiting nurse.  They appear to provide  excellent care and support for him.  -Continue PTA medications including amlodipine and atorvastatin.  -Hold Plavix for thoracentesis.  -RD consulted to facilitate tube feeds.     Anemia and chronic kidney disease  -Resumed ferrous sulfate.     Depression  -Resume Prozac.     Goals of care  The patient is chronically ill and essentially full cares as a result of a prior massive stroke.  He has chronic dysphagia and receives tube feeds via a PEG.  He is essentially bedbound and has residual right-sided hemiplegia and nonverbal status.  He is now faced with rapidly progressing chronic kidney disease leading to concerning electrolyte abnormalities, hypervolemia, and bradycardia.  He is an extremely poor dialysis candidate and dialysis has not been offered by nephrology.  Care conference was held on 3/18 with palliative care, nephrology, and this hospitalist.  Goals of care discussed.  Hospice being considered by family but formal decision has not yet been made        DVT Prophylaxis: Pneumatic Compression Devices  Code Status: No CPR- Do NOT Intubate  Diet: Adult Formula Drip Feeding: Continuous Nepro with Carbsteady; Gastrostomy; Goal Rate: 30; mL/hr; Advancement Instructions: Once mag and phos are back and WNL, please initiate TF at 15 ml/hr x 6 hours. If tolerating, okay to advance to goal rate of...    Ordoñez Catheter: PRESENT, indication: Chronic ordoñez  Disposition: Expected discharge after family decides whether or not to pursue hospice care at discharge              Interval History (Subjective):      Patient does not provide any verbal input due to chronic CVA with aphasia.  Noncommunicative.  Remains medically stable.  Await family decision regarding disposition plans                  Physical Exam:      Last Vital Signs:  /47 (BP Location: Left arm)   Pulse (!) 47   Temp 97.9  F (36.6  C) (Axillary)   Resp 14   Wt 71.5 kg (157 lb 10.1 oz)   SpO2 93%   BMI 25.44 kg/m        Intake/Output Summary  (Last 24 hours) at 3/22/2025 1045  Last data filed at 3/22/2025 0755  Gross per 24 hour   Intake 1939 ml   Output 625 ml   Net 1314 ml       Constitutional: Lying in bed, eyes closed     Respiratory: Clear to auscultation bilaterally, no crackles or wheezing   Cardiovascular: Regular rate and rhythm, normal S1 and S2, and no murmur noted   Abdomen: Normal bowel sounds, soft, non-distended, non-tender   Skin: No rashes, no cyanosis, dry to touch   Neuro: R sided weakness   Extremities: No edema, normal range of motion   Other(s):        All other systems: Negative          Medications:      All current medications were reviewed with changes reflected in problem list.         Data:      All new lab and imaging data was reviewed.   Labs:       Lab Results   Component Value Date     03/22/2025     03/21/2025     03/20/2025     08/16/2012     08/13/2012    Lab Results   Component Value Date    CHLORIDE 87 03/22/2025    CHLORIDE 88 03/21/2025    CHLORIDE 90 03/20/2025    CHLORIDE 99 03/30/2022    CHLORIDE 98 02/01/2022    CHLORIDE 105 01/26/2022    CHLORIDE 101 08/16/2012    CHLORIDE 99 08/13/2012    Lab Results   Component Value Date    .4 03/22/2025    .0 03/21/2025    .9 03/20/2025    BUN 24 03/30/2022    BUN 22 02/01/2022    BUN 20 01/26/2022    BUN 18 08/16/2012    BUN 14 08/13/2012      Lab Results   Component Value Date    POTASSIUM 3.4 03/22/2025    POTASSIUM 3.5 03/21/2025    POTASSIUM 3.6 03/20/2025    POTASSIUM 4.4 03/30/2022    POTASSIUM 4.0 02/01/2022    POTASSIUM 3.6 01/26/2022    POTASSIUM 4.0 08/16/2012    POTASSIUM 3.6 08/13/2012    Lab Results   Component Value Date    CO2 33 03/22/2025    CO2 28 03/21/2025    CO2 31 03/20/2025    CO2 29 03/30/2022    CO2 27 02/01/2022    CO2 31 01/26/2022    CO2 26 08/16/2012    CO2 26 08/13/2012    Lab Results   Component Value Date    CR 5.81 03/22/2025    CR 5.55 03/21/2025    CR 5.95 03/20/2025    CR 0.96 08/16/2012     CR 0.91 08/13/2012        Recent Labs   Lab 03/18/25  0703   WBC 5.2   HGB 8.6*   HCT 27.2*   MCV 84         Imaging:   No results found for this or any previous visit (from the past 24 hours).

## 2025-03-22 NOTE — PLAN OF CARE
Care from 23:00-07:30  Inpatient Progress Note - MS3  For vital signs and complete assessments, please see documentation flowsheets.     ORIENTATION: Non-verbal.  VSS except bradycardia.  PAIN: No non-verbal indications of pain.  O2: 1L NC  GI/: Ordoñez patent.  ACTIVITY: Bedbound; lift.  DIET: NPO.  LINES/DRAINS: L PIV SL. TF at goal rate of 30mL/hr with water flushes programmed per orders.  B, 209  PLAN: Pending.    Goal Outcome Evaluation:      Plan of Care Reviewed With: patient, family    Overall Patient Progress: no changeOverall Patient Progress: no change    Outcome Evaluation: Family at bedside overnight, pt is lethargic; non-verbal; Tube feed infusing, BGs/insulin q4hr; ordoñez in place; turns/repos provided.    Problem: Adult Inpatient Plan of Care  Goal: Plan of Care Review  Description: The Plan of Care Review/Shift note should be completed every shift.  The Outcome Evaluation is a brief statement about your assessment that the patient is improving, declining, or no change.  This information will be displayed automatically on your shift  note.  Recent Flowsheet Documentation  Taken 3/22/2025 0214 by Lianne Jones RN  Outcome Evaluation:   Family at bedside overnight, pt is lethargic   non-verbal   Tube feed infusing, BGs/insulin q4hr   ordoñez in place   turns/repos provided.  Plan of Care Reviewed With:   patient   family  Overall Patient Progress: no change  Goal: Absence of Hospital-Acquired Illness or Injury  Intervention: Identify and Manage Fall Risk  Recent Flowsheet Documentation  Taken 3/22/2025 0030 by Lianne Jones, RN  Safety Promotion/Fall Prevention:   safety round/check completed   supervised activity   room organization consistent   room near nurse's station   room door open   patient and family education   nonskid shoes/slippers when out of bed   mobility aid in reach   lighting adjusted   increase visualization of patient   clutter free environment maintained   increased  rounding and observation   assistive device/personal items within reach   activity supervised  Intervention: Prevent Skin Injury  Recent Flowsheet Documentation  Taken 3/22/2025 0030 by Lianne Jones RN  Body Position:   weight shifting   neutral head position   legs elevated   heels elevated  Skin Protection: adhesive use limited  Intervention: Prevent and Manage VTE (Venous Thromboembolism) Risk  Recent Flowsheet Documentation  Taken 3/22/2025 0030 by Lianne Jones RN  VTE Prevention/Management: SCDs on (sequential compression devices)  Intervention: Prevent Infection  Recent Flowsheet Documentation  Taken 3/22/2025 0030 by Lianne Jones RN  Infection Prevention:   rest/sleep promoted   single patient room provided  Goal: Optimal Comfort and Wellbeing  Intervention: Provide Person-Centered Care  Recent Flowsheet Documentation  Taken 3/22/2025 0030 by Lianne Jones RN  Trust Relationship/Rapport:   care explained   choices provided

## 2025-03-23 LAB
ANION GAP SERPL CALCULATED.3IONS-SCNC: 12 MMOL/L (ref 7–15)
BUN SERPL-MCNC: 126.4 MG/DL (ref 8–23)
CALCIUM SERPL-MCNC: 11.3 MG/DL (ref 8.8–10.4)
CHLORIDE SERPL-SCNC: 87 MMOL/L (ref 98–107)
CREAT SERPL-MCNC: 5.85 MG/DL (ref 0.67–1.17)
EGFRCR SERPLBLD CKD-EPI 2021: 9 ML/MIN/1.73M2
GLUCOSE BLDC GLUCOMTR-MCNC: 183 MG/DL (ref 70–99)
GLUCOSE BLDC GLUCOMTR-MCNC: 186 MG/DL (ref 70–99)
GLUCOSE BLDC GLUCOMTR-MCNC: 199 MG/DL (ref 70–99)
GLUCOSE BLDC GLUCOMTR-MCNC: 220 MG/DL (ref 70–99)
GLUCOSE BLDC GLUCOMTR-MCNC: 234 MG/DL (ref 70–99)
GLUCOSE BLDC GLUCOMTR-MCNC: 237 MG/DL (ref 70–99)
GLUCOSE SERPL-MCNC: 234 MG/DL (ref 70–99)
HCO3 SERPL-SCNC: 30 MMOL/L (ref 22–29)
POTASSIUM SERPL-SCNC: 3.5 MMOL/L (ref 3.4–5.3)
SODIUM SERPL-SCNC: 129 MMOL/L (ref 135–145)

## 2025-03-23 PROCEDURE — 80048 BASIC METABOLIC PNL TOTAL CA: CPT | Performed by: INTERNAL MEDICINE

## 2025-03-23 PROCEDURE — 250N000013 HC RX MED GY IP 250 OP 250 PS 637: Performed by: INTERNAL MEDICINE

## 2025-03-23 PROCEDURE — 250N000011 HC RX IP 250 OP 636: Performed by: INTERNAL MEDICINE

## 2025-03-23 PROCEDURE — 36415 COLL VENOUS BLD VENIPUNCTURE: CPT | Performed by: INTERNAL MEDICINE

## 2025-03-23 PROCEDURE — 99232 SBSQ HOSP IP/OBS MODERATE 35: CPT | Performed by: INTERNAL MEDICINE

## 2025-03-23 PROCEDURE — 120N000001 HC R&B MED SURG/OB

## 2025-03-23 RX ORDER — NALOXONE HYDROCHLORIDE 0.4 MG/ML
0.4 INJECTION, SOLUTION INTRAMUSCULAR; INTRAVENOUS; SUBCUTANEOUS
Status: DISCONTINUED | OUTPATIENT
Start: 2025-03-23 | End: 2025-04-18 | Stop reason: HOSPADM

## 2025-03-23 RX ORDER — CALCIUM CARBONATE 500 MG/1
1000 TABLET, CHEWABLE ORAL 4 TIMES DAILY PRN
Status: DISCONTINUED | OUTPATIENT
Start: 2025-03-23 | End: 2025-04-06

## 2025-03-23 RX ORDER — NALOXONE HYDROCHLORIDE 0.4 MG/ML
0.2 INJECTION, SOLUTION INTRAMUSCULAR; INTRAVENOUS; SUBCUTANEOUS
Status: DISCONTINUED | OUTPATIENT
Start: 2025-03-23 | End: 2025-04-18 | Stop reason: HOSPADM

## 2025-03-23 RX ORDER — MORPHINE SULFATE 2 MG/ML
1 INJECTION, SOLUTION INTRAMUSCULAR; INTRAVENOUS EVERY 8 HOURS PRN
Status: DISCONTINUED | OUTPATIENT
Start: 2025-03-23 | End: 2025-03-23

## 2025-03-23 RX ORDER — HYDROMORPHONE HCL IN WATER/PF 6 MG/30 ML
0.2 PATIENT CONTROLLED ANALGESIA SYRINGE INTRAVENOUS EVERY 8 HOURS PRN
Status: DISCONTINUED | OUTPATIENT
Start: 2025-03-23 | End: 2025-03-26

## 2025-03-23 RX ADMIN — INSULIN ASPART 2 UNITS: 100 INJECTION, SOLUTION INTRAVENOUS; SUBCUTANEOUS at 08:53

## 2025-03-23 RX ADMIN — FUROSEMIDE 80 MG: 10 SOLUTION ORAL at 08:51

## 2025-03-23 RX ADMIN — AMLODIPINE BESYLATE 10 MG: 10 TABLET ORAL at 08:51

## 2025-03-23 RX ADMIN — INSULIN GLARGINE 14 UNITS: 100 INJECTION, SOLUTION SUBCUTANEOUS at 08:53

## 2025-03-23 RX ADMIN — INSULIN ASPART 1 UNITS: 100 INJECTION, SOLUTION INTRAVENOUS; SUBCUTANEOUS at 20:44

## 2025-03-23 RX ADMIN — ACETAMINOPHEN 650 MG: 160 SOLUTION ORAL at 05:57

## 2025-03-23 RX ADMIN — FUROSEMIDE 80 MG: 10 SOLUTION ORAL at 17:16

## 2025-03-23 RX ADMIN — INSULIN ASPART 2 UNITS: 100 INJECTION, SOLUTION INTRAVENOUS; SUBCUTANEOUS at 12:55

## 2025-03-23 RX ADMIN — Medication 325 MG: at 08:52

## 2025-03-23 RX ADMIN — INSULIN ASPART 2 UNITS: 100 INJECTION, SOLUTION INTRAVENOUS; SUBCUTANEOUS at 03:51

## 2025-03-23 RX ADMIN — INSULIN ASPART 1 UNITS: 100 INJECTION, SOLUTION INTRAVENOUS; SUBCUTANEOUS at 17:16

## 2025-03-23 RX ADMIN — ACETAMINOPHEN 650 MG: 160 SOLUTION ORAL at 14:26

## 2025-03-23 RX ADMIN — Medication 5 ML: at 20:44

## 2025-03-23 RX ADMIN — SENNOSIDES 5 ML: 8.8 LIQUID ORAL at 08:54

## 2025-03-23 RX ADMIN — ONDANSETRON 4 MG: 2 INJECTION, SOLUTION INTRAMUSCULAR; INTRAVENOUS at 09:06

## 2025-03-23 RX ADMIN — CLOPIDOGREL BISULFATE 75 MG: 75 TABLET ORAL at 08:51

## 2025-03-23 RX ADMIN — INSULIN ASPART 2 UNITS: 100 INJECTION, SOLUTION INTRAVENOUS; SUBCUTANEOUS at 00:33

## 2025-03-23 RX ADMIN — FLUOXETINE 20 MG: 20 SOLUTION ORAL at 08:51

## 2025-03-23 RX ADMIN — DOXAZOSIN 2 MG: 1 TABLET ORAL at 08:51

## 2025-03-23 ASSESSMENT — ACTIVITIES OF DAILY LIVING (ADL)
ADLS_ACUITY_SCORE: 105
ADLS_ACUITY_SCORE: 103
ADLS_ACUITY_SCORE: 105
ADLS_ACUITY_SCORE: 103
ADLS_ACUITY_SCORE: 105
ADLS_ACUITY_SCORE: 105
ADLS_ACUITY_SCORE: 103
ADLS_ACUITY_SCORE: 105
ADLS_ACUITY_SCORE: 103
ADLS_ACUITY_SCORE: 103
ADLS_ACUITY_SCORE: 105
ADLS_ACUITY_SCORE: 103
ADLS_ACUITY_SCORE: 105
ADLS_ACUITY_SCORE: 103

## 2025-03-23 NOTE — PROGRESS NOTES
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       The patient's daughter states that patient has had some vomiting last week.  He was having increased shortness of breath.  His wife noted that his oxygen saturation was low.  He was brought to emergency room for evaluation.     Initial vital signs showed temperature 97.5, pulse 46, blood pressure 148/61, oxygen saturation 95% on room air.  Laboratory workup showed sodium 128, potassium 5.8, creatinine 5.67, ALT 36, AST 31, troponin T high-sensitivity 103.  WBC 6.5, hemoglobin 8.9, platelets 164.  Venous blood gas showed pH 7.44/pCO2 44, pO2 82.5.  Chest x-ray showed large right pleural effusion with small to moderate left pleural effusion.  Bibasilar consolidation, patchy infiltrate in the central aspect of both lungs progressed on the left and improved on the right.  Felix catheter within decompressed urinary bladder.  Anasarca has progressed.     Nephrology was consulted from emergency room and recommended Lasix 100 mg IV.  He was also given calcium gluconate, Lokelma 10 g per G-tube.  He was also given a dose of IV ceftriaxone and azithromycin.  He was admitted to the hospital for further management.     He remains on 5 L of oxygen.  Interventional radiology performed a right-sided thoracentesis today.  He continues on IV  "diuresis and nephrology was formally consulted.  Potassium and heart rate have improved.  Palliative care met with family to discuss goals of care and possible enrollment into hospice given his poor functional status, elderly age, worsening renal function without being a dialysis candidate, bradycardia without being a pacemaker candidate, and gradual deterioration with no truly reversible process.  They understand his poor prognosis and a care conference was held with the family on 3/18 to discuss all of the above, with attendees including hospitalist, palliative care, and nephrology.  Nephrology reiterated during the care conference that the patient is a poor dialysis candidate given his multiple comorbidities     Update on 3/23: Patient remains stable.  There are no acute reversible medical issues we are addressing currently.  Continue to await family decision regarding hospice care versus discharging home with home cares and continuing restorative measures.  Once family makes a disposition decision, patient can discharge.  Spoke with 1 of the patient's daughters today.  She says she \"understands the endpoint, and wants her father to reach that with the least amount of discomfort is possible\".  she says that all the family members including the daughters and patient's spouse are interested in looking at comfort measures, but the patient's son has a different viewpoint on this from the rest of the family.  Family discussions are ongoing and they are hoping to meet with palliative care again tomorrow     Plans today:  -Increase Lantus from 14 units to 18 units daily given hyperglycemia  -Await patient/family decision making regarding disposition  -When family makes disposition decision patient can discharge.    -Daughter updated at bedside.  See discussion above     Problem list:  Acute hypoxic respiratory failure likely multifactorial including volume overload due to acute on chronic diastolic CHF " exacerbation  Bilateral pleural effusions, right > left  Patient is on Lasix 20 mg daily.  Per family he had decreased urine output and increased shortness of breath.  Chest x-ray showed large right pleural effusion, moderate left pleural effusion.    -Nephrology consulted.  -Completed a course of IV diuresis, now transitioned to oral Lasix 80 mg twice a day  -s/p R sided thoracentesis with 900 ml removed     Hyperkalemia  ALEX on CKD stage V  Nephrotic range proteinuria  Anasarca  Hyponatremia  Hypercalcemia   -Creatinine 1 year ago was near 1  -Most recent creatinine 4.07 on 2/26/2025.  Creatinine 5.67 on admission and potassium 5.8.  He was previously seen by nephrology and his daughter indicates that he was determined not to be a dialysis candidate.  Potassium has improved with shifting agents and Lokelma.  Creatinine has been steadily climbing over the past several weeks.  -completed IV diuresis; now on PO diuretic  -Daily BMP.  -Felix in place and tracking I's and O's.  - appreciate nephrology input.  As per nephrology not a dialysis candidate     Bradycardia  Junctional rhythm  This was an issue during his recent hospitalization as well.  There may be some metabolic component but I do worry about some underlying conduction disease.  Poor candidate for pacemaker due to the issues outlined above.  -Continue telemetry.     Demand ischemia  Patient had no evidence of chest pain. Troponin has been elevated during prior evaluation on 2/26/2025.  Troponin 103 on admission and flat, not consistent with acute coronary syndrome.  Likely demand ischemia from respiratory failure with poor enzyme clearance in the face of CKD.    -No further workup.     Diabetes mellitus type 2  He is on Lantus insulin 20 units every morning, insulin sliding scale PTA.  -Continue home Lantus and medium sliding scale insulin.     History of CVA with residual right-sided hemiplegia and baseline nonverbal status  Chronic dysphagia, status post  PEG tube placement, G-tube exchange on 2/24/2025  S/p indwelling Ordoñez catheter  Dyslipidemia  Hypertension  Patient resides at home and family takes care of him.  He also has a visiting nurse.  They appear to provide excellent care and support for him.  -Continue PTA medications including amlodipine and atorvastatin.  -Hold Plavix for thoracentesis.  -RD consulted to facilitate tube feeds.     Anemia and chronic kidney disease  -Resumed ferrous sulfate.     Depression  -Resume Prozac.     Goals of care  The patient is chronically ill and essentially full cares as a result of a prior massive stroke.  He has chronic dysphagia and receives tube feeds via a PEG.  He is essentially bedbound and has residual right-sided hemiplegia and nonverbal status.  He is now faced with rapidly progressing chronic kidney disease leading to concerning electrolyte abnormalities, hypervolemia, and bradycardia.  He is an extremely poor dialysis candidate and dialysis has not been offered by nephrology.  Care conference was held on 3/18 with palliative care, nephrology, and this hospitalist.  Goals of care discussed.  Hospice being considered by family but formal decision has not yet been made  Per one of the daughters, she says that all the family members including the daughters and patient's spouse are interested in looking at comfort measures, but the patient's son has a different viewpoint on this from the rest of the family.  Family discussions are ongoing        DVT Prophylaxis: Pneumatic Compression Devices  Code Status: No CPR- Do NOT Intubate  Diet: Adult Formula Drip Feeding: Continuous Nepro with Carbsteady; Gastrostomy; Goal Rate: 30; mL/hr; Advancement Instructions: Once mag and phos are back and WNL, please initiate TF at 15 ml/hr x 6 hours. If tolerating, okay to advance to goal rate of...    Ordoñez Catheter: PRESENT, indication: Chronic ordoñez  Disposition: Expected discharge after family decides whether or not to pursue  hospice care at discharge           Interval History (Subjective):      Patient aphasic and unable to provide any historical information.  I spoke with the patient's daughter at bedside.  No significant changes made to care plan today.  Family hoping to meet with palliative care again tomorrow                  Physical Exam:      Last Vital Signs:  BP (!) 145/52 (BP Location: Left arm)   Pulse 54   Temp 97.5  F (36.4  C) (Axillary)   Resp 14   Wt 71.5 kg (157 lb 10.1 oz)   SpO2 99%   BMI 25.44 kg/m        Intake/Output Summary (Last 24 hours) at 3/23/2025 1015  Last data filed at 3/23/2025 0607  Gross per 24 hour   Intake 1285 ml   Output 675 ml   Net 610 ml       Constitutional: Eyes closed, non-verbal     Respiratory: Clear to auscultation bilaterally, no crackles or wheezing   Cardiovascular: Regular rate and rhythm, normal S1 and S2, and no murmur noted   Abdomen: Normal bowel sounds, soft, non-distended, non-tender   Skin: No rashes, no cyanosis, dry to touch   Neuro: As above   Extremities: No edema, normal range of motion   Other(s):        All other systems: Negative          Medications:      All current medications were reviewed with changes reflected in problem list.         Data:      All new lab and imaging data was reviewed.   Labs:       Lab Results   Component Value Date     03/23/2025     03/22/2025     03/21/2025     08/16/2012     08/13/2012    Lab Results   Component Value Date    CHLORIDE 87 03/23/2025    CHLORIDE 87 03/22/2025    CHLORIDE 88 03/21/2025    CHLORIDE 99 03/30/2022    CHLORIDE 98 02/01/2022    CHLORIDE 105 01/26/2022    CHLORIDE 101 08/16/2012    CHLORIDE 99 08/13/2012    Lab Results   Component Value Date    .4 03/23/2025    .4 03/22/2025    .0 03/21/2025    BUN 24 03/30/2022    BUN 22 02/01/2022    BUN 20 01/26/2022    BUN 18 08/16/2012    BUN 14 08/13/2012      Lab Results   Component Value Date    POTASSIUM 3.5 03/23/2025     POTASSIUM 3.4 03/22/2025    POTASSIUM 3.5 03/21/2025    POTASSIUM 4.4 03/30/2022    POTASSIUM 4.0 02/01/2022    POTASSIUM 3.6 01/26/2022    POTASSIUM 4.0 08/16/2012    POTASSIUM 3.6 08/13/2012    Lab Results   Component Value Date    CO2 30 03/23/2025    CO2 33 03/22/2025    CO2 28 03/21/2025    CO2 29 03/30/2022    CO2 27 02/01/2022    CO2 31 01/26/2022    CO2 26 08/16/2012    CO2 26 08/13/2012    Lab Results   Component Value Date    CR 5.85 03/23/2025    CR 5.81 03/22/2025    CR 5.55 03/21/2025    CR 0.96 08/16/2012    CR 0.91 08/13/2012        Recent Labs   Lab 03/18/25  0703   WBC 5.2   HGB 8.6*   HCT 27.2*   MCV 84         Imaging:   No results found for this or any previous visit (from the past 24 hours).

## 2025-03-23 NOTE — PLAN OF CARE
Goal Outcome Evaluation:      Plan of Care Reviewed With: patient, spouse, child    Overall Patient Progress: decliningOverall Patient Progress: declining    Outcome Evaluation: Lethargy continues, minimal eye opening. Some facial grimacing, tylenol provided and pt family will contact RN if add'l pain medication needed. SSI coverage. +Flatus, senna given again today. Palliative to meet w/family Monday.      Problem: Adult Inpatient Plan of Care  Goal: Plan of Care Review  Description: The Plan of Care Review/Shift note should be completed every shift.  The Outcome Evaluation is a brief statement about your assessment that the patient is improving, declining, or no change.  This information will be displayed automatically on your shift  note.  Recent Flowsheet Documentation  Taken 3/23/2025 1236 by Amalia Stinson RN  Outcome Evaluation: Lethargy continues, minimal eye opening. Some facial grimacing, tylenol provided and pt family will contact RN if add'l pain medication needed. SSI coverage. +Flatus, senna given again today. Palliative to meet w/family Monday.  Plan of Care Reviewed With:   patient   spouse   child  Overall Patient Progress: declining  Goal: Absence of Hospital-Acquired Illness or Injury  Intervention: Prevent Skin Injury  Recent Flowsheet Documentation  Taken 3/23/2025 0900 by Amalia Stinson, RN  Body Position:   supine, head elevated   supine, legs elevated

## 2025-03-23 NOTE — PLAN OF CARE
Goal Outcome Evaluation:      Plan of Care Reviewed With: patient, spouse, child    Overall Patient Progress: decliningOverall Patient Progress: declining    Outcome Evaluation: Slept most of the shift. Urine output decreasing. Respirations at times irregular with ~10sec apnea. Room air O2 >90%. Family prefers O2 on for comfort. Reposition q2 hours. HR 40's-50s. No issues with tube feeding tolerance. Palliative to see pt Monday.        Problem: Adult Inpatient Plan of Care  Goal: Plan of Care Review  Description: The Plan of Care Review/Shift note should be completed every shift.  The Outcome Evaluation is a brief statement about your assessment that the patient is improving, declining, or no change.  This information will be displayed automatically on your shift  note.  Recent Flowsheet Documentation  Taken 3/22/2025 1853 by Amalia Stinson RN  Outcome Evaluation: Slept most of the shift. Urine output decreasing. Respirations at times irregular with ~10sec apnea. Room air O2 >90%. Family prefers O2 on for comfort. Reposition q2 hours. HR 40's-50s. No issues with tube feeding tolerance. Palliative to see pt Monday.  Plan of Care Reviewed With:   patient   spouse   child  Overall Patient Progress: declining  Goal: Absence of Hospital-Acquired Illness or Injury  Intervention: Identify and Manage Fall Risk  Recent Flowsheet Documentation  Taken 3/22/2025 0923 by Amalia Stinson, RN  Safety Promotion/Fall Prevention:   safety round/check completed   room organization consistent   room near nurse's station   patient and family education   nonskid shoes/slippers when out of bed   mobility aid in reach   lighting adjusted   increase visualization of patient   clutter free environment maintained   increased rounding and observation   assistive device/personal items within reach   activity supervised   supervised activity  Intervention: Prevent Skin Injury  Recent Flowsheet Documentation  Taken 3/22/2025 1719 by Amalia Stinson  A, RN  Body Position:   supine   heels elevated   legs elevated  Taken 3/22/2025 1410 by Amalia Stinson RN  Body Position:   turned   right   foot of bed elevated  Taken 3/22/2025 1216 by Amalia Stinson RN  Body Position:   supine   heels elevated   foot of bed elevated  Taken 3/22/2025 0947 by Amalia Stinson RN  Body Position:   weight shifting   neutral head position   legs elevated   heels elevated  Skin Protection: adhesive use limited  Taken 3/22/2025 0755 by Amalia Stinson RN  Body Position: supine, head elevated  Intervention: Prevent and Manage VTE (Venous Thromboembolism) Risk  Recent Flowsheet Documentation  Taken 3/22/2025 0947 by Amalia Stinson RN  VTE Prevention/Management: SCDs on (sequential compression devices)  Intervention: Prevent Infection  Recent Flowsheet Documentation  Taken 3/22/2025 0947 by Amalia Stinson RN  Infection Prevention:   rest/sleep promoted   single patient room provided  Goal: Optimal Comfort and Wellbeing  Intervention: Provide Person-Centered Care  Recent Flowsheet Documentation  Taken 3/22/2025 0947 by Amalia Stinson RN  Trust Relationship/Rapport:   care explained   choices provided

## 2025-03-23 NOTE — PLAN OF CARE
Care from 19:00-07:30  Inpatient Progress Note - MS3  For vital signs and complete assessments, please see documentation flowsheets.     ORIENTATION: Non-verbal.  VSS except bradycardia.  PAIN: No non-verbal indications of pain.  O2: 1L NC  GI/: Felix patent.  ACTIVITY: Bedbound; lift.  DIET: NPO.  LINES/DRAINS: L PIV SL. TF at goal rate of 30mL/hr with water flushes programmed per orders.  B, 237, 119  PLAN: Pending. Palliative to follow.    Goal Outcome Evaluation:      Plan of Care Reviewed With: patient    Overall Patient Progress: no changeOverall Patient Progress: no change    Outcome Evaluation: Lethargic, non-verbal. Decreased UO. Remained on 1L NC overnight. Family at bedside. Bradycardic. TF running at goal rate.    Problem: Adult Inpatient Plan of Care  Goal: Plan of Care Review  Description: The Plan of Care Review/Shift note should be completed every shift.  The Outcome Evaluation is a brief statement about your assessment that the patient is improving, declining, or no change.  This information will be displayed automatically on your shift  note.  Recent Flowsheet Documentation  Taken 3/23/2025 030 by Lianne Jones RN  Outcome Evaluation: Lethargic, non-verbal. Decreased UO. Remained on 1L NC overnight. Family at bedside. Bradycardic. TF running at goal rate.  Plan of Care Reviewed With: patient  Overall Patient Progress: no change  Goal: Absence of Hospital-Acquired Illness or Injury  Intervention: Identify and Manage Fall Risk  Recent Flowsheet Documentation  Taken 3/23/2025 0039 by Lianne Jones RN  Safety Promotion/Fall Prevention:   safety round/check completed   supervised activity   room organization consistent   room near nurse's station   room door open   patient and family education   nonskid shoes/slippers when out of bed   mobility aid in reach   lighting adjusted   increase visualization of patient   increased rounding and observation   clutter free environment  maintained   assistive device/personal items within reach   activity supervised  Taken 3/22/2025 2010 by Lianne Jones RN  Safety Promotion/Fall Prevention:   safety round/check completed   supervised activity   room organization consistent   room near nurse's station   room door open   patient and family education   nonskid shoes/slippers when out of bed   mobility aid in reach   lighting adjusted   increase visualization of patient   increased rounding and observation   clutter free environment maintained   assistive device/personal items within reach   activity supervised  Intervention: Prevent Skin Injury  Recent Flowsheet Documentation  Taken 3/23/2025 0039 by Lianne Jones RN  Body Position:   weight shifting   upper extremity elevated   neutral head position   neutral body alignment   legs elevated   heels elevated  Skin Protection: adhesive use limited  Taken 3/22/2025 2236 by Lianne Jones RN  Body Position:   turned   left   heels elevated   legs elevated   neutral body alignment   neutral head position   upper extremity elevated   weight shifting  Taken 3/22/2025 2010 by Lianne Jones RN  Skin Protection: adhesive use limited  Taken 3/22/2025 2009 by Lianne Jones RN  Body Position: weight shifting  Intervention: Prevent and Manage VTE (Venous Thromboembolism) Risk  Recent Flowsheet Documentation  Taken 3/23/2025 0039 by Lianne Jones RN  VTE Prevention/Management: SCDs on (sequential compression devices)  Taken 3/22/2025 2010 by Lianne Jones RN  VTE Prevention/Management: SCDs on (sequential compression devices)  Intervention: Prevent Infection  Recent Flowsheet Documentation  Taken 3/23/2025 0039 by Lianne Jones RN  Infection Prevention:   rest/sleep promoted   single patient room provided  Taken 3/22/2025 2010 by Lianne Jones RN  Infection Prevention:   rest/sleep promoted   single patient room provided  Goal: Optimal Comfort and Wellbeing  Intervention: Provide  Person-Centered Care  Recent Flowsheet Documentation  Taken 3/23/2025 0039 by Lianne Jones, RN  Trust Relationship/Rapport:   care explained   reassurance provided  Taken 3/22/2025 2010 by Lianne Jones, RN  Trust Relationship/Rapport:   care explained   reassurance provided

## 2025-03-23 NOTE — PROGRESS NOTES
Care Management Follow Up    Length of Stay (days): 8    Expected Discharge Date: 03/24/2025     Concerns to be Addressed: discharge planning       Additional Information:  SW spoke with patient's daughter to inquire about hospice conversation. Daughter shared that they have been trying to meet with palliative to discuss further and have a meeting scheduled with them on Monday.     Next Steps: CM will follow up about hospice decision on Monday.    JOSE ALEJANDRO Hurd, [facility]   Care [email or web address]

## 2025-03-24 LAB
ANION GAP SERPL CALCULATED.3IONS-SCNC: 14 MMOL/L (ref 7–15)
BUN SERPL-MCNC: 121.6 MG/DL (ref 8–23)
CALCIUM SERPL-MCNC: 11.7 MG/DL (ref 8.8–10.4)
CHLORIDE SERPL-SCNC: 87 MMOL/L (ref 98–107)
CREAT SERPL-MCNC: 5.77 MG/DL (ref 0.67–1.17)
EGFRCR SERPLBLD CKD-EPI 2021: 10 ML/MIN/1.73M2
GLUCOSE BLDC GLUCOMTR-MCNC: 157 MG/DL (ref 70–99)
GLUCOSE BLDC GLUCOMTR-MCNC: 181 MG/DL (ref 70–99)
GLUCOSE BLDC GLUCOMTR-MCNC: 183 MG/DL (ref 70–99)
GLUCOSE BLDC GLUCOMTR-MCNC: 205 MG/DL (ref 70–99)
GLUCOSE BLDC GLUCOMTR-MCNC: 207 MG/DL (ref 70–99)
GLUCOSE BLDC GLUCOMTR-MCNC: 215 MG/DL (ref 70–99)
GLUCOSE BLDC GLUCOMTR-MCNC: 232 MG/DL (ref 70–99)
GLUCOSE SERPL-MCNC: 205 MG/DL (ref 70–99)
HCO3 SERPL-SCNC: 32 MMOL/L (ref 22–29)
POTASSIUM SERPL-SCNC: 3.1 MMOL/L (ref 3.4–5.3)
SODIUM SERPL-SCNC: 133 MMOL/L (ref 135–145)

## 2025-03-24 PROCEDURE — 120N000001 HC R&B MED SURG/OB

## 2025-03-24 PROCEDURE — 80048 BASIC METABOLIC PNL TOTAL CA: CPT | Performed by: INTERNAL MEDICINE

## 2025-03-24 PROCEDURE — 36415 COLL VENOUS BLD VENIPUNCTURE: CPT | Performed by: INTERNAL MEDICINE

## 2025-03-24 PROCEDURE — 250N000013 HC RX MED GY IP 250 OP 250 PS 637: Performed by: INTERNAL MEDICINE

## 2025-03-24 PROCEDURE — 99233 SBSQ HOSP IP/OBS HIGH 50: CPT | Performed by: CLINICAL NURSE SPECIALIST

## 2025-03-24 PROCEDURE — 99418 PROLNG IP/OBS E/M EA 15 MIN: CPT | Performed by: CLINICAL NURSE SPECIALIST

## 2025-03-24 PROCEDURE — 99232 SBSQ HOSP IP/OBS MODERATE 35: CPT | Performed by: INTERNAL MEDICINE

## 2025-03-24 RX ADMIN — FLUOXETINE 20 MG: 20 SOLUTION ORAL at 10:31

## 2025-03-24 RX ADMIN — INSULIN ASPART 1 UNITS: 100 INJECTION, SOLUTION INTRAVENOUS; SUBCUTANEOUS at 00:16

## 2025-03-24 RX ADMIN — Medication 5 ML: at 20:15

## 2025-03-24 RX ADMIN — INSULIN ASPART 2 UNITS: 100 INJECTION, SOLUTION INTRAVENOUS; SUBCUTANEOUS at 10:20

## 2025-03-24 RX ADMIN — AMLODIPINE BESYLATE 10 MG: 10 TABLET ORAL at 10:41

## 2025-03-24 RX ADMIN — DOXAZOSIN 2 MG: 1 TABLET ORAL at 10:42

## 2025-03-24 RX ADMIN — CLOPIDOGREL BISULFATE 75 MG: 75 TABLET ORAL at 10:41

## 2025-03-24 RX ADMIN — Medication 325 MG: at 10:32

## 2025-03-24 RX ADMIN — FUROSEMIDE 80 MG: 10 SOLUTION ORAL at 10:30

## 2025-03-24 RX ADMIN — POLYETHYLENE GLYCOL 3350 17 G: 17 POWDER, FOR SOLUTION ORAL at 17:30

## 2025-03-24 RX ADMIN — INSULIN ASPART 2 UNITS: 100 INJECTION, SOLUTION INTRAVENOUS; SUBCUTANEOUS at 04:01

## 2025-03-24 RX ADMIN — INSULIN ASPART 2 UNITS: 100 INJECTION, SOLUTION INTRAVENOUS; SUBCUTANEOUS at 13:28

## 2025-03-24 RX ADMIN — INSULIN ASPART 1 UNITS: 100 INJECTION, SOLUTION INTRAVENOUS; SUBCUTANEOUS at 20:27

## 2025-03-24 RX ADMIN — INSULIN ASPART 2 UNITS: 100 INJECTION, SOLUTION INTRAVENOUS; SUBCUTANEOUS at 17:59

## 2025-03-24 RX ADMIN — ACETAMINOPHEN 650 MG: 160 SOLUTION ORAL at 17:30

## 2025-03-24 RX ADMIN — ACETAMINOPHEN 650 MG: 160 SOLUTION ORAL at 10:37

## 2025-03-24 RX ADMIN — FUROSEMIDE 80 MG: 10 SOLUTION ORAL at 17:33

## 2025-03-24 RX ADMIN — ACETAMINOPHEN 650 MG: 160 SOLUTION ORAL at 00:17

## 2025-03-24 ASSESSMENT — ACTIVITIES OF DAILY LIVING (ADL)
ADLS_ACUITY_SCORE: 103

## 2025-03-24 NOTE — PLAN OF CARE
Goal Outcome Evaluation:      Plan of Care Reviewed With: patient    Overall Patient Progress: no changeOverall Patient Progress: no change    Outcome Evaluation: Lethargic. Non verbal. O2 Sat 98% on oxygen, 1 LPM via NC. Turn and reposition q2h. Tolerating tube feeding at 30 mL/h. Felix in place and patent. Assist x 2 with lift device. Total care. Palliative consulted with family today. family at bedside. PIV SL. No signs of pain    Pt was grimacing. Scheduled Tylenol given. , 232, and 205. Sliding scale insulin and long acting insulin given.    Problem: Adult Inpatient Plan of Care  Goal: Plan of Care Review  Description: The Plan of Care Review/Shift note should be completed every shift.  The Outcome Evaluation is a brief statement about your assessment that the patient is improving, declining, or no change.  This information will be displayed automatically on your shift  note.  Recent Flowsheet Documentation  Taken 3/24/2025 1437 by Donna Bingham RN  Outcome Evaluation: Lethargic. Non verbal. O2 Sat 98% on oxygen, 1 LPM via NC. Turn and reposition q2h. Tolerating tube feeding at 30 mL/h. Felix in place and patent. Assist x 2 with lift device. Total care. Palliative consulted with family today. family at bedside. PIV SL. No signs of pain  Plan of Care Reviewed With: patient  Overall Patient Progress: no change  Goal: Absence of Hospital-Acquired Illness or Injury  Intervention: Identify and Manage Fall Risk  Recent Flowsheet Documentation  Taken 3/24/2025 0730 by Donna Bingham, RN  Safety Promotion/Fall Prevention:   clutter free environment maintained   room near nurse's station   safety round/check completed  Intervention: Prevent and Manage VTE (Venous Thromboembolism) Risk  Recent Flowsheet Documentation  Taken 3/24/2025 1026 by Donna Bingham, RAHAT  VTE Prevention/Management: SCDs on (sequential compression devices)  Intervention: Prevent Infection  Recent Flowsheet Documentation  Taken 3/24/2025 1026  by Donna Bingham, RN  Infection Prevention:   hand hygiene promoted   single patient room provided  Goal: Optimal Comfort and Wellbeing  Intervention: Provide Person-Centered Care  Recent Flowsheet Documentation  Taken 3/24/2025 1026 by Donna Bingham, RN  Trust Relationship/Rapport: care explained

## 2025-03-24 NOTE — PROGRESS NOTES
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       The patient's daughter states that patient has had some vomiting last week.  He was having increased shortness of breath.  His wife noted that his oxygen saturation was low.  He was brought to emergency room for evaluation.     Initial vital signs showed temperature 97.5, pulse 46, blood pressure 148/61, oxygen saturation 95% on room air.  Laboratory workup showed sodium 128, potassium 5.8, creatinine 5.67, ALT 36, AST 31, troponin T high-sensitivity 103.  WBC 6.5, hemoglobin 8.9, platelets 164.  Venous blood gas showed pH 7.44/pCO2 44, pO2 82.5.  Chest x-ray showed large right pleural effusion with small to moderate left pleural effusion.  Bibasilar consolidation, patchy infiltrate in the central aspect of both lungs progressed on the left and improved on the right.  Felix catheter within decompressed urinary bladder.  Anasarca has progressed.     Nephrology was consulted from emergency room and recommended Lasix 100 mg IV.  He was also given calcium gluconate, Lokelma 10 g per G-tube.  He was also given a dose of IV ceftriaxone and azithromycin.  He was admitted to the hospital for further management.     He remains on 5 L of oxygen.  Interventional radiology performed a right-sided thoracentesis today.  He continues on IV diuresis and  nephrology was formally consulted.  Potassium and heart rate have improved.  Palliative care met with family to discuss goals of care and possible enrollment into hospice given his poor functional status, elderly age, worsening renal function without being a dialysis candidate, bradycardia without being a pacemaker candidate, and gradual deterioration with no truly reversible process.  They understand his poor prognosis and a care conference was held with the family on 3/18 to discuss all of the above, with attendees including hospitalist, palliative care, and nephrology.  Nephrology reiterated during the care conference that the patient is a poor dialysis candidate given his multiple comorbidities     Update on 3/24: Patient remains stable.  There are no acute reversible medical issues we are addressing currently.  Continue to await family decision regarding hospice care versus discharging home with home cares and continuing restorative measures.  Once family makes a disposition decision, patient can discharge.      I spoke with all the children (2 daughters and 1 son) today.  They asked if the patient could remain in the hospital until he passes away.  I told them this was not an option unless the patient was actively dying, and we do not suspect this is the case.  They asked about life expectancy.  I said that is difficult to predict, but likely on the order of weeks to months in the setting of progressive chronic kidney disease.  I made it clear to them that the patient will discharge with two conceivable options: Discharge home with family and outpatient therapies, (restorative cares) versus discharge home with hospice care.  Family had several questions about hospice including if he would continue his home medications and whether or not his primary care provider or hospice provider would be handling his acute needs postdischarge.  I encouraged all family members to talk with palliative care today to answer any  remaining questions that they have.  I told the family members that we are awaiting a decision from them so we can plan accordingly to proceed with discharge, and that they should make a decision about how they want to proceed today after all their questions are addressed     Plans today:  - anticipate palliative care to meet again with family today to address lingering questions they have regarding planning above  - I have indicated to family that we need a decision from them to proceed with discharge planning  -When family makes disposition decision patient can discharge.    - there is nothing reversible that requires further inpatient treatment  - family updated again today.  See discussion above     Problem list:  Acute hypoxic respiratory failure likely multifactorial including volume overload due to acute on chronic diastolic CHF exacerbation  Bilateral pleural effusions, right > left  Patient is on Lasix 20 mg daily.  Per family he had decreased urine output and increased shortness of breath.  Chest x-ray showed large right pleural effusion, moderate left pleural effusion.    -Nephrology consulted.  -Completed a course of IV diuresis, now transitioned to oral Lasix 80 mg twice a day  -s/p R sided thoracentesis with 900 ml removed     Hyperkalemia  ALEX on CKD stage V  Nephrotic range proteinuria  Anasarca  Hyponatremia  Hypercalcemia   -Creatinine 1 year ago was near 1  -Most recent creatinine 4.07 on 2/26/2025.  Creatinine 5.67 on admission and potassium 5.8.  He was previously seen by nephrology and his daughter indicates that he was determined not to be a dialysis candidate.  Potassium has improved with shifting agents and Lokelma.  Creatinine has been steadily climbing over the past several weeks.  -completed IV diuresis; now on PO diuretic  -Daily BMP.  -Felix in place and tracking I's and O's.  - appreciate nephrology input.  As per nephrology not a dialysis candidate     Mando  Junctional  rhythm  This was an issue during his recent hospitalization as well.  There may be some metabolic component but I do worry about some underlying conduction disease.  Poor candidate for pacemaker due to the issues outlined above.  -Continue telemetry.     Demand ischemia  Patient had no evidence of chest pain. Troponin has been elevated during prior evaluation on 2/26/2025.  Troponin 103 on admission and flat, not consistent with acute coronary syndrome.  Likely demand ischemia from respiratory failure with poor enzyme clearance in the face of CKD.    -No further workup.     Diabetes mellitus type 2  He is on Lantus insulin 20 units every morning, insulin sliding scale PTA.  -Continue home Lantus and medium sliding scale insulin.     History of CVA with residual right-sided hemiplegia and baseline nonverbal status  Chronic dysphagia, status post PEG tube placement, G-tube exchange on 2/24/2025  S/p indwelling Felix catheter  Dyslipidemia  Hypertension  Patient resides at home and family takes care of him.  He also has a visiting nurse.  They appear to provide excellent care and support for him.  -Continue PTA medications including amlodipine and atorvastatin.  -Hold Plavix for thoracentesis.  -RD consulted to facilitate tube feeds.     Anemia and chronic kidney disease  -Resumed ferrous sulfate.     Depression  -Resume Prozac.     Goals of care  The patient is chronically ill and essentially full cares as a result of a prior massive stroke.  He has chronic dysphagia and receives tube feeds via a PEG.  He is essentially bedbound and has residual right-sided hemiplegia and nonverbal status.  He is now faced with rapidly progressing chronic kidney disease leading to concerning electrolyte abnormalities, hypervolemia, and bradycardia.  He is an extremely poor dialysis candidate and dialysis has not been offered by nephrology.  Care conference was held on 3/18 with palliative care, nephrology, and this hospitalist.  Goals  of care discussed.  Hospice being considered by family but formal decision has not yet been made  Per one of the daughters, she says that all the family members including the daughters and patient's spouse are interested in looking at comfort measures, but the patient's son has a different viewpoint on this from the rest of the family.  Family discussions are ongoing        DVT Prophylaxis: Pneumatic Compression Devices  Code Status: No CPR- Do NOT Intubate  Diet: Adult Formula Drip Feeding: Continuous Nepro with Carbsteady; Gastrostomy; Goal Rate: 30; mL/hr; Advancement Instructions: Once mag and phos are back and WNL, please initiate TF at 15 ml/hr x 6 hours. If tolerating, okay to advance to goal rate of...    Ordoñez Catheter: PRESENT, indication: Chronic ordoñez  Disposition: Expected discharge after family decides whether or not to pursue hospice care at discharge           Interval History (Subjective):      Patient is nonverbal and noncommunicative.                  Physical Exam:      Last Vital Signs:  BP (!) 156/58 (BP Location: Left arm)   Pulse 80   Temp 98.4  F (36.9  C) (Axillary)   Resp 16   Wt 64.7 kg (142 lb 10.2 oz)   SpO2 100%   BMI 23.02 kg/m        Intake/Output Summary (Last 24 hours) at 3/24/2025 1051  Last data filed at 3/24/2025 0646  Gross per 24 hour   Intake 1053 ml   Output 1450 ml   Net -397 ml       Constitutional: Nonverbal and noncommunicative     Respiratory: Clear to auscultation bilaterally, no crackles or wheezing   Cardiovascular: Regular rate and rhythm, normal S1 and S2, and no murmur noted   Abdomen: Normal bowel sounds, soft, non-distended, non-tender   Skin: No rashes, no cyanosis, dry to touch   Neuro: As above   Extremities: No edema, normal range of motion   Other(s):        All other systems: Negative          Medications:      All current medications were reviewed with changes reflected in problem list.         Data:      All new lab and imaging data was reviewed.    Labs:       Lab Results   Component Value Date     03/24/2025     03/23/2025     03/22/2025     08/16/2012     08/13/2012    Lab Results   Component Value Date    CHLORIDE 87 03/24/2025    CHLORIDE 87 03/23/2025    CHLORIDE 87 03/22/2025    CHLORIDE 99 03/30/2022    CHLORIDE 98 02/01/2022    CHLORIDE 105 01/26/2022    CHLORIDE 101 08/16/2012    CHLORIDE 99 08/13/2012    Lab Results   Component Value Date    .6 03/24/2025    .4 03/23/2025    .4 03/22/2025    BUN 24 03/30/2022    BUN 22 02/01/2022    BUN 20 01/26/2022    BUN 18 08/16/2012    BUN 14 08/13/2012      Lab Results   Component Value Date    POTASSIUM 3.1 03/24/2025    POTASSIUM 3.5 03/23/2025    POTASSIUM 3.4 03/22/2025    POTASSIUM 4.4 03/30/2022    POTASSIUM 4.0 02/01/2022    POTASSIUM 3.6 01/26/2022    POTASSIUM 4.0 08/16/2012    POTASSIUM 3.6 08/13/2012    Lab Results   Component Value Date    CO2 32 03/24/2025    CO2 30 03/23/2025    CO2 33 03/22/2025    CO2 29 03/30/2022    CO2 27 02/01/2022    CO2 31 01/26/2022    CO2 26 08/16/2012    CO2 26 08/13/2012    Lab Results   Component Value Date    CR 5.77 03/24/2025    CR 5.85 03/23/2025    CR 5.81 03/22/2025    CR 0.96 08/16/2012    CR 0.91 08/13/2012        Recent Labs   Lab 03/18/25  0703   WBC 5.2   HGB 8.6*   HCT 27.2*   MCV 84         Imaging:   No results found for this or any previous visit (from the past 24 hours).

## 2025-03-24 NOTE — PROGRESS NOTES
PALLIATIVE CARE PROGRESS NOTE  [facility]     Patient Name: Jimmy Otto  Date of Admission: 3/15/2025   Today the patient was seen for: Goals of care     Recommendations & Counseling     GOALS OF CARE:   Life-prolonging with limits - No CPR- Do NOT Intubate. Family acknowledged Jimmy is not a candidate for dialysis or pacemaker placement.   Family would like time to discuss with Jimmy's spouse the option of hospice at home or LTC placement before considering a possible comfort plan.       ADVANCE CARE PLANNING:  No health care directive on file. Per system policy, Surrogate Decision-makers for Patients With Diminished Decision-making Capacity offers guidance on possible decision-makers. Daughter Clemente Clark has been identified as a surrogate decision maker.   There is no POLST form on file, defer to patient and/or next of kin for decisions   Code status: No CPR- Do NOT Intubate     MEDICAL MANAGEMENT:   We are not actively managing symptoms at this time.     PSYCHOSOCIAL/SPIRITUAL SUPPORT:  Family Spouse, two daughters live locally and his son lives in [place].  Appreciate input and support of Child Life Specialist.  [place] community: Yazidism   [facility]    Palliative Care will continue to follow. Thank you for the consult and allowing us to aid in the care of Jimmy Otto.    These recommendations have been discussed with Hospitalist Demetrius Robles MD and bedside nurse RAHAT Thompson.    FEMI Plummer CNS  [facility], Palliative Care  Securely message with the [employer] Web Console (learn more here) or  Text page via [facility] Paging/Directory        Assessment                 Jimmy Otto is a 75 year old male admitted 3/15/2025 with acute hypoxic respiratory failure in the setting of acute on chronic diastolic CHF exacerbation with bilateral pleura effusions R>L. The patient and family are known to this writer from the patient's hospitalization October 2021 when the family had made the  "decision to pursue tube feedings. 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        3/17/2025 - Family care conference with 2 daughters, Hospitalist and Palliative Care  3/19/2025 -  Family care conference with wife, son (who lives in [place]), 2 daughters, Nephrology, Hospitalist and Palliative Care. Daughters acting as  for wife and patient who speak Cambodian.   3/24/2025 - Family care conference with 2 daughters and son.         Interval History:     Multidisciplinary collaboration:  Appreciate input of bedside nursing staff diligence in caring for Jimmy and his family.      Patient/family narrative  Met with the patient's 2 daughters at bedside. They acknowledged their brother had questions for me, sot they contacted him (as he is working remotely in the hospital cafeteria.) When he arrived at bedside we moved to the family waiting area on the first floor of the hospital. The patient's daughters acknowledged the difficult weekend their father had \"I thought he was going to die on Saturday.\" They acknowledged their father has had multiple emesis. We discussed the dying process, as  normally the patient will have a decreased need for food and drink as the body is preparing to die, but in Ho's situation he is given tube feeds which are not absorbed by the dying body. Hence the increased vomiting episodes. Discussed the use of oxygen for comfort. Jimmy's son inquired about thoracentesis on hospice which we discussed at length. I acknowledged a plan to continue treatment efforts does not align with a comfort plan and hospice. Both daughters acknowledged they are " ready for a comfort plan, but await their brother and mother's decision to begin a comfort plan of care.      Review of Systems:     Patient not verbal and unable to provide insight.         Physical Exam:   Temp:  [97.4  F (36.3  C)-98.4  F (36.9  C)] 98.4  F (36.9  C)  Pulse:  [66-81] 80  Resp:  [16] 16  BP: (144-163)/(49-63) 156/58  SpO2:  [100 %] 100 %  142 lbs 10.2 oz    Physical Exam    GEN:  Patient lethargic and did not open eyes during physical exam. Non verbal and  appears comfortable, no apparent distress.  HEENT:  Normocephalic/atraumatic, no nasal discharge, mouth moist.  CV:  RRR, S1, S2; no murmurs or other irregularities noted.  +3 DP/PT pulses bilatererally; no edema BLE.  RESP:  Clear to auscultation bilaterally without rales/rhonchi/wheezing/retractions.  Symmetric chest rise on inhalation noted.  Normal respiratory effort.  ABD:  Rounded, soft, non-tender/non-distended.  +BS  EXT:  Edema & pulses as noted above.  CMS intact x 4.     M/S:   Not wincing or grimace with palpation of extremities.    SKIN:  Warm and dry to touch, no mottling.       Data Reviewed:     Results for orders placed or performed during the hospital encounter of 03/15/25 (from the past 24 hours)   Glucose by meter   Result Value Ref Range    GLUCOSE BY METER POCT 183 (H) 70 - 99 mg/dL   Glucose by meter   Result Value Ref Range    GLUCOSE BY METER POCT 186 (H) 70 - 99 mg/dL   Glucose by meter   Result Value Ref Range    GLUCOSE BY METER POCT 181 (H) 70 - 99 mg/dL   Glucose by meter   Result Value Ref Range    GLUCOSE BY METER POCT 207 (H) 70 - 99 mg/dL   Basic metabolic panel   Result Value Ref Range    Sodium 133 (L) 135 - 145 mmol/L    Potassium 3.1 (L) 3.4 - 5.3 mmol/L    Chloride 87 (L) 98 - 107 mmol/L    Carbon Dioxide (CO2) 32 (H) 22 - 29 mmol/L    Anion Gap 14 7 - 15 mmol/L    Urea Nitrogen 121.6 (H) 8.0 - 23.0 mg/dL    Creatinine 5.77 (H) 0.67 - 1.17 mg/dL    GFR Estimate 10 (L) >60 mL/min/1.73m2    Calcium 11.7 (H)  8.8 - 10.4 mg/dL    Glucose 205 (H) 70 - 99 mg/dL   Glucose by meter   Result Value Ref Range    GLUCOSE BY METER POCT 215 (H) 70 - 99 mg/dL   Glucose by meter   Result Value Ref Range    GLUCOSE BY METER POCT 232 (H) 70 - 99 mg/dL       MANAGEMENT DISCUSSED with the following over the past 24 hours: Hospitalist Demetrius Robles MD and bedside nurse RAHAT Thompson   10 AM until 12:30 PM -  150 MINUTES SPENT BY ME on the date of service doing chart review, history, exam, documentation & further activities per the note.

## 2025-03-24 NOTE — PLAN OF CARE
"ORIENTATION: Non-verbal.  VSS except bradycardia.  PAIN: Occasional facial grimacing, managed w/ scheduled Tylenol  O2: 1L NC  GI/: Felix patent.  ACTIVITY: Bedbound; lift, q2hr turn  DIET: NPO.  LINES/DRAINS: L PIV SL. TF at goal rate of 30mL/hr with water flushes programmed per orders.  B,181, 207    PLAN: Palliative meeting w/ family on Monday    Goal Outcome Evaluation:      Plan of Care Reviewed With: child, spouse, patient    Overall Patient Progress: no change    Outcome Evaluation: Lethargy continues, responds to verbal stimuli. Mild nonverbal indicators of pain, IV dilaudid ordred as needed. Tolerating TF at goal rate. SSI coverage. +Flatus. Family supported at bedside.      Problem: Adult Inpatient Plan of Care  Goal: Plan of Care Review  Description: The Plan of Care Review/Shift note should be completed every shift.  The Outcome Evaluation is a brief statement about your assessment that the patient is improving, declining, or no change.  This information will be displayed automatically on your shiftnote.  Outcome: Progressing  Flowsheets (Taken 3/23/2025 2201)  Outcome Evaluation: Lethargy continues, responds to verbal stimuli. Mild nonverbal indicators of pain, IV dilaudid ordred as needed. Tolerating TF at goal rate. SSI coverage. +Flatus. Family supported at bedside.  Plan of Care Reviewed With:   child   spouse   patient  Overall Patient Progress: no change  Goal: Patient-Specific Goal (Individualized)  Description: You can add care plan individualizations to a care plan. Examples of Individualization might be:  \"Parent requests to be called daily at 9am for status\", \"I have a hard time hearing out of my right ear\", or \"Do not touch me to wake me up as it startlesme\".  Outcome: Progressing  Goal: Absence of Hospital-Acquired Illness or Injury  Outcome: Progressing  Intervention: Identify and Manage Fall Risk  Recent Flowsheet Documentation  Taken 3/23/2025 2045 by Alexandria Woodson RN  Safety " Promotion/Fall Prevention:   safety round/check completed   room organization consistent   room near nurse's station   patient and family education   nonskid shoes/slippers when out of bed   mobility aid in reach   lighting adjusted   increase visualization of patient   clutter free environment maintained   increased rounding and observation   assistive device/personal items within reach   activity supervised   supervised activity  Intervention: Prevent Skin Injury  Recent Flowsheet Documentation  Taken 3/23/2025 2045 by Alexandria Woodson RN  Body Position:   turned   right  Intervention: Prevent and Manage VTE (Venous Thromboembolism) Risk  Recent Flowsheet Documentation  Taken 3/23/2025 2045 by Alexandria Woodson RN  VTE Prevention/Management: SCDs on (sequential compression devices)  Goal: Optimal Comfort and Wellbeing  Outcome: Progressing  Intervention: Provide Person-Centered Care  Recent Flowsheet Documentation  Taken 3/23/2025 2045 by Alexandria Woodson RN  Trust Relationship/Rapport:   care explained   choices provided  Goal: Readiness for Transition of Care  Outcome: Progressing

## 2025-03-25 LAB
ANION GAP SERPL CALCULATED.3IONS-SCNC: 10 MMOL/L (ref 7–15)
BUN SERPL-MCNC: 119.6 MG/DL (ref 8–23)
CALCIUM SERPL-MCNC: 12.2 MG/DL (ref 8.8–10.4)
CHLORIDE SERPL-SCNC: 86 MMOL/L (ref 98–107)
CREAT SERPL-MCNC: 5.48 MG/DL (ref 0.67–1.17)
EGFRCR SERPLBLD CKD-EPI 2021: 10 ML/MIN/1.73M2
GLUCOSE BLDC GLUCOMTR-MCNC: 173 MG/DL (ref 70–99)
GLUCOSE BLDC GLUCOMTR-MCNC: 191 MG/DL (ref 70–99)
GLUCOSE BLDC GLUCOMTR-MCNC: 205 MG/DL (ref 70–99)
GLUCOSE BLDC GLUCOMTR-MCNC: 227 MG/DL (ref 70–99)
GLUCOSE BLDC GLUCOMTR-MCNC: 241 MG/DL (ref 70–99)
GLUCOSE BLDC GLUCOMTR-MCNC: 243 MG/DL (ref 70–99)
GLUCOSE SERPL-MCNC: 210 MG/DL (ref 70–99)
HCO3 SERPL-SCNC: 34 MMOL/L (ref 22–29)
HOLD SPECIMEN: NORMAL
POTASSIUM SERPL-SCNC: 3.2 MMOL/L (ref 3.4–5.3)
POTASSIUM SERPL-SCNC: 3.3 MMOL/L (ref 3.4–5.3)
SODIUM SERPL-SCNC: 130 MMOL/L (ref 135–145)

## 2025-03-25 PROCEDURE — 36415 COLL VENOUS BLD VENIPUNCTURE: CPT | Performed by: STUDENT IN AN ORGANIZED HEALTH CARE EDUCATION/TRAINING PROGRAM

## 2025-03-25 PROCEDURE — 250N000013 HC RX MED GY IP 250 OP 250 PS 637: Performed by: INTERNAL MEDICINE

## 2025-03-25 PROCEDURE — 250N000011 HC RX IP 250 OP 636: Performed by: INTERNAL MEDICINE

## 2025-03-25 PROCEDURE — 80048 BASIC METABOLIC PNL TOTAL CA: CPT | Performed by: INTERNAL MEDICINE

## 2025-03-25 PROCEDURE — 250N000013 HC RX MED GY IP 250 OP 250 PS 637: Performed by: STUDENT IN AN ORGANIZED HEALTH CARE EDUCATION/TRAINING PROGRAM

## 2025-03-25 PROCEDURE — 120N000001 HC R&B MED SURG/OB

## 2025-03-25 PROCEDURE — 99232 SBSQ HOSP IP/OBS MODERATE 35: CPT | Performed by: CLINICAL NURSE SPECIALIST

## 2025-03-25 PROCEDURE — 36415 COLL VENOUS BLD VENIPUNCTURE: CPT | Performed by: INTERNAL MEDICINE

## 2025-03-25 PROCEDURE — 99232 SBSQ HOSP IP/OBS MODERATE 35: CPT | Performed by: STUDENT IN AN ORGANIZED HEALTH CARE EDUCATION/TRAINING PROGRAM

## 2025-03-25 PROCEDURE — 84132 ASSAY OF SERUM POTASSIUM: CPT | Performed by: STUDENT IN AN ORGANIZED HEALTH CARE EDUCATION/TRAINING PROGRAM

## 2025-03-25 RX ORDER — POTASSIUM CHLORIDE 1.5 G/1.58G
20 POWDER, FOR SOLUTION ORAL ONCE
Status: COMPLETED | OUTPATIENT
Start: 2025-03-25 | End: 2025-03-25

## 2025-03-25 RX ORDER — POTASSIUM CHLORIDE 20MEQ/15ML
20 LIQUID (ML) ORAL ONCE
Status: COMPLETED | OUTPATIENT
Start: 2025-03-25 | End: 2025-03-25

## 2025-03-25 RX ORDER — POTASSIUM CHLORIDE 1500 MG/1
20 TABLET, EXTENDED RELEASE ORAL ONCE
Status: DISCONTINUED | OUTPATIENT
Start: 2025-03-25 | End: 2025-03-25

## 2025-03-25 RX ORDER — BISACODYL 10 MG
10 SUPPOSITORY, RECTAL RECTAL ONCE
Status: COMPLETED | OUTPATIENT
Start: 2025-03-25 | End: 2025-03-25

## 2025-03-25 RX ADMIN — INSULIN ASPART 2 UNITS: 100 INJECTION, SOLUTION INTRAVENOUS; SUBCUTANEOUS at 20:07

## 2025-03-25 RX ADMIN — Medication 5 ML: at 20:29

## 2025-03-25 RX ADMIN — INSULIN ASPART 3 UNITS: 100 INJECTION, SOLUTION INTRAVENOUS; SUBCUTANEOUS at 16:32

## 2025-03-25 RX ADMIN — INSULIN ASPART 3 UNITS: 100 INJECTION, SOLUTION INTRAVENOUS; SUBCUTANEOUS at 12:01

## 2025-03-25 RX ADMIN — ACETAMINOPHEN 650 MG: 160 SOLUTION ORAL at 00:21

## 2025-03-25 RX ADMIN — DOXAZOSIN 2 MG: 1 TABLET ORAL at 09:34

## 2025-03-25 RX ADMIN — INSULIN ASPART 2 UNITS: 100 INJECTION, SOLUTION INTRAVENOUS; SUBCUTANEOUS at 04:03

## 2025-03-25 RX ADMIN — FUROSEMIDE 80 MG: 10 SOLUTION ORAL at 09:42

## 2025-03-25 RX ADMIN — ACETAMINOPHEN 650 MG: 160 SOLUTION ORAL at 15:05

## 2025-03-25 RX ADMIN — POTASSIUM CHLORIDE 20 MEQ: 1.5 POWDER, FOR SOLUTION ORAL at 15:06

## 2025-03-25 RX ADMIN — INSULIN ASPART 2 UNITS: 100 INJECTION, SOLUTION INTRAVENOUS; SUBCUTANEOUS at 09:50

## 2025-03-25 RX ADMIN — ACETAMINOPHEN 650 MG: 160 SOLUTION ORAL at 09:34

## 2025-03-25 RX ADMIN — Medication 325 MG: at 09:42

## 2025-03-25 RX ADMIN — INSULIN ASPART 1 UNITS: 100 INJECTION, SOLUTION INTRAVENOUS; SUBCUTANEOUS at 00:14

## 2025-03-25 RX ADMIN — FLUOXETINE 20 MG: 20 SOLUTION ORAL at 09:42

## 2025-03-25 RX ADMIN — FUROSEMIDE 80 MG: 10 SOLUTION ORAL at 15:06

## 2025-03-25 RX ADMIN — POTASSIUM CHLORIDE 20 MEQ: 1.5 SOLUTION ORAL at 20:29

## 2025-03-25 RX ADMIN — CLOPIDOGREL BISULFATE 75 MG: 75 TABLET ORAL at 09:34

## 2025-03-25 RX ADMIN — BISACODYL 10 MG: 10 SUPPOSITORY RECTAL at 09:42

## 2025-03-25 RX ADMIN — AMLODIPINE BESYLATE 10 MG: 10 TABLET ORAL at 09:34

## 2025-03-25 RX ADMIN — ONDANSETRON 4 MG: 2 INJECTION, SOLUTION INTRAMUSCULAR; INTRAVENOUS at 18:19

## 2025-03-25 ASSESSMENT — ACTIVITIES OF DAILY LIVING (ADL)
ADLS_ACUITY_SCORE: 101
ADLS_ACUITY_SCORE: 101
ADLS_ACUITY_SCORE: 103
ADLS_ACUITY_SCORE: 99
ADLS_ACUITY_SCORE: 101
ADLS_ACUITY_SCORE: 99
ADLS_ACUITY_SCORE: 103
ADLS_ACUITY_SCORE: 101
ADLS_ACUITY_SCORE: 103
ADLS_ACUITY_SCORE: 99
ADLS_ACUITY_SCORE: 101
ADLS_ACUITY_SCORE: 101
ADLS_ACUITY_SCORE: 103
ADLS_ACUITY_SCORE: 101
ADLS_ACUITY_SCORE: 99
ADLS_ACUITY_SCORE: 101
ADLS_ACUITY_SCORE: 103
ADLS_ACUITY_SCORE: 99
ADLS_ACUITY_SCORE: 103
ADLS_ACUITY_SCORE: 101
ADLS_ACUITY_SCORE: 101

## 2025-03-25 NOTE — PROGRESS NOTES
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       The patient's daughter states that patient has had some vomiting last week.  He was having increased shortness of breath.  His wife noted that his oxygen saturation was low.  He was brought to emergency room for evaluation.     Initial vital signs showed temperature 97.5, pulse 46, blood pressure 148/61, oxygen saturation 95% on room air.  Laboratory workup showed sodium 128, potassium 5.8, creatinine 5.67, ALT 36, AST 31, troponin T high-sensitivity 103.  WBC 6.5, hemoglobin 8.9, platelets 164.  Venous blood gas showed pH 7.44/pCO2 44, pO2 82.5.  Chest x-ray showed large right pleural effusion with small to moderate left pleural effusion.  Bibasilar consolidation, patchy infiltrate in the central aspect of both lungs progressed on the left and improved on the right.  Felix catheter within decompressed urinary bladder.  Anasarca has progressed.     Nephrology was consulted from emergency room and recommended Lasix 100 mg IV.  He was also given calcium gluconate, Lokelma 10 g per G-tube.  He was also given a dose of IV ceftriaxone and azithromycin.  He was admitted to the hospital for further management.     He remains on 5 L of oxygen.  Interventional radiology performed a right-sided thoracentesis today.  He continues on IV diuresis and nephrology was  formally consulted.  Potassium and heart rate have improved.  Palliative care met with family to discuss goals of care and possible enrollment into hospice given his poor functional status, elderly age, worsening renal function without being a dialysis candidate, bradycardia without being a pacemaker candidate, and gradual deterioration with no truly reversible process.  They understand his poor prognosis and a care conference was held with the family on 3/18 to discuss all of the above, with attendees including hospitalist, palliative care, and nephrology.  Nephrology reiterated during the care conference that the patient is a poor dialysis candidate given his multiple comorbidities     Update on 3/25:   I spoke with patient's son Sathya at bedside. He and other family members awaiting to hear from  about dispo places.   I ordered dulcolax suppository for constipation and patient was able to have a large liquid bowl movement.      Problem list:  Acute hypoxic respiratory failure likely multifactorial including volume overload due to acute on chronic diastolic CHF exacerbation---improving  Bilateral pleural effusions, right > left  Patient was on Lasix 20 mg daily at home.  Per family he had decreased urine output and increased shortness of breath.  Chest x-ray showed large right pleural effusion, moderate left pleural effusion.  Received IV diuresis in the hospital and then transition to p.o.  Appreciate nephrology assistance.  -Continue oral Lasix 80 mg twice a day  -s/p R sided thoracentesis with 900 ml removed  -AHRF overall improved now requiring only 0.5 to 1 L of nasal cannula.  Lungs clear anteriorly    Acute toxic metabolic encephalopathy  Per patient's son Sathya usually he is awake and alert and can show thumbs up or down when I ask questions.  Lately patient has been more lethargic.  Suspect this is likely due to uremia.  Also has history of CVA.  Explained to patient's and Sathya that there is nothing much  that can be offered at this point.     Hyperkalemia-resolved  Hypokalemia  ALEX on CKD stage V  Nephrotic range proteinuria  Anasarca  Hyponatremia  Hypercalcemia   -Creatinine 1 year ago was near 1  -Most recent creatinine 4.07 on 2/26/2025.  Creatinine 5.67 on admission and potassium 5.8.  He was previously seen by nephrology and his daughter indicates that he was determined not to be a dialysis candidate.  Potassium has improved with shifting agents and Lokelma.  Creatinine has been steadily climbing over the past several weeks.  -Initially hyperkalemic.  Now hypokalemic and requiring potassium replacement.  -completed IV diuresis; now on PO diuretic  -Will check BMP intermittently while in the hospital  -Felix in place and tracking I's and O's.  - appreciate nephrology input.  As per nephrology not a dialysis candidate     Bradycardia  Junctional rhythm  This was an issue during his recent hospitalization as well.  There may be some metabolic component but I do worry about some underlying conduction disease.  Poor candidate for pacemaker due to the issues outlined above.  -Continue telemetry.     Demand ischemia  Patient had no evidence of chest pain. Troponin has been elevated during prior evaluation on 2/26/2025.  Troponin 103 on admission and flat, not consistent with acute coronary syndrome.  Likely demand ischemia from respiratory failure with poor enzyme clearance in the face of CKD.    -No further workup.     Diabetes mellitus type 2  He was on Lantus insulin 14 units every morning, insulin sliding scale PTA.  -Insulin was increased to 18 units this hospitalization.  Will continue medium intensity sliding scale insulin     History of CVA with residual right-sided hemiplegia and baseline nonverbal status  Chronic dysphagia, status post PEG tube placement, G-tube exchange on 2/24/2025  S/p indwelling Felix catheter  Dyslipidemia  Hypertension  Patient resides at home and family takes care of him.  He also  has a visiting nurse.  They appear to provide excellent care and support for him.  Patient's son Sathya stated that patient's spouse who is also around 73 years old was taking care of patient but now getting overwhelmed and would appreciate further assistance in placement.  -Continue PTA medications including amlodipine and atorvastatin and Plavix  -RD consulted to facilitate tube feeds.     Anemia and chronic kidney disease  -Resumed ferrous sulfate.     Depression  -Resume Prozac.     Goals of care  The patient is chronically ill and essentially full cares as a result of a prior massive stroke.  He has chronic dysphagia and receives tube feeds via a PEG.  He is essentially bedbound and has residual right-sided hemiplegia and nonverbal status.  He is now faced with rapidly progressing chronic kidney disease leading to concerning electrolyte abnormalities, hypervolemia, and bradycardia.  He is an extremely poor dialysis candidate and dialysis has not been offered by nephrology.  Care conference was held on 3/18 with palliative care, nephrology, and this hospitalist.  Goals of care discussed.  Hospice being considered by family but formal decision has not yet been made  Per one of the daughters, she says that all the family members including the daughters and patient's spouse are interested in looking at comfort measures, but the patient's son has a different viewpoint on this from the rest of the family.  Family discussions are ongoing        DVT Prophylaxis: Pneumatic Compression Devices  Code Status: No CPR- Do NOT Intubate  Diet: Adult Formula Drip Feeding: Continuous Nepro with Carbsteady; Gastrostomy; Goal Rate: 30; mL/hr; Advancement Instructions: Once mag and phos are back and WNL, please initiate TF at 15 ml/hr x 6 hours. If tolerating, okay to advance to goal rate of...    Ordoñez Catheter: PRESENT, indication: Chronic ordoñez  Disposition: Expected discharge after family decides whether or not to pursue hospice  care at discharge           Interval History (Subjective):      Patient is nonverbal and noncommunicative.  Son Sathya present at bedside.  All questions were answered.                  Physical Exam:      Last Vital Signs:  /43 (BP Location: Left arm)   Pulse (!) 48   Temp 97.8  F (36.6  C) (Axillary)   Resp 16   Wt 64.8 kg (142 lb 13.7 oz)   SpO2 100%   BMI 23.06 kg/m        Intake/Output Summary (Last 24 hours) at 3/24/2025 1051  Last data filed at 3/24/2025 0646  Gross per 24 hour   Intake 1053 ml   Output 1450 ml   Net -397 ml       Constitutional: Nonverbal and noncommunicative     Respiratory: Clear to auscultation bilaterally, no crackles or wheezing anteriorly   Cardiovascular: Regular rate and rhythm, normal S1 and S2, and no murmur noted   Abdomen: Normal bowel sounds, soft, non-distended, non-tender, PEG tube present   Skin: No rashes, no cyanosis, dry to touch   Neuro: Sleepy but arousable, nonverbal, able to follow one-step command   Extremities: No edema, normal range of motion   Other(s):        All other systems: Negative          Medications:      All current medications were reviewed with changes reflected in problem list.         Data:      All new lab and imaging data was reviewed.   Labs:       Lab Results   Component Value Date     03/24/2025     03/23/2025     03/22/2025     08/16/2012     08/13/2012    Lab Results   Component Value Date    CHLORIDE 87 03/24/2025    CHLORIDE 87 03/23/2025    CHLORIDE 87 03/22/2025    CHLORIDE 99 03/30/2022    CHLORIDE 98 02/01/2022    CHLORIDE 105 01/26/2022    CHLORIDE 101 08/16/2012    CHLORIDE 99 08/13/2012    Lab Results   Component Value Date    .6 03/24/2025    .4 03/23/2025    .4 03/22/2025    BUN 24 03/30/2022    BUN 22 02/01/2022    BUN 20 01/26/2022    BUN 18 08/16/2012    BUN 14 08/13/2012      Lab Results   Component Value Date    POTASSIUM 3.1 03/24/2025    POTASSIUM 3.5 03/23/2025     "POTASSIUM 3.4 03/22/2025    POTASSIUM 4.4 03/30/2022    POTASSIUM 4.0 02/01/2022    POTASSIUM 3.6 01/26/2022    POTASSIUM 4.0 08/16/2012    POTASSIUM 3.6 08/13/2012    Lab Results   Component Value Date    CO2 32 03/24/2025    CO2 30 03/23/2025    CO2 33 03/22/2025    CO2 29 03/30/2022    CO2 27 02/01/2022    CO2 31 01/26/2022    CO2 26 08/16/2012    CO2 26 08/13/2012    Lab Results   Component Value Date    CR 5.77 03/24/2025    CR 5.85 03/23/2025    CR 5.81 03/22/2025    CR 0.96 08/16/2012    CR 0.91 08/13/2012        No results for input(s): \"WBC\", \"HGB\", \"HCT\", \"MCV\", \"PLT\" in the last 168 hours.     Imaging:   No results found for this or any previous visit (from the past 24 hours).   "

## 2025-03-25 NOTE — PROVIDER NOTIFICATION
Dr. PATRICK Casey notified of low K+, replacement completed this afternoon.   RN also informed her of pt now having stools after suppository - liquid black/green incontinent stools.

## 2025-03-25 NOTE — PLAN OF CARE
Goal Outcome Evaluation:      Plan of Care Reviewed With: patient    Overall Patient Progress: improving    Outcome Evaluation:   Alert , non verbal , responds to verbal stimuli by flexing the left hand fingers . On RA , VSS  , afebrile.  Feeding Tube aspirated and flushing performed before and after medications administered .  Free water  and enteral feeding administered per POC . Patient tolerated feeding/medication well . Feeding  tube  is patent secured, no signs of displacement.  Aspiration prevention maintained. Repositioned at intervals . BS: 183 , 173, 191 insuline given per POC . Felix cath in place , secured , collecting well . No BM since 3/19/25 in spite of he is on laxatives .       Problem: Adult Inpatient Plan of Care  Goal: Plan of Care Review  Description: The Plan of Care Review/Shift note should be completed every shift.  The Outcome Evaluation is a brief statement about your assessment that the patient is improving, declining, or no change.  This information will be displayed automatically on your shift  note.  Recent Flowsheet Documentation  Taken 3/25/2025 0327 by Arabella Patricia RN  Outcome Evaluation: vs  Plan of Care Reviewed With: patient  Overall Patient Progress: improving  Goal: Absence of Hospital-Acquired Illness or Injury  Intervention: Identify and Manage Fall Risk  Recent Flowsheet Documentation  Taken 3/25/2025 0214 by Arabella Patricia, RAHAT  Safety Promotion/Fall Prevention: safety round/check completed  Taken 3/25/2025 0020 by Arabella Patricia, RAHAT  Safety Promotion/Fall Prevention: safety round/check completed  Taken 3/24/2025 2033 by Arabella Patricia, RN  Safety Promotion/Fall Prevention: safety round/check completed  Taken 3/24/2025 2000 by Arabella Patricia, RN  Safety Promotion/Fall Prevention:   clutter free environment maintained   room near nurse's station   safety round/check completed   lighting adjusted   patient and family education  Intervention: Prevent Skin Injury  Recent  Flowsheet Documentation  Taken 3/25/2025 0221 by Arabella Patricia, RN  Body Position:   turned   right  Taken 3/25/2025 0020 by Arabella Patricia RN  Body Position:   turned   left  Taken 3/24/2025 2000 by Arabella Patricia RN  Body Position:   left   turned  Intervention: Prevent and Manage VTE (Venous Thromboembolism) Risk  Recent Flowsheet Documentation  Taken 3/24/2025 2000 by Arabella Patricia RN  VTE Prevention/Management: SCDs on (sequential compression devices)  Intervention: Prevent Infection  Recent Flowsheet Documentation  Taken 3/24/2025 2000 by Arabella Patricia RN  Infection Prevention:   hand hygiene promoted   single patient room provided  Goal: Optimal Comfort and Wellbeing  Intervention: Provide Person-Centered Care  Recent Flowsheet Documentation  Taken 3/24/2025 2000 by Arabella Patricia RN  Trust Relationship/Rapport: care explained

## 2025-03-25 NOTE — PROGRESS NOTES
PALLIATIVE CARE PROGRESS NOTE  [facility]     Patient Name: Jimmy Otto  Date of Admission: 3/15/2025   Today the patient was seen for: Goals of care     Recommendations & Counseling     GOALS OF CARE:   Life-prolonging with limits - No CPR- Do NOT Intubate. Family acknowledged Jimmy is not a candidate for dialysis or pacemaker placement.   Both daughters indicate they are ready for a comfort plan. They are giving their brother and mother time to adjust to Jimmy's terminal trajectory.       ADVANCE CARE PLANNING:  No health care directive on file. Per system policy, Surrogate Decision-makers for Patients With Diminished Decision-making Capacity offers guidance on possible decision-makers. Daughter Clemente Clark has been identified as a surrogate decision maker.   There is no POLST form on file, defer to patient and/or next of kin for decisions   Code status: No CPR- Do NOT Intubate     MEDICAL MANAGEMENT:   We are not actively managing symptoms at this time.     PSYCHOSOCIAL/SPIRITUAL SUPPORT:  Family Spouse, two daughters live locally and his son lives in [place].  Appreciate input and support of Child Life Specialist.  [place] community: [employer]   [facility]    Palliative Care will continue to follow. Thank you for the consult and allowing us to aid in the care of Jimmy Otto.    These recommendations have been discussed with Hospitalist Chelsae Casey MD and bedside nurse RAHAT Holland.    FEMI Plummer CNS  [facility], Palliative Care  Securely message with the [employer] Web Console (learn more here) or  Text page via [facility] Paging/Directory        Assessment          Jimmy Otto is a 75 year old male admitted 3/15/2025 with acute hypoxic respiratory failure in the setting of acute on chronic diastolic CHF exacerbation with bilateral pleura effusions R>L. The patient and family are known to this writer from the patient's hospitalization October 2021 when the family had made the decision to  "pursue tube feedings. 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        3/17/2025 - Family care conference with 2 daughters, Hospitalist and Palliative Care  3/19/2025 -  Family care conference with wife, son (who lives in [place]), 2 daughters, Nephrology, Hospitalist and Palliative Care. Daughters acting as  for wife and patient who speak Cambodian.   3/24/2025 - Family care conference with 2 daughters and son.      Interval History:     Multidisciplinary collaboration:  Appreciate input of bedside staff, as patient again had emesis and today's bowel movement was black,    Patient/family narrative  Met with two daughters at bedside. Ho had his eyes open, but not looking at his daughter or me when we spoke to him.  Daughter notes that Ho had a successful bowel movement today, so they are hopeful this would reduce his periodic emesis. They acknowledged their mother is not coping well with Ho's health problems \"We're giving her time to adjust to everything.\" They also acknowledged their brother is not ready to pursue a comfort plan of care.      Review of Systems:     Unable as patient is not verbal         Physical Exam:   Temp:  [97.8  F (36.6  C)] 97.8  F (36.6  C)  Pulse:  [48-77] 48  Resp:  [16-18] 16  BP: (131-153)/(43-63) 131/43  SpO2:  [99 %-100 %] 100 %  142 lbs 13.73 oz    Physical Exam  GEN:  Non-verbal elderly male who has his eyes open, but is not responding meaningfully to input from family or myself. Appears comfortable, no apparent distress.  HEENT:  Normocephalic/atraumatic, no scleral icterus, no nasal discharge, mouth moist.  CV:  RRR, S1, " S2; no murmurs or other irregularities noted.  +3 DP/PT pulses bilatererally; no edema BLE.  RESP:  Clear to auscultation bilaterally without rales/rhonchi/wheezing/retractions.  Symmetric chest rise on inhalation noted.  Normal respiratory effort.  ABD:  Rounded, soft, non-tender/non-distended.  +BS  M/S:   No wincing or grimace with palpation of extremities.    SKIN:  Warm and dry to touch, no mottling.        Data Reviewed:     Results for orders placed or performed during the hospital encounter of 03/15/25 (from the past 24 hours)   Glucose by meter   Result Value Ref Range    GLUCOSE BY METER POCT 205 (H) 70 - 99 mg/dL   Glucose by meter   Result Value Ref Range    GLUCOSE BY METER POCT 183 (H) 70 - 99 mg/dL   Glucose by meter   Result Value Ref Range    GLUCOSE BY METER POCT 173 (H) 70 - 99 mg/dL   Glucose by meter   Result Value Ref Range    GLUCOSE BY METER POCT 191 (H) 70 - 99 mg/dL   Basic metabolic panel   Result Value Ref Range    Sodium 130 (L) 135 - 145 mmol/L    Potassium 3.2 (L) 3.4 - 5.3 mmol/L    Chloride 86 (L) 98 - 107 mmol/L    Carbon Dioxide (CO2) 34 (H) 22 - 29 mmol/L    Anion Gap 10 7 - 15 mmol/L    Urea Nitrogen 119.6 (H) 8.0 - 23.0 mg/dL    Creatinine 5.48 (H) 0.67 - 1.17 mg/dL    GFR Estimate 10 (L) >60 mL/min/1.73m2    Calcium 12.2 (H) 8.8 - 10.4 mg/dL    Glucose 210 (H) 70 - 99 mg/dL   Extra Purple Top EDTA (LAB USE ONLY)   Result Value Ref Range    Hold Specimen JIC    Glucose by meter   Result Value Ref Range    GLUCOSE BY METER POCT 227 (H) 70 - 99 mg/dL   Glucose by meter   Result Value Ref Range    GLUCOSE BY METER POCT 243 (H) 70 - 99 mg/dL         MANAGEMENT DISCUSSED with the following over the past 24 hours: Hospitalist Chelsea Casey MD and bedside nurse RAHAT Holland.   30 MINUTES SPENT BY ME on the date of service doing chart review, history, exam, documentation & further activities per the note.

## 2025-03-25 NOTE — PLAN OF CARE
Goal Outcome Evaluation:      Plan of Care Reviewed With: patient, child    Overall Patient Progress: no change  Outcome Evaluation: No significant change. Potassium replaced. Suppository given w/large liquid black stools x2 incontinenet. U.O. 400ml. Tube feed at goal. Reposition [id number] w/oral cares.    Addendum 1830: Pt had emesis ~75ml. Suction utilized and zofran given. Tube feeding flushed and stopped 1830 w/ Fish RN noc to check residual and restart.     Problem: Adult Inpatient Plan of Care  Goal: Plan of Care Review  Description: The Plan of Care Review/Shift note should be completed every shift.  The Outcome Evaluation is a brief statement about your assessment that the patient is improving, declining, or no change.  This information will be displayed automatically on your shift  note.  Recent Flowsheet Documentation  Taken 3/25/2025 1704 by Amalia Stinson RN  Outcome Evaluation: No significant change. Potassium replaced. Suppository given w/large liquid black stools x2 incontinenet. U.O. 400ml. Tube feed at goal. Reposition [id number] w/oral cares.  Plan of Care Reviewed With:   patient   child  Overall Patient Progress: no change  Goal: Absence of Hospital-Acquired Illness or Injury  Intervention: Identify and Manage Fall Risk  Recent Flowsheet Documentation  Taken 3/25/2025 1000 by Amalia Stinson RN  Safety Promotion/Fall Prevention:   safety round/check completed   room organization consistent   room near nurse's station   patient and family education   nonskid shoes/slippers when out of bed   mobility aid in reach   lighting adjusted   increase visualization of patient   clutter free environment maintained   increased rounding and observation   assistive device/personal items within reach   activity supervised   supervised activity  Intervention: Prevent Skin Injury  Recent Flowsheet Documentation  Taken 3/25/2025 1624 by Amalia Stinson RN  Body Position:   side-lying 30 degrees   right  Taken 3/25/2025  1300 by Amalia Stinson, RN  Body Position:   side-lying   left  Taken 3/25/2025 1000 by Amalia Stinson RN  Body Position:   side-lying 30 degrees   right  Intervention: Prevent and Manage VTE (Venous Thromboembolism) Risk  Recent Flowsheet Documentation  Taken 3/25/2025 1000 by Amalia Stinson RN  VTE Prevention/Management: SCDs on (sequential compression devices)  Goal: Optimal Comfort and Wellbeing  Intervention: Provide Person-Centered Care  Recent Flowsheet Documentation  Taken 3/25/2025 1000 by Amalia Stinson RN  Trust Relationship/Rapport:   care explained   choices provided

## 2025-03-26 LAB
GLUCOSE BLDC GLUCOMTR-MCNC: 165 MG/DL (ref 70–99)
GLUCOSE BLDC GLUCOMTR-MCNC: 189 MG/DL (ref 70–99)
GLUCOSE BLDC GLUCOMTR-MCNC: 210 MG/DL (ref 70–99)
GLUCOSE BLDC GLUCOMTR-MCNC: 218 MG/DL (ref 70–99)
GLUCOSE BLDC GLUCOMTR-MCNC: 222 MG/DL (ref 70–99)
GLUCOSE BLDC GLUCOMTR-MCNC: 232 MG/DL (ref 70–99)
GLUCOSE BLDC GLUCOMTR-MCNC: 240 MG/DL (ref 70–99)
POTASSIUM SERPL-SCNC: 3.7 MMOL/L (ref 3.4–5.3)

## 2025-03-26 PROCEDURE — 84132 ASSAY OF SERUM POTASSIUM: CPT | Performed by: INTERNAL MEDICINE

## 2025-03-26 PROCEDURE — 250N000011 HC RX IP 250 OP 636: Performed by: INTERNAL MEDICINE

## 2025-03-26 PROCEDURE — 120N000001 HC R&B MED SURG/OB

## 2025-03-26 PROCEDURE — 250N000013 HC RX MED GY IP 250 OP 250 PS 637: Performed by: INTERNAL MEDICINE

## 2025-03-26 PROCEDURE — 99232 SBSQ HOSP IP/OBS MODERATE 35: CPT | Performed by: STUDENT IN AN ORGANIZED HEALTH CARE EDUCATION/TRAINING PROGRAM

## 2025-03-26 PROCEDURE — 36415 COLL VENOUS BLD VENIPUNCTURE: CPT | Performed by: INTERNAL MEDICINE

## 2025-03-26 RX ADMIN — Medication 325 MG: at 08:43

## 2025-03-26 RX ADMIN — INSULIN ASPART 3 UNITS: 100 INJECTION, SOLUTION INTRAVENOUS; SUBCUTANEOUS at 08:40

## 2025-03-26 RX ADMIN — ONDANSETRON 4 MG: 2 INJECTION, SOLUTION INTRAMUSCULAR; INTRAVENOUS at 14:33

## 2025-03-26 RX ADMIN — ACETAMINOPHEN 650 MG: 160 SOLUTION ORAL at 08:43

## 2025-03-26 RX ADMIN — ONDANSETRON 4 MG: 4 TABLET, ORALLY DISINTEGRATING ORAL at 09:17

## 2025-03-26 RX ADMIN — ACETAMINOPHEN 650 MG: 160 SOLUTION ORAL at 01:20

## 2025-03-26 RX ADMIN — INSULIN ASPART 2 UNITS: 100 INJECTION, SOLUTION INTRAVENOUS; SUBCUTANEOUS at 04:30

## 2025-03-26 RX ADMIN — ONDANSETRON 4 MG: 2 INJECTION, SOLUTION INTRAMUSCULAR; INTRAVENOUS at 20:40

## 2025-03-26 RX ADMIN — INSULIN ASPART 1 UNITS: 100 INJECTION, SOLUTION INTRAVENOUS; SUBCUTANEOUS at 01:18

## 2025-03-26 RX ADMIN — FLUOXETINE 20 MG: 20 SOLUTION ORAL at 08:44

## 2025-03-26 RX ADMIN — FUROSEMIDE 80 MG: 10 SOLUTION ORAL at 08:45

## 2025-03-26 RX ADMIN — CLOPIDOGREL BISULFATE 75 MG: 75 TABLET ORAL at 08:44

## 2025-03-26 RX ADMIN — INSULIN ASPART 2 UNITS: 100 INJECTION, SOLUTION INTRAVENOUS; SUBCUTANEOUS at 12:01

## 2025-03-26 RX ADMIN — DOXAZOSIN 2 MG: 1 TABLET ORAL at 08:45

## 2025-03-26 RX ADMIN — AMLODIPINE BESYLATE 10 MG: 10 TABLET ORAL at 08:46

## 2025-03-26 RX ADMIN — INSULIN ASPART 2 UNITS: 100 INJECTION, SOLUTION INTRAVENOUS; SUBCUTANEOUS at 20:31

## 2025-03-26 RX ADMIN — FUROSEMIDE 80 MG: 10 SOLUTION ORAL at 16:49

## 2025-03-26 RX ADMIN — INSULIN ASPART 2 UNITS: 100 INJECTION, SOLUTION INTRAVENOUS; SUBCUTANEOUS at 17:18

## 2025-03-26 RX ADMIN — ACETAMINOPHEN 650 MG: 160 SOLUTION ORAL at 16:49

## 2025-03-26 RX ADMIN — Medication 5 ML: at 20:32

## 2025-03-26 ASSESSMENT — ACTIVITIES OF DAILY LIVING (ADL)
ADLS_ACUITY_SCORE: 105
ADLS_ACUITY_SCORE: 103
ADLS_ACUITY_SCORE: 105
ADLS_ACUITY_SCORE: 105
ADLS_ACUITY_SCORE: 103
ADLS_ACUITY_SCORE: 105
ADLS_ACUITY_SCORE: 103
ADLS_ACUITY_SCORE: 103
ADLS_ACUITY_SCORE: 105
ADLS_ACUITY_SCORE: 103
ADLS_ACUITY_SCORE: 105
ADLS_ACUITY_SCORE: 103
ADLS_ACUITY_SCORE: 105
ADLS_ACUITY_SCORE: 103

## 2025-03-26 NOTE — PROGRESS NOTES
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       The patient's daughter states that patient has had some vomiting last week.  He was having increased shortness of breath.  His wife noted that his oxygen saturation was low.  He was brought to emergency room for evaluation.     Initial vital signs showed temperature 97.5, pulse 46, blood pressure 148/61, oxygen saturation 95% on room air.  Laboratory workup showed sodium 128, potassium 5.8, creatinine 5.67, ALT 36, AST 31, troponin T high-sensitivity 103.  WBC 6.5, hemoglobin 8.9, platelets 164.  Venous blood gas showed pH 7.44/pCO2 44, pO2 82.5.  Chest x-ray showed large right pleural effusion with small to moderate left pleural effusion.  Bibasilar consolidation, patchy infiltrate in the central aspect of both lungs progressed on the left and improved on the right.  Felix catheter within decompressed urinary bladder.  Anasarca has progressed.     Nephrology was consulted from emergency room and recommended Lasix 100 mg IV.  He was also given calcium gluconate, Lokelma 10 g per G-tube.  He was also given a dose of IV ceftriaxone and azithromycin.  He was admitted to the hospital for further management.     He remains on 5 L of oxygen.  Interventional radiology performed a right-sided thoracentesis today.  He continues on IV diuresis and nephrology was  formally consulted.  Potassium and heart rate have improved.  Palliative care met with family to discuss goals of care and possible enrollment into hospice given his poor functional status, elderly age, worsening renal function without being a dialysis candidate, bradycardia without being a pacemaker candidate, and gradual deterioration with no truly reversible process.  They understand his poor prognosis and a care conference was held with the family on 3/18 to discuss all of the above, with attendees including hospitalist, palliative care, and nephrology.  Nephrology reiterated during the care conference that the patient is a poor dialysis candidate given his multiple comorbidities     Update on 3/26:   Per palliative patient's daughters are ready for comfort plan.  They are giving their brother and mother time to adjust to his terminal trajectory.  I tried to reach out to daughter Clemente today but she did not  the phone.  I left a generic message.  Discuss the discharge planning with the , unfortunately family is having unrealistic expectations around the nursing cares which is making discharge process prolong and difficult.     Problem list:  Acute hypoxic respiratory failure likely multifactorial including volume overload due to acute on chronic diastolic CHF exacerbation---improving  Bilateral pleural effusions, right > left  Patient was on Lasix 20 mg daily at home.  Per family he had decreased urine output and increased shortness of breath.  Chest x-ray showed large right pleural effusion, moderate left pleural effusion.  Received IV diuresis in the hospital and then transition to p.o.  Appreciate nephrology assistance.  -Continue oral Lasix 80 mg twice a day  -s/p R sided thoracentesis with 900 ml removed  -AHRF overall improved now requiring only 0.5 to 1 L of nasal cannula.  Lungs clear anteriorly    Acute toxic metabolic encephalopathy  Per patient's son Sathya usually he is awake and  alert and can show thumbs up or down when ask questions.  Lately patient has been more lethargic.  Suspect this is likely due to uremia.  Also has history of CVA.  Explained to patient's and Sathya that there is nothing much that can be offered at this point.     Hyperkalemia-resolved  Hypokalemia  ALEX on CKD stage V  Nephrotic range proteinuria  Anasarca  Hyponatremia  Hypercalcemia   -Creatinine 1 year ago was near 1  -Most recent creatinine 4.07 on 2/26/2025.  Creatinine 5.67 on admission and potassium 5.8.  He was previously seen by nephrology and his daughter indicates that he was determined not to be a dialysis candidate.  Potassium has improved with shifting agents and Lokelma.  Creatinine has been steadily climbing over the past several weeks.  -Initially hyperkalemic.  Now hypokalemic and requiring potassium replacement.  -completed IV diuresis; now on PO diuretic  -Will check BMP intermittently while in the hospital  -Felix in place and tracking I's and O's.  - appreciate nephrology input.  As per nephrology not a dialysis candidate     Bradycardia  Junctional rhythm  This was an issue during his recent hospitalization as well.  There may be some metabolic component but I do worry about some underlying conduction disease.  Poor candidate for pacemaker due to the issues outlined above.  -telemetry discontinued     Demand ischemia  Patient had no evidence of chest pain. Troponin has been elevated during prior evaluation on 2/26/2025.  Troponin 103 on admission and flat, not consistent with acute coronary syndrome.  Likely demand ischemia from respiratory failure with poor enzyme clearance in the face of CKD.    -No further workup.     Diabetes mellitus type 2  He was on Lantus insulin 14 units every morning, insulin sliding scale PTA.  -Insulin was increased to 18 units this hospitalization.  Will continue medium intensity sliding scale insulin     History of CVA with residual right-sided hemiplegia and  baseline nonverbal status  Chronic dysphagia, status post PEG tube placement, G-tube exchange on 2/24/2025  S/p indwelling Felix catheter  Dyslipidemia  Hypertension  Patient resides at home and family takes care of him.  He also has a visiting nurse.  They appear to provide excellent care and support for him.  Patient's son Sathya stated that patient's spouse who is also around 73 years old was taking care of patient but now getting overwhelmed and would appreciate further assistance in placement.  -Continue PTA medications including amlodipine and atorvastatin and Plavix  -RD consulted to facilitate tube feeds.     Anemia and chronic kidney disease  -Resumed ferrous sulfate.     Depression  -Resume Prozac.     Goals of care  The patient is chronically ill and essentially full cares as a result of a prior massive stroke.  He has chronic dysphagia and receives tube feeds via a PEG.  He is essentially bedbound and has residual right-sided hemiplegia and nonverbal status.  He is now faced with rapidly progressing chronic kidney disease leading to concerning electrolyte abnormalities, hypervolemia, and bradycardia.  He is an extremely poor dialysis candidate and dialysis has not been offered by nephrology.  Care conference was held on 3/18 with palliative care, nephrology, and this hospitalist.  Goals of care discussed.  Hospice being considered by family but formal decision has not yet been made  Per one of the daughters, she says that all the family members including the daughters and patient's spouse are interested in looking at comfort measures, but the patient's son has a different viewpoint on this from the rest of the family.  Family discussions are ongoing        DVT Prophylaxis: Pneumatic Compression Devices  Code Status: No CPR- Do NOT Intubate  Diet: Adult Formula Drip Feeding: Continuous Nepro with Carbsteady; Gastrostomy; Goal Rate: 30; mL/hr; Advancement Instructions: Once mag and phos are back and WNL,  please initiate TF at 15 ml/hr x 6 hours. If tolerating, okay to advance to goal rate of...    Ordoñez Catheter: PRESENT, indication: Chronic ordoñez  Disposition: Expected discharge after family decides whether or not to pursue hospice care at discharge           Interval History (Subjective):      Patient is nonverbal and noncommunicative.  Son Sathya present at bedside.  All questions were answered.                  Physical Exam:      Last Vital Signs:  /50 (BP Location: Left arm)   Pulse (!) 47   Temp 98.7  F (37.1  C) (Axillary)   Resp 16   Wt 69.6 kg (153 lb 7 oz)   SpO2 100%   BMI 24.77 kg/m        Intake/Output Summary (Last 24 hours) at 3/24/2025 1051  Last data filed at 3/24/2025 0646  Gross per 24 hour   Intake 1053 ml   Output 1450 ml   Net -397 ml       Constitutional: Nonverbal and noncommunicative     Respiratory: Clear to auscultation bilaterally, no crackles or wheezing anteriorly   Cardiovascular: Regular rate and rhythm, normal S1 and S2, and no murmur noted   Abdomen: Normal bowel sounds, soft, non-distended, non-tender, PEG tube present   Skin: No rashes, no cyanosis, dry to touch   Neuro: Sleepy but arousable, nonverbal,    Extremities: No edema, normal range of motion   Other(s):        All other systems: Negative          Medications:      All current medications were reviewed with changes reflected in problem list.         Data:      All new lab and imaging data was reviewed.   Labs:       Lab Results   Component Value Date     03/24/2025     03/23/2025     03/22/2025     08/16/2012     08/13/2012    Lab Results   Component Value Date    CHLORIDE 87 03/24/2025    CHLORIDE 87 03/23/2025    CHLORIDE 87 03/22/2025    CHLORIDE 99 03/30/2022    CHLORIDE 98 02/01/2022    CHLORIDE 105 01/26/2022    CHLORIDE 101 08/16/2012    CHLORIDE 99 08/13/2012    Lab Results   Component Value Date    .6 03/24/2025    .4 03/23/2025    .4 03/22/2025    BUN  "24 03/30/2022    BUN 22 02/01/2022    BUN 20 01/26/2022    BUN 18 08/16/2012    BUN 14 08/13/2012      Lab Results   Component Value Date    POTASSIUM 3.1 03/24/2025    POTASSIUM 3.5 03/23/2025    POTASSIUM 3.4 03/22/2025    POTASSIUM 4.4 03/30/2022    POTASSIUM 4.0 02/01/2022    POTASSIUM 3.6 01/26/2022    POTASSIUM 4.0 08/16/2012    POTASSIUM 3.6 08/13/2012    Lab Results   Component Value Date    CO2 32 03/24/2025    CO2 30 03/23/2025    CO2 33 03/22/2025    CO2 29 03/30/2022    CO2 27 02/01/2022    CO2 31 01/26/2022    CO2 26 08/16/2012    CO2 26 08/13/2012    Lab Results   Component Value Date    CR 5.77 03/24/2025    CR 5.85 03/23/2025    CR 5.81 03/22/2025    CR 0.96 08/16/2012    CR 0.91 08/13/2012        No results for input(s): \"WBC\", \"HGB\", \"HCT\", \"MCV\", \"PLT\" in the last 168 hours.     Imaging:   No results found for this or any previous visit (from the past 24 hours).   "

## 2025-03-26 NOTE — CONSULTS
Care Management Follow Up    Length of Stay (days): 11    Expected Discharge Date: 03/28/2025     Concerns to be Addressed: discharge planning     Patient plan of care discussed at interdisciplinary rounds: Yes    Anticipated Discharge Disposition:                Anticipated Discharge Services:    Anticipated Discharge DME:      Patient/family educated on Medicare website which has current facility and service quality ratings:    Education Provided on the Discharge Plan:    Patient/Family in Agreement with the Plan:      Referrals Placed by CM/SW:    Private pay costs discussed: private room/amenity fees and transportation costs    Discussed  Partnership in Safe Discharge Planning  document with patient/family: No     Handoff Completed: No, handoff not indicated or clinically appropriate    Additional Information: PALAK met with patient and children. Two daughters here and son and wife on the phone. Discussed discharge options and patient's prognosis. It has been difficult for all the family members to be on the same page. Patient does not have a good prognosis, chronic kidney disease.    Discussed options for discharge.    Home with home care, home with hospice care or to a facility LTC with or without hospice care. Daughters appear realistic about patient's current condition. They would like patient on comfort care and return home with hospice but sonSathya is not at this place. He is wondering if patient can go home on home care and family providing care. Son and wife continue to need more time to decide what level of care is best for patient.      Next Steps: Palak will continue to give support and assist with discharge planning.    CRISTINA Linton   Inpatient Care Coordination   Supervisor  [facility]  [phone]      CRISTINA Law

## 2025-03-26 NOTE — PLAN OF CARE
Goal Outcome Evaluation:      Plan of Care Reviewed With: patient, child    Overall Patient Progress: no change    Outcome Evaluation: No significant change.  Awaiting decision for LTC/hospice. RN writer provided the TCU list this morning & asked family to choose 2-3 facilities for referrals to be sent out. As of 1300, the family has made no decision. SW to see pt/family today.        Problem: Adult Inpatient Plan of Care  Goal: Plan of Care Review  Description: The Plan of Care Review/Shift note should be completed every shift.  The Outcome Evaluation is a brief statement about your assessment that the patient is improving, declining, or no change.  This information will be displayed automatically on your shift  note.  Recent Flowsheet Documentation  Taken 3/26/2025 1336 by Amalia Stinson RN  Outcome Evaluation: No significant change.  Awaiting decision for LTC/hospice. RN writer provided the TCU list this morning & asked family to choose 2-3 facilities for referrals to be sent out. As of 1300, the family has made no decision. SW to see pt/family today.  Plan of Care Reviewed With:   patient   child  Overall Patient Progress: no change  Goal: Absence of Hospital-Acquired Illness or Injury  Intervention: Identify and Manage Fall Risk  Recent Flowsheet Documentation  Taken 3/26/2025 0900 by Amalia Stinson RN  Safety Promotion/Fall Prevention:   safety round/check completed   room organization consistent   room near nurse's station   patient and family education   nonskid shoes/slippers when out of bed   mobility aid in reach   lighting adjusted   increase visualization of patient   clutter free environment maintained   increased rounding and observation   assistive device/personal items within reach   activity supervised   supervised activity  Intervention: Prevent Skin Injury  Recent Flowsheet Documentation  Taken 3/26/2025 1206 by Amalia Stinson RN  Body Position: sitting up in bed  Taken 3/26/2025 0900 by  Amalia Stinson RN  Body Position:   side-lying 30 degrees   right  Intervention: Prevent and Manage VTE (Venous Thromboembolism) Risk  Recent Flowsheet Documentation  Taken 3/26/2025 0900 by Amalia Stinson RN  VTE Prevention/Management: SCDs on (sequential compression devices)  Intervention: Prevent Infection  Recent Flowsheet Documentation  Taken 3/26/2025 0900 by Amalia Stinson RN  Infection Prevention: hand hygiene promoted  Goal: Optimal Comfort and Wellbeing  Intervention: Provide Person-Centered Care  Recent Flowsheet Documentation  Taken 3/26/2025 0900 by Amalia Stinson RN  Trust Relationship/Rapport:   care explained   choices provided

## 2025-03-26 NOTE — PLAN OF CARE
Goal Outcome Evaluation:      Plan of Care Reviewed With: child    Overall Patient Progress: improvingOverall Patient Progress: improving    Outcome Evaluation:     Alert , non verbal , responds to verbal stimuli by flexing the left hand fingers . On RA , VSS  , afebrile. Feeding restarted after checking his has no nausea or abdominal discomfort . Feeding Tube aspirated and flushing performed before and after medications administered .  Free water  and enteral feeding administered per POC . Patient tolerated feeding/medication well . Feeding  tube  is patent secured, no signs of displacement.  Aspiration prevention maintained. Repositioned at intervals .  Felix cath in place , secured , collecting well . Had small sticky and dark brown stool.     Problem: Adult Inpatient Plan of Care  Goal: Plan of Care Review  Description: The Plan of Care Review/Shift note should be completed every shift.  The Outcome Evaluation is a brief statement about your assessment that the patient is improving, declining, or no change.  This information will be displayed automatically on your shift  note.  Recent Flowsheet Documentation  Taken 3/26/2025 0649 by Arabella Patricia, RN  Outcome Evaluation: vs  Plan of Care Reviewed With: child  Overall Patient Progress: improving  Goal: Absence of Hospital-Acquired Illness or Injury  Intervention: Identify and Manage Fall Risk  Recent Flowsheet Documentation  Taken 3/26/2025 0410 by Arabella Patricia, RN  Safety Promotion/Fall Prevention: safety round/check completed  Taken 3/26/2025 0328 by Arabella Patricia, RN  Safety Promotion/Fall Prevention: safety round/check completed  Taken 3/25/2025 1930 by Arabella Patricia, RN  Safety Promotion/Fall Prevention:   clutter free environment maintained   room near nurse's station   safety round/check completed   lighting adjusted   patient and family education  Intervention: Prevent Skin Injury  Recent Flowsheet Documentation  Taken 3/26/2025 0410 by  Arabella Patricia RN  Body Position:   turned   left  Taken 3/26/2025 0205 by Arabella Patricia RN  Body Position:   turned   right  Taken 3/26/2025 0007 by Arabella Patricia RN  Body Position:   turned   left  Taken 3/25/2025 2211 by Arabella Patricia RN  Body Position: turned  Taken 3/25/2025 2000 by Arabella Patricia RN  Body Position:   turned   right  Taken 3/25/2025 1930 by Arabella Patricia RN  Body Position:   left   turned  Intervention: Prevent and Manage VTE (Venous Thromboembolism) Risk  Recent Flowsheet Documentation  Taken 3/25/2025 1930 by Arabella Patricia RN  VTE Prevention/Management: SCDs on (sequential compression devices)  Intervention: Prevent Infection  Recent Flowsheet Documentation  Taken 3/25/2025 1930 by Arabella Patricia RN  Infection Prevention:   hand hygiene promoted   single patient room provided  Goal: Optimal Comfort and Wellbeing  Intervention: Provide Person-Centered Care  Recent Flowsheet Documentation  Taken 3/25/2025 1930 by Arabella Patricia RN  Trust Relationship/Rapport: care explained

## 2025-03-27 LAB
ANION GAP SERPL CALCULATED.3IONS-SCNC: 13 MMOL/L (ref 7–15)
BUN SERPL-MCNC: 141.5 MG/DL (ref 8–23)
CALCIUM SERPL-MCNC: 12.5 MG/DL (ref 8.8–10.4)
CHLORIDE SERPL-SCNC: 89 MMOL/L (ref 98–107)
CREAT SERPL-MCNC: 5.94 MG/DL (ref 0.67–1.17)
EGFRCR SERPLBLD CKD-EPI 2021: 9 ML/MIN/1.73M2
GLUCOSE BLDC GLUCOMTR-MCNC: 203 MG/DL (ref 70–99)
GLUCOSE BLDC GLUCOMTR-MCNC: 204 MG/DL (ref 70–99)
GLUCOSE BLDC GLUCOMTR-MCNC: 210 MG/DL (ref 70–99)
GLUCOSE BLDC GLUCOMTR-MCNC: 213 MG/DL (ref 70–99)
GLUCOSE BLDC GLUCOMTR-MCNC: 225 MG/DL (ref 70–99)
GLUCOSE BLDC GLUCOMTR-MCNC: 227 MG/DL (ref 70–99)
GLUCOSE SERPL-MCNC: 215 MG/DL (ref 70–99)
HCO3 SERPL-SCNC: 32 MMOL/L (ref 22–29)
POTASSIUM SERPL-SCNC: 3.8 MMOL/L (ref 3.4–5.3)
SODIUM SERPL-SCNC: 134 MMOL/L (ref 135–145)

## 2025-03-27 PROCEDURE — 36416 COLLJ CAPILLARY BLOOD SPEC: CPT | Performed by: STUDENT IN AN ORGANIZED HEALTH CARE EDUCATION/TRAINING PROGRAM

## 2025-03-27 PROCEDURE — 80048 BASIC METABOLIC PNL TOTAL CA: CPT | Performed by: STUDENT IN AN ORGANIZED HEALTH CARE EDUCATION/TRAINING PROGRAM

## 2025-03-27 PROCEDURE — 250N000013 HC RX MED GY IP 250 OP 250 PS 637: Performed by: INTERNAL MEDICINE

## 2025-03-27 PROCEDURE — 99232 SBSQ HOSP IP/OBS MODERATE 35: CPT | Performed by: STUDENT IN AN ORGANIZED HEALTH CARE EDUCATION/TRAINING PROGRAM

## 2025-03-27 PROCEDURE — 120N000001 HC R&B MED SURG/OB

## 2025-03-27 RX ORDER — FUROSEMIDE 20 MG/1
80 TABLET ORAL
Qty: 40 TABLET | Refills: 0 | Status: SHIPPED | OUTPATIENT
Start: 2025-03-27 | End: 2025-04-01

## 2025-03-27 RX ADMIN — ACETAMINOPHEN 650 MG: 160 SOLUTION ORAL at 01:39

## 2025-03-27 RX ADMIN — INSULIN ASPART 2 UNITS: 100 INJECTION, SOLUTION INTRAVENOUS; SUBCUTANEOUS at 08:44

## 2025-03-27 RX ADMIN — INSULIN ASPART 2 UNITS: 100 INJECTION, SOLUTION INTRAVENOUS; SUBCUTANEOUS at 01:37

## 2025-03-27 RX ADMIN — Medication 300 MG: at 10:59

## 2025-03-27 RX ADMIN — AMLODIPINE BESYLATE 10 MG: 10 TABLET ORAL at 08:31

## 2025-03-27 RX ADMIN — DOXAZOSIN 2 MG: 1 TABLET ORAL at 08:31

## 2025-03-27 RX ADMIN — Medication 5 ML: at 20:43

## 2025-03-27 RX ADMIN — FUROSEMIDE 80 MG: 10 SOLUTION ORAL at 16:17

## 2025-03-27 RX ADMIN — ACETAMINOPHEN 650 MG: 160 SOLUTION ORAL at 16:17

## 2025-03-27 RX ADMIN — INSULIN ASPART 2 UNITS: 100 INJECTION, SOLUTION INTRAVENOUS; SUBCUTANEOUS at 16:18

## 2025-03-27 RX ADMIN — INSULIN ASPART 2 UNITS: 100 INJECTION, SOLUTION INTRAVENOUS; SUBCUTANEOUS at 04:04

## 2025-03-27 RX ADMIN — FUROSEMIDE 80 MG: 10 SOLUTION ORAL at 08:41

## 2025-03-27 RX ADMIN — INSULIN ASPART 2 UNITS: 100 INJECTION, SOLUTION INTRAVENOUS; SUBCUTANEOUS at 12:21

## 2025-03-27 RX ADMIN — FLUOXETINE 20 MG: 20 SOLUTION ORAL at 08:42

## 2025-03-27 RX ADMIN — ACETAMINOPHEN 650 MG: 160 SOLUTION ORAL at 08:31

## 2025-03-27 RX ADMIN — INSULIN ASPART 2 UNITS: 100 INJECTION, SOLUTION INTRAVENOUS; SUBCUTANEOUS at 20:43

## 2025-03-27 RX ADMIN — CLOPIDOGREL BISULFATE 75 MG: 75 TABLET ORAL at 08:31

## 2025-03-27 ASSESSMENT — ACTIVITIES OF DAILY LIVING (ADL)
ADLS_ACUITY_SCORE: 103
ADLS_ACUITY_SCORE: 105
ADLS_ACUITY_SCORE: 103
ADLS_ACUITY_SCORE: 103
ADLS_ACUITY_SCORE: 105
ADLS_ACUITY_SCORE: 103
ADLS_ACUITY_SCORE: 102
ADLS_ACUITY_SCORE: 103
ADLS_ACUITY_SCORE: 102
ADLS_ACUITY_SCORE: 103

## 2025-03-27 NOTE — PLAN OF CARE
"ZAYDA orientation. Nonverbal. BG Q4hrs. TF running @ 30ml/hr. Lift Ax2. Brennen-40s. 1L NC. Q2hr repos. Oral care and suction done. Palliative follow. Plan to switch to inpatient comfort cares.               Goal Outcome Evaluation:      Plan of Care Reviewed With: patient          Outcome Evaluation: TF running @30ml/hr. Repo Q2hrs.      Problem: Gas Exchange Impaired  Goal: Optimal Gas Exchange  Outcome: Adequate for Care Transition  Intervention: Optimize Oxygenation and Ventilation  Recent Flowsheet Documentation  Taken 3/27/2025 1158 by Rodney Hull RN  Head of Bed (HOB) Positioning: HOB at 30 degrees  Taken 3/27/2025 1057 by Rodney Hull RN  Head of Bed (HOB) Positioning: HOB at 30 degrees  Taken 3/27/2025 0824 by Rodney Hull RN  Head of Bed (HOB) Positioning: HOB at 30 degrees     Problem: Adult Inpatient Plan of Care  Goal: Plan of Care Review  Description: The Plan of Care Review/Shift note should be completed every shift.  The Outcome Evaluation is a brief statement about your assessment that the patient is improving, declining, or no change.  This information will be displayed automatically on your shiftnote.  Outcome: Adequate for Care Transition  Flowsheets (Taken 3/27/2025 1343)  Outcome Evaluation: TF running @30ml/hr. Repo Q2hrs.  Plan of Care Reviewed With: patient  Goal: Patient-Specific Goal (Individualized)  Description: You can add care plan individualizations to a care plan. Examples of Individualization might be:  \"Parent requests to be called daily at 9am for status\", \"I have a hard time hearing out of my right ear\", or \"Do not touch me to wake me up as it startlesme\".  Outcome: Adequate for Care Transition  Goal: Absence of Hospital-Acquired Illness or Injury  Outcome: Adequate for Care Transition  Intervention: Identify and Manage Fall Risk  Recent Flowsheet Documentation  Taken 3/27/2025 0824 by Rodney Hull RN  Safety Promotion/Fall Prevention: safety round/check " completed  Intervention: Prevent Skin Injury  Recent Flowsheet Documentation  Taken 3/27/2025 1158 by Rodney Hull RN  Body Position:   left   turned  Taken 3/27/2025 1057 by Rodney Hull RN  Body Position:   right   turned  Taken 3/27/2025 0824 by Rodney Hull RN  Body Position:   turned   left  Intervention: Prevent and Manage VTE (Venous Thromboembolism) Risk  Recent Flowsheet Documentation  Taken 3/27/2025 0824 by Rodney Hull RN  VTE Prevention/Management: SCDs on (sequential compression devices)  Intervention: Prevent Infection  Recent Flowsheet Documentation  Taken 3/27/2025 0824 by Rodney Hull RN  Infection Prevention:   hand hygiene promoted   rest/sleep promoted   single patient room provided  Goal: Optimal Comfort and Wellbeing  Outcome: Adequate for Care Transition  Goal: Readiness for Transition of Care  Outcome: Adequate for Care Transition     Problem: Fall Injury Risk  Goal: Absence of Fall and Fall-Related Injury  Outcome: Adequate for Care Transition  Intervention: Identify and Manage Contributors  Recent Flowsheet Documentation  Taken 3/27/2025 0824 by Rodney Hull RN  Medication Review/Management: medications reviewed  Intervention: Promote Injury-Free Environment  Recent Flowsheet Documentation  Taken 3/27/2025 0824 by Rodney Hull RN  Safety Promotion/Fall Prevention: safety round/check completed

## 2025-03-27 NOTE — PROGRESS NOTES
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       The patient's daughter states that patient has had some vomiting last week.  He was having increased shortness of breath.  His wife noted that his oxygen saturation was low.  He was brought to emergency room for evaluation.     Initial vital signs showed temperature 97.5, pulse 46, blood pressure 148/61, oxygen saturation 95% on room air.  Laboratory workup showed sodium 128, potassium 5.8, creatinine 5.67, ALT 36, AST 31, troponin T high-sensitivity 103.  WBC 6.5, hemoglobin 8.9, platelets 164.  Venous blood gas showed pH 7.44/pCO2 44, pO2 82.5.  Chest x-ray showed large right pleural effusion with small to moderate left pleural effusion.  Bibasilar consolidation, patchy infiltrate in the central aspect of both lungs progressed on the left and improved on the right.  Felix catheter within decompressed urinary bladder.  Anasarca has progressed.     Nephrology was consulted from emergency room and recommended Lasix 100 mg IV.  He was also given calcium gluconate, Lokelma 10 g per G-tube.  He was also given a dose of IV ceftriaxone and azithromycin.  He was admitted to the hospital for further management.     He remains on 5 L of oxygen.  Interventional radiology performed a right-sided thoracentesis today.  He continues on IV diuresis and nephrology was  formally consulted.  Potassium and heart rate have improved.  Palliative care met with family to discuss goals of care and possible enrollment into hospice given his poor functional status, elderly age, worsening renal function without being a dialysis candidate, bradycardia without being a pacemaker candidate, and gradual deterioration with no truly reversible process.  They understand his poor prognosis and a care conference was held with the family on 3/18 to discuss all of the above, with attendees including hospitalist, palliative care, and nephrology.  Nephrology reiterated during the care conference that the patient is a poor dialysis candidate given his multiple comorbidities     Update on 3/27:   No Change in the medical management.  I again reach out to DaughterClemente  today fortunately I was able to speak with her over the phone.  She said that all the family members now have agreed to take patient's home with home cares.     Problem list:  Acute hypoxic respiratory failure likely multifactorial including volume overload due to acute on chronic diastolic CHF exacerbation---improving  Bilateral pleural effusions, right > left  Patient was on Lasix 20 mg daily at home.  Per family he had decreased urine output and increased shortness of breath.  Chest x-ray showed large right pleural effusion, moderate left pleural effusion.  Received IV diuresis in the hospital and then transition to p.o.  Appreciate nephrology assistance.  -Continue oral Lasix 80 mg twice a day  -s/p R sided thoracentesis with 900 ml removed  -AHRF overall improved now requiring only 0.5 to 1 L of nasal cannula.  Lungs clear anteriorly    Acute toxic metabolic encephalopathy  Per patient's son Sathya usually he is awake and alert and can show thumbs up or down when ask questions.  Lately patient has been more lethargic.  Suspect this is likely due to uremia.  Also has history of CVA.  Explained to patient's and Sathya that there is nothing  much that can be offered at this point.     Hyperkalemia-resolved  Hypokalemia  ALEX on CKD stage V  Nephrotic range proteinuria  Anasarca  Hyponatremia  Hypercalcemia   -Creatinine 1 year ago was near 1  -Most recent creatinine 4.07 on 2/26/2025.  Creatinine 5.67 on admission and potassium 5.8.  He was previously seen by nephrology and his daughter indicates that he was determined not to be a dialysis candidate.  Potassium has improved with shifting agents and Lokelma.  Creatinine has been steadily climbing over the past several weeks.  -Initially hyperkalemic.  Now hypokalemic and requiring potassium replacement.  -completed IV diuresis; now on PO diuretic  -Will check BMP intermittently while in the hospital  -Felix in place and tracking I's and O's.  - appreciate nephrology input.  As per nephrology not a dialysis candidate     Bradycardia  Junctional rhythm  This was an issue during his recent hospitalization as well.  There may be some metabolic component but I do worry about some underlying conduction disease.  Poor candidate for pacemaker due to the issues outlined above.  -telemetry discontinued     Demand ischemia  Patient had no evidence of chest pain. Troponin has been elevated during prior evaluation on 2/26/2025.  Troponin 103 on admission and flat, not consistent with acute coronary syndrome.  Likely demand ischemia from respiratory failure with poor enzyme clearance in the face of CKD.    -No further workup.     Diabetes mellitus type 2  He was on Lantus insulin 14 units every morning, insulin sliding scale PTA.  -Insulin was increased to 18 units this hospitalization.  Will continue medium intensity sliding scale insulin     History of CVA with residual right-sided hemiplegia and baseline nonverbal status  Chronic dysphagia, status post PEG tube placement, G-tube exchange on 2/24/2025  S/p indwelling Felix catheter  Dyslipidemia  Hypertension  Patient resides at home and family takes care of him.  He  also has a visiting nurse.  They appear to provide excellent care and support for him.  Patient's son Sathya stated that patient's spouse who is also around 73 years old was taking care of patient but now getting overwhelmed and would appreciate further assistance in placement.  Family now decided to take patient back home with home cares.  -Continue PTA medications including amlodipine and atorvastatin and Plavix  -RD consulted to facilitate tube feeds.     Anemia and chronic kidney disease  -Resumed ferrous sulfate.     Depression  -Resume Prozac.     Goals of care  The patient is chronically ill and essentially full cares as a result of a prior massive stroke.  He has chronic dysphagia and receives tube feeds via a PEG.  He is essentially bedbound and has residual right-sided hemiplegia and nonverbal status.  He is now faced with rapidly progressing chronic kidney disease leading to concerning electrolyte abnormalities, hypervolemia, and bradycardia.  He is an extremely poor dialysis candidate and dialysis has not been offered by nephrology.  Care conference was held on 3/18 with palliative care, nephrology, and this hospitalist.  Goals of care discussed.  Hospice being considered by family but formal decision has not yet been made  Per one of the daughters, she says that all the family members including the daughters and patient's spouse are interested in looking at comfort measures, but the patient's son has a different viewpoint on this from the rest of the family.  Ongoing family discussions around the disposition plan.  Fortunately family eventually decided to take patient home with home cares.  They are not pursuing hospice upon discharge at this time.        DVT Prophylaxis: Pneumatic Compression Devices  Code Status: No CPR- Do NOT Intubate  Diet: Adult Formula Drip Feeding: Continuous Nepro with Carbsteady; Gastrostomy; Goal Rate: 30; mL/hr; Advancement Instructions: Once mag and phos are back and WNL,  please initiate TF at 15 ml/hr x 6 hours. If tolerating, okay to advance to goal rate of...    Ordoñez Catheter: PRESENT, indication: Chronic ordoñez  Disposition: Expected discharge after family decides whether or not to pursue hospice care at discharge           Interval History (Subjective):      Patient is nonverbal and noncommunicative.  Son Sathya present at bedside.  All questions were answered.                  Physical Exam:      Last Vital Signs:  BP (P) 139/52 (BP Location: Left arm)   Pulse (P) 52   Temp 97.6  F (36.4  C) (Axillary)   Resp 18   Wt 69.6 kg (153 lb 7 oz)   SpO2 (P) 96%   BMI 24.77 kg/m        Intake/Output Summary (Last 24 hours) at 3/24/2025 1051  Last data filed at 3/24/2025 0646  Gross per 24 hour   Intake 1053 ml   Output 1450 ml   Net -397 ml       Constitutional: Nonverbal and noncommunicative     Respiratory: Clear to auscultation bilaterally, no crackles or wheezing anteriorly   Cardiovascular: Regular rate and rhythm, normal S1 and S2, and no murmur noted   Abdomen: Normal bowel sounds, soft, non-distended, non-tender, PEG tube present   Skin: No rashes, no cyanosis, dry to touch   Neuro: Sleepy but arousable, nonverbal,    Extremities: No edema, normal range of motion   Other(s):        All other systems: Negative          Medications:      All current medications were reviewed with changes reflected in problem list.         Data:      All new lab and imaging data was reviewed.   Labs:       Lab Results   Component Value Date     03/24/2025     03/23/2025     03/22/2025     08/16/2012     08/13/2012    Lab Results   Component Value Date    CHLORIDE 87 03/24/2025    CHLORIDE 87 03/23/2025    CHLORIDE 87 03/22/2025    CHLORIDE 99 03/30/2022    CHLORIDE 98 02/01/2022    CHLORIDE 105 01/26/2022    CHLORIDE 101 08/16/2012    CHLORIDE 99 08/13/2012    Lab Results   Component Value Date    .6 03/24/2025    .4 03/23/2025    .4 03/22/2025  "   BUN 24 03/30/2022    BUN 22 02/01/2022    BUN 20 01/26/2022    BUN 18 08/16/2012    BUN 14 08/13/2012      Lab Results   Component Value Date    POTASSIUM 3.1 03/24/2025    POTASSIUM 3.5 03/23/2025    POTASSIUM 3.4 03/22/2025    POTASSIUM 4.4 03/30/2022    POTASSIUM 4.0 02/01/2022    POTASSIUM 3.6 01/26/2022    POTASSIUM 4.0 08/16/2012    POTASSIUM 3.6 08/13/2012    Lab Results   Component Value Date    CO2 32 03/24/2025    CO2 30 03/23/2025    CO2 33 03/22/2025    CO2 29 03/30/2022    CO2 27 02/01/2022    CO2 31 01/26/2022    CO2 26 08/16/2012    CO2 26 08/13/2012    Lab Results   Component Value Date    CR 5.77 03/24/2025    CR 5.85 03/23/2025    CR 5.81 03/22/2025    CR 0.96 08/16/2012    CR 0.91 08/13/2012        No results for input(s): \"WBC\", \"HGB\", \"HCT\", \"MCV\", \"PLT\" in the last 168 hours.     Imaging:   No results found for this or any previous visit (from the past 24 hours).   "

## 2025-03-27 NOTE — PLAN OF CARE
"Some arousal to pain. Non-verbal. Frequent oral cares and repositioning. Family at bedside to translate. Suction at bedside, none required this shift - no emesis. VSS 1L NC for comfort. Felix draining.     Protocols: k  IV: L PIV saline locked  Diet: NPO, continuous G-tube feed at 30ml/hr w/ q4hr flushes  Activity: 2 lift w/ repos  Pain: PAINAID scoring 0    Palliative consulted, appears that family is still discussing options. Feeding paused prior to repo's d/t pt producing emesis on 3/25. Nephrology following.                 Temp: 98.5  F (36.9  C) Temp src: Axillary BP: (!) 146/55 Pulse: 55   Resp: 18 SpO2: 97 % O2 Device: Nasal cannula Oxygen Delivery: 1 LPM       Goal Outcome Evaluation:      Plan of Care Reviewed With: patient, child, family    Overall Patient Progress: no changeOverall Patient Progress: no change    Outcome Evaluation: g tube feeding w/ repos      Problem: Gas Exchange Impaired  Goal: Optimal Gas Exchange  Outcome: Progressing  Intervention: Optimize Oxygenation and Ventilation  Recent Flowsheet Documentation  Taken 3/27/2025 0214 by Mackenzie Navarrete, RN  Head of Bed (HOB) Positioning: HOB at 30 degrees     Problem: Adult Inpatient Plan of Care  Goal: Plan of Care Review  Description: The Plan of Care Review/Shift note should be completed every shift.  The Outcome Evaluation is a brief statement about your assessment that the patient is improving, declining, or no change.  This information will be displayed automatically on your shiftnote.  Outcome: Progressing  Flowsheets (Taken 3/27/2025 0501)  Outcome Evaluation: g tube feeding w/ repos  Plan of Care Reviewed With:   patient   child   family  Overall Patient Progress: no change  Goal: Patient-Specific Goal (Individualized)  Description: You can add care plan individualizations to a care plan. Examples of Individualization might be:  \"Parent requests to be called daily at 9am for status\", \"I have a hard time hearing out of my right ear\", or " "\"Do not touch me to wake me up as it startlesme\".  Outcome: Progressing  Goal: Absence of Hospital-Acquired Illness or Injury  Outcome: Progressing  Intervention: Identify and Manage Fall Risk  Recent Flowsheet Documentation  Taken 3/27/2025 0415 by Mackenzie Navarrete RN  Safety Promotion/Fall Prevention: safety round/check completed  Taken 3/27/2025 0100 by Mackenzie Navarrete RN  Safety Promotion/Fall Prevention: safety round/check completed  Intervention: Prevent Skin Injury  Recent Flowsheet Documentation  Taken 3/27/2025 0214 by Mackenzie Navarrete RN  Body Position: (was left)   turned   right   log-rolled   supine, head elevated  Intervention: Prevent and Manage VTE (Venous Thromboembolism) Risk  Recent Flowsheet Documentation  Taken 3/27/2025 0100 by Mackenzie Navarrete RN  VTE Prevention/Management: SCDs on (sequential compression devices)  Intervention: Prevent Infection  Recent Flowsheet Documentation  Taken 3/27/2025 0100 by Mackenzie Navarrete RN  Infection Prevention:   equipment surfaces disinfected   rest/sleep promoted   single patient room provided  Goal: Optimal Comfort and Wellbeing  Outcome: Progressing  Goal: Readiness for Transition of Care  Outcome: Progressing     Problem: Fall Injury Risk  Goal: Absence of Fall and Fall-Related Injury  Outcome: Progressing  Intervention: Promote Injury-Free Environment  Recent Flowsheet Documentation  Taken 3/27/2025 0415 by Mackenzie Navarrete RN  Safety Promotion/Fall Prevention: safety round/check completed  Taken 3/27/2025 0100 by Mackenzie Navarrete RN  Safety Promotion/Fall Prevention: safety round/check completed     "

## 2025-03-27 NOTE — PLAN OF CARE
"[id number]  VSS on RA. Nonverbal. TF at goal rate of 30ml/hr. Repo q2 hr. Family in room. No pain noted this shift.      Goal Outcome Evaluation:      Plan of Care Reviewed With: patient    Overall Patient Progress: no change    Outcome Evaluation: Continue on TF at goal rate. Repo q2 hr.      Problem: Gas Exchange Impaired  Goal: Optimal Gas Exchange  Outcome: Not Progressing  Intervention: Optimize Oxygenation and Ventilation  Recent Flowsheet Documentation  Taken 3/26/2025 2200 by Rosalva Talley RN  Head of Bed (HOB) Positioning: HOB at 30 degrees  Taken 3/26/2025 2030 by Rosalva Talley RN  Head of Bed (HOB) Positioning: HOB at 30 degrees     Problem: Adult Inpatient Plan of Care  Goal: Plan of Care Review  Description: The Plan of Care Review/Shift note should be completed every shift.  The Outcome Evaluation is a brief statement about your assessment that the patient is improving, declining, or no change.  This information will be displayed automatically on your shiftnote.  Outcome: Not Progressing  Flowsheets (Taken 3/26/2025 2258)  Outcome Evaluation: Continue on TF at goal rate. Repo q2 hr.  Plan of Care Reviewed With: patient  Overall Patient Progress: no change  Goal: Patient-Specific Goal (Individualized)  Description: You can add care plan individualizations to a care plan. Examples of Individualization might be:  \"Parent requests to be called daily at 9am for status\", \"I have a hard time hearing out of my right ear\", or \"Do not touch me to wake me up as it startlesme\".  Outcome: Not Progressing  Goal: Absence of Hospital-Acquired Illness or Injury  Outcome: Not Progressing  Intervention: Identify and Manage Fall Risk  Recent Flowsheet Documentation  Taken 3/26/2025 2030 by Rosalva Talley RN  Safety Promotion/Fall Prevention:   safety round/check completed   room near nurse's station   patient and family education  Intervention: Prevent Skin Injury  Recent Flowsheet Documentation  Taken " 3/26/2025 2200 by Rosalva Talley RN  Body Position:   turned   left  Taken 3/26/2025 2030 by Rosalva Talley RN  Body Position: weight shifting  Intervention: Prevent Infection  Recent Flowsheet Documentation  Taken 3/26/2025 2030 by Rosalva Talley RN  Infection Prevention:   single patient room provided   rest/sleep promoted  Goal: Optimal Comfort and Wellbeing  Outcome: Not Progressing  Goal: Readiness for Transition of Care  Outcome: Not Progressing     Problem: Fall Injury Risk  Goal: Absence of Fall and Fall-Related Injury  Outcome: Not Progressing  Intervention: Identify and Manage Contributors  Recent Flowsheet Documentation  Taken 3/26/2025 2030 by Rosalva Talley RN  Medication Review/Management: medications reviewed  Intervention: Promote Injury-Free Environment  Recent Flowsheet Documentation  Taken 3/26/2025 2030 by Rosalva Talley RN  Safety Promotion/Fall Prevention:   safety round/check completed   room near nurse's station   patient and family education

## 2025-03-27 NOTE — PROGRESS NOTES
CLINICAL NUTRITION SERVICES - REASSESSMENT NOTE     Registered Dietitian Interventions:  Continue TF as ordered    Ordered BMP     RD team will continue to follow      INFORMATION OBTAINED  Patient not available for interview due to nonverbal at baseline    CURRENT NUTRITION ORDERS  Nutrition Support: Pt typically on bolus tube feeding at home, but on continuous during admission d/t hx of aspiration. TF since admission as follows ~    Access: G-Tube   Frequency: Continuous   Formula: Nepro   Enteral Regimen: Nepro @ goal 30 ml/hr x 24 hours (720 ml/day)   Total enteral provisions: 1296 kcals (20 kcal/kg/day), 58 g PRO (0.9 g/kg/day), 526 ml free H2O, 116 g CHO and 18 g Fiber daily.   Free Water Flushes: 60 ml q 4h for tube patency     CURRENT INTAKE/TOLERANCE  Pt's TF was briefly paused on 3/26 @ 1830 d/t 75 ml emesis. Residual was checked and TF was restarted shortly after.  Pt noted to have been tolerating enteral feeding well apart from this instance.      NEW FINDINGS  Plan for pt to discharge home w/ home cares    GI symptoms:   - Stools: pt was struggling with constipation since admission and did not have a BM from 3/16 until 3/25. Now has had at least 1 BM per day since 3/25.   - Emesis: 50 ml emesis on 3/21; 75 ml emesis on 3/25    Skin/wounds:   - Edema: 1-2+ dependent and BLE  - No current documentation of pressure injuries or non healing wounds    Nutrition-relevant labs: Reviewed    Nutrition-relevant medications: Reviewed  Noted: nephronex, ferrous sulfate, 80 mg lasix BID, sliding scale insulin, 18 units Lantus daily, senokot PRN, miralax PRN, zofran PRN    Weight: 69.6 kg (fairly stable compared to admission weight)  Vitals:    03/21/25 0405 03/22/25 0608 03/24/25 0405 03/25/25 0552   Weight: 79.5 kg (175 lb 4.3 oz) 71.5 kg (157 lb 10.1 oz) 64.7 kg (142 lb 10.2 oz) 64.8 kg (142 lb 13.7 oz)    03/26/25 0415   Weight: 69.6 kg (153 lb 7 oz)   - Difficult to trend weights d/t fluid status    I/O:  "260/1850    ASSESSED NUTRITION NEEDS  Dosing Weight: 63.8 kg based on IBW - pt w/ anasarca, unsure of dry wt  Estimated Energy Needs: 3830-0968 kcals/day (20 - 25 kcals/kg)  Justification: Maintenance  Estimated Protein Needs: 51-64 grams protein/day (0.8-1 grams of pro/kg)  Justification: CKD  Estimated Fluid Needs: defer to nephrology     MALNUTRITION  % Intake: No decreased intake noted  % Weight Loss: Unable to assess d/t fluid status  Subcutaneous Fat Loss: None observed  Muscle Loss: None observed  Fluid Accumulation/Edema: Pt w/ anasarca. Not r/t nutrition status.   Malnutrition Diagnosis: Patient does not meet two of the established criteria necessary for diagnosing malnutrition  Malnutrition Present on Admission: No    EVALUATION OF THE PROGRESS TOWARD GOALS   Previous Goals  Tolerate EN at goal rate   Evaluation: Met    Previous Nutrition Diagnosis  Swallowing difficulty related to CVA as evidenced by pt 100% reliant on EN at baseline.   Evaluation: No change    NUTRITION DIAGNOSIS  Swallowing difficulty related to CVA as evidenced by pt 100% reliant on EN at baseline.     INTERVENTIONS  See nutrition interventions above    GOALS  EN to meet % of estimated needs      MONITORING/EVALUATION  Progress toward goals will be monitored and evaluated per policy.        Danielle Akins RD, LD  Clinical Dietitian  [facility] Message Group: \"Dietitian [[place]]\"  Office Phone: [phone]  Pagers: 3rd floor/ICU: [phone]  All other floors: [phone]  Weekend/holiday: [phone]    "

## 2025-03-27 NOTE — PROGRESS NOTES
PALLIATIVE CARE PROGRESS NOTE  [facility]     Patient Name: Jimmy Otto  Date of Admission: 3/15/2025   Today the patient was seen for: Follow up goals of care discussion     Recommendations & Counseling       GOALS OF CARE:   Comfort focused   Long discussion with son Sathya regarding signs and symptoms seen on assessment that Jimmy is nearing end of life.  He states that he was trying to wait and see if his father would stabilize and potentially be able to discharge home, but after discussion with the bedside nurse and findings of bradycardia and lethargy today, he is convinced that getting Jimmy home would not be realistic.  He spoke about transitioning toward comfort focused cares and supporting his father through end of life.    Sathya requested time to communicate the changes to his mother and uncle and to also contact his sister who is the health care agent.  He will update the team when he has done this communication and they will likely proceed with comfort measures.  Comfort measures were defined as transition to symptom management and support a patient at end of life, this would include:  discontinuation of tube feedings, de-escalation of medications that do not strictly promote comfort and symptom management, discontinuation of cardiac monitor and other medical interventions that do not focus on comfort and the addition to comfort focused/symptom management medications.      ADVANCE CARE PLANNING:  No health care directive on file. Per system policy, Surrogate Decision-makers for Patients With Diminished Decision-making Capacity offers guidance on possible decision-makers. Daughter Clemente Clark has been identified as a surrogate decision maker.   There is no POLST form on file, defer to patient and/or next of kin for decisions   Code status: No CPR- Do NOT Intubate    MEDICAL MANAGEMENT:   Anticipated transition to Comfort Care   Below are common symptoms routinely seen in patients with  comfort focused goals and at the end of life. This patient may not currently exhibit all of these symptoms; however, if these were to occur our recommendations are as follows:  These orders have not been entered as there is pending family conversation at the time of this note.   Recommend the following if transition to comfort measures is decided:  #Pain  1st choice: hydromorphone via G-tube 1-2 mg q 2 hour as needed.   2nd choice: hydromorphone IV 0.3-0.5 mg q 15 minutes as needed     #Air hunger/Dyspnea   1st choice: hydromorphone G-tube 1-2 mg q 1 hour as needed.   2nd choice: hydromorphone IV 0.3-0.5 mg q 15 minutes as needed     #Anxiety    1st choice: Lorazepam SL/G-tube 1 mg  q 3 hour as needed.   2nd choice: Lorazepam IV 1 mg q 3 hour as needed    #Secretion burden   Robinul 0.1 mg (PO/IV) q 4 hours as needed. If ineffective, increase up to 0.3 mg   Consider atropine if Robinul ineffective after dose increase      #Nausea   Zofran 4 mg q 6 hours as needed. Can increase to 8 mg   Zyprexa 5 mg q 6 hours as needed     #Agitation  Aggressive control of other symptoms first, then  1st choice: Zyprexa 5 mg ODT or IM   2nd choice: Increase dose of Zyprexa to 10 mg or consider switch to Haldol   3rd choice: Lorazepam as above     PSYCHOSOCIAL/SPIRITUAL:  Family spouse, son, daughters (2)  [place] community: [employer]     Palliative Care will continue to follow. Thank you for the consult and allowing us to aid in the care of Jimmy Otto.    These recommendations have been discussed with hospital medicine, nursing staff, case management.    FEMI Short CNP  [facility], Palliative Care  Securely message with the [employer] Web Console (learn more here) or  Text page via [employer] Paging/Directory        Assessment          Jimmy Otto is a 75 year old male admitted 3/15/2025 with acute hypoxic respiratory failure in the setting of acute on chronic diastolic CHF exacerbation with bilateral pleura effusions R>L. The patient and  "family are known to this writer from the patient's hospitalization October 2021 when the family had made the decision to pursue tube feedings. 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        3/17/2025 - Family care conference with 2 daughters, Hospitalist and Palliative Care  3/19/2025 -  Family care conference with wife, son (who lives in [place]), 2 daughters, Nephrology, Hospitalist and Palliative Care. Daughters acting as  for wife and patient who speak Cambodian.   3/24/2025 - Family care conference with 2 daughters and son  3/27/2025 - Patient with progressive bradycardia overnight and somnolence.  Son at the bedside.  Reviewed medical updates and decision to review with family and likely transition to comfort focused cares and continued hospitalization through end of life.       Interval History:     Multidisciplinary collaboration:  Reviewed plan of care and patient's change in status to Hospitalist service and Case management.  Discharge home with home health care services cancelled.     Patient/family narrative  Met patient and son at the bedside after being contacted by nursing staff.  Jimmy is sleeping/lethargic and was not able to participate in the discussion.    Reviewed Sathya's understanding and impression of Jimmy's medical status.  Sathya stated that he can see a steady decline in his father and that he is seeing possible signs that he is dying.  Validated Sathya's observations and reviewed the typical trajectory of a patient nearing end of life.  Acknowledged that a patient may have a \"good day\" that can make a family feel confused or worried about " their decision.  Discussed that late stage findings such as bradycardia and lethargy were potential signs that Ho has limited days remaining before death.      Sathya outlined their family dynamics and the differing opinions of the family members, expressed grief and worry that he would be able to hold the family together.  Offered empathetic listening to his worries.      Offered plan for Sathya to communicate poor prognosis and inevitable trajectory for Ho as he nears end of life. Discussed that it would be appropriate to support Ho on Comfort focused cares at this time and that he be monitored for symptoms or distress in the hospital.  Reviewed and clarified Comfort measures with Sathya.  Discussed de-escalation from interventions that would not actively support comfort, but would only prolong Ho's dying process.  Discussed discontinuation of tube feeding, IV hydration, monitoring and labwork, as these interventions would no longer offer benefit.  Discussed escalation of medications and nursing interventions that would promote comfort and symptom management for Ho.      Sathya verbalized understanding to this plan and the transition to comfort cares.  He requested time to contact his sister and his mother to review this update and reach consensus regarding plan for comfort measures.      Offered that Provider team can be contacted at any time and transition to comfort cares promptly when he indicates they are in agreement.     Review of Systems:     Besides above, ROS was reviewed and is unremarkable        Physical Exam:   Temp:  [97.4  F (36.3  C)-98.5  F (36.9  C)] 97.6  F (36.4  C)  Pulse:  [46-55] 52  Resp:  [18] 18  BP: (133-160)/(46-55) 139/52  SpO2:  [96 %-99 %] 96 %  153 lbs 7.04 oz    Physical Exam  Constitutional:       Comments: Somnolent, not responsive to verbal stimulation or tactile stimulation   HENT:      Mouth/Throat:      Mouth: Mucous membranes are dry.      Pharynx: Oropharynx is clear.    Cardiovascular:      Rate and Rhythm: Bradycardia present.      Pulses: Normal pulses.   Pulmonary:      Effort: Pulmonary effort is normal.   Abdominal:      General: Bowel sounds are normal.      Palpations: Abdomen is soft.   Neurological:      Mental Status: He is unresponsive.                 Data Reviewed:     Results for orders placed or performed during the hospital encounter of 03/15/25 (from the past 24 hours)   Glucose by meter   Result Value Ref Range    GLUCOSE BY METER POCT 218 (H) 70 - 99 mg/dL   Glucose by meter   Result Value Ref Range    GLUCOSE BY METER POCT 222 (H) 70 - 99 mg/dL   Glucose by meter   Result Value Ref Range    GLUCOSE BY METER POCT 225 (H) 70 - 99 mg/dL   Glucose by meter   Result Value Ref Range    GLUCOSE BY METER POCT 210 (H) 70 - 99 mg/dL   Glucose by meter   Result Value Ref Range    GLUCOSE BY METER POCT 213 (H) 70 - 99 mg/dL   Glucose by meter   Result Value Ref Range    GLUCOSE BY METER POCT 227 (H) 70 - 99 mg/dL         Medical Decision Making       Please see A&P for additional details of medical decision making.  MANAGEMENT DISCUSSED with the following over the past 24 hours: Hospital medicine, case management, nursing staff   NOTE(S)/MEDICAL RECORDS REVIEWED over the past 24 hours: H&P, interval progress notes, consultant notes, case management notes, nursing notes.  Tests REVIEWED in the past 24 hours:  - See lab/imaging results included in the data section of the note  SUPPLEMENTAL HISTORY, in addition to the patient's history, over the past 24 hours obtained from:   - Adult child  Medical complexity over the past 24 hours:  - Decision to DE-ESCALATE CARE based on prognosis

## 2025-03-28 LAB
GLUCOSE BLDC GLUCOMTR-MCNC: 185 MG/DL (ref 70–99)
GLUCOSE BLDC GLUCOMTR-MCNC: 192 MG/DL (ref 70–99)
GLUCOSE BLDC GLUCOMTR-MCNC: 201 MG/DL (ref 70–99)
GLUCOSE BLDC GLUCOMTR-MCNC: 207 MG/DL (ref 70–99)
GLUCOSE BLDC GLUCOMTR-MCNC: 210 MG/DL (ref 70–99)
GLUCOSE BLDC GLUCOMTR-MCNC: 215 MG/DL (ref 70–99)
POTASSIUM SERPL-SCNC: 3.3 MMOL/L (ref 3.4–5.3)
POTASSIUM SERPL-SCNC: 3.6 MMOL/L (ref 3.4–5.3)

## 2025-03-28 PROCEDURE — 250N000013 HC RX MED GY IP 250 OP 250 PS 637: Performed by: STUDENT IN AN ORGANIZED HEALTH CARE EDUCATION/TRAINING PROGRAM

## 2025-03-28 PROCEDURE — 250N000013 HC RX MED GY IP 250 OP 250 PS 637: Performed by: INTERNAL MEDICINE

## 2025-03-28 PROCEDURE — 36415 COLL VENOUS BLD VENIPUNCTURE: CPT | Performed by: INTERNAL MEDICINE

## 2025-03-28 PROCEDURE — 120N000001 HC R&B MED SURG/OB

## 2025-03-28 PROCEDURE — 84132 ASSAY OF SERUM POTASSIUM: CPT | Performed by: INTERNAL MEDICINE

## 2025-03-28 PROCEDURE — 99232 SBSQ HOSP IP/OBS MODERATE 35: CPT | Performed by: STUDENT IN AN ORGANIZED HEALTH CARE EDUCATION/TRAINING PROGRAM

## 2025-03-28 PROCEDURE — 36415 COLL VENOUS BLD VENIPUNCTURE: CPT | Performed by: STUDENT IN AN ORGANIZED HEALTH CARE EDUCATION/TRAINING PROGRAM

## 2025-03-28 PROCEDURE — 84132 ASSAY OF SERUM POTASSIUM: CPT | Performed by: STUDENT IN AN ORGANIZED HEALTH CARE EDUCATION/TRAINING PROGRAM

## 2025-03-28 RX ORDER — POTASSIUM CHLORIDE 20MEQ/15ML
20 LIQUID (ML) ORAL ONCE
Status: COMPLETED | OUTPATIENT
Start: 2025-03-28 | End: 2025-03-28

## 2025-03-28 RX ADMIN — INSULIN ASPART 1 UNITS: 100 INJECTION, SOLUTION INTRAVENOUS; SUBCUTANEOUS at 16:07

## 2025-03-28 RX ADMIN — CLOPIDOGREL BISULFATE 75 MG: 75 TABLET ORAL at 09:17

## 2025-03-28 RX ADMIN — ACETAMINOPHEN 650 MG: 160 SOLUTION ORAL at 00:02

## 2025-03-28 RX ADMIN — INSULIN ASPART 2 UNITS: 100 INJECTION, SOLUTION INTRAVENOUS; SUBCUTANEOUS at 12:54

## 2025-03-28 RX ADMIN — DOXAZOSIN 2 MG: 1 TABLET ORAL at 09:18

## 2025-03-28 RX ADMIN — FUROSEMIDE 80 MG: 10 SOLUTION ORAL at 15:58

## 2025-03-28 RX ADMIN — FLUOXETINE 20 MG: 20 SOLUTION ORAL at 09:16

## 2025-03-28 RX ADMIN — ACETAMINOPHEN 650 MG: 160 SOLUTION ORAL at 09:16

## 2025-03-28 RX ADMIN — INSULIN ASPART 2 UNITS: 100 INJECTION, SOLUTION INTRAVENOUS; SUBCUTANEOUS at 04:07

## 2025-03-28 RX ADMIN — Medication 5 ML: at 20:38

## 2025-03-28 RX ADMIN — ACETAMINOPHEN 650 MG: 160 SOLUTION ORAL at 23:53

## 2025-03-28 RX ADMIN — ACETAMINOPHEN 650 MG: 160 SOLUTION ORAL at 15:58

## 2025-03-28 RX ADMIN — INSULIN ASPART 2 UNITS: 100 INJECTION, SOLUTION INTRAVENOUS; SUBCUTANEOUS at 09:18

## 2025-03-28 RX ADMIN — FUROSEMIDE 80 MG: 10 SOLUTION ORAL at 09:16

## 2025-03-28 RX ADMIN — AMLODIPINE BESYLATE 10 MG: 10 TABLET ORAL at 09:17

## 2025-03-28 RX ADMIN — INSULIN ASPART 2 UNITS: 100 INJECTION, SOLUTION INTRAVENOUS; SUBCUTANEOUS at 00:02

## 2025-03-28 RX ADMIN — Medication 300 MG: at 09:16

## 2025-03-28 RX ADMIN — INSULIN ASPART 2 UNITS: 100 INJECTION, SOLUTION INTRAVENOUS; SUBCUTANEOUS at 23:54

## 2025-03-28 RX ADMIN — POTASSIUM CHLORIDE 20 MEQ: 1.5 SOLUTION ORAL at 12:47

## 2025-03-28 RX ADMIN — INSULIN ASPART 2 UNITS: 100 INJECTION, SOLUTION INTRAVENOUS; SUBCUTANEOUS at 20:38

## 2025-03-28 ASSESSMENT — ACTIVITIES OF DAILY LIVING (ADL)
ADLS_ACUITY_SCORE: 103

## 2025-03-28 NOTE — PLAN OF CARE
"Orientation: ZAYDA  Pain: PAINAD score 0, signs of pain monitored.   Neuro: Rt deficit from previous CVA  Resp: On 1L NC for comfort  Cardiac: Bradycardic in the 40s  GI/: Incontinent, Chronic ordoñez in place  Skin/Musculoskeletal: +1/+2 edema to BLE  Activity: Moves by lift.  Repos Q2  Diet: Receives tube feedings, 30mL/hr w/ 60mL Q4 water flushes.  BG Q4  Lines: PIV  Consults: Palliative   Discharge planning: Pt anticipated to transition to comfort cares    Goal Outcome Evaluation:      Plan of Care Reviewed With: patient    Overall Patient Progress: no changeOverall Patient Progress: no change    Outcome Evaluation: TF running at 30mL/hr.  Repositioned Q2 hours.  BG Q4.  Possible transition to comfort cares.      Problem: Adult Inpatient Plan of Care  Goal: Plan of Care Review  Description: The Plan of Care Review/Shift note should be completed every shift.  The Outcome Evaluation is a brief statement about your assessment that the patient is improving, declining, or no change.  This information will be displayed automatically on your shiftnote.  Outcome: Progressing  Flowsheets (Taken 3/27/2025 1943)  Outcome Evaluation: TF running at 30mL/hr.  Repositioned Q2 hours.  BG Q4.  Possible transition to comfort cares.  Plan of Care Reviewed With: patient  Overall Patient Progress: no change  Goal: Patient-Specific Goal (Individualized)  Description: You can add care plan individualizations to a care plan. Examples of Individualization might be:  \"Parent requests to be called daily at 9am for status\", \"I have a hard time hearing out of my right ear\", or \"Do not touch me to wake me up as it startlesme\".  Outcome: Progressing  Goal: Absence of Hospital-Acquired Illness or Injury  Outcome: Progressing  Intervention: Identify and Manage Fall Risk  Recent Flowsheet Documentation  Taken 3/27/2025 1626 by Gwen Galvan RN  Safety Promotion/Fall Prevention:   activity supervised   nonskid shoes/slippers when out of " bed   safety round/check completed   supervised activity  Intervention: Prevent Skin Injury  Recent Flowsheet Documentation  Taken 3/27/2025 1741 by Gwen Galvan RN  Body Position:   turned   left  Taken 3/27/2025 1626 by Gwen Galvan RN  Body Position:   right   turned  Intervention: Prevent and Manage VTE (Venous Thromboembolism) Risk  Recent Flowsheet Documentation  Taken 3/27/2025 1626 by Gwen Galvan RN  VTE Prevention/Management: SCDs on (sequential compression devices)  Goal: Optimal Comfort and Wellbeing  Outcome: Progressing  Goal: Readiness for Transition of Care  Outcome: Progressing     Problem: Pain Acute  Goal: Optimal Pain Control and Function  Outcome: Progressing     Problem: Adult Inpatient Plan of Care  Goal: Plan of Care Review  Description: The Plan of Care Review/Shift note should be completed every shift.  The Outcome Evaluation is a brief statement about your assessment that the patient is improving, declining, or no change.  This information will be displayed automatically on your shift  note.  Recent Flowsheet Documentation  Taken 3/27/2025 1943 by Gwen Galvan RN  Outcome Evaluation: TF running at 30mL/hr.  Repositioned Q2 hours.  BG Q4.  Possible transition to comfort cares.  Plan of Care Reviewed With: patient  Overall Patient Progress: no change  Goal: Absence of Hospital-Acquired Illness or Injury  Intervention: Identify and Manage Fall Risk  Recent Flowsheet Documentation  Taken 3/27/2025 1626 by Gwen Galvan RN  Safety Promotion/Fall Prevention:   activity supervised   nonskid shoes/slippers when out of bed   safety round/check completed   supervised activity  Intervention: Prevent Skin Injury  Recent Flowsheet Documentation  Taken 3/27/2025 1741 by Gwen Galvan RN  Body Position:   turned   left  Taken 3/27/2025 1626 by Gwen Galvan RN  Body Position:   right   turned  Intervention: Prevent  and Manage VTE (Venous Thromboembolism) Risk  Recent Flowsheet Documentation  Taken 3/27/2025 1626 by Gwen Galvan RN  VTE Prevention/Management: SCDs on (sequential compression devices)     Problem: Dysrhythmia  Goal: Normalized Cardiac Rhythm  Intervention: Monitor and Manage Cardiac Rhythm Effect  Recent Flowsheet Documentation  Taken 3/27/2025 1626 by Gwen Galvan RN  VTE Prevention/Management: SCDs on (sequential compression devices)     Problem: Fall Injury Risk  Goal: Absence of Fall and Fall-Related Injury  Intervention: Promote Injury-Free Environment  Recent Flowsheet Documentation  Taken 3/27/2025 1626 by Gwen Galvan RN  Safety Promotion/Fall Prevention:   activity supervised   nonskid shoes/slippers when out of bed   safety round/check completed   supervised activity     Problem: Gas Exchange Impaired  Goal: Optimal Gas Exchange  Intervention: Optimize Oxygenation and Ventilation  Recent Flowsheet Documentation  Taken 3/27/2025 1741 by Gwen Galvan RN  Head of Bed (HOB) Positioning: HOB at 30 degrees  Taken 3/27/2025 1626 by Gwen Galvan RN  Head of Bed (HOB) Positioning: HOB at 30 degrees     Problem: Adult Inpatient Plan of Care  Goal: Plan of Care Review  Description: The Plan of Care Review/Shift note should be completed every shift.  The Outcome Evaluation is a brief statement about your assessment that the patient is improving, declining, or no change.  This information will be displayed automatically on your shiftnote.  Recent Flowsheet Documentation  Taken 3/27/2025 1943 by Gwen Galvan RN  Outcome Evaluation: TF running at 30mL/hr.  Repositioned Q2 hours.  BG Q4.  Possible transition to comfort cares.  Plan of Care Reviewed With: patient  Overall Patient Progress: no change  Goal: Absence of Hospital-Acquired Illness or Injury  Intervention: Identify and Manage Fall Risk  Recent Flowsheet Documentation  Taken  3/27/2025 1626 by Gwen Galvan, RN  Safety Promotion/Fall Prevention:   activity supervised   nonskid shoes/slippers when out of bed   safety round/check completed   supervised activity  Intervention: Prevent Skin Injury  Recent Flowsheet Documentation  Taken 3/27/2025 1741 by Gwen Galvan RN  Body Position:   turned   left  Taken 3/27/2025 1626 by Gwen Galvan RN  Body Position:   right   turned  Intervention: Prevent and Manage VTE (Venous Thromboembolism) Risk  Recent Flowsheet Documentation  Taken 3/27/2025 1626 by Gwen Galvan RN  VTE Prevention/Management: SCDs on (sequential compression devices)     Problem: Fall Injury Risk  Goal: Absence of Fall and Fall-Related Injury  Intervention: Promote Injury-Free Environment  Recent Flowsheet Documentation  Taken 3/27/2025 1626 by Gwen Galvan RN  Safety Promotion/Fall Prevention:   activity supervised   nonskid shoes/slippers when out of bed   safety round/check completed   supervised activity

## 2025-03-28 NOTE — PROGRESS NOTES
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       The patient's daughter states that patient has had some vomiting last week.  He was having increased shortness of breath.  His wife noted that his oxygen saturation was low.  He was brought to emergency room for evaluation.     Initial vital signs showed temperature 97.5, pulse 46, blood pressure 148/61, oxygen saturation 95% on room air.  Laboratory workup showed sodium 128, potassium 5.8, creatinine 5.67, ALT 36, AST 31, troponin T high-sensitivity 103.  WBC 6.5, hemoglobin 8.9, platelets 164.  Venous blood gas showed pH 7.44/pCO2 44, pO2 82.5.  Chest x-ray showed large right pleural effusion with small to moderate left pleural effusion.  Bibasilar consolidation, patchy infiltrate in the central aspect of both lungs progressed on the left and improved on the right.  Felix catheter within decompressed urinary bladder.  Anasarca has progressed.     Nephrology was consulted from emergency room and recommended Lasix 100 mg IV.  He was also given calcium gluconate, Lokelma 10 g per G-tube.  He was also given a dose of IV ceftriaxone and azithromycin.  He was admitted to the hospital for further management.     He remains on 5 L of oxygen.  Interventional radiology performed a right-sided thoracentesis today.  He continues on IV diuresis and nephrology was  formally consulted.  Potassium and heart rate have improved.  Palliative care met with family to discuss goals of care and possible enrollment into hospice given his poor functional status, elderly age, worsening renal function without being a dialysis candidate, bradycardia without being a pacemaker candidate, and gradual deterioration with no truly reversible process.  They understand his poor prognosis and a care conference was held with the family on 3/18 to discuss all of the above, with attendees including hospitalist, palliative care, and nephrology.  Nephrology reiterated during the care conference that the patient is a poor dialysis candidate given his multiple comorbidities     Update on 3/28:   No Change in the medical management.  Pt was about to be discharged on 3/27 to home with home cares however he was noted to have worsening bradycardia.  He has remained lethargic.  He was evaluated by palliative and appeared to be in the process of active dying so discharge was canceled.  I spoke with patient's family including patient's son Sathya and her daughter Clemente.  They understand patient's terminal trajectory. Son Sathya has been hesitant to agree on putting patient on comfort cares.  I extensively explained to both son and daughter that current medical cares are only prolonging the process and probably in the end will not provide much benefit as his underlying conditions are a irreversible.  I recommend comfort cares.  I then explained that with comfort cares we will be stopping the medications, Lasix can be continued if it looks like that this is aiding in comfort, tube feeds will be stopped.  Family is a still talking amongst themselves to come up with a decision/plan.    Problem list:  Acute hypoxic respiratory failure likely multifactorial including volume overload due to acute on chronic diastolic CHF exacerbation---improving  Bilateral pleural effusions, right > left  Patient was on Lasix 20 mg daily  at home.  Per family he had decreased urine output and increased shortness of breath.  Chest x-ray showed large right pleural effusion, moderate left pleural effusion.  Received IV diuresis in the hospital and then transition to p.o.  Appreciate nephrology assistance.  -Continue oral Lasix 80 mg twice a day  -s/p R sided thoracentesis with 900 ml removed  -AHRF overall improved now requiring only 0.5 to 1 L of nasal cannula.  Lungs clear anteriorly    Acute toxic metabolic encephalopathy  Per patient's son Sathya usually he is awake and alert and can show thumbs up or down when ask questions.  Lately patient has been more lethargic.  Suspect this is likely due to uremia.  Also has history of CVA.  Explained to patient's and Sathya that there is nothing much that can be offered at this point.     Hyperkalemia-resolved  Hypokalemia  ALEX on CKD stage V  Nephrotic range proteinuria  Anasarca  Hyponatremia  Hypercalcemia   -Creatinine 1 year ago was near 1  -Most recent creatinine 4.07 on 2/26/2025.  Creatinine 5.67 on admission and potassium 5.8.  He was previously seen by nephrology and his daughter indicates that he was determined not to be a dialysis candidate.  Potassium has improved with shifting agents and Lokelma.  Creatinine has been steadily climbing over the past several weeks.  -Initially hyperkalemic.  Now hypokalemic and requiring potassium replacement.  -completed IV diuresis; now on PO diuretic  -Will check BMP intermittently while in the hospital  -Felix in place and tracking I's and O's.  - appreciate nephrology input.  As per nephrology not a dialysis candidate     Bradycardia  Junctional rhythm  This was an issue during his recent hospitalization as well.  There may be some metabolic component but I do worry about some underlying conduction disease.  Poor candidate for pacemaker due to the issues outlined above.  -telemetry discontinued     Demand ischemia  Patient had no evidence of chest pain. Troponin  has been elevated during prior evaluation on 2/26/2025.  Troponin 103 on admission and flat, not consistent with acute coronary syndrome.  Likely demand ischemia from respiratory failure with poor enzyme clearance in the face of CKD.    -No further workup.     Diabetes mellitus type 2  He was on Lantus insulin 14 units every morning, insulin sliding scale PTA.  -Insulin was increased to 18 units this hospitalization.  Will continue medium intensity sliding scale insulin     History of CVA with residual right-sided hemiplegia and baseline nonverbal status  Chronic dysphagia, status post PEG tube placement, G-tube exchange on 2/24/2025  S/p indwelling Felix catheter  Dyslipidemia  Hypertension  Patient resides at home and family takes care of him.  He also has a visiting nurse.  They appear to provide excellent care and support for him.  Patient's son Sathya stated that patient's spouse who is also around 73 years old was taking care of patient but now getting overwhelmed and would appreciate further assistance in placement.  Family now decided to take patient back home with home cares.  -Continue PTA medications including amlodipine and atorvastatin and Plavix  -RD consulted to facilitate tube feeds.     Anemia and chronic kidney disease  -Resumed ferrous sulfate.     Depression  -Resume Prozac.     Goals of care  The patient is chronically ill and essentially full cares as a result of a prior massive stroke.  He has chronic dysphagia and receives tube feeds via a PEG.  He is essentially bedbound and has residual right-sided hemiplegia and nonverbal status.  He is now faced with rapidly progressing chronic kidney disease leading to concerning electrolyte abnormalities, hypervolemia, and bradycardia.  He is an extremely poor dialysis candidate and dialysis has not been offered by nephrology.  Care conference was held on 3/18 with palliative care, nephrology, and this hospitalist.  Goals of care discussed.  Hospice being  considered by family but formal decision has not yet been made  Per one of the daughters, she says that all the family members including the daughters and patient's spouse are interested in looking at comfort measures, but the patient's son has a different viewpoint on this from the rest of the family.          DVT Prophylaxis: Pneumatic Compression Devices  Code Status: No CPR- Do NOT Intubate  Diet: Adult Formula Drip Feeding: Continuous Nepro with Carbsteady; Gastrostomy; Goal Rate: 30; mL/hr; Advancement Instructions: Once mag and phos are back and WNL, please initiate TF at 15 ml/hr x 6 hours. If tolerating, okay to advance to goal rate of...    Ordoñez Catheter: PRESENT, indication: Chronic ordoñez  Disposition: Expected discharge after family decides whether or not to pursue hospice care at discharge           Interval History (Subjective):      Patient is nonverbal and noncommunicative.  Son Sathya present at bedside.  All questions were answered.                  Physical Exam:      Last Vital Signs:  BP (!) 183/68 (BP Location: Left arm)   Pulse 91   Temp 98.9  F (37.2  C) (Axillary)   Resp 18   Wt 76.3 kg (168 lb 3.4 oz)   SpO2 100%   BMI 27.15 kg/m        Intake/Output Summary (Last 24 hours) at 3/24/2025 1051  Last data filed at 3/24/2025 0646  Gross per 24 hour   Intake 1053 ml   Output 1450 ml   Net -397 ml       Constitutional: Nonverbal and noncommunicative     Respiratory: Clear to auscultation bilaterally, no crackles or wheezing anteriorly   Cardiovascular: Regular rate and rhythm, normal S1 and S2, and no murmur noted   Abdomen: Normal bowel sounds, soft, non-distended, non-tender, PEG tube present   Skin: No rashes, no cyanosis, dry to touch   Neuro: Sleepy but arousable to noxious stimulus, nonverbal   Extremities: No edema in legs, bilateral pedal edema   Other(s):        All other systems: Negative          Medications:      All current medications were reviewed with changes reflected in  "problem list.         Data:      All new lab and imaging data was reviewed.   Labs:       Lab Results   Component Value Date     03/24/2025     03/23/2025     03/22/2025     08/16/2012     08/13/2012    Lab Results   Component Value Date    CHLORIDE 87 03/24/2025    CHLORIDE 87 03/23/2025    CHLORIDE 87 03/22/2025    CHLORIDE 99 03/30/2022    CHLORIDE 98 02/01/2022    CHLORIDE 105 01/26/2022    CHLORIDE 101 08/16/2012    CHLORIDE 99 08/13/2012    Lab Results   Component Value Date    .6 03/24/2025    .4 03/23/2025    .4 03/22/2025    BUN 24 03/30/2022    BUN 22 02/01/2022    BUN 20 01/26/2022    BUN 18 08/16/2012    BUN 14 08/13/2012      Lab Results   Component Value Date    POTASSIUM 3.1 03/24/2025    POTASSIUM 3.5 03/23/2025    POTASSIUM 3.4 03/22/2025    POTASSIUM 4.4 03/30/2022    POTASSIUM 4.0 02/01/2022    POTASSIUM 3.6 01/26/2022    POTASSIUM 4.0 08/16/2012    POTASSIUM 3.6 08/13/2012    Lab Results   Component Value Date    CO2 32 03/24/2025    CO2 30 03/23/2025    CO2 33 03/22/2025    CO2 29 03/30/2022    CO2 27 02/01/2022    CO2 31 01/26/2022    CO2 26 08/16/2012    CO2 26 08/13/2012    Lab Results   Component Value Date    CR 5.77 03/24/2025    CR 5.85 03/23/2025    CR 5.81 03/22/2025    CR 0.96 08/16/2012    CR 0.91 08/13/2012        No results for input(s): \"WBC\", \"HGB\", \"HCT\", \"MCV\", \"PLT\" in the last 168 hours.     Imaging:   No results found for this or any previous visit (from the past 24 hours).   "

## 2025-03-28 NOTE — PLAN OF CARE
"ZAYDA orientation. Nonverbal. TF running @30ml/hr. Lift Ax2. Repo Q2hrs. BG Q4hrs. 1L NC. Incontinent/ordoñez cath. Palliative/social/nutrition follow. Plan to switch to comfort cares/hospice.                 Goal Outcome Evaluation:      Plan of Care Reviewed With: patient          Outcome Evaluation: TF 30ml/hr.      Problem: Adult Inpatient Plan of Care  Goal: Plan of Care Review  Description: The Plan of Care Review/Shift note should be completed every shift.  The Outcome Evaluation is a brief statement about your assessment that the patient is improving, declining, or no change.  This information will be displayed automatically on your shiftnote.  Outcome: Progressing  Flowsheets (Taken 3/28/2025 1445)  Outcome Evaluation: TF 30ml/hr.  Plan of Care Reviewed With: patient  Goal: Patient-Specific Goal (Individualized)  Description: You can add care plan individualizations to a care plan. Examples of Individualization might be:  \"Parent requests to be called daily at 9am for status\", \"I have a hard time hearing out of my right ear\", or \"Do not touch me to wake me up as it startlesme\".  Outcome: Progressing  Goal: Absence of Hospital-Acquired Illness or Injury  Outcome: Progressing  Intervention: Identify and Manage Fall Risk  Recent Flowsheet Documentation  Taken 3/28/2025 0915 by Rodney Hull RN  Safety Promotion/Fall Prevention:   safety round/check completed   supervised activity   room organization consistent   clutter free environment maintained   patient and family education  Intervention: Prevent Skin Injury  Recent Flowsheet Documentation  Taken 3/28/2025 1254 by Rodney Hull RN  Body Position:   turned   left   log-rolled  Taken 3/28/2025 0915 by Rodney Hull RN  Body Position: position maintained  Taken 3/28/2025 0900 by Rodney Hull RN  Body Position:   left   turned   log-rolled  Intervention: Prevent and Manage VTE (Venous Thromboembolism) Risk  Recent Flowsheet " Documentation  Taken 3/28/2025 1122 by Rodney Hull RN  VTE Prevention/Management: SCDs on (sequential compression devices)  Taken 3/28/2025 0915 by Rodney Hull RN  VTE Prevention/Management: SCDs on (sequential compression devices)  Intervention: Prevent Infection  Recent Flowsheet Documentation  Taken 3/28/2025 0915 by Rodney Hull RN  Infection Prevention:   hand hygiene promoted   rest/sleep promoted   single patient room provided  Goal: Optimal Comfort and Wellbeing  Outcome: Progressing  Goal: Readiness for Transition of Care  Outcome: Progressing     Problem: Pain Acute  Goal: Optimal Pain Control and Function  Outcome: Progressing  Intervention: Prevent or Manage Pain  Recent Flowsheet Documentation  Taken 3/28/2025 0915 by Rodney Hull RN  Medication Review/Management: medications reviewed

## 2025-03-28 NOTE — PLAN OF CARE
"[id number]  VSS on RA. Nonverbal. TF at goal rate of 30ml/hr. Repo q4 hr this shift per discussion with pt's family to allow pt to sleep. Family in room. Scheduled tylenol given for pain with improvement. Palliative care following.      Goal Outcome Evaluation:      Plan of Care Reviewed With: patient    Overall Patient Progress: no change    Outcome Evaluation: Continue on TF at goal rate 30 ml/hr.      Problem: Adult Inpatient Plan of Care  Goal: Plan of Care Review  Description: The Plan of Care Review/Shift note should be completed every shift.  The Outcome Evaluation is a brief statement about your assessment that the patient is improving, declining, or no change.  This information will be displayed automatically on your shiftnote.  Outcome: Not Progressing  Flowsheets (Taken 3/28/2025 0326)  Outcome Evaluation: Continue on TF at goal rate 30 ml/hr.  Plan of Care Reviewed With: patient  Overall Patient Progress: no change  Goal: Patient-Specific Goal (Individualized)  Description: You can add care plan individualizations to a care plan. Examples of Individualization might be:  \"Parent requests to be called daily at 9am for status\", \"I have a hard time hearing out of my right ear\", or \"Do not touch me to wake me up as it startlesme\".  Outcome: Not Progressing  Goal: Absence of Hospital-Acquired Illness or Injury  Outcome: Not Progressing  Intervention: Identify and Manage Fall Risk  Recent Flowsheet Documentation  Taken 3/28/2025 0017 by Rosalva Talley RN  Safety Promotion/Fall Prevention:   safety round/check completed   supervised activity   room organization consistent   clutter free environment maintained   patient and family education  Intervention: Prevent and Manage VTE (Venous Thromboembolism) Risk  Recent Flowsheet Documentation  Taken 3/28/2025 0017 by Rosalva Talley RN  VTE Prevention/Management: SCDs on (sequential compression devices)  Intervention: Prevent Infection  Recent Flowsheet " Documentation  Taken 3/28/2025 0017 by Rosalva Talley RN  Infection Prevention:   rest/sleep promoted   single patient room provided  Goal: Optimal Comfort and Wellbeing  Outcome: Not Progressing  Intervention: Monitor Pain and Promote Comfort  Recent Flowsheet Documentation  Taken 3/28/2025 0017 by Rosalva Talley RN  Pain Management Interventions: medication (see MAR)  Goal: Readiness for Transition of Care  Outcome: Not Progressing     Problem: Pain Acute  Goal: Optimal Pain Control and Function  Outcome: Not Progressing  Intervention: Develop Pain Management Plan  Recent Flowsheet Documentation  Taken 3/28/2025 0017 by Rosalva Talley RN  Pain Management Interventions: medication (see MAR)  Intervention: Prevent or Manage Pain  Recent Flowsheet Documentation  Taken 3/28/2025 0017 by Rosalva Talley RN  Medication Review/Management: medications reviewed

## 2025-03-28 NOTE — PROGRESS NOTES
PALLIATIVE CARE PROGRESS NOTE  [facility]     Patient Name: Jimmy Otto  Date of Admission: 3/15/2025   Today the patient was seen for: Follow up goals of care discussion     Recommendations & Counseling       GOALS OF CARE:   Comfort focused  - Family discussing and will update team when ready to discontinue TF and transition to comfort focused cares.  Attempted follow up conversation with family to establish plan for transition to comfort focused cares.  LVM with primary health care agent, luis enrique Cornejo.  No return call.  Unable to reach patient's other children Chava or Sathya.  Comfort measures were defined as transition to symptom management and support a patient at end of life, this would include:  discontinuation of tube feedings, de-escalation of medications that do not strictly promote comfort and symptom management, discontinuation of cardiac monitor and other medical interventions that do not focus on comfort and the addition to comfort focused/symptom management medications.      ADVANCE CARE PLANNING:  No health care directive on file. Per system policy, Surrogate Decision-makers for Patients With Diminished Decision-making Capacity offers guidance on possible decision-makers. Luis Enrique Clark has been identified as a surrogate decision maker.   There is no POLST form on file, defer to patient and/or next of kin for decisions   Code status: No CPR- Do NOT Intubate    MEDICAL MANAGEMENT:   Anticipated transition to Comfort Care   Below are common symptoms routinely seen in patients with comfort focused goals and at the end of life. This patient may not currently exhibit all of these symptoms; however, if these were to occur our recommendations are as follows:  These orders have not been entered as there is pending family conversation at the time of this note.   Recommend the following if transition to comfort measures is decided:  #Pain  1st choice: hydromorphone via G-tube 1-2  mg q 2 hour as needed.   2nd choice: hydromorphone IV 0.3-0.5 mg q 15 minutes as needed     #Air hunger/Dyspnea   1st choice: hydromorphone G-tube 1-2 mg q 1 hour as needed.   2nd choice: hydromorphone IV 0.3-0.5 mg q 15 minutes as needed     #Anxiety    1st choice: Lorazepam SL/G-tube 1 mg  q 3 hour as needed.   2nd choice: Lorazepam IV 1 mg q 3 hour as needed    #Secretion burden   Robinul 0.1 mg (PO/IV) q 4 hours as needed. If ineffective, increase up to 0.3 mg   Consider atropine if Robinul ineffective after dose increase      #Nausea   Zofran 4 mg q 6 hours as needed. Can increase to 8 mg   Zyprexa 5 mg q 6 hours as needed     #Agitation  Aggressive control of other symptoms first, then  1st choice: Zyprexa 5 mg ODT or IM   2nd choice: Increase dose of Zyprexa to 10 mg or consider switch to Haldol   3rd choice: Lorazepam as above     PSYCHOSOCIAL/SPIRITUAL:  Family spouse, son, daughters (2)  [place] community: Congregational     Palliative Care will continue to follow. Thank you for the consult and allowing us to aid in the care of Jimmy Otto.    These recommendations have been discussed with hospital medicine, nursing staff, case management.    FEMI Short CNP  [facility], Palliative Care  Securely message with the [employer] Web Console (learn more here) or  Text page via [facility] Paging/Directory        Assessment          Jimmy Otto is a 75 year old male admitted 3/15/2025 with acute hypoxic respiratory failure in the setting of acute on chronic diastolic CHF exacerbation with bilateral pleura effusions R>L. The patient and family are known to this writer from the patient's hospitalization October 2021 when the family had made the decision to pursue tube feedings. 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        3/17/2025 - Family care conference with 2 daughters, Hospitalist and Palliative Care  3/19/2025 -  Family care conference with wife, son (who lives in [place]), 2 daughters, Nephrology, Hospitalist and Palliative Care. Daughters acting as  for wife and patient who speak Cambodian.   3/24/2025 - Family care conference with 2 daughters and son  3/27/2025 - Patient with progressive bradycardia overnight and somnolence.  Son at the bedside.  Reviewed medical updates and decision to review with family and likely transition to comfort focused cares and continued hospitalization through end of life.       Interval History:     Multidisciplinary collaboration:  Reviewed plan of care and patient's change in status to Hospitalist service and Case management.  Discharge home with home health care services cancelled.     Patient/family narrative  Met patient at the bedside, he is unresponsive to stimuli, appears comfortable.    Called and left voicemail for daughter Phally.  Unable to reach children Chava and Sathya.  Unable to discuss plan of care and transition to comfort focused cares.     Review of Systems:     Besides above, ROS was reviewed and is unremarkable        Physical Exam:   Temp:  [98  F (36.7  C)-98.4  F (36.9  C)] 98  F (36.7  C)  Pulse:  [47-80] 80  Resp:  [18] 18  BP: (139-160)/(52-61) 145/61  SpO2:  [96 %-99 %] 99 %  168 lbs 3.38 oz    Physical Exam  Constitutional:       Comments: Somnolent, not responsive to verbal stimulation or tactile stimulation   HENT:      Mouth/Throat:      Mouth: Mucous membranes are dry.      Pharynx: Oropharynx is clear.   Cardiovascular:      Rate and Rhythm: Bradycardia present.      Pulses: Normal pulses.   Pulmonary:      Effort: Pulmonary effort is normal.   Abdominal:      General: Bowel sounds are normal.       Palpations: Abdomen is soft.   Neurological:      Mental Status: He is unresponsive.           Data Reviewed:     Results for orders placed or performed during the hospital encounter of 03/15/25 (from the past 24 hours)   Glucose by meter   Result Value Ref Range    GLUCOSE BY METER POCT 213 (H) 70 - 99 mg/dL   Glucose by meter   Result Value Ref Range    GLUCOSE BY METER POCT 227 (H) 70 - 99 mg/dL   Basic metabolic panel   Result Value Ref Range    Sodium 134 (L) 135 - 145 mmol/L    Potassium 3.8 3.4 - 5.3 mmol/L    Chloride 89 (L) 98 - 107 mmol/L    Carbon Dioxide (CO2) 32 (H) 22 - 29 mmol/L    Anion Gap 13 7 - 15 mmol/L    Urea Nitrogen 141.5 (H) 8.0 - 23.0 mg/dL    Creatinine 5.94 (H) 0.67 - 1.17 mg/dL    GFR Estimate 9 (L) >60 mL/min/1.73m2    Calcium 12.5 (H) 8.8 - 10.4 mg/dL    Glucose 215 (H) 70 - 99 mg/dL   Glucose by meter   Result Value Ref Range    GLUCOSE BY METER POCT 203 (H) 70 - 99 mg/dL   Glucose by meter   Result Value Ref Range    GLUCOSE BY METER POCT 204 (H) 70 - 99 mg/dL   Glucose by meter   Result Value Ref Range    GLUCOSE BY METER POCT 215 (H) 70 - 99 mg/dL   Glucose by meter   Result Value Ref Range    GLUCOSE BY METER POCT 201 (H) 70 - 99 mg/dL         Medical Decision Making       Please see A&P for additional details of medical decision making.  MANAGEMENT DISCUSSED with the following over the past 24 hours: Hospital medicine, case management, nursing staff   NOTE(S)/MEDICAL RECORDS REVIEWED over the past 24 hours: H&P, interval progress notes, consultant notes, case management notes, nursing notes.  Tests REVIEWED in the past 24 hours:  - See lab/imaging results included in the data section of the note  SUPPLEMENTAL HISTORY, in addition to the patient's history, over the past 24 hours obtained from:   - Adult child  Medical complexity over the past 24 hours:  - patient assessment, attempted to reach family for goals of care conversation

## 2025-03-29 LAB
GLUCOSE BLDC GLUCOMTR-MCNC: 199 MG/DL (ref 70–99)
GLUCOSE BLDC GLUCOMTR-MCNC: 205 MG/DL (ref 70–99)
GLUCOSE BLDC GLUCOMTR-MCNC: 218 MG/DL (ref 70–99)
GLUCOSE BLDC GLUCOMTR-MCNC: 242 MG/DL (ref 70–99)
GLUCOSE BLDC GLUCOMTR-MCNC: 252 MG/DL (ref 70–99)
GLUCOSE BLDC GLUCOMTR-MCNC: 276 MG/DL (ref 70–99)
POTASSIUM SERPL-SCNC: 3.5 MMOL/L (ref 3.4–5.3)

## 2025-03-29 PROCEDURE — 84132 ASSAY OF SERUM POTASSIUM: CPT | Performed by: INTERNAL MEDICINE

## 2025-03-29 PROCEDURE — 250N000013 HC RX MED GY IP 250 OP 250 PS 637: Performed by: INTERNAL MEDICINE

## 2025-03-29 PROCEDURE — 120N000001 HC R&B MED SURG/OB

## 2025-03-29 PROCEDURE — 99232 SBSQ HOSP IP/OBS MODERATE 35: CPT | Performed by: STUDENT IN AN ORGANIZED HEALTH CARE EDUCATION/TRAINING PROGRAM

## 2025-03-29 PROCEDURE — 250N000012 HC RX MED GY IP 250 OP 636 PS 637: Performed by: INTERNAL MEDICINE

## 2025-03-29 PROCEDURE — 36415 COLL VENOUS BLD VENIPUNCTURE: CPT | Performed by: INTERNAL MEDICINE

## 2025-03-29 RX ADMIN — AMLODIPINE BESYLATE 10 MG: 10 TABLET ORAL at 10:17

## 2025-03-29 RX ADMIN — INSULIN ASPART 3 UNITS: 100 INJECTION, SOLUTION INTRAVENOUS; SUBCUTANEOUS at 10:31

## 2025-03-29 RX ADMIN — DOXAZOSIN 2 MG: 1 TABLET ORAL at 10:30

## 2025-03-29 RX ADMIN — INSULIN ASPART 2 UNITS: 100 INJECTION, SOLUTION INTRAVENOUS; SUBCUTANEOUS at 03:55

## 2025-03-29 RX ADMIN — INSULIN ASPART 3 UNITS: 100 INJECTION, SOLUTION INTRAVENOUS; SUBCUTANEOUS at 17:33

## 2025-03-29 RX ADMIN — Medication 325 MG: at 10:18

## 2025-03-29 RX ADMIN — INSULIN ASPART 3 UNITS: 100 INJECTION, SOLUTION INTRAVENOUS; SUBCUTANEOUS at 12:49

## 2025-03-29 RX ADMIN — INSULIN ASPART 2 UNITS: 100 INJECTION, SOLUTION INTRAVENOUS; SUBCUTANEOUS at 20:21

## 2025-03-29 RX ADMIN — FUROSEMIDE 80 MG: 10 SOLUTION ORAL at 10:20

## 2025-03-29 RX ADMIN — ACETAMINOPHEN 650 MG: 160 SOLUTION ORAL at 10:30

## 2025-03-29 RX ADMIN — FLUOXETINE 20 MG: 20 SOLUTION ORAL at 10:20

## 2025-03-29 RX ADMIN — ACETAMINOPHEN 650 MG: 160 SOLUTION ORAL at 17:32

## 2025-03-29 RX ADMIN — FUROSEMIDE 80 MG: 10 SOLUTION ORAL at 17:32

## 2025-03-29 RX ADMIN — CLOPIDOGREL BISULFATE 75 MG: 75 TABLET ORAL at 10:18

## 2025-03-29 ASSESSMENT — ACTIVITIES OF DAILY LIVING (ADL)
ADLS_ACUITY_SCORE: 103

## 2025-03-29 NOTE — PLAN OF CARE
BP (!) 151/64 (BP Location: Left arm, Patient Position: Semi-Capellan's, Cuff Size: Adult Regular)   Pulse 82   Temp 98.4  F (36.9  C) (Axillary)   Resp 18   Wt 69.9 kg (154 lb 1.6 oz)   SpO2 99%   BMI 24.87 kg/m      2300 - 0700:     VS: VSS except BP mildly elevated. Sating well on 1/2 L via NC.  Pain: Minimal nonverbal indicators of pain. Well controlled w/ scheduled tylenol.  Lines: PIV SL to L forearm.   Cognitive: Somnolent and only arousing to vigorous and repeated stimulation.   Respiratory: LS very dim. No increased WOB noted.  Cardiac: No bradycardia noted.  Peripheral neurovascular: Trace generalized edema. PCDs removed per family request.   GI: Last BM 3/27.   : Felix in place.   Skin: See flowsheet for assessment.   Diet: TF running at 30 ml/hr w/ free water flush at 60 ml/hr.  Activity: A2 w/ lift. Turn and repo Q2hr.   Labs: K in for AM draw.  and 205.  Plan: Continue PO lasix and TF. Family discussion plans for HC vs comfort care. Continue w/ POC.     Goal Outcome Evaluation:      Plan of Care Reviewed With: patient    Overall Patient Progress: decliningOverall Patient Progress: declining    Outcome Evaluation: Minimal nonverbal indicators of pain well controlled w/ shceduled tylenol.      Problem: Adult Inpatient Plan of Care  Goal: Plan of Care Review  Description: The Plan of Care Review/Shift note should be completed every shift.  The Outcome Evaluation is a brief statement about your assessment that the patient is improving, declining, or no change.  This information will be displayed automatically on your shift  note.  Recent Flowsheet Documentation  Taken 3/29/2025 0152 by Olivia Porter RN  Outcome Evaluation: Minimal nonverbal indicators of pain well controlled w/ shceduled tylenol.  Plan of Care Reviewed With: patient  Overall Patient Progress: declining  Goal: Absence of Hospital-Acquired Illness or Injury  Intervention: Identify and Manage Fall Risk  Recent Flowsheet  Documentation  Taken 3/29/2025 0000 by Olivia Porter RN  Safety Promotion/Fall Prevention: safety round/check completed  Intervention: Prevent Skin Injury  Recent Flowsheet Documentation  Taken 3/28/2025 2353 by Olivia Porter RN  Body Position:   turned   right  Intervention: Prevent and Manage VTE (Venous Thromboembolism) Risk  Recent Flowsheet Documentation  Taken 3/28/2025 2357 by Olivia Porter RN  VTE Prevention/Management:   SCDs off (sequential compression devices)   patient refused intervention  Intervention: Prevent Infection  Recent Flowsheet Documentation  Taken 3/29/2025 0000 by Olivia Porter RN  Infection Prevention:   single patient room provided   rest/sleep promoted   personal protective equipment utilized   hand hygiene promoted   equipment surfaces disinfected   environmental surveillance performed   cohorting utilized  Goal: Optimal Comfort and Wellbeing  Intervention: Monitor Pain and Promote Comfort  Recent Flowsheet Documentation  Taken 3/28/2025 2353 by Olivia Porter RN  Pain Management Interventions: medication (see MAR)     Problem: Adult Inpatient Plan of Care  Goal: Plan of Care Review  Description: The Plan of Care Review/Shift note should be completed every shift.  The Outcome Evaluation is a brief statement about your assessment that the patient is improving, declining, or no change.  This information will be displayed automatically on your shiftnote.  Recent Flowsheet Documentation  Taken 3/29/2025 0152 by Olivia Porter RN  Outcome Evaluation: Minimal nonverbal indicators of pain well controlled w/ shceduled tylenol.  Plan of Care Reviewed With: patient  Overall Patient Progress: declining  Goal: Absence of Hospital-Acquired Illness or Injury  Intervention: Identify and Manage Fall Risk  Recent Flowsheet Documentation  Taken 3/29/2025 0000 by Olivia Porter RN  Safety Promotion/Fall Prevention: safety round/check completed  Intervention: Prevent Skin Injury  Recent  "Flowsheet Documentation  Taken 3/28/2025 2353 by Olivia Porter RN  Body Position:   turned   right  Intervention: Prevent and Manage VTE (Venous Thromboembolism) Risk  Recent Flowsheet Documentation  Taken 3/28/2025 2357 by Olivia Porter RN  VTE Prevention/Management:   SCDs off (sequential compression devices)   patient refused intervention  Intervention: Prevent Infection  Recent Flowsheet Documentation  Taken 3/29/2025 0000 by Olivia Porter RN  Infection Prevention:   single patient room provided   rest/sleep promoted   personal protective equipment utilized   hand hygiene promoted   equipment surfaces disinfected   environmental surveillance performed   cohorting utilized  Goal: Optimal Comfort and Wellbeing  Intervention: Monitor Pain and Promote Comfort  Recent Flowsheet Documentation  Taken 3/28/2025 2353 by Olivia Porter RN  Pain Management Interventions: medication (see MAR)     Problem: Adult Inpatient Plan of Care  Goal: Plan of Care Review  Description: The Plan of Care Review/Shift note should be completed every shift.  The Outcome Evaluation is a brief statement about your assessment that the patient is improving, declining, or no change.  This information will be displayed automatically on your shiftnote.  Outcome: Not Progressing  Flowsheets (Taken 3/29/2025 0152)  Outcome Evaluation: Minimal nonverbal indicators of pain well controlled w/ shceduled tylenol.  Plan of Care Reviewed With: patient  Overall Patient Progress: declining  Goal: Patient-Specific Goal (Individualized)  Description: You can add care plan individualizations to a care plan. Examples of Individualization might be:  \"Parent requests to be called daily at 9am for status\", \"I have a hard time hearing out of my right ear\", or \"Do not touch me to wake me up as it startlesme\".  Outcome: Not Progressing  Goal: Absence of Hospital-Acquired Illness or Injury  Outcome: Not Progressing  Intervention: Identify and Manage Fall " Risk  Recent Flowsheet Documentation  Taken 3/29/2025 0000 by Olivia Porter RN  Safety Promotion/Fall Prevention: safety round/check completed  Intervention: Prevent Skin Injury  Recent Flowsheet Documentation  Taken 3/28/2025 2353 by Olivia Porter RN  Body Position:   turned   right  Intervention: Prevent and Manage VTE (Venous Thromboembolism) Risk  Recent Flowsheet Documentation  Taken 3/28/2025 2357 by Olivia Porter RN  VTE Prevention/Management:   SCDs off (sequential compression devices)   patient refused intervention  Intervention: Prevent Infection  Recent Flowsheet Documentation  Taken 3/29/2025 0000 by Olivia Porter RN  Infection Prevention:   single patient room provided   rest/sleep promoted   personal protective equipment utilized   hand hygiene promoted   equipment surfaces disinfected   environmental surveillance performed   cohorting utilized  Goal: Optimal Comfort and Wellbeing  Outcome: Not Progressing  Intervention: Monitor Pain and Promote Comfort  Recent Flowsheet Documentation  Taken 3/28/2025 2353 by Olivia Porter RN  Pain Management Interventions: medication (see MAR)  Goal: Readiness for Transition of Care  Outcome: Not Progressing     Problem: Delirium  Goal: Improved Behavioral Control  Intervention: Minimize Safety Risk  Recent Flowsheet Documentation  Taken 3/29/2025 0000 by Olivia Porter RN  Enhanced Safety Measures: review medications for side effects with activity     Problem: Dysrhythmia  Goal: Normalized Cardiac Rhythm  Intervention: Monitor and Manage Cardiac Rhythm Effect  Recent Flowsheet Documentation  Taken 3/28/2025 2357 by Olivia Porter RN  VTE Prevention/Management:   SCDs off (sequential compression devices)   patient refused intervention     Problem: Comorbidity Management  Goal: Maintenance of Asthma Control  Intervention: Maintain Asthma Symptom Control  Recent Flowsheet Documentation  Taken 3/29/2025 0000 by Olivia Porter RN  Medication  Review/Management: medications reviewed  Goal: Maintenance of Behavioral Health Symptom Control  Intervention: Maintain Behavioral Health Symptom Control  Recent Flowsheet Documentation  Taken 3/29/2025 0000 by Olivia Porter RN  Medication Review/Management: medications reviewed  Goal: Maintenance of COPD Symptom Control  Intervention: Maintain COPD Symptom Control  Recent Flowsheet Documentation  Taken 3/29/2025 0000 by Olivia Porter RN  Medication Review/Management: medications reviewed  Goal: Blood Glucose Levels Within Targeted Range  Intervention: Monitor and Manage Glycemia  Recent Flowsheet Documentation  Taken 3/29/2025 0000 by Olivia Porter RN  Medication Review/Management: medications reviewed  Goal: Maintenance of Heart Failure Symptom Control  Intervention: Maintain Heart Failure Management  Recent Flowsheet Documentation  Taken 3/29/2025 0000 by Olivia Porter RN  Medication Review/Management: medications reviewed  Goal: Blood Pressure in Desired Range  Intervention: Maintain Blood Pressure Management  Recent Flowsheet Documentation  Taken 3/29/2025 0000 by Olivia Porter RN  Medication Review/Management: medications reviewed  Goal: Maintenance of Osteoarthritis Symptom Control  Intervention: Maintain Osteoarthritis Symptom Control  Recent Flowsheet Documentation  Taken 3/29/2025 0000 by Olivia Porter RN  Medication Review/Management: medications reviewed  Taken 3/28/2025 2353 by Oilvia Porter RN  Assistive Device Utilized: lift device  Activity Management: activity adjusted per tolerance  Goal: Bariatric Home Regimen Maintained  Intervention: Maintain and Manage Postbariatric Surgery Care  Recent Flowsheet Documentation  Taken 3/29/2025 0000 by Olivia Porter RN  Medication Review/Management: medications reviewed  Goal: Maintenance of Seizure Control  Intervention: Maintain Seizure Symptom Control  Recent Flowsheet Documentation  Taken 3/29/2025 0000 by Olivia Porter  RN  Medication Review/Management: medications reviewed     Problem: Pain Acute  Goal: Optimal Pain Control and Function  Intervention: Develop Pain Management Plan  Recent Flowsheet Documentation  Taken 3/28/2025 2353 by Olivia Porter RN  Pain Management Interventions: medication (see MAR)  Intervention: Prevent or Manage Pain  Recent Flowsheet Documentation  Taken 3/29/2025 0000 by Olivia Porter RN  Medication Review/Management: medications reviewed     Problem: Pain Acute  Goal: Optimal Pain Control and Function  Outcome: Progressing  Intervention: Develop Pain Management Plan  Recent Flowsheet Documentation  Taken 3/28/2025 2353 by Olivia Porter RN  Pain Management Interventions: medication (see MAR)  Intervention: Prevent or Manage Pain  Recent Flowsheet Documentation  Taken 3/29/2025 0000 by Olivia Porter RN  Medication Review/Management: medications reviewed     Problem: Fall Injury Risk  Goal: Absence of Fall and Fall-Related Injury  Intervention: Identify and Manage Contributors  Recent Flowsheet Documentation  Taken 3/29/2025 0000 by Olivia Porter RN  Medication Review/Management: medications reviewed  Intervention: Promote Injury-Free Environment  Recent Flowsheet Documentation  Taken 3/29/2025 0000 by Olivia Porter RN  Safety Promotion/Fall Prevention: safety round/check completed     Problem: Fall Injury Risk  Goal: Absence of Fall and Fall-Related Injury  Intervention: Identify and Manage Contributors  Recent Flowsheet Documentation  Taken 3/29/2025 0000 by Olivia Porter RN  Medication Review/Management: medications reviewed  Intervention: Promote Injury-Free Environment  Recent Flowsheet Documentation  Taken 3/29/2025 0000 by Olivia Porter RN  Safety Promotion/Fall Prevention: safety round/check completed     Problem: Gas Exchange Impaired  Goal: Optimal Gas Exchange  Intervention: Optimize Oxygenation and Ventilation  Recent Flowsheet Documentation  Taken 3/28/2025 2353 by  Olivia Porter, RN  Head of Bed (HOB) Positioning: HOB at 20-30 degrees

## 2025-03-29 NOTE — PROGRESS NOTES
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       The patient's daughter states that patient has had some vomiting last week.  He was having increased shortness of breath.  His wife noted that his oxygen saturation was low.  He was brought to emergency room for evaluation.     Initial vital signs showed temperature 97.5, pulse 46, blood pressure 148/61, oxygen saturation 95% on room air.  Laboratory workup showed sodium 128, potassium 5.8, creatinine 5.67, ALT 36, AST 31, troponin T high-sensitivity 103.  WBC 6.5, hemoglobin 8.9, platelets 164.  Venous blood gas showed pH 7.44/pCO2 44, pO2 82.5.  Chest x-ray showed large right pleural effusion with small to moderate left pleural effusion.  Bibasilar consolidation, patchy infiltrate in the central aspect of both lungs progressed on the left and improved on the right.  Felix catheter within decompressed urinary bladder.  Anasarca has progressed.     Nephrology was consulted from emergency room and recommended Lasix 100 mg IV.  He was also given calcium gluconate, Lokelma 10 g per G-tube.  He was also given a dose of IV ceftriaxone and azithromycin.  He was admitted to the hospital for further management.     He remains on 5 L of oxygen.  Interventional radiology performed a right-sided thoracentesis.  He continues on IV diuresis and nephrology  was formally consulted.  Potassium and heart rate have improved.  Palliative care met with family to discuss goals of care and possible enrollment into hospice given his poor functional status, elderly age, worsening renal function without being a dialysis candidate, bradycardia without being a pacemaker candidate, and gradual deterioration with no truly reversible process. They understand his poor prognosis and a care conference was held with the family on 3/18 to discuss all of the above, with attendees including hospitalist, palliative care, and nephrology.  Nephrology reiterated during the care conference that the patient is a poor dialysis candidate given his multiple comorbidities     Update on 3/28:   No Change in the medical management or issues.  Pt was about to be discharged on 3/27 to home with home cares however he was noted to have worsening bradycardia.  He has remained lethargic.  He was evaluated by palliative and appeared to be in the process of active dying so discharge was canceled.  My colleague poke with patient's family including patient's son Sathya and her daughter Clemente.  They understand patient's terminal trajectory. Son Sathya has been hesitant to agree on putting patient on comfort cares.  I extensively explained to both son and daughter that current medical cares are only prolonging the process and probably in the end will not provide much benefit as his underlying conditions are a irreversible.  I recommended comfort cares.  I then explained that with comfort cares we will be stopping the medications, Lasix can be continued if it looks like that this is aiding in comfort, tube feeds will be stopped.  Family is a still talking amongst themselves to come up with a decision/plan.     Update 3/29:   Had a repeat discussion with family members at bedside today regarding the fact that the patient is actively dying, patient's son reported that there are some differing opinions amongst family  members about how to proceed and there are some cultural barriers involved with this. we had an at length discussion regarding the low utility of any particular lab study to indicate how much longer he has to live, we reviewed that no such test exists.  However given his long list of medical problems and comorbidities and the inability to treat some of these, his trajectory is unlikely to change.  I strongly encouraged to transition to comfort focused cares and he expressed that it would be nice to get the patient home and I agree.  The plan will be to monitor the patient overnight in the hospital so an uncle can visit on 3/30 and if the patient is stable enough, we will consider transfer to home on comfort cares.  Family understood and agreed to this plan.  We will monitor him closely overnight.  All questions were answered      Problem list:    Goals of care  The patient is chronically ill and essentially full cares as a result of a prior massive stroke.  He has chronic dysphagia and receives tube feeds via a PEG.  He is essentially bedbound and has residual right-sided hemiplegia and nonverbal status.  He is now faced with rapidly progressing chronic kidney disease leading to concerning electrolyte abnormalities, hypervolemia, and bradycardia.  He is an extremely poor dialysis candidate and dialysis has not been offered by nephrology.  Care conference was held on 3/18 with palliative care, nephrology, and this hospitalist.  Goals of care discussed.  Hospice being considered by family but formal decision has not yet been made  Per one of the daughters, she says that all the family members including the daughters and patient's spouse are interested in looking at comfort measures, but the patient's son has a different viewpoint on this from the rest of the family.  Plan to wait until  the uncle arrives on 3/30 and having ongoing discussions, no plans for aggressive escalation of care, if patient starts to decline  family is to be called right away.     Acute hypoxic respiratory failure likely multifactorial including volume overload due to acute on chronic diastolic CHF exacerbation---improving  Bilateral pleural effusions, right > left  Patient was on Lasix 20 mg daily at home.  Per family he had decreased urine output and increased shortness of breath.  Chest x-ray showed large right pleural effusion, moderate left pleural effusion.  Received IV diuresis in the hospital and then transition to p.o.  Appreciate nephrology assistance.  -Continue oral Lasix 80 mg twice a day  -s/p R sided thoracentesis with 900 ml removed earlier in hospital stay   -AHRF overall improved now requiring only 0.5 to 1 L of nasal cannula.     Acute toxic metabolic encephalopathy  Per patient's son Sathya usually he is awake and alert and can show thumbs up or down when ask questions.  Lately patient has been more lethargic.  Suspect this is likely due to uremia.  Also has history of CVA.    -No other source of encephalopathy identified during hospital stay, likely related to the chronic illness and various comorbidities, will continue to monitor     Hyperkalemia-resolved  Hypokalemia  ALEX on CKD stage V  Nephrotic range proteinuria  Anasarca  Hyponatremia  Hypercalcemia   -Creatinine 1 year ago was near 1  -Most recent creatinine 4.07 on 2/26/2025.  Creatinine 5.67 on admission and potassium 5.8.  He was previously seen by nephrology and his daughter indicates that he was determined not to be a dialysis candidate.  Potassium has improved with shifting agents and Lokelma.  Creatinine has been steadily climbing over the past several weeks.  -Initially hyperkalemic.  Now hypokalemic and requiring potassium replacement.  -Appreciate nephrology input.  As per nephrology not a dialysis candidate  -completed IV diuresis; now on PO diuretic for symptom management  -Felix in place and tracking I's and O's.     Bradycardia  Junctional rhythm  This was an issue  during his recent hospitalization as well.  There may be some metabolic component but I do worry about some underlying conduction disease.  Poor candidate for pacemaker due to the issues outlined above.  -telemetry discontinued earlier in hospital stay     Demand ischemia  Patient had no evidence of chest pain. Troponin has been elevated during prior evaluation on 2/26/2025.  Troponin 103 on admission and flat, not consistent with acute coronary syndrome.  Likely demand ischemia from respiratory failure with poor enzyme clearance in the face of CKD.    -No further workup      Diabetes mellitus type 2  He was on Lantus insulin 14 units every morning, insulin sliding scale PTA.  -Insulin was increased to 18 units this hospitalization.  Will continue medium intensity sliding scale insulin  -Blood glucose is rising however given his tenuous state do not want to increase at this time given risk of hypoglycemia, if blood glucose continues to rise will adjust on 3/30     History of CVA with residual right-sided hemiplegia and baseline nonverbal status  Chronic dysphagia, status post PEG tube placement, G-tube exchange on 2/24/2025  S/p indwelling Felix catheter  Dyslipidemia  Hypertension  Patient resides at home and family takes care of him.  He also has a visiting nurse.  They appear to provide excellent care and support for him.  Patient's son Sathya stated that patient's spouse who is also ~ 70 years old was taking care of patient but now getting overwhelmed and would appreciate further assistance in placement.  Family now decided to take patient back home with home cares.  -PTA medications including amlodipine and atorvastatin and Plavix  -RD consulted to facilitate tube feeds.     Anemia and chronic kidney disease  -PTA ferrous sulfate.     Depression  -PTA Prozac.     DVT Prophylaxis: Pneumatic Compression Devices  Code Status: No CPR- Do NOT Intubate  Diet: Adult Formula Drip Feeding: Continuous Nepro with  Carbsteady; Gastrostomy; Goal Rate: 30; mL/hr; Advancement Instructions: Once mag and phos are back and WNL, please initiate TF at 15 ml/hr x 6 hours. If tolerating, okay to advance to goal rate of...    Ordoñez Catheter: PRESENT, indication: Chronic ordoñez  Disposition: Expected discharge plan to be finalized 3/30/25     Wife present at bedside and son Sathya was called in the room and updated on speaker phone 03/29/25.          Interval History (Subjective):      Patient is nonverbal and noncommunicative.  No complaints. Unable to identify any symptoms. Wife present at bedside and son Sathya was called in the room and updated on speaker phone 03/29/25.  All questions were answered.                  Physical Exam:      Last Vital Signs:  /52 (BP Location: Left arm)   Pulse (!) 45   Temp 98.1  F (36.7  C) (Axillary)   Resp 18   Wt 69.9 kg (154 lb 1.6 oz)   SpO2 97%   BMI 24.87 kg/m      Constitutional: fatigued, no apparent distress, frail appearing   HEENT: Normocephalic, atraumatic   Respiratory: Increased work of breathing, on nasal cannula oxygen   Cardiovascular: bradycardic rate and regular rhythm   GI: Soft and nontender to palpation, nondistended, no rebound or guarding   Skin: normal skin color, texture, turgor   Musculoskeletal: No deformities, pitting edema present bilaterally   Neuropsychiatric: General: normal, calm and poor eye contact           Medications:        Current Facility-Administered Medications:     acetaminophen (TYLENOL) oral liquid 650 mg, 650 mg, Oral or Feeding Tube, Q8H, Mustapha Fountain MD, 650 mg at 03/29/25 1030    amLODIPine (NORVASC) tablet 10 mg, 10 mg, Per G Tube, Daily, Mustapha Fountain MD, 10 mg at 03/29/25 1017    B and C vitamin Complex with folic acid (NEPHRONEX) liquid 5 mL, 5 mL, Per Feeding Tube, QPM, Mustapha Fountain MD, 5 mL at 03/28/25 2038    calcium carbonate (TUMS) chewable tablet 1,000 mg, 1,000 mg, Oral or Feeding Tube, 4x Daily PRN,  Demetrius Robles MD    clopidogrel (PLAVIX) tablet 75 mg, 75 mg, Per Feeding Tube, Daily, Demetrius Robles MD, 75 mg at 03/29/25 1018    dextrose 10% infusion, , Intravenous, Continuous PRN, Mustapha Fountain MD    glucose gel 15-30 g, 15-30 g, Oral, Q15 Min PRN **OR** dextrose 50 % injection 25-50 mL, 25-50 mL, Intravenous, Q15 Min PRN **OR** glucagon injection 1 mg, 1 mg, Subcutaneous, Q15 Min PRN, Mustapha Fountain MD    sennosides (SENOKOT) syrup 5-10 mL, 5-10 mL, Per G Tube, BID PRN, 5 mL at 03/23/25 0854 **AND** docusate (COLACE) 50 MG/5ML liquid  mg,  mg, Per G Tube, BID PRN, Demetrius Robles MD, 100 mg at 03/22/25 1826    doxazosin (CARDURA) tablet 2 mg, 2 mg, Oral or Feeding Tube, Daily, Mustapha Fountain MD, 2 mg at 03/29/25 1030    ferrous sulfate 300 (60 Fe) MG/5ML solution 325 mg, 325 mg, Per G Tube, Daily, Demetrius Robles MD, 325 mg at 03/29/25 1018    FLUoxetine (PROzac) solution 20 mg, 20 mg, Per Feeding Tube, Daily, Mustapha Fountain MD, 20 mg at 03/29/25 1020    furosemide (LASIX) solution 80 mg, 80 mg, Per G Tube, BID, Demetrius Robles MD, 80 mg at 03/29/25 1020    insulin aspart (NovoLOG) injection (RAPID ACTING), 1-7 Units, Subcutaneous, Q4H, Mustapha Fountain MD, 3 Units at 03/29/25 1249    insulin glargine (LANTUS PEN) injection 18 Units, 18 Units, Subcutaneous, QAM, Demetrius Robles MD, 18 Units at 03/29/25 1031    lidocaine (LMX4) cream, , Topical, Q1H PRN, Mustapha Fountain MD    lidocaine 1 % 0.1-1 mL, 0.1-1 mL, Other, Q1H PRN, Mustapha Fountain MD    naloxone (NARCAN) injection 0.2 mg, 0.2 mg, Intravenous, Q2 Min PRN **OR** naloxone (NARCAN) injection 0.4 mg, 0.4 mg, Intravenous, Q2 Min PRN **OR** naloxone (NARCAN) injection 0.2 mg, 0.2 mg, Intramuscular, Q2 Min PRN **OR** naloxone (NARCAN) injection 0.4 mg, 0.4 mg, Intramuscular, Q2 Min PRN, Mustapha Fountain MD    ondansetron (ZOFRAN ODT)  ODT tab 4 mg, 4 mg, Oral, Q6H PRN, 4 mg at 03/26/25 0917 **OR** ondansetron (ZOFRAN) injection 4 mg, 4 mg, Intravenous, Q6H PRN, Mustapha Fountain MD, 4 mg at 03/26/25 2040    polyethylene glycol (MIRALAX) Packet 17 g, 17 g, Oral or Feeding Tube, Daily PRN, Mustapha Fountain MD, 17 g at 03/24/25 1730    sodium chloride (PF) 0.9% PF flush 3 mL, 3 mL, Intracatheter, Q8H, Mustapha Fountain MD, 3 mL at 03/29/25 1022    sodium chloride (PF) 0.9% PF flush 3 mL, 3 mL, Intracatheter, q1 min prn, Mustapha Fountain MD, 3 mL at 03/26/25 2032    [Held by provider] tolterodine (DETROL) tablet 1 mg, 1 mg, Oral or Feeding Tube, BID AC, Mustapha Fountain MD, 1 mg at 03/17/25 1639          Data:      All new lab and imaging data was reviewed.   Labs:   Latest Reference Range & Units 03/29/25 05:53   Potassium 3.4 - 5.3 mmol/L 3.5     Imaging:   No results found for this or any previous visit (from the past 24 hours).

## 2025-03-29 NOTE — PLAN OF CARE
"  Problem: Adult Inpatient Plan of Care  Goal: Plan of Care Review  Description: The Plan of Care Review/Shift note should be completed every shift.  The Outcome Evaluation is a brief statement about your assessment that the patient is improving, declining, or no change.  This information will be displayed automatically on your shiftnote.  Outcome: Not Progressing  Flowsheets (Taken 3/28/2025 2200)  Outcome Evaluation: Pt remains unresponsive, turned and repositioned Q2H.  TF cont at 30 ml/hr. Pt wife and son at bedside. POC reviewed, sons questions answered. Pt VSS, does not appear to be in pain. BG's were 185 & 207. Plan is to discuss Comfort Cares orders in AM with family.  Plan of Care Reviewed With:   patient   child   family  Goal: Patient-Specific Goal (Individualized)  Description: You can add care plan individualizations to a care plan. Examples of Individualization might be:  \"Parent requests to be called daily at 9am for status\", \"I have a hard time hearing out of my right ear\", or \"Do not touch me to wake me up as it startlesme\".  Outcome: Not Progressing  Goal: Absence of Hospital-Acquired Illness or Injury  Outcome: Not Progressing  Intervention: Identify and Manage Fall Risk  Recent Flowsheet Documentation  Taken 3/28/2025 1656 by Mare Tinajero RN  Safety Promotion/Fall Prevention:   safety round/check completed   supervised activity   room organization consistent   clutter free environment maintained   patient and family education  Intervention: Prevent Skin Injury  Recent Flowsheet Documentation  Taken 3/28/2025 2041 by Mare Tinajero RN  Body Position:   turned   left   heels elevated   legs elevated   supine, legs elevated   upper extremity elevated  Taken 3/28/2025 1845 by Mare Tinajero RN  Body Position:   turned   heels elevated   legs elevated   neutral body alignment   neutral head position   upper extremity elevated  Taken 3/28/2025 1656 by Mare Tinajero RN  Body " Position:   turned   right   heels elevated   log-rolled   neutral head position  Intervention: Prevent and Manage VTE (Venous Thromboembolism) Risk  Recent Flowsheet Documentation  Taken 3/28/2025 1656 by Mare Tinjaero RN  VTE Prevention/Management: SCDs on (sequential compression devices)  Intervention: Prevent Infection  Recent Flowsheet Documentation  Taken 3/28/2025 1656 by Mare Tinajero RN  Infection Prevention:   hand hygiene promoted   rest/sleep promoted   single patient room provided  Goal: Optimal Comfort and Wellbeing  Outcome: Not Progressing  Goal: Readiness for Transition of Care  Outcome: Not Progressing     Problem: Pain Acute  Goal: Optimal Pain Control and Function  Outcome: Not Progressing  Intervention: Prevent or Manage Pain  Recent Flowsheet Documentation  Taken 3/28/2025 1656 by Mare Tinajero RN  Medication Review/Management: medications reviewed   Goal Outcome Evaluation:      Plan of Care Reviewed With: patient, child, family          Outcome Evaluation: Pt remains unresponsive, turned and repositioned Q2H.  TF cont at 30 ml/hr. Pt wife and son at bedside. POC reviewed, sons questions answered. Pt VSS, does not appear to be in pain. BG's were 185 & 207. Plan is to discuss Comfort Cares orders in AM with family.

## 2025-03-30 LAB
GLUCOSE BLDC GLUCOMTR-MCNC: 188 MG/DL (ref 70–99)
GLUCOSE BLDC GLUCOMTR-MCNC: 206 MG/DL (ref 70–99)
GLUCOSE BLDC GLUCOMTR-MCNC: 209 MG/DL (ref 70–99)
GLUCOSE BLDC GLUCOMTR-MCNC: 214 MG/DL (ref 70–99)
GLUCOSE BLDC GLUCOMTR-MCNC: 217 MG/DL (ref 70–99)
GLUCOSE BLDC GLUCOMTR-MCNC: 230 MG/DL (ref 70–99)
GLUCOSE BLDC GLUCOMTR-MCNC: 232 MG/DL (ref 70–99)
HOLD SPECIMEN: NORMAL
POTASSIUM SERPL-SCNC: 3.4 MMOL/L (ref 3.4–5.3)

## 2025-03-30 PROCEDURE — 250N000013 HC RX MED GY IP 250 OP 250 PS 637: Performed by: INTERNAL MEDICINE

## 2025-03-30 PROCEDURE — 99232 SBSQ HOSP IP/OBS MODERATE 35: CPT | Performed by: STUDENT IN AN ORGANIZED HEALTH CARE EDUCATION/TRAINING PROGRAM

## 2025-03-30 PROCEDURE — 84132 ASSAY OF SERUM POTASSIUM: CPT | Performed by: INTERNAL MEDICINE

## 2025-03-30 PROCEDURE — 36415 COLL VENOUS BLD VENIPUNCTURE: CPT | Performed by: INTERNAL MEDICINE

## 2025-03-30 PROCEDURE — 250N000011 HC RX IP 250 OP 636: Performed by: STUDENT IN AN ORGANIZED HEALTH CARE EDUCATION/TRAINING PROGRAM

## 2025-03-30 PROCEDURE — 250N000013 HC RX MED GY IP 250 OP 250 PS 637: Performed by: STUDENT IN AN ORGANIZED HEALTH CARE EDUCATION/TRAINING PROGRAM

## 2025-03-30 PROCEDURE — 250N000011 HC RX IP 250 OP 636: Performed by: INTERNAL MEDICINE

## 2025-03-30 PROCEDURE — 120N000001 HC R&B MED SURG/OB

## 2025-03-30 RX ORDER — POTASSIUM CHLORIDE 20MEQ/15ML
20 LIQUID (ML) ORAL ONCE
Status: COMPLETED | OUTPATIENT
Start: 2025-03-30 | End: 2025-03-30

## 2025-03-30 RX ORDER — PROCHLORPERAZINE MALEATE 5 MG/1
5 TABLET ORAL EVERY 6 HOURS PRN
Status: DISCONTINUED | OUTPATIENT
Start: 2025-03-30 | End: 2025-04-18 | Stop reason: HOSPADM

## 2025-03-30 RX ORDER — PROCHLORPERAZINE 25 MG
12.5 SUPPOSITORY, RECTAL RECTAL EVERY 12 HOURS PRN
Status: DISCONTINUED | OUTPATIENT
Start: 2025-03-30 | End: 2025-04-18 | Stop reason: HOSPADM

## 2025-03-30 RX ADMIN — Medication 325 MG: at 08:35

## 2025-03-30 RX ADMIN — INSULIN ASPART 1 UNITS: 100 INJECTION, SOLUTION INTRAVENOUS; SUBCUTANEOUS at 00:26

## 2025-03-30 RX ADMIN — INSULIN ASPART 2 UNITS: 100 INJECTION, SOLUTION INTRAVENOUS; SUBCUTANEOUS at 17:43

## 2025-03-30 RX ADMIN — ACETAMINOPHEN 650 MG: 160 SOLUTION ORAL at 00:25

## 2025-03-30 RX ADMIN — ACETAMINOPHEN 650 MG: 160 SOLUTION ORAL at 17:32

## 2025-03-30 RX ADMIN — ONDANSETRON 4 MG: 2 INJECTION, SOLUTION INTRAMUSCULAR; INTRAVENOUS at 23:38

## 2025-03-30 RX ADMIN — POTASSIUM CHLORIDE 20 MEQ: 1.5 SOLUTION ORAL at 08:36

## 2025-03-30 RX ADMIN — ACETAMINOPHEN 650 MG: 160 SOLUTION ORAL at 08:36

## 2025-03-30 RX ADMIN — ONDANSETRON 4 MG: 2 INJECTION, SOLUTION INTRAMUSCULAR; INTRAVENOUS at 17:26

## 2025-03-30 RX ADMIN — AMLODIPINE BESYLATE 10 MG: 10 TABLET ORAL at 08:35

## 2025-03-30 RX ADMIN — FLUOXETINE 20 MG: 20 SOLUTION ORAL at 08:36

## 2025-03-30 RX ADMIN — DOXAZOSIN 2 MG: 1 TABLET ORAL at 08:34

## 2025-03-30 RX ADMIN — FUROSEMIDE 80 MG: 10 SOLUTION ORAL at 08:35

## 2025-03-30 RX ADMIN — INSULIN ASPART 2 UNITS: 100 INJECTION, SOLUTION INTRAVENOUS; SUBCUTANEOUS at 20:42

## 2025-03-30 RX ADMIN — PROCHLORPERAZINE EDISYLATE 5 MG: 5 INJECTION INTRAMUSCULAR; INTRAVENOUS at 21:10

## 2025-03-30 RX ADMIN — FUROSEMIDE 80 MG: 10 SOLUTION ORAL at 17:32

## 2025-03-30 RX ADMIN — INSULIN ASPART 2 UNITS: 100 INJECTION, SOLUTION INTRAVENOUS; SUBCUTANEOUS at 04:13

## 2025-03-30 RX ADMIN — CLOPIDOGREL BISULFATE 75 MG: 75 TABLET ORAL at 08:35

## 2025-03-30 ASSESSMENT — ACTIVITIES OF DAILY LIVING (ADL)
ADLS_ACUITY_SCORE: 99
ADLS_ACUITY_SCORE: 103
ADLS_ACUITY_SCORE: 99
ADLS_ACUITY_SCORE: 103
ADLS_ACUITY_SCORE: 99
ADLS_ACUITY_SCORE: 103
ADLS_ACUITY_SCORE: 99
ADLS_ACUITY_SCORE: 103
ADLS_ACUITY_SCORE: 99
ADLS_ACUITY_SCORE: 103
ADLS_ACUITY_SCORE: 103
ADLS_ACUITY_SCORE: 99
ADLS_ACUITY_SCORE: 99

## 2025-03-30 NOTE — PROGRESS NOTES
[facility]  Hospitalist Progress Note  Jill EvateenaDO 03/30/2025    Reason for Stay (Diagnosis): CKD         Assessment and Plan:      Summary of Stay: Jimmy Otto is a 75 year old male patient with extensive past medical history including cerebrovascular accident with right-sided hemiplegia and aphasia, diabetes mellitus type 2, hypertension, hyperlipidemia, status post PEG tube placement, chronic kidney disease, nephrotic range proteinuria, advanced diabetic nephropathy, anasarca, anemia, neurogenic bladder with chronic Felix catheter in place, depressive disorder, who was brought from home for evaluation for low oxygen saturations and bradycardia.       The patient's daughter states that patient has had some vomiting last week.  He was having increased shortness of breath.  His wife noted that his oxygen saturation was low.  He was brought to emergency room for evaluation.     Initial vital signs showed temperature 97.5, pulse 46, blood pressure 148/61, oxygen saturation 95% on room air.  Laboratory workup showed sodium 128, potassium 5.8, creatinine 5.67, ALT 36, AST 31, troponin T high-sensitivity 103.  WBC 6.5, hemoglobin 8.9, platelets 164.  Venous blood gas showed pH 7.44/pCO2 44, pO2 82.5.  Chest x-ray showed large right pleural effusion with small to moderate left pleural effusion.  Bibasilar consolidation, patchy infiltrate in the central aspect of both lungs progressed on the left and improved on the right.  Felix catheter within decompressed urinary bladder.  Anasarca has progressed.     Nephrology was consulted from emergency room and recommended Lasix 100 mg IV.  He was also given calcium gluconate, Lokelma 10 g per G-tube.  He was also given a dose of IV ceftriaxone and azithromycin.  He was admitted to the hospital for further management.     He remains on 5 L of oxygen.  Interventional radiology performed a right-sided thoracentesis.  He continues on IV diuresis and nephrology was  formally consulted.  Potassium and heart rate have improved.  Palliative care met with family to discuss goals of care and possible enrollment into hospice given his poor functional status, elderly age, worsening renal function without being a dialysis candidate, bradycardia without being a pacemaker candidate, and gradual deterioration with no truly reversible process. They understand his poor prognosis and a care conference was held with the family on 3/18 to discuss all of the above, with attendees including hospitalist, palliative care, and nephrology.  Nephrology reiterated during the care conference that the patient is a poor dialysis candidate given his multiple comorbidities     Update on 3/28:   No Change in the medical management or issues.  Pt was about to be discharged on 3/27 to home with home cares however he was noted to have worsening bradycardia.  He has remained lethargic.  He was evaluated by palliative and appeared to be in the process of active dying so discharge was canceled.  My colleague poke with patient's family including patient's son Sathya and her daughter Clemente.  They understand patient's terminal trajectory. Son Sathya has been hesitant to agree on putting patient on comfort cares.  I extensively explained to both son and daughter that current medical cares are only prolonging the process and probably in the end will not provide much benefit as his underlying conditions are a irreversible.  I recommended comfort cares.  I then explained that with comfort cares we will be stopping the medications, Lasix can be continued if it looks like that this is aiding in comfort, tube feeds will be stopped.  Family is a still talking amongst themselves to come up with a decision/plan.     Update 3/29:   Had a repeat discussion with family members at bedside today regarding the fact that the patient is actively dying, patient's son reported that there are some differing opinions amongst family members  about how to proceed and there are some cultural barriers involved with this. we had an at length discussion regarding the low utility of any particular lab study to indicate how much longer he has to live, we reviewed that no such test exists.  However given his long list of medical problems and comorbidities and the inability to treat some of these, his trajectory is unlikely to change.  I strongly encouraged to transition to comfort focused cares and he expressed that it would be nice to get the patient home and I agree.  The plan will be to monitor the patient overnight in the hospital so an uncle can visit on 3/30 and if the patient is stable enough, we will consider transfer to home on comfort cares.  Family understood and agreed to this plan.  We will monitor him closely overnight and call patients family if he acutely declines.  All questions were answered    Update 3/30:   Patient had multiple episodes of emesis, tube feeds were stopped, insulin was adjusted.  At this time we had another at length discussion with the family regarding course of action.  Receiving calls from the daughters that they are concerned that the son is not communicating the plan well, meanwhile the son is at bedside and reporting back/communicating the plan to his mother/patient's wife.  Multiple family members have been traveling and it would be really helpful if they were to come to the hospital to see the patient status and discuss plan as a family.  It is clear at this time that we need to have a full family goals of care discussion and have everyone relevant to the conversation present in some form.  Will appreciate palliative's assistance in coordinating this in the coming days.  At this time plan for no escalation of cares but will continue plan as outlined below.  Son was updated at bedside today, plan otherwise as outlined below. We will monitor him closely overnight and call patients family if he acutely  declines.    Problem list:    Goals of care  The patient is chronically ill and essentially full cares as a result of a prior massive stroke.  He has chronic dysphagia and receives tube feeds via a PEG.  He is essentially bedbound and has residual right-sided hemiplegia and nonverbal status.  He is now faced with rapidly progressing chronic kidney disease leading to concerning electrolyte abnormalities, hypervolemia, and bradycardia.  He is an extremely poor dialysis candidate and dialysis has not been offered by nephrology.  Care conference was held on 3/18 with palliative care, nephrology, and this hospitalist.  Goals of care discussed.  Hospice being considered by family but formal decision has not yet been made  Per one of the daughters, she says that all the family members including the daughters and patient's spouse are interested in looking at comfort measures, but the patient's son has a different viewpoint on this from the rest of the family.  Plan to wait until the uncle arrives on 3/30 and having ongoing discussions, no plans for aggressive escalation of care, if patient starts to decline family is to be called right away.     Acute hypoxic respiratory failure likely multifactorial including volume overload due to acute on chronic diastolic CHF exacerbation---improving  Bilateral pleural effusions, right > left  Patient was on Lasix 20 mg daily at home.  Per family he had decreased urine output and increased shortness of breath.  Chest x-ray showed large right pleural effusion, moderate left pleural effusion.  Received IV diuresis in the hospital and then transition to p.o.  Appreciate nephrology assistance.  -Continue oral Lasix 80 mg twice a day  -s/p R sided thoracentesis with 900 ml removed earlier in hospital stay   -AHRF overall improved now requiring only 0.5 to 1 L of nasal cannula.     Acute toxic metabolic encephalopathy  Per patient's son Sathya usually he is awake and alert and can show thumbs  up or down when ask questions.  Lately patient has been more lethargic.  Suspect this is likely due to uremia.  Also has history of CVA.    -No other source of encephalopathy identified during hospital stay, likely related to the chronic illness and various comorbidities, will continue to monitor     Hyperkalemia-resolved  Hypokalemia  ALEX on CKD stage V  Nephrotic range proteinuria  Anasarca  Hyponatremia  Hypercalcemia   -Creatinine 1 year ago was near 1  -Most recent creatinine 4.07 on 2/26/2025.  Creatinine 5.67 on admission and potassium 5.8.  He was previously seen by nephrology and his daughter indicates that he was determined not to be a dialysis candidate.  Potassium has improved with shifting agents and Lokelma.  Creatinine has been steadily climbing over the past several weeks.  -Initially hyperkalemic.  Now hypokalemic and requiring potassium replacement.  -Appreciate nephrology input.  As per nephrology not a dialysis candidate  -completed IV diuresis; now on PO diuretic for symptom management  -Felix in place and tracking I's and O's.     Bradycardia  Junctional rhythm  This was an issue during his recent hospitalization as well.  There may be some metabolic component but I do worry about some underlying conduction disease.  Poor candidate for pacemaker due to the issues outlined above.  -telemetry discontinued earlier in hospital stay     Demand ischemia  Patient had no evidence of chest pain. Troponin has been elevated during prior evaluation on 2/26/2025.  Troponin 103 on admission and flat, not consistent with acute coronary syndrome.  Likely demand ischemia from respiratory failure with poor enzyme clearance in the face of CKD.    -No further workup      Diabetes mellitus type 2  He was on Lantus insulin 14 units every morning, insulin sliding scale PTA.  -Insulin was increased to 18 units this hospitalization.  Will continue medium intensity sliding scale insulin  -Blood glucose is rising however  given his tenuous state do not want to increase at this time given risk of hypoglycemia, if blood glucose continues to rise will adjust on 3/30     History of CVA with residual right-sided hemiplegia and baseline nonverbal status  Chronic dysphagia, status post PEG tube placement, G-tube exchange on 2/24/2025  S/p indwelling Ordoñez catheter  Dyslipidemia  Hypertension  Patient resides at home and family takes care of him.  He also has a visiting nurse.  They appear to provide excellent care and support for him.  Patient's son Sathya stated that patient's spouse who is also ~ 70 years old was taking care of patient but now getting overwhelmed and would appreciate further assistance in placement.  Family now decided to take patient back home with home cares.  -PTA medications including amlodipine and atorvastatin and Plavix  -RD consulted to facilitate tube feeds.     Anemia and chronic kidney disease  -PTA ferrous sulfate.     Depression  -PTA Prozac.     DVT Prophylaxis: Pneumatic Compression Devices  Code Status: No CPR- Do NOT Intubate  Diet: Adult Formula Drip Feeding: Continuous Nepro with Carbsteady; Gastrostomy; Goal Rate: 30; mL/hr; Advancement Instructions: Once mag and phos are back and WNL, please initiate TF at 15 ml/hr x 6 hours. If tolerating, okay to advance to goal rate of...    Ordoñez Catheter: PRESENT, indication: Chronic ordoñez  Disposition: Expected discharge plan to be finalized 3/30/25     Wife present at bedside and son Sathya was called in the room and updated on speaker phone 03/29/25.          Interval History (Subjective):      Patient is nonverbal and noncommunicative.  No complaints. Unable to identify any symptoms. Son present at bedside. Asking how much time we have left.  All questions were answered.                  Physical Exam:      Last Vital Signs:  BP (!) 154/53 (BP Location: Left arm)   Pulse (!) 47   Temp 97.3  F (36.3  C) (Axillary)   Resp 17   Wt 69.1 kg (152 lb 5.4 oz)   SpO2  98%   BMI 24.59 kg/m      Constitutional: fatigued, not arousable to touch - worsened from yesterdays exam, no apparent distress, frail appearing   HEENT: Normocephalic, atraumatic   Respiratory: Increased work of breathing, on nasal cannula oxygen   Cardiovascular: bradycardic rate and regular rhythm   GI: Soft and nontender to palpation, nondistended, no rebound or guarding   Skin: normal skin color, texture, turgor   Musculoskeletal: No deformities, pitting edema present bilaterally   Neuropsychiatric: General: normal, calm and poor eye contact           Medications:        Current Facility-Administered Medications:     acetaminophen (TYLENOL) oral liquid 650 mg, 650 mg, Oral or Feeding Tube, Q8H, Mustapha Fountain MD, 650 mg at 03/30/25 0836    amLODIPine (NORVASC) tablet 10 mg, 10 mg, Per G Tube, Daily, Mustapha Fountain MD, 10 mg at 03/30/25 0835    B and C vitamin Complex with folic acid (NEPHRONEX) liquid 5 mL, 5 mL, Per Feeding Tube, QPM, Mustapha Fountain MD, 5 mL at 03/28/25 2038    calcium carbonate (TUMS) chewable tablet 1,000 mg, 1,000 mg, Oral or Feeding Tube, 4x Daily PRN, Demetrius Robles MD    clopidogrel (PLAVIX) tablet 75 mg, 75 mg, Per Feeding Tube, Daily, Demetrius Robles MD, 75 mg at 03/30/25 0835    dextrose 10% infusion, , Intravenous, Continuous PRN, Mustapha Fountain MD    glucose gel 15-30 g, 15-30 g, Oral, Q15 Min PRN **OR** dextrose 50 % injection 25-50 mL, 25-50 mL, Intravenous, Q15 Min PRN **OR** glucagon injection 1 mg, 1 mg, Subcutaneous, Q15 Min PRN, Mustapha Fountain MD    sennosides (SENOKOT) syrup 5-10 mL, 5-10 mL, Per G Tube, BID PRN, 5 mL at 03/23/25 0854 **AND** docusate (COLACE) 50 MG/5ML liquid  mg,  mg, Per G Tube, BID PRN, Demetrius Robles MD, 100 mg at 03/22/25 1826    doxazosin (CARDURA) tablet 2 mg, 2 mg, Oral or Feeding Tube, Daily, Mustapha Fountain MD, 2 mg at 03/30/25 0858    ferrous sulfate 300  (60 Fe) MG/5ML solution 325 mg, 325 mg, Per G Tube, Daily, Demetrius Robles MD, 325 mg at 03/30/25 0835    FLUoxetine (PROzac) solution 20 mg, 20 mg, Per Feeding Tube, Daily, Mustapha Fountain MD, 20 mg at 03/30/25 0836    furosemide (LASIX) solution 80 mg, 80 mg, Per G Tube, BID, Demetrius Robles MD, 80 mg at 03/30/25 0835    insulin aspart (NovoLOG) injection (RAPID ACTING), 1-7 Units, Subcutaneous, Q4H, Mustapha Fountain MD, 2 Units at 03/30/25 0413    insulin glargine (LANTUS PEN) injection 18 Units, 18 Units, Subcutaneous, QAM, Demetrius Robles MD, 18 Units at 03/29/25 1031    lidocaine (LMX4) cream, , Topical, Q1H PRN, Mustapha Founatin MD    lidocaine 1 % 0.1-1 mL, 0.1-1 mL, Other, Q1H PRN, Mustapha Fountain MD    naloxone (NARCAN) injection 0.2 mg, 0.2 mg, Intravenous, Q2 Min PRN **OR** naloxone (NARCAN) injection 0.4 mg, 0.4 mg, Intravenous, Q2 Min PRN **OR** naloxone (NARCAN) injection 0.2 mg, 0.2 mg, Intramuscular, Q2 Min PRN **OR** naloxone (NARCAN) injection 0.4 mg, 0.4 mg, Intramuscular, Q2 Min PRN, Mustapha Fountain MD    ondansetron (ZOFRAN ODT) ODT tab 4 mg, 4 mg, Oral, Q6H PRN, 4 mg at 03/26/25 0917 **OR** ondansetron (ZOFRAN) injection 4 mg, 4 mg, Intravenous, Q6H PRN, Mustapha Fountain MD, 4 mg at 03/26/25 2040    polyethylene glycol (MIRALAX) Packet 17 g, 17 g, Oral or Feeding Tube, Daily PRN, Mustapha Fountain MD, 17 g at 03/24/25 1730    sodium chloride (PF) 0.9% PF flush 3 mL, 3 mL, Intracatheter, Q8H, Mustapha Fountain MD, 3 mL at 03/30/25 0837    sodium chloride (PF) 0.9% PF flush 3 mL, 3 mL, Intracatheter, q1 min prn, Mustapha Fountain MD, 3 mL at 03/26/25 2032    [Held by provider] tolterodine (DETROL) tablet 1 mg, 1 mg, Oral or Feeding Tube, BID AC, Mustapha Fountain MD, 1 mg at 03/17/25 1639          Data:      All new lab and imaging data was reviewed.   Labs:   Latest Reference Range & Units 03/30/25  05:48   Potassium 3.4 - 5.3 mmol/L 3.4     Imaging:   No results found for this or any previous visit (from the past 24 hours).

## 2025-03-30 NOTE — PROVIDER NOTIFICATION
MD paged: Pt had x2 large episodes of emesis. Has not had BM since 3/27. Paused tube feeding. Do you want to do imaging? Thanks.     Abdominal X ray ordered.

## 2025-03-30 NOTE — PLAN OF CARE
BP (!) 163/48 (BP Location: Left arm)   Pulse 53   Temp 97.9  F (36.6  C) (Axillary)   Resp 14   Wt 69.1 kg (152 lb 5.4 oz)   SpO2 97%   BMI 24.59 kg/m      Care from 1900 - 0730:    VS: VSS except BP elevated to 160s and HR panfilo to 50s. Sating well on RA.  Pain: More nonverbal indicators of pain noted. Semi-controlled w/ scheduled tylenol, but family declined other pain medication options.  Lines: PIV SL to L forearm.   Cognitive: Lethargic and opening eyes occasionally. Not responding to questions or verbal stimulation.   Respiratory: LS very dim. No increased WOB noted.  Cardiac: Bradycardia noted.  Peripheral neurovascular: Trace generalized edema. PCDs removed per family request.   GI: Last BM 3/29. Very small, loose, and black. Pt had x2 episodes of large emesis. MD paged and TF stopped. PEG WDL. Dressing changed.   : Felix in place.   Skin: See flowsheet for assessment.   Diet: NPO.  Activity: A2 w/ lift. Turn and repo Q2hr.   Labs: K in for AM draw. , 188, 206, 217. Abdominal ultrasound completed and negative.   Plan: Continue PO lasix and hold TF for now. Family discussion plans for HC vs comfort care. Uncle planning to come today. Continue w/ POC.     Goal Outcome Evaluation:      Plan of Care Reviewed With: patient    Overall Patient Progress: decliningOverall Patient Progress: declining    Outcome Evaluation: Pt showing more nonverbal indicators of pain. Still on scheduled tylenol and family declined any other pain medication options.      Problem: Adult Inpatient Plan of Care  Goal: Plan of Care Review  Description: The Plan of Care Review/Shift note should be completed every shift.  The Outcome Evaluation is a brief statement about your assessment that the patient is improving, declining, or no change.  This information will be displayed automatically on your shift  note.  Recent Flowsheet Documentation  Taken 3/30/2025 0238 by Olivia Porter RN  Outcome Evaluation: Pt showing more  nonverbal indicators of pain. Still on scheduled tylenol and family declined any other pain medication options.  Plan of Care Reviewed With: patient  Overall Patient Progress: declining  Goal: Absence of Hospital-Acquired Illness or Injury  Intervention: Identify and Manage Fall Risk  Recent Flowsheet Documentation  Taken 3/30/2025 0025 by Olivia Porter RN  Safety Promotion/Fall Prevention: safety round/check completed  Taken 3/29/2025 2004 by Olivia Porter RN  Safety Promotion/Fall Prevention: safety round/check completed  Intervention: Prevent Skin Injury  Recent Flowsheet Documentation  Taken 3/30/2025 0220 by Olivia Porter RN  Body Position:   turned   supine  Taken 3/30/2025 0025 by Olivia Porter RN  Body Position:   turned   right  Taken 3/29/2025 2217 by Olivia Porter RN  Body Position:   turned   left  Taken 3/29/2025 2004 by Olivia Porter RN  Body Position:   log-rolled   weight shifting   turned   left  Intervention: Prevent and Manage VTE (Venous Thromboembolism) Risk  Recent Flowsheet Documentation  Taken 3/29/2025 2004 by Olivia Porter RN  VTE Prevention/Management: (plavix)   SCDs off (sequential compression devices)   patient refused intervention  Intervention: Prevent Infection  Recent Flowsheet Documentation  Taken 3/30/2025 0025 by Olivia Porter RN  Infection Prevention:   single patient room provided   rest/sleep promoted   personal protective equipment utilized   equipment surfaces disinfected   hand hygiene promoted   cohorting utilized   environmental surveillance performed  Taken 3/29/2025 2004 by Olivia Porter RN  Infection Prevention:   single patient room provided   rest/sleep promoted   personal protective equipment utilized   equipment surfaces disinfected   hand hygiene promoted   cohorting utilized   environmental surveillance performed  Goal: Optimal Comfort and Wellbeing  Intervention: Monitor Pain and Promote Comfort  Recent Flowsheet Documentation  Taken  3/29/2025 1937 by Olivia Porter, RN  Pain Management Interventions: (family refused, pt then voimtted and nonverbal pain indicators gone) medication offered but refused     Problem: Adult Inpatient Plan of Care  Goal: Plan of Care Review  Description: The Plan of Care Review/Shift note should be completed every shift.  The Outcome Evaluation is a brief statement about your assessment that the patient is improving, declining, or no change.  This information will be displayed automatically on your shiftnote.  Recent Flowsheet Documentation  Taken 3/30/2025 0238 by Olivia Porter, RN  Outcome Evaluation: Pt showing more nonverbal indicators of pain. Still on scheduled tylenol and family declined any other pain medication options.  Plan of Care Reviewed With: patient  Overall Patient Progress: declining  Goal: Absence of Hospital-Acquired Illness or Injury  Intervention: Identify and Manage Fall Risk  Recent Flowsheet Documentation  Taken 3/30/2025 0025 by Olivia Porter RN  Safety Promotion/Fall Prevention: safety round/check completed  Taken 3/29/2025 2004 by Olivia Porter RN  Safety Promotion/Fall Prevention: safety round/check completed  Intervention: Prevent Skin Injury  Recent Flowsheet Documentation  Taken 3/30/2025 0220 by Olivia Porter, RN  Body Position:   turned   supine  Taken 3/30/2025 0025 by Olivia Porter RN  Body Position:   turned   right  Taken 3/29/2025 2217 by Olivia Porter RN  Body Position:   turned   left  Taken 3/29/2025 2004 by Olivia Porter RN  Body Position:   log-rolled   weight shifting   turned   left  Intervention: Prevent and Manage VTE (Venous Thromboembolism) Risk  Recent Flowsheet Documentation  Taken 3/29/2025 2004 by Olivia Porter, RAHAT  VTE Prevention/Management: (plavix)   SCDs off (sequential compression devices)   patient refused intervention  Intervention: Prevent Infection  Recent Flowsheet Documentation  Taken 3/30/2025 0025 by Olivia Porter, RN  Infection  "Prevention:   single patient room provided   rest/sleep promoted   personal protective equipment utilized   equipment surfaces disinfected   hand hygiene promoted   cohorting utilized   environmental surveillance performed  Taken 3/29/2025 2004 by Olivia Porter, RN  Infection Prevention:   single patient room provided   rest/sleep promoted   personal protective equipment utilized   equipment surfaces disinfected   hand hygiene promoted   cohorting utilized   environmental surveillance performed  Goal: Optimal Comfort and Wellbeing  Intervention: Monitor Pain and Promote Comfort  Recent Flowsheet Documentation  Taken 3/29/2025 1937 by Olivia Porter, RN  Pain Management Interventions: (family refused, pt then voimtted and nonverbal pain indicators gone) medication offered but refused     Problem: Adult Inpatient Plan of Care  Goal: Plan of Care Review  Description: The Plan of Care Review/Shift note should be completed every shift.  The Outcome Evaluation is a brief statement about your assessment that the patient is improving, declining, or no change.  This information will be displayed automatically on your shiftnote.  Outcome: Not Progressing  Flowsheets (Taken 3/30/2025 0238)  Outcome Evaluation: Pt showing more nonverbal indicators of pain. Still on scheduled tylenol and family declined any other pain medication options.  Plan of Care Reviewed With: patient  Overall Patient Progress: declining  Goal: Patient-Specific Goal (Individualized)  Description: You can add care plan individualizations to a care plan. Examples of Individualization might be:  \"Parent requests to be called daily at 9am for status\", \"I have a hard time hearing out of my right ear\", or \"Do not touch me to wake me up as it startlesme\".  Outcome: Not Progressing  Goal: Absence of Hospital-Acquired Illness or Injury  Outcome: Not Progressing  Intervention: Identify and Manage Fall Risk  Recent Flowsheet Documentation  Taken 3/30/2025 0025 by " Olivia Porter, RN  Safety Promotion/Fall Prevention: safety round/check completed  Taken 3/29/2025 2004 by Olivia Porter RN  Safety Promotion/Fall Prevention: safety round/check completed  Intervention: Prevent Skin Injury  Recent Flowsheet Documentation  Taken 3/30/2025 0220 by Olivia Porter RN  Body Position:   turned   supine  Taken 3/30/2025 0025 by Olivia Porter RN  Body Position:   turned   right  Taken 3/29/2025 2217 by Olivia Porter RN  Body Position:   turned   left  Taken 3/29/2025 2004 by Olivia Porter RN  Body Position:   log-rolled   weight shifting   turned   left  Intervention: Prevent and Manage VTE (Venous Thromboembolism) Risk  Recent Flowsheet Documentation  Taken 3/29/2025 2004 by Olivia Porter RN  VTE Prevention/Management: (plavix)   SCDs off (sequential compression devices)   patient refused intervention  Intervention: Prevent Infection  Recent Flowsheet Documentation  Taken 3/30/2025 0025 by Olivia Porter RN  Infection Prevention:   single patient room provided   rest/sleep promoted   personal protective equipment utilized   equipment surfaces disinfected   hand hygiene promoted   cohorting utilized   environmental surveillance performed  Taken 3/29/2025 2004 by Olivia Porter RN  Infection Prevention:   single patient room provided   rest/sleep promoted   personal protective equipment utilized   equipment surfaces disinfected   hand hygiene promoted   cohorting utilized   environmental surveillance performed  Goal: Optimal Comfort and Wellbeing  Outcome: Not Progressing  Intervention: Monitor Pain and Promote Comfort  Recent Flowsheet Documentation  Taken 3/29/2025 1937 by Olivia Porter RN  Pain Management Interventions: (family refused, pt then voimtted and nonverbal pain indicators gone) medication offered but refused  Goal: Readiness for Transition of Care  Outcome: Not Progressing     Problem: Delirium  Goal: Improved Behavioral Control  Intervention: Minimize  Safety Risk  Recent Flowsheet Documentation  Taken 3/30/2025 0025 by Olivia Porter RN  Enhanced Safety Measures: review medications for side effects with activity  Taken 3/29/2025 2004 by Olivia Porter RN  Enhanced Safety Measures: review medications for side effects with activity     Problem: Dysrhythmia  Goal: Normalized Cardiac Rhythm  Intervention: Monitor and Manage Cardiac Rhythm Effect  Recent Flowsheet Documentation  Taken 3/29/2025 2004 by Olivia Porter RN  VTE Prevention/Management: (plavix)   SCDs off (sequential compression devices)   patient refused intervention     Problem: Comorbidity Management  Goal: Maintenance of Asthma Control  Intervention: Maintain Asthma Symptom Control  Recent Flowsheet Documentation  Taken 3/30/2025 0025 by Olivia Porter RN  Medication Review/Management: medications reviewed  Taken 3/29/2025 2004 by Olivia Porter RN  Medication Review/Management: medications reviewed  Goal: Maintenance of Behavioral Health Symptom Control  Intervention: Maintain Behavioral Health Symptom Control  Recent Flowsheet Documentation  Taken 3/30/2025 0025 by Olivia Porter RN  Medication Review/Management: medications reviewed  Taken 3/29/2025 2004 by Olivia Porter RN  Medication Review/Management: medications reviewed  Goal: Maintenance of COPD Symptom Control  Intervention: Maintain COPD Symptom Control  Recent Flowsheet Documentation  Taken 3/30/2025 0025 by Olivia Porter RN  Medication Review/Management: medications reviewed  Taken 3/29/2025 2004 by Olivia Porter RN  Medication Review/Management: medications reviewed  Goal: Blood Glucose Levels Within Targeted Range  Intervention: Monitor and Manage Glycemia  Recent Flowsheet Documentation  Taken 3/30/2025 0025 by Olivia Porter RN  Medication Review/Management: medications reviewed  Taken 3/29/2025 2004 by Olivia Porter RN  Medication Review/Management: medications reviewed  Goal: Maintenance of Heart Failure  Symptom Control  Intervention: Maintain Heart Failure Management  Recent Flowsheet Documentation  Taken 3/30/2025 0025 by Olivia Porter RN  Medication Review/Management: medications reviewed  Taken 3/29/2025 2004 by Olivia Porter RN  Medication Review/Management: medications reviewed  Goal: Blood Pressure in Desired Range  Intervention: Maintain Blood Pressure Management  Recent Flowsheet Documentation  Taken 3/30/2025 0025 by Olivia Porter RN  Medication Review/Management: medications reviewed  Taken 3/29/2025 2004 by Olivia Porter RN  Medication Review/Management: medications reviewed  Goal: Maintenance of Osteoarthritis Symptom Control  Intervention: Maintain Osteoarthritis Symptom Control  Recent Flowsheet Documentation  Taken 3/30/2025 0220 by Olivia Porter RN  Assistive Device Utilized: lift device  Activity Management: activity adjusted per tolerance  Taken 3/30/2025 0025 by Olivia Porter RN  Assistive Device Utilized: lift device  Activity Management: activity adjusted per tolerance  Medication Review/Management: medications reviewed  Taken 3/29/2025 2217 by Olivia Porter RN  Assistive Device Utilized: lift device  Activity Management: activity adjusted per tolerance  Taken 3/29/2025 2004 by Olivia Porter RN  Assistive Device Utilized: lift device  Activity Management: activity adjusted per tolerance  Medication Review/Management: medications reviewed  Goal: Bariatric Home Regimen Maintained  Intervention: Maintain and Manage Postbariatric Surgery Care  Recent Flowsheet Documentation  Taken 3/30/2025 0025 by Olivia Porter RN  Medication Review/Management: medications reviewed  Taken 3/29/2025 2004 by Olivia Porter RN  Medication Review/Management: medications reviewed  Goal: Maintenance of Seizure Control  Intervention: Maintain Seizure Symptom Control  Recent Flowsheet Documentation  Taken 3/30/2025 0025 by Olivia Porter RN  Medication Review/Management: medications  reviewed  Taken 3/29/2025 2004 by Olivia Porter RN  Medication Review/Management: medications reviewed     Problem: Pain Acute  Goal: Optimal Pain Control and Function  Intervention: Develop Pain Management Plan  Recent Flowsheet Documentation  Taken 3/29/2025 1937 by Olivia Porter RN  Pain Management Interventions: (family refused, pt then voimtted and nonverbal pain indicators gone) medication offered but refused  Intervention: Prevent or Manage Pain  Recent Flowsheet Documentation  Taken 3/30/2025 0025 by Olivia Porter RN  Medication Review/Management: medications reviewed  Taken 3/29/2025 2004 by Olivia Porter RN  Medication Review/Management: medications reviewed     Problem: Pain Acute  Goal: Optimal Pain Control and Function  Outcome: Not Progressing  Intervention: Develop Pain Management Plan  Recent Flowsheet Documentation  Taken 3/29/2025 1937 by Olivia Porter RN  Pain Management Interventions: (family refused, pt then voimtted and nonverbal pain indicators gone) medication offered but refused  Intervention: Prevent or Manage Pain  Recent Flowsheet Documentation  Taken 3/30/2025 0025 by Olivia Porter RN  Medication Review/Management: medications reviewed  Taken 3/29/2025 2004 by Olivia Porter RN  Medication Review/Management: medications reviewed     Problem: Fall Injury Risk  Goal: Absence of Fall and Fall-Related Injury  Intervention: Identify and Manage Contributors  Recent Flowsheet Documentation  Taken 3/30/2025 0025 by Olivia Porter RN  Medication Review/Management: medications reviewed  Taken 3/29/2025 2004 by Olivia Porter RN  Medication Review/Management: medications reviewed  Intervention: Promote Injury-Free Environment  Recent Flowsheet Documentation  Taken 3/30/2025 0025 by Olivia Porter, RN  Safety Promotion/Fall Prevention: safety round/check completed  Taken 3/29/2025 2004 by Olivia Porter, RN  Safety Promotion/Fall Prevention: safety round/check completed      Problem: Fall Injury Risk  Goal: Absence of Fall and Fall-Related Injury  Intervention: Identify and Manage Contributors  Recent Flowsheet Documentation  Taken 3/30/2025 0025 by Olivia Porter RN  Medication Review/Management: medications reviewed  Taken 3/29/2025 2004 by Olivia Porter, RN  Medication Review/Management: medications reviewed  Intervention: Promote Injury-Free Environment  Recent Flowsheet Documentation  Taken 3/30/2025 0025 by Olivia Porter, RN  Safety Promotion/Fall Prevention: safety round/check completed  Taken 3/29/2025 2004 by Olivia Porter RN  Safety Promotion/Fall Prevention: safety round/check completed     Problem: Gas Exchange Impaired  Goal: Optimal Gas Exchange  Intervention: Optimize Oxygenation and Ventilation  Recent Flowsheet Documentation  Taken 3/30/2025 0220 by Olivia Porter RN  Head of Bed (HOB) Positioning: HOB at 30 degrees  Taken 3/30/2025 0025 by Olivia Porter RN  Head of Bed (HOB) Positioning: HOB at 30 degrees  Taken 3/29/2025 2217 by Olivia Porter RN  Head of Bed (HOB) Positioning: HOB at 30-45 degrees  Taken 3/29/2025 2004 by Olivia Porter RN  Head of Bed (HOB) Positioning: HOB at 30 degrees

## 2025-03-30 NOTE — PROVIDER NOTIFICATION
MD paged: Abdominal X ray came back negative. Tube feed has been paused since 19:39. Pt nonverbal, but still having frequent secretions and grimacing, which he did prior to emesis episode. Do you want to restart TF or wait until tomorrow?

## 2025-03-30 NOTE — PROGRESS NOTES
Cross cover notified that abdominal x-ray results ordered by previous provider are back.  The patient had 2 large emesis earlier.  No bowel movement for 2 days.  Tube feeding was paused a few hours ago.  X-ray does not show any dilated loops of bowel.  Patient is still having frequent secretions and grimacing, this is how he appeared before he vomited earlier tonight.  Reviewed chart and there have been multiple conversations last few days with the family to consider comfort cares which they are still deciding on.   given the patient continues to appear uncomfortable and with the previous vomiting today I recommend holding tube feeds overnight and reassessment in the morning.  If the patient is in the active dying process I would not want to cause increased comfort and pain overnight by challenging with tube feeds.   he did receive glargine 18 units about 12 hours ago, blood glucoses remained above 200.  Recommend checking glucose at 2 AM

## 2025-03-30 NOTE — PLAN OF CARE
"Temp: 99.3  F (37.4  C) Temp src: Axillary BP: (!) 170/62 Pulse: 51   Resp: 17 SpO2: 93 % O2 Device: None (Room air)       Somnolent, ZAYDA orientation. 2 assist for repositioning, not out of bed. Opens eyes slightly with repositioning. Had emesis x1 this morning shortly after morning medications. Given PRN IV zofran before afternoon medications, so far no emesis. Small, loose, black BM. PEG tube in place, clamped, TF remain held. Chronic ordoñez in place. Repositioned side-to-side. Patient has appeared comfortable. Hopeful for family to come in together tomorrow for goals of care discussion.     Goal Outcome Evaluation:      Plan of Care Reviewed With: patient    Overall Patient Progress: decliningOverall Patient Progress: declining    Outcome Evaluation: Emesis x1 today. TF remains held. Spiritual consult placed. PRN zofran. Repo.      Problem: Adult Inpatient Plan of Care  Goal: Plan of Care Review  Description: The Plan of Care Review/Shift note should be completed every shift.  The Outcome Evaluation is a brief statement about your assessment that the patient is improving, declining, or no change.  This information will be displayed automatically on your shiftnote.  Outcome: Progressing  Flowsheets (Taken 3/30/2025 4014)  Outcome Evaluation: Emesis x1 today. TF remains held. Spiritual consult placed. PRN zofran. Repo.  Plan of Care Reviewed With: patient  Overall Patient Progress: declining  Goal: Patient-Specific Goal (Individualized)  Description: You can add care plan individualizations to a care plan. Examples of Individualization might be:  \"Parent requests to be called daily at 9am for status\", \"I have a hard time hearing out of my right ear\", or \"Do not touch me to wake me up as it startlesme\".  Outcome: Progressing  Goal: Absence of Hospital-Acquired Illness or Injury  Outcome: Progressing  Intervention: Identify and Manage Fall Risk  Recent Flowsheet Documentation  Taken 3/30/2025 1828 by Mckenzie Sparks, " RN  Safety Promotion/Fall Prevention:   safety round/check completed   room organization consistent   room near nurse's station   clutter free environment maintained  Taken 3/30/2025 0848 by Mckenzie Sparks RN  Safety Promotion/Fall Prevention:   safety round/check completed   room organization consistent   room near nurse's station   clutter free environment maintained  Intervention: Prevent Skin Injury  Recent Flowsheet Documentation  Taken 3/30/2025 1828 by Mckenzie Sparks RN  Body Position:   turned   left   heels elevated   log-rolled   weight shifting  Skin Protection:   adhesive use limited   skin to skin areas padded   transparent dressing maintained   tubing/devices free from skin contact  Taken 3/30/2025 1530 by Mckenzie Sparks RN  Body Position:   turned   right   heels elevated   log-rolled   weight shifting   upper extremity elevated  Taken 3/30/2025 1300 by Mckenzie Sparks RN  Body Position:   turned   left   heels elevated   weight shifting  Taken 3/30/2025 0848 by Mckenzie Sparks RN  Skin Protection:   adhesive use limited   skin to skin areas padded   transparent dressing maintained   tubing/devices free from skin contact  Intervention: Prevent and Manage VTE (Venous Thromboembolism) Risk  Recent Flowsheet Documentation  Taken 3/30/2025 1828 by Mckenzie Sparks RN  VTE Prevention/Management: (On Plavix) SCDs off (sequential compression devices)  Taken 3/30/2025 0848 by Mckenzie Sparks RN  VTE Prevention/Management: (on Plavix) SCDs off (sequential compression devices)  Intervention: Prevent Infection  Recent Flowsheet Documentation  Taken 3/30/2025 1828 by Mckenzie Sparks RN  Infection Prevention:   hand hygiene promoted   rest/sleep promoted   single patient room provided  Taken 3/30/2025 0848 by Mckenzie Sparks RN  Infection Prevention:   hand hygiene promoted   rest/sleep promoted   single patient room provided  Goal: Optimal Comfort and Wellbeing  Outcome: Progressing  Intervention: Provide  Person-Centered Care  Recent Flowsheet Documentation  Taken 3/30/2025 1828 by Mckenzie Sparks RN  Trust Relationship/Rapport: care explained  Taken 3/30/2025 0848 by Mckenzie Sparks RN  Trust Relationship/Rapport: care explained  Goal: Readiness for Transition of Care  Outcome: Progressing     Problem: Pain Acute  Goal: Optimal Pain Control and Function  Outcome: Progressing  Intervention: Prevent or Manage Pain  Recent Flowsheet Documentation  Taken 3/30/2025 1828 by Mckenzie Sparks RN  Medication Review/Management: medications reviewed  Taken 3/30/2025 0848 by Mckenzie Sparks RN  Medication Review/Management: medications reviewed

## 2025-03-30 NOTE — PLAN OF CARE
"Temp: 97.9  F (36.6  C) Temp src: Axillary BP: (!) 143/47 Pulse: 50   Resp: 12 SpO2: 92 % O2 Device: Nasal cannula Oxygen Delivery: 1/2 LPM     Somnolent, ZAYDA orientation, nonverbal. Opens eyes slightly. 2A reposition side-to-side, Q2 hours. Continues TF @ 30 ml/hr, 60 ml flush Q 4hour. O2 via NC at 1/2 lpm. Chronic ordoñez in place. Received all scheduled medications. Family still discussing transition to comfort cares.     Goal Outcome Evaluation:      Plan of Care Reviewed With: patient    Overall Patient Progress: decliningOverall Patient Progress: declining    Outcome Evaluation: Nonverbal, received all scheduled medication. Continues TF @ 30 ml/hr, 60 ml flush Q 4. Repo Q2 hours.      Problem: Adult Inpatient Plan of Care  Goal: Plan of Care Review  Description: The Plan of Care Review/Shift note should be completed every shift.  The Outcome Evaluation is a brief statement about your assessment that the patient is improving, declining, or no change.  This information will be displayed automatically on your shiftnote.  Outcome: Not Progressing  Flowsheets (Taken 3/29/2025 1941)  Outcome Evaluation: Nonverbal, received all scheduled medication. Continues TF @ 30 ml/hr, 60 ml flush Q 4. Repo Q2 hours.  Plan of Care Reviewed With: patient  Overall Patient Progress: declining  Goal: Patient-Specific Goal (Individualized)  Description: You can add care plan individualizations to a care plan. Examples of Individualization might be:  \"Parent requests to be called daily at 9am for status\", \"I have a hard time hearing out of my right ear\", or \"Do not touch me to wake me up as it startlesme\".  Outcome: Not Progressing  Goal: Absence of Hospital-Acquired Illness or Injury  Outcome: Not Progressing  Intervention: Identify and Manage Fall Risk  Recent Flowsheet Documentation  Taken 3/29/2025 1030 by Mckenzie Sparks RN  Safety Promotion/Fall Prevention:   activity supervised   clutter free environment maintained   room near " nurse's station   room organization consistent   safety round/check completed   supervised activity  Intervention: Prevent Skin Injury  Recent Flowsheet Documentation  Taken 3/29/2025 1243 by Mckenzie Sparks RN  Body Position:   turned   left   heels elevated   legs elevated   log-rolled   upper extremity elevated   weight shifting  Taken 3/29/2025 1030 by Mckenzie Sparks RN  Skin Protection:   adhesive use limited   skin to skin areas padded   transparent dressing maintained   tubing/devices free from skin contact  Intervention: Prevent and Manage VTE (Venous Thromboembolism) Risk  Recent Flowsheet Documentation  Taken 3/29/2025 1030 by Mckenzie Sparks RN  VTE Prevention/Management: SCDs on (sequential compression devices)  Intervention: Prevent Infection  Recent Flowsheet Documentation  Taken 3/29/2025 1030 by Mckenzie Sparks RN  Infection Prevention:   hand hygiene promoted   rest/sleep promoted   single patient room provided  Goal: Optimal Comfort and Wellbeing  Outcome: Not Progressing  Intervention: Provide Person-Centered Care  Recent Flowsheet Documentation  Taken 3/29/2025 1030 by Mckenzie Sparks RN  Trust Relationship/Rapport: care explained  Goal: Readiness for Transition of Care  Outcome: Not Progressing     Problem: Pain Acute  Goal: Optimal Pain Control and Function  Outcome: Not Progressing  Intervention: Prevent or Manage Pain  Recent Flowsheet Documentation  Taken 3/29/2025 1030 by Mckenzie Sparks RN  Medication Review/Management: medications reviewed

## 2025-03-31 LAB
GLUCOSE BLDC GLUCOMTR-MCNC: 167 MG/DL (ref 70–99)
GLUCOSE BLDC GLUCOMTR-MCNC: 188 MG/DL (ref 70–99)
GLUCOSE BLDC GLUCOMTR-MCNC: 194 MG/DL (ref 70–99)
GLUCOSE BLDC GLUCOMTR-MCNC: 209 MG/DL (ref 70–99)
GLUCOSE BLDC GLUCOMTR-MCNC: 226 MG/DL (ref 70–99)
GLUCOSE BLDC GLUCOMTR-MCNC: 243 MG/DL (ref 70–99)

## 2025-03-31 PROCEDURE — 99232 SBSQ HOSP IP/OBS MODERATE 35: CPT | Performed by: HOSPITALIST

## 2025-03-31 PROCEDURE — 250N000011 HC RX IP 250 OP 636: Performed by: INTERNAL MEDICINE

## 2025-03-31 PROCEDURE — 120N000001 HC R&B MED SURG/OB

## 2025-03-31 PROCEDURE — 250N000013 HC RX MED GY IP 250 OP 250 PS 637: Performed by: INTERNAL MEDICINE

## 2025-03-31 PROCEDURE — 250N000011 HC RX IP 250 OP 636: Performed by: STUDENT IN AN ORGANIZED HEALTH CARE EDUCATION/TRAINING PROGRAM

## 2025-03-31 RX ADMIN — INSULIN ASPART 2 UNITS: 100 INJECTION, SOLUTION INTRAVENOUS; SUBCUTANEOUS at 09:41

## 2025-03-31 RX ADMIN — AMLODIPINE BESYLATE 10 MG: 10 TABLET ORAL at 09:47

## 2025-03-31 RX ADMIN — FUROSEMIDE 80 MG: 10 SOLUTION ORAL at 16:30

## 2025-03-31 RX ADMIN — PROCHLORPERAZINE EDISYLATE 5 MG: 5 INJECTION INTRAMUSCULAR; INTRAVENOUS at 21:25

## 2025-03-31 RX ADMIN — ACETAMINOPHEN 650 MG: 160 SOLUTION ORAL at 16:30

## 2025-03-31 RX ADMIN — FUROSEMIDE 80 MG: 10 SOLUTION ORAL at 09:46

## 2025-03-31 RX ADMIN — ONDANSETRON 4 MG: 2 INJECTION, SOLUTION INTRAMUSCULAR; INTRAVENOUS at 09:36

## 2025-03-31 RX ADMIN — INSULIN ASPART 2 UNITS: 100 INJECTION, SOLUTION INTRAVENOUS; SUBCUTANEOUS at 04:33

## 2025-03-31 RX ADMIN — ONDANSETRON 4 MG: 2 INJECTION, SOLUTION INTRAMUSCULAR; INTRAVENOUS at 16:21

## 2025-03-31 RX ADMIN — DOXAZOSIN 2 MG: 1 TABLET ORAL at 09:47

## 2025-03-31 RX ADMIN — CLOPIDOGREL BISULFATE 75 MG: 75 TABLET ORAL at 09:47

## 2025-03-31 RX ADMIN — INSULIN ASPART 1 UNITS: 100 INJECTION, SOLUTION INTRAVENOUS; SUBCUTANEOUS at 20:36

## 2025-03-31 RX ADMIN — INSULIN ASPART 1 UNITS: 100 INJECTION, SOLUTION INTRAVENOUS; SUBCUTANEOUS at 16:36

## 2025-03-31 RX ADMIN — INSULIN ASPART 2 UNITS: 100 INJECTION, SOLUTION INTRAVENOUS; SUBCUTANEOUS at 13:02

## 2025-03-31 RX ADMIN — INSULIN ASPART 3 UNITS: 100 INJECTION, SOLUTION INTRAVENOUS; SUBCUTANEOUS at 00:40

## 2025-03-31 RX ADMIN — ACETAMINOPHEN 650 MG: 160 SOLUTION ORAL at 09:47

## 2025-03-31 RX ADMIN — ACETAMINOPHEN 650 MG: 160 SOLUTION ORAL at 00:38

## 2025-03-31 ASSESSMENT — ACTIVITIES OF DAILY LIVING (ADL)
ADLS_ACUITY_SCORE: 103
ADLS_ACUITY_SCORE: 99
ADLS_ACUITY_SCORE: 103
ADLS_ACUITY_SCORE: 99
ADLS_ACUITY_SCORE: 103

## 2025-03-31 NOTE — PLAN OF CARE
"Vss ex SBP in the 160s prior to meds, no co pain/cp/sob/emesis (PRN zofran given prior to essential meds this am), O2 removed and tolerateing RA with sats in the upper 90s%. , 194. DNR/DNI, turn and reposition every two hours as pt will allow, chronic ordoñez in place, aspiration precautions maintained with suction equipment set up at bedside ready to go. Pt has been somnolent and not responding, which (according to family) is his new normal recently. Gtube with 60ml flushes Q4 hours, NPO, TF on hold for now pending goals of care conference with family, SH seen pt/family to discuss situation and offer support (see note for details). Continue poc and monitoring.       Goal Outcome Evaluation:      Plan of Care Reviewed With: family    Overall Patient Progress: no changeOverall Patient Progress: no change    Outcome Evaluation: non-verbal, TF on hold, zofran given as pre-med with scheduled meds this am, suciton at bedside, no emesis this shift, scheduled tylenol given      Problem: Adult Inpatient Plan of Care  Goal: Plan of Care Review  Description: The Plan of Care Review/Shift note should be completed every shift.  The Outcome Evaluation is a brief statement about your assessment that the patient is improving, declining, or no change.  This information will be displayed automatically on your shiftnote.  Outcome: Not Progressing  Flowsheets (Taken 3/31/2025 1343)  Outcome Evaluation: non-verbal, TF on hold, zofran given as pre-med with scheduled meds this am, suciton at bedside, no emesis this shift, scheduled tylenol given  Plan of Care Reviewed With: family  Overall Patient Progress: no change  Goal: Patient-Specific Goal (Individualized)  Description: You can add care plan individualizations to a care plan. Examples of Individualization might be:  \"Parent requests to be called daily at 9am for status\", \"I have a hard time hearing out of my right ear\", or \"Do not touch me to wake me up as it " "startlesme\".  Outcome: Not Progressing  Goal: Absence of Hospital-Acquired Illness or Injury  Outcome: Not Progressing  Intervention: Identify and Manage Fall Risk  Recent Flowsheet Documentation  Taken 3/31/2025 1301 by Nallely Roque RN  Safety Promotion/Fall Prevention: safety round/check completed  Taken 3/31/2025 1227 by Nallely Roque RN  Safety Promotion/Fall Prevention: safety round/check completed  Taken 3/31/2025 1118 by Nallely Roque RN  Safety Promotion/Fall Prevention: safety round/check completed  Taken 3/31/2025 1004 by Nallely Roque RN  Safety Promotion/Fall Prevention: safety round/check completed  Taken 3/31/2025 0947 by Nallely Roque RN  Safety Promotion/Fall Prevention:   treat underlying cause   treat reversible contributory factors   supervised activity   safety round/check completed   room organization consistent   room near nurse's station   room door open   patient and family education   lighting adjusted   increase visualization of patient   increased rounding and observation   clutter free environment maintained   assistive device/personal items within reach  Taken 3/31/2025 0847 by Nallely Roque RN  Safety Promotion/Fall Prevention: safety round/check completed  Taken 3/31/2025 0822 by Nallely Roque RN  Safety Promotion/Fall Prevention: safety round/check completed  Taken 3/31/2025 0723 by Nallely Roque RN  Safety Promotion/Fall Prevention: safety round/check completed  Intervention: Prevent and Manage VTE (Venous Thromboembolism) Risk  Recent Flowsheet Documentation  Taken 3/31/2025 1004 by Nallely Roque RN  VTE Prevention/Management: (plavix)   SCDs off (sequential compression devices)   other (see comments)  Goal: Optimal Comfort and Wellbeing  Outcome: Not Progressing  Goal: Readiness for Transition of Care  Outcome: Not Progressing     Problem: Pain Acute  Goal: Optimal Pain Control and Function  Outcome: Progressing  Intervention: Prevent or Manage " Pain  Recent Flowsheet Documentation  Taken 3/31/2025 0947 by Nallely Roque, RN  Medication Review/Management:   medications reviewed   high-risk medications identified     Problem: Nausea and Vomiting  Goal: Nausea and Vomiting Relief  Outcome: Progressing  Intervention: Prevent and Manage Nausea and Vomiting  Recent Flowsheet Documentation  Taken 3/31/2025 1004 by Nallely Roque, RN  Nausea/Vomiting Interventions: (pre-meds)   antiemetic   other (see comments)

## 2025-03-31 NOTE — PROVIDER NOTIFICATION
MD paged: Pt vomiting again. Cannot give Zofran until 23:30. Can you order IV Compazine? Also, do you want me to hold PEG tube meds bc of emesis? Thanks.     Compazine ordered. Continue to hold TF. Okay to hold PEG meds while pt N/V. If symptoms improved w/ compazine, okay to give more critical meds via PEG.

## 2025-03-31 NOTE — CONSULTS
"SPIRITUAL HEALTH SERVICES - Consult Note  [facility] Med/Surg 3  Referral Source/Reason for Visit: [facility] consult.    Summary and Recommendations -  Pt Jimmy's daughter Wayne named the complex dynamics between pt's son and the family regarding pt's care plan.  She reported that Jimmy is \"culturally\" Zoroastrianism and not affiliated with a toshia community.  However, they asked for contact information for Antionette Hernandez.    Plan: Informed pt's family how they can request further  support.  This author and other chaplains remain available per pt/family request.     Mo Israel M.Div., [facility]  Staff     SHS available 24/7 for emergent requests/referrals, either by paging the on-call  or by entering an ASAP/STAT consult in [facility], which will also page the on-call .    Assessment    Saw pt Jimmy Otto per [facility] consult; staff requested  support for pt's family.  I was accompanied by  Intern Cecil Aaron.  Jimmy opened his eyes but did not respond to verbal cues.  His two daughters Wayne and Clemente and their families were at the bedside.    Patient/Family Understanding of Illness and Goals of Care - Clemente shared that the family is considering comfort care.    Distress and Loss - Wayne, however, named the complex dynamics between pt's son and the family regarding pt's care plan.    Strengths, Coping, and Resources - Wayne shared that Jimmy is  and has a son and two daughters.  They had hope to make decisions about Jimmy's care as a family, to include all four of them.    Meaning, Beliefs, and Spirituality - Wayne reported that Jimmy is culturally Zoroastrianism but not affiliated with a toshia community.  However, she asked for the contact information for Neela Tobin to request a monk to come and provide a chanting service.  Information was provided.   "

## 2025-03-31 NOTE — PROGRESS NOTES
Notified provider about indwelling ordoñez catheter discussed removal or continued need.    Did provider choose to remove indwelling ordoñez catheter? NO    Provider's ordoñez indication for keeping indwelling ordoñez catheter: Indication for continued use: Other - chronic indwelling ordoñez catheter with plan to discharge with catheter in place    Is there an order for indwelling ordoñez catheter? YES    *If there is a plan to keep ordoñez catheter in place at discharge daily notification with provider is not necessary, but please add a notation in the treatment team sticky note that the patient will be discharging with the catheter.

## 2025-03-31 NOTE — PLAN OF CARE
BP (!) 156/64 (BP Location: Left arm, Patient Position: Semi-Capellan's, Cuff Size: Adult Regular)   Pulse 50   Temp 99.8  F (37.7  C) (Axillary)   Resp 16   Wt 68.1 kg (150 lb 2.1 oz)   SpO2 94%   BMI 24.23 kg/m      1900 - 0700:     VS: Mild temp of 99.8. Scheduled tylenol given. BP elevated at times. SBP < 180. Bradycardic in 50s. Sating well on RA.   Pain: showing nonverbal indicators of pain prior to vomiting. Scheduled tylenol given.    Lines: PIV SL.  Cognitive: Lethargic, but restless at times.  Respiratory: LS dim. No increased Wob noted.   Cardiac: Bradycardic.  Peripheral neurovascular: Trace dependent edema.   GI: Incontinent BM 3/31. Loose and black. X1 Small emesis of bile. PRN compazine and Zofran given and most PEG tube meds held per MD.   : Felix in place,   Skin: See flowsheet for assessment.   Diet: NPO.  Activity: A2 w/ lift.  Labs: B, 234, 226.   Plan: Monitor for emesis. Suction at bedside. Plan for full family palliative care conference in coming days. Continue w/ POC.     Goal Outcome Evaluation:      Plan of Care Reviewed With: patient    Overall Patient Progress: decliningOverall Patient Progress: declining    Outcome Evaluation: Nonverbal indicators of pain noted prior to emesis. X1 emesis episode w/ bile. PEG tube meds held and IV compazine given.      Problem: Adult Inpatient Plan of Care  Goal: Plan of Care Review  Description: The Plan of Care Review/Shift note should be completed every shift.  The Outcome Evaluation is a brief statement about your assessment that the patient is improving, declining, or no change.  This information will be displayed automatically on your shift  note.  Recent Flowsheet Documentation  Taken 3/30/2025 2117 by Olivia Porter RN  Outcome Evaluation: Nonverbal indicators of pain noted prior to emesis. X1 emesis episode w/ bile. PEG tube meds held and IV compazine given.  Plan of Care Reviewed With: patient  Overall Patient Progress:  declining  Goal: Absence of Hospital-Acquired Illness or Injury  Intervention: Identify and Manage Fall Risk  Recent Flowsheet Documentation  Taken 3/30/2025 2046 by Olivia Porter RN  Safety Promotion/Fall Prevention:   activity supervised   clutter free environment maintained   increased rounding and observation   lighting adjusted   increase visualization of patient   patient and family education   room near nurse's station   room organization consistent   safety round/check completed   supervised activity   treat reversible contributory factors  Intervention: Prevent and Manage VTE (Venous Thromboembolism) Risk  Recent Flowsheet Documentation  Taken 3/30/2025 2046 by Olivia Porter RN  VTE Prevention/Management: (plavix)   SCDs off (sequential compression devices)   patient refused intervention  Intervention: Prevent Infection  Recent Flowsheet Documentation  Taken 3/30/2025 2046 by Olivia Porter RN  Infection Prevention:   hand hygiene promoted   rest/sleep promoted   single patient room provided  Goal: Optimal Comfort and Wellbeing  Intervention: Monitor Pain and Promote Comfort  Recent Flowsheet Documentation  Taken 3/30/2025 2046 by Olivia Porter RN  Pain Management Interventions: (family declined. MD paged for nauea meds)   repositioned   medication offered but refused     Problem: Adult Inpatient Plan of Care  Goal: Plan of Care Review  Description: The Plan of Care Review/Shift note should be completed every shift.  The Outcome Evaluation is a brief statement about your assessment that the patient is improving, declining, or no change.  This information will be displayed automatically on your shiftnote.  Recent Flowsheet Documentation  Taken 3/30/2025 2117 by Olivia Porter, RN  Outcome Evaluation: Nonverbal indicators of pain noted prior to emesis. X1 emesis episode w/ bile. PEG tube meds held and IV compazine given.  Plan of Care Reviewed With: patient  Overall Patient Progress:  declining  Goal: Absence of Hospital-Acquired Illness or Injury  Intervention: Identify and Manage Fall Risk  Recent Flowsheet Documentation  Taken 3/30/2025 2046 by Olivia Porter RN  Safety Promotion/Fall Prevention:   activity supervised   clutter free environment maintained   increased rounding and observation   lighting adjusted   increase visualization of patient   patient and family education   room near nurse's station   room organization consistent   safety round/check completed   supervised activity   treat reversible contributory factors  Intervention: Prevent and Manage VTE (Venous Thromboembolism) Risk  Recent Flowsheet Documentation  Taken 3/30/2025 2046 by Olivia Porter RN  VTE Prevention/Management: (plavix)   SCDs off (sequential compression devices)   patient refused intervention  Intervention: Prevent Infection  Recent Flowsheet Documentation  Taken 3/30/2025 2046 by Olivia Porter RN  Infection Prevention:   hand hygiene promoted   rest/sleep promoted   single patient room provided  Goal: Optimal Comfort and Wellbeing  Intervention: Monitor Pain and Promote Comfort  Recent Flowsheet Documentation  Taken 3/30/2025 2046 by Olivia Porter RN  Pain Management Interventions: (family declined. MD paged for nanormana meds)   repositioned   medication offered but refused     Problem: Adult Inpatient Plan of Care  Goal: Plan of Care Review  Description: The Plan of Care Review/Shift note should be completed every shift.  The Outcome Evaluation is a brief statement about your assessment that the patient is improving, declining, or no change.  This information will be displayed automatically on your shiftnote.  Outcome: Not Progressing  Flowsheets (Taken 3/30/2025 2117)  Outcome Evaluation: Nonverbal indicators of pain noted prior to emesis. X1 emesis episode w/ bile. PEG tube meds held and IV compazine given.  Plan of Care Reviewed With: patient  Overall Patient Progress: declining  Goal:  "Patient-Specific Goal (Individualized)  Description: You can add care plan individualizations to a care plan. Examples of Individualization might be:  \"Parent requests to be called daily at 9am for status\", \"I have a hard time hearing out of my right ear\", or \"Do not touch me to wake me up as it startlesme\".  Outcome: Not Progressing  Goal: Absence of Hospital-Acquired Illness or Injury  Outcome: Not Progressing  Intervention: Identify and Manage Fall Risk  Recent Flowsheet Documentation  Taken 3/30/2025 2046 by Olivia Porter RN  Safety Promotion/Fall Prevention:   activity supervised   clutter free environment maintained   increased rounding and observation   lighting adjusted   increase visualization of patient   patient and family education   room near nurse's station   room organization consistent   safety round/check completed   supervised activity   treat reversible contributory factors  Intervention: Prevent and Manage VTE (Venous Thromboembolism) Risk  Recent Flowsheet Documentation  Taken 3/30/2025 2046 by Olivia Porter RN  VTE Prevention/Management: (plavix)   SCDs off (sequential compression devices)   patient refused intervention  Intervention: Prevent Infection  Recent Flowsheet Documentation  Taken 3/30/2025 2046 by Olivia Porter RN  Infection Prevention:   hand hygiene promoted   rest/sleep promoted   single patient room provided  Goal: Optimal Comfort and Wellbeing  Outcome: Not Progressing  Intervention: Monitor Pain and Promote Comfort  Recent Flowsheet Documentation  Taken 3/30/2025 2046 by Olviia Porter RN  Pain Management Interventions: (family declined. MD paged for nauea meds)   repositioned   medication offered but refused  Goal: Readiness for Transition of Care  Outcome: Not Progressing     Problem: Delirium  Goal: Improved Behavioral Control  Intervention: Minimize Safety Risk  Recent Flowsheet Documentation  Taken 3/30/2025 2046 by Olivia Porter, RN  Enhanced Safety " Measures:   family to remain at bedside   pain management   monitor patients coagulation values     Problem: Dysrhythmia  Goal: Normalized Cardiac Rhythm  Intervention: Monitor and Manage Cardiac Rhythm Effect  Recent Flowsheet Documentation  Taken 3/30/2025 2046 by Olivia Porter RN  VTE Prevention/Management: (plavix)   SCDs off (sequential compression devices)   patient refused intervention     Problem: Comorbidity Management  Goal: Maintenance of Asthma Control  Intervention: Maintain Asthma Symptom Control  Recent Flowsheet Documentation  Taken 3/30/2025 2046 by Olivia Porter RN  Medication Review/Management: medications reviewed  Goal: Maintenance of Behavioral Health Symptom Control  Intervention: Maintain Behavioral Health Symptom Control  Recent Flowsheet Documentation  Taken 3/30/2025 2046 by Olivia Porter RN  Medication Review/Management: medications reviewed  Goal: Maintenance of COPD Symptom Control  Intervention: Maintain COPD Symptom Control  Recent Flowsheet Documentation  Taken 3/30/2025 2046 by Olivia Porter RN  Medication Review/Management: medications reviewed  Goal: Blood Glucose Levels Within Targeted Range  Intervention: Monitor and Manage Glycemia  Recent Flowsheet Documentation  Taken 3/30/2025 2046 by Olivia Porter RN  Medication Review/Management: medications reviewed  Goal: Maintenance of Heart Failure Symptom Control  Intervention: Maintain Heart Failure Management  Recent Flowsheet Documentation  Taken 3/30/2025 2046 by Olivia Porter RN  Medication Review/Management: medications reviewed  Goal: Blood Pressure in Desired Range  Intervention: Maintain Blood Pressure Management  Recent Flowsheet Documentation  Taken 3/30/2025 2046 by Olivia Porter RN  Medication Review/Management: medications reviewed  Goal: Maintenance of Osteoarthritis Symptom Control  Intervention: Maintain Osteoarthritis Symptom Control  Recent Flowsheet Documentation  Taken 3/30/2025 2046 by  Olivia Porter RN  Medication Review/Management: medications reviewed  Goal: Bariatric Home Regimen Maintained  Intervention: Maintain and Manage Postbariatric Surgery Care  Recent Flowsheet Documentation  Taken 3/30/2025 2046 by Olivia Porter RN  Medication Review/Management: medications reviewed  Goal: Maintenance of Seizure Control  Intervention: Maintain Seizure Symptom Control  Recent Flowsheet Documentation  Taken 3/30/2025 2046 by Olivia Porter RN  Medication Review/Management: medications reviewed     Problem: Pain Acute  Goal: Optimal Pain Control and Function  Intervention: Develop Pain Management Plan  Recent Flowsheet Documentation  Taken 3/30/2025 2046 by Olivia Porter RN  Pain Management Interventions: (family declined. MD paged for nauea meds)   repositioned   medication offered but refused  Intervention: Prevent or Manage Pain  Recent Flowsheet Documentation  Taken 3/30/2025 2046 by Olivia Porter RN  Medication Review/Management: medications reviewed     Problem: Pain Acute  Goal: Optimal Pain Control and Function  3/30/2025 2117 by Olivia Porter, RN  Outcome: Progressing  3/30/2025 2116 by Olivia Porter RN  Outcome: Not Progressing  Intervention: Develop Pain Management Plan  Recent Flowsheet Documentation  Taken 3/30/2025 2046 by Olivia Porter RN  Pain Management Interventions: (family declined. MD paged for nauea meds)   repositioned   medication offered but refused  Intervention: Prevent or Manage Pain  Recent Flowsheet Documentation  Taken 3/30/2025 2046 by Olivia Porter RN  Medication Review/Management: medications reviewed     Problem: Fall Injury Risk  Goal: Absence of Fall and Fall-Related Injury  Intervention: Identify and Manage Contributors  Recent Flowsheet Documentation  Taken 3/30/2025 2046 by Olivia Porter RN  Medication Review/Management: medications reviewed  Intervention: Promote Injury-Free Environment  Recent Flowsheet Documentation  Taken 3/30/2025  2046 by Olivia Porter, RN  Safety Promotion/Fall Prevention:   activity supervised   clutter free environment maintained   increased rounding and observation   lighting adjusted   increase visualization of patient   patient and family education   room near nurse's station   room organization consistent   safety round/check completed   supervised activity   treat reversible contributory factors     Problem: Fall Injury Risk  Goal: Absence of Fall and Fall-Related Injury  Intervention: Identify and Manage Contributors  Recent Flowsheet Documentation  Taken 3/30/2025 2046 by Olivia Porter RN  Medication Review/Management: medications reviewed  Intervention: Promote Injury-Free Environment  Recent Flowsheet Documentation  Taken 3/30/2025 2046 by Olivia Porter, RN  Safety Promotion/Fall Prevention:   activity supervised   clutter free environment maintained   increased rounding and observation   lighting adjusted   increase visualization of patient   patient and family education   room near nurse's station   room organization consistent   safety round/check completed   supervised activity   treat reversible contributory factors     Problem: Nausea and Vomiting  Goal: Nausea and Vomiting Relief  Outcome: Not Progressing  Intervention: Prevent and Manage Nausea and Vomiting  Recent Flowsheet Documentation  Taken 3/30/2025 2046 by Olivia Porter RN  Nausea/Vomiting Interventions: (MD paged) stimuli minimized

## 2025-03-31 NOTE — PROGRESS NOTES
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       The patient's daughter states that patient has had some vomiting last week.  He was having increased shortness of breath.  His wife noted that his oxygen saturation was low.  He was brought to emergency room for evaluation.     Initial vital signs showed temperature 97.5, pulse 46, blood pressure 148/61, oxygen saturation 95% on room air.  Laboratory workup showed sodium 128, potassium 5.8, creatinine 5.67, ALT 36, AST 31, troponin T high-sensitivity 103.  WBC 6.5, hemoglobin 8.9, platelets 164.  Venous blood gas showed pH 7.44/pCO2 44, pO2 82.5.  Chest x-ray showed large right pleural effusion with small to moderate left pleural effusion.  Bibasilar consolidation, patchy infiltrate in the central aspect of both lungs progressed on the left and improved on the right.  Felix catheter within decompressed urinary bladder.  Anasarca has progressed.     Nephrology was consulted from emergency room and recommended Lasix 100 mg IV.  He was also given calcium gluconate, Lokelma 10 g per G-tube.  He was also given a dose of IV ceftriaxone and azithromycin.  He was admitted to the hospital for further management.     He remains on 5 L of oxygen.  Interventional radiology performed a right-sided thoracentesis.  He continues on IV diuresis and nephrology was formally  consulted.  Potassium and heart rate have improved.  Palliative care met with family to discuss goals of care and possible enrollment into hospice given his poor functional status, elderly age, worsening renal function without being a dialysis candidate, bradycardia without being a pacemaker candidate, and gradual deterioration with no truly reversible process. They understand his poor prognosis and a care conference was held with the family on 3/18 to discuss all of the above, with attendees including hospitalist, palliative care, and nephrology.  Nephrology reiterated during the care conference that the patient is a poor dialysis candidate given his multiple comorbidities     Update on 3/28:   No Change in the medical management or issues.  Pt was about to be discharged on 3/27 to home with home cares however he was noted to have worsening bradycardia.  He has remained lethargic.  He was evaluated by palliative and appeared to be in the process of active dying so discharge was canceled.  My colleague poke with patient's family including patient's son Sathya and her daughter Clemente.  They understand patient's terminal trajectory. Son Sathya has been hesitant to agree on putting patient on comfort cares.  I extensively explained to both son and daughter that current medical cares are only prolonging the process and probably in the end will not provide much benefit as his underlying conditions are a irreversible.  I recommended comfort cares.  I then explained that with comfort cares we will be stopping the medications, Lasix can be continued if it looks like that this is aiding in comfort, tube feeds will be stopped.  Family is a still talking amongst themselves to come up with a decision/plan.      Update 3/29:   Had a repeat discussion with family members at bedside today regarding the fact that the patient is actively dying, patient's son reported that there are some differing opinions amongst family members about  how to proceed and there are some cultural barriers involved with this. we had an at length discussion regarding the low utility of any particular lab study to indicate how much longer he has to live, we reviewed that no such test exists.  However given his long list of medical problems and comorbidities and the inability to treat some of these, his trajectory is unlikely to change.  I strongly encouraged to transition to comfort focused cares and he expressed that it would be nice to get the patient home and I agree.  The plan will be to monitor the patient overnight in the hospital so an uncle can visit on 3/30 and if the patient is stable enough, we will consider transfer to home on comfort cares.  Family understood and agreed to this plan.  We will monitor him closely overnight and call patients family if he acutely declines.  All questions were answered     Update 3/30:   Patient had multiple episodes of emesis, tube feeds were stopped, insulin was adjusted.  At this time we had another at length discussion with the family regarding course of action.  Receiving calls from the daughters that they are concerned that the son is not communicating the plan well, meanwhile the son is at bedside and reporting back/communicating the plan to his mother/patient's wife.  Multiple family members have been traveling and it would be really helpful if they were to come to the hospital to see the patient status and discuss plan as a family.  It is clear at this time that we need to have a full family goals of care discussion and have everyone relevant to the conversation present in some form.  Will appreciate palliative's assistance in coordinating this in the coming days.  At this time plan for no escalation of cares but will continue plan as outlined below.  Son was updated at bedside today, plan otherwise as outlined below. We will monitor him closely overnight and call patients family if he acutely declines.     Update  3/31  Holding enteral nutrition after vomiting and high risk of aspiration, Talked to his daughter who has a clear understanding of current situation and poor prognosis, with continuous decline. Meeting with palliative is pending, apparently set for tomorrow midmorning by palliative team       Problem list:       Goals of care  The patient is chronically ill and essentially full cares as a result of a prior massive stroke.  He has chronic dysphagia and receives tube feeds via a PEG.  He is essentially bedbound and has residual right-sided hemiplegia and nonverbal status.  He is now faced with rapidly progressing chronic kidney disease leading to concerning electrolyte abnormalities, hypervolemia, and bradycardia.  He is an extremely poor dialysis candidate and dialysis has not been offered by nephrology.  Care conference was held on 3/18 with palliative care, nephrology, and this hospitalist.  Goals of care discussed.  Hospice being considered by family but formal decision has not yet been made  Per one of the daughters, she says that all the family members including the daughters and patient's spouse are interested in looking at comfort measures, but the patient's son has a different viewpoint on this from the rest of the family.  Plan to wait until the uncle arrives on 3/30 and having ongoing discussions, no plans for aggressive escalation of care, if patient starts to decline family is to be called right away.      Acute hypoxic respiratory failure likely multifactorial including volume overload due to acute on chronic diastolic CHF exacerbation---improving  Bilateral pleural effusions, right > left  Patient was on Lasix 20 mg daily at home.  Per family he had decreased urine output and increased shortness of breath.  Chest x-ray showed large right pleural effusion, moderate left pleural effusion.  Received IV diuresis in the hospital and then transition to p.o.  Appreciate nephrology assistance.  -Continue oral  Lasix 80 mg twice a day  -s/p R sided thoracentesis with 900 ml removed earlier in hospital stay   -AHRF overall improved now requiring only 0.5 to 1 L of nasal cannula.      Acute toxic metabolic encephalopathy  Per patient's son Sathya usually he is awake and alert and can show thumbs up or down when ask questions.  Lately patient has been more lethargic.  Suspect this is likely due to uremia.  Also has history of CVA.    -No other source of encephalopathy identified during hospital stay, likely related to the chronic illness and various comorbidities, will continue to monitor     Hyperkalemia-resolved  Hypokalemia  ALEX on CKD stage V  Nephrotic range proteinuria  Anasarca  Hyponatremia  Hypercalcemia   -Creatinine 1 year ago was near 1  -Most recent creatinine 4.07 on 2/26/2025.  Creatinine 5.67 on admission and potassium 5.8.  He was previously seen by nephrology and his daughter indicates that he was determined not to be a dialysis candidate.  Potassium has improved with shifting agents and Lokelma.  Creatinine has been steadily climbing over the past several weeks.  -Initially hyperkalemic.  Now hypokalemic and requiring potassium replacement.  -Appreciate nephrology input.  As per nephrology not a dialysis candidate  -completed IV diuresis; now on PO diuretic for symptom management  -Felix in place and tracking I's and O's.      Bradycardia  Junctional rhythm  This was an issue during his recent hospitalization as well.  There may be some metabolic component but I do worry about some underlying conduction disease.  Poor candidate for pacemaker due to the issues outlined above.  -telemetry discontinued earlier in hospital stay     Demand ischemia  Patient had no evidence of chest pain. Troponin has been elevated during prior evaluation on 2/26/2025.  Troponin 103 on admission and flat, not consistent with acute coronary syndrome.  Likely demand ischemia from respiratory failure with poor enzyme clearance in the  face of CKD.    -No further workup      Diabetes mellitus type 2  He was on Lantus insulin 14 units every morning, insulin sliding scale PTA.  -Insulin was increased to 18 units this hospitalization.  Will continue medium intensity sliding scale insulin  -Blood glucose is rising however given his tenuous state do not want to increase at this time given risk of hypoglycemia, if blood glucose continues to rise will adjust on 3/30     History of CVA with residual right-sided hemiplegia and baseline nonverbal status  Chronic dysphagia, status post PEG tube placement, G-tube exchange on 2/24/2025  S/p indwelling Ordoñez catheter  Dyslipidemia  Hypertension  Patient resides at home and family takes care of him.  He also has a visiting nurse.  They appear to provide excellent care and support for him.  Patient's son Sathya stated that patient's spouse who is also ~ 70 years old was taking care of patient but now getting overwhelmed and would appreciate further assistance in placement.  Family now decided to take patient back home with home cares.  -PTA medications including amlodipine and atorvastatin and Plavix  -RD consulted to facilitate tube feeds.     Anemia and chronic kidney disease  -PTA ferrous sulfate.     Depression  -PTA Prozac.    Diet: Adult Formula Drip Feeding: Continuous Nepro with Carbsteady; Gastrostomy; Goal Rate: 30; mL/hr; If MD okay with restarting TF, restart at 15 mL/hr x 8 hours. If tolerating advance to goal rate.    DVT Prophylaxis: Pneumatic Compression Devices  Ordoñez Catheter: PRESENT, indication: Chronic ordoñez, Chronic ordoñez  Lines: None     Cardiac Monitoring: None  Code Status: No CPR- Do NOT Intubate      Clinically Significant Risk Factors               # Hypoalbuminemia: Lowest albumin = 3.3 g/dL at 3/15/2025 11:50 AM, will monitor as appropriate     # Hypertension: Noted on problem list  # Chronic heart failure with preserved ejection fraction: heart failure noted on problem list and last  echo with EF >50%          # DMII: A1C = 8.8 % (Ref range: <5.7 %) within past 6 months       # Financial/Environmental Concerns:           Social Drivers of Health    Interpersonal Safety: Unknown (3/15/2025)    Interpersonal Safety     Do you feel physically and emotionally safe where you currently live?: Patient unable to answer     Within the past 12 months, have you been hit, slapped, kicked or otherwise physically hurt by someone?: Patient unable to answer     Within the past 12 months, have you been humiliated or emotionally abused in other ways by your partner or ex-partner?: Patient unable to answer          Disposition Plan     Medically Ready for Discharge: Anticipated in 2-4 Days             Suhas Graham MD  Hospitalist Service  [facility]  Securely message with [employer] (more info)  Text page via [facility] Paging/Directory   ______________________________________________________________________    Interval History   Slept, relax, NAD. Talked to his daughter who has a clear understanding of current situation and poor prognosis with continuous decline. Meeting with palliative is pending, apparently set for tomorrow midmorning by palliative team     Physical Exam   Vital Signs: Temp: 98.1  F (36.7  C) Temp src: Axillary BP: (!) 150/57 Pulse: 80   Resp: 20 SpO2: 98 % O2 Device: Nasal cannula Oxygen Delivery: 1/2 LPM  Weight: 150 lbs 2.13 oz    Constitutional: Lethargic, no apparent distress, and appears stated age  Respiratory: No increased work of breathing, good air exchange, clear to auscultation bilaterally, no crackles or wheezing  Cardiovascular: Regular rate and rhythm, normal S1 and S2, no S3 or S4, and no murmur noted    Medical Decision Making       49 MINUTES SPENT BY ME on the date of service doing chart review, history, exam, documentation & further activities per the note.      Data         Imaging results reviewed over the past 24 hrs:   No results found for this or any  previous visit (from the past 24 hours).

## 2025-03-31 NOTE — PLAN OF CARE
"Orientation: ZAYDA  Pain: Controlled with scheduled tylenol   Neuro: WDL  Resp: Shallow respirations, on 1/2L for pt comfort  Cardiac: WDL  GI/: Chronic ordoñez in place, incontinent of Bms, G tube, Q4 BGs  Skin/Musculoskeletal: Significantly impaired  Activity: Lift, repositions Q2  Diet: NPO, Tube feeds held until care conference tomorrow AM due to nausea/vomiting, Q4 60mL water flushes  Lines: PIV  Consults: palliative   Discharge planning: TBD    PRN zofran given prior to essential meds at start of shift.  Pt was awake and moving at start of shift, tylenol given, repositioned, and placed on 1/2L for comfort, satting at 99%.  Bgs Q4 in upper 100s.  Turned Q2.  60mL water flushes Q4. Compazine given later for additional nausea symptoms. TF on hold for now, care conference with family and palliative tomorrow at 10am.          Goal Outcome Evaluation:      Plan of Care Reviewed With: family    Overall Patient Progress: no changeOverall Patient Progress: no change    Outcome Evaluation: TF on hold until care conferance tomorrow at 10AM with palliative.  Zofran given for nausea.  Unnessesary medications held per family request.      Problem: Adult Inpatient Plan of Care  Goal: Plan of Care Review  Description: The Plan of Care Review/Shift note should be completed every shift.  The Outcome Evaluation is a brief statement about your assessment that the patient is improving, declining, or no change.  This information will be displayed automatically on your shiftnote.  Outcome: Not Progressing  Flowsheets (Taken 3/31/2025 4069)  Outcome Evaluation: TF on hold until care conferance tomorrow at 10AM with palliative.  Zofran given for nausea.  Unnessesary medications held per family request.  Plan of Care Reviewed With: family  Overall Patient Progress: no change  Goal: Patient-Specific Goal (Individualized)  Description: You can add care plan individualizations to a care plan. Examples of Individualization might be:  \"Parent " "requests to be called daily at 9am for status\", \"I have a hard time hearing out of my right ear\", or \"Do not touch me to wake me up as it startlesme\".  Outcome: Not Progressing  Goal: Absence of Hospital-Acquired Illness or Injury  Outcome: Not Progressing  Intervention: Identify and Manage Fall Risk  Recent Flowsheet Documentation  Taken 3/31/2025 1645 by Gwen Galvan RN  Safety Promotion/Fall Prevention:   activity supervised   nonskid shoes/slippers when out of bed   safety round/check completed   supervised activity  Intervention: Prevent Skin Injury  Recent Flowsheet Documentation  Taken 3/31/2025 1638 by Gwen Galvan RN  Body Position:   turned   right  Intervention: Prevent and Manage VTE (Venous Thromboembolism) Risk  Recent Flowsheet Documentation  Taken 3/31/2025 1645 by Gwen Galvan RN  VTE Prevention/Management: (Plavix) other (see comments)  Goal: Optimal Comfort and Wellbeing  Outcome: Not Progressing  Intervention: Monitor Pain and Promote Comfort  Recent Flowsheet Documentation  Taken 3/31/2025 1638 by Gwen Galvan RN  Pain Management Interventions:   repositioned   medication (see MAR)  Goal: Readiness for Transition of Care  Outcome: Not Progressing     Problem: Pain Acute  Goal: Optimal Pain Control and Function  Outcome: Progressing  Intervention: Develop Pain Management Plan  Recent Flowsheet Documentation  Taken 3/31/2025 1638 by Gwen Galvan RN  Pain Management Interventions:   repositioned   medication (see MAR)     Problem: Nausea and Vomiting  Goal: Nausea and Vomiting Relief  Outcome: Progressing  Intervention: Prevent and Manage Nausea and Vomiting  Recent Flowsheet Documentation  Taken 3/31/2025 1645 by Gwen Galvan RN  Nausea/Vomiting Interventions: antiemetic     Problem: Adult Inpatient Plan of Care  Goal: Plan of Care Review  Description: The Plan of Care Review/Shift note should be completed every shift.  " The Outcome Evaluation is a brief statement about your assessment that the patient is improving, declining, or no change.  This information will be displayed automatically on your shift  note.  Recent Flowsheet Documentation  Taken 3/31/2025 1808 by Gwen Galvan RN  Outcome Evaluation: TF on hold until care conferance tomorrow at 10AM with palliative.  Zofran given for nausea.  Unnessesary medications held per family request.  Plan of Care Reviewed With: family  Overall Patient Progress: no change  Goal: Absence of Hospital-Acquired Illness or Injury  Intervention: Identify and Manage Fall Risk  Recent Flowsheet Documentation  Taken 3/31/2025 1645 by Gwen Galvan RN  Safety Promotion/Fall Prevention:   activity supervised   nonskid shoes/slippers when out of bed   safety round/check completed   supervised activity  Intervention: Prevent Skin Injury  Recent Flowsheet Documentation  Taken 3/31/2025 1638 by Gwen Galvan RN  Body Position:   turned   right  Intervention: Prevent and Manage VTE (Venous Thromboembolism) Risk  Recent Flowsheet Documentation  Taken 3/31/2025 1645 by Gwen Galvan RN  VTE Prevention/Management: (Plavix) other (see comments)  Goal: Optimal Comfort and Wellbeing  Intervention: Monitor Pain and Promote Comfort  Recent Flowsheet Documentation  Taken 3/31/2025 1638 by Gwen Galvan RN  Pain Management Interventions:   repositioned   medication (see MAR)     Problem: Dysrhythmia  Goal: Normalized Cardiac Rhythm  Intervention: Monitor and Manage Cardiac Rhythm Effect  Recent Flowsheet Documentation  Taken 3/31/2025 1645 by Gwen Galvan RN  VTE Prevention/Management: (Plavix) other (see comments)     Problem: Pain Acute  Goal: Optimal Pain Control and Function  Intervention: Develop Pain Management Plan  Recent Flowsheet Documentation  Taken 3/31/2025 1638 by Gwen Galvan RN  Pain Management Interventions:    repositioned   medication (see MAR)     Problem: Fall Injury Risk  Goal: Absence of Fall and Fall-Related Injury  Intervention: Promote Injury-Free Environment  Recent Flowsheet Documentation  Taken 3/31/2025 1645 by Gwen Galvan RN  Safety Promotion/Fall Prevention:   activity supervised   nonskid shoes/slippers when out of bed   safety round/check completed   supervised activity     Problem: Gas Exchange Impaired  Goal: Optimal Gas Exchange  Intervention: Optimize Oxygenation and Ventilation  Recent Flowsheet Documentation  Taken 3/31/2025 1638 by Gwen Galvan RN  Head of Bed (HOB) Positioning: HOB at 20-30 degrees     Problem: Adult Inpatient Plan of Care  Goal: Plan of Care Review  Description: The Plan of Care Review/Shift note should be completed every shift.  The Outcome Evaluation is a brief statement about your assessment that the patient is improving, declining, or no change.  This information will be displayed automatically on your shiftnote.  Recent Flowsheet Documentation  Taken 3/31/2025 1808 by Gwen Galvan RN  Outcome Evaluation: TF on hold until care conferance tomorrow at 10AM with palliative.  Zofran given for nausea.  Unnessesary medications held per family request.  Plan of Care Reviewed With: family  Overall Patient Progress: no change  Goal: Absence of Hospital-Acquired Illness or Injury  Intervention: Identify and Manage Fall Risk  Recent Flowsheet Documentation  Taken 3/31/2025 1645 by Gwen Galvan RN  Safety Promotion/Fall Prevention:   activity supervised   nonskid shoes/slippers when out of bed   safety round/check completed   supervised activity  Intervention: Prevent Skin Injury  Recent Flowsheet Documentation  Taken 3/31/2025 1638 by Gwen Galvan RN  Body Position:   turned   right  Intervention: Prevent and Manage VTE (Venous Thromboembolism) Risk  Recent Flowsheet Documentation  Taken 3/31/2025 1645 by Mandy  Gwen, RN  VTE Prevention/Management: (Plavix) other (see comments)  Goal: Optimal Comfort and Wellbeing  Intervention: Monitor Pain and Promote Comfort  Recent Flowsheet Documentation  Taken 3/31/2025 1638 by Gwen Galvan, RN  Pain Management Interventions:   repositioned   medication (see MAR)     Problem: Fall Injury Risk  Goal: Absence of Fall and Fall-Related Injury  Intervention: Promote Injury-Free Environment  Recent Flowsheet Documentation  Taken 3/31/2025 1645 by Gwen Galvan, RN  Safety Promotion/Fall Prevention:   activity supervised   nonskid shoes/slippers when out of bed   safety round/check completed   supervised activity

## 2025-03-31 NOTE — PROVIDER NOTIFICATION
Discussed with dr bahgat the need to use a  for his communication with the pt's wife despite any family there willing to translate. Also, discussed TF being off d/t reports of pt emesis on previous shifts and the use of PRN zofran prior to meds this am. Md acknowledged the above.

## 2025-04-01 LAB
ANION GAP SERPL CALCULATED.3IONS-SCNC: 12 MMOL/L (ref 7–15)
BUN SERPL-MCNC: 139.8 MG/DL (ref 8–23)
CALCIUM SERPL-MCNC: 12.3 MG/DL (ref 8.8–10.4)
CHLORIDE SERPL-SCNC: 94 MMOL/L (ref 98–107)
CREAT SERPL-MCNC: 6.35 MG/DL (ref 0.67–1.17)
EGFRCR SERPLBLD CKD-EPI 2021: 9 ML/MIN/1.73M2
GLUCOSE BLDC GLUCOMTR-MCNC: 181 MG/DL (ref 70–99)
GLUCOSE BLDC GLUCOMTR-MCNC: 188 MG/DL (ref 70–99)
GLUCOSE BLDC GLUCOMTR-MCNC: 198 MG/DL (ref 70–99)
GLUCOSE BLDC GLUCOMTR-MCNC: 229 MG/DL (ref 70–99)
GLUCOSE BLDC GLUCOMTR-MCNC: 232 MG/DL (ref 70–99)
GLUCOSE BLDC GLUCOMTR-MCNC: 240 MG/DL (ref 70–99)
GLUCOSE SERPL-MCNC: 241 MG/DL (ref 70–99)
HCO3 SERPL-SCNC: 35 MMOL/L (ref 22–29)
POTASSIUM SERPL-SCNC: 3.2 MMOL/L (ref 3.4–5.3)
SODIUM SERPL-SCNC: 141 MMOL/L (ref 135–145)

## 2025-04-01 PROCEDURE — 250N000013 HC RX MED GY IP 250 OP 250 PS 637: Performed by: INTERNAL MEDICINE

## 2025-04-01 PROCEDURE — 36415 COLL VENOUS BLD VENIPUNCTURE: CPT | Performed by: HOSPITALIST

## 2025-04-01 PROCEDURE — 99233 SBSQ HOSP IP/OBS HIGH 50: CPT | Performed by: NURSE PRACTITIONER

## 2025-04-01 PROCEDURE — 99232 SBSQ HOSP IP/OBS MODERATE 35: CPT | Performed by: HOSPITALIST

## 2025-04-01 PROCEDURE — 120N000001 HC R&B MED SURG/OB

## 2025-04-01 PROCEDURE — 80048 BASIC METABOLIC PNL TOTAL CA: CPT | Performed by: HOSPITALIST

## 2025-04-01 PROCEDURE — 250N000011 HC RX IP 250 OP 636: Performed by: INTERNAL MEDICINE

## 2025-04-01 RX ORDER — DIPHENHYDRAMINE HYDROCHLORIDE AND LIDOCAINE HYDROCHLORIDE AND ALUMINUM HYDROXIDE AND MAGNESIUM HYDRO
10 KIT EVERY 6 HOURS PRN
Status: DISCONTINUED | OUTPATIENT
Start: 2025-04-01 | End: 2025-04-18 | Stop reason: HOSPADM

## 2025-04-01 RX ORDER — HYDROMORPHONE HYDROCHLORIDE 1 MG/ML
0.3 INJECTION, SOLUTION INTRAMUSCULAR; INTRAVENOUS; SUBCUTANEOUS EVERY 6 HOURS PRN
Status: DISCONTINUED | OUTPATIENT
Start: 2025-04-01 | End: 2025-04-02

## 2025-04-01 RX ADMIN — CLOPIDOGREL BISULFATE 75 MG: 75 TABLET ORAL at 07:57

## 2025-04-01 RX ADMIN — ONDANSETRON 4 MG: 2 INJECTION, SOLUTION INTRAMUSCULAR; INTRAVENOUS at 00:24

## 2025-04-01 RX ADMIN — DOXAZOSIN 2 MG: 1 TABLET ORAL at 07:57

## 2025-04-01 RX ADMIN — INSULIN ASPART 2 UNITS: 100 INJECTION, SOLUTION INTRAVENOUS; SUBCUTANEOUS at 04:07

## 2025-04-01 RX ADMIN — ONDANSETRON 4 MG: 2 INJECTION, SOLUTION INTRAMUSCULAR; INTRAVENOUS at 17:27

## 2025-04-01 RX ADMIN — ACETAMINOPHEN 650 MG: 160 SOLUTION ORAL at 17:33

## 2025-04-01 RX ADMIN — FUROSEMIDE 80 MG: 10 SOLUTION ORAL at 07:57

## 2025-04-01 RX ADMIN — FUROSEMIDE 80 MG: 10 SOLUTION ORAL at 17:33

## 2025-04-01 RX ADMIN — INSULIN ASPART 1 UNITS: 100 INJECTION, SOLUTION INTRAVENOUS; SUBCUTANEOUS at 12:12

## 2025-04-01 RX ADMIN — ACETAMINOPHEN 650 MG: 160 SOLUTION ORAL at 07:56

## 2025-04-01 RX ADMIN — AMLODIPINE BESYLATE 10 MG: 10 TABLET ORAL at 07:57

## 2025-04-01 RX ADMIN — ACETAMINOPHEN 650 MG: 160 SOLUTION ORAL at 00:52

## 2025-04-01 RX ADMIN — INSULIN ASPART 2 UNITS: 100 INJECTION, SOLUTION INTRAVENOUS; SUBCUTANEOUS at 09:04

## 2025-04-01 RX ADMIN — INSULIN ASPART 1 UNITS: 100 INJECTION, SOLUTION INTRAVENOUS; SUBCUTANEOUS at 00:25

## 2025-04-01 RX ADMIN — ONDANSETRON 4 MG: 2 INJECTION, SOLUTION INTRAMUSCULAR; INTRAVENOUS at 08:02

## 2025-04-01 RX ADMIN — INSULIN ASPART 3 UNITS: 100 INJECTION, SOLUTION INTRAVENOUS; SUBCUTANEOUS at 21:13

## 2025-04-01 ASSESSMENT — ACTIVITIES OF DAILY LIVING (ADL)
ADLS_ACUITY_SCORE: 103
ADLS_ACUITY_SCORE: 103
ADLS_ACUITY_SCORE: 99
ADLS_ACUITY_SCORE: 103
ADLS_ACUITY_SCORE: 99
ADLS_ACUITY_SCORE: 103
ADLS_ACUITY_SCORE: 99
ADLS_ACUITY_SCORE: 103
ADLS_ACUITY_SCORE: 99
ADLS_ACUITY_SCORE: 103
ADLS_ACUITY_SCORE: 99
ADLS_ACUITY_SCORE: 99
ADLS_ACUITY_SCORE: 103
ADLS_ACUITY_SCORE: 99
ADLS_ACUITY_SCORE: 103

## 2025-04-01 NOTE — PLAN OF CARE
BP (!) 146/66 (BP Location: Left arm)   Pulse 68   Temp 97.6  F (36.4  C) (Axillary)   Resp 15   Wt 67 kg (147 lb 11.3 oz)   SpO2 97%   BMI 23.84 kg/m      2300 - 0700:     VS: VSS on 1/2 L via NC.  Pain: No nonverbal indicators of pain noted. Scheduled tylenol given per MAR.   Lines: PIV SL.  Cognitive: Somnolent, but will occasionally open eyes. Not responding to commands or communication by staff or family.   Respiratory: LS dim. No SOB or FONTENOT noted.  Peripheral neurovascular: Trace dependent edema. PCDs on.  GI: Last BM 3/31. Passing flatus. PRN Zofran given prior to meds to help prevent emesis episodes.  : Felix in place.   Skin: See flowsheet for assessment.   Diet: NPO. TF held. Free water flushes 60 mL q4hr.   Activity: A2 w/ lift and turn and repo q2hr.   Labs: B, 198.   Plan: Family care conference w/ palliative planned for today at 10:00 am to guide goals of care. Continue w/ POC.     Goal Outcome Evaluation:      Plan of Care Reviewed With: patient    Overall Patient Progress: no changeOverall Patient Progress: no change    Outcome Evaluation: Less nonverbal indicators of pain noted. Continuing to given scheduled tylenol. PRN zofran given prior to meds to help prevent emesis episodes.      Problem: Adult Inpatient Plan of Care  Goal: Plan of Care Review  Description: The Plan of Care Review/Shift note should be completed every shift.  The Outcome Evaluation is a brief statement about your assessment that the patient is improving, declining, or no change.  This information will be displayed automatically on your shift  note.  Recent Flowsheet Documentation  Taken 2025 0233 by Olivia Porter, RN  Outcome Evaluation: Less nonverbal indicators of pain noted. Continuing to given scheduled tylenol. PRN zofran given prior to meds to help prevent emesis episodes.  Plan of Care Reviewed With: patient  Overall Patient Progress: no change  Goal: Absence of Hospital-Acquired Illness or  Injury  Intervention: Identify and Manage Fall Risk  Recent Flowsheet Documentation  Taken 4/1/2025 0031 by Olivia Porter RN  Safety Promotion/Fall Prevention: safety round/check completed  Intervention: Prevent Skin Injury  Recent Flowsheet Documentation  Taken 4/1/2025 0228 by Olivia Porter RN  Body Position:   turned   left  Taken 4/1/2025 0029 by Olivia Porter RN  Body Position:   turned   right  Intervention: Prevent and Manage VTE (Venous Thromboembolism) Risk  Recent Flowsheet Documentation  Taken 4/1/2025 0031 by Olivia Porter RN  VTE Prevention/Management: SCDs on (sequential compression devices)  Intervention: Prevent Infection  Recent Flowsheet Documentation  Taken 4/1/2025 0031 by Olivia Porter RN  Infection Prevention: single patient room provided     Problem: Adult Inpatient Plan of Care  Goal: Plan of Care Review  Description: The Plan of Care Review/Shift note should be completed every shift.  The Outcome Evaluation is a brief statement about your assessment that the patient is improving, declining, or no change.  This information will be displayed automatically on your shiftnote.  Recent Flowsheet Documentation  Taken 4/1/2025 0233 by Olivia Porter RN  Outcome Evaluation: Less nonverbal indicators of pain noted. Continuing to given scheduled tylenol. PRN zofran given prior to meds to help prevent emesis episodes.  Plan of Care Reviewed With: patient  Overall Patient Progress: no change  Goal: Absence of Hospital-Acquired Illness or Injury  Intervention: Identify and Manage Fall Risk  Recent Flowsheet Documentation  Taken 4/1/2025 0031 by Olivia Porter RN  Safety Promotion/Fall Prevention: safety round/check completed  Intervention: Prevent Skin Injury  Recent Flowsheet Documentation  Taken 4/1/2025 0228 by Olivia Porter RN  Body Position:   turned   left  Taken 4/1/2025 0029 by Olivia Porter RN  Body Position:   turned   right  Intervention: Prevent and Manage VTE (Venous  "Thromboembolism) Risk  Recent Flowsheet Documentation  Taken 4/1/2025 0031 by Olivia Porter RN  VTE Prevention/Management: SCDs on (sequential compression devices)  Intervention: Prevent Infection  Recent Flowsheet Documentation  Taken 4/1/2025 0031 by Olivia Porter RN  Infection Prevention: single patient room provided     Problem: Adult Inpatient Plan of Care  Goal: Plan of Care Review  Description: The Plan of Care Review/Shift note should be completed every shift.  The Outcome Evaluation is a brief statement about your assessment that the patient is improving, declining, or no change.  This information will be displayed automatically on your shiftnote.  Outcome: Progressing  Flowsheets (Taken 4/1/2025 0233)  Outcome Evaluation: Less nonverbal indicators of pain noted. Continuing to given scheduled tylenol. PRN zofran given prior to meds to help prevent emesis episodes.  Plan of Care Reviewed With: patient  Overall Patient Progress: no change  Goal: Patient-Specific Goal (Individualized)  Description: You can add care plan individualizations to a care plan. Examples of Individualization might be:  \"Parent requests to be called daily at 9am for status\", \"I have a hard time hearing out of my right ear\", or \"Do not touch me to wake me up as it startlesme\".  Outcome: Progressing  Goal: Absence of Hospital-Acquired Illness or Injury  Outcome: Progressing  Intervention: Identify and Manage Fall Risk  Recent Flowsheet Documentation  Taken 4/1/2025 0031 by Olivia Porter RN  Safety Promotion/Fall Prevention: safety round/check completed  Intervention: Prevent Skin Injury  Recent Flowsheet Documentation  Taken 4/1/2025 0228 by Olivia Porter RN  Body Position:   turned   left  Taken 4/1/2025 0029 by Olivia Porter RN  Body Position:   turned   right  Intervention: Prevent and Manage VTE (Venous Thromboembolism) Risk  Recent Flowsheet Documentation  Taken 4/1/2025 0031 by Olivia Porter RN  VTE " Prevention/Management: SCDs on (sequential compression devices)  Intervention: Prevent Infection  Recent Flowsheet Documentation  Taken 4/1/2025 0031 by Olivia Porter RN  Infection Prevention: single patient room provided  Goal: Optimal Comfort and Wellbeing  Outcome: Progressing  Goal: Readiness for Transition of Care  Outcome: Progressing     Problem: Delirium  Goal: Improved Behavioral Control  Intervention: Minimize Safety Risk  Recent Flowsheet Documentation  Taken 4/1/2025 0031 by Olivia Porter RN  Enhanced Safety Measures: review medications for side effects with activity     Problem: Dysrhythmia  Goal: Normalized Cardiac Rhythm  Intervention: Monitor and Manage Cardiac Rhythm Effect  Recent Flowsheet Documentation  Taken 4/1/2025 0031 by Olivia Porter RN  VTE Prevention/Management: SCDs on (sequential compression devices)     Problem: Comorbidity Management  Goal: Maintenance of Asthma Control  Intervention: Maintain Asthma Symptom Control  Recent Flowsheet Documentation  Taken 4/1/2025 0031 by Olivia Porter RN  Medication Review/Management: medications reviewed  Goal: Maintenance of Behavioral Health Symptom Control  Intervention: Maintain Behavioral Health Symptom Control  Recent Flowsheet Documentation  Taken 4/1/2025 0031 by Olivia Porter RN  Medication Review/Management: medications reviewed  Goal: Maintenance of COPD Symptom Control  Intervention: Maintain COPD Symptom Control  Recent Flowsheet Documentation  Taken 4/1/2025 0031 by Olivia Porter RN  Medication Review/Management: medications reviewed  Goal: Blood Glucose Levels Within Targeted Range  Intervention: Monitor and Manage Glycemia  Recent Flowsheet Documentation  Taken 4/1/2025 0031 by Olivia Porter RN  Medication Review/Management: medications reviewed  Goal: Maintenance of Heart Failure Symptom Control  Intervention: Maintain Heart Failure Management  Recent Flowsheet Documentation  Taken 4/1/2025 0031 by Olivia Porter  T, RN  Medication Review/Management: medications reviewed  Goal: Blood Pressure in Desired Range  Intervention: Maintain Blood Pressure Management  Recent Flowsheet Documentation  Taken 4/1/2025 0031 by Olivia Porter RN  Medication Review/Management: medications reviewed  Goal: Maintenance of Osteoarthritis Symptom Control  Intervention: Maintain Osteoarthritis Symptom Control  Recent Flowsheet Documentation  Taken 4/1/2025 0228 by Olivia Porter RN  Assistive Device Utilized: lift device  Activity Management: activity adjusted per tolerance  Taken 4/1/2025 0031 by Olivia Porter RN  Medication Review/Management: medications reviewed  Taken 4/1/2025 0029 by Olivia Porter RN  Assistive Device Utilized: lift device  Activity Management: activity adjusted per tolerance  Goal: Bariatric Home Regimen Maintained  Intervention: Maintain and Manage Postbariatric Surgery Care  Recent Flowsheet Documentation  Taken 4/1/2025 0031 by Olivia Porter RN  Medication Review/Management: medications reviewed  Goal: Maintenance of Seizure Control  Intervention: Maintain Seizure Symptom Control  Recent Flowsheet Documentation  Taken 4/1/2025 0031 by Olivia Porter RN  Medication Review/Management: medications reviewed     Problem: Pain Acute  Goal: Optimal Pain Control and Function  Intervention: Prevent or Manage Pain  Recent Flowsheet Documentation  Taken 4/1/2025 0031 by Olivia Porter RN  Medication Review/Management: medications reviewed     Problem: Pain Acute  Goal: Optimal Pain Control and Function  Outcome: Progressing  Intervention: Prevent or Manage Pain  Recent Flowsheet Documentation  Taken 4/1/2025 0031 by Olivia Porter RN  Medication Review/Management: medications reviewed     Problem: Fall Injury Risk  Goal: Absence of Fall and Fall-Related Injury  Intervention: Identify and Manage Contributors  Recent Flowsheet Documentation  Taken 4/1/2025 0031 by Olivia Porter RN  Medication Review/Management:  medications reviewed  Intervention: Promote Injury-Free Environment  Recent Flowsheet Documentation  Taken 4/1/2025 0031 by Olivia Porter, RN  Safety Promotion/Fall Prevention: safety round/check completed     Problem: Fall Injury Risk  Goal: Absence of Fall and Fall-Related Injury  Intervention: Identify and Manage Contributors  Recent Flowsheet Documentation  Taken 4/1/2025 0031 by Olivia Porter RN  Medication Review/Management: medications reviewed  Intervention: Promote Injury-Free Environment  Recent Flowsheet Documentation  Taken 4/1/2025 0031 by Olivia Porter, RN  Safety Promotion/Fall Prevention: safety round/check completed     Problem: Gas Exchange Impaired  Goal: Optimal Gas Exchange  Intervention: Optimize Oxygenation and Ventilation  Recent Flowsheet Documentation  Taken 4/1/2025 0228 by Olivia Porter RN  Head of Bed (HOB) Positioning: HOB at 30 degrees  Taken 4/1/2025 0029 by Olivia Porter RN  Head of Bed (HOB) Positioning: HOB at 20-30 degrees     Problem: Nausea and Vomiting  Goal: Nausea and Vomiting Relief  Outcome: Progressing  Intervention: Prevent and Manage Nausea and Vomiting  Recent Flowsheet Documentation  Taken 4/1/2025 0031 by Olivia Porter, RN  Nausea/Vomiting Interventions: antiemetic

## 2025-04-01 NOTE — PROGRESS NOTES
Care Management Follow Up    Length of Stay (days): 17    Expected Discharge Date: 04/03/2025     Concerns to be Addressed: discharge planning     Patient plan of care discussed at interdisciplinary rounds: Yes    Anticipated Discharge Disposition:                Anticipated Discharge Services:    Anticipated Discharge DME:      Patient/family educated on Medicare website which has current facility and service quality ratings:    Education Provided on the Discharge Plan:    Patient/Family in Agreement with the Plan:      Referrals Placed by CM/SW:    Private pay costs discussed: Not applicable    Discussed  Partnership in Safe Discharge Planning  document with patient/family: No     Handoff Completed: No, handoff not indicated or clinically appropriate    Additional Information:  SW provided check in with pt and daughters, who were present at bedside. Pt did open eyes briefly during visit.     Pt's daughters stated that their brother took their mother home to give her the time and space to process the earlier meeting with palliative and think through the options of care.     Pt's daughters do not have current questions or needs.     Next Steps: SW to follow for discharge planning.    JOSE ALEJANDRO Parsons, LGSW  [facility] Care Coordinator/ Med Surg [address] [place] [address]  [phone]

## 2025-04-01 NOTE — PLAN OF CARE
"Temp: 97.3  F (36.3  C) Temp src: Oral BP: 128/67 Pulse: 71   Resp: 14 SpO2: 100 % O2 Device: Nasal cannula Oxygen Delivery: 1/2 LPM     Assumed care 9813-0391:  Somnolent, opens eyes during cares intermittently. Squeezes hand to questions per daughters. 2A reposition. Tube feedings remain on hold. Per family, MD ok'd feeding small amounts of food from home, via G tube. Care conference held today, goals of care plans still pending. Chronic ordoñez in place. PRN IV zofran prior to G tube medications. No emesis today. Scheduled tylenol, appears comfortable. Continue plan of care.       Goal Outcome Evaluation:      Plan of Care Reviewed With: patient, family    Overall Patient Progress: no changeOverall Patient Progress: no change    Outcome Evaluation: PRN zofran. Care conference today.        Problem: Adult Inpatient Plan of Care  Goal: Plan of Care Review  Description: The Plan of Care Review/Shift note should be completed every shift.  The Outcome Evaluation is a brief statement about your assessment that the patient is improving, declining, or no change.  This information will be displayed automatically on your shiftnote.  Outcome: Not Progressing  Flowsheets (Taken 4/1/2025 1810)  Outcome Evaluation: PRN zofran. Care conference today.  Plan of Care Reviewed With:   patient   family  Overall Patient Progress: no change  Goal: Patient-Specific Goal (Individualized)  Description: You can add care plan individualizations to a care plan. Examples of Individualization might be:  \"Parent requests to be called daily at 9am for status\", \"I have a hard time hearing out of my right ear\", or \"Do not touch me to wake me up as it startlesme\".  Outcome: Not Progressing  Goal: Absence of Hospital-Acquired Illness or Injury  Outcome: Not Progressing  Goal: Optimal Comfort and Wellbeing  Outcome: Not Progressing  Goal: Readiness for Transition of Care  Outcome: Not Progressing     Problem: Pain Acute  Goal: Optimal Pain Control and " Function  Outcome: Not Progressing     Problem: Nausea and Vomiting  Goal: Nausea and Vomiting Relief  Outcome: Not Progressing

## 2025-04-01 NOTE — PLAN OF CARE
"Vss, no co pain/cp/sob/emesis (PRN zofran given prior to essential meds this am), O2 removed but ended up not tolerateing RA so placed back on 1/2L per NC. , 188. DNR/DNI, turn and reposition every two hours as pt will allow, chronic ordoñez in place, aspiration precautions maintained with suction equipment set up at bedside. Pt has been more alert today and opening eyes for short periods, Gtube with 60ml flushes Q4 hours, NPO, TF on hold for now pending goals of care conference with family which happened at 1000 (with  IPAD), pending [facility] from [facility] at this time. Continue poc and monitoring.      Goal Outcome Evaluation:      Plan of Care Reviewed With: family    Overall Patient Progress: no changeOverall Patient Progress: no change    Outcome Evaluation: no pain, no N/V pre-medicated with zofran prior to meds, abnormal labs      Problem: Adult Inpatient Plan of Care  Goal: Plan of Care Review  Description: The Plan of Care Review/Shift note should be completed every shift.  The Outcome Evaluation is a brief statement about your assessment that the patient is improving, declining, or no change.  This information will be displayed automatically on your shiftnote.  Outcome: Not Progressing  Flowsheets (Taken 4/1/2025 1343)  Outcome Evaluation: no pain, no N/V pre-medicated with zofran prior to meds, abnormal labs  Plan of Care Reviewed With: family  Overall Patient Progress: no change  Goal: Patient-Specific Goal (Individualized)  Description: You can add care plan individualizations to a care plan. Examples of Individualization might be:  \"Parent requests to be called daily at 9am for status\", \"I have a hard time hearing out of my right ear\", or \"Do not touch me to wake me up as it startlesme\".  Outcome: Not Progressing  Goal: Absence of Hospital-Acquired Illness or Injury  Outcome: Not Progressing  Intervention: Identify and Manage Fall Risk  Recent Flowsheet Documentation  Taken 4/1/2025 1310 by " Schweim, Nallely K, RN  Safety Promotion/Fall Prevention: safety round/check completed  Taken 4/1/2025 1212 by Nallely Roque RN  Safety Promotion/Fall Prevention: safety round/check completed  Taken 4/1/2025 1111 by Nallely Roque RN  Safety Promotion/Fall Prevention: safety round/check completed  Taken 4/1/2025 1002 by Nallely Roque RN  Safety Promotion/Fall Prevention: safety round/check completed  Taken 4/1/2025 0906 by Nallely Roque RN  Safety Promotion/Fall Prevention: safety round/check completed  Taken 4/1/2025 0802 by Nallely Roque RN  Safety Promotion/Fall Prevention:   safety round/check completed   activity supervised   assistive device/personal items within reach   clutter free environment maintained   increased rounding and observation   increase visualization of patient   lighting adjusted   nonskid shoes/slippers when out of bed   patient and family education   room near nurse's station   room organization consistent   treat underlying cause   treat reversible contributory factors   supervised activity  Taken 4/1/2025 0715 by Nallely Roque RN  Safety Promotion/Fall Prevention: safety round/check completed  Intervention: Prevent and Manage VTE (Venous Thromboembolism) Risk  Recent Flowsheet Documentation  Taken 4/1/2025 0802 by Nallely Roque RN  VTE Prevention/Management: SCDs on (sequential compression devices)  Goal: Optimal Comfort and Wellbeing  Outcome: Not Progressing  Goal: Readiness for Transition of Care  Outcome: Not Progressing     Problem: Pain Acute  Goal: Optimal Pain Control and Function  Outcome: Progressing  Intervention: Prevent or Manage Pain  Recent Flowsheet Documentation  Taken 4/1/2025 0802 by Nallely Roque RN  Medication Review/Management:   medications reviewed   high-risk medications identified     Problem: Nausea and Vomiting  Goal: Nausea and Vomiting Relief  Outcome: Progressing  Intervention: Prevent and Manage Nausea and Vomiting  Recent  Flowsheet Documentation  Taken 4/1/2025 0802 by Nallely Roque, RN  Nausea/Vomiting Interventions: (pre-meds)   antiemetic   other (see comments)

## 2025-04-01 NOTE — PROGRESS NOTES
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       The patient's daughter states that patient has had some vomiting last week.  He was having increased shortness of breath.  His wife noted that his oxygen saturation was low.  He was brought to emergency room for evaluation.     Initial vital signs showed temperature 97.5, pulse 46, blood pressure 148/61, oxygen saturation 95% on room air.  Laboratory workup showed sodium 128, potassium 5.8, creatinine 5.67, ALT 36, AST 31, troponin T high-sensitivity 103.  WBC 6.5, hemoglobin 8.9, platelets 164.  Venous blood gas showed pH 7.44/pCO2 44, pO2 82.5.  Chest x-ray showed large right pleural effusion with small to moderate left pleural effusion.  Bibasilar consolidation, patchy infiltrate in the central aspect of both lungs progressed on the left and improved on the right.  Felix catheter within decompressed urinary bladder.  Anasarca has progressed.     Nephrology was consulted from emergency room and recommended Lasix 100 mg IV.  He was also given calcium gluconate, Lokelma 10 g per G-tube.  He was also given a dose of IV ceftriaxone and azithromycin.  He was admitted to the hospital for further management.     He remains on 5 L of oxygen.  Interventional radiology performed a right-sided thoracentesis.  He continues on IV diuresis and nephrology was formally  consulted.  Potassium and heart rate have improved.  Palliative care met with family to discuss goals of care and possible enrollment into hospice given his poor functional status, elderly age, worsening renal function without being a dialysis candidate, bradycardia without being a pacemaker candidate, and gradual deterioration with no truly reversible process. They understand his poor prognosis and a care conference was held with the family on 3/18 to discuss all of the above, with attendees including hospitalist, palliative care, and nephrology.  Nephrology reiterated during the care conference that the patient is a poor dialysis candidate given his multiple comorbidities     Update on 3/28:   No Change in the medical management or issues.  Pt was about to be discharged on 3/27 to home with home cares however he was noted to have worsening bradycardia.  He has remained lethargic.  He was evaluated by palliative and appeared to be in the process of active dying so discharge was canceled.  My colleague poke with patient's family including patient's son Sathya and her daughter Clemente.  They understand patient's terminal trajectory. Son Sathya has been hesitant to agree on putting patient on comfort cares.  I extensively explained to both son and daughter that current medical cares are only prolonging the process and probably in the end will not provide much benefit as his underlying conditions are a irreversible.  I recommended comfort cares.  I then explained that with comfort cares we will be stopping the medications, Lasix can be continued if it looks like that this is aiding in comfort, tube feeds will be stopped.  Family is a still talking amongst themselves to come up with a decision/plan.      Update 3/29:   Had a repeat discussion with family members at bedside today regarding the fact that the patient is actively dying, patient's son reported that there are some differing opinions amongst family members about  how to proceed and there are some cultural barriers involved with this. we had an at length discussion regarding the low utility of any particular lab study to indicate how much longer he has to live, we reviewed that no such test exists.  However given his long list of medical problems and comorbidities and the inability to treat some of these, his trajectory is unlikely to change.  I strongly encouraged to transition to comfort focused cares and he expressed that it would be nice to get the patient home and I agree.  The plan will be to monitor the patient overnight in the hospital so an uncle can visit on 3/30 and if the patient is stable enough, we will consider transfer to home on comfort cares.  Family understood and agreed to this plan.  We will monitor him closely overnight and call patients family if he acutely declines.  All questions were answered     Update 3/30:   Patient had multiple episodes of emesis, tube feeds were stopped, insulin was adjusted.  At this time we had another at length discussion with the family regarding course of action.  Receiving calls from the daughters that they are concerned that the son is not communicating the plan well, meanwhile the son is at bedside and reporting back/communicating the plan to his mother/patient's wife.  Multiple family members have been traveling and it would be really helpful if they were to come to the hospital to see the patient status and discuss plan as a family.  It is clear at this time that we need to have a full family goals of care discussion and have everyone relevant to the conversation present in some form.  Will appreciate palliative's assistance in coordinating this in the coming days.  At this time plan for no escalation of cares but will continue plan as outlined below.  Son was updated at bedside today, plan otherwise as outlined below. We will monitor him closely overnight and call patients family if he acutely declines.     Update  3/31  Holding enteral nutrition after vomiting and high risk of aspiration, Talked to his daughter who has a clear understanding of current situation and poor prognosis, with continuous decline. Meeting with palliative is pending, apparently set for tomorrow midmorning by palliative team     Update 4/1  Meeting with family and and Palliative team. Plan for comfort care and transfer to Hospice to be decided by family yet. Interventions focus on minimizing suffering.    Problem list:     Goals of care  The patient is chronically ill and essentially full cares as a result of a prior massive stroke.  He has chronic dysphagia and receives tube feeds via a PEG.  He is essentially bedbound and has residual right-sided hemiplegia and nonverbal status.  He is now faced with rapidly progressing chronic kidney disease leading to concerning electrolyte abnormalities, hypervolemia, and bradycardia.  He is an extremely poor dialysis candidate and dialysis has not been offered by nephrology.  Care conference was held on 3/18 with palliative care, nephrology, and this hospitalist.  Goals of care discussed.  Hospice being considered by family but formal decision has not yet been made  Per one of the daughters, she says that all the family members including the daughters and patient's spouse are interested in looking at comfort measures, but the patient's son has a different viewpoint on this from the rest of the family.  Plan to wait until the uncle arrives on 3/30 and having ongoing discussions, no plans for aggressive escalation of care, if patient starts to decline family is to be called right away.      Acute hypoxic respiratory failure likely multifactorial including volume overload due to acute on chronic diastolic CHF exacerbation---improving  Bilateral pleural effusions, right > left  Patient was on Lasix 20 mg daily at home.  Per family he had decreased urine output and increased shortness of breath.  Chest x-ray showed  large right pleural effusion, moderate left pleural effusion.  Received IV diuresis in the hospital and then transition to p.o.  Appreciate nephrology assistance.  -Continue oral Lasix 80 mg twice a day  -s/p R sided thoracentesis with 900 ml removed earlier in hospital stay   -AHRF overall improved now requiring only 0.5 to 1 L of nasal cannula.      Acute toxic metabolic encephalopathy  Per patient's son Sathya usually he is awake and alert and can show thumbs up or down when ask questions.  Lately patient has been more lethargic.  Suspect this is likely due to uremia.  Also has history of CVA.    -No other source of encephalopathy identified during hospital stay, likely related to the chronic illness and various comorbidities, will continue to monitor     Hyperkalemia-resolved  Hypokalemia  ALEX on CKD stage V  Nephrotic range proteinuria  Anasarca  Hyponatremia  Hypercalcemia   -Creatinine 1 year ago was near 1  -Most recent creatinine 4.07 on 2/26/2025.  Creatinine 5.67 on admission and potassium 5.8.  He was previously seen by nephrology and his daughter indicates that he was determined not to be a dialysis candidate.  Potassium has improved with shifting agents and Lokelma.  Creatinine has been steadily climbing over the past several weeks.  -Initially hyperkalemic.  Now hypokalemic and requiring potassium replacement.  -Appreciate nephrology input.  As per nephrology not a dialysis candidate  -completed IV diuresis; now on PO diuretic for symptom management  -Felix in place and tracking I's and O's.      Bradycardia  Junctional rhythm  This was an issue during his recent hospitalization as well.  There may be some metabolic component but I do worry about some underlying conduction disease.  Poor candidate for pacemaker due to the issues outlined above.  -telemetry discontinued earlier in hospital stay     Demand ischemia  Patient had no evidence of chest pain. Troponin has been elevated during prior evaluation  on 2/26/2025.  Troponin 103 on admission and flat, not consistent with acute coronary syndrome.  Likely demand ischemia from respiratory failure with poor enzyme clearance in the face of CKD.    -No further workup      Diabetes mellitus type 2  He was on Lantus insulin 14 units every morning, insulin sliding scale PTA.  -Insulin was increased to 18 units this hospitalization.  Will continue medium intensity sliding scale insulin  -Blood glucose is rising however given his tenuous state do not want to increase at this time given risk of hypoglycemia, if blood glucose continues to rise will adjust on 3/30     History of CVA with residual right-sided hemiplegia and baseline nonverbal status  Chronic dysphagia, status post PEG tube placement, G-tube exchange on 2/24/2025  S/p indwelling Ordoñez catheter  Dyslipidemia  Hypertension  Patient resides at home and family takes care of him.  He also has a visiting nurse.  They appear to provide excellent care and support for him.  Patient's son Sathya stated that patient's spouse who is also ~ 70 years old was taking care of patient but now getting overwhelmed and would appreciate further assistance in placement.  Family now decided to take patient back home with home cares.  -PTA medications including amlodipine and atorvastatin and Plavix  -RD consulted to facilitate tube feeds.     Anemia and chronic kidney disease  -PTA ferrous sulfate.     Depression  -PTA Prozac.    Diet: Adult Formula Drip Feeding: Continuous Nepro with Carbsteady; Gastrostomy; Goal Rate: 30; mL/hr; If MD okay with restarting TF, restart at 15 mL/hr x 8 hours. If tolerating advance to goal rate.    DVT Prophylaxis: Pneumatic Compression Devices  Ordoñez Catheter: PRESENT, indication: Chronic ordoñez, Chronic ordoñez  Lines: None     Cardiac Monitoring: None  Code Status: No CPR- Do NOT Intubate      Clinically Significant Risk Factors        # Hypokalemia: Lowest K = 3.2 mmol/L in last 2 days, will replace as  needed   # Hypochloremia: Lowest Cl = 94 mmol/L in last 2 days, will monitor as appropriate   # Hypercalcemia: Highest Ca = 12.3 mg/dL in last 2 days, will monitor as appropriate    # Hypoalbuminemia: Lowest albumin = 3.3 g/dL at 3/15/2025 11:50 AM, will monitor as appropriate    # Acute Kidney Injury, unspecified: based on a >150% or 0.3 mg/dL increase in last creatinine compared to past 90 day average, will monitor renal function  # Hypertension: Noted on problem list    # Chronic heart failure with preserved ejection fraction: heart failure noted on problem list and last echo with EF >50%          # DMII: A1C = 8.8 % (Ref range: <5.7 %) within past 6 months         # Financial/Environmental Concerns:           Social Drivers of Health    Interpersonal Safety: Unknown (3/15/2025)    Interpersonal Safety     Do you feel physically and emotionally safe where you currently live?: Patient unable to answer     Within the past 12 months, have you been hit, slapped, kicked or otherwise physically hurt by someone?: Patient unable to answer     Within the past 12 months, have you been humiliated or emotionally abused in other ways by your partner or ex-partner?: Patient unable to answer          Disposition Plan     Medically Ready for Discharge: Anticipated in 2-4 Days             Suhas Graham MD  Hospitalist Service  [facility]  Securely message with [employer] (more info)  Text page via [facility] Paging/Directory   ______________________________________________________________________    Interval History   About the same, with declining non oliguric renal function.      Physical Exam   Vital Signs: Temp: 97.7  F (36.5  C) Temp src: Axillary BP: 136/59 Pulse: 63   Resp: 14 SpO2: 100 % O2 Device: Nasal cannula Oxygen Delivery: 1/2 LPM  Weight: 147 lbs 11.33 oz    Constitutional: Lethargic, no apparent distress, and appears stated age  Respiratory: No increased work of breathing, good air exchange,  clear to auscultation bilaterally, no crackles or wheezing  Cardiovascular: Regular rate and rhythm, normal S1 and S2, no S3 or S4, and no murmur noted    Medical Decision Making       49 MINUTES SPENT BY ME on the date of service doing chart review, history, exam, documentation & further activities per the note.      Data     I have personally reviewed the following data over the past 24 hrs:    N/A  \   N/A   / N/A     141 94 (L) 139.8 (H) /  188 (H)   3.2 (L) 35 (H) 6.35 (H) \       Imaging results reviewed over the past 24 hrs:   No results found for this or any previous visit (from the past 24 hours).

## 2025-04-01 NOTE — PROGRESS NOTES
PALLIATIVE CARE PROGRESS NOTE  [facility]     Patient Name: Jimmy Otto  Date of Admission: 3/15/2025   Today the patient was seen for:   Goals of care  Emotional and decisional support     Recommendations & Counseling     GOALS OF CARE:   Restorative with limits   Met with family to talk about goals and comfort focus and hospice, they need time to think about it and process things.  We discussed stopping TF vs keeping it going  Plan will be to follow up again tomorrow    ADVANCE CARE PLANNING:  No health care directive on file. Per system policy, Surrogate Decision-makers for Patients With Diminished Decision-making Capacity offers guidance on possible decision-makers. Daughter Clemente and Wayne and Wife and son Sathya make decisions together has been identified as a surrogate decision maker.   There is no POLST form on file, defer to patient and/or next of kin for decisions   Code status: No CPR- Do NOT Intubate    MEDICAL MANAGEMENT:   #General Weakness  Appreciate the staff for all ADLs    PSYCHOSOCIAL/SPIRITUAL:  Family Wife, two daughters and son  [place] community: [facility]     Palliative Care will continue to follow. Thank you for the consult and allowing us to aid in the care of Jimmy Otto.    These recommendations have been discussed with medical team.    Mackenzie Medina NP  Securely message with [employer] (more info)  Text page via [facility] Paging/Directory      Assessments          Jimmy Otto is a 75 year old male admitted 3/15/2025 with acute hypoxic respiratory failure in the setting of acute on chronic diastolic CHF exacerbation with bilateral pleura effusions R>L. The patient and family are known to this writer from the patient's hospitalization October 2021 when the family had made the decision to pursue tube feedings. 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 Discussed and reviewed goals, discussed keeping his care the same vs comfort approach and leaving on hospice. They would like to talk about things. We also talked about the tube feeding continuing it vs stopping it. They are going to take time to process and will follow up again tomorrow            Review of Systems:     Besides above, an additional system ROS was reviewed and is unremarkable               Physical Exam:   Temp: 97.7  F (36.5  C) Temp src: Axillary Temp  Min: 97.6  F (36.4  C)  Max: 98.1  F (36.7  C) BP: 136/59 Pulse: 63   Resp: 14 SpO2: 100 % O2 Device: Nasal cannula Oxygen Delivery: 1/2 LPM  Vital Signs with Ranges  Temp:  [97.6  F (36.4  C)-98.1  F (36.7  C)] 97.7  F (36.5  C)  Pulse:  [63-73] 63  Resp:  [12-20] 14  BP: (136-150)/(57-66) 136/59  SpO2:  [90 %-100 %] 100 %  147 lbs 11.33 oz    EXAM:  Constitutional: no distress and somnolent   Cardiovascular: negative  Respiratory: negative  Psychiatric: calm  Pain: no s/s of pain               Current Problem List:   Active Problems:    Hypervolemia, unspecified hypervolemia type    Bradycardia    Pleural effusion    Hypoxia    Acute kidney injury superimposed on CKD      No Known Allergies         Data Reviewed:       Results for orders placed or performed during the hospital encounter of 03/15/25 (from the past 24 hours)   Glucose by meter   Result Value Ref Range    GLUCOSE BY METER POCT 167 (H) 70 - 99 mg/dL   Glucose by meter   Result Value Ref Range    GLUCOSE BY METER POCT 188 (H) 70 - 99 mg/dL   Glucose by meter   Result Value Ref Range    GLUCOSE BY METER POCT 181 (H) 70 - 99 mg/dL    Glucose by meter   Result Value Ref Range    GLUCOSE BY METER POCT 198 (H) 70 - 99 mg/dL   Glucose by meter   Result Value Ref Range    GLUCOSE BY METER POCT 232 (H) 70 - 99 mg/dL   Basic metabolic panel   Result Value Ref Range    Sodium 141 135 - 145 mmol/L    Potassium 3.2 (L) 3.4 - 5.3 mmol/L    Chloride 94 (L) 98 - 107 mmol/L    Carbon Dioxide (CO2) 35 (H) 22 - 29 mmol/L    Anion Gap 12 7 - 15 mmol/L    Urea Nitrogen 139.8 (H) 8.0 - 23.0 mg/dL    Creatinine 6.35 (H) 0.67 - 1.17 mg/dL    GFR Estimate 9 (L) >60 mL/min/1.73m2    Calcium 12.3 (H) 8.8 - 10.4 mg/dL    Glucose 241 (H) 70 - 99 mg/dL   Glucose by meter   Result Value Ref Range    GLUCOSE BY METER POCT 188 (H) 70 - 99 mg/dL          Medical Decision Making       55 MINUTES SPENT BY ME on the date of service doing chart review, history, exam, documentation & further activities per the note.

## 2025-04-01 NOTE — PROVIDER NOTIFICATION
FYI paged dr bhagat: see results for abnormal BMP details (K+ 3.2, Cr 6.35, BG 200s, etc), restarting lantus and replacing K+ may depend on goals of care from the family conference that happened today. thanks.

## 2025-04-02 LAB
GLUCOSE BLDC GLUCOMTR-MCNC: 171 MG/DL (ref 70–99)
GLUCOSE BLDC GLUCOMTR-MCNC: 189 MG/DL (ref 70–99)
GLUCOSE BLDC GLUCOMTR-MCNC: 192 MG/DL (ref 70–99)
GLUCOSE BLDC GLUCOMTR-MCNC: 194 MG/DL (ref 70–99)
GLUCOSE BLDC GLUCOMTR-MCNC: 199 MG/DL (ref 70–99)
GLUCOSE BLDC GLUCOMTR-MCNC: 226 MG/DL (ref 70–99)
GLUCOSE BLDC GLUCOMTR-MCNC: 233 MG/DL (ref 70–99)

## 2025-04-02 PROCEDURE — 250N000013 HC RX MED GY IP 250 OP 250 PS 637: Performed by: INTERNAL MEDICINE

## 2025-04-02 PROCEDURE — 99232 SBSQ HOSP IP/OBS MODERATE 35: CPT | Performed by: INTERNAL MEDICINE

## 2025-04-02 PROCEDURE — 99232 SBSQ HOSP IP/OBS MODERATE 35: CPT | Performed by: NURSE PRACTITIONER

## 2025-04-02 PROCEDURE — 120N000001 HC R&B MED SURG/OB

## 2025-04-02 PROCEDURE — 250N000013 HC RX MED GY IP 250 OP 250 PS 637: Performed by: STUDENT IN AN ORGANIZED HEALTH CARE EDUCATION/TRAINING PROGRAM

## 2025-04-02 RX ORDER — HYDROMORPHONE HCL IN WATER/PF 6 MG/30 ML
0.2 PATIENT CONTROLLED ANALGESIA SYRINGE INTRAVENOUS EVERY 6 HOURS PRN
Status: COMPLETED | OUTPATIENT
Start: 2025-04-02 | End: 2025-04-04

## 2025-04-02 RX ADMIN — FUROSEMIDE 80 MG: 10 SOLUTION ORAL at 08:27

## 2025-04-02 RX ADMIN — AMLODIPINE BESYLATE 10 MG: 10 TABLET ORAL at 08:18

## 2025-04-02 RX ADMIN — FUROSEMIDE 80 MG: 10 SOLUTION ORAL at 15:59

## 2025-04-02 RX ADMIN — INSULIN ASPART 1 UNITS: 100 INJECTION, SOLUTION INTRAVENOUS; SUBCUTANEOUS at 00:32

## 2025-04-02 RX ADMIN — INSULIN ASPART 2 UNITS: 100 INJECTION, SOLUTION INTRAVENOUS; SUBCUTANEOUS at 18:22

## 2025-04-02 RX ADMIN — Medication 325 MG: at 08:21

## 2025-04-02 RX ADMIN — FLUOXETINE 20 MG: 20 SOLUTION ORAL at 08:21

## 2025-04-02 RX ADMIN — INSULIN ASPART 2 UNITS: 100 INJECTION, SOLUTION INTRAVENOUS; SUBCUTANEOUS at 20:26

## 2025-04-02 RX ADMIN — INSULIN ASPART 2 UNITS: 100 INJECTION, SOLUTION INTRAVENOUS; SUBCUTANEOUS at 12:47

## 2025-04-02 RX ADMIN — ACETAMINOPHEN 650 MG: 160 SOLUTION ORAL at 23:43

## 2025-04-02 RX ADMIN — INSULIN ASPART 1 UNITS: 100 INJECTION, SOLUTION INTRAVENOUS; SUBCUTANEOUS at 23:47

## 2025-04-02 RX ADMIN — Medication 5 ML: at 20:26

## 2025-04-02 RX ADMIN — DOXAZOSIN 2 MG: 1 TABLET ORAL at 08:18

## 2025-04-02 RX ADMIN — INSULIN ASPART 2 UNITS: 100 INJECTION, SOLUTION INTRAVENOUS; SUBCUTANEOUS at 04:13

## 2025-04-02 RX ADMIN — INSULIN ASPART 2 UNITS: 100 INJECTION, SOLUTION INTRAVENOUS; SUBCUTANEOUS at 08:36

## 2025-04-02 RX ADMIN — ACETAMINOPHEN 650 MG: 160 SOLUTION ORAL at 00:35

## 2025-04-02 RX ADMIN — DIPHENHYDRAMINE HYDROCHLORIDE AND LIDOCAINE HYDROCHLORIDE AND ALUMINUM HYDROXIDE AND MAGNESIUM HYDRO 10 ML: KIT at 08:38

## 2025-04-02 RX ADMIN — ACETAMINOPHEN 650 MG: 160 SOLUTION ORAL at 15:59

## 2025-04-02 RX ADMIN — CLOPIDOGREL BISULFATE 75 MG: 75 TABLET ORAL at 08:18

## 2025-04-02 RX ADMIN — ACETAMINOPHEN 650 MG: 160 SOLUTION ORAL at 08:26

## 2025-04-02 ASSESSMENT — ACTIVITIES OF DAILY LIVING (ADL)
ADLS_ACUITY_SCORE: 103

## 2025-04-02 NOTE — PLAN OF CARE
"Pt is lethargic while occasionally opening eyes. Scheduled Tylenol administered. Breathing is unlabored and pt on 0.5LNC.    Q4hrs Blood glucose: 189, 233. Repositioning maintained per protocol, oral care provided.    Felix is patent and care provide.    Family at bedside. Ongoing monitoring.    Plan: Palliative following.    BP (!) 141/53 (BP Location: Left arm)   Pulse 80   Temp 98.1  F (36.7  C) (Oral)   Resp 14   Wt 66.8 kg (147 lb 4.3 oz)   SpO2 100%   BMI 23.77 kg/m       Problem: Adult Inpatient Plan of Care  Goal: Plan of Care Review  Description: The Plan of Care Review/Shift note should be completed every shift.  The Outcome Evaluation is a brief statement about your assessment that the patient is improving, declining, or no change.  This information will be displayed automatically on your shiftnote.  Outcome: Progressing  Flowsheets (Taken 4/2/2025 0456)  Outcome Evaluation: Scheduled Tylenol administered.  Plan of Care Reviewed With: patient  Overall Patient Progress: no change  Goal: Patient-Specific Goal (Individualized)  Description: You can add care plan individualizations to a care plan. Examples of Individualization might be:  \"Parent requests to be called daily at 9am for status\", \"I have a hard time hearing out of my right ear\", or \"Do not touch me to wake me up as it startlesme\".  Outcome: Progressing  Goal: Absence of Hospital-Acquired Illness or Injury  Outcome: Progressing  Intervention: Identify and Manage Fall Risk  Recent Flowsheet Documentation  Taken 4/2/2025 0300 by Alea Angeles RN  Safety Promotion/Fall Prevention: safety round/check completed  Taken 4/2/2025 0100 by Alea Angeles RN  Safety Promotion/Fall Prevention: safety round/check completed  Taken 4/2/2025 0000 by Alea Angeles, RN  Safety Promotion/Fall Prevention:   safety round/check completed   lighting adjusted   patient and family education  Taken 4/1/2025 2300 by Alea Angeles, RN  Safety " Promotion/Fall Prevention: safety round/check completed  Intervention: Prevent and Manage VTE (Venous Thromboembolism) Risk  Recent Flowsheet Documentation  Taken 4/2/2025 0000 by Alea Angeles RN  VTE Prevention/Management: SCDs on (sequential compression devices)  Goal: Optimal Comfort and Wellbeing  Outcome: Progressing  Goal: Readiness for Transition of Care  Outcome: Progressing     Problem: Pain Acute  Goal: Optimal Pain Control and Function  Outcome: Progressing  Intervention: Prevent or Manage Pain  Recent Flowsheet Documentation  Taken 4/2/2025 0000 by Alea Angeles RN  Sleep/Rest Enhancement:   family presence promoted   awakenings minimized  Medication Review/Management: medications reviewed     Problem: Nausea and Vomiting  Goal: Nausea and Vomiting Relief  Outcome: Progressing   Goal Outcome Evaluation:      Plan of Care Reviewed With: patient    Overall Patient Progress: no changeOverall Patient Progress: no change    Outcome Evaluation: Scheduled Tylenol administered.

## 2025-04-02 NOTE — PROGRESS NOTES
Notified by RN that patient is starting to show signs of pain. In review of chart, no PRNs are available to patient. Ordered dilaudid 0.3mg IV Q6H PRN for 2 doses overnight. Request day team / palliative assistance with clarifying pain plan.    Carlin Morales MD  Hospitalist

## 2025-04-02 NOTE — PROGRESS NOTES
[facility]    Medicine Progress Note - Hospitalist Service    Date of Admission:  3/15/2025    Assessment & Plan   Summary of hospital stay by Dr. Graham from 4/1 reviewed and shared below. No change in management for now. Son reports that his mother is still trying to decide on next steps.       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       The patient's daughter states that patient has had some vomiting last week.  He was having increased shortness of breath.  His wife noted that his oxygen saturation was low.  He was brought to emergency room for evaluation.     Initial vital signs showed temperature 97.5, pulse 46, blood pressure 148/61, oxygen saturation 95% on room air.  Laboratory workup showed sodium 128, potassium 5.8, creatinine 5.67, ALT 36, AST 31, troponin T high-sensitivity 103.  WBC 6.5, hemoglobin 8.9, platelets 164.  Venous blood gas showed pH 7.44/pCO2 44, pO2 82.5.  Chest x-ray showed large right pleural effusion with small to moderate left pleural effusion.  Bibasilar consolidation, patchy infiltrate in the central aspect of both lungs progressed on the left and improved on the right.  Felix catheter within decompressed urinary bladder.  Anasarca has progressed.     Nephrology was consulted from emergency room and recommended Lasix 100 mg IV.  He was also given calcium gluconate, Lokelma 10 g per G-tube.  He was also given a dose of IV ceftriaxone and azithromycin.  He was admitted to the hospital for  further management.     He remains on 5 L of oxygen.  Interventional radiology performed a right-sided thoracentesis.  He continues on IV diuresis and nephrology was formally consulted.  Potassium and heart rate have improved.  Palliative care met with family to discuss goals of care and possible enrollment into hospice given his poor functional status, elderly age, worsening renal function without being a dialysis candidate, bradycardia without being a pacemaker candidate, and gradual deterioration with no truly reversible process. They understand his poor prognosis and a care conference was held with the family on 3/18 to discuss all of the above, with attendees including hospitalist, palliative care, and nephrology.  Nephrology reiterated during the care conference that the patient is a poor dialysis candidate given his multiple comorbidities     Update on 3/28:   No Change in the medical management or issues.  Pt was about to be discharged on 3/27 to home with home cares however he was noted to have worsening bradycardia.  He has remained lethargic.  He was evaluated by palliative and appeared to be in the process of active dying so discharge was canceled.  My colleague poke with patient's family including patient's son Sathya and her daughter Clemente.  They understand patient's terminal trajectory. Son Sathya has been hesitant to agree on putting patient on comfort cares.  I extensively explained to both son and daughter that current medical cares are only prolonging the process and probably in the end will not provide much benefit as his underlying conditions are a irreversible.  I recommended comfort cares.  I then explained that with comfort cares we will be stopping the medications, Lasix can be continued if it looks like that this is aiding in comfort, tube feeds will be stopped.  Family is a still talking amongst themselves to come up with a decision/plan.      Update 3/29:   Had a repeat discussion with  family members at bedside today regarding the fact that the patient is actively dying, patient's son reported that there are some differing opinions amongst family members about how to proceed and there are some cultural barriers involved with this. we had an at length discussion regarding the low utility of any particular lab study to indicate how much longer he has to live, we reviewed that no such test exists.  However given his long list of medical problems and comorbidities and the inability to treat some of these, his trajectory is unlikely to change.  I strongly encouraged to transition to comfort focused cares and he expressed that it would be nice to get the patient home and I agree.  The plan will be to monitor the patient overnight in the hospital so an uncle can visit on 3/30 and if the patient is stable enough, we will consider transfer to home on comfort cares.  Family understood and agreed to this plan.  We will monitor him closely overnight and call patients family if he acutely declines.  All questions were answered     Update 3/30:   Patient had multiple episodes of emesis, tube feeds were stopped, insulin was adjusted.  At this time we had another at length discussion with the family regarding course of action.  Receiving calls from the daughters that they are concerned that the son is not communicating the plan well, meanwhile the son is at bedside and reporting back/communicating the plan to his mother/patient's wife.  Multiple family members have been traveling and it would be really helpful if they were to come to the hospital to see the patient status and discuss plan as a family.  It is clear at this time that we need to have a full family goals of care discussion and have everyone relevant to the conversation present in some form.  Will appreciate palliative's assistance in coordinating this in the coming days.  At this time plan for no escalation of cares but will continue plan as outlined  below.  Son was updated at bedside today, plan otherwise as outlined below. We will monitor him closely overnight and call patients family if he acutely declines.     Update 3/31  Holding enteral nutrition after vomiting and high risk of aspiration, Talked to his daughter who has a clear understanding of current situation and poor prognosis, with continuous decline. Meeting with palliative is pending, apparently set for tomorrow midmorning by palliative team     Update 4/1  Meeting with family and and Palliative team. Plan for comfort care and transfer to Hospice to be decided by family yet. Interventions focus on minimizing suffering.    Problem list:     Goals of care  The patient is chronically ill and essentially full cares as a result of a prior massive stroke.  He has chronic dysphagia and receives tube feeds via a PEG.  He is essentially bedbound and has residual right-sided hemiplegia and nonverbal status.  He is now faced with rapidly progressing chronic kidney disease leading to concerning electrolyte abnormalities, hypervolemia, and bradycardia.  He is an extremely poor dialysis candidate and dialysis has not been offered by nephrology.  Care conference was held on 3/18 with palliative care, nephrology, and this hospitalist.  Goals of care discussed.  Hospice being considered by family but formal decision has not yet been made  Per one of the daughters, she says that all the family members including the daughters and patient's spouse are interested in looking at comfort measures, but the patient's son has a different viewpoint on this from the rest of the family.  Plan to wait until the uncle arrives on 3/30 and having ongoing discussions, no plans for aggressive escalation of care, if patient starts to decline family is to be called right away.      Acute hypoxic respiratory failure likely multifactorial including volume overload due to acute on chronic diastolic CHF exacerbation---improving  Bilateral  pleural effusions, right > left  Patient was on Lasix 20 mg daily at home.  Per family he had decreased urine output and increased shortness of breath.  Chest x-ray showed large right pleural effusion, moderate left pleural effusion.  Received IV diuresis in the hospital and then transition to p.o.  Appreciate nephrology assistance.  -Continue oral Lasix 80 mg twice a day  -s/p R sided thoracentesis with 900 ml removed earlier in hospital stay   -AHRF overall improved now requiring only 0.5 to 1 L of nasal cannula.      Acute toxic metabolic encephalopathy  Per patient's son Sathya usually he is awake and alert and can show thumbs up or down when ask questions.  Lately patient has been more lethargic.  Suspect this is likely due to uremia.  Also has history of CVA.    -No other source of encephalopathy identified during hospital stay, likely related to the chronic illness and various comorbidities, will continue to monitor     Hyperkalemia-resolved  Hypokalemia  ALEX on CKD stage V  Nephrotic range proteinuria  Anasarca  Hyponatremia  Hypercalcemia   -Creatinine 1 year ago was near 1  -Most recent creatinine 4.07 on 2/26/2025.  Creatinine 5.67 on admission and potassium 5.8.  He was previously seen by nephrology and his daughter indicates that he was determined not to be a dialysis candidate.  Potassium has improved with shifting agents and Lokelma.  Creatinine has been steadily climbing over the past several weeks.  -Initially hyperkalemic.  Now hypokalemic and requiring potassium replacement.  -Appreciate nephrology input.  As per nephrology not a dialysis candidate  -completed IV diuresis; now on PO diuretic for symptom management  -Felix in place and tracking I's and O's.      Bradycardia  Junctional rhythm  This was an issue during his recent hospitalization as well.  There may be some metabolic component but I do worry about some underlying conduction disease.  Poor candidate for pacemaker due to the issues  outlined above.  -telemetry discontinued earlier in hospital stay     Demand ischemia  Patient had no evidence of chest pain. Troponin has been elevated during prior evaluation on 2/26/2025.  Troponin 103 on admission and flat, not consistent with acute coronary syndrome.  Likely demand ischemia from respiratory failure with poor enzyme clearance in the face of CKD.    -No further workup      Diabetes mellitus type 2  He was on Lantus insulin 14 units every morning, insulin sliding scale PTA.  -Insulin was increased to 18 units this hospitalization.  Will continue medium intensity sliding scale insulin  -Blood glucose is rising however given his tenuous state do not want to increase at this time given risk of hypoglycemia, if blood glucose continues to rise will adjust on 3/30     History of CVA with residual right-sided hemiplegia and baseline nonverbal status  Chronic dysphagia, status post PEG tube placement, G-tube exchange on 2/24/2025  S/p indwelling Ordoeñz catheter  Dyslipidemia  Hypertension  Patient resides at home and family takes care of him.  He also has a visiting nurse.  They appear to provide excellent care and support for him.  Patient's son Sathya stated that patient's spouse who is also ~ 70 years old was taking care of patient but now getting overwhelmed and would appreciate further assistance in placement.  Family now decided to take patient back home with home cares.  -PTA medications including amlodipine and atorvastatin and Plavix  -RD consulted to facilitate tube feeds.     Anemia and chronic kidney disease  -PTA ferrous sulfate.     Depression  -PTA Prozac.    Diet: Adult Formula Drip Feeding: Continuous Nepro with Carbsteady; Gastrostomy; Goal Rate: 30; mL/hr; If MD okay with restarting TF, restart at 15 mL/hr x 8 hours. If tolerating advance to goal rate.    DVT Prophylaxis: Pneumatic Compression Devices  Ordoñez Catheter: PRESENT, indication: Chronic ordoñez, Chronic ordoñez  Lines: None      Cardiac Monitoring: None  Code Status: No CPR- Do NOT Intubate            Diet: Adult Formula Drip Feeding: Continuous Nepro with Carbsteady; Gastrostomy; Goal Rate: 30; mL/hr; If MD okay with restarting TF, restart at 15 mL/hr x 8 hours. If tolerating advance to goal rate.    DVT Prophylaxis: Pneumatic Compression Devices  Ordoñez Catheter: PRESENT, indication: Chronic ordoñez, Chronic ordoñez  Lines: None     Cardiac Monitoring: None  Code Status: No CPR- Do NOT Intubate      Clinically Significant Risk Factors        # Hypokalemia: Lowest K = 3.2 mmol/L in last 2 days, will replace as needed   # Hypochloremia: Lowest Cl = 94 mmol/L in last 2 days, will monitor as appropriate   # Hypercalcemia: Highest Ca = 12.3 mg/dL in last 2 days, will monitor as appropriate    # Hypoalbuminemia: Lowest albumin = 3.3 g/dL at 3/15/2025 11:50 AM, will monitor as appropriate    # Acute Kidney Injury, unspecified: based on a >150% or 0.3 mg/dL increase in last creatinine compared to past 90 day average, will monitor renal function  # Hypertension: Noted on problem list  # Chronic heart failure with preserved ejection fraction: heart failure noted on problem list and last echo with EF >50%          # DMII: A1C = 8.8 % (Ref range: <5.7 %) within past 6 months       # Financial/Environmental Concerns:           Social Drivers of Health    Interpersonal Safety: Unknown (3/15/2025)    Interpersonal Safety     Do you feel physically and emotionally safe where you currently live?: Patient unable to answer     Within the past 12 months, have you been hit, slapped, kicked or otherwise physically hurt by someone?: Patient unable to answer     Within the past 12 months, have you been humiliated or emotionally abused in other ways by your partner or ex-partner?: Patient unable to answer          Disposition Plan     Medically Ready for Discharge: Anticipated Tomorrow             Eric Dumont MD  Hospitalist Service  [facility]  Hospital  Securely message with [employer] (more info)  Text page via [facility] Paging/Directory   ______________________________________________________________________    Interval History   No acute events overnight.  Son at bedside reports that patient is unchanged compared to baseline.  He reports the patient has been nonverbal for about 3 years after the stroke.  Reports that patient was hospitalized several months ago for fluid management going downhill since that time.  No vomiting or diarrhea overnight.    Physical Exam   Vital Signs: Temp: 98.4  F (36.9  C) Temp src: Axillary BP: (!) 162/69 Pulse: 83   Resp: 16 SpO2: 99 % O2 Device: Nasal cannula Oxygen Delivery: 1/2 LPM  Weight: 147 lbs 4.28 oz    General: Awake and alert. Resting comfortably in bed. Aphasic.   HEENT: NCAT, pupils are equal and round, anicteric, oral mucosa is moist.  Neck: Supple,   Cardiac: RRR.  No murmur. No rub or gallop.  Respiratory: CTAB in anterior lung fields. No IWOB. No crackles, wheezes, rales, or rhonchi  Abdomen: Soft, NT, ND, + bowel sounds  Extremity: No LE edema,   Neuro: AAO x3,   Psych: Calm       Medical Decision Making       >35 MINUTES SPENT BY ME on the date of service doing chart review, history, exam, documentation & further activities per the note.      Data         Imaging results reviewed over the past 24 hrs:   No results found for this or any previous visit (from the past 24 hours).

## 2025-04-02 NOTE — PROGRESS NOTES
PALLIATIVE CARE PROGRESS NOTE  [facility]     Patient Name: Jimmy Otto  Date of Admission: 3/15/2025   Today the patient was seen for:   Goals of care  Emotional and decisional support     Recommendations & Counseling     GOALS OF CARE:   Restorative with limits   Met with family to talk about goals and comfort focus and hospice, they need time to think about it and process things.  Discussed goals with family today, wife is still processing things. Daughters are at the bedside and understand where things are at, they just want Wife to make the decisions as able  We discussed stopping TF vs keeping it going  To note, he may qualify for hospice with TF as he is unable to take orals...    ADVANCE CARE PLANNING:  No health care directive on file. Per system policy, Surrogate Decision-makers for Patients With Diminished Decision-making Capacity offers guidance on possible decision-makers. Daughter Clemente and Wayne and Wife and son Sathya make decisions together has been identified as a surrogate decision maker.   There is no POLST form on file, defer to patient and/or next of kin for decisions   Code status: No CPR- Do NOT Intubate    MEDICAL MANAGEMENT:   #General Weakness  Appreciate the staff for all ADLs    #Pain  Added low dose dilaudid for pain as needed. Family will ask for it if they feel he's uncomfortable  Added heating pad for comfort  Also has PRN tylenol    PSYCHOSOCIAL/SPIRITUAL:  Family Wife, two daughters and son  [facility] community: [employer]     Palliative Care will continue to follow. Thank you for the consult and allowing us to aid in the care of Jimmy Otto.    These recommendations have been discussed with medical team.    Mackenzie Medina NP  Securely message with [employer] (more info)  Text page via [employer] Paging/Directory      Assessments          Jimmy Otto is a 75 year old male admitted 3/15/2025 with acute hypoxic respiratory failure in the setting of acute on chronic diastolic  CHF exacerbation with bilateral pleura effusions R>L. The patient and family are known to this writer from the patient's hospitalization October 2021 when the family had made the decision to pursue tube feedings. 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 They report that they are still trying to process all the information from yesterday. They are hoping patients wife will be able to make some of the care decisions for him. She is having a hard time processing all the care options. We discussed overall discomfort and medications and side effects. They will ask if they feel he's uncomfortable            Review of Systems:     Besides above, an additional system ROS was reviewed and is unremarkable               Physical Exam:   Temp: 98.4  F (36.9  C) Temp src: Axillary Temp  Min: 97.3  F (36.3  C)  Max: 98.4  F (36.9  C) BP: (!) 162/69 Pulse: 83   Resp: 16 SpO2: 99 % O2 Device: Nasal cannula Oxygen Delivery: 1/2 LPM  Vital Signs with Ranges  Temp:  [97.3  F (36.3  C)-98.4  F (36.9  C)] 98.4  F (36.9  C)  Pulse:  [71-83] 83  Resp:  [14-16] 16  BP: (128-162)/(53-69) 162/69  SpO2:  [99 %-100 %] 99 %  147 lbs 4.28 oz    EXAM:  Constitutional: no distress and somnolent   Cardiovascular: negative  Respiratory: negative  Psychiatric: calm  Pain: no s/s of pain               Current Problem List:   Active Problems:    Hypervolemia, unspecified hypervolemia type    Bradycardia    Pleural effusion    Hypoxia    Acute kidney injury superimposed on CKD      No Known  Allergies         Data Reviewed:       Results for orders placed or performed during the hospital encounter of 03/15/25 (from the past 24 hours)   Glucose by meter   Result Value Ref Range    GLUCOSE BY METER POCT 229 (H) 70 - 99 mg/dL   Glucose by meter   Result Value Ref Range    GLUCOSE BY METER POCT 240 (H) 70 - 99 mg/dL   Glucose by meter   Result Value Ref Range    GLUCOSE BY METER POCT 189 (H) 70 - 99 mg/dL   Glucose by meter   Result Value Ref Range    GLUCOSE BY METER POCT 233 (H) 70 - 99 mg/dL   Glucose by meter   Result Value Ref Range    GLUCOSE BY METER POCT 199 (H) 70 - 99 mg/dL   Glucose by meter   Result Value Ref Range    GLUCOSE BY METER POCT 194 (H) 70 - 99 mg/dL          Medical Decision Making       40 MINUTES SPENT BY ME on the date of service doing chart review, history, exam, documentation & further activities per the note.

## 2025-04-02 NOTE — PLAN OF CARE
Goal Outcome Evaluation:      Plan of Care Reviewed With: patient, family          Outcome Evaluation: Some grimacing, scheduled Tylenol administerd, IV dilaudid available, but family wants to hold off. Family noticed some redness on gums, wondering if source of pain, requesting Orajel. Orajel available PRN. Plan for palliative      Problem: Adult Inpatient Plan of Care  Goal: Plan of Care Review  Description: The Plan of Care Review/Shift note should be completed every shift.  The Outcome Evaluation is a brief statement about your assessment that the patient is improving, declining, or no change.  This information will be displayed automatically on your shift  note.  Recent Flowsheet Documentation  Taken 4/1/2025 2234 by Nic Goldstein, RN  Outcome Evaluation: Some grimacing, scheduled Tylenol administerd, IV dilaudid available, but family wants to hold off. Family noticed some redness on gums, wondering if source of pain, requesting Orajel. Orajel available PRN. Plan for palliative  Plan of Care Reviewed With:   patient   family  Goal: Absence of Hospital-Acquired Illness or Injury  Intervention: Prevent Skin Injury  Recent Flowsheet Documentation  Taken 4/1/2025 2130 by Nic Goldstein, RN  Body Position:   turned   log-rolled   side-lying   left     Problem: Comorbidity Management  Goal: Maintenance of Osteoarthritis Symptom Control  Intervention: Maintain Osteoarthritis Symptom Control  Recent Flowsheet Documentation  Taken 4/1/2025 2130 by Nic Goldstein, RN  Activity Management: bedrest     Problem: Gas Exchange Impaired  Goal: Optimal Gas Exchange  Intervention: Optimize Oxygenation and Ventilation  Recent Flowsheet Documentation  Taken 4/1/2025 2130 by Nic Goldstein, RN  Head of Bed (HOB) Positioning: HOB at 30-45 degrees     Problem: Adult Inpatient Plan of Care  Goal: Plan of Care Review  Description: The Plan of Care Review/Shift note should be completed every shift.  The Outcome Evaluation is a  brief statement about your assessment that the patient is improving, declining, or no change.  This information will be displayed automatically on your shiftnote.  Recent Flowsheet Documentation  Taken 4/1/2025 2234 by Nic Goldstein RN  Outcome Evaluation: Some grimacing, scheduled Tylenol administerd, IV dilaudid available, but family wants to hold off. Family noticed some redness on gums, wondering if source of pain, requesting Orajel. Orajel available PRN. Plan for palliative  Plan of Care Reviewed With:   patient   family  Goal: Absence of Hospital-Acquired Illness or Injury  Intervention: Prevent Skin Injury  Recent Flowsheet Documentation  Taken 4/1/2025 2130 by Nic Goldstein RN  Body Position:   turned   log-rolled   side-lying   left     Problem: Adult Inpatient Plan of Care  Goal: Plan of Care Review  Description: The Plan of Care Review/Shift note should be completed every shift.  The Outcome Evaluation is a brief statement about your assessment that the patient is improving, declining, or no change.  This information will be displayed automatically on your shiftnote.  Outcome: Not Progressing  Flowsheets (Taken 4/1/2025 2234)  Outcome Evaluation: Some grimacing, scheduled Tylenol administerd, IV dilaudid available, but family wants to hold off. Family noticed some redness on gums, wondering if source of pain, requesting Orajel. Orajel available PRN. Plan for palliative  Plan of Care Reviewed With:   patient   family  Goal: Absence of Hospital-Acquired Illness or Injury  Intervention: Prevent Skin Injury  Recent Flowsheet Documentation  Taken 4/1/2025 2130 by Nic Goldstein RN  Body Position:   turned   log-rolled   side-lying   left

## 2025-04-03 LAB
GLUCOSE BLDC GLUCOMTR-MCNC: 176 MG/DL (ref 70–99)
GLUCOSE BLDC GLUCOMTR-MCNC: 205 MG/DL (ref 70–99)
GLUCOSE BLDC GLUCOMTR-MCNC: 205 MG/DL (ref 70–99)
GLUCOSE BLDC GLUCOMTR-MCNC: 217 MG/DL (ref 70–99)
GLUCOSE BLDC GLUCOMTR-MCNC: 227 MG/DL (ref 70–99)
GLUCOSE BLDC GLUCOMTR-MCNC: 244 MG/DL (ref 70–99)

## 2025-04-03 PROCEDURE — 250N000011 HC RX IP 250 OP 636: Performed by: INTERNAL MEDICINE

## 2025-04-03 PROCEDURE — 99232 SBSQ HOSP IP/OBS MODERATE 35: CPT | Performed by: NURSE PRACTITIONER

## 2025-04-03 PROCEDURE — 99232 SBSQ HOSP IP/OBS MODERATE 35: CPT | Performed by: INTERNAL MEDICINE

## 2025-04-03 PROCEDURE — 250N000013 HC RX MED GY IP 250 OP 250 PS 637: Performed by: INTERNAL MEDICINE

## 2025-04-03 PROCEDURE — 120N000001 HC R&B MED SURG/OB

## 2025-04-03 RX ORDER — HALOPERIDOL 2 MG/ML
0.5 SOLUTION ORAL EVERY 6 HOURS PRN
Status: DISCONTINUED | OUTPATIENT
Start: 2025-04-03 | End: 2025-04-07

## 2025-04-03 RX ADMIN — CLOPIDOGREL BISULFATE 75 MG: 75 TABLET ORAL at 08:29

## 2025-04-03 RX ADMIN — AMLODIPINE BESYLATE 10 MG: 10 TABLET ORAL at 08:29

## 2025-04-03 RX ADMIN — INSULIN ASPART 2 UNITS: 100 INJECTION, SOLUTION INTRAVENOUS; SUBCUTANEOUS at 17:58

## 2025-04-03 RX ADMIN — INSULIN ASPART 3 UNITS: 100 INJECTION, SOLUTION INTRAVENOUS; SUBCUTANEOUS at 20:52

## 2025-04-03 RX ADMIN — INSULIN ASPART 2 UNITS: 100 INJECTION, SOLUTION INTRAVENOUS; SUBCUTANEOUS at 12:59

## 2025-04-03 RX ADMIN — INSULIN ASPART 2 UNITS: 100 INJECTION, SOLUTION INTRAVENOUS; SUBCUTANEOUS at 23:52

## 2025-04-03 RX ADMIN — INSULIN ASPART 2 UNITS: 100 INJECTION, SOLUTION INTRAVENOUS; SUBCUTANEOUS at 04:36

## 2025-04-03 RX ADMIN — ACETAMINOPHEN 650 MG: 160 SOLUTION ORAL at 23:50

## 2025-04-03 RX ADMIN — ACETAMINOPHEN 650 MG: 160 SOLUTION ORAL at 17:48

## 2025-04-03 RX ADMIN — ONDANSETRON 4 MG: 2 INJECTION, SOLUTION INTRAMUSCULAR; INTRAVENOUS at 10:39

## 2025-04-03 RX ADMIN — FUROSEMIDE 80 MG: 10 SOLUTION ORAL at 08:36

## 2025-04-03 RX ADMIN — FLUOXETINE 20 MG: 20 SOLUTION ORAL at 08:36

## 2025-04-03 RX ADMIN — Medication 5 ML: at 20:39

## 2025-04-03 RX ADMIN — FUROSEMIDE 80 MG: 10 SOLUTION ORAL at 17:48

## 2025-04-03 RX ADMIN — Medication 325 MG: at 08:33

## 2025-04-03 RX ADMIN — INSULIN ASPART 1 UNITS: 100 INJECTION, SOLUTION INTRAVENOUS; SUBCUTANEOUS at 08:48

## 2025-04-03 RX ADMIN — DOXAZOSIN 2 MG: 1 TABLET ORAL at 08:28

## 2025-04-03 RX ADMIN — ACETAMINOPHEN 650 MG: 160 SOLUTION ORAL at 08:32

## 2025-04-03 ASSESSMENT — ACTIVITIES OF DAILY LIVING (ADL)
ADLS_ACUITY_SCORE: 103
ADLS_ACUITY_SCORE: 99
ADLS_ACUITY_SCORE: 99
ADLS_ACUITY_SCORE: 103
ADLS_ACUITY_SCORE: 99
ADLS_ACUITY_SCORE: 99
ADLS_ACUITY_SCORE: 103
ADLS_ACUITY_SCORE: 103
ADLS_ACUITY_SCORE: 99
ADLS_ACUITY_SCORE: 99
ADLS_ACUITY_SCORE: 103
ADLS_ACUITY_SCORE: 103
ADLS_ACUITY_SCORE: 99
ADLS_ACUITY_SCORE: 103
ADLS_ACUITY_SCORE: 99
ADLS_ACUITY_SCORE: 103
ADLS_ACUITY_SCORE: 103
ADLS_ACUITY_SCORE: 99
ADLS_ACUITY_SCORE: 103
ADLS_ACUITY_SCORE: 99
ADLS_ACUITY_SCORE: 103
ADLS_ACUITY_SCORE: 99
ADLS_ACUITY_SCORE: 103

## 2025-04-03 NOTE — PLAN OF CARE
"Patient is non-verbal. Unable to assess orientation. Family remains at bedside. Appears comfortable throughout the day. No SOB or N/V noted.    Goal Outcome Evaluation:      Plan of Care Reviewed With: patient    Overall Patient Progress: no change    Outcome Evaluation: Patient remains NPO, TF held.      Problem: Adult Inpatient Plan of Care  Goal: Plan of Care Review  Description: The Plan of Care Review/Shift note should be completed every shift.  The Outcome Evaluation is a brief statement about your assessment that the patient is improving, declining, or no change.  This information will be displayed automatically on your shiftnote.  Outcome: Not Progressing  Flowsheets (Taken 4/2/2025 1952)  Outcome Evaluation: Patient remains NPO, TF held.  Plan of Care Reviewed With: patient  Overall Patient Progress: no change  Goal: Patient-Specific Goal (Individualized)  Description: You can add care plan individualizations to a care plan. Examples of Individualization might be:  \"Parent requests to be called daily at 9am for status\", \"I have a hard time hearing out of my right ear\", or \"Do not touch me to wake me up as it startlesme\".  Outcome: Not Progressing  Goal: Absence of Hospital-Acquired Illness or Injury  Outcome: Not Progressing  Intervention: Identify and Manage Fall Risk  Recent Flowsheet Documentation  Taken 4/2/2025 0815 by Mackenzie Payan RN  Safety Promotion/Fall Prevention: activity supervised  Goal: Optimal Comfort and Wellbeing  Outcome: Not Progressing  Intervention: Provide Person-Centered Care  Recent Flowsheet Documentation  Taken 4/2/2025 0815 by Mackenzie Payan RN  Trust Relationship/Rapport: care explained  Goal: Readiness for Transition of Care  Outcome: Not Progressing     Problem: Pain Acute  Goal: Optimal Pain Control and Function  Outcome: Not Progressing     Problem: Nausea and Vomiting  Goal: Nausea and Vomiting Relief  Outcome: Not Progressing     "

## 2025-04-03 NOTE — PLAN OF CARE
Goal Outcome Evaluation:      Plan of Care Reviewed With: patient    Overall Patient Progress: no changeOverall Patient Progress: no change    Outcome Evaluation: Pt nonverbal at baseline. on 1/2L NC. No signs of pain pt appears comfortable. repo Q2 hours. Chronic ordoñez in place. 1 incont bm this shift. NPO. Gtube is clamped. IV SL. Q4 B, 171 and 205. Family would like to talk to Doctor about restarting tube feeding.    Temp: 99.5  F (37.5  C) Temp src: Axillary BP: (!) 158/74 Pulse: 80   Resp: 14 SpO2: 99 % O2 Device: Nasal cannula Oxygen Delivery: 1/2 LPM       Problem: Adult Inpatient Plan of Care  Goal: Plan of Care Review  Description: The Plan of Care Review/Shift note should be completed every shift.  The Outcome Evaluation is a brief statement about your assessment that the patient is improving, declining, or no change.  This information will be displayed automatically on your shift  note.  Recent Flowsheet Documentation  Taken 4/3/2025 05 by Latesha Salguero RN  Outcome Evaluation: Pt nonverbal at baseline. on 12L NC. No signs of pain pt appears comfortable. repo Q2 hours. Chronic ordoñez in place. 1 incont bm this shift. NPO. Gtube is clamped. IV SL. Q4 B, 171 and 205. Family would like to talk to Doctor about restarting tube feeding.  Plan of Care Reviewed With: patient  Overall Patient Progress: no change  Goal: Absence of Hospital-Acquired Illness or Injury  Intervention: Identify and Manage Fall Risk  Recent Flowsheet Documentation  Taken 2025 214 by Latesha Salguero RN  Safety Promotion/Fall Prevention:   safety round/check completed   nonskid shoes/slippers when out of bed  Intervention: Prevent Skin Injury  Recent Flowsheet Documentation  Taken 4/3/2025 0452 by Latesha Salguero RN  Body Position:   turned   right  Taken 4/3/2025 0200 by Latesha Salguero RN  Body Position:   turned   left  Taken 2025 2355 by Latesha Salguero RN  Body Position:   turned    right  Intervention: Prevent and Manage VTE (Venous Thromboembolism) Risk  Recent Flowsheet Documentation  Taken 2025 by Latesha Salguero RN  VTE Prevention/Management: SCDs on (sequential compression devices)  Intervention: Prevent Infection  Recent Flowsheet Documentation  Taken 2025 by Latesha Salguero RN  Infection Prevention:   rest/sleep promoted   hand hygiene promoted  Goal: Optimal Comfort and Wellbeing  Intervention: Provide Person-Centered Care  Recent Flowsheet Documentation  Taken 2025 by Latesha Salguero RN  Trust Relationship/Rapport: care explained     Problem: Adult Inpatient Plan of Care  Goal: Plan of Care Review  Description: The Plan of Care Review/Shift note should be completed every shift.  The Outcome Evaluation is a brief statement about your assessment that the patient is improving, declining, or no change.  This information will be displayed automatically on your shiftnote.  Recent Flowsheet Documentation  Taken 4/3/2025 0513 by Latesha Salguero RN  Outcome Evaluation: Pt nonverbal at baseline. on  NC. No signs of pain pt appears comfortable. repo Q2 hours. Chronic ordoñez in place. 1 incont bm this shift. NPO. Gtube is clamped. IV SL. Q4 B, 171 and 205. Family would like to talk to Doctor about restarting tube feeding.  Plan of Care Reviewed With: patient  Overall Patient Progress: no change  Goal: Absence of Hospital-Acquired Illness or Injury  Intervention: Identify and Manage Fall Risk  Recent Flowsheet Documentation  Taken 2025 by Latesha Salguero RN  Safety Promotion/Fall Prevention:   safety round/check completed   nonskid shoes/slippers when out of bed  Intervention: Prevent Skin Injury  Recent Flowsheet Documentation  Taken 4/3/2025 0452 by Latesha Salguero RN  Body Position:   turned   right  Taken 4/3/2025 0200 by Latesha Salguero RN  Body Position:   turned   left  Taken 2025 2355 by Latesha Salguero RN  Body Position:    "turned   right  Intervention: Prevent and Manage VTE (Venous Thromboembolism) Risk  Recent Flowsheet Documentation  Taken 2025 by Latesha Salguero RN  VTE Prevention/Management: SCDs on (sequential compression devices)  Intervention: Prevent Infection  Recent Flowsheet Documentation  Taken 2025 by Latesha Salguero RN  Infection Prevention:   rest/sleep promoted   hand hygiene promoted  Goal: Optimal Comfort and Wellbeing  Intervention: Provide Person-Centered Care  Recent Flowsheet Documentation  Taken 2025 by Latesha Salguero RN  Trust Relationship/Rapport: care explained     Problem: Adult Inpatient Plan of Care  Goal: Plan of Care Review  Description: The Plan of Care Review/Shift note should be completed every shift.  The Outcome Evaluation is a brief statement about your assessment that the patient is improving, declining, or no change.  This information will be displayed automatically on your shiftnote.  Outcome: Not Progressing  Flowsheets (Taken 4/3/2025 0513)  Outcome Evaluation: Pt nonverbal at baseline. on  NC. No signs of pain pt appears comfortable. repo Q2 hours. Chronic ordoñez in place. 1 incont bm this shift. NPO. Gtube is clamped. IV SL. Q4 B, 171 and 205. Family would like to talk to Doctor about restarting tube feeding.  Plan of Care Reviewed With: patient  Overall Patient Progress: no change  Goal: Patient-Specific Goal (Individualized)  Description: You can add care plan individualizations to a care plan. Examples of Individualization might be:  \"Parent requests to be called daily at 9am for status\", \"I have a hard time hearing out of my right ear\", or \"Do not touch me to wake me up as it startlesme\".  Outcome: Not Progressing  Goal: Absence of Hospital-Acquired Illness or Injury  Outcome: Not Progressing  Intervention: Identify and Manage Fall Risk  Recent Flowsheet Documentation  Taken 2025 by Latesha Salgureo RN  Safety Promotion/Fall " Prevention:   safety round/check completed   nonskid shoes/slippers when out of bed  Intervention: Prevent Skin Injury  Recent Flowsheet Documentation  Taken 4/3/2025 0452 by Latesha Salguero RN  Body Position:   turned   right  Taken 4/3/2025 0200 by Latesha Salguero RN  Body Position:   turned   left  Taken 4/2/2025 2355 by Latesha Salguero RN  Body Position:   turned   right  Intervention: Prevent and Manage VTE (Venous Thromboembolism) Risk  Recent Flowsheet Documentation  Taken 4/2/2025 2142 by Latesha Salguero RN  VTE Prevention/Management: SCDs on (sequential compression devices)  Intervention: Prevent Infection  Recent Flowsheet Documentation  Taken 4/2/2025 2142 by Latesha Salguero RN  Infection Prevention:   rest/sleep promoted   hand hygiene promoted  Goal: Optimal Comfort and Wellbeing  Outcome: Not Progressing  Intervention: Provide Person-Centered Care  Recent Flowsheet Documentation  Taken 4/2/2025 2142 by Latesha Salguero RN  Trust Relationship/Rapport: care explained  Goal: Readiness for Transition of Care  Outcome: Not Progressing     Problem: Delirium  Goal: Improved Behavioral Control  Intervention: Minimize Safety Risk  Recent Flowsheet Documentation  Taken 4/2/2025 2142 by Latesha Salguero RN  Enhanced Safety Measures: room near unit station  Trust Relationship/Rapport: care explained     Problem: Dysrhythmia  Goal: Normalized Cardiac Rhythm  Intervention: Monitor and Manage Cardiac Rhythm Effect  Recent Flowsheet Documentation  Taken 4/2/2025 2142 by Latesha Salguero RN  VTE Prevention/Management: SCDs on (sequential compression devices)     Problem: Comorbidity Management  Goal: Maintenance of Asthma Control  Intervention: Maintain Asthma Symptom Control  Recent Flowsheet Documentation  Taken 4/2/2025 2142 by Latesha Salguero RN  Medication Review/Management: medications reviewed  Goal: Maintenance of Behavioral Health Symptom Control  Intervention: Maintain Behavioral Health Symptom  Control  Recent Flowsheet Documentation  Taken 4/2/2025 2142 by Latesha Salguero RN  Medication Review/Management: medications reviewed  Goal: Maintenance of COPD Symptom Control  Intervention: Maintain COPD Symptom Control  Recent Flowsheet Documentation  Taken 4/2/2025 2142 by Latesha Salguero RN  Medication Review/Management: medications reviewed  Goal: Blood Glucose Levels Within Targeted Range  Intervention: Monitor and Manage Glycemia  Recent Flowsheet Documentation  Taken 4/2/2025 2142 by Latesha Salguero RN  Medication Review/Management: medications reviewed  Goal: Maintenance of Heart Failure Symptom Control  Intervention: Maintain Heart Failure Management  Recent Flowsheet Documentation  Taken 4/2/2025 2142 by Latesha Salguero RN  Medication Review/Management: medications reviewed  Goal: Blood Pressure in Desired Range  Intervention: Maintain Blood Pressure Management  Recent Flowsheet Documentation  Taken 4/2/2025 2142 by Latesha Salguero RN  Medication Review/Management: medications reviewed  Goal: Maintenance of Osteoarthritis Symptom Control  Intervention: Maintain Osteoarthritis Symptom Control  Recent Flowsheet Documentation  Taken 4/3/2025 0452 by Latesha Salguero RN  Activity Management: activity adjusted per tolerance  Taken 4/3/2025 0200 by Latesha Salugero RN  Activity Management: activity adjusted per tolerance  Taken 4/2/2025 2355 by Latesha Salguero RN  Activity Management: activity adjusted per tolerance  Taken 4/2/2025 2142 by Latesha Salguero RN  Activity Management: activity adjusted per tolerance  Medication Review/Management: medications reviewed  Goal: Bariatric Home Regimen Maintained  Intervention: Maintain and Manage Postbariatric Surgery Care  Recent Flowsheet Documentation  Taken 4/2/2025 2142 by Latesha Salguero RN  Medication Review/Management: medications reviewed  Goal: Maintenance of Seizure Control  Intervention: Maintain Seizure Symptom Control  Recent Flowsheet  Documentation  Taken 4/2/2025 2142 by Latesha Salguero RN  Medication Review/Management: medications reviewed     Problem: Pain Acute  Goal: Optimal Pain Control and Function  Intervention: Prevent or Manage Pain  Recent Flowsheet Documentation  Taken 4/2/2025 2142 by Latesha Salguero RN  Medication Review/Management: medications reviewed     Problem: Pain Acute  Goal: Optimal Pain Control and Function  Outcome: Not Progressing  Intervention: Prevent or Manage Pain  Recent Flowsheet Documentation  Taken 4/2/2025 2142 by Latesha Salguero RN  Medication Review/Management: medications reviewed     Problem: Fall Injury Risk  Goal: Absence of Fall and Fall-Related Injury  Intervention: Identify and Manage Contributors  Recent Flowsheet Documentation  Taken 4/2/2025 2142 by Latesha Salguero RN  Medication Review/Management: medications reviewed  Intervention: Promote Injury-Free Environment  Recent Flowsheet Documentation  Taken 4/2/2025 2142 by Latesha Salguero RN  Safety Promotion/Fall Prevention:   safety round/check completed   nonskid shoes/slippers when out of bed

## 2025-04-03 NOTE — PROGRESS NOTES
PALLIATIVE CARE PROGRESS NOTE  [facility]     Patient Name: Jimmy Otto  Date of Admission: 3/15/2025   Today the patient was seen for:   Goals of care  Emotional and decisional support     Recommendations & Counseling     GOALS OF CARE:   Restorative with limits   Met with family to talk about goals and comfort focus and hospice, they need time to think about it and process things.  Discussed goals with family today, wife is still processing things. Daughters are at the bedside and understand where things are at, they just want Wife to make the decisions as able  We discussed stopping TF vs keeping it going wife would like to try it one more time.   To note, he may qualify for hospice with TF as he is unable to take orals...    ADVANCE CARE PLANNING:  No health care directive on file. Per system policy, Surrogate Decision-makers for Patients With Diminished Decision-making Capacity offers guidance on possible decision-makers. Daughter Clemente and Wayne and Wife and son Sathya make decisions together has been identified as a surrogate decision maker.   There is no POLST form on file, defer to patient and/or next of kin for decisions   Code status: No CPR- Do NOT Intubate    MEDICAL MANAGEMENT:   #General Weakness  Appreciate the staff for all ADLs    #Pain  Added low dose dilaudid for pain as needed. Family will ask for it if they feel he's uncomfortable  Added heating pad for comfort  Also has PRN tylenol    PSYCHOSOCIAL/SPIRITUAL:  Family Wife, two daughters and son  [employer] community: [employer]     Palliative Care will continue to follow. Thank you for the consult and allowing us to aid in the care of Jimmy Otto.    These recommendations have been discussed with medical team.    Mackenzie Medina NP  Securely message with [employer] (more info)  Text page via [employer] Paging/Directory      Assessments          Jimmy Otto is a 75 year old male admitted 3/15/2025 with acute hypoxic respiratory failure in  the setting of acute on chronic diastolic CHF exacerbation with bilateral pleura effusions R>L. The patient and family are known to this writer from the patient's hospitalization October 2021 when the family had made the decision to pursue tube feedings. The patient's past medical history is significant for cerebrovascular accident with right-sided hemiplegia and aphasia, diabetes mellitus type 2, hypertension, hyperlipidemia, status post PEG tube placement, chronic kidney disease, nephrotic range proteinuria, advanced diabetic nephropathy, anasarca, anemia, neurogenic bladder with chronic Felix catheter in place, and depressive disorder                 Interval History:     Per patient or family/caregivers today:  Met with daughters at the bedside. Discussed and reviewed TF and what to expect. We discussed that a NH is not a likely option give wife's huge fear or them. Discussed OLP if TF were to stop. We will try TF today and see how it goes.if he does not tolerate it would be reasonable just not to restart. Family agrees            Review of Systems:     Besides above, an additional system ROS was reviewed and is unremarkable               Physical Exam:   Temp: 99.3  F (37.4  C) Temp src: Oral Temp  Min: 98  F (36.7  C)  Max: 99.5  F (37.5  C) BP: (!) 159/73 Pulse: 78   Resp: 16 SpO2: 100 % O2 Device: Nasal cannula Oxygen Delivery: 1/2 LPM  Vital Signs with Ranges  Temp:  [98  F (36.7  C)-99.5  F (37.5  C)] 99.3  F (37.4  C)  Pulse:  [78-88] 78  Resp:  [14-16] 16  BP: (158-164)/(67-74) 159/73  SpO2:  [99 %-100 %] 100 %  147 lbs 4.28 oz    EXAM:  Constitutional: no distress   Cardiovascular: negative  Respiratory: negative  Psychiatric: calm  Pain: no s/s of pain               Current Problem List:   Active Problems:    Hypervolemia, unspecified hypervolemia type    Bradycardia    Pleural effusion    Hypoxia    Acute kidney injury superimposed on CKD      No Known Allergies         Data Reviewed:       Results  for orders placed or performed during the hospital encounter of 03/15/25 (from the past 24 hours)   Glucose by meter   Result Value Ref Range    GLUCOSE BY METER POCT 194 (H) 70 - 99 mg/dL   Glucose by meter   Result Value Ref Range    GLUCOSE BY METER POCT 226 (H) 70 - 99 mg/dL   Glucose by meter   Result Value Ref Range    GLUCOSE BY METER POCT 192 (H) 70 - 99 mg/dL   Glucose by meter   Result Value Ref Range    GLUCOSE BY METER POCT 171 (H) 70 - 99 mg/dL   Glucose by meter   Result Value Ref Range    GLUCOSE BY METER POCT 205 (H) 70 - 99 mg/dL   Glucose by meter   Result Value Ref Range    GLUCOSE BY METER POCT 176 (H) 70 - 99 mg/dL          Medical Decision Making       45 MINUTES SPENT BY ME on the date of service doing chart review, history, exam, documentation & further activities per the note.

## 2025-04-03 NOTE — PLAN OF CARE
"Patient sleeping between cares, but wakes easily. Non-verbal per baseline. Appears comfortable. Breathing even and unlabored. Family at bedside around the clock. Remains on O2 @ 0.5 LPM via NC. TF restarted and tolerating well.    Goal Outcome Evaluation:      Plan of Care Reviewed With: patient, child    Overall Patient Progress: no change    Outcome Evaluation: TF restarted. No N/V noted.      Problem: Adult Inpatient Plan of Care  Goal: Plan of Care Review  Description: The Plan of Care Review/Shift note should be completed every shift.  The Outcome Evaluation is a brief statement about your assessment that the patient is improving, declining, or no change.  This information will be displayed automatically on your shiftnote.  Outcome: Progressing  Flowsheets (Taken 4/3/2025 1457)  Outcome Evaluation: TF restarted. No N/V noted.  Plan of Care Reviewed With:   patient   child  Overall Patient Progress: no change  Goal: Patient-Specific Goal (Individualized)  Description: You can add care plan individualizations to a care plan. Examples of Individualization might be:  \"Parent requests to be called daily at 9am for status\", \"I have a hard time hearing out of my right ear\", or \"Do not touch me to wake me up as it startlesme\".  Outcome: Progressing  Goal: Absence of Hospital-Acquired Illness or Injury  Outcome: Progressing  Intervention: Identify and Manage Fall Risk  Recent Flowsheet Documentation  Taken 4/3/2025 0830 by Mackenzie Payan RN  Safety Promotion/Fall Prevention:   safety round/check completed   nonskid shoes/slippers when out of bed  Goal: Optimal Comfort and Wellbeing  Outcome: Progressing  Intervention: Monitor Pain and Promote Comfort  Recent Flowsheet Documentation  Taken 4/3/2025 0830 by Mackenzie Payan, RN  Pain Management Interventions:   repositioned   medication (see MAR)  Intervention: Provide Person-Centered Care  Recent Flowsheet Documentation  Taken 4/3/2025 0830 by Mackenzie Payan RN  Trust " Relationship/Rapport:   care explained   reassurance provided  Goal: Readiness for Transition of Care  Outcome: Progressing     Problem: Pain Acute  Goal: Optimal Pain Control and Function  Outcome: Progressing  Intervention: Develop Pain Management Plan  Recent Flowsheet Documentation  Taken 4/3/2025 0830 by Mackenzie Payan RN  Pain Management Interventions:   repositioned   medication (see MAR)  Intervention: Prevent or Manage Pain  Recent Flowsheet Documentation  Taken 4/3/2025 0830 by Mackenzie Payan RN  Medication Review/Management: medications reviewed     Problem: Nausea and Vomiting  Goal: Nausea and Vomiting Relief  Outcome: Progressing  Intervention: Prevent and Manage Nausea and Vomiting  Recent Flowsheet Documentation  Taken 4/3/2025 0830 by Mackenzie Payan RN  Environmental Support: calm environment promoted

## 2025-04-03 NOTE — PROGRESS NOTES
[facility]    Medicine Progress Note - Hospitalist Service    Date of Admission:  3/15/2025    Assessment & Plan   Summary of hospital stay by Dr. Graham from 4/1 reviewed and shared below. No change in management for now. Son reports that his mother is still trying to decide on next steps.       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       The patient's daughter states that patient has had some vomiting last week.  He was having increased shortness of breath.  His wife noted that his oxygen saturation was low.  He was brought to emergency room for evaluation.     Initial vital signs showed temperature 97.5, pulse 46, blood pressure 148/61, oxygen saturation 95% on room air.  Laboratory workup showed sodium 128, potassium 5.8, creatinine 5.67, ALT 36, AST 31, troponin T high-sensitivity 103.  WBC 6.5, hemoglobin 8.9, platelets 164.  Venous blood gas showed pH 7.44/pCO2 44, pO2 82.5.  Chest x-ray showed large right pleural effusion with small to moderate left pleural effusion.  Bibasilar consolidation, patchy infiltrate in the central aspect of both lungs progressed on the left and improved on the right.  Felix catheter within decompressed urinary bladder.  Anasarca has progressed.     Nephrology was consulted from emergency room and recommended Lasix 100 mg IV.  He was also given calcium gluconate, Lokelma 10 g per G-tube.  He was also given a dose of IV ceftriaxone and azithromycin.  He was admitted to the hospital for  further management.     He remains on 5 L of oxygen.  Interventional radiology performed a right-sided thoracentesis.  He continues on IV diuresis and nephrology was formally consulted.  Potassium and heart rate have improved.  Palliative care met with family to discuss goals of care and possible enrollment into hospice given his poor functional status, elderly age, worsening renal function without being a dialysis candidate, bradycardia without being a pacemaker candidate, and gradual deterioration with no truly reversible process. They understand his poor prognosis and a care conference was held with the family on 3/18 to discuss all of the above, with attendees including hospitalist, palliative care, and nephrology.  Nephrology reiterated during the care conference that the patient is a poor dialysis candidate given his multiple comorbidities    -I will refer you to excerpts of my colleagues prior documentation as listed below for other details of his prolonged and complicated hospitalization stay     Update on 3/28:   No Change in the medical management or issues.  Pt was about to be discharged on 3/27 to home with home cares however he was noted to have worsening bradycardia.  He has remained lethargic.  He was evaluated by palliative and appeared to be in the process of active dying so discharge was canceled.  My colleague poke with patient's family including patient's son Sathya and her daughter Clemente.  They understand patient's terminal trajectory. Son Sathya has been hesitant to agree on putting patient on comfort cares.  I extensively explained to both son and daughter that current medical cares are only prolonging the process and probably in the end will not provide much benefit as his underlying conditions are a irreversible.  I recommended comfort cares.  I then explained that with comfort cares we will be stopping the medications, Lasix can be continued if it looks like that this is aiding in comfort,  tube feeds will be stopped.  Family is a still talking amongst themselves to come up with a decision/plan.      Update 3/29:   Had a repeat discussion with family members at bedside today regarding the fact that the patient is actively dying, patient's son reported that there are some differing opinions amongst family members about how to proceed and there are some cultural barriers involved with this. we had an at length discussion regarding the low utility of any particular lab study to indicate how much longer he has to live, we reviewed that no such test exists.  However given his long list of medical problems and comorbidities and the inability to treat some of these, his trajectory is unlikely to change.  I strongly encouraged to transition to comfort focused cares and he expressed that it would be nice to get the patient home and I agree.  The plan will be to monitor the patient overnight in the hospital so an uncle can visit on 3/30 and if the patient is stable enough, we will consider transfer to home on comfort cares.  Family understood and agreed to this plan.  We will monitor him closely overnight and call patients family if he acutely declines.  All questions were answered     Update 3/30:   Patient had multiple episodes of emesis, tube feeds were stopped, insulin was adjusted.  At this time we had another at length discussion with the family regarding course of action.  Receiving calls from the daughters that they are concerned that the son is not communicating the plan well, meanwhile the son is at bedside and reporting back/communicating the plan to his mother/patient's wife.  Multiple family members have been traveling and it would be really helpful if they were to come to the hospital to see the patient status and discuss plan as a family.  It is clear at this time that we need to have a full family goals of care discussion and have everyone relevant to the conversation present in some form.  Will  appreciate palliative's assistance in coordinating this in the coming days.  At this time plan for no escalation of cares but will continue plan as outlined below.  Son was updated at bedside today, plan otherwise as outlined below. We will monitor him closely overnight and call patients family if he acutely declines.     Update 3/31  Holding enteral nutrition after vomiting and high risk of aspiration, Talked to his daughter who has a clear understanding of current situation and poor prognosis, with continuous decline. Meeting with palliative is pending, apparently set for tomorrow midmorning by palliative team     Update 4/1  Meeting with family and and Palliative team. Plan for comfort care and transfer to Hospice to be decided by family yet. Interventions focus on minimizing suffering.    Today: April 3  Met with palliative care service and family updated as patient's 2 daughters present at bedside  -Highly appreciate extensive input from palliative care.  -Plans to initiate as needed haloperidol for any episodes of agitation or hallucinations  -Continue to monitor ongoing tube feeding and if not able to tolerate earlier discussion stated possibly will just stop enteral feeding as he remained high risk for aspiration as well      Problem list:     Goals of care  The patient is chronically ill and essentially full cares as a result of a prior massive stroke.  He has chronic dysphagia and receives tube feeds via a PEG.  He is essentially bedbound and has residual right-sided hemiplegia and nonverbal status.  He is now faced with rapidly progressing chronic kidney disease leading to concerning electrolyte abnormalities, hypervolemia, and bradycardia.  He is an extremely poor dialysis candidate and dialysis has not been offered by nephrology.  Care conference was held on 3/18 with palliative care, nephrology, and this hospitalist.  Goals of care discussed.  Hospice being considered by family but formal decision has  not yet been made  Per one of the daughters, she says that all the family members including the daughters and patient's spouse are interested in looking at comfort measures, but the patient's son has a different viewpoint on this from the rest of the family.  Plan to wait until the uncle arrives on 3/30 and having ongoing discussions, no plans for aggressive escalation of care, if patient starts to decline family is to be called right away.      Acute hypoxic respiratory failure likely multifactorial including volume overload due to acute on chronic diastolic CHF exacerbation---improving  Bilateral pleural effusions, right > left  Patient was on Lasix 20 mg daily at home.  Per family he had decreased urine output and increased shortness of breath.  Chest x-ray showed large right pleural effusion, moderate left pleural effusion.  Received IV diuresis in the hospital and then transition to p.o.  Appreciate nephrology assistance.  -Continue oral Lasix 80 mg twice a day  -s/p R sided thoracentesis with 900 ml removed earlier in hospital stay   -AHRF overall improved now requiring only 0.5 to 1 L of nasal cannula.      Acute toxic metabolic encephalopathy  Per patient's son Sathya usually he is awake and alert and can show thumbs up or down when ask questions.  Lately patient has been more lethargic.  Suspect this is likely due to uremia.  Also has history of CVA.    -No other source of encephalopathy identified during hospital stay, likely related to the chronic illness and various comorbidities, will continue to monitor     Hyperkalemia-resolved  Hypokalemia  ALEX on CKD stage V  Nephrotic range proteinuria  Anasarca  Hyponatremia  Hypercalcemia   -Creatinine 1 year ago was near 1  -Most recent creatinine 4.07 on 2/26/2025.  Creatinine 5.67 on admission and potassium 5.8.  He was previously seen by nephrology and his daughter indicates that he was determined not to be a dialysis candidate.  Potassium has improved with  shifting agents and Lokelma.  Creatinine has been steadily climbing over the past several weeks.  -Initially hyperkalemic.  Now hypokalemic and requiring potassium replacement.  -Appreciate nephrology input.  As per nephrology not a dialysis candidate  -completed IV diuresis; now on PO diuretic for symptom management  -Felix in place and tracking I's and O's.      Bradycardia  Junctional rhythm  This was an issue during his recent hospitalization as well.  There may be some metabolic component but I do worry about some underlying conduction disease.  Poor candidate for pacemaker due to the issues outlined above.  -telemetry discontinued earlier in hospital stay     Demand ischemia  Patient had no evidence of chest pain. Troponin has been elevated during prior evaluation on 2/26/2025.  Troponin 103 on admission and flat, not consistent with acute coronary syndrome.  Likely demand ischemia from respiratory failure with poor enzyme clearance in the face of CKD.    -No further workup      Diabetes mellitus type 2  He was on Lantus insulin 14 units every morning, insulin sliding scale PTA.  -Insulin was increased to 18 units this hospitalization.  Will continue medium intensity sliding scale insulin  -Blood glucose is rising however given his tenuous state do not want to increase at this time given risk of hypoglycemia, if blood glucose continues to rise will adjust on 3/30     History of CVA with residual right-sided hemiplegia and baseline nonverbal status  Chronic dysphagia, status post PEG tube placement, G-tube exchange on 2/24/2025  S/p indwelling Felix catheter  Dyslipidemia  Hypertension  Patient resides at home and family takes care of him.  He also has a visiting nurse.  They appear to provide excellent care and support for him.  Patient's son Sathya stated that patient's spouse who is also ~ 70 years old was taking care of patient but now getting overwhelmed and would appreciate further assistance in placement.   Family now decided to take patient back home with home cares.  -PTA medications including amlodipine and atorvastatin and Plavix  -RD consulted to facilitate tube feeds.     Anemia and chronic kidney disease  -PTA ferrous sulfate.     Depression  -PTA Prozac.    Diet: Adult Formula Drip Feeding: Continuous Nepro with Carbsteady; Gastrostomy; Goal Rate: 30; mL/hr; If MD okay with restarting TF, restart at 15 mL/hr x 8 hours. If tolerating advance to goal rate.    DVT Prophylaxis: Pneumatic Compression Devices  Ordoñez Catheter: PRESENT, indication: Chronic ordoñez, Chronic ordoñez  Lines: None     Cardiac Monitoring: None  Code Status: No CPR- Do NOT Intubate            Diet: Adult Formula Drip Feeding: Continuous Nepro with Carbsteady; Gastrostomy; Goal Rate: 30; mL/hr; If MD okay with restarting TF, restart at 15 mL/hr x 12 hours. If tolerating advance to goal rate.    DVT Prophylaxis: Pneumatic Compression Devices  Ordoñez Catheter: PRESENT, indication: Chronic ordoñez, Chronic ordoñez  Lines: None     Cardiac Monitoring: None  Code Status: No CPR- Do NOT Intubate      Clinically Significant Risk Factors               # Hypoalbuminemia: Lowest albumin = 3.3 g/dL at 3/15/2025 11:50 AM, will monitor as appropriate     # Hypertension: Noted on problem list  # Chronic heart failure with preserved ejection fraction: heart failure noted on problem list and last echo with EF >50%          # DMII: A1C = 8.8 % (Ref range: <5.7 %) within past 6 months       # Financial/Environmental Concerns:           Social Drivers of Health    Interpersonal Safety: Unknown (3/15/2025)    Interpersonal Safety     Do you feel physically and emotionally safe where you currently live?: Patient unable to answer     Within the past 12 months, have you been hit, slapped, kicked or otherwise physically hurt by someone?: Patient unable to answer     Within the past 12 months, have you been humiliated or emotionally abused in other ways by your partner or  ex-partner?: Patient unable to answer          Disposition Plan     Medically Ready for Discharge: Anticipated in 2-4 Days             Vinod Santos MD, MD  Hospitalist Service  [facility]  Securely message with [employer] (more info)  Text page via [employer] Paging/Directory   ______________________________________________________________________    Interval History   I assumed medicine service care today.  Seen and examined.  Chart reviewed.  Case discussed with nursing service.  Family updated this patient's 2 daughters were present at bedside during my encounter.  Case discussed as well with palliative care service  I met this gentleman while he is laying comfortably in bed.  Reportedly at baseline he is nonverbal.  He was doing visual tracking during my encounter.  He is on minimal oxygen support.  Reportedly able to tolerate reinitiation back of enteral feeding.  Remained afebrile.  Currently not having any recurrent issues of nausea or vomiting    Physical Exam   Vital Signs: Temp: 99.3  F (37.4  C) Temp src: Oral BP: (!) 159/73 Pulse: 78   Resp: 16 SpO2: 100 % O2 Device: Nasal cannula Oxygen Delivery: 1/2 LPM  Weight: 147 lbs 4.28 oz    HEENT; Atraumatic, normocephalic, pinkish conjuctiva, pupils bilateral reactive   Chronically ill-appearing, nonverbal  Skin: warm and moist, no rashes  Lungs: equal chest expansion, clear to auscultation, no wheezes, no stridor, no crackles,   Heart: normal rate, normal rhythm, no rubs or gallops.   Abdomen: normal bowel sounds, no tenderness, no peritoneal signs, no guarding  Feeding tube in place  Extremities: no deformities, no edema   Neuro; limited exam, nonverbal, mostly bedbound, visual tracking  Psych; not combative, does not appear to be agitated      Medical Decision Making       40 MINUTES SPENT BY ME on the date of service doing chart review, history, exam, documentation & further activities per the note.  MANAGEMENT DISCUSSED with the  following over the past 24 hours: Yes   NOTE(S)/MEDICAL RECORDS REVIEWED over the past 24 hours: Yes       Data         Imaging results reviewed over the past 24 hrs:   No results found for this or any previous visit (from the past 24 hours).

## 2025-04-03 NOTE — PROGRESS NOTES
CLINICAL NUTRITION SERVICES - REASSESSMENT NOTE     Registered Dietitian Interventions:  If patient's family would like to restart tube feeds, the tube feed orders have been updated to start at half rate (15 mL/hr) for 12 hours. If tolerating okay to advance to goal rate of 30 mL/hr.         INFORMATION OBTAINED  Completed reassessment via chart review.     CURRENT NUTRITION ORDERS  Diet: NPO    Nutrition Support:   Access: G-Tube   Frequency: Continuous   Formula: Nepro   Enteral Regimen: Nepro @ goal 30 ml/hr x 24 hours (720 ml/day)   Total enteral provisions: 1296 kcals (20 kcal/kg/day), 58 g PRO (0.9 g/kg/day), 526 ml free H2O, 116 g CHO and 18 g Fiber daily.   Free Water Flushes: 60 ml q 4h for tube patency     CURRENT INTAKE/TOLERANCE  Patient had been tolerating TF at goal until 3/29 - pt had emesis x2 episodes and TF were held.  Since then TF have been held (x6 days) while family has GOC discussions.      NEW FINDINGS  GI:  1-2 BM daily      Skin/wounds:  trace edema      Nutrition-relevant labs:  last labs drawn 4/1  Nutrition-relevant medications:  nephronex, ferrous sulfate, lasix, insulin  Weight: difficult to assess trends d/t fluid and bed scale differences  Vitals:    03/29/25 0406 03/30/25 0419 03/31/25 0436 04/01/25 0300   Weight: 69.9 kg (154 lb 1.6 oz) 69.1 kg (152 lb 5.4 oz) 68.1 kg (150 lb 2.1 oz) 67 kg (147 lb 11.3 oz)    04/02/25 0207   Weight: 66.8 kg (147 lb 4.3 oz)       ASSESSED NUTRITION NEEDS  Dosing Weight: 63.8 kg based on IBW - pt w/ anasarca, unsure of dry wt  Estimated Energy Needs: 9144-6126 kcals/day (20 - 25 kcals/kg)  Justification: Maintenance  Estimated Protein Needs: 51-64 grams protein/day (0.8-1 grams of pro/kg)  Justification: CKD  Estimated Fluid Needs: defer to nephrology     MALNUTRITION  % Intake: <50% for >/= 5 days  % Weight Loss: Unable to assess d/t fluid status  Subcutaneous Fat Loss: None observed  Muscle Loss: None observed  Fluid Accumulation/Edema:  "trace  Malnutrition Diagnosis: Patient does not meet two of the established criteria necessary for diagnosing malnutrition  Malnutrition Present on Admission: No    EVALUATION OF THE PROGRESS TOWARD GOALS   Previous Goals  EN to meet % of estimated needs   Evaluation: Unable to meet - TF held    Previous Nutrition Diagnosis  Swallowing difficulty related to CVA as evidenced by pt 100% reliant on EN at baseline.   Evaluation:  TF currently held    NUTRITION DIAGNOSIS  Swallowing difficulty related to CVA as evidenced by pt 100% reliant on EN at baseline.     INTERVENTIONS  Collaboration by nutrition professional with other providers  Enteral nutrition management    GOALS  Resume TF if within patient's GOC     MONITORING/EVALUATION  Progress toward goals will be monitored and evaluated per policy.    Karen Macias, MS, RD, LD  [facility] Message Group: \"Dietitian [[place]]\"  Office Phone: [phone]  Pagers: 3rd floor/ICU: [phone]  All other floors: [phone]  Weekend/holiday: [phone]    "

## 2025-04-04 VITALS
WEIGHT: 147.27 LBS | TEMPERATURE: 98.1 F | OXYGEN SATURATION: 100 % | HEART RATE: 81 BPM | BODY MASS INDEX: 23.77 KG/M2 | RESPIRATION RATE: 15 BRPM | DIASTOLIC BLOOD PRESSURE: 78 MMHG | SYSTOLIC BLOOD PRESSURE: 171 MMHG

## 2025-04-04 LAB
ATRIAL RATE - MUSE: 65 BPM
DIASTOLIC BLOOD PRESSURE - MUSE: NORMAL MMHG
GLUCOSE BLDC GLUCOMTR-MCNC: 201 MG/DL (ref 70–99)
GLUCOSE BLDC GLUCOMTR-MCNC: 211 MG/DL (ref 70–99)
GLUCOSE BLDC GLUCOMTR-MCNC: 215 MG/DL (ref 70–99)
GLUCOSE BLDC GLUCOMTR-MCNC: 234 MG/DL (ref 70–99)
GLUCOSE BLDC GLUCOMTR-MCNC: 256 MG/DL (ref 70–99)
INTERPRETATION ECG - MUSE: NORMAL
P AXIS - MUSE: NORMAL DEGREES
PR INTERVAL - MUSE: NORMAL MS
QRS DURATION - MUSE: 80 MS
QT - MUSE: 452 MS
QTC - MUSE: 470 MS
R AXIS - MUSE: -26 DEGREES
SYSTOLIC BLOOD PRESSURE - MUSE: NORMAL MMHG
T AXIS - MUSE: -22 DEGREES
VENTRICULAR RATE- MUSE: 65 BPM

## 2025-04-04 PROCEDURE — 250N000013 HC RX MED GY IP 250 OP 250 PS 637: Performed by: INTERNAL MEDICINE

## 2025-04-04 PROCEDURE — 99232 SBSQ HOSP IP/OBS MODERATE 35: CPT | Performed by: INTERNAL MEDICINE

## 2025-04-04 PROCEDURE — 120N000001 HC R&B MED SURG/OB

## 2025-04-04 PROCEDURE — 250N000011 HC RX IP 250 OP 636: Mod: JZ | Performed by: NURSE PRACTITIONER

## 2025-04-04 PROCEDURE — 93010 ELECTROCARDIOGRAM REPORT: CPT | Performed by: INTERNAL MEDICINE

## 2025-04-04 RX ADMIN — ACETAMINOPHEN 650 MG: 160 SOLUTION ORAL at 16:11

## 2025-04-04 RX ADMIN — HYDROMORPHONE HYDROCHLORIDE 0.2 MG: 0.2 INJECTION, SOLUTION INTRAMUSCULAR; INTRAVENOUS; SUBCUTANEOUS at 21:15

## 2025-04-04 RX ADMIN — FLUOXETINE 20 MG: 20 SOLUTION ORAL at 10:13

## 2025-04-04 RX ADMIN — CLOPIDOGREL BISULFATE 75 MG: 75 TABLET ORAL at 08:42

## 2025-04-04 RX ADMIN — ACETAMINOPHEN 650 MG: 160 SOLUTION ORAL at 08:41

## 2025-04-04 RX ADMIN — INSULIN ASPART 3 UNITS: 100 INJECTION, SOLUTION INTRAVENOUS; SUBCUTANEOUS at 11:44

## 2025-04-04 RX ADMIN — Medication 5 ML: at 20:48

## 2025-04-04 RX ADMIN — INSULIN ASPART 2 UNITS: 100 INJECTION, SOLUTION INTRAVENOUS; SUBCUTANEOUS at 08:58

## 2025-04-04 RX ADMIN — INSULIN ASPART 2 UNITS: 100 INJECTION, SOLUTION INTRAVENOUS; SUBCUTANEOUS at 04:11

## 2025-04-04 RX ADMIN — FUROSEMIDE 80 MG: 10 SOLUTION ORAL at 09:42

## 2025-04-04 RX ADMIN — INSULIN ASPART 3 UNITS: 100 INJECTION, SOLUTION INTRAVENOUS; SUBCUTANEOUS at 16:11

## 2025-04-04 RX ADMIN — AMLODIPINE BESYLATE 10 MG: 10 TABLET ORAL at 08:42

## 2025-04-04 RX ADMIN — INSULIN ASPART 2 UNITS: 100 INJECTION, SOLUTION INTRAVENOUS; SUBCUTANEOUS at 20:48

## 2025-04-04 RX ADMIN — FUROSEMIDE 80 MG: 10 SOLUTION ORAL at 16:11

## 2025-04-04 RX ADMIN — DOXAZOSIN 2 MG: 1 TABLET ORAL at 08:42

## 2025-04-04 ASSESSMENT — ACTIVITIES OF DAILY LIVING (ADL)
ADLS_ACUITY_SCORE: 103
ADLS_ACUITY_SCORE: 99
ADLS_ACUITY_SCORE: 103
ADLS_ACUITY_SCORE: 99
ADLS_ACUITY_SCORE: 103
ADLS_ACUITY_SCORE: 103
ADLS_ACUITY_SCORE: 99
ADLS_ACUITY_SCORE: 99
ADLS_ACUITY_SCORE: 103
ADLS_ACUITY_SCORE: 99
ADLS_ACUITY_SCORE: 103
ADLS_ACUITY_SCORE: 99
ADLS_ACUITY_SCORE: 99
ADLS_ACUITY_SCORE: 103
ADLS_ACUITY_SCORE: 99
ADLS_ACUITY_SCORE: 103
ADLS_ACUITY_SCORE: 99
ADLS_ACUITY_SCORE: 103

## 2025-04-04 NOTE — PLAN OF CARE
Pt's son at bedside overnight. Pt was due to have tube feed rate adjusted to 30mL/hr which is the goal rate. Per discussion with the pt's son, he wants the rate to remain at the reduced rate of 15mL/hr which has been infusing the last 12hr. The son wants to consider the rate change tomorrow rather than overnight. Son concerned of nausea/vomiting despite the 12hrs of the lower rate infusion with no nausea or vomiting.

## 2025-04-04 NOTE — PLAN OF CARE
"Care from 19:00-07:30  Inpatient Progress Note - MS3  For vital signs and complete assessments, please see documentation flowsheets.     ORIENTATION: Alert, non-verbal. Calm.  VSS except elevated BP which improved overnight slightly.  PAIN: Non-verbal indications of pain noted and intervened on per scheduled Tylenol.  O2: 0.5L NC for comfort.  GI/: x1 smear BM; has chronic ordoñez.  ACTIVITY: A2 lift; turned/repo'd.  DIET: NPO - has PEG/TF.  LINES/DRAINS: L PIV SL.  B, 227, 234.  PLAN: Pending; palliative following.    Goal Outcome Evaluation:      Plan of Care Reviewed With: patient    Overall Patient Progress: no changeOverall Patient Progress: no change    Outcome Evaluation: TF remains at 15mL/hr per son request (rather than advanving to goal rate of 30mL/hr), no nausea/vomiting observed; scheduled Tylenol given as ordered as pt had non-verbal indications of pain. Remains on 0.5L NC for comfort.    Problem: Adult Inpatient Plan of Care  Goal: Plan of Care Review  Description: The Plan of Care Review/Shift note should be completed every shift.  The Outcome Evaluation is a brief statement about your assessment that the patient is improving, declining, or no change.  This information will be displayed automatically on your shiftnote.  Outcome: Progressing  Flowsheets (Taken 2025)  Outcome Evaluation:   TF remains at 15mL/hr per son request (rather than advanving to goal rate of 30mL/hr), no nausea/vomiting observed   scheduled Tylenol given as ordered as pt had non-verbal indications of pain. Remains on 0.5L NC for comfort.  Plan of Care Reviewed With: patient  Overall Patient Progress: no change  Goal: Patient-Specific Goal (Individualized)  Description: You can add care plan individualizations to a care plan. Examples of Individualization might be:  \"Parent requests to be called daily at 9am for status\", \"I have a hard time hearing out of my right ear\", or \"Do not touch me to wake me up as it " "startlesme\".  Outcome: Progressing  Goal: Absence of Hospital-Acquired Illness or Injury  Outcome: Progressing  Goal: Optimal Comfort and Wellbeing  Outcome: Progressing  Goal: Readiness for Transition of Care  Outcome: Progressing     Problem: Pain Acute  Goal: Optimal Pain Control and Function  Outcome: Progressing     Problem: Nausea and Vomiting  Goal: Nausea and Vomiting Relief  Outcome: Progressing     "

## 2025-04-04 NOTE — PLAN OF CARE
Goal Outcome Evaluation:      Plan of Care Reviewed With: patient, caregiver, child, family    Overall Patient Progress: no changeOverall Patient Progress: no change    Outcome Evaluation: tele monitoring started today, no change in condition Patient presents in bed, non verbal but alert. Able to give a thumbs up or down when asked yes or no questions. Morning meds given, pain resolved with tylenol. PEG tube intact and patent, 15ml/hr feed rate. Free water flush 60ml given. BP a little high this AM, came down with med administration to around 124/70. Family in room, voicing concerns that his heart rate keeps dropping into the 30s. MD rounding and ordered and EKG and continuous Tele monitoring. Tele placed and active monitoring in place. Repo Q2H with patric cares. Felix catheter in place, cath cares done during bed bath. No skin issues at this time.         Problem: Adult Inpatient Plan of Care  Goal: Plan of Care Review  Description: The Plan of Care Review/Shift note should be completed every shift.  The Outcome Evaluation is a brief statement about your assessment that the patient is improving, declining, or no change.  This information will be displayed automatically on your shift  note.  Recent Flowsheet Documentation  Taken 4/4/2025 1841 by Carly Alfaro RN  Outcome Evaluation: tele monitoring started today, no change in condition  Plan of Care Reviewed With:   patient   caregiver   child   family  Overall Patient Progress: no change  Goal: Absence of Hospital-Acquired Illness or Injury  Intervention: Identify and Manage Fall Risk  Recent Flowsheet Documentation  Taken 4/4/2025 0842 by Carly Alfaro RN  Safety Promotion/Fall Prevention:   safety round/check completed   nonskid shoes/slippers when out of bed  Intervention: Prevent Skin Injury  Recent Flowsheet Documentation  Taken 4/4/2025 0842 by Carly Alfaro RN  Body Position:   neutral body alignment   lower extremity  elevated  Intervention: Prevent Infection  Recent Flowsheet Documentation  Taken 4/4/2025 1758 by Carly Alfaro, RN  Infection Prevention:   cohorting utilized   hand hygiene promoted   rest/sleep promoted  Taken 4/4/2025 0842 by Carly Alfaro, RN  Infection Prevention:   rest/sleep promoted   single patient room provided

## 2025-04-04 NOTE — PROGRESS NOTES
[facility]    Medicine Progress Note - Hospitalist Service    Date of Admission:  3/15/2025    Assessment & Plan   Summary of hospital stay by Dr. Graham from 4/1 reviewed and shared below. No change in management for now. Son reports that his mother is still trying to decide on next steps.       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       The patient's daughter states that patient has had some vomiting last week.  He was having increased shortness of breath.  His wife noted that his oxygen saturation was low.  He was brought to emergency room for evaluation.     Initial vital signs showed temperature 97.5, pulse 46, blood pressure 148/61, oxygen saturation 95% on room air.  Laboratory workup showed sodium 128, potassium 5.8, creatinine 5.67, ALT 36, AST 31, troponin T high-sensitivity 103.  WBC 6.5, hemoglobin 8.9, platelets 164.  Venous blood gas showed pH 7.44/pCO2 44, pO2 82.5.  Chest x-ray showed large right pleural effusion with small to moderate left pleural effusion.  Bibasilar consolidation, patchy infiltrate in the central aspect of both lungs progressed on the left and improved on the right.  Felix catheter within decompressed urinary bladder.  Anasarca has progressed.     Nephrology was consulted from emergency room and recommended Lasix 100 mg IV.  He was also given calcium gluconate, Lokelma 10 g per G-tube.  He was also given a dose of IV ceftriaxone and azithromycin.  He was admitted to the hospital for  further management.     He remains on 5 L of oxygen.  Interventional radiology performed a right-sided thoracentesis.  He continues on IV diuresis and nephrology was formally consulted.  Potassium and heart rate have improved.  Palliative care met with family to discuss goals of care and possible enrollment into hospice given his poor functional status, elderly age, worsening renal function without being a dialysis candidate, bradycardia without being a pacemaker candidate, and gradual deterioration with no truly reversible process. They understand his poor prognosis and a care conference was held with the family on 3/18 to discuss all of the above, with attendees including hospitalist, palliative care, and nephrology.  Nephrology reiterated during the care conference that the patient is a poor dialysis candidate given his multiple comorbidities    -I will refer you to excerpts of my colleagues prior documentation as listed below for other details of his prolonged and complicated hospitalization stay     Update on 3/28:   No Change in the medical management or issues.  Pt was about to be discharged on 3/27 to home with home cares however he was noted to have worsening bradycardia.  He has remained lethargic.  He was evaluated by palliative and appeared to be in the process of active dying so discharge was canceled.  My colleague poke with patient's family including patient's son Sathya and her daughter Clemente.  They understand patient's terminal trajectory. Son Sathya has been hesitant to agree on putting patient on comfort cares.  I extensively explained to both son and daughter that current medical cares are only prolonging the process and probably in the end will not provide much benefit as his underlying conditions are a irreversible.  I recommended comfort cares.  I then explained that with comfort cares we will be stopping the medications, Lasix can be continued if it looks like that this is aiding in comfort,  tube feeds will be stopped.  Family is a still talking amongst themselves to come up with a decision/plan.      Update 3/29:   Had a repeat discussion with family members at bedside today regarding the fact that the patient is actively dying, patient's son reported that there are some differing opinions amongst family members about how to proceed and there are some cultural barriers involved with this. we had an at length discussion regarding the low utility of any particular lab study to indicate how much longer he has to live, we reviewed that no such test exists.  However given his long list of medical problems and comorbidities and the inability to treat some of these, his trajectory is unlikely to change.  I strongly encouraged to transition to comfort focused cares and he expressed that it would be nice to get the patient home and I agree.  The plan will be to monitor the patient overnight in the hospital so an uncle can visit on 3/30 and if the patient is stable enough, we will consider transfer to home on comfort cares.  Family understood and agreed to this plan.  We will monitor him closely overnight and call patients family if he acutely declines.  All questions were answered     Update 3/30:   Patient had multiple episodes of emesis, tube feeds were stopped, insulin was adjusted.  At this time we had another at length discussion with the family regarding course of action.  Receiving calls from the daughters that they are concerned that the son is not communicating the plan well, meanwhile the son is at bedside and reporting back/communicating the plan to his mother/patient's wife.  Multiple family members have been traveling and it would be really helpful if they were to come to the hospital to see the patient status and discuss plan as a family.  It is clear at this time that we need to have a full family goals of care discussion and have everyone relevant to the conversation present in some form.  Will  appreciate palliative's assistance in coordinating this in the coming days.  At this time plan for no escalation of cares but will continue plan as outlined below.  Son was updated at bedside today, plan otherwise as outlined below. We will monitor him closely overnight and call patients family if he acutely declines.     Update 3/31  Holding enteral nutrition after vomiting and high risk of aspiration, Talked to his daughter who has a clear understanding of current situation and poor prognosis, with continuous decline. Meeting with palliative is pending, apparently set for tomorrow midmorning by palliative team     Update 4/1  Meeting with family and and Palliative team. Plan for comfort care and transfer to Hospice to be decided by family yet. Interventions focus on minimizing suffering.    Today: April 4    -palliative care service following with us.  Further delineation of goals of care as per discussion earlier with palliative care service and family  - Spoke with family this morning and given earlier possible dry heaving we will continue current tube feeding rate at 15 mL/h  - No reports of any vomiting spells or diarrhea  - I elected to place cardiac monitor given concerns that he might be having some bradycardic spells earlier.  Requested for EKG      Problem list:     Goals of care  The patient is chronically ill and essentially full cares as a result of a prior massive stroke.  He has chronic dysphagia and receives tube feeds via a PEG.  He is essentially bedbound and has residual right-sided hemiplegia and nonverbal status.  He is now faced with rapidly progressing chronic kidney disease leading to concerning electrolyte abnormalities, hypervolemia, and bradycardia.  He is an extremely poor dialysis candidate and dialysis has not been offered by nephrology.  Care conference was held on 3/18 with palliative care, nephrology, and this hospitalist.  Goals of care discussed.  Hospice being considered by  family but formal decision has not yet been made  Per one of the daughters, she says that all the family members including the daughters and patient's spouse are interested in looking at comfort measures, but the patient's son has a different viewpoint on this from the rest of the family.  Plan to wait until the uncle arrives on 3/30 and having ongoing discussions, no plans for aggressive escalation of care, if patient starts to decline family is to be called right away.      Acute hypoxic respiratory failure likely multifactorial including volume overload due to acute on chronic diastolic CHF exacerbation---improving  Bilateral pleural effusions, right > left  Patient was on Lasix 20 mg daily at home.  Per family he had decreased urine output and increased shortness of breath.  Chest x-ray showed large right pleural effusion, moderate left pleural effusion.  Received IV diuresis in the hospital and then transition to p.o.  Appreciate nephrology assistance.  -Continue oral Lasix 80 mg twice a day  -s/p R sided thoracentesis with 900 ml removed earlier in hospital stay   -AHRF overall improved now requiring only 0.5 to 1 L of nasal cannula.      Acute toxic metabolic encephalopathy  Per patient's son Sathya usually he is awake and alert and can show thumbs up or down when ask questions.  Lately patient has been more lethargic.  Suspect this is likely due to uremia.  Also has history of CVA.    -No other source of encephalopathy identified during hospital stay, likely related to the chronic illness and various comorbidities, will continue to monitor     Hyperkalemia-resolved  Hypokalemia  ALEX on CKD stage V  Nephrotic range proteinuria  Anasarca  Hyponatremia  Hypercalcemia   -Creatinine 1 year ago was near 1  -Most recent creatinine 4.07 on 2/26/2025.  Creatinine 5.67 on admission and potassium 5.8.  He was previously seen by nephrology and his daughter indicates that he was determined not to be a dialysis candidate.   Potassium has improved with shifting agents and Lokelma.  Creatinine has been steadily climbing over the past several weeks.  -Initially hyperkalemic.  Now hypokalemic and requiring potassium replacement.  -Appreciate nephrology input.  As per nephrology not a dialysis candidate  -completed IV diuresis; now on PO diuretic for symptom management  -Felix in place and tracking I's and O's.      Bradycardia  Junctional rhythm  This was an issue during his recent hospitalization as well.  There may be some metabolic component but I do worry about some underlying conduction disease.  Poor candidate for pacemaker due to the issues outlined above.  -telemetry discontinued earlier in hospital stay     Demand ischemia  Patient had no evidence of chest pain. Troponin has been elevated during prior evaluation on 2/26/2025.  Troponin 103 on admission and flat, not consistent with acute coronary syndrome.  Likely demand ischemia from respiratory failure with poor enzyme clearance in the face of CKD.    -No further workup      Diabetes mellitus type 2  He was on Lantus insulin 14 units every morning, insulin sliding scale PTA.  -Insulin was increased to 18 units this hospitalization.  Will continue medium intensity sliding scale insulin  -Blood glucose is rising however given his tenuous state do not want to increase at this time given risk of hypoglycemia, if blood glucose continues to rise will adjust on 3/30     History of CVA with residual right-sided hemiplegia and baseline nonverbal status  Chronic dysphagia, status post PEG tube placement, G-tube exchange on 2/24/2025  S/p indwelling Felix catheter  Dyslipidemia  Hypertension  Patient resides at home and family takes care of him.  He also has a visiting nurse.  They appear to provide excellent care and support for him.  Patient's son Sathya stated that patient's spouse who is also ~ 70 years old was taking care of patient but now getting overwhelmed and would appreciate  further assistance in placement.  Family now decided to take patient back home with home cares.  -PTA medications including amlodipine and atorvastatin and Plavix  -RD consulted to facilitate tube feeds.     Anemia and chronic kidney disease  -PTA ferrous sulfate.     Depression  -PTA Prozac.    Diet: Adult Formula Drip Feeding: Continuous Nepro with Carbsteady; Gastrostomy; Goal Rate: 30; mL/hr; If MD okay with restarting TF, restart at 15 mL/hr x 8 hours. If tolerating advance to goal rate.    DVT Prophylaxis: Pneumatic Compression Devices  Ordoñez Catheter: PRESENT, indication: Chronic ordoñez, Chronic ordoñez  Lines: None     Cardiac Monitoring: None  Code Status: No CPR- Do NOT Intubate            Diet: Adult Formula Drip Feeding: Continuous Nepro with Carbsteady; Gastrostomy; Goal Rate: 30; mL/hr; If MD okay with restarting TF, restart at 15 mL/hr x 12 hours. If tolerating advance to goal rate.    DVT Prophylaxis: Pneumatic Compression Devices  Ordoñez Catheter: PRESENT, indication: Chronic ordoñez, Chronic ordoñez  Lines: None     Cardiac Monitoring: ACTIVE order. Indication: Tachyarrhythmias, acute (48 hours)  Code Status: No CPR- Do NOT Intubate      Clinically Significant Risk Factors               # Hypoalbuminemia: Lowest albumin = 3.3 g/dL at 3/15/2025 11:50 AM, will monitor as appropriate     # Hypertension: Noted on problem list    # Chronic heart failure with preserved ejection fraction: heart failure noted on problem list and last echo with EF >50%          # DMII: A1C = 8.8 % (Ref range: <5.7 %) within past 6 months         # Financial/Environmental Concerns:           Social Drivers of Health    Interpersonal Safety: Unknown (3/15/2025)    Interpersonal Safety     Do you feel physically and emotionally safe where you currently live?: Patient unable to answer     Within the past 12 months, have you been hit, slapped, kicked or otherwise physically hurt by someone?: Patient unable to answer     Within the past  12 months, have you been humiliated or emotionally abused in other ways by your partner or ex-partner?: Patient unable to answer          Disposition Plan     Medically Ready for Discharge: Anticipated in 2-4 Days             Vinod Santos MD, MD  Hospitalist Service  [facility]  Securely message with [employer] (more info)  Text page via [facility] Paging/Directory   ______________________________________________________________________    Interval History   Continuing medicine service care today.  Seen and examined.  Chart reviewed.  Case discussed with nursing service.  Family updated this patient's 2 daughters were present at bedside during my encounter.   Reportedly patient had some possible nausea and dry heaving demeanor overnight but no actual vomiting spells  -Patient's family also verbalized concerns earlier that patient's heart rate was dropping to low 40s and high 50s earlier  -No reports of any diarrhea, bleeding tendencies, no escalation of oxygen needs currently at 1 L by nasal cannula.  No reports of coughing spells.  Not labored breathing.  Afebrile.  Remains to be a poor historian, at baseline mental state, nonverbal    Physical Exam   Vital Signs: Temp: 98  F (36.7  C) Temp src: Axillary BP: (!) 180/81 Pulse: 86   Resp: 15 SpO2: 99 % O2 Device: Nasal cannula Oxygen Delivery: 1/2 LPM  Weight: 107 lbs 5.82 oz    HEENT; Atraumatic, normocephalic, pinkish conjuctiva, pupils bilateral reactive   Chronically ill-appearing, nonverbal  Skin: warm and moist, no rashes  Lungs: equal chest expansion, clear to auscultation, no wheezes, no stridor, no crackles,   Heart: normal rate, normal rhythm, no rubs or gallops.   Abdomen: normal bowel sounds, no tenderness, no peritoneal signs, no guarding  Feeding tube in place  Extremities: no deformities, no edema   Neuro; limited exam, nonverbal, mostly bedbound, visual tracking  Psych; not combative, does not appear to be agitated      Medical  Decision Making       30 MINUTES SPENT BY ME on the date of service doing chart review, history, exam, documentation & further activities per the note.  MANAGEMENT DISCUSSED with the following over the past 24 hours: Yes   NOTE(S)/MEDICAL RECORDS REVIEWED over the past 24 hours: This       Data         Imaging results reviewed over the past 24 hrs:   No results found for this or any previous visit (from the past 24 hours).

## 2025-04-05 LAB
GLUCOSE BLDC GLUCOMTR-MCNC: 195 MG/DL (ref 70–99)
GLUCOSE BLDC GLUCOMTR-MCNC: 206 MG/DL (ref 70–99)
GLUCOSE BLDC GLUCOMTR-MCNC: 210 MG/DL (ref 70–99)
GLUCOSE BLDC GLUCOMTR-MCNC: 210 MG/DL (ref 70–99)
GLUCOSE BLDC GLUCOMTR-MCNC: 226 MG/DL (ref 70–99)
GLUCOSE BLDC GLUCOMTR-MCNC: 229 MG/DL (ref 70–99)

## 2025-04-05 PROCEDURE — 250N000013 HC RX MED GY IP 250 OP 250 PS 637: Performed by: INTERNAL MEDICINE

## 2025-04-05 PROCEDURE — 120N000001 HC R&B MED SURG/OB

## 2025-04-05 PROCEDURE — 99232 SBSQ HOSP IP/OBS MODERATE 35: CPT | Performed by: INTERNAL MEDICINE

## 2025-04-05 PROCEDURE — 250N000011 HC RX IP 250 OP 636: Performed by: INTERNAL MEDICINE

## 2025-04-05 RX ADMIN — INSULIN ASPART 2 UNITS: 100 INJECTION, SOLUTION INTRAVENOUS; SUBCUTANEOUS at 16:50

## 2025-04-05 RX ADMIN — ACETAMINOPHEN 650 MG: 160 SOLUTION ORAL at 00:36

## 2025-04-05 RX ADMIN — ACETAMINOPHEN 650 MG: 160 SOLUTION ORAL at 09:08

## 2025-04-05 RX ADMIN — AMLODIPINE BESYLATE 10 MG: 10 TABLET ORAL at 09:09

## 2025-04-05 RX ADMIN — DOXAZOSIN 2 MG: 1 TABLET ORAL at 09:09

## 2025-04-05 RX ADMIN — ACETAMINOPHEN 650 MG: 160 SOLUTION ORAL at 16:36

## 2025-04-05 RX ADMIN — INSULIN ASPART 2 UNITS: 100 INJECTION, SOLUTION INTRAVENOUS; SUBCUTANEOUS at 13:55

## 2025-04-05 RX ADMIN — ONDANSETRON 4 MG: 2 INJECTION, SOLUTION INTRAMUSCULAR; INTRAVENOUS at 20:36

## 2025-04-05 RX ADMIN — INSULIN ASPART 2 UNITS: 100 INJECTION, SOLUTION INTRAVENOUS; SUBCUTANEOUS at 00:36

## 2025-04-05 RX ADMIN — CLOPIDOGREL BISULFATE 75 MG: 75 TABLET ORAL at 09:08

## 2025-04-05 RX ADMIN — INSULIN ASPART 2 UNITS: 100 INJECTION, SOLUTION INTRAVENOUS; SUBCUTANEOUS at 04:36

## 2025-04-05 RX ADMIN — INSULIN ASPART 2 UNITS: 100 INJECTION, SOLUTION INTRAVENOUS; SUBCUTANEOUS at 09:10

## 2025-04-05 RX ADMIN — FUROSEMIDE 80 MG: 10 SOLUTION ORAL at 09:10

## 2025-04-05 RX ADMIN — INSULIN ASPART 2 UNITS: 100 INJECTION, SOLUTION INTRAVENOUS; SUBCUTANEOUS at 20:38

## 2025-04-05 ASSESSMENT — ACTIVITIES OF DAILY LIVING (ADL)
ADLS_ACUITY_SCORE: 105
ADLS_ACUITY_SCORE: 105
ADLS_ACUITY_SCORE: 103
ADLS_ACUITY_SCORE: 105
ADLS_ACUITY_SCORE: 103
ADLS_ACUITY_SCORE: 103
ADLS_ACUITY_SCORE: 105
ADLS_ACUITY_SCORE: 103
ADLS_ACUITY_SCORE: 105
ADLS_ACUITY_SCORE: 103
ADLS_ACUITY_SCORE: 105
ADLS_ACUITY_SCORE: 103
ADLS_ACUITY_SCORE: 105
ADLS_ACUITY_SCORE: 105
ADLS_ACUITY_SCORE: 103
ADLS_ACUITY_SCORE: 105
ADLS_ACUITY_SCORE: 103
ADLS_ACUITY_SCORE: 105

## 2025-04-05 NOTE — PLAN OF CARE
"  Overall Patient Progress: no changeOverall Patient Progress: no change    Outcome Evaluation: Pts is showing signs of 4/10 pain, See MAR. Pt repositioned Q2. Ordoñez catheter patent. No tube feeding overnight, Family requesting minimal flushing with med administration. Family with patient overnight.    Neuro:  Pt is nonverbal ZAYDA orientation. Alert to pain and voice.   Activity: are on bedrest. Ax-2 repositioning/Lift.  Telemetry Monitoring: Yes - bradycardic  Pain: showing nonverbal signs of pain.  Tylenol given for pain.  Labs / Tests: Blood glucose: 195, 210  GI: incontinent to bowel. No BM overnight.  : Ordoñez catheter, ordoñez cares completed  O2: 0.5 L NC for comfort, per family request  LDA's: Peripheral and Feeding tube  Fluids: is Saline locked.  Diet: NPO. No tube feeding per pts family request including minimal water flushes with medicaiton.  Discharge Disposition:  TBD , Family undecided.        Problem: Adult Inpatient Plan of Care  Goal: Plan of Care Review  Description: The Plan of Care Review/Shift note should be completed every shift.  The Outcome Evaluation is a brief statement about your assessment that the patient is improving, declining, or no change.  This information will be displayed automatically on your shiftnote.  Outcome: Progressing  Flowsheets (Taken 4/5/2025 0253)  Outcome Evaluation: Pts is showing signs of 4/10 pain, See MAR. Pt repositioned Q2. Ordoñez catheter patent. No tube feeding overnight, Family requesting minimal flushing with med administration. Family with patient overnight.  Overall Patient Progress: no change  Goal: Patient-Specific Goal (Individualized)  Description: You can add care plan individualizations to a care plan. Examples of Individualization might be:  \"Parent requests to be called daily at 9am for status\", \"I have a hard time hearing out of my right ear\", or \"Do not touch me to wake me up as it startlesme\".  Outcome: Progressing  Goal: Absence of " Hospital-Acquired Illness or Injury  Outcome: Progressing  Goal: Optimal Comfort and Wellbeing  Outcome: Progressing  Goal: Readiness for Transition of Care  Outcome: Progressing     Problem: Pain Acute  Goal: Optimal Pain Control and Function  Outcome: Progressing     Problem: Nausea and Vomiting  Goal: Nausea and Vomiting Relief  Outcome: Progressing

## 2025-04-05 NOTE — PROGRESS NOTES
[facility]    Medicine Progress Note - Hospitalist Service    Date of Admission:  3/15/2025    Assessment & Plan   Summary of hospital stay by Dr. Graham from 4/1 reviewed and shared below. No change in management for now. Son reports that his mother is still trying to decide on next steps.       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       The patient's daughter states that patient has had some vomiting last week.  He was having increased shortness of breath.  His wife noted that his oxygen saturation was low.  He was brought to emergency room for evaluation.     Initial vital signs showed temperature 97.5, pulse 46, blood pressure 148/61, oxygen saturation 95% on room air.  Laboratory workup showed sodium 128, potassium 5.8, creatinine 5.67, ALT 36, AST 31, troponin T high-sensitivity 103.  WBC 6.5, hemoglobin 8.9, platelets 164.  Venous blood gas showed pH 7.44/pCO2 44, pO2 82.5.  Chest x-ray showed large right pleural effusion with small to moderate left pleural effusion.  Bibasilar consolidation, patchy infiltrate in the central aspect of both lungs progressed on the left and improved on the right.  Felix catheter within decompressed urinary bladder.  Anasarca has progressed.     Nephrology was consulted from emergency room and recommended Lasix 100 mg IV.  He was also given calcium gluconate, Lokelma 10 g per G-tube.  He was also given a dose of IV ceftriaxone and azithromycin.  He was admitted to the hospital for  further management.     He remains on 5 L of oxygen.  Interventional radiology performed a right-sided thoracentesis.  He continues on IV diuresis and nephrology was formally consulted.  Potassium and heart rate have improved.  Palliative care met with family to discuss goals of care and possible enrollment into hospice given his poor functional status, elderly age, worsening renal function without being a dialysis candidate, bradycardia without being a pacemaker candidate, and gradual deterioration with no truly reversible process. They understand his poor prognosis and a care conference was held with the family on 3/18 to discuss all of the above, with attendees including hospitalist, palliative care, and nephrology.  Nephrology reiterated during the care conference that the patient is a poor dialysis candidate given his multiple comorbidities    -I will refer you to excerpts of my colleagues prior documentation as listed below for other details of his prolonged and complicated hospitalization stay     Update on 3/28:   No Change in the medical management or issues.  Pt was about to be discharged on 3/27 to home with home cares however he was noted to have worsening bradycardia.  He has remained lethargic.  He was evaluated by palliative and appeared to be in the process of active dying so discharge was canceled.  My colleague poke with patient's family including patient's son Sathya and her daughter Clemente.  They understand patient's terminal trajectory. Son Sathya has been hesitant to agree on putting patient on comfort cares.  I extensively explained to both son and daughter that current medical cares are only prolonging the process and probably in the end will not provide much benefit as his underlying conditions are a irreversible.  I recommended comfort cares.  I then explained that with comfort cares we will be stopping the medications, Lasix can be continued if it looks like that this is aiding in comfort,  tube feeds will be stopped.  Family is a still talking amongst themselves to come up with a decision/plan.      Update 3/29:   Had a repeat discussion with family members at bedside today regarding the fact that the patient is actively dying, patient's son reported that there are some differing opinions amongst family members about how to proceed and there are some cultural barriers involved with this. we had an at length discussion regarding the low utility of any particular lab study to indicate how much longer he has to live, we reviewed that no such test exists.  However given his long list of medical problems and comorbidities and the inability to treat some of these, his trajectory is unlikely to change.  I strongly encouraged to transition to comfort focused cares and he expressed that it would be nice to get the patient home and I agree.  The plan will be to monitor the patient overnight in the hospital so an uncle can visit on 3/30 and if the patient is stable enough, we will consider transfer to home on comfort cares.  Family understood and agreed to this plan.  We will monitor him closely overnight and call patients family if he acutely declines.  All questions were answered     Update 3/30:   Patient had multiple episodes of emesis, tube feeds were stopped, insulin was adjusted.  At this time we had another at length discussion with the family regarding course of action.  Receiving calls from the daughters that they are concerned that the son is not communicating the plan well, meanwhile the son is at bedside and reporting back/communicating the plan to his mother/patient's wife.  Multiple family members have been traveling and it would be really helpful if they were to come to the hospital to see the patient status and discuss plan as a family.  It is clear at this time that we need to have a full family goals of care discussion and have everyone relevant to the conversation present in some form.  Will  appreciate palliative's assistance in coordinating this in the coming days.  At this time plan for no escalation of cares but will continue plan as outlined below.  Son was updated at bedside today, plan otherwise as outlined below. We will monitor him closely overnight and call patients family if he acutely declines.     Update 3/31  Holding enteral nutrition after vomiting and high risk of aspiration, Talked to his daughter who has a clear understanding of current situation and poor prognosis, with continuous decline. Meeting with palliative is pending, apparently set for tomorrow midmorning by palliative team     Update 4/1  Meeting with family and and Palliative team. Plan for comfort care and transfer to Hospice to be decided by family yet. Interventions focus on minimizing suffering.    Today: April 5  -Tube feeding was discontinued yesterday due to concerns that family noted that he is getting more bradycardic while receiving tube feeding.  - He was placed on cardiac monitoring and currently is not showing junctional rhythm between low 50s to high 60s.  - Heart rate remained the same even while not receiving tube feeding  - Discussed this with family and further decisions were made regarding reinitiation back of tube feeding at 15 mL/h.  Continue telemonitoring.  - Will continue to monitor if patient will be able to tolerate this rate and no further issues with his heart rate  - Discussed with family as well that prior EKG did show previously known junctional rhythm.          Problem list:     Goals of care  The patient is chronically ill and essentially full cares as a result of a prior massive stroke.  He has chronic dysphagia and receives tube feeds via a PEG.  He is essentially bedbound and has residual right-sided hemiplegia and nonverbal status.  He is now faced with rapidly progressing chronic kidney disease leading to concerning electrolyte abnormalities, hypervolemia, and bradycardia.  He is an  extremely poor dialysis candidate and dialysis has not been offered by nephrology.  Care conference was held on 3/18 with palliative care, nephrology, and this hospitalist.  Goals of care discussed.  Hospice being considered by family but formal decision has not yet been made  Per one of the daughters, she says that all the family members including the daughters and patient's spouse are interested in looking at comfort measures, but the patient's son has a different viewpoint on this from the rest of the family.  Plan to wait until the uncle arrives on 3/30 and having ongoing discussions, no plans for aggressive escalation of care, if patient starts to decline family is to be called right away.      Acute hypoxic respiratory failure likely multifactorial including volume overload due to acute on chronic diastolic CHF exacerbation---improving  Bilateral pleural effusions, right > left  Patient was on Lasix 20 mg daily at home.  Per family he had decreased urine output and increased shortness of breath.  Chest x-ray showed large right pleural effusion, moderate left pleural effusion.  Received IV diuresis in the hospital and then transition to p.o.  Appreciate nephrology assistance.  -Continue oral Lasix 80 mg twice a day  -s/p R sided thoracentesis with 900 ml removed earlier in hospital stay   -AHRF overall improved now requiring only 0.5 to 1 L of nasal cannula.      Acute toxic metabolic encephalopathy  Per patient's son Sathya usually he is awake and alert and can show thumbs up or down when ask questions.  Lately patient has been more lethargic.  Suspect this is likely due to uremia.  Also has history of CVA.    -No other source of encephalopathy identified during hospital stay, likely related to the chronic illness and various comorbidities, will continue to monitor     Hyperkalemia-resolved  Hypokalemia  ALEX on CKD stage V  Nephrotic range proteinuria  Anasarca  Hyponatremia  Hypercalcemia   -Creatinine 1 year  ago was near 1  -Most recent creatinine 4.07 on 2/26/2025.  Creatinine 5.67 on admission and potassium 5.8.  He was previously seen by nephrology and his daughter indicates that he was determined not to be a dialysis candidate.  Potassium has improved with shifting agents and Lokelma.  Creatinine has been steadily climbing over the past several weeks.  -Initially hyperkalemic.  Now hypokalemic and requiring potassium replacement.  -Appreciate nephrology input.  As per nephrology not a dialysis candidate  -completed IV diuresis; now on PO diuretic for symptom management  -Felix in place and tracking I's and O's.      Bradycardia  Junctional rhythm  This was an issue during his recent hospitalization as well.  There may be some metabolic component but I do worry about some underlying conduction disease.  Poor candidate for pacemaker due to the issues outlined above.  -telemetry discontinued earlier in hospital stay     Demand ischemia  Patient had no evidence of chest pain. Troponin has been elevated during prior evaluation on 2/26/2025.  Troponin 103 on admission and flat, not consistent with acute coronary syndrome.  Likely demand ischemia from respiratory failure with poor enzyme clearance in the face of CKD.    -No further workup      Diabetes mellitus type 2  He was on Lantus insulin 14 units every morning, insulin sliding scale PTA.  -Insulin was increased to 18 units this hospitalization.  Will continue medium intensity sliding scale insulin  -Blood glucose is rising however given his tenuous state do not want to increase at this time given risk of hypoglycemia, if blood glucose continues to rise will adjust on 3/30     History of CVA with residual right-sided hemiplegia and baseline nonverbal status  Chronic dysphagia, status post PEG tube placement, G-tube exchange on 2/24/2025  S/p indwelling Felix catheter  Dyslipidemia  Hypertension  Patient resides at home and family takes care of him.  He also has a  visiting nurse.  They appear to provide excellent care and support for him.  Patient's son Sathya stated that patient's spouse who is also ~ 70 years old was taking care of patient but now getting overwhelmed and would appreciate further assistance in placement.  Family now decided to take patient back home with home cares.  -PTA medications including amlodipine and atorvastatin and Plavix  -RD consulted to facilitate tube feeds.     Anemia and chronic kidney disease  -PTA ferrous sulfate.     Depression  -PTA Prozac.    Diet: Adult Formula Drip Feeding: Continuous Nepro with Carbsteady; Gastrostomy; Goal Rate: 30; mL/hr; If MD okay with restarting TF, restart at 15 mL/hr x 8 hours. If tolerating advance to goal rate.    DVT Prophylaxis: Pneumatic Compression Devices  Ordoñez Catheter: PRESENT, indication: Chronic ordoñez, Chronic ordoñez  Lines: None     Cardiac Monitoring: None  Code Status: No CPR- Do NOT Intubate            Diet: Adult Formula Drip Feeding: Continuous Nepro with Carbsteady; Gastrostomy; Goal Rate: 30; mL/hr; If MD okay with restarting TF, restart at 15 mL/hr x 12 hours. If tolerating advance to goal rate.    DVT Prophylaxis: Pneumatic Compression Devices  Ordoñez Catheter: PRESENT, indication: Chronic ordoñez, Chronic ordoñez  Lines: None     Cardiac Monitoring: ACTIVE order. Indication: Tachyarrhythmias, acute (48 hours)  Code Status: No CPR- Do NOT Intubate      Clinically Significant Risk Factors               # Hypoalbuminemia: Lowest albumin = 3.3 g/dL at 3/15/2025 11:50 AM, will monitor as appropriate     # Hypertension: Noted on problem list    # Chronic heart failure with preserved ejection fraction: heart failure noted on problem list and last echo with EF >50%          # DMII: A1C = 8.8 % (Ref range: <5.7 %) within past 6 months         # Financial/Environmental Concerns:           Social Drivers of Health    Interpersonal Safety: Unknown (3/15/2025)    Interpersonal Safety     Do you feel  physically and emotionally safe where you currently live?: Patient unable to answer     Within the past 12 months, have you been hit, slapped, kicked or otherwise physically hurt by someone?: Patient unable to answer     Within the past 12 months, have you been humiliated or emotionally abused in other ways by your partner or ex-partner?: Patient unable to answer          Disposition Plan     Medically Ready for Discharge: Anticipated in 2-4 Days             Vinod Santos MD, MD  Hospitalist Service  [facility]  Securely message with [employer] (more info)  Text page via [facility] Paging/Directory   ______________________________________________________________________    Interval History   Continuing medicine service care today.  Seen and examined.  Chart reviewed.  Case discussed with nursing service.  Family updated this patient's daughter, patient's wife present at bedside.  Patient's other daughter and son were also updated over conference phone call.  No significant reported issues noted by nursing service.  Telemetry monitoring remained junctional rhythm between 50 to 60 bpm.  Remained stable hemodynamics.  No episodes of any worsening loose stooling.  No reports of any nausea, or vomiting spells.  No escalation of oxygen needs currently on half liter of oxygen.  Afebrile.  At baseline mental state..  Patient is nonverbal at baseline.  Not the best historian as well.         Physical Exam   Vital Signs: Temp: 99.3  F (37.4  C) Temp src: Axillary BP: (!) 147/80 Pulse: 52   Resp: 16 SpO2: 99 % O2 Device: Nasal cannula Oxygen Delivery: 1/2 LPM  Weight: 130 lbs 15.25 oz    HEENT; Atraumatic, normocephalic, pinkish conjuctiva, pupils bilateral reactive   Chronically ill-appearing, nonverbal  Skin: warm and moist, no rashes  Lungs: equal chest expansion, clear to auscultation, no wheezes, no stridor, no crackles,   Heart: normal rate, normal rhythm, no rubs or gallops.   Abdomen: normal bowel  sounds, no tenderness, no peritoneal signs, no guarding  Feeding tube in place  Extremities: no deformities, no edema   Neuro; limited exam, nonverbal, mostly bedbound, visual tracking  Psych; not combative, does not appear to be agitated      Medical Decision Making       30 MINUTES SPENT BY ME on the date of service doing chart review, history, exam, documentation & further activities per the note.  MANAGEMENT DISCUSSED with the following over the past 24 hours: yes   NOTE(S)/MEDICAL RECORDS REVIEWED over the past 24 hours: yes       Data         Imaging results reviewed over the past 24 hrs:   No results found for this or any previous visit (from the past 24 hours).

## 2025-04-05 NOTE — PLAN OF CARE
Goal Outcome Evaluation:      Plan of Care Reviewed With: patient          Outcome Evaluation: Pt is Alert but non verbal. VSS was stable at the beginning of this shift, but now has soft BP 97/46. HR 42. Lasix scheduled at 1600 not given due to soft BP and low urine out put. Provider notified. Has chronic ordoñez due to neurogenic bladder. Bed bound. Assist x 2 with cares. Had one large, loose, black stool today. T&R q2h. On glucose and cardiac monitoring. Tele JR w/ long QTs. O2 Sat 99% on oxygen, 1/2 LPM via NC. Scheduled Tylenol given for comfort. At 1700, Tube feeding restarted at 15 mL/h. Bed bath given. Family at bedside.      Problem: Adult Inpatient Plan of Care  Goal: Plan of Care Review  Description: The Plan of Care Review/Shift note should be completed every shift.  The Outcome Evaluation is a brief statement about your assessment that the patient is improving, declining, or no change.  This information will be displayed automatically on your shift  note.  Recent Flowsheet Documentation  Taken 4/5/2025 1720 by Donna Bingham RN  Outcome Evaluation: Pt is Alert but non verbal. VSS was stable at the beginning of this shift, but now has soft BP 97/46. HR 42. Lasix scheduled at 1600 not given due to soft BP and low urine out put. Provider notified. Has chronic ordoñez due to neurogenic bladder. Bed bound. Assist x 2 with cares. Had one large, loose, black stool today. T&R q2h. On glucose and cardiac monitoring. Tele JR w/ long QTs. O2 Sat 99% on oxygen, 1/2 LPM via NC. Scheduled Tylenol given for comfort. At 1700, Tube feeding restarted at 15 mL/h. Bed bath given. Family at bedside.  Plan of Care Reviewed With: patient  Goal: Absence of Hospital-Acquired Illness or Injury  Intervention: Identify and Manage Fall Risk  Recent Flowsheet Documentation  Taken 4/5/2025 1635 by Donna Bingham RN  Safety Promotion/Fall Prevention:   clutter free environment maintained   increased rounding and observation   lighting  adjusted   room near nurse's station   safety round/check completed  Taken 4/5/2025 0900 by Donna Bingham RN  Safety Promotion/Fall Prevention:   clutter free environment maintained   increased rounding and observation   lighting adjusted   room near nurse's station   safety round/check completed  Intervention: Prevent Skin Injury  Recent Flowsheet Documentation  Taken 4/5/2025 1635 by Donna Bingham RN  Body Position:   turned   left  Skin Protection:   adhesive use limited   incontinence pads utilized  Taken 4/5/2025 1631 by Donna Bingham RN  Body Position:   turned   right  Taken 4/5/2025 1400 by Donna Bingham RN  Body Position:   supine, head elevated   supine, legs elevated  Taken 4/5/2025 0900 by Donna Bingham RN  Body Position:   turned   left  Skin Protection:   adhesive use limited   incontinence pads utilized  Intervention: Prevent and Manage VTE (Venous Thromboembolism) Risk  Recent Flowsheet Documentation  Taken 4/5/2025 1635 by Donna Bingham RN  VTE Prevention/Management: SCDs on (sequential compression devices)  Taken 4/5/2025 0900 by Donna Bingham RN  VTE Prevention/Management: SCDs on (sequential compression devices)  Intervention: Prevent Infection  Recent Flowsheet Documentation  Taken 4/5/2025 1635 by Donna Bingham RN  Infection Prevention:   cohorting utilized   hand hygiene promoted   rest/sleep promoted  Taken 4/5/2025 0900 by Donna Bingham RN  Infection Prevention:   cohorting utilized   hand hygiene promoted   rest/sleep promoted  Goal: Optimal Comfort and Wellbeing  Intervention: Provide Person-Centered Care  Recent Flowsheet Documentation  Taken 4/5/2025 1635 by Donna Bingham RN  Trust Relationship/Rapport: care explained  Taken 4/5/2025 0900 by Donna Bingham RN  Trust Relationship/Rapport: care explained

## 2025-04-06 LAB
GLUCOSE BLDC GLUCOMTR-MCNC: 238 MG/DL (ref 70–99)
GLUCOSE BLDC GLUCOMTR-MCNC: 244 MG/DL (ref 70–99)
GLUCOSE BLDC GLUCOMTR-MCNC: 257 MG/DL (ref 70–99)

## 2025-04-06 PROCEDURE — 250N000011 HC RX IP 250 OP 636: Mod: JZ | Performed by: INTERNAL MEDICINE

## 2025-04-06 PROCEDURE — 250N000013 HC RX MED GY IP 250 OP 250 PS 637: Performed by: INTERNAL MEDICINE

## 2025-04-06 PROCEDURE — 99232 SBSQ HOSP IP/OBS MODERATE 35: CPT | Performed by: INTERNAL MEDICINE

## 2025-04-06 PROCEDURE — 120N000001 HC R&B MED SURG/OB

## 2025-04-06 RX ORDER — HYDROMORPHONE HCL IN WATER/PF 6 MG/30 ML
0.4 PATIENT CONTROLLED ANALGESIA SYRINGE INTRAVENOUS
Status: DISCONTINUED | OUTPATIENT
Start: 2025-04-06 | End: 2025-04-07

## 2025-04-06 RX ORDER — HYDROMORPHONE HCL IN WATER/PF 6 MG/30 ML
0.2 PATIENT CONTROLLED ANALGESIA SYRINGE INTRAVENOUS
Status: DISCONTINUED | OUTPATIENT
Start: 2025-04-06 | End: 2025-04-07

## 2025-04-06 RX ADMIN — ACETAMINOPHEN 650 MG: 160 SOLUTION ORAL at 23:33

## 2025-04-06 RX ADMIN — CLOPIDOGREL BISULFATE 75 MG: 75 TABLET ORAL at 09:02

## 2025-04-06 RX ADMIN — INSULIN ASPART 3 UNITS: 100 INJECTION, SOLUTION INTRAVENOUS; SUBCUTANEOUS at 00:11

## 2025-04-06 RX ADMIN — ACETAMINOPHEN 650 MG: 160 SOLUTION ORAL at 16:59

## 2025-04-06 RX ADMIN — ACETAMINOPHEN 650 MG: 160 SOLUTION ORAL at 00:06

## 2025-04-06 RX ADMIN — HYDROMORPHONE HYDROCHLORIDE 0.2 MG: 0.2 INJECTION, SOLUTION INTRAMUSCULAR; INTRAVENOUS; SUBCUTANEOUS at 18:33

## 2025-04-06 RX ADMIN — INSULIN ASPART 3 UNITS: 100 INJECTION, SOLUTION INTRAVENOUS; SUBCUTANEOUS at 04:05

## 2025-04-06 RX ADMIN — ACETAMINOPHEN 650 MG: 160 SOLUTION ORAL at 09:02

## 2025-04-06 RX ADMIN — INSULIN ASPART 2 UNITS: 100 INJECTION, SOLUTION INTRAVENOUS; SUBCUTANEOUS at 09:03

## 2025-04-06 RX ADMIN — HYDROMORPHONE HYDROCHLORIDE 0.2 MG: 0.2 INJECTION, SOLUTION INTRAMUSCULAR; INTRAVENOUS; SUBCUTANEOUS at 12:41

## 2025-04-06 RX ADMIN — HYDROMORPHONE HYDROCHLORIDE 0.2 MG: 0.2 INJECTION, SOLUTION INTRAMUSCULAR; INTRAVENOUS; SUBCUTANEOUS at 21:03

## 2025-04-06 RX ADMIN — Medication 5 ML: at 21:03

## 2025-04-06 ASSESSMENT — ACTIVITIES OF DAILY LIVING (ADL)
ADLS_ACUITY_SCORE: 105

## 2025-04-06 NOTE — PROVIDER NOTIFICATION
MD notified in person that pt's TF is paused again r/t emesis & intolerance of TF resumption (showing signs of abdominal pain prior to emesis).

## 2025-04-06 NOTE — PROGRESS NOTES
[facility]    Medicine Progress Note - Hospitalist Service    Date of Admission:  3/15/2025    Assessment & Plan   Summary of hospital stay by Dr. Graham from 4/1 reviewed and shared below. No change in management for now. Son reports that his mother is still trying to decide on next steps.       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       The patient's daughter states that patient has had some vomiting last week.  He was having increased shortness of breath.  His wife noted that his oxygen saturation was low.  He was brought to emergency room for evaluation.     Initial vital signs showed temperature 97.5, pulse 46, blood pressure 148/61, oxygen saturation 95% on room air.  Laboratory workup showed sodium 128, potassium 5.8, creatinine 5.67, ALT 36, AST 31, troponin T high-sensitivity 103.  WBC 6.5, hemoglobin 8.9, platelets 164.  Venous blood gas showed pH 7.44/pCO2 44, pO2 82.5.  Chest x-ray showed large right pleural effusion with small to moderate left pleural effusion.  Bibasilar consolidation, patchy infiltrate in the central aspect of both lungs progressed on the left and improved on the right.  Felix catheter within decompressed urinary bladder.  Anasarca has progressed.     Nephrology was consulted from emergency room and recommended Lasix 100 mg IV.  He was also given calcium gluconate, Lokelma 10 g per G-tube.  He was also given a dose of IV ceftriaxone and azithromycin.  He was admitted to the hospital for  further management.     He remains on 5 L of oxygen.  Interventional radiology performed a right-sided thoracentesis.  He continues on IV diuresis and nephrology was formally consulted.  Potassium and heart rate have improved.  Palliative care met with family to discuss goals of care and possible enrollment into hospice given his poor functional status, elderly age, worsening renal function without being a dialysis candidate, bradycardia without being a pacemaker candidate, and gradual deterioration with no truly reversible process. They understand his poor prognosis and a care conference was held with the family on 3/18 to discuss all of the above, with attendees including hospitalist, palliative care, and nephrology.  Nephrology reiterated during the care conference that the patient is a poor dialysis candidate given his multiple comorbidities    -I will refer you to excerpts of my colleagues prior documentation as listed below for other details of his prolonged and complicated hospitalization stay     Update on 3/28:   No Change in the medical management or issues.  Pt was about to be discharged on 3/27 to home with home cares however he was noted to have worsening bradycardia.  He has remained lethargic.  He was evaluated by palliative and appeared to be in the process of active dying so discharge was canceled.  My colleague poke with patient's family including patient's son Sathya and her daughter Clemente.  They understand patient's terminal trajectory. Son Sathya has been hesitant to agree on putting patient on comfort cares.  I extensively explained to both son and daughter that current medical cares are only prolonging the process and probably in the end will not provide much benefit as his underlying conditions are a irreversible.  I recommended comfort cares.  I then explained that with comfort cares we will be stopping the medications, Lasix can be continued if it looks like that this is aiding in comfort,  tube feeds will be stopped.  Family is a still talking amongst themselves to come up with a decision/plan.      Update 3/29:   Had a repeat discussion with family members at bedside today regarding the fact that the patient is actively dying, patient's son reported that there are some differing opinions amongst family members about how to proceed and there are some cultural barriers involved with this. we had an at length discussion regarding the low utility of any particular lab study to indicate how much longer he has to live, we reviewed that no such test exists.  However given his long list of medical problems and comorbidities and the inability to treat some of these, his trajectory is unlikely to change.  I strongly encouraged to transition to comfort focused cares and he expressed that it would be nice to get the patient home and I agree.  The plan will be to monitor the patient overnight in the hospital so an uncle can visit on 3/30 and if the patient is stable enough, we will consider transfer to home on comfort cares.  Family understood and agreed to this plan.  We will monitor him closely overnight and call patients family if he acutely declines.  All questions were answered     Update 3/30:   Patient had multiple episodes of emesis, tube feeds were stopped, insulin was adjusted.  At this time we had another at length discussion with the family regarding course of action.  Receiving calls from the daughters that they are concerned that the son is not communicating the plan well, meanwhile the son is at bedside and reporting back/communicating the plan to his mother/patient's wife.  Multiple family members have been traveling and it would be really helpful if they were to come to the hospital to see the patient status and discuss plan as a family.  It is clear at this time that we need to have a full family goals of care discussion and have everyone relevant to the conversation present in some form.  Will  appreciate palliative's assistance in coordinating this in the coming days.  At this time plan for no escalation of cares but will continue plan as outlined below.  Son was updated at bedside today, plan otherwise as outlined below. We will monitor him closely overnight and call patients family if he acutely declines.     Update 3/31  Holding enteral nutrition after vomiting and high risk of aspiration, Talked to his daughter who has a clear understanding of current situation and poor prognosis, with continuous decline. Meeting with palliative is pending, apparently set for tomorrow midmorning by palliative team     Update 4/1  Meeting with family and and Palliative team. Plan for comfort care and transfer to Hospice to be decided by family yet. Interventions focus on minimizing suffering.    Today: April 6  - Tube feeding was subsequently discontinued last night due to intolerance to it  - Multiple discussions ensued with several family members earlier this morning regarding goals of care, expectations with current goals of care, and prognosis  - Will provide official comfort care orders  - Family requested the lowest possible dose of hydromorphone comfort alleviating symptoms  - Continue Felix catheter for comfort.  Patient has chronic indwelling Felix catheter  - Will await further discussion with palliative care service and  regarding discharge planning disposition  - In the interim we will continue current care.  Minimize labs.  - I also streamlined patient current chart and remove orders not in line with comfort care      Problem list:     Goals of care  The patient is chronically ill and essentially full cares as a result of a prior massive stroke.  He has chronic dysphagia and receives tube feeds via a PEG.  He is essentially bedbound and has residual right-sided hemiplegia and nonverbal status.  He is now faced with rapidly progressing chronic kidney disease leading to concerning electrolyte  abnormalities, hypervolemia, and bradycardia.  He is an extremely poor dialysis candidate and dialysis has not been offered by nephrology.  Care conference was held on 3/18 with palliative care, nephrology, and this hospitalist.  Goals of care discussed.  Hospice being considered by family but formal decision has not yet been made  Per one of the daughters, she says that all the family members including the daughters and patient's spouse are interested in looking at comfort measures, but the patient's son has a different viewpoint on this from the rest of the family.  Plan to wait until the uncle arrives on 3/30 and having ongoing discussions, no plans for aggressive escalation of care, if patient starts to decline family is to be called right away.      Acute hypoxic respiratory failure likely multifactorial including volume overload due to acute on chronic diastolic CHF exacerbation---improving  Bilateral pleural effusions, right > left  Patient was on Lasix 20 mg daily at home.  Per family he had decreased urine output and increased shortness of breath.  Chest x-ray showed large right pleural effusion, moderate left pleural effusion.  Received IV diuresis in the hospital and then transition to p.o.  Appreciate nephrology assistance.  -Continue oral Lasix 80 mg twice a day  -s/p R sided thoracentesis with 900 ml removed earlier in hospital stay   -AHRF overall improved now requiring only 0.5 to 1 L of nasal cannula.      Acute toxic metabolic encephalopathy  Per patient's son Sathya usually he is awake and alert and can show thumbs up or down when ask questions.  Lately patient has been more lethargic.  Suspect this is likely due to uremia.  Also has history of CVA.    -No other source of encephalopathy identified during hospital stay, likely related to the chronic illness and various comorbidities, will continue to monitor     Hyperkalemia-resolved  Hypokalemia  ALEX on CKD stage V  Nephrotic range  proteinuria  Anasarca  Hyponatremia  Hypercalcemia   -Creatinine 1 year ago was near 1  -Most recent creatinine 4.07 on 2/26/2025.  Creatinine 5.67 on admission and potassium 5.8.  He was previously seen by nephrology and his daughter indicates that he was determined not to be a dialysis candidate.  Potassium has improved with shifting agents and Lokelma.  Creatinine has been steadily climbing over the past several weeks.  -Initially hyperkalemic.  Now hypokalemic and requiring potassium replacement.  -Appreciate nephrology input.  As per nephrology not a dialysis candidate  -completed IV diuresis; now on PO diuretic for symptom management  -Felix in place and tracking I's and O's.      Bradycardia  Junctional rhythm  This was an issue during his recent hospitalization as well.  There may be some metabolic component but I do worry about some underlying conduction disease.  Poor candidate for pacemaker due to the issues outlined above.  -telemetry discontinued earlier in hospital stay     Demand ischemia  Patient had no evidence of chest pain. Troponin has been elevated during prior evaluation on 2/26/2025.  Troponin 103 on admission and flat, not consistent with acute coronary syndrome.  Likely demand ischemia from respiratory failure with poor enzyme clearance in the face of CKD.    -No further workup      Diabetes mellitus type 2  He was on Lantus insulin 14 units every morning, insulin sliding scale PTA.  -Insulin was increased to 18 units this hospitalization.  Will continue medium intensity sliding scale insulin  -Blood glucose is rising however given his tenuous state do not want to increase at this time given risk of hypoglycemia, if blood glucose continues to rise will adjust on 3/30     History of CVA with residual right-sided hemiplegia and baseline nonverbal status  Chronic dysphagia, status post PEG tube placement, G-tube exchange on 2/24/2025  S/p indwelling Felix  catheter  Dyslipidemia  Hypertension  Patient resides at home and family takes care of him.  He also has a visiting nurse.  They appear to provide excellent care and support for him.  Patient's son Sathya stated that patient's spouse who is also ~ 70 years old was taking care of patient but now getting overwhelmed and would appreciate further assistance in placement.  Family now decided to take patient back home with home cares.  -PTA medications including amlodipine and atorvastatin and Plavix  -RD consulted to facilitate tube feeds.     Anemia and chronic kidney disease  -PTA ferrous sulfate.     Depression  -PTA Prozac.    Diet: Adult Formula Drip Feeding: Continuous Nepro with Carbsteady; Gastrostomy; Goal Rate: 30; mL/hr; If MD okay with restarting TF, restart at 15 mL/hr x 8 hours. If tolerating advance to goal rate.    DVT Prophylaxis: Pneumatic Compression Devices  Ordoñez Catheter: PRESENT, indication: Chronic ordoñez, Chronic ordoñez  Lines: None     Cardiac Monitoring: None  Code Status: No CPR- Do NOT Intubate            Diet: Regular Diet Adult    DVT Prophylaxis: Pneumatic Compression Devices  Ordoñez Catheter: PRESENT, indication: Chronic ordoñez, Chronic ordoñez  Lines: None     Cardiac Monitoring: None  Code Status: No CPR- Do NOT Intubate      Clinically Significant Risk Factors               # Hypoalbuminemia: Lowest albumin = 3.3 g/dL at 3/15/2025 11:50 AM, will monitor as appropriate     # Hypertension: Noted on problem list    # Chronic heart failure with preserved ejection fraction: heart failure noted on problem list and last echo with EF >50%          # DMII: A1C = 8.8 % (Ref range: <5.7 %) within past 6 months         # Financial/Environmental Concerns:           Social Drivers of Health    Interpersonal Safety: Unknown (3/15/2025)    Interpersonal Safety     Do you feel physically and emotionally safe where you currently live?: Patient unable to answer     Within the past 12 months, have you been hit,  slapped, kicked or otherwise physically hurt by someone?: Patient unable to answer     Within the past 12 months, have you been humiliated or emotionally abused in other ways by your partner or ex-partner?: Patient unable to answer          Disposition Plan     Medically Ready for Discharge: Anticipated in 2-4 Days             Vinod Santos MD, MD  Hospitalist Service  [facility]  Securely message with [employer] (more info)  Text page via [email or web address] Paging/Directory   ______________________________________________________________________    Interval History   Continuing medicine service care today.  Seen and examined.  Chart reviewed.    I revisited this patient twice this morning.  I met with patient's son at bedside earlier today.  We had discussion regarding intolerance to tube feeding.  Plans for comfort care approach.    Our patient appears to be comfortably laying down in bed.  No reports of any underlying demeanor suggestive of being in distress.  Overnight tube feeding was discontinued though given intolerance to it with dry heaving and vomiting spells     Revisited again this patient this time with patient's wife and daughter in attendance.  Tried may have very best to provide accurate answers to queries.  Family mention pursuing comfort care.  Also agreed if pursuing comfort care then trial of comfort feeding may be attempted as patient's family wanted to provide some home-cooked meals       Physical Exam   Vital Signs: Temp: 99.7  F (37.6  C) Temp src: Oral BP: (!) 153/74 Pulse: 100   Resp: 18 SpO2: 96 % O2 Device: None (Room air) Oxygen Delivery: 1/2 LPM  Weight: 133 lbs 2.53 oz    HEENT; Atraumatic, normocephalic, pinkish conjuctiva, pupils bilateral reactive   Chronically ill-appearing, nonverbal  Skin: warm and moist, no rashes  Lungs: equal chest expansion, clear to auscultation, no wheezes, no stridor, no crackles,   Heart: normal rate, normal rhythm, no rubs or gallops.    Abdomen: normal bowel sounds, no tenderness, no peritoneal signs, no guarding  Feeding tube in place  Extremities: no deformities, no edema   Neuro; limited exam, nonverbal, mostly bedbound, visual tracking  Psych; not combative, does not appear to be agitated      Medical Decision Making       35 MINUTES SPENT BY ME on the date of service doing chart review, history, exam, documentation & further activities per the note.  MANAGEMENT DISCUSSED with the following over the past 24 hours: yes   NOTE(S)/MEDICAL RECORDS REVIEWED over the past 24 hours: Yes       Data         Imaging results reviewed over the past 24 hrs:   No results found for this or any previous visit (from the past 24 hours).

## 2025-04-06 NOTE — PLAN OF CARE
[id number]    VSS on RA. No change in neuros, nonverbal still. Appeared to be in pain 4/5 evening, then had emesis of TF -- TF stopped & MD notified of TF pause. PRN zofran given, thorough oral cares performed by family. Still giving Q4 60 mL free water flushing through G-Tube. Q2 turn/repo. Felix patent. Son w/ many questions for MD, sticky note in chart regarding questions left in room to be discussed w/ family.     Goal Outcome Evaluation:      Plan of Care Reviewed With: patient, child    Overall Patient Progress: no changeOverall Patient Progress: no change    Outcome Evaluation: Did not tolerate trial of TF      Problem: Adult Inpatient Plan of Care  Goal: Plan of Care Review  Description: The Plan of Care Review/Shift note should be completed every shift.  The Outcome Evaluation is a brief statement about your assessment that the patient is improving, declining, or no change.  This information will be displayed automatically on your shiftnote.  Outcome: Not Progressing  Flowsheets (Taken 4/6/2025 0640)  Outcome Evaluation: Did not tolerate trial of TF  Plan of Care Reviewed With:   patient   child  Overall Patient Progress: no change  Goal: Absence of Hospital-Acquired Illness or Injury  Outcome: Not Progressing  Intervention: Prevent Skin Injury  Recent Flowsheet Documentation  Taken 4/6/2025 0403 by Minnie Rogers RN  Body Position:   turned   left   log-rolled   heels elevated   weight shifting  Taken 4/6/2025 0215 by Minnie Rogers, RN  Body Position:   turned   right   log-rolled   heels elevated   weight shifting  Taken 4/6/2025 0006 by Minnie Rogers, RN  Body Position:   turned   left   heels elevated   log-rolled   weight shifting  Taken 4/5/2025 2005 by Minnie Rogers, RN  Body Position: (back to R sd per pt preference w. nausea)   log-rolled   weight shifting   side-lying  Goal: Optimal Comfort and Wellbeing  Outcome: Not Progressing  Intervention: Monitor Pain and Promote  Comfort  Recent Flowsheet Documentation  Taken 4/5/2025 2005 by Minnie Rogers RN  Pain Management Interventions: (TF paused per family request) other (see comments)  Goal: Readiness for Transition of Care  Outcome: Not Progressing     Problem: Pain Acute  Goal: Optimal Pain Control and Function  Outcome: Not Progressing  Intervention: Develop Pain Management Plan  Recent Flowsheet Documentation  Taken 4/5/2025 2005 by Minnie Rogers RN  Pain Management Interventions: (TF paused per family request) other (see comments)     Problem: Nausea and Vomiting  Goal: Nausea and Vomiting Relief  Outcome: Not Progressing  Intervention: Prevent and Manage Nausea and Vomiting  Recent Flowsheet Documentation  Taken 4/6/2025 0403 by Minnie Rogers, RN  Oral Care: (not opening mouth for cares at this time) patient refused intervention  Taken 4/6/2025 0006 by Minnie Rogers RN  Oral Care: (not opening mouth for oral cares at this time) patient refused intervention  Taken 4/5/2025 2005 by Minnie Rogers, RN  Nausea/Vomiting Interventions: (TF paused)   antiemetic   stimuli minimized   nausea triggers minimized   other (see comments)  Oral Care: (per family post emesis)   tongue brushed   teeth brushed   swabbed with sterile water

## 2025-04-06 NOTE — PLAN OF CARE
Problem: Adult Inpatient Plan of Care  Goal: Plan of Care Review  Description: The Plan of Care Review/Shift note should be completed every shift.  The Outcome Evaluation is a brief statement about your assessment that the patient is improving, declining, or no change.  This information will be displayed automatically on your shift  note.  Recent Flowsheet Documentation  Taken 4/6/2025 1435 by Donna Bingham RN  Outcome Evaluation: Pt is lethargic and somnolent. VSS BP slightly elevated. O2 Sat 96% on oxygen, 1/2 LPM via NC for comfort. Has chronic ordoñez due to neurogenic bladder and it's patent.. Bed bound. Assist x 2 with cares. T&R q2h. Tele SR. Scheduled Tylenol given for pain. Tube feeding discontinued. Pt not tolerating tube feeding. Pt now on comfort care. Regular diet. Tele discontinued by provider.  Family at bedside. PIV SL.  PRN Dilaudid given for pain. Pain interventiton effective.  Plan of Care Reviewed With: patient  Overall Patient Progress: declining  Taken 4/6/2025 1432 by Donna Bingham RN  Outcome Evaluation: Pt is lethargic and somnolent. VSS BP slightly elevated. O2 Sat 96% on oxygen, 1/2 LPM via NC for comfort. Has chronic ordoñez due to neurogenic bladder and it's patent.. Bed bound. Assist x 2 with cares. T&R q2h. Tele SR. Scheduled Tylenol given for pain. Tube feeding discontinued. Pt not tolerating tube feeding. Pt now on comfort care. Regular diet. Tele discontinued by provider.  Family at bedside. PIV SL.  PRN Dilaudid given for pain. Pain interventiton effective.  Plan of Care Reviewed With: patient  Overall Patient Progress: improving  Goal: Absence of Hospital-Acquired Illness or Injury  Intervention: Identify and Manage Fall Risk  Recent Flowsheet Documentation  Taken 4/6/2025 0900 by Donna Bingham RN  Safety Promotion/Fall Prevention:   clutter free environment maintained   increased rounding and observation   lighting adjusted   room near nurse's station   safety round/check  completed  Intervention: Prevent Skin Injury  Recent Flowsheet Documentation  Taken 4/6/2025 0900 by Donna Bingham RN  Body Position:   turned   left  Skin Protection:   adhesive use limited   incontinence pads utilized  Intervention: Prevent and Manage VTE (Venous Thromboembolism) Risk  Recent Flowsheet Documentation  Taken 4/6/2025 0900 by Donna Bingham RN  VTE Prevention/Management: SCDs on (sequential compression devices)  Intervention: Prevent Infection  Recent Flowsheet Documentation  Taken 4/6/2025 0900 by Donna Bingham RN  Infection Prevention:   cohorting utilized   hand hygiene promoted   rest/sleep promoted  Goal: Optimal Comfort and Wellbeing  Intervention: Provide Person-Centered Care  Recent Flowsheet Documentation  Taken 4/6/2025 0900 by Donna Bingham RN  Trust Relationship/Rapport: care explained   Goal Outcome Evaluation:      Plan of Care Reviewed With: patient    Overall Patient Progress: decliningOverall Patient Progress: declining    Outcome Evaluation: Pt is lethargic and somnolent. VSS BP slightly elevated. O2 Sat 96% on oxygen, 1/2 LPM via NC for comfort. Has chronic ordoñez due to neurogenic bladder and it's patent.. Bed bound. Assist x 2 with cares. T&R q2h. Tele SR. Scheduled Tylenol given for pain. Tube feeding discontinued. Pt not tolerating tube feeding. Pt now on comfort care. Regular diet. Tele discontinued by provider.  Family at bedside. PIV SL.  PRN Dilaudid given for pain. Pain interventiton effective.

## 2025-04-07 ENCOUNTER — MEDICAL CORRESPONDENCE (OUTPATIENT)
Dept: HOME HEALTH SERVICES | Facility: HOME HEALTH | Age: 75
End: 2025-04-07
Payer: MEDICARE

## 2025-04-07 PROCEDURE — 250N000013 HC RX MED GY IP 250 OP 250 PS 637: Performed by: INTERNAL MEDICINE

## 2025-04-07 PROCEDURE — 120N000001 HC R&B MED SURG/OB

## 2025-04-07 PROCEDURE — 250N000011 HC RX IP 250 OP 636: Mod: JZ | Performed by: INTERNAL MEDICINE

## 2025-04-07 PROCEDURE — 99232 SBSQ HOSP IP/OBS MODERATE 35: CPT | Performed by: INTERNAL MEDICINE

## 2025-04-07 PROCEDURE — 99232 SBSQ HOSP IP/OBS MODERATE 35: CPT | Performed by: NURSE PRACTITIONER

## 2025-04-07 PROCEDURE — 250N000013 HC RX MED GY IP 250 OP 250 PS 637: Performed by: NURSE PRACTITIONER

## 2025-04-07 RX ORDER — HYDROMORPHONE HCL IN WATER/PF 6 MG/30 ML
0.2 PATIENT CONTROLLED ANALGESIA SYRINGE INTRAVENOUS
Status: DISCONTINUED | OUTPATIENT
Start: 2025-04-07 | End: 2025-04-09

## 2025-04-07 RX ORDER — HYDROMORPHONE HYDROCHLORIDE 1 MG/ML
2 SOLUTION ORAL
Status: DISCONTINUED | OUTPATIENT
Start: 2025-04-07 | End: 2025-04-07

## 2025-04-07 RX ORDER — HYDROMORPHONE HCL IN WATER/PF 6 MG/30 ML
0.4 PATIENT CONTROLLED ANALGESIA SYRINGE INTRAVENOUS
Status: DISCONTINUED | OUTPATIENT
Start: 2025-04-07 | End: 2025-04-09

## 2025-04-07 RX ORDER — HYDROMORPHONE HYDROCHLORIDE 1 MG/ML
2 SOLUTION ORAL
Status: DISCONTINUED | OUTPATIENT
Start: 2025-04-07 | End: 2025-04-14

## 2025-04-07 RX ORDER — LORAZEPAM 2 MG/ML
1 CONCENTRATE ORAL EVERY 4 HOURS PRN
Status: DISCONTINUED | OUTPATIENT
Start: 2025-04-07 | End: 2025-04-18 | Stop reason: HOSPADM

## 2025-04-07 RX ORDER — HALOPERIDOL 2 MG/ML
1 SOLUTION ORAL EVERY 4 HOURS PRN
Status: DISCONTINUED | OUTPATIENT
Start: 2025-04-07 | End: 2025-04-18 | Stop reason: HOSPADM

## 2025-04-07 RX ORDER — HYDROMORPHONE HYDROCHLORIDE 1 MG/ML
4 SOLUTION ORAL
Status: DISCONTINUED | OUTPATIENT
Start: 2025-04-07 | End: 2025-04-14

## 2025-04-07 RX ORDER — HYDROMORPHONE HYDROCHLORIDE 1 MG/ML
1 SOLUTION ORAL
Status: DISCONTINUED | OUTPATIENT
Start: 2025-04-07 | End: 2025-04-07

## 2025-04-07 RX ADMIN — HYDROMORPHONE HYDROCHLORIDE 2 MG: 1 SOLUTION ORAL at 09:52

## 2025-04-07 RX ADMIN — HYDROMORPHONE HYDROCHLORIDE 2 MG: 1 SOLUTION ORAL at 20:13

## 2025-04-07 RX ADMIN — HYDROMORPHONE HYDROCHLORIDE 0.2 MG: 0.2 INJECTION, SOLUTION INTRAMUSCULAR; INTRAVENOUS; SUBCUTANEOUS at 01:27

## 2025-04-07 RX ADMIN — ACETAMINOPHEN 650 MG: 160 SOLUTION ORAL at 07:05

## 2025-04-07 RX ADMIN — HYDROMORPHONE HYDROCHLORIDE 0.2 MG: 0.2 INJECTION, SOLUTION INTRAMUSCULAR; INTRAVENOUS; SUBCUTANEOUS at 03:43

## 2025-04-07 RX ADMIN — HYDROMORPHONE HYDROCHLORIDE 2 MG: 1 SOLUTION ORAL at 14:24

## 2025-04-07 RX ADMIN — HYDROMORPHONE HYDROCHLORIDE 0.2 MG: 0.2 INJECTION, SOLUTION INTRAMUSCULAR; INTRAVENOUS; SUBCUTANEOUS at 07:06

## 2025-04-07 RX ADMIN — HYDROMORPHONE HYDROCHLORIDE 2 MG: 1 SOLUTION ORAL at 22:40

## 2025-04-07 RX ADMIN — HYDROMORPHONE HYDROCHLORIDE 0.2 MG: 0.2 INJECTION, SOLUTION INTRAMUSCULAR; INTRAVENOUS; SUBCUTANEOUS at 05:37

## 2025-04-07 ASSESSMENT — ACTIVITIES OF DAILY LIVING (ADL)
ADLS_ACUITY_SCORE: 105
ADLS_ACUITY_SCORE: 101
ADLS_ACUITY_SCORE: 105

## 2025-04-07 NOTE — PLAN OF CARE
[id number]    PRN dilaudid, scheduled tylenol given for pain per MAR. CORY SANTANA, tiago patent, turned/repo'd for comfort. Family at bedside. Oral cares as pt tolerates.     Goal Outcome Evaluation:      Plan of Care Reviewed With: family, patient    Overall Patient Progress: no changeOverall Patient Progress: no change    Outcome Evaluation: PRN dilaudid, scheduled tylenol for pain      Problem: Adult Inpatient Plan of Care  Goal: Plan of Care Review  Description: The Plan of Care Review/Shift note should be completed every shift.  The Outcome Evaluation is a brief statement about your assessment that the patient is improving, declining, or no change.  This information will be displayed automatically on your shiftnote.  Outcome: Not Progressing  Flowsheets (Taken 4/7/2025 0424)  Outcome Evaluation: PRN dilaudid, scheduled tylenol for pain  Plan of Care Reviewed With:   family   patient  Overall Patient Progress: no change  Goal: Absence of Hospital-Acquired Illness or Injury  Outcome: Not Progressing  Intervention: Prevent Skin Injury  Recent Flowsheet Documentation  Taken 4/7/2025 0343 by Minnie Rogers RN  Body Position:   turned   right   weight shifting   heels elevated   log-rolled  Taken 4/6/2025 2333 by Minnie Rogers RN  Body Position:   weight shifting   heels elevated  Taken 4/6/2025 2000 by Minnie Rogers RN  Body Position: position maintained  Goal: Optimal Comfort and Wellbeing  Outcome: Not Progressing  Intervention: Monitor Pain and Promote Comfort  Recent Flowsheet Documentation  Taken 4/6/2025 2103 by Minnie Rogers, RN  Pain Management Interventions: medication (see MAR)  Goal: Readiness for Transition of Care  Outcome: Not Progressing     Problem: Pain Acute  Goal: Optimal Pain Control and Function  Outcome: Not Progressing  Intervention: Develop Pain Management Plan  Recent Flowsheet Documentation  Taken 4/6/2025 2103 by Minnie Rogers, RN  Pain Management Interventions: medication  (see MAR)  Intervention: Prevent or Manage Pain  Recent Flowsheet Documentation  Taken 4/6/2025 2000 by Minnie Rogers, RN  Sleep/Rest Enhancement:   comfort measures   family presence promoted     Problem: Nausea and Vomiting  Goal: Nausea and Vomiting Relief  Outcome: Not Progressing  Intervention: Prevent and Manage Nausea and Vomiting  Recent Flowsheet Documentation  Taken 4/7/2025 0127 by Minnie Rogers, RN  Oral Care: swabbed with sterile water  Taken 4/6/2025 2333 by Minnie Rogers, RN  Oral Care: swabbed with sterile water  Taken 4/6/2025 2000 by Minnie Rogers, RN  Environmental Support: calm environment promoted

## 2025-04-07 NOTE — PROGRESS NOTES
CLINICAL NUTRITION SERVICES    Informed decision made to change pt s status to comfort care. Nutrition interventions discontinued and no further interventions planned at this time. RD can be consulted if needed.    RD signing off on 4/7/2025.

## 2025-04-07 NOTE — PLAN OF CARE
"Comfort care Pallative following. Changed to oral solution through PEG tube, with improved relief. Family at bedside.      Goal Outcome Evaluation:      Plan of Care Reviewed With: family, patient    Overall Patient Progress: no changeOverall Patient Progress: no change    Outcome Evaluation: Comfort care Pallative following. Changed to oral solution through PEG tube, with improved relief. Family at bedside.      Problem: Adult Inpatient Plan of Care  Goal: Plan of Care Review  Description: The Plan of Care Review/Shift note should be completed every shift.  The Outcome Evaluation is a brief statement about your assessment that the patient is improving, declining, or no change.  This information will be displayed automatically on your shiftnote.  Outcome: Not Progressing  Flowsheets (Taken 4/7/2025 1203)  Outcome Evaluation: Comfort care Pallative following. Changed to oral solution through PEG tube, with improved relief. Family at bedside.  Plan of Care Reviewed With:   family   patient  Overall Patient Progress: no change  Goal: Patient-Specific Goal (Individualized)  Description: You can add care plan individualizations to a care plan. Examples of Individualization might be:  \"Parent requests to be called daily at 9am for status\", \"I have a hard time hearing out of my right ear\", or \"Do not touch me to wake me up as it startlesme\".  Outcome: Not Progressing  Goal: Absence of Hospital-Acquired Illness or Injury  Outcome: Not Progressing  Goal: Optimal Comfort and Wellbeing  Outcome: Not Progressing  Goal: Readiness for Transition of Care  Outcome: Not Progressing     Problem: Pain Acute  Goal: Optimal Pain Control and Function  Outcome: Not Progressing     Problem: Nausea and Vomiting  Goal: Nausea and Vomiting Relief  Outcome: Not Progressing     "

## 2025-04-07 NOTE — PROGRESS NOTES
PALLIATIVE CARE PROGRESS NOTE  [facility]     Patient Name: Jimmy Otto  Date of Admission: 3/15/2025   Today the patient was seen for:   Goals of care  Emotional and decisional support     Recommendations & Counseling     GOALS OF CARE:   Comfort focused   No tube feeding  Discussed restlessness and pain and medications to treat- family is ok treating.    ADVANCE CARE PLANNING:  No health care directive on file. Per system policy, Surrogate Decision-makers for Patients With Diminished Decision-making Capacity offers guidance on possible decision-makers. Daughter Clemente and Wayne and Wife and son Sathya make decisions together has been identified as a surrogate decision maker.   There is no POLST form on file, defer to patient and/or next of kin for decisions   Code status: No CPR- Do NOT Intubate    MEDICAL MANAGEMENT:   #General Weakness  Appreciate the staff for all ADLs    #Pain  Added low dose dilaudid for pain as needed. Family will ask for it if they feel he's uncomfortable  Added heating pad for comfort  Also has PRN tylenol    PSYCHOSOCIAL/SPIRITUAL:  Family Wife, two daughters and son  [place] community: [employer]     Palliative Care will continue to follow. Thank you for the consult and allowing us to aid in the care of Jimmy Otto.    These recommendations have been discussed with medical team.    Mackenzie Medina NP  Securely message with [employer] (more info)  Text page via [employer] Paging/Directory      Assessments          Jimmy Otto is a 75 year old male admitted 3/15/2025 with acute hypoxic respiratory failure in the setting of acute on chronic diastolic CHF exacerbation with bilateral pleura effusions R>L. The patient and family are known to this writer from the patient's hospitalization October 2021 when the family had made the decision to pursue tube feedings. 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 Discussed the comfort care plans. We talked about symptoms and medications and side effects. We talked about symptoms of actively dying and what to expect. We talked about spiritual beliefs. They will talk as a family about when it would be appropriate, they will reach out to the team. There are several young grandchildren, will consult child life as well.          Review of Systems:     Besides above, an additional system ROS was reviewed and is unremarkable               Physical Exam:   Temp: 98.7  F (37.1  C) Temp src: Axillary Temp  Min: 98.7  F (37.1  C)  Max: 99.7  F (37.6  C) BP: 127/64 Pulse: 93   Resp: 17 SpO2: 99 % O2 Device: Nasal cannula (family applied nasal cannula, present upon start of shift) Oxygen Delivery: 1/2 LPM  Vital Signs with Ranges  Temp:  [98.7  F (37.1  C)-99.7  F (37.6  C)] 98.7  F (37.1  C)  Pulse:  [] 93  Resp:  [17-18] 17  BP: (127-166)/(64-74) 127/64  SpO2:  [95 %-99 %] 99 %  133 lbs 2.53 oz    EXAM:  Constitutional: appears uncomfortable  Cardiovascular: negative  Respiratory: negative  Psychiatric: calm  Pain: grimacing               Current Problem List:   Active Problems:    Hypervolemia, unspecified hypervolemia type    Bradycardia    Pleural effusion    Hypoxia    Acute kidney injury superimposed on CKD      No Known Allergies         Data Reviewed:       Results for orders placed or performed during the hospital encounter of 03/15/25 (from the past 24 hours)   Glucose by meter   Result Value Ref Range    GLUCOSE BY METER POCT 238 (H) 70 - 99 mg/dL          Medical Decision Making       40  MINUTES SPENT BY ME on the date of service doing chart review, history, exam, documentation & further activities per the note.

## 2025-04-07 NOTE — CONSULTS
"SPIRITUAL HEALTH SERVICES - Consult Note  [facility] Med/Surg 3    Referral Source: [facility] consult; Palliative Care Nurse Mackenzie Medina requested  support for pt and his family as he has transitioned to comfort care.    Pt Jimmy's daughter Wayne and Clemente were at the bedside.  They reported that they tried to contact [facility] but the [place] New Year is approaching and a monk will not be available to come and offer a chanting service.  His daughters explained that Jimmy's spouse stated that Buddhism prayers and blessing would be OK since she and their children are Buddhism.  They welcomed my offer to provide a service that includes chants and prayers from both the Buddhism and Lutheran traditions.  They requested that I return tomorrow morning to offer the service when their mother will be here.  They reflected briefly on Jimmy's personality and described him as \"a hard worker\" who was dedicated to his family.    Plan: Will see pt and his family tomorrow 4/8/2025, at 9:30 am to offer a prayer service.    Mo Israel M.Div., [facility]  Staff     [facility] available 24/7 for emergent requests/referrals, either by paging the on-call  or by entering an ASAP/STAT consult in [facility], which will also page the on-call .   "

## 2025-04-07 NOTE — PROGRESS NOTES
[facility]    Medicine Progress Note - Hospitalist Service    Date of Admission:  3/15/2025    Assessment & Plan   Summary of hospital stay by Dr. Graham from 4/1 reviewed and shared below. No change in management for now. Son reports that his mother is still trying to decide on next steps.       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       The patient's daughter states that patient has had some vomiting last week.  He was having increased shortness of breath.  His wife noted that his oxygen saturation was low.  He was brought to emergency room for evaluation.     Initial vital signs showed temperature 97.5, pulse 46, blood pressure 148/61, oxygen saturation 95% on room air.  Laboratory workup showed sodium 128, potassium 5.8, creatinine 5.67, ALT 36, AST 31, troponin T high-sensitivity 103.  WBC 6.5, hemoglobin 8.9, platelets 164.  Venous blood gas showed pH 7.44/pCO2 44, pO2 82.5.  Chest x-ray showed large right pleural effusion with small to moderate left pleural effusion.  Bibasilar consolidation, patchy infiltrate in the central aspect of both lungs progressed on the left and improved on the right.  Felix catheter within decompressed urinary bladder.  Anasarca has progressed.     Nephrology was consulted from emergency room and recommended Lasix 100 mg IV.  He was also given calcium gluconate, Lokelma 10 g per G-tube.  He was also given a dose of IV ceftriaxone and azithromycin.  He was admitted to the hospital for  further management.     He remains on 5 L of oxygen.  Interventional radiology performed a right-sided thoracentesis.  He continues on IV diuresis and nephrology was formally consulted.  Potassium and heart rate have improved.  Palliative care met with family to discuss goals of care and possible enrollment into hospice given his poor functional status, elderly age, worsening renal function without being a dialysis candidate, bradycardia without being a pacemaker candidate, and gradual deterioration with no truly reversible process. They understand his poor prognosis and a care conference was held with the family on 3/18 to discuss all of the above, with attendees including hospitalist, palliative care, and nephrology.  Nephrology reiterated during the care conference that the patient is a poor dialysis candidate given his multiple comorbidities    -I will refer you to excerpts of my colleagues prior documentation as listed below for other details of his prolonged and complicated hospitalization stay     Update on 3/28:   No Change in the medical management or issues.  Pt was about to be discharged on 3/27 to home with home cares however he was noted to have worsening bradycardia.  He has remained lethargic.  He was evaluated by palliative and appeared to be in the process of active dying so discharge was canceled.  My colleague poke with patient's family including patient's son Sathya and her daughter Clemente.  They understand patient's terminal trajectory. Son Sathya has been hesitant to agree on putting patient on comfort cares.  I extensively explained to both son and daughter that current medical cares are only prolonging the process and probably in the end will not provide much benefit as his underlying conditions are a irreversible.  I recommended comfort cares.  I then explained that with comfort cares we will be stopping the medications, Lasix can be continued if it looks like that this is aiding in comfort,  tube feeds will be stopped.  Family is a still talking amongst themselves to come up with a decision/plan.      Update 3/29:   Had a repeat discussion with family members at bedside today regarding the fact that the patient is actively dying, patient's son reported that there are some differing opinions amongst family members about how to proceed and there are some cultural barriers involved with this. we had an at length discussion regarding the low utility of any particular lab study to indicate how much longer he has to live, we reviewed that no such test exists.  However given his long list of medical problems and comorbidities and the inability to treat some of these, his trajectory is unlikely to change.  I strongly encouraged to transition to comfort focused cares and he expressed that it would be nice to get the patient home and I agree.  The plan will be to monitor the patient overnight in the hospital so an uncle can visit on 3/30 and if the patient is stable enough, we will consider transfer to home on comfort cares.  Family understood and agreed to this plan.  We will monitor him closely overnight and call patients family if he acutely declines.  All questions were answered     Update 3/30:   Patient had multiple episodes of emesis, tube feeds were stopped, insulin was adjusted.  At this time we had another at length discussion with the family regarding course of action.  Receiving calls from the daughters that they are concerned that the son is not communicating the plan well, meanwhile the son is at bedside and reporting back/communicating the plan to his mother/patient's wife.  Multiple family members have been traveling and it would be really helpful if they were to come to the hospital to see the patient status and discuss plan as a family.  It is clear at this time that we need to have a full family goals of care discussion and have everyone relevant to the conversation present in some form.  Will  appreciate palliative's assistance in coordinating this in the coming days.  At this time plan for no escalation of cares but will continue plan as outlined below.  Son was updated at bedside today, plan otherwise as outlined below. We will monitor him closely overnight and call patients family if he acutely declines.     Update 3/31  Holding enteral nutrition after vomiting and high risk of aspiration, Talked to his daughter who has a clear understanding of current situation and poor prognosis, with continuous decline. Meeting with palliative is pending, apparently set for tomorrow midmorning by palliative team     Update 4/1  Meeting with family and and Palliative team. Plan for comfort care and transfer to Hospice to be decided by family yet. Interventions focus on minimizing suffering.    April 6  - Tube feeding was subsequently discontinued last night due to intolerance to it  - Multiple discussions ensued with several family members earlier this morning regarding goals of care, expectations with current goals of care, and prognosis  - Will provide official comfort care orders  - Family requested the lowest possible dose of hydromorphone comfort alleviating symptoms  - Continue Felix catheter for comfort.  Patient has chronic indwelling Felix catheter  - Will await further discussion with palliative care service and  regarding discharge planning disposition  - In the interim we will continue current care.  Minimize labs.  - I also streamlined patient current chart and remove orders not in line with comfort care provide    Today April 7  - Highly appreciate continuous and extensive input from palliative care service  - Continuation of comfort care approach    I will refer you to excerpts of my colleagues prior progress notes as listed below for further details of his prolonged complicated hospitalization stay:      Problem list:     Goals of care  The patient is chronically ill and essentially  full cares as a result of a prior massive stroke.  He has chronic dysphagia and receives tube feeds via a PEG.  He is essentially bedbound and has residual right-sided hemiplegia and nonverbal status.  He is now faced with rapidly progressing chronic kidney disease leading to concerning electrolyte abnormalities, hypervolemia, and bradycardia.  He is an extremely poor dialysis candidate and dialysis has not been offered by nephrology.  Care conference was held on 3/18 with palliative care, nephrology, and this hospitalist.  Goals of care discussed.  Hospice being considered by family but formal decision has not yet been made  Per one of the daughters, she says that all the family members including the daughters and patient's spouse are interested in looking at comfort measures, but the patient's son has a different viewpoint on this from the rest of the family.  Plan to wait until the uncle arrives on 3/30 and having ongoing discussions, no plans for aggressive escalation of care, if patient starts to decline family is to be called right away.      Acute hypoxic respiratory failure likely multifactorial including volume overload due to acute on chronic diastolic CHF exacerbation---improving  Bilateral pleural effusions, right > left  Patient was on Lasix 20 mg daily at home.  Per family he had decreased urine output and increased shortness of breath.  Chest x-ray showed large right pleural effusion, moderate left pleural effusion.  Received IV diuresis in the hospital and then transition to p.o.  Appreciate nephrology assistance.  -Continue oral Lasix 80 mg twice a day  -s/p R sided thoracentesis with 900 ml removed earlier in hospital stay   -AHRF overall improved now requiring only 0.5 to 1 L of nasal cannula.      Acute toxic metabolic encephalopathy  Per patient's son Sathya usually he is awake and alert and can show thumbs up or down when ask questions.  Lately patient has been more lethargic.  Suspect this is  likely due to uremia.  Also has history of CVA.    -No other source of encephalopathy identified during hospital stay, likely related to the chronic illness and various comorbidities, will continue to monitor     Hyperkalemia-resolved  Hypokalemia  ALEX on CKD stage V  Nephrotic range proteinuria  Anasarca  Hyponatremia  Hypercalcemia   -Creatinine 1 year ago was near 1  -Most recent creatinine 4.07 on 2/26/2025.  Creatinine 5.67 on admission and potassium 5.8.  He was previously seen by nephrology and his daughter indicates that he was determined not to be a dialysis candidate.  Potassium has improved with shifting agents and Lokelma.  Creatinine has been steadily climbing over the past several weeks.  -Initially hyperkalemic.  Now hypokalemic and requiring potassium replacement.  -Appreciate nephrology input.  As per nephrology not a dialysis candidate  -completed IV diuresis; now on PO diuretic for symptom management  -Felix in place and tracking I's and O's.      Bradycardia  Junctional rhythm  This was an issue during his recent hospitalization as well.  There may be some metabolic component but I do worry about some underlying conduction disease.  Poor candidate for pacemaker due to the issues outlined above.  -telemetry discontinued earlier in hospital stay     Demand ischemia  Patient had no evidence of chest pain. Troponin has been elevated during prior evaluation on 2/26/2025.  Troponin 103 on admission and flat, not consistent with acute coronary syndrome.  Likely demand ischemia from respiratory failure with poor enzyme clearance in the face of CKD.    -No further workup      Diabetes mellitus type 2  He was on Lantus insulin 14 units every morning, insulin sliding scale PTA.  -Insulin was increased to 18 units this hospitalization.  Will continue medium intensity sliding scale insulin  -Blood glucose is rising however given his tenuous state do not want to increase at this time given risk of  hypoglycemia, if blood glucose continues to rise will adjust on 3/30     History of CVA with residual right-sided hemiplegia and baseline nonverbal status  Chronic dysphagia, status post PEG tube placement, G-tube exchange on 2/24/2025  S/p indwelling Ordoñez catheter  Dyslipidemia  Hypertension  Patient resides at home and family takes care of him.  He also has a visiting nurse.  They appear to provide excellent care and support for him.  Patient's son Sathya stated that patient's spouse who is also ~ 70 years old was taking care of patient but now getting overwhelmed and would appreciate further assistance in placement.  Family now decided to take patient back home with home cares.  -PTA medications including amlodipine and atorvastatin and Plavix  -RD consulted to facilitate tube feeds.     Anemia and chronic kidney disease  -PTA ferrous sulfate.     Depression  -PTA Prozac.    Diet: Adult Formula Drip Feeding: Continuous Nepro with Carbsteady; Gastrostomy; Goal Rate: 30; mL/hr; If MD okay with restarting TF, restart at 15 mL/hr x 8 hours. If tolerating advance to goal rate.    DVT Prophylaxis: Pneumatic Compression Devices  Ordoñez Catheter: PRESENT, indication: Chronic ordoñez, Chronic ordoñez  Lines: None     Cardiac Monitoring: None  Code Status: No CPR- Do NOT Intubate            Diet: Regular Diet Adult    DVT Prophylaxis: Pneumatic Compression Devices  Ordoñez Catheter: PRESENT, indication: Chronic ordoñez, Chronic ordoñez  Lines: None     Cardiac Monitoring: None  Code Status: No CPR- Do NOT Intubate      Clinically Significant Risk Factors               # Hypoalbuminemia: Lowest albumin = 3.3 g/dL at 3/15/2025 11:50 AM, will monitor as appropriate     # Hypertension: Noted on problem list    # Chronic heart failure with preserved ejection fraction: heart failure noted on problem list and last echo with EF >50%          # DMII: A1C = 8.8 % (Ref range: <5.7 %) within past 6 months         # Financial/Environmental  Concerns:           Social Drivers of Health    Interpersonal Safety: Unknown (3/15/2025)    Interpersonal Safety     Do you feel physically and emotionally safe where you currently live?: Patient unable to answer     Within the past 12 months, have you been hit, slapped, kicked or otherwise physically hurt by someone?: Patient unable to answer     Within the past 12 months, have you been humiliated or emotionally abused in other ways by your partner or ex-partner?: Patient unable to answer          Disposition Plan     Medically Ready for Discharge: Anticipated Tomorrow if pursuing hospice, comfort discharge             Vinod Santos MD, MD  Hospitalist Service  [facility]  Securely message with [employer] (more info)  Text page via [employer] Paging/Directory   ______________________________________________________________________    Interval History   Continuing medicine service care today.  Seen and examined.  Chart reviewed.    Discussed with palliative care service  Family present during encounter  He is not in distress.  Family comfortable about current patient's condition         Physical Exam   Vital Signs: Temp: 98.7  F (37.1  C) Temp src: Axillary BP: 127/64 Pulse: 93   Resp: 17 SpO2: 99 % O2 Device: Nasal cannula (family applied nasal cannula, present upon start of shift) Oxygen Delivery: 1/2 LPM  Weight: 133 lbs 2.53 oz    HEENT; Atraumatic, normocephalic, pinkish conjuctiva, pupils bilateral reactive   Chronically ill-appearing, nonverbal  Limited exam as patient is chronically on comfort care  Laying in bed appears to be comfortably sleeping      Medical Decision Making       15 MINUTES SPENT BY ME on the date of service doing chart review, history, exam, documentation & further activities per the note.  MANAGEMENT DISCUSSED with the following over the past 24 hours: yes   NOTE(S)/MEDICAL RECORDS REVIEWED over the past 24 hours: Yes       Data         Imaging results reviewed over  the past 24 hrs:   No results found for this or any previous visit (from the past 24 hours).

## 2025-04-08 PROCEDURE — 99233 SBSQ HOSP IP/OBS HIGH 50: CPT | Performed by: INTERNAL MEDICINE

## 2025-04-08 PROCEDURE — 250N000013 HC RX MED GY IP 250 OP 250 PS 637: Performed by: NURSE PRACTITIONER

## 2025-04-08 PROCEDURE — 120N000001 HC R&B MED SURG/OB

## 2025-04-08 RX ADMIN — HYDROMORPHONE HYDROCHLORIDE 2 MG: 1 SOLUTION ORAL at 02:52

## 2025-04-08 RX ADMIN — HYDROMORPHONE HYDROCHLORIDE 2 MG: 1 SOLUTION ORAL at 20:35

## 2025-04-08 RX ADMIN — HYDROMORPHONE HYDROCHLORIDE 2 MG: 1 SOLUTION ORAL at 00:44

## 2025-04-08 RX ADMIN — HYDROMORPHONE HYDROCHLORIDE 2 MG: 1 SOLUTION ORAL at 08:09

## 2025-04-08 RX ADMIN — HYDROMORPHONE HYDROCHLORIDE 2 MG: 1 SOLUTION ORAL at 17:29

## 2025-04-08 RX ADMIN — HYDROMORPHONE HYDROCHLORIDE 4 MG: 1 SOLUTION ORAL at 22:48

## 2025-04-08 RX ADMIN — HYDROMORPHONE HYDROCHLORIDE 2 MG: 1 SOLUTION ORAL at 05:18

## 2025-04-08 ASSESSMENT — ACTIVITIES OF DAILY LIVING (ADL)
ADLS_ACUITY_SCORE: 101

## 2025-04-08 NOTE — PROGRESS NOTES
[facility]  Hospitalist Progress Note  Jorge Stewart MD 04/08/2025    Reason for Stay (Diagnosis):          Assessment and Plan:      Summary of Stay: Jimmy Otto is a 75 year old male with extensive past medical history including major stroke with right sided hemiaplasia, aphasia, type 2 diabetes, hypertension, PEG tube, CKD with nephrotic range proteinuria and advanced diabetic nephropathy, anasarca, anemia, neurogenic bladder with chronic Felix catheter essentially total cares at baseline admitted on 3/15/2025 with low oxygen level and bradycardia with some recent vomiting.    Initial workup here showed hyponatremia, mild hyperkalemia with creatinine of 5.67, stable anemia and chest x-ray showing a large right pleural effusion with a small to moderate left pleural effusion as well as suspected bibasilar patchy infiltrates.    He initially was treated with aggressive IV diuretics, will, and antibiotics.  He did undergo thoracentesis and nephrology was consulted and he was felt to be a very poor dialysis candidate based on major comorbidities.  Furthermore, he was having difficulties tolerating enteral nutrition both apparently due to pain and likely reflux.  Native care has been following throughout.  Ultimately the decision was made not to pursue dialysis and to stop tube feeds due to intolerance and focus instead on comfort cares as he does not have anything clearly modifiable and seems to be nearing the end of his life.    I assumed care on/8.  Patient remains comfort care status.  Family wishing not to go to nursing home and feel they cannot manage at home.  I think is likely he will pass here in the coming days.    Problem List/Assessment and Plan:   CKD 5, nephrotic range proteinuria: Not a dialysis candidate.  No plan on comfort cares.  --Palliative care following  --Comfort meds available, tolerating enteral Dilaudid/other meds  --Tube feeding stopped due to intolerance    2.   Acute  on chronic respiratory failure: Multifactorial due to volume overload, acute on chronic diastolic CHF, bilateral pleural effusions.  --Initially treated with aggressive diuresis, right-sided thoracentesis.  -- Weaned to about 1 L of oxygen, now just using oxygen for comfort    3.   Toxic metabolic encephalopathy: Suspected due to uremia.  Likely to continue to worsen.    4.   Prior CVA with hemiparesis, neurogenic bladder, tube feeds status:   --Tube feeds discontinued as he was not tolerating these and based on goals of care.    Other medical issues no longer being actively managed:     Hyperkalemia-resolved  Hypokalemia  ALEX on CKD stage V  Nephrotic range proteinuria  Anasarca  Hyponatremia  Hypercalcemia   -Creatinine 1 year ago was near 1  -Most recent creatinine 4.07 on 2/26/2025.  Creatinine 5.67 on admission and potassium 5.8.  He was previously seen by nephrology and his daughter indicates that he was determined not to be a dialysis candidate.  Potassium had improved with shifting agents and Lokelma.  Creatinine has been steadily climbing over the past several weeks.  -Appreciate nephrology input.  As per nephrology not a dialysis candidate  -Focusing on comfort cares, not checking labs.     Bradycardia  Junctional rhythm  This was an issue during his recent hospitalization as well.  Deemed not to be candidate for pacemaker given above issues.     Demand ischemia  Troponin has been elevated during prior evaluation on 2/26/2025.  Troponin 103 on admission and flat, not consistent with acute coronary syndrome.  Likely demand ischemia from respiratory failure with poor enzyme clearance in the face of CKD.    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 nonverbal status  Chronic dysphagia, status post PEG tube placement, G-tube exchange on 2/24/2025  S/p indwelling Felix catheter  Dyslipidemia  Hypertension     Anemia and chronic kidney  disease  -PTA ferrous sulfate.     Depression  -PTA Prozac.      DVT Prophylaxis: Comfort care status  Code Status: Comfort Care  Medically Ready for Discharge: Ready Now if a hospice facility were to be obtained      Clinically Significant Risk Factors               # Hypoalbuminemia: Lowest albumin = 3.3 g/dL at 3/15/2025 11:50 AM, will monitor as appropriate     # Hypertension: Noted on problem list  # Chronic heart failure with preserved ejection fraction: heart failure noted on problem list and last echo with EF >50%          # DMII: A1C = 8.8 % (Ref range: <5.7 %) within past 6 months       # Financial/Environmental Concerns:                 Interval History (Subjective):      Seems to be comfortable, tube feeds stopped due to discomfort  Met with his wife and daughter at the bedside  Discussed with social work  Not eating or drinking, likely to pass in the next couple of days  Continuing comfort cares                  Physical Exam:      Last Vital Signs:  /64 (BP Location: Left arm, Patient Position: Right side, Cuff Size: Adult Regular)   Pulse 93   Temp 98.7  F (37.1  C) (Axillary)   Resp 11   Wt 60.4 kg (133 lb 2.5 oz)   SpO2 99%   BMI 21.49 kg/m        Intake/Output Summary (Last 24 hours) at 4/8/2025 1136  Last data filed at 4/8/2025 0500  Gross per 24 hour   Intake 60 ml   Output 450 ml   Net -390 ml       General: Eyes partially open, nonverbal and not interactive with me.  HEENT: NC/AT, eyes anicteric  Cardiac: RRR, S1, S2.  Pulmonary: Normal chest rise, normal work of breathing.   Abdomen: soft, non-tender to the degree I can tell.  Extremities: no deformities.  Warm, well perfused.  Skin:  Warm and Dry.  Neuro: Nonverbal, not interacting.         Medications:      All current medications were reviewed with changes reflected in problem list.         Data:      Comfort cares    Jorge Stewart MD     I've spent 50 minutes in chart review, ordering medications and tests,  obtaining additional history as needed, evaluating the patient and in documentation for this encounter.

## 2025-04-08 NOTE — PROGRESS NOTES
04/08/25 1615   Child Life   Location [facility] adult med/surg area   Interaction Intent Introduction of Services;Follow Up/Ongoing support;Chart Review   Method in-person   Individuals Present Patient;Caregiver/Adult Family Member   Comments (names or other info) Pt's adult daughters and pt's wife at bedside.   Intervention Goal Offer EOL support, memory making opportunities   Intervention Supportive Check in   Supportive Check in CCLS met with patient's daughters and wife to reintroduce child life services and offer support. Upon hearing this writer's role, one daughter shared that her sister just left to bring her kids to the hospital. CCLS asked questions about what they know or what I should know about the children. She shared rough ages (13 yo-4yo girls) and said they could share any other information when they get here.   Outcomes/Follow Up Referral   Outcomes Comment CCLS referred patient to ED/evening CCLS to follow up with family.   Time Spent   Direct Patient Care 5   Indirect Patient Care 15   Total Time Spent (Calc) 20

## 2025-04-08 NOTE — PLAN OF CARE
"Pt comfort care, nonverbal. Given dilaudid through peg tube. Chronic ordoñez in place. Repositioned as tolerated. Family at bed side.    Goal Outcome Evaluation:      Plan of Care Reviewed With: patient    Overall Patient Progress: no changeOverall Patient Progress: no change    Problem: Adult Inpatient Plan of Care  Goal: Plan of Care Review  Description: The Plan of Care Review/Shift note should be completed every shift.  The Outcome Evaluation is a brief statement about your assessment that the patient is improving, declining, or no change.  This information will be displayed automatically on your shiftnote.  4/8/2025 0551 by Mariangel Ardon, RN  Outcome: Not Progressing  Flowsheets (Taken 4/8/2025 0551)  Plan of Care Reviewed With: patient  Overall Patient Progress: no change  4/8/2025 0550 by Mariangel Ardon, RN  Outcome: Progressing  Goal: Patient-Specific Goal (Individualized)  Description: You can add care plan individualizations to a care plan. Examples of Individualization might be:  \"Parent requests to be called daily at 9am for status\", \"I have a hard time hearing out of my right ear\", or \"Do not touch me to wake me up as it startlesme\".  4/8/2025 0551 by Mariangel Ardon, RN  Outcome: Not Progressing  4/8/2025 0550 by Mariangel Ardon, RN  Outcome: Progressing  Goal: Absence of Hospital-Acquired Illness or Injury  4/8/2025 0551 by Mariangel Ardon, RN  Outcome: Not Progressing  4/8/2025 0550 by Mariangel Ardon, RN  Outcome: Progressing  Intervention: Identify and Manage Fall Risk  Recent Flowsheet Documentation  Taken 4/8/2025 0252 by Mariangel Ardon, RN  Safety Promotion/Fall Prevention:   assistive device/personal items within reach   clutter free environment maintained   nonskid shoes/slippers when out of bed   room near nurse's station   room organization consistent   safety round/check completed  Intervention: Prevent and Manage VTE (Venous Thromboembolism) Risk  Recent " Flowsheet Documentation  Taken 4/8/2025 0252 by Mariangel Ardon RN  VTE Prevention/Management: SCDs off (sequential compression devices)  Goal: Optimal Comfort and Wellbeing  4/8/2025 0551 by Mariangel Ardon RN  Outcome: Not Progressing  4/8/2025 0550 by Mariangel Ardon RN  Outcome: Progressing  Intervention: Provide Person-Centered Care  Recent Flowsheet Documentation  Taken 4/8/2025 0252 by Mariangel Ardon RN  Trust Relationship/Rapport: care explained  Goal: Readiness for Transition of Care  4/8/2025 0551 by Mariangel Ardon, RN  Outcome: Not Progressing  4/8/2025 0550 by Mariangel Ardon RN  Outcome: Progressing     Problem: Pain Acute  Goal: Optimal Pain Control and Function  4/8/2025 0551 by Mariangel Ardon RN  Outcome: Not Progressing  4/8/2025 0550 by Mariangel Ardon RN  Outcome: Progressing     Problem: Nausea and Vomiting  Goal: Nausea and Vomiting Relief  4/8/2025 0551 by Mariangel Ardon, RN  Outcome: Not Progressing  4/8/2025 0550 by Mariangel Ardon RN  Outcome: Progressing

## 2025-04-08 NOTE — PLAN OF CARE
"Shift Summary (8604 - 1334):    Pt on comfort cares. Family at bedside. Intermittently alert, nonverbal. Chronic ordoñez wnl, cares done. PRN dilaudid for pain x2 through PEG tube. PEG tube intact. Repo as tolerated. Palliative and spiritual health following.     Goal Outcome Evaluation:      Plan of Care Reviewed With: patient, family    Overall Patient Progress: no change    Outcome Evaluation: On comfort cares. Getting Dilaudid x2 through PEG tube. Chronic ordoñez.      Problem: Adult Inpatient Plan of Care  Goal: Plan of Care Review  Description: The Plan of Care Review/Shift note should be completed every shift.  The Outcome Evaluation is a brief statement about your assessment that the patient is improving, declining, or no change.  This information will be displayed automatically on your shiftnote.  Outcome: Progressing  Flowsheets (Taken 4/7/2025 9394)  Outcome Evaluation: On comfort cares. Getting Dilaudid x2 through PEG tube. Chronic ordoñez.  Plan of Care Reviewed With:   patient   family  Overall Patient Progress: no change  Goal: Patient-Specific Goal (Individualized)  Description: You can add care plan individualizations to a care plan. Examples of Individualization might be:  \"Parent requests to be called daily at 9am for status\", \"I have a hard time hearing out of my right ear\", or \"Do not touch me to wake me up as it startlesme\".  Outcome: Progressing  Goal: Absence of Hospital-Acquired Illness or Injury  Outcome: Progressing  Intervention: Prevent Skin Injury  Recent Flowsheet Documentation  Taken 4/7/2025 1802 by Shana Sorto RN  Body Position:   weight shifting   turned   left  Goal: Optimal Comfort and Wellbeing  Outcome: Progressing  Goal: Readiness for Transition of Care  Outcome: Progressing     Problem: Pain Acute  Goal: Optimal Pain Control and Function  Outcome: Progressing     Problem: Nausea and Vomiting  Goal: Nausea and Vomiting Relief  Outcome: Progressing     "

## 2025-04-08 NOTE — CONSULTS
SPIRITUAL HEALTH SERVICES - Consult Note  [facility] Med/Surg 3    Referral Source: Family requested a prayer service for pt as he transitions to comfort care.    Pt Jimmy's wife, two daughters Wayne and Clemente, and son Sathya were at the bedside.  Provided a prayer service with texts and prayers from both the Alevism and Muslim traditions.  Afterwards, family engaged in memory sharing and reflected on Jimmy's personality and legacy.      Plan: Reviewed with pt's family how they can request further  support.  This author and other chaplains remain available per pt/family request.     Mo Israel M.Div., [facility]  Staff     SHS available 24/7 for emergent requests/referrals, either by paging the on-call  or by entering an ASAP/STAT consult in [facility], which will also page the on-call .

## 2025-04-08 NOTE — PLAN OF CARE
"Comfort care. Pallative following. Dilaudid oral solution through PEG tube. Family at bedside. Spiritual health following.     Goal Outcome Evaluation:      Plan of Care Reviewed With: patient, family    Overall Patient Progress: no changeOverall Patient Progress: no change    Outcome Evaluation: Comfort care Pallative following. Dilaudid oral solution through PEG tube. Family at bedside. Spiritual health following.      Problem: Adult Inpatient Plan of Care  Goal: Plan of Care Review  Description: The Plan of Care Review/Shift note should be completed every shift.  The Outcome Evaluation is a brief statement about your assessment that the patient is improving, declining, or no change.  This information will be displayed automatically on your shiftnote.  Outcome: Not Progressing  Flowsheets (Taken 4/8/2025 1510)  Outcome Evaluation: Comfort care Pallative following. Dilaudid oral solution through PEG tube. Family at bedside. Spiritual health following.  Plan of Care Reviewed With:   patient   family  Overall Patient Progress: no change  Goal: Patient-Specific Goal (Individualized)  Description: You can add care plan individualizations to a care plan. Examples of Individualization might be:  \"Parent requests to be called daily at 9am for status\", \"I have a hard time hearing out of my right ear\", or \"Do not touch me to wake me up as it startlesme\".  Outcome: Not Progressing  Goal: Absence of Hospital-Acquired Illness or Injury  Outcome: Not Progressing  Goal: Optimal Comfort and Wellbeing  Outcome: Not Progressing  Goal: Readiness for Transition of Care  Outcome: Not Progressing     Problem: Pain Acute  Goal: Optimal Pain Control and Function  Outcome: Not Progressing     Problem: Nausea and Vomiting  Goal: Nausea and Vomiting Relief  Outcome: Not Progressing     "

## 2025-04-08 NOTE — CARE PLAN
Notified provider about indwelling ordoñez catheter discussed removal or continued need.    Did provider choose to remove indwelling ordoñez catheter? NO    Provider's ordoñez indication for keeping indwelling ordoñez catheter: Indication for continued use: End of Live    Is there an order for indwelling ordoñez catheter? YES    *If there is a plan to keep ordoñez catheter in place at discharge daily notification with provider is not necessary, but please add a notation in the treatment team sticky note that the patient will be discharging with the catheter.      Chronic Ordoñez - End of Life

## 2025-04-09 PROCEDURE — 120N000001 HC R&B MED SURG/OB

## 2025-04-09 PROCEDURE — 250N000013 HC RX MED GY IP 250 OP 250 PS 637: Performed by: NURSE PRACTITIONER

## 2025-04-09 PROCEDURE — 99232 SBSQ HOSP IP/OBS MODERATE 35: CPT | Performed by: INTERNAL MEDICINE

## 2025-04-09 RX ADMIN — HYDROMORPHONE HYDROCHLORIDE 2 MG: 1 SOLUTION ORAL at 03:29

## 2025-04-09 RX ADMIN — HYDROMORPHONE HYDROCHLORIDE 2 MG: 1 SOLUTION ORAL at 19:30

## 2025-04-09 RX ADMIN — HYDROMORPHONE HYDROCHLORIDE 2 MG: 1 SOLUTION ORAL at 07:00

## 2025-04-09 RX ADMIN — HYDROMORPHONE HYDROCHLORIDE 2 MG: 1 SOLUTION ORAL at 21:14

## 2025-04-09 RX ADMIN — HYDROMORPHONE HYDROCHLORIDE 2 MG: 1 SOLUTION ORAL at 00:51

## 2025-04-09 RX ADMIN — HYDROMORPHONE HYDROCHLORIDE 2 MG: 1 SOLUTION ORAL at 15:06

## 2025-04-09 RX ADMIN — HYDROMORPHONE HYDROCHLORIDE 2 MG: 1 SOLUTION ORAL at 08:55

## 2025-04-09 ASSESSMENT — ACTIVITIES OF DAILY LIVING (ADL)
ADLS_ACUITY_SCORE: 107
ADLS_ACUITY_SCORE: 107
ADLS_ACUITY_SCORE: 101
ADLS_ACUITY_SCORE: 107
ADLS_ACUITY_SCORE: 105
ADLS_ACUITY_SCORE: 105
ADLS_ACUITY_SCORE: 107
ADLS_ACUITY_SCORE: 105
ADLS_ACUITY_SCORE: 107
ADLS_ACUITY_SCORE: 105
ADLS_ACUITY_SCORE: 101
ADLS_ACUITY_SCORE: 101
ADLS_ACUITY_SCORE: 107
ADLS_ACUITY_SCORE: 105
ADLS_ACUITY_SCORE: 105
ADLS_ACUITY_SCORE: 107
ADLS_ACUITY_SCORE: 107
ADLS_ACUITY_SCORE: 101
ADLS_ACUITY_SCORE: 107
ADLS_ACUITY_SCORE: 101
ADLS_ACUITY_SCORE: 107

## 2025-04-09 NOTE — PLAN OF CARE
Shift summary (5050-8133)    Pt remains comfortable w/ comfort care measures provided. Adequate pain control, see MAR for analgesic regimen. Felix patent and intact. PEG tube patent and intact, clamped. Family remains at bedside.     Goal Outcome Evaluation:      Plan of Care Reviewed With: patient, family    Overall Patient Progress: decliningOverall Patient Progress: declining    Outcome Evaluation: Pt remains comfortable w/ comfort care measures provided.      Problem: Adult Inpatient Plan of Care  Goal: Plan of Care Review  Description: The Plan of Care Review/Shift note should be completed every shift.  The Outcome Evaluation is a brief statement about your assessment that the patient is improving, declining, or no change.  This information will be displayed automatically on your shift  note.  Recent Flowsheet Documentation  Taken 4/9/2025 1648 by Viji Davies RN  Outcome Evaluation: Pt remains comfortable w/ comfort care measures provided.  Plan of Care Reviewed With:   patient   family  Overall Patient Progress: declining  Goal: Absence of Hospital-Acquired Illness or Injury  Intervention: Identify and Manage Fall Risk  Recent Flowsheet Documentation  Taken 4/9/2025 1639 by Viji Davies RN  Safety Promotion/Fall Prevention: safety round/check completed  Taken 4/9/2025 1544 by Viji Davies RN  Safety Promotion/Fall Prevention: safety round/check completed  Taken 4/9/2025 1349 by Viji Davies RN  Safety Promotion/Fall Prevention: safety round/check completed  Taken 4/9/2025 1313 by Viji Davies RN  Safety Promotion/Fall Prevention: safety round/check completed  Taken 4/9/2025 1035 by Viji Davies RN  Safety Promotion/Fall Prevention: safety round/check completed  Taken 4/9/2025 0958 by Viji Davies RN  Safety Promotion/Fall Prevention: safety round/check completed  Taken 4/9/2025 0936 by Viji Davies RN  Safety Promotion/Fall  Prevention: safety round/check completed  Taken 4/9/2025 0900 by Viji Davies RN  Safety Promotion/Fall Prevention: safety round/check completed  Taken 4/9/2025 0749 by Viji Davies RN  Safety Promotion/Fall Prevention:   activity supervised   assistive device/personal items within reach   clutter free environment maintained   increased rounding and observation   increase visualization of patient   lighting adjusted   mobility aid in reach   nonskid shoes/slippers when out of bed   patient and family education   room organization consistent   safety round/check completed   supervised activity   treat reversible contributory factors   treat underlying cause  Taken 4/9/2025 0712 by Viji Davies RN  Safety Promotion/Fall Prevention: safety round/check completed  Intervention: Prevent Skin Injury  Recent Flowsheet Documentation  Taken 4/9/2025 1500 by Viji Davies RN  Body Position:   turned   side-lying   right   heels elevated  Taken 4/9/2025 1205 by Viji Davies RN  Body Position:   turned   side-lying   left   heels elevated  Taken 4/9/2025 0958 by Viji Davies RN  Body Position:   turned   side-lying   right   heels elevated  Taken 4/9/2025 0749 by Viji Davies RN  Body Position:   turned   side-lying   left   heels elevated  Skin Protection:   adhesive use limited   incontinence pads utilized   tubing/devices free from skin contact  Intervention: Prevent and Manage VTE (Venous Thromboembolism) Risk  Recent Flowsheet Documentation  Taken 4/9/2025 0749 by Viji Davies RN  VTE Prevention/Management: (comfort cares) SCDs off (sequential compression devices)  Intervention: Prevent Infection  Recent Flowsheet Documentation  Taken 4/9/2025 0749 by Viji Davies RN  Infection Prevention:   equipment surfaces disinfected   hand hygiene promoted   personal protective equipment utilized   rest/sleep promoted   single patient room  provided

## 2025-04-09 NOTE — PROGRESS NOTES
04/09/25 1017   Child Life   Location [facility] adult med/surg area   Interaction Intent Follow Up/Ongoing support;Chart Review   Method in-person   Individuals Present Patient;Caregiver/Adult Family Member;Siblings/Child Family Members   Comments (names or other info) Patient's daughter and daughter-in-law, Queta and Radha at bedside with granddaughters Anne, Bhavana, and Asha.   Intervention Goal Supportive check-in, offer EOL support and memory making opportunities   Intervention Supportive Check in;End of Life Care   Supportive Check in Family had just arrived prior to this writer's arrival. CCLS introduced self and reintroduced child life services and asked questions to get to know the girls. Radha shared that Bhavana is at the hospital everyday, so she is familiar with what is going on and with the environment. The older girls have the day off school for conferences. Anne was sitting at bedside holding Ho's hand. Asha and Bhavana were sitting and watching television. They came prepared with activities and snacks in the wagon and reported feeling comfortable with the hospital environment and Ho's condition. They are also familiar with the process of death from their grandmother's death. CCLS offered opportunities for memory making for the family to think about. Family was thankful and said they would think about it and contact staff if they were interested later.   Coping Strategies family support   Outcomes Comment CCLS available to follow up with memory making activities if desired at a later time.   Time Spent   Direct Patient Care 15   Indirect Patient Care 10   Total Time Spent (Calc) 25

## 2025-04-09 NOTE — PROGRESS NOTES
[facility]  Hospitalist Progress Note  Jorge Stewart MD 04/09/2025    Reason for Stay (Diagnosis):          Assessment and Plan:      Summary of Stay: Jimmy Otto is a 75 year old male with extensive past medical history including major stroke with right sided hemiaplasia, aphasia, type 2 diabetes, hypertension, PEG tube, CKD with nephrotic range proteinuria and advanced diabetic nephropathy, anasarca, anemia, neurogenic bladder with chronic Felix catheter essentially total cares at baseline admitted on 3/15/2025 with low oxygen level and bradycardia with some recent vomiting.    Initial workup here showed hyponatremia, mild hyperkalemia with creatinine of 5.67, stable anemia and chest x-ray showing a large right pleural effusion with a small to moderate left pleural effusion as well as suspected bibasilar patchy infiltrates.    He initially was treated with aggressive IV diuretics, will, and antibiotics.  He did undergo thoracentesis and nephrology was consulted and he was felt to be a very poor dialysis candidate based on major comorbidities.  Furthermore, he was having difficulties tolerating enteral nutrition both apparently due to pain and likely reflux.  Native care has been following throughout.  Ultimately the decision was made not to pursue dialysis and to stop tube feeds due to intolerance and focus instead on comfort cares as he does not have anything clearly modifiable and seems to be nearing the end of his life.    I assumed care on/8.  Patient remains comfort care status.  Family wishing not to go to nursing home and feel they cannot manage at home.  I think is likely he will pass here in the coming days.    Problem List/Assessment and Plan:   CKD 5, nephrotic range proteinuria: Not a dialysis candidate.  No plan on comfort cares.  --Palliative care following  --Comfort meds available, tolerating enteral Dilaudid/other meds  --Tube feeding stopped due to intolerance    2.   Acute  on chronic respiratory failure: Multifactorial due to volume overload, acute on chronic diastolic CHF, bilateral pleural effusions.  --Initially treated with aggressive diuresis, right-sided thoracentesis.  -- Weaned to about 1 L of oxygen, now just using oxygen for comfort    3.   Toxic metabolic encephalopathy: Suspected due to uremia.  Likely to continue to worsen.    4.   Prior CVA with hemiparesis, neurogenic bladder, tube feeds status:   --Tube feeds discontinued as he was not tolerating these and based on goals of care.    Other medical issues no longer being actively managed:     Hyperkalemia-resolved  Hypokalemia  ALEX on CKD stage V  Nephrotic range proteinuria  Anasarca  Hyponatremia  Hypercalcemia   -Creatinine 1 year ago was near 1  -Most recent creatinine 4.07 on 2/26/2025.  Creatinine 5.67 on admission and potassium 5.8.  He was previously seen by nephrology and his daughter indicates that he was determined not to be a dialysis candidate.  Potassium had improved with shifting agents and Lokelma.  Creatinine has been steadily climbing over the past several weeks.  -Appreciate nephrology input.  As per nephrology not a dialysis candidate  -Focusing on comfort cares, not checking labs.     Bradycardia  Junctional rhythm  This was an issue during his recent hospitalization as well.  Deemed not to be candidate for pacemaker given above issues.     Demand ischemia  Troponin has been elevated during prior evaluation on 2/26/2025.  Troponin 103 on admission and flat, not consistent with acute coronary syndrome.  Likely demand ischemia from respiratory failure with poor enzyme clearance in the face of CKD.    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 nonverbal status  Chronic dysphagia, status post PEG tube placement, G-tube exchange on 2/24/2025  S/p indwelling Felix catheter  Dyslipidemia  Hypertension     Anemia and chronic kidney  disease  -PTA ferrous sulfate.     Depression  -PTA Prozac.      DVT Prophylaxis: Comfort care status  Code Status: Comfort Care  Medically Ready for Discharge: Ready Now if a hospice facility were to be obtained      Clinically Significant Risk Factors               # Hypoalbuminemia: Lowest albumin = 3.3 g/dL at 3/15/2025 11:50 AM, will monitor as appropriate     # Hypertension: Noted on problem list    # Chronic heart failure with preserved ejection fraction: heart failure noted on problem list and last echo with EF >50%          # DMII: A1C = 8.8 % (Ref range: <5.7 %) within past 6 months         # Financial/Environmental Concerns:                 Interval History (Subjective):      Seems to be comfortable, tube feeds stopped due to discomfort  Met with family at the bedside  Not eating or drinking, likely to pass in the next couple of days  Continuing comfort cares                  Physical Exam:      Last Vital Signs:  /64 (BP Location: Left arm, Patient Position: Right side, Cuff Size: Adult Regular)   Pulse 93   Temp 98.7  F (37.1  C) (Axillary)   Resp 11   Wt 60.4 kg (133 lb 2.5 oz)   SpO2 99%   BMI 21.49 kg/m        Intake/Output Summary (Last 24 hours) at 4/8/2025 1136  Last data filed at 4/8/2025 0500  Gross per 24 hour   Intake 60 ml   Output 450 ml   Net -390 ml       General: Eyes partially open, nonverbal and not interactive with me.  HEENT: NC/AT, eyes anicteric  Cardiac: RRR, S1, S2.  Pulmonary: Normal chest rise, normal work of breathing.   Abdomen: soft, non-tender to the degree I can tell.  Extremities: no deformities.  Warm, well perfused.  Skin:  Warm and Dry.  Neuro: Nonverbal, not interacting.         Medications:      All current medications were reviewed with changes reflected in problem list.         Data:      Comfort cares    Jorge Stewart MD     I've spent 50 minutes in chart review, ordering medications and tests, obtaining additional history as needed,  evaluating the patient and in documentation for this encounter.

## 2025-04-09 NOTE — PLAN OF CARE
"Shift Summary (8578 - 3469):    Pt intermittently alert, nonverbal. Family at bedside. On comfort cares. Dilaudid given 3x for pain through PEG tube. PEG tube and chronic ordoñez WNL. Repo as tolerated. NPO. Palliative and spiritual health following.     Goal Outcome Evaluation:      Plan of Care Reviewed With: patient, family    Overall Patient Progress: no change    Outcome Evaluation: On comfort cares. Family at bedside. Dilaudid x2 through PEG tube. Chronic ordoñez intact.      Problem: Adult Inpatient Plan of Care  Goal: Plan of Care Review  Description: The Plan of Care Review/Shift note should be completed every shift.  The Outcome Evaluation is a brief statement about your assessment that the patient is improving, declining, or no change.  This information will be displayed automatically on your shiftnote.  Outcome: Progressing  Flowsheets (Taken 4/8/2025 2143)  Outcome Evaluation: On comfort cares. Family at bedside. Dilaudid x2 through PEG tube. Chronic ordoñez intact.  Plan of Care Reviewed With:   patient   family  Overall Patient Progress: no change  Goal: Patient-Specific Goal (Individualized)  Description: You can add care plan individualizations to a care plan. Examples of Individualization might be:  \"Parent requests to be called daily at 9am for status\", \"I have a hard time hearing out of my right ear\", or \"Do not touch me to wake me up as it startlesme\".  Outcome: Progressing  Goal: Absence of Hospital-Acquired Illness or Injury  Outcome: Progressing  Goal: Optimal Comfort and Wellbeing  Outcome: Progressing  Goal: Readiness for Transition of Care  Outcome: Progressing     Problem: Pain Acute  Goal: Optimal Pain Control and Function  Outcome: Progressing     Problem: Nausea and Vomiting  Goal: Nausea and Vomiting Relief  Outcome: Progressing     "

## 2025-04-09 NOTE — PLAN OF CARE
Pt showing nonverbal indicators of pain. Dilaudid given via PEG tube per family request w/ decrease in nonverbal indicators of pain. PIV leaking and removed. Discussed w/ family and no new IV placed per family request. Turning per family request as pt tolerates. Felix in place. Last BM 4/4. Regular diet. A2 w/ lift. Plan: Continue w/ comfort measures. Continue w/ POC.     Goal Outcome Evaluation:      Plan of Care Reviewed With: patient    Overall Patient Progress: no changeOverall Patient Progress: no change    Outcome Evaluation: Dilaudid given via PEG tube for pain. No s/s of nausea or emesis.      Problem: Adult Inpatient Plan of Care  Goal: Plan of Care Review  Description: The Plan of Care Review/Shift note should be completed every shift.  The Outcome Evaluation is a brief statement about your assessment that the patient is improving, declining, or no change.  This information will be displayed automatically on your shift  note.  Recent Flowsheet Documentation  Taken 4/9/2025 0300 by Olivia Porter, RN  Outcome Evaluation: Dilaudid given via PEG tube for pain. No s/s of nausea or emesis.  Plan of Care Reviewed With: patient  Overall Patient Progress: no change  Goal: Absence of Hospital-Acquired Illness or Injury  Intervention: Identify and Manage Fall Risk  Recent Flowsheet Documentation  Taken 4/9/2025 0033 by Olivia Porter RN  Safety Promotion/Fall Prevention: safety round/check completed  Intervention: Prevent Skin Injury  Recent Flowsheet Documentation  Taken 4/9/2025 0032 by Olivia Porter, RN  Body Position:   turned   left  Intervention: Prevent and Manage VTE (Venous Thromboembolism) Risk  Recent Flowsheet Documentation  Taken 4/9/2025 0033 by Olivia Porter, RAHAT  VTE Prevention/Management:   SCDs off (sequential compression devices)   patient refused intervention  Intervention: Prevent Infection  Recent Flowsheet Documentation  Taken 4/9/2025 0033 by Olivia Porter, RN  Infection Prevention:    single patient room provided   personal protective equipment utilized   rest/sleep promoted   hand hygiene promoted   equipment surfaces disinfected   environmental surveillance performed   cohorting utilized  Goal: Optimal Comfort and Wellbeing  Intervention: Monitor Pain and Promote Comfort  Recent Flowsheet Documentation  Taken 4/9/2025 0032 by Olivia Porter RN  Pain Management Interventions: repositioned     Problem: Adult Inpatient Plan of Care  Goal: Plan of Care Review  Description: The Plan of Care Review/Shift note should be completed every shift.  The Outcome Evaluation is a brief statement about your assessment that the patient is improving, declining, or no change.  This information will be displayed automatically on your shiftnote.  Recent Flowsheet Documentation  Taken 4/9/2025 0300 by Olivia Porter RN  Outcome Evaluation: Dilaudid given via PEG tube for pain. No s/s of nausea or emesis.  Plan of Care Reviewed With: patient  Overall Patient Progress: no change  Goal: Absence of Hospital-Acquired Illness or Injury  Intervention: Identify and Manage Fall Risk  Recent Flowsheet Documentation  Taken 4/9/2025 0033 by Olivia Porter RN  Safety Promotion/Fall Prevention: safety round/check completed  Intervention: Prevent Skin Injury  Recent Flowsheet Documentation  Taken 4/9/2025 0032 by Olivia Porter RN  Body Position:   turned   left  Intervention: Prevent and Manage VTE (Venous Thromboembolism) Risk  Recent Flowsheet Documentation  Taken 4/9/2025 0033 by Olivia Porter RN  VTE Prevention/Management:   SCDs off (sequential compression devices)   patient refused intervention  Intervention: Prevent Infection  Recent Flowsheet Documentation  Taken 4/9/2025 0033 by Olivia Porter RN  Infection Prevention:   single patient room provided   personal protective equipment utilized   rest/sleep promoted   hand hygiene promoted   equipment surfaces disinfected   environmental surveillance performed    "cohorting utilized  Goal: Optimal Comfort and Wellbeing  Intervention: Monitor Pain and Promote Comfort  Recent Flowsheet Documentation  Taken 4/9/2025 0032 by Olivia Porter RN  Pain Management Interventions: repositioned     Problem: Adult Inpatient Plan of Care  Goal: Plan of Care Review  Description: The Plan of Care Review/Shift note should be completed every shift.  The Outcome Evaluation is a brief statement about your assessment that the patient is improving, declining, or no change.  This information will be displayed automatically on your shiftnote.  Outcome: Progressing  Flowsheets (Taken 4/9/2025 0300)  Outcome Evaluation: Dilaudid given via PEG tube for pain. No s/s of nausea or emesis.  Plan of Care Reviewed With: patient  Overall Patient Progress: no change  Goal: Patient-Specific Goal (Individualized)  Description: You can add care plan individualizations to a care plan. Examples of Individualization might be:  \"Parent requests to be called daily at 9am for status\", \"I have a hard time hearing out of my right ear\", or \"Do not touch me to wake me up as it startlesme\".  Outcome: Progressing  Goal: Absence of Hospital-Acquired Illness or Injury  Outcome: Progressing  Intervention: Identify and Manage Fall Risk  Recent Flowsheet Documentation  Taken 4/9/2025 0033 by Olivia Porter RN  Safety Promotion/Fall Prevention: safety round/check completed  Intervention: Prevent Skin Injury  Recent Flowsheet Documentation  Taken 4/9/2025 0032 by Olivia Porter RN  Body Position:   turned   left  Intervention: Prevent and Manage VTE (Venous Thromboembolism) Risk  Recent Flowsheet Documentation  Taken 4/9/2025 0033 by Olivia Porter, RN  VTE Prevention/Management:   SCDs off (sequential compression devices)   patient refused intervention  Intervention: Prevent Infection  Recent Flowsheet Documentation  Taken 4/9/2025 0033 by Olivia Porter RN  Infection Prevention:   single patient room provided   personal " protective equipment utilized   rest/sleep promoted   hand hygiene promoted   equipment surfaces disinfected   environmental surveillance performed   cohorting utilized  Goal: Optimal Comfort and Wellbeing  Outcome: Progressing  Intervention: Monitor Pain and Promote Comfort  Recent Flowsheet Documentation  Taken 4/9/2025 0032 by Olivia Porter RN  Pain Management Interventions: repositioned  Goal: Readiness for Transition of Care  Outcome: Progressing     Problem: Delirium  Goal: Improved Behavioral Control  Intervention: Minimize Safety Risk  Recent Flowsheet Documentation  Taken 4/9/2025 0033 by Olivia Porter RN  Enhanced Safety Measures: review medications for side effects with activity     Problem: Dysrhythmia  Goal: Normalized Cardiac Rhythm  Intervention: Monitor and Manage Cardiac Rhythm Effect  Recent Flowsheet Documentation  Taken 4/9/2025 0033 by Olivia Porter RN  VTE Prevention/Management:   SCDs off (sequential compression devices)   patient refused intervention     Problem: Comorbidity Management  Goal: Maintenance of Asthma Control  Intervention: Maintain Asthma Symptom Control  Recent Flowsheet Documentation  Taken 4/9/2025 0033 by Olivia Porter RN  Medication Review/Management: medications reviewed  Goal: Maintenance of Behavioral Health Symptom Control  Intervention: Maintain Behavioral Health Symptom Control  Recent Flowsheet Documentation  Taken 4/9/2025 0033 by Olivia Porter RN  Medication Review/Management: medications reviewed  Goal: Maintenance of COPD Symptom Control  Intervention: Maintain COPD Symptom Control  Recent Flowsheet Documentation  Taken 4/9/2025 0033 by Olivia Porter RN  Medication Review/Management: medications reviewed  Goal: Blood Glucose Levels Within Targeted Range  Intervention: Monitor and Manage Glycemia  Recent Flowsheet Documentation  Taken 4/9/2025 0033 by Olivia Porter, RN  Medication Review/Management: medications reviewed  Goal: Maintenance of  Heart Failure Symptom Control  Intervention: Maintain Heart Failure Management  Recent Flowsheet Documentation  Taken 4/9/2025 0033 by Olivia Porter RN  Medication Review/Management: medications reviewed  Goal: Blood Pressure in Desired Range  Intervention: Maintain Blood Pressure Management  Recent Flowsheet Documentation  Taken 4/9/2025 0033 by Olivia Porter RN  Medication Review/Management: medications reviewed  Goal: Maintenance of Osteoarthritis Symptom Control  Intervention: Maintain Osteoarthritis Symptom Control  Recent Flowsheet Documentation  Taken 4/9/2025 0033 by Olivia Porter RN  Medication Review/Management: medications reviewed  Taken 4/9/2025 0032 by Olivia Porter RN  Assistive Device Utilized: lift device  Activity Management: activity adjusted per tolerance  Goal: Bariatric Home Regimen Maintained  Intervention: Maintain and Manage Postbariatric Surgery Care  Recent Flowsheet Documentation  Taken 4/9/2025 0033 by Olivia Porter RN  Medication Review/Management: medications reviewed  Goal: Maintenance of Seizure Control  Intervention: Maintain Seizure Symptom Control  Recent Flowsheet Documentation  Taken 4/9/2025 0033 by Olivia Porter RN  Medication Review/Management: medications reviewed     Problem: Pain Acute  Goal: Optimal Pain Control and Function  Intervention: Develop Pain Management Plan  Recent Flowsheet Documentation  Taken 4/9/2025 0032 by Olivia Porter RN  Pain Management Interventions: repositioned  Intervention: Prevent or Manage Pain  Recent Flowsheet Documentation  Taken 4/9/2025 0033 by Olivia Porter RN  Medication Review/Management: medications reviewed     Problem: Pain Acute  Goal: Optimal Pain Control and Function  Outcome: Progressing  Intervention: Develop Pain Management Plan  Recent Flowsheet Documentation  Taken 4/9/2025 0032 by Olivia Porter RN  Pain Management Interventions: repositioned  Intervention: Prevent or Manage Pain  Recent Flowsheet  Documentation  Taken 4/9/2025 0033 by Olivia Porter RN  Medication Review/Management: medications reviewed     Problem: Fall Injury Risk  Goal: Absence of Fall and Fall-Related Injury  Intervention: Identify and Manage Contributors  Recent Flowsheet Documentation  Taken 4/9/2025 0033 by Olivia Porter RN  Medication Review/Management: medications reviewed  Intervention: Promote Injury-Free Environment  Recent Flowsheet Documentation  Taken 4/9/2025 0033 by Olivia Porter RN  Safety Promotion/Fall Prevention: safety round/check completed     Problem: Fall Injury Risk  Goal: Absence of Fall and Fall-Related Injury  Intervention: Identify and Manage Contributors  Recent Flowsheet Documentation  Taken 4/9/2025 0033 by Olivia Porter RN  Medication Review/Management: medications reviewed  Intervention: Promote Injury-Free Environment  Recent Flowsheet Documentation  Taken 4/9/2025 0033 by Olivia Porter RN  Safety Promotion/Fall Prevention: safety round/check completed     Problem: Gas Exchange Impaired  Goal: Optimal Gas Exchange  Intervention: Optimize Oxygenation and Ventilation  Recent Flowsheet Documentation  Taken 4/9/2025 0032 by Olivia Porter, RN  Head of Bed (HOB) Positioning: HOB at 20 degrees     Problem: Nausea and Vomiting  Goal: Nausea and Vomiting Relief  Outcome: Adequate for Care Transition

## 2025-04-10 VITALS
TEMPERATURE: 98.7 F | BODY MASS INDEX: 21.49 KG/M2 | DIASTOLIC BLOOD PRESSURE: 64 MMHG | WEIGHT: 133.16 LBS | HEART RATE: 93 BPM | OXYGEN SATURATION: 99 % | SYSTOLIC BLOOD PRESSURE: 127 MMHG | RESPIRATION RATE: 18 BRPM

## 2025-04-10 PROCEDURE — 250N000013 HC RX MED GY IP 250 OP 250 PS 637: Performed by: NURSE PRACTITIONER

## 2025-04-10 PROCEDURE — 99232 SBSQ HOSP IP/OBS MODERATE 35: CPT | Performed by: NURSE PRACTITIONER

## 2025-04-10 PROCEDURE — 99231 SBSQ HOSP IP/OBS SF/LOW 25: CPT | Performed by: INTERNAL MEDICINE

## 2025-04-10 PROCEDURE — 250N000009 HC RX 250: Performed by: STUDENT IN AN ORGANIZED HEALTH CARE EDUCATION/TRAINING PROGRAM

## 2025-04-10 PROCEDURE — 120N000001 HC R&B MED SURG/OB

## 2025-04-10 RX ORDER — ATROPINE SULFATE 10 MG/ML
2 SOLUTION/ DROPS OPHTHALMIC
Status: DISCONTINUED | OUTPATIENT
Start: 2025-04-10 | End: 2025-04-18 | Stop reason: HOSPADM

## 2025-04-10 RX ADMIN — HYDROMORPHONE HYDROCHLORIDE 2 MG: 1 SOLUTION ORAL at 21:11

## 2025-04-10 RX ADMIN — HYDROMORPHONE HYDROCHLORIDE 2 MG: 1 SOLUTION ORAL at 01:35

## 2025-04-10 RX ADMIN — ATROPINE SULFATE 2 DROP: 10 SOLUTION/ DROPS OPHTHALMIC at 18:42

## 2025-04-10 RX ADMIN — HYDROMORPHONE HYDROCHLORIDE 2 MG: 1 SOLUTION ORAL at 17:09

## 2025-04-10 RX ADMIN — HYDROMORPHONE HYDROCHLORIDE 2 MG: 1 SOLUTION ORAL at 06:11

## 2025-04-10 RX ADMIN — HYDROMORPHONE HYDROCHLORIDE 2 MG: 1 SOLUTION ORAL at 13:11

## 2025-04-10 ASSESSMENT — ACTIVITIES OF DAILY LIVING (ADL)
ADLS_ACUITY_SCORE: 107
ADLS_ACUITY_SCORE: 107
ADLS_ACUITY_SCORE: 105
ADLS_ACUITY_SCORE: 107
ADLS_ACUITY_SCORE: 107
ADLS_ACUITY_SCORE: 109
ADLS_ACUITY_SCORE: 105
ADLS_ACUITY_SCORE: 105
ADLS_ACUITY_SCORE: 107
ADLS_ACUITY_SCORE: 105
ADLS_ACUITY_SCORE: 107
ADLS_ACUITY_SCORE: 105
ADLS_ACUITY_SCORE: 105
ADLS_ACUITY_SCORE: 107
ADLS_ACUITY_SCORE: 109
ADLS_ACUITY_SCORE: 107
ADLS_ACUITY_SCORE: 107
ADLS_ACUITY_SCORE: 105
ADLS_ACUITY_SCORE: 105
ADLS_ACUITY_SCORE: 107
ADLS_ACUITY_SCORE: 105

## 2025-04-10 NOTE — PROGRESS NOTES
[facility]  Hospitalist Progress Note  Jorge Stewart MD 04/10/2025    Reason for Stay (Diagnosis):          Assessment and Plan:      Summary of Stay: Jimmy Otto is a 75 year old male with extensive past medical history including major stroke with right sided hemiaplasia, aphasia, type 2 diabetes, hypertension, PEG tube, CKD with nephrotic range proteinuria and advanced diabetic nephropathy, anasarca, anemia, neurogenic bladder with chronic Felix catheter essentially total cares at baseline admitted on 3/15/2025 with low oxygen level and bradycardia with some recent vomiting.    Initial workup here showed hyponatremia, mild hyperkalemia with creatinine of 5.67, stable anemia and chest x-ray showing a large right pleural effusion with a small to moderate left pleural effusion as well as suspected bibasilar patchy infiltrates.    He initially was treated with aggressive IV diuretics, will, and antibiotics.  He did undergo thoracentesis and nephrology was consulted and he was felt to be a very poor dialysis candidate based on major comorbidities.  Furthermore, he was having difficulties tolerating enteral nutrition both apparently due to pain and likely reflux.  [facility] care has been following throughout.  Ultimately the decision was made not to pursue dialysis and to stop tube feeds due to intolerance and focus instead on comfort cares as he does not have anything clearly modifiable and seems to be nearing the end of his life.    I assumed care on/8.  Patient remains comfort care status.  Family wishing not to go to nursing home and feel they cannot manage at home.  I think is likely he will pass here in the coming days.    Problem List/Assessment and Plan:   CKD 5, nephrotic range proteinuria: Not a dialysis candidate.  No plan on comfort cares.  --Palliative care following  --Comfort meds available, tolerating enteral Dilaudid/other meds  --Tube feeding stopped due to intolerance    2.   Acute  on chronic respiratory failure: Multifactorial due to volume overload, acute on chronic diastolic CHF, bilateral pleural effusions.  --Initially treated with aggressive diuresis, right-sided thoracentesis.  -- Weaned to about 1 L of oxygen, now just using oxygen for comfort    3.   Toxic metabolic encephalopathy: Suspected due to uremia.  Likely to continue to worsen.    4.   Prior CVA with hemiparesis, neurogenic bladder, tube feeds status:   --Tube feeds discontinued as he was not tolerating these and based on goals of care.    Other medical issues no longer being actively managed:     Hyperkalemia-resolved  Hypokalemia  ALEX on CKD stage V  Nephrotic range proteinuria  Anasarca  Hyponatremia  Hypercalcemia   -Creatinine 1 year ago was near 1  -Most recent creatinine 4.07 on 2/26/2025.  Creatinine 5.67 on admission and potassium 5.8.  He was previously seen by nephrology and his daughter indicates that he was determined not to be a dialysis candidate.  Potassium had improved with shifting agents and Lokelma.  Creatinine has been steadily climbing over the past several weeks.  -Appreciate nephrology input.  As per nephrology not a dialysis candidate  -Focusing on comfort cares, not checking labs.     Bradycardia  Junctional rhythm  This was an issue during his recent hospitalization as well.  Deemed not to be candidate for pacemaker given above issues.     Demand ischemia  Troponin has been elevated during prior evaluation on 2/26/2025.  Troponin 103 on admission and flat, not consistent with acute coronary syndrome.  Likely demand ischemia from respiratory failure with poor enzyme clearance in the face of CKD.    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 nonverbal status  Chronic dysphagia, status post PEG tube placement, G-tube exchange on 2/24/2025  S/p indwelling Felix catheter  Dyslipidemia  Hypertension     Anemia and chronic kidney  disease  -PTA ferrous sulfate.     Depression  -PTA Prozac.      DVT Prophylaxis: Comfort care status  Code Status: Comfort Care  Medically Ready for Discharge: Ready Now if a hospice facility were to be obtained      Clinically Significant Risk Factors               # Hypoalbuminemia: Lowest albumin = 3.3 g/dL at 3/15/2025 11:50 AM, will monitor as appropriate     # Hypertension: Noted on problem list    # Chronic heart failure with preserved ejection fraction: heart failure noted on problem list and last echo with EF >50%          # DMII: A1C = 8.8 % (Ref range: <5.7 %) within past 6 months         # Financial/Environmental Concerns:                 Interval History (Subjective):      Seems to be comfortable, d/w nursing, no major changes.                  Physical Exam:      Last Vital Signs:    General: Eyes partially open, nonverbal and not interactive with me.  HEENT: NC/AT, eyes anicteric  Cardiac: RRR, S1, S2.  Pulmonary: Normal chest rise, normal work of breathing.   Abdomen: soft, non-tender to the degree I can tell.  Extremities: no deformities.  Warm, well perfused.  Skin:  Warm and Dry.  Neuro: Nonverbal, not interacting.         Medications:      All current medications were reviewed with changes reflected in problem list.         Data:      Comfort cares    Jorge Stewart MD     I've spent 30 minutes in chart review, ordering medications and tests, obtaining additional history as needed, evaluating the patient and in documentation for this encounter.

## 2025-04-10 NOTE — PLAN OF CARE
Many family members at bedside. Pt alert. Non verbal. Has eye twitching which is his pain indicator. Dilaudid for pain.     Goal Outcome Evaluation:      Plan of Care Reviewed With: patient    Overall Patient Progress: no changeOverall Patient Progress: no change    Outcome Evaluation: Dilaudid given x 2 for comfort. Turned and repositioned      Problem: Adult Inpatient Plan of Care  Goal: Plan of Care Review  Description: The Plan of Care Review/Shift note should be completed every shift.  The Outcome Evaluation is a brief statement about your assessment that the patient is improving, declining, or no change.  This information will be displayed automatically on your shift  note.  Recent Flowsheet Documentation  Taken 4/9/2025 2249 by Samantha Lazo RN  Outcome Evaluation: Dilaudid given x 2 for comfort. Turned and repositioned  Plan of Care Reviewed With: patient  Overall Patient Progress: no change  Goal: Absence of Hospital-Acquired Illness or Injury  Intervention: Identify and Manage Fall Risk  Recent Flowsheet Documentation  Taken 4/9/2025 1930 by Samantha Lazo RN  Safety Promotion/Fall Prevention:   clutter free environment maintained   nonskid shoes/slippers when out of bed   patient and family education   safety round/check completed  Intervention: Prevent Skin Injury  Recent Flowsheet Documentation  Taken 4/9/2025 2114 by Samantha Lazo RN  Body Position:   right   turned     Problem: Adult Inpatient Plan of Care  Goal: Plan of Care Review  Description: The Plan of Care Review/Shift note should be completed every shift.  The Outcome Evaluation is a brief statement about your assessment that the patient is improving, declining, or no change.  This information will be displayed automatically on your shiftnote.  Recent Flowsheet Documentation  Taken 4/9/2025 2249 by Samantha Lazo RN  Outcome Evaluation: Dilaudid given x 2 for comfort. Turned and repositioned  Plan of Care  "Reviewed With: patient  Overall Patient Progress: no change  Goal: Absence of Hospital-Acquired Illness or Injury  Intervention: Identify and Manage Fall Risk  Recent Flowsheet Documentation  Taken 4/9/2025 1930 by Samantha Lazo RN  Safety Promotion/Fall Prevention:   clutter free environment maintained   nonskid shoes/slippers when out of bed   patient and family education   safety round/check completed  Intervention: Prevent Skin Injury  Recent Flowsheet Documentation  Taken 4/9/2025 2114 by Samantha Lazo, RN  Body Position:   right   turned     Problem: Adult Inpatient Plan of Care  Goal: Plan of Care Review  Description: The Plan of Care Review/Shift note should be completed every shift.  The Outcome Evaluation is a brief statement about your assessment that the patient is improving, declining, or no change.  This information will be displayed automatically on your shiftnote.  Outcome: Progressing  Flowsheets (Taken 4/9/2025 2249)  Outcome Evaluation: Dilaudid given x 2 for comfort. Turned and repositioned  Plan of Care Reviewed With: patient  Overall Patient Progress: no change  Goal: Patient-Specific Goal (Individualized)  Description: You can add care plan individualizations to a care plan. Examples of Individualization might be:  \"Parent requests to be called daily at 9am for status\", \"I have a hard time hearing out of my right ear\", or \"Do not touch me to wake me up as it startlesme\".  Outcome: Progressing  Goal: Absence of Hospital-Acquired Illness or Injury  Outcome: Progressing  Intervention: Identify and Manage Fall Risk  Recent Flowsheet Documentation  Taken 4/9/2025 1930 by Samantha Lazo RN  Safety Promotion/Fall Prevention:   clutter free environment maintained   nonskid shoes/slippers when out of bed   patient and family education   safety round/check completed  Intervention: Prevent Skin Injury  Recent Flowsheet Documentation  Taken 4/9/2025 2114 by Samantha Lazo" TRAVON RN  Body Position:   right   turned  Goal: Optimal Comfort and Wellbeing  Outcome: Progressing  Goal: Readiness for Transition of Care  Outcome: Progressing     Problem: Comorbidity Management  Goal: Maintenance of Osteoarthritis Symptom Control  Intervention: Maintain Osteoarthritis Symptom Control  Recent Flowsheet Documentation  Taken 4/9/2025 1930 by Samantha Lazo RN  Activity Management: activity adjusted per tolerance     Problem: Pain Acute  Goal: Optimal Pain Control and Function  Outcome: Progressing     Problem: Fall Injury Risk  Goal: Absence of Fall and Fall-Related Injury  Intervention: Promote Injury-Free Environment  Recent Flowsheet Documentation  Taken 4/9/2025 1930 by Samantha Lazo RN  Safety Promotion/Fall Prevention:   clutter free environment maintained   nonskid shoes/slippers when out of bed   patient and family education   safety round/check completed     Problem: Fall Injury Risk  Goal: Absence of Fall and Fall-Related Injury  Intervention: Promote Injury-Free Environment  Recent Flowsheet Documentation  Taken 4/9/2025 1930 by Samantha Lazo, RN  Safety Promotion/Fall Prevention:   clutter free environment maintained   nonskid shoes/slippers when out of bed   patient and family education   safety round/check completed     Problem: Gas Exchange Impaired  Goal: Optimal Gas Exchange  Intervention: Optimize Oxygenation and Ventilation  Recent Flowsheet Documentation  Taken 4/9/2025 2114 by Samantha Lazo, RN  Head of Bed (HOB) Positioning: HOB at 20-30 degrees

## 2025-04-10 NOTE — PLAN OF CARE
Shift summary ([id number])    Pt remains comfortable w/ comfort care measures provided. Adequate pain control, see MAR for analgesic regimen for occasional facial grimacing, left eye twitching (signal of pain per family report). Family intermittently swabbing and suctioning pt's oral cavity for secretions, atropine drops ordered and lip moisturizer applied. Felix patent and intact. Gtube patent and intact, clamped. Family declined offered stool softener and bedside fan for pt.     Goal Outcome Evaluation:      Plan of Care Reviewed With: patient, family    Overall Patient Progress: no changeOverall Patient Progress: no change    Outcome Evaluation: Pt remains comfortable w/ comfort care measures provided.      Problem: Adult Inpatient Plan of Care  Goal: Plan of Care Review  Description: The Plan of Care Review/Shift note should be completed every shift.  The Outcome Evaluation is a brief statement about your assessment that the patient is improving, declining, or no change.  This information will be displayed automatically on your shift  note.  Recent Flowsheet Documentation  Taken 4/10/2025 1814 by Viji Davies RN  Outcome Evaluation: Pt remains comfortable w/ comfort care measures provided.  Plan of Care Reviewed With:   patient   family  Overall Patient Progress: no change  Goal: Absence of Hospital-Acquired Illness or Injury  Intervention: Identify and Manage Fall Risk  Recent Flowsheet Documentation  Taken 4/10/2025 1801 by Viji Davies RN  Safety Promotion/Fall Prevention: safety round/check completed  Taken 4/10/2025 1751 by Viji Davies RN  Safety Promotion/Fall Prevention: safety round/check completed  Taken 4/10/2025 1712 by Viji Davies RN  Safety Promotion/Fall Prevention: safety round/check completed  Taken 4/10/2025 1535 by Viji Davies RN  Safety Promotion/Fall Prevention: safety round/check completed  Taken 4/10/2025 1248 by Viji Davies  RN  Safety Promotion/Fall Prevention: (family at bedside) safety round/check completed  Taken 4/10/2025 1244 by Viji Davies RN  Safety Promotion/Fall Prevention: safety round/check completed  Taken 4/10/2025 1205 by Viji Davies RN  Safety Promotion/Fall Prevention: safety round/check completed  Taken 4/10/2025 1133 by Viji Davies RN  Safety Promotion/Fall Prevention: safety round/check completed  Taken 4/10/2025 1102 by Viji Davies RN  Safety Promotion/Fall Prevention: safety round/check completed  Taken 4/10/2025 1015 by Viji Davies RN  Safety Promotion/Fall Prevention: safety round/check completed  Taken 4/10/2025 0921 by Viji Davies RN  Safety Promotion/Fall Prevention: (spouse at bedside)   activity supervised   assistive device/personal items within reach   clutter free environment maintained   increased rounding and observation   increase visualization of patient   lighting adjusted   patient and family education   nonskid shoes/slippers when out of bed   room organization consistent   safety round/check completed   supervised activity   treat reversible contributory factors   treat underlying cause  Taken 4/10/2025 0715 by Viji Davies RN  Safety Promotion/Fall Prevention: safety round/check completed  Intervention: Prevent Skin Injury  Recent Flowsheet Documentation  Taken 4/10/2025 1801 by Viji Davies RN  Body Position:   turned   side-lying   left   heels elevated  Taken 4/10/2025 1550 by Viji Davies RN  Body Position:   turned   side-lying   right   heels elevated  Taken 4/10/2025 1321 by Viji Davies RN  Body Position:   turned   side-lying   left   heels elevated  Taken 4/10/2025 1133 by Viji Davies RN  Body Position:   turned   side-lying   right   heels elevated  Taken 4/10/2025 0921 by Viji Davies, RN  Body Position:   turned   side-lying   left   heels elevated  Skin  Protection:   adhesive use limited   incontinence pads utilized   tubing/devices free from skin contact  Intervention: Prevent and Manage VTE (Venous Thromboembolism) Risk  Recent Flowsheet Documentation  Taken 4/10/2025 0921 by Viji Davies RN  VTE Prevention/Management: (comfort cares) other (see comments)  Intervention: Prevent Infection  Recent Flowsheet Documentation  Taken 4/10/2025 0921 by Viji Davies RN  Infection Prevention:   equipment surfaces disinfected   hand hygiene promoted   personal protective equipment utilized   rest/sleep promoted   single patient room provided

## 2025-04-10 NOTE — PLAN OF CARE
"Non-verbal, opens eyes spontaneously. Comfort cares. Family in room to translate. Turns and oral cares provided throughout the night.     IV: none  Diet: G-tube, feedings discontinued  Activity: 2 lift w/ turns  Pain: rFLACC scoring for grimace/ eye wink. Family reported eye wink being an indicator of being pain. PRN dilaudid given.     Plan to discharge to hospice to facility when family decides on one.                               Resp: 18           Goal Outcome Evaluation:      Plan of Care Reviewed With: patient, child    Overall Patient Progress: no changeOverall Patient Progress: no change    Outcome Evaluation: Dilaudid via G-tube, turns. Comfort cares      Problem: Adult Inpatient Plan of Care  Goal: Plan of Care Review  Description: The Plan of Care Review/Shift note should be completed every shift.  The Outcome Evaluation is a brief statement about your assessment that the patient is improving, declining, or no change.  This information will be displayed automatically on your shiftnote.  Outcome: Not Progressing  Flowsheets (Taken 4/10/2025 0436)  Outcome Evaluation: Dilaudid via G-tube, turns. Comfort cares  Plan of Care Reviewed With:   patient   child  Overall Patient Progress: no change  Goal: Patient-Specific Goal (Individualized)  Description: You can add care plan individualizations to a care plan. Examples of Individualization might be:  \"Parent requests to be called daily at 9am for status\", \"I have a hard time hearing out of my right ear\", or \"Do not touch me to wake me up as it startlesme\".  Outcome: Not Progressing  Goal: Absence of Hospital-Acquired Illness or Injury  Outcome: Not Progressing  Intervention: Identify and Manage Fall Risk  Recent Flowsheet Documentation  Taken 4/10/2025 0402 by Mackenzie Navarrete, RN  Safety Promotion/Fall Prevention: safety round/check completed  Taken 4/10/2025 0217 by Mackenzie Navarrete, RN  Safety Promotion/Fall Prevention: safety round/check completed  Taken " 4/10/2025 0124 by Mackenzie Navarrete RN  Safety Promotion/Fall Prevention: safety round/check completed  Intervention: Prevent Skin Injury  Recent Flowsheet Documentation  Taken 4/10/2025 0217 by Mackenzie Navarrete RN  Body Position:   turned   left   log-rolled   heels elevated   legs elevated   supine, head elevated  Intervention: Prevent and Manage VTE (Venous Thromboembolism) Risk  Recent Flowsheet Documentation  Taken 4/10/2025 0124 by Mackenzie Navarrete RN  VTE Prevention/Management: SCDs on (sequential compression devices)  Goal: Optimal Comfort and Wellbeing  Outcome: Not Progressing  Goal: Readiness for Transition of Care  Outcome: Not Progressing     Problem: Pain Acute  Goal: Optimal Pain Control and Function  Outcome: Not Progressing

## 2025-04-10 NOTE — PROGRESS NOTES
PALLIATIVE CARE PROGRESS NOTE  [facility]     Patient Name: Jimmy Otto  Date of Admission: 3/15/2025   Today the patient was seen for:   Goals of care  Emotional and decisional support     Recommendations & Counseling     GOALS OF CARE:   Comfort focused   No tube feeding  Discussed restlessness and pain and medications to treat- family is ok treating.    ADVANCE CARE PLANNING:  No health care directive on file. Per system policy, Surrogate Decision-makers for Patients With Diminished Decision-making Capacity offers guidance on possible decision-makers. Daughter Clemente and Wayne and Wife and son Sathya make decisions together has been identified as a surrogate decision maker.   There is no POLST form on file, defer to patient and/or next of kin for decisions   Code status: No CPR- Do NOT Intubate    MEDICAL MANAGEMENT:   #General Weakness  Appreciate the staff for all ADLs    #Pain  Dilaudid 2-4 mg every 1 hour PRN  Added heating pad for comfort  Also has PRN tylenol    PSYCHOSOCIAL/SPIRITUAL:  Family Wife, two daughters and son  [place] community: [facility]     Palliative Care will continue to follow. Thank you for the consult and allowing us to aid in the care of Jimmy Otto.    These recommendations have been discussed with medical team.    Mackenzie Medina NP  Securely message with [employer] (more info)  Text page via [facility] Paging/Directory      Assessments          Jimmy Otto is a 75 year old male admitted 3/15/2025 with acute hypoxic respiratory failure in the setting of acute on chronic diastolic CHF exacerbation with bilateral pleura effusions R>L. The patient and family are known to this writer from the patient's hospitalization October 2021 when the family had made the decision to pursue tube feedings. 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 Patient appears comfortable. Pain regimen is currently working. Having secretions- discussion of terminal secretions and atropine. Questions asked and answered          Review of Systems:     Besides above, an additional system ROS was reviewed and is unremarkable               Physical Exam:       No data recorded       Resp: 18        Vital Signs with Ranges  Resp:  [18-20] 18  133 lbs 2.53 oz    EXAM:  Constitutional: appears comfortable  Cardiovascular: negative  Respiratory: negative  Psychiatric: calm  Pain: no s/s of pain               Current Problem List:   Active Problems:    Hypervolemia, unspecified hypervolemia type    Bradycardia    Pleural effusion    Hypoxia    Acute kidney injury superimposed on CKD      No Known Allergies         Data Reviewed:       No results found for this or any previous visit (from the past 24 hours).         Medical Decision Making       37 MINUTES SPENT BY ME on the date of service doing chart review, history, exam, documentation & further activities per the note.

## 2025-04-11 PROCEDURE — 99231 SBSQ HOSP IP/OBS SF/LOW 25: CPT | Mod: GV | Performed by: INTERNAL MEDICINE

## 2025-04-11 PROCEDURE — 120N000001 HC R&B MED SURG/OB

## 2025-04-11 PROCEDURE — 250N000013 HC RX MED GY IP 250 OP 250 PS 637: Performed by: NURSE PRACTITIONER

## 2025-04-11 RX ADMIN — HYDROMORPHONE HYDROCHLORIDE 2 MG: 1 SOLUTION ORAL at 02:40

## 2025-04-11 RX ADMIN — HYDROMORPHONE HYDROCHLORIDE 2 MG: 1 SOLUTION ORAL at 17:07

## 2025-04-11 RX ADMIN — ATROPINE SULFATE 2 DROP: 10 SOLUTION/ DROPS OPHTHALMIC at 12:46

## 2025-04-11 RX ADMIN — HYDROMORPHONE HYDROCHLORIDE 2 MG: 1 SOLUTION ORAL at 10:04

## 2025-04-11 RX ADMIN — ATROPINE SULFATE 2 DROP: 10 SOLUTION/ DROPS OPHTHALMIC at 08:40

## 2025-04-11 RX ADMIN — HYDROMORPHONE HYDROCHLORIDE 2 MG: 1 SOLUTION ORAL at 21:53

## 2025-04-11 RX ADMIN — HYDROMORPHONE HYDROCHLORIDE 2 MG: 1 SOLUTION ORAL at 14:36

## 2025-04-11 RX ADMIN — HYDROMORPHONE HYDROCHLORIDE 2 MG: 1 SOLUTION ORAL at 19:38

## 2025-04-11 ASSESSMENT — ACTIVITIES OF DAILY LIVING (ADL)
ADLS_ACUITY_SCORE: 109
ADLS_ACUITY_SCORE: 107
ADLS_ACUITY_SCORE: 109
ADLS_ACUITY_SCORE: 107
ADLS_ACUITY_SCORE: 109
ADLS_ACUITY_SCORE: 107
ADLS_ACUITY_SCORE: 109
ADLS_ACUITY_SCORE: 109

## 2025-04-11 NOTE — PROGRESS NOTES
[facility]  Hospitalist Progress Note  Jorge Stewart MD 04/11/2025    Reason for Stay (Diagnosis):          Assessment and Plan:      Summary of Stay: Jimmy Otto is a 75 year old male with extensive past medical history including major stroke with right sided hemiaplasia, aphasia, type 2 diabetes, hypertension, PEG tube, CKD with nephrotic range proteinuria and advanced diabetic nephropathy, anasarca, anemia, neurogenic bladder with chronic Felix catheter essentially total cares at baseline admitted on 3/15/2025 with low oxygen level and bradycardia with some recent vomiting.    Initial workup here showed hyponatremia, mild hyperkalemia with creatinine of 5.67, stable anemia and chest x-ray showing a large right pleural effusion with a small to moderate left pleural effusion as well as suspected bibasilar patchy infiltrates.    He initially was treated with aggressive IV diuretics, will, and antibiotics.  He did undergo thoracentesis and nephrology was consulted and he was felt to be a very poor dialysis candidate based on major comorbidities.  Furthermore, he was having difficulties tolerating enteral nutrition both apparently due to pain and likely reflux.  Native care has been following throughout.  Ultimately the decision was made not to pursue dialysis and to stop tube feeds due to intolerance and focus instead on comfort cares as he does not have anything clearly modifiable and seems to be nearing the end of his life.    I assumed care on/8.  Patient remains comfort care status.  Family wishing not to go to nursing home and feel they cannot manage at home.  I think is likely he will pass here in the coming days.    Problem List/Assessment and Plan:   CKD 5, nephrotic range proteinuria: Not a dialysis candidate.  No plan on comfort cares.  --Palliative care following  --Comfort meds available, tolerating enteral Dilaudid/other meds  --Tube feeding stopped due to intolerance    2.   Acute  on chronic respiratory failure: Multifactorial due to volume overload, acute on chronic diastolic CHF, bilateral pleural effusions.  --Initially treated with aggressive diuresis, right-sided thoracentesis.  -- Weaned to about 1 L of oxygen, now just using oxygen for comfort    3.   Toxic metabolic encephalopathy: Suspected due to uremia.  Likely to continue to worsen.    4.   Prior CVA with hemiparesis, neurogenic bladder, tube feeds status:   --Tube feeds discontinued as he was not tolerating these and based on goals of care.    Other medical issues no longer being actively managed:     Hyperkalemia-resolved  Hypokalemia  ALEX on CKD stage V  Nephrotic range proteinuria  Anasarca  Hyponatremia  Hypercalcemia   -Creatinine 1 year ago was near 1  -Most recent creatinine 4.07 on 2/26/2025.  Creatinine 5.67 on admission and potassium 5.8.  He was previously seen by nephrology and his daughter indicates that he was determined not to be a dialysis candidate.  Potassium had improved with shifting agents and Lokelma.  Creatinine has been steadily climbing over the past several weeks.  -Appreciate nephrology input.  As per nephrology not a dialysis candidate  -Focusing on comfort cares, not checking labs.     Bradycardia  Junctional rhythm  This was an issue during his recent hospitalization as well.  Deemed not to be candidate for pacemaker given above issues.     Demand ischemia  Troponin has been elevated during prior evaluation on 2/26/2025.  Troponin 103 on admission and flat, not consistent with acute coronary syndrome.  Likely demand ischemia from respiratory failure with poor enzyme clearance in the face of CKD.    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 nonverbal status  Chronic dysphagia, status post PEG tube placement, G-tube exchange on 2/24/2025  S/p indwelling Felix catheter  Dyslipidemia  Hypertension     Anemia and chronic kidney  disease  -PTA ferrous sulfate.     Depression  -PTA Prozac.      DVT Prophylaxis: Comfort care status  Code Status: Comfort Care  Medically Ready for Discharge: Ready Now if a hospice facility were to be obtained      Clinically Significant Risk Factors               # Hypoalbuminemia: Lowest albumin = 3.3 g/dL at 3/15/2025 11:50 AM, will monitor as appropriate     # Hypertension: Noted on problem list    # Chronic heart failure with preserved ejection fraction: heart failure noted on problem list and last echo with EF >50%          # DMII: A1C = 8.8 % (Ref range: <5.7 %) within past 6 months         # Financial/Environmental Concerns:                 Interval History (Subjective):      Seems to be comfortable, no major changes.                  Physical Exam:      Last Vital Signs:    General: Eyes partially open, nonverbal and not interactive with me.  HEENT: NC/AT, eyes anicteric  Cardiac: RRR, S1, S2.  Pulmonary: Normal chest rise, normal work of breathing.   Abdomen: soft, non-tender to the degree I can tell.  Extremities: no deformities.  Warm, well perfused.  Skin:  Warm and Dry.  Neuro: Nonverbal, not interacting.         Medications:      All current medications were reviewed with changes reflected in problem list.         Data:      Comfort cares    Jorge Stewart MD     I've spent 30 minutes in chart review, ordering medications and tests, obtaining additional history as needed, evaluating the patient and in documentation for this encounter.

## 2025-04-11 NOTE — PLAN OF CARE
"Goal Outcome Evaluation:      Plan of Care Reviewed With: patient, family    Overall Patient Progress: no changeOverall Patient Progress: no change    Outcome Evaluation: Pt comfortable, turn and repo as tolerated. PRN dilaudid given 1x noc. Family remains at bedside. Felix & PEG tube CDI      Problem: Adult Inpatient Plan of Care  Goal: Plan of Care Review  Description: The Plan of Care Review/Shift note should be completed every shift.  The Outcome Evaluation is a brief statement about your assessment that the patient is improving, declining, or no change.  This information will be displayed automatically on your shiftnote.  Outcome: Not Progressing  Flowsheets (Taken 4/11/2025 0637)  Outcome Evaluation: Pt comfortable, turn and repo as tolerated. PRN dilaudid given 1x noc. Family remains at bedside. Felix & PEG tube CDI  Plan of Care Reviewed With:   patient   family  Overall Patient Progress: no change  Goal: Patient-Specific Goal (Individualized)  Description: You can add care plan individualizations to a care plan. Examples of Individualization might be:  \"Parent requests to be called daily at 9am for status\", \"I have a hard time hearing out of my right ear\", or \"Do not touch me to wake me up as it startlesme\".  Outcome: Not Progressing  Goal: Absence of Hospital-Acquired Illness or Injury  Outcome: Not Progressing  Goal: Optimal Comfort and Wellbeing  Outcome: Not Progressing  Goal: Readiness for Transition of Care  Outcome: Not Progressing     Problem: Pain Acute  Goal: Optimal Pain Control and Function  Outcome: Not Progressing     "

## 2025-04-11 NOTE — PLAN OF CARE
"Goal Outcome Evaluation:      Plan of Care Reviewed With: family    Overall Patient Progress: no changeOverall Patient Progress: no change         Nonverbal. BLANCO. Hector Q2h. Comfort cares. Dilaudid given through Gtube. Felix in place. Family at bedside.       Problem: Adult Inpatient Plan of Care  Goal: Plan of Care Review  Description: The Plan of Care Review/Shift note should be completed every shift.  The Outcome Evaluation is a brief statement about your assessment that the patient is improving, declining, or no change.  This information will be displayed automatically on your shiftnote.  Outcome: Progressing  Flowsheets (Taken 4/10/2025 4498)  Plan of Care Reviewed With: family  Overall Patient Progress: no change  Goal: Patient-Specific Goal (Individualized)  Description: You can add care plan individualizations to a care plan. Examples of Individualization might be:  \"Parent requests to be called daily at 9am for status\", \"I have a hard time hearing out of my right ear\", or \"Do not touch me to wake me up as it startlesme\".  Outcome: Progressing  Goal: Absence of Hospital-Acquired Illness or Injury  Outcome: Progressing  Goal: Optimal Comfort and Wellbeing  Outcome: Progressing  Goal: Readiness for Transition of Care  Outcome: Progressing     Problem: Pain Acute  Goal: Optimal Pain Control and Function  Outcome: Progressing     "

## 2025-04-12 PROCEDURE — 99231 SBSQ HOSP IP/OBS SF/LOW 25: CPT | Mod: GV | Performed by: INTERNAL MEDICINE

## 2025-04-12 PROCEDURE — 250N000013 HC RX MED GY IP 250 OP 250 PS 637: Performed by: NURSE PRACTITIONER

## 2025-04-12 PROCEDURE — 120N000001 HC R&B MED SURG/OB

## 2025-04-12 RX ADMIN — HYDROMORPHONE HYDROCHLORIDE 2 MG: 1 SOLUTION ORAL at 12:51

## 2025-04-12 RX ADMIN — HYDROMORPHONE HYDROCHLORIDE 2 MG: 1 SOLUTION ORAL at 21:50

## 2025-04-12 RX ADMIN — HYDROMORPHONE HYDROCHLORIDE 2 MG: 1 SOLUTION ORAL at 07:46

## 2025-04-12 RX ADMIN — HYDROMORPHONE HYDROCHLORIDE 2 MG: 1 SOLUTION ORAL at 17:27

## 2025-04-12 RX ADMIN — HYDROMORPHONE HYDROCHLORIDE 2 MG: 1 SOLUTION ORAL at 00:02

## 2025-04-12 RX ADMIN — HYDROMORPHONE HYDROCHLORIDE 2 MG: 1 SOLUTION ORAL at 02:56

## 2025-04-12 ASSESSMENT — ACTIVITIES OF DAILY LIVING (ADL)
ADLS_ACUITY_SCORE: 109
ADLS_ACUITY_SCORE: 107
ADLS_ACUITY_SCORE: 107
ADLS_ACUITY_SCORE: 109
ADLS_ACUITY_SCORE: 107
ADLS_ACUITY_SCORE: 107
ADLS_ACUITY_SCORE: 109
ADLS_ACUITY_SCORE: 107
ADLS_ACUITY_SCORE: 109
ADLS_ACUITY_SCORE: 109
ADLS_ACUITY_SCORE: 107
ADLS_ACUITY_SCORE: 109

## 2025-04-12 NOTE — PLAN OF CARE
Shift summary ([id number])    Pt remains comfortable w/ comfort care measures provided. Adequate pain control w/ current regimen, see MAR for details. Family at bedside and very involved in pt cares. Oral cares and intermittent suction being provided by family PRN. Felix and gtube patent and intact. Family verbalizing they would like pt to not be turned onto sides overnight, would like him to lay on back supported w/ pillows and have weight shifted if it appears needed and will reassess repositioning in the morning.     Goal Outcome Evaluation:      Plan of Care Reviewed With: patient, family    Overall Patient Progress: decliningOverall Patient Progress: declining    Outcome Evaluation: Pt remains comfortable w/ comfort care measures provided.      Problem: Adult Inpatient Plan of Care  Goal: Plan of Care Review  Description: The Plan of Care Review/Shift note should be completed every shift.  The Outcome Evaluation is a brief statement about your assessment that the patient is improving, declining, or no change.  This information will be displayed automatically on your shift  note.  Recent Flowsheet Documentation  Taken 4/11/2025 1903 by Viji Davies RN  Outcome Evaluation: Pt remains comfortable w/ comfort care measures provided.  Plan of Care Reviewed With:   patient   family  Overall Patient Progress: declining  Goal: Absence of Hospital-Acquired Illness or Injury  Intervention: Identify and Manage Fall Risk  Recent Flowsheet Documentation  Taken 4/11/2025 1902 by Viji Davies RN  Safety Promotion/Fall Prevention: safety round/check completed  Taken 4/11/2025 1813 by Viji Davies RN  Safety Promotion/Fall Prevention: (increasing number of visitors at bedside) --  Taken 4/11/2025 1715 by Viji Davies RN  Safety Promotion/Fall Prevention: safety round/check completed  Taken 4/11/2025 1639 by Viji Davies RN  Safety Promotion/Fall Prevention: safety round/check  completed  Taken 4/11/2025 1539 by Viji Davies RN  Safety Promotion/Fall Prevention: safety round/check completed  Taken 4/11/2025 1358 by Viji Davies RN  Safety Promotion/Fall Prevention: safety round/check completed  Taken 4/11/2025 1250 by Viji Davies RN  Safety Promotion/Fall Prevention: safety round/check completed  Taken 4/11/2025 1220 by Viji Davies RN  Safety Promotion/Fall Prevention: safety round/check completed  Taken 4/11/2025 0958 by Viji Davies RN  Safety Promotion/Fall Prevention: safety round/check completed  Taken 4/11/2025 0844 by Viji Davies RN  Safety Promotion/Fall Prevention:   activity supervised   assistive device/personal items within reach   clutter free environment maintained   increase visualization of patient   increased rounding and observation   lighting adjusted   mobility aid in reach   nonskid shoes/slippers when out of bed   patient and family education   room organization consistent   safety round/check completed   supervised activity   treat reversible contributory factors   treat underlying cause  Intervention: Prevent Skin Injury  Recent Flowsheet Documentation  Taken 4/11/2025 1535 by Viji Davies RN  Body Position:   turned   side-lying   left   heels elevated  Taken 4/11/2025 1100 by Viji Davies RN  Body Position:   turned   side-lying   left   heels elevated  Taken 4/11/2025 0844 by Viji Davies RN  Body Position:   turned   side-lying   right   heels elevated  Skin Protection:   adhesive use limited   incontinence pads utilized   tubing/devices free from skin contact  Intervention: Prevent and Manage VTE (Venous Thromboembolism) Risk  Recent Flowsheet Documentation  Taken 4/11/2025 0844 by Viji Davies RN  VTE Prevention/Management: (comfort cares) other (see comments)  Intervention: Prevent Infection  Recent Flowsheet Documentation  Taken 4/11/2025 0844 by Samson  Viji AUGUSTE RN  Infection Prevention:   equipment surfaces disinfected   hand hygiene promoted   personal protective equipment utilized   rest/sleep promoted   single patient room provided

## 2025-04-12 NOTE — PLAN OF CARE
CARE FROM [id number]    Goal Outcome Evaluation:      Plan of Care Reviewed With: patient, family    Overall Patient Progress: no changeOverall Patient Progress: no change    Outcome Evaluation: Comfort cares. Family at bedside. Pain treated w/ PRN dilaudid via g-tube. Chronic ordoñez. Repos offered and done per family request.    Problem: Adult Inpatient Plan of Care  Goal: Plan of Care Review  Description: The Plan of Care Review/Shift note should be completed every shift.  The Outcome Evaluation is a brief statement about your assessment that the patient is improving, declining, or no change.  This information will be displayed automatically on your shift  note.  Recent Flowsheet Documentation  Taken 4/12/2025 0308 by Amanda Mcgrath, RN  Outcome Evaluation: Comfort cares. Family at bedside. Pain treated w/ PRN dilaudid via g-tube. Chronic ordoñez. Repos offered and done per family request.  Plan of Care Reviewed With:   patient   family  Overall Patient Progress: no change  Goal: Absence of Hospital-Acquired Illness or Injury  Intervention: Identify and Manage Fall Risk  Recent Flowsheet Documentation  Taken 4/12/2025 0030 by Amanda Mcgrath, RN  Safety Promotion/Fall Prevention:   patient and family education   clutter free environment maintained   safety round/check completed   increase visualization of patient   room near nurse's station  Taken 4/11/2025 1938 by Amanda Mcgrath, RN  Safety Promotion/Fall Prevention:   patient and family education   clutter free environment maintained   safety round/check completed   increase visualization of patient   room near nurse's station  Intervention: Prevent Skin Injury  Recent Flowsheet Documentation  Taken 4/12/2025 0030 by Amanda Mcgrath, RN  Body Position:   tilted   left  Taken 4/11/2025 2200 by Amanda Mcgrath, RN  Body Position: supine  Goal: Optimal Comfort and Wellbeing  Intervention: Monitor Pain and Promote Comfort  Recent Flowsheet  Documentation  Taken 4/12/2025 0256 by Amanda Mcgrath, RN  Pain Management Interventions: medication (see MAR)  Taken 4/12/2025 0059 by Amanda Mcgrath, RN  Pain Management Interventions:   quiet environment facilitated   rest  Taken 4/11/2025 2153 by Amanda Mcgrath, RN  Pain Management Interventions: medication (see MAR)  Taken 4/11/2025 1938 by Amanda Mcgrath, RN  Pain Management Interventions: medication (see MAR)

## 2025-04-12 NOTE — PLAN OF CARE
"Goal Outcome Evaluation:    Pt non-verbal; lethargic with intermittent periods of alertness. Family present at bedside. Pt on comfort cares; turned and repositioned per family request. PRN dilaudid x 3 for pain.  G tube patent; dressing C/D/I.     Plan of Care Reviewed With: patient, family    Overall Patient Progress: no changeOverall Patient Progress: no change    Outcome Evaluation: Pt on comfort cares; family present at bedside. PRN dilaudid administered for pain.  Turned and repositioning offered and completed per family wishes.      Problem: Adult Inpatient Plan of Care  Goal: Plan of Care Review  Description: The Plan of Care Review/Shift note should be completed every shift.  The Outcome Evaluation is a brief statement about your assessment that the patient is improving, declining, or no change.  This information will be displayed automatically on your shiftnote.  Outcome: Not Progressing  Flowsheets (Taken 4/12/2025 1550)  Outcome Evaluation:   Pt on comfort cares   family present at bedside. PRN dilaudid administered for pain.  Turned and repositioning offered and completed per family wishes.  Plan of Care Reviewed With:   patient   family  Overall Patient Progress: no change  Goal: Patient-Specific Goal (Individualized)  Description: You can add care plan individualizations to a care plan. Examples of Individualization might be:  \"Parent requests to be called daily at 9am for status\", \"I have a hard time hearing out of my right ear\", or \"Do not touch me to wake me up as it startlesme\".  Outcome: Not Progressing  Goal: Absence of Hospital-Acquired Illness or Injury  Outcome: Not Progressing  Intervention: Prevent Skin Injury  Recent Flowsheet Documentation  Taken 4/12/2025 1251 by Aline Tellez RN  Body Position: (family declined repositioning) other (see comments)  Taken 4/12/2025 1010 by Aline Tellez, RN  Body Position:   turned   right   legs elevated   heels elevated  Taken 4/12/2025 0746 " by Aline Tellez RN  Body Position: (family declined; pt comfortable per son) refuses positioning  Goal: Optimal Comfort and Wellbeing  Outcome: Not Progressing  Intervention: Monitor Pain and Promote Comfort  Recent Flowsheet Documentation  Taken 4/12/2025 1251 by Aline Tellez RN  Pain Management Interventions: medication (see MAR)  Taken 4/12/2025 0746 by Aline Tellez RN  Pain Management Interventions: medication (see MAR)  Goal: Readiness for Transition of Care  Outcome: Not Progressing     Problem: Pain Acute  Goal: Optimal Pain Control and Function  Outcome: Not Progressing  Intervention: Develop Pain Management Plan  Recent Flowsheet Documentation  Taken 4/12/2025 1251 by Aline Tellez, RN  Pain Management Interventions: medication (see MAR)  Taken 4/12/2025 0746 by Aline Tellez, RN  Pain Management Interventions: medication (see MAR)

## 2025-04-12 NOTE — PROGRESS NOTES
[facility]  Hospitalist Progress Note  Jorge Stewart MD 04/12/2025    Reason for Stay (Diagnosis):          Assessment and Plan:      Summary of Stay: Jimmy Otto is a 75 year old male with extensive past medical history including major stroke with right sided hemiaplasia, aphasia, type 2 diabetes, hypertension, PEG tube, CKD with nephrotic range proteinuria and advanced diabetic nephropathy, anasarca, anemia, neurogenic bladder with chronic Felix catheter essentially total cares at baseline admitted on 3/15/2025 with low oxygen level and bradycardia with some recent vomiting.    Initial workup here showed hyponatremia, mild hyperkalemia with creatinine of 5.67, stable anemia and chest x-ray showing a large right pleural effusion with a small to moderate left pleural effusion as well as suspected bibasilar patchy infiltrates.    He initially was treated with aggressive IV diuretics, will, and antibiotics.  He did undergo thoracentesis and nephrology was consulted and he was felt to be a very poor dialysis candidate based on major comorbidities.  Furthermore, he was having difficulties tolerating enteral nutrition both apparently due to pain and likely reflux.  Native care has been following throughout.  Ultimately the decision was made not to pursue dialysis and to stop tube feeds due to intolerance and focus instead on comfort cares as he does not have anything clearly modifiable and seems to be nearing the end of his life.    I assumed care on/8.  Patient remains comfort care status.  Family wishing not to go to nursing home and feel they cannot manage at home.  I think is likely he will pass here in the coming days.    Today:   -- Appears more ill, diaphoretic and respiratory rate increased    Problem List/Assessment and Plan:   CKD 5, nephrotic range proteinuria: Not a dialysis candidate.  No plan on comfort cares.  --Palliative care following  --Comfort meds available, tolerating enteral  Dilaudid/other meds  --Tube feeding stopped due to intolerance    2.   Acute on chronic respiratory failure: Multifactorial due to volume overload, acute on chronic diastolic CHF, bilateral pleural effusions.  --Initially treated with aggressive diuresis, right-sided thoracentesis.  -- Weaned to about 1 L of oxygen, now just using oxygen for comfort    3.   Toxic metabolic encephalopathy: Suspected due to uremia.  Likely to continue to worsen.    4.   Prior CVA with hemiparesis, neurogenic bladder, tube feeds status:   --Tube feeds discontinued as he was not tolerating these and based on goals of care.    Other medical issues no longer being actively managed:     Hyperkalemia-resolved  Hypokalemia  ALXE on CKD stage V  Nephrotic range proteinuria  Anasarca  Hyponatremia  Hypercalcemia   -Creatinine 1 year ago was near 1  -Most recent creatinine 4.07 on 2/26/2025.  Creatinine 5.67 on admission and potassium 5.8.  He was previously seen by nephrology and his daughter indicates that he was determined not to be a dialysis candidate.  Potassium had improved with shifting agents and Lokelma.  Creatinine has been steadily climbing over the past several weeks.  -Appreciate nephrology input.  As per nephrology not a dialysis candidate  -Focusing on comfort cares, not checking labs.     Bradycardia  Junctional rhythm  This was an issue during his recent hospitalization as well.  Deemed not to be candidate for pacemaker given above issues.     Demand ischemia  Troponin has been elevated during prior evaluation on 2/26/2025.  Troponin 103 on admission and flat, not consistent with acute coronary syndrome.  Likely demand ischemia from respiratory failure with poor enzyme clearance in the face of CKD.    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 nonverbal status  Chronic dysphagia, status post PEG tube placement, G-tube exchange on 2/24/2025  S/p indwelling  Felix catheter  Dyslipidemia  Hypertension     Anemia and chronic kidney disease  -PTA ferrous sulfate.     Depression  -PTA Prozac.      DVT Prophylaxis: Comfort care status  Code Status: Comfort Care  Medically Ready for Discharge: Ready Now if a hospice facility were to be obtained      Clinically Significant Risk Factors               # Hypoalbuminemia: Lowest albumin = 3.3 g/dL at 3/15/2025 11:50 AM, will monitor as appropriate     # Hypertension: Noted on problem list    # Chronic heart failure with preserved ejection fraction: heart failure noted on problem list and last echo with EF >50%          # DMII: A1C = 8.8 % (Ref range: <5.7 %) within past 6 months         # Financial/Environmental Concerns:                 Interval History (Subjective):        A bit more diaphoretic today, not opening his eyes  Respiratory rate seems increased                  Physical Exam:      Last Vital Signs:    General: Diaphoretic, respiratory rate somewhat creased.  Not really interacting with me  HEENT: NC/AT, eyes anicteric  Cardiac: RRR, S1, S2.  Pulmonary: Normal chest rise, normal work of breathing.   Abdomen: soft, non-tender to the degree I can tell.  Extremities: no deformities.  Warm, well perfused.  Skin:  Warm and Dry.  Neuro: Nonverbal, not interacting.         Medications:      All current medications were reviewed with changes reflected in problem list.         Data:      Comfort cares    Jorge Stewart MD     I've spent 30 minutes in chart review, ordering medications and tests, obtaining additional history as needed, evaluating the patient and in documentation for this encounter.

## 2025-04-13 ENCOUNTER — HEALTH MAINTENANCE LETTER (OUTPATIENT)
Age: 75
End: 2025-04-13

## 2025-04-13 PROCEDURE — 250N000013 HC RX MED GY IP 250 OP 250 PS 637: Performed by: NURSE PRACTITIONER

## 2025-04-13 PROCEDURE — 120N000001 HC R&B MED SURG/OB

## 2025-04-13 PROCEDURE — 99231 SBSQ HOSP IP/OBS SF/LOW 25: CPT | Mod: GV | Performed by: INTERNAL MEDICINE

## 2025-04-13 RX ADMIN — HYDROMORPHONE HYDROCHLORIDE 2 MG: 1 SOLUTION ORAL at 10:50

## 2025-04-13 RX ADMIN — HYDROMORPHONE HYDROCHLORIDE 2 MG: 1 SOLUTION ORAL at 19:32

## 2025-04-13 RX ADMIN — HYDROMORPHONE HYDROCHLORIDE 2 MG: 1 SOLUTION ORAL at 05:19

## 2025-04-13 RX ADMIN — HYDROMORPHONE HYDROCHLORIDE 2 MG: 1 SOLUTION ORAL at 15:06

## 2025-04-13 RX ADMIN — HYDROMORPHONE HYDROCHLORIDE 2 MG: 1 SOLUTION ORAL at 01:07

## 2025-04-13 ASSESSMENT — ACTIVITIES OF DAILY LIVING (ADL)
ADLS_ACUITY_SCORE: 105

## 2025-04-13 NOTE — PLAN OF CARE
"Goal Outcome Evaluation:  Pt is comfort care, q2 turns and PRN dilaudid given.    Problem: Adult Inpatient Plan of Care  Goal: Plan of Care Review  Description: The Plan of Care Review/Shift note should be completed every shift.  The Outcome Evaluation is a brief statement about your assessment that the patient is improving, declining, or no change.  This information will be displayed automatically on your shiftnote.  Outcome: Progressing  Goal: Patient-Specific Goal (Individualized)  Description: You can add care plan individualizations to a care plan. Examples of Individualization might be:  \"Parent requests to be called daily at 9am for status\", \"I have a hard time hearing out of my right ear\", or \"Do not touch me to wake me up as it startlesme\".  Outcome: Progressing  Goal: Absence of Hospital-Acquired Illness or Injury  Outcome: Progressing  Intervention: Identify and Manage Fall Risk  Recent Flowsheet Documentation  Taken 4/13/2025 1100 by Jos Singer RN  Safety Promotion/Fall Prevention: safety round/check completed  Intervention: Prevent Skin Injury  Recent Flowsheet Documentation  Taken 4/13/2025 1100 by Jos Singer RN  Body Position:   turned   right  Intervention: Prevent Infection  Recent Flowsheet Documentation  Taken 4/13/2025 1100 by Jos Singer, RAHAT  Infection Prevention:   equipment surfaces disinfected   hand hygiene promoted   personal protective equipment utilized   rest/sleep promoted   single patient room provided  Goal: Optimal Comfort and Wellbeing  Outcome: Progressing  Goal: Readiness for Transition of Care  Outcome: Progressing     Problem: Pain Acute  Goal: Optimal Pain Control and Function  Outcome: Progressing  Intervention: Prevent or Manage Pain  Recent Flowsheet Documentation  Taken 4/13/2025 1100 by Jos Singer RN  Medication Review/Management: medications reviewed         "

## 2025-04-13 NOTE — PLAN OF CARE
"Goal Outcome Evaluation:      Plan of Care Reviewed With: patient    Overall Patient Progress: no changeOverall Patient Progress: no change    Outcome Evaluation: Patient is on comfort care. family at the bediside. Repo every 2-3 hours. PRN diluadid given.      Problem: Adult Inpatient Plan of Care  Goal: Plan of Care Review  Description: The Plan of Care Review/Shift note should be completed every shift.  The Outcome Evaluation is a brief statement about your assessment that the patient is improving, declining, or no change.  This information will be displayed automatically on your shiftnote.  Outcome: Not Progressing  Flowsheets (Taken 4/13/2025 0431)  Outcome Evaluation: Patient is on comfort care. family at the bediside. Repo every 2-3 hours. PRN diluadid given.  Plan of Care Reviewed With: patient  Overall Patient Progress: no change  Goal: Patient-Specific Goal (Individualized)  Description: You can add care plan individualizations to a care plan. Examples of Individualization might be:  \"Parent requests to be called daily at 9am for status\", \"I have a hard time hearing out of my right ear\", or \"Do not touch me to wake me up as it startlesme\".  Outcome: Not Progressing  Goal: Absence of Hospital-Acquired Illness or Injury  Outcome: Not Progressing  Intervention: Identify and Manage Fall Risk  Recent Flowsheet Documentation  Taken 4/13/2025 0426 by Faizan Trejo, RN  Safety Promotion/Fall Prevention: safety round/check completed  Taken 4/13/2025 0000 by Faizan Trejo, RN  Safety Promotion/Fall Prevention: safety round/check completed  Intervention: Prevent Skin Injury  Recent Flowsheet Documentation  Taken 4/13/2025 0100 by Faizan Trejo, RN  Body Position:   turned   left  Goal: Optimal Comfort and Wellbeing  Outcome: Not Progressing  Goal: Readiness for Transition of Care  Outcome: Not Progressing     Problem: Pain Acute  Goal: Optimal Pain Control and Function  Outcome: Not Progressing     "

## 2025-04-13 NOTE — PROGRESS NOTES
[facility]  Hospitalist Progress Note  Jorge Stewart MD 04/13/2025    Reason for Stay (Diagnosis):          Assessment and Plan:      Summary of Stay: Jimmy Otto is a 75 year old male with extensive past medical history including major stroke with right sided hemiaplasia, aphasia, type 2 diabetes, hypertension, PEG tube, CKD with nephrotic range proteinuria and advanced diabetic nephropathy, anasarca, anemia, neurogenic bladder with chronic Felix catheter essentially total cares at baseline admitted on 3/15/2025 with low oxygen level and bradycardia with some recent vomiting.    Initial workup here showed hyponatremia, mild hyperkalemia with creatinine of 5.67, stable anemia and chest x-ray showing a large right pleural effusion with a small to moderate left pleural effusion as well as suspected bibasilar patchy infiltrates.    He initially was treated with aggressive IV diuretics, will, and antibiotics.  He did undergo thoracentesis and nephrology was consulted and he was felt to be a very poor dialysis candidate based on major comorbidities.  Furthermore, he was having difficulties tolerating enteral nutrition both apparently due to pain and likely reflux.  Native care has been following throughout.  Ultimately the decision was made not to pursue dialysis and to stop tube feeds due to intolerance and focus instead on comfort cares as he does not have anything clearly modifiable and seems to be nearing the end of his life.    I assumed care on/8.  Patient remains comfort care status.  Family wishing not to go to nursing home and feel they cannot manage at home.  I think is likely he will pass here in the coming days.    Today:   -- Appears more ill, diaphoretic and respiratory rate increased    Problem List/Assessment and Plan:   CKD 5, nephrotic range proteinuria: Not a dialysis candidate.  No plan on comfort cares.  --Palliative care following  --Comfort meds available, tolerating enteral  Dilaudid/other meds  --Tube feeding stopped due to intolerance    2.   Acute on chronic respiratory failure: Multifactorial due to volume overload, acute on chronic diastolic CHF, bilateral pleural effusions.  --Initially treated with aggressive diuresis, right-sided thoracentesis.  -- Weaned to about 1 L of oxygen, now just using oxygen for comfort    3.   Toxic metabolic encephalopathy: Suspected due to uremia.  Likely to continue to worsen.    4.   Prior CVA with hemiparesis, neurogenic bladder, tube feeds status:   --Tube feeds discontinued as he was not tolerating these and based on goals of care.    Other medical issues no longer being actively managed:     Hyperkalemia-resolved  Hypokalemia  ALEX on CKD stage V  Nephrotic range proteinuria  Anasarca  Hyponatremia  Hypercalcemia   -Creatinine 1 year ago was near 1  -Most recent creatinine 4.07 on 2/26/2025.  Creatinine 5.67 on admission and potassium 5.8.  He was previously seen by nephrology and his daughter indicates that he was determined not to be a dialysis candidate.  Potassium had improved with shifting agents and Lokelma.  Creatinine has been steadily climbing over the past several weeks.  -Appreciate nephrology input.  As per nephrology not a dialysis candidate  -Focusing on comfort cares, not checking labs.     Bradycardia  Junctional rhythm  This was an issue during his recent hospitalization as well.  Deemed not to be candidate for pacemaker given above issues.     Demand ischemia  Troponin has been elevated during prior evaluation on 2/26/2025.  Troponin 103 on admission and flat, not consistent with acute coronary syndrome.  Likely demand ischemia from respiratory failure with poor enzyme clearance in the face of CKD.    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 nonverbal status  Chronic dysphagia, status post PEG tube placement, G-tube exchange on 2/24/2025  S/p indwelling  Felix catheter  Dyslipidemia  Hypertension     Anemia and chronic kidney disease  -PTA ferrous sulfate.     Depression  -PTA Prozac.      DVT Prophylaxis: Comfort care status  Code Status: Comfort Care  Medically Ready for Discharge: Ready Now if a hospice facility were to be obtained      Clinically Significant Risk Factors               # Hypoalbuminemia: Lowest albumin = 3.3 g/dL at 3/15/2025 11:50 AM, will monitor as appropriate     # Hypertension: Noted on problem list    # Chronic heart failure with preserved ejection fraction: heart failure noted on problem list and last echo with EF >50%          # DMII: A1C = 8.8 % (Ref range: <5.7 %) within past 6 months         # Financial/Environmental Concerns:                 Interval History (Subjective):        A bit more diaphoretic today, not opening his eyes  Respiratory rate seems increased                  Physical Exam:      Last Vital Signs:    General: Diaphoretic, respiratory rate somewhat creased.  Not really interacting with me  HEENT: NC/AT, eyes anicteric  Cardiac: RRR, S1, S2.  Pulmonary: Normal chest rise, normal work of breathing.   Abdomen: soft, non-tender to the degree I can tell.  Extremities: no deformities.  Warm, well perfused.  Skin:  Warm and Dry.  Neuro: Nonverbal, not interacting.         Medications:      All current medications were reviewed with changes reflected in problem list.         Data:      Comfort cares    Jorge Stewart MD     I've spent 30 minutes in chart review, ordering medications and tests, obtaining additional history as needed, evaluating the patient and in documentation for this encounter.

## 2025-04-13 NOTE — PLAN OF CARE
Goal Outcome Evaluation:           Overall Patient Progress: no changeOverall Patient Progress: no change    Outcome Evaluation: [id number] - Comfort cares continue. Family at bedside. Additional pain medication given per family request - pt observed slightly more awake prior to giving. Care done per family wishes.      Problem: Adult Inpatient Plan of Care  Goal: Plan of Care Review  Description: The Plan of Care Review/Shift note should be completed every shift.  The Outcome Evaluation is a brief statement about your assessment that the patient is improving, declining, or no change.  This information will be displayed automatically on your shift  note.  Recent Flowsheet Documentation  Taken 4/12/2025 2224 by Paulo Pizano, RN  Outcome Evaluation: [id number] - Comfort cares continue. Family at bedside. Additional pain medication given per family request - pt observed slightly more awake prior to giving. Care done per family wishes.  Overall Patient Progress: no change  Goal: Optimal Comfort and Wellbeing  Intervention: Monitor Pain and Promote Comfort  Recent Flowsheet Documentation  Taken 4/12/2025 2150 by Paulo Pizano, RN  Pain Management Interventions: medication (see MAR)

## 2025-04-14 PROCEDURE — 250N000013 HC RX MED GY IP 250 OP 250 PS 637: Performed by: NURSE PRACTITIONER

## 2025-04-14 PROCEDURE — 250N000013 HC RX MED GY IP 250 OP 250 PS 637: Performed by: INTERNAL MEDICINE

## 2025-04-14 PROCEDURE — 99232 SBSQ HOSP IP/OBS MODERATE 35: CPT | Mod: GV | Performed by: INTERNAL MEDICINE

## 2025-04-14 PROCEDURE — 120N000001 HC R&B MED SURG/OB

## 2025-04-14 RX ORDER — HYDROMORPHONE HYDROCHLORIDE 1 MG/ML
4 SOLUTION ORAL
Status: DISCONTINUED | OUTPATIENT
Start: 2025-04-14 | End: 2025-04-15

## 2025-04-14 RX ORDER — HYDROMORPHONE HYDROCHLORIDE 1 MG/ML
3 SOLUTION ORAL
Status: DISCONTINUED | OUTPATIENT
Start: 2025-04-14 | End: 2025-04-15

## 2025-04-14 RX ADMIN — HYDROMORPHONE HYDROCHLORIDE 3 MG: 1 SOLUTION ORAL at 11:51

## 2025-04-14 RX ADMIN — HYDROMORPHONE HYDROCHLORIDE 3 MG: 1 SOLUTION ORAL at 16:02

## 2025-04-14 RX ADMIN — HYDROMORPHONE HYDROCHLORIDE 2 MG: 1 SOLUTION ORAL at 06:19

## 2025-04-14 RX ADMIN — HYDROMORPHONE HYDROCHLORIDE 3 MG: 1 SOLUTION ORAL at 20:06

## 2025-04-14 ASSESSMENT — ACTIVITIES OF DAILY LIVING (ADL)
ADLS_ACUITY_SCORE: 105

## 2025-04-14 NOTE — PLAN OF CARE
Assumed care [id number]. Patient is nonverbal on comfort cares. Patient's family at the bedside at all times and request to not disturb the patient unless they call for pain medications or for a reposition. They want him to rest and be comfortable as much as possible. Repositioned and pain medication given per family's request. Indwelling ordoñez catheter in place draining urine adequately. Comfort care measures continued.     Goal Outcome Evaluation:      Plan of Care Reviewed With: patient    Overall Patient Progress: no changeOverall Patient Progress: no change    Outcome Evaluation: Comfort care measures continued      Problem: Adult Inpatient Plan of Care  Goal: Readiness for Transition of Care  4/13/2025 2356 by Renae Osorio RN  Outcome: Not Progressing  4/13/2025 2353 by Renae Osorio RN  Outcome: Progressing     Problem: Palliative Care  Goal: Enhanced Quality of Life  Outcome: Not Progressing  Intervention: Maximize Comfort  Recent Flowsheet Documentation  Taken 4/13/2025 2014 by Renae Osorio RN  Pain Management Interventions: rest  Taken 4/13/2025 1932 by Renae Osorio RN  Pain Management Interventions: medication (see MAR)     Problem: Adult Inpatient Plan of Care  Goal: Plan of Care Review  Description: The Plan of Care Review/Shift note should be completed every shift.  The Outcome Evaluation is a brief statement about your assessment that the patient is improving, declining, or no change.  This information will be displayed automatically on your shiftnote.  4/13/2025 2356 by Renae Osorio RN  Outcome: Progressing  Flowsheets (Taken 4/13/2025 2356)  Outcome Evaluation: Comfort care measures continued  Plan of Care Reviewed With: patient  Overall Patient Progress: no change  4/13/2025 2353 by Renae Osorio RN  Outcome: Progressing  Flowsheets (Taken 4/13/2025 2353)  Outcome Evaluation: Comfort care measures continued  Plan of Care Reviewed With: patient  Overall Patient Progress:  "no change  Goal: Patient-Specific Goal (Individualized)  Description: You can add care plan individualizations to a care plan. Examples of Individualization might be:  \"Parent requests to be called daily at 9am for status\", \"I have a hard time hearing out of my right ear\", or \"Do not touch me to wake me up as it startlesme\".  4/13/2025 2356 by Renae Osorio RN  Outcome: Progressing  4/13/2025 2353 by Renae Osorio RN  Outcome: Progressing  Goal: Absence of Hospital-Acquired Illness or Injury  4/13/2025 2356 by Renae Osorio RN  Outcome: Progressing  4/13/2025 2353 by Renae Osorio RN  Outcome: Progressing  Intervention: Identify and Manage Fall Risk  Recent Flowsheet Documentation  Taken 4/13/2025 1932 by Renae Osorio RN  Safety Promotion/Fall Prevention:   activity supervised   lighting adjusted   room near nurse's station   safety round/check completed  Intervention: Prevent Skin Injury  Recent Flowsheet Documentation  Taken 4/13/2025 1932 by Renae Osorio RN  Body Position: weight shifting  Goal: Optimal Comfort and Wellbeing  4/13/2025 2356 by Renae Osorio RN  Outcome: Progressing  4/13/2025 2353 by Renae Osorio RN  Outcome: Progressing  Intervention: Monitor Pain and Promote Comfort  Recent Flowsheet Documentation  Taken 4/13/2025 2014 by Renae Oosrio RN  Pain Management Interventions: rest  Taken 4/13/2025 1932 by Renae Osorio RN  Pain Management Interventions: medication (see MAR)     Problem: Pain Acute  Goal: Optimal Pain Control and Function  4/13/2025 2356 by Renae Osorio RN  Outcome: Progressing  4/13/2025 2353 by Renae Osorio RN  Outcome: Progressing  Intervention: Develop Pain Management Plan  Recent Flowsheet Documentation  Taken 4/13/2025 2014 by Renae Osorio RN  Pain Management Interventions: rest  Taken 4/13/2025 1932 by Renae Osorio RN  Pain Management Interventions: medication (see MAR)  Intervention: Prevent or Manage Pain  Recent " Flowsheet Documentation  Taken 4/13/2025 1932 by Renae Osorio, RN  Medication Review/Management: medications reviewed

## 2025-04-14 NOTE — PROGRESS NOTES
[facility]  Hospitalist Progress Note  Jorge Stewart MD 04/14/2025    Reason for Stay (Diagnosis):          Assessment and Plan:      Summary of Stay: Jimmy Otto is a 75 year old male with extensive past medical history including major stroke with right sided hemiaplasia, aphasia, type 2 diabetes, hypertension, PEG tube, CKD with nephrotic range proteinuria and advanced diabetic nephropathy, anasarca, anemia, neurogenic bladder with chronic Felix catheter essentially total cares at baseline admitted on 3/15/2025 with low oxygen level and bradycardia with some recent vomiting.    Initial workup here showed hyponatremia, mild hyperkalemia with creatinine of 5.67, stable anemia and chest x-ray showing a large right pleural effusion with a small to moderate left pleural effusion as well as suspected bibasilar patchy infiltrates.    He initially was treated with aggressive IV diuretics, will, and antibiotics.  He did undergo thoracentesis and nephrology was consulted and he was felt to be a very poor dialysis candidate based on major comorbidities.  Furthermore, he was having difficulties tolerating enteral nutrition both apparently due to pain and likely reflux.  Native care has been following throughout.  Ultimately the decision was made not to pursue dialysis and to stop tube feeds due to intolerance and focus instead on comfort cares as he does not have anything clearly modifiable and seems to be nearing the end of his life.    I assumed care on/8.  Patient remains comfort care status.  Family wishing not to go to nursing home and feel they cannot manage at home.  I think is likely he will pass here in the coming days.    Today:   -- Appears more ill, diaphoretic and respiratory rate increased  -- Family noticing worsened pain responses even with light touch, increasing Dilaudid from 2 mg to 3 mg for moderate pain.  Will keep severe pain dose at 4 mg as he has not been receiving this  much.    Problem List/Assessment and Plan:   CKD 5, nephrotic range proteinuria: Not a dialysis candidate.  No plan on comfort cares.  --Palliative care following  --Comfort meds available, tolerating enteral Dilaudid/other meds  --Tube feeding stopped due to intolerance    2.   Acute on chronic respiratory failure: Multifactorial due to volume overload, acute on chronic diastolic CHF, bilateral pleural effusions.  --Initially treated with aggressive diuresis, right-sided thoracentesis.  -- Weaned to about 1 L of oxygen, now just using oxygen for comfort    3.   Toxic metabolic encephalopathy: Suspected due to uremia.  Likely to continue to worsen.    4.   Prior CVA with hemiparesis, neurogenic bladder, tube feeds status:   --Tube feeds discontinued as he was not tolerating these and based on goals of care.    Other medical issues no longer being actively managed:     Hyperkalemia-resolved  Hypokalemia  ALEX on CKD stage V  Nephrotic range proteinuria  Anasarca  Hyponatremia  Hypercalcemia   -Creatinine 1 year ago was near 1  -Most recent creatinine 4.07 on 2/26/2025.  Creatinine 5.67 on admission and potassium 5.8.  He was previously seen by nephrology and his daughter indicates that he was determined not to be a dialysis candidate.  Potassium had improved with shifting agents and Lokelma.  Creatinine has been steadily climbing over the past several weeks.  -Appreciate nephrology input.  As per nephrology not a dialysis candidate  -Focusing on comfort cares, not checking labs.     Bradycardia  Junctional rhythm  This was an issue during his recent hospitalization as well.  Deemed not to be candidate for pacemaker given above issues.     Demand ischemia  Troponin has been elevated during prior evaluation on 2/26/2025.  Troponin 103 on admission and flat, not consistent with acute coronary syndrome.  Likely demand ischemia from respiratory failure with poor enzyme clearance in the face of CKD.    -No further workup       Diabetes mellitus type 2  History of CVA with residual right-sided hemiplegia and baseline nonverbal status  Chronic dysphagia, status post PEG tube placement, G-tube exchange on 2/24/2025  S/p indwelling Felix catheter  Dyslipidemia  Hypertension  Anemia and chronic kidney disease  Depression        DVT Prophylaxis: Comfort care status  Code Status: Comfort Care  Medically Ready for Discharge: [facility] family feels they cannot care for him at home and are declining transfer to nursing home.  Suspect he will pass in the next day or 2      Clinically Significant Risk Factors               # Hypoalbuminemia: Lowest albumin = 3.3 g/dL at 3/15/2025 11:50 AM, will monitor as appropriate     # Hypertension: Noted on problem list    # Chronic heart failure with preserved ejection fraction: heart failure noted on problem list and last echo with EF >50%          # DMII: A1C = 8.8 % (Ref range: <5.7 %) within past 6 months         # Financial/Environmental Concerns:                 Interval History (Subjective):      Increasing Dilaudid as above  Sandra noticing more pain responses  Likely to pass in the next day or 2, at this point we will plan to keep the hospital                  Physical Exam:      Last Vital Signs:    General: Diaphoretic, respiratory rate somewhat increased.  Not really interacting with me  HEENT: NC/AT, eyes anicteric  Cardiac: RRR, S1, S2.  Pulmonary: Normal chest rise, normal work of breathing.   Abdomen: soft, non-tender to the degree I can tell.  Extremities: no deformities.  Warm, well perfused.  Skin:  Warm and Dry.  Neuro: Nonverbal, not interacting.         Medications:      All current medications were reviewed with changes reflected in problem list.         Data:      Comfort cares    Jorge Stewart MD     I've spent 30 minutes in chart review, ordering medications and tests, obtaining additional history as needed, evaluating the patient and in documentation for this  encounter.

## 2025-04-14 NOTE — PLAN OF CARE
"Comfort care maintained. PRN meds given per flacc and at family request. Repositioning refused by family. Discharge to hospice if bed found.    Goal Outcome Evaluation:           Overall Patient Progress: no changeOverall Patient Progress: no change    Outcome Evaluation: Comfort care. No significant signs of air hunger/Pain.      Problem: Adult Inpatient Plan of Care  Goal: Plan of Care Review  Description: The Plan of Care Review/Shift note should be completed every shift.  The Outcome Evaluation is a brief statement about your assessment that the patient is improving, declining, or no change.  This information will be displayed automatically on your shiftnote.  Outcome: Progressing  Flowsheets (Taken 4/14/2025 1753)  Outcome Evaluation: Comfort care. No significant signs of air hunger/Pain.  Overall Patient Progress: no change  Goal: Patient-Specific Goal (Individualized)  Description: You can add care plan individualizations to a care plan. Examples of Individualization might be:  \"Parent requests to be called daily at 9am for status\", \"I have a hard time hearing out of my right ear\", or \"Do not touch me to wake me up as it startlesme\".  Outcome: Progressing  Goal: Absence of Hospital-Acquired Illness or Injury  Outcome: Progressing  Intervention: Identify and Manage Fall Risk  Recent Flowsheet Documentation  Taken 4/14/2025 0730 by Mir Hartman RN  Safety Promotion/Fall Prevention: safety round/check completed  Intervention: Prevent Skin Injury  Recent Flowsheet Documentation  Taken 4/14/2025 1753 by Mir Hartman RN  Body Position: position maintained  Taken 4/14/2025 1200 by Mir Hartman RN  Body Position: position maintained  Taken 4/14/2025 0730 by Mir Hartman RN  Body Position: position maintained  Goal: Optimal Comfort and Wellbeing  Outcome: Progressing  Intervention: Monitor Pain and Promote Comfort  Recent Flowsheet Documentation  Taken 4/14/2025 1630 by Mir Hartman RN  Pain " Management Interventions: medication (see MAR)  Taken 4/14/2025 1200 by Mir Hartman RN  Pain Management Interventions: medication (see MAR)  Goal: Readiness for Transition of Care  Outcome: Progressing     Problem: Pain Acute  Goal: Optimal Pain Control and Function  Outcome: Progressing  Intervention: Develop Pain Management Plan  Recent Flowsheet Documentation  Taken 4/14/2025 1630 by Mir Hartman RN  Pain Management Interventions: medication (see MAR)  Taken 4/14/2025 1200 by Mir Hartman RN  Pain Management Interventions: medication (see MAR)  Intervention: Prevent or Manage Pain  Recent Flowsheet Documentation  Taken 4/14/2025 0730 by Mir Hartman RN  Medication Review/Management: medications reviewed     Problem: Palliative Care  Goal: Enhanced Quality of Life  Outcome: Progressing  Intervention: Maximize Comfort  Recent Flowsheet Documentation  Taken 4/14/2025 1630 by Mir Hartman RN  Pain Management Interventions: medication (see MAR)  Taken 4/14/2025 1200 by Mir Hartman RN  Pain Management Interventions: medication (see MAR)

## 2025-04-15 VITALS
WEIGHT: 133.16 LBS | OXYGEN SATURATION: 99 % | RESPIRATION RATE: 16 BRPM | BODY MASS INDEX: 21.49 KG/M2 | DIASTOLIC BLOOD PRESSURE: 64 MMHG | HEART RATE: 93 BPM | TEMPERATURE: 98.7 F | SYSTOLIC BLOOD PRESSURE: 127 MMHG

## 2025-04-15 PROCEDURE — 120N000001 HC R&B MED SURG/OB

## 2025-04-15 PROCEDURE — 99231 SBSQ HOSP IP/OBS SF/LOW 25: CPT | Performed by: INTERNAL MEDICINE

## 2025-04-15 PROCEDURE — 250N000009 HC RX 250: Performed by: STUDENT IN AN ORGANIZED HEALTH CARE EDUCATION/TRAINING PROGRAM

## 2025-04-15 PROCEDURE — 250N000011 HC RX IP 250 OP 636: Performed by: INTERNAL MEDICINE

## 2025-04-15 PROCEDURE — 250N000013 HC RX MED GY IP 250 OP 250 PS 637: Performed by: INTERNAL MEDICINE

## 2025-04-15 PROCEDURE — 250N000013 HC RX MED GY IP 250 OP 250 PS 637: Performed by: NURSE PRACTITIONER

## 2025-04-15 RX ORDER — HYDROMORPHONE HYDROCHLORIDE 1 MG/ML
4 SOLUTION ORAL
Status: DISCONTINUED | OUTPATIENT
Start: 2025-04-15 | End: 2025-04-18 | Stop reason: HOSPADM

## 2025-04-15 RX ORDER — HYDROMORPHONE HYDROCHLORIDE 1 MG/ML
3 SOLUTION ORAL
Status: DISCONTINUED | OUTPATIENT
Start: 2025-04-15 | End: 2025-04-18 | Stop reason: HOSPADM

## 2025-04-15 RX ADMIN — HYDROMORPHONE HYDROCHLORIDE 3 MG: 1 SOLUTION ORAL at 20:08

## 2025-04-15 RX ADMIN — HYDROMORPHONE HYDROCHLORIDE 3 MG: 1 SOLUTION ORAL at 00:04

## 2025-04-15 RX ADMIN — HYDROMORPHONE HYDROCHLORIDE 3 MG: 1 SOLUTION ORAL at 14:30

## 2025-04-15 RX ADMIN — HYDROMORPHONE HYDROCHLORIDE 3 MG: 1 SOLUTION ORAL at 10:05

## 2025-04-15 RX ADMIN — HYDROMORPHONE HYDROCHLORIDE 3 MG: 1 SOLUTION ORAL at 04:27

## 2025-04-15 RX ADMIN — ATROPINE SULFATE 2 DROP: 10 SOLUTION/ DROPS OPHTHALMIC at 20:06

## 2025-04-15 RX ADMIN — ONDANSETRON 4 MG: 4 TABLET, ORALLY DISINTEGRATING ORAL at 15:30

## 2025-04-15 RX ADMIN — LORAZEPAM 1 MG: 2 LIQUID ORAL at 15:23

## 2025-04-15 RX ADMIN — HYDROMORPHONE HYDROCHLORIDE 4 MG: 1 SOLUTION ORAL at 16:35

## 2025-04-15 RX ADMIN — ATROPINE SULFATE 2 DROP: 10 SOLUTION/ DROPS OPHTHALMIC at 17:58

## 2025-04-15 ASSESSMENT — ACTIVITIES OF DAILY LIVING (ADL)
ADLS_ACUITY_SCORE: 105

## 2025-04-15 NOTE — PLAN OF CARE
"Comfort cares. PRN medication given. Repositioning refused by family.    Goal Outcome Evaluation:      Plan of Care Reviewed With: patient    Overall Patient Progress: no changeOverall Patient Progress: no change    Outcome Evaluation: Comfort cares      Problem: Adult Inpatient Plan of Care  Goal: Plan of Care Review  Description: The Plan of Care Review/Shift note should be completed every shift.  The Outcome Evaluation is a brief statement about your assessment that the patient is improving, declining, or no change.  This information will be displayed automatically on your shiftnote.  Outcome: Not Progressing  Flowsheets (Taken 4/15/2025 0306)  Outcome Evaluation: Comfort cares  Plan of Care Reviewed With: patient  Overall Patient Progress: no change  Goal: Patient-Specific Goal (Individualized)  Description: You can add care plan individualizations to a care plan. Examples of Individualization might be:  \"Parent requests to be called daily at 9am for status\", \"I have a hard time hearing out of my right ear\", or \"Do not touch me to wake me up as it startlesme\".  Outcome: Not Progressing  Goal: Absence of Hospital-Acquired Illness or Injury  Outcome: Not Progressing  Intervention: Identify and Manage Fall Risk  Recent Flowsheet Documentation  Taken 4/15/2025 0000 by Lakia Tam RN  Safety Promotion/Fall Prevention:   clutter free environment maintained   lighting adjusted   nonskid shoes/slippers when out of bed  Intervention: Prevent Skin Injury  Recent Flowsheet Documentation  Taken 4/15/2025 0000 by Lakia Tam RN  Body Position: position maintained  Intervention: Prevent Infection  Recent Flowsheet Documentation  Taken 4/15/2025 0000 by Lakia Tam RN  Infection Prevention:   rest/sleep promoted   single patient room provided  Goal: Optimal Comfort and Wellbeing  Outcome: Not Progressing  Goal: Readiness for Transition of Care  Outcome: Not Progressing     Problem: Pain Acute  Goal: Optimal Pain " Control and Function  Outcome: Not Progressing     Problem: Palliative Care  Goal: Enhanced Quality of Life  Outcome: Not Progressing

## 2025-04-15 NOTE — PROGRESS NOTES
[facility]    Hospitalist Progress Note  Name: Jimmy Otto    MRN: [id number]  Provider:  Mina Holbrook DO  Date of Service: 04/15/2025    Summary of Stay: Jimmy Otto is a 75 year old male with extensive past medical history including major stroke with right sided hemiaplasia, aphasia, type 2 diabetes, hypertension, PEG tube, CKD with nephrotic range proteinuria and advanced diabetic nephropathy, anasarca, anemia, neurogenic bladder with chronic Felix catheter essentially total cares at baseline admitted on 3/15/2025 with low oxygen level and bradycardia with some recent vomiting.     Initial workup here showed hyponatremia, mild hyperkalemia with creatinine of 5.67, stable anemia and chest x-ray showing a large right pleural effusion with a small to moderate left pleural effusion as well as suspected bibasilar patchy infiltrates.     He initially was treated with aggressive IV diuretics, will, and antibiotics.  He did undergo thoracentesis and nephrology was consulted and he was felt to be a very poor dialysis candidate based on major comorbidities.  Furthermore, he was having difficulties tolerating enteral nutrition both apparently due to pain and likely reflux.  Native care has been following throughout.  Ultimately the decision was made not to pursue dialysis and to stop tube feeds due to intolerance and focus instead on comfort cares as he does not have anything clearly modifiable and seems to be nearing the end of his life.    TODAY'S PLAN: Continue comfort cares.  Patient with minimal urine output.  Anticipate life expectancy measured in days.  Planning to keep the patient in the hospital for this.  Will ask for evaluation by inpatient hospice.    Problem List:   CKD 5, nephrotic range proteinuria: Not a dialysis candidate.  No plan on comfort cares.  --Palliative care following  --Comfort meds available, tolerating enteral Dilaudid/other meds  --Tube feeding stopped due to intolerance      Acute on chronic respiratory failure: Multifactorial due to volume overload, acute on chronic diastolic CHF, bilateral pleural effusions.  --Initially treated with aggressive diuresis, right-sided thoracentesis.  -- Weaned to about 1 L of oxygen, now just using oxygen for comfort     Toxic metabolic encephalopathy: Suspected due to uremia.  Likely to continue to worsen.     Prior CVA with hemiparesis, neurogenic bladder, tube feeds status:   --Tube feeds discontinued as he was not tolerating these and based on goals of care.     Other medical issues no longer being actively managed:      Hyperkalemia-resolved  Hypokalemia  ALEX on CKD stage V  Nephrotic range proteinuria  Anasarca  Hyponatremia  Hypercalcemia   -Creatinine 1 year ago was near 1  -Most recent creatinine 4.07 on 2/26/2025.  Creatinine 5.67 on admission and potassium 5.8.  He was previously seen by nephrology and his daughter indicates that he was determined not to be a dialysis candidate.  Potassium had improved with shifting agents and Lokelma.  Creatinine has been steadily climbing over the past several weeks.  -Appreciate nephrology input.  As per nephrology not a dialysis candidate  -Focusing on comfort cares, not checking labs.     Bradycardia  Junctional rhythm  This was an issue during his recent hospitalization as well.  Deemed not to be candidate for pacemaker given above issues.     Demand ischemia  Troponin has been elevated during prior evaluation on 2/26/2025.  Troponin 103 on admission and flat, not consistent with acute coronary syndrome.  Likely demand ischemia from respiratory failure with poor enzyme clearance in the face of CKD.    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 nonverbal status  Chronic dysphagia, status post PEG tube placement, G-tube exchange on 2/24/2025  S/p indwelling Felix catheter  Dyslipidemia  Hypertension  Anemia and chronic kidney  "disease  Depression    I spent 26 minutes in reviewing this patient's labs, imaging, medications, medical history.  In addition time was spent interviewing the patient, communicating with family, and medical decision making.      DVT Prophylaxis: Comfort care  Code Status: No CPR- Do NOT Intubate  Diet: Regular Diet Adult    Ordoñez Catheter: PRESENT, indication: End of life, Chronic ordoñez    Disposition: Medically Ready for Discharge: Anticipated in 2-4 Days    Goals to discharge include: pt passes  Family updated today: Yes      Interval History   Pt seen and examined.  Daughters at bedside.  Pt uncomfortable this morning.    -Data reviewed today: I personally reviewed all new labs and imaging results over the last 24 hours.     Physical Exam             Resp: 16        Vitals:    04/04/25 0610 04/05/25 0603 04/06/25 0403   Weight: 48.7 kg (107 lb 5.8 oz) 59.4 kg (130 lb 15.3 oz) 60.4 kg (133 lb 2.5 oz)     Vital Signs with Ranges  Resp:  [16] 16  I/O last 3 completed shifts:  In: -   Out: 25 [Urine:25]    GENERAL: No apparent distress. Sleeping  HEENT: Normocephalic, atraumatic. Extraocular movements intact.  CARDIOVASCULAR: Regular rate and rhythm without murmurs or rubs. No S3.  PULMONARY: Clear bilaterally.  GASTROINTESTINAL: Soft, non-tender, non-distended. Bowel sounds normoactive.   EXTREMITIES: No cyanosis or clubbing. No edema.  DERMATOLOGICAL: No rash, ulcer, bruising, nor jaundice.    Medications   Current Facility-Administered Medications   Medication Dose Route Frequency Provider Last Rate Last Admin     Current Facility-Administered Medications   Medication Dose Route Frequency Provider Last Rate Last Admin     Data     Laboratory:  No results for input(s): \"WBC\", \"HGB\", \"HCT\", \"MCV\", \"PLT\" in the last 168 hours.  No results for input(s): \"NA\", \"POTASSIUM\", \"CHLORIDE\", \"CO2\", \"ANIONGAP\", \"GLC\", \"BUN\", \"CR\", \"GFRESTIMATED\", \"GFRESTBLACK\", \"ARLETH\" in the last 168 hours.  No results for input(s): \"CULT\" in " the last 168 hours.  Troponin I ES   Date Value Ref Range Status   08/16/2012 <0.012 0.000 - 0.034 ug/L Final       Imaging:  No results found for this or any previous visit (from the past 24 hours).      Mina Holbrook DO  [facility] Hospitalist  [address] [facility] [place] [address]  Securely message with [employer] (more info)  Text page via [facility] Paging/Directory   04/15/2025

## 2025-04-15 NOTE — PLAN OF CARE
Goal Outcome Evaluation:      Outcome Evaluation: RN(Shift [id number]) Continue comfort cares. PRN dilaudud per Gtube once. Repositioned a bit when asked by family. Closes mouth when oral cares are attempted. No air hunger or secretions.Felix intact. Family at bedside. Continue current POC.        Problem: Adult Inpatient Plan of Care  Goal: Plan of Care Review  Description: The Plan of Care Review/Shift note should be completed every shift.  The Outcome Evaluation is a brief statement about your assessment that the patient is improving, declining, or no change.  This information will be displayed automatically on your shift  note.  Recent Flowsheet Documentation  Taken 4/14/2025 2232 by Jesika Hull RN  Outcome Evaluation: RN(Shift [id number]) Continue comfort cares. PRN dilaudud per Gtube once. Repositioned a bit when asked by family. Closes mouth when oral cares are attempted. No air hunger or secretions.Felix intact. Family at bedside. Continue current POC.     Problem: Adult Inpatient Plan of Care  Goal: Plan of Care Review  Description: The Plan of Care Review/Shift note should be completed every shift.  The Outcome Evaluation is a brief statement about your assessment that the patient is improving, declining, or no change.  This information will be displayed automatically on your shiftnote.  Recent Flowsheet Documentation  Taken 4/14/2025 2232 by Jesika Hull RN  Outcome Evaluation: RN(Shift [id number]) Continue comfort cares. PRN dilaudud per Gtube once. Repositioned a bit when asked by family. Closes mouth when oral cares are attempted. No air hunger or secretions.Felix intact. Family at bedside. Continue current POC.     Problem: Adult Inpatient Plan of Care  Goal: Plan of Care Review  Description: The Plan of Care Review/Shift note should be completed every shift.  The Outcome Evaluation is a brief statement about your assessment that the patient is improving, declining, or no change.   This information will be displayed automatically on your shiftnote.  Outcome: Progressing  Flowsheets (Taken 4/14/2025 2232)  Outcome Evaluation: RN(Shift [id number]) Continue comfort cares. PRN dilaudud per Gtube once. Repositioned a bit when asked by family. Closes mouth when oral cares are attempted. No air hunger or secretions.Felix intact. Family at bedside. Continue current POC.

## 2025-04-15 NOTE — PLAN OF CARE
Goal Outcome Evaluation:      Plan of Care Reviewed With: patient, child    Overall Patient Progress: declining  Outcome Evaluation: Comfort care. GT dilaudid as needed for pain. Reposition per familiy wishes.    The patient is not appropriate for GIP - discussed care with Hospice RN Patti.       Problem: Adult Inpatient Plan of Care  Goal: Plan of Care Review  Description: The Plan of Care Review/Shift note should be completed every shift.  The Outcome Evaluation is a brief statement about your assessment that the patient is improving, declining, or no change.  This information will be displayed automatically on your shift  note.  Recent Flowsheet Documentation  Taken 4/15/2025 1423 by Amalia Stinson RN  Outcome Evaluation: Comfort care. GT dilaudid as needed for pain. Reposition per familiy wishes.  Plan of Care Reviewed With:   patient   child  Overall Patient Progress: declining  Goal: Absence of Hospital-Acquired Illness or Injury  Intervention: Identify and Manage Fall Risk  Recent Flowsheet Documentation  Taken 4/15/2025 1000 by Amalia Stinson RN  Safety Promotion/Fall Prevention: safety round/check completed  Taken 4/15/2025 0700 by Amalia Stinson RN  Safety Promotion/Fall Prevention: safety round/check completed  Goal: Optimal Comfort and Wellbeing  Intervention: Monitor Pain and Promote Comfort  Recent Flowsheet Documentation  Taken 4/15/2025 0900 by Amalia Stinson RN  Pain Management Interventions: medication (see MAR)

## 2025-04-15 NOTE — CONSULTS
[facility]    Consult Note -  - [facility]     ___________________________________________________________________    [facility]/7 Contact Number: [phone]    ______________________________________________________________________       Ho is currently ineligible for inpatient hospice.    Rationale: Patient currently comfortable and stable. No uncontrolled symptoms. If uncontrolled symptoms arise, [facility] available to reassess patient.     Ineligibility Status Discussed with the Following:   - Nurse: Amalia Stinson  - Hospitalist/Rounding Provider: Mustapha Fountain   - Ho's Family/Preferred Contact: NA  - Hospice Provider: Senthil Larose  - Hospice Social Worker: Ana Pace    Is Ho eligible for hospice on Discharge? Yes, [facility] hospice will continue to follow Ho throughout the hospital stay.     Page Santana RN

## 2025-04-16 PROCEDURE — 250N000013 HC RX MED GY IP 250 OP 250 PS 637: Performed by: INTERNAL MEDICINE

## 2025-04-16 PROCEDURE — 99231 SBSQ HOSP IP/OBS SF/LOW 25: CPT | Performed by: INTERNAL MEDICINE

## 2025-04-16 PROCEDURE — 120N000001 HC R&B MED SURG/OB

## 2025-04-16 RX ADMIN — ATROPINE SULFATE 2 DROP: 10 SOLUTION/ DROPS OPHTHALMIC at 23:09

## 2025-04-16 RX ADMIN — HYDROMORPHONE HYDROCHLORIDE 4 MG: 1 SOLUTION ORAL at 12:36

## 2025-04-16 RX ADMIN — ATROPINE SULFATE 2 DROP: 10 SOLUTION/ DROPS OPHTHALMIC at 19:06

## 2025-04-16 RX ADMIN — HYDROMORPHONE HYDROCHLORIDE 4 MG: 1 SOLUTION ORAL at 08:47

## 2025-04-16 RX ADMIN — HYDROMORPHONE HYDROCHLORIDE 4 MG: 1 SOLUTION ORAL at 16:02

## 2025-04-16 RX ADMIN — HYDROMORPHONE HYDROCHLORIDE 3 MG: 1 SOLUTION ORAL at 00:13

## 2025-04-16 RX ADMIN — ATROPINE SULFATE 2 DROP: 10 SOLUTION/ DROPS OPHTHALMIC at 08:48

## 2025-04-16 RX ADMIN — HYDROMORPHONE HYDROCHLORIDE 3 MG: 1 SOLUTION ORAL at 23:08

## 2025-04-16 RX ADMIN — HYDROMORPHONE HYDROCHLORIDE 3 MG: 1 SOLUTION ORAL at 19:02

## 2025-04-16 RX ADMIN — HYDROMORPHONE HYDROCHLORIDE 3 MG: 1 SOLUTION ORAL at 04:14

## 2025-04-16 RX ADMIN — ATROPINE SULFATE 2 DROP: 10 SOLUTION/ DROPS OPHTHALMIC at 00:13

## 2025-04-16 RX ADMIN — ATROPINE SULFATE 2 DROP: 10 SOLUTION/ DROPS OPHTHALMIC at 16:02

## 2025-04-16 RX ADMIN — ATROPINE SULFATE 2 DROP: 10 SOLUTION/ DROPS OPHTHALMIC at 13:35

## 2025-04-16 ASSESSMENT — ACTIVITIES OF DAILY LIVING (ADL)
ADLS_ACUITY_SCORE: 105
ADLS_ACUITY_SCORE: 100
ADLS_ACUITY_SCORE: 105
ADLS_ACUITY_SCORE: 100
ADLS_ACUITY_SCORE: 105

## 2025-04-16 NOTE — PROGRESS NOTES
[facility]    Hospitalist Progress Note  Name: Jimmy Otto    MRN: [id number]  Provider:  Mina Holbrook DO  Date of Service: 04/16/2025    Summary of Stay: Jimmy Otto is a 75 year old male with extensive past medical history including major stroke with right sided hemiaplasia, aphasia, type 2 diabetes, hypertension, PEG tube, CKD with nephrotic range proteinuria and advanced diabetic nephropathy, anasarca, anemia, neurogenic bladder with chronic Felix catheter essentially total cares at baseline admitted on 3/15/2025 with low oxygen level and bradycardia with some recent vomiting.     Initial workup here showed hyponatremia, mild hyperkalemia with creatinine of 5.67, stable anemia and chest x-ray showing a large right pleural effusion with a small to moderate left pleural effusion as well as suspected bibasilar patchy infiltrates.     He initially was treated with aggressive IV diuretics, will, and antibiotics.  He did undergo thoracentesis and nephrology was consulted and he was felt to be a very poor dialysis candidate based on major comorbidities.  Furthermore, he was having difficulties tolerating enteral nutrition both apparently due to pain and likely reflux.  Native care has been following throughout.  Ultimately the decision was made not to pursue dialysis and to stop tube feeds due to intolerance and focus instead on comfort cares as he does not have anything clearly modifiable and seems to be nearing the end of his life.    TODAY'S PLAN: Continue comfort cares.  No changes today.    Problem List:   CKD 5, nephrotic range proteinuria: Not a dialysis candidate.  No plan on comfort cares.  --Palliative care following  --Comfort meds available, tolerating enteral Dilaudid/other meds  --Tube feeding stopped due to intolerance     Acute on chronic respiratory failure: Multifactorial due to volume overload, acute on chronic diastolic CHF, bilateral pleural effusions.  --Initially treated with  aggressive diuresis, right-sided thoracentesis.  -- Weaned to about 1 L of oxygen, now just using oxygen for comfort     Toxic metabolic encephalopathy: Suspected due to uremia.  Likely to continue to worsen.     Prior CVA with hemiparesis, neurogenic bladder, tube feeds status:   --Tube feeds discontinued as he was not tolerating these and based on goals of care.     Other medical issues no longer being actively managed:      Hyperkalemia-resolved  Hypokalemia  ALEX on CKD stage V  Nephrotic range proteinuria  Anasarca  Hyponatremia  Hypercalcemia   -Creatinine 1 year ago was near 1  -Most recent creatinine 4.07 on 2/26/2025.  Creatinine 5.67 on admission and potassium 5.8.  He was previously seen by nephrology and his daughter indicates that he was determined not to be a dialysis candidate.  Potassium had improved with shifting agents and Lokelma.  Creatinine has been steadily climbing over the past several weeks.  -Appreciate nephrology input.  As per nephrology not a dialysis candidate  -Focusing on comfort cares, not checking labs.     Bradycardia  Junctional rhythm  This was an issue during his recent hospitalization as well.  Deemed not to be candidate for pacemaker given above issues.     Demand ischemia  Troponin has been elevated during prior evaluation on 2/26/2025.  Troponin 103 on admission and flat, not consistent with acute coronary syndrome.  Likely demand ischemia from respiratory failure with poor enzyme clearance in the face of CKD.    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 nonverbal status  Chronic dysphagia, status post PEG tube placement, G-tube exchange on 2/24/2025  S/p indwelling Felix catheter  Dyslipidemia  Hypertension  Anemia and chronic kidney disease  Depression    Clinically Significant Risk Factors               # Hypoalbuminemia: Lowest albumin = 3.3 g/dL at 3/15/2025 11:50 AM, will monitor as appropriate    "  # Hypertension: Noted on problem list  # Chronic heart failure with preserved ejection fraction: heart failure noted on problem list and last echo with EF >50%          # DMII: A1C = 8.8 % (Ref range: <5.7 %) within past 6 months       # Financial/Environmental Concerns:            I spent 20 minutes in reviewing this patient's labs, imaging, medications, medical history.  In addition time was spent interviewing the patient, communicating with family, and medical decision making.      DVT Prophylaxis: Comfort Care  Code Status: No CPR- Do NOT Intubate  Diet: Regular Diet Adult    Ordoñez Catheter: PRESENT, indication: End of life, Chronic ordoñez    Disposition: Medically Ready for Discharge: Ready Now    Goals to discharge include: pt passes  Family updated today: Yes      Interval History   Pt seen and examined.  Daughters at bedside.  No issues.    -Data reviewed today: I personally reviewed all new labs and imaging results over the last 24 hours.     Physical Exam             Resp: 16        Vitals:    04/04/25 0610 04/05/25 0603 04/06/25 0403   Weight: 48.7 kg (107 lb 5.8 oz) 59.4 kg (130 lb 15.3 oz) 60.4 kg (133 lb 2.5 oz)     Vital Signs with Ranges  Resp:  [14-16] 16  I/O last 3 completed shifts:  In: 180 [NG/GT:180]  Out: 25 [Urine:25]    No physical exam completed    Medications   Current Facility-Administered Medications   Medication Dose Route Frequency Provider Last Rate Last Admin     Current Facility-Administered Medications   Medication Dose Route Frequency Provider Last Rate Last Admin     Data     Laboratory:  No results for input(s): \"WBC\", \"HGB\", \"HCT\", \"MCV\", \"PLT\" in the last 168 hours.  No results for input(s): \"NA\", \"POTASSIUM\", \"CHLORIDE\", \"CO2\", \"ANIONGAP\", \"GLC\", \"BUN\", \"CR\", \"GFRESTIMATED\", \"GFRESTBLACK\", \"ARLETH\" in the last 168 hours.  No results for input(s): \"CULT\" in the last 168 hours.  Troponin I ES   Date Value Ref Range Status   08/16/2012 <0.012 0.000 - 0.034 ug/L Final "       Imaging:  No results found for this or any previous visit (from the past 24 hours).      Mina Holbrook DO  [facility] Hospitalist  [address] [place] [address]  Securely message with [employer] (more info)  Text page via [employer] Paging/Directory   04/16/2025

## 2025-04-16 NOTE — PLAN OF CARE
Goal Outcome Evaluation:      Plan of Care Reviewed With: patient, child    Overall Patient Progress: declining    Outcome Evaluation: continue comfort care - 4mg dilaudid q3-4 hr & atropine for secretions per family request. Color remains, not dusky.      Problem: Adult Inpatient Plan of Care  Goal: Plan of Care Review  Description: The Plan of Care Review/Shift note should be completed every shift.  The Outcome Evaluation is a brief statement about your assessment that the patient is improving, declining, or no change.  This information will be displayed automatically on your shift  note.  Recent Flowsheet Documentation  Taken 4/16/2025 1708 by Amalia Stinson, RN  Outcome Evaluation: continue comfort care - 4mg dilaudid q3-4 hr & atropine for secretions per family request. Color remains, not dusky.  Plan of Care Reviewed With:   patient   child  Overall Patient Progress: declining  Goal: Absence of Hospital-Acquired Illness or Injury  Intervention: Identify and Manage Fall Risk  Recent Flowsheet Documentation  Taken 4/16/2025 1100 by Amalia Stinson RN  Safety Promotion/Fall Prevention: safety round/check completed  Intervention: Prevent Skin Injury  Recent Flowsheet Documentation  Taken 4/16/2025 1100 by Amalia Stinson RN  Body Position: (family refusing reposition) --

## 2025-04-16 NOTE — PLAN OF CARE
"Goal Outcome Evaluation:      Plan of Care Reviewed With: patient, child    Overall Patient Progress: decliningOverall Patient Progress: declining    Outcome Evaluation: On Comfort cares, Liquid  Dilaudid given thru GT. Family at bedside    Pt appears to be comfortable.  Atropine drops given and 3 ML of liquid Dilaudid via g -tube. Pt has chronic ordoñez, with very little output. No IV access. Family at bedside. Palliative following.     Problem: Adult Inpatient Plan of Care  Goal: Plan of Care Review  Description: The Plan of Care Review/Shift note should be completed every shift.  The Outcome Evaluation is a brief statement about your assessment that the patient is improving, declining, or no change.  This information will be displayed automatically on your shiftnote.  Outcome: Not Progressing  Flowsheets (Taken 4/15/2025 2109)  Outcome Evaluation: On Comfort cares, Liquid  Dilaudid given thru GT. Family at bedside  Plan of Care Reviewed With:   patient   child  Overall Patient Progress: declining  Goal: Patient-Specific Goal (Individualized)  Description: You can add care plan individualizations to a care plan. Examples of Individualization might be:  \"Parent requests to be called daily at 9am for status\", \"I have a hard time hearing out of my right ear\", or \"Do not touch me to wake me up as it startlesme\".  Outcome: Not Progressing  Goal: Absence of Hospital-Acquired Illness or Injury  Outcome: Not Progressing  Goal: Optimal Comfort and Wellbeing  Outcome: Not Progressing  Goal: Readiness for Transition of Care  Outcome: Not Progressing     Problem: Pain Acute  Goal: Optimal Pain Control and Function  Outcome: Not Progressing     Problem: Palliative Care  Goal: Enhanced Quality of Life  Outcome: Not Progressing     "

## 2025-04-16 NOTE — PLAN OF CARE
Goal Outcome Evaluation:    Plan of Care Reviewed With: patient, family    Overall Patient Progress: declining    Outcome Evaluation: On comfort cares. Family at bedside. Medication via PEG tube. PRN dilaudid given q4h. PRN atropine given. Felix intact. Family refused repositioning. Appears comfortable.    Problem: Adult Inpatient Plan of Care  Goal: Plan of Care Review  Description: The Plan of Care Review/Shift note should be completed every shift.  The Outcome Evaluation is a brief statement about your assessment that the patient is improving, declining, or no change.  This information will be displayed automatically on your shift  note.  Recent Flowsheet Documentation  Taken 4/16/2025 0200 by Jose Guy RN  Outcome Evaluation: On comfort cares. Family at bedside. Medication via PEG tube. PRN dilaudid given q4h. PRN atropine given. Felix intact. Family refused repositioning. Appears comfortable.  Plan of Care Reviewed With:   patient   family  Overall Patient Progress: declining

## 2025-04-17 PROCEDURE — 120N000001 HC R&B MED SURG/OB

## 2025-04-17 PROCEDURE — 250N000009 HC RX 250: Performed by: STUDENT IN AN ORGANIZED HEALTH CARE EDUCATION/TRAINING PROGRAM

## 2025-04-17 PROCEDURE — 250N000013 HC RX MED GY IP 250 OP 250 PS 637: Performed by: INTERNAL MEDICINE

## 2025-04-17 PROCEDURE — 99232 SBSQ HOSP IP/OBS MODERATE 35: CPT | Performed by: INTERNAL MEDICINE

## 2025-04-17 RX ADMIN — HYDROMORPHONE HYDROCHLORIDE 3 MG: 1 SOLUTION ORAL at 07:33

## 2025-04-17 RX ADMIN — ATROPINE SULFATE 2 DROP: 10 SOLUTION/ DROPS OPHTHALMIC at 03:09

## 2025-04-17 RX ADMIN — ATROPINE SULFATE 2 DROP: 10 SOLUTION/ DROPS OPHTHALMIC at 14:21

## 2025-04-17 RX ADMIN — ATROPINE SULFATE 2 DROP: 10 SOLUTION/ DROPS OPHTHALMIC at 10:54

## 2025-04-17 RX ADMIN — ATROPINE SULFATE 2 DROP: 10 SOLUTION/ DROPS OPHTHALMIC at 21:29

## 2025-04-17 RX ADMIN — HYDROMORPHONE HYDROCHLORIDE 3 MG: 1 SOLUTION ORAL at 03:04

## 2025-04-17 RX ADMIN — HYDROMORPHONE HYDROCHLORIDE 4 MG: 1 SOLUTION ORAL at 10:41

## 2025-04-17 RX ADMIN — HYDROMORPHONE HYDROCHLORIDE 3 MG: 1 SOLUTION ORAL at 21:29

## 2025-04-17 RX ADMIN — HYDROMORPHONE HYDROCHLORIDE 3 MG: 1 SOLUTION ORAL at 14:21

## 2025-04-17 RX ADMIN — ATROPINE SULFATE 2 DROP: 10 SOLUTION/ DROPS OPHTHALMIC at 18:20

## 2025-04-17 RX ADMIN — HYDROMORPHONE HYDROCHLORIDE 3 MG: 1 SOLUTION ORAL at 18:21

## 2025-04-17 ASSESSMENT — ACTIVITIES OF DAILY LIVING (ADL)
ADLS_ACUITY_SCORE: 100

## 2025-04-17 NOTE — PLAN OF CARE
Goal Outcome Evaluation:      Plan of Care Reviewed With: patient, child    Overall Patient Progress: declining    Outcome Evaluation: Comfort Care. Dilaudid and atropine given per family request. Pt somnolent, sometimes open eyes a bit. Reposition per family request.

## 2025-04-17 NOTE — PROGRESS NOTES
[facility]    Hospitalist Progress Note  Name: Jimmy Otto    MRN: [id number]  Provider:  Mina Holbrook DO  Date of Service: 04/17/2025    Summary of Stay: Jimmy Otto is a 75 year old male with extensive past medical history including major stroke with right sided hemiaplasia, aphasia, type 2 diabetes, hypertension, PEG tube, CKD with nephrotic range proteinuria and advanced diabetic nephropathy, anasarca, anemia, neurogenic bladder with chronic Felix catheter essentially total cares at baseline admitted on 3/15/2025 with low oxygen level and bradycardia with some recent vomiting.     Initial workup here showed hyponatremia, mild hyperkalemia with creatinine of 5.67, stable anemia and chest x-ray showing a large right pleural effusion with a small to moderate left pleural effusion as well as suspected bibasilar patchy infiltrates.     He initially was treated with aggressive IV diuretics, will, and antibiotics.  He did undergo thoracentesis and nephrology was consulted and he was felt to be a very poor dialysis candidate based on major comorbidities.  Furthermore, he was having difficulties tolerating enteral nutrition both apparently due to pain and likely reflux.  Native care has been following throughout.  Ultimately the decision was made not to pursue dialysis and to stop tube feeds due to intolerance and focus instead on comfort cares as he does not have anything clearly modifiable and seems to be nearing the end of his life.    TODAY'S PLAN: No changes today.  Continue comfort cares.  Patient anuric and with episodes of apnea.  Anticipate the patient will pass soon.    Problem List:   CKD 5, nephrotic range proteinuria: Not a dialysis candidate.  No plan on comfort cares.  --Palliative care following  --Comfort meds available, tolerating enteral Dilaudid/other meds  --Tube feeding stopped due to intolerance     Acute on chronic respiratory failure: Multifactorial due to volume overload, acute on  chronic diastolic CHF, bilateral pleural effusions.  --Initially treated with aggressive diuresis, right-sided thoracentesis.  -- Weaned to about 1 L of oxygen, now just using oxygen for comfort     Toxic metabolic encephalopathy: Suspected due to uremia.  Likely to continue to worsen.     Prior CVA with hemiparesis, neurogenic bladder, tube feeds status:   --Tube feeds discontinued as he was not tolerating these and based on goals of care.     Other medical issues no longer being actively managed:      Hyperkalemia-resolved  Hypokalemia  ALEX on CKD stage V  Nephrotic range proteinuria  Anasarca  Hyponatremia  Hypercalcemia   -Creatinine 1 year ago was near 1  -Most recent creatinine 4.07 on 2/26/2025.  Creatinine 5.67 on admission and potassium 5.8.  He was previously seen by nephrology and his daughter indicates that he was determined not to be a dialysis candidate.  Potassium had improved with shifting agents and Lokelma.  Creatinine has been steadily climbing over the past several weeks.  -Appreciate nephrology input.  As per nephrology not a dialysis candidate  -Focusing on comfort cares, not checking labs.     Bradycardia  Junctional rhythm  This was an issue during his recent hospitalization as well.  Deemed not to be candidate for pacemaker given above issues.     Demand ischemia  Troponin has been elevated during prior evaluation on 2/26/2025.  Troponin 103 on admission and flat, not consistent with acute coronary syndrome.  Likely demand ischemia from respiratory failure with poor enzyme clearance in the face of CKD.    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 nonverbal status  Chronic dysphagia, status post PEG tube placement, G-tube exchange on 2/24/2025  S/p indwelling Felix catheter  Dyslipidemia  Hypertension  Anemia and chronic kidney disease  Depression    I spent 47 minutes in reviewing this patient's labs, imaging,  "medications, medical history.  In addition time was spent interviewing the patient, communicating with family, and medical decision making.     DVT Prophylaxis: Comfort Care  Code Status: No CPR- Do NOT Intubate  Diet: Regular Diet Adult    Ordoñez Catheter: PRESENT, indication: End of life, Chronic ordoñez    Disposition: Medically Ready for Discharge: Ready Now    Goals to discharge include: pt passes  Family updated today: Yes      Interval History   Pt seen.  Daughter at bedside.  No issues.    -Data reviewed today: I personally reviewed all new labs and imaging results over the last 24 hours.     Physical Exam                      Vitals:    04/04/25 0610 04/05/25 0603 04/06/25 0403   Weight: 48.7 kg (107 lb 5.8 oz) 59.4 kg (130 lb 15.3 oz) 60.4 kg (133 lb 2.5 oz)     Vital Signs with Ranges     I/O last 3 completed shifts:  In: 60 [NG/GT:60]  Out: 75 [Urine:75]    No physical exam completed    Medications   Current Facility-Administered Medications   Medication Dose Route Frequency Provider Last Rate Last Admin     Current Facility-Administered Medications   Medication Dose Route Frequency Provider Last Rate Last Admin     Data     Laboratory:  No results for input(s): \"WBC\", \"HGB\", \"HCT\", \"MCV\", \"PLT\" in the last 168 hours.  No results for input(s): \"NA\", \"POTASSIUM\", \"CHLORIDE\", \"CO2\", \"ANIONGAP\", \"GLC\", \"BUN\", \"CR\", \"GFRESTIMATED\", \"GFRESTBLACK\", \"ARLETH\" in the last 168 hours.  No results for input(s): \"CULT\" in the last 168 hours.  Troponin I ES   Date Value Ref Range Status   08/16/2012 <0.012 0.000 - 0.034 ug/L Final       Imaging:  No results found for this or any previous visit (from the past 24 hours).      Mina Holbrook DO  [facility] Hospitalist  [address] [place] [address]  Securely message with [employer] (more info)  Text page via [employer] Paging/Directory   04/17/2025   "

## 2025-04-17 NOTE — PROGRESS NOTES
Care Management Follow Up    Length of Stay (days): 33    Expected Discharge Date: 04/18/2025     Concerns to be Addressed: discharge planning     Patient plan of care discussed at interdisciplinary rounds: Yes    Anticipated Discharge Disposition:                Anticipated Discharge Services:    Anticipated Discharge DME:      Patient/family educated on Medicare website which has current facility and service quality ratings:    Education Provided on the Discharge Plan:    Patient/Family in Agreement with the Plan:      Referrals Placed by CM/SW:    Private pay costs discussed: Not applicable    Discussed  Partnership in Safe Discharge Planning  document with patient/family: No     Handoff Completed: No, handoff not indicated or clinically appropriate    Additional Information:  Aicha, from [facility] ([phone]) called SW to follow up on a GIP referral made on 4/15. After chart review and speaking with pt's RN, SW confirmed that pt is not experiencing uncontrollable pain. At this time pt does not qualify for GIP. Aicha stated that she would check in again tomorrow, prior to the weekend.     Next Steps: SW to continue to follow for discharge.    JOSE ALEJANDRO Parsons, LGSW  [facility] Care Coordinator/ Med Surg [address] [place] [address]  [phone]

## 2025-04-17 NOTE — PLAN OF CARE
"Goal Outcome Evaluation:      Plan of Care Reviewed With: patient    Overall Patient Progress: no changeOverall Patient Progress: no change    Outcome Evaluation: Comfort cares continued, 3mg dilaudid given for comfort per family request, atropine for secretions, weight shifting assisted, ppp      Problem: Adult Inpatient Plan of Care  Goal: Plan of Care Review  Description: The Plan of Care Review/Shift note should be completed every shift.  The Outcome Evaluation is a brief statement about your assessment that the patient is improving, declining, or no change.  This information will be displayed automatically on your shiftnote.  Outcome: Progressing  Flowsheets (Taken 4/17/2025 0322)  Outcome Evaluation: Comfort cares continued, 3mg dilaudid given for comfort per family request, atropine for secretions, weight shifting assisted, ppp  Plan of Care Reviewed With: patient  Overall Patient Progress: no change  Goal: Patient-Specific Goal (Individualized)  Description: You can add care plan individualizations to a care plan. Examples of Individualization might be:  \"Parent requests to be called daily at 9am for status\", \"I have a hard time hearing out of my right ear\", or \"Do not touch me to wake me up as it startlesme\".  Outcome: Progressing  Goal: Absence of Hospital-Acquired Illness or Injury  Outcome: Progressing  Intervention: Identify and Manage Fall Risk  Recent Flowsheet Documentation  Taken 4/17/2025 0300 by Manasa Hilton RN  Safety Promotion/Fall Prevention:   clutter free environment maintained   room near nurse's station   room organization consistent   safety round/check completed  Intervention: Prevent Skin Injury  Recent Flowsheet Documentation  Taken 4/17/2025 0300 by Manasa Hilton RN  Body Position: weight shifting  Intervention: Prevent Infection  Recent Flowsheet Documentation  Taken 4/17/2025 0300 by Manasa Hilton, RN  Infection Prevention:   environmental surveillance performed   rest/sleep " promoted   single patient room provided  Taken 4/17/2025 0010 by Manasa Hilton, RN  Infection Prevention:   environmental surveillance performed   rest/sleep promoted   single patient room provided  Goal: Optimal Comfort and Wellbeing  Outcome: Progressing  Goal: Readiness for Transition of Care  Outcome: Progressing     Problem: Pain Acute  Goal: Optimal Pain Control and Function  Outcome: Progressing  Intervention: Prevent or Manage Pain  Recent Flowsheet Documentation  Taken 4/17/2025 0300 by Manasa Hilton RN  Sleep/Rest Enhancement:   comfort measures   family presence promoted   therapeutic touch utilized  Medication Review/Management: medications reviewed     Problem: Palliative Care  Goal: Enhanced Quality of Life  Outcome: Progressing  Intervention: Maximize Comfort  Recent Flowsheet Documentation  Taken 4/17/2025 0300 by Manasa Hilton, RN  Oral Care:   lip/mouth moisturizer applied   swabbed with sterile water  Intervention: Optimize Function  Recent Flowsheet Documentation  Taken 4/17/2025 0300 by Manasa Hilton RN  Sleep/Rest Enhancement:   comfort measures   family presence promoted   therapeutic touch utilized

## 2025-04-17 NOTE — PLAN OF CARE
"Goal Outcome Evaluation:      Plan of Care Reviewed With: patient    Overall Patient Progress: no change    Outcome Evaluation: Continue comfort care. 3mg dilaudid given per family request for comfort measure. Atropine given to help with secretion. minimal UOP. Shallow breathing.      Problem: Adult Inpatient Plan of Care  Goal: Plan of Care Review  Description: The Plan of Care Review/Shift note should be completed every shift.  The Outcome Evaluation is a brief statement about your assessment that the patient is improving, declining, or no change.  This information will be displayed automatically on your shiftnote.  Outcome: Progressing  Flowsheets (Taken 4/16/2025 2259)  Outcome Evaluation: Continue comfort care. 3mg dilaudid given per family request for comfort measure. Atropine given to help with secretion. minimal UOP. Shallow breathing.  Plan of Care Reviewed With: patient  Overall Patient Progress: no change  Goal: Patient-Specific Goal (Individualized)  Description: You can add care plan individualizations to a care plan. Examples of Individualization might be:  \"Parent requests to be called daily at 9am for status\", \"I have a hard time hearing out of my right ear\", or \"Do not touch me to wake me up as it startlesme\".  Outcome: Progressing  Goal: Absence of Hospital-Acquired Illness or Injury  Outcome: Progressing  Intervention: Identify and Manage Fall Risk  Recent Flowsheet Documentation  Taken 4/16/2025 2039 by Rosalva Talley RN  Safety Promotion/Fall Prevention:   safety round/check completed   room near nurse's station  Intervention: Prevent Skin Injury  Recent Flowsheet Documentation  Taken 4/16/2025 2315 by Rosalva Talley RN  Body Position: weight shifting  Taken 4/16/2025 2036 by Rosalva Talley RN  Body Position: (family refused reposition at this time) other (see comments)  Intervention: Prevent Infection  Recent Flowsheet Documentation  Taken 4/16/2025 2039 by Rosalva Talley" RN  Infection Prevention:   rest/sleep promoted   single patient room provided  Goal: Optimal Comfort and Wellbeing  Outcome: Progressing  Goal: Readiness for Transition of Care  Outcome: Progressing     Problem: Pain Acute  Goal: Optimal Pain Control and Function  Outcome: Progressing  Intervention: Prevent or Manage Pain  Recent Flowsheet Documentation  Taken 4/16/2025 2039 by Rosalva Talley RN  Sleep/Rest Enhancement: comfort measures  Medication Review/Management: medications reviewed     Problem: Palliative Care  Goal: Enhanced Quality of Life  Outcome: Progressing  Intervention: Maximize Comfort  Recent Flowsheet Documentation  Taken 4/16/2025 2315 by Rosalva Talley RN  Oral Care:   lip/mouth moisturizer applied   swabbed with sterile water  Intervention: Optimize Function  Recent Flowsheet Documentation  Taken 4/16/2025 2039 by Rosalva Talley RN  Sleep/Rest Enhancement: comfort measures

## 2025-04-18 PROCEDURE — 99239 HOSP IP/OBS DSCHRG MGMT >30: CPT | Performed by: INTERNAL MEDICINE

## 2025-04-18 PROCEDURE — 250N000013 HC RX MED GY IP 250 OP 250 PS 637: Performed by: INTERNAL MEDICINE

## 2025-04-18 PROCEDURE — 250N000011 HC RX IP 250 OP 636: Performed by: INTERNAL MEDICINE

## 2025-04-18 RX ADMIN — HYDROMORPHONE HYDROCHLORIDE 3 MG: 1 SOLUTION ORAL at 02:34

## 2025-04-18 RX ADMIN — ATROPINE SULFATE 2 DROP: 10 SOLUTION/ DROPS OPHTHALMIC at 02:35

## 2025-04-18 RX ADMIN — ONDANSETRON 4 MG: 4 TABLET, ORALLY DISINTEGRATING ORAL at 03:42

## 2025-04-18 ASSESSMENT — ACTIVITIES OF DAILY LIVING (ADL)
ADLS_ACUITY_SCORE: 100

## 2025-04-18 NOTE — DISCHARGE SUMMARY
[facility]    Death Summary  Hospitalist    Date of Admission:  3/15/2025  Date of Death:         4/18/2025  Provider Completing Death Summary: Mina Holbrook DO     Cause of Death:  Cardiopulmonary Arrest secondary to acute kidney failure secondary to type 2 diabetes mellitus and hypertension    DISCHARGE DIAGNOSES  CKD 5, nephrotic range proteinuria: Not a dialysis candidate.  No plan on comfort cares.  --Palliative care following  --Comfort meds available, tolerating enteral Dilaudid/other meds  --Tube feeding stopped due to intolerance     Acute on chronic respiratory failure: Multifactorial due to volume overload, acute on chronic diastolic CHF, bilateral pleural effusions.  --Initially treated with aggressive diuresis, right-sided thoracentesis.  -- Weaned to about 1 L of oxygen, now just using oxygen for comfort     Toxic metabolic encephalopathy: Suspected due to uremia.  Likely to continue to worsen.     Prior CVA with hemiparesis, neurogenic bladder, tube feeds status:   --Tube feeds discontinued as he was not tolerating these and based on goals of care.     Other medical issues no longer being actively managed:      Hyperkalemia-resolved  Hypokalemia  ALEX on CKD stage V  Nephrotic range proteinuria  Anasarca  Hyponatremia  Hypercalcemia   -Creatinine 1 year ago was near 1  -Most recent creatinine 4.07 on 2/26/2025.  Creatinine 5.67 on admission and potassium 5.8.  He was previously seen by nephrology and his daughter indicates that he was determined not to be a dialysis candidate.  Potassium had improved with shifting agents and Lokelma.  Creatinine has been steadily climbing over the past several weeks.  -Appreciate nephrology input.  As per nephrology not a dialysis candidate  -Focusing on comfort cares, not checking labs.     Bradycardia  Junctional rhythm  This was an issue during his recent hospitalization as well.  Deemed not to be candidate for pacemaker given above  issues.     Demand ischemia  Troponin has been elevated during prior evaluation on 2/26/2025.  Troponin 103 on admission and flat, not consistent with acute coronary syndrome.  Likely demand ischemia from respiratory failure with poor enzyme clearance in the face of CKD.    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 nonverbal status  Chronic dysphagia, status post PEG tube placement, G-tube exchange on 2/24/2025  S/p indwelling Felix catheter  Dyslipidemia  Hypertension  Anemia and chronic kidney disease  Depression    HOSPITAL COURSE  Jimmy Otto is a 75 year old male with extensive past medical history including major stroke with right sided hemiaplasia, aphasia, type 2 diabetes, hypertension, PEG tube, CKD with nephrotic range proteinuria and advanced diabetic nephropathy, anasarca, anemia, neurogenic bladder with chronic Felix catheter essentially total cares at baseline admitted on 3/15/2025 with low oxygen level and bradycardia with some recent vomiting.     Initial workup here showed hyponatremia, mild hyperkalemia with creatinine of 5.67, stable anemia and chest x-ray showing a large right pleural effusion with a small to moderate left pleural effusion as well as suspected bibasilar patchy infiltrates.     He initially was treated with aggressive IV diuretics, will, and antibiotics.  He did undergo thoracentesis and nephrology was consulted and he was felt to be a very poor dialysis candidate based on major comorbidities.  Furthermore, he was having difficulties tolerating enteral nutrition both apparently due to pain and likely reflux.  Native care has been following throughout.  Ultimately the decision was made not to pursue dialysis and to stop tube feeds due to intolerance and focus instead on comfort cares as he does not have anything clearly modifiable and seems to be nearing the end of his life.    Mina Holbrook, DO    Primary Care  Physician   Nallely Fong    Consultations This Hospital Stay   NEPHROLOGY IP CONSULT  NUTRITION SERVICES ADULT IP CONSULT  CARE MANAGEMENT / SOCIAL WORK IP CONSULT  PHARMACY IP CONSULT  PALLIATIVE CARE ADULT IP CONSULT  CHILD FAMILY LIFE IP CONSULT  CARE MANAGEMENT / SOCIAL WORK IP CONSULT  SPIRITUAL HEALTH SERVICES IP CONSULT  CHILD FAMILY LIFE IP CONSULT  SPIRITUAL HEALTH SERVICES IP CONSULT  SPIRITUAL HEALTH SERVICES IP CONSULT  [facility] INPATIENT HOSPICE ADULT CONSULT    Time Spent on this Encounter   I, Mina Holbrook DO, personally saw the patient today and spent greater than 30 minutes discharging this patient.    Data   Most Recent 3 CBC's:  Recent Labs   Lab Test 03/18/25  0703 03/17/25  0719 03/16/25  0555   WBC 5.2 5.4 5.2   HGB 8.6* 8.2* 8.5*   MCV 84 83 84    160 141*      Most Recent 3 BMP's:  Recent Labs   Lab Test 04/06/25  0748 04/06/25  0356 04/05/25  2353 04/01/25  1157 04/01/25  0911 03/30/25  0915 03/30/25  0548 03/29/25  0906 03/29/25  0553 03/27/25  1556 03/27/25  1536 03/25/25  0945 03/25/25  0718   NA  --   --   --   --  141  --   --   --   --   --  134*  --  130*   POTASSIUM  --   --   --   --  3.2*  --  3.4  --  3.5   < > 3.8   < > 3.2*   CHLORIDE  --   --   --   --  94*  --   --   --   --   --  89*  --  86*   CO2  --   --   --   --  35*  --   --   --   --   --  32*  --  34*   BUN  --   --   --   --  139.8*  --   --   --   --   --  141.5*  --  119.6*   CR  --   --   --   --  6.35*  --   --   --   --   --  5.94*  --  5.48*   ANIONGAP  --   --   --   --  12  --   --   --   --   --  13  --  10   ARLETH  --   --   --   --  12.3*  --   --   --   --   --  12.5*  --  12.2*   * 244* 257*   < > 241*   < >  --    < >  --    < > 215*   < > 210*    < > = values in this interval not displayed.     Most Recent 2 LFT's:  Recent Labs   Lab Test 03/15/25  1150 02/17/25  0428   AST 31 37   ALT 36 36   ALKPHOS 140 117   BILITOTAL 0.3 0.3     Most Recent INR's and Anticoagulation Dosing  History:  Anticoagulation Dose History          Latest Ref Rng & Units 8/13/2012 8/16/2012 9/14/2021 9/16/2021 11/1/2021   Recent Dosing and Labs   INR 0.85 - 1.15 0.96  1.00  0.94        Effective 7/11/2021, the reference range for this assay has changed. 1.03        Effective 7/11/2021, the reference range for this assay has changed. 0.96      Most Recent 3 Troponin's:  Recent Labs   Lab Test 10/13/21  2152 09/18/21  1535 09/14/21  2142   TROPONIN <0.015 <0.015 <0.015     Most Recent Cholesterol Panel:  Recent Labs   Lab Test 02/07/24  1147   CHOL 97   LDL 49  48   HDL 30*   TRIG 88     Most Recent 6 Bacteria Isolates From Any Culture (See EPIC Reports for Culture Details):No lab results found.  Most Recent TSH, T4 and A1c Labs:  Recent Labs   Lab Test 02/16/25  1515   TSH 8.72*   T4 1.47   A1C 8.8*     Results for orders placed or performed during the hospital encounter of 03/15/25   CT Chest Abdomen Pelvis w/o Contrast    Narrative    EXAM: CT CHEST ABDOMEN PELVIS W/O CONTRAST  LOCATION: [facility]  DATE: 3/15/2025    INDICATION: hypoxia, abdominal pain, recent vomiting, decreased urine output  COMPARISON: 2/23/2025  TECHNIQUE: CT scan of the chest, abdomen, and pelvis was performed without IV contrast. Multiplanar reformats were obtained. Dose reduction techniques were used.   CONTRAST: None.    FINDINGS:   LUNGS AND PLEURA: Large right pleural effusion with small moderate left pleural effusion and bibasilar consolidation unchanged. Patchy infiltrates in the central aspect of both lungs again seen progressed on the left and improved on the right.    MEDIASTINUM/AXILLAE: Normal.    CORONARY ARTERY CALCIFICATION: Moderate.    HEPATOBILIARY: Normal.    PANCREAS: Changes of chronic calcific pancreatitis with stable cystic change throughout the pancreas.    SPLEEN: Normal.    ADRENAL GLANDS: Normal.    KIDNEYS/BLADDER: No hydronephrosis.    BOWEL: Normal.    LYMPH NODES:  Normal.    VASCULATURE: Normal.    PELVIC ORGANS: Felix catheter within decompressed urinary bladder.    MUSCULOSKELETAL: [Anasarca which has progressed.      Impression    IMPRESSION:  1.  Large right pleural effusion with small to moderate left pleural effusion. Bibasilar consolidation. Patchy infiltrates in the central aspect of both lungs progressed on the left and improved on the right.  2.  Changes of chronic calcific pancreatitis with stable cystic change throughout the pancreas.  3.  Felix catheter within decompressed urinary bladder.  4.  Anasarca has progressed.     Head CT w/o contrast    Narrative    EXAM: CT HEAD W/O CONTRAST  LOCATION: [facility]  DATE: 3/15/2025    INDICATION: vomiting, nonverbal, unclera mental status  COMPARISON: 09/14/2021  TECHNIQUE: Routine CT Head without IV contrast. Multiplanar reformats. Dose reduction techniques were used.    FINDINGS:  INTRACRANIAL CONTENTS: No intracranial hemorrhage, extraaxial collection, or mass effect.  No CT evidence of acute infarct. Chronic appearing infarction within the medial/anterior right thalamus and likely within the left corona radiata. Moderate to   advanced degree of cerebral parenchymal volume loss and presumed small vessel ischemic disease.    VISUALIZED ORBITS/SINUSES/MASTOIDS: No intraorbital abnormality. No paranasal sinus mucosal disease. No middle ear or mastoid effusion.    BONES/SOFT TISSUES: No acute abnormality.      Impression    IMPRESSION:  1.  No CT evidence for acute intracranial process.  2.  Brain atrophy and presumed chronic microvascular ischemic changes as above.   US Thoracentesis    Narrative    EXAM:   1. RIGHT THORACENTESIS  2. ULTRASOUND GUIDANCE  LOCATION: [facility]  DATE: 3/17/2025    INDICATION: Pleural effusion.    PROCEDURE: Informed consent obtained. Time out performed. The chest was prepped and draped in sterile fashion. 10 mL of 1 % lidocaine was infused into  the local soft tissues. Under direct ultrasound guidance, a 5 Serbian catheter system was placed into   the pleural effusion.     0.9 liters of clear yellow fluid were removed and sent to lab, if requested.    Patient tolerated procedure well.    Ultrasound imaging was obtained and placed in the patient's permanent medical record.      Impression    IMPRESSION:  Status post right ultrasound-guided thoracentesis.    Reference CPT Code: 89083   XR Abdomen Port 1 View    Narrative    EXAM: XR ABDOMEN PORT 1 VIEW  LOCATION: [facility]  DATE: 3/29/2025    INDICATION: Bowel obstruction versus ileus.    COMPARISON: CT chest abdomen pelvis 3/15/2025      Impression    IMPRESSION: Negative abdomen. Bowel gas pattern is normal. Nothing for obstruction or ileus. A gastrostomy tube projects over the gastric body. No evidence for renal stones.

## 2025-04-18 NOTE — PROGRESS NOTES
DEATH PRONOUNCEMENT    This writer was paged to evaluate the patient due to unresponsiveness. The patient has absent pupillary reflexes. Heart tones and breath sounds are absent. There is no response to verbal or painful stimuli. Extremities are cool without appreciable pulses. Time of death is 0850 on 4/18/2025.    Family at bedside.    Mina Holbrook DO  [facility] Hospitalist  [address] [place] [address]  04/18/2025

## 2025-04-18 NOTE — PLAN OF CARE
"Pt comfort cares. Remains lethargic and resting comfortably. PRN atropine and dilaudid given per orders. 1 emesis event occurred overnight, PRN zofran given. Felix and G tube remain in tact, catheter cares done. Turn and repo done upon family request. POC orders maintained.     Problem: Adult Inpatient Plan of Care  Goal: Plan of Care Review  Description: The Plan of Care Review/Shift note should be completed every shift.  The Outcome Evaluation is a brief statement about your assessment that the patient is improving, declining, or no change.  This information will be displayed automatically on your shiftnote.  Outcome: Not Progressing  Flowsheets (Taken 4/18/2025 0702)  Overall Patient Progress: declining  Goal: Patient-Specific Goal (Individualized)  Description: You can add care plan individualizations to a care plan. Examples of Individualization might be:  \"Parent requests to be called daily at 9am for status\", \"I have a hard time hearing out of my right ear\", or \"Do not touch me to wake me up as it startlesme\".  Outcome: Not Progressing  Goal: Absence of Hospital-Acquired Illness or Injury  Outcome: Not Progressing  Intervention: Identify and Manage Fall Risk  Recent Flowsheet Documentation  Taken 4/18/2025 0400 by Alie Hector RN  Safety Promotion/Fall Prevention: safety round/check completed  Intervention: Prevent and Manage VTE (Venous Thromboembolism) Risk  Recent Flowsheet Documentation  Taken 4/18/2025 0400 by Alie Hector RN  VTE Prevention/Management: SCDs off (sequential compression devices)  Intervention: Prevent Infection  Recent Flowsheet Documentation  Taken 4/18/2025 0400 by Alie Hector RN  Infection Prevention:   single patient room provided   rest/sleep promoted   hand hygiene promoted  Goal: Optimal Comfort and Wellbeing  Outcome: Not Progressing  Goal: Readiness for Transition of Care  Outcome: Not Progressing     Problem: Pain Acute  Goal: Optimal Pain Control and " Function  Outcome: Not Progressing     Problem: Palliative Care  Goal: Enhanced Quality of Life  Outcome: Not Progressing   Goal Outcome Evaluation:           Overall Patient Progress: decliningOverall Patient Progress: declining

## 2025-04-18 NOTE — PLAN OF CARE
Shift [id number]  Comfort cares. Somnolent. PAINAD score 0. PRN dilaudid per G tube and PRN atropine drops given q3h per family request. Turn and repo at family request. Felix in place, catheter cares done. No meals. Bedrest. Family at bedside.        Goal Outcome Evaluation:      Plan of Care Reviewed With: patient    Overall Patient Progress: decliningOverall Patient Progress: declining    Outcome Evaluation: Comfort cares. Somnolent. PAINAD score 0. PRN dilaudid per G tube and PRN atropine drops given q3h per family request. Turn and repo at family request. Felix. Family at bedside.      Problem: Adult Inpatient Plan of Care  Goal: Plan of Care Review  Description: The Plan of Care Review/Shift note should be completed every shift.  The Outcome Evaluation is a brief statement about your assessment that the patient is improving, declining, or no change.  This information will be displayed automatically on your shift  note.  Recent Flowsheet Documentation  Taken 4/17/2025 2040 by Lily Parker RN  Outcome Evaluation: Comfort cares. Somnolent. PAINAD score 0. PRN dilaudid per G tube and PRN atropine drops given q3h per family request. Turn and repo at family request. Felix. Family at bedside.  Plan of Care Reviewed With: patient  Overall Patient Progress: declining  Goal: Absence of Hospital-Acquired Illness or Injury  Intervention: Identify and Manage Fall Risk  Recent Flowsheet Documentation  Taken 4/17/2025 1800 by Lily Parker RN  Safety Promotion/Fall Prevention:   activity supervised   assistive device/personal items within reach   clutter free environment maintained   safety round/check completed  Taken 4/17/2025 1530 by Lily Parker RN  Safety Promotion/Fall Prevention: safety round/check completed  Intervention: Prevent Skin Injury  Recent Flowsheet Documentation  Taken 4/17/2025 1800 by Lily Parker, RN  Body Position: legs elevated  Taken 4/17/2025 1530 by Lily Parker, RN  Body Position: legs  elevated  Intervention: Prevent Infection  Recent Flowsheet Documentation  Taken 4/17/2025 1800 by Lily Parker, RN  Infection Prevention:   cohorting utilized   environmental surveillance performed   equipment surfaces disinfected   hand hygiene promoted   personal protective equipment utilized   rest/sleep promoted   single patient room provided  Taken 4/17/2025 1530 by Lily Parker, RN  Infection Prevention:   cohorting utilized   environmental surveillance performed   equipment surfaces disinfected   hand hygiene promoted   personal protective equipment utilized   rest/sleep promoted   single patient room provided

## (undated) RX ORDER — LIDOCAINE HYDROCHLORIDE 10 MG/ML
INJECTION, SOLUTION INFILTRATION; PERINEURAL
Status: DISPENSED
Start: 2021-11-01

## (undated) RX ORDER — LIDOCAINE HYDROCHLORIDE 20 MG/ML
JELLY TOPICAL
Status: DISPENSED
Start: 2025-02-24

## (undated) RX ORDER — FENTANYL CITRATE 50 UG/ML
INJECTION, SOLUTION INTRAMUSCULAR; INTRAVENOUS
Status: DISPENSED
Start: 2021-11-01

## (undated) RX ORDER — LIDOCAINE HYDROCHLORIDE 20 MG/ML
JELLY TOPICAL
Status: DISPENSED
Start: 2021-11-01

## (undated) RX ORDER — LIDOCAINE HYDROCHLORIDE 20 MG/ML
JELLY TOPICAL
Status: DISPENSED
Start: 2021-10-28

## (undated) RX ORDER — LIDOCAINE HYDROCHLORIDE 20 MG/ML
JELLY TOPICAL
Status: DISPENSED
Start: 2021-10-29